# Patient Record
Sex: MALE | Race: WHITE | NOT HISPANIC OR LATINO | Employment: OTHER | URBAN - METROPOLITAN AREA
[De-identification: names, ages, dates, MRNs, and addresses within clinical notes are randomized per-mention and may not be internally consistent; named-entity substitution may affect disease eponyms.]

---

## 2017-08-31 ENCOUNTER — APPOINTMENT (EMERGENCY)
Dept: RADIOLOGY | Facility: HOSPITAL | Age: 72
End: 2017-08-31
Payer: MEDICARE

## 2017-08-31 ENCOUNTER — HOSPITAL ENCOUNTER (EMERGENCY)
Facility: HOSPITAL | Age: 72
Discharge: HOME | End: 2017-08-31
Attending: EMERGENCY MEDICINE | Admitting: EMERGENCY MEDICINE
Payer: MEDICARE

## 2017-08-31 VITALS
SYSTOLIC BLOOD PRESSURE: 171 MMHG | OXYGEN SATURATION: 95 % | RESPIRATION RATE: 20 BRPM | TEMPERATURE: 97.6 F | HEART RATE: 57 BPM | DIASTOLIC BLOOD PRESSURE: 94 MMHG | WEIGHT: 216 LBS

## 2017-08-31 DIAGNOSIS — R07.9 CHEST PAIN: Primary | ICD-10-CM

## 2017-08-31 DIAGNOSIS — T14.8XXA MUSCLE STRAIN: ICD-10-CM

## 2017-08-31 DIAGNOSIS — J18.9 PNEUMONIA: ICD-10-CM

## 2017-08-31 LAB
ALBUMIN SERPL BCP-MCNC: 3.7 G/DL (ref 3.5–5)
ALP SERPL-CCNC: 75 U/L (ref 46–116)
ALT SERPL W P-5'-P-CCNC: 26 U/L (ref 12–78)
ANION GAP SERPL CALCULATED.3IONS-SCNC: 7 MMOL/L (ref 4–13)
AST SERPL W P-5'-P-CCNC: 18 U/L (ref 5–45)
BASOPHILS # BLD AUTO: 0 THOUSANDS/ΜL (ref 0–0.1)
BASOPHILS NFR BLD AUTO: 0 % (ref 0–1)
BILIRUB SERPL-MCNC: 0.5 MG/DL (ref 0.2–1)
BUN SERPL-MCNC: 26 MG/DL (ref 5–25)
CALCIUM SERPL-MCNC: 9.3 MG/DL (ref 8.3–10.1)
CHLORIDE SERPL-SCNC: 100 MMOL/L (ref 100–108)
CO2 SERPL-SCNC: 31 MMOL/L (ref 21–32)
CREAT SERPL-MCNC: 1.03 MG/DL (ref 0.6–1.3)
DEPRECATED D DIMER PPP: 730 NG/ML (FEU) (ref 190–520)
EOSINOPHIL # BLD AUTO: 0.2 THOUSAND/ΜL (ref 0–0.61)
EOSINOPHIL NFR BLD AUTO: 2 % (ref 0–6)
ERYTHROCYTE [DISTWIDTH] IN BLOOD BY AUTOMATED COUNT: 16.3 % (ref 11.6–15.1)
GFR SERPL CREATININE-BSD FRML MDRD: 72 ML/MIN/1.73SQ M
GLUCOSE SERPL-MCNC: 113 MG/DL (ref 65–140)
HCT VFR BLD AUTO: 38.9 % (ref 42–52)
HGB BLD-MCNC: 12.9 G/DL (ref 14–18)
LIPASE SERPL-CCNC: 120 U/L (ref 73–393)
LYMPHOCYTES # BLD AUTO: 1.3 THOUSANDS/ΜL (ref 0.6–4.47)
LYMPHOCYTES NFR BLD AUTO: 13 % (ref 14–44)
MAGNESIUM SERPL-MCNC: 1.8 MG/DL (ref 1.6–2.6)
MCH RBC QN AUTO: 30.3 PG (ref 27–31)
MCHC RBC AUTO-ENTMCNC: 33.3 G/DL (ref 31.4–37.4)
MCV RBC AUTO: 91 FL (ref 82–98)
MONOCYTES # BLD AUTO: 0.8 THOUSAND/ΜL (ref 0.17–1.22)
MONOCYTES NFR BLD AUTO: 8 % (ref 4–12)
NEUTROPHILS # BLD AUTO: 7.8 THOUSANDS/ΜL (ref 1.85–7.62)
NEUTS SEG NFR BLD AUTO: 77 % (ref 43–75)
NRBC BLD AUTO-RTO: 0 /100 WBCS
PLATELET # BLD AUTO: 178 THOUSANDS/UL (ref 130–400)
PMV BLD AUTO: 7.1 FL (ref 8.9–12.7)
POTASSIUM SERPL-SCNC: 4.6 MMOL/L (ref 3.5–5.3)
PROT SERPL-MCNC: 7.3 G/DL (ref 6.4–8.2)
RBC # BLD AUTO: 4.27 MILLION/UL (ref 4.7–6.1)
SODIUM SERPL-SCNC: 138 MMOL/L (ref 136–145)
TROPONIN I SERPL-MCNC: <0.02 NG/ML
TROPONIN I SERPL-MCNC: <0.02 NG/ML
WBC # BLD AUTO: 10.1 THOUSAND/UL (ref 4.8–10.8)

## 2017-08-31 PROCEDURE — 36415 COLL VENOUS BLD VENIPUNCTURE: CPT | Performed by: EMERGENCY MEDICINE

## 2017-08-31 PROCEDURE — 71275 CT ANGIOGRAPHY CHEST: CPT

## 2017-08-31 PROCEDURE — 80053 COMPREHEN METABOLIC PANEL: CPT | Performed by: EMERGENCY MEDICINE

## 2017-08-31 PROCEDURE — 83690 ASSAY OF LIPASE: CPT | Performed by: EMERGENCY MEDICINE

## 2017-08-31 PROCEDURE — 85379 FIBRIN DEGRADATION QUANT: CPT | Performed by: EMERGENCY MEDICINE

## 2017-08-31 PROCEDURE — 96374 THER/PROPH/DIAG INJ IV PUSH: CPT

## 2017-08-31 PROCEDURE — 74175 CTA ABDOMEN W/CONTRAST: CPT

## 2017-08-31 PROCEDURE — 83735 ASSAY OF MAGNESIUM: CPT | Performed by: EMERGENCY MEDICINE

## 2017-08-31 PROCEDURE — 71010 HB CHEST X-RAY 1 VIEW FRONTAL (PORTABLE): CPT

## 2017-08-31 PROCEDURE — 99285 EMERGENCY DEPT VISIT HI MDM: CPT

## 2017-08-31 PROCEDURE — 85025 COMPLETE CBC W/AUTO DIFF WBC: CPT | Performed by: EMERGENCY MEDICINE

## 2017-08-31 PROCEDURE — 84484 ASSAY OF TROPONIN QUANT: CPT | Performed by: EMERGENCY MEDICINE

## 2017-08-31 PROCEDURE — 96375 TX/PRO/DX INJ NEW DRUG ADDON: CPT

## 2017-08-31 PROCEDURE — 94640 AIRWAY INHALATION TREATMENT: CPT

## 2017-08-31 PROCEDURE — 93005 ELECTROCARDIOGRAM TRACING: CPT | Performed by: EMERGENCY MEDICINE

## 2017-08-31 RX ORDER — NITROGLYCERIN 0.4 MG/1
0.4 TABLET SUBLINGUAL ONCE
Status: DISCONTINUED | OUTPATIENT
Start: 2017-08-31 | End: 2017-08-31

## 2017-08-31 RX ORDER — ALBUTEROL SULFATE 90 UG/1
2 AEROSOL, METERED RESPIRATORY (INHALATION) EVERY 4 HOURS PRN
Qty: 1 INHALER | Refills: 0 | Status: SHIPPED | OUTPATIENT
Start: 2017-08-31

## 2017-08-31 RX ORDER — CELECOXIB 200 MG/1
200 CAPSULE ORAL 2 TIMES DAILY
COMMUNITY
End: 2021-04-16 | Stop reason: HOSPADM

## 2017-08-31 RX ORDER — MORPHINE SULFATE 2 MG/ML
2 INJECTION, SOLUTION INTRAMUSCULAR; INTRAVENOUS ONCE
Status: DISCONTINUED | OUTPATIENT
Start: 2017-08-31 | End: 2017-08-31

## 2017-08-31 RX ORDER — CYCLOBENZAPRINE HCL 10 MG
5 TABLET ORAL 2 TIMES DAILY PRN
Qty: 6 TABLET | Refills: 0 | Status: SHIPPED | OUTPATIENT
Start: 2017-08-31 | End: 2021-05-02 | Stop reason: HOSPADM

## 2017-08-31 RX ORDER — ATORVASTATIN CALCIUM 20 MG/1
20 TABLET, FILM COATED ORAL
COMMUNITY

## 2017-08-31 RX ORDER — FOLIC ACID 1 MG/1
1 TABLET ORAL DAILY
COMMUNITY
End: 2021-06-28

## 2017-08-31 RX ORDER — NAPROXEN 500 MG/1
500 TABLET ORAL 2 TIMES DAILY WITH MEALS
Qty: 10 TABLET | Refills: 0 | Status: SHIPPED | OUTPATIENT
Start: 2017-08-31 | End: 2021-04-27 | Stop reason: HOSPADM

## 2017-08-31 RX ORDER — IPRATROPIUM BROMIDE AND ALBUTEROL SULFATE 2.5; .5 MG/3ML; MG/3ML
3 SOLUTION RESPIRATORY (INHALATION) ONCE
Status: COMPLETED | OUTPATIENT
Start: 2017-08-31 | End: 2017-08-31

## 2017-08-31 RX ORDER — MORPHINE SULFATE 4 MG/ML
4 INJECTION, SOLUTION INTRAMUSCULAR; INTRAVENOUS ONCE
Status: COMPLETED | OUTPATIENT
Start: 2017-08-31 | End: 2017-08-31

## 2017-08-31 RX ORDER — METHYLPREDNISOLONE SODIUM SUCCINATE 125 MG/2ML
125 INJECTION, POWDER, LYOPHILIZED, FOR SOLUTION INTRAMUSCULAR; INTRAVENOUS DAILY
Status: DISCONTINUED | OUTPATIENT
Start: 2017-08-31 | End: 2017-08-31 | Stop reason: HOSPADM

## 2017-08-31 RX ORDER — AZITHROMYCIN 250 MG/1
250 TABLET, FILM COATED ORAL DAILY
Qty: 6 TABLET | Refills: 0 | Status: SHIPPED | OUTPATIENT
Start: 2017-08-31 | End: 2017-09-06

## 2017-08-31 RX ADMIN — METHYLPREDNISOLONE SODIUM SUCCINATE 125 MG: 125 INJECTION, POWDER, FOR SOLUTION INTRAMUSCULAR; INTRAVENOUS at 11:07

## 2017-08-31 RX ADMIN — MORPHINE SULFATE 4 MG: 4 INJECTION, SOLUTION INTRAMUSCULAR; INTRAVENOUS at 11:07

## 2017-08-31 RX ADMIN — IPRATROPIUM BROMIDE AND ALBUTEROL SULFATE 3 ML: .5; 3 SOLUTION RESPIRATORY (INHALATION) at 13:32

## 2017-08-31 RX ADMIN — IOHEXOL 100 ML: 350 INJECTION, SOLUTION INTRAVENOUS at 12:01

## 2017-09-03 LAB
ATRIAL RATE: 61 BPM
P AXIS: 53 DEGREES
PR INTERVAL: 140 MS
QRS AXIS: 41 DEGREES
QRSD INTERVAL: 100 MS
QT INTERVAL: 402 MS
QTC INTERVAL: 404 MS
T WAVE AXIS: 48 DEGREES
VENTRICULAR RATE: 61 BPM

## 2017-09-07 ENCOUNTER — TRANSCRIBE ORDERS (OUTPATIENT)
Dept: ADMINISTRATIVE | Facility: HOSPITAL | Age: 72
End: 2017-09-07

## 2017-09-07 DIAGNOSIS — R07.9 CHEST PAIN, UNSPECIFIED TYPE: ICD-10-CM

## 2017-09-07 DIAGNOSIS — I10 ESSENTIAL HYPERTENSION, MALIGNANT: ICD-10-CM

## 2017-09-07 DIAGNOSIS — J44.9 OBSTRUCTIVE CHRONIC BRONCHITIS WITHOUT EXACERBATION (HCC): Primary | ICD-10-CM

## 2017-09-13 ENCOUNTER — TRANSCRIBE ORDERS (OUTPATIENT)
Dept: ADMINISTRATIVE | Facility: HOSPITAL | Age: 72
End: 2017-09-13

## 2017-09-13 ENCOUNTER — HOSPITAL ENCOUNTER (OUTPATIENT)
Dept: RADIOLOGY | Facility: HOSPITAL | Age: 72
Discharge: HOME | End: 2017-09-13
Attending: INTERNAL MEDICINE
Payer: MEDICARE

## 2017-09-13 DIAGNOSIS — R07.89 OTHER CHEST PAIN: ICD-10-CM

## 2017-09-13 DIAGNOSIS — R07.89 OTHER CHEST PAIN: Primary | ICD-10-CM

## 2017-09-13 DIAGNOSIS — I10 ESSENTIAL HYPERTENSION, MALIGNANT: ICD-10-CM

## 2017-09-13 PROCEDURE — 71020 HB CHEST X-RAY 2VW FRONTAL&LATL: CPT

## 2017-09-21 ENCOUNTER — HOSPITAL ENCOUNTER (OUTPATIENT)
Dept: RADIOLOGY | Facility: HOSPITAL | Age: 72
Discharge: HOME | End: 2017-09-21
Attending: INTERNAL MEDICINE
Payer: MEDICARE

## 2017-09-21 ENCOUNTER — HOSPITAL ENCOUNTER (OUTPATIENT)
Dept: NON INVASIVE DIAGNOSTICS | Facility: HOSPITAL | Age: 72
Discharge: HOME | End: 2017-09-21
Attending: INTERNAL MEDICINE
Payer: MEDICARE

## 2017-09-21 ENCOUNTER — HOSPITAL ENCOUNTER (OUTPATIENT)
Dept: PULMONOLOGY | Facility: HOSPITAL | Age: 72
Discharge: HOME | End: 2017-09-21
Attending: INTERNAL MEDICINE
Payer: MEDICARE

## 2017-09-21 ENCOUNTER — GENERIC CONVERSION - ENCOUNTER (OUTPATIENT)
Dept: OTHER | Facility: OTHER | Age: 72
End: 2017-09-21

## 2017-09-21 DIAGNOSIS — I10 ESSENTIAL HYPERTENSION, MALIGNANT: ICD-10-CM

## 2017-09-21 DIAGNOSIS — R07.9 CHEST PAIN, UNSPECIFIED TYPE: ICD-10-CM

## 2017-09-21 DIAGNOSIS — J44.9 OBSTRUCTIVE CHRONIC BRONCHITIS WITHOUT EXACERBATION (HCC): ICD-10-CM

## 2017-09-21 PROCEDURE — 94726 PLETHYSMOGRAPHY LUNG VOLUMES: CPT

## 2017-09-21 PROCEDURE — 93017 CV STRESS TEST TRACING ONLY: CPT

## 2017-09-21 PROCEDURE — 94060 EVALUATION OF WHEEZING: CPT

## 2017-09-21 PROCEDURE — 94729 DIFFUSING CAPACITY: CPT

## 2017-09-21 PROCEDURE — 94760 N-INVAS EAR/PLS OXIMETRY 1: CPT

## 2017-09-21 PROCEDURE — A9502 TC99M TETROFOSMIN: HCPCS

## 2017-09-21 PROCEDURE — 78452 HT MUSCLE IMAGE SPECT MULT: CPT

## 2017-09-21 RX ORDER — ALBUTEROL SULFATE 2.5 MG/3ML
2.5 SOLUTION RESPIRATORY (INHALATION) ONCE
Status: DISCONTINUED | OUTPATIENT
Start: 2017-09-21 | End: 2017-09-25 | Stop reason: HOSPADM

## 2017-09-21 RX ADMIN — REGADENOSON 0.4 MG: 0.08 INJECTION, SOLUTION INTRAVENOUS at 09:27

## 2017-09-22 LAB
MAX DIASTOLIC BP: 80 MMHG
MAX HEART RATE: 92 BPM
MAX PREDICTED HEART RATE: 147 BPM
MAX. SYSTOLIC BP: 167 MMHG
PROTOCOL NAME: NORMAL
TARGET HR FORMULA: NORMAL
TEST INDICATION: NORMAL
TIME IN EXERCISE PHASE: 191 S

## 2018-03-25 ENCOUNTER — APPOINTMENT (EMERGENCY)
Dept: RADIOLOGY | Facility: HOSPITAL | Age: 73
End: 2018-03-25
Payer: MEDICARE

## 2018-03-25 ENCOUNTER — HOSPITAL ENCOUNTER (EMERGENCY)
Facility: HOSPITAL | Age: 73
Discharge: HOME | End: 2018-03-25
Attending: EMERGENCY MEDICINE | Admitting: EMERGENCY MEDICINE
Payer: MEDICARE

## 2018-03-25 VITALS
HEART RATE: 80 BPM | RESPIRATION RATE: 20 BRPM | OXYGEN SATURATION: 98 % | WEIGHT: 224 LBS | DIASTOLIC BLOOD PRESSURE: 94 MMHG | SYSTOLIC BLOOD PRESSURE: 177 MMHG | TEMPERATURE: 97.4 F

## 2018-03-25 DIAGNOSIS — R31.9 HEMATURIA: Primary | ICD-10-CM

## 2018-03-25 LAB
ALBUMIN SERPL BCP-MCNC: 3.6 G/DL (ref 3.5–5)
ALP SERPL-CCNC: 85 U/L (ref 46–116)
ALT SERPL W P-5'-P-CCNC: 26 U/L (ref 12–78)
ANION GAP SERPL CALCULATED.3IONS-SCNC: 9 MMOL/L (ref 4–13)
APTT PPP: 26 SECONDS (ref 24–33)
AST SERPL W P-5'-P-CCNC: 21 U/L (ref 5–45)
BACTERIA UR QL AUTO: ABNORMAL /HPF
BASOPHILS # BLD AUTO: 0 THOUSANDS/ΜL (ref 0–0.1)
BASOPHILS NFR BLD AUTO: 0 % (ref 0–1)
BILIRUB SERPL-MCNC: 0.3 MG/DL (ref 0.2–1)
BILIRUB UR QL STRIP: NEGATIVE
BUN SERPL-MCNC: 28 MG/DL (ref 5–25)
CALCIUM SERPL-MCNC: 9.3 MG/DL (ref 8.3–10.1)
CHLORIDE SERPL-SCNC: 102 MMOL/L (ref 100–108)
CLARITY UR: ABNORMAL
CO2 SERPL-SCNC: 26 MMOL/L (ref 21–32)
COLOR UR: ABNORMAL
CREAT SERPL-MCNC: 1.22 MG/DL (ref 0.6–1.3)
EOSINOPHIL # BLD AUTO: 0.1 THOUSAND/ΜL (ref 0–0.61)
EOSINOPHIL NFR BLD AUTO: 2 % (ref 0–6)
ERYTHROCYTE [DISTWIDTH] IN BLOOD BY AUTOMATED COUNT: 17.5 % (ref 11.6–15.1)
GFR SERPL CREATININE-BSD FRML MDRD: 58 ML/MIN/1.73SQ M
GLUCOSE SERPL-MCNC: 116 MG/DL (ref 65–140)
GLUCOSE UR STRIP-MCNC: NEGATIVE MG/DL
HCT VFR BLD AUTO: 39.2 % (ref 42–52)
HGB BLD-MCNC: 12.8 G/DL (ref 14–18)
HGB UR QL STRIP.AUTO: ABNORMAL
INR PPP: 1.04 (ref 0.86–1.16)
KETONES UR STRIP-MCNC: NEGATIVE MG/DL
LEUKOCYTE ESTERASE UR QL STRIP: NEGATIVE
LYMPHOCYTES # BLD AUTO: 1.4 THOUSANDS/ΜL (ref 0.6–4.47)
LYMPHOCYTES NFR BLD AUTO: 17 % (ref 14–44)
MCH RBC QN AUTO: 29.7 PG (ref 27–31)
MCHC RBC AUTO-ENTMCNC: 32.6 G/DL (ref 31.4–37.4)
MCV RBC AUTO: 91 FL (ref 82–98)
MONOCYTES # BLD AUTO: 0.7 THOUSAND/ΜL (ref 0.17–1.22)
MONOCYTES NFR BLD AUTO: 8 % (ref 4–12)
NEUTROPHILS # BLD AUTO: 6.1 THOUSANDS/ΜL (ref 1.85–7.62)
NEUTS SEG NFR BLD AUTO: 73 % (ref 43–75)
NITRITE UR QL STRIP: NEGATIVE
NON-SQ EPI CELLS URNS QL MICRO: ABNORMAL /HPF
NRBC BLD AUTO-RTO: 0 /100 WBCS
PH UR STRIP.AUTO: 5.5 [PH] (ref 5–9)
PLATELET # BLD AUTO: 213 THOUSANDS/UL (ref 130–400)
PMV BLD AUTO: 7.1 FL (ref 8.9–12.7)
POTASSIUM SERPL-SCNC: 4.5 MMOL/L (ref 3.5–5.3)
PROT SERPL-MCNC: 7.6 G/DL (ref 6.4–8.2)
PROT UR STRIP-MCNC: ABNORMAL MG/DL
PROTHROMBIN TIME: 10.9 SECONDS (ref 9.4–11.7)
RBC # BLD AUTO: 4.31 MILLION/UL (ref 4.7–6.1)
RBC #/AREA URNS AUTO: ABNORMAL /HPF
SODIUM SERPL-SCNC: 137 MMOL/L (ref 136–145)
SP GR UR STRIP.AUTO: 1.02 (ref 1–1.03)
UROBILINOGEN UR QL STRIP.AUTO: 0.2 E.U./DL
WBC # BLD AUTO: 8.3 THOUSAND/UL (ref 4.8–10.8)
WBC #/AREA URNS AUTO: ABNORMAL /HPF

## 2018-03-25 PROCEDURE — 85610 PROTHROMBIN TIME: CPT | Performed by: EMERGENCY MEDICINE

## 2018-03-25 PROCEDURE — 80053 COMPREHEN METABOLIC PANEL: CPT | Performed by: EMERGENCY MEDICINE

## 2018-03-25 PROCEDURE — 96360 HYDRATION IV INFUSION INIT: CPT

## 2018-03-25 PROCEDURE — 96361 HYDRATE IV INFUSION ADD-ON: CPT

## 2018-03-25 PROCEDURE — 85730 THROMBOPLASTIN TIME PARTIAL: CPT | Performed by: EMERGENCY MEDICINE

## 2018-03-25 PROCEDURE — 99284 EMERGENCY DEPT VISIT MOD MDM: CPT

## 2018-03-25 PROCEDURE — 74176 CT ABD & PELVIS W/O CONTRAST: CPT

## 2018-03-25 PROCEDURE — 81001 URINALYSIS AUTO W/SCOPE: CPT | Performed by: EMERGENCY MEDICINE

## 2018-03-25 PROCEDURE — 85025 COMPLETE CBC W/AUTO DIFF WBC: CPT | Performed by: EMERGENCY MEDICINE

## 2018-03-25 PROCEDURE — 36415 COLL VENOUS BLD VENIPUNCTURE: CPT | Performed by: EMERGENCY MEDICINE

## 2018-03-25 PROCEDURE — 87086 URINE CULTURE/COLONY COUNT: CPT | Performed by: EMERGENCY MEDICINE

## 2018-03-25 RX ADMIN — SODIUM CHLORIDE 1000 ML: 0.9 INJECTION, SOLUTION INTRAVENOUS at 15:54

## 2018-03-25 NOTE — ED NOTES
Patient ct scan back  Aware that Dr Taryn Stringer is in with another patient and will be in as soon as he can          Vlad Willams RN  03/25/18 7980

## 2018-03-25 NOTE — DISCHARGE INSTRUCTIONS
Acute Hematuria   WHAT YOU SHOULD KNOW:   Hematuria is blood in your urine from an injury or medical condition  Acute means the problem starts suddenly, worsens quickly, and lasts a short time  Your urine may be bright red to dark brown  Some common causes of hematuria are bladder infection, kidney stone, trauma to the kidneys or bladder, and some medications  Sometimes blood clots can block the urethra so that you cannot empty your bladder  AFTER YOU LEAVE:   Medicines:  Ask about these or other medicines you may need to treat the cause of your acute hematuria:  · Antibiotics: This medicine is given to fight or prevent an infection caused by bacteria  Always take your antibiotics exactly as ordered by your healthcare provider  Do not stop taking your medicine unless directed by your healthcare provider  Never save antibiotics or take leftover antibiotics that were given to you for another illness  · Take your medicine as directed  Call your healthcare provider if you think your medicine is not helping or if you have side effects  Tell him if you are allergic to any medicine  Keep a list of the medicines, vitamins, and herbs you take  Include the amounts, and when and why you take them  Bring the list or the pill bottles to follow-up visits  Carry your medicine list with you in case of an emergency  Increase the amount of fluid you drink:  Drink clear fluids to help flush the blood from your urinary tract  Follow instructions about how much fluid to drink  Follow up with your healthcare provider as directed: Your healthcare provider will tell you how often to come in for follow-up visits  He may refer you to a specialist, such as a urologist or nephrologist  The specialists may do tests or procedures to find more serious problems with your urinary system  Write down your questions so you remember to ask them during your visits     Contact your healthcare provider if:   · You have a fever that gets worse or does not go away with treatment  · You cannot keep liquids or medicines down  · Your urine gets darker, even after you drink extra liquids  · You have questions or concerns about your condition, treatment, or care  Seek care immediately or call 911 if:   · You have blood in your urine after a new injury, such as a fall  · You are urinating very small amounts or not at all  · You feel like you cannot empty your bladder  · You have severe back or side pain that does not go away with treatment  © 2014 [employer] is for End User's use only and may not be sold, redistributed or otherwise used for commercial purposes  All illustrations and images included in CareNotes® are the copyrighted property of [employer]  or Giorgi Wang  The above information is an  only  It is not intended as medical advice for individual conditions or treatments  Talk to your doctor, nurse or pharmacist before following any medical regimen to see if it is safe and effective for you

## 2018-03-26 ENCOUNTER — TRANSCRIBE ORDERS (OUTPATIENT)
Dept: ADMINISTRATIVE | Facility: HOSPITAL | Age: 73
End: 2018-03-26

## 2018-03-26 DIAGNOSIS — R31.0 GROSS HEMATURIA: Primary | ICD-10-CM

## 2018-03-26 LAB — BACTERIA UR CULT: NORMAL

## 2018-03-29 ENCOUNTER — HOSPITAL ENCOUNTER (OUTPATIENT)
Dept: RADIOLOGY | Facility: HOSPITAL | Age: 73
Discharge: HOME | End: 2018-03-29
Attending: SPECIALIST
Payer: MEDICARE

## 2018-03-29 DIAGNOSIS — R31.0 GROSS HEMATURIA: ICD-10-CM

## 2018-03-29 PROCEDURE — 74178 CT ABD&PLV WO CNTR FLWD CNTR: CPT

## 2018-03-29 RX ADMIN — IOHEXOL 100 ML: 350 INJECTION, SOLUTION INTRAVENOUS at 09:35

## 2018-05-02 NOTE — ED PROVIDER NOTES
History  Chief Complaint   Patient presents with    Blood in Urine     Pt reports blood in urine since this morning  Pt also currently has shingles x 3 weeks  Patient states he had blood he noticed in the urine this morning, without dysuria  Patient states he has had recent pain in the flank secondary to zoster infection  There was no change or no new flank pain  No nausea or vomiting today  Patient is not on any blood thinners  Patient is a current everyday smoker            Prior to Admission Medications   Prescriptions Last Dose Informant Patient Reported? Taking? Calcium Carb-Cholecalciferol (CALTRATE 600+D3 SOFT PO)   Yes No   Sig: Take by mouth   albuterol (PROVENTIL HFA,VENTOLIN HFA) 90 mcg/act inhaler   No No   Sig: Inhale 2 puffs every 4 (four) hours as needed for wheezing   atorvastatin (LIPITOR) 20 mg tablet   Yes No   Sig: Take 20 mg by mouth daily at bedtime   celecoxib (CeleBREX) 200 mg capsule   Yes No   Sig: Take 200 mg by mouth 2 (two) times a day   cyclobenzaprine (FLEXERIL) 10 mg tablet   No No   Sig: Take 0 5 tablets by mouth 2 (two) times a day as needed for muscle spasms for up to 3 days   folic acid (FOLVITE) 1 mg tablet   Yes No   Sig: Take 1 mg by mouth daily   methotrexate 2 5 mg tablet   Yes No   Sig: Take 15 5 mg by mouth once a week   metoprolol tartrate (LOPRESSOR) 25 mg tablet   Yes No   Sig: Take 25 mg by mouth every 12 (twelve) hours   naproxen (NAPROSYN) 500 mg tablet   No No   Sig: Take 1 tablet by mouth 2 (two) times a day with meals for 5 days      Facility-Administered Medications: None       Past Medical History:   Diagnosis Date    Cancer [facility])     prostate       Past Surgical History:   Procedure Laterality Date    APPENDECTOMY      HERNIA REPAIR      PROSTATECTOMY      ROTATOR CUFF REPAIR      right shoulder       History reviewed  No pertinent family history  I have reviewed and agree with the history as documented      Social History   Substance Use Topics    Smoking status: Current Every Day Smoker     Packs/day: 0 50     Years: 50 00     Last attempt to quit: 8/29/2017    Smokeless tobacco: Never Used    Alcohol use No        Review of Systems   Constitutional: Negative for fever  Respiratory: Negative for shortness of breath  Gastrointestinal: Negative for abdominal pain and vomiting  Genitourinary: Positive for flank pain and hematuria  Negative for decreased urine volume, dysuria, penile pain and testicular pain  Musculoskeletal: Negative for back pain and myalgias  Skin: Positive for rash  Neurological: Negative for weakness  Hematological: Does not bruise/bleed easily  All other systems reviewed and are negative  Physical Exam  ED Triage Vitals [03/25/18 1445]   Temperature Pulse Respirations Blood Pressure SpO2   (!) 97 4 °F (36 3 °C) 93 19 132/90 96 %      Temp Source Heart Rate Source Patient Position - Orthostatic VS BP Location FiO2 (%)   Tympanic Monitor Sitting Right arm --      Pain Score       6           Orthostatic Vital Signs  Vitals:    03/25/18 1445 03/25/18 1748   BP: 132/90 (!) 177/94   Pulse: 93 80   Patient Position - Orthostatic VS: Sitting        Physical Exam   Constitutional: He is oriented to person, place, and time  He appears well-developed and well-nourished  HENT:   Head: Normocephalic and atraumatic  Eyes: Conjunctivae are normal    Neck: Normal range of motion  Neck supple  Cardiovascular: Normal rate, regular rhythm and normal heart sounds  Pulmonary/Chest: Effort normal and breath sounds normal    Abdominal: Soft  Bowel sounds are normal  There is no tenderness  Musculoskeletal: Normal range of motion  He exhibits no tenderness  Neurological: He is alert and oriented to person, place, and time  Skin: Skin is warm and dry  Psychiatric: He has a normal mood and affect  Nursing note and vitals reviewed        ED Medications  Medications   sodium chloride 0 9 % bolus 1,000 mL (0 mL Intravenous Stopped 3/25/18 1737)       Diagnostic Studies  Results Reviewed     Procedure Component Value Units Date/Time    Urine culture [[id number]] Collected:  03/25/18 1551    Lab Status:  Final result Specimen:  Urine from Urine, Clean Catch Updated:  03/26/18 1809     Urine Culture No Growth <1000 cfu/mL    Urine Microscopic [[id number]]  (Abnormal) Collected:  03/25/18 1551    Lab Status:  Final result Specimen:  Urine from Urine, Clean Catch Updated:  03/25/18 1632     RBC, UA Innumerable (A) /hpf      WBC, UA 0-1 (A) /hpf      Epithelial Cells None Seen /hpf      Bacteria, UA Moderate (A) /hpf     UA w Reflex to Microscopic [[id number]]  (Abnormal) Collected:  03/25/18 1551    Lab Status:  Final result Specimen:  Urine from Urine, Clean Catch Updated:  03/25/18 1630     Color, UA Red     Clarity, UA Turbid     Specific [place], UA 1 020     pH, UA 5 5     Leukocytes, UA Negative     Nitrite, UA Negative     Protein,  (2+) (A) mg/dl      Glucose, UA Negative mg/dl      Ketones, UA Negative mg/dl      Urobilinogen, UA 0 2 E U /dl      Bilirubin, UA Negative     Blood, UA Large (A)    Protime-INR [[id number]]  (Normal) Collected:  03/25/18 1551    Lab Status:  Final result Specimen:  Blood from Arm, Left Updated:  03/25/18 1625     Protime 10 9 seconds      INR 1 04    APTT [[id number]]  (Normal) Collected:  03/25/18 1551    Lab Status:  Final result Specimen:  Blood from Arm, Left Updated:  03/25/18 1625     PTT 26 seconds     Narrative:          Therapeutic Heparin Range = 60-90 seconds    Comprehensive metabolic panel [[id number]]  (Abnormal) Collected:  03/25/18 1551    Lab Status:  Final result Specimen:  Blood from Arm, Left Updated:  03/25/18 1619     Sodium 137 mmol/L      Potassium 4 5 mmol/L      Chloride 102 mmol/L      CO2 26 mmol/L      Anion Gap 9 mmol/L      BUN 28 (H) mg/dL      Creatinine 1 22 mg/dL      Glucose 116 mg/dL      Calcium 9 3 mg/dL      AST 21 U/L      ALT 26 U/L      Alkaline Phosphatase 85 U/L      Total Protein 7 6 g/dL      Albumin 3 6 g/dL      Total Bilirubin 0 30 mg/dL      eGFR 58 ml/min/1 73sq m     Narrative:         National Kidney Disease Education Program recommendations are as follows:  GFR calculation is accurate only with a steady state creatinine  Chronic Kidney disease less than 60 ml/min/1 73 sq  meters  Kidney failure less than 15 ml/min/1 73 sq  meters  CBC and differential [[id number]]  (Abnormal) Collected:  03/25/18 1551    Lab Status:  Final result Specimen:  Blood from Arm, Left Updated:  03/25/18 1602     WBC 8 30 Thousand/uL      RBC 4 31 (L) Million/uL      Hemoglobin 12 8 (L) g/dL      Hematocrit 39 2 (L) %      MCV 91 fL      MCH 29 7 pg      MCHC 32 6 g/dL      RDW 17 5 (H) %      MPV 7 1 (L) fL      Platelets 561 Thousands/uL      nRBC 0 /100 WBCs      Neutrophils Relative 73 %      Lymphocytes Relative 17 %      Monocytes Relative 8 %      Eosinophils Relative 2 %      Basophils Relative 0 %      Neutrophils Absolute 6 10 Thousands/µL      Lymphocytes Absolute 1 40 Thousands/µL      Monocytes Absolute 0 70 Thousand/µL      Eosinophils Absolute 0 10 Thousand/µL      Basophils Absolute 0 00 Thousands/µL                  CT renal stone study abdomen pelvis without contrast   Final Result by Namita Martin MD (03/25 1647)      No acute inflammatory stranding   No renal or ureteric calculi seen             Workstation performed: [id number]                    Procedures  Procedures       Phone Contacts  ED Phone Contact    ED Course                               MDM  Number of Diagnoses or Management Options  Hematuria:   Diagnosis management comments: Patient has recent zoster infection may be complicating the presentation of hematuria, which I believe is actually painless hematuria 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  Must consider possible renal stone, or UTI    CT scan abdomen pelvis, urology follow-up    CritCare Time    Disposition  Final diagnoses:   Hematuria     Time reflects when diagnosis was documented in both MDM as applicable and the Disposition within this note     Time User Action Codes Description Comment    3/25/2018  5:18 PM Con Sis Add [R31 9] Hematuria       ED Disposition     ED Disposition Condition Comment    Discharge  Ivette Wyman discharge to home/self care  Condition at discharge: Stable        Follow-up Information     Follow up With Specialties Details Why [address], MD Urology Go in 1 day  [address]  [phone]          Discharge Medication List as of 3/25/2018  5:20 PM      CONTINUE these medications which have NOT CHANGED    Details   albuterol (PROVENTIL HFA,VENTOLIN HFA) 90 mcg/act inhaler Inhale 2 puffs every 4 (four) hours as needed for wheezing, Starting u 8/31/2017, Print      atorvastatin (LIPITOR) 20 mg tablet Take 20 mg by mouth daily at bedtime, Historical Med      Calcium Carb-Cholecalciferol (CALTRATE 600+D3 SOFT PO) Take by mouth, Historical Med      celecoxib (CeleBREX) 200 mg capsule Take 200 mg by mouth 2 (two) times a day, Historical Med      cyclobenzaprine (FLEXERIL) 10 mg tablet Take 0 5 tablets by mouth 2 (two) times a day as needed for muscle spasms for up to 3 days, Starting Thu 8/31/2017, Until Sun 6/2/1152, Print      folic acid (FOLVITE) 1 mg tablet Take 1 mg by mouth daily, Historical Med      methotrexate 2 5 mg tablet Take 15 5 mg by mouth once a week, Historical Med      metoprolol tartrate (LOPRESSOR) 25 mg tablet Take 25 mg by mouth every 12 (twelve) hours, Historical Med      naproxen (NAPROSYN) 500 mg tablet Take 1 tablet by mouth 2 (two) times a day with meals for 5 days, Starting Thu 8/31/2017, Until Tue 9/5/2017, Print           No discharge procedures on file      ED Provider  Electronically Signed by           Carrie Chong MD  05/02/18 2946

## 2020-06-17 ENCOUNTER — TRANSCRIBE ORDERS (OUTPATIENT)
Dept: ADMINISTRATIVE | Facility: HOSPITAL | Age: 75
End: 2020-06-17

## 2020-06-17 DIAGNOSIS — Z00.00 HEALTH MAINTENANCE EXAMINATION: Primary | ICD-10-CM

## 2020-06-17 DIAGNOSIS — I10 BENIGN HYPERTENSION: ICD-10-CM

## 2020-06-18 ENCOUNTER — OFFICE VISIT (OUTPATIENT)
Dept: LAB | Facility: HOSPITAL | Age: 75
End: 2020-06-18
Attending: INTERNAL MEDICINE
Payer: MEDICARE

## 2020-06-18 ENCOUNTER — TRANSCRIBE ORDERS (OUTPATIENT)
Dept: ADMINISTRATIVE | Facility: HOSPITAL | Age: 75
End: 2020-06-18

## 2020-06-18 ENCOUNTER — HOSPITAL ENCOUNTER (OUTPATIENT)
Dept: RADIOLOGY | Facility: HOSPITAL | Age: 75
Discharge: HOME | End: 2020-06-18
Attending: INTERNAL MEDICINE

## 2020-06-18 DIAGNOSIS — Z00.00 ROUTINE GENERAL MEDICAL EXAMINATION AT A HEALTH CARE FACILITY: Primary | ICD-10-CM

## 2020-06-18 DIAGNOSIS — Z00.00 ROUTINE GENERAL MEDICAL EXAMINATION AT A HEALTH CARE FACILITY: ICD-10-CM

## 2020-06-18 DIAGNOSIS — I10 ESSENTIAL HYPERTENSION, MALIGNANT: ICD-10-CM

## 2020-06-18 LAB
ATRIAL RATE: 70 BPM
P AXIS: 74 DEGREES
PR INTERVAL: 142 MS
QRS AXIS: 29 DEGREES
QRSD INTERVAL: 92 MS
QT INTERVAL: 382 MS
QTC INTERVAL: 412 MS
T WAVE AXIS: 60 DEGREES
VENTRICULAR RATE: 70 BPM

## 2020-06-18 PROCEDURE — 93010 ELECTROCARDIOGRAM REPORT: CPT | Performed by: INTERNAL MEDICINE

## 2020-06-18 PROCEDURE — 93005 ELECTROCARDIOGRAM TRACING: CPT

## 2020-06-18 PROCEDURE — 71046 X-RAY EXAM CHEST 2 VIEWS: CPT

## 2020-06-23 ENCOUNTER — TRANSCRIBE ORDERS (OUTPATIENT)
Dept: ADMINISTRATIVE | Facility: HOSPITAL | Age: 75
End: 2020-06-23

## 2020-06-23 DIAGNOSIS — R79.89 POSITIVE D-DIMER: Primary | ICD-10-CM

## 2020-06-26 ENCOUNTER — HOSPITAL ENCOUNTER (OUTPATIENT)
Dept: NON INVASIVE DIAGNOSTICS | Facility: HOSPITAL | Age: 75
Discharge: HOME | End: 2020-06-26
Attending: INTERNAL MEDICINE
Payer: MEDICARE

## 2020-06-26 DIAGNOSIS — Z00.00 HEALTH MAINTENANCE EXAMINATION: ICD-10-CM

## 2020-06-26 DIAGNOSIS — I10 BENIGN HYPERTENSION: ICD-10-CM

## 2020-06-26 PROCEDURE — 93306 TTE W/DOPPLER COMPLETE: CPT

## 2020-06-27 PROCEDURE — 93306 TTE W/DOPPLER COMPLETE: CPT | Performed by: INTERNAL MEDICINE

## 2020-06-29 ENCOUNTER — HOSPITAL ENCOUNTER (OUTPATIENT)
Dept: RADIOLOGY | Facility: HOSPITAL | Age: 75
Discharge: HOME | End: 2020-06-29
Attending: INTERNAL MEDICINE
Payer: MEDICARE

## 2020-06-29 DIAGNOSIS — R60.9 EDEMA: ICD-10-CM

## 2020-06-29 DIAGNOSIS — R79.89 POSITIVE D-DIMER: ICD-10-CM

## 2020-06-29 PROCEDURE — 93970 EXTREMITY STUDY: CPT

## 2020-06-29 PROCEDURE — 93970 EXTREMITY STUDY: CPT | Performed by: SURGERY

## 2021-02-09 DIAGNOSIS — Z23 ENCOUNTER FOR IMMUNIZATION: ICD-10-CM

## 2021-02-10 ENCOUNTER — IMMUNIZATIONS (OUTPATIENT)
Dept: FAMILY MEDICINE CLINIC | Facility: HOSPITAL | Age: 76
End: 2021-02-10

## 2021-02-10 DIAGNOSIS — Z23 ENCOUNTER FOR IMMUNIZATION: Primary | ICD-10-CM

## 2021-02-10 PROCEDURE — 0011A SARS-COV-2 / COVID-19 MRNA VACCINE (MODERNA) 100 MCG: CPT

## 2021-02-10 PROCEDURE — 91301 SARS-COV-2 / COVID-19 MRNA VACCINE (MODERNA) 100 MCG: CPT

## 2021-03-10 ENCOUNTER — IMMUNIZATIONS (OUTPATIENT)
Dept: FAMILY MEDICINE CLINIC | Facility: HOSPITAL | Age: 76
End: 2021-03-10

## 2021-03-10 DIAGNOSIS — Z23 ENCOUNTER FOR IMMUNIZATION: Primary | ICD-10-CM

## 2021-03-10 PROCEDURE — 91301 SARS-COV-2 / COVID-19 MRNA VACCINE (MODERNA) 100 MCG: CPT

## 2021-03-10 PROCEDURE — 0012A SARS-COV-2 / COVID-19 MRNA VACCINE (MODERNA) 100 MCG: CPT

## 2021-04-12 ENCOUNTER — HOSPITAL ENCOUNTER (INPATIENT)
Facility: HOSPITAL | Age: 76
LOS: 4 days | Discharge: HOME | DRG: 670 | End: 2021-04-16
Attending: EMERGENCY MEDICINE | Admitting: FAMILY MEDICINE
Payer: MEDICARE

## 2021-04-12 DIAGNOSIS — N30.91 HEMORRHAGIC CYSTITIS: Primary | ICD-10-CM

## 2021-04-12 DIAGNOSIS — N39.0 UTI (URINARY TRACT INFECTION): ICD-10-CM

## 2021-04-12 DIAGNOSIS — N30.40 RADIATION CYSTITIS: ICD-10-CM

## 2021-04-12 DIAGNOSIS — R31.0 GROSS HEMATURIA: ICD-10-CM

## 2021-04-12 PROBLEM — E78.5 DYSLIPIDEMIA: Status: ACTIVE | Noted: 2021-04-12

## 2021-04-12 PROBLEM — F17.200 SMOKER: Status: ACTIVE | Noted: 2021-04-12

## 2021-04-12 PROBLEM — N30.90 CYSTITIS: Status: ACTIVE | Noted: 2021-04-12

## 2021-04-12 PROBLEM — I10 ESSENTIAL HYPERTENSION: Status: ACTIVE | Noted: 2021-04-12

## 2021-04-12 LAB
ALBUMIN SERPL BCP-MCNC: 3.6 G/DL (ref 3.5–5)
ALP SERPL-CCNC: 80 U/L (ref 46–116)
ALT SERPL W P-5'-P-CCNC: 40 U/L (ref 12–78)
ANION GAP SERPL CALCULATED.3IONS-SCNC: 11 MMOL/L (ref 4–13)
APTT PPP: 27 SECONDS (ref 23–37)
AST SERPL W P-5'-P-CCNC: 26 U/L (ref 5–45)
BACTERIA UR QL AUTO: ABNORMAL /HPF
BASOPHILS # BLD AUTO: 0.04 THOUSANDS/ΜL (ref 0–0.1)
BASOPHILS NFR BLD AUTO: 0 % (ref 0–1)
BILIRUB SERPL-MCNC: 0.7 MG/DL (ref 0.2–1)
BILIRUB UR QL STRIP: NEGATIVE
BUN SERPL-MCNC: 31 MG/DL (ref 5–25)
CALCIUM SERPL-MCNC: 9.1 MG/DL (ref 8.3–10.1)
CHLORIDE SERPL-SCNC: 102 MMOL/L (ref 100–108)
CLARITY UR: ABNORMAL
CO2 SERPL-SCNC: 26 MMOL/L (ref 21–32)
COLOR UR: ABNORMAL
CREAT SERPL-MCNC: 1.37 MG/DL (ref 0.6–1.3)
EOSINOPHIL # BLD AUTO: 0.15 THOUSAND/ΜL (ref 0–0.61)
EOSINOPHIL NFR BLD AUTO: 1 % (ref 0–6)
ERYTHROCYTE [DISTWIDTH] IN BLOOD BY AUTOMATED COUNT: 15.1 % (ref 11.6–15.1)
GFR SERPL CREATININE-BSD FRML MDRD: 49 ML/MIN/1.73SQ M
GLUCOSE SERPL-MCNC: 109 MG/DL (ref 65–140)
GLUCOSE UR STRIP-MCNC: ABNORMAL MG/DL
HCT VFR BLD AUTO: 33.5 % (ref 36.5–49.3)
HCT VFR BLD AUTO: 38.5 % (ref 36.5–49.3)
HGB BLD-MCNC: 10.8 G/DL (ref 12–17)
HGB BLD-MCNC: 12.3 G/DL (ref 12–17)
HGB UR QL STRIP.AUTO: ABNORMAL
IMM GRANULOCYTES # BLD AUTO: 0.07 THOUSAND/UL (ref 0–0.2)
IMM GRANULOCYTES NFR BLD AUTO: 1 % (ref 0–2)
INR PPP: 1.14 (ref 0.84–1.19)
KETONES UR STRIP-MCNC: ABNORMAL MG/DL
LEUKOCYTE ESTERASE UR QL STRIP: ABNORMAL
LYMPHOCYTES # BLD AUTO: 1.52 THOUSANDS/ΜL (ref 0.6–4.47)
LYMPHOCYTES NFR BLD AUTO: 13 % (ref 14–44)
MCH RBC QN AUTO: 31 PG (ref 26.8–34.3)
MCHC RBC AUTO-ENTMCNC: 31.9 G/DL (ref 31.4–37.4)
MCV RBC AUTO: 97 FL (ref 82–98)
MONOCYTES # BLD AUTO: 0.81 THOUSAND/ΜL (ref 0.17–1.22)
MONOCYTES NFR BLD AUTO: 7 % (ref 4–12)
NEUTROPHILS # BLD AUTO: 9.18 THOUSANDS/ΜL (ref 1.85–7.62)
NEUTS SEG NFR BLD AUTO: 78 % (ref 43–75)
NITRITE UR QL STRIP: POSITIVE
NON-SQ EPI CELLS URNS QL MICRO: ABNORMAL /HPF
NRBC BLD AUTO-RTO: 0 /100 WBCS
PH UR STRIP.AUTO: 6.5 [PH]
PLATELET # BLD AUTO: 184 THOUSANDS/UL (ref 149–390)
PMV BLD AUTO: 9.9 FL (ref 8.9–12.7)
POTASSIUM SERPL-SCNC: 4.2 MMOL/L (ref 3.5–5.3)
PROT SERPL-MCNC: 7 G/DL (ref 6.4–8.2)
PROT UR STRIP-MCNC: >=300 MG/DL
PROTHROMBIN TIME: 14.5 SECONDS (ref 11.6–14.5)
RBC # BLD AUTO: 3.97 MILLION/UL (ref 3.88–5.62)
RBC #/AREA URNS AUTO: ABNORMAL /HPF
SODIUM SERPL-SCNC: 139 MMOL/L (ref 136–145)
SP GR UR STRIP.AUTO: 1.02 (ref 1–1.03)
UROBILINOGEN UR QL STRIP.AUTO: 2 E.U./DL
WBC # BLD AUTO: 11.77 THOUSAND/UL (ref 4.31–10.16)
WBC #/AREA URNS AUTO: ABNORMAL /HPF

## 2021-04-12 PROCEDURE — 85025 COMPLETE CBC W/AUTO DIFF WBC: CPT | Performed by: EMERGENCY MEDICINE

## 2021-04-12 PROCEDURE — 87186 SC STD MICRODIL/AGAR DIL: CPT | Performed by: EMERGENCY MEDICINE

## 2021-04-12 PROCEDURE — 80053 COMPREHEN METABOLIC PANEL: CPT | Performed by: EMERGENCY MEDICINE

## 2021-04-12 PROCEDURE — 81001 URINALYSIS AUTO W/SCOPE: CPT | Performed by: EMERGENCY MEDICINE

## 2021-04-12 PROCEDURE — 87077 CULTURE AEROBIC IDENTIFY: CPT | Performed by: EMERGENCY MEDICINE

## 2021-04-12 PROCEDURE — 99284 EMERGENCY DEPT VISIT MOD MDM: CPT

## 2021-04-12 PROCEDURE — 85610 PROTHROMBIN TIME: CPT | Performed by: EMERGENCY MEDICINE

## 2021-04-12 PROCEDURE — 85730 THROMBOPLASTIN TIME PARTIAL: CPT | Performed by: EMERGENCY MEDICINE

## 2021-04-12 PROCEDURE — 99284 EMERGENCY DEPT VISIT MOD MDM: CPT | Performed by: EMERGENCY MEDICINE

## 2021-04-12 PROCEDURE — 85014 HEMATOCRIT: CPT | Performed by: FAMILY MEDICINE

## 2021-04-12 PROCEDURE — 99223 1ST HOSP IP/OBS HIGH 75: CPT | Performed by: FAMILY MEDICINE

## 2021-04-12 PROCEDURE — 87086 URINE CULTURE/COLONY COUNT: CPT | Performed by: EMERGENCY MEDICINE

## 2021-04-12 PROCEDURE — 36415 COLL VENOUS BLD VENIPUNCTURE: CPT | Performed by: EMERGENCY MEDICINE

## 2021-04-12 PROCEDURE — 85018 HEMOGLOBIN: CPT | Performed by: FAMILY MEDICINE

## 2021-04-12 RX ORDER — CEFTRIAXONE 1 G/50ML
1000 INJECTION, SOLUTION INTRAVENOUS ONCE
Status: COMPLETED | OUTPATIENT
Start: 2021-04-12 | End: 2021-04-12

## 2021-04-12 RX ORDER — FOLIC ACID 1 MG/1
1 TABLET ORAL DAILY
Status: DISCONTINUED | OUTPATIENT
Start: 2021-04-13 | End: 2021-04-16 | Stop reason: HOSPADM

## 2021-04-12 RX ORDER — CEFTRIAXONE 1 G/50ML
1000 INJECTION, SOLUTION INTRAVENOUS EVERY 24 HOURS
Status: DISCONTINUED | OUTPATIENT
Start: 2021-04-13 | End: 2021-04-15

## 2021-04-12 RX ORDER — NICOTINE 21 MG/24HR
1 PATCH, TRANSDERMAL 24 HOURS TRANSDERMAL DAILY
Status: DISCONTINUED | OUTPATIENT
Start: 2021-04-13 | End: 2021-04-16 | Stop reason: HOSPADM

## 2021-04-12 RX ORDER — ATROPA BELLADONNA AND OPIUM 16.2; 3 MG/1; MG/1
30 SUPPOSITORY RECTAL EVERY 8 HOURS PRN
Status: DISCONTINUED | OUTPATIENT
Start: 2021-04-12 | End: 2021-04-16 | Stop reason: HOSPADM

## 2021-04-12 RX ORDER — ALBUTEROL SULFATE 90 UG/1
2 AEROSOL, METERED RESPIRATORY (INHALATION) EVERY 4 HOURS PRN
Status: DISCONTINUED | OUTPATIENT
Start: 2021-04-12 | End: 2021-04-16 | Stop reason: HOSPADM

## 2021-04-12 RX ORDER — ATORVASTATIN CALCIUM 20 MG/1
20 TABLET, FILM COATED ORAL
Status: DISCONTINUED | OUTPATIENT
Start: 2021-04-12 | End: 2021-04-16 | Stop reason: HOSPADM

## 2021-04-12 RX ADMIN — ATORVASTATIN CALCIUM 20 MG: 20 TABLET, FILM COATED ORAL at 23:00

## 2021-04-12 RX ADMIN — CEFTRIAXONE 1000 MG: 1 INJECTION, SOLUTION INTRAVENOUS at 14:30

## 2021-04-12 RX ADMIN — ATROPA BELLADONNA AND OPIUM 1 SUPPOSITORY: 16.2; 3 SUPPOSITORY RECTAL at 19:57

## 2021-04-13 ENCOUNTER — ANESTHESIA EVENT (INPATIENT)
Dept: PERIOP | Facility: HOSPITAL | Age: 76
DRG: 670 | End: 2021-04-13
Payer: MEDICARE

## 2021-04-13 PROBLEM — E78.5 HYPERLIPIDEMIA: Status: ACTIVE | Noted: 2021-04-13

## 2021-04-13 LAB
ANION GAP SERPL CALCULATED.3IONS-SCNC: 8 MMOL/L (ref 4–13)
BUN SERPL-MCNC: 28 MG/DL (ref 5–25)
CALCIUM SERPL-MCNC: 8.6 MG/DL (ref 8.3–10.1)
CHLORIDE SERPL-SCNC: 105 MMOL/L (ref 100–108)
CO2 SERPL-SCNC: 26 MMOL/L (ref 21–32)
CREAT SERPL-MCNC: 1.21 MG/DL (ref 0.6–1.3)
ERYTHROCYTE [DISTWIDTH] IN BLOOD BY AUTOMATED COUNT: 15.2 % (ref 11.6–15.1)
GFR SERPL CREATININE-BSD FRML MDRD: 57 ML/MIN/1.73SQ M
GLUCOSE SERPL-MCNC: 99 MG/DL (ref 65–140)
HCT VFR BLD AUTO: 34.1 % (ref 36.5–49.3)
HGB BLD-MCNC: 11 G/DL (ref 12–17)
MAGNESIUM SERPL-MCNC: 2 MG/DL (ref 1.6–2.6)
MCH RBC QN AUTO: 30.8 PG (ref 26.8–34.3)
MCHC RBC AUTO-ENTMCNC: 32.3 G/DL (ref 31.4–37.4)
MCV RBC AUTO: 96 FL (ref 82–98)
PLATELET # BLD AUTO: 166 THOUSANDS/UL (ref 149–390)
PMV BLD AUTO: 10.2 FL (ref 8.9–12.7)
POTASSIUM SERPL-SCNC: 4.1 MMOL/L (ref 3.5–5.3)
RBC # BLD AUTO: 3.57 MILLION/UL (ref 3.88–5.62)
SARS-COV-2 RNA RESP QL NAA+PROBE: NEGATIVE
SODIUM SERPL-SCNC: 139 MMOL/L (ref 136–145)
WBC # BLD AUTO: 8.63 THOUSAND/UL (ref 4.31–10.16)

## 2021-04-13 PROCEDURE — 87635 SARS-COV-2 COVID-19 AMP PRB: CPT | Performed by: SPECIALIST

## 2021-04-13 PROCEDURE — 99232 SBSQ HOSP IP/OBS MODERATE 35: CPT | Performed by: FAMILY MEDICINE

## 2021-04-13 PROCEDURE — 85027 COMPLETE CBC AUTOMATED: CPT | Performed by: FAMILY MEDICINE

## 2021-04-13 PROCEDURE — 83735 ASSAY OF MAGNESIUM: CPT | Performed by: FAMILY MEDICINE

## 2021-04-13 PROCEDURE — 80048 BASIC METABOLIC PNL TOTAL CA: CPT | Performed by: FAMILY MEDICINE

## 2021-04-13 RX ADMIN — ATORVASTATIN CALCIUM 20 MG: 20 TABLET, FILM COATED ORAL at 22:10

## 2021-04-13 RX ADMIN — FOLIC ACID 1 MG: 1 TABLET ORAL at 09:44

## 2021-04-13 RX ADMIN — METOPROLOL TARTRATE 25 MG: 25 TABLET, FILM COATED ORAL at 09:44

## 2021-04-13 RX ADMIN — CEFTRIAXONE 1000 MG: 1 INJECTION, SOLUTION INTRAVENOUS at 18:40

## 2021-04-13 NOTE — ANESTHESIA PREPROCEDURE EVALUATION
Procedure:  CYSTOSCOPY EVACUATION OF CLOTS bilateral retrograde pyelograms possible TURBT bladder biopsy (Bilateral Bladder)    Relevant Problems   CARDIO   (+) Essential hypertension   (+) Hyperlipidemia      HEMATOLOGY   (+) Anemia      PULMONARY   (+) Chronic obstructive pulmonary disease (HCC)   (+) Smoker (Resolved)      Other   (+) Gross hematuria        Physical Exam    Airway    Mallampati score: II  TM Distance: >3 FB  Neck ROM: full     Dental       Cardiovascular  Rhythm: regular, Rate: normal,     Pulmonary  Breath sounds clear to auscultation,     Other Findings        Anesthesia Plan  ASA Score- 2     Anesthesia Type- general with ASA Monitors  Additional Monitors:   Airway Plan: LMA  Plan Factors-    Chart reviewed  Patient is a current smoker  Induction- intravenous  Postoperative Plan- Plan for postoperative opioid use  Informed Consent- Anesthetic plan and risks discussed with patient  I personally reviewed this patient with the CRNA  Discussed and agreed on the Anesthesia Plan with the CRNA  Antoinette Medina

## 2021-04-14 ENCOUNTER — APPOINTMENT (INPATIENT)
Dept: RADIOLOGY | Facility: HOSPITAL | Age: 76
DRG: 670 | End: 2021-04-14
Payer: MEDICARE

## 2021-04-14 ENCOUNTER — ANESTHESIA (INPATIENT)
Dept: PERIOP | Facility: HOSPITAL | Age: 76
DRG: 670 | End: 2021-04-14
Payer: MEDICARE

## 2021-04-14 PROBLEM — D64.9 ANEMIA: Status: ACTIVE | Noted: 2021-04-14

## 2021-04-14 PROBLEM — J44.9 CHRONIC OBSTRUCTIVE PULMONARY DISEASE (HCC): Status: ACTIVE | Noted: 2021-04-14

## 2021-04-14 PROBLEM — F17.200 SMOKER: Status: RESOLVED | Noted: 2021-04-12 | Resolved: 2021-04-14

## 2021-04-14 LAB
ANION GAP SERPL CALCULATED.3IONS-SCNC: 8 MMOL/L (ref 4–13)
BUN SERPL-MCNC: 28 MG/DL (ref 5–25)
CALCIUM SERPL-MCNC: 8.7 MG/DL (ref 8.3–10.1)
CHLORIDE SERPL-SCNC: 104 MMOL/L (ref 100–108)
CO2 SERPL-SCNC: 27 MMOL/L (ref 21–32)
CREAT SERPL-MCNC: 1.28 MG/DL (ref 0.6–1.3)
GFR SERPL CREATININE-BSD FRML MDRD: 54 ML/MIN/1.73SQ M
GLUCOSE SERPL-MCNC: 97 MG/DL (ref 65–140)
HCT VFR BLD AUTO: 34.2 % (ref 36.5–49.3)
HGB BLD-MCNC: 11 G/DL (ref 12–17)
POTASSIUM SERPL-SCNC: 4 MMOL/L (ref 3.5–5.3)
SODIUM SERPL-SCNC: 139 MMOL/L (ref 136–145)

## 2021-04-14 PROCEDURE — 88304 TISSUE EXAM BY PATHOLOGIST: CPT | Performed by: PATHOLOGY

## 2021-04-14 PROCEDURE — 99232 SBSQ HOSP IP/OBS MODERATE 35: CPT | Performed by: FAMILY MEDICINE

## 2021-04-14 PROCEDURE — 88305 TISSUE EXAM BY PATHOLOGIST: CPT | Performed by: PATHOLOGY

## 2021-04-14 PROCEDURE — 74420 UROGRAPHY RTRGR +-KUB: CPT

## 2021-04-14 PROCEDURE — 85014 HEMATOCRIT: CPT | Performed by: FAMILY MEDICINE

## 2021-04-14 PROCEDURE — 88342 IMHCHEM/IMCYTCHM 1ST ANTB: CPT | Performed by: PATHOLOGY

## 2021-04-14 PROCEDURE — 85018 HEMOGLOBIN: CPT | Performed by: FAMILY MEDICINE

## 2021-04-14 PROCEDURE — 0T5B7ZZ DESTRUCTION OF BLADDER, VIA NATURAL OR ARTIFICIAL OPENING: ICD-10-PCS | Performed by: SPECIALIST

## 2021-04-14 PROCEDURE — 80048 BASIC METABOLIC PNL TOTAL CA: CPT | Performed by: FAMILY MEDICINE

## 2021-04-14 RX ORDER — EPHEDRINE SULFATE 50 MG/ML
INJECTION INTRAVENOUS AS NEEDED
Status: DISCONTINUED | OUTPATIENT
Start: 2021-04-14 | End: 2021-04-14

## 2021-04-14 RX ORDER — FENTANYL CITRATE 50 UG/ML
INJECTION, SOLUTION INTRAMUSCULAR; INTRAVENOUS AS NEEDED
Status: DISCONTINUED | OUTPATIENT
Start: 2021-04-14 | End: 2021-04-14

## 2021-04-14 RX ORDER — LEVOFLOXACIN 5 MG/ML
500 INJECTION, SOLUTION INTRAVENOUS EVERY 24 HOURS
Status: COMPLETED | OUTPATIENT
Start: 2021-04-15 | End: 2021-04-15

## 2021-04-14 RX ORDER — SODIUM CHLORIDE, SODIUM LACTATE, POTASSIUM CHLORIDE, CALCIUM CHLORIDE 600; 310; 30; 20 MG/100ML; MG/100ML; MG/100ML; MG/100ML
20 INJECTION, SOLUTION INTRAVENOUS CONTINUOUS
Status: DISCONTINUED | OUTPATIENT
Start: 2021-04-14 | End: 2021-04-15

## 2021-04-14 RX ORDER — FENTANYL CITRATE/PF 50 MCG/ML
50 SYRINGE (ML) INJECTION
Status: DISCONTINUED | OUTPATIENT
Start: 2021-04-14 | End: 2021-04-14 | Stop reason: HOSPADM

## 2021-04-14 RX ORDER — GLYCINE 1.5 G/100ML
SOLUTION IRRIGATION AS NEEDED
Status: DISCONTINUED | OUTPATIENT
Start: 2021-04-14 | End: 2021-04-14 | Stop reason: HOSPADM

## 2021-04-14 RX ORDER — METOCLOPRAMIDE HYDROCHLORIDE 5 MG/ML
5 INJECTION INTRAMUSCULAR; INTRAVENOUS ONCE AS NEEDED
Status: DISCONTINUED | OUTPATIENT
Start: 2021-04-14 | End: 2021-04-14 | Stop reason: HOSPADM

## 2021-04-14 RX ORDER — SODIUM CHLORIDE, SODIUM LACTATE, POTASSIUM CHLORIDE, CALCIUM CHLORIDE 600; 310; 30; 20 MG/100ML; MG/100ML; MG/100ML; MG/100ML
75 INJECTION, SOLUTION INTRAVENOUS CONTINUOUS
Status: DISCONTINUED | OUTPATIENT
Start: 2021-04-14 | End: 2021-04-14

## 2021-04-14 RX ORDER — ONDANSETRON 2 MG/ML
4 INJECTION INTRAMUSCULAR; INTRAVENOUS ONCE AS NEEDED
Status: DISCONTINUED | OUTPATIENT
Start: 2021-04-14 | End: 2021-04-14 | Stop reason: HOSPADM

## 2021-04-14 RX ORDER — PROPOFOL 10 MG/ML
INJECTION, EMULSION INTRAVENOUS AS NEEDED
Status: DISCONTINUED | OUTPATIENT
Start: 2021-04-14 | End: 2021-04-14

## 2021-04-14 RX ORDER — FENTANYL CITRATE/PF 50 MCG/ML
25 SYRINGE (ML) INJECTION
Status: DISCONTINUED | OUTPATIENT
Start: 2021-04-14 | End: 2021-04-14 | Stop reason: HOSPADM

## 2021-04-14 RX ORDER — LEVOFLOXACIN 5 MG/ML
500 INJECTION, SOLUTION INTRAVENOUS ONCE
Status: DISCONTINUED | OUTPATIENT
Start: 2021-04-14 | End: 2021-04-14 | Stop reason: HOSPADM

## 2021-04-14 RX ORDER — ONDANSETRON 2 MG/ML
4 INJECTION INTRAMUSCULAR; INTRAVENOUS EVERY 4 HOURS PRN
Status: DISCONTINUED | OUTPATIENT
Start: 2021-04-14 | End: 2021-04-16 | Stop reason: HOSPADM

## 2021-04-14 RX ORDER — LIDOCAINE HYDROCHLORIDE 10 MG/ML
INJECTION, SOLUTION EPIDURAL; INFILTRATION; INTRACAUDAL; PERINEURAL AS NEEDED
Status: DISCONTINUED | OUTPATIENT
Start: 2021-04-14 | End: 2021-04-14

## 2021-04-14 RX ADMIN — LIDOCAINE HYDROCHLORIDE 50 MG: 10 INJECTION, SOLUTION EPIDURAL; INFILTRATION; INTRACAUDAL; PERINEURAL at 11:17

## 2021-04-14 RX ADMIN — FENTANYL CITRATE 100 MCG: 50 INJECTION, SOLUTION INTRAMUSCULAR; INTRAVENOUS at 12:19

## 2021-04-14 RX ADMIN — EPHEDRINE SULFATE 10 MG: 50 INJECTION, SOLUTION INTRAVENOUS at 11:25

## 2021-04-14 RX ADMIN — FENTANYL CITRATE 50 MCG: 50 INJECTION, SOLUTION INTRAMUSCULAR; INTRAVENOUS at 11:33

## 2021-04-14 RX ADMIN — ATORVASTATIN CALCIUM 20 MG: 20 TABLET, FILM COATED ORAL at 21:13

## 2021-04-14 RX ADMIN — SODIUM CHLORIDE, SODIUM LACTATE, POTASSIUM CHLORIDE, AND CALCIUM CHLORIDE 20 ML/HR: .6; .31; .03; .02 INJECTION, SOLUTION INTRAVENOUS at 13:42

## 2021-04-14 RX ADMIN — CEFTRIAXONE 1000 MG: 1 INJECTION, SOLUTION INTRAVENOUS at 15:36

## 2021-04-14 RX ADMIN — PROPOFOL 150 MG: 10 INJECTION, EMULSION INTRAVENOUS at 11:17

## 2021-04-14 RX ADMIN — METOPROLOL TARTRATE 25 MG: 25 TABLET, FILM COATED ORAL at 20:58

## 2021-04-14 RX ADMIN — ONDANSETRON 4 MG: 2 INJECTION INTRAMUSCULAR; INTRAVENOUS at 21:44

## 2021-04-14 RX ADMIN — LEVOFLOXACIN 500 MG: 5 INJECTION, SOLUTION INTRAVENOUS at 11:20

## 2021-04-14 RX ADMIN — MORPHINE SULFATE 2 MG: 2 INJECTION, SOLUTION INTRAMUSCULAR; INTRAVENOUS at 21:43

## 2021-04-14 RX ADMIN — FENTANYL CITRATE 50 MCG: 50 INJECTION, SOLUTION INTRAMUSCULAR; INTRAVENOUS at 13:05

## 2021-04-14 RX ADMIN — ATROPA BELLADONNA AND OPIUM 1 SUPPOSITORY: 16.2; 3 SUPPOSITORY RECTAL at 20:58

## 2021-04-14 RX ADMIN — FENTANYL CITRATE 50 MCG: 50 INJECTION, SOLUTION INTRAMUSCULAR; INTRAVENOUS at 11:22

## 2021-04-14 RX ADMIN — SODIUM CHLORIDE, SODIUM LACTATE, POTASSIUM CHLORIDE, AND CALCIUM CHLORIDE: .6; .31; .03; .02 INJECTION, SOLUTION INTRAVENOUS at 11:05

## 2021-04-14 RX ADMIN — EPHEDRINE SULFATE 5 MG: 50 INJECTION, SOLUTION INTRAVENOUS at 11:39

## 2021-04-14 RX ADMIN — FENTANYL CITRATE 25 MCG: 50 INJECTION, SOLUTION INTRAMUSCULAR; INTRAVENOUS at 12:27

## 2021-04-14 RX ADMIN — FOLIC ACID 1 MG: 1 TABLET ORAL at 09:06

## 2021-04-14 RX ADMIN — FENTANYL CITRATE 25 MCG: 50 INJECTION, SOLUTION INTRAMUSCULAR; INTRAVENOUS at 12:43

## 2021-04-14 RX ADMIN — FENTANYL CITRATE 25 MCG: 50 INJECTION, SOLUTION INTRAMUSCULAR; INTRAVENOUS at 12:36

## 2021-04-14 RX ADMIN — MORPHINE SULFATE 2 MG: 2 INJECTION, SOLUTION INTRAMUSCULAR; INTRAVENOUS at 15:30

## 2021-04-14 NOTE — ANESTHESIA POSTPROCEDURE EVALUATION
Post-Op Assessment Note    CV Status:  Stable  Pain Score: 0    Pain management: adequate     Mental Status:  Alert and awake   Hydration Status:  Euvolemic   PONV Controlled:  Controlled   Airway Patency:  Patent      Post Op Vitals Reviewed: Yes      Staff: CRNA         No complications documented      BP   130/78   Temp   97 9   Pulse  84   Resp   18   SpO2   98

## 2021-04-15 LAB
BACTERIA UR CULT: ABNORMAL
BACTERIA UR CULT: ABNORMAL
HCT VFR BLD AUTO: 33.5 % (ref 36.5–49.3)
HGB BLD-MCNC: 10.9 G/DL (ref 12–17)

## 2021-04-15 PROCEDURE — 99232 SBSQ HOSP IP/OBS MODERATE 35: CPT | Performed by: INTERNAL MEDICINE

## 2021-04-15 PROCEDURE — 85018 HEMOGLOBIN: CPT | Performed by: SPECIALIST

## 2021-04-15 PROCEDURE — 85014 HEMATOCRIT: CPT | Performed by: SPECIALIST

## 2021-04-15 RX ORDER — VANCOMYCIN HYDROCHLORIDE 1 G/200ML
10 INJECTION, SOLUTION INTRAVENOUS EVERY 12 HOURS
Status: DISCONTINUED | OUTPATIENT
Start: 2021-04-15 | End: 2021-04-16 | Stop reason: HOSPADM

## 2021-04-15 RX ADMIN — ATORVASTATIN CALCIUM 20 MG: 20 TABLET, FILM COATED ORAL at 21:34

## 2021-04-15 RX ADMIN — FOLIC ACID 1 MG: 1 TABLET ORAL at 10:18

## 2021-04-15 RX ADMIN — VANCOMYCIN HYDROCHLORIDE 1000 MG: 1 INJECTION, SOLUTION INTRAVENOUS at 21:36

## 2021-04-15 RX ADMIN — VANCOMYCIN HYDROCHLORIDE 1000 MG: 1 INJECTION, SOLUTION INTRAVENOUS at 10:19

## 2021-04-15 RX ADMIN — LEVOFLOXACIN 500 MG: 5 INJECTION, SOLUTION INTRAVENOUS at 12:38

## 2021-04-15 RX ADMIN — ATROPA BELLADONNA AND OPIUM 1 SUPPOSITORY: 16.2; 3 SUPPOSITORY RECTAL at 22:57

## 2021-04-16 VITALS
RESPIRATION RATE: 16 BRPM | WEIGHT: 212.3 LBS | TEMPERATURE: 98.7 F | OXYGEN SATURATION: 90 % | SYSTOLIC BLOOD PRESSURE: 107 MMHG | HEIGHT: 71 IN | DIASTOLIC BLOOD PRESSURE: 58 MMHG | HEART RATE: 67 BPM | BODY MASS INDEX: 29.72 KG/M2

## 2021-04-16 LAB
ANION GAP SERPL CALCULATED.3IONS-SCNC: 7 MMOL/L (ref 4–13)
BASOPHILS # BLD AUTO: 0.02 THOUSANDS/ΜL (ref 0–0.1)
BASOPHILS NFR BLD AUTO: 0 % (ref 0–1)
BUN SERPL-MCNC: 29 MG/DL (ref 5–25)
CALCIUM SERPL-MCNC: 8.7 MG/DL (ref 8.3–10.1)
CHLORIDE SERPL-SCNC: 103 MMOL/L (ref 100–108)
CO2 SERPL-SCNC: 27 MMOL/L (ref 21–32)
CREAT SERPL-MCNC: 1.44 MG/DL (ref 0.6–1.3)
EOSINOPHIL # BLD AUTO: 0.16 THOUSAND/ΜL (ref 0–0.61)
EOSINOPHIL NFR BLD AUTO: 2 % (ref 0–6)
ERYTHROCYTE [DISTWIDTH] IN BLOOD BY AUTOMATED COUNT: 15.2 % (ref 11.6–15.1)
GFR SERPL CREATININE-BSD FRML MDRD: 47 ML/MIN/1.73SQ M
GLUCOSE SERPL-MCNC: 102 MG/DL (ref 65–140)
HCT VFR BLD AUTO: 32.8 % (ref 36.5–49.3)
HGB BLD-MCNC: 10.4 G/DL (ref 12–17)
IMM GRANULOCYTES # BLD AUTO: 0.05 THOUSAND/UL (ref 0–0.2)
IMM GRANULOCYTES NFR BLD AUTO: 1 % (ref 0–2)
LYMPHOCYTES # BLD AUTO: 1.75 THOUSANDS/ΜL (ref 0.6–4.47)
LYMPHOCYTES NFR BLD AUTO: 18 % (ref 14–44)
MCH RBC QN AUTO: 30.9 PG (ref 26.8–34.3)
MCHC RBC AUTO-ENTMCNC: 31.7 G/DL (ref 31.4–37.4)
MCV RBC AUTO: 97 FL (ref 82–98)
MONOCYTES # BLD AUTO: 0.87 THOUSAND/ΜL (ref 0.17–1.22)
MONOCYTES NFR BLD AUTO: 9 % (ref 4–12)
NEUTROPHILS # BLD AUTO: 7.09 THOUSANDS/ΜL (ref 1.85–7.62)
NEUTS SEG NFR BLD AUTO: 70 % (ref 43–75)
NRBC BLD AUTO-RTO: 0 /100 WBCS
PLATELET # BLD AUTO: 159 THOUSANDS/UL (ref 149–390)
PMV BLD AUTO: 10 FL (ref 8.9–12.7)
POTASSIUM SERPL-SCNC: 4.1 MMOL/L (ref 3.5–5.3)
RBC # BLD AUTO: 3.37 MILLION/UL (ref 3.88–5.62)
SODIUM SERPL-SCNC: 137 MMOL/L (ref 136–145)
WBC # BLD AUTO: 9.94 THOUSAND/UL (ref 4.31–10.16)

## 2021-04-16 PROCEDURE — 99239 HOSP IP/OBS DSCHRG MGMT >30: CPT | Performed by: PHYSICIAN ASSISTANT

## 2021-04-16 PROCEDURE — 97161 PT EVAL LOW COMPLEX 20 MIN: CPT

## 2021-04-16 PROCEDURE — 85025 COMPLETE CBC W/AUTO DIFF WBC: CPT | Performed by: INTERNAL MEDICINE

## 2021-04-16 PROCEDURE — 80048 BASIC METABOLIC PNL TOTAL CA: CPT | Performed by: INTERNAL MEDICINE

## 2021-04-16 RX ORDER — AMOXICILLIN 250 MG/5ML
500 POWDER, FOR SUSPENSION ORAL 2 TIMES DAILY
Qty: 80 ML | Refills: 0 | Status: SHIPPED | OUTPATIENT
Start: 2021-04-16 | End: 2021-04-20

## 2021-04-16 RX ADMIN — FOLIC ACID 1 MG: 1 TABLET ORAL at 10:42

## 2021-04-16 RX ADMIN — VANCOMYCIN HYDROCHLORIDE 1000 MG: 1 INJECTION, SOLUTION INTRAVENOUS at 10:42

## 2021-04-23 ENCOUNTER — HOSPITAL ENCOUNTER (EMERGENCY)
Facility: HOSPITAL | Age: 76
Discharge: HOME | DRG: 663 | End: 2021-04-23
Attending: EMERGENCY MEDICINE | Admitting: EMERGENCY MEDICINE
Payer: MEDICARE

## 2021-04-23 VITALS
DIASTOLIC BLOOD PRESSURE: 64 MMHG | OXYGEN SATURATION: 94 % | SYSTOLIC BLOOD PRESSURE: 137 MMHG | HEART RATE: 67 BPM | TEMPERATURE: 97.2 F | RESPIRATION RATE: 20 BRPM

## 2021-04-23 DIAGNOSIS — R33.9 URINARY RETENTION: Primary | ICD-10-CM

## 2021-04-23 DIAGNOSIS — R31.9 HEMATURIA: ICD-10-CM

## 2021-04-23 PROCEDURE — 99283 EMERGENCY DEPT VISIT LOW MDM: CPT

## 2021-04-23 PROCEDURE — 99284 EMERGENCY DEPT VISIT MOD MDM: CPT | Performed by: EMERGENCY MEDICINE

## 2021-04-23 RX ORDER — OXYCODONE HYDROCHLORIDE AND ACETAMINOPHEN 5; 325 MG/1; MG/1
1 TABLET ORAL ONCE
Status: COMPLETED | OUTPATIENT
Start: 2021-04-23 | End: 2021-04-23

## 2021-04-23 RX ADMIN — OXYCODONE HYDROCHLORIDE AND ACETAMINOPHEN 1 TABLET: 5; 325 TABLET ORAL at 22:30

## 2021-04-24 ENCOUNTER — APPOINTMENT (INPATIENT)
Dept: RADIOLOGY | Facility: HOSPITAL | Age: 76
DRG: 663 | End: 2021-04-24
Payer: MEDICARE

## 2021-04-24 ENCOUNTER — ANESTHESIA EVENT (INPATIENT)
Dept: PERIOP | Facility: HOSPITAL | Age: 76
DRG: 663 | End: 2021-04-24
Payer: MEDICARE

## 2021-04-24 ENCOUNTER — HOSPITAL ENCOUNTER (INPATIENT)
Facility: HOSPITAL | Age: 76
LOS: 3 days | Discharge: HOME | DRG: 663 | End: 2021-04-27
Attending: EMERGENCY MEDICINE | Admitting: INTERNAL MEDICINE
Payer: MEDICARE

## 2021-04-24 ENCOUNTER — ANESTHESIA (INPATIENT)
Dept: PERIOP | Facility: HOSPITAL | Age: 76
DRG: 663 | End: 2021-04-24
Payer: MEDICARE

## 2021-04-24 DIAGNOSIS — R31.0 GROSS HEMATURIA: ICD-10-CM

## 2021-04-24 DIAGNOSIS — N30.01 ACUTE CYSTITIS WITH HEMATURIA: ICD-10-CM

## 2021-04-24 DIAGNOSIS — N30.91 HEMATURIA DUE TO CYSTITIS: Primary | ICD-10-CM

## 2021-04-24 PROBLEM — N39.0 UTI (URINARY TRACT INFECTION): Status: ACTIVE | Noted: 2021-04-24

## 2021-04-24 PROBLEM — D72.829 LEUKOCYTOSIS: Status: ACTIVE | Noted: 2021-04-24

## 2021-04-24 PROBLEM — N17.9 AKI (ACUTE KIDNEY INJURY) (HCC): Status: ACTIVE | Noted: 2021-04-24

## 2021-04-24 LAB
ALBUMIN SERPL BCP-MCNC: 3.5 G/DL (ref 3.5–5)
ALP SERPL-CCNC: 86 U/L (ref 46–116)
ALT SERPL W P-5'-P-CCNC: 43 U/L (ref 12–78)
ANION GAP SERPL CALCULATED.3IONS-SCNC: 11 MMOL/L (ref 4–13)
ANION GAP SERPL CALCULATED.3IONS-SCNC: 9 MMOL/L (ref 4–13)
APTT PPP: 26 SECONDS (ref 23–37)
AST SERPL W P-5'-P-CCNC: 30 U/L (ref 5–45)
BACTERIA UR QL AUTO: ABNORMAL /HPF
BASOPHILS # BLD AUTO: 0.01 THOUSANDS/ΜL (ref 0–0.1)
BASOPHILS # BLD AUTO: 0.03 THOUSANDS/ΜL (ref 0–0.1)
BASOPHILS NFR BLD AUTO: 0 % (ref 0–1)
BASOPHILS NFR BLD AUTO: 0 % (ref 0–1)
BILIRUB SERPL-MCNC: 0.39 MG/DL (ref 0.2–1)
BILIRUB UR QL STRIP: NEGATIVE
BUN SERPL-MCNC: 32 MG/DL (ref 5–25)
BUN SERPL-MCNC: 33 MG/DL (ref 5–25)
CALCIUM SERPL-MCNC: 8.5 MG/DL (ref 8.3–10.1)
CALCIUM SERPL-MCNC: 8.9 MG/DL (ref 8.3–10.1)
CHLORIDE SERPL-SCNC: 103 MMOL/L (ref 100–108)
CHLORIDE SERPL-SCNC: 106 MMOL/L (ref 100–108)
CLARITY UR: ABNORMAL
CO2 SERPL-SCNC: 27 MMOL/L (ref 21–32)
CO2 SERPL-SCNC: 27 MMOL/L (ref 21–32)
COLOR UR: ABNORMAL
CREAT SERPL-MCNC: 1.43 MG/DL (ref 0.6–1.3)
CREAT SERPL-MCNC: 1.59 MG/DL (ref 0.6–1.3)
EOSINOPHIL # BLD AUTO: 0.03 THOUSAND/ΜL (ref 0–0.61)
EOSINOPHIL # BLD AUTO: 0.1 THOUSAND/ΜL (ref 0–0.61)
EOSINOPHIL NFR BLD AUTO: 0 % (ref 0–6)
EOSINOPHIL NFR BLD AUTO: 1 % (ref 0–6)
ERYTHROCYTE [DISTWIDTH] IN BLOOD BY AUTOMATED COUNT: 14.3 % (ref 11.6–15.1)
ERYTHROCYTE [DISTWIDTH] IN BLOOD BY AUTOMATED COUNT: 14.4 % (ref 11.6–15.1)
GFR SERPL CREATININE-BSD FRML MDRD: 41 ML/MIN/1.73SQ M
GFR SERPL CREATININE-BSD FRML MDRD: 47 ML/MIN/1.73SQ M
GLUCOSE P FAST SERPL-MCNC: 150 MG/DL (ref 65–99)
GLUCOSE SERPL-MCNC: 132 MG/DL (ref 65–140)
GLUCOSE SERPL-MCNC: 150 MG/DL (ref 65–140)
GLUCOSE UR STRIP-MCNC: NEGATIVE MG/DL
HCT VFR BLD AUTO: 31.5 % (ref 36.5–49.3)
HCT VFR BLD AUTO: 34.5 % (ref 36.5–49.3)
HGB BLD-MCNC: 11.2 G/DL (ref 12–17)
HGB BLD-MCNC: 9.8 G/DL (ref 12–17)
HGB UR QL STRIP.AUTO: ABNORMAL
IMM GRANULOCYTES # BLD AUTO: 0.06 THOUSAND/UL (ref 0–0.2)
IMM GRANULOCYTES # BLD AUTO: 0.08 THOUSAND/UL (ref 0–0.2)
IMM GRANULOCYTES NFR BLD AUTO: 1 % (ref 0–2)
IMM GRANULOCYTES NFR BLD AUTO: 1 % (ref 0–2)
INR PPP: 1.1 (ref 0.84–1.19)
KETONES UR STRIP-MCNC: ABNORMAL MG/DL
LEUKOCYTE ESTERASE UR QL STRIP: ABNORMAL
LYMPHOCYTES # BLD AUTO: 0.93 THOUSANDS/ΜL (ref 0.6–4.47)
LYMPHOCYTES # BLD AUTO: 1.31 THOUSANDS/ΜL (ref 0.6–4.47)
LYMPHOCYTES NFR BLD AUTO: 8 % (ref 14–44)
LYMPHOCYTES NFR BLD AUTO: 9 % (ref 14–44)
MCH RBC QN AUTO: 30.1 PG (ref 26.8–34.3)
MCH RBC QN AUTO: 30.8 PG (ref 26.8–34.3)
MCHC RBC AUTO-ENTMCNC: 31.1 G/DL (ref 31.4–37.4)
MCHC RBC AUTO-ENTMCNC: 32.5 G/DL (ref 31.4–37.4)
MCV RBC AUTO: 95 FL (ref 82–98)
MCV RBC AUTO: 97 FL (ref 82–98)
MONOCYTES # BLD AUTO: 0.63 THOUSAND/ΜL (ref 0.17–1.22)
MONOCYTES # BLD AUTO: 0.68 THOUSAND/ΜL (ref 0.17–1.22)
MONOCYTES NFR BLD AUTO: 5 % (ref 4–12)
MONOCYTES NFR BLD AUTO: 6 % (ref 4–12)
NEUTROPHILS # BLD AUTO: 12.57 THOUSANDS/ΜL (ref 1.85–7.62)
NEUTROPHILS # BLD AUTO: 9.72 THOUSANDS/ΜL (ref 1.85–7.62)
NEUTS SEG NFR BLD AUTO: 84 % (ref 43–75)
NEUTS SEG NFR BLD AUTO: 85 % (ref 43–75)
NITRITE UR QL STRIP: POSITIVE
NON-SQ EPI CELLS URNS QL MICRO: ABNORMAL /HPF
NRBC BLD AUTO-RTO: 0 /100 WBCS
NRBC BLD AUTO-RTO: 0 /100 WBCS
NT-PROBNP SERPL-MCNC: 157 PG/ML
PH UR STRIP.AUTO: 7 [PH]
PLATELET # BLD AUTO: 180 THOUSANDS/UL (ref 149–390)
PLATELET # BLD AUTO: 207 THOUSANDS/UL (ref 149–390)
PMV BLD AUTO: 10 FL (ref 8.9–12.7)
PMV BLD AUTO: 10 FL (ref 8.9–12.7)
POTASSIUM SERPL-SCNC: 4.3 MMOL/L (ref 3.5–5.3)
POTASSIUM SERPL-SCNC: 4.6 MMOL/L (ref 3.5–5.3)
PROT SERPL-MCNC: 7 G/DL (ref 6.4–8.2)
PROT UR STRIP-MCNC: >=300 MG/DL
PROTHROMBIN TIME: 14.1 SECONDS (ref 11.6–14.5)
RBC # BLD AUTO: 3.26 MILLION/UL (ref 3.88–5.62)
RBC # BLD AUTO: 3.64 MILLION/UL (ref 3.88–5.62)
RBC #/AREA URNS AUTO: ABNORMAL /HPF
SARS-COV-2 RNA RESP QL NAA+PROBE: NEGATIVE
SODIUM SERPL-SCNC: 141 MMOL/L (ref 136–145)
SODIUM SERPL-SCNC: 142 MMOL/L (ref 136–145)
SP GR UR STRIP.AUTO: 1.02 (ref 1–1.03)
TROPONIN I SERPL-MCNC: <0.02 NG/ML
UROBILINOGEN UR QL STRIP.AUTO: 2 E.U./DL
WBC # BLD AUTO: 11.38 THOUSAND/UL (ref 4.31–10.16)
WBC # BLD AUTO: 14.77 THOUSAND/UL (ref 4.31–10.16)
WBC #/AREA URNS AUTO: ABNORMAL /HPF

## 2021-04-24 PROCEDURE — 71045 X-RAY EXAM CHEST 1 VIEW: CPT

## 2021-04-24 PROCEDURE — 81001 URINALYSIS AUTO W/SCOPE: CPT | Performed by: EMERGENCY MEDICINE

## 2021-04-24 PROCEDURE — 99223 1ST HOSP IP/OBS HIGH 75: CPT | Performed by: INTERNAL MEDICINE

## 2021-04-24 PROCEDURE — 0TCB8ZZ EXTIRPATION OF MATTER FROM BLADDER, VIA NATURAL OR ARTIFICIAL OPENING ENDOSCOPIC: ICD-10-PCS | Performed by: SPECIALIST

## 2021-04-24 PROCEDURE — U0003 INFECTIOUS AGENT DETECTION BY NUCLEIC ACID (DNA OR RNA); SEVERE ACUTE RESPIRATORY SYNDROME CORONAVIRUS 2 (SARS-COV-2) (CORONAVIRUS DISEASE [COVID-19]), AMPLIFIED PROBE TECHNIQUE, MAKING USE OF HIGH THROUGHPUT TECHNOLOGIES AS DESCRIBED BY CMS-2020-01-R: HCPCS | Performed by: SPECIALIST

## 2021-04-24 PROCEDURE — 87086 URINE CULTURE/COLONY COUNT: CPT | Performed by: EMERGENCY MEDICINE

## 2021-04-24 PROCEDURE — 93005 ELECTROCARDIOGRAM TRACING: CPT

## 2021-04-24 PROCEDURE — 94640 AIRWAY INHALATION TREATMENT: CPT

## 2021-04-24 PROCEDURE — 3E1K78Z IRRIGATION OF GENITOURINARY TRACT USING IRRIGATING SUBSTANCE, VIA NATURAL OR ARTIFICIAL OPENING: ICD-10-PCS | Performed by: INTERNAL MEDICINE

## 2021-04-24 PROCEDURE — 84484 ASSAY OF TROPONIN QUANT: CPT | Performed by: INTERNAL MEDICINE

## 2021-04-24 PROCEDURE — 1124F ACP DISCUSS-NO DSCNMKR DOCD: CPT | Performed by: EMERGENCY MEDICINE

## 2021-04-24 PROCEDURE — 85610 PROTHROMBIN TIME: CPT | Performed by: EMERGENCY MEDICINE

## 2021-04-24 PROCEDURE — 85025 COMPLETE CBC W/AUTO DIFF WBC: CPT | Performed by: EMERGENCY MEDICINE

## 2021-04-24 PROCEDURE — 80053 COMPREHEN METABOLIC PANEL: CPT | Performed by: EMERGENCY MEDICINE

## 2021-04-24 PROCEDURE — 74176 CT ABD & PELVIS W/O CONTRAST: CPT

## 2021-04-24 PROCEDURE — 94760 N-INVAS EAR/PLS OXIMETRY 1: CPT

## 2021-04-24 PROCEDURE — 36415 COLL VENOUS BLD VENIPUNCTURE: CPT | Performed by: EMERGENCY MEDICINE

## 2021-04-24 PROCEDURE — 99285 EMERGENCY DEPT VISIT HI MDM: CPT

## 2021-04-24 PROCEDURE — 99284 EMERGENCY DEPT VISIT MOD MDM: CPT | Performed by: EMERGENCY MEDICINE

## 2021-04-24 PROCEDURE — 87077 CULTURE AEROBIC IDENTIFY: CPT | Performed by: EMERGENCY MEDICINE

## 2021-04-24 PROCEDURE — 85025 COMPLETE CBC W/AUTO DIFF WBC: CPT | Performed by: PHYSICIAN ASSISTANT

## 2021-04-24 PROCEDURE — 85730 THROMBOPLASTIN TIME PARTIAL: CPT | Performed by: EMERGENCY MEDICINE

## 2021-04-24 PROCEDURE — U0005 INFEC AGEN DETEC AMPLI PROBE: HCPCS | Performed by: SPECIALIST

## 2021-04-24 PROCEDURE — 80048 BASIC METABOLIC PNL TOTAL CA: CPT | Performed by: PHYSICIAN ASSISTANT

## 2021-04-24 PROCEDURE — 0W3R8ZZ CONTROL BLEEDING IN GENITOURINARY TRACT, VIA NATURAL OR ARTIFICIAL OPENING ENDOSCOPIC: ICD-10-PCS | Performed by: SPECIALIST

## 2021-04-24 PROCEDURE — 83880 ASSAY OF NATRIURETIC PEPTIDE: CPT | Performed by: INTERNAL MEDICINE

## 2021-04-24 RX ORDER — GLYCINE 1.5 G/100ML
SOLUTION IRRIGATION AS NEEDED
Status: DISCONTINUED | OUTPATIENT
Start: 2021-04-24 | End: 2021-04-24 | Stop reason: HOSPADM

## 2021-04-24 RX ORDER — PROPOFOL 10 MG/ML
INJECTION, EMULSION INTRAVENOUS AS NEEDED
Status: DISCONTINUED | OUTPATIENT
Start: 2021-04-24 | End: 2021-04-24

## 2021-04-24 RX ORDER — FENTANYL CITRATE 50 UG/ML
INJECTION, SOLUTION INTRAMUSCULAR; INTRAVENOUS AS NEEDED
Status: DISCONTINUED | OUTPATIENT
Start: 2021-04-24 | End: 2021-04-24

## 2021-04-24 RX ORDER — FENTANYL CITRATE/PF 50 MCG/ML
50 SYRINGE (ML) INJECTION
Status: DISCONTINUED | OUTPATIENT
Start: 2021-04-24 | End: 2021-04-24 | Stop reason: HOSPADM

## 2021-04-24 RX ORDER — POLYETHYLENE GLYCOL 3350 17 G/17G
17 POWDER, FOR SOLUTION ORAL DAILY PRN
Status: DISCONTINUED | OUTPATIENT
Start: 2021-04-24 | End: 2021-04-27 | Stop reason: HOSPADM

## 2021-04-24 RX ORDER — LEVOFLOXACIN 5 MG/ML
500 INJECTION, SOLUTION INTRAVENOUS ONCE
Status: DISCONTINUED | OUTPATIENT
Start: 2021-04-24 | End: 2021-04-24 | Stop reason: HOSPADM

## 2021-04-24 RX ORDER — MAGNESIUM HYDROXIDE/ALUMINUM HYDROXICE/SIMETHICONE 120; 1200; 1200 MG/30ML; MG/30ML; MG/30ML
30 SUSPENSION ORAL EVERY 6 HOURS PRN
Status: DISCONTINUED | OUTPATIENT
Start: 2021-04-24 | End: 2021-04-27 | Stop reason: HOSPADM

## 2021-04-24 RX ORDER — EPHEDRINE SULFATE 50 MG/ML
INJECTION INTRAVENOUS AS NEEDED
Status: DISCONTINUED | OUTPATIENT
Start: 2021-04-24 | End: 2021-04-24

## 2021-04-24 RX ORDER — PROMETHAZINE HYDROCHLORIDE 25 MG/ML
12.5 INJECTION, SOLUTION INTRAMUSCULAR; INTRAVENOUS ONCE AS NEEDED
Status: DISCONTINUED | OUTPATIENT
Start: 2021-04-24 | End: 2021-04-24 | Stop reason: HOSPADM

## 2021-04-24 RX ORDER — AMOXICILLIN 250 MG/1
500 CAPSULE ORAL EVERY 8 HOURS SCHEDULED
Status: DISCONTINUED | OUTPATIENT
Start: 2021-04-24 | End: 2021-04-27 | Stop reason: HOSPADM

## 2021-04-24 RX ORDER — ONDANSETRON 2 MG/ML
4 INJECTION INTRAMUSCULAR; INTRAVENOUS ONCE AS NEEDED
Status: DISCONTINUED | OUTPATIENT
Start: 2021-04-24 | End: 2021-04-24 | Stop reason: HOSPADM

## 2021-04-24 RX ORDER — FOLIC ACID 1 MG/1
1 TABLET ORAL DAILY
Status: DISCONTINUED | OUTPATIENT
Start: 2021-04-24 | End: 2021-04-27 | Stop reason: HOSPADM

## 2021-04-24 RX ORDER — DEXAMETHASONE SODIUM PHOSPHATE 10 MG/ML
INJECTION, SOLUTION INTRAMUSCULAR; INTRAVENOUS AS NEEDED
Status: DISCONTINUED | OUTPATIENT
Start: 2021-04-24 | End: 2021-04-24

## 2021-04-24 RX ORDER — MEPERIDINE HYDROCHLORIDE 25 MG/ML
12.5 INJECTION INTRAMUSCULAR; INTRAVENOUS; SUBCUTANEOUS
Status: DISCONTINUED | OUTPATIENT
Start: 2021-04-24 | End: 2021-04-24 | Stop reason: HOSPADM

## 2021-04-24 RX ORDER — ONDANSETRON 2 MG/ML
4 INJECTION INTRAMUSCULAR; INTRAVENOUS EVERY 6 HOURS PRN
Status: DISCONTINUED | OUTPATIENT
Start: 2021-04-24 | End: 2021-04-27 | Stop reason: HOSPADM

## 2021-04-24 RX ORDER — LIDOCAINE HYDROCHLORIDE 10 MG/ML
INJECTION, SOLUTION EPIDURAL; INFILTRATION; INTRACAUDAL; PERINEURAL AS NEEDED
Status: DISCONTINUED | OUTPATIENT
Start: 2021-04-24 | End: 2021-04-24

## 2021-04-24 RX ORDER — CALCIUM CARBONATE 200(500)MG
1000 TABLET,CHEWABLE ORAL DAILY PRN
Status: DISCONTINUED | OUTPATIENT
Start: 2021-04-24 | End: 2021-04-27 | Stop reason: HOSPADM

## 2021-04-24 RX ORDER — ATROPA BELLADONNA AND OPIUM 16.2; 3 MG/1; MG/1
30 SUPPOSITORY RECTAL EVERY 8 HOURS PRN
Status: DISCONTINUED | OUTPATIENT
Start: 2021-04-24 | End: 2021-04-27 | Stop reason: HOSPADM

## 2021-04-24 RX ORDER — ACETAMINOPHEN 325 MG/1
650 TABLET ORAL EVERY 6 HOURS PRN
Status: DISCONTINUED | OUTPATIENT
Start: 2021-04-24 | End: 2021-04-27 | Stop reason: HOSPADM

## 2021-04-24 RX ORDER — IPRATROPIUM BROMIDE AND ALBUTEROL SULFATE 2.5; .5 MG/3ML; MG/3ML
3 SOLUTION RESPIRATORY (INHALATION) 2 TIMES DAILY PRN
Status: DISCONTINUED | OUTPATIENT
Start: 2021-04-24 | End: 2021-04-27 | Stop reason: HOSPADM

## 2021-04-24 RX ORDER — LEVOFLOXACIN 5 MG/ML
500 INJECTION, SOLUTION INTRAVENOUS EVERY 24 HOURS
Status: COMPLETED | OUTPATIENT
Start: 2021-04-24 | End: 2021-04-24

## 2021-04-24 RX ORDER — METHYLPREDNISOLONE SODIUM SUCCINATE 125 MG/2ML
60 INJECTION, POWDER, LYOPHILIZED, FOR SOLUTION INTRAMUSCULAR; INTRAVENOUS ONCE
Status: COMPLETED | OUTPATIENT
Start: 2021-04-24 | End: 2021-04-24

## 2021-04-24 RX ORDER — ATORVASTATIN CALCIUM 20 MG/1
20 TABLET, FILM COATED ORAL
Status: DISCONTINUED | OUTPATIENT
Start: 2021-04-24 | End: 2021-04-27 | Stop reason: HOSPADM

## 2021-04-24 RX ORDER — IPRATROPIUM BROMIDE AND ALBUTEROL SULFATE 2.5; .5 MG/3ML; MG/3ML
SOLUTION RESPIRATORY (INHALATION)
Status: COMPLETED
Start: 2021-04-24 | End: 2021-04-24

## 2021-04-24 RX ORDER — SODIUM CHLORIDE 9 MG/ML
75 INJECTION, SOLUTION INTRAVENOUS CONTINUOUS
Status: DISCONTINUED | OUTPATIENT
Start: 2021-04-24 | End: 2021-04-24

## 2021-04-24 RX ORDER — ONDANSETRON 2 MG/ML
INJECTION INTRAMUSCULAR; INTRAVENOUS AS NEEDED
Status: DISCONTINUED | OUTPATIENT
Start: 2021-04-24 | End: 2021-04-24

## 2021-04-24 RX ADMIN — METOPROLOL TARTRATE 25 MG: 25 TABLET, FILM COATED ORAL at 21:27

## 2021-04-24 RX ADMIN — FENTANYL CITRATE 50 MCG: 50 INJECTION, SOLUTION INTRAMUSCULAR; INTRAVENOUS at 15:24

## 2021-04-24 RX ADMIN — PROPOFOL 20 MG: 10 INJECTION, EMULSION INTRAVENOUS at 15:45

## 2021-04-24 RX ADMIN — METOPROLOL TARTRATE 25 MG: 25 TABLET, FILM COATED ORAL at 08:49

## 2021-04-24 RX ADMIN — ATORVASTATIN CALCIUM 20 MG: 20 TABLET, FILM COATED ORAL at 21:27

## 2021-04-24 RX ADMIN — PROPOFOL 30 MG: 10 INJECTION, EMULSION INTRAVENOUS at 15:43

## 2021-04-24 RX ADMIN — LEVOFLOXACIN 500 MG: 5 INJECTION, SOLUTION INTRAVENOUS at 17:25

## 2021-04-24 RX ADMIN — AMOXICILLIN 500 MG: 250 CAPSULE ORAL at 21:27

## 2021-04-24 RX ADMIN — LIDOCAINE HYDROCHLORIDE 50 MG: 10 INJECTION, SOLUTION EPIDURAL; INFILTRATION; INTRACAUDAL; PERINEURAL at 15:24

## 2021-04-24 RX ADMIN — FENTANYL CITRATE 25 MCG: 50 INJECTION, SOLUTION INTRAMUSCULAR; INTRAVENOUS at 15:40

## 2021-04-24 RX ADMIN — EPHEDRINE SULFATE 10 MG: 50 INJECTION, SOLUTION INTRAVENOUS at 15:29

## 2021-04-24 RX ADMIN — SODIUM CHLORIDE 75 ML/HR: 0.9 INJECTION, SOLUTION INTRAVENOUS at 02:53

## 2021-04-24 RX ADMIN — VANCOMYCIN HYDROCHLORIDE 2000 MG: 1 INJECTION, POWDER, LYOPHILIZED, FOR SOLUTION INTRAVENOUS at 05:01

## 2021-04-24 RX ADMIN — ONDANSETRON 4 MG: 2 INJECTION INTRAMUSCULAR; INTRAVENOUS at 15:28

## 2021-04-24 RX ADMIN — IPRATROPIUM BROMIDE AND ALBUTEROL SULFATE 3 ML: 2.5; .5 SOLUTION RESPIRATORY (INHALATION) at 18:32

## 2021-04-24 RX ADMIN — EPHEDRINE SULFATE 10 MG: 50 INJECTION, SOLUTION INTRAVENOUS at 15:50

## 2021-04-24 RX ADMIN — DEXAMETHASONE SODIUM PHOSPHATE 4 MG: 10 INJECTION, SOLUTION INTRAMUSCULAR; INTRAVENOUS at 15:28

## 2021-04-24 RX ADMIN — METHYLPREDNISOLONE SODIUM SUCCINATE 60 MG: 125 INJECTION, POWDER, FOR SOLUTION INTRAMUSCULAR; INTRAVENOUS at 18:48

## 2021-04-24 RX ADMIN — PROPOFOL 150 MG: 10 INJECTION, EMULSION INTRAVENOUS at 15:24

## 2021-04-24 RX ADMIN — FOLIC ACID 1 MG: 1 TABLET ORAL at 08:49

## 2021-04-24 RX ADMIN — ATROPA BELLADONNA AND OPIUM 1 SUPPOSITORY: 16.2; 3 SUPPOSITORY RECTAL at 04:57

## 2021-04-24 RX ADMIN — FENTANYL CITRATE 25 MCG: 50 INJECTION, SOLUTION INTRAMUSCULAR; INTRAVENOUS at 15:43

## 2021-04-24 NOTE — ED PROVIDER NOTES
History  Chief Complaint   Patient presents with    Urinary Retention     states a couple hours ago passed a couple of large clots  last regular urination about 4 pm uncomfortable     Pt in the ER with c/o acute urinary retention tonight  He has a hx of prostate CA and was recently diagnosed with hemorrhagic cystitis  He voided 2 large clots, after which he was unable to urinate  Bladder scan showed that pt was retaining 200ml of urine  Pt requested a alanis catheter  History provided by:  Patient  History limited by:  Acuity of condition   used: No    Difficulty Urinating  Presenting symptoms: dysuria    Relieved by:  None tried  Worsened by:  Nothing  Ineffective treatments:  None tried  Associated symptoms: abdominal pain, hematuria and urinary retention    Associated symptoms: no diarrhea, no fever, no flank pain, no nausea and no vomiting    Abdominal pain:     Location:  Suprapubic    Quality: aching and pressure      Severity:  Moderate    Onset quality:  Sudden    Timing:  Constant    Progression:  Worsening    Chronicity:  Recurrent      Prior to Admission Medications   Prescriptions Last Dose Informant Patient Reported? Taking?    Calcium Carb-Cholecalciferol (CALTRATE 600+D3 SOFT PO)   Yes No   Sig: Take by mouth   albuterol (PROVENTIL HFA,VENTOLIN HFA) 90 mcg/act inhaler   No No   Sig: Inhale 2 puffs every 4 (four) hours as needed for wheezing   atorvastatin (LIPITOR) 20 mg tablet   Yes No   Sig: Take 20 mg by mouth daily at bedtime   cyclobenzaprine (FLEXERIL) 10 mg tablet   No No   Sig: Take 0 5 tablets by mouth 2 (two) times a day as needed for muscle spasms for up to 3 days   folic acid (FOLVITE) 1 mg tablet   Yes No   Sig: Take 1 mg by mouth daily   methotrexate 2 5 mg tablet   Yes No   Sig: Take 15 5 mg by mouth once a week   metoprolol tartrate (LOPRESSOR) 25 mg tablet   Yes No   Sig: Take 25 mg by mouth every 12 (twelve) hours   naproxen (NAPROSYN) 500 mg tablet   No No Sig: Take 1 tablet by mouth 2 (two) times a day with meals for 5 days      Facility-Administered Medications: None       Past Medical History:   Diagnosis Date    Cancer [facility])     prostate       Past Surgical History:   Procedure Laterality Date    APPENDECTOMY      [place] RETROGRADE PYELOGRAM  4/14/2021    HERNIA REPAIR      [place] CYSTOURETHROSCOPY W/IRRIG & EVAC CLOTS Bilateral 4/14/2021    Procedure: CYSTOSCOPY EVACUATION OF CLOTS bilateral retrograde pyelograms possible TURBT bladder biopsy;  Surgeon: Sean Sunshine MD;  Location: [address] [place] [address];  Service: Urology    PROSTATECTOMY      [address]      right shoulder       History reviewed  No pertinent family history  I have reviewed and agree with the history as documented  E-Cigarette/Vaping    E-Cigarette Use Former User      E-Cigarette/Vaping Substances     Social History     Tobacco Use    Smoking status: Former Smoker     Packs/day: 0 50     Years: 50 00     Pack years: 25 00     Quit date: 8/29/2017     Years since quitting: 3 6    Smokeless tobacco: Never Used    Tobacco comment: quit one month ago   Substance Use Topics    Alcohol use: Never     Frequency: Never    Drug use: No       Review of Systems   Constitutional: Negative for chills and fever  Respiratory: Negative for cough, shortness of breath and wheezing  Cardiovascular: Negative for chest pain and palpitations  Gastrointestinal: Positive for abdominal pain  Negative for constipation, diarrhea, nausea and vomiting  Genitourinary: Positive for dysuria, hematuria and urgency  Negative for flank pain  Musculoskeletal: Negative for back pain  Skin: Negative for color change and rash  All other systems reviewed and are negative  Physical Exam  Physical Exam  Vitals signs and nursing note reviewed  Constitutional:       Appearance: He is well-developed  HENT:      Head: Normocephalic and atraumatic     Eyes:      Pupils: Pupils are equal, round, and reactive to light  Cardiovascular:      Rate and Rhythm: Normal rate and regular rhythm  Heart sounds: Normal heart sounds  Pulmonary:      Effort: Pulmonary effort is normal       Breath sounds: Normal breath sounds  Abdominal:      General: Bowel sounds are normal  There is no distension  Palpations: Abdomen is soft  There is no mass  Tenderness: There is no abdominal tenderness  There is no guarding or rebound  Skin:     General: Skin is warm and dry  Capillary Refill: Capillary refill takes less than 2 seconds  Neurological:      General: No focal deficit present  Mental Status: He is alert and oriented to person, place, and time  Psychiatric:         Behavior: Behavior normal          Thought Content: Thought content normal          Judgment: Judgment normal          Vital Signs  ED Triage Vitals   Temperature Pulse Respirations Blood Pressure SpO2   04/23/21 2038 04/23/21 2038 04/23/21 2038 04/23/21 2038 04/23/21 2038   98 3 °F (36 8 °C) 84 18 (!) 170/107 95 %      Temp Source Heart Rate Source Patient Position - Orthostatic VS BP Location FiO2 (%)   04/23/21 2038 04/23/21 2038 04/23/21 2038 04/23/21 2038 --   Tympanic Monitor Sitting Right arm       Pain Score       04/23/21 2230       Worst Possible Pain           Vitals:    04/23/21 2038 04/23/21 2244   BP: (!) 170/107 137/64   Pulse: 84 67   Patient Position - Orthostatic VS: Sitting Sitting         Visual Acuity      ED Medications  Medications   oxyCODONE-acetaminophen (PERCOCET) 5-325 mg per tablet 1 tablet (1 tablet Oral Given 4/23/21 2230)       Diagnostic Studies  Results Reviewed     None                 No orders to display              Procedures  Procedures         ED Course                                           MDM  Number of Diagnoses or Management Options  Hematuria: new and requires workup  Urinary retention: new and requires workup  Diagnosis management comments: Pt in ER with c/o hematuria   Initial alanis catheter obstructed with blood clots, changed to 3way alanis catheter, which was hand irrigated, with good urine output  Will d/c pt to home with 3way catheter  I reviewed pt with Dr Darcy Dorman, and he agrees with plan  Risk of Complications, Morbidity, and/or Mortality  Presenting problems: high  Diagnostic procedures: high  Management options: high    Patient Progress  Patient progress: improved      Disposition  Final diagnoses:   Urinary retention   Hematuria     Time reflects when diagnosis was documented in both MDM as applicable and the Disposition within this note     Time User Action Codes Description Comment    4/23/2021  9:51 PM Fredda Jeronimo Add [R33 9] Urinary retention     4/23/2021  9:51 PM Fredda Jeronimo Add [R31 9] Hematuria       ED Disposition     ED Disposition Condition Date/Time Comment    Discharge Stable Fri Apr 23, 2021  9:51 PM Jase Hankins discharge to home/self care  Follow-up Information     Follow up With Specialties Details Why Contact Moon Menezes MD Internal Medicine Schedule an appointment as soon as possible for a visit in 2 days for follow up [address]    [facility] [id number] [phone]      Justino Garcia MD Urology Schedule an appointment as soon as possible for a visit in 3 days for follow up [address] [facility]  [phone]            Discharge Medication List as of 4/23/2021 10:28 PM      CONTINUE these medications which have NOT CHANGED    Details   albuterol (PROVENTIL HFA,VENTOLIN HFA) 90 mcg/act inhaler Inhale 2 puffs every 4 (four) hours as needed for wheezing, Starting u 8/31/2017, Print      atorvastatin (LIPITOR) 20 mg tablet Take 20 mg by mouth daily at bedtime, Historical Med      Calcium Carb-Cholecalciferol (CALTRATE 600+D3 SOFT PO) Take by mouth, Historical Med      cyclobenzaprine (FLEXERIL) 10 mg tablet Take 0 5 tablets by mouth 2 (two) times a day as needed for muscle spasms for up to 3 days, Starting Thu 8/31/2017, Until Sun 4/6/5089, Print      folic acid (FOLVITE) 1 mg tablet Take 1 mg by mouth daily, Historical Med      methotrexate 2 5 mg tablet Take 15 5 mg by mouth once a week, Historical Med      metoprolol tartrate (LOPRESSOR) 25 mg tablet Take 25 mg by mouth every 12 (twelve) hours, Historical Med      naproxen (NAPROSYN) 500 mg tablet Take 1 tablet by mouth 2 (two) times a day with meals for 5 days, Starting Thu 8/31/2017, Until Tue 9/5/2017, Print           No discharge procedures on file      PDMP Review     None          ED Provider  Electronically Signed by           Franc Ochoa DO  04/24/21 0204

## 2021-04-24 NOTE — PHYSICAL THERAPY NOTE
Pt declined PT evaluation today  Pt states he is independently ambulating  Will follow    Simona Irby PT  [id number]     04/24/21 1131   Note Type   Note type Evaluation   Cancel Reasons Other  (pt refused)

## 2021-04-24 NOTE — ED NOTES
Pt initially has 18F alanis placed in ED by Trang Boothe  This RN hand irrigated it with some clots but pt tolerates it poorly  Requested to have the alanis removed as he states that it happened before and he had clots it the cath that won't move  MD made aware  Dr Duncan Going at bedside, order to remove 18F alanis and inserted 22F alanis 3 ways inserted, urine returned noted  Hand irrigated performed, pt tolerates well, some clots noted  Leg bag applied          Shane Clayton RN  04/23/21 0772

## 2021-04-24 NOTE — ED NOTES
PATIENT ARRIVED WITH GONSALVES CATH IN PLACE  RESPIRATIONS EVEN AND UNLABORED  PATIENT REPORTS PAIN IN PENIS 10/10  PATIENT APPEARS UNCOMFORTABLE, SPOUSE AT BEDSIDE       Ramón Almonte RN  04/24/21 1749

## 2021-04-24 NOTE — DISCHARGE INSTRUCTIONS
Keep alanis catheter in place until you follow up with Dr Sonali Motnes  Return to the ER for further concerns or worsening symptoms  Follow up with your primary care physician in 1-2 days

## 2021-04-24 NOTE — ANESTHESIA PREPROCEDURE EVALUATION
Procedure:  CYSTOSCOPY EVACUATION OF CLOTS fulguration (N/A Bladder)    Relevant Problems   CARDIO   (+) Essential hypertension   (+) Hyperlipidemia      /RENAL   (+) SHANTI (acute kidney injury) ([address] )      HEMATOLOGY   (+) Anemia      PULMONARY   (+) Chronic obstructive pulmonary disease (HCC)      Other   (+) Cystitis        Physical Exam    Airway    Mallampati score: II  TM Distance: >3 FB  Neck ROM: full     Dental       Cardiovascular  Rhythm: regular, Rate: normal,     Pulmonary  Breath sounds clear to auscultation,     Other Findings        Anesthesia Plan  ASA Score- 2 Emergent    Anesthesia Type- general with ASA Monitors  Additional Monitors:   Airway Plan: LMA  Plan Factors-    Chart reviewed  Patient is not a current smoker  Induction- intravenous  Postoperative Plan- Plan for postoperative opioid use  Informed Consent- Anesthetic plan and risks discussed with patient

## 2021-04-24 NOTE — ASSESSMENT & PLAN NOTE
Noted exacerbation post cystoscopy  Chest x-ray without any acute abnormality  EKG within normal limit  Troponin, proBNP negative    Improved with albuterol and IV Solu-Medrol x1  · Continue albuterol as needed

## 2021-04-24 NOTE — H&P
Arm[facility] 43  H&P- Marin Shea 1945, 66 y o  male MRN: [id number]  Unit/Bed#: ED 07 Encounter: [id number]  Primary Care Provider: Rupal Boyer MD   Date and time admitted to hospital: 4/24/2021 12:29 AM    * Gross hematuria  Assessment & Plan  Patient recently underwent cystoscopy on 4/14 with evacuation of clots, bladder biopsy and extensive fulguration of radiation cysitis and was maintained on CBI initially during hospitalization and was discharged without Sumner, sent with Abx for UTI  He arrived in ED earlier tonight with complaint of hematuria and urinary retention and was sent home with a Sumner catheter  He complained of significant pain and minimal urine output from the Sumner and felt dizzy and lightheaded from the pain  - UA pending  - CBI started in ED   - Monitor H+H, stable at 11 2  - Urology consult appreciated    UTI (urinary tract infection)  Assessment & Plan  WBC 14 on admission  UA with moderate leukocytes, nitrites  Previous culture grew E faecalis, susceptible to vanco  Treat with vanco pending urine cultures     SHANTI (acute kidney injury) ([facility] [address] )  Assessment & Plan  Baseline Cr between 1 2-1 3  Cr 1 59 on admission  IVF  Repeat BMP in AM  Avoid nephrotoxins     Chronic obstructive pulmonary disease ([facility] [address] )  Assessment & Plan  No acute exacerbation at this time   PRN albuterol    Dyslipidemia  Assessment & Plan  Continue statin therapy    Essential hypertension  Assessment & Plan  Continue Lopressor 25 mg Q12     VTE Prophylaxis: Pharmacologic VTE Prophylaxis contraindicated due to gross hematuria  / sequential compression device   Code Status: level 3  POLST: POLST is not applicable to this patient  Discussion with family: Pt's wife present in ED     Anticipated Length of Stay:  Patient will be admitted on an Observation basis with an anticipated length of stay of  < 2 midnights     Justification for Hospital Stay: gross hematuria    Total Time for Visit, including Counseling / Coordination of Care: 30 minutes  Greater than 50% of this total time spent on direct patient counseling and coordination of care  Chief Complaint:   Hematuria, dysuria    History of Present Illness:    Altagracia Johnson is a 66 y o  male who presents with PMH of HTN, HLD, COPD, prostate cancer s/p radiation/chemo, prostatectomy  He initially presented to the ED with complaint of urinary retention and hematuria  He was passing clots at home and then felt as though he was unable to urinate and presented to the ED  He had a Sumner cathter placed in the ED and was discharged home  He returned a few hours later complaining of severe pain related to the Sumner  He states he felt lightheaded, dizzy, diaphoretic from the pain itself  He had the Sumner replaced in the ED with CBI with significant relief  He denies any fevers, chills, abdominal pain, nausea, vomiting  Review of Systems:    Review of Systems   Constitutional: Negative for chills, fatigue and fever  HENT: Negative for congestion, rhinorrhea and sore throat  Eyes: Negative for visual disturbance  Respiratory: Negative for cough, shortness of breath and wheezing  Cardiovascular: Negative for chest pain, palpitations and leg swelling  Gastrointestinal: Negative for abdominal distention, abdominal pain, diarrhea, nausea and vomiting  Genitourinary: Positive for dysuria, hematuria, penile pain and urgency  Negative for frequency  Musculoskeletal: Negative for gait problem and myalgias  Neurological: Negative for dizziness, weakness, light-headedness, numbness and headaches         Past Medical and Surgical History:     Past Medical History:   Diagnosis Date    Cancer [facility])     prostate       Past Surgical History:   Procedure Laterality Date    APPENDECTOMY      FL RETROGRADE PYELOGRAM  4/14/2021    HERNIA REPAIR      GA CYSTOURETHROSCOPY W/IRRIG & EVAC CLOTS Bilateral 4/14/2021    Procedure: CYSTOSCOPY EVACUATION OF CLOTS bilateral retrograde pyelograms possible TURBT bladder biopsy;  Surgeon: Raoul Arredondo MD;  Location: [address] [place] [address];  Service: Urology    PROSTATECTOMY      ROTATOR CUFF REPAIR      right shoulder       Meds/Allergies:    Prior to Admission medications    Medication Sig Start Date End Date Taking? Authorizing Provider   albuterol (PROVENTIL HFA,VENTOLIN HFA) 90 mcg/act inhaler Inhale 2 puffs every 4 (four) hours as needed for wheezing 8/31/17   Kelly Rivera DO   atorvastatin (LIPITOR) 20 mg tablet Take 20 mg by mouth daily at bedtime    Historical Provider, MD   Calcium Carb-Cholecalciferol (CALTRATE 600+D3 SOFT PO) Take by mouth    Historical Provider, MD   cyclobenzaprine (FLEXERIL) 10 mg tablet Take 0 5 tablets by mouth 2 (two) times a day as needed for muscle spasms for up to 3 days 8/31/17 9/3/17  Kelly Rivera DO   folic acid (FOLVITE) 1 mg tablet Take 1 mg by mouth daily    Historical Provider, MD   methotrexate 2 5 mg tablet Take 15 5 mg by mouth once a week    Historical Provider, MD   metoprolol tartrate (LOPRESSOR) 25 mg tablet Take 25 mg by mouth every 12 (twelve) hours    Historical Provider, MD   naproxen (NAPROSYN) 500 mg tablet Take 1 tablet by mouth 2 (two) times a day with meals for 5 days 8/31/17 9/5/17  Kelly Rivera DO       Allergies: No Known Allergies    Social History:    Social History     Substance and Sexual Activity   Alcohol Use Never    Frequency: Never     Social History     Tobacco Use   Smoking Status Former Smoker    Packs/day: 0 50    Years: 50 00    Pack years: 25 00    Quit date: 8/29/2017    Years since quitting: 3 6   Smokeless Tobacco Never Used   Tobacco Comment    quit one month ago     Social History     Substance and Sexual Activity   Drug Use No       Family History:    History reviewed  No pertinent family history      Physical Exam:     Vitals:   Blood Pressure: 98/55 (04/24/21 0145)  Pulse: 66 (04/24/21 0145)  Respirations: 20 (04/24/21 0037)  SpO2: 91 %(placed on 2L O2) (04/24/21 0145)    Physical Exam  Vitals signs reviewed  Constitutional:       General: He is not in acute distress  Appearance: He is well-developed  He is not ill-appearing  HENT:      Head: Normocephalic and atraumatic  Cardiovascular:      Rate and Rhythm: Normal rate and regular rhythm  Heart sounds: Normal heart sounds  No murmur  No gallop  Pulmonary:      Effort: Pulmonary effort is normal  No respiratory distress  Breath sounds: Normal breath sounds  No wheezing, rhonchi or rales  Abdominal:      General: Bowel sounds are normal  There is no distension  Palpations: Abdomen is soft  Tenderness: There is no abdominal tenderness  There is no guarding  Musculoskeletal:         General: No tenderness  Right lower leg: No edema  Left lower leg: No edema  Skin:     General: Skin is warm and dry  Findings: No erythema or rash  Neurological:      Mental Status: He is alert and oriented to person, place, and time  Psychiatric:         Mood and Affect: Mood normal          Behavior: Behavior normal            Additional Data:     Lab Results: I have personally reviewed pertinent reports  Additional labs pending at time of admission  Results from last 7 days   Lab Units 04/24/21  0132   WBC Thousand/uL 14 77*   HEMOGLOBIN g/dL 11 2*   HEMATOCRIT % 34 5*   PLATELETS Thousands/uL 207   NEUTROS PCT % 84*   LYMPHS PCT % 9*   MONOS PCT % 5   EOS PCT % 1     Results from last 7 days   Lab Units 04/24/21  0132   SODIUM mmol/L 141   POTASSIUM mmol/L 4 3   CHLORIDE mmol/L 103   CO2 mmol/L 27   BUN mg/dL 33*   CREATININE mg/dL 1 59*   ANION GAP mmol/L 11   CALCIUM mg/dL 8 9   ALBUMIN g/dL 3 5   TOTAL BILIRUBIN mg/dL 0 39   ALK PHOS U/L 86   ALT U/L 43   AST U/L 30   GLUCOSE RANDOM mg/dL 150*     Results from last 7 days   Lab Units 04/24/21  0131   INR  1 10                   Imaging: I have personally reviewed pertinent reports        No orders to display       EKG, Pathology, and Other Studies Reviewed on Admission:   · EKG: none on admission    Allscripts / Epic Records Reviewed: Yes     ** Please Note: This note has been constructed using a voice recognition system   **

## 2021-04-24 NOTE — ASSESSMENT & PLAN NOTE
Suspected, WBC 14 on admission    Patient denies any fever chills  UA with moderate leukocytes, nitrites  Previous culture grew E faecalis, susceptible to vanco and ampicillin  Remains afebrile, white count is trending down  · Continue amoxicillin   · Current urine cultures growing Candida Candida

## 2021-04-24 NOTE — ASSESSMENT & PLAN NOTE
Patient recently underwent cystoscopy on 4/14 with evacuation of clots, bladder biopsy and extensive fulguration of radiation cysitis and was maintained on CBI initially during hospitalization and was discharged without Sumner, sent with Abx for UTI  He arrived in ED earlier tonight with complaint of hematuria and urinary retention and was sent home with a Sumner catheter  He complained of significant pain and minimal urine output from the Sumner and felt dizzy and lightheaded from the pain     - UA pending  - CBI started in ED   - Monitor H+H, stable at 11 2  - Urology consult appreciated

## 2021-04-24 NOTE — PLAN OF CARE
Problem: Potential for Falls  Goal: Patient will remain free of falls  Description: INTERVENTIONS:  - Assess patient frequently for physical needs  -  Identify cognitive and physical deficits and behaviors that affect risk of falls    -  [place] fall precautions as indicated by assessment   - Educate patient/family on patient safety including physical limitations  - Instruct patient to call for assistance with activity based on assessment  - Modify environment to reduce risk of injury  - Consider OT/PT consult to assist with strengthening/mobility  Outcome: Progressing     Problem: PAIN - ADULT  Goal: Verbalizes/displays adequate comfort level or baseline comfort level  Description: Interventions:  - Encourage patient to monitor pain and request assistance  - Assess pain using appropriate pain scale  - Administer analgesics based on type and severity of pain and evaluate response  - Implement non-pharmacological measures as appropriate and evaluate response  - Consider cultural and social influences on pain and pain management  - Notify physician/advanced practitioner if interventions unsuccessful or patient reports new pain  Outcome: Progressing     Problem: INFECTION - ADULT  Goal: Absence or prevention of progression during hospitalization  Description: INTERVENTIONS:  - Assess and monitor for signs and symptoms of infection  - Monitor lab/diagnostic results  - Monitor all insertion sites, i e  indwelling lines, tubes, and drains  - Monitor endotracheal if appropriate and nasal secretions for changes in amount and color  - [place] appropriate cooling/warming therapies per order  - Administer medications as ordered  - Instruct and encourage patient and family to use good hand hygiene technique  - Identify and instruct in appropriate isolation precautions for identified infection/condition  Outcome: Progressing  Goal: Absence of fever/infection during neutropenic period  Description: INTERVENTIONS:  - Monitor WBC    Outcome: Completed     Problem: SAFETY ADULT  Goal: Patient will remain free of falls  Description: INTERVENTIONS:  - Assess patient frequently for physical needs  -  Identify cognitive and physical deficits and behaviors that affect risk of falls    -  [place] fall precautions as indicated by assessment   - Educate patient/family on patient safety including physical limitations  - Instruct patient to call for assistance with activity based on assessment  - Modify environment to reduce risk of injury  - Consider OT/PT consult to assist with strengthening/mobility  Outcome: Progressing  Goal: Maintain or return to baseline ADL function  Description: INTERVENTIONS:  -  Assess patient's ability to carry out ADLs; assess patient's baseline for ADL function and identify physical deficits which impact ability to perform ADLs (bathing, care of mouth/teeth, toileting, grooming, dressing, etc )  - Assess/evaluate cause of self-care deficits   - Assess range of motion  - Assess patient's mobility; develop plan if impaired  - Assess patient's need for assistive devices and provide as appropriate  - Encourage maximum independence but intervene and supervise when necessary  - Involve family in performance of ADLs  - Assess for home care needs following discharge   - Consider OT consult to assist with ADL evaluation and planning for discharge  - Provide patient education as appropriate  Outcome: Progressing  Goal: Maintain or return mobility status to optimal level  Description: INTERVENTIONS:  - Assess patient's baseline mobility status (ambulation, transfers, stairs, etc )    - Identify cognitive and physical deficits and behaviors that affect mobility  - Identify mobility aids required to assist with transfers and/or ambulation (gait belt, sit-to-stand, lift, walker, cane, etc )  - [place] fall precautions as indicated by assessment  - Record patient progress and toleration of activity level on Mobility SBAR; progress patient to next Phase/Stage  - Instruct patient to call for assistance with activity based on assessment  - Consider rehabilitation consult to assist with strengthening/weightbearing, etc   Outcome: Progressing     Problem: DISCHARGE PLANNING  Goal: Discharge to home or other facility with appropriate resources  Description: INTERVENTIONS:  - Identify barriers to discharge w/patient and caregiver  - Arrange for needed discharge resources and transportation as appropriate  - Identify discharge learning needs (meds, wound care, etc )  - Arrange for interpretive services to assist at discharge as needed  - Refer to Case Management Department for coordinating discharge planning if the patient needs post-hospital services based on physician/advanced practitioner order or complex needs related to functional status, cognitive ability, or social support system  Outcome: Progressing     Problem: Knowledge Deficit  Goal: Patient/family/caregiver demonstrates understanding of disease process, treatment plan, medications, and discharge instructions  Description: Complete learning assessment and assess knowledge base  Interventions:  - Provide teaching at level of understanding  - Provide teaching via preferred learning methods  Outcome: Progressing     Problem: Nutrition/Hydration-ADULT  Goal: Nutrient/Hydration intake appropriate for improving, restoring or maintaining nutritional needs  Description: Monitor and assess patient's nutrition/hydration status for malnutrition  Collaborate with interdisciplinary team and initiate plan and interventions as ordered  Monitor patient's weight and dietary intake as ordered or per policy  Utilize nutrition screening tool and intervene as necessary  Determine patient's food preferences and provide high-protein, high-caloric foods as appropriate       INTERVENTIONS:  - Monitor oral intake, urinary output, labs, and treatment plans  - Assess nutrition and hydration status and recommend course of action  - Evaluate amount of meals eaten  - Assist patient with eating if necessary   - Allow adequate time for meals  - Recommend/ encourage appropriate diets, oral nutritional supplements, and vitamin/mineral supplements  - Order, calculate, and assess calorie counts as needed  - Recommend, monitor, and adjust tube feedings and TPN/PPN based on assessed needs  - Assess need for intravenous fluids  - Provide specific nutrition/hydration education as appropriate  - Include patient/family/caregiver in decisions related to nutrition  Outcome: Progressing

## 2021-04-24 NOTE — OP NOTE
OPERATIVE REPORT  PATIENT NAME: Yennifer Smith    :  1945  MRN: [id number]  Pt Location: [facility] OR ROOM 04    SURGERY DATE: 2021    Surgeon(s) and Role:     * Ning Catherine MD - Primary    Preop Diagnosis:  Gross hematuria [R31 0]  Radiation cystitis    Post-Op Diagnosis Codes:     * Gross hematuria [R31 0]  Radiation cystitis    Procedure(s) (LRB):  CYSTOSCOPY EVACUATION OF CLOTS fulguration (N/A)    Specimen(s):  * No specimens in log *    Estimated Blood Loss:   Minimal    Drains:  Urethral Catheter Three way 22 Fr  (Active)   Number of days: 0       Continuous Bladder Irrigation Three-way (Active)   Number of days: 0       Anesthesia Type:   General/LMA    Operative Indications:  Gross hematuria [R320]  49-year-old male well known to me recently discharged from [address] [place] [address] after undergoing workup for gross hematuria 10 days ago found on cystoscopy to have radiation cystitis path confirmed inflammatory changes no carcinoma 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 status post radiation for prostate cancer recurrence status post radical prostatectomy 11 years ago patient seen in the office yesterday for postoperative visit doing well but refusing to undergo hyperbolioxygen therapy since he is claustrophobic but will go for the consultation next week  only to develop gross hematuria last night at 6:00 p m   Developing into clot retention presenting to [address] [place] [address] ER having Sumner catheter inserted and hand irrigated sent home only to return in clot retention started on continuous bladder irrigation seen at the bedside this morning noting severe clots and suprapubic discomfort now at this time to go to the OR for emergency cysto evacuation of clots fulguration    Operative Findings:  500 cc of clot irrigated from the bladder  Bleeding from the bladder floor posterior wall significant radiation cystitis posterior lateral walls   extensive fulguration no bleeding at this time  CBI clear    Complications:   None    Procedure and Technique:  After patient identified the holding area consent signed he was placed in the op suite  After anesthesia was induced he was placed in lithotomy seated position draped prep standard fashion  Time-out performed  A 22 [place]ian cystoscope passed through through the bladder  Anterior showed no abnormalities  Posterior urethra confirmed inflamed bleeding bladder neck with organized clot throughout the bladder  The scope was removed and the 32 Estonian resectoscope with 30 lens was then placed with, 2 L of water used to remove clots on view of the bladder there was extensive radiation cystitis changes with oozing throughout the entire bladder floor and lateral walls a fulgurated once again as was done last week    At the end of the procedure there was no obvious bleeding the scope was removed 22 Estonian 3 Sumner catheter inserted with 20 cc into the balloon and continuous bladder irrigation 0 9 normal saline time of dictation CBI is clear postop procedure was awakened taken to recovery room stable condition patient once again strongly suggested hyper bulk oxygen therapy which he states he does not want to get since he is claustrophobic but does have a consult next week   I was present for the entire procedure    Patient Disposition:  PACU     SIGNATURE: Parth Manzo MD  DATE: April 24, 2021  TIME: 4:18 PM

## 2021-04-24 NOTE — CONSULTS
H&P Exam - Urology       Patient: Jorge Atkinson   : 1945 Sex: male   MRN: [id number]     CSN: [id number]      History of Present Illness   HPI:  Jorge Atkinson is a 66 y o  male well known to me recent history of diagnosed radiation cystitis  and admission to the hospital seen in the office yesterday afternoon doing well status post cysto fulguration biopsy confirming inflammatory changes presented to [address] [place] [address] ER last night with gross painless hematuria  It patient had Sumner catheter inserted hand irrigated the discharged with a Sumner only to return in clot retention admitted with continuous bladder irrigation  Patient now has undergone multiple hand irrigations by the staff and myself stone noting aggressive clots and at this point after discussion with patient and wife in room take to OR for cysto evacuation of clots fulguration of bleeding vessels        Review of Systems:   Constitutional:  Negative for activity change, fever, chills and diaphoresis  HENT: Negative for hearing loss and trouble swallowing  Eyes: Negative for itching and visual disturbance  Respiratory: Negative for chest tightness and shortness of breath  Cardiovascular: Negative for chest pain, edema  Gastrointestinal: Negative for abdominal distention, na abdominal pain, constipation, diarrhea, Nausea and vomiting  Genitourinary: Negative for decreased urine volume, difficulty urinating, dysuria, enuresis, frequency, hematuria and urgency  Musculoskeletal: Negative for gait problem and myalgias  Neurological: Negative for dizziness and headaches  Hematological: Does not bruise/bleed easily         Historical Information   Past Medical History:   Diagnosis Date    Cancer [facility])     prostate     Past Surgical History:   Procedure Laterality Date    APPENDECTOMY      FL RETROGRADE PYELOGRAM  2021    HERNIA REPAIR      CT CYSTOURETHROSCOPY W/IRRIG & EVAC CLOTS Bilateral 2021    Procedure: CYSTOSCOPY EVACUATION OF CLOTS bilateral retrograde pyelograms possible TURBT bladder biopsy;  Surgeon: Sean Sunshine MD;  Location: [facility];  Service: Urology    PROSTATECTOMY      [address]      right shoulder     Social History   Social History     Substance and Sexual Activity   Alcohol Use Never    Frequency: Never     Social History     Substance and Sexual Activity   Drug Use No     Social History     Tobacco Use   Smoking Status Former Smoker    Packs/day: 0 50    Years: 50 00    Pack years: 25 00    Quit date: 8/29/2017    Years since quitting: 3 6   Smokeless Tobacco Never Used   Tobacco Comment    quit one month ago     Family History: History reviewed  No pertinent family history  Meds/Allergies   Medications Prior to Admission   Medication    atorvastatin (LIPITOR) 20 mg tablet    methotrexate 2 5 mg tablet    metoprolol tartrate (LOPRESSOR) 25 mg tablet    albuterol (PROVENTIL HFA,VENTOLIN HFA) 90 mcg/act inhaler    Calcium Carb-Cholecalciferol (CALTRATE 600+D3 SOFT PO)    cyclobenzaprine (FLEXERIL) 10 mg tablet    folic acid (FOLVITE) 1 mg tablet    naproxen (NAPROSYN) 500 mg tablet     No Known Allergies    Objective   Vitals: /68 (BP Location: Left arm)   Pulse 73   Temp 97 5 °F (36 4 °C) (Oral)   Resp 18   Ht 5' 11" (1 803 m)   Wt 97 5 kg (215 lb)   SpO2 96%   BMI 29 99 kg/m²     Physical Exam:  General Alert awake   Normocephalic atraumatic PERRLA  Lungs clear bilaterally  Cardiac normal S1 normal S2  Abdomen soft, flank pain  Bladder distended  CBI pink  Hand irrigated multiple clots removed with significant pain noted  Extremities no edema    I/O last 24 hours:   In: 80 [I V :310; IV Piggyback:500]  Out: 1000 [Urine:1000]    Invasive Devices     Peripheral Intravenous Line            Peripheral IV 04/24/21 Right Antecubital less than 1 day          Drain            Urethral Catheter Three way -- days    Continuous Bladder Irrigation Three-way less than 1 day                    Lab Results: CBC:   Lab Results   Component Value Date    WBC 11 38 (H) 04/24/2021    HGB 9 8 (L) 04/24/2021    HCT 31 5 (L) 04/24/2021    MCV 97 04/24/2021     04/24/2021    MCH 30 1 04/24/2021    MCHC 31 1 (L) 04/24/2021    RDW 14 4 04/24/2021    MPV 10 0 04/24/2021    NRBC 0 04/24/2021     CMP:   Lab Results   Component Value Date     04/24/2021    CO2 27 04/24/2021    BUN 32 (H) 04/24/2021    CREATININE 1 43 (H) 04/24/2021    CALCIUM 8 5 04/24/2021    AST 30 04/24/2021    ALT 43 04/24/2021    ALKPHOS 86 04/24/2021    EGFR 47 04/24/2021     Urinalysis:   Lab Results   Component Value Date    COLORU Red 04/24/2021    CLARITYU Cloudy 04/24/2021    SPECGRAV 1 020 04/24/2021    PHUR 7 0 04/24/2021    PHUR 5 5 03/25/2018    LEUKOCYTESUR Moderate (A) 04/24/2021    NITRITE Positive (A) 04/24/2021    GLUCOSEU Negative 04/24/2021    KETONESU 15 (1+) (A) 04/24/2021    BILIRUBINUR Negative 04/24/2021    BLOODU Large (A) 04/24/2021     Urine Culture:   Lab Results   Component Value Date    URINECX 80,000-89,000 cfu/ml Enterococcus faecalis (A) 04/12/2021    URINECX 60,000-69,000 cfu/ml  04/12/2021     PSA: No results found for: PSA        Assessment/ Plan:  Gross hematuria/UTI  Clot retention  History of radiation cystitis  NPO  To OR cysto evacuation of clots fulguration    Awaiting CT scan abdomen pelvis from previous [facility] admission Will schedule postop CT scan for re-evaluation without IV contrast in light of chronic renal failure      Vineet Barrientos MD

## 2021-04-24 NOTE — PLAN OF CARE
Problem: Potential for Falls  Goal: Patient will remain free of falls  Description: INTERVENTIONS:  - Assess patient frequently for physical needs  -  Identify cognitive and physical deficits and behaviors that affect risk of falls    -  Wever fall precautions as indicated by assessment   - Educate patient/family on patient safety including physical limitations  - Instruct patient to call for assistance with activity based on assessment  - Modify environment to reduce risk of injury  - Consider OT/PT consult to assist with strengthening/mobility  Outcome: Progressing     Problem: PAIN - ADULT  Goal: Verbalizes/displays adequate comfort level or baseline comfort level  Description: Interventions:  - Encourage patient to monitor pain and request assistance  - Assess pain using appropriate pain scale  - Administer analgesics based on type and severity of pain and evaluate response  - Implement non-pharmacological measures as appropriate and evaluate response  - Consider cultural and social influences on pain and pain management  - Notify physician/advanced practitioner if interventions unsuccessful or patient reports new pain  Outcome: Progressing     Problem: INFECTION - ADULT  Goal: Absence or prevention of progression during hospitalization  Description: INTERVENTIONS:  - Assess and monitor for signs and symptoms of infection  - Monitor lab/diagnostic results  - Monitor all insertion sites, i e  indwelling lines, tubes, and drains  - Monitor endotracheal if appropriate and nasal secretions for changes in amount and color  - Wever appropriate cooling/warming therapies per order  - Administer medications as ordered  - Instruct and encourage patient and family to use good hand hygiene technique  - Identify and instruct in appropriate isolation precautions for identified infection/condition  Outcome: Progressing  Goal: Absence of fever/infection during neutropenic period  Description: INTERVENTIONS:  - Monitor WBC    Outcome: Progressing     Problem: SAFETY ADULT  Goal: Patient will remain free of falls  Description: INTERVENTIONS:  - Assess patient frequently for physical needs  -  Identify cognitive and physical deficits and behaviors that affect risk of falls    -  Alpha fall precautions as indicated by assessment   - Educate patient/family on patient safety including physical limitations  - Instruct patient to call for assistance with activity based on assessment  - Modify environment to reduce risk of injury  - Consider OT/PT consult to assist with strengthening/mobility  Outcome: Progressing  Goal: Maintain or return to baseline ADL function  Description: INTERVENTIONS:  -  Assess patient's ability to carry out ADLs; assess patient's baseline for ADL function and identify physical deficits which impact ability to perform ADLs (bathing, care of mouth/teeth, toileting, grooming, dressing, etc )  - Assess/evaluate cause of self-care deficits   - Assess range of motion  - Assess patient's mobility; develop plan if impaired  - Assess patient's need for assistive devices and provide as appropriate  - Encourage maximum independence but intervene and supervise when necessary  - Involve family in performance of ADLs  - Assess for home care needs following discharge   - Consider OT consult to assist with ADL evaluation and planning for discharge  - Provide patient education as appropriate  Outcome: Progressing  Goal: Maintain or return mobility status to optimal level  Description: INTERVENTIONS:  - Assess patient's baseline mobility status (ambulation, transfers, stairs, etc )    - Identify cognitive and physical deficits and behaviors that affect mobility  - Identify mobility aids required to assist with transfers and/or ambulation (gait belt, sit-to-stand, lift, walker, cane, etc )  - Alpha fall precautions as indicated by assessment  - Record patient progress and toleration of activity level on Mobility SBAR; progress patient to next Phase/Stage  - Instruct patient to call for assistance with activity based on assessment  - Consider rehabilitation consult to assist with strengthening/weightbearing, etc   Outcome: Progressing     Problem: DISCHARGE PLANNING  Goal: Discharge to home or other facility with appropriate resources  Description: INTERVENTIONS:  - Identify barriers to discharge w/patient and caregiver  - Arrange for needed discharge resources and transportation as appropriate  - Identify discharge learning needs (meds, wound care, etc )  - Arrange for interpretive services to assist at discharge as needed  - Refer to Case Management Department for coordinating discharge planning if the patient needs post-hospital services based on physician/advanced practitioner order or complex needs related to functional status, cognitive ability, or social support system  Outcome: Progressing     Problem: Knowledge Deficit  Goal: Patient/family/caregiver demonstrates understanding of disease process, treatment plan, medications, and discharge instructions  Description: Complete learning assessment and assess knowledge base  Interventions:  - Provide teaching at level of understanding  - Provide teaching via preferred learning methods  Outcome: Progressing     Problem: Nutrition/Hydration-ADULT  Goal: Nutrient/Hydration intake appropriate for improving, restoring or maintaining nutritional needs  Description: Monitor and assess patient's nutrition/hydration status for malnutrition  Collaborate with interdisciplinary team and initiate plan and interventions as ordered  Monitor patient's weight and dietary intake as ordered or per policy  Utilize nutrition screening tool and intervene as necessary  Determine patient's food preferences and provide high-protein, high-caloric foods as appropriate       INTERVENTIONS:  - Monitor oral intake, urinary output, labs, and treatment plans  - Assess nutrition and hydration status and recommend course of action  - Evaluate amount of meals eaten  - Assist patient with eating if necessary   - Allow adequate time for meals  - Recommend/ encourage appropriate diets, oral nutritional supplements, and vitamin/mineral supplements  - Order, calculate, and assess calorie counts as needed  - Recommend, monitor, and adjust tube feedings and TPN/PPN based on assessed needs  - Assess need for intravenous fluids  - Provide specific nutrition/hydration education as appropriate  - Include patient/family/caregiver in decisions related to nutrition  Outcome: Progressing

## 2021-04-24 NOTE — ASSESSMENT & PLAN NOTE
Patient recently underwent cystoscopy on 4/14 with evacuation of clots, bladder biopsy and extensive fulguration of radiation cysitis and was maintained on CBI initially during hospitalization and was discharged without Sumner, sent with Abx for UTI  He arrived in ED earlier tonight with complaint of hematuria and urinary retention and was sent home with a Sumner catheter  He complained of significant pain and minimal urine output from the Sumner and felt dizzy and lightheaded from the pain  Remained with hematuria passing clots with mild did drop in hemoglobin  Underwent urgent cystoscopy 4/24, noted to have 500 cc of clot in the bladder which was evacuated  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  Maintained on CBI postoperatively  Hematuria resolved  Urology follow-up appreciated  · Sumner has been removed yesterday and patient has been urinating well  · Creatinine continues to improve    · Patient to be discharged on amoxicillin for 5 days

## 2021-04-24 NOTE — ASSESSMENT & PLAN NOTE
WBC 14 on admission  UA with moderate leukocytes, nitrites  Previous culture grew E faecalis, susceptible to vanco  Treat with vanco pending urine cultures

## 2021-04-24 NOTE — ASSESSMENT & PLAN NOTE
Baseline Cr between 1 2-1 3  Cr 1 59 on admission  Likely secondary to urinary retention  Subsequently improved but again higher at 1 41 today despite urinary catheter  · Patient received IV fluids  · Sumner has been removed yesterday and patient has been urinating well  · Creatinine has improved and is close to baseline

## 2021-04-24 NOTE — ED PROVIDER NOTES
History  Chief Complaint   Patient presents with    Weakness - Generalized     "I GOT ALL DIZZY AND LIGHTHEADED AND SWEATY ALONG WITH THIS PAIN FROM THE CATHETHER"     Pt returns to the ER with c/o penile/scrotal discomfort, from alanis catheter inserted earlier this evening to treat hematuria  Pt also states that he has had minimal urine output since he was discharged earlier  Pt with a hx of prostate CA, recently with hemorrhagic cystitis      History provided by:  Patient   used: No        Prior to Admission Medications   Prescriptions Last Dose Informant Patient Reported? Taking?    Calcium Carb-Cholecalciferol (CALTRATE 600+D3 SOFT PO)   Yes No   Sig: Take by mouth   albuterol (PROVENTIL HFA,VENTOLIN HFA) 90 mcg/act inhaler   No No   Sig: Inhale 2 puffs every 4 (four) hours as needed for wheezing   atorvastatin (LIPITOR) 20 mg tablet   Yes No   Sig: Take 20 mg by mouth daily at bedtime   cyclobenzaprine (FLEXERIL) 10 mg tablet   No No   Sig: Take 0 5 tablets by mouth 2 (two) times a day as needed for muscle spasms for up to 3 days   folic acid (FOLVITE) 1 mg tablet   Yes No   Sig: Take 1 mg by mouth daily   methotrexate 2 5 mg tablet   Yes No   Sig: Take 15 5 mg by mouth once a week   metoprolol tartrate (LOPRESSOR) 25 mg tablet   Yes No   Sig: Take 25 mg by mouth every 12 (twelve) hours   naproxen (NAPROSYN) 500 mg tablet   No No   Sig: Take 1 tablet by mouth 2 (two) times a day with meals for 5 days      Facility-Administered Medications: None       Past Medical History:   Diagnosis Date    Cancer [facility])     prostate       Past Surgical History:   Procedure Laterality Date    APPENDECTOMY      [place] RETROGRADE PYELOGRAM  4/14/2021    HERNIA REPAIR      [place] CYSTOURETHROSCOPY W/IRRIG & EVAC CLOTS Bilateral 4/14/2021    Procedure: CYSTOSCOPY EVACUATION OF CLOTS bilateral retrograde pyelograms possible TURBT bladder biopsy;  Surgeon: Julia Lynn MD;  Location: [address] [place] [address];  Service: Urology  PROSTATECTOMY      ROTATOR CUFF REPAIR      right shoulder       History reviewed  No pertinent family history  I have reviewed and agree with the history as documented  E-Cigarette/Vaping    E-Cigarette Use Former User      E-Cigarette/Vaping Substances     Social History     Tobacco Use    Smoking status: Former Smoker     Packs/day: 0 50     Years: 50 00     Pack years: 25 00     Quit date: 8/29/2017     Years since quitting: 3 6    Smokeless tobacco: Never Used    Tobacco comment: quit one month ago   Substance Use Topics    Alcohol use: Never     Frequency: Never    Drug use: No       Review of Systems   Constitutional: Negative for chills and fever  Respiratory: Negative for cough, shortness of breath and wheezing  Cardiovascular: Negative for chest pain and palpitations  Gastrointestinal: Positive for abdominal pain  Negative for constipation, diarrhea, nausea and vomiting  Genitourinary: Positive for difficulty urinating, dysuria, hematuria and penile pain  Negative for flank pain and urgency  Musculoskeletal: Negative for back pain  Skin: Negative for color change and rash  All other systems reviewed and are negative  Physical Exam  Physical Exam  Vitals signs and nursing note reviewed  Constitutional:       Appearance: He is well-developed  HENT:      Head: Normocephalic and atraumatic  Eyes:      Pupils: Pupils are equal, round, and reactive to light  Cardiovascular:      Rate and Rhythm: Normal rate and regular rhythm  Heart sounds: Normal heart sounds  Pulmonary:      Effort: Pulmonary effort is normal       Breath sounds: Normal breath sounds  Abdominal:      General: Bowel sounds are normal  There is no distension  Palpations: Abdomen is soft  There is no mass  Tenderness: There is no abdominal tenderness  There is no guarding or rebound  Genitourinary:     Penis: Normal        Scrotum/Testes:         Right: Tenderness present  Left: Tenderness present  Skin:     General: Skin is warm and dry  Capillary Refill: Capillary refill takes less than 2 seconds  Neurological:      General: No focal deficit present  Mental Status: He is alert and oriented to person, place, and time  Psychiatric:         Mood and Affect: Mood is anxious  Behavior: Behavior normal          Thought Content:  Thought content normal          Judgment: Judgment normal          Vital Signs  ED Triage Vitals [04/24/21 0037]   Temp Pulse Respirations Blood Pressure SpO2   -- 74 20 154/96 95 %      Temp src Heart Rate Source Patient Position - Orthostatic VS BP Location FiO2 (%)   -- Monitor Lying Left arm --      Pain Score       Worst Possible Pain           Vitals:    04/24/21 0037 04/24/21 0145   BP: 154/96 98/55   Pulse: 74 66   Patient Position - Orthostatic VS: Lying          Visual Acuity      ED Medications  Medications - No data to display    Diagnostic Studies  Results Reviewed     Procedure Component Value Units Date/Time    Comprehensive metabolic panel [[id number]]  (Abnormal) Collected: 04/24/21 0132    Lab Status: Final result Specimen: Blood from Arm, Right Updated: 04/24/21 0154     Sodium 141 mmol/L      Potassium 4 3 mmol/L      Chloride 103 mmol/L      CO2 27 mmol/L      ANION GAP 11 mmol/L      BUN 33 mg/dL      Creatinine 1 59 mg/dL      Glucose 150 mg/dL      Glucose, Fasting 150 mg/dL      Calcium 8 9 mg/dL      AST 30 U/L      ALT 43 U/L      Alkaline Phosphatase 86 U/L      Total Protein 7 0 g/dL      Albumin 3 5 g/dL      Total Bilirubin 0 39 mg/dL      eGFR 41 ml/min/1 73sq m     Narrative:      Tonya guidelines for Chronic Kidney Disease (CKD):     Stage 1 with normal or high GFR (GFR > 90 mL/min/1 73 square meters)    Stage 2 Mild CKD (GFR = 60-89 mL/min/1 73 square meters)    Stage 3A Moderate CKD (GFR = 45-59 mL/min/1 73 square meters)    Stage 3B Moderate CKD (GFR = 30-44 mL/min/1 73 square meters)    Stage 4 Severe CKD (GFR = 15-29 mL/min/1 73 square meters)    Stage 5 End Stage CKD (GFR <15 mL/min/1 73 square meters)  Note: GFR calculation is accurate only with a steady state creatinine    Urine Microscopic [[id number]]  (Abnormal) Collected: 04/24/21 0134    Lab Status: Final result Specimen: Urine, Indwelling Sumner Catheter Updated: 04/24/21 0152     RBC, UA Innumerable /hpf      WBC, UA       Field obscured, unable to enumerate     /hpf     Epithelial Cells None Seen /hpf      Bacteria, UA       Field obscured, unable to enumerate     /hpf    Urine culture [[id number]] Collected: 04/24/21 0134    Lab Status:  In process Specimen: Urine, Indwelling Sumner Catheter Updated: 04/24/21 0152    Protime-INR [[id number]]  (Normal) Collected: 04/24/21 0131    Lab Status: Final result Specimen: Blood from Arm, Right Updated: 04/24/21 0149     Protime 14 1 seconds      INR 1 10    APTT [[id number]]  (Normal) Collected: 04/24/21 0131    Lab Status: Final result Specimen: Blood from Arm, Right Updated: 04/24/21 0149     PTT 26 seconds     UA w Reflex to Microscopic w Reflex to Culture [[id number]]  (Abnormal) Collected: 04/24/21 0134    Lab Status: Final result Specimen: Urine, Indwelling Sumner Catheter Updated: 04/24/21 0145     Color, UA Red     Clarity, UA Cloudy     Specific [place], UA 1 020     pH, UA 7 0     Leukocytes, UA Moderate     Nitrite, UA Positive     Protein, UA >=300 mg/dl      Glucose, UA Negative mg/dl      Ketones, UA 15 (1+) mg/dl      Urobilinogen, UA 2 0 E U /dl      Bilirubin, UA Negative     Blood, UA Large    CBC and differential [[id number]]  (Abnormal) Collected: 04/24/21 0132    Lab Status: Final result Specimen: Blood from Arm, Right Updated: 04/24/21 0137     WBC 14 77 Thousand/uL      RBC 3 64 Million/uL      Hemoglobin 11 2 g/dL      Hematocrit 34 5 %      MCV 95 fL      MCH 30 8 pg      MCHC 32 5 g/dL      RDW 14 3 %      MPV 10 0 fL      Platelets 890 Thousands/uL      nRBC 0 /100 WBCs      Neutrophils Relative 84 %      Immat GRANS % 1 %      Lymphocytes Relative 9 %      Monocytes Relative 5 %      Eosinophils Relative 1 %      Basophils Relative 0 %      Neutrophils Absolute 12 57 Thousands/µL      Immature Grans Absolute 0 08 Thousand/uL      Lymphocytes Absolute 1 31 Thousands/µL      Monocytes Absolute 0 68 Thousand/µL      Eosinophils Absolute 0 10 Thousand/µL      Basophils Absolute 0 03 Thousands/µL                  No orders to display              Procedures  Procedures         ED Course                             SBIRT 22yo+      Most Recent Value   SBIRT (22 yo +)   In order to provide better care to our patients, we are screening all of our patients for alcohol and drug use  Would it be okay to ask you these screening questions? Yes Filed at: 04/24/2021 3110   Initial Alcohol Screen: US AUDIT-C    1  How often do you have a drink containing alcohol?  0 Filed at: 04/24/2021 0039   2  How many drinks containing alcohol do you have on a typical day you are drinking? 0 Filed at: 04/24/2021 0039   3a  Male UNDER 65: How often do you have five or more drinks on one occasion? 0 Filed at: 04/24/2021 0039   3b  FEMALE Any Age, or MALE 65+: How often do you have 4 or more drinks on one occassion? 0 Filed at: 04/24/2021 0039   Audit-C Score  0 Filed at: 04/24/2021 9708   NATALI: How many times in the past year have you    Used an illegal drug or used a prescription medication for non-medical reasons? Never Filed at: 04/24/2021 6705                    MDM  Number of Diagnoses or Management Options  Hematuria due to cystitis: new and requires workup  Diagnosis management comments: Pt in the ER with worsening hematuria and penile discomfort  Previous alanis catheter obstructed with blood clots  New 3way alanis catheter placed  CBI initiated  Will admit pt to [facility]  Dr Lisa Screws informed          Amount and/or Complexity of Data Reviewed  Clinical lab tests: ordered and reviewed  Tests in the radiology section of CPT®: ordered and reviewed    Risk of Complications, Morbidity, and/or Mortality  Presenting problems: high  Diagnostic procedures: high  Management options: high    Patient Progress  Patient progress: stable      Disposition  Final diagnoses:   Hematuria due to cystitis     Time reflects when diagnosis was documented in both MDM as applicable and the Disposition within this note     Time User Action Codes Description Comment    4/24/2021  1:05 AM Brian Friday O Add [N30 91] Hematuria due to cystitis     4/24/2021  1:20 AM Haley Resides Add [R31 0] Gross hematuria       ED Disposition     ED Disposition Condition Date/Time Comment    Admit Stable Sat Apr 24, 2021  1:05 AM Case was discussed with Dr Kareen Mccray and Virgil Gitelman, PA and the patient's admission status was agreed to be Admission Status: observation status to the service of Dr Kerwin Baer   Follow-up Information    None         Patient's Medications   Discharge Prescriptions    No medications on file     No discharge procedures on file      PDMP Review     None          ED Provider  Electronically Signed by           Suhas Hudson DO  04/24/21 0209

## 2021-04-24 NOTE — PROGRESS NOTES
Vancomycin Assessment    Altagracia Johnson is a 66 y o  male who is currently receiving vancomycin 2000mg (20mg/kg) Q 24hrs for urinary tract infection     Relevant clinical data and objective history reviewed:  Creatinine   Date Value Ref Range Status   04/24/2021 1 59 (H) 0 60 - 1 30 mg/dL Final     Comment:     Standardized to IDMS reference method   04/16/2021 1 44 (H) 0 60 - 1 30 mg/dL Final     Comment:     Standardized to IDMS reference method   04/14/2021 1 28 0 60 - 1 30 mg/dL Final     Comment:     Standardized to IDMS reference method     /70   Pulse 66   Temp 97 5 °F (36 4 °C)   Resp 18   Ht 5' 11" (1 803 m)   Wt 97 5 kg (215 lb)   SpO2 96%   BMI 29 99 kg/m²   No intake/output data recorded  Lab Results   Component Value Date/Time    BUN 33 (H) 04/24/2021 01:32 AM    WBC 14 77 (H) 04/24/2021 01:32 AM    HGB 11 2 (L) 04/24/2021 01:32 AM    HCT 34 5 (L) 04/24/2021 01:32 AM    MCV 95 04/24/2021 01:32 AM     04/24/2021 01:32 AM     Temp Readings from Last 3 Encounters:   04/24/21 97 5 °F (36 4 °C)   04/23/21 (!) 97 2 °F (36 2 °C) (Tympanic)   04/16/21 98 7 °F (37 1 °C) (Oral)     Vancomycin Days of Therapy: 1    Assessment/Plan  The patient is currently on vancomycin utilizing scheduled dosing based on actual body weight  Baseline risks associated with therapy include: pre-existing renal impairment and advanced age  The patient is currently receiving 2000mg (20mg/kg) Q 24hrs and is clinically appropriate and dose will be continued  Pharmacy will also follow closely for s/sx of nephrotoxicity, infusion reactions, and appropriateness of therapy  BMP and CBC will be ordered per protocol  Plan for trough as patient approaches steady state, prior to the 4th  dose at approximately 0430 on 4/27/21  Due to infection severity, will target a trough of 15-20 (appropriate for most indications)     Pharmacy will continue to follow the patients culture results and clinical progress daily     Stephan Tillman, Pharmacist

## 2021-04-24 NOTE — ANESTHESIA POSTPROCEDURE EVALUATION
Post-Op Assessment Note    CV Status:  Stable  Pain Score: 0    Pain management: adequate     Mental Status:  Awake   Hydration Status:  Stable   PONV Controlled:  None   Airway Patency:  Patent      Post Op Vitals Reviewed: Yes      Staff: Anesthesiologist         No complications documented      BP      Temp      Pulse     Resp      SpO2

## 2021-04-24 NOTE — PROGRESS NOTES
[facility] Progress Note  Patient: Edd Molina 66 y o  male   MRN: [id number]  PCP: Dulce Vasquez MD  Unit/Bed#: [address] [place] [address] Encounter: [id number]  Date Of Visit: 04/24/21    Problem List:    Principal Problem:    Gross hematuria  Active Problems:    Essential hypertension    SHANTI (acute kidney injury) ([facility] )    UTI (urinary tract infection)    Chronic obstructive pulmonary disease ([place] [address] )    Dyslipidemia      Assessment & Plan:    * Gross hematuria  Assessment & Plan  Patient recently underwent cystoscopy on 4/14 with evacuation of clots, bladder biopsy and extensive fulguration of radiation cysitis and was maintained on CBI initially during hospitalization and was discharged without Sumner, sent with Abx for UTI  He arrived in ED earlier tonight with complaint of hematuria and urinary retention and was sent home with a Sumner catheter  He complained of significant pain and minimal urine output from the Sumner and felt dizzy and lightheaded from the pain  Remains with hematuria passing clots intermittently requiring irrigation  Hemoglobin slightly lower with component of dilution  · Continue to monitor H&H  · - CBI started in ED   · Follow-up urology recommendation    UTI (urinary tract infection)  Assessment & Plan  Suspected, WBC 14 on admission    Patient denies any fever chills  UA with moderate leukocytes, nitrites  Previous culture grew E faecalis, susceptible to vanco and ampicillin  Remains afebrile, white count is trending down  · Will transition to ampicillin  · Follow-up urine culture    SHANTI (acute kidney injury) ([facility] )  Assessment & Plan  Baseline Cr between 1 2-1 3  Cr 1 59 on admission  Likely secondary to urinary retention  · Improving IV fluids  · Monitor intake output  · Follow-up urine culture  · Avoid nephrotoxins     Essential hypertension  Assessment & Plan  Continue Lopressor 25 mg Q12   Check orthostasis    Chronic obstructive pulmonary disease (HCC)  Assessment & Plan  No acute exacerbation at this time   PRN albuterol    Dyslipidemia  Assessment & Plan  Continue statin therapy    Discussed with Urology, recommended to continue CBI  CT of the abdomen without contrast due to elevated creatinine  Continue to monitor H&H may require cystoscopy if no improvement    Addendum 6:30 p m  [place] Patient reported having shortness of breath  Noted to be wheezing saturating 88% on on 2 L of oxygen  Patient was noted to be wheezing bilaterally  Reported shortness of breath denied chest pain  Patient underwent emergent cystoscopy earlier today due to hematuria  Procedure was done under LMA  Patient denies any shortness of breath in the past but reported history of extensive smoking quit about 1 month ago  Patient was afebrile heart rate 95 blood pressure 160/84  On exam, patient is comfortable  Does not appear to be in distress  Answering appropriately without any conversational dyspnea  Lungs exam diffuse bilateral wheezing  No Stridor noted  Cardiac S1-S2 regular  Abdomen soft nontender bowel sounds present CBI running  Extremity no edema   · Will get chest x-ray, EKG  · DuoNebs, IV steroid x1  · Check troponin, proBNP  · Continue supplemental oxygen    VTE Pharmacologic Prophylaxis:   Pharmacologic: Pharmacologic VTE Prophylaxis contraindicated due to Hematuria  Mechanical VTE Prophylaxis in Place: Yes    Patient Centered Rounds: I have performed bedside rounds with nursing staff today  Discussions with Specialists or Other Care Team Provider:  Yes    Education and Discussions with Family / Patient:  Discussed with patient    Time Spent for Care: 45 minutes  More than 50% of total time spent on counseling and coordination of care as described above      Current Length of Stay: 0 day(s)    Current Patient Status: Observation   Certification Statement: The patient will continue to require additional inpatient hospital stay due to Hematuria requiring CBI    Discharge Plan:  Home went hematuria improves and cleared by Urology    Code Status: Level 3 - DNAR and DNI      Subjective:   Presented with urinary retention and hematuria, seen in ED earlier yesterday Sumner was placed but subsequently had significant pain, and minimal urine output with Sumner  Reported feeling dizzy and lightheaded due to pain  Recently hospitalized for radiation cystitis, patient reported that he was doing well until a day prior  Denied any fever chills dysuria or flank pain prior to occurrence of hematuria    Remains on CBI, requiring irrigation due to clots  Denies chest pain shortness of breath dizziness palpitation  Denies any flank pain, fever or chills          Objective:     Vitals:   Temp (24hrs), Av 7 °F (36 5 °C), Min:97 5 °F (36 4 °C), Max:98 1 °F (36 7 °C)    Temp:  [97 5 °F (36 4 °C)-98 1 °F (36 7 °C)] 97 5 °F (36 4 °C)  HR:  [66-74] 73  Resp:  [16-20] 18  BP: ()/(55-96) 122/68  SpO2:  [91 %-98 %] 96 % on room air  Body mass index is 29 99 kg/m²  Input and Output Summary (last 24 hours): Intake/Output Summary (Last 24 hours) at 2021 0955  Last data filed at 2021 0701  Gross per 24 hour   Intake 810 ml   Output 1000 ml   Net -190 ml       Physical Exam:     Physical Exam  Constitutional:       General: He is not in acute distress  Appearance: He is obese  HENT:      Head: Normocephalic and atraumatic  Neck:      Musculoskeletal: Neck supple  Cardiovascular:      Rate and Rhythm: Normal rate  Pulmonary:      Effort: Pulmonary effort is normal  No respiratory distress  Breath sounds: No wheezing, rhonchi or rales  Chest:      Chest wall: No tenderness  Abdominal:      General: Bowel sounds are normal  There is no distension  Palpations: Abdomen is soft  Tenderness: There is no abdominal tenderness  There is no guarding or rebound     Genitourinary:     Comments: CBI with pink-tinged urine  Musculoskeletal:      Right lower leg: No edema  Left lower leg: No edema  Skin:     General: Skin is warm and dry  Findings: No rash  Neurological:      General: No focal deficit present  Mental Status: He is alert and oriented to person, place, and time  Mental status is at baseline  Cranial Nerves: No cranial nerve deficit  Additional Data:     Labs:    Results from last 7 days   Lab Units 04/24/21  0537   WBC Thousand/uL 11 38*   HEMOGLOBIN g/dL 9 8*   HEMATOCRIT % 31 5*   PLATELETS Thousands/uL 180   NEUTROS PCT % 85*   LYMPHS PCT % 8*   MONOS PCT % 6   EOS PCT % 0     Results from last 7 days   Lab Units 04/24/21  0537 04/24/21  0132   POTASSIUM mmol/L 4 6 4 3   CHLORIDE mmol/L 106 103   CO2 mmol/L 27 27   BUN mg/dL 32* 33*   CREATININE mg/dL 1 43* 1 59*   CALCIUM mg/dL 8 5 8 9   ALK PHOS U/L  --  86   ALT U/L  --  43   AST U/L  --  30     Results from last 7 days   Lab Units 04/24/21  0131   INR  1 10       * I Have Reviewed All Lab Data Listed Above  * Additional Pertinent Lab Tests Reviewed:  All Labs Within Last 24 Hours Reviewed      Imaging:  Imaging Reports Reviewed Today Include:  None  Recent Cultures (last 7 days):           Last 24 Hours Medication List:   Current Facility-Administered Medications   Medication Dose Route Frequency Provider Last Rate    acetaminophen  650 mg Oral Q6H PRN Mikle Meline, PA-C      atorvastatin  20 mg Oral HS Mikle Meline, PA-C      belladonna-opium  30 mg Rectal Q8H PRN Mikle Meline, PA-C      calcium carbonate  1,000 mg Oral Daily PRN Mikle Meline, PA-C      folic acid  1 mg Oral Daily Mikle Meline, PA-C      metoprolol tartrate  25 mg Oral Q12H [facility] Mikle Meline, PA-C      ondansetron  4 mg Intravenous Q6H PRN Mikle Meline, PA-C      polyethylene glycol  17 g Oral Daily PRN Mikle Meline, PA-C      sodium chloride  75 mL/hr Intravenous Continuous Mikle Meline, PA-C 75 mL/hr (04/24/21 0253)    vancomycin  20 mg/kg Intravenous Q24H Mikle Meline, PA-C 2,000 mg (04/24/21 0824)          Today, Patient Was Seen By: Rupa Echeverria MD    ** Please Note: "This note has been constructed using a voice recognition system  Therefore there may be syntax, spelling, and/or grammatical errors   Please call if you have any questions  "**

## 2021-04-25 LAB
ANION GAP SERPL CALCULATED.3IONS-SCNC: 6 MMOL/L (ref 4–13)
ATRIAL RATE: 83 BPM
BUN SERPL-MCNC: 30 MG/DL (ref 5–25)
CALCIUM SERPL-MCNC: 8.4 MG/DL (ref 8.3–10.1)
CHLORIDE SERPL-SCNC: 104 MMOL/L (ref 100–108)
CO2 SERPL-SCNC: 27 MMOL/L (ref 21–32)
CREAT SERPL-MCNC: 1.24 MG/DL (ref 0.6–1.3)
ERYTHROCYTE [DISTWIDTH] IN BLOOD BY AUTOMATED COUNT: 14.6 % (ref 11.6–15.1)
GFR SERPL CREATININE-BSD FRML MDRD: 56 ML/MIN/1.73SQ M
GLUCOSE SERPL-MCNC: 161 MG/DL (ref 65–140)
HCT VFR BLD AUTO: 30.6 % (ref 36.5–49.3)
HGB BLD-MCNC: 9.7 G/DL (ref 12–17)
MCH RBC QN AUTO: 30.4 PG (ref 26.8–34.3)
MCHC RBC AUTO-ENTMCNC: 31.7 G/DL (ref 31.4–37.4)
MCV RBC AUTO: 96 FL (ref 82–98)
P AXIS: 65 DEGREES
PLATELET # BLD AUTO: 172 THOUSANDS/UL (ref 149–390)
PMV BLD AUTO: 10 FL (ref 8.9–12.7)
POTASSIUM SERPL-SCNC: 4.7 MMOL/L (ref 3.5–5.3)
PR INTERVAL: 140 MS
QRS AXIS: 51 DEGREES
QRSD INTERVAL: 88 MS
QT INTERVAL: 364 MS
QTC INTERVAL: 427 MS
RBC # BLD AUTO: 3.19 MILLION/UL (ref 3.88–5.62)
SODIUM SERPL-SCNC: 137 MMOL/L (ref 136–145)
T WAVE AXIS: 53 DEGREES
VENTRICULAR RATE: 83 BPM
WBC # BLD AUTO: 10.64 THOUSAND/UL (ref 4.31–10.16)

## 2021-04-25 PROCEDURE — 93010 ELECTROCARDIOGRAM REPORT: CPT | Performed by: INTERNAL MEDICINE

## 2021-04-25 PROCEDURE — 80048 BASIC METABOLIC PNL TOTAL CA: CPT | Performed by: SPECIALIST

## 2021-04-25 PROCEDURE — 94760 N-INVAS EAR/PLS OXIMETRY 1: CPT

## 2021-04-25 PROCEDURE — 85027 COMPLETE CBC AUTOMATED: CPT | Performed by: SPECIALIST

## 2021-04-25 PROCEDURE — 99232 SBSQ HOSP IP/OBS MODERATE 35: CPT | Performed by: INTERNAL MEDICINE

## 2021-04-25 PROCEDURE — 94640 AIRWAY INHALATION TREATMENT: CPT

## 2021-04-25 RX ADMIN — ATORVASTATIN CALCIUM 20 MG: 20 TABLET, FILM COATED ORAL at 21:39

## 2021-04-25 RX ADMIN — AMOXICILLIN 500 MG: 250 CAPSULE ORAL at 06:24

## 2021-04-25 RX ADMIN — AMOXICILLIN 500 MG: 250 CAPSULE ORAL at 14:10

## 2021-04-25 RX ADMIN — IPRATROPIUM BROMIDE AND ALBUTEROL SULFATE 3 ML: 2.5; .5 SOLUTION RESPIRATORY (INHALATION) at 20:11

## 2021-04-25 RX ADMIN — AMOXICILLIN 500 MG: 250 CAPSULE ORAL at 21:38

## 2021-04-25 RX ADMIN — IPRATROPIUM BROMIDE AND ALBUTEROL SULFATE 3 ML: 2.5; .5 SOLUTION RESPIRATORY (INHALATION) at 07:38

## 2021-04-25 RX ADMIN — FOLIC ACID 1 MG: 1 TABLET ORAL at 09:27

## 2021-04-25 NOTE — PROGRESS NOTES
Progress Note - Urology      Patient: Chrissie Rai   : 1945 Sex: male   MRN: [id number]     CSN: [id number]  Unit/Bed#: [address] [place] [address]     SUBJECTIVE:   Sitting in bed  CBI crystal clear  Status post cysto evacuation of clots extensive fulguration recurrent radiation cystitis      Objective   Vitals: /59   Pulse 56   Temp 97 6 °F (36 4 °C)   Resp 18   Ht 5' 11" (1 803 m)   Wt 97 5 kg (215 lb)   SpO2 98%   BMI 29 99 kg/m²     I/O last 24 hours: In: 1410 8 [P O :250; I V :1060 8;  IV Piggyback:100]  Out: 65606 [Urine:66314]      Physical Exam:   General Alert awake   Normocephalic atraumatic PERRLA  Lungs clear bilaterally  Cardiac normal S1 normal S2  Abdomen soft, flank pain  Extremities no edema      Lab Results: CBC:   Lab Results   Component Value Date    WBC 10 64 (H) 2021    HGB 9 7 (L) 2021    HCT 30 6 (L) 2021    MCV 96 2021     2021    MCH 30 4 2021    MCHC 31 7 2021    RDW 14 6 2021    MPV 10 0 2021    NRBC 0 2021     CMP:   Lab Results   Component Value Date     2021    CO2 27 2021    BUN 30 (H) 2021    CREATININE 1 24 2021    CALCIUM 8 4 2021    AST 30 2021    ALT 43 2021    ALKPHOS 86 2021    EGFR 56 2021     Urinalysis:   Lab Results   Component Value Date    COLORU Red 2021    CLARITYU Cloudy 2021    SPECGRAV 1 020 2021    PHUR 7 0 2021    PHUR 5 5 2018    LEUKOCYTESUR Moderate (A) 2021    NITRITE Positive (A) 2021    GLUCOSEU Negative 2021    KETONESU 15 (1+) (A) 2021    BILIRUBINUR Negative 2021    BLOODU Large (A) 2021     Urine Culture:   Lab Results   Component Value Date    URINECX Culture too young- will reincubate 2021     PSA: No results found for: PSA  CT scan shows no upper tract abnormalities contrast not given in light of renal failure  Assessment/ Plan:  Doing well  Status post cysto evacuation of clots extensive fulguration day 1    CBI clear  Will DC CBI  DC Sumner catheter tomorrow morning if content urine continues to stay clear             Bijan Neal MD

## 2021-04-25 NOTE — PLAN OF CARE
Problem: Potential for Falls  Goal: Patient will remain free of falls  Description: INTERVENTIONS:  - Assess patient frequently for physical needs  -  Identify cognitive and physical deficits and behaviors that affect risk of falls    -  [place] fall precautions as indicated by assessment   - Educate patient/family on patient safety including physical limitations  - Instruct patient to call for assistance with activity based on assessment  - Modify environment to reduce risk of injury  - Consider OT/PT consult to assist with strengthening/mobility  Outcome: Progressing     Problem: PAIN - ADULT  Goal: Verbalizes/displays adequate comfort level or baseline comfort level  Description: Interventions:  - Encourage patient to monitor pain and request assistance  - Assess pain using appropriate pain scale  - Administer analgesics based on type and severity of pain and evaluate response  - Implement non-pharmacological measures as appropriate and evaluate response  - Consider cultural and social influences on pain and pain management  - Notify physician/advanced practitioner if interventions unsuccessful or patient reports new pain  Outcome: Progressing     Problem: INFECTION - ADULT  Goal: Absence or prevention of progression during hospitalization  Description: INTERVENTIONS:  - Assess and monitor for signs and symptoms of infection  - Monitor lab/diagnostic results  - Monitor all insertion sites, i e  indwelling lines, tubes, and drains  - Monitor endotracheal if appropriate and nasal secretions for changes in amount and color  - [place] appropriate cooling/warming therapies per order  - Administer medications as ordered  - Instruct and encourage patient and family to use good hand hygiene technique  - Identify and instruct in appropriate isolation precautions for identified infection/condition  Outcome: Progressing     Problem: SAFETY ADULT  Goal: Patient will remain free of falls  Description: INTERVENTIONS:  - Assess patient frequently for physical needs  -  Identify cognitive and physical deficits and behaviors that affect risk of falls    -  [place] fall precautions as indicated by assessment   - Educate patient/family on patient safety including physical limitations  - Instruct patient to call for assistance with activity based on assessment  - Modify environment to reduce risk of injury  - Consider OT/PT consult to assist with strengthening/mobility  Outcome: Progressing  Goal: Maintain or return to baseline ADL function  Description: INTERVENTIONS:  -  Assess patient's ability to carry out ADLs; assess patient's baseline for ADL function and identify physical deficits which impact ability to perform ADLs (bathing, care of mouth/teeth, toileting, grooming, dressing, etc )  - Assess/evaluate cause of self-care deficits   - Assess range of motion  - Assess patient's mobility; develop plan if impaired  - Assess patient's need for assistive devices and provide as appropriate  - Encourage maximum independence but intervene and supervise when necessary  - Involve family in performance of ADLs  - Assess for home care needs following discharge   - Consider OT consult to assist with ADL evaluation and planning for discharge  - Provide patient education as appropriate  Outcome: Progressing  Goal: Maintain or return mobility status to optimal level  Description: INTERVENTIONS:  - Assess patient's baseline mobility status (ambulation, transfers, stairs, etc )    - Identify cognitive and physical deficits and behaviors that affect mobility  - Identify mobility aids required to assist with transfers and/or ambulation (gait belt, sit-to-stand, lift, walker, cane, etc )  - [place] fall precautions as indicated by assessment  - Record patient progress and toleration of activity level on Mobility SBAR; progress patient to next Phase/Stage  - Instruct patient to call for assistance with activity based on assessment  - Consider rehabilitation consult to assist with strengthening/weightbearing, etc   Outcome: Progressing     Problem: DISCHARGE PLANNING  Goal: Discharge to home or other facility with appropriate resources  Description: INTERVENTIONS:  - Identify barriers to discharge w/patient and caregiver  - Arrange for needed discharge resources and transportation as appropriate  - Identify discharge learning needs (meds, wound care, etc )  - Arrange for interpretive services to assist at discharge as needed  - Refer to Case Management Department for coordinating discharge planning if the patient needs post-hospital services based on physician/advanced practitioner order or complex needs related to functional status, cognitive ability, or social support system  Outcome: Progressing     Problem: Knowledge Deficit  Goal: Patient/family/caregiver demonstrates understanding of disease process, treatment plan, medications, and discharge instructions  Description: Complete learning assessment and assess knowledge base  Interventions:  - Provide teaching at level of understanding  - Provide teaching via preferred learning methods  Outcome: Progressing     Problem: Nutrition/Hydration-ADULT  Goal: Nutrient/Hydration intake appropriate for improving, restoring or maintaining nutritional needs  Description: Monitor and assess patient's nutrition/hydration status for malnutrition  Collaborate with interdisciplinary team and initiate plan and interventions as ordered  Monitor patient's weight and dietary intake as ordered or per policy  Utilize nutrition screening tool and intervene as necessary  Determine patient's food preferences and provide high-protein, high-caloric foods as appropriate       INTERVENTIONS:  - Monitor oral intake, urinary output, labs, and treatment plans  - Assess nutrition and hydration status and recommend course of action  - Evaluate amount of meals eaten  - Assist patient with eating if necessary   - Allow adequate time for meals  - Recommend/ encourage appropriate diets, oral nutritional supplements, and vitamin/mineral supplements  - Order, calculate, and assess calorie counts as needed  - Recommend, monitor, and adjust tube feedings and TPN/PPN based on assessed needs  - Assess need for intravenous fluids  - Provide specific nutrition/hydration education as appropriate  - Include patient/family/caregiver in decisions related to nutrition  Outcome: Progressing     Problem: RESPIRATORY - ADULT  Goal: Achieves optimal ventilation and oxygenation  Description: INTERVENTIONS:  - Assess for changes in respiratory status  - Assess for changes in mentation and behavior  - Position to facilitate oxygenation and minimize respiratory effort  - Oxygen administered by appropriate delivery if ordered  - Initiate smoking cessation education as indicated  - Encourage broncho-pulmonary hygiene including cough, deep breathe, Incentive Spirometry  - Assess the need for suctioning and aspirate as needed  - Assess and instruct to report SOB or any respiratory difficulty  - Respiratory Therapy support as indicated  Outcome: Progressing

## 2021-04-26 PROBLEM — D62 ACUTE BLOOD LOSS ANEMIA (ABLA): Status: ACTIVE | Noted: 2021-04-26

## 2021-04-26 LAB
ANION GAP SERPL CALCULATED.3IONS-SCNC: 6 MMOL/L (ref 4–13)
ANION GAP SERPL CALCULATED.3IONS-SCNC: 7 MMOL/L (ref 4–13)
BACTERIA UR CULT: ABNORMAL
BUN SERPL-MCNC: 32 MG/DL (ref 5–25)
BUN SERPL-MCNC: 35 MG/DL (ref 5–25)
CALCIUM SERPL-MCNC: 8 MG/DL (ref 8.3–10.1)
CALCIUM SERPL-MCNC: 8.2 MG/DL (ref 8.3–10.1)
CHLORIDE SERPL-SCNC: 102 MMOL/L (ref 100–108)
CHLORIDE SERPL-SCNC: 105 MMOL/L (ref 100–108)
CO2 SERPL-SCNC: 28 MMOL/L (ref 21–32)
CO2 SERPL-SCNC: 28 MMOL/L (ref 21–32)
CREAT SERPL-MCNC: 1.41 MG/DL (ref 0.6–1.3)
CREAT SERPL-MCNC: 1.42 MG/DL (ref 0.6–1.3)
ERYTHROCYTE [DISTWIDTH] IN BLOOD BY AUTOMATED COUNT: 14.8 % (ref 11.6–15.1)
GFR SERPL CREATININE-BSD FRML MDRD: 47 ML/MIN/1.73SQ M
GFR SERPL CREATININE-BSD FRML MDRD: 48 ML/MIN/1.73SQ M
GLUCOSE SERPL-MCNC: 119 MG/DL (ref 65–140)
GLUCOSE SERPL-MCNC: 119 MG/DL (ref 65–140)
HCT VFR BLD AUTO: 27.9 % (ref 36.5–49.3)
HGB BLD-MCNC: 8.9 G/DL (ref 12–17)
MCH RBC QN AUTO: 30.6 PG (ref 26.8–34.3)
MCHC RBC AUTO-ENTMCNC: 31.9 G/DL (ref 31.4–37.4)
MCV RBC AUTO: 96 FL (ref 82–98)
PLATELET # BLD AUTO: 187 THOUSANDS/UL (ref 149–390)
PMV BLD AUTO: 10.3 FL (ref 8.9–12.7)
POTASSIUM SERPL-SCNC: 4.1 MMOL/L (ref 3.5–5.3)
POTASSIUM SERPL-SCNC: 4.5 MMOL/L (ref 3.5–5.3)
RBC # BLD AUTO: 2.91 MILLION/UL (ref 3.88–5.62)
SODIUM SERPL-SCNC: 136 MMOL/L (ref 136–145)
SODIUM SERPL-SCNC: 140 MMOL/L (ref 136–145)
WBC # BLD AUTO: 11.43 THOUSAND/UL (ref 4.31–10.16)

## 2021-04-26 PROCEDURE — 85027 COMPLETE CBC AUTOMATED: CPT | Performed by: INTERNAL MEDICINE

## 2021-04-26 PROCEDURE — 80048 BASIC METABOLIC PNL TOTAL CA: CPT | Performed by: INTERNAL MEDICINE

## 2021-04-26 PROCEDURE — 99232 SBSQ HOSP IP/OBS MODERATE 35: CPT | Performed by: INTERNAL MEDICINE

## 2021-04-26 PROCEDURE — 97162 PT EVAL MOD COMPLEX 30 MIN: CPT

## 2021-04-26 PROCEDURE — 97116 GAIT TRAINING THERAPY: CPT

## 2021-04-26 PROCEDURE — 94760 N-INVAS EAR/PLS OXIMETRY 1: CPT

## 2021-04-26 RX ORDER — SODIUM CHLORIDE 9 MG/ML
75 INJECTION, SOLUTION INTRAVENOUS CONTINUOUS
Status: DISCONTINUED | OUTPATIENT
Start: 2021-04-26 | End: 2021-04-27 | Stop reason: HOSPADM

## 2021-04-26 RX ADMIN — METOPROLOL TARTRATE 25 MG: 25 TABLET, FILM COATED ORAL at 21:43

## 2021-04-26 RX ADMIN — FOLIC ACID 1 MG: 1 TABLET ORAL at 08:26

## 2021-04-26 RX ADMIN — AMOXICILLIN 500 MG: 250 CAPSULE ORAL at 06:13

## 2021-04-26 RX ADMIN — ATORVASTATIN CALCIUM 20 MG: 20 TABLET, FILM COATED ORAL at 21:43

## 2021-04-26 RX ADMIN — SODIUM CHLORIDE 75 ML/HR: 0.9 INJECTION, SOLUTION INTRAVENOUS at 21:44

## 2021-04-26 RX ADMIN — METOPROLOL TARTRATE 25 MG: 25 TABLET, FILM COATED ORAL at 08:26

## 2021-04-26 RX ADMIN — SODIUM CHLORIDE 75 ML/HR: 0.9 INJECTION, SOLUTION INTRAVENOUS at 11:02

## 2021-04-26 RX ADMIN — AMOXICILLIN 500 MG: 250 CAPSULE ORAL at 14:55

## 2021-04-26 RX ADMIN — AMOXICILLIN 500 MG: 250 CAPSULE ORAL at 21:43

## 2021-04-26 NOTE — PLAN OF CARE
Problem: Potential for Falls  Goal: Patient will remain free of falls  Description: INTERVENTIONS:  - Assess patient frequently for physical needs  -  Identify cognitive and physical deficits and behaviors that affect risk of falls    -  [place] fall precautions as indicated by assessment   - Educate patient/family on patient safety including physical limitations  - Instruct patient to call for assistance with activity based on assessment  - Modify environment to reduce risk of injury  - Consider OT/PT consult to assist with strengthening/mobility  Outcome: Progressing     Problem: PAIN - ADULT  Goal: Verbalizes/displays adequate comfort level or baseline comfort level  Description: Interventions:  - Encourage patient to monitor pain and request assistance  - Assess pain using appropriate pain scale  - Administer analgesics based on type and severity of pain and evaluate response  - Implement non-pharmacological measures as appropriate and evaluate response  - Consider cultural and social influences on pain and pain management  - Notify physician/advanced practitioner if interventions unsuccessful or patient reports new pain  Outcome: Progressing     Problem: INFECTION - ADULT  Goal: Absence or prevention of progression during hospitalization  Description: INTERVENTIONS:  - Assess and monitor for signs and symptoms of infection  - Monitor lab/diagnostic results  - Monitor all insertion sites, i e  indwelling lines, tubes, and drains  - Monitor endotracheal if appropriate and nasal secretions for changes in amount and color  - [place] appropriate cooling/warming therapies per order  - Administer medications as ordered  - Instruct and encourage patient and family to use good hand hygiene technique  - Identify and instruct in appropriate isolation precautions for identified infection/condition  Outcome: Progressing     Problem: SAFETY ADULT  Goal: Patient will remain free of falls  Description: INTERVENTIONS:  - Assess patient frequently for physical needs  -  Identify cognitive and physical deficits and behaviors that affect risk of falls    -  [place] fall precautions as indicated by assessment   - Educate patient/family on patient safety including physical limitations  - Instruct patient to call for assistance with activity based on assessment  - Modify environment to reduce risk of injury  - Consider OT/PT consult to assist with strengthening/mobility  Outcome: Progressing  Goal: Maintain or return to baseline ADL function  Description: INTERVENTIONS:  -  Assess patient's ability to carry out ADLs; assess patient's baseline for ADL function and identify physical deficits which impact ability to perform ADLs (bathing, care of mouth/teeth, toileting, grooming, dressing, etc )  - Assess/evaluate cause of self-care deficits   - Assess range of motion  - Assess patient's mobility; develop plan if impaired  - Assess patient's need for assistive devices and provide as appropriate  - Encourage maximum independence but intervene and supervise when necessary  - Involve family in performance of ADLs  - Assess for home care needs following discharge   - Consider OT consult to assist with ADL evaluation and planning for discharge  - Provide patient education as appropriate  Outcome: Progressing  Goal: Maintain or return mobility status to optimal level  Description: INTERVENTIONS:  - Assess patient's baseline mobility status (ambulation, transfers, stairs, etc )    - Identify cognitive and physical deficits and behaviors that affect mobility  - Identify mobility aids required to assist with transfers and/or ambulation (gait belt, sit-to-stand, lift, walker, cane, etc )  - [place] fall precautions as indicated by assessment  - Record patient progress and toleration of activity level on Mobility SBAR; progress patient to next Phase/Stage  - Instruct patient to call for assistance with activity based on assessment  - Consider rehabilitation consult to assist with strengthening/weightbearing, etc   Outcome: Progressing     Problem: DISCHARGE PLANNING  Goal: Discharge to home or other facility with appropriate resources  Description: INTERVENTIONS:  - Identify barriers to discharge w/patient and caregiver  - Arrange for needed discharge resources and transportation as appropriate  - Identify discharge learning needs (meds, wound care, etc )  - Arrange for interpretive services to assist at discharge as needed  - Refer to Case Management Department for coordinating discharge planning if the patient needs post-hospital services based on physician/advanced practitioner order or complex needs related to functional status, cognitive ability, or social support system  Outcome: Progressing     Problem: Knowledge Deficit  Goal: Patient/family/caregiver demonstrates understanding of disease process, treatment plan, medications, and discharge instructions  Description: Complete learning assessment and assess knowledge base  Interventions:  - Provide teaching at level of understanding  - Provide teaching via preferred learning methods  Outcome: Progressing     Problem: Nutrition/Hydration-ADULT  Goal: Nutrient/Hydration intake appropriate for improving, restoring or maintaining nutritional needs  Description: Monitor and assess patient's nutrition/hydration status for malnutrition  Collaborate with interdisciplinary team and initiate plan and interventions as ordered  Monitor patient's weight and dietary intake as ordered or per policy  Utilize nutrition screening tool and intervene as necessary  Determine patient's food preferences and provide high-protein, high-caloric foods as appropriate       INTERVENTIONS:  - Monitor oral intake, urinary output, labs, and treatment plans  - Assess nutrition and hydration status and recommend course of action  - Evaluate amount of meals eaten  - Assist patient with eating if necessary   - Allow adequate time for meals  - Recommend/ encourage appropriate diets, oral nutritional supplements, and vitamin/mineral supplements  - Order, calculate, and assess calorie counts as needed  - Recommend, monitor, and adjust tube feedings and TPN/PPN based on assessed needs  - Assess need for intravenous fluids  - Provide specific nutrition/hydration education as appropriate  - Include patient/family/caregiver in decisions related to nutrition  Outcome: Progressing     Problem: RESPIRATORY - ADULT  Goal: Achieves optimal ventilation and oxygenation  Description: INTERVENTIONS:  - Assess for changes in respiratory status  - Assess for changes in mentation and behavior  - Position to facilitate oxygenation and minimize respiratory effort  - Oxygen administered by appropriate delivery if ordered  - Initiate smoking cessation education as indicated  - Encourage broncho-pulmonary hygiene including cough, deep breathe, Incentive Spirometry  - Assess the need for suctioning and aspirate as needed  - Assess and instruct to report SOB or any respiratory difficulty  - Respiratory Therapy support as indicated  Outcome: Progressing

## 2021-04-26 NOTE — ASSESSMENT & PLAN NOTE
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

## 2021-04-26 NOTE — PROGRESS NOTES
Progress Note - Urology      Patient: Durga Marx   : 1945 Sex: male   MRN: [id number]     CSN: [id number]  Unit/Bed#: [address] [place] [address]     SUBJECTIVE:   Feels fine  Urine was clear this morning  Nurses order to remove Sumner at 7:00 a m  Which was done before creatinine returned      Objective   Vitals: /60   Pulse 75   Temp 98 1 °F (36 7 °C)   Resp 18   Ht 5' 11" (1 803 m)   Wt 97 5 kg (215 lb)   SpO2 93%   BMI 29 99 kg/m²     I/O last 24 hours: In: -   Out: 3267 [Urine:1470]      Physical Exam:   General Alert awake   Normocephalic atraumatic PERRLA  Lungs clear bilaterally  Cardiac normal S1 normal S2  Abdomen soft, flank pain  Extremities no edema      Lab Results: CBC:   Lab Results   Component Value Date    WBC 11 43 (H) 2021    HGB 8 9 (L) 2021    HCT 27 9 (L) 2021    MCV 96 2021     2021    MCH 30 6 2021    MCHC 31 9 2021    RDW 14 8 2021    MPV 10 3 2021    NRBC 0 2021     CMP:   Lab Results   Component Value Date     2021    CO2 28 2021    BUN 35 (H) 2021    CREATININE 1 41 (H) 2021    CALCIUM 8 2 (L) 2021    AST 30 2021    ALT 43 2021    ALKPHOS 86 2021    EGFR 48 2021     Urinalysis:   Lab Results   Component Value Date    COLORU Red 2021    CLARITYU Cloudy 2021    SPECGRAV 1 020 2021    PHUR 7 0 2021    PHUR 5 5 2018    LEUKOCYTESUR Moderate (A) 2021    NITRITE Positive (A) 2021    GLUCOSEU Negative 2021    KETONESU 15 (1+) (A) 2021    BILIRUBINUR Negative 2021    BLOODU Large (A) 2021     Urine Culture:   Lab Results   Component Value Date    URINECX Culture results to follow  2021     PSA: No results found for: PSA      Assessment/ Plan:  Radiation cystitis  Status post cysto evacuation of clots fulguration bladder floor day 2    [place]ner out earlier  Voiding clear urine  Worsening creatinine renal failure  Possibly prerenal or postrenal if orifices were fulgurated  Increase p o   Intake  Voiding trial watch today check postvoid residual  If creatinine increases tomorrow renal ultrasound           Vipul Pereyra MD

## 2021-04-26 NOTE — PHYSICAL THERAPY NOTE
PT EVALUATION       04/26/21 1047   PT Last Visit   PT Visit Date 04/26/21   Note Type   Note type Evaluation   Pain Assessment   Pain Assessment Tool Pain Assessment not indicated - pt denies pain   Home Living   Type of Home Apartment  (6 ALLIE with rails)   Home Layout One level   Home Equipment   (No DME per patient)   Prior Function   Level of [place] Independent with ADLs and functional mobility   Lives With Spouse   Receives Help From Family   ADL Assistance Independent   IADLs Independent   Comments Patient ambulates without an assistive device prior to admission   Restrictions/Precautions   Other Precautions Fall Risk   General   Additional Pertinent History Patient admitted with urinary retention, hematuria, Sumner placed with resultant pain and exchange of Sumner  On 04/24/2021 patient underwent a cystoscopy  Family/Caregiver Present No   Cognition   Overall Cognitive Status WFL   Arousal/Participation Cooperative   Orientation Level Oriented X4   Following Commands Follows all commands and directions without difficulty   RLE Assessment   RLE Assessment WFL  (4-/5)   LLE Assessment   LLE Assessment WFL  (4-/5)   Bed Mobility   Supine to Sit 7  Independent   Transfers   Sit to Stand 5  Supervision   Additional items Verbal cues   Stand to Sit 5  Supervision   Additional items Verbal cues   Ambulation/Elevation   Gait pattern   (Mild, occasional unsteadiness)   Gait Assistance   (Min assist/S)   Additional items Assist x 1;Verbal cues; Tactile cues   Assistive Device None;Rolling walker;SPC   Distance Patient ambulated 25 ft without an assistive device; 25 ft with a RW; 100 ft with a cane; all with change in direction   Balance   Static Sitting Good   Static Standing Fair +   Dynamic Standing Fair   Ambulatory   (F-/F)   Activity Tolerance   Activity Tolerance Patient limited by fatigue  (Mild, occasional unsteadiness)   Assessment   Problem List Decreased strength;Decreased range of motion;Decreased endurance; Impaired balance;Decreased mobility; Decreased coordination;Decreased safety awareness   Assessment Patient seen for Physical Therapy evaluation  Patient admitted with Gross hematuria  Comorbidities affecting patient's physical performance include:  HTN, COPD, SHANTI, UTI, hematuria due to cystitis, anemia, HLD  Personal factors affecting patient at time of initial evaluation include: lives in one story house, stairs to enter home, inability to navigate community distances, inability to navigate level surfaces without external assistance and inability to perform dynamic tasks in community  Prior to admission, patient was independent with functional mobility without assistive device, independent with ADLS, independent with IADLS, living with spouse in a one level home with 6 steps to enter, ambulating household distance and ambulating community distances  Please find objective findings from Physical Therapy assessment regarding body systems outlined above with impairments and limitations including weakness, decreased ROM, impaired balance, decreased endurance, impaired coordination, gait deviations, decreased activity tolerance, decreased functional mobility tolerance, decreased safety awareness and fall risk  The Barthel Index was used as a functional outcome tool presenting with a score of 70 today indicating moderate limitations of functional mobility and ADLS  Patient's clinical presentation is currently evolving as seen in patient's presentation of vital sign response, changing level of pain, increased fall risk, new onset of impairment of functional mobility, decreased endurance and new onset of weakness  Pt would benefit from continued Physical Therapy treatment to address deficits as defined above and maximize level of functional mobility  As demonstrated by objective findings, the assigned level of complexity for this evaluation is moderate      The patient's AM-PAC Basic Mobility Inpatient Short Form Raw Score is 20, Standardized Score is 43 99  A standardized score greater than 42 9 suggests the patient may benefit from discharge to home  Please also refer to the recommendation of the Physical Therapist for safe discharge planning  Goals   Patient Goals go home   STG Expiration Date 05/03/21   Short Term Goal #1 Transfers-I   Short Term Goal #2 Patient will ambulate with/without cane functional household distances-S/I; up/down 6 steps with bilateral rails so patient can enter/exit his home-S   LTG Expiration Date 05/10/21   Long Term Goal #1 Patient will return to independent functional mobility, home and community, with/without cane, levels and stairs   Long Term Goal #2 Balance with/without assistive device-G for safe gait and mobility   Plan   Treatment/Interventions ADL retraining;Functional transfer training;LE strengthening/ROM; Elevations; Therapeutic exercise; Endurance training;Patient/family training;Equipment eval/education; Bed mobility;Gait training; Compensatory technique education   PT Frequency 5x/wk   Recommendation   PT Discharge Recommendation No rehabilitation needs   Equipment Recommended   (cane for dc)   AM-PAC Basic Mobility Inpatient   Turning in Bed Without Bedrails 4   Lying on Back to Sitting on Edge of Flat Bed 4   Moving Bed to Chair 3   Standing Up From Chair 3   Walk in Room 3   Climb 3-5 Stairs 3   Basic Mobility Inpatient Raw Score 20   Basic Mobility Standardized Score 43 99   Barthel Index   Feeding 10   Bathing 0   Grooming Score 5   Dressing Score 5   Bladder Score 10   Bowels Score 10   Toilet Use Score 5   Transfers (Bed/Chair) Score 10   Mobility (Level Surface) Score 10   Stairs Score 5   Barthel Index Score 79   Licensure   [place] License Number  Hira Rothman PT [id number]     Time [place]:1448  Time [place]:1360  Total Time: 10 mins      S:  I have not walked in 3 days  O:  Patient transfers sit to stand-S/I and ambulates with a cane x 75 ft and without the cane x 75 ft both with change in direction with S/I  Patient transfer stand to sit-S/I  A:  Slight improvement in balance noted with the cane versus no assistive device  Patient will benefit from continued skilled physical therapy services to return him to his prior level of function and to improve his balance  P:  Continue per PT POC  DCP-home with wife  Continued gait with cane with progression to no assistive devices previous      [place], [place]   [id number]

## 2021-04-26 NOTE — PROGRESS NOTES
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  No further bleeding  Continue to monitor for now    * Gross hematuria  Assessment & Plan  Patient recently underwent cystoscopy on 4/14 with evacuation of clots, bladder biopsy and extensive fulguration of radiation cysitis and was maintained on CBI initially during hospitalization and was discharged without Sumner, sent with Abx for UTI  He arrived in ED earlier tonight with complaint of hematuria and urinary retention and was sent home with a Sumner catheter  He complained of significant pain and minimal urine output from the Sumner and felt dizzy and lightheaded from the pain  Remained with hematuria passing clots mid declining hemoglobin  Underwent urgent cystoscopy 4/24, noted to have 500 cc of clot in the bladder which was evacuated  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  Maintained on CBI postoperatively  Hematuria resolved  Urology follow-up appreciated  · Continue to monitor  · Catheter was removed today, voiding trial  · Monitor bladder scan  · Follow-up BMP  · Follow-up with urology    UTI (urinary tract infection)  Assessment & Plan  Suspected, WBC 14 on admission    Patient denies any fever chills  UA with moderate leukocytes, nitrites  Previous culture grew E faecalis, susceptible to vanco and ampicillin  Remains afebrile, white count is trending down  · Continue amoxicillin   · Follow-up urine culture    SHANTI (acute kidney injury) ([facility] )  Assessment & Plan  Baseline Cr between 1 2-1 3  Cr 1 59 on admission  Likely secondary to urinary retention  Subsequently improved but again higher at 1 41 today despite urinary catheter  · Start IV fluid  · Monitor intake output  · Monitor bladder scan as catheter has been removed  Repeat BMP later today  · Avoid nephrotoxins     Chronic obstructive pulmonary disease (HCC)  Assessment & Plan  Noted exacerbation post cystoscopy  Chest x-ray without any acute abnormality  EKG within normal limit  Troponin, proBNP negative  Improved with albuterol and IV Solu-Medrol x1  · Continue albuterol as needed    Essential hypertension  Assessment & Plan  Continue Lopressor 25 mg Q12     Dyslipidemia  Assessment & Plan  Continue statin therapy        VTE Pharmacologic Prophylaxis:   Pharmacologic: Pharmacologic VTE Prophylaxis contraindicated due to Hematuria  Mechanical VTE Prophylaxis in Place: Yes    Patient Centered Rounds: I have performed bedside rounds with nursing staff today  Discussions with Specialists or Other Care Team Provider:  Yes, Dr Yesi Dupont and Discussions with Family / Patient:  Discussed with patient, offered to call family but patient declined    Time Spent for Care: 45 minutes  More than 50% of total time spent on counseling and coordination of care as described above  Current Length of Stay: 2 day(s)    Current Patient Status: Inpatient   Certification Statement: The patient will continue to require additional inpatient hospital stay due to Hematuria requiring CBI    Discharge Plan:  Home likely in a m      Code Status: Level 3 - DNAR and DNI      Subjective:   Feels well  Sumner catheter was removed today  Denies any voiding difficulties no further bleeding or dysuria    Denies chest pain shortness of breath or dizziness    Objective:     Vitals:   Temp (24hrs), Av 1 °F (36 7 °C), Min:97 9 °F (36 6 °C), Max:98 3 °F (36 8 °C)    Temp:  [97 9 °F (36 6 °C)-98 3 °F (36 8 °C)] 98 3 °F (36 8 °C)  HR:  [60-75] 60  Resp:  [16-18] 18  BP: (116-134)/(60-73) 134/73  SpO2:  [90 %-94 %] 94 % on room air  Body mass index is 29 99 kg/m²  Input and Output Summary (last 24 hours): Intake/Output Summary (Last 24 hours) at 4/26/2021 1733  Last data filed at 4/26/2021 0701  Gross per 24 hour   Intake --   Output 1470 ml   Net -1470 ml       Physical Exam:     Physical Exam  Constitutional:       General: He is not in acute distress  Appearance: He is obese  HENT:      Head: Normocephalic and atraumatic  Neck:      Musculoskeletal: Neck supple  Cardiovascular:      Rate and Rhythm: Normal rate  Pulmonary:      Effort: Pulmonary effort is normal  No respiratory distress  Breath sounds: No wheezing, rhonchi or rales  Chest:      Chest wall: No tenderness  Abdominal:      General: Bowel sounds are normal  There is no distension  Palpations: Abdomen is soft  Tenderness: There is no abdominal tenderness  There is no guarding or rebound  Skin:     General: Skin is warm and dry  Findings: No rash  Neurological:      Mental Status: He is alert and oriented to person, place, and time  Mental status is at baseline  Cranial Nerves: No cranial nerve deficit  Additional Data:     Labs:    Results from last 7 days   Lab Units 04/26/21  0447  04/24/21  0537   WBC Thousand/uL 11 43*   < > 11 38*   HEMOGLOBIN g/dL 8 9*   < > 9 8*   HEMATOCRIT % 27 9*   < > 31 5*   PLATELETS Thousands/uL 187   < > 180   NEUTROS PCT %  --   --  85*   LYMPHS PCT %  --   --  8*   MONOS PCT %  --   --  6   EOS PCT %  --   --  0    < > = values in this interval not displayed       Results from last 7 days   Lab Units 04/26/21  1653  04/24/21  0132   POTASSIUM mmol/L 4 5   < > 4 3   CHLORIDE mmol/L 102   < > 103   CO2 mmol/L 28   < > 27   BUN mg/dL 32*   < > 33*   CREATININE mg/dL 1 42*   < > 1 59*   CALCIUM mg/dL 8 0*   < > 8 9   ALK PHOS U/L  --   --  86   ALT U/L  --   --  43   AST U/L  --   --  30    < > = values in this interval not displayed  Results from last 7 days   Lab Units 04/24/21  0131   INR  1 10       * I Have Reviewed All Lab Data Listed Above  * Additional Pertinent Lab Tests Reviewed: All Labs Within Last 24 Hours Reviewed      Imaging:  Imaging Reports Reviewed Today Include:  None  Recent Cultures (last 7 days):     Results from last 7 days   Lab Units 04/24/21  0134   URINE CULTURE  10,000-19,000 cfu/ml Candida albicans*       Last 24 Hours Medication List:   Current Facility-Administered Medications   Medication Dose Route Frequency Provider Last Rate    acetaminophen  650 mg Oral Q6H PRN Meaghan Chávez, MD      aluminum-magnesium hydroxide-simethicone  30 mL Oral Q6H PRN Meaghan Chávez, MD      amoxicillin  500 mg Oral [facility] Meaghan Chávez MD      atorvastatin  20 mg Oral HS Meaghan Chávez, MD      belladonna-opium  30 mg Rectal Q8H PRN Meaghan Chávez MD      calcium carbonate  1,000 mg Oral Daily PRN Meaghan Chávez MD      folic acid  1 mg Oral Daily Meaghan Chávez MD      ipratropium-albuterol  3 mL Nebulization BID PRN Claudio Davey MD      metoprolol tartrate  25 mg Oral Q12H [address] Meaghan Chávez MD      ondansetron  4 mg Intravenous Q6H PRN Meaghan Chávez MD      polyethylene glycol  17 g Oral Daily PRN Meaghan Chávez MD      sodium chloride  75 mL/hr Intravenous Continuous Claudio Davey MD 75 mL/hr (04/26/21 1102)          Today, Patient Was Seen By: Claudio Davey MD    ** Please Note: "This note has been constructed using a voice recognition system  Therefore there may be syntax, spelling, and/or grammatical errors   Please call if you have any questions  "**

## 2021-04-26 NOTE — PROGRESS NOTES
[facility] Progress Note  Patient: Truman Patricia 66 y o  male   MRN: [id number]  PCP: Mila Jacobs MD  Unit/Bed#: [address] [place] [address] Encounter: [id number]  Date Of Visit: 04/25/21    Problem List:    Principal Problem:    Gross hematuria  Active Problems:    SHANTI (acute kidney injury) ([facility] )    UTI (urinary tract infection)    Essential hypertension    Chronic obstructive pulmonary disease ([place] [address] )    Dyslipidemia    Hematuria due to cystitis      Assessment & Plan:    * Gross hematuria  Assessment & Plan  Patient recently underwent cystoscopy on 4/14 with evacuation of clots, bladder biopsy and extensive fulguration of radiation cysitis and was maintained on CBI initially during hospitalization and was discharged without Sumner, sent with Abx for UTI  He arrived in ED earlier tonight with complaint of hematuria and urinary retention and was sent home with a Sumner catheter  He complained of significant pain and minimal urine output from the Sumner and felt dizzy and lightheaded from the pain  Remained with hematuria passing clots mid declining hemoglobin  Underwent urgent cystoscopy 4/24, noted to have 500 cc of clot in the bladder which was evacuated  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  Maintained on CBI overnight, now clear  Urology follow-up appreciated  · Continue to monitor  · Voiding trial in a m  If urine remains clear  · Follow-up with urology    UTI (urinary tract infection)  Assessment & Plan  Suspected, WBC 14 on admission    Patient denies any fever chills  UA with moderate leukocytes, nitrites  Previous culture grew E faecalis, susceptible to vanco and ampicillin  Remains afebrile, white count is trending down  · Continue amoxicillin   · Follow-up urine culture    SHANTI (acute kidney injury) ([facility] )  Assessment & Plan  Baseline Cr between 1 2-1 3  Cr 1 59 on admission  Likely secondary to urinary retention  Now improved  · Monitor intake output  · Follow-up urine culture  · Avoid nephrotoxins     Chronic obstructive pulmonary disease ([facility] )  Assessment & Plan  Noted to exacerbation post cystoscopy  Chest x-ray without any acute abnormality  EKG within normal limit  Troponin, proBNP negative  Improved with albuterol and IV Solu-Medrol x1  · Continue albuterol as needed    Essential hypertension  Assessment & Plan  Continue Lopressor 25 mg Q12     Dyslipidemia  Assessment & Plan  Continue statin therapy        VTE Pharmacologic Prophylaxis:   Pharmacologic: Pharmacologic VTE Prophylaxis contraindicated due to Hematuria  Mechanical VTE Prophylaxis in Place: Yes    Patient Centered Rounds: I have performed bedside rounds with nursing staff today  Discussions with Specialists or Other Care Team Provider:  Yes, Dr Kory Munoz    Education and Discussions with Family / Patient:  Discussed with patient    Time Spent for Care: 45 minutes  More than 50% of total time spent on counseling and coordination of care as described above  Current Length of Stay: 1 day(s)    Current Patient Status: Inpatient   Certification Statement: The patient will continue to require additional inpatient hospital stay due to Hematuria requiring CBI    Discharge Plan:  Home went hematuria improves and cleared by Urology    Code Status: Level 3 - DNAR and DNI      Subjective:   Underwent urgent cystoscopy yesterday due to significant gross hematuria and passing clots  Noted to have 500 cc out which was irrigated from the catheter  Bleeding was noted from bladder floor posterior wall secondary to radiation cystitis, patient underwent extensive fulguration    Reports feeling much better today  Denies any abdominal discomfort , CBI was discontinues as urine cleared  Denies any further shortness of breath or wheezing    Reports that he has history of COPD takes albuterol inhaler as needed  Previously was on maintenance inhaler also  Quit smoking few months ago      Objective: Vitals:   Temp (24hrs), Av 9 °F (36 6 °C), Min:97 6 °F (36 4 °C), Max:98 2 °F (36 8 °C)    Temp:  [97 6 °F (36 4 °C)-98 2 °F (36 8 °C)] 97 9 °F (36 6 °C)  HR:  [51-71] 62  Resp:  [16-20] 16  BP: ()/(55-65) 118/63  SpO2:  [91 %-98 %] 94 % on room air  Body mass index is 29 99 kg/m²  Input and Output Summary (last 24 hours): Intake/Output Summary (Last 24 hours) at 2021 2140  Last data filed at 2021 1901  Gross per 24 hour   Intake --   Output -2850 ml   Net 2850 ml       Physical Exam:     Physical Exam  Constitutional:       General: He is not in acute distress  Appearance: He is obese  HENT:      Head: Normocephalic and atraumatic  Neck:      Musculoskeletal: Neck supple  Cardiovascular:      Rate and Rhythm: Normal rate  Pulmonary:      Effort: Pulmonary effort is normal  No respiratory distress  Breath sounds: No wheezing, rhonchi or rales  Chest:      Chest wall: No tenderness  Abdominal:      General: Bowel sounds are normal  There is no distension  Palpations: Abdomen is soft  Tenderness: There is no abdominal tenderness  There is no guarding or rebound  Genitourinary:     Comments: CBI with pink-tinged urine  Musculoskeletal:      Right lower leg: No edema  Left lower leg: No edema  Skin:     General: Skin is warm and dry  Findings: No rash  Neurological:      General: No focal deficit present  Mental Status: He is alert and oriented to person, place, and time  Mental status is at baseline  Cranial Nerves: No cranial nerve deficit           Additional Data:     Labs:    Results from last 7 days   Lab Units 21  0447 21  0537   WBC Thousand/uL 10 64* 11 38*   HEMOGLOBIN g/dL 9 7* 9 8*   HEMATOCRIT % 30 6* 31 5*   PLATELETS Thousands/uL 172 180   NEUTROS PCT %  --  85*   LYMPHS PCT %  --  8*   MONOS PCT %  --  6   EOS PCT %  --  0     Results from last 7 days   Lab Units 217  21  0132 POTASSIUM mmol/L 4 7   < > 4 3   CHLORIDE mmol/L 104   < > 103   CO2 mmol/L 27   < > 27   BUN mg/dL 30*   < > 33*   CREATININE mg/dL 1 24   < > 1 59*   CALCIUM mg/dL 8 4   < > 8 9   ALK PHOS U/L  --   --  86   ALT U/L  --   --  43   AST U/L  --   --  30    < > = values in this interval not displayed  Results from last 7 days   Lab Units 04/24/21  0131   INR  1 10       * I Have Reviewed All Lab Data Listed Above  * Additional Pertinent Lab Tests Reviewed: All Labs Within Last 24 Hours Reviewed      Imaging:  Imaging Reports Reviewed Today Include:  None  Recent Cultures (last 7 days):     Results from last 7 days   Lab Units 04/24/21  0134   URINE CULTURE  Culture too young- will reincubate       Last 24 Hours Medication List:   Current Facility-Administered Medications   Medication Dose Route Frequency Provider Last Rate    acetaminophen  650 mg Oral Q6H PRN Yissel Olmstead MD      aluminum-magnesium hydroxide-simethicone  30 mL Oral Q6H PRN Yissel Olmstead MD      amoxicillin  500 mg Oral [facility] Yissel Olmstead MD      atorvastatin  20 mg Oral HS Yissel Olmstead MD      belladonna-opium  30 mg Rectal Q8H PRN Yissel Olmstead MD      calcium carbonate  1,000 mg Oral Daily PRN Yissel Olmstead MD      folic acid  1 mg Oral Daily Yissel Olmstead MD      ipratropium-albuterol  3 mL Nebulization BID PRN Selwyn Baker MD      metoprolol tartrate  25 mg Oral Q12H [address] MD      ondansetron  4 mg Intravenous Q6H PRN Yissel Olmstead MD      polyethylene glycol  17 g Oral Daily PRN Yissel Olmstead MD            Today, Patient Was Seen By: Selwyn Baker MD    ** Please Note: "This note has been constructed using a voice recognition system  Therefore there may be syntax, spelling, and/or grammatical errors   Please call if you have any questions  "**

## 2021-04-26 NOTE — CASE MANAGEMENT
LOS - 2 days    Readmission  CPR2    SW met with pt to assess needs and discuss plans  Discussed goals of making sure pt's needs are met upon discharge and that Freedom of Choice is offered  Pt is a 30 day readmission  Admissions are related  Explored with pt factors that may have led to readmission  Pt could not pinpoint reason for readmission  He had followed up with urologist as directed early that day but then started to have issues later in the evening  Pt lives with his wife in an apartment  Pt is independent with ADLs and mobility, driving  No has no DME at home  No HHC/STR history  No MH or D&A issues  Pt's PCP is Dr Ivon Hester MD   Per pt he has prescription coverage and has no difficulty getting his medication as prescribed  Preferred pharmacy is [facility]  Pt does not have POA/advanced directives  Offered information on and assistance with completing advanced directive  Pt declined at this time  Discussed discharge plans and needs with pt  Pt's plan is to return home when discharged  Offered home care services  Pt declined at this time  Pt's wife to transport home when ready  No other discharge needs expressed or anticipated by pt at this time  Offered support in future if needed

## 2021-04-27 VITALS
RESPIRATION RATE: 18 BRPM | WEIGHT: 215 LBS | SYSTOLIC BLOOD PRESSURE: 123 MMHG | HEART RATE: 64 BPM | HEIGHT: 71 IN | DIASTOLIC BLOOD PRESSURE: 59 MMHG | OXYGEN SATURATION: 92 % | BODY MASS INDEX: 30.1 KG/M2 | TEMPERATURE: 97.7 F

## 2021-04-27 LAB
ANION GAP SERPL CALCULATED.3IONS-SCNC: 6 MMOL/L (ref 4–13)
BUN SERPL-MCNC: 28 MG/DL (ref 5–25)
CALCIUM SERPL-MCNC: 8.3 MG/DL (ref 8.3–10.1)
CHLORIDE SERPL-SCNC: 105 MMOL/L (ref 100–108)
CO2 SERPL-SCNC: 28 MMOL/L (ref 21–32)
CREAT SERPL-MCNC: 1.29 MG/DL (ref 0.6–1.3)
ERYTHROCYTE [DISTWIDTH] IN BLOOD BY AUTOMATED COUNT: 15 % (ref 11.6–15.1)
GFR SERPL CREATININE-BSD FRML MDRD: 53 ML/MIN/1.73SQ M
GLUCOSE SERPL-MCNC: 96 MG/DL (ref 65–140)
HCT VFR BLD AUTO: 30.4 % (ref 36.5–49.3)
HGB BLD-MCNC: 9.5 G/DL (ref 12–17)
MCH RBC QN AUTO: 30.4 PG (ref 26.8–34.3)
MCHC RBC AUTO-ENTMCNC: 31.3 G/DL (ref 31.4–37.4)
MCV RBC AUTO: 97 FL (ref 82–98)
PLATELET # BLD AUTO: 203 THOUSANDS/UL (ref 149–390)
PMV BLD AUTO: 10 FL (ref 8.9–12.7)
POTASSIUM SERPL-SCNC: 4.2 MMOL/L (ref 3.5–5.3)
RBC # BLD AUTO: 3.13 MILLION/UL (ref 3.88–5.62)
SODIUM SERPL-SCNC: 139 MMOL/L (ref 136–145)
WBC # BLD AUTO: 8.35 THOUSAND/UL (ref 4.31–10.16)

## 2021-04-27 PROCEDURE — 85027 COMPLETE CBC AUTOMATED: CPT | Performed by: INTERNAL MEDICINE

## 2021-04-27 PROCEDURE — 99239 HOSP IP/OBS DSCHRG MGMT >30: CPT | Performed by: INTERNAL MEDICINE

## 2021-04-27 PROCEDURE — 80048 BASIC METABOLIC PNL TOTAL CA: CPT | Performed by: INTERNAL MEDICINE

## 2021-04-27 RX ORDER — AMOXICILLIN 200 MG/5ML
500 POWDER, FOR SUSPENSION ORAL 2 TIMES DAILY
Qty: 125 ML | Refills: 0 | Status: SHIPPED | OUTPATIENT
Start: 2021-04-27 | End: 2021-05-02 | Stop reason: HOSPADM

## 2021-04-27 RX ADMIN — AMOXICILLIN 500 MG: 250 CAPSULE ORAL at 05:50

## 2021-04-27 RX ADMIN — METOPROLOL TARTRATE 25 MG: 25 TABLET, FILM COATED ORAL at 09:12

## 2021-04-27 RX ADMIN — FOLIC ACID 1 MG: 1 TABLET ORAL at 09:12

## 2021-04-27 NOTE — PROGRESS NOTES
Progress Note - Urology      Patient: Marin Shea   : 1945 Sex: male   MRN: [id number]     CSN: [id number]  Unit/Bed#: [address] [place] [address]     SUBJECTIVE:   Feels fine  Being discharged today creatinine trending down      Objective   Vitals: /59   Pulse 64   Temp 97 7 °F (36 5 °C)   Resp 18   Ht 5' 11" (1 803 m)   Wt 97 5 kg (215 lb)   SpO2 92%   BMI 29 99 kg/m²     I/O last 24 hours: In: 1761 3 [P O :240;  I V :1521 3]  Out: 1420 [Urine:1420]      Physical Exam:   General Alert awake   Normocephalic atraumatic PERRLA  Lungs clear bilaterally  Cardiac normal S1 normal S2  Abdomen soft, flank pain  Extremities no edema      Lab Results: CBC:   Lab Results   Component Value Date    WBC 8 35 2021    HGB 9 5 (L) 2021    HCT 30 4 (L) 2021    MCV 97 2021     2021    MCH 30 4 2021    MCHC 31 3 (L) 2021    RDW 15 0 2021    MPV 10 0 2021    NRBC 0 2021     CMP:   Lab Results   Component Value Date     2021    CO2 28 2021    BUN 28 (H) 2021    CREATININE 1 29 2021    CALCIUM 8 3 2021    AST 30 2021    ALT 43 2021    ALKPHOS 86 2021    EGFR 53 2021     Urinalysis:   Lab Results   Component Value Date    COLORU Red 2021    CLARITYU Cloudy 2021    SPECGRAV 1 020 2021    PHUR 7 0 2021    PHUR 5 5 2018    LEUKOCYTESUR Moderate (A) 2021    NITRITE Positive (A) 2021    GLUCOSEU Negative 2021    KETONESU 15 (1+) (A) 2021    BILIRUBINUR Negative 2021    BLOODU Large (A) 2021     Urine Culture:   Lab Results   Component Value Date    URINECX 10,000-19,000 cfu/ml Candida albicans (A) 2021     PSA: No results found for: PSA      Assessment/ Plan:  Recurrent hemorrhagic cystitis  Sumner out voiding well  Creatinine trending down with hydration  DC home will see the office Friday has consult with Wound Care hyper bulk oxygen therapy this Thursday  No straining heavy lifting aspirin products        Batsheva Ridley MD

## 2021-04-27 NOTE — CASE MANAGEMENT
Patient has been medically cleared for discharge home today  IMM reviewed with patient  patient agree with discharge determination  Copy provided to patient  Original placed in chart for scanning  Patient requires a follow up appointment with their primary care physician post discharge  CM called patients primary care office to request a follow up appointment  See appointment details below      PCP Name: Dr Mehran Tipton at [facility] Appointment date and time: 5/4/2021 10:45AM

## 2021-04-27 NOTE — PLAN OF CARE
Problem: Potential for Falls  Goal: Patient will remain free of falls  Description: INTERVENTIONS:  - Assess patient frequently for physical needs  -  Identify cognitive and physical deficits and behaviors that affect risk of falls    -  [place] fall precautions as indicated by assessment   - Educate patient/family on patient safety including physical limitations  - Instruct patient to call for assistance with activity based on assessment  - Modify environment to reduce risk of injury  - Consider OT/PT consult to assist with strengthening/mobility  Outcome: Progressing     Problem: PAIN - ADULT  Goal: Verbalizes/displays adequate comfort level or baseline comfort level  Description: Interventions:  - Encourage patient to monitor pain and request assistance  - Assess pain using appropriate pain scale  - Administer analgesics based on type and severity of pain and evaluate response  - Implement non-pharmacological measures as appropriate and evaluate response  - Consider cultural and social influences on pain and pain management  - Notify physician/advanced practitioner if interventions unsuccessful or patient reports new pain  Outcome: Progressing     Problem: INFECTION - ADULT  Goal: Absence or prevention of progression during hospitalization  Description: INTERVENTIONS:  - Assess and monitor for signs and symptoms of infection  - Monitor lab/diagnostic results  - Monitor all insertion sites, i e  indwelling lines, tubes, and drains  - Monitor endotracheal if appropriate and nasal secretions for changes in amount and color  - [place] appropriate cooling/warming therapies per order  - Administer medications as ordered  - Instruct and encourage patient and family to use good hand hygiene technique  - Identify and instruct in appropriate isolation precautions for identified infection/condition  Outcome: Progressing     Problem: SAFETY ADULT  Goal: Patient will remain free of falls  Description: INTERVENTIONS:  - Assess patient frequently for physical needs  -  Identify cognitive and physical deficits and behaviors that affect risk of falls    -  [place] fall precautions as indicated by assessment   - Educate patient/family on patient safety including physical limitations  - Instruct patient to call for assistance with activity based on assessment  - Modify environment to reduce risk of injury  - Consider OT/PT consult to assist with strengthening/mobility  Outcome: Progressing  Goal: Maintain or return to baseline ADL function  Description: INTERVENTIONS:  -  Assess patient's ability to carry out ADLs; assess patient's baseline for ADL function and identify physical deficits which impact ability to perform ADLs (bathing, care of mouth/teeth, toileting, grooming, dressing, etc )  - Assess/evaluate cause of self-care deficits   - Assess range of motion  - Assess patient's mobility; develop plan if impaired  - Assess patient's need for assistive devices and provide as appropriate  - Encourage maximum independence but intervene and supervise when necessary  - Involve family in performance of ADLs  - Assess for home care needs following discharge   - Consider OT consult to assist with ADL evaluation and planning for discharge  - Provide patient education as appropriate  Outcome: Progressing  Goal: Maintain or return mobility status to optimal level  Description: INTERVENTIONS:  - Assess patient's baseline mobility status (ambulation, transfers, stairs, etc )    - Identify cognitive and physical deficits and behaviors that affect mobility  - Identify mobility aids required to assist with transfers and/or ambulation (gait belt, sit-to-stand, lift, walker, cane, etc )  - [place] fall precautions as indicated by assessment  - Record patient progress and toleration of activity level on Mobility SBAR; progress patient to next Phase/Stage  - Instruct patient to call for assistance with activity based on assessment  - Consider rehabilitation consult to assist with strengthening/weightbearing, etc   Outcome: Progressing     Problem: DISCHARGE PLANNING  Goal: Discharge to home or other facility with appropriate resources  Description: INTERVENTIONS:  - Identify barriers to discharge w/patient and caregiver  - Arrange for needed discharge resources and transportation as appropriate  - Identify discharge learning needs (meds, wound care, etc )  - Arrange for interpretive services to assist at discharge as needed  - Refer to Case Management Department for coordinating discharge planning if the patient needs post-hospital services based on physician/advanced practitioner order or complex needs related to functional status, cognitive ability, or social support system  Outcome: Progressing     Problem: Knowledge Deficit  Goal: Patient/family/caregiver demonstrates understanding of disease process, treatment plan, medications, and discharge instructions  Description: Complete learning assessment and assess knowledge base  Interventions:  - Provide teaching at level of understanding  - Provide teaching via preferred learning methods  Outcome: Progressing     Problem: Nutrition/Hydration-ADULT  Goal: Nutrient/Hydration intake appropriate for improving, restoring or maintaining nutritional needs  Description: Monitor and assess patient's nutrition/hydration status for malnutrition  Collaborate with interdisciplinary team and initiate plan and interventions as ordered  Monitor patient's weight and dietary intake as ordered or per policy  Utilize nutrition screening tool and intervene as necessary  Determine patient's food preferences and provide high-protein, high-caloric foods as appropriate       INTERVENTIONS:  - Monitor oral intake, urinary output, labs, and treatment plans  - Assess nutrition and hydration status and recommend course of action  - Evaluate amount of meals eaten  - Assist patient with eating if necessary   - Allow adequate time for meals  - Recommend/ encourage appropriate diets, oral nutritional supplements, and vitamin/mineral supplements  - Order, calculate, and assess calorie counts as needed  - Recommend, monitor, and adjust tube feedings and TPN/PPN based on assessed needs  - Assess need for intravenous fluids  - Provide specific nutrition/hydration education as appropriate  - Include patient/family/caregiver in decisions related to nutrition  Outcome: Progressing     Problem: RESPIRATORY - ADULT  Goal: Achieves optimal ventilation and oxygenation  Description: INTERVENTIONS:  - Assess for changes in respiratory status  - Assess for changes in mentation and behavior  - Position to facilitate oxygenation and minimize respiratory effort  - Oxygen administered by appropriate delivery if ordered  - Initiate smoking cessation education as indicated  - Encourage broncho-pulmonary hygiene including cough, deep breathe, Incentive Spirometry  - Assess the need for suctioning and aspirate as needed  - Assess and instruct to report SOB or any respiratory difficulty  - Respiratory Therapy support as indicated  Outcome: Progressing

## 2021-04-27 NOTE — NURSING NOTE
Discharge instructions given to patient and wife  No questions at this time  Patient wheeled down to lobby for discharge with PCA  All belongings at side, IV was removed

## 2021-04-27 NOTE — DISCHARGE SUMMARY
Armando 43  Discharge- Whitley Links 1945, 66 y o  male MRN: [id number]  Unit/Bed#: [address] [place] [address] Encounter: [id number]  Primary Care Provider: Saima Nation MD   Date and time admitted to hospital: 4/24/2021 12:29 AM    * Gross hematuria  Assessment & Plan  Patient recently underwent cystoscopy on 4/14 with evacuation of clots, bladder biopsy and extensive fulguration of radiation cysitis and was maintained on CBI initially during hospitalization and was discharged without Sumner, sent with Abx for UTI  He arrived in ED earlier tonight with complaint of hematuria and urinary retention and was sent home with a Sumner catheter  He complained of significant pain and minimal urine output from the Sumner and felt dizzy and lightheaded from the pain  Remained with hematuria passing clots with mild did drop in hemoglobin  Underwent urgent cystoscopy 4/24, noted to have 500 cc of clot in the bladder which was evacuated  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  Maintained on CBI postoperatively  Hematuria resolved  Urology follow-up appreciated  · Sumner has been removed yesterday and patient has been urinating well  · Creatinine continues to improve  · Patient to be discharged on amoxicillin for 5 days    SHANTI (acute kidney injury) ([facility] [address] )  Assessment & Plan  Baseline Cr between 1 2-1 3  Cr 1 59 on admission  Likely secondary to urinary retention  Subsequently improved but again higher at 1 41 today despite urinary catheter  · Patient received IV fluids  · Sumner has been removed yesterday and patient has been urinating well  · Creatinine has improved and is close to baseline  UTI (urinary tract infection)  Assessment & Plan  Suspected, WBC 14 on admission    Patient denies any fever chills  UA with moderate leukocytes, nitrites  Previous culture grew E faecalis, susceptible to vanco and ampicillin  Remains afebrile, white count is trending down  · Continue amoxicillin   · Current urine cultures growing Candida Candida    Chronic obstructive pulmonary disease (HCC)  Assessment & Plan  Noted exacerbation post cystoscopy  Chest x-ray without any acute abnormality  EKG within normal limit  Troponin, proBNP negative  Improved with albuterol and IV Solu-Medrol x1  · Continue albuterol as needed    Acute blood loss anemia (ABLA)  Assessment & Plan  Secondary to gross hematuria due to radiation cystitis  No further bleeding  Hemoglobin continues to remain stable    Dyslipidemia  Assessment & Plan  Continue statin therapy    Essential hypertension  Assessment & Plan  Continue Lopressor 25 mg Q12   Blood pressure has been stable      Discharging Physician / Practitioner: Wes Haynes MD  PCP: Kellen Pena MD  Admission Date:   Admission Orders (From admission, onward)     Ordered        04/24/21 1219  Inpatient Admission  Once         04/24/21 0105  Place in Observation  Once                   Discharge Date: 04/27/21    Resolved Problems  Date Reviewed: 4/27/2021    None          Consultations During Hospital Stay:  · Urology    Procedures Performed:   · Cystoscopy with evacuation of clots fulguration     Outpatient Tests Requested:  · Follow-up with Urology    Complications:  None    Reason for Admission:  Blood in the urine with burning urination    Hospital Course:     Gabby Wang is a 66 y o  male patient with past medical history of hypertension, hyperlipidemia, COPD, prostate cancer status post radiation/chemotherapy and prostatectomy who originally presented to the hospital on 4/24/2021 due to no retention and hematuria  Patient was passing lot of clots and had difficulty with urination  Patient had Sumner catheter placed in the ED and was discharged home and returned few hours later complaining of severe pain related to the Sumner with dizziness and diaphoresis    Patient was seen by Urology underwent cystoscopy with evacuation of clots and fulguration  Patient was maintained CBI postprocedure and eventually was changed to Sumner catheter  Later Sumner catheter was discontinued and patient was urinating well with improving creatinine  Patient to be discharged to home with outpatient follow-up with Urology        Please see above list of diagnoses and related plan for additional information  Condition at Discharge: stable     Discharge Day Visit / Exam:     Subjective:  Patient is feeling well  Has been urinating well  Denies any blood in the urine  Denies any abdominal pain pain with urination  Patient had a bowel movement today  Vitals: Blood Pressure: 123/59 (04/27/21 0823)  Pulse: 64 (04/27/21 0823)  Temperature: 97 7 °F (36 5 °C) (04/27/21 0823)  Temp Source: Oral (04/26/21 2145)  Respirations: 18 (04/26/21 2145)  Height: 5' 11" (180 3 cm) (04/24/21 0331)  Weight - Scale: 97 5 kg (215 lb) (04/24/21 0331)  SpO2: 92 % (04/27/21 0823)  Exam:   Physical Exam  Constitutional:       Appearance: Normal appearance  HENT:      Head: Normocephalic and atraumatic  Eyes:      Extraocular Movements: Extraocular movements intact  Pupils: Pupils are equal, round, and reactive to light  Neck:      Musculoskeletal: Normal range of motion and neck supple  Cardiovascular:      Rate and Rhythm: Normal rate and regular rhythm  Heart sounds: No murmur  No gallop  Pulmonary:      Effort: Pulmonary effort is normal       Breath sounds: Normal breath sounds  Abdominal:      General: Bowel sounds are normal       Palpations: Abdomen is soft  Tenderness: There is no abdominal tenderness  Musculoskeletal: Normal range of motion  General: No swelling or deformity  Skin:     General: Skin is warm and dry  Neurological:      General: No focal deficit present  Mental Status: He is alert             Discharge instructions/Information to patient and family:   See after visit summary for information provided to patient and family  Provisions for Follow-Up Care:  See after visit summary for information related to follow-up care and any pertinent home health orders  Disposition:     Home      Planned Readmission: No     Discharge Statement:  I spent 35 minutes discharging the patient  This time was spent on the day of discharge  I had direct contact with the patient on the day of discharge  Greater than 50% of the total time was spent examining patient, answering all patient questions, arranging and discussing plan of care with patient as well as directly providing post-discharge instructions  Additional time then spent on discharge activities  Discharge Medications:  See after visit summary for reconciled discharge medications provided to patient and family        ** Please Note: This note has been constructed using a voice recognition system **

## 2021-04-27 NOTE — DISCHARGE INSTRUCTIONS
Acute Hematuria   WHAT YOU SHOULD KNOW:   Hematuria is blood in your urine from an injury or medical condition  Acute means the problem starts suddenly, worsens quickly, and lasts a short time  Your urine may be bright red to dark brown  Some common causes of hematuria are bladder infection, kidney stone, trauma to the kidneys or bladder, and some medications  Sometimes blood clots can block the urethra so that you cannot empty your bladder  AFTER YOU LEAVE:   Medicines:  Ask about these or other medicines you may need to treat the cause of your acute hematuria:  · Antibiotics: This medicine is given to fight or prevent an infection caused by bacteria  Always take your antibiotics exactly as ordered by your healthcare provider  Do not stop taking your medicine unless directed by your healthcare provider  Never save antibiotics or take leftover antibiotics that were given to you for another illness  · Take your medicine as directed  Call your healthcare provider if you think your medicine is not helping or if you have side effects  Tell him if you are allergic to any medicine  Keep a list of the medicines, vitamins, and herbs you take  Include the amounts, and when and why you take them  Bring the list or the pill bottles to follow-up visits  Carry your medicine list with you in case of an emergency  Increase the amount of fluid you drink:  Drink clear fluids to help flush the blood from your urinary tract  Follow instructions about how much fluid to drink  Follow up with your healthcare provider as directed: Your healthcare provider will tell you how often to come in for follow-up visits  He may refer you to a specialist, such as a urologist or nephrologist  The specialists may do tests or procedures to find more serious problems with your urinary system  Write down your questions so you remember to ask them during your visits     Contact your healthcare provider if:   · You have a fever that gets worse or does not go away with treatment  · You cannot keep liquids or medicines down  · Your urine gets darker, even after you drink extra liquids  · You have questions or concerns about your condition, treatment, or care  Seek care immediately or call 911 if:   · You have blood in your urine after a new injury, such as a fall  · You are urinating very small amounts or not at all  · You feel like you cannot empty your bladder  · You have severe back or side pain that does not go away with treatment  © 2014 [employer] is for End User's use only and may not be sold, redistributed or otherwise used for commercial purposes  All illustrations and images included in CareNotes® are the copyrighted property of [employer]  or Giorgi Wang  The above information is an  only  It is not intended as medical advice for individual conditions or treatments  Talk to your doctor, nurse or pharmacist before following any medical regimen to see if it is safe and effective for you

## 2021-04-29 ENCOUNTER — HOSPITAL ENCOUNTER (INPATIENT)
Facility: HOSPITAL | Age: 76
LOS: 2 days | Discharge: HOME | DRG: 700 | End: 2021-05-02
Attending: EMERGENCY MEDICINE | Admitting: INTERNAL MEDICINE
Payer: MEDICARE

## 2021-04-29 DIAGNOSIS — R31.0 GROSS HEMATURIA: ICD-10-CM

## 2021-04-29 DIAGNOSIS — R31.9 HEMATURIA: Primary | ICD-10-CM

## 2021-04-29 LAB
ABO GROUP BLD: NORMAL
ALBUMIN SERPL BCP-MCNC: 2.9 G/DL (ref 3.5–5)
ALP SERPL-CCNC: 89 U/L (ref 46–116)
ALT SERPL W P-5'-P-CCNC: 31 U/L (ref 12–78)
ANION GAP SERPL CALCULATED.3IONS-SCNC: 8 MMOL/L (ref 4–13)
APTT PPP: 28 SECONDS (ref 23–37)
AST SERPL W P-5'-P-CCNC: 19 U/L (ref 5–45)
BACTERIA UR QL AUTO: ABNORMAL /HPF
BASOPHILS # BLD AUTO: 0.02 THOUSANDS/ΜL (ref 0–0.1)
BASOPHILS NFR BLD AUTO: 0 % (ref 0–1)
BILIRUB SERPL-MCNC: 0.55 MG/DL (ref 0.2–1)
BILIRUB UR QL STRIP: NEGATIVE
BLD GP AB SCN SERPL QL: NEGATIVE
BUN SERPL-MCNC: 28 MG/DL (ref 5–25)
CALCIUM ALBUM COR SERPL-MCNC: 9.4 MG/DL (ref 8.3–10.1)
CALCIUM SERPL-MCNC: 8.5 MG/DL (ref 8.3–10.1)
CHLORIDE SERPL-SCNC: 106 MMOL/L (ref 100–108)
CLARITY UR: ABNORMAL
CO2 SERPL-SCNC: 27 MMOL/L (ref 21–32)
COLOR UR: ABNORMAL
CREAT SERPL-MCNC: 1.24 MG/DL (ref 0.6–1.3)
EOSINOPHIL # BLD AUTO: 0.43 THOUSAND/ΜL (ref 0–0.61)
EOSINOPHIL NFR BLD AUTO: 5 % (ref 0–6)
ERYTHROCYTE [DISTWIDTH] IN BLOOD BY AUTOMATED COUNT: 14.5 % (ref 11.6–15.1)
GFR SERPL CREATININE-BSD FRML MDRD: 56 ML/MIN/1.73SQ M
GLUCOSE SERPL-MCNC: 124 MG/DL (ref 65–140)
GLUCOSE UR STRIP-MCNC: NEGATIVE MG/DL
HCT VFR BLD AUTO: 32 % (ref 36.5–49.3)
HGB BLD-MCNC: 10.3 G/DL (ref 12–17)
HGB UR QL STRIP.AUTO: ABNORMAL
IMM GRANULOCYTES # BLD AUTO: 0.05 THOUSAND/UL (ref 0–0.2)
IMM GRANULOCYTES NFR BLD AUTO: 1 % (ref 0–2)
INR PPP: 1.16 (ref 0.84–1.19)
KETONES UR STRIP-MCNC: NEGATIVE MG/DL
LEUKOCYTE ESTERASE UR QL STRIP: NEGATIVE
LYMPHOCYTES # BLD AUTO: 1.64 THOUSANDS/ΜL (ref 0.6–4.47)
LYMPHOCYTES NFR BLD AUTO: 17 % (ref 14–44)
MCH RBC QN AUTO: 30.5 PG (ref 26.8–34.3)
MCHC RBC AUTO-ENTMCNC: 32.2 G/DL (ref 31.4–37.4)
MCV RBC AUTO: 95 FL (ref 82–98)
MONOCYTES # BLD AUTO: 0.71 THOUSAND/ΜL (ref 0.17–1.22)
MONOCYTES NFR BLD AUTO: 8 % (ref 4–12)
NEUTROPHILS # BLD AUTO: 6.59 THOUSANDS/ΜL (ref 1.85–7.62)
NEUTS SEG NFR BLD AUTO: 69 % (ref 43–75)
NITRITE UR QL STRIP: NEGATIVE
NON-SQ EPI CELLS URNS QL MICRO: ABNORMAL /HPF
NRBC BLD AUTO-RTO: 0 /100 WBCS
PH UR STRIP.AUTO: 6 [PH]
PLATELET # BLD AUTO: 211 THOUSANDS/UL (ref 149–390)
PMV BLD AUTO: 9.3 FL (ref 8.9–12.7)
POTASSIUM SERPL-SCNC: 4.1 MMOL/L (ref 3.5–5.3)
PROT SERPL-MCNC: 6.3 G/DL (ref 6.4–8.2)
PROT UR STRIP-MCNC: ABNORMAL MG/DL
PROTHROMBIN TIME: 14.7 SECONDS (ref 11.6–14.5)
RBC # BLD AUTO: 3.38 MILLION/UL (ref 3.88–5.62)
RBC #/AREA URNS AUTO: ABNORMAL /HPF
RH BLD: POSITIVE
SODIUM SERPL-SCNC: 141 MMOL/L (ref 136–145)
SP GR UR STRIP.AUTO: 1.02 (ref 1–1.03)
SPECIMEN EXPIRATION DATE: NORMAL
UROBILINOGEN UR QL STRIP.AUTO: 0.2 E.U./DL
WBC # BLD AUTO: 9.44 THOUSAND/UL (ref 4.31–10.16)
WBC #/AREA URNS AUTO: ABNORMAL /HPF

## 2021-04-29 PROCEDURE — 85730 THROMBOPLASTIN TIME PARTIAL: CPT | Performed by: EMERGENCY MEDICINE

## 2021-04-29 PROCEDURE — 99284 EMERGENCY DEPT VISIT MOD MDM: CPT | Performed by: EMERGENCY MEDICINE

## 2021-04-29 PROCEDURE — 85610 PROTHROMBIN TIME: CPT | Performed by: EMERGENCY MEDICINE

## 2021-04-29 PROCEDURE — 99220 PR INITIAL OBSERVATION CARE/DAY 70 MINUTES: CPT | Performed by: INTERNAL MEDICINE

## 2021-04-29 PROCEDURE — 36415 COLL VENOUS BLD VENIPUNCTURE: CPT | Performed by: EMERGENCY MEDICINE

## 2021-04-29 PROCEDURE — 87086 URINE CULTURE/COLONY COUNT: CPT | Performed by: EMERGENCY MEDICINE

## 2021-04-29 PROCEDURE — 86900 BLOOD TYPING SEROLOGIC ABO: CPT | Performed by: EMERGENCY MEDICINE

## 2021-04-29 PROCEDURE — 99284 EMERGENCY DEPT VISIT MOD MDM: CPT

## 2021-04-29 PROCEDURE — 81001 URINALYSIS AUTO W/SCOPE: CPT | Performed by: EMERGENCY MEDICINE

## 2021-04-29 PROCEDURE — 86850 RBC ANTIBODY SCREEN: CPT | Performed by: EMERGENCY MEDICINE

## 2021-04-29 PROCEDURE — 86901 BLOOD TYPING SEROLOGIC RH(D): CPT | Performed by: EMERGENCY MEDICINE

## 2021-04-29 PROCEDURE — 3E1H78Z IRRIGATION OF LOWER GI USING IRRIGATING SUBSTANCE, VIA NATURAL OR ARTIFICIAL OPENING: ICD-10-PCS | Performed by: SPECIALIST

## 2021-04-29 PROCEDURE — 85025 COMPLETE CBC W/AUTO DIFF WBC: CPT | Performed by: EMERGENCY MEDICINE

## 2021-04-29 PROCEDURE — 80053 COMPREHEN METABOLIC PANEL: CPT | Performed by: EMERGENCY MEDICINE

## 2021-04-29 PROCEDURE — 0TCB7ZZ EXTIRPATION OF MATTER FROM BLADDER, VIA NATURAL OR ARTIFICIAL OPENING: ICD-10-PCS | Performed by: SPECIALIST

## 2021-04-29 RX ORDER — AMOXICILLIN 250 MG/5ML
500 POWDER, FOR SUSPENSION ORAL EVERY 12 HOURS SCHEDULED
Status: DISCONTINUED | OUTPATIENT
Start: 2021-04-29 | End: 2021-05-02 | Stop reason: HOSPADM

## 2021-04-29 RX ORDER — ONDANSETRON 2 MG/ML
4 INJECTION INTRAMUSCULAR; INTRAVENOUS EVERY 6 HOURS PRN
Status: DISCONTINUED | OUTPATIENT
Start: 2021-04-29 | End: 2021-05-02 | Stop reason: HOSPADM

## 2021-04-29 RX ORDER — SODIUM CHLORIDE 9 MG/ML
125 INJECTION, SOLUTION INTRAVENOUS CONTINUOUS
Status: DISCONTINUED | OUTPATIENT
Start: 2021-04-29 | End: 2021-04-30

## 2021-04-29 RX ORDER — ATORVASTATIN CALCIUM 20 MG/1
20 TABLET, FILM COATED ORAL
Status: DISCONTINUED | OUTPATIENT
Start: 2021-04-29 | End: 2021-05-02 | Stop reason: HOSPADM

## 2021-04-29 RX ORDER — FOLIC ACID 1 MG/1
1 TABLET ORAL DAILY
Status: DISCONTINUED | OUTPATIENT
Start: 2021-04-29 | End: 2021-05-02 | Stop reason: HOSPADM

## 2021-04-29 RX ORDER — ALBUTEROL SULFATE 90 UG/1
2 AEROSOL, METERED RESPIRATORY (INHALATION) EVERY 4 HOURS PRN
Status: DISCONTINUED | OUTPATIENT
Start: 2021-04-29 | End: 2021-05-02 | Stop reason: HOSPADM

## 2021-04-29 RX ORDER — B-COMPLEX WITH VITAMIN C
1 TABLET ORAL
Status: DISCONTINUED | OUTPATIENT
Start: 2021-04-29 | End: 2021-05-02 | Stop reason: HOSPADM

## 2021-04-29 RX ADMIN — METOPROLOL TARTRATE 25 MG: 25 TABLET, FILM COATED ORAL at 10:24

## 2021-04-29 RX ADMIN — SODIUM CHLORIDE 125 ML/HR: 0.9 INJECTION, SOLUTION INTRAVENOUS at 18:15

## 2021-04-29 RX ADMIN — ATORVASTATIN CALCIUM 20 MG: 20 TABLET, FILM COATED ORAL at 21:47

## 2021-04-29 RX ADMIN — FOLIC ACID 1 MG: 1 TABLET ORAL at 10:24

## 2021-04-29 RX ADMIN — SODIUM CHLORIDE 125 ML/HR: 0.9 INJECTION, SOLUTION INTRAVENOUS at 10:24

## 2021-04-29 RX ADMIN — AMOXICILLIN 500 MG: 250 POWDER, FOR SUSPENSION ORAL at 22:49

## 2021-04-29 RX ADMIN — AMOXICILLIN 500 MG: 250 POWDER, FOR SUSPENSION ORAL at 10:37

## 2021-04-29 RX ADMIN — METOPROLOL TARTRATE 25 MG: 25 TABLET, FILM COATED ORAL at 21:47

## 2021-04-29 NOTE — ASSESSMENT & PLAN NOTE
Patient presented with 1 episode of leobardo bleeding this morning at 3:30 a m    Patient had a recent hospitalization for similar complaints and underwent cystoscopy on 04/24 with clot evacuation and extensive fulguration  Patient was started on CBI as per Urology recommendations  Hemoglobin appears to be stable  CBI with pinkish colored urine without any clots  Monitor hemoglobin  Urology evaluation  No evidence of UTI on urinalysis  Continue amoxicillin for now

## 2021-04-29 NOTE — QUICK NOTE
Patient hand irrigated with 1 5 L of fluid with multiple clots removed now minimal hematuria    At this point and elected to consider cysto fulguration yet again will attempt CBI for time being would send home with indwelling Sumner to allow bladder to heal for 1-2 weeks in light of recent episode of recurrence just discharged 3 days ago

## 2021-04-29 NOTE — CONSULTS
H&P Exam - Urology       Patient: Phillip Cartwright   : 1945 Sex: male   MRN: [id number]     CSN: [id number]      History of Present Illness   HPI:  Phillip Cartwright is a 66 y o  male well-known to [facility] with 2 recent admissions for gross hematuria secondary to radiation cystitis undergoing cysto for evacuation of clots repeat fulguration last weekend discharged home on Monday night without catheter passing clear urine developing gross painless hematuria early this morning presenting to the ER now on continuous bladder irrigation seen at the bedside  Review of Systems:   Constitutional:  Negative for activity change, fever, chills and diaphoresis  HENT: Negative for hearing loss and trouble swallowing  Eyes: Negative for itching and visual disturbance  Respiratory: Negative for chest tightness and shortness of breath  Cardiovascular: Negative for chest pain, edema  Gastrointestinal: Negative for abdominal distention, na abdominal pain, constipation, diarrhea, Nausea and vomiting  Genitourinary: Negative for decreased urine volume, difficulty urinating, dysuria, enuresis, frequency, hematuria and urgency  Musculoskeletal: Negative for gait problem and myalgias  Neurological: Negative for dizziness and headaches  Hematological: Does not bruise/bleed easily         Historical Information   Past Medical History:   Diagnosis Date    Cancer [facility])     prostate     Past Surgical History:   Procedure Laterality Date    APPENDECTOMY      [place] RETROGRADE PYELOGRAM  2021    HERNIA REPAIR      NH CYSTOURETHROSCOPY W/IRRIG & EVAC CLOTS Bilateral 2021    Procedure: CYSTOSCOPY EVACUATION OF CLOTS bilateral retrograde pyelograms possible TURBT bladder biopsy;  Surgeon: Khushboo Alex MD;  [facility];  Service: Urology    NH CYSTOURETHROSCOPY W/IRRIG & EVAC CLOTS N/A 2021    Procedure: CYSTOSCOPY EVACUATION OF CLOTS fulguration;  Surgeon: Khushboo Alex MD; Location: [facility];  Service: Urology    PROSTATECTOMY      ROTATOR CUFF REPAIR      right shoulder     Social History   Social History     Substance and Sexual Activity   Alcohol Use Never    Frequency: Never     Social History     Substance and Sexual Activity   Drug Use No     Social History     Tobacco Use   Smoking Status Former Smoker    Packs/day: 0 50    Years: 50 00    Pack years: 25 00    Quit date: 8/29/2017    Years since quitting: 3 6   Smokeless Tobacco Never Used   Tobacco Comment    quit one month ago     Family History: No family history on file  Meds/Allergies   Medications Prior to Admission   Medication    albuterol (PROVENTIL HFA,VENTOLIN HFA) 90 mcg/act inhaler    amoxicillin (AMOXIL) 200 MG/5ML suspension    atorvastatin (LIPITOR) 20 mg tablet    Calcium Carb-Cholecalciferol (CALTRATE 923+K8 SOFT PO)    folic acid (FOLVITE) 1 mg tablet    methotrexate 2 5 mg tablet    metoprolol tartrate (LOPRESSOR) 25 mg tablet    cyclobenzaprine (FLEXERIL) 10 mg tablet     No Known Allergies    Objective   Vitals: /80   Pulse 60   Temp 97 5 °F (36 4 °C)   Resp 18   SpO2 94%     Physical Exam:  General Alert awake   Normocephalic atraumatic PERRLA  Lungs clear bilaterally  Cardiac normal S1 normal S2  Abdomen soft, flank osorio  CBI clear  Extremities no edema    I/O last 24 hours:   In: -   Out: 1800 [Urine:1800]    Invasive Devices     Peripheral Intravenous Line            Peripheral IV 04/29/21 Left Wrist less than 1 day          Drain            Urethral Catheter Three way 22 Fr  less than 1 day                    Lab Results: CBC:   Lab Results   Component Value Date    WBC 9 44 04/29/2021    HGB 10 3 (L) 04/29/2021    HCT 32 0 (L) 04/29/2021    MCV 95 04/29/2021     04/29/2021    MCH 30 5 04/29/2021    MCHC 32 2 04/29/2021    RDW 14 5 04/29/2021    MPV 9 3 04/29/2021    NRBC 0 04/29/2021     CMP:   Lab Results   Component Value Date     04/29/2021    CO2 27 04/29/2021    BUN 28 (H) 04/29/2021    CREATININE 1 24 04/29/2021    CALCIUM 8 5 04/29/2021    AST 19 04/29/2021    ALT 31 04/29/2021    ALKPHOS 89 04/29/2021    EGFR 56 04/29/2021     Urinalysis:   Lab Results   Component Value Date    COLORU Red 04/29/2021    CLARITYU Cloudy 04/29/2021    SPECGRAV 1 025 04/29/2021    PHUR 6 0 04/29/2021    PHUR 5 5 03/25/2018    LEUKOCYTESUR Negative 04/29/2021    NITRITE Negative 04/29/2021    GLUCOSEU Negative 04/29/2021    KETONESU Negative 04/29/2021    BILIRUBINUR Negative 04/29/2021    BLOODU Large (A) 04/29/2021     Urine Culture:   Lab Results   Component Value Date    URINECX 10,000-19,000 cfu/ml Candida albicans (A) 04/24/2021     PSA: No results found for: PSA        Assessment/ Plan:  Gross hematuria  Radiation cystitis status post  Radical prostatectomy for localized prostate cancer with recurrence undergoing radiation therapy to the prostatic bed  History of bladder papillomas  Recent new onset history radiation cystitis  Continue CBI today  Will DC CBI in the morning if clear patient to go home with Sumner for 1 week to allow adequate healing      Long Hernandez MD

## 2021-04-29 NOTE — Clinical Note
Case was discussed with Ms Kaushik Cobos and the patient's admission status was agreed to be Med to the service of Dr Robert Garcia

## 2021-04-29 NOTE — ED PROVIDER NOTES
History  No chief complaint on file  66-year-old male, former smoker, past medical history for prostate CA years ago treated with surgery and radiation,  urinary retention, gross hematuria,presenting with the recurrent gross hematuria noticed it prior to arrival   Patient states his urine has been clear up until this morning around 3:30 a m  Antoinette Medina Patient has had multiple recent ER visits and recent admission on 04/24 for the same  He is a patient of Dr Last Paris with a recent cystoscopy, bladder irrigation and large clot removal from bladder  He denies any use of aspirin or anticoagulation  There is no bleeding from elsewhere  Patient states he is voiding freely  Denies any back or flank pain  No fever or chills  He is currently taking amoxicillin which he was discharged on  There are no other acute issues at this time  History provided by:  Patient   used: No        Prior to Admission Medications   Prescriptions Last Dose Informant Patient Reported? Taking?    Calcium Carb-Cholecalciferol (CALTRATE 600+D3 SOFT PO)   Yes No   Sig: Take by mouth   albuterol (PROVENTIL HFA,VENTOLIN HFA) 90 mcg/act inhaler   No No   Sig: Inhale 2 puffs every 4 (four) hours as needed for wheezing   amoxicillin (AMOXIL) 200 MG/5ML suspension   No No   Sig: Take 12 5 mL (500 mg total) by mouth 2 (two) times a day for 5 days   atorvastatin (LIPITOR) 20 mg tablet   Yes No   Sig: Take 20 mg by mouth daily at bedtime   cyclobenzaprine (FLEXERIL) 10 mg tablet   No No   Sig: Take 0 5 tablets by mouth 2 (two) times a day as needed for muscle spasms for up to 3 days   folic acid (FOLVITE) 1 mg tablet   Yes No   Sig: Take 1 mg by mouth daily   methotrexate 2 5 mg tablet   Yes No   Sig: Take 15 5 mg by mouth once a week   metoprolol tartrate (LOPRESSOR) 25 mg tablet   Yes No   Sig: Take 25 mg by mouth every 12 (twelve) hours      Facility-Administered Medications: None       Past Medical History:   Diagnosis Date    Cancer [facility])     prostate       Past Surgical History:   Procedure Laterality Date    APPENDECTOMY      FL RETROGRADE PYELOGRAM  4/14/2021    HERNIA REPAIR      [place] CYSTOURETHROSCOPY W/IRRIG & EVAC CLOTS Bilateral 4/14/2021    Procedure: CYSTOSCOPY EVACUATION OF CLOTS bilateral retrograde pyelograms possible TURBT bladder biopsy;  Surgeon: Patrick Ragsdale MD;  Location: [facility];  Service: Urology    [place] CYSTOURETHROSCOPY W/IRRIG & EVAC CLOTS N/A 4/24/2021    Procedure: CYSTOSCOPY EVACUATION OF CLOTS fulguration;  Surgeon: Patrick Ragsdale MD;  Location: [address] [place] [address];  Service: Urology    PROSTATECTOMY      [address]      right shoulder       No family history on file  I have reviewed and agree with the history as documented  E-Cigarette/Vaping    E-Cigarette Use Former User      E-Cigarette/Vaping Substances     Social History     Tobacco Use    Smoking status: Former Smoker     Packs/day: 0 50     Years: 50 00     Pack years: 25 00     Quit date: 8/29/2017     Years since quitting: 3 6    Smokeless tobacco: Never Used    Tobacco comment: quit one month ago   Substance Use Topics    Alcohol use: Never     Frequency: Never    Drug use: No       Review of Systems   Constitutional: Negative for chills, diaphoresis, fatigue and fever  HENT: Negative for nosebleeds  Eyes: Negative for visual disturbance  Respiratory: Negative for cough, chest tightness and shortness of breath  Cardiovascular: Negative for chest pain, palpitations and leg swelling  Gastrointestinal: Negative for abdominal pain, diarrhea, nausea and vomiting  Genitourinary: Positive for frequency, hematuria and penile pain  Negative for difficulty urinating, discharge, dysuria, flank pain, penile swelling, scrotal swelling, testicular pain and urgency  Burning at the penis  Nocturia   Musculoskeletal: Negative for back pain  Skin: Negative for pallor, rash and wound     Allergic/Immunologic: Negative for immunocompromised state  Neurological: Negative for dizziness, syncope, light-headedness and headaches  Psychiatric/Behavioral: The patient is nervous/anxious  All other systems reviewed and are negative  Physical Exam  Physical Exam  Vitals signs and nursing note reviewed  Constitutional:       Appearance: Normal appearance  He is well-developed  HENT:      Head: Normocephalic and atraumatic  Nose: Nose normal       Mouth/Throat:      Mouth: Mucous membranes are moist    Eyes:      Extraocular Movements: Extraocular movements intact  Conjunctiva/sclera: Conjunctivae normal    Neck:      Musculoskeletal: Normal range of motion and neck supple  Cardiovascular:      Rate and Rhythm: Normal rate and regular rhythm  Heart sounds: No murmur  Pulmonary:      Effort: Pulmonary effort is normal  No respiratory distress  Breath sounds: Normal breath sounds  Abdominal:      General: There is no distension  Palpations: Abdomen is soft  Tenderness: There is no abdominal tenderness  Genitourinary:     Penis: Normal        Comments: Mild light blood at tip of penis and depends  No active bleeding, no clots    Musculoskeletal: Normal range of motion  General: No tenderness  Right lower leg: No edema  Left lower leg: No edema  Skin:     General: Skin is warm and dry  Capillary Refill: Capillary refill takes less than 2 seconds  Neurological:      General: No focal deficit present  Mental Status: He is alert and oriented to person, place, and time  Psychiatric:      Comments: Mod anxious         Vital Signs  ED Triage Vitals   Temp Pulse Resp BP SpO2   -- -- -- -- --      Temp src Heart Rate Source Patient Position - Orthostatic VS BP Location FiO2 (%)   -- -- -- -- --      Pain Score       --           There were no vitals filed for this visit        Visual Acuity      ED Medications  Medications - No data to display    Diagnostic Studies  Results Reviewed     Procedure Component Value Units Date/Time    UA w Reflex to Microscopic w Reflex to Culture [[id number]]     Lab Status: No result Specimen: Urine     Protime-INR [[id number]]     Lab Status: No result Specimen: Blood     APTT [[id number]]     Lab Status: No result Specimen: Blood     Comprehensive metabolic panel [[id number]]     Lab Status: No result Specimen: Blood     CBC and differential [[id number]]     Lab Status: No result Specimen: Blood                  No orders to display              Procedures  Procedures         ED Course  ED Course as of Apr 29 0629   Thu Apr 29, 2021   0517 Bladder scan 120 cc on arrival      0540 Pt given void trial in ED, but "not able to go" 3- way indwelling catheter place, appx 300 cc bloody urine outpt, clear, no clots; tolerated well       0614 Case discussed with urologist, Dr Roberts Me, findings reviewed - recommending CBI and admission      0617 Patient endorsed to hospitalist, Ms Montano [facility]  Number of Diagnoses or Management Options  Hematuria: established and worsening  Diagnosis management comments: CBI per urologist  Admit       Amount and/or Complexity of Data Reviewed  Clinical lab tests: ordered and reviewed  Tests in the medicine section of CPT®: reviewed and ordered  Decide to obtain previous medical records or to obtain history from someone other than the patient: yes  Review and summarize past medical records: yes  Independent visualization of images, tracings, or specimens: yes    Risk of Complications, Morbidity, and/or Mortality  Presenting problems: low  Diagnostic procedures: minimal  Management options: low    Patient Progress  Patient progress: stable      Disposition  Final diagnoses:   None     ED Disposition     None      Follow-up Information    None         Patient's Medications   Discharge Prescriptions    No medications on file     No discharge procedures on file      PDMP Review None          ED Provider  Electronically Signed by           Rasheed Flores MD  04/29/21 5990

## 2021-04-29 NOTE — ED NOTES
PATIENT RANG CALL BELL AND STATED "IM HAVING A LOT OF PAIN IN MY PENIS AND BLADDER, I CAN'T PEE"   DR SHAISTA العراقي [address] [place] [address], RN  21 Brenda Baltazar, RN  [phone]

## 2021-04-29 NOTE — ED TRIAGE NOTES
PATIENT AOX4, PLEASANT AND COOPERATIVE, PATIENT STATES "I WENT TO THE BATHROOM AT 0100 AND MY URINE WAS CLEAR, I WENT TO THE BATHROOM AGAIN AT 0300 AND IT WAS BLOODY, DARK RED, NO BLOOD CLOTS"  PATIENT DENIES PAIN OR DISCOMFORT AT THIS TIME  RESPIRATIONS EVEN AND UNLABORED    WHEN DR NOONAN ENTERED ROOM PATIENT COMPLAINED OF "SOME BURNING AT THE TIP OF MY PENIS"

## 2021-04-29 NOTE — H&P
Armando 43  H&P- Renan Miller 1945, 66 y o  male MRN: [id number]  Unit/Bed#: [address] [place] [address] Encounter: [id number]  Primary Care Provider: Arash Ho MD   Date and time admitted to hospital: 4/29/2021  4:45 AM    * Gross hematuria  Assessment & Plan  Patient presented with 1 episode of leobardo bleeding this morning at 3:30 a m  Patient had a recent hospitalization for similar complaints and underwent cystoscopy on 04/24 with clot evacuation and extensive fulguration  Patient was started on CBI as per Urology recommendations  Hemoglobin appears to be stable  CBI with pinkish colored urine without any clots  Monitor hemoglobin  Urology evaluation  No evidence of UTI on urinalysis  Continue amoxicillin for now      Chronic obstructive pulmonary disease ([address] )  Assessment & Plan  No evidence of exacerbation  Continue albuterol p r n  Dyslipidemia  Assessment & Plan  Continue statin    Essential hypertension  Assessment & Plan  Continue metoprolol 25 milligram p o  B i d       VTE Prophylaxis: Pharmacologic VTE Prophylaxis contraindicated due to Hematuria  / sequential compression device   Code Status:  Full code    Anticipated Length of Stay:  Patient will be admitted on an Observation basis with an anticipated length of stay of  less than 2 midnights  Justification for Hospital Stay:  Gross hematuria    Total Time for Visit, including Counseling / Coordination of Care: 60 minutes  Greater than 50% of this total time spent on direct patient counseling and coordination of care  Chief Complaint:   Blood in the urine    History of Present Illness:    Renan Miller is a 66 y o  male with past medical history hypertension, hyperlipidemia, COPD, prostate cancer status post radiation/chemotherapy with prostatectomy who presented with 1 episode of leobardo blood in the urine    Patient had a recent hospitalization with similar complaints of bleeding with clots and underwent cystoscopy with clot evacuation and fulguration  Patient was on CBI postprocedure and hematuria resolved and patient was discharged home  Patient has stable and was urinating clear urine  Patient reported at 3:30 a m  He woke up and saw leobardo blood in the urine  Denies any clots  Been and denies any burning urination or pain with urination  Denies any fever, chills, abdominal pain, nausea or vomiting  In the ED patient's vital signs were stable other than slightly elevated blood pressure  Labs showed normal creatinine with hemoglobin of 10 3  Review of Systems:    Review of Systems   Constitutional: Negative for chills, diaphoresis, fatigue and unexpected weight change  HENT: Negative for congestion, ear discharge, ear pain, facial swelling, hearing loss, mouth sores, nosebleeds, postnasal drip, rhinorrhea, sinus pressure, sneezing, sore throat, tinnitus, trouble swallowing and voice change  Eyes: Negative for photophobia, discharge, redness and visual disturbance  Respiratory: Negative for cough, chest tightness, shortness of breath, wheezing and stridor  Cardiovascular: Negative for chest pain, palpitations and leg swelling  Gastrointestinal: Negative for abdominal distention, abdominal pain, anal bleeding, blood in stool, constipation, diarrhea, nausea and vomiting  Endocrine: Negative for polydipsia, polyphagia and polyuria  Genitourinary: Positive for hematuria  Negative for decreased urine volume, difficulty urinating, dysuria, flank pain, frequency and urgency  Musculoskeletal: Negative for arthralgias, back pain and neck stiffness  Skin: Negative for pallor and rash  Neurological: Negative for dizziness, seizures, facial asymmetry, speech difficulty, light-headedness, numbness and headaches  Hematological: Negative for adenopathy  Does not bruise/bleed easily  Psychiatric/Behavioral: Negative for agitation and confusion         Past Medical and Surgical History:     Past Medical History:   Diagnosis Date    Cancer [facility])     prostate       Past Surgical History:   Procedure Laterality Date    APPENDECTOMY      FL RETROGRADE PYELOGRAM  4/14/2021    HERNIA REPAIR      [place] CYSTOURETHROSCOPY W/IRRIG & EVAC CLOTS Bilateral 4/14/2021    Procedure: CYSTOSCOPY EVACUATION OF CLOTS bilateral retrograde pyelograms possible TURBT bladder biopsy;  Surgeon: Vicki Curtis MD;  Location: [facility];  Service: Urology    MI CYSTOURETHROSCOPY W/IRRIG & EVAC CLOTS N/A 4/24/2021    Procedure: CYSTOSCOPY EVACUATION OF CLOTS fulguration;  Surgeon: Vicki Curtis MD;  Location: [address] [place] [address];  Service: Urology    [address]      right shoulder       Meds/Allergies:    Prior to Admission medications    Medication Sig Start Date End Date Taking? Authorizing Provider   amoxicillin (AMOXIL) 200 MG/5ML suspension Take 12 5 mL (500 mg total) by mouth 2 (two) times a day for 5 days 4/27/21 5/2/21 Yes Alondra Lopez MD   atorvastatin (LIPITOR) 20 mg tablet Take 20 mg by mouth daily at bedtime   Yes Historical Provider, MD   Calcium Carb-Cholecalciferol (CALTRATE 600+D3 SOFT PO) Take by mouth   Yes Historical Provider, MD   folic acid (FOLVITE) 1 mg tablet Take 1 mg by mouth daily   Yes Historical Provider, MD   methotrexate 2 5 mg tablet Take 15 5 mg by mouth once a week   Yes Historical Provider, MD   metoprolol tartrate (LOPRESSOR) 25 mg tablet Take 25 mg by mouth every 12 (twelve) hours   Yes Historical Provider, MD   albuterol (PROVENTIL HFA,VENTOLIN HFA) 90 mcg/act inhaler Inhale 2 puffs every 4 (four) hours as needed for wheezing 8/31/17   Kelly Macu, DO   cyclobenzaprine (FLEXERIL) 10 mg tablet Take 0 5 tablets by mouth 2 (two) times a day as needed for muscle spasms for up to 3 days 8/31/17 9/3/17  Kelly Macu, DO     I have reviewed home medications using allscripts      Allergies: No Known Allergies    Social History:     Marital Status: /Civil Union Substance Use History:   Social History     Substance and Sexual Activity   Alcohol Use Never    Frequency: Never     Social History     Tobacco Use   Smoking Status Former Smoker    Packs/day: 0 50    Years: 50 00    Pack years: 25 00    Quit date: 8/29/2017    Years since quitting: 3 6   Smokeless Tobacco Never Used   Tobacco Comment    quit one month ago     Social History     Substance and Sexual Activity   Drug Use No       Family History:    No family history on file  Physical Exam:     Vitals:   Blood Pressure: 151/80 (04/29/21 0754)  Pulse: 60 (04/29/21 0754)  Temperature: 97 5 °F (36 4 °C) (04/29/21 0754)  Temp Source: Tympanic (04/29/21 0449)  Respirations: 18 (04/29/21 0754)  SpO2: 94 % (04/29/21 0754)    Physical Exam  Constitutional:       Appearance: Normal appearance  HENT:      Head: Normocephalic and atraumatic  Eyes:      Extraocular Movements: Extraocular movements intact  Pupils: Pupils are equal, round, and reactive to light  Neck:      Musculoskeletal: Normal range of motion and neck supple  Cardiovascular:      Rate and Rhythm: Normal rate and regular rhythm  Heart sounds: No murmur  No gallop  Pulmonary:      Effort: Pulmonary effort is normal       Breath sounds: Normal breath sounds  Abdominal:      General: Bowel sounds are normal       Palpations: Abdomen is soft  Tenderness: There is no abdominal tenderness  Genitourinary:     Comments: CBI with pinkish colored urine without any clots  Musculoskeletal: Normal range of motion  General: No swelling or deformity  Skin:     General: Skin is warm and dry  Neurological:      General: No focal deficit present  Mental Status: He is alert  Additional Data:     Lab Results: I have personally reviewed pertinent reports        Results from last 7 days   Lab Units 04/29/21  0456   WBC Thousand/uL 9 44   HEMOGLOBIN g/dL 10 3*   HEMATOCRIT % 32 0*   PLATELETS Thousands/uL 211 NEUTROS PCT % 69   LYMPHS PCT % 17   MONOS PCT % 8   EOS PCT % 5     Results from last 7 days   Lab Units 04/29/21  0456   SODIUM mmol/L 141   POTASSIUM mmol/L 4 1   CHLORIDE mmol/L 106   CO2 mmol/L 27   BUN mg/dL 28*   CREATININE mg/dL 1 24   ANION GAP mmol/L 8   CALCIUM mg/dL 8 5   ALBUMIN g/dL 2 9*   TOTAL BILIRUBIN mg/dL 0 55   ALK PHOS U/L 89   ALT U/L 31   AST U/L 19   GLUCOSE RANDOM mg/dL 124     Results from last 7 days   Lab Units 04/29/21  0456   INR  1 16                   Imaging: I have personally reviewed pertinent reports  No orders to display       EKG, Pathology, and Other Studies Reviewed on Admission:   · EKG:     Allscripts / Epic Records Reviewed: Yes     ** Please Note: This note has been constructed using a voice recognition system   **

## 2021-04-30 LAB
ANION GAP SERPL CALCULATED.3IONS-SCNC: 7 MMOL/L (ref 4–13)
BACTERIA UR CULT: ABNORMAL
BUN SERPL-MCNC: 21 MG/DL (ref 5–25)
CALCIUM SERPL-MCNC: 8.1 MG/DL (ref 8.3–10.1)
CHLORIDE SERPL-SCNC: 106 MMOL/L (ref 100–108)
CO2 SERPL-SCNC: 27 MMOL/L (ref 21–32)
CREAT SERPL-MCNC: 1.1 MG/DL (ref 0.6–1.3)
ERYTHROCYTE [DISTWIDTH] IN BLOOD BY AUTOMATED COUNT: 14.6 % (ref 11.6–15.1)
GFR SERPL CREATININE-BSD FRML MDRD: 64 ML/MIN/1.73SQ M
GLUCOSE P FAST SERPL-MCNC: 99 MG/DL (ref 65–99)
GLUCOSE SERPL-MCNC: 99 MG/DL (ref 65–140)
HCT VFR BLD AUTO: 29.1 % (ref 36.5–49.3)
HGB BLD-MCNC: 9.2 G/DL (ref 12–17)
MCH RBC QN AUTO: 30.7 PG (ref 26.8–34.3)
MCHC RBC AUTO-ENTMCNC: 31.6 G/DL (ref 31.4–37.4)
MCV RBC AUTO: 97 FL (ref 82–98)
PLATELET # BLD AUTO: 174 THOUSANDS/UL (ref 149–390)
PMV BLD AUTO: 9.8 FL (ref 8.9–12.7)
POTASSIUM SERPL-SCNC: 4 MMOL/L (ref 3.5–5.3)
RBC # BLD AUTO: 3 MILLION/UL (ref 3.88–5.62)
SODIUM SERPL-SCNC: 140 MMOL/L (ref 136–145)
WBC # BLD AUTO: 8.57 THOUSAND/UL (ref 4.31–10.16)

## 2021-04-30 PROCEDURE — 85027 COMPLETE CBC AUTOMATED: CPT | Performed by: INTERNAL MEDICINE

## 2021-04-30 PROCEDURE — 80048 BASIC METABOLIC PNL TOTAL CA: CPT | Performed by: INTERNAL MEDICINE

## 2021-04-30 PROCEDURE — 99232 SBSQ HOSP IP/OBS MODERATE 35: CPT | Performed by: INTERNAL MEDICINE

## 2021-04-30 RX ADMIN — SODIUM CHLORIDE 125 ML/HR: 0.9 INJECTION, SOLUTION INTRAVENOUS at 02:19

## 2021-04-30 RX ADMIN — METOPROLOL TARTRATE 25 MG: 25 TABLET, FILM COATED ORAL at 22:00

## 2021-04-30 RX ADMIN — ATORVASTATIN CALCIUM 20 MG: 20 TABLET, FILM COATED ORAL at 22:00

## 2021-04-30 RX ADMIN — AMOXICILLIN 500 MG: 250 POWDER, FOR SUSPENSION ORAL at 22:00

## 2021-04-30 RX ADMIN — AMOXICILLIN 500 MG: 250 POWDER, FOR SUSPENSION ORAL at 09:53

## 2021-04-30 RX ADMIN — FOLIC ACID 1 MG: 1 TABLET ORAL at 09:52

## 2021-04-30 NOTE — ASSESSMENT & PLAN NOTE
Patient presented with 1 episode of leobardo bleeding this morning at 3:30 a m  Patient had a recent hospitalization for similar complaints and underwent cystoscopy on 04/24 with clot evacuation and extensive fulguration  Patient was started on CBI as per Urology recommendations in the ED  Hemoglobin appears to be stable  CBI is clear currently with clots  Hemoglobin appears stable  Urology evaluation appreciated  No evidence of UTI on urinalysis  Continue empiric amoxicillin through tomorrow  To discontinue CBI  Continue [place] for drainage  If no further bleeding with stable hemoglobin for discharge in a [facility]

## 2021-04-30 NOTE — PROGRESS NOTES
Progress Note - Urology      Patient: Marcia Christopher   : 1945 Sex: male   MRN: [id number]     CSN: [id number]  Unit/Bed#: [address] [place] [address]     SUBJECTIVE:   No problems  cbi clear      Objective   Vitals: /66   Pulse 58   Temp 98 1 °F (36 7 °C)   Resp 17   SpO2 94%     I/O last 24 hours:   In: 210 [P O :210]  Out: 1050 [Urine:1050]      Physical Exam:   General Alert awake   Normocephalic atraumatic PERRLA  Lungs clear bilaterally  Cardiac normal S1 normal S2  Abdomen soft, flank pain  Extremities no edema      Lab Results: CBC:   Lab Results   Component Value Date    WBC 8 57 2021    HGB 9 2 (L) 2021    HCT 29 1 (L) 2021    MCV 97 2021     2021    MCH 30 7 2021    MCHC 31 6 2021    RDW 14 6 2021    MPV 9 8 2021    NRBC 0 2021     CMP:   Lab Results   Component Value Date     2021    CO2 27 2021    BUN 21 2021    CREATININE 1 10 2021    CALCIUM 8 1 (L) 2021    AST 19 2021    ALT 31 2021    ALKPHOS 89 2021    EGFR 64 2021     Urinalysis:   Lab Results   Component Value Date    COLORU Red 2021    CLARITYU Cloudy 2021    SPECGRAV 1 025 2021    PHUR 6 0 2021    PHUR 5 5 2018    LEUKOCYTESUR Negative 2021    NITRITE Negative 2021    GLUCOSEU Negative 2021    KETONESU Negative 2021    BILIRUBINUR Negative 2021    BLOODU Large (A) 2021     Urine Culture:   Lab Results   Component Value Date    URINECX 10,000-19,000 cfu/ml Candida albicans (A) 2021     PSA: No results found for: PSA      Assessment/ Plan:  Gross hematuria  Radiation cystitis  cbi  Clear  Will d/c cbi  Alanis drainage  Home tomorrow with alanis if clear          Guinda MD Cat

## 2021-04-30 NOTE — PROGRESS NOTES
[address]  Progress Note Kennedy Rowe 1945, 66 y o  male MRN: [id number]  Unit/Bed#: [address] [place] [address] Encounter: [id number]  Primary Care Provider: Eliazar Reaves MD   Date and time admitted to hospital: 4/29/2021  4:45 AM    * Gross hematuria  Assessment & Plan  Patient presented with 1 episode of leobardo bleeding this morning at 3:30 a m  Patient had a recent hospitalization for similar complaints and underwent cystoscopy on 04/24 with clot evacuation and extensive fulguration  Patient was started on CBI as per Urology recommendations in the ED  Hemoglobin appears to be stable  CBI is clear currently with clots  Hemoglobin appears stable  Urology evaluation appreciated  No evidence of UTI on urinalysis  Continue empiric amoxicillin through tomorrow  To discontinue CBI  Continue Sumner for drainage  If no further bleeding with stable hemoglobin for discharge in a m  Jennifer Cassette Chronic obstructive pulmonary disease (HCC)  Assessment & Plan  No evidence of exacerbation  Continue albuterol p r n  Dyslipidemia  Assessment & Plan  Continue statin    Essential hypertension  Assessment & Plan  Continue metoprolol 25 milligram p o  B i d         VTE Pharmacologic Prophylaxis:   Pharmacologic: Pharmacologic VTE Prophylaxis contraindicated due to Gross hematuria  Mechanical VTE Prophylaxis in Place: Yes    Patient Centered Rounds: I have performed bedside rounds with nursing staff today  Discussions with Specialists or Other Care Team Provider: Dr Olga Chua and Discussions with Family / Patient: yes    Time Spent for Care: 45 minutes  More than 50% of total time spent on counseling and coordination of care as described above      Current Length of Stay: 0 day(s)    Current Patient Status: Inpatient   Certification Statement: The patient will continue to require additional inpatient hospital stay due to Gross hematuria    Discharge Plan:  Home    Code Status: Level 1 - Full Code      Subjective:   Patient denies any abdominal pain, nausea or vomiting  CBI with clear urine    Objective:     Vitals:   Temp (24hrs), Av 4 °F (36 9 °C), Min:98 1 °F (36 7 °C), Max:98 5 °F (36 9 °C)    Temp:  [98 1 °F (36 7 °C)-98 5 °F (36 9 °C)] 98 5 °F (36 9 °C)  HR:  [57-66] 66  Resp:  [16-20] 16  BP: ()/(56-80) 98/56  SpO2:  [93 %-96 %] 93 %  There is no height or weight on file to calculate BMI  Input and Output Summary (last 24 hours):     No intake or output data in the 24 hours ending 21 1349    Physical Exam:     Physical Exam  Constitutional:       General: He is not in acute distress  HENT:      Head: Normocephalic and atraumatic  Eyes:      Conjunctiva/sclera: Conjunctivae normal       Pupils: Pupils are equal, round, and reactive to light  Neck:      Musculoskeletal: Normal range of motion and neck supple  Cardiovascular:      Rate and Rhythm: Normal rate and regular rhythm  Heart sounds: Normal heart sounds  Pulmonary:      Effort: Pulmonary effort is normal  No respiratory distress  Breath sounds: No wheezing, rhonchi or rales  Chest:      Chest wall: No tenderness  Abdominal:      General: Bowel sounds are normal  There is no distension  Palpations: Abdomen is soft  Tenderness: There is no abdominal tenderness  There is no guarding or rebound  Genitourinary:     Comments: CBI with clear urine  Skin:     General: Skin is warm and dry  Findings: No rash  Neurological:      Mental Status: He is alert  Cranial Nerves: No cranial nerve deficit           Additional Data:     Labs:    Results from last 7 days   Lab Units 21  0632 21  0456   WBC Thousand/uL 8 57 9 44   HEMOGLOBIN g/dL 9 2* 10 3*   HEMATOCRIT % 29 1* 32 0*   PLATELETS Thousands/uL 174 211   NEUTROS PCT %  --  69   LYMPHS PCT %  --  17   MONOS PCT %  --  8   EOS PCT %  --  5     Results from last 7 days   Lab Units 21  0632 21  0456   POTASSIUM mmol/L 4 0 4 1   CHLORIDE mmol/L 106 106   CO2 mmol/L 27 27   BUN mg/dL 21 28*   CREATININE mg/dL 1 10 1 24   CALCIUM mg/dL 8 1* 8 5   ALK PHOS U/L  --  89   ALT U/L  --  31   AST U/L  --  19     Results from last 7 days   Lab Units 04/29/21  0456   INR  1 16       * I Have Reviewed All Lab Data Listed Above  * Additional Pertinent Lab Tests Reviewed: [facility] Admission Reviewed      Recent Cultures (last 7 days):     Results from last 7 days   Lab Units 04/29/21  0545 04/24/21  0134   URINE CULTURE  <10,000 cfu/ml Yeast* 10,000-19,000 cfu/ml Candida albicans*       Last 24 Hours Medication List:   Current Facility-Administered Medications   Medication Dose Route Frequency Provider Last Rate    albuterol  2 puff Inhalation Q4H PRN Marilynn Simon MD      amoxicillin  500 mg Oral Q12H Liza Lambert MD      atorvastatin  20 mg Oral HS Marilynn Simon MD      calcium carbonate-vitamin D  1 tablet Oral Daily With Breakfast Marilynn Simon MD      folic acid  1 mg Oral Daily Marilynn Simon MD      [START ON 5/5/2021] methotrexate  15 mg Oral Once per day on Yaona Bansal MD      metoprolol tartrate  25 mg Oral Q12H [address] Marilynn Simon MD      ondansetron  4 mg Intravenous Q6H PRN Marilynn Simon MD          Today, Patient Was Seen By: Marilynn Simon MD    ** Please Note: Dictation voice to text software may have been used in the creation of this document   **

## 2021-05-01 LAB
ANION GAP SERPL CALCULATED.3IONS-SCNC: 4 MMOL/L (ref 4–13)
BUN SERPL-MCNC: 20 MG/DL (ref 5–25)
CALCIUM SERPL-MCNC: 8.4 MG/DL (ref 8.3–10.1)
CHLORIDE SERPL-SCNC: 106 MMOL/L (ref 100–108)
CO2 SERPL-SCNC: 29 MMOL/L (ref 21–32)
CREAT SERPL-MCNC: 1.26 MG/DL (ref 0.6–1.3)
ERYTHROCYTE [DISTWIDTH] IN BLOOD BY AUTOMATED COUNT: 14.4 % (ref 11.6–15.1)
GFR SERPL CREATININE-BSD FRML MDRD: 55 ML/MIN/1.73SQ M
GLUCOSE SERPL-MCNC: 98 MG/DL (ref 65–140)
HCT VFR BLD AUTO: 29.5 % (ref 36.5–49.3)
HGB BLD-MCNC: 9.3 G/DL (ref 12–17)
MCH RBC QN AUTO: 30.2 PG (ref 26.8–34.3)
MCHC RBC AUTO-ENTMCNC: 31.5 G/DL (ref 31.4–37.4)
MCV RBC AUTO: 96 FL (ref 82–98)
PLATELET # BLD AUTO: 177 THOUSANDS/UL (ref 149–390)
PMV BLD AUTO: 9.5 FL (ref 8.9–12.7)
POTASSIUM SERPL-SCNC: 4.2 MMOL/L (ref 3.5–5.3)
RBC # BLD AUTO: 3.08 MILLION/UL (ref 3.88–5.62)
SODIUM SERPL-SCNC: 139 MMOL/L (ref 136–145)
WBC # BLD AUTO: 9.41 THOUSAND/UL (ref 4.31–10.16)

## 2021-05-01 PROCEDURE — 85027 COMPLETE CBC AUTOMATED: CPT | Performed by: INTERNAL MEDICINE

## 2021-05-01 PROCEDURE — 99232 SBSQ HOSP IP/OBS MODERATE 35: CPT | Performed by: INTERNAL MEDICINE

## 2021-05-01 PROCEDURE — 80048 BASIC METABOLIC PNL TOTAL CA: CPT | Performed by: INTERNAL MEDICINE

## 2021-05-01 RX ADMIN — FOLIC ACID 1 MG: 1 TABLET ORAL at 10:01

## 2021-05-01 RX ADMIN — METOPROLOL TARTRATE 25 MG: 25 TABLET, FILM COATED ORAL at 21:23

## 2021-05-01 RX ADMIN — AMOXICILLIN 500 MG: 250 POWDER, FOR SUSPENSION ORAL at 21:23

## 2021-05-01 RX ADMIN — ATORVASTATIN CALCIUM 20 MG: 20 TABLET, FILM COATED ORAL at 21:23

## 2021-05-01 RX ADMIN — METOPROLOL TARTRATE 25 MG: 25 TABLET, FILM COATED ORAL at 10:01

## 2021-05-01 RX ADMIN — AMOXICILLIN 500 MG: 250 POWDER, FOR SUSPENSION ORAL at 09:59

## 2021-05-01 NOTE — PROGRESS NOTES
Progress Note - Urology      Patient: Cheyenne Rutledge   : 1945 Sex: male   MRN: [id number]     CSN: [id number]  Unit/Bed#: [address] [address]     SUBJECTIVE:   Feels fine  Sumner not causing any trouble  Urine cleared till BM 3:00 a m  This morning  Minimal hematuria no clots      Objective   Vitals: /71 (BP Location: Right arm)   Pulse 64   Temp 97 6 °F (36 4 °C) (Oral)   Resp 19   Ht 5' 11" (1 803 m)   Wt 95 3 kg (210 lb)   SpO2 (!) 89%   BMI 29 29 kg/m²     I/O last 24 hours:   In: -   Out: 1150 [Urine:1150]      Physical Exam:   General Alert awake   Normocephalic atraumatic PERRLA  Lungs clear bilaterally  Cardiac normal S1 normal S2  Abdomen soft, flank pain  Extremities no edema      Lab Results: CBC:   Lab Results   Component Value Date    WBC 9 41 2021    HGB 9 3 (L) 2021    HCT 29 5 (L) 2021    MCV 96 2021     2021    MCH 30 2 2021    MCHC 31 5 2021    RDW 14 4 2021    MPV 9 5 2021    NRBC 0 2021     CMP:   Lab Results   Component Value Date     2021    CO2 29 2021    BUN 20 2021    CREATININE 1 26 2021    CALCIUM 8 4 2021    AST 19 2021    ALT 31 2021    ALKPHOS 89 2021    EGFR 55 2021     Urinalysis:   Lab Results   Component Value Date    COLORU Red 2021    CLARITYU Cloudy 2021    SPECGRAV 1 025 2021    PHUR 6 0 2021    PHUR 5 5 2018    LEUKOCYTESUR Negative 2021    NITRITE Negative 2021    GLUCOSEU Negative 2021    KETONESU Negative 2021    BILIRUBINUR Negative 2021    BLOODU Large (A) 2021     Urine Culture:   Lab Results   Component Value Date    URINECX <10,000 cfu/ml Yeast (A) 2021     PSA: No results found for: PSA      Assessment/ Plan:  Radiation cystitis  Gross hematuria  Irrigated no clots  Patient to be watched today   No straining lifting  Patient advised to stay home will see in the office May 10th remove Sumner  Can be discharged tomorrow morning with Taisha Hernandez MD

## 2021-05-01 NOTE — PROGRESS NOTES
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  Patient had a recent hospitalization for similar complaints and underwent cystoscopy on 04/24 with clot evacuation and extensive fulguration  Patient was started on CBI as per Urology recommendations in the ED  Hemoglobin appears to be stable  CBI is clear currently with clots  Hemoglobin appears stable  Urology evaluation appreciated  No evidence of UTI on urinalysis  Continue empiric amoxicillin through tomorrow  CBI was discontinued yesterday  Patient noted to have small amount of hematuria again through Sumner today  Discussed with Urology-no further intervention at the current time      Chronic obstructive pulmonary disease ([facility] [address] )  Assessment & Plan  No evidence of exacerbation  Continue albuterol p r n  Dyslipidemia  Assessment & Plan  Continue statin    Essential hypertension  Assessment & Plan  Continue metoprolol 25 milligram p o  B i d         VTE Pharmacologic Prophylaxis:   Pharmacologic: Pharmacologic VTE Prophylaxis contraindicated due to Gross hematuria  Mechanical VTE Prophylaxis in Place: Yes    Patient Centered Rounds: I have performed bedside rounds with nursing staff today  Discussions with Specialists or Other Care Team Provider: Dr Elis Dejesus and Discussions with Family / Patient: yes    Time Spent for Care: 45 minutes  More than 50% of total time spent on counseling and coordination of care as described above      Current Length of Stay: 1 day(s)    Current Patient Status: Inpatient   Certification Statement: The patient will continue to require additional inpatient hospital stay due to Persistent hematuria    Discharge Plan:  Home    Code Status: Level 1 - Full Code      Subjective:   Patient denies any abdominal pain  Patient had clear urine till early this morning when urine started to turn reddish again  Objective:     Vitals:   Temp (24hrs), Av °F (36 7 °C), Min:97 6 °F (36 4 °C), Max:98 3 °F (36 8 °C)    Temp:  [97 6 °F (36 4 °C)-98 3 °F (36 8 °C)] 97 6 °F (36 4 °C)  HR:  [61-64] 64  Resp:  [16-19] 19  BP: (116-130)/(65-71) 123/71  SpO2:  [89 %-94 %] 89 %  Body mass index is 29 29 kg/m²  Input and Output Summary (last 24 hours): Intake/Output Summary (Last 24 hours) at 2021 1309  Last data filed at 2021 0555  Gross per 24 hour   Intake --   Output 1150 ml   Net -1150 ml       Physical Exam:     Physical Exam  Constitutional:       General: He is not in acute distress  HENT:      Head: Normocephalic and atraumatic  Eyes:      Conjunctiva/sclera: Conjunctivae normal       Pupils: Pupils are equal, round, and reactive to light  Neck:      Musculoskeletal: Normal range of motion and neck supple  Cardiovascular:      Rate and Rhythm: Normal rate and regular rhythm  Heart sounds: Normal heart sounds  Pulmonary:      Effort: Pulmonary effort is normal  No respiratory distress  Breath sounds: No wheezing, rhonchi or rales  Chest:      Chest wall: No tenderness  Abdominal:      General: Bowel sounds are normal  There is no distension  Palpations: Abdomen is soft  Tenderness: There is no abdominal tenderness  There is no guarding or rebound  Genitourinary:     Comments: Sumner with reddish urine without clots  Skin:     General: Skin is warm and dry  Findings: No rash  Neurological:      Mental Status: He is alert  Cranial Nerves: No cranial nerve deficit           Additional Data:     Labs:    Results from last 7 days   Lab Units 21  0609  21  0456   WBC Thousand/uL 9 41   < > 9 44   HEMOGLOBIN g/dL 9 3*   < > 10 3*   HEMATOCRIT % 29 5*   < > 32 0*   PLATELETS Thousands/uL 177   < > 211   NEUTROS PCT %  --   --  69   LYMPHS PCT %  --   --  17   MONOS PCT %  --   --  8   EOS PCT %  --   --  5    < > = values in this interval not displayed  Results from last 7 days   Lab Units 05/01/21  0609  04/29/21  0456   POTASSIUM mmol/L 4 2   < > 4 1   CHLORIDE mmol/L 106   < > 106   CO2 mmol/L 29   < > 27   BUN mg/dL 20   < > 28*   CREATININE mg/dL 1 26   < > 1 24   CALCIUM mg/dL 8 4   < > 8 5   ALK PHOS U/L  --   --  89   ALT U/L  --   --  31   AST U/L  --   --  19    < > = values in this interval not displayed  Results from last 7 days   Lab Units 04/29/21  0456   INR  1 16       * I Have Reviewed All Lab Data Listed Above  * Additional Pertinent Lab Tests Reviewed: [facility] Admission Reviewed      Recent Cultures (last 7 days):     Results from last 7 days   Lab Units 04/29/21  0545   URINE CULTURE  <10,000 cfu/ml Yeast*       Last 24 Hours Medication List:   Current Facility-Administered Medications   Medication Dose Route Frequency Provider Last Rate    albuterol  2 puff Inhalation Q4H PRN Wes Haynes MD      amoxicillin  500 mg Oral Q12H Mj Wise MD      atorvastatin  20 mg Oral HS Wes Haynes MD      calcium carbonate-vitamin D  1 tablet Oral Daily With Breakfast Wes Haynes MD      folic acid  1 mg Oral Daily Wes Haynes MD      [START ON 5/5/2021] methotrexate  15 mg Oral Once per day on Aidee Trejo MD      metoprolol tartrate  25 mg Oral Q12H [facility] Wes Haynes MD      ondansetron  4 mg Intravenous Q6H PRN Wes Haynes MD          Today, Patient Was Seen By: Wes Haynes MD    ** Please Note: Dictation voice to text software may have been used in the creation of this document   **

## 2021-05-01 NOTE — ASSESSMENT & PLAN NOTE
Patient present with 1 episode of leobardo bleeding on the morning of the day of admission  Recurrent gross hematuria secondary to radiation cystitis  Patient had a recent hospitalization for similar complaints and underwent cystoscopy on 04/24 with clot evacuation and extensive fulguration  Patient was started on CBI as per Urology recommendations in the ED  Hemoglobin appears to be stable  CBI is clear currently with clots  Hemoglobin appears stable  Urology evaluation appreciated  No evidence of UTI on urinalysis  Continue empiric amoxicillin through tomorrow  CBI was discontinued yesterday    Patient noted to have small amount of hematuria again through Sumner today  Discussed with Urology-no further intervention at the current time

## 2021-05-02 VITALS
HEIGHT: 71 IN | SYSTOLIC BLOOD PRESSURE: 101 MMHG | HEART RATE: 59 BPM | BODY MASS INDEX: 29.4 KG/M2 | RESPIRATION RATE: 18 BRPM | DIASTOLIC BLOOD PRESSURE: 57 MMHG | OXYGEN SATURATION: 92 % | TEMPERATURE: 98.1 F | WEIGHT: 210 LBS

## 2021-05-02 LAB
ERYTHROCYTE [DISTWIDTH] IN BLOOD BY AUTOMATED COUNT: 14.4 % (ref 11.6–15.1)
HCT VFR BLD AUTO: 29.7 % (ref 36.5–49.3)
HGB BLD-MCNC: 9.3 G/DL (ref 12–17)
MCH RBC QN AUTO: 29.7 PG (ref 26.8–34.3)
MCHC RBC AUTO-ENTMCNC: 31.3 G/DL (ref 31.4–37.4)
MCV RBC AUTO: 95 FL (ref 82–98)
PLATELET # BLD AUTO: 190 THOUSANDS/UL (ref 149–390)
PMV BLD AUTO: 9.5 FL (ref 8.9–12.7)
RBC # BLD AUTO: 3.13 MILLION/UL (ref 3.88–5.62)
WBC # BLD AUTO: 8.42 THOUSAND/UL (ref 4.31–10.16)

## 2021-05-02 PROCEDURE — 85027 COMPLETE CBC AUTOMATED: CPT | Performed by: INTERNAL MEDICINE

## 2021-05-02 PROCEDURE — 99239 HOSP IP/OBS DSCHRG MGMT >30: CPT | Performed by: INTERNAL MEDICINE

## 2021-05-02 RX ADMIN — AMOXICILLIN 500 MG: 250 POWDER, FOR SUSPENSION ORAL at 09:38

## 2021-05-02 RX ADMIN — FOLIC ACID 1 MG: 1 TABLET ORAL at 09:38

## 2021-05-02 NOTE — DISCHARGE SUMMARY
[address]  Discharge- Parvin Ho 1945, 66 y o  male MRN: [id number]  Unit/Bed#: [address] [place] [address] Encounter: [id number]  Primary Care Provider: Frederick Rea MD   Date and time admitted to hospital: 4/29/2021  4:45 AM    * Gross hematuria  Assessment & Plan  Patient present with 1 episode of leobardo bleeding on the morning of the day of admission  Recurrent gross hematuria secondary to radiation cystitis  Patient had a recent hospitalization for similar complaints and underwent cystoscopy on 04/24 with clot evacuation and extensive fulguration  Patient was started on CBI as per Urology recommendations in the ED  Hemoglobin appears to be stable  Patient was initially on CBI which was clear and again had small amount hematuria after CBI was discontinued which has since resolved  Hemoglobin appears stable  Urology evaluation appreciated  No evidence of UTI on urinalysis  Patient finished empiric amoxicillin today  Patient to be discharged home with outpatient follow-up with Urology  Chronic obstructive pulmonary disease (HCC)  Assessment & Plan  No evidence of exacerbation  Continue albuterol p r n  Dyslipidemia  Assessment & Plan  Continue statin    Essential hypertension  Assessment & Plan  Continue metoprolol 25 milligram p o  B i d        Discharging Physician / Practitioner: Micky Laboy MD  PCP: Frederick Rea MD  Admission Date:   Admission Orders (From admission, onward)     Ordered        04/30/21 1348  Inpatient Admission  Once         04/29/21 0617  Place in Observation  Once                   Discharge Date: 05/02/21    Resolved Problems  Date Reviewed: 5/2/2021    None          Consultations During Hospital Stay:  · Urology    Procedures Performed:   · CBI       Outpatient Tests Requested:  · Follow-up with Urology    Complications:  None    Reason for Admission:  Blood in the urine    Hospital Course:     Parvin Ho is a 66 y o  male patient with past medical history hypertension, hyperlipidemia, prostate cancer status post radiation/chemotherapy, radiation cystitis, COPD who originally presented to the hospital on 4/29/2021 due to blood in the urine  Patient had recent hospitalization for similar complaints and her cystoscopy with fulguration  Patient's hemoglobin continues to remain stable  Patient was seen by Urology  The patient was on CBI initially  Later CBI was discontinued  Patient's Sumner catheter was continued and patient had mild amount of bleeding which later resolved  Patient be discharged home with outpatient follow-up with Urology        Please see above list of diagnoses and related plan for additional information  Condition at Discharge: stable     Discharge Day Visit / Exam:     Subjective:  Patient has no further bleeding  Patient did have a bowel movement today  Patient denies any abdominal pain  Vitals: Blood Pressure: 101/57 (05/02/21 0937)  Pulse: 59 (05/02/21 0937)  Temperature: 98 1 °F (36 7 °C) (05/02/21 0937)  Temp Source: Oral (05/02/21 9425)  Respirations: 18 (05/02/21 0937)  Height: 5' 11" (180 3 cm) (04/30/21 1623)  Weight - Scale: 95 3 kg (210 lb) (04/30/21 1623)  SpO2: 92 % (05/02/21 0937)  Exam:   Physical Exam  Constitutional:       Appearance: Normal appearance  HENT:      Head: Normocephalic and atraumatic  Eyes:      Extraocular Movements: Extraocular movements intact  Pupils: Pupils are equal, round, and reactive to light  Neck:      Musculoskeletal: Normal range of motion and neck supple  Cardiovascular:      Rate and Rhythm: Normal rate and regular rhythm  Heart sounds: No murmur  No gallop  Pulmonary:      Effort: Pulmonary effort is normal       Breath sounds: Normal breath sounds  Abdominal:      General: Bowel sounds are normal       Palpations: Abdomen is soft  Tenderness: There is no abdominal tenderness     Genitourinary:     Comments: Very light pinkish urine  Musculoskeletal: Normal range of motion  General: No swelling or deformity  Skin:     General: Skin is warm and dry  Neurological:      General: No focal deficit present  Mental Status: He is alert  Discharge instructions/Information to patient and family:   See after visit summary for information provided to patient and family  Provisions for Follow-Up Care:  See after visit summary for information related to follow-up care and any pertinent home health orders  Disposition:     Home      Planned Readmission: No     Discharge Statement:  I spent 35 minutes discharging the patient  This time was spent on the day of discharge  I had direct contact with the patient on the day of discharge  Greater than 50% of the total time was spent examining patient, answering all patient questions, arranging and discussing plan of care with patient as well as directly providing post-discharge instructions  Additional time then spent on discharge activities  Discharge Medications:  See after visit summary for reconciled discharge medications provided to patient and family        ** Please Note: This note has been constructed using a voice recognition system **

## 2021-05-02 NOTE — PROGRESS NOTES
Progress Note - Urology      Patient: Edd Molina   : 1945 Sex: male   MRN: [id number]     CSN: [id number]  Unit/Bed#: [address] [address]     SUBJECTIVE:   Feeling good going home  Sumner urine clear  Patient instructed to do no straining lifting excessive bowel movements to allow adequate healing of the inflamed tissue      Objective   Vitals: /57   Pulse 59   Temp 98 1 °F (36 7 °C) (Oral)   Resp 18   Ht 5' 11" (1 803 m)   Wt 95 3 kg (210 lb)   SpO2 92%   BMI 29 29 kg/m²     I/O last 24 hours:   In: -   Out:  [Urine:]      Physical Exam:   General Alert awake   Normocephalic atraumatic PERRLA  Lungs clear bilaterally  Cardiac normal S1 normal S2  Abdomen soft, flank pain  Extremities no edema      Lab Results: CBC:   Lab Results   Component Value Date    WBC 8 42 2021    HGB 9 3 (L) 2021    HCT 29 7 (L) 2021    MCV 95 2021     2021    MCH 29 7 2021    MCHC 31 3 (L) 2021    RDW 14 4 2021    MPV 9 5 2021    NRBC 0 2021     CMP:   Lab Results   Component Value Date     2021    CO2 29 2021    BUN 20 2021    CREATININE 1 26 2021    CALCIUM 8 4 2021    AST 19 2021    ALT 31 2021    ALKPHOS 89 2021    EGFR 55 2021     Urinalysis:   Lab Results   Component Value Date    COLORU Red 2021    CLARITYU Cloudy 2021    SPECGRAV 1 025 2021    PHUR 6 0 2021    PHUR 5 5 2018    LEUKOCYTESUR Negative 2021    NITRITE Negative 2021    GLUCOSEU Negative 2021    KETONESU Negative 2021    BILIRUBINUR Negative 2021    BLOODU Large (A) 2021     Urine Culture:   Lab Results   Component Value Date    URINECX <10,000 cfu/ml Yeast (A) 2021     PSA: No results found for: PSA      Assessment/ Plan:  Radiation cystitis  Urine clear  DC home with Sumner  Will see in the office next Monday to remove Sumner  No straining no car adeel Philippe MD

## 2021-05-02 NOTE — ASSESSMENT & PLAN NOTE
Patient present with 1 episode of leobardo bleeding on the morning of the day of admission  Recurrent gross hematuria secondary to radiation cystitis  Patient had a recent hospitalization for similar complaints and underwent cystoscopy on 04/24 with clot evacuation and extensive fulguration  Patient was started on CBI as per Urology recommendations in the ED  Hemoglobin appears to be stable  Patient was initially on CBI which was clear and again had small amount hematuria after CBI was discontinued which has since resolved  Hemoglobin appears stable  Urology evaluation appreciated  No evidence of UTI on urinalysis  Patient finished empiric amoxicillin today  Patient to be discharged home with outpatient follow-up with Urology

## 2021-05-04 ENCOUNTER — HOSPITAL ENCOUNTER (INPATIENT)
Facility: HOSPITAL | Age: 76
LOS: 6 days | Discharge: HOME HEALTH | DRG: 699 | End: 2021-05-12
Attending: GENERAL PRACTICE | Admitting: INTERNAL MEDICINE
Payer: MEDICARE

## 2021-05-04 DIAGNOSIS — R31.0 GROSS HEMATURIA: ICD-10-CM

## 2021-05-04 DIAGNOSIS — N30.91 HEMATURIA DUE TO CYSTITIS: ICD-10-CM

## 2021-05-04 DIAGNOSIS — R31.9 HEMATURIA, UNSPECIFIED TYPE: Primary | ICD-10-CM

## 2021-05-04 DIAGNOSIS — D64.9 ANEMIA, UNSPECIFIED TYPE: ICD-10-CM

## 2021-05-04 PROBLEM — D72.829 LEUKOCYTOSIS: Status: ACTIVE | Noted: 2021-05-04

## 2021-05-04 PROBLEM — M06.9 RA (RHEUMATOID ARTHRITIS) (HCC): Status: ACTIVE | Noted: 2021-05-04

## 2021-05-04 LAB
ANION GAP SERPL CALCULATED.3IONS-SCNC: 11 MMOL/L (ref 4–13)
APTT PPP: 29 SECONDS (ref 23–37)
BACTERIA UR QL AUTO: ABNORMAL /HPF
BASOPHILS # BLD AUTO: 0.04 THOUSANDS/ΜL (ref 0–0.1)
BASOPHILS NFR BLD AUTO: 0 % (ref 0–1)
BILIRUB UR QL STRIP: NEGATIVE
BUN SERPL-MCNC: 23 MG/DL (ref 5–25)
CALCIUM SERPL-MCNC: 9.2 MG/DL (ref 8.3–10.1)
CHLORIDE SERPL-SCNC: 102 MMOL/L (ref 100–108)
CLARITY UR: ABNORMAL
CO2 SERPL-SCNC: 25 MMOL/L (ref 21–32)
COLOR UR: ABNORMAL
CREAT SERPL-MCNC: 1.51 MG/DL (ref 0.6–1.3)
EOSINOPHIL # BLD AUTO: 0.33 THOUSAND/ΜL (ref 0–0.61)
EOSINOPHIL NFR BLD AUTO: 3 % (ref 0–6)
ERYTHROCYTE [DISTWIDTH] IN BLOOD BY AUTOMATED COUNT: 14.6 % (ref 11.6–15.1)
GFR SERPL CREATININE-BSD FRML MDRD: 44 ML/MIN/1.73SQ M
GLUCOSE SERPL-MCNC: 150 MG/DL (ref 65–140)
GLUCOSE UR STRIP-MCNC: NEGATIVE MG/DL
HCT VFR BLD AUTO: 33.1 % (ref 36.5–49.3)
HGB BLD-MCNC: 10.5 G/DL (ref 12–17)
HGB UR QL STRIP.AUTO: ABNORMAL
IMM GRANULOCYTES # BLD AUTO: 0.09 THOUSAND/UL (ref 0–0.2)
IMM GRANULOCYTES NFR BLD AUTO: 1 % (ref 0–2)
INR PPP: 1.12 (ref 0.84–1.19)
KETONES UR STRIP-MCNC: NEGATIVE MG/DL
LEUKOCYTE ESTERASE UR QL STRIP: NEGATIVE
LYMPHOCYTES # BLD AUTO: 1.82 THOUSANDS/ΜL (ref 0.6–4.47)
LYMPHOCYTES NFR BLD AUTO: 16 % (ref 14–44)
MCH RBC QN AUTO: 30.1 PG (ref 26.8–34.3)
MCHC RBC AUTO-ENTMCNC: 31.7 G/DL (ref 31.4–37.4)
MCV RBC AUTO: 95 FL (ref 82–98)
MONOCYTES # BLD AUTO: 0.93 THOUSAND/ΜL (ref 0.17–1.22)
MONOCYTES NFR BLD AUTO: 8 % (ref 4–12)
NEUTROPHILS # BLD AUTO: 7.98 THOUSANDS/ΜL (ref 1.85–7.62)
NEUTS SEG NFR BLD AUTO: 72 % (ref 43–75)
NITRITE UR QL STRIP: NEGATIVE
NON-SQ EPI CELLS URNS QL MICRO: ABNORMAL /HPF
NRBC BLD AUTO-RTO: 0 /100 WBCS
PH UR STRIP.AUTO: 7.5 [PH]
PLATELET # BLD AUTO: 258 THOUSANDS/UL (ref 149–390)
PMV BLD AUTO: 9.7 FL (ref 8.9–12.7)
POTASSIUM SERPL-SCNC: 4 MMOL/L (ref 3.5–5.3)
PROT UR STRIP-MCNC: >=300 MG/DL
PROTHROMBIN TIME: 14.3 SECONDS (ref 11.6–14.5)
RBC # BLD AUTO: 3.49 MILLION/UL (ref 3.88–5.62)
RBC #/AREA URNS AUTO: ABNORMAL /HPF
SARS-COV-2 RNA RESP QL NAA+PROBE: NEGATIVE
SODIUM SERPL-SCNC: 138 MMOL/L (ref 136–145)
SP GR UR STRIP.AUTO: 1.02 (ref 1–1.03)
UROBILINOGEN UR QL STRIP.AUTO: 0.2 E.U./DL
WBC # BLD AUTO: 11.19 THOUSAND/UL (ref 4.31–10.16)
WBC #/AREA URNS AUTO: ABNORMAL /HPF

## 2021-05-04 PROCEDURE — 80048 BASIC METABOLIC PNL TOTAL CA: CPT | Performed by: GENERAL PRACTICE

## 2021-05-04 PROCEDURE — 87086 URINE CULTURE/COLONY COUNT: CPT | Performed by: GENERAL PRACTICE

## 2021-05-04 PROCEDURE — 85610 PROTHROMBIN TIME: CPT | Performed by: GENERAL PRACTICE

## 2021-05-04 PROCEDURE — 81001 URINALYSIS AUTO W/SCOPE: CPT | Performed by: GENERAL PRACTICE

## 2021-05-04 PROCEDURE — 85730 THROMBOPLASTIN TIME PARTIAL: CPT | Performed by: GENERAL PRACTICE

## 2021-05-04 PROCEDURE — 85025 COMPLETE CBC W/AUTO DIFF WBC: CPT | Performed by: GENERAL PRACTICE

## 2021-05-04 PROCEDURE — 99284 EMERGENCY DEPT VISIT MOD MDM: CPT

## 2021-05-04 PROCEDURE — 96365 THER/PROPH/DIAG IV INF INIT: CPT

## 2021-05-04 PROCEDURE — 99220 PR INITIAL OBSERVATION CARE/DAY 70 MINUTES: CPT | Performed by: INTERNAL MEDICINE

## 2021-05-04 PROCEDURE — U0005 INFEC AGEN DETEC AMPLI PROBE: HCPCS | Performed by: SPECIALIST

## 2021-05-04 PROCEDURE — 36415 COLL VENOUS BLD VENIPUNCTURE: CPT | Performed by: GENERAL PRACTICE

## 2021-05-04 PROCEDURE — 96375 TX/PRO/DX INJ NEW DRUG ADDON: CPT

## 2021-05-04 PROCEDURE — 99285 EMERGENCY DEPT VISIT HI MDM: CPT | Performed by: GENERAL PRACTICE

## 2021-05-04 PROCEDURE — U0003 INFECTIOUS AGENT DETECTION BY NUCLEIC ACID (DNA OR RNA); SEVERE ACUTE RESPIRATORY SYNDROME CORONAVIRUS 2 (SARS-COV-2) (CORONAVIRUS DISEASE [COVID-19]), AMPLIFIED PROBE TECHNIQUE, MAKING USE OF HIGH THROUGHPUT TECHNOLOGIES AS DESCRIBED BY CMS-2020-01-R: HCPCS | Performed by: SPECIALIST

## 2021-05-04 RX ORDER — LEVOFLOXACIN 5 MG/ML
500 INJECTION, SOLUTION INTRAVENOUS ONCE
Status: CANCELLED | OUTPATIENT
Start: 2021-05-04 | End: 2021-05-04

## 2021-05-04 RX ORDER — ATROPA BELLADONNA AND OPIUM 16.2; 3 MG/1; MG/1
15 SUPPOSITORY RECTAL EVERY 6 HOURS PRN
Status: DISCONTINUED | OUTPATIENT
Start: 2021-05-04 | End: 2021-05-12 | Stop reason: HOSPADM

## 2021-05-04 RX ORDER — SODIUM CHLORIDE 9 MG/ML
75 INJECTION, SOLUTION INTRAVENOUS CONTINUOUS
Status: DISCONTINUED | OUTPATIENT
Start: 2021-05-04 | End: 2021-05-04

## 2021-05-04 RX ORDER — ATORVASTATIN CALCIUM 20 MG/1
20 TABLET, FILM COATED ORAL
Status: DISCONTINUED | OUTPATIENT
Start: 2021-05-04 | End: 2021-05-12 | Stop reason: HOSPADM

## 2021-05-04 RX ORDER — FENTANYL CITRATE 50 UG/ML
25 INJECTION, SOLUTION INTRAMUSCULAR; INTRAVENOUS ONCE
Status: COMPLETED | OUTPATIENT
Start: 2021-05-04 | End: 2021-05-04

## 2021-05-04 RX ORDER — ACETAMINOPHEN 325 MG/1
650 TABLET ORAL EVERY 6 HOURS PRN
Status: DISCONTINUED | OUTPATIENT
Start: 2021-05-04 | End: 2021-05-12 | Stop reason: HOSPADM

## 2021-05-04 RX ORDER — IPRATROPIUM BROMIDE AND ALBUTEROL SULFATE 2.5; .5 MG/3ML; MG/3ML
3 SOLUTION RESPIRATORY (INHALATION) 4 TIMES DAILY PRN
Status: DISCONTINUED | OUTPATIENT
Start: 2021-05-04 | End: 2021-05-12 | Stop reason: HOSPADM

## 2021-05-04 RX ORDER — SODIUM CHLORIDE 9 MG/ML
75 INJECTION, SOLUTION INTRAVENOUS CONTINUOUS
Status: DISCONTINUED | OUTPATIENT
Start: 2021-05-04 | End: 2021-05-05

## 2021-05-04 RX ORDER — MORPHINE SULFATE 4 MG/ML
4 INJECTION, SOLUTION INTRAMUSCULAR; INTRAVENOUS ONCE
Status: COMPLETED | OUTPATIENT
Start: 2021-05-04 | End: 2021-05-04

## 2021-05-04 RX ORDER — ATROPA BELLADONNA AND OPIUM 16.2; 3 MG/1; MG/1
30 SUPPOSITORY RECTAL ONCE
Status: COMPLETED | OUTPATIENT
Start: 2021-05-04 | End: 2021-05-04

## 2021-05-04 RX ORDER — FLUTICASONE FUROATE AND VILANTEROL 100; 25 UG/1; UG/1
1 POWDER RESPIRATORY (INHALATION) DAILY
Status: DISCONTINUED | OUTPATIENT
Start: 2021-05-04 | End: 2021-05-12 | Stop reason: HOSPADM

## 2021-05-04 RX ORDER — FOLIC ACID 1 MG/1
1 TABLET ORAL DAILY
Status: DISCONTINUED | OUTPATIENT
Start: 2021-05-04 | End: 2021-05-12 | Stop reason: HOSPADM

## 2021-05-04 RX ORDER — FLUCONAZOLE 100 MG/1
200 TABLET ORAL DAILY
Status: DISCONTINUED | OUTPATIENT
Start: 2021-05-05 | End: 2021-05-08

## 2021-05-04 RX ADMIN — SODIUM CHLORIDE 75 ML/HR: 0.9 INJECTION, SOLUTION INTRAVENOUS at 18:26

## 2021-05-04 RX ADMIN — LIDOCAINE HYDROCHLORIDE 140 MG: 10 INJECTION, SOLUTION EPIDURAL; INFILTRATION; INTRACAUDAL; PERINEURAL at 11:02

## 2021-05-04 RX ADMIN — SODIUM CHLORIDE 75 ML/HR: 0.9 INJECTION, SOLUTION INTRAVENOUS at 16:13

## 2021-05-04 RX ADMIN — METOPROLOL TARTRATE 25 MG: 25 TABLET, FILM COATED ORAL at 15:23

## 2021-05-04 RX ADMIN — ATORVASTATIN CALCIUM 20 MG: 20 TABLET, FILM COATED ORAL at 21:30

## 2021-05-04 RX ADMIN — ATROPA BELLADONNA AND OPIUM 1 SUPPOSITORY: 16.2; 3 SUPPOSITORY RECTAL at 11:51

## 2021-05-04 RX ADMIN — MORPHINE SULFATE 4 MG: 4 INJECTION INTRAVENOUS at 10:49

## 2021-05-04 RX ADMIN — FENTANYL CITRATE 25 MCG: 50 INJECTION, SOLUTION INTRAMUSCULAR; INTRAVENOUS at 09:32

## 2021-05-04 RX ADMIN — FOLIC ACID 1 MG: 1 TABLET ORAL at 15:23

## 2021-05-04 RX ADMIN — FLUTICASONE FUROATE AND VILANTEROL TRIFENATATE 1 PUFF: 100; 25 POWDER RESPIRATORY (INHALATION) at 18:25

## 2021-05-04 RX ADMIN — FENTANYL CITRATE 25 MCG: 50 INJECTION, SOLUTION INTRAMUSCULAR; INTRAVENOUS at 09:57

## 2021-05-04 NOTE — CONSULTS
H&P Exam - Urology       Patient: Roro Saha   : 1945 Sex: male   MRN: [id number]     CSN: [id number]      History of Present Illness   HPI:  Roro Saha is a 66 y o  male who was just discharged from [address] [place] [address] on [place] May 2nd with admission for gross hematuria clot retention requiring CBI with history of radiation cystitis this is the 3rd admission since returning home from [facility] admitted for 2 weeks for gross hematuria patient has now undergone cystoscopy fulguration x2 in the last 3 weeks seen in the ER for clot retention severe pain undergoing hand irrigation and now with Sumner catheter converted to a [address] 3 way on continuous bladder irrigation hand irrigated by the nursing staff now CBI crystal clear        Review of Systems:   Constitutional:  Negative for activity change, fever, chills and diaphoresis  HENT: Negative for hearing loss and trouble swallowing  Eyes: Negative for itching and visual disturbance  Respiratory: Negative for chest tightness and shortness of breath  Cardiovascular: Negative for chest pain, edema  Gastrointestinal: Negative for abdominal distention, na abdominal pain, constipation, diarrhea, Nausea and vomiting  Genitourinary: Negative for decreased urine volume, difficulty urinating, dysuria, enuresis, frequency, hematuria and urgency  Musculoskeletal: Negative for gait problem and myalgias  Neurological: Negative for dizziness and headaches  Hematological: Does not bruise/bleed easily         Historical Information   Past Medical History:   Diagnosis Date    Cancer [facility])     prostate     Past Surgical History:   Procedure Laterality Date    APPENDECTOMY      FL RETROGRADE PYELOGRAM  2021    HERNIA REPAIR      [place] CYSTOURETHROSCOPY W/IRRIG & EVAC CLOTS Bilateral 2021    Procedure: CYSTOSCOPY EVACUATION OF CLOTS bilateral retrograde pyelograms possible TURBT bladder biopsy;  Surgeon: Vineet Barrientos MD;  Location: [facility];  Service: Urology    [place] CYSTOURETHROSCOPY W/IRRIG & EVAC CLOTS N/A 4/24/2021    Procedure: CYSTOSCOPY EVACUATION OF CLOTS fulguration;  Surgeon: Justino Garcia MD;  Location: [facility];  Service: Urology    PROSTATECTOMY      [address]      right shoulder     Social History   Social History     Substance and Sexual Activity   Alcohol Use Never    Frequency: Never     Social History     Substance and Sexual Activity   Drug Use No     Social History     Tobacco Use   Smoking Status Former Smoker    Packs/day: 0 50    Years: 50 00    Pack years: 25 00    Quit date: 8/29/2017    Years since quitting: 3 6   Smokeless Tobacco Never Used   Tobacco Comment    quit one month ago     Family History: History reviewed  No pertinent family history  Meds/Allergies   Medications Prior to Admission   Medication    atorvastatin (LIPITOR) 20 mg tablet    folic acid (FOLVITE) 1 mg tablet    metoprolol tartrate (LOPRESSOR) 25 mg tablet    albuterol (PROVENTIL HFA,VENTOLIN HFA) 90 mcg/act inhaler    Calcium Carb-Cholecalciferol (CALTRATE 600+D3 SOFT PO)    methotrexate 2 5 mg tablet     No Known Allergies    Objective   Vitals: /88   Pulse 88   Temp 97 8 °F (36 6 °C) (Axillary)   Resp 18   Wt 93 4 kg (206 lb)   SpO2 93%   BMI 28 73 kg/m²     Physical Exam:  General Alert awake   Normocephalic atraumatic PERRLA  Lungs clear bilaterally  Cardiac normal S1 normal S2  Abdomen soft, flank pain  Extremities no edema    I/O last 24 hours:   In: -   Out: 125 [Urine:125]    Invasive Devices     Peripheral Intravenous Line            Peripheral IV 05/04/21 Right Antecubital less than 1 day          Drain            Continuous Bladder Irrigation Three-way less than 1 day    Urethral Catheter Three way 22 Fr  less than 1 day                    Lab Results: CBC:   Lab Results   Component Value Date    WBC 11 19 (H) 05/04/2021    HGB 10 5 (L) 05/04/2021    HCT 33 1 (L) 05/04/2021    MCV 95 05/04/2021     05/04/2021    MCH 30 1 05/04/2021    MCHC 31 7 05/04/2021    RDW 14 6 05/04/2021    MPV 9 7 05/04/2021    NRBC 0 05/04/2021     CMP:   Lab Results   Component Value Date     05/04/2021    CO2 25 05/04/2021    BUN 23 05/04/2021    CREATININE 1 51 (H) 05/04/2021    CALCIUM 9 2 05/04/2021    AST 19 04/29/2021    ALT 31 04/29/2021    ALKPHOS 89 04/29/2021    EGFR 44 05/04/2021     Urinalysis:   Lab Results   Component Value Date    COLORU Red 05/04/2021    CLARITYU Cloudy 05/04/2021    SPECGRAV 1 020 05/04/2021    PHUR 7 5 05/04/2021    PHUR 5 5 03/25/2018    LEUKOCYTESUR Negative 05/04/2021    NITRITE Negative 05/04/2021    GLUCOSEU Negative 05/04/2021    KETONESU Negative 05/04/2021    BILIRUBINUR Negative 05/04/2021    BLOODU Large (A) 05/04/2021     Urine Culture:   Lab Results   Component Value Date    URINECX <10,000 cfu/ml Yeast (A) 04/29/2021     PSA: No results found for: PSA        Assessment/ Plan:  Recurrent gross hematuria  Secondary to radiation cystitis  Sent home with indwelling Sumner on Sunday  Hand irrigated CBI clear  Will watch today  DC CBI in the morning  If bleeding tomorrow will put on OR schedule for Thursday cysto fulguration  Discussed with patient may need bilateral nephrostomy tubes if bleeding continues and could not start hyperbolic oxygen therapy which he now states he will do      Mike Rosenberg MD

## 2021-05-04 NOTE — ASSESSMENT & PLAN NOTE
Ex-smoker, quit 2 months ago  On Advair and albuterol as needed at home  Substitute with [facility]  Continue albuterol as needed

## 2021-05-04 NOTE — ED NOTES
Bladder scan = 100cc  Sumner irrigated with saline, gross bloody urine return with clots, Sumner replaced with 22F 3 way without diff  Gross bloody urine with clots returned, continuous irrigation started       De Young LAURA Cervantes  05/04/21 9399

## 2021-05-04 NOTE — ED NOTES
Received patient from [facility] c/o of severe spasm,  Started the lidocaine drip as ordered  Dr Gillian Henry ordered to PRN  Irrigate the 3 ways alanis with CBI running  Irrigated twice and massive clots was pulled  Patient states a small amount of relief and will irrigate as needed       Marco Youssef RN  05/04/21 0502

## 2021-05-04 NOTE — LETTER
To: Megan Mckeon  From:  Gino Neal, [place] [phone]    AVS/F2F for Kiley Quach    Discharging home today

## 2021-05-04 NOTE — ASSESSMENT & PLAN NOTE
Mild in setting of recurrent hematuria  Somewhat lower than baseline but stable, likely combination of hematuria and dilutional component  Continue to monitor

## 2021-05-04 NOTE — ED NOTES
Patient continues to have bladder spasms, continuous irrigation does not run when patient is having spasms  Dr Denis Favre aware       Jose Cooper, RN  05/04/21 1000

## 2021-05-04 NOTE — ED PROVIDER NOTES
History  Chief Complaint   Patient presents with    Bladder Spasms     patient with a alanis, discharged Sunday  States he has been having pressure and spasms all night  Drained back this morning and has had little to no drainage since  Patient is a 68-year-old male with a past medical history of prostate cancer status post resection, with recurrence status post radiation, and recent hematuria status post CBI and cauterization by Urology, who was discharged from the hospital 2 days ago, who presents to the emergency room with severe lower abdominal spasms and hematuria  There has been decreased drainage from his Alanis this morning, which is now draining bright red blood  He has had no flank pain or fevers  There has been no trauma  Prior to Admission Medications   Prescriptions Last Dose Informant Patient Reported? Taking?    Calcium Carb-Cholecalciferol (CALTRATE 600+D3 SOFT PO)   Yes No   Sig: Take by mouth   albuterol (PROVENTIL HFA,VENTOLIN HFA) 90 mcg/act inhaler   No No   Sig: Inhale 2 puffs every 4 (four) hours as needed for wheezing   atorvastatin (LIPITOR) 20 mg tablet   Yes No   Sig: Take 20 mg by mouth daily at bedtime   folic acid (FOLVITE) 1 mg tablet   Yes No   Sig: Take 1 mg by mouth daily   methotrexate 2 5 mg tablet   Yes No   Sig: Take 15 5 mg by mouth once a week   metoprolol tartrate (LOPRESSOR) 25 mg tablet   Yes No   Sig: Take 25 mg by mouth every 12 (twelve) hours      Facility-Administered Medications: None       Past Medical History:   Diagnosis Date    Cancer [facility])     prostate       Past Surgical History:   Procedure Laterality Date    APPENDECTOMY      FL RETROGRADE PYELOGRAM  4/14/2021    HERNIA REPAIR      MD CYSTOURETHROSCOPY W/IRRIG & EVAC CLOTS Bilateral 4/14/2021    Procedure: CYSTOSCOPY EVACUATION OF CLOTS bilateral retrograde pyelograms possible TURBT bladder biopsy;  Surgeon: Suman Schwartz MD;  Location: [facility];  Service: Urology    MD CYSTOURETHROSCOPY W/IRRIG & EVAC CLOTS N/A 4/24/2021    Procedure: CYSTOSCOPY EVACUATION OF CLOTS fulguration;  Surgeon: Kathy Fowler MD;  Location: [address] [place] [address];  Service: Urology    PROSTATECTOMY      [address]      right shoulder       History reviewed  No pertinent family history  I have reviewed and agree with the history as documented  E-Cigarette/Vaping    E-Cigarette Use Former User      E-Cigarette/Vaping Substances    Nicotine No     THC No     CBD No     Flavoring No     Other No     Unknown No      Social History     Tobacco Use    Smoking status: Former Smoker     Packs/day: 0 50     Years: 50 00     Pack years: 25 00     Quit date: 8/29/2017     Years since quitting: 3 6    Smokeless tobacco: Never Used    Tobacco comment: quit one month ago   Substance Use Topics    Alcohol use: Never     Frequency: Never    Drug use: No       Review of Systems   Constitutional: Negative for chills and fatigue  HENT: Negative for congestion and rhinorrhea  Eyes: Negative for redness and visual disturbance  Respiratory: Negative for cough and wheezing  Cardiovascular: Negative for chest pain and palpitations  Gastrointestinal: Negative for abdominal pain, constipation, diarrhea, nausea and vomiting  Endocrine: Negative for polydipsia and polyuria  Genitourinary: Positive for hematuria and urgency  Negative for dysuria  Musculoskeletal: Negative for arthralgias and myalgias  Neurological: Negative for light-headedness and headaches  Hematological: Negative for adenopathy  Does not bruise/bleed easily  Psychiatric/Behavioral: Negative for dysphoric mood  The patient is not nervous/anxious  All other systems reviewed and are negative  Physical Exam  Physical Exam  Constitutional:       General: He is not in acute distress  Appearance: Normal appearance  He is not ill-appearing  Comments: Moderate painful distress   HENT:      Head: Normocephalic and atraumatic  Mouth/Throat:      Mouth: Mucous membranes are moist       Pharynx: Oropharynx is clear  Eyes:      General: No scleral icterus  Conjunctiva/sclera: Conjunctivae normal    Neck:      Musculoskeletal: Normal range of motion and neck supple  Cardiovascular:      Rate and Rhythm: Normal rate and regular rhythm  Pulses: Normal pulses  Heart sounds: No murmur  No friction rub  No gallop  Pulmonary:      Effort: Pulmonary effort is normal  No respiratory distress  Breath sounds: Normal breath sounds  No wheezing, rhonchi or rales  Abdominal:      Palpations: Abdomen is soft  Tenderness: There is abdominal tenderness (Suprapubic)  There is guarding (Voluntary)  There is no right CVA tenderness or left CVA tenderness  Genitourinary:     Penis: Normal        Scrotum/Testes: Normal       Comments: Bright red (Gatorade) urine  Musculoskeletal: Normal range of motion  General: No swelling or tenderness  Skin:     General: Skin is warm and dry  Capillary Refill: Capillary refill takes less than 2 seconds  Neurological:      General: No focal deficit present  Mental Status: He is alert and oriented to person, place, and time  Mental status is at baseline     Psychiatric:         Mood and Affect: Mood normal          Behavior: Behavior normal          Vital Signs  ED Triage Vitals   Temperature Pulse Respirations Blood Pressure SpO2   05/04/21 0804 05/04/21 0804 05/04/21 0804 05/04/21 1002 05/04/21 0804   98 °F (36 7 °C) (!) 114 20 (!) 177/73 97 %      Temp Source Heart Rate Source Patient Position - Orthostatic VS BP Location FiO2 (%)   05/04/21 0804 05/04/21 0804 05/04/21 1002 05/04/21 1002 --   Tympanic Monitor Lying Right arm       Pain Score       05/04/21 0932       Worst Possible Pain           Vitals:    05/04/21 0804 05/04/21 1002 05/04/21 1100   BP:  (!) 177/73 148/77   Pulse: (!) 114 (!) 107 98   Patient Position - Orthostatic VS:  Lying Lying         Visual Acuity      ED Medications  Medications   belladonna-opium (B&O SUPPOSITORY) 16 2-30 mg suppository 1 suppository (has no administration in time range)   fentanyl citrate (PF) 100 MCG/2ML 25 mcg (25 mcg Intravenous Given 5/4/21 0932)   fentanyl citrate (PF) 100 MCG/2ML 25 mcg (25 mcg Intravenous Given 5/4/21 0957)   lidocaine (XYLOCAINE) 1 % 140 mg in dextrose 5 % 50 mL IVPB (140 mg Intravenous New Bag 5/4/21 1102)   morphine (PF) 4 mg/mL injection 4 mg (4 mg Intravenous Given 5/4/21 1049)       Diagnostic Studies  Results Reviewed     Procedure Component Value Units Date/Time    APTT [[id number]]  (Normal) Collected: 05/04/21 0916    Lab Status: Final result Specimen: Blood from Arm, Right Updated: 05/04/21 0937     PTT 29 seconds     Protime-INR [[id number]]  (Normal) Collected: 05/04/21 0916    Lab Status: Final result Specimen: Blood from Arm, Right Updated: 05/04/21 0937     Protime 14 3 seconds      INR 1 05    Basic metabolic panel [[id number]]  (Abnormal) Collected: 05/04/21 0916    Lab Status: Final result Specimen: Blood from Arm, Right Updated: 05/04/21 0936     Sodium 138 mmol/L      Potassium 4 0 mmol/L      Chloride 102 mmol/L      CO2 25 mmol/L      ANION GAP 11 mmol/L      BUN 23 mg/dL      Creatinine 1 51 mg/dL      Glucose 150 mg/dL      Calcium 9 2 mg/dL      eGFR 44 ml/min/1 73sq m     Narrative:      Tonya guidelines for Chronic Kidney Disease (CKD):     Stage 1 with normal or high GFR (GFR > 90 mL/min/1 73 square meters)    Stage 2 Mild CKD (GFR = 60-89 mL/min/1 73 square meters)    Stage 3A Moderate CKD (GFR = 45-59 mL/min/1 73 square meters)    Stage 3B Moderate CKD (GFR = 30-44 mL/min/1 73 square meters)    Stage 4 Severe CKD (GFR = 15-29 mL/min/1 73 square meters)    Stage 5 End Stage CKD (GFR <15 mL/min/1 73 square meters)  Note: GFR calculation is accurate only with a steady state creatinine    Urine Microscopic [[id number]]  (Abnormal) Collected: 05/04/21 5075    Lab Status: Final result Specimen: Urine, Indwelling Sumner Catheter Updated: 05/04/21 0931     RBC, UA Innumerable /hpf      WBC, UA       Field obscured, unable to enumerate     /hpf     Epithelial Cells       Field obscured, unable to enumerate     /hpf     Bacteria, UA       Field obscured, unable to enumerate     /hpf    Urine culture [[id number]] Collected: 05/04/21 0916    Lab Status:  In process Specimen: Urine, Indwelling Sumner Catheter Updated: 05/04/21 0931    UA w Reflex to Microscopic w Reflex to Culture [[id number]]  (Abnormal) Collected: 05/04/21 0916    Lab Status: Final result Specimen: Urine, Indwelling Sumner Catheter Updated: 05/04/21 0928     Color, UA Red     Clarity, UA Cloudy     Specific [place], UA 1 020     pH, UA 7 5     Leukocytes, UA Negative     Nitrite, UA Negative     Protein, UA >=300 mg/dl      Glucose, UA Negative mg/dl      Ketones, UA Negative mg/dl      Urobilinogen, UA 0 2 E U /dl      Bilirubin, UA Negative     Blood, UA Large    CBC and differential [[id number]]  (Abnormal) Collected: 05/04/21 0916    Lab Status: Final result Specimen: Blood from Arm, Right Updated: 05/04/21 0924     WBC 11 19 Thousand/uL      RBC 3 49 Million/uL      Hemoglobin 10 5 g/dL      Hematocrit 33 1 %      MCV 95 fL      MCH 30 1 pg      MCHC 31 7 g/dL      RDW 14 6 %      MPV 9 7 fL      Platelets 091 Thousands/uL      nRBC 0 /100 WBCs      Neutrophils Relative 72 %      Immat GRANS % 1 %      Lymphocytes Relative 16 %      Monocytes Relative 8 %      Eosinophils Relative 3 %      Basophils Relative 0 %      Neutrophils Absolute 7 98 Thousands/µL      Immature Grans Absolute 0 09 Thousand/uL      Lymphocytes Absolute 1 82 Thousands/µL      Monocytes Absolute 0 93 Thousand/µL      Eosinophils Absolute 0 33 Thousand/µL      Basophils Absolute 0 04 Thousands/µL                  No orders to display              Procedures  Procedures         ED Course  ED Course as of May 04 1136   Tue May 04, 2021 Kay 12 text sent to Dr Trav Frankel  9862 Call placed to Dr Trav Frankel  No answer  Left message  4693 Patient still with persistent pain  Additional fentanyl 25mcg and lidocaine 1 5mg/kg IVPB ordered  2507 [facility] to Dr Trav Frankel  He states the spasms are likely because his bladder is over-distended  Recommend hand irrigation with 10cc at a time until clot burden is relieved, then he may tolerate CBI  Admit to medicine and he will put the patient on the schedule for cystoscopy  1123 Belladonna Opium suppository ordered for spasm  Nurse irrigating with small volume, getting significant clot burden out  She will continue as patient tolerates  MDM    Disposition  Final diagnoses:   Hematuria, unspecified type     Time reflects when diagnosis was documented in both MDM as applicable and the Disposition within this note     Time User Action Codes Description Comment    5/4/2021 11:35 AM Sonam Lopez Add [R31 9] Hematuria, unspecified type       ED Disposition     ED Disposition Condition Date/Time Comment    Admit Stable Tue May 4, 2021 11:35 AM Case was discussed with Shelli and the patient's admission status was agreed to be Admission Status: observation status to the service of Dr Kranthi Riggins   Follow-up Information    None         Patient's Medications   Discharge Prescriptions    No medications on file     No discharge procedures on file      PDMP Review     None          ED Provider  Electronically Signed by           Sue Biggs MD  05/04/21 5145

## 2021-05-04 NOTE — ASSESSMENT & PLAN NOTE
Baseline creatinine appears to be 1 2-1 3  Creatinine 1 5 on presentation  Likely secondary to urinary retention  Improved initially, creatinine down trended 1 45  · Monitor intake output  · Avoid nephrotoxin  · Follow-up BMP

## 2021-05-04 NOTE — H&P
History and Physical - [facility]    Patient Information: Whitley Hernandez 66 y o  male MRN: [id number]  Unit/Bed#: [address] [place] [address] Encounter: [id number]  Admitting Physician: Ceasar Rubinstein, MD  PCP: Saima Nation MD  Date of Admission:  05/04/21        Hospital Problem List:     Principal Problem:    Gross hematuria  Active Problems:    Anemia    SHANTI (acute kidney injury) ([facility] )    Leukocytosis    Essential hypertension    Chronic obstructive pulmonary disease (HCC)    RA (rheumatoid arthritis) ([facility] )    Dyslipidemia      Assessment/Plan:    * Gross hematuria  Assessment & Plan  Presented with recurrence of gross hematuria associated with lower abdominal discomfort, bladder spasm and decreased output from the catheter  Recent multiple hospitalization requiring cystoscopy clot evacuation extensive fulguration for radiation cystitis  Denies fever chills or flank pain  Hemoglobin stable  Case was discussed with Urology in ED, recommended to continue CBI  · Continue CBI  · Head irrigation as needed  · Monitor labs  · NPO  · IV fluid  · Urology follow-up, likely will require cystoscopy    Leukocytosis  Assessment & Plan  Reactive  Monitor    SHANTI (acute kidney injury) ([facility] )  Assessment & Plan  Baseline creatinine appears to be 1 2-1 3  Creatinine 1 5 on presentation  Likely secondary to urinary retention  · Continue CBI  · Monitor intake output  · Avoid nephrotoxin  · IV fluids    Anemia  Assessment & Plan  Mild in setting of recurrent hematuria  Currently stable  Continue to monitor    RA (rheumatoid arthritis) (HCC)  Assessment & Plan  On methotrexate and folic acid    Chronic obstructive pulmonary disease (HCC)  Assessment & Plan  Ex-smoker, quit 2 months ago  On Advair and albuterol as needed at home  Substitute with [facility]  Continue albuterol as needed    Essential hypertension  Assessment & Plan  Uncontrolled on presentation due to pain  Now improved  Continue metoprolol with holding parameters    Dyslipidemia  Assessment & Plan  On statin          VTE Prophylaxis: Pharmacologic VTE Prophylaxis contraindicated due to Due to hematuria  / sequential compression device   Code Status: Level 1 - Full Code    Anticipated Length of Stay:  Patient will be admitted on an Observation basis with an anticipated length of stay of  < 2 midnights  Justification for Hospital Stay:  Recurrent gross hematuria    Total Time for Visit, including Counseling / Coordination of Care: 45 minutes  Greater than 50% of this total time spent on direct patient counseling and coordination of care  Chief Complaint:     Bladder Spasms (patient with a alanis, discharged Sunday  States he has been having pressure and spasms all night  Drained back this morning and has had little to no drainage since )    History of Present Illness:    García Dick is a 66 y o  male past medical history of COPD, hypertension, dyslipidemia, prostate cancer status post radiation and chemotherapy, recent recurrent hematuria secondary to radiation cystitis who presents with bladder spasm and pain  Patient had recent multiple hospitalization for gross hematuria requiring cystoscopy fulguration and evacuation  Most recently patient was discharged 2 days ago  Patient reported that at the time of discharge he was discharged with Alanis catheter   At that time urine was pinkish  Over last 2 days he noticed that urine was more blood-tinged  Today morning, he started having   severe lower abdominal pain, bladder spasm and decrease drainage from his Alanis catheter  Patient denied any fever, chills or flank pain  Patient was evaluated in ED, afebrile  Was hypertensive and tachycardic in setting of pain  Bright red blood was noted through the Alanis catheter  And irrigation was done with clot removal   Case was discussed with the Neurology, Dr Hoang Youngblood and Patient was subsequently admitted      Review of Systems:    Review of Systems Constitutional: Negative for activity change, appetite change, chills and fever  HENT: Negative for hearing loss and sore throat  Eyes: Negative for visual disturbance  Respiratory: Negative for cough, chest tightness and shortness of breath  Cardiovascular: Negative for chest pain, palpitations and leg swelling  Gastrointestinal: Positive for abdominal pain  Negative for constipation, diarrhea, nausea and vomiting  Genitourinary: Positive for decreased urine volume, difficulty urinating and hematuria  Negative for flank pain  Neurological: Negative for dizziness, syncope and light-headedness  Hematological: Does not bruise/bleed easily  Psychiatric/Behavioral: Negative for behavioral problems  Past Medical and Surgical History:     Past Medical History:   Diagnosis Date    Cancer [facility])     prostate       Past Surgical History:   Procedure Laterality Date    APPENDECTOMY      FL RETROGRADE PYELOGRAM  4/14/2021    HERNIA REPAIR      MA CYSTOURETHROSCOPY W/IRRIG & EVAC CLOTS Bilateral 4/14/2021    Procedure: CYSTOSCOPY EVACUATION OF CLOTS bilateral retrograde pyelograms possible TURBT bladder biopsy;  Surgeon: Sukhwinder De Jesus MD;  Location: [address] [place] [address];  Service: Urology    MA CYSTOURETHROSCOPY W/IRRIG & EVAC CLOTS N/A 4/24/2021    Procedure: CYSTOSCOPY EVACUATION OF CLOTS fulguration;  Surgeon: Sukhwinder De Jesus MD;  Location: [address] [place] [address];  Service: Urology    [id number] [facility]      right shoulder       Meds/Allergies:    PTA meds:   Prior to Admission Medications   Prescriptions Last Dose Informant Patient Reported? Taking?    Calcium Carb-Cholecalciferol (CALTRATE 600+D3 SOFT PO) Not Taking at Unknown time  Yes No   Sig: Take by mouth   albuterol (PROVENTIL HFA,VENTOLIN HFA) 90 mcg/act inhaler   No No   Sig: Inhale 2 puffs every 4 (four) hours as needed for wheezing   atorvastatin (LIPITOR) 20 mg tablet 5/3/2021 at Unknown time  Yes Yes   Sig: Take 20 mg by mouth daily at bedtime   folic acid (FOLVITE) 1 mg tablet 5/3/2021 at Unknown time  Yes Yes   Sig: Take 1 mg by mouth daily   methotrexate 2 5 mg tablet   Yes No   Sig: Take 15 5 mg by mouth once a week   metoprolol tartrate (LOPRESSOR) 25 mg tablet 5/3/2021 at Unknown time  Yes Yes   Sig: Take 25 mg by mouth every 12 (twelve) hours      Facility-Administered Medications: None       Allergies: No Known Allergies  History:     Marital Status: /Civil Union     Substance Use History:   Social History     Substance and Sexual Activity   Alcohol Use Never    Frequency: Never     Social History     Tobacco Use   Smoking Status Former Smoker    Packs/day: 0 50    Years: 50 00    Pack years: 25 00    Quit date: 8/29/2017    Years since quitting: 3 6   Smokeless Tobacco Never Used   Tobacco Comment    quit one month ago     Social History     Substance and Sexual Activity   Drug Use No       Family History:    History reviewed  No pertinent family history  Physical Exam:     Vitals:   Blood Pressure: 156/91 (05/04/21 1339)  Pulse: 100 (05/04/21 1339)  Temperature: 97 8 °F (36 6 °C) (05/04/21 1339)  Temp Source: Axillary (05/04/21 1339)  Respirations: 18 (05/04/21 1339)  Weight - Scale: 93 4 kg (206 lb) (05/04/21 1022)  SpO2: 94 % (05/04/21 1339)    Physical Exam  Constitutional:       General: He is not in acute distress  HENT:      Head: Normocephalic and atraumatic  Neck:      Musculoskeletal: Neck supple  Cardiovascular:      Rate and Rhythm: Normal rate  Pulmonary:      Effort: Pulmonary effort is normal  No respiratory distress  Breath sounds: No wheezing, rhonchi or rales  Chest:      Chest wall: No tenderness  Abdominal:      General: Bowel sounds are normal  There is no distension  Palpations: Abdomen is soft  Tenderness: There is no abdominal tenderness  There is no guarding or rebound     Genitourinary:     Comments: CBI, requiring intermittent hand irrigation with passage of clots  Musculoskeletal:      Right lower leg: No edema  Left lower leg: No edema  Skin:     General: Skin is warm and dry  Findings: No rash  Neurological:      Mental Status: He is alert  Mental status is at baseline  Cranial Nerves: No cranial nerve deficit  Lab Results: I have personally reviewed pertinent reports  Results from last 7 days   Lab Units 05/04/21  0916   WBC Thousand/uL 11 19*   HEMOGLOBIN g/dL 10 5*   HEMATOCRIT % 33 1*   PLATELETS Thousands/uL 258   NEUTROS PCT % 72   LYMPHS PCT % 16   MONOS PCT % 8   EOS PCT % 3     Results from last 7 days   Lab Units 05/04/21  0916  04/29/21  0456   POTASSIUM mmol/L 4 0   < > 4 1   CHLORIDE mmol/L 102   < > 106   CO2 mmol/L 25   < > 27   BUN mg/dL 23   < > 28*   CREATININE mg/dL 1 51*   < > 1 24   CALCIUM mg/dL 9 2   < > 8 5   ALK PHOS U/L  --   --  89   ALT U/L  --   --  31   AST U/L  --   --  19    < > = values in this interval not displayed  Results from last 7 days   Lab Units 05/04/21  0916   INR  1 12       Imaging: I have personally reviewed pertinent reports  Ct Abdomen Pelvis Wo Contrast    Result Date: 4/24/2021  Narrative: CT ABDOMEN AND PELVIS WITHOUT IV CONTRAST INDICATION:   Recurrent hematuria  COMPARISON:  Compared with 3/29/2018 TECHNIQUE:  CT examination of the abdomen and pelvis was performed without intravenous contrast   Axial, sagittal, and coronal 2D reformatted images were created from the source data and submitted for interpretation  Radiation dose length product (DLP) for this visit:  730 43 mGy-cm   This examination, like all CT scans performed in the [facility], was performed utilizing techniques to minimize radiation dose exposure, including the use of iterative  reconstruction and automated exposure control  Enteric contrast was administered   FINDINGS: ABDOMEN LOWER CHEST:  No clinically significant abnormality identified in the visualized lower chest  LIVER/BILIARY TREE:  Unremarkable  GALLBLADDER:  No calcified gallstones  No pericholecystic inflammatory change  SPLEEN:  Unremarkable  PANCREAS:  Unremarkable  ADRENAL GLANDS:  Unremarkable  KIDNEYS/URETERS:  Unremarkable  No hydronephrosis  STOMACH AND BOWEL:  Unremarkable  APPENDIX:  There are expected postoperative changes of appendectomy  ABDOMINOPELVIC CAVITY:  No ascites  No pneumoperitoneum  No lymphadenopathy  VESSELS:  Unremarkable for patient's age  PELVIS REPRODUCTIVE ORGANS:  Status post prostatectomy  URINARY BLADDER:  Bladder demonstrates hyperdense fluid with Sumner balloon and is poorly distended  Small amount of air seen  ABDOMINAL WALL/INGUINAL REGIONS:  Small right inguinal hernia  OSSEOUS STRUCTURES:  No acute fracture or destructive osseous lesion  Impression: Poorly distended bladder demonstrate a Sumner balloon and hyperdense fluid which may correlate with history of hematuria    Otherwise unremarkable study  Workstation performed: [id number]     Xr Chest Portable    Result Date: 4/25/2021  Narrative: CHEST INDICATION:   Shortness of breath  COMPARISON:  Compared with 6/18/2020 EXAM PERFORMED/VIEWS:  XR CHEST PORTABLE FINDINGS: Cardiomediastinal silhouette appears unremarkable  Bibasilar linear densities of atelectasis are new  The lungs are clear  No pneumothorax or pleural effusion  Osseous structures appear within normal limits for patient age  Impression: New bibasilar linear atelectasis  No acute cardiopulmonary disease  Workstation performed: [id number]     Fl Retrograde Pyelogram    Result Date: 4/14/2021  Narrative: C-ARM - retrograde pyelogram INDICATION: R31 0: Gross hematuria  Procedure guidance  COMPARISON:  None TECHNIQUE: FLUOROSCOPY TIME:   17 7 SECONDS 5 FLUOROSCOPIC IMAGES FINDINGS: Fluoroscopic guidance provided for procedure guidance  Osseous and soft tissue detail limited by technique  Impression: Fluoroscopic guidance provided for procedure guidance    Please refer to the separate procedure notes for additional details  Workstation performed: [id number]       No orders to display       EKG, Pathology, and Other Studies Reviewed on Admission:   · Prior EKG noted from 04/24    \[facility]\""[facility] Reviewed: Yes     ** Please Note: "This note has been constructed using a voice recognition system  Therefore there may be syntax, spelling, and/or grammatical errors   Please call if you have any questions  "**

## 2021-05-04 NOTE — ASSESSMENT & PLAN NOTE
Presented with recurrence of gross hematuria associated with lower abdominal discomfort, bladder spasm and decreased output from the catheter  Recent multiple hospitalization requiring cystoscopy clot evacuation extensive fulguration for radiation cystitis  Improved initially on CBI but hematuria and clot recurred after stopping CBI, CBI was resumed  Hemoglobin remains stable  Patient denies any fever chills or flank pain  Urology following, input appreciated  Recommended bilateral nephrostomy due to recurrent hematuria  Status post IR guided bilateral nephrostomy placement on 05/07  Hematuria appears to have improved  · Urology follow-up noted, recommended bilateral nephrograms  · Status post IR evaluation for left nephrostomy, functioning appropriately  · Monitor for hematuria  · May require cystoscopy if hematuria persistent/recurs after nephrostomy  · Patient is considering hyperbaric oxygen  · Patient has bilateral nephrostomy tube which are stable, however did have some hematuria  Sumner catheter has been discontinued today  · Follow up Urology recommendations  Plan to watch the patient today and continue to monitor  If stable tomorrow can be discharged

## 2021-05-04 NOTE — PLAN OF CARE
Problem: Potential for Falls  Goal: Patient will remain free of falls  Description: INTERVENTIONS:  - Assess patient frequently for physical needs  -  Identify cognitive and physical deficits and behaviors that affect risk of falls    -  [place] fall precautions as indicated by assessment   - Educate patient/family on patient safety including physical limitations  - Instruct patient to call for assistance with activity based on assessment  - Modify environment to reduce risk of injury  - Consider OT/PT consult to assist with strengthening/mobility  5/4/2021 1847 by Kimberley Kennedy RN  Outcome: Progressing  5/4/2021 1846 by Kimberley Kennedy RN  Outcome: Progressing     Problem: RESPIRATORY - ADULT  Goal: Achieves optimal ventilation and oxygenation  Description: INTERVENTIONS:  - Assess for changes in respiratory status  - Assess for changes in mentation and behavior  - Position to facilitate oxygenation and minimize respiratory effort  - Oxygen administered by appropriate delivery if ordered  - Initiate smoking cessation education as indicated  - Encourage broncho-pulmonary hygiene including cough, deep breathe, Incentive Spirometry  - Assess the need for suctioning and aspirate as needed  - Assess and instruct to report SOB or any respiratory difficulty  - Respiratory Therapy support as indicated  5/4/2021 1847 by Kimberley Kennedy RN  Outcome: Progressing  5/4/2021 1846 by Kimberley Kennedy RN  Outcome: Progressing     Problem: GENITOURINARY - ADULT  Goal: Absence of urinary retention  Description: INTERVENTIONS:  - Assess patients ability to void and empty bladder  - Monitor I/O  - Bladder scan as needed  - Discuss with physician/AP medications to alleviate retention as needed  - Discuss catheterization for long term situations as appropriate  Outcome: Progressing  Goal: Urinary catheter remains patent  Description: INTERVENTIONS:  - Assess patency of urinary catheter  - If patient has a chronic alanis, consider changing catheter if non-functioning  - Follow guidelines for intermittent irrigation of non-functioning urinary catheter  Outcome: Progressing

## 2021-05-04 NOTE — ED NOTES
Lidociane drip stopped  and patient O2 maintained above 90%  Nissa Kurtz texted Dr Roberts Me that patient's spasm is still ongoing          Abraham Perea, RN  05/04/21 0874

## 2021-05-05 LAB
ANION GAP SERPL CALCULATED.3IONS-SCNC: 7 MMOL/L (ref 4–13)
BACTERIA UR CULT: NORMAL
BUN SERPL-MCNC: 23 MG/DL (ref 5–25)
CALCIUM SERPL-MCNC: 8.4 MG/DL (ref 8.3–10.1)
CHLORIDE SERPL-SCNC: 105 MMOL/L (ref 100–108)
CO2 SERPL-SCNC: 29 MMOL/L (ref 21–32)
CREAT SERPL-MCNC: 1.32 MG/DL (ref 0.6–1.3)
ERYTHROCYTE [DISTWIDTH] IN BLOOD BY AUTOMATED COUNT: 14.6 % (ref 11.6–15.1)
GFR SERPL CREATININE-BSD FRML MDRD: 52 ML/MIN/1.73SQ M
GLUCOSE P FAST SERPL-MCNC: 103 MG/DL (ref 65–99)
GLUCOSE SERPL-MCNC: 103 MG/DL (ref 65–140)
HCT VFR BLD AUTO: 27.6 % (ref 36.5–49.3)
HGB BLD-MCNC: 8.6 G/DL (ref 12–17)
MCH RBC QN AUTO: 30 PG (ref 26.8–34.3)
MCHC RBC AUTO-ENTMCNC: 31.2 G/DL (ref 31.4–37.4)
MCV RBC AUTO: 96 FL (ref 82–98)
PLATELET # BLD AUTO: 207 THOUSANDS/UL (ref 149–390)
PMV BLD AUTO: 9.6 FL (ref 8.9–12.7)
POTASSIUM SERPL-SCNC: 4.2 MMOL/L (ref 3.5–5.3)
RBC # BLD AUTO: 2.87 MILLION/UL (ref 3.88–5.62)
SODIUM SERPL-SCNC: 141 MMOL/L (ref 136–145)
WBC # BLD AUTO: 10.17 THOUSAND/UL (ref 4.31–10.16)

## 2021-05-05 PROCEDURE — 99225 PR SBSQ OBSERVATION CARE/DAY 25 MINUTES: CPT | Performed by: INTERNAL MEDICINE

## 2021-05-05 PROCEDURE — 94760 N-INVAS EAR/PLS OXIMETRY 1: CPT

## 2021-05-05 PROCEDURE — 80048 BASIC METABOLIC PNL TOTAL CA: CPT | Performed by: INTERNAL MEDICINE

## 2021-05-05 PROCEDURE — 85027 COMPLETE CBC AUTOMATED: CPT | Performed by: INTERNAL MEDICINE

## 2021-05-05 RX ADMIN — FOLIC ACID 1 MG: 1 TABLET ORAL at 08:41

## 2021-05-05 RX ADMIN — ATORVASTATIN CALCIUM 20 MG: 20 TABLET, FILM COATED ORAL at 21:49

## 2021-05-05 RX ADMIN — FLUCONAZOLE 200 MG: 100 TABLET ORAL at 08:41

## 2021-05-05 RX ADMIN — METHOTREXATE SODIUM 2.5 MG: 2.5 TABLET ORAL at 08:41

## 2021-05-05 RX ADMIN — FLUTICASONE FUROATE AND VILANTEROL TRIFENATATE 1 PUFF: 100; 25 POWDER RESPIRATORY (INHALATION) at 21:49

## 2021-05-05 NOTE — PLAN OF CARE
Problem: Potential for Falls  Goal: Patient will remain free of falls  Description: INTERVENTIONS:  - Assess patient frequently for physical needs  -  Identify cognitive and physical deficits and behaviors that affect risk of falls    -  [place] fall precautions as indicated by assessment   - Educate patient/family on patient safety including physical limitations  - Instruct patient to call for assistance with activity based on assessment  - Modify environment to reduce risk of injury  - Consider OT/PT consult to assist with strengthening/mobility  Outcome: Progressing     Problem: RESPIRATORY - ADULT  Goal: Achieves optimal ventilation and oxygenation  Description: INTERVENTIONS:  - Assess for changes in respiratory status  - Assess for changes in mentation and behavior  - Position to facilitate oxygenation and minimize respiratory effort  - Oxygen administered by appropriate delivery if ordered  - Initiate smoking cessation education as indicated  - Encourage broncho-pulmonary hygiene including cough, deep breathe, Incentive Spirometry  - Assess the need for suctioning and aspirate as needed  - Assess and instruct to report SOB or any respiratory difficulty  - Respiratory Therapy support as indicated  Outcome: Progressing     Problem: GENITOURINARY - ADULT  Goal: Absence of urinary retention  Description: INTERVENTIONS:  - Assess patients ability to void and empty bladder  - Monitor I/O  - Bladder scan as needed  - Discuss with physician/AP medications to alleviate retention as needed  - Discuss catheterization for long term situations as appropriate  Outcome: Progressing  Goal: Urinary catheter remains patent  Description: INTERVENTIONS:  - Assess patency of urinary catheter  - If patient has a chronic alanis, consider changing catheter if non-functioning  - Follow guidelines for intermittent irrigation of non-functioning urinary catheter  Outcome: Progressing

## 2021-05-05 NOTE — PLAN OF CARE
Problem: Potential for Falls  Goal: Patient will remain free of falls  Description: INTERVENTIONS:  - Assess patient frequently for physical needs  -  Identify cognitive and physical deficits and behaviors that affect risk of falls    -  [place] fall precautions as indicated by assessment   - Educate patient/family on patient safety including physical limitations  - Instruct patient to call for assistance with activity based on assessment  - Modify environment to reduce risk of injury  - Consider OT/PT consult to assist with strengthening/mobility  Outcome: Progressing     Problem: RESPIRATORY - ADULT  Goal: Achieves optimal ventilation and oxygenation  Description: INTERVENTIONS:  - Assess for changes in respiratory status  - Assess for changes in mentation and behavior  - Position to facilitate oxygenation and minimize respiratory effort  - Oxygen administered by appropriate delivery if ordered  - Initiate smoking cessation education as indicated  - Encourage broncho-pulmonary hygiene including cough, deep breathe, Incentive Spirometry  - Assess the need for suctioning and aspirate as needed  - Assess and instruct to report SOB or any respiratory difficulty  - Respiratory Therapy support as indicated  Outcome: Progressing     Problem: GENITOURINARY - ADULT  Goal: Absence of urinary retention  Description: INTERVENTIONS:  - Assess patients ability to void and empty bladder  - Monitor I/O  - Bladder scan as needed  - Discuss with physician/AP medications to alleviate retention as needed  - Discuss catheterization for long term situations as appropriate  Outcome: Progressing  Goal: Urinary catheter remains patent  Description: INTERVENTIONS:  - Assess patency of urinary catheter  - If patient has a chronic alanis, consider changing catheter if non-functioning  - Follow guidelines for intermittent irrigation of non-functioning urinary catheter  Outcome: Progressing

## 2021-05-05 NOTE — PROGRESS NOTES
Progress Note - Urology      Patient: Randolph Moran   : 1945 Sex: male   MRN: [id number]     CSN: [id number]  Unit/Bed#: [address] [place] [address]     SUBJECTIVE:   Hand irrigated last night  No remaining clots  CBI completely clear  Only had hematuria for 1 day  Appears that radiation cystitis/inflamed tissues are healing        Objective   Vitals: /57   Pulse 73   Temp 99 6 °F (37 6 °C)   Resp 16   Wt 93 4 kg (206 lb)   SpO2 90%   BMI 28 73 kg/m²     I/O last 24 hours:   In: 850 [I V :850]  Out:  [Urine:]      Physical Exam:   General Alert awake   Normocephalic atraumatic PERRLA  Lungs clear bilaterally  Cardiac normal S1 normal S2  Abdomen soft, flank pain  Extremities no edema      Lab Results: CBC:   Lab Results   Component Value Date    WBC 10 17 (H) 2021    HGB 8 6 (L) 2021    HCT 27 6 (L) 2021    MCV 96 2021     2021    MCH 30 0 2021    MCHC 31 2 (L) 2021    RDW 14 6 2021    MPV 9 6 2021    NRBC 0 2021     CMP:   Lab Results   Component Value Date     2021    CO2 29 2021    BUN 23 2021    CREATININE 1 32 (H) 2021    CALCIUM 8 4 2021    AST 19 2021    ALT 31 2021    ALKPHOS 89 2021    EGFR 52 2021     Urinalysis:   Lab Results   Component Value Date    COLORU Red 2021    CLARITYU Cloudy 2021    SPECGRAV 1 020 2021    PHUR 7 5 2021    PHUR 5 5 2018    LEUKOCYTESUR Negative 2021    NITRITE Negative 2021    GLUCOSEU Negative 2021    KETONESU Negative 2021    BILIRUBINUR Negative 2021    BLOODU Large (A) 2021     Urine Culture:   Lab Results   Component Value Date    URINECX No Growth <1000 cfu/mL 2021     PSA: No results found for: PSA      Assessment/ Plan:  Gross hematuria  Radiation cystitis  DC CBI  Continue Sumner  Will make NPO after midnight  If hematuria occurs overnight will go back to OR for cysto Gamal fulguration which after discussion with patient trying to avoid  Will undergo hyper bulk oxygen therapy once out of hospital and set up with daily visits          Justino Garcia MD

## 2021-05-05 NOTE — PROGRESS NOTES
[facility] Internal Medicine Progress Note  Patient: Adelaide Bowling 66 y o  male   MRN: [id number]  PCP: Mark Morales MD  Unit/Bed#: [address] [place] [address] Encounter: [id number]  Date Of Visit: 05/05/21    Problem List:    Principal Problem:    Gross hematuria  Active Problems:    Anemia    SHANTI (acute kidney injury) ([facility] )    Essential hypertension    Chronic obstructive pulmonary disease (HCC)    RA (rheumatoid arthritis) ([place] [address] )    Dyslipidemia      Assessment & Plan:    * Gross hematuria  Assessment & Plan  Presented with recurrence of gross hematuria associated with lower abdominal discomfort, bladder spasm and decreased output from the catheter  Recent multiple hospitalization requiring cystoscopy clot evacuation extensive fulguration for radiation cystitis  Improved on CBI   Hemoglobin is lower but stable  Patient denies any fever chills or flank pain  Urology following, input appreciated  · Continue CBI, plan to discontinue CBI and monitor today  · Monitor labs  · Monitor for hematuria  · May require cystoscopy for recurrence  · Urology input noted   Eventual consideration for hyperbaric treatment versus bilateral nephrostomy    SHANTI (acute kidney injury) ([place] [address] )  Assessment & Plan  Baseline creatinine appears to be 1 2-1 3  Creatinine 1 5 on presentation  Likely secondary to urinary retention  Improved  · Continue CBI  · Monitor intake output  · Avoid nephrotoxin  · Will monitor off IV fluid    Anemia  Assessment & Plan  Mild in setting of recurrent hematuria  Somewhat lower than baseline, likely combination of hematuria and dilutional component  Continue to monitor    Leukocytosis  Assessment & Plan  Reactive, improving spontaneously  Monitor    RA (rheumatoid arthritis) (HCC)  Assessment & Plan  On methotrexate and folic acid    Chronic obstructive pulmonary disease (HCC)  Assessment & Plan  Ex-smoker, quit 2 months ago  On Advair and albuterol as needed at home  Substitute with [facility]  Continue albuterol as needed    Essential hypertension  Assessment & Plan  Uncontrolled on presentation due to pain  Now improved  Continue metoprolol with holding parameters    Dyslipidemia  Assessment & Plan  On statin        VTE Pharmacologic Prophylaxis:   Pharmacologic: Pharmacologic VTE Prophylaxis contraindicated due to Hematuria  Mechanical VTE Prophylaxis in Place: Yes    Patient Centered Rounds: I have performed bedside rounds with nursing staff today  Discussions with Specialists or Other Care Team Provider:  Urology    Education and Discussions with Family / Patient:  Discussed with patient    Time Spent for Care: 45 minutes  More than 50% of total time spent on counseling and coordination of care as described above  Current Length of Stay: 0 day(s)    Current Patient Status:  Observation  Certification Statement: The patient will continue to require additional inpatient hospital stay due to Monitoring of hematuria    Discharge Plan:  Home when cleared by Urology    Code Status: Level 1 - Full Code      Subjective:   Hematuria improved with CBI  Denies any pain    Objective:     Vitals:   Temp (24hrs), Av 6 °F (37 °C), Min:97 8 °F (36 6 °C), Max:99 6 °F (37 6 °C)    Temp:  [97 8 °F (36 6 °C)-99 6 °F (37 6 °C)] 99 6 °F (37 6 °C)  HR:  [] 73  Resp:  [16-20] 16  BP: (100-177)/(55-91) 111/57  SpO2:  [90 %-94 %] 90 %  Body mass index is 28 73 kg/m²  Input and Output Summary (last 24 hours): Intake/Output Summary (Last 24 hours) at 2021 6995  Last data filed at 2021 0801  Gross per 24 hour   Intake 850 ml   Output 2000 ml   Net -1150 ml       Physical Exam:     Physical Exam  Constitutional:       General: He is not in acute distress  HENT:      Head: Normocephalic and atraumatic  Neck:      Musculoskeletal: Normal range of motion and neck supple  Cardiovascular:      Rate and Rhythm: Normal rate  Pulmonary:      Effort: Pulmonary effort is normal  No respiratory distress        Breath sounds: No wheezing, rhonchi or rales  Chest:      Chest wall: No tenderness  Abdominal:      General: Bowel sounds are normal  There is no distension  Palpations: Abdomen is soft  Tenderness: There is no abdominal tenderness  There is no guarding or rebound  Genitourinary:     Comments: CBI, clear at present  Musculoskeletal:      Right lower leg: No edema  Left lower leg: No edema  Skin:     General: Skin is warm and dry  Findings: No rash  Neurological:      Mental Status: He is alert  Mental status is at baseline  Cranial Nerves: No cranial nerve deficit  Additional Data:     Labs:    Results from last 7 days   Lab Units 05/05/21  0547 05/04/21  0916   WBC Thousand/uL 10 17* 11 19*   HEMOGLOBIN g/dL 8 6* 10 5*   HEMATOCRIT % 27 6* 33 1*   PLATELETS Thousands/uL 207 258   NEUTROS PCT %  --  72   LYMPHS PCT %  --  16   MONOS PCT %  --  8   EOS PCT %  --  3     Results from last 7 days   Lab Units 05/05/21  0547  04/29/21  0456   POTASSIUM mmol/L 4 2   < > 4 1   CHLORIDE mmol/L 105   < > 106   CO2 mmol/L 29   < > 27   BUN mg/dL 23   < > 28*   CREATININE mg/dL 1 32*   < > 1 24   CALCIUM mg/dL 8 4   < > 8 5   ALK PHOS U/L  --   --  89   ALT U/L  --   --  31   AST U/L  --   --  19    < > = values in this interval not displayed  Results from last 7 days   Lab Units 05/04/21  0916   INR  1 12       * I Have Reviewed All Lab Data Listed Above  * Additional Pertinent Lab Tests Reviewed:  All Labs Within Last 24 Hours Reviewed      Imaging:  Imaging Reports Reviewed Today Include:  none    Recent Cultures (last 7 days):     Results from last 7 days   Lab Units 05/04/21  0916 04/29/21  0545   URINE CULTURE  No Growth <1000 cfu/mL <10,000 cfu/ml Yeast*       Last 24 Hours Medication List:   Current Facility-Administered Medications   Medication Dose Route Frequency Provider Last Rate    acetaminophen  650 mg Oral Q6H PRN Sonia Montalvo MD      atorvastatin  20 mg Oral HS Ez Metzger MD      belladonna-opium  15 mg Rectal Q6H PRN Ez Metzger MD      fluconazole  200 mg Oral Daily María Sexton MD      fluticasone-vilanterol  1 puff Inhalation Daily Ez Metzger MD      folic acid  1 mg Oral Daily Ez Metzger MD      ipratropium-albuterol  3 mL Nebulization 4x Daily PRN Ez Metzger MD      methotrexate  2 5 mg Oral Weekly Ez Metzger MD      metoprolol tartrate  25 mg Oral Q12H Vy Wilcox MD            Today, Patient Was Seen By: Ez Metzger MD    ** Please Note: "This note has been constructed using a voice recognition system  Therefore there may be syntax, spelling, and/or grammatical errors   Please call if you have any questions  "**

## 2021-05-06 PROBLEM — D72.829 LEUKOCYTOSIS: Status: RESOLVED | Noted: 2021-05-04 | Resolved: 2021-05-06

## 2021-05-06 LAB
ANION GAP SERPL CALCULATED.3IONS-SCNC: 8 MMOL/L (ref 4–13)
BUN SERPL-MCNC: 27 MG/DL (ref 5–25)
CALCIUM SERPL-MCNC: 8.3 MG/DL (ref 8.3–10.1)
CHLORIDE SERPL-SCNC: 104 MMOL/L (ref 100–108)
CO2 SERPL-SCNC: 29 MMOL/L (ref 21–32)
CREAT SERPL-MCNC: 1.38 MG/DL (ref 0.6–1.3)
ERYTHROCYTE [DISTWIDTH] IN BLOOD BY AUTOMATED COUNT: 14.6 % (ref 11.6–15.1)
GFR SERPL CREATININE-BSD FRML MDRD: 49 ML/MIN/1.73SQ M
GLUCOSE P FAST SERPL-MCNC: 110 MG/DL (ref 65–99)
GLUCOSE SERPL-MCNC: 110 MG/DL (ref 65–140)
HCT VFR BLD AUTO: 27 % (ref 36.5–49.3)
HGB BLD-MCNC: 8.5 G/DL (ref 12–17)
MCH RBC QN AUTO: 30.4 PG (ref 26.8–34.3)
MCHC RBC AUTO-ENTMCNC: 31.5 G/DL (ref 31.4–37.4)
MCV RBC AUTO: 96 FL (ref 82–98)
PLATELET # BLD AUTO: 206 THOUSANDS/UL (ref 149–390)
PMV BLD AUTO: 9.5 FL (ref 8.9–12.7)
POTASSIUM SERPL-SCNC: 4 MMOL/L (ref 3.5–5.3)
RBC # BLD AUTO: 2.8 MILLION/UL (ref 3.88–5.62)
SODIUM SERPL-SCNC: 141 MMOL/L (ref 136–145)
WBC # BLD AUTO: 9.27 THOUSAND/UL (ref 4.31–10.16)

## 2021-05-06 PROCEDURE — 85027 COMPLETE CBC AUTOMATED: CPT | Performed by: INTERNAL MEDICINE

## 2021-05-06 PROCEDURE — NC001 PR NO CHARGE: Performed by: STUDENT IN AN ORGANIZED HEALTH CARE EDUCATION/TRAINING PROGRAM

## 2021-05-06 PROCEDURE — 94760 N-INVAS EAR/PLS OXIMETRY 1: CPT

## 2021-05-06 PROCEDURE — 80048 BASIC METABOLIC PNL TOTAL CA: CPT | Performed by: INTERNAL MEDICINE

## 2021-05-06 PROCEDURE — 99232 SBSQ HOSP IP/OBS MODERATE 35: CPT | Performed by: INTERNAL MEDICINE

## 2021-05-06 PROCEDURE — 94640 AIRWAY INHALATION TREATMENT: CPT

## 2021-05-06 RX ORDER — CEFAZOLIN SODIUM 2 G/50ML
2000 SOLUTION INTRAVENOUS ONCE
Status: CANCELLED | OUTPATIENT
Start: 2021-05-06 | End: 2021-05-06

## 2021-05-06 RX ADMIN — FOLIC ACID 1 MG: 1 TABLET ORAL at 10:54

## 2021-05-06 RX ADMIN — FLUTICASONE FUROATE AND VILANTEROL TRIFENATATE 1 PUFF: 100; 25 POWDER RESPIRATORY (INHALATION) at 21:16

## 2021-05-06 RX ADMIN — FLUCONAZOLE 200 MG: 100 TABLET ORAL at 10:54

## 2021-05-06 RX ADMIN — ATORVASTATIN CALCIUM 20 MG: 20 TABLET, FILM COATED ORAL at 21:16

## 2021-05-06 RX ADMIN — IPRATROPIUM BROMIDE AND ALBUTEROL SULFATE 3 ML: 2.5; .5 SOLUTION RESPIRATORY (INHALATION) at 11:42

## 2021-05-06 RX ADMIN — IPRATROPIUM BROMIDE AND ALBUTEROL SULFATE 3 ML: 2.5; .5 SOLUTION RESPIRATORY (INHALATION) at 06:18

## 2021-05-06 NOTE — CONSULTS
INTERPROFESSIONAL (PHONE) CONSULTATION - Interventional Radiology  Marin Shea 66 y o  male MRN: [id number]  Unit/Bed#: [address] [place] [address] Encounter: [id number]    IR has been consulted to evaluate the patient, determine the appropriate procedure, and whether or not a procedure can and should be performed regarding the care of Marin Shea  We were consulted by urology concerning this patient, and to possibly perform b/l PCNs if medically appropriate for the patient  IP Consult to IR  Consult performed by: Dany Weeks MD  Consult ordered by: Demetrice Villarreal MD        05/06/21      Assessment/Recommendation:   66 yoM PMH prostate ca s/p radical prostatectomy with tumor recurrence, s/p external beam radiation  He presents with refractory hematuria due to extensive urinary bladder radiation cystitis  He has failed multiple bladder fulgurations and CBI  IR consulted for b/l PCN placement for urinary diversion  We'll plan on b/l PCN placement tomorrow pending anesthesia availability  Please make NPO at midnight  D/w Dr Rox Esquivel  Total time spent in review of data, discussion with requesting provider and rendering advice was 15 minutes  Patient or appropriate family member was verbally informed by urology of this consultative service on their behalf to provide more timely access to specialty care in lieu of an in person consultation  Verbal consent was obtained  Thank you for allowing Interventional Radiology to participate in the care of Marin Shea  Please don't hesitate to call or TigerText us with any questions       Dany Weeks MD

## 2021-05-06 NOTE — PROGRESS NOTES
[facility] Medicine Progress Note  Patient: Jef Cleveland 66 y o  male   MRN: [id number]  PCP: Renu Sheriff MD  Unit/Bed#: [address] [place] [address] Encounter: [id number]  Date Of Visit: 05/06/21    Problem List:    Principal Problem:    Gross hematuria  Active Problems:    Anemia    SHANTI (acute kidney injury) ([facility] )    Essential hypertension    Chronic obstructive pulmonary disease (HCC)    RA (rheumatoid arthritis) ([place] [address] )    Dyslipidemia      Assessment & Plan:    * Gross hematuria  Assessment & Plan  Presented with recurrence of gross hematuria associated with lower abdominal discomfort, bladder spasm and decreased output from the catheter  Recent multiple hospitalization requiring cystoscopy clot evacuation extensive fulguration for radiation cystitis  Improved on CBI but hematuria and clot recurred after stopping  Hemoglobin is lower but stable  Patient denies any fever chills or flank pain  Urology following, input appreciated  · Continue CBI  · Monitor labs  · Monitor for hematuria  · May require cystoscopy for recurrence  · Urology input noted   Eventual consideration for hyperbaric treatment versus bilateral nephrostomy    SHANTI (acute kidney injury) ([place] [address] )  Assessment & Plan  Baseline creatinine appears to be 1 2-1 3  Creatinine 1 5 on presentation  Likely secondary to urinary retention  Improved  · Continue CBI  · Monitor intake output  · Avoid nephrotoxin  · Will monitor off IV fluid    Anemia  Assessment & Plan  Mild in setting of recurrent hematuria  Somewhat lower than baseline but stable, likely combination of hematuria and dilutional component  Continue to monitor    Leukocytosis-resolved as of 5/6/2021  Assessment & Plan  Reactive  Monitor    RA (rheumatoid arthritis) (HCC)  Assessment & Plan  On methotrexate and folic acid    Chronic obstructive pulmonary disease (HCC)  Assessment & Plan  Ex-smoker, quit 2 months ago  On Advair and albuterol as needed at home  Substitute with [facility]  Continue albuterol as needed    Essential hypertension  Assessment & Plan  Uncontrolled on presentation due to pain  Now improved  Continue metoprolol with holding parameters    Dyslipidemia  Assessment & Plan  On statin          VTE Pharmacologic Prophylaxis:   Pharmacologic: Pharmacologic VTE Prophylaxis contraindicated due to Hematuria  Mechanical VTE Prophylaxis in Place: Yes    Patient Centered Rounds: I have performed bedside rounds with nursing staff today  Discussions with Specialists or Other Care Team Provider:  Urology    Education and Discussions with Family / Patient:  Discussed with patient    Time Spent for Care: 45 minutes  More than 50% of total time spent on counseling and coordination of care as described above  Current Length of Stay: 0 day(s)    Current Patient Status:  Observation  Certification Statement: The patient will continue to require additional inpatient hospital stay due to Monitoring of hematuria    Discharge Plan:  Home when cleared by Urology    Code Status: Level 1 - Full Code      Subjective:   Hematuria and clot recurred overnight  Now back on CBI  Denies any pain    Denies chest pain shortness of breath dizziness or palpitation  Denies any other problems  Reported that he discussed with urologist earlier today and considering bilateral nephrostomy and cystoscopy    Objective:     Vitals:   Temp (24hrs), Av 7 °F (37 1 °C), Min:98 3 °F (36 8 °C), Max:98 9 °F (37 2 °C)    Temp:  [98 3 °F (36 8 °C)-98 9 °F (37 2 °C)] 98 3 °F (36 8 °C)  HR:  [68-81] 81  Resp:  [16-18] 16  BP: (106-126)/(58-77) 106/60  SpO2:  [91 %-95 %] 91 %  Body mass index is 28 75 kg/m²  Input and Output Summary (last 24 hours): Intake/Output Summary (Last 24 hours) at 2021 1101  Last data filed at 2021 0601  Gross per 24 hour   Intake --   Output 1680 ml   Net -1680 ml       Physical Exam:     Physical Exam  Constitutional:       General: He is not in acute distress    HENT:      Head: Normocephalic and atraumatic  Neck:      Musculoskeletal: Neck supple  Cardiovascular:      Rate and Rhythm: Normal rate  Pulmonary:      Effort: Pulmonary effort is normal  No respiratory distress  Breath sounds: No wheezing, rhonchi or rales  Chest:      Chest wall: No tenderness  Abdominal:      General: Bowel sounds are normal  There is no distension  Palpations: Abdomen is soft  Tenderness: There is no abdominal tenderness  There is no guarding or rebound  Genitourinary:     Comments: CBI, clear at present  Skin:     General: Skin is warm and dry  Findings: No rash  Neurological:      Mental Status: He is alert  Mental status is at baseline  Cranial Nerves: No cranial nerve deficit  Additional Data:     Labs:    Results from last 7 days   Lab Units 05/06/21  0556  05/04/21  0916   WBC Thousand/uL 9 27   < > 11 19*   HEMOGLOBIN g/dL 8 5*   < > 10 5*   HEMATOCRIT % 27 0*   < > 33 1*   PLATELETS Thousands/uL 206   < > 258   NEUTROS PCT %  --   --  72   LYMPHS PCT %  --   --  16   MONOS PCT %  --   --  8   EOS PCT %  --   --  3    < > = values in this interval not displayed  Results from last 7 days   Lab Units 05/06/21  0556   POTASSIUM mmol/L 4 0   CHLORIDE mmol/L 104   CO2 mmol/L 29   BUN mg/dL 27*   CREATININE mg/dL 1 38*   CALCIUM mg/dL 8 3     Results from last 7 days   Lab Units 05/04/21  0916   INR  1 12       * I Have Reviewed All Lab Data Listed Above  * Additional Pertinent Lab Tests Reviewed:  All Labs Within Last 24 Hours Reviewed      Imaging:  Imaging Reports Reviewed Today Include:  none    Recent Cultures (last 7 days):     Results from last 7 days   Lab Units 05/04/21  0916   URINE CULTURE  No Growth <1000 cfu/mL       Last 24 Hours Medication List:   Current Facility-Administered Medications   Medication Dose Route Frequency Provider Last Rate    acetaminophen  650 mg Oral Q6H PRN Jeanette De La Torre MD      atorvastatin  20 mg Oral HS [facility] Artem Araujo MD      belladonna-opium  15 mg Rectal Q6H PRN Andrea Davis MD      fluconazole  200 mg Oral Daily Ning Catherine MD      fluticasone-vilanterol  1 puff Inhalation Daily Andrea Davis MD      folic acid  1 mg Oral Daily Andrea Davis MD      ipratropium-albuterol  3 mL Nebulization 4x Daily PRN Andrea Davis MD      methotrexate  2 5 mg Oral Weekly Andrea Davis MD      metoprolol tartrate  25 mg Oral Q12H Surekha Anderson MD            Today, Patient Was Seen By: Andrea Davis MD    ** Please Note: "This note has been constructed using a voice recognition system  Therefore there may be syntax, spelling, and/or grammatical errors   Please call if you have any questions  "**

## 2021-05-06 NOTE — PLAN OF CARE
Problem: Potential for Falls  Goal: Patient will remain free of falls  Description: INTERVENTIONS:  - Assess patient frequently for physical needs  -  Identify cognitive and physical deficits and behaviors that affect risk of falls    -  [place] fall precautions as indicated by assessment   - Educate patient/family on patient safety including physical limitations  - Instruct patient to call for assistance with activity based on assessment  - Modify environment to reduce risk of injury  - Consider OT/PT consult to assist with strengthening/mobility  Outcome: Progressing     Problem: RESPIRATORY - ADULT  Goal: Achieves optimal ventilation and oxygenation  Description: INTERVENTIONS:  - Assess for changes in respiratory status  - Assess for changes in mentation and behavior  - Position to facilitate oxygenation and minimize respiratory effort  - Oxygen administered by appropriate delivery if ordered  - Initiate smoking cessation education as indicated  - Encourage broncho-pulmonary hygiene including cough, deep breathe, Incentive Spirometry  - Assess the need for suctioning and aspirate as needed  - Assess and instruct to report SOB or any respiratory difficulty  - Respiratory Therapy support as indicated  Outcome: Progressing     Problem: GENITOURINARY - ADULT  Goal: Absence of urinary retention  Description: INTERVENTIONS:  - Assess patients ability to void and empty bladder  - Monitor I/O  - Bladder scan as needed  - Discuss with physician/AP medications to alleviate retention as needed  - Discuss catheterization for long term situations as appropriate  Outcome: Progressing  Goal: Urinary catheter remains patent  Description: INTERVENTIONS:  - Assess patency of urinary catheter  - If patient has a chronic alanis, consider changing catheter if non-functioning  - Follow guidelines for intermittent irrigation of non-functioning urinary catheter  Outcome: Progressing

## 2021-05-06 NOTE — QUICK NOTE
Discussion with patient and wife at bedside just now in light of continued gross hematuria from unresolved radiation cystitis willing to undergo bilateral nephrostomy tube drainage at this time will reach out to IR consult to see a can be done today since patient is NPO otherwise hopefully tomorrow patient will continue on bladder irrigation at this time after is not clear by Saturday return to the OR for cysto repeat fulguration  Patient states now that he will undergo hiyperbolic oxygen despite being claustrophobic

## 2021-05-06 NOTE — PROGRESS NOTES
Progress Note - Urology      Patient: Maryjane Marmolejo   : 1945 Sex: male   MRN: [id number]     CSN: [id number]  Unit/Bed#: [address] [place] [address]     SUBJECTIVE:   Hand irrigated last night  Multiple clots  Started back on CBI by myself now clear      Objective   Vitals: /60   Pulse 81   Temp 98 3 °F (36 8 °C)   Resp 16   Ht 5' 11" (1 803 m)   Wt 93 5 kg (206 lb 2 1 oz)   SpO2 93%   BMI 28 75 kg/m²     I/O last 24 hours:   In: -   Out:  [Urine:]      Physical Exam:   General Alert awake   Normocephalic atraumatic PERRLA  Lungs clear bilaterally  Cardiac normal S1 normal S2  Abdomen soft, flank pain  Extremities no edema      Lab Results: CBC:   Lab Results   Component Value Date    WBC 9 27 2021    HGB 8 5 (L) 2021    HCT 27 0 (L) 2021    MCV 96 2021     2021    MCH 30 4 2021    MCHC 31 5 2021    RDW 14 6 2021    MPV 9 5 2021    NRBC 0 2021     CMP:   Lab Results   Component Value Date     2021    CO2 29 2021    BUN 27 (H) 2021    CREATININE 1 38 (H) 2021    CALCIUM 8 3 2021    AST 19 2021    ALT 31 2021    ALKPHOS 89 2021    EGFR 49 2021     Urinalysis:   Lab Results   Component Value Date    COLORU Red 2021    CLARITYU Cloudy 2021    SPECGRAV 1 020 2021    PHUR 7 5 2021    PHUR 5 5 2018    LEUKOCYTESUR Negative 2021    NITRITE Negative 2021    GLUCOSEU Negative 2021    KETONESU Negative 2021    BILIRUBINUR Negative 2021    BLOODU Large (A) 2021     Urine Culture:   Lab Results   Component Value Date    URINECX No Growth <1000 cfu/mL 2021     PSA: No results found for: PSA      Assessment/ Plan:  Gross hematuria  Radiation cystitis  Hand irrigated a few clots but back on CBI  Discussed with patient feel IR consult warranted for possible bilateral nephrostomy tube placement  Will have discussion with wife this afternoon about nephrostomy tubes          Tania Rosales MD

## 2021-05-07 ENCOUNTER — ANESTHESIA (INPATIENT)
Dept: NON INVASIVE DIAGNOSTICS | Facility: HOSPITAL | Age: 76
DRG: 699 | End: 2021-05-07
Payer: MEDICARE

## 2021-05-07 LAB
ANION GAP SERPL CALCULATED.3IONS-SCNC: 8 MMOL/L (ref 4–13)
BUN SERPL-MCNC: 23 MG/DL (ref 5–25)
CALCIUM SERPL-MCNC: 8.7 MG/DL (ref 8.3–10.1)
CHLORIDE SERPL-SCNC: 103 MMOL/L (ref 100–108)
CO2 SERPL-SCNC: 29 MMOL/L (ref 21–32)
CREAT SERPL-MCNC: 1.3 MG/DL (ref 0.6–1.3)
ERYTHROCYTE [DISTWIDTH] IN BLOOD BY AUTOMATED COUNT: 14.5 % (ref 11.6–15.1)
GFR SERPL CREATININE-BSD FRML MDRD: 53 ML/MIN/1.73SQ M
GLUCOSE SERPL-MCNC: 101 MG/DL (ref 65–140)
HCT VFR BLD AUTO: 27.5 % (ref 36.5–49.3)
HGB BLD-MCNC: 8.6 G/DL (ref 12–17)
MCH RBC QN AUTO: 29.6 PG (ref 26.8–34.3)
MCHC RBC AUTO-ENTMCNC: 31.3 G/DL (ref 31.4–37.4)
MCV RBC AUTO: 95 FL (ref 82–98)
PLATELET # BLD AUTO: 230 THOUSANDS/UL (ref 149–390)
PMV BLD AUTO: 9.9 FL (ref 8.9–12.7)
POTASSIUM SERPL-SCNC: 4.1 MMOL/L (ref 3.5–5.3)
RBC # BLD AUTO: 2.91 MILLION/UL (ref 3.88–5.62)
SODIUM SERPL-SCNC: 140 MMOL/L (ref 136–145)
WBC # BLD AUTO: 8.98 THOUSAND/UL (ref 4.31–10.16)

## 2021-05-07 PROCEDURE — 94760 N-INVAS EAR/PLS OXIMETRY 1: CPT

## 2021-05-07 PROCEDURE — 80048 BASIC METABOLIC PNL TOTAL CA: CPT | Performed by: INTERNAL MEDICINE

## 2021-05-07 PROCEDURE — 99232 SBSQ HOSP IP/OBS MODERATE 35: CPT | Performed by: INTERNAL MEDICINE

## 2021-05-07 PROCEDURE — C1894 INTRO/SHEATH, NON-LASER: HCPCS

## 2021-05-07 PROCEDURE — 97162 PT EVAL MOD COMPLEX 30 MIN: CPT

## 2021-05-07 PROCEDURE — 97530 THERAPEUTIC ACTIVITIES: CPT

## 2021-05-07 PROCEDURE — 50432 PLMT NEPHROSTOMY CATHETER: CPT | Performed by: RADIOLOGY

## 2021-05-07 PROCEDURE — 0T9130Z DRAINAGE OF LEFT KIDNEY WITH DRAINAGE DEVICE, PERCUTANEOUS APPROACH: ICD-10-PCS | Performed by: RADIOLOGY

## 2021-05-07 PROCEDURE — 85027 COMPLETE CBC AUTOMATED: CPT | Performed by: INTERNAL MEDICINE

## 2021-05-07 PROCEDURE — 50432 PLMT NEPHROSTOMY CATHETER: CPT

## 2021-05-07 PROCEDURE — 94640 AIRWAY INHALATION TREATMENT: CPT

## 2021-05-07 PROCEDURE — 0T9030Z DRAINAGE OF RIGHT KIDNEY WITH DRAINAGE DEVICE, PERCUTANEOUS APPROACH: ICD-10-PCS | Performed by: RADIOLOGY

## 2021-05-07 PROCEDURE — C1729 CATH, DRAINAGE: HCPCS

## 2021-05-07 RX ORDER — NEOSTIGMINE METHYLSULFATE 1 MG/ML
INJECTION INTRAVENOUS AS NEEDED
Status: DISCONTINUED | OUTPATIENT
Start: 2021-05-07 | End: 2021-05-07

## 2021-05-07 RX ORDER — GLYCOPYRROLATE 0.2 MG/ML
INJECTION INTRAMUSCULAR; INTRAVENOUS AS NEEDED
Status: DISCONTINUED | OUTPATIENT
Start: 2021-05-07 | End: 2021-05-07

## 2021-05-07 RX ORDER — SODIUM CHLORIDE 9 MG/ML
INJECTION, SOLUTION INTRAVENOUS CONTINUOUS PRN
Status: DISCONTINUED | OUTPATIENT
Start: 2021-05-07 | End: 2021-05-07

## 2021-05-07 RX ORDER — DEXAMETHASONE SODIUM PHOSPHATE 4 MG/ML
INJECTION, SOLUTION INTRA-ARTICULAR; INTRALESIONAL; INTRAMUSCULAR; INTRAVENOUS; SOFT TISSUE AS NEEDED
Status: DISCONTINUED | OUTPATIENT
Start: 2021-05-07 | End: 2021-05-07

## 2021-05-07 RX ORDER — PROPOFOL 10 MG/ML
INJECTION, EMULSION INTRAVENOUS AS NEEDED
Status: DISCONTINUED | OUTPATIENT
Start: 2021-05-07 | End: 2021-05-07

## 2021-05-07 RX ORDER — SUCCINYLCHOLINE/SOD CL,ISO/PF 100 MG/5ML
SYRINGE (ML) INTRAVENOUS AS NEEDED
Status: DISCONTINUED | OUTPATIENT
Start: 2021-05-07 | End: 2021-05-07

## 2021-05-07 RX ORDER — CEFAZOLIN SODIUM 2 G/50ML
SOLUTION INTRAVENOUS AS NEEDED
Status: DISCONTINUED | OUTPATIENT
Start: 2021-05-07 | End: 2021-05-07

## 2021-05-07 RX ORDER — ONDANSETRON 2 MG/ML
INJECTION INTRAMUSCULAR; INTRAVENOUS AS NEEDED
Status: DISCONTINUED | OUTPATIENT
Start: 2021-05-07 | End: 2021-05-07

## 2021-05-07 RX ORDER — ROCURONIUM BROMIDE 10 MG/ML
INJECTION, SOLUTION INTRAVENOUS AS NEEDED
Status: DISCONTINUED | OUTPATIENT
Start: 2021-05-07 | End: 2021-05-07

## 2021-05-07 RX ORDER — FENTANYL CITRATE 50 UG/ML
INJECTION, SOLUTION INTRAMUSCULAR; INTRAVENOUS AS NEEDED
Status: DISCONTINUED | OUTPATIENT
Start: 2021-05-07 | End: 2021-05-07

## 2021-05-07 RX ADMIN — CEFAZOLIN SODIUM 2000 MG: 2 SOLUTION INTRAVENOUS at 11:15

## 2021-05-07 RX ADMIN — FENTANYL CITRATE 50 MCG: 50 INJECTION, SOLUTION INTRAMUSCULAR; INTRAVENOUS at 12:12

## 2021-05-07 RX ADMIN — PHENYLEPHRINE HYDROCHLORIDE 50 MCG/MIN: 10 INJECTION INTRAVENOUS at 11:43

## 2021-05-07 RX ADMIN — ATORVASTATIN CALCIUM 20 MG: 20 TABLET, FILM COATED ORAL at 21:31

## 2021-05-07 RX ADMIN — IOHEXOL 22 ML: 350 INJECTION, SOLUTION INTRAVENOUS at 12:14

## 2021-05-07 RX ADMIN — ROCURONIUM BROMIDE 5 MG: 10 INJECTION, SOLUTION INTRAVENOUS at 11:12

## 2021-05-07 RX ADMIN — PHENYLEPHRINE HYDROCHLORIDE 100 MCG: 10 INJECTION INTRAVENOUS at 11:27

## 2021-05-07 RX ADMIN — IPRATROPIUM BROMIDE AND ALBUTEROL SULFATE 3 ML: 2.5; .5 SOLUTION RESPIRATORY (INHALATION) at 08:03

## 2021-05-07 RX ADMIN — PROPOFOL 200 MG: 10 INJECTION, EMULSION INTRAVENOUS at 11:12

## 2021-05-07 RX ADMIN — GLYCOPYRROLATE 0.4 MG: 0.2 INJECTION, SOLUTION INTRAMUSCULAR; INTRAVENOUS at 12:16

## 2021-05-07 RX ADMIN — NEOSTIGMINE METHYLSULFATE 3 MG: 1 INJECTION INTRAVENOUS at 12:16

## 2021-05-07 RX ADMIN — FLUCONAZOLE 200 MG: 100 TABLET ORAL at 08:44

## 2021-05-07 RX ADMIN — FENTANYL CITRATE 50 MCG: 50 INJECTION, SOLUTION INTRAMUSCULAR; INTRAVENOUS at 11:12

## 2021-05-07 RX ADMIN — ROCURONIUM BROMIDE 45 MG: 10 INJECTION, SOLUTION INTRAVENOUS at 11:25

## 2021-05-07 RX ADMIN — DEXAMETHASONE SODIUM PHOSPHATE 4 MG: 4 INJECTION, SOLUTION INTRA-ARTICULAR; INTRALESIONAL; INTRAMUSCULAR; INTRAVENOUS; SOFT TISSUE at 11:38

## 2021-05-07 RX ADMIN — SODIUM CHLORIDE: 0.9 INJECTION, SOLUTION INTRAVENOUS at 11:05

## 2021-05-07 RX ADMIN — ONDANSETRON 4 MG: 2 INJECTION INTRAMUSCULAR; INTRAVENOUS at 11:38

## 2021-05-07 RX ADMIN — Medication 100 MG: at 11:12

## 2021-05-07 RX ADMIN — PHENYLEPHRINE HYDROCHLORIDE 100 MCG: 10 INJECTION INTRAVENOUS at 11:37

## 2021-05-07 RX ADMIN — FOLIC ACID 1 MG: 1 TABLET ORAL at 08:44

## 2021-05-07 RX ADMIN — FLUTICASONE FUROATE AND VILANTEROL TRIFENATATE 1 PUFF: 100; 25 POWDER RESPIRATORY (INHALATION) at 21:30

## 2021-05-07 NOTE — BRIEF OP NOTE (RAD/CATH)
INTERVENTIONAL RADIOLOGY PROCEDURE NOTE    Date: 5/7/2021    Procedure: IR NEPHROSTOMY TUBE PLACEMENT    Preoperative diagnosis:   1  Hematuria, unspecified type    2  Gross hematuria         Postoperative diagnosis: Same  Surgeon: Freya Hendrickson MD     Assistant: None  No qualified resident was available  Blood loss: 1 mL    Specimens: None     Findings: Successful bilateral PCN placement    Complications: None immediate      Anesthesia: local and general

## 2021-05-07 NOTE — PHYSICAL THERAPY NOTE
PT EVALUATION       05/07/21 0910   Note Type   Note type Evaluation   Pain Assessment   Pain Assessment Tool Pain Assessment not indicated - pt denies pain   Home Living   Type of [address] One level  (6 ALLIE)   Home Equipment   (none)   Prior Function   Level of [place] Independent with ADLs and functional mobility   Lives With Spouse   ADL Assistance Independent   Restrictions/Precautions   Other Precautions   (CBI)   General   Additional Pertinent History Pt has been admitted 3 x over the past few weeks for gross hematuria  Pt now on CBI and feeling a little weak from being in bed for a few days  Cognition   Overall Cognitive Status WFL   RUE Assessment   RUE Assessment WFL   LUE Assessment   LUE Assessment WFL   RLE Assessment   RLE Assessment WFL   LLE Assessment   LLE Assessment WFL   Bed Mobility   Supine to Sit 5  Supervision   Sit to Supine 5  Supervision   Transfers   Sit to Stand 5  Supervision   Stand to Sit 5  Supervision   Stand pivot 5  Supervision   Ambulation/Elevation   Gait Assistance 5  Supervision   Assistive Device Rolling walker   Distance 50 feet   Balance   Static Sitting Good   Dynamic Sitting Good   Static Standing Good   Dynamic Standing Fair +   Ambulatory Fair +   Activity Tolerance   Activity Tolerance Patient tolerated treatment well   Assessment   Prognosis Good   Problem List Decreased mobility   Assessment Patient seen for Physical Therapy evaluation  Patient admitted with Gross hematuria  Comorbidities affecting patient's physical performance include: anemia, htn, RA  Personal factors affecting patient at time of initial evaluation include: stairs to enter home  Prior to admission, patient was independent with functional mobility without assistive device and independent with ADLS    Please find objective findings from Physical Therapy assessment regarding body systems outlined above with impairments and limitations including decreased activity tolerance and decreased functional mobility tolerance  The Barthel Index was used as a functional outcome tool presenting with a score of 80 today indicating moderate limitations of functional mobility and ADLS  Patient's clinical presentation is currently evolving as seen in patient's presentation of new onset of impairment of functional mobility  Pt would benefit from continued Physical Therapy treatment to address deficits as defined above and maximize level of functional mobility  As demonstrated by objective findings, the assigned level of complexity for this evaluation is moderate  The patient's AM-PAC Basic Mobility Inpatient Short Form Raw Score is 23, Standardized Score is 50  88  A standardized score greater than 42 9 suggests the patient may benefit from discharge to home  Goals   Patient Goals "no more bleeding"   STG Expiration Date 05/14/21   Short Term Goal #1 Independent ambulation with least restrictive device indoor level surfaces 200 ft, independent up and down 6 steps with a railing so patient can enter his home   LTG Expiration Date 05/21/21   Long Term Goal #1 Independent ambulation outdoor surfaces without an assistive device 300 ft with steady gait   Plan   Treatment/Interventions ADL retraining;Functional transfer training;LE strengthening/ROM; Therapeutic exercise; Endurance training;Gait training;Bed mobility; Equipment eval/education;Patient/family training;Elevations   PT Frequency 5x/wk   Recommendation   PT Discharge Recommendation No rehabilitation needs   Equipment Recommended Other (Comment)  (pt requesting a cane)   AM-PAC Basic Mobility Inpatient   Turning in Bed Without Bedrails 4   Lying on Back to Sitting on Edge of Flat Bed 4   Moving Bed to Chair 4   Standing Up From Chair 4   Walk in Room 4   Climb 3-5 Stairs 3   Basic Mobility Inpatient Raw Score 23   Basic Mobility Standardized Score 50 88   Barthel Index   Feeding 10   Bathing 0   Grooming Score 5   Dressing Score 10 Bladder Score 0   Bowels Score 10   Toilet Use Score 10   Transfers (Bed/Chair) Score 15   Mobility (Level Surface) Score 10   Stairs Score 5   Barthel Index Score 76   Licensure   [place] License Number  Neeta Antonio PT  [id number]       Time In:0900  Time Out:0910  Total Time:10       S:  "It feels good to move around"   O:  Supervision ambulation with rolling walker 200 feet  A:  Pt tolerated activity well  Gait is steady with a walker  Plan to try with a cane or no device next session as pt is normally independent without a device    P:  Continue PT    Larisa Santos PT

## 2021-05-07 NOTE — CASE MANAGEMENT
LOS - 1 day    Readmission  CPR2     SW met with pt to assess needs and discuss plans  Discussed goals of making sure pt's needs are met upon discharge and that Freedom of Choice is offered  Pt is a 30 day readmission  Admissions are related  Explored with pt factors that may have led to readmission  Pt continues with ongoing bladder issues  Pt lives with his wife in an apartment  Pt is independent with ADLs and mobility, driving  No has no DME at home  No HHC/STR history  No MH or D&A issues  Pt's PCP is Dr Roque Walton MD   Per pt he has prescription coverage and has no difficulty getting his medication as prescribed  Preferred pharmacy is [facility]  Pt does not have POA/advanced directives  Offered information on and assistance with completing advanced directive  Pt declined at this time  Discussed discharge plans and needs with pt  Pt's plan is to return home when discharged  Offered home care services  Pt is open to services now that he has the bilateral nephrostomy tubes  Discussed agency options with pt  Pt expressed no specific preference  Referral will be made to [facility]  Pt's wife to transport home when ready  SW will continue to follow to monitor progress and assist with transition home when ready

## 2021-05-07 NOTE — PROGRESS NOTES
[facility] Progress Note  Patient: Vanesa Ahmadi 66 y o  male   MRN: [id number]  PCP: Piotr Nur MD  Unit/Bed#: [address] [place] [address] Encounter: [id number]  Date Of Visit: 05/07/21    Problem List:    Principal Problem:    Gross hematuria  Active Problems:    Anemia    SHANTI (acute kidney injury) ([facility] )    Essential hypertension    Chronic obstructive pulmonary disease (HCC)    RA (rheumatoid arthritis) ([facility] )    Dyslipidemia      Assessment & Plan:    * Gross hematuria  Assessment & Plan  Presented with recurrence of gross hematuria associated with lower abdominal discomfort, bladder spasm and decreased output from the catheter  Recent multiple hospitalization requiring cystoscopy clot evacuation extensive fulguration for radiation cystitis  Improved initially on CBI but hematuria and clot recurred after stopping CBI, CBI resume  Hemoglobin is lower but stable  Patient denies any fever chills or flank pain  Urology following, input appreciated  · Continue CBI  · Monitor labs  · Monitor for hematuria  · Urology input noted    Plan for bilateral nephrostomy today due to persistent/recurrent hematuria  · May require cystoscopy if hematuria persist of CBI after nephrostomy  · Patient is considering hyperbaric oxygen    SHANTI (acute kidney injury) ([facility] )  Assessment & Plan  Baseline creatinine appears to be 1 2-1 3  Creatinine 1 5 on presentation  Likely secondary to urinary retention  Improved  · Continue CBI  · Monitor intake output  · Avoid nephrotoxin  · Will monitor off IV fluid    Anemia  Assessment & Plan  Mild in setting of recurrent hematuria  Somewhat lower than baseline but stable, likely combination of hematuria and dilutional component  Continue to monitor    Leukocytosis-resolved as of 5/6/2021  Assessment & Plan  Reactive  Monitor    RA (rheumatoid arthritis) ([facility])  Assessment & Plan  On methotrexate and folic acid    Chronic obstructive pulmonary disease ([facility])  Assessment & Plan  Ex-smoker, quit 2 months ago  On Advair and albuterol as needed at home  Substitute with [facility]  Continue albuterol as needed    Essential hypertension  Assessment & Plan  Uncontrolled on presentation due to pain  Now improved  Continue metoprolol with holding parameters    Dyslipidemia  Assessment & Plan  On statin          VTE Pharmacologic Prophylaxis:   Pharmacologic: Pharmacologic VTE Prophylaxis contraindicated due to Hematuria  Mechanical VTE Prophylaxis in Place: Yes    Patient Centered Rounds: I have performed bedside rounds with nursing staff today  Discussions with Specialists or Other Care Team Provider:  Urology    Education and Discussions with Family / Patient:  Discussed with patient    Time Spent for Care: 45 minutes  More than 50% of total time spent on counseling and coordination of care as described above  Current Length of Stay: 1 day(s)    Current Patient Status:  Observation  Certification Statement: The patient will continue to require additional inpatient hospital stay due to Monitoring of hematuria    Discharge Plan:  Home when cleared by Urology    Code Status: Level 1 - Full Code      Subjective:   Scheduled for bilateral nephrostomy today  Denies any abdominal pain or bladder spasm  Urine remains clear son CBI  Denies any chest pain shortness of breath or abdominal pain  Denies any fever chills or flank pain    Objective:     Vitals:   Temp (24hrs), Av 4 °F (36 9 °C), Min:98 3 °F (36 8 °C), Max:98 6 °F (37 °C)    Temp:  [98 3 °F (36 8 °C)-98 6 °F (37 °C)] 98 4 °F (36 9 °C)  HR:  [68-81] 71  Resp:  [16-20] 20  BP: (105-110)/(57-62) 105/57  SpO2:  [91 %-95 %] 93 %  Body mass index is 28 75 kg/m²  Input and Output Summary (last 24 hours):        Intake/Output Summary (Last 24 hours) at 2021 0023  Last data filed at 2021 2352  Gross per 24 hour   Intake --   Output 73296 ml   Net -02856 ml       Physical Exam:     Physical Exam  Constitutional:       General: He is not in acute distress  HENT:      Head: Normocephalic and atraumatic  Neck:      Musculoskeletal: Neck supple  Cardiovascular:      Rate and Rhythm: Normal rate  Pulmonary:      Effort: Pulmonary effort is normal  No respiratory distress  Breath sounds: No wheezing, rhonchi or rales  Chest:      Chest wall: No tenderness  Abdominal:      General: Bowel sounds are normal  There is no distension  Palpations: Abdomen is soft  Tenderness: There is no abdominal tenderness  There is no guarding or rebound  Genitourinary:     Comments: CBI, clear  Skin:     General: Skin is warm and dry  Findings: No rash  Neurological:      Mental Status: He is alert  Mental status is at baseline  Cranial Nerves: No cranial nerve deficit  Additional Data:     Labs:    Results from last 7 days   Lab Units 05/06/21  0556  05/04/21  0916   WBC Thousand/uL 9 27   < > 11 19*   HEMOGLOBIN g/dL 8 5*   < > 10 5*   HEMATOCRIT % 27 0*   < > 33 1*   PLATELETS Thousands/uL 206   < > 258   NEUTROS PCT %  --   --  72   LYMPHS PCT %  --   --  16   MONOS PCT %  --   --  8   EOS PCT %  --   --  3    < > = values in this interval not displayed  Results from last 7 days   Lab Units 05/06/21  0556   POTASSIUM mmol/L 4 0   CHLORIDE mmol/L 104   CO2 mmol/L 29   BUN mg/dL 27*   CREATININE mg/dL 1 38*   CALCIUM mg/dL 8 3     Results from last 7 days   Lab Units 05/04/21  0916   INR  1 12       * I Have Reviewed All Lab Data Listed Above  * Additional Pertinent Lab Tests Reviewed:  All Labs Within Last 24 Hours Reviewed      Imaging:  Imaging Reports Reviewed Today Include:  none    Recent Cultures (last 7 days):     Results from last 7 days   Lab Units 05/04/21  0916   URINE CULTURE  No Growth <1000 cfu/mL       Last 24 Hours Medication List:   Current Facility-Administered Medications   Medication Dose Route Frequency Provider Last Rate    acetaminophen  650 mg Oral Q6H PRN Maxine Gracia MD      atorvastatin  20 mg Oral HS Erik Renee MD      belladonna-opium  15 mg Rectal Q6H PRN Erik Renee MD      fluconazole  200 mg Oral Daily Sean Sunshine MD      fluticasone-vilanterol  1 puff Inhalation Daily Erik Renee MD      folic acid  1 mg Oral Daily Erik Renee MD      ipratropium-albuterol  3 mL Nebulization 4x Daily PRN Erik Renee MD      methotrexate  2 5 mg Oral Weekly Erik Renee MD      metoprolol tartrate  25 mg Oral Q12H Edgar Simon MD            Today, Patient Was Seen By: Erik Renee MD    ** Please Note: "This note has been constructed using a voice recognition system  Therefore there may be syntax, spelling, and/or grammatical errors   Please call if you have any questions  "**

## 2021-05-07 NOTE — PLAN OF CARE
Problem: Potential for Falls  Goal: Patient will remain free of falls  Description: INTERVENTIONS:  - Assess patient frequently for physical needs  -  Identify cognitive and physical deficits and behaviors that affect risk of falls    -  [place] fall precautions as indicated by assessment   - Educate patient/family on patient safety including physical limitations  - Instruct patient to call for assistance with activity based on assessment  - Modify environment to reduce risk of injury  - Consider OT/PT consult to assist with strengthening/mobility  Outcome: Progressing     Problem: RESPIRATORY - ADULT  Goal: Achieves optimal ventilation and oxygenation  Description: INTERVENTIONS:  - Assess for changes in respiratory status  - Assess for changes in mentation and behavior  - Position to facilitate oxygenation and minimize respiratory effort  - Oxygen administered by appropriate delivery if ordered  - Initiate smoking cessation education as indicated  - Encourage broncho-pulmonary hygiene including cough, deep breathe, Incentive Spirometry  - Assess the need for suctioning and aspirate as needed  - Assess and instruct to report SOB or any respiratory difficulty  - Respiratory Therapy support as indicated  Outcome: Progressing     Problem: GENITOURINARY - ADULT  Goal: Absence of urinary retention  Description: INTERVENTIONS:  - Assess patients ability to void and empty bladder  - Monitor I/O  - Bladder scan as needed  - Discuss with physician/AP medications to alleviate retention as needed  - Discuss catheterization for long term situations as appropriate  Outcome: Progressing  Goal: Urinary catheter remains patent  Description: INTERVENTIONS:  - Assess patency of urinary catheter  - If patient has a chronic alanis, consider changing catheter if non-functioning  - Follow guidelines for intermittent irrigation of non-functioning urinary catheter  Outcome: Progressing

## 2021-05-07 NOTE — CASE MANAGEMENT
SW met with pt and dtr to issue IMM#2  Pt gave verbal understanding, signed, and declined a copy  Original placed in bin for scanning

## 2021-05-07 NOTE — PLAN OF CARE
Problem: Potential for Falls  Goal: Patient will remain free of falls  Description: INTERVENTIONS:  - Assess patient frequently for physical needs  -  Identify cognitive and physical deficits and behaviors that affect risk of falls    -  [place] fall precautions as indicated by assessment   - Educate patient/family on patient safety including physical limitations  - Instruct patient to call for assistance with activity based on assessment  - Modify environment to reduce risk of injury  - Consider OT/PT consult to assist with strengthening/mobility  Outcome: Progressing     Problem: RESPIRATORY - ADULT  Goal: Achieves optimal ventilation and oxygenation  Description: INTERVENTIONS:  - Assess for changes in respiratory status  - Assess for changes in mentation and behavior  - Position to facilitate oxygenation and minimize respiratory effort  - Oxygen administered by appropriate delivery if ordered  - Initiate smoking cessation education as indicated  - Encourage broncho-pulmonary hygiene including cough, deep breathe, Incentive Spirometry  - Assess the need for suctioning and aspirate as needed  - Assess and instruct to report SOB or any respiratory difficulty  - Respiratory Therapy support as indicated  Outcome: Progressing     Problem: GENITOURINARY - ADULT  Goal: Absence of urinary retention  Description: INTERVENTIONS:  - Assess patients ability to void and empty bladder  - Monitor I/O  - Bladder scan as needed  - Discuss with physician/AP medications to alleviate retention as needed  - Discuss catheterization for long term situations as appropriate  Outcome: Progressing  Goal: Urinary catheter remains patent  Description: INTERVENTIONS:  - Assess patency of urinary catheter  - If patient has a chronic alanis, consider changing catheter if non-functioning  - Follow guidelines for intermittent irrigation of non-functioning urinary catheter  Outcome: Progressing

## 2021-05-07 NOTE — PROGRESS NOTES
Progress Note - Urology      Patient: Adelaide Bowling   : 1945 Sex: male   MRN: [id number]     CSN: [id number]  Unit/Bed#: [address] [place] [address]     SUBJECTIVE:   NPO  For cysto bilateral nephrostomy today      Objective   Vitals: /73   Pulse 77   Temp 98 4 °F (36 9 °C)   Resp 18   Ht 5' 11" (1 803 m)   Wt 93 5 kg (206 lb 2 1 oz)   SpO2 93%   BMI 28 75 kg/m²     I/O last 24 hours:   In: -   Out: 49656 [Urine:38482]      Physical Exam:   General Alert awake   Normocephalic atraumatic PERRLA  Lungs clear bilaterally  Cardiac normal S1 normal S2  Abdomen soft, flank pain  Extremities no edema      Lab Results: CBC:   Lab Results   Component Value Date    WBC 8 98 2021    HGB 8 6 (L) 2021    HCT 27 5 (L) 2021    MCV 95 2021     2021    MCH 29 6 2021    MCHC 31 3 (L) 2021    RDW 14 5 2021    MPV 9 9 2021    NRBC 0 2021     CMP:   Lab Results   Component Value Date     2021    CO2 29 2021    BUN 23 2021    CREATININE 1 30 2021    CALCIUM 8 7 2021    AST 19 2021    ALT 31 2021    ALKPHOS 89 2021    EGFR 53 2021     Urinalysis:   Lab Results   Component Value Date    COLORU Red 2021    CLARITYU Cloudy 2021    SPECGRAV 1 020 2021    PHUR 7 5 2021    PHUR 5 5 2018    LEUKOCYTESUR Negative 2021    NITRITE Negative 2021    GLUCOSEU Negative 2021    KETONESU Negative 2021    BILIRUBINUR Negative 2021    BLOODU Large (A) 2021     Urine Culture:   Lab Results   Component Value Date    URINECX No Growth <1000 cfu/mL 2021     PSA: No results found for: PSA      Assessment/ Plan:  Radiation cystitis  Gross hematuria  Patient NPO for cysto bilateral nephrostomies today          Kathy Fowler MD

## 2021-05-07 NOTE — ANESTHESIA PREPROCEDURE EVALUATION
Procedure:  IR NEPHROSTOMY TUBE PLACEMENT    Relevant Problems   No relevant active problems        Physical Exam    Airway    Mallampati score: II  TM Distance: >3 FB  Neck ROM: full     Dental   No notable dental hx     Cardiovascular  Cardiovascular exam normal    Pulmonary  Pulmonary exam normal     Other Findings        Anesthesia Plan  ASA Score- 2     Anesthesia Type- general with ASA Monitors  Additional Monitors:   Airway Plan: ETT  Plan Factors-Exercise tolerance (METS): >4 METS  Chart reviewed  EKG reviewed  Imaging results reviewed  Existing labs reviewed  Patient summary reviewed  Patient is not a current smoker  Induction- intravenous  Postoperative Plan- Plan for postoperative opioid use  Informed Consent- Anesthetic plan and risks discussed with patient  I personally reviewed this patient with the CRNA  Discussed and agreed on the Anesthesia Plan with the CRNA  Helene Lara

## 2021-05-07 NOTE — ANESTHESIA POSTPROCEDURE EVALUATION
Post-Op Assessment Note    CV Status:  Stable  Pain Score: 0    Pain management: adequate     Mental Status:  Alert   Hydration Status:  Stable   PONV Controlled:  None   Airway Patency:  Patent   Two or more mitigation strategies used for obstructive sleep apnea   Post Op Vitals Reviewed: Yes      Staff: CRNA         No complications documented      /60 (05/07/21 1310)    Temp 97 7 °F (36 5 °C) (05/07/21 1310)    Pulse 84 (05/07/21 1310)   Resp 16 (05/07/21 1310)    SpO2 95 % (05/07/21 1310)

## 2021-05-08 LAB
ANION GAP SERPL CALCULATED.3IONS-SCNC: 6 MMOL/L (ref 4–13)
BUN SERPL-MCNC: 27 MG/DL (ref 5–25)
CALCIUM SERPL-MCNC: 8.9 MG/DL (ref 8.3–10.1)
CHLORIDE SERPL-SCNC: 103 MMOL/L (ref 100–108)
CO2 SERPL-SCNC: 30 MMOL/L (ref 21–32)
CREAT SERPL-MCNC: 1.57 MG/DL (ref 0.6–1.3)
ERYTHROCYTE [DISTWIDTH] IN BLOOD BY AUTOMATED COUNT: 14.3 % (ref 11.6–15.1)
GFR SERPL CREATININE-BSD FRML MDRD: 42 ML/MIN/1.73SQ M
GLUCOSE SERPL-MCNC: 178 MG/DL (ref 65–140)
HCT VFR BLD AUTO: 29.2 % (ref 36.5–49.3)
HGB BLD-MCNC: 9.4 G/DL (ref 12–17)
MCH RBC QN AUTO: 30.2 PG (ref 26.8–34.3)
MCHC RBC AUTO-ENTMCNC: 32.2 G/DL (ref 31.4–37.4)
MCV RBC AUTO: 94 FL (ref 82–98)
PLATELET # BLD AUTO: 262 THOUSANDS/UL (ref 149–390)
PMV BLD AUTO: 9.5 FL (ref 8.9–12.7)
POTASSIUM SERPL-SCNC: 4.6 MMOL/L (ref 3.5–5.3)
RBC # BLD AUTO: 3.11 MILLION/UL (ref 3.88–5.62)
SODIUM SERPL-SCNC: 139 MMOL/L (ref 136–145)
WBC # BLD AUTO: 13.96 THOUSAND/UL (ref 4.31–10.16)

## 2021-05-08 PROCEDURE — 94760 N-INVAS EAR/PLS OXIMETRY 1: CPT

## 2021-05-08 PROCEDURE — 85027 COMPLETE CBC AUTOMATED: CPT | Performed by: INTERNAL MEDICINE

## 2021-05-08 PROCEDURE — 99232 SBSQ HOSP IP/OBS MODERATE 35: CPT | Performed by: INTERNAL MEDICINE

## 2021-05-08 PROCEDURE — 80048 BASIC METABOLIC PNL TOTAL CA: CPT | Performed by: INTERNAL MEDICINE

## 2021-05-08 RX ORDER — ONDANSETRON 2 MG/ML
4 INJECTION INTRAMUSCULAR; INTRAVENOUS EVERY 8 HOURS PRN
Status: DISCONTINUED | OUTPATIENT
Start: 2021-05-08 | End: 2021-05-12 | Stop reason: HOSPADM

## 2021-05-08 RX ORDER — SODIUM CHLORIDE 9 MG/ML
75 INJECTION, SOLUTION INTRAVENOUS CONTINUOUS
Status: DISPENSED | OUTPATIENT
Start: 2021-05-08 | End: 2021-05-09

## 2021-05-08 RX ORDER — CEFAZOLIN SODIUM 2 G/50ML
2000 SOLUTION INTRAVENOUS EVERY 8 HOURS
Status: DISCONTINUED | OUTPATIENT
Start: 2021-05-08 | End: 2021-05-12 | Stop reason: HOSPADM

## 2021-05-08 RX ADMIN — SODIUM CHLORIDE 75 ML/HR: 0.9 INJECTION, SOLUTION INTRAVENOUS at 12:01

## 2021-05-08 RX ADMIN — CEFAZOLIN SODIUM 2000 MG: 2 SOLUTION INTRAVENOUS at 21:41

## 2021-05-08 RX ADMIN — METOPROLOL TARTRATE 25 MG: 25 TABLET, FILM COATED ORAL at 09:41

## 2021-05-08 RX ADMIN — ONDANSETRON 4 MG: 2 INJECTION INTRAMUSCULAR; INTRAVENOUS at 02:07

## 2021-05-08 RX ADMIN — ATORVASTATIN CALCIUM 20 MG: 20 TABLET, FILM COATED ORAL at 21:42

## 2021-05-08 RX ADMIN — METOPROLOL TARTRATE 25 MG: 25 TABLET, FILM COATED ORAL at 21:41

## 2021-05-08 RX ADMIN — ATROPA BELLADONNA AND OPIUM 0.5 SUPPOSITORY: 16.2; 3 SUPPOSITORY RECTAL at 04:11

## 2021-05-08 RX ADMIN — FOLIC ACID 1 MG: 1 TABLET ORAL at 09:41

## 2021-05-08 RX ADMIN — CEFAZOLIN SODIUM 2000 MG: 2 SOLUTION INTRAVENOUS at 15:34

## 2021-05-08 NOTE — PROGRESS NOTES
[facility] Progress Note  Patient: Chrissie Rai 66 y o  male   MRN: [id number]  PCP: Rosa Weinberg MD  Unit/Bed#: [address] [place] [address] Encounter: [id number]  Date Of Visit: 05/08/21    Problem List:    Principal Problem:    Gross hematuria  Active Problems:    Anemia    SHANTI (acute kidney injury) ([facility] )    Leukocytosis    Essential hypertension    Chronic obstructive pulmonary disease (HCC)    RA (rheumatoid arthritis) ([facility] )    Dyslipidemia      Assessment & Plan:    * Gross hematuria  Assessment & Plan  Presented with recurrence of gross hematuria associated with lower abdominal discomfort, bladder spasm and decreased output from the catheter  Recent multiple hospitalization requiring cystoscopy clot evacuation extensive fulguration for radiation cystitis  Improved initially on CBI but hematuria and clot recurred after stopping CBI, CBI was resumed  Hemoglobin remains stable  Patient denies any fever chills or flank pain  Urology following, input appreciated    Recommended bilateral nephrostomy due to recurrent hematuria  Status post IR guided bilateral nephrostomy placement on 05/07  · Continue CBI for now  · Follow-up for Urology for further input  · Monitor for hematuria  · May require cystoscopy if hematuria persistent/recurs after nephrostomy  · Patient is considering hyperbaric oxygen    Leukocytosis  Assessment & Plan  Reactive, improving  Monitor    SHANTI (acute kidney injury) ([facility] )  Assessment & Plan  Baseline creatinine appears to be 1 2-1 3  Creatinine 1 5 on presentation  Likely secondary to urinary retention  Improved initially, creatinine slightly higher at 1 57 today   · IV hydration  · Monitor intake output  · Avoid nephrotoxin  · Follow-up BMP    Anemia  Assessment & Plan  Mild in setting of recurrent hematuria  Somewhat lower than baseline but stable, likely combination of hematuria and dilutional component  Continue to monitor    RA (rheumatoid arthritis) ([facility] )  Assessment & Plan  On methotrexate and folic acid    Chronic obstructive pulmonary disease (HCC)  Assessment & Plan  Ex-smoker, quit 2 months ago  On Advair and albuterol as needed at home  Substitute with Breo  Continue albuterol as needed    Essential hypertension  Assessment & Plan  Uncontrolled on presentation due to pain  Now improved  Continue metoprolol with holding parameters    Dyslipidemia  Assessment & Plan  On statin          VTE Pharmacologic Prophylaxis:   Pharmacologic: Pharmacologic VTE Prophylaxis contraindicated due to Hematuria  Mechanical VTE Prophylaxis in Place: Yes    Patient Centered Rounds: I have performed bedside rounds with nursing staff today  Discussions with Specialists or Other Care Team Provider:  Urology    Education and Discussions with Family / Patient:  Discussed with patient    Time Spent for Care: 45 minutes  More than 50% of total time spent on counseling and coordination of care as described above  Current Length of Stay: 2 day(s)    Current Patient Status:  Inpatient   Certification Statement: The patient will continue to require additional inpatient hospital stay due to Monitoring of hematuria    Discharge Plan:  Home when cleared by Urology    Code Status: Level 1 - Full Code      Subjective:   Underwent bilateral nephrostomy placement yesterday  Denies any fever chills or back pain  Reports discomfort associated with Sumner catheter  On CBI with tea-colored urine  Denies chest pain shortness of breath or GI symptoms      Objective:     Vitals:   Temp (24hrs), Av 9 °F (36 6 °C), Min:97 6 °F (36 4 °C), Max:98 3 °F (36 8 °C)    Temp:  [97 6 °F (36 4 °C)-98 3 °F (36 8 °C)] 97 8 °F (36 6 °C)  HR:  [66-86] 86  Resp:  [16-17] 16  BP: (121-145)/(60-75) 132/70  SpO2:  [88 %-100 %] 95 %  Body mass index is 28 75 kg/m²  Input and Output Summary (last 24 hours):        Intake/Output Summary (Last 24 hours) at 2021 1137  Last data filed at 2021 0546  Gross per 24 hour   Intake 700 ml   Output 3540 ml   Net -2840 ml       Physical Exam:     Physical Exam  Constitutional:       General: He is not in acute distress  HENT:      Head: Normocephalic and atraumatic  Neck:      Musculoskeletal: Neck supple  Cardiovascular:      Rate and Rhythm: Normal rate  Pulmonary:      Effort: Pulmonary effort is normal  No respiratory distress  Breath sounds: No wheezing, rhonchi or rales  Chest:      Chest wall: No tenderness  Abdominal:      General: Bowel sounds are normal  There is no distension  Palpations: Abdomen is soft  Tenderness: There is no abdominal tenderness  There is no guarding or rebound  Genitourinary:     Comments: Bilateral nephrostomy with clear yellow urine  Sumner catheter with tea-colored urine on CBI    Skin:     General: Skin is warm and dry  Findings: No rash  Neurological:      Mental Status: He is alert  Mental status is at baseline  Cranial Nerves: No cranial nerve deficit  Additional Data:     Labs:        * I Have Reviewed All Lab Data Listed Above  * Additional Pertinent Lab Tests Reviewed:  All Labs Within Last 24 Hours Reviewed      Imaging:  Imaging Reports Reviewed Today Include:  none    Recent Cultures (last 7 days):     Results from last 7 days   Lab Units 05/04/21  0916   URINE CULTURE  No Growth <1000 cfu/mL       Last 24 Hours Medication List:   Current Facility-Administered Medications   Medication Dose Route Frequency Provider Last Rate    acetaminophen  650 mg Oral Q6H PRN Demetrice Villarreal MD      atorvastatin  20 mg Oral HS Demetrice Villarreal MD      belladonna-opium  15 mg Rectal Q6H PRN Demetrice Villarreal MD      fluticasone-vilanterol  1 puff Inhalation Daily Demetrice Villarreal MD      folic acid  1 mg Oral Daily Demetrice Villarreal MD      ipratropium-albuterol  3 mL Nebulization 4x Daily PRN Demetrice Villarreal MD      methotrexate  2 5 mg Oral Weekly Demetrice Villarreal MD      metoprolol tartrate  25 mg Oral Q12H [facility] Laura Franco MD      ondansetron  4 mg Intravenous Q8H PRN Vani Silva PA-C            Today, Patient Was Seen By: Laura Franco MD    ** Please Note: "This note has been constructed using a voice recognition system  Therefore there may be syntax, spelling, and/or grammatical errors   Please call if you have any questions  "**

## 2021-05-08 NOTE — PROGRESS NOTES
Progress Note - Urology      Patient: García Dick   : 1945 Sex: male   MRN: [id number]     CSN: [id number]  Unit/Bed#: [address] [place] [address]     SUBJECTIVE:   No complaints  CBI stopped 3 hours ago  Mild hematuria no suprapubic discomfort  Minimal urine output  Bilateral nephrostomy tubes draining clear urine      Objective   Vitals: /70   Pulse 86   Temp 97 8 °F (36 6 °C)   Resp 16   Ht 5' 11" (1 803 m)   Wt 93 5 kg (206 lb 2 1 oz)   SpO2 95%   BMI 28 75 kg/m²     I/O last 24 hours:   In: 1700 [I V :1700]  Out: 7340 [Urine:7335; Blood:5]      Physical Exam:   General Alert awake   Normocephalic atraumatic PERRLA  Lungs clear bilaterally  Cardiac normal S1 normal S2  Abdomen soft, flank pain  Extremities no edema      Lab Results: CBC:   Lab Results   Component Value Date    WBC 13 96 (H) 2021    HGB 9 4 (L) 2021    HCT 29 2 (L) 2021    MCV 94 2021     2021    MCH 30 2 2021    MCHC 32 2 2021    RDW 14 3 2021    MPV 9 5 2021    NRBC 0 2021     CMP:   Lab Results   Component Value Date     2021    CO2 30 2021    BUN 27 (H) 2021    CREATININE 1 57 (H) 2021    CALCIUM 8 9 2021    AST 19 2021    ALT 31 2021    ALKPHOS 89 2021    EGFR 42 2021     Urinalysis:   Lab Results   Component Value Date    COLORU Red 2021    CLARITYU Cloudy 2021    SPECGRAV 1 020 2021    PHUR 7 5 2021    PHUR 5 5 2018    LEUKOCYTESUR Negative 2021    NITRITE Negative 2021    GLUCOSEU Negative 2021    KETONESU Negative 2021    BILIRUBINUR Negative 2021    BLOODU Large (A) 2021     Urine Culture:   Lab Results   Component Value Date    URINECX No Growth <1000 cfu/mL 2021     PSA: No results found for: PSA      Assessment/ Plan:  Hemorrhagic cystitis  Bilateral nephrostomy tubes draining clear urine  Hand irrigated multiple small clots removed  Now irrigation clear  Sumner to bag drainage  Do not feel cysto fulguration warranted at this time  Patient states will definitely undergo hyper bulk oxygen therapy  White count elevating will start Ancef in light of recent bilateral nephrostomy tubes  If urine clears by tomorrow will DC Sumner can be discharged home          Justino Garcia MD

## 2021-05-09 LAB
ANION GAP SERPL CALCULATED.3IONS-SCNC: 6 MMOL/L (ref 4–13)
BASOPHILS # BLD AUTO: 0.03 THOUSANDS/ΜL (ref 0–0.1)
BASOPHILS NFR BLD AUTO: 0 % (ref 0–1)
BUN SERPL-MCNC: 30 MG/DL (ref 5–25)
CALCIUM SERPL-MCNC: 8.2 MG/DL (ref 8.3–10.1)
CHLORIDE SERPL-SCNC: 106 MMOL/L (ref 100–108)
CO2 SERPL-SCNC: 30 MMOL/L (ref 21–32)
CREAT SERPL-MCNC: 1.55 MG/DL (ref 0.6–1.3)
EOSINOPHIL # BLD AUTO: 0.32 THOUSAND/ΜL (ref 0–0.61)
EOSINOPHIL NFR BLD AUTO: 3 % (ref 0–6)
ERYTHROCYTE [DISTWIDTH] IN BLOOD BY AUTOMATED COUNT: 14.6 % (ref 11.6–15.1)
GFR SERPL CREATININE-BSD FRML MDRD: 43 ML/MIN/1.73SQ M
GLUCOSE SERPL-MCNC: 101 MG/DL (ref 65–140)
HCT VFR BLD AUTO: 28.6 % (ref 36.5–49.3)
HGB BLD-MCNC: 8.9 G/DL (ref 12–17)
IMM GRANULOCYTES # BLD AUTO: 0.1 THOUSAND/UL (ref 0–0.2)
IMM GRANULOCYTES NFR BLD AUTO: 1 % (ref 0–2)
LYMPHOCYTES # BLD AUTO: 1.89 THOUSANDS/ΜL (ref 0.6–4.47)
LYMPHOCYTES NFR BLD AUTO: 16 % (ref 14–44)
MCH RBC QN AUTO: 29.7 PG (ref 26.8–34.3)
MCHC RBC AUTO-ENTMCNC: 31.1 G/DL (ref 31.4–37.4)
MCV RBC AUTO: 95 FL (ref 82–98)
MONOCYTES # BLD AUTO: 0.87 THOUSAND/ΜL (ref 0.17–1.22)
MONOCYTES NFR BLD AUTO: 7 % (ref 4–12)
NEUTROPHILS # BLD AUTO: 8.55 THOUSANDS/ΜL (ref 1.85–7.62)
NEUTS SEG NFR BLD AUTO: 73 % (ref 43–75)
NRBC BLD AUTO-RTO: 0 /100 WBCS
PLATELET # BLD AUTO: 259 THOUSANDS/UL (ref 149–390)
PMV BLD AUTO: 9.7 FL (ref 8.9–12.7)
POTASSIUM SERPL-SCNC: 4.6 MMOL/L (ref 3.5–5.3)
RBC # BLD AUTO: 3 MILLION/UL (ref 3.88–5.62)
SODIUM SERPL-SCNC: 142 MMOL/L (ref 136–145)
WBC # BLD AUTO: 11.76 THOUSAND/UL (ref 4.31–10.16)

## 2021-05-09 PROCEDURE — 99232 SBSQ HOSP IP/OBS MODERATE 35: CPT | Performed by: INTERNAL MEDICINE

## 2021-05-09 PROCEDURE — 94760 N-INVAS EAR/PLS OXIMETRY 1: CPT

## 2021-05-09 PROCEDURE — NC001 PR NO CHARGE: Performed by: STUDENT IN AN ORGANIZED HEALTH CARE EDUCATION/TRAINING PROGRAM

## 2021-05-09 PROCEDURE — 85025 COMPLETE CBC W/AUTO DIFF WBC: CPT | Performed by: INTERNAL MEDICINE

## 2021-05-09 PROCEDURE — 80048 BASIC METABOLIC PNL TOTAL CA: CPT | Performed by: INTERNAL MEDICINE

## 2021-05-09 RX ADMIN — FOLIC ACID 1 MG: 1 TABLET ORAL at 09:06

## 2021-05-09 RX ADMIN — ATORVASTATIN CALCIUM 20 MG: 20 TABLET, FILM COATED ORAL at 21:14

## 2021-05-09 RX ADMIN — CEFAZOLIN SODIUM 2000 MG: 2 SOLUTION INTRAVENOUS at 14:29

## 2021-05-09 RX ADMIN — FLUTICASONE FUROATE AND VILANTEROL TRIFENATATE 1 PUFF: 100; 25 POWDER RESPIRATORY (INHALATION) at 21:13

## 2021-05-09 RX ADMIN — CEFAZOLIN SODIUM 2000 MG: 2 SOLUTION INTRAVENOUS at 23:04

## 2021-05-09 RX ADMIN — CEFAZOLIN SODIUM 2000 MG: 2 SOLUTION INTRAVENOUS at 05:33

## 2021-05-09 NOTE — PROGRESS NOTES
Progress Note - Urology      Patient: Edd Molina   : 1945 Sex: male   MRN: [id number]     CSN: [id number]  Unit/Bed#: [address] [address]     SUBJECTIVE:   Doing better  Right nephrostomy 300 cc left nephrostomy 150 cc  Left nephrostomy appears to be obstructed  Penile Sumner urine clear      Objective   Vitals: /66 (BP Location: Left arm)   Pulse 55   Temp 97 8 °F (36 6 °C) (Oral)   Resp 16   Ht 5' 11" (1 803 m)   Wt 93 5 kg (206 lb 2 1 oz)   SpO2 94%   BMI 28 75 kg/m²     I/O last 24 hours: In: 1800 [P O :150;  I V :1600; IV Piggyback:50]  Out: 5650 [Urine:5650]      Physical Exam:   General Alert awake   Normocephalic atraumatic PERRLA  Lungs clear bilaterally  Cardiac normal S1 normal S2  Abdomen soft, flank pain  Extremities no edema      Lab Results: CBC:   Lab Results   Component Value Date    WBC 11 76 (H) 2021    HGB 8 9 (L) 2021    HCT 28 6 (L) 2021    MCV 95 2021     2021    MCH 29 7 2021    MCHC 31 1 (L) 2021    RDW 14 6 2021    MPV 9 7 2021    NRBC 0 2021     CMP:   Lab Results   Component Value Date     2021    CO2 30 2021    BUN 30 (H) 2021    CREATININE 1 55 (H) 2021    CALCIUM 8 2 (L) 2021    AST 19 2021    ALT 31 2021    ALKPHOS 89 2021    EGFR 43 2021     Urinalysis:   Lab Results   Component Value Date    COLORU Red 2021    CLARITYU Cloudy 2021    SPECGRAV 1 020 2021    PHUR 7 5 2021    PHUR 5 5 2018    LEUKOCYTESUR Negative 2021    NITRITE Negative 2021    GLUCOSEU Negative 2021    KETONESU Negative 2021    BILIRUBINUR Negative 2021    BLOODU Large (A) 2021     Urine Culture:   Lab Results   Component Value Date    URINECX No Growth <1000 cfu/mL 2021     PSA: No results found for: PSA      Assessment/ Plan:  Radiation cystitisstatus post radiation Prostatic tissue for recurrent prostate cancer status post radical prostatectomy years ago  Gross hematuria  History of bladder papillomas  Bilateral nephrostomy tubes appears left tube may be blocked  Renal failure  CT scan abdomen pelvis without IV contrast confirm no obvious upper tract abnormalities  Plan  Will place consult with IR to do bilateral nephrograms for tomorrow to check upper tracts in light of history of bladder papillomas  Will check left nephrostomy tube in light of drainage difference for partial obstruction  Will DC Sumner catheter tomorrow or Tuesday pending results of IR evaluation  Patient to schedule hyperbolic oxygen therapy consult this week          Batsheva Ridley MD

## 2021-05-09 NOTE — PLAN OF CARE
Problem: Potential for Falls  Goal: Patient will remain free of falls  Description: INTERVENTIONS:  - Assess patient frequently for physical needs  -  Identify cognitive and physical deficits and behaviors that affect risk of falls    -  [place] fall precautions as indicated by assessment   - Educate patient/family on patient safety including physical limitations  - Instruct patient to call for assistance with activity based on assessment  - Modify environment to reduce risk of injury  - Consider OT/PT consult to assist with strengthening/mobility  Outcome: Progressing     Problem: RESPIRATORY - ADULT  Goal: Achieves optimal ventilation and oxygenation  Description: INTERVENTIONS:  - Assess for changes in respiratory status  - Assess for changes in mentation and behavior  - Position to facilitate oxygenation and minimize respiratory effort  - Oxygen administered by appropriate delivery if ordered  - Initiate smoking cessation education as indicated  - Encourage broncho-pulmonary hygiene including cough, deep breathe, Incentive Spirometry  - Assess the need for suctioning and aspirate as needed  - Assess and instruct to report SOB or any respiratory difficulty  - Respiratory Therapy support as indicated  Outcome: Progressing     Problem: GENITOURINARY - ADULT  Goal: Absence of urinary retention  Description: INTERVENTIONS:  - Assess patients ability to void and empty bladder  - Monitor I/O  - Bladder scan as needed  - Discuss with physician/AP medications to alleviate retention as needed  - Discuss catheterization for long term situations as appropriate  Outcome: Progressing  Goal: Urinary catheter remains patent  Description: INTERVENTIONS:  - Assess patency of urinary catheter  - If patient has a chronic alanis, consider changing catheter if non-functioning  - Follow guidelines for intermittent irrigation of non-functioning urinary catheter  Outcome: Progressing

## 2021-05-09 NOTE — PLAN OF CARE
Problem: Potential for Falls  Goal: Patient will remain free of falls  Description: INTERVENTIONS:  - Assess patient frequently for physical needs  -  Identify cognitive and physical deficits and behaviors that affect risk of falls    -  [place] fall precautions as indicated by assessment   - Educate patient/family on patient safety including physical limitations  - Instruct patient to call for assistance with activity based on assessment  - Modify environment to reduce risk of injury  - Consider OT/PT consult to assist with strengthening/mobility  Outcome: Progressing     Problem: RESPIRATORY - ADULT  Goal: Achieves optimal ventilation and oxygenation  Description: INTERVENTIONS:  - Assess for changes in respiratory status  - Assess for changes in mentation and behavior  - Position to facilitate oxygenation and minimize respiratory effort  - Oxygen administered by appropriate delivery if ordered  - Initiate smoking cessation education as indicated  - Encourage broncho-pulmonary hygiene including cough, deep breathe, Incentive Spirometry  - Assess the need for suctioning and aspirate as needed  - Assess and instruct to report SOB or any respiratory difficulty  - Respiratory Therapy support as indicated  Outcome: Progressing     Problem: GENITOURINARY - ADULT  Goal: Absence of urinary retention  Description: INTERVENTIONS:  - Assess patients ability to void and empty bladder  - Monitor I/O  - Bladder scan as needed  - Discuss with physician/AP medications to alleviate retention as needed  - Discuss catheterization for long term situations as appropriate  Outcome: Progressing  Goal: Urinary catheter remains patent  Description: INTERVENTIONS:  - Assess patency of urinary catheter  - If patient has a chronic alanis, consider changing catheter if non-functioning  - Follow guidelines for intermittent irrigation of non-functioning urinary catheter  Outcome: Progressing

## 2021-05-09 NOTE — CONSULTS
INTERPROFESSIONAL (PHONE) CONSULTATION - Interventional Radiology  Constantine Jenkins 66 y o  male MRN: [id number]  Unit/Bed#: [address] [address] Encounter: [id number]    IR has been consulted to evaluate the patient, determine the appropriate procedure, and whether or not a procedure can and should be performed regarding the care of Constantine Jenkins  We were consulted by urolology concerning this patient, and to possibly perform a L PCN check if medically appropriate for the patient  Consults  05/09/21      Assessment/Recommendation:     66 yoM PMH prostate ca s/p radical prostatectomy c/b recurrent hematuria, now s/p b/l PCN insertion on Friday for urinary diversion  The L PCN is not draining well  IR consulted for L PCN evaluation  We'll plan on L PCN evaluation tomorrow pending schedule availability  Total time spent in review of data, discussion with requesting provider and rendering advice was 15 minutes  Patient or appropriate family member was verbally informed by urology of this consultative service on their behalf to provide more timely access to specialty care in lieu of an in person consultation  Verbal consent was obtained  Thank you for allowing Interventional Radiology to participate in the care of Constantine Jenkins  Please don't hesitate to call or TigerText us with any questions       Su Councilman, MD

## 2021-05-09 NOTE — PROGRESS NOTES
[facility] Progress Note  Patient: Parvin Ho 66 y o  male   MRN: [id number]  PCP: Frederick Rea MD  Unit/Bed#: [address] [place] [address] Encounter: [id number]  Date Of Visit: 05/09/21    Problem List:    Principal Problem:    Gross hematuria  Active Problems:    Anemia    SHANTI (acute kidney injury) ([facility] )    Leukocytosis    Essential hypertension    Chronic obstructive pulmonary disease (HCC)    RA (rheumatoid arthritis) ([facility] )    Dyslipidemia      Assessment & Plan:    * Gross hematuria  Assessment & Plan  Presented with recurrence of gross hematuria associated with lower abdominal discomfort, bladder spasm and decreased output from the catheter  Recent multiple hospitalization requiring cystoscopy clot evacuation extensive fulguration for radiation cystitis  Improved initially on CBI but hematuria and clot recurred after stopping CBI, CBI was resumed  Hemoglobin remains stable  Patient denies any fever chills or flank pain  Urology following, input appreciated    Recommended bilateral nephrostomy due to recurrent hematuria  Status post IR guided bilateral nephrostomy placement on 05/07  Hematuria appears to have improved  · Follow-up with urology  · IR evaluation for left nephrostomy  · Monitor for hematuria  · May require cystoscopy if hematuria persistent/recurs after nephrostomy  · Patient is considering hyperbaric oxygen    Leukocytosis  Assessment & Plan  Reactive, improving  Monitor    SHANTI (acute kidney injury) ([facility] )  Assessment & Plan  Baseline creatinine appears to be 1 2-1 3  Creatinine 1 5 on presentation  Likely secondary to urinary retention  Improved initially, creatinine slightly higher at 1 55   · IR for left nephrostomy evaluation  · Monitor intake output  · Avoid nephrotoxin  · Follow-up BMP    Anemia  Assessment & Plan  Mild in setting of recurrent hematuria  Somewhat lower than baseline but stable, likely combination of hematuria and dilutional component  Continue to monitor    RA (rheumatoid arthritis) (HCC)  Assessment & Plan  On methotrexate and folic acid    Chronic obstructive pulmonary disease (HCC)  Assessment & Plan  Ex-smoker, quit 2 months ago  On Advair and albuterol as needed at home  Substitute with Breo  Continue albuterol as needed    Essential hypertension  Assessment & Plan  Uncontrolled on presentation due to pain  Now improved  Continue metoprolol with holding parameters    Dyslipidemia  Assessment & Plan  On statin          VTE Pharmacologic Prophylaxis:   Pharmacologic: Pharmacologic VTE Prophylaxis contraindicated due to Hematuria  Mechanical VTE Prophylaxis in Place: Yes    Patient Centered Rounds: I have performed bedside rounds with nursing staff today  Discussions with Specialists or Other Care Team Provider:  Urology    Education and Discussions with Family / Patient:  Discussed with patient    Time Spent for Care: 45 minutes  More than 50% of total time spent on counseling and coordination of care as described above  Current Length of Stay: 3 day(s)    Current Patient Status:  Inpatient   Certification Statement: The patient will continue to require additional inpatient hospital stay due to Monitoring of hematuria    Discharge Plan:  Home when cleared by Urology    Code Status: Level 1 - Full Code      Subjective:     Reports feeling better  Sumner catheter without hematuria  Denies any chest pain, shortness of breath, abdominal pain or flank pain  Urology input appreciated, concerned about decrease left nephrostomy output recommended IR evaluation   Patient denies any fever or chills      Objective:     Vitals:   Temp (24hrs), Av °F (36 7 °C), Min:97 5 °F (36 4 °C), Max:98 4 °F (36 9 °C)    Temp:  [97 5 °F (36 4 °C)-98 4 °F (36 9 °C)] 97 8 °F (36 6 °C)  HR:  [51-73] 55  Resp:  [12-20] 16  BP: (121-133)/(66-71) 121/66  SpO2:  [89 %-96 %] 94 %  Body mass index is 28 75 kg/m²  Input and Output Summary (last 24 hours):        Intake/Output Summary (Last 24 hours) at 5/9/2021 1322  Last data filed at 5/9/2021 1202  Gross per 24 hour   Intake 800 ml   Output 1950 ml   Net -1150 ml       Physical Exam:     Physical Exam  Constitutional:       General: He is not in acute distress  HENT:      Head: Normocephalic and atraumatic  Neck:      Musculoskeletal: Neck supple  Cardiovascular:      Rate and Rhythm: Normal rate  Pulmonary:      Effort: Pulmonary effort is normal  No respiratory distress  Breath sounds: No wheezing, rhonchi or rales  Chest:      Chest wall: No tenderness  Abdominal:      General: Bowel sounds are normal  There is no distension  Palpations: Abdomen is soft  Tenderness: There is no abdominal tenderness  There is no guarding or rebound  Musculoskeletal:      Comments: Bilateral nephrostomy in place, recently emptied  Dressing C/D/I  Sumner catheter with clear urine     Skin:     General: Skin is warm and dry  Findings: No rash  Neurological:      Mental Status: He is alert  Mental status is at baseline  Cranial Nerves: No cranial nerve deficit  Additional Data:     Labs:    Results from last 7 days   Lab Units 05/09/21  0524   WBC Thousand/uL 11 76*   HEMOGLOBIN g/dL 8 9*   HEMATOCRIT % 28 6*   PLATELETS Thousands/uL 259   NEUTROS PCT % 73   LYMPHS PCT % 16   MONOS PCT % 7   EOS PCT % 3     Results from last 7 days   Lab Units 05/09/21  0524   POTASSIUM mmol/L 4 6   CHLORIDE mmol/L 106   CO2 mmol/L 30   BUN mg/dL 30*   CREATININE mg/dL 1 55*   CALCIUM mg/dL 8 2*     Results from last 7 days   Lab Units 05/04/21  0916   INR  1 12       * I Have Reviewed All Lab Data Listed Above  * Additional Pertinent Lab Tests Reviewed:  All Labs Within Last 24 Hours Reviewed      Imaging:  Imaging Reports Reviewed Today Include:  none    Recent Cultures (last 7 days):     Results from last 7 days   Lab Units 05/04/21  0916   URINE CULTURE  No Growth <1000 cfu/mL       Last 24 Hours Medication List:   Current Facility-Administered Medications   Medication Dose Route Frequency Provider Last Rate    acetaminophen  650 mg Oral Q6H PRN Sonia Montalvo MD      atorvastatin  20 mg Oral HS Sonia Montalvo MD      belladonna-opium  15 mg Rectal Q6H PRN Sonia Montalvo MD      cefazolin  2,000 mg Intravenous Q8H Khushboo Alex MD 2,000 mg (05/09/21 0533)    fluticasone-vilanterol  1 puff Inhalation Daily Sonia Montalvo MD      folic acid  1 mg Oral Daily Sonia Montalvo MD      ipratropium-albuterol  3 mL Nebulization 4x Daily PRN Sonia Montalvo MD      methotrexate  2 5 mg Oral Weekly Sonia Montalvo MD      metoprolol tartrate  25 mg Oral Q12H Vickey Duckworth MD      ondansetron  4 mg Intravenous Q8H PRN Taylor Baugh PA-C            Today, Patient Was Seen By: Sonia Montalvo MD    ** Please Note: "This note has been constructed using a voice recognition system  Therefore there may be syntax, spelling, and/or grammatical errors   Please call if you have any questions  "**

## 2021-05-10 ENCOUNTER — APPOINTMENT (INPATIENT)
Dept: NON INVASIVE DIAGNOSTICS | Facility: HOSPITAL | Age: 76
DRG: 699 | End: 2021-05-10
Payer: MEDICARE

## 2021-05-10 LAB
ANION GAP SERPL CALCULATED.3IONS-SCNC: 7 MMOL/L (ref 4–13)
BASOPHILS # BLD AUTO: 0.04 THOUSANDS/ΜL (ref 0–0.1)
BASOPHILS NFR BLD AUTO: 0 % (ref 0–1)
BUN SERPL-MCNC: 30 MG/DL (ref 5–25)
CALCIUM SERPL-MCNC: 8.2 MG/DL (ref 8.3–10.1)
CHLORIDE SERPL-SCNC: 105 MMOL/L (ref 100–108)
CO2 SERPL-SCNC: 29 MMOL/L (ref 21–32)
CREAT SERPL-MCNC: 1.45 MG/DL (ref 0.6–1.3)
EOSINOPHIL # BLD AUTO: 0.55 THOUSAND/ΜL (ref 0–0.61)
EOSINOPHIL NFR BLD AUTO: 6 % (ref 0–6)
ERYTHROCYTE [DISTWIDTH] IN BLOOD BY AUTOMATED COUNT: 14.6 % (ref 11.6–15.1)
GFR SERPL CREATININE-BSD FRML MDRD: 46 ML/MIN/1.73SQ M
GLUCOSE SERPL-MCNC: 98 MG/DL (ref 65–140)
HCT VFR BLD AUTO: 28.6 % (ref 36.5–49.3)
HGB BLD-MCNC: 9 G/DL (ref 12–17)
IMM GRANULOCYTES # BLD AUTO: 0.09 THOUSAND/UL (ref 0–0.2)
IMM GRANULOCYTES NFR BLD AUTO: 1 % (ref 0–2)
LYMPHOCYTES # BLD AUTO: 1.69 THOUSANDS/ΜL (ref 0.6–4.47)
LYMPHOCYTES NFR BLD AUTO: 17 % (ref 14–44)
MCH RBC QN AUTO: 29.8 PG (ref 26.8–34.3)
MCHC RBC AUTO-ENTMCNC: 31.5 G/DL (ref 31.4–37.4)
MCV RBC AUTO: 95 FL (ref 82–98)
MONOCYTES # BLD AUTO: 0.83 THOUSAND/ΜL (ref 0.17–1.22)
MONOCYTES NFR BLD AUTO: 8 % (ref 4–12)
NEUTROPHILS # BLD AUTO: 6.79 THOUSANDS/ΜL (ref 1.85–7.62)
NEUTS SEG NFR BLD AUTO: 68 % (ref 43–75)
NRBC BLD AUTO-RTO: 0 /100 WBCS
PLATELET # BLD AUTO: 261 THOUSANDS/UL (ref 149–390)
PMV BLD AUTO: 9.9 FL (ref 8.9–12.7)
POTASSIUM SERPL-SCNC: 4.4 MMOL/L (ref 3.5–5.3)
RBC # BLD AUTO: 3.02 MILLION/UL (ref 3.88–5.62)
SODIUM SERPL-SCNC: 141 MMOL/L (ref 136–145)
WBC # BLD AUTO: 9.99 THOUSAND/UL (ref 4.31–10.16)

## 2021-05-10 PROCEDURE — 94760 N-INVAS EAR/PLS OXIMETRY 1: CPT

## 2021-05-10 PROCEDURE — 88112 CYTOPATH CELL ENHANCE TECH: CPT | Performed by: PATHOLOGY

## 2021-05-10 PROCEDURE — 99232 SBSQ HOSP IP/OBS MODERATE 35: CPT | Performed by: INTERNAL MEDICINE

## 2021-05-10 PROCEDURE — 76080 X-RAY EXAM OF FISTULA: CPT

## 2021-05-10 PROCEDURE — BT141ZZ FLUOROSCOPY OF KIDNEYS, URETERS AND BLADDER USING LOW OSMOLAR CONTRAST: ICD-10-PCS | Performed by: RADIOLOGY

## 2021-05-10 PROCEDURE — 50431 NJX PX NFROSGRM &/URTRGRM: CPT | Performed by: RADIOLOGY

## 2021-05-10 PROCEDURE — 85025 COMPLETE CBC W/AUTO DIFF WBC: CPT | Performed by: INTERNAL MEDICINE

## 2021-05-10 PROCEDURE — 49424 ASSESS CYST CONTRAST INJECT: CPT

## 2021-05-10 PROCEDURE — 80048 BASIC METABOLIC PNL TOTAL CA: CPT | Performed by: INTERNAL MEDICINE

## 2021-05-10 RX ADMIN — ATORVASTATIN CALCIUM 20 MG: 20 TABLET, FILM COATED ORAL at 22:10

## 2021-05-10 RX ADMIN — CEFAZOLIN SODIUM 2000 MG: 2 SOLUTION INTRAVENOUS at 05:48

## 2021-05-10 RX ADMIN — CEFAZOLIN SODIUM 2000 MG: 2 SOLUTION INTRAVENOUS at 13:39

## 2021-05-10 RX ADMIN — IOHEXOL 15 ML: 350 INJECTION, SOLUTION INTRAVENOUS at 15:35

## 2021-05-10 RX ADMIN — FOLIC ACID 1 MG: 1 TABLET ORAL at 08:50

## 2021-05-10 RX ADMIN — CEFAZOLIN SODIUM 2000 MG: 2 SOLUTION INTRAVENOUS at 22:10

## 2021-05-10 RX ADMIN — FLUTICASONE FUROATE AND VILANTEROL TRIFENATATE 1 PUFF: 100; 25 POWDER RESPIRATORY (INHALATION) at 21:56

## 2021-05-10 NOTE — PROGRESS NOTES
[facility] Progress Note  Patient: Marin Shea 66 y o  male   MRN: [id number]  PCP: Rupal Boyer MD  Unit/Bed#: [address] [place] [address] Encounter: [id number]  Date Of Visit: 05/10/21    Problem List:    Principal Problem:    Gross hematuria  Active Problems:    Anemia    SHANTI (acute kidney injury) ([facility] )    Leukocytosis    Essential hypertension    Chronic obstructive pulmonary disease (HCC)    RA (rheumatoid arthritis) ([facility] )    Dyslipidemia      Assessment & Plan:    * Gross hematuria  Assessment & Plan  Presented with recurrence of gross hematuria associated with lower abdominal discomfort, bladder spasm and decreased output from the catheter  Recent multiple hospitalization requiring cystoscopy clot evacuation extensive fulguration for radiation cystitis  Improved initially on CBI but hematuria and clot recurred after stopping CBI, CBI was resumed  Hemoglobin remains stable  Patient denies any fever chills or flank pain  Urology following, input appreciated  Recommended bilateral nephrostomy due to recurrent hematuria  Status post IR guided bilateral nephrostomy placement on 05/07  Hematuria appears to have improved  · Urology follow-up noted, recommended bilateral nephrograms  · Status post IR evaluation for left nephrostomy, functioning appropriately    · Monitor for hematuria  · May require cystoscopy if hematuria persistent/recurs after nephrostomy  · Patient is considering hyperbaric oxygen  · Will discharge once cleared by Urology    Leukocytosis  Assessment & Plan  Reactive, improving  Monitor    SHANTI (acute kidney injury) ([facility] )  Assessment & Plan  Baseline creatinine appears to be 1 2-1 3  Creatinine 1 5 on presentation  Likely secondary to urinary retention  Improved initially, creatinine down trended 1 45  · Monitor intake output  · Avoid nephrotoxin  · Follow-up BMP    Anemia  Assessment & Plan  Mild in setting of recurrent hematuria  Somewhat lower than baseline but stable, likely combination of hematuria and dilutional component  Continue to monitor    RA (rheumatoid arthritis) (HCC)  Assessment & Plan  On methotrexate and folic acid    Chronic obstructive pulmonary disease (HCC)  Assessment & Plan  Ex-smoker, quit 2 months ago  On Advair and albuterol as needed at home  Substitute with Breo  Continue albuterol as needed    Essential hypertension  Assessment & Plan  Uncontrolled on presentation due to pain  Now improved  Continue metoprolol with holding parameters    Dyslipidemia  Assessment & Plan  On statin          VTE Pharmacologic Prophylaxis:   Pharmacologic: Pharmacologic VTE Prophylaxis contraindicated due to Hematuria  Mechanical VTE Prophylaxis in Place: Yes    Patient Centered Rounds: I have performed bedside rounds with nursing staff today  Discussions with Specialists or Other Care Team Provider:  Urology    Education and Discussions with Family / Patient:  Discussed with patient and family at bedside    Time Spent for Care: 45 minutes  More than 50% of total time spent on counseling and coordination of care as described above      Current Length of Stay: 4 day(s)    Current Patient Status:  Inpatient   Certification Statement: The patient will continue to require additional inpatient hospital stay due to Monitoring of hematuria    Discharge Plan:  Home when cleared by Urology    Code Status: Level 1 - Full Code      Subjective:     Patient reported doing well  He reports that he was seen by IR today and nephrostomy tube were assessed and functioning appropriately  Denies any flank pain or abdominal pain  [place] catheter with small amount of blood  Denies any chest pain shortness of breath or dizziness      Objective:     Vitals:   Temp (24hrs), Av 3 °F (36 8 °C), Min:98 1 °F (36 7 °C), Max:98 6 °F (37 °C)    Temp:  [98 1 °F (36 7 °C)-98 6 °F (37 °C)] 98 6 °F (37 °C)  HR:  [56-74] 74  Resp:  [16-18] 18  BP: (109-126)/(59-79) 111/59  SpO2:  [91 %-93 %] 91 %  Body mass index is 28 75 kg/m²  Input and Output Summary (last 24 hours): Intake/Output Summary (Last 24 hours) at 5/10/2021 1314  Last data filed at 5/10/2021 1218  Gross per 24 hour   Intake 155 ml   Output 2300 ml   Net -2145 ml       Physical Exam:     Physical Exam  Constitutional:       General: He is not in acute distress  HENT:      Head: Normocephalic and atraumatic  Neck:      Musculoskeletal: Neck supple  Cardiovascular:      Rate and Rhythm: Normal rate  Pulmonary:      Effort: Pulmonary effort is normal  No respiratory distress  Breath sounds: No wheezing, rhonchi or rales  Chest:      Chest wall: No tenderness  Abdominal:      General: Bowel sounds are normal  There is no distension  Palpations: Abdomen is soft  Tenderness: There is no abdominal tenderness  There is no guarding or rebound  Genitourinary:     Comments: Bilateral nephrostomy in place, recently emptied  Dressing C/D/I  [place]ner catheter with small amount of blood  Skin:     General: Skin is warm and dry  Findings: No rash  Neurological:      Mental Status: He is alert  Mental status is at baseline  Cranial Nerves: No cranial nerve deficit  Additional Data:     Labs:    Results from last 7 days   Lab Units 05/10/21  0539   WBC Thousand/uL 9 99   HEMOGLOBIN g/dL 9 0*   HEMATOCRIT % 28 6*   PLATELETS Thousands/uL 261   NEUTROS PCT % 68   LYMPHS PCT % 17   MONOS PCT % 8   EOS PCT % 6     Results from last 7 days   Lab Units 05/10/21  0539   POTASSIUM mmol/L 4 4   CHLORIDE mmol/L 105   CO2 mmol/L 29   BUN mg/dL 30*   CREATININE mg/dL 1 45*   CALCIUM mg/dL 8 2*     Results from last 7 days   Lab Units 05/04/21  0916   INR  1 12       * I Have Reviewed All Lab Data Listed Above  * Additional Pertinent Lab Tests Reviewed:  All Labs Within Last 24 Hours Reviewed      Imaging:  Imaging Reports Reviewed Today Include:  none    Recent Cultures (last 7 days):     Results from last 7 days   Lab Units 05/04/21  0916   URINE CULTURE  No Growth <1000 cfu/mL       Last 24 Hours Medication List:   Current Facility-Administered Medications   Medication Dose Route Frequency Provider Last Rate    acetaminophen  650 mg Oral Q6H PRN Claudio Davey MD      atorvastatin  20 mg Oral HS Claudio Davey MD      belladonna-opium  15 mg Rectal Q6H PRN Claudio Davey MD      cefazolin  2,000 mg Intravenous Q8H Meaghan Chávez MD 2,000 mg (05/10/21 0548)    fluticasone-vilanterol  1 puff Inhalation Daily Claudio Davey MD      folic acid  1 mg Oral Daily Claudio Davey MD      ipratropium-albuterol  3 mL Nebulization 4x Daily PRN Claudio Davey MD      methotrexate  2 5 mg Oral Weekly Claudio Davey MD      metoprolol tartrate  25 mg Oral Q12H Shae Mcgill MD      ondansetron  4 mg Intravenous Q8H PRN Andrés Mccormick PA-C            Today, Patient Was Seen By: Claudio Davey MD    ** Please Note: "This note has been constructed using a voice recognition system  Therefore there may be syntax, spelling, and/or grammatical errors   Please call if you have any questions  "**

## 2021-05-10 NOTE — PLAN OF CARE
Problem: Potential for Falls  Goal: Patient will remain free of falls  Description: INTERVENTIONS:  - Assess patient frequently for physical needs  -  Identify cognitive and physical deficits and behaviors that affect risk of falls    -  [place] fall precautions as indicated by assessment   - Educate patient/family on patient safety including physical limitations  - Instruct patient to call for assistance with activity based on assessment  - Modify environment to reduce risk of injury  - Consider OT/PT consult to assist with strengthening/mobility  Outcome: Progressing     Problem: GENITOURINARY - ADULT  Goal: Absence of urinary retention  Description: INTERVENTIONS:  - Assess patients ability to void and empty bladder  - Monitor I/O  - Bladder scan as needed  - Discuss with physician/AP medications to alleviate retention as needed  - Discuss catheterization for long term situations as appropriate  Outcome: Progressing     Problem: GENITOURINARY - ADULT  Goal: Urinary catheter remains patent  Description: INTERVENTIONS:  - Assess patency of urinary catheter  - If patient has a chronic alanis, consider changing catheter if non-functioning  - Follow guidelines for intermittent irrigation of non-functioning urinary catheter  Outcome: Progressing

## 2021-05-10 NOTE — CASE MANAGEMENT
Per response in [place], [facility] is unable to accept  CM placed additional home healthcare referrals  Awaiting responses on acceptance

## 2021-05-10 NOTE — PLAN OF CARE
Problem: Potential for Falls  Goal: Patient will remain free of falls  Description: INTERVENTIONS:  - Assess patient frequently for physical needs  -  Identify cognitive and physical deficits and behaviors that affect risk of falls    -  [place] fall precautions as indicated by assessment   - Educate patient/family on patient safety including physical limitations  - Instruct patient to call for assistance with activity based on assessment  - Modify environment to reduce risk of injury  - Consider OT/PT consult to assist with strengthening/mobility  Outcome: Progressing     Problem: RESPIRATORY - ADULT  Goal: Achieves optimal ventilation and oxygenation  Description: INTERVENTIONS:  - Assess for changes in respiratory status  - Assess for changes in mentation and behavior  - Position to facilitate oxygenation and minimize respiratory effort  - Oxygen administered by appropriate delivery if ordered  - Initiate smoking cessation education as indicated  - Encourage broncho-pulmonary hygiene including cough, deep breathe, Incentive Spirometry  - Assess the need for suctioning and aspirate as needed  - Assess and instruct to report SOB or any respiratory difficulty  - Respiratory Therapy support as indicated  Outcome: Progressing     Problem: GENITOURINARY - ADULT  Goal: Absence of urinary retention  Description: INTERVENTIONS:  - Assess patients ability to void and empty bladder  - Monitor I/O  - Bladder scan as needed  - Discuss with physician/AP medications to alleviate retention as needed  - Discuss catheterization for long term situations as appropriate  Outcome: Progressing  Goal: Urinary catheter remains patent  Description: INTERVENTIONS:  - Assess patency of urinary catheter  - If patient has a chronic alanis, consider changing catheter if non-functioning  - Follow guidelines for intermittent irrigation of non-functioning urinary catheter  Outcome: Progressing

## 2021-05-10 NOTE — CASE MANAGEMENT
CM spoke to spouse Carmen Solis regarding home healthcare  Spouse does not have a preference on VNA agencies  Patient is accepted with [facility] per response in [employer]  Contact information for [facility] added to patient's after visit summary  Spouse to transport home  CM met with patient at bedside to communicate the above  IMM reviewed with patient and a copy was provided

## 2021-05-10 NOTE — SEDATION DOCUMENTATION
B/L nephrostogram completed  No intervention needed at this time  Patient transferred back to room in stable condition

## 2021-05-10 NOTE — PHYSICAL THERAPY NOTE
Pt declined PT today  Pt reports he was just up in the bathroom washing and that he is going for a test soon  Encouraged pt to ambulate in the [place]ott with the staff later today  Will follow    Delbra Eisenmenger PT  [phone]     05/10/21 1331   Note Type   Note Type Treatment   Cancel Reasons Other  (refusal)

## 2021-05-10 NOTE — PROGRESS NOTES
Progress Note - Urology      Patient: Cheyenne Rutledge   : 1945 Sex: male   MRN: [id number]     CSN: [id number]  Unit/Bed#: [address] [place] [address]     SUBJECTIVE:   No complaints  Gross hematuria now noted in [place]  On for IR at bilateral nephrograms today      Objective   Vitals: /59 (BP Location: Right arm)   Pulse 74   Temp 98 6 °F (37 °C) (Oral)   Resp 18   Ht 5' 11" (1 803 m)   Wt 93 5 kg (206 lb 2 1 oz)   SpO2 91%   BMI 28 75 kg/m²     I/O last 24 hours: In: 305 [P O :300;  I V :5]  Out: 2400 [Urine:2400]      Physical Exam:   General Alert awake   Normocephalic atraumatic PERRLA  Lungs clear bilaterally  Cardiac normal S1 normal S2  Abdomen soft, flank pain  Extremities no edema      Lab Results: CBC:   Lab Results   Component Value Date    WBC 9 99 05/10/2021    HGB 9 0 (L) 05/10/2021    HCT 28 6 (L) 05/10/2021    MCV 95 05/10/2021     05/10/2021    MCH 29 8 05/10/2021    MCHC 31 5 05/10/2021    RDW 14 6 05/10/2021    MPV 9 9 05/10/2021    NRBC 0 05/10/2021     CMP:   Lab Results   Component Value Date     05/10/2021    CO2 29 05/10/2021    BUN 30 (H) 05/10/2021    CREATININE 1 45 (H) 05/10/2021    CALCIUM 8 2 (L) 05/10/2021    AST 19 2021    ALT 31 2021    ALKPHOS 89 2021    EGFR 46 05/10/2021     Urinalysis:   Lab Results   Component Value Date    COLORU Red 2021    CLARITYU Cloudy 2021    SPECGRAV 1 020 2021    PHUR 7 5 2021    PHUR 5 5 2018    LEUKOCYTESUR Negative 2021    NITRITE Negative 2021    GLUCOSEU Negative 2021    KETONESU Negative 2021    BILIRUBINUR Negative 2021    BLOODU Large (A) 2021     Urine Culture:   Lab Results   Component Value Date    URINECX No Growth <1000 cfu/mL 2021     PSA: No results found for: PSA      Assessment/ Plan:  Radiation cystitis  Gross hematuria  Bilateral nephrostomy tubes  Hand irrigated minimal amount of scant clots  IR consult check left tube for partial obstruction  Bilateral nephrograms check upper tracts in light of history of bladder papillomas status post CT scan abdomen pelvis without IV contrast in light of renal failure  DC [place] tomorrow  Hyperbolic  oxygen outpatient          Bubba Wang MD

## 2021-05-10 NOTE — BRIEF OP NOTE (RAD/CATH)
INTERVENTIONAL RADIOLOGY PROCEDURE NOTE    Date: 5/10/2021    Procedure:  BILATERAL NEPHROSTOGRAM    Preoperative diagnosis:   1  Hematuria, unspecified type    2  Gross hematuria    3  Hematuria due to cystitis         Postoperative diagnosis: Same  Surgeon: Mark Zambrano MD     Assistant: None  No qualified resident was available  Blood loss:  None    Specimens:  None    Findings:  Bilateral nephrostogram is a were performed  Both nephrostomy tubes are in good position  Both ureters drain into the bladder  There is no hydronephrosis  On the left, there are multiple fixed filling defects demonstrated in the distal ureter, which appear to be mucosal or submucosal in location  These abnormalities are nonobstructive  Differential possibilities include a very focal area of ureteritis cystica which should be considered if the patient has a history of chronic urinary tract infection  Another consideration is urothelial cancer affecting the distal left ureter  Complications: None immediate      Anesthesia: none

## 2021-05-10 NOTE — QUICK NOTE
Patient seen at the bedside going into clot retention after being in the prone position for bilateral nephrograms tiger text from Dr Ciro Yates  patient has filling defect left distal ureter questionable ureteral carcinoma or ureteritis cystica will definitely need ureteroscopy for definitive diagnosis hand irrigated with multiple small clots removed now draining  Will see in the morning  Urine clear will DC [place] tried to get patient home will need hyper bulk oxygen will also need more than likely conversion of left nephrostomy tube to left nephroureteral stent in order to gain access into the left ureter since cannot be found due to severe bladder inflammatory changes from bleeding and radiation cystitis patient aware

## 2021-05-11 LAB
ANION GAP SERPL CALCULATED.3IONS-SCNC: 8 MMOL/L (ref 4–13)
BUN SERPL-MCNC: 28 MG/DL (ref 5–25)
CALCIUM SERPL-MCNC: 8.3 MG/DL (ref 8.3–10.1)
CHLORIDE SERPL-SCNC: 104 MMOL/L (ref 100–108)
CO2 SERPL-SCNC: 28 MMOL/L (ref 21–32)
CREAT SERPL-MCNC: 1.4 MG/DL (ref 0.6–1.3)
ERYTHROCYTE [DISTWIDTH] IN BLOOD BY AUTOMATED COUNT: 14.6 % (ref 11.6–15.1)
GFR SERPL CREATININE-BSD FRML MDRD: 48 ML/MIN/1.73SQ M
GLUCOSE SERPL-MCNC: 109 MG/DL (ref 65–140)
HCT VFR BLD AUTO: 28.3 % (ref 36.5–49.3)
HGB BLD-MCNC: 8.9 G/DL (ref 12–17)
MCH RBC QN AUTO: 29.6 PG (ref 26.8–34.3)
MCHC RBC AUTO-ENTMCNC: 31.4 G/DL (ref 31.4–37.4)
MCV RBC AUTO: 94 FL (ref 82–98)
PLATELET # BLD AUTO: 242 THOUSANDS/UL (ref 149–390)
PMV BLD AUTO: 9.7 FL (ref 8.9–12.7)
POTASSIUM SERPL-SCNC: 4.2 MMOL/L (ref 3.5–5.3)
RBC # BLD AUTO: 3.01 MILLION/UL (ref 3.88–5.62)
SODIUM SERPL-SCNC: 140 MMOL/L (ref 136–145)
WBC # BLD AUTO: 9.38 THOUSAND/UL (ref 4.31–10.16)

## 2021-05-11 PROCEDURE — 80048 BASIC METABOLIC PNL TOTAL CA: CPT | Performed by: INTERNAL MEDICINE

## 2021-05-11 PROCEDURE — 99232 SBSQ HOSP IP/OBS MODERATE 35: CPT | Performed by: INTERNAL MEDICINE

## 2021-05-11 PROCEDURE — 85027 COMPLETE CBC AUTOMATED: CPT | Performed by: INTERNAL MEDICINE

## 2021-05-11 PROCEDURE — 97530 THERAPEUTIC ACTIVITIES: CPT

## 2021-05-11 RX ADMIN — CEFAZOLIN SODIUM 2000 MG: 2 SOLUTION INTRAVENOUS at 05:47

## 2021-05-11 RX ADMIN — FOLIC ACID 1 MG: 1 TABLET ORAL at 08:23

## 2021-05-11 RX ADMIN — ATORVASTATIN CALCIUM 20 MG: 20 TABLET, FILM COATED ORAL at 21:53

## 2021-05-11 RX ADMIN — FLUTICASONE FUROATE AND VILANTEROL TRIFENATATE 1 PUFF: 100; 25 POWDER RESPIRATORY (INHALATION) at 21:53

## 2021-05-11 RX ADMIN — CEFAZOLIN SODIUM 2000 MG: 2 SOLUTION INTRAVENOUS at 13:50

## 2021-05-11 RX ADMIN — CEFAZOLIN SODIUM 2000 MG: 2 SOLUTION INTRAVENOUS at 21:53

## 2021-05-11 NOTE — PROGRESS NOTES
Progress Note - Urology      Patient: Claus Mehta   : 1945 Sex: male   MRN: [id number]     CSN: [id number]  Unit/Bed#: [address] [place] [address]     SUBJECTIVE:   No complaints  Bilateral nephrostomy tubes draining clear urine  Urine cytologies pending right and left tube  Discussed with patient questionable lesion left distal ureter possible inflammatory versus left ureteral tumor  Unable to find left orifice at this time in light of severe radiation cystitis inflammatory changes to the bladder floor  Would need conversion of left nephrostomy to left nephroureteral stent in fear of bleeding if done at this time      Objective   Vitals: /68 (BP Location: Right arm)   Pulse 74   Temp 98 4 °F (36 9 °C) (Oral)   Resp 16   Ht 5' 11" (1 803 m)   Wt 93 5 kg (206 lb 2 1 oz)   SpO2 94%   BMI 28 75 kg/m²     I/O last 24 hours: In: 934 [P O :625;  I V :10]  Out: 1800 [Urine:1800]      Physical Exam:   General Alert awake   Normocephalic atraumatic PERRLA  Lungs clear bilaterally  Cardiac normal S1 normal S2  Abdomen soft, flank pain  Extremities no edema      Lab Results: CBC:   Lab Results   Component Value Date    WBC 9 38 2021    HGB 8 9 (L) 2021    HCT 28 3 (L) 2021    MCV 94 2021     2021    MCH 29 6 2021    MCHC 31 4 2021    RDW 14 6 2021    MPV 9 7 2021    NRBC 0 05/10/2021     CMP:   Lab Results   Component Value Date     2021    CO2 28 2021    BUN 28 (H) 2021    CREATININE 1 40 (H) 2021    CALCIUM 8 3 2021    AST 19 2021    ALT 31 2021    ALKPHOS 89 2021    EGFR 48 2021     Urinalysis:   Lab Results   Component Value Date    COLORU Red 2021    CLARITYU Cloudy 2021    SPECGRAV 1 020 2021    PHUR 7 5 2021    PHUR 5 5 2018    LEUKOCYTESUR Negative 2021    NITRITE Negative 2021    GLUCOSEU Negative 2021    KETONESU Negative 2021 BILIRUBINUR Negative 05/04/2021    BLOODU Large (A) 05/04/2021     Urine Culture:   Lab Results   Component Value Date    URINECX No Growth <1000 cfu/mL 05/04/2021     PSA: No results found for: PSA      Assessment/ Plan:  Radiation cystitis/renal failure  Left nephrogram confirmed  Left distal ureteral lesion inflammatory versus tumor  Hand irrigated bedside though mildly hematuria no clots general bladder bleeding consistent with radiation cystitis  Urine cytology is pending right/ left nephrostomy  Plan  DC [place]  Voiding trial  Will watch today  If urine cytologies left ureter negative for carcinoma would  schedule hyperbolic  oxygen therapy  Repeat left nephrogram in 6 weeks  Once bleeding subsides if defect cleared more than likely inflammatory  If defect still present will need left nephroureteral stent insertion and elective left ureteroscopy discussed with patient  DC home tomorrow if voiding well minimal hematuria        Justino Garcia MD

## 2021-05-11 NOTE — PROGRESS NOTES
[address] 43  Progress Note Nithin Pisano 1945, 66 y o  male MRN: [id number]  Unit/Bed#: [address] [place] [address] Encounter: [id number]  Primary Care Provider: Piotr Nur MD   Date and time admitted to hospital: 5/4/2021  7:57 AM    * Gross hematuria  Assessment & Plan  Presented with recurrence of gross hematuria associated with lower abdominal discomfort, bladder spasm and decreased output from the catheter  Recent multiple hospitalization requiring cystoscopy clot evacuation extensive fulguration for radiation cystitis  Improved initially on CBI but hematuria and clot recurred after stopping CBI, CBI was resumed  Hemoglobin remains stable  Patient denies any fever chills or flank pain  Urology following, input appreciated  Recommended bilateral nephrostomy due to recurrent hematuria  Status post IR guided bilateral nephrostomy placement on 05/07  Hematuria appears to have improved  · Urology follow-up noted, recommended bilateral nephrograms  · Status post IR evaluation for left nephrostomy, functioning appropriately  · Monitor for hematuria  · May require cystoscopy if hematuria persistent/recurs after nephrostomy  · Patient is considering hyperbaric oxygen  · Patient has bilateral nephrostomy tube which are stable, however did have some hematuria  [place] catheter has been discontinued today  · Follow up Urology recommendations  Plan to watch the patient today and continue to monitor  If stable tomorrow can be discharged      SHANTI (acute kidney injury) ([facility] [address] )  Assessment & Plan  Baseline creatinine appears to be 1 2-1 3  Creatinine 1 5 on presentation  Likely secondary to urinary retention  Improved initially, creatinine down trended 1 45  · Monitor intake output  · Avoid nephrotoxin  · Follow-up BMP    Anemia  Assessment & Plan  Mild in setting of recurrent hematuria  Somewhat lower than baseline but stable, likely combination of hematuria and dilutional component  Continue to monitor    Essential hypertension  Assessment & Plan  Uncontrolled on presentation due to pain  Now improved  Continue metoprolol with holding parameters    Leukocytosis  Assessment & Plan  Reactive, improving  Monitor    RA (rheumatoid arthritis) (HCC)  Assessment & Plan  On methotrexate and folic acid    Chronic obstructive pulmonary disease (HCC)  Assessment & Plan  Ex-smoker, quit 2 months ago  On Advair and albuterol as needed at home  Substitute with Breo  Continue albuterol as needed    Dyslipidemia  Assessment & Plan  On statin        Subjective:   I have seen and Examined the patient at the bedside  No CP or Sob  No fevers or chills, No nausea or vomiting  Overnight events reviewed with the RN  No Other complains  Patient denies any abdominal pain or back pain  Does complain of some mild hematuria however the Sumner catheter has been removed today  And patient is voiding little bit  Appetite is okay  No fevers or chills  Review of System:   Denies any CP or SOB  Denies any Cough or Cold  Denies any Fevers or chills  Denies any focal tingling numbness or weakness in any extremities  Denies any abdominal pain, Nausea or vomiting  Objective:   Temp (24hrs), Av 3 °F (36 8 °C), Min:97 9 °F (36 6 °C), Max:98 5 °F (36 9 °C)    Temp:  [97 9 °F (36 6 °C)-98 5 °F (36 9 °C)] 98 4 °F (36 9 °C)  HR:  [60-75] 74  Resp:  [16-18] 16  BP: (111-116)/(58-68) 116/68  SpO2:  [90 %-95 %] 94 %  Body mass index is 28 75 kg/m²  Input and Output Summary (last 24 hours): Intake/Output Summary (Last 24 hours) at 2021 1354  Last data filed at 2021 0823  Gross per 24 hour   Intake 480 ml   Output 1450 ml   Net -970 ml     I/O        07 - 05/10 0700 05/10 0701 -  0700  07 -  0700    P  O  150 150 475    I V  (mL/kg)  5 (0 1) 5 (0 1)    IV Piggyback       Total Intake(mL/kg) 150 (1 6) 155 (1 7) 480 (5 1)    Urine (mL/kg/hr) 2400 (1 1) 1200 (0 5) 600 (0 9)    Total Output 2400 [address] [place] [address]                 Physical Exam:   General : Alert, Awake and oriented x 2-3, NAD  Neck : Supple  Eyes:  TR, EOMI  CVS : S1, S2, RRR    R/S : Clear to auscultate anteriorly  Abd: Soft, NT, ND  Bs+ve,  bilateral nephrostomy tube noted draining clear urine  Extremity: Trace pedal edema noted  Skin: No acute Rash noted  CNS: No acute FND  Additional Data:     Labs & Imaging Data Reviewed :    Results from last 7 days   Lab Units 05/11/21  0538 05/10/21  0539   WBC Thousand/uL 9 38 9 99   HEMOGLOBIN g/dL 8 9* 9 0*   HEMATOCRIT % 28 3* 28 6*   PLATELETS Thousands/uL 242 261   NEUTROS PCT %  --  68   LYMPHS PCT %  --  17   MONOS PCT %  --  8   EOS PCT %  --  6     Results from last 7 days   Lab Units 05/11/21  0538   POTASSIUM mmol/L 4 2   CHLORIDE mmol/L 104   CO2 mmol/L 28   BUN mg/dL 28*   CREATININE mg/dL 1 40*   CALCIUM mg/dL 8 3         No results found for: HGBA1C   IR nephrostomy tube check/change/reposition/reinsertion/upsize   Final Result by Jose Tee MD (05/10 8423)   Impression:   1  Nephrostogram demonstrates both nephrostomy catheters in good position with no obstruction  2  The distal left ureter is affected by multiple mucosal or submucosal filling defects which may represent either ureteral tumor implants or focal ureter right is cystic  Clinical correlation is needed  Workstation performed: [id number]         IR nephrostomy tube placement   Final Result by Freya Hendrickson MD (05/07 5113)      Bilateral percutaneous nephrostomy tube placement using 8 5 [place] catheters        Plan:       Return in 3 months for routine PCN exchange    ______________________________________________________________________      PROCEDURE SUMMARY:   - Bilateral percutaneous nephrostomy catheter placement under fluoroscopic and ultrasound guidance      PROCEDURE DETAILS:      Pre-procedure   Consent: Informed consent for the procedure including risks, benefits and alternatives was obtained and time-out was performed prior to the procedure  Preparation: The site was prepared and draped using maximal sterile barrier technique including cutaneous antisepsis  Anesthesia/sedation   Level of anesthesia/sedation: General anesthesia   Anesthesia/sedation administered by: Anesthesiology   Total intra-service sedation time (minutes): 60      Left percutaneous nephrostomy catheter placement   The patient was positioned prone  Under ultrasound guidance, a 21-gauge AccuStick needle was used to gain access into a superior pole calyx of the left kidney  System was converted to an AccuStick catheter over a 0 018 inch wire  8 5 Gabonese catheter was    advanced over a 0 035 inch wire with pigtail loop forming in the left renal collecting system  Contrast was injected under fluoroscopy to confirm proper positioning  Catheter was secured to skin using 2-0 Prolene suture and connected to bag gravity    drainage  Right percutaneous nephrostomy catheter placement   The patient was positioned prone  Under ultrasound guidance, a 21-gauge AccuStick needle was used to gain access into a superior pole calyx of the right kidney  System was converted to an AccuStick catheter over a 0 018 inch wire  8 5 [place]onese catheter was    advanced over a 0 035 inch wire with pigtail loop forming in the right renal collecting system  Contrast was injected under fluoroscopy to confirm proper positioning  Catheter was secured to skin using 2-0 Prolene suture and connected to bag gravity    drainage  Contrast   Contrast agent: Omnipaque 300   Contrast volume (mL): 22      Radiation Dose   Fluoroscopy time (minutes): 7 8     Reference air kerma (mGy): 79       Additional Details   Estimated blood loss (mL): 1   Standardized report: [email or web address]      Attestation   Signer name: [facility]   I attest that I was present for the entire procedure   I reviewed the stored images and agree with the report as written  Workstation performed: [id number]         IR nephrostomy tube check/change/reposition/reinsertion/upsize    (Results Pending)       Cultures:   Blood Culture: No results found for: BLOODCX  Urine Culture:   Lab Results   Component Value Date    URINECX No Growth <1000 cfu/mL 05/04/2021    URINECX <10,000 cfu/ml Yeast (A) 04/29/2021    URINECX 10,000-19,000 cfu/ml Candida albicans (A) 04/24/2021    URINECX 80,000-89,000 cfu/ml Enterococcus faecalis (A) 04/12/2021    URINECX 60,000-69,000 cfu/ml  04/12/2021    URINECX No Growth <1000 cfu/mL 03/25/2018     Sputum Culture: No components found for: SPUTUMCX  Wound Culture: No results found for: WOUNDCULT    Last 24 Hours Medication List:   Current Facility-Administered Medications   Medication Dose Route Frequency Provider Last Rate    acetaminophen  650 mg Oral Q6H PRN Jordi Arita MD      atorvastatin  20 mg Oral HS Jordi Arita MD      belladonna-opium  15 mg Rectal Q6H PRN Jordi Arita MD      cefazolin  2,000 mg Intravenous Q8H Oanh Morrell MD 2,000 mg (05/11/21 1350)    fluticasone-vilanterol  1 puff Inhalation Daily Jordi Arita MD      folic acid  1 mg Oral Daily Jordi Arita MD      ipratropium-albuterol  3 mL Nebulization 4x Daily PRN Jordi Arita MD      methotrexate  2 5 mg Oral Weekly Jordi Arita MD      metoprolol tartrate  25 mg Oral Q12H Ana Laura Figueroa MD      ondansetron  4 mg Intravenous Q8H PRN Yas Varela PA-C         Patient is at moderate risk for morbidity and mortality due to above mentioned illness and comorbidities

## 2021-05-11 NOTE — PLAN OF CARE
Problem: Potential for Falls  Goal: Patient will remain free of falls  Description: INTERVENTIONS:  - Assess patient frequently for physical needs  -  Identify cognitive and physical deficits and behaviors that affect risk of falls    -  [place] fall precautions as indicated by assessment   - Educate patient/family on patient safety including physical limitations  - Instruct patient to call for assistance with activity based on assessment  - Modify environment to reduce risk of injury  - Consider OT/PT consult to assist with strengthening/mobility  Outcome: Progressing     Problem: RESPIRATORY - ADULT  Goal: Achieves optimal ventilation and oxygenation  Description: INTERVENTIONS:  - Assess for changes in respiratory status  - Assess for changes in mentation and behavior  - Position to facilitate oxygenation and minimize respiratory effort  - Oxygen administered by appropriate delivery if ordered  - Initiate smoking cessation education as indicated  - Encourage broncho-pulmonary hygiene including cough, deep breathe, Incentive Spirometry  - Assess the need for suctioning and aspirate as needed  - Assess and instruct to report SOB or any respiratory difficulty  - Respiratory Therapy support as indicated  Outcome: Progressing     Problem: GENITOURINARY - ADULT  Goal: Absence of urinary retention  Description: INTERVENTIONS:  - Assess patients ability to void and empty bladder  - Monitor I/O  - Bladder scan as needed  - Discuss with physician/AP medications to alleviate retention as needed  - Discuss catheterization for long term situations as appropriate  Outcome: Progressing  Goal: Urinary catheter remains patent  Description: INTERVENTIONS:  - Assess patency of urinary catheter  - If patient has a chronic alanis, consider changing catheter if non-functioning  - Follow guidelines for intermittent irrigation of non-functioning urinary catheter  Outcome: Progressing

## 2021-05-11 NOTE — PLAN OF CARE
Problem: Potential for Falls  Goal: Patient will remain free of falls  Description: INTERVENTIONS:  - Assess patient frequently for physical needs  -  Identify cognitive and physical deficits and behaviors that affect risk of falls    -  [place] fall precautions as indicated by assessment   - Educate patient/family on patient safety including physical limitations  - Instruct patient to call for assistance with activity based on assessment  - Modify environment to reduce risk of injury  - Consider OT/PT consult to assist with strengthening/mobility  Outcome: Progressing     Problem: RESPIRATORY - ADULT  Goal: Achieves optimal ventilation and oxygenation  Description: INTERVENTIONS:  - Assess for changes in respiratory status  - Assess for changes in mentation and behavior  - Position to facilitate oxygenation and minimize respiratory effort  - Oxygen administered by appropriate delivery if ordered  - Initiate smoking cessation education as indicated  - Encourage broncho-pulmonary hygiene including cough, deep breathe, Incentive Spirometry  - Assess the need for suctioning and aspirate as needed  - Assess and instruct to report SOB or any respiratory difficulty  - Respiratory Therapy support as indicated  Outcome: Progressing     Problem: GENITOURINARY - ADULT  Goal: Absence of urinary retention  Description: INTERVENTIONS:  - Assess patients ability to void and empty bladder  - Monitor I/O  - Bladder scan as needed  - Discuss with physician/AP medications to alleviate retention as needed  - Discuss catheterization for long term situations as appropriate  Outcome: Progressing  Goal: Urinary catheter remains patent  Description: INTERVENTIONS:  - Assess patency of urinary catheter  - If patient has a chronic alanis, consider changing catheter if non-functioning  - Follow guidelines for intermittent irrigation of non-functioning urinary catheter  Outcome: Progressing

## 2021-05-12 VITALS
HEART RATE: 70 BPM | HEIGHT: 71 IN | OXYGEN SATURATION: 91 % | TEMPERATURE: 98.6 F | BODY MASS INDEX: 28.86 KG/M2 | DIASTOLIC BLOOD PRESSURE: 69 MMHG | SYSTOLIC BLOOD PRESSURE: 123 MMHG | WEIGHT: 206.13 LBS | RESPIRATION RATE: 16 BRPM

## 2021-05-12 LAB
ANION GAP SERPL CALCULATED.3IONS-SCNC: 5 MMOL/L (ref 4–13)
BASOPHILS # BLD AUTO: 0.03 THOUSANDS/ΜL (ref 0–0.1)
BASOPHILS NFR BLD AUTO: 0 % (ref 0–1)
BUN SERPL-MCNC: 28 MG/DL (ref 5–25)
CALCIUM SERPL-MCNC: 8.7 MG/DL (ref 8.3–10.1)
CHLORIDE SERPL-SCNC: 104 MMOL/L (ref 100–108)
CO2 SERPL-SCNC: 31 MMOL/L (ref 21–32)
CREAT SERPL-MCNC: 1.45 MG/DL (ref 0.6–1.3)
EOSINOPHIL # BLD AUTO: 0.64 THOUSAND/ΜL (ref 0–0.61)
EOSINOPHIL NFR BLD AUTO: 7 % (ref 0–6)
ERYTHROCYTE [DISTWIDTH] IN BLOOD BY AUTOMATED COUNT: 14.6 % (ref 11.6–15.1)
GFR SERPL CREATININE-BSD FRML MDRD: 46 ML/MIN/1.73SQ M
GLUCOSE SERPL-MCNC: 114 MG/DL (ref 65–140)
HCT VFR BLD AUTO: 28.9 % (ref 36.5–49.3)
HGB BLD-MCNC: 8.8 G/DL (ref 12–17)
IMM GRANULOCYTES # BLD AUTO: 0.09 THOUSAND/UL (ref 0–0.2)
IMM GRANULOCYTES NFR BLD AUTO: 1 % (ref 0–2)
LYMPHOCYTES # BLD AUTO: 1.37 THOUSANDS/ΜL (ref 0.6–4.47)
LYMPHOCYTES NFR BLD AUTO: 16 % (ref 14–44)
MCH RBC QN AUTO: 28.9 PG (ref 26.8–34.3)
MCHC RBC AUTO-ENTMCNC: 30.4 G/DL (ref 31.4–37.4)
MCV RBC AUTO: 95 FL (ref 82–98)
MONOCYTES # BLD AUTO: 0.76 THOUSAND/ΜL (ref 0.17–1.22)
MONOCYTES NFR BLD AUTO: 9 % (ref 4–12)
NEUTROPHILS # BLD AUTO: 5.75 THOUSANDS/ΜL (ref 1.85–7.62)
NEUTS SEG NFR BLD AUTO: 67 % (ref 43–75)
NRBC BLD AUTO-RTO: 0 /100 WBCS
PLATELET # BLD AUTO: 244 THOUSANDS/UL (ref 149–390)
PMV BLD AUTO: 9.7 FL (ref 8.9–12.7)
POTASSIUM SERPL-SCNC: 4.4 MMOL/L (ref 3.5–5.3)
RBC # BLD AUTO: 3.05 MILLION/UL (ref 3.88–5.62)
SODIUM SERPL-SCNC: 140 MMOL/L (ref 136–145)
WBC # BLD AUTO: 8.64 THOUSAND/UL (ref 4.31–10.16)

## 2021-05-12 PROCEDURE — 85025 COMPLETE CBC W/AUTO DIFF WBC: CPT | Performed by: INTERNAL MEDICINE

## 2021-05-12 PROCEDURE — 80048 BASIC METABOLIC PNL TOTAL CA: CPT | Performed by: INTERNAL MEDICINE

## 2021-05-12 PROCEDURE — 99239 HOSP IP/OBS DSCHRG MGMT >30: CPT | Performed by: INTERNAL MEDICINE

## 2021-05-12 RX ORDER — ATROPA BELLADONNA AND OPIUM 16.2; 3 MG/1; MG/1
15 SUPPOSITORY RECTAL EVERY 6 HOURS PRN
Qty: 10 SUPPOSITORY | Refills: 0 | Status: SHIPPED | OUTPATIENT
Start: 2021-05-12 | End: 2021-06-30 | Stop reason: HOSPADM

## 2021-05-12 RX ADMIN — FOLIC ACID 1 MG: 1 TABLET ORAL at 08:44

## 2021-05-12 RX ADMIN — CEFAZOLIN SODIUM 2000 MG: 2 SOLUTION INTRAVENOUS at 06:19

## 2021-05-12 NOTE — PLAN OF CARE
Problem: Potential for Falls  Goal: Patient will remain free of falls  Description: INTERVENTIONS:  - Assess patient frequently for physical needs  -  Identify cognitive and physical deficits and behaviors that affect risk of falls    -  [place] fall precautions as indicated by assessment   - Educate patient/family on patient safety including physical limitations  - Instruct patient to call for assistance with activity based on assessment  - Modify environment to reduce risk of injury  - Consider OT/PT consult to assist with strengthening/mobility  Outcome: Progressing     Problem: RESPIRATORY - ADULT  Goal: Achieves optimal ventilation and oxygenation  Description: INTERVENTIONS:  - Assess for changes in respiratory status  - Assess for changes in mentation and behavior  - Position to facilitate oxygenation and minimize respiratory effort  - Oxygen administered by appropriate delivery if ordered  - Initiate smoking cessation education as indicated  - Encourage broncho-pulmonary hygiene including cough, deep breathe, Incentive Spirometry  - Assess the need for suctioning and aspirate as needed  - Assess and instruct to report SOB or any respiratory difficulty  - Respiratory Therapy support as indicated  Outcome: Progressing     Problem: GENITOURINARY - ADULT  Goal: Absence of urinary retention  Description: INTERVENTIONS:  - Assess patients ability to void and empty bladder  - Monitor I/O  - Bladder scan as needed  - Discuss with physician/AP medications to alleviate retention as needed  - Discuss catheterization for long term situations as appropriate  Outcome: Progressing

## 2021-05-12 NOTE — DISCHARGE INSTRUCTIONS
Nephrostomy Tube Insertion   WHAT YOU NEED TO KNOW:   A nephrostomy tube is a catheter (thin plastic tube) that is inserted through your skin and into your kidney  The nephrostomy tube is placed to drain urine from your kidney into a collecting bag outside your body  You may need one tube for each kidney  DISCHARGE INSTRUCTIONS:   Medicines:   · Prescription pain medicine  may be given to decrease pain  Do not wait until the pain is severe before you take this medicine  · Take your medicine as directed  Contact your healthcare provider if you think your medicine is not helping or if you have side effects  Tell him or her if you are allergic to any medicine  Keep a list of the medicines, vitamins, and herbs you take  Include the amounts, and when and why you take them  Bring the list or the pill bottles to follow-up visits  Carry your medicine list with you in case of an emergency  Follow up with your healthcare provider as directed: You will need to return for more tests  Write down your questions so you remember to ask them during your visits  Nephrostomy tube care:  Since the nephrostomy tube comes out of your back, you will not be able to care for it by yourself  Ask for help caring for your nephrostomy tube  Ask your healthcare provider how to clean your skin, and what skin barriers and attachment devices to use  Ask what type of urinary drainage bag to use  If the bag is not single-use, ask when and how to clean it  Prevent nephrostomy tube problems:   · Keep the tube taped to your skin and connected to a drainage bag placed below the level of your kidneys  This helps prevent urine from backing up into your kidneys  You may wear a small drainage bag strapped to your leg to let you move around more easily  · Check the catheter to be sure it is in place after you change your clothes or do other activities  Do not wear tight clothing over the tube   Place the tubing over your thigh rather than under it when you are sitting down  Be sure that nothing is pulling on the nephrostomy tube when you move around  · Change positions if you see little or no urine in your drainage bag  Check to see if the urine tube is twisted or bent  Be sure that you are not sitting or lying on the tube  · Flush out the tube as directed  Do this if you think the tube is blocked  Contact your healthcare provider if:   · The skin around the nephrostomy tube is red, swollen, itches, or has a rash  · You have a fever  · You have low back or hip pain  · There are changes in how your urine looks or smells  · A large amount of urine drains into the drainage bag over a short period of time  · You have little or no urine draining from the nephrostomy tube  · You have nausea or are vomiting  · The black xavier on your tube (if there is a xavier) has moved, or you see that your tube is longer than it was when it was put in      · You have questions or concerns about your condition or care  Seek care immediately or call 911 if:   · The nephrostomy tube comes out completely  · There is blood, pus, or a bad smell coming from the place where the tube enters your skin  · Urine is leaking around the tube 10 days after the tube was placed  · Blood soaks through your bandage  © Copyright [employer] 2018 Information is for End User's use only and may not be sold, redistributed or otherwise used for commercial purposes  All illustrations and images included in CareNotes® are the copyrighted property of A D A M , Inc  or [address] [place], [place] [address]tr[address]   The above information is an  only  It is not intended as medical advice for individual conditions or treatments  Talk to your doctor, nurse or pharmacist before following any medical regimen to see if it is safe and effective for you        Nephrostomy Tube Care   WHAT YOU NEED TO KNOW:   A nephrostomy tube is a catheter (thin plastic tube) that is inserted through your skin and into your kidney  The nephrostomy tube drains urine from your kidney into a collecting bag outside your body  You may need a nephrostomy tube when something is blocking the normal flow of urine  A nephrostomy tube may be used for a short or a long period of time  The nephrostomy tube comes out of your back, so you will need someone to help care for your nephrostomy tube  DISCHARGE INSTRUCTIONS:   Medicines:   · Antibiotics  may be given to prevent or treat an infection caused by bacteria  You may need to take antibiotics for 5 to 10 days after the tube is placed  · Take your medicine as directed  Contact your healthcare provider if you think your medicine is not helping or you have side effects  Tell him if you are allergic to any medicine  Keep a list of the medicines, vitamins, and herbs you take  Include the amounts, and when and why you take them  Bring the list or the pill bottles to follow-up visits  Carry your medicine list with you in case of an emergency  Follow up with your healthcare provider as directed: You may need a test called a nephrostogram to make sure your nephrostomy tube is in the correct place  Write down your questions so you remember to ask them during your visits  How to clean the skin around the nephrostomy tube and change the bandage:  Since the nephrostomy tube comes out of your back, you will not be able to care for it by yourself  Ask someone to follow the general directions below to check and care for your nephrostomy tube  Ask your healthcare provider how your skin should be cleaned and what skin barriers and attachment devices to use  · Gather the items you will need  ? Disposable (single use) under-pad, and a clean washcloth    ? Plain soap, warm water, and new medical gloves    ? Sterile gauze bandages    ? Clear adhesive dressing or medical tape    ? Skin barrier    ? Tube attachment device     ?  Protective skin film    ? Hydrogen peroxide and saline solution (if ordered by a healthcare provider)    ? Medicine for your skin (if ordered by a healthcare provider)     ? Trash bag    · Remove the old bandage, and check the tube entry site  ? Have the patient lie on his side with the nephrostomy tube entry site facing up  Place the under-pad where it will catch drainage as you are working with the nephrostomy tube  ? Wash your hands with soap and water  Put on new medical gloves  ? Gently remove the old bandage and skin barrier  Do this by holding the skin beside the tube with one hand  With the other hand, gently remove sticky tape and the skin barrier by pulling in the same direction as hair growth  Do not touch the side of the bandage that is placed over or around the tube  Throw the bandage and skin barrier away in a trash bag     ? Look for signs of infection, such as skin redness and swelling  Report any skin changes to healthcare providers  ? There may be a black xavier on the tube to xavier the place where the tube enters the skin  Check to see that the black xavier is next to the skin  If it is further down the tube, the tube has moved  A healthcare provider needs to put it back in  · Clean the tube entry site  ? Hold the tube in place to keep it from being pulled out while you are cleaning around it  ? You will need to clean the area twice  For the first cleaning, wet a new gauze bandage with soap and water  You may be directed to use hydrogen peroxide instead  Begin at the entry site of the tube  Wipe the skin in circles, moving away from the entry site  Remove blood and any other material with the gauze  Do this as often as needed  Use a new gauze bandage each time you clean the area, moving away from the entry site  ? For the second cleaning, wet a new gauze bandage with water  You may be directed to use saline solution instead  Begin at the entry site of the tube   Wipe the skin in circles, moving away from the entry site  Use a new gauze bandage each time you clean the area, moving away from the entry site  ? Gently pat the skin with a clean washcloth to dry it  Put medicine on the skin if directed by a healthcare provider  · Apply the skin barrier and bandages  ? Cut an opening in the center of the skin barrier large enough to fit around the tube  Cut a slit from the outside edge of the skin barrier to the center opening so that you can fit it around the nephrostomy tube  Place the skin barrier around the nephrostomy tube  Make sure the skin barrier is not touching stitches that may be holding the tube in place  Put a protective skin film over the skin barrier if directed by a healthcare provider  ? Roll up a bandage to make it thick, and wrap it around the place where the tube enters the skin  Place it to support the tube, and stop it from kinking or bending  Tape the bandage in place, and apply more bandages if directed by a healthcare provider  ? An attachment device may be placed over the bandages to help keep the nephrostomy tube in place  ? Bring the tubing forward to the front and tape it to the skin  Do not stretch the tube tight, because this may pull the nephrostomy tube out  How often to change the bandage, skin barrier, and tube attachment device:  Change the bandage around the tube, the bolsters, skin barriers, and tube attachment devices at least every 7 days  If your bandages, barriers, or devices get dirty or wet, change them right away, and as often as needed  If your nephrostomy tube is to be used for a long period of time, the tube needs to be changed every 2 to 3 months  Healthcare providers will tell you when you need to make an appointment to have your tube changed  How to care for the urine drainage bag: You may use a reusable or a single-use (disposable) urine bag  If you are using a disposable urine bag, use it only once, and then throw it away  If you have a reusable urine drainage bag, ask when and how to clean it  The following are general directions for cleaning a reusable urine drainage bag:  · Ask if you need to measure and write down how much urine is in the bag before you empty it  Drain urine out of the drainage bag when it is ½ to ? full  Open the spout at the bottom of the bag to empty the urine into the toilet  · You may need to detach the drainage bag from the nephrostomy tube to clean it  If so, attach a new drainage bag tightly to the nephrostomy tube  · You may need to use a solution such as phosphoric acid to clean the bag  Ask what kind of cleaning solution to use  Use a plastic syringe (without a needle) to gently force the cleaning solution into the drainage bag as you clean it  · Ask if you should leave the cleaning solution in the bag for a time before you drain it out  When it is time to drain the bag, drain the cleaning solution out through the spout at the bottom  · Ask what to use to rinse the urine drainage bag  After you rinse the bag, empty it and hang it up to air dry before you use it again  Throw reusable bags away after you use them for 1 week  How to prevent problems with your nephrostomy tube:   · Change bandages, skin barriers, and attachment devices as directed  This helps to prevent infection  Throw away or clean your drainage bag as directed by your healthcare provider  · Wipe the connecting ends of the drainage bag with alcohol or iodine before you reconnect the bag to the tube  This helps prevent infection  · Keep the tube taped to your skin and connected to a drainage bag placed below the level of your kidneys  This helps prevent urine from backing up into your kidneys  You may wear a small drainage bag strapped to your leg to let you move around more easily  · Use a larger drainage bag at night and when you take naps during the day    This will help prevent urine from leaking out from the place where the tube enters your skin  · Check the catheter to be sure it is in place after you change your clothes or do other activities  Do not wear tight clothing over the tube  Place the tubing over your thigh rather than under it when you are sitting down  Be sure that nothing is pulling on the nephrostomy tube when you move around  · Change positions if you see little or no urine in your drainage bag  Check to see if the urine tube is twisted or bent  Be sure that you are not sitting or lying on the tube  If there are no kinks and there is little or no urine in the drainage bag, tell your healthcare provider  · Flush out the tube as directed  Do this if you think the tube is blocked  Other things to know:   · Drink 2 to 3 liters of liquid each day  unless you were told to limit liquids because of another condition  This amount is equal to about 8 to 12 (eight-ounce) cups of liquid  There should be 30 to 60 milliliters of urine draining into the bag each hour  A large amount of urine that drains over a shorter period of time should be reported  For example, 2,000 milliliters (2 liters) of urine draining out over 8 hours could be a sign of problems  · Keep the site covered while you shower  Tape a piece of clear adhesive plastic over the dressing to keep it dry while you shower  Do not take tub baths  Contact your healthcare provider if:   · The skin around the nephrostomy tube is red, swollen, itches, or has a rash  · You have a fever  · You have lower back or hip pain  · There are changes in how your urine looks or smells  · You have large amounts of urine draining into the drainage bag over a short period of time  · You have little or no urine draining from the nephrostomy tube  · You have nausea and are vomiting      · The black xavier on your tube has moved, or the tube is longer than when it was put in      · You have questions or concerns about your condition or care  Seek care immediately or call 911 if:   · The nephrostomy tube comes out completely  · There is blood, pus, or a bad smell coming from the place where the tube enters your skin  · Urine is leaking around the tube 10 days after the tube was placed  © Copyright [address] Information is for End User's use only and may not be sold, redistributed or otherwise used for commercial purposes  All illustrations and images included in CareNotes® are the copyrighted property of A D A M , Inc  or [facility]   The above information is an  only  It is not intended as medical advice for individual conditions or treatments  Talk to your doctor, nurse or pharmacist before following any medical regimen to see if it is safe and effective for you

## 2021-05-12 NOTE — PLAN OF CARE
Problem: Potential for Falls  Goal: Patient will remain free of falls  Description: INTERVENTIONS:  - Assess patient frequently for physical needs  -  Identify cognitive and physical deficits and behaviors that affect risk of falls    -  [place] fall precautions as indicated by assessment   - Educate patient/family on patient safety including physical limitations  - Instruct patient to call for assistance with activity based on assessment  - Modify environment to reduce risk of injury  - Consider OT/PT consult to assist with strengthening/mobility  Outcome: Progressing     Problem: RESPIRATORY - ADULT  Goal: Achieves optimal ventilation and oxygenation  Description: INTERVENTIONS:  - Assess for changes in respiratory status  - Assess for changes in mentation and behavior  - Position to facilitate oxygenation and minimize respiratory effort  - Oxygen administered by appropriate delivery if ordered  - Initiate smoking cessation education as indicated  - Encourage broncho-pulmonary hygiene including cough, deep breathe, Incentive Spirometry  - Assess the need for suctioning and aspirate as needed  - Assess and instruct to report SOB or any respiratory difficulty  - Respiratory Therapy support as indicated  Outcome: Progressing     Problem: GENITOURINARY - ADULT  Goal: Absence of urinary retention  Description: INTERVENTIONS:  - Assess patients ability to void and empty bladder  - Monitor I/O  - Bladder scan as needed  - Discuss with physician/AP medications to alleviate retention as needed  - Discuss catheterization for long term situations as appropriate  Outcome: Progressing  Goal: Urinary catheter remains patent  Description: INTERVENTIONS:  - Assess patency of urinary catheter  - If patient has a chronic alanis, consider changing catheter if non-functioning  - Follow guidelines for intermittent irrigation of non-functioning urinary catheter  Outcome: Progressing

## 2021-05-12 NOTE — CASE MANAGEMENT
Discharge ordered  Pt will be returning home with wife and home care services through [facility]  SW met with wife to review plans  SW spoke with Brad Camara at [facility] to confirm acceptance of case and inform her of discharge today  Brad Camara said agency will be calling pt and wife to schedule SOC  AVS will be faxed to agency through [address] [place] [address] Rd  Wife will be transporting pt home  No other needs expressed by wife

## 2021-05-12 NOTE — DISCHARGE SUMMARY
Discharge Summary - [facility] Internal Medicine    Patient Information: Phillip Cartwright 66 y o  male MRN: [id number]  Unit/Bed#: [address] [place] [address] Encounter: [id number]    Discharging Physician / Practitioner: Laura Rodriguez MD  PCP: Nic Richard MD  Admission Date: 5/4/2021  Discharge Date: 05/12/21    Reason for Admission: Bladder Spasms (patient with a alanis, discharged Sunday  States he has been having pressure and spasms all night  Drained back this morning and has had little to no drainage since )      Discharge Diagnoses:     Primary Discharge Diagnosis:     Principal Problem:    Gross hematuria  Active Problems:    SHANTI (acute kidney injury) ([facility] [address] )    Essential hypertension    Anemia    Leukocytosis    RA (rheumatoid arthritis) ([facility])    Dyslipidemia    [facility]  Resolved Problems:    * No resolved hospital problems  *    Consultations During Hospital Stay:  [address] TO IR  INPATIENT CONSULT TO IR    Procedures Performed:   IR guided bilateral nephrostomy tube placement on 05/10/2021  Significant Findings:   · Refer to hospital course and above listed diagnosis related plan for details    Imaging while in hospital:    Incidental Findings:   ·     Test Results Pending at Discharge (will require follow up):   · As per After Visit Summary     Outpatient Tests Requested:  CBC, BMP in 1 week  Complications:  Refer to hospital course and above listed diagnosis related plan, if any    Hospital Course:     Phillip Cartwright is a 66 y o  male patient with PMHx of COPD, hypertension, dyslipidemia, prostate cancer status post radiation and chemotherapy and history of 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 as well as bladder spasms and pain who originally presented to the hospital on 5/4/2021 due to chief complaints of gross hematuria and bladder spasms    Patient has had this problem for a while and was discharged on [place] catheter and again started having gross hematuria  Patient was then considered by Urology to get bilateral nephrostomy tube placement which he got on 05/10/2021 by Interventional Radiology  Status post dose procedure patient was much better  Bilateral nephrostomy tubes were draining clear urine however patient had mild hematuria for which he was then monitored for another day and eventually on 05/12/2021 patient was cleared by Urology to follow up with Urology as an outpatient and patient will be in the plan to get hyperbaric oxygen treatment for this radiation cystitis  Patient takes methotrexate at home which [facility] inpatient pharmacy contacted patient's rheumatology and they recommended that they were planning to stop the patient's methotrexate  Currently the recommended to continue the methotrexate on discharge but eventually would need to be stopped  Communicated this with the patient's family-patient's wife  Patient will be given a prescription for belladonna suppository for bladder spasms as needed  Patient will follow-up with urology as an outpatient  Please see above list of diagnoses and related plan for additional information  Condition at Discharge: fair     Discharge Day Visit / Exam:     Subjective:  I have seen and examined the patient at bedside  Overnight events reviewed with the RN  Vitals: Blood Pressure: 123/69 (05/12/21 0803)  Pulse: 70 (05/12/21 0803)  Temperature: 98 6 °F (37 °C) (05/12/21 0803)  Temp Source: Oral (05/11/21 0539)  Respirations: 16 (05/11/21 2238)  Height: 5' 11" (180 3 cm) (05/05/21 1539)  Weight - Scale: 93 5 kg (206 lb 2 1 oz) (05/05/21 1539)  SpO2: 91 % (05/12/21 0803)  Exam:   Physical Exam  General Exam: Alert and Oriented x 3, NAD  Eyes: TR  Neck: Supple  CVS: S1, S2 Price, RRR    R/S: Clear to auscultate anteriorly  Abd: Soft, NT, ND, BS+ve, bilateral nephrostomy tube draining clear urine  Extremities: No edema noted  Skin: No acute Rash noted  CNS: No acute FND  Moves all 4 extremities  Psych: Co-operative, Not agitated  Discharge instructions/Information to patient and family:(Discharge Medications and Follow up):   See after visit summary for information provided to patient and family  Provisions for Follow-Up Care:  See after visit summary for information related to follow-up care and any pertinent home health orders  Disposition: Home with Home Health  Planned Readmission:  No     Discharge Statement:  I spent 35 minutes discharging the patient  This time was spent on the day of discharge  I had direct contact with the patient on the day of discharge  Greater than 50% of the total time was spent examining patient, answering all patient questions, arranging and discussing plan of care with patient as well as directly providing post-discharge instructions  Additional time then spent on discharge activities  Discharge Medications:  See after visit summary for reconciled discharge medications provided to patient and family  ** Please Note: "This note has been constructed using a voice recognition system  Therefore there may be syntax, spelling, and/or grammatical errors   Please call if you have any questions  "**

## 2021-05-12 NOTE — PLAN OF CARE
Problem: Potential for Falls  Goal: Patient will remain free of falls  Description: INTERVENTIONS:  - Assess patient frequently for physical needs  -  Identify cognitive and physical deficits and behaviors that affect risk of falls  -  [place] fall precautions as indicated by assessment   - Educate patient/family on patient safety including physical limitations  - Instruct patient to call for assistance with activity based on assessment  - Modify environment to reduce risk of injury  - Consider OT/PT consult to assist with strengthening/mobility  5/12/2021 1215 by Wilbert Carcamo RN  Outcome: Adequate for Discharge  5/12/2021 1215 by Wilbert Carcamo RN  Outcome: Adequate for Discharge  5/12/2021 [id number] by Wilbert Carcamo RN  Outcome: Progressing     Problem: RESPIRATORY - ADULT  Goal: Achieves optimal ventilation and oxygenation  Description: INTERVENTIONS:  - Assess for changes in respiratory status  - Assess for changes in mentation and behavior  - Position to facilitate oxygenation and minimize respiratory effort  - Oxygen administered by appropriate delivery if ordered  - Initiate smoking cessation education as indicated  - Encourage broncho-pulmonary hygiene including cough, deep breathe, Incentive Spirometry  - Assess the need for suctioning and aspirate as needed  - Assess and instruct to report SOB or any respiratory difficulty  - Respiratory Therapy support as indicated  5/12/2021 1215 by Wilbert Carcamo RN  Outcome: Adequate for Discharge  5/12/2021 1215 by Wilbert Carcamo RN  Outcome: Adequate for Discharge  5/12/2021 5325 by Wilbert Carcamo RN  Outcome: Progressing     Problem: GENITOURINARY - ADULT  Goal: Absence of urinary retention  Description: INTERVENTIONS:  - Assess patients ability to void and empty bladder  - Monitor I/O  - Bladder scan as needed  - Discuss with physician/AP medications to alleviate retention as needed  - Discuss catheterization for long term situations as appropriate  5/12/2021 1215 by Abi Carcamo RN  Outcome: Adequate for Discharge  5/12/2021 1215 by Abi Carcamo RN  Outcome: Adequate for Discharge  5/12/2021 [id number] by Abi Carcamo RN  Outcome: Progressing  Goal: Urinary catheter remains patent  Description: INTERVENTIONS:  - Assess patency of urinary catheter  - If patient has a chronic alanis, consider changing catheter if non-functioning  - Follow guidelines for intermittent irrigation of non-functioning urinary catheter  5/12/2021 1215 by Abi Carcamo RN  Outcome: Adequate for Discharge  5/12/2021 1215 by Abi Carcamo RN  Outcome: Adequate for Discharge  5/12/2021 5412 by Abi Carcamo RN  Outcome: Progressing

## 2021-05-12 NOTE — PROGRESS NOTES
Progress Note - Urology      Patient: Yennifer Smith   : 1945 Sex: male   MRN: [id number]     CSN: [id number]  Unit/Bed#: [address] [address]     SUBJECTIVE:   [place] out 24 hour  Urine clear  Bilateral nephrostomy tubes draining clear urine  Patient ready for discharge  Aware left nephrogram confirmed questionable inflammatory or malignancy  Will undergo repeat left nephrogram after O2 therapy  Urine cytology is pending    Objective   Vitals: /69   Pulse 70   Temp 98 6 °F (37 °C)   Resp 16   Ht 5' 11" (1 803 m)   Wt 93 5 kg (206 lb 2 1 oz)   SpO2 91%   BMI 28 75 kg/m²     I/O last 24 hours:   In: 480 [P O :475; I V :5]  Out: 1500 [Urine:1500]      Physical Exam:   General Alert awake   Normocephalic atraumatic PERRLA  Lungs clear bilaterally  Cardiac normal S1 normal S2  Abdomen soft, flank pain  Extremities no edema      Lab Results: CBC:   Lab Results   Component Value Date    WBC 8 64 2021    HGB 8 8 (L) 2021    HCT 28 9 (L) 2021    MCV 95 2021     2021    MCH 28 9 2021    MCHC 30 4 (L) 2021    RDW 14 6 2021    MPV 9 7 2021    NRBC 0 2021     CMP:   Lab Results   Component Value Date     2021    CO2 31 2021    BUN 28 (H) 2021    CREATININE 1 45 (H) 2021    CALCIUM 8 7 2021    AST 19 2021    ALT 31 2021    ALKPHOS 89 2021    EGFR 46 2021     Urinalysis:   Lab Results   Component Value Date    COLORU Red 2021    CLARITYU Cloudy 2021    SPECGRAV 1 020 2021    PHUR 7 5 2021    PHUR 5 5 2018    LEUKOCYTESUR Negative 2021    NITRITE Negative 2021    GLUCOSEU Negative 2021    KETONESU Negative 2021    BILIRUBINUR Negative 2021    BLOODU Large (A) 2021     Urine Culture:   Lab Results   Component Value Date    URINECX No Growth <1000 cfu/mL 2021     PSA: No results found for: PSA      Assessment/ Plan:  Radiation cystitis  Bilateral nephrostomy tubes  Voiding clear urine  DC home  Patient to schedule hyperbolic O2 therapy ASAP  Will see in the office in 1 week check nephrostomy tubes        Mina Issa MD

## 2021-05-12 NOTE — NURSING NOTE
Patient discharged home with home health care services  Patient sent with referral for blood work as well as a prescription to the pharmacy for a suppository  Patient's IV taken out and all belongings sent with  Education given to patient about nephrostomy tubes and how to care for them  Patient left with bilateral nephrostomy tubes as well  Patient understood measures of how to empty tubes and when to seek medical attention again for possible complications  All questions and concerns answered  Patient left floor via wheelchair accompanied by PCA

## 2021-05-14 ENCOUNTER — OFFICE VISIT (OUTPATIENT)
Dept: WOUND CARE | Facility: HOSPITAL | Age: 76
End: 2021-05-14
Payer: MEDICARE

## 2021-05-14 VITALS
TEMPERATURE: 97.5 F | RESPIRATION RATE: 16 BRPM | DIASTOLIC BLOOD PRESSURE: 82 MMHG | SYSTOLIC BLOOD PRESSURE: 150 MMHG | HEART RATE: 96 BPM

## 2021-05-14 DIAGNOSIS — F41.8 ANXIETY ASSOCIATED WITH HYPERBARIC OXYGEN THERAPY: ICD-10-CM

## 2021-05-14 DIAGNOSIS — N30.41 IRRADIATION CYSTITIS WITH HEMATURIA: Primary | ICD-10-CM

## 2021-05-14 PROCEDURE — 99204 OFFICE O/P NEW MOD 45 MIN: CPT | Performed by: NURSE PRACTITIONER

## 2021-05-14 PROCEDURE — 99213 OFFICE O/P EST LOW 20 MIN: CPT | Performed by: NURSE PRACTITIONER

## 2021-05-14 RX ORDER — LORAZEPAM 0.5 MG/1
0.5 TABLET ORAL EVERY 8 HOURS PRN
Qty: 30 TABLET | Refills: 0 | Status: SHIPPED | OUTPATIENT
Start: 2021-05-14 | End: 2021-06-11 | Stop reason: SDUPTHER

## 2021-05-14 RX ORDER — NALOXONE HYDROCHLORIDE 4 MG/.1ML
SPRAY NASAL
Qty: 1 EACH | Refills: 1 | Status: SHIPPED | OUTPATIENT
Start: 2021-05-14 | End: 2021-08-02 | Stop reason: HOSPADM

## 2021-05-14 NOTE — PROGRESS NOTES
Patient ID: Robbie Marcus is a 66 y o  male Date of Birth 1945     Chief Complaint  Chief Complaint   Patient presents with    HBO Consult       Allergies  Patient has no known allergies  Assessment:     Diagnoses and all orders for this visit:    Irradiation cystitis with hematuria  -     Hyperbaric oxygen thearpy; Standing    Anxiety associated with hyperbaric oxygen therapy  -     LORazepam (ATIVAN) 0 5 mg tablet; Take 1 tablet (0 5 mg total) by mouth every 8 (eight) hours as needed for anxiety (1-2 tablets prior to hyperbaric oxygen therapy)  -     naloxone (NARCAN) 4 mg/0 1 mL nasal spray; Administer 1 spray into a nostril  If no response after 2-3 minutes, give another dose in the other nostril using a new spray  Plan:  1  Patient with a history of prostate cancer s/p prostatectomy s/p radiation 8-10 years ago with recurring hospitalizations of radiation cystitis with hematuria  Awaiting for radiation records from [facility]  Patient is a candidate for HBOT and would benefit from treatment with the goal of angiogenesis  2  Addendum: Received radiation records from [facility]  Per patient's records patient received a total dose of 6840cGy which was completed on 7/8/2015  3  Patient has no contraindications  Patient had a CXR on 4/24/21 which showed no acute cardiopulmonary disease  4  He also had an EKG on 4/24 which showed NSR  5  Will prescribe 0 5mg lorazepam to help with anxiety d/t HBOT  5  Patient is scheduled to start on Tuesday at 8:15               Subjective:     Patient is a 66year old male who was seen today for an HBO consult  Patient was referred by his urologist Dr Kaitlyn Perera  Patient was diagnosed with prostate cancer  He underwent a prostatectomy with radiation at [facility]  He received a total dose of 6840cGy which was completed on 7/8/2015  He reports he had 35 radiation treatments  He has had numerous hospitalizations for hematuria   Most recently, he underwent a cystoscopy with evacuation of organized clot, bladder biopsy and extensive fulguration of radiation cystitis on 4/14/21  The following portions of the patient's history were reviewed and updated as appropriate:   He  has a past medical history of Cancer ([place] [address] ), COPD (chronic obstructive pulmonary disease) ([place] [address] ), Hyperlipidemia, and Hypertension  He   Patient Active Problem List    Diagnosis Date Noted    Leukocytosis 05/04/2021    RA (rheumatoid arthritis) ([place] [address] ) 05/04/2021    Acute blood loss anemia (ABLA) 04/26/2021    SHANTI (acute kidney injury) ([place] [address] ) 04/24/2021    UTI (urinary tract infection) 04/24/2021    Hematuria due to cystitis     Anemia 04/14/2021    Chronic obstructive pulmonary disease ([place] [address] ) 04/14/2021    Hyperlipidemia 04/13/2021    Gross hematuria 04/12/2021    Cystitis 04/12/2021    Essential hypertension 04/12/2021    Dyslipidemia 04/12/2021     He  has a past surgical history that includes Appendectomy; Hernia repair; Rotator cuff repair; Prostatectomy; [place] retrograde pyelogram (4/14/2021); pr cystourethroscopy w/irrig & evac clots (Bilateral, 4/14/2021); pr cystourethroscopy w/irrig & evac clots (N/A, 4/24/2021); IR nephrostomy tube placement (5/7/2021); and IR nephrostomy tube check/change/reposition/reinsertion/upsize (5/10/2021)  His family history includes Diabetes in his brother and mother; Stroke in his brother and mother  He  reports that he quit smoking about 3 years ago  He has a 25 00 pack-year smoking history  He has never used smokeless tobacco  He reports that he does not drink alcohol or use drugs    Current Outpatient Medications   Medication Sig Dispense Refill    albuterol (PROVENTIL HFA,VENTOLIN HFA) 90 mcg/act inhaler Inhale 2 puffs every 4 (four) hours as needed for wheezing 1 Inhaler 0    atorvastatin (LIPITOR) 20 mg tablet Take 20 mg by mouth daily at bedtime      folic acid (FOLVITE) 1 mg tablet Take 1 mg by mouth daily      methotrexate 2 5 mg tablet Take 15 5 mg by mouth once a week      belladonna-opium (B&O SUPPOSITORY) 16 2-30 mg suppository Insert 0 5 suppositories into the rectum every 6 (six) hours as needed for cramping or bladder spasms for up to 10 daysMax Daily Amount: 2 suppositories (Patient not taking: Reported on 5/14/2021) 10 suppository 0    Calcium Carb-Cholecalciferol (CALTRATE 600+D3 SOFT PO) Take by mouth      LORazepam (ATIVAN) 0 5 mg tablet Take 1 tablet (0 5 mg total) by mouth every 8 (eight) hours as needed for anxiety (1-2 tablets prior to hyperbaric oxygen therapy) 30 tablet 0    metoprolol tartrate (LOPRESSOR) 25 mg tablet Take 25 mg by mouth every 12 (twelve) hours      naloxone (NARCAN) 4 mg/0 1 mL nasal spray Administer 1 spray into a nostril  If no response after 2-3 minutes, give another dose in the other nostril using a new spray  1 each 1     No current facility-administered medications for this visit  He has No Known Allergies       Review of Systems   Constitutional: Negative  HENT: Negative for ear pain and hearing loss  Eyes: Negative for pain  Respiratory: Negative for chest tightness and shortness of breath  Cardiovascular: Negative for chest pain, palpitations and leg swelling  Gastrointestinal: Negative for diarrhea, nausea and vomiting  Genitourinary: Positive for dysuria and hematuria  Musculoskeletal: Negative for gait problem  Skin: Positive for wound  Neurological: Negative for tremors and weakness  Psychiatric/Behavioral: Negative for behavioral problems, confusion and suicidal ideas  HBO Qualification & Assessment     Yennifer Smith presents today for a consultation for HBO treatment      HBO Indication    Soft Tissue Radionecrosis    Soft Tissue Radionecrosis diagnosis code  N30 41 Irradiation cystitis with hematuria    Anatomical site Other    Date radiation treatments started Unknown    Date radiation treatments completed 7/8/2015    Radiation therapy treatments ended at least 6 months previous to consideration of HBO  Yes    Symptoms of STRN the patient is experiencing Bleeding    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 as evidenced by above documentation HBO is medically necessary    Review of the [facility], dada patient had cancer and received radiation  HBO is being used as an adjunct to conventional Rx  Based on the information included herein, it is my determination that the patient has a medical necessity for HBOT and meets the conditions for the adjunctive treatment to a comprehensive plan    Yes    Brief history of co-morbidities that may affect HBO treatment    Previously treated with No chemotherapy or medications which are contraindications to HBO    Patient reports s/s of No acute infections    Eyes    Patient has  No history of Myopia, Cataracts or Optic Nerve    Ears    Patient has a history of No ear abnormalities or conditions    Ears assessed for tympanic membrane or middle are abnormalities  Ears clear bilaterally    Patient instructed on how to clear ears and demonstrate proper technique Yes    Right ear baseline TEEDS Grade 0    Left ear baseline TEEDS Grade 0    ENT consult for Myringotomy tube insertion due to No consult is needed    Cerumen removal performed N/A  ears clear of cerumen    Neurological    Patient lindsey a history of seizures No    Cardiovascular    Patient has a history of No cardiac history requiring work-up prior to hyperbaric therapy    EKG ordered No    Echocardiogram ordered No    Ejection fraction Greater than 50%    Cardiovascular consult ordered No    Smoking  Social History     Tobacco Use   Smoking Status Former Smoker    Packs/day: 0 50    Years: 50 00    Pack years: 25 00    Quit date: 8/29/2017    Years since quitting: 3 7   Smokeless Tobacco Never Used   Tobacco Comment    quit one month ago       Respiratory    Patient has a history of pneumothorax No    Patient has a history of COPD     Lungs clear bilaterally Yes    Chest x-ray ordered No    Psychiatric    Patient has confinement anxiety Yes    Patient education and consent    I discussed with and educated the patient on the risks and benefits of HBO, different treatment options, potential adverse side effects and side effects, HBO procedure, smoking/drug/alcohol policy, and itesm not allowed in the hyperbaric chamber  Yes    Written consent for HBO obtained Yes    A trained emergency response team and ICU is available in this facility to assist with complications if required  Yes    HBO treatment goal    Angiogenesis    Objective:            /82   Pulse 96   Temp 97 5 °F (36 4 °C)   Resp 16     Physical Exam  Vitals signs and nursing note reviewed  Constitutional:       General: He is not in acute distress  Appearance: Normal appearance  He is normal weight  HENT:      Head: Normocephalic and atraumatic  Right Ear: Tympanic membrane, ear canal and external ear normal  There is no impacted cerumen  Left Ear: Tympanic membrane, ear canal and external ear normal  There is no impacted cerumen  Eyes:      General:         Right eye: No discharge  Left eye: No discharge  Neck:      Musculoskeletal: Normal range of motion and neck supple  No neck rigidity  Cardiovascular:      Rate and Rhythm: Normal rate and regular rhythm  Heart sounds: Normal heart sounds  No murmur  No friction rub  No gallop  Pulmonary:      Effort: Pulmonary effort is normal  No respiratory distress  Breath sounds: Normal breath sounds  Musculoskeletal: Normal range of motion  Right lower leg: No edema  Left lower leg: No edema  Skin:     General: Skin is warm and dry  Findings: No erythema or wound  Neurological:      General: No focal deficit present  Mental Status: He is alert and oriented to person, place, and time  Mental status is at baseline     Psychiatric:         Mood and Affect: Mood normal  Behavior: Behavior normal          Thought Content: Thought content normal          Judgment: Judgment normal            Wound Instructions:  Orders Placed This Encounter   Procedures    Hyperbaric oxygen thearpy     Standing Status:   Standing     Number of Occurrences:   40     Standing Expiration Date:   5/14/2022     Order Specific Question:   FE Rate     Answer:   2 5 FE for 90 Minutes with 2 five minute air breaks; 100% oxygen     Order Specific Question:   Descent and ascent rate     Answer:   Descent and ascent rate of up to 2 psi/min depending upon the patient's ability to clear ears and comfort     Order Specific Question:   Frequency     Answer:   5 x week     Order Specific Question:   Total number of treatments     Answer:   40        Diagnosis ICD-10-CM Associated Orders   1  Irradiation cystitis with hematuria  N30 41 Hyperbaric oxygen thearpy   2   Anxiety associated with hyperbaric oxygen therapy  F41 9 LORazepam (ATIVAN) 0 5 mg tablet     naloxone (NARCAN) 4 mg/0 1 mL nasal spray

## 2021-05-17 ENCOUNTER — HOSPITAL ENCOUNTER (EMERGENCY)
Facility: HOSPITAL | Age: 76
Discharge: HOME | End: 2021-05-17
Attending: EMERGENCY MEDICINE | Admitting: EMERGENCY MEDICINE
Payer: MEDICARE

## 2021-05-17 VITALS
TEMPERATURE: 96.9 F | HEART RATE: 101 BPM | OXYGEN SATURATION: 98 % | RESPIRATION RATE: 17 BRPM | DIASTOLIC BLOOD PRESSURE: 73 MMHG | SYSTOLIC BLOOD PRESSURE: 145 MMHG

## 2021-05-17 DIAGNOSIS — N30.40 RADIATION CYSTITIS: ICD-10-CM

## 2021-05-17 DIAGNOSIS — R31.9 HEMATURIA: Primary | ICD-10-CM

## 2021-05-17 PROCEDURE — 99283 EMERGENCY DEPT VISIT LOW MDM: CPT

## 2021-05-17 PROCEDURE — 99284 EMERGENCY DEPT VISIT MOD MDM: CPT | Performed by: EMERGENCY MEDICINE

## 2021-05-17 NOTE — ED PROVIDER NOTES
History  Chief Complaint   Patient presents with    Blood in Urine     pt reports has had bloody urine/occasional clots from penis since saturday- reports his nephrostomy bag urine is normal for him     Patient has a history of prostate cancer, status post external beam radiation  Patient recently developed problems with recurrent gross hematuria  He is scheduled to start hyperbaric oxygen treatments tomorrow  Patient states left the hospital last Wednesday and was well with bilateral nephrostomy tubes  States he still able the pass some urine via the penile urethra, which was doing normally until early Sunday morning  Patient states he once again experienced gross hematuria while urinating with some mild burning in the penis  He has no abdominal pain  He has no fever  Bilateral nephrostomy tube connected to leg bag have been draining clear clyde urine  Patient states he was directed here for evaluation by the bariatric treatment center  Prior to Admission Medications   Prescriptions Last Dose Informant Patient Reported? Taking?    Calcium Carb-Cholecalciferol (CALTRATE 600+D3 SOFT PO)   Yes Yes   Sig: Take by mouth   LORazepam (ATIVAN) 0 5 mg tablet   No Yes   Sig: Take 1 tablet (0 5 mg total) by mouth every 8 (eight) hours as needed for anxiety (1-2 tablets prior to hyperbaric oxygen therapy)   albuterol (PROVENTIL HFA,VENTOLIN HFA) 90 mcg/act inhaler   No Yes   Sig: Inhale 2 puffs every 4 (four) hours as needed for wheezing   atorvastatin (LIPITOR) 20 mg tablet   Yes Yes   Sig: Take 20 mg by mouth daily at bedtime   belladonna-opium (B&O SUPPOSITORY) 16 2-30 mg suppository Not Taking at Unknown time  No No   Sig: Insert 0 5 suppositories into the rectum every 6 (six) hours as needed for cramping or bladder spasms for up to 10 daysMax Daily Amount: 2 suppositories   Patient not taking: Reported on 3/95/5034   folic acid (FOLVITE) 1 mg tablet   Yes Yes   Sig: Take 1 mg by mouth daily methotrexate 2 5 mg tablet   Yes Yes   Sig: Take 15 5 mg by mouth once a week   metoprolol tartrate (LOPRESSOR) 25 mg tablet   Yes Yes   Sig: Take 25 mg by mouth every 12 (twelve) hours   naloxone (NARCAN) 4 mg/0 1 mL nasal spray   No No   Sig: Administer 1 spray into a nostril  If no response after 2-3 minutes, give another dose in the other nostril using a new spray  Facility-Administered Medications: None       Past Medical History:   Diagnosis Date    Cancer [facility])     prostate    COPD [facility]chronic obstructive pulmonary disease) ([facility] [address] )     Hyperlipidemia     Hypertension        Past Surgical History:   Procedure Laterality Date    APPENDECTOMY      FL RETROGRADE PYELOGRAM  4/14/2021    HERNIA REPAIR      IR NEPHROSTOMY TUBE CHECK/CHANGE/REPOSITION/REINSERTION/UPSIZE  5/10/2021    IR NEPHROSTOMY TUBE PLACEMENT  5/7/2021    MO CYSTOURETHROSCOPY W/IRRIG & EVAC CLOTS Bilateral 4/14/2021    Procedure: CYSTOSCOPY EVACUATION OF CLOTS bilateral retrograde pyelograms possible TURBT bladder biopsy;  Surgeon: Ning Catherine MD;  Location: [address] [place] [address];  Service: Urology    MO CYSTOURETHROSCOPY W/IRRIG & EVAC CLOTS N/A 4/24/2021    Procedure: CYSTOSCOPY EVACUATION OF CLOTS fulguration;  Surgeon: Ning Catherine MD;  Location: [facility];  Service: Urology    PROSTATECTOMY      ROTATOR CUFF REPAIR      right shoulder       Family History   Problem Relation Age of Onset    Diabetes Mother     Stroke Mother     Diabetes Brother     Stroke Brother      I have reviewed and agree with the history as documented      E-Cigarette/Vaping    E-Cigarette Use Former User      E-Cigarette/Vaping Substances    Nicotine No     THC No     CBD No     Flavoring No     Other No     Unknown No      Social History     Tobacco Use    Smoking status: Former Smoker     Packs/day: 0 50     Years: 50 00     Pack years: 25 00     Quit date: 8/29/2017     Years since quitting: 3 7    Smokeless tobacco: Never Used    Tobacco comment: quit one month ago   Substance Use Topics    Alcohol use: Never     Frequency: Never    Drug use: No       Review of Systems   Constitutional: Negative for chills and fever  HENT: Negative for congestion and sore throat  Eyes: Negative for visual disturbance  Respiratory: Negative for cough and shortness of breath  Cardiovascular: Negative for chest pain  Gastrointestinal: Negative for abdominal pain and vomiting  Genitourinary: Positive for dysuria, hematuria and penile pain  Negative for testicular pain  Musculoskeletal: Negative for back pain  Skin: Negative for rash  Neurological: Negative for weakness  Hematological: Does not bruise/bleed easily  Psychiatric/Behavioral: Negative for confusion  All other systems reviewed and are negative  Physical Exam  Physical Exam  Vitals signs and nursing note reviewed  Constitutional:       Appearance: Normal appearance  HENT:      Head: Normocephalic  Right Ear: External ear normal       Left Ear: External ear normal       Nose: Nose normal       Mouth/Throat:      Mouth: Mucous membranes are moist    Eyes:      Conjunctiva/sclera: Conjunctivae normal    Neck:      Musculoskeletal: Normal range of motion  Cardiovascular:      Rate and Rhythm: Normal rate  Pulses: Normal pulses  Pulmonary:      Effort: Pulmonary effort is normal    Abdominal:      Palpations: Abdomen is soft  Tenderness: There is no abdominal tenderness  Genitourinary:     Penis: Normal        Comments: Patient has fresh blood in his adult diaper  Musculoskeletal: Normal range of motion  Skin:     General: Skin is warm and dry  Capillary Refill: Capillary refill takes less than 2 seconds  Neurological:      General: No focal deficit present  Mental Status: He is alert     Psychiatric:         Mood and Affect: Mood normal          Behavior: Behavior normal          Vital Signs  ED Triage Vitals [05/17/21 1117]   Temperature Pulse Respirations Blood Pressure SpO2   (!) 96 9 °F (36 1 °C) 101 17 145/73 98 %      Temp Source Heart Rate Source Patient Position - Orthostatic VS BP Location FiO2 (%)   Tympanic -- Sitting Left arm --      Pain Score       --           Vitals:    05/17/21 1117   BP: 145/73   Pulse: 101   Patient Position - Orthostatic VS: Sitting         Visual Acuity      ED Medications  Medications - No data to display    Diagnostic Studies  Results Reviewed     Procedure Component Value Units Date/Time    UA (URINE) with reflex to Scope [[id number]]     Lab Status: No result Specimen: Urine                  No orders to display              Procedures  Procedures         ED Course                                           MDM  Number of Diagnoses or Management Options  Diagnosis management comments: Both nephrostomies are draining normally  Bedside bladder scan was performed that shows only a small amount of fluid contained in the bladder  Will check a penile urinalysis for possible infection  Patient is well-known to his urologist who had discussed the case with this morning  States he will see him as he office      Disposition  Final diagnoses:   Hematuria   Radiation cystitis     Time reflects when diagnosis was documented in both MDM as applicable and the Disposition within this note     Time User Action Codes Description Comment    5/17/2021 11:52 AM Gal ALFORD Add [R31 9] Hematuria     5/17/2021 11:52 AM Reese Santos Add [N30 40] Radiation cystitis       ED Disposition     ED Disposition Condition Date/Time Comment    Discharge Stable Mon May 17, 2021 11:52 AM Nancie Escobedo discharge to home/self care              Follow-up Information     Follow up With Specialties Details Why Contact Info    Kerwin Rivera MD Urology Schedule an appointment as soon as possible for a visit in 1 day  [address]  [phone]            Patient's Medications   Discharge Prescriptions No medications on file     No discharge procedures on file      PDMP Review       Value Time User    PDMP Reviewed  Yes 5/14/2021 10:47 AM CORWIN Elliott          ED Provider  Electronically Signed by           Ольга Michaels MD  05/17/21 4097

## 2021-05-18 ENCOUNTER — OFFICE VISIT (OUTPATIENT)
Dept: WOUND CARE | Facility: HOSPITAL | Age: 76
End: 2021-05-18
Payer: MEDICARE

## 2021-05-18 DIAGNOSIS — N30.41 IRRADIATION CYSTITIS WITH HEMATURIA: Primary | ICD-10-CM

## 2021-05-18 PROCEDURE — G0277 HBOT, FULL BODY CHAMBER, 30M: HCPCS | Performed by: NURSE PRACTITIONER

## 2021-05-18 PROCEDURE — 99183 HYPERBARIC OXYGEN THERAPY: CPT | Performed by: NURSE PRACTITIONER

## 2021-05-18 NOTE — PROGRESS NOTES
HBO Treatment Course Details       Treatment Notes: Patient tolerated treatment well  Patient reports "popping" in his ears during treatment  Patient was found to have a TEED 1 in his left ear post HBOT  Counseled patient to purchase Flonase nasal spray OTC and use tonight and tomorrow before his next HBOT  Patient verbalized agreement and understanding  Treatment Course Number: 1  Total Treatments Ordered:  40     Diagnosis:   1  Irradiation cystitis with hematuria  [facility]       HBO Treatment Details:  In-Patient Visit: No  Treatment Length:90 Minutes(Minutes)  Chamber #: Hard sided Monoplace Chamber    Pre-Treatment details:  Pre-treatment protocol Treatment Protocol: 2 5 FE X 90 minutes w/ 100% oxygen, two 5 minute air breaks  Left ear clear?: yes  Right ear clear?: yes  Left ear intact?: yes  Right ear intact?: yes                    Left ear TEED scale: Grade 0  Right ear TEED scale: Grade 0   Pretreatment heart and lung assessment: Pretreatment heart and lung auscltation unremarkable  Patient cleared for HBOT     Treatment details:  FE Rate: 2 5  Started Compression: 0839  Reached Compression: 0904  Total Compression Time: 25 (Minutes)  Total Holding Time: 100 (Minutes)  Started Decompression: 1044  Reached Surface: 7593  Total Decompression Time: 21 (Minutes)  Total Airbreaks: 10 (Minutes)  Total Time of Treatment: 136 (Minutes)  Symptoms Noted During Treatment: Restlessness and irritability(in last minute patient started to knock on window asking how much longer ) (Minutes)    Post treatment details:  Left ear clear?: yes  Right ear clear?: yes  Left ears intact?: yes  Right ears intact?: yes                    Left ear TEED scale: Grade I  Right ear TEED scale: Grade 0  Post treatment heart and lung assessment: Post treatment heart and lung auscltation unremarkable  Patient cleared for discharge  Tolerated treatment well         Vital Signs:  HBO Glucose Reference Range: 100-350 mg/dl   Pre-Treatment Post-Treatment   Time vitals are taken: 0820 Time vitals are taken: 1110   Blood Pressure: 116/75 Blood Pressure: 147/85   Pulse: 93 Pulse: 74   Resp: 18 Resp: 18   Temp: 97 9 °F (36 6 °C) Temp: 97 4 °F (36 3 °C)             No Known Allergies  Patient Active Problem List    Diagnosis Date Noted    Leukocytosis 05/04/2021    RA (rheumatoid arthritis) ([place] [address] ) 05/04/2021    Acute blood loss anemia (ABLA) 04/26/2021    SHANTI (acute kidney injury) ([place] [address] ) 04/24/2021    UTI (urinary tract infection) 04/24/2021    Hematuria due to cystitis     Anemia 04/14/2021    Chronic obstructive pulmonary disease ([place] [address] ) 04/14/2021    Hyperlipidemia 04/13/2021    Gross hematuria 04/12/2021    Cystitis 04/12/2021    Essential hypertension 04/12/2021    Dyslipidemia 04/12/2021     No orders of the defined types were placed in this encounter

## 2021-05-18 NOTE — PROGRESS NOTES
HBO Treatment Course Details       Treatment Notes: Patient's first dive  Some anxiety for which he took Xanax before coming to site  He had nephrosotmy tube insertion sites covered with ABD pad and paper tape  Each tube vented and put into plastic bag with guaze to soak up any drainage and urinal provided for any drainage from penis  Patient had some restlessness during last minute  Was able to talk to him and make it until he was at sea level  He states he had "popping in ears" but denies any pain in ears bilaterally  He was instructed to use Flonase tonight and prior to coming in morning by CORWNI which was written on paper for him  Overall he successfully completed his first dive  Treatment Course Number: 1  Total Treatments Ordered:  40     Diagnosis:   1  Irradiation cystitis with hematuria  Hyperbaric oxygen [facility]       HBO Treatment Details:  In-Patient Visit: no  Treatment Length:90 Minutes(Minutes)  Chamber #: Hard sided Monoplace Chamber    Pre-Treatment details:  Pre-treatment protocol Treatment Protocol: 2 5 FE X 90 minutes w/ 100% oxygen, two 5 minute air breaks  Left ear clear?: yes  Right ear clear?: yes  Left ear intact?: yes  Right ear intact?: yes                    Left ear TEED scale: Grade 0  Right ear TEED scale: Grade 0   Pretreatment heart and lung assessment: Pretreatment heart and lung auscltation unremarkable   Patient cleared for HBOT     Treatment details:  FE Rate: 2 5  Started Compression: 0839  Reached Compression: 0904  Total Compression Time: 25 (Minutes)  Total Holding Time: 100 (Minutes)  Started Decompression: 1044  Reached Surface: 1105  Total Decompression Time: 21 (Minutes)  Total Airbreaks: 10 (Minutes)  Total Time of Treatment: 136 (Minutes)  Symptoms Noted During Treatment: Restlessness and irritability(in last minute patient started to knock on window asking how much longer ) (Minutes)    Post treatment details:  Left ear clear?: yes  Right ear clear?: yes  Left ears intact?: yes  Right ears intact?: yes                    Left ear TEED scale: Grade I  Right ear TEED scale: Grade 0  Post treatment heart and lung assessment: Post treatment heart and lung auscltation unremarkable  Patient cleared for discharge  Tolerated treatment well  Vital Signs:  [facility] Glucose Reference Range: 100-350 mg/dl   Pre-Treatment Post-Treatment   Time vitals are taken: 0820 Time vitals are taken: 1110   Blood Pressure: 116/75 Blood Pressure: 147/85   Pulse: 93 Pulse: 74   Resp: 18 Resp: 18   Temp: 97 9 °F (36 6 °C) Temp: 97 4 °F (36 3 °C)             No Known Allergies  Patient Active Problem List    Diagnosis Date Noted    Leukocytosis 05/04/2021    RA (rheumatoid arthritis) ([place] [id number] ) 05/04/2021    Acute blood loss anemia (ABLA) 04/26/2021    SHANTI (acute kidney injury) ([place] [id number] ) 04/24/2021    UTI (urinary tract infection) 04/24/2021    Hematuria due to cystitis     Anemia 04/14/2021    Chronic obstructive pulmonary disease ([place] [id number] ) 04/14/2021    Hyperlipidemia 04/13/2021    Gross hematuria 04/12/2021    Cystitis 04/12/2021    Essential hypertension 04/12/2021    Dyslipidemia 04/12/2021     No orders of the defined types were placed in this encounter

## 2021-05-19 ENCOUNTER — OFFICE VISIT (OUTPATIENT)
Dept: WOUND CARE | Facility: HOSPITAL | Age: 76
End: 2021-05-19
Payer: MEDICARE

## 2021-05-19 DIAGNOSIS — N30.41 IRRADIATION CYSTITIS WITH HEMATURIA: ICD-10-CM

## 2021-05-19 PROCEDURE — G0277 HBOT, FULL BODY CHAMBER, 30M: HCPCS | Performed by: FAMILY MEDICINE

## 2021-05-19 PROCEDURE — 99183 HYPERBARIC OXYGEN THERAPY: CPT | Performed by: FAMILY MEDICINE

## 2021-05-19 NOTE — PROGRESS NOTES
HBO Treatment Course Details       Treatment Notes:      Treatment Course Number: 2  Total Treatments Ordered:  40     Diagnosis:   1  Irradiation cystitis with hematuria  Hyperbaric oxygen [facility]       HBO Treatment Details:  In-Patient Visit: no  Treatment Length:90 Minutes(Minutes)  Chamber #: Hard sided Monoplace Chamber    Pre-Treatment details:  Pre-treatment protocol Treatment Protocol: 2 5 FE X 90 minutes w/ 100% oxygen, two 5 minute air breaks  Left ear clear?: yes  Right ear clear?: yes  Left ear intact?: yes  Right ear intact?: yes  Left ear color: translucent  Right ear color: translucent  PE Tubes present, Left ear?: no  PE Tubes present, Right ear?: no  Left ear irrigated?: no  Right ear irrigated?: no  Left ear TEED scale: Grade 0  Right ear TEED scale: Grade 0   Pretreatment heart and lung assessment: Pretreatment heart and lung auscltation unremarkable  Patient cleared for HBOT     Treatment details:  FE Rate: 2 5  Started Compression: 0837  Reached Compression: 0902  Total Compression Time: 25 (Minutes)  Total Holding Time: 100 (Minutes)  Started Decompression: 1042  Reached Surface: 1102  Total Decompression Time: 20 (Minutes)  Total Airbreaks: 10 (Minutes)  Total Time of Treatment: 135 (Minutes)  Symptoms Noted During Treatment: Restlessness and irritability(he starts to get restless and anxious while ascending ) (Minutes)    Post treatment details:  Left ear clear?: yes  Right ear clear?: yes  Left ears intact?: yes  Right ears intact?: yes  Left ear color: translucent  Right ear color: pink  PE Tubes present, Left ear?: no  PE Tubes present, Right ear?: no        Left ear TEED scale: Grade 0  Right ear TEED scale: Grade 0  Post treatment heart and lung assessment: Post treatment heart and lung auscltation unremarkable  Patient cleared for discharge  Tolerated treatment well         Vital Signs:  HBO Glucose Reference Range: 100-350 mg/dl   Pre-Treatment Post-Treatment   Time vitals are taken: 0815 Time vitals are taken: 1110   Blood Pressure: 132/85 Blood Pressure: (!) 153/93   Pulse: 90 Pulse: 66   Resp: 18 Resp: 18   Temp: 97 6 °F (36 4 °C) Temp: 97 5 °F (36 4 °C)             No Known Allergies  Patient Active Problem List    Diagnosis Date Noted    Leukocytosis 05/04/2021    RA (rheumatoid arthritis) ([place] [address] ) 05/04/2021    Acute blood loss anemia (ABLA) 04/26/2021    SHANTI (acute kidney injury) ([place] [address] ) 04/24/2021    UTI (urinary tract infection) 04/24/2021    Hematuria due to cystitis     Anemia 04/14/2021    Chronic obstructive pulmonary disease ([place] [address] ) 04/14/2021    Hyperlipidemia 04/13/2021    Gross hematuria 04/12/2021    Cystitis 04/12/2021    Essential hypertension 04/12/2021    Dyslipidemia 04/12/2021     No orders of the defined types were placed in this encounter

## 2021-05-19 NOTE — PROGRESS NOTES
HBO Treatment Course Details       Treatment Notes: Patient had some "popping" in ears but denies pain  Continued to instruct him on how to clear ears  He does have anxiety near the end of the dive  Some relaxation techniques instruction provided and he asked if he could take 2 Ativan instead of one  Per prescription he can take 2  He verbalizes understanding  Treatment Course Number: 2  Total Treatments Ordered:  40     Diagnosis:   1  Irradiation cystitis with hematuria  [facility]       H[facility] Treatment Details:  In-Patient Visit: no  Treatment Length:90 Minutes(Minutes)  Chamber #: Hard sided Monoplace Chamber    Pre-Treatment details:  Pre-treatment protocol Treatment Protocol: 2 5 FE X 90 minutes w/ 100% oxygen, two 5 minute air breaks  Left ear clear?: yes  Right ear clear?: yes  Left ear intact?: yes  Right ear intact?: yes  Left ear color: translucent  Right ear color: translucent  PE Tubes present, Left ear?: no  PE Tubes present, Right ear?: no  Left ear irrigated?: no  Right ear irrigated?: no  Left ear TEED scale: Grade 0  Right ear TEED scale: Grade 0   Pretreatment heart and lung assessment: Pretreatment heart and lung auscltation unremarkable   Patient cleared for HBOT     Treatment details:  FE Rate: 2 5  Started Compression: 0837  Reached Compression: 0902  Total Compression Time: 25 (Minutes)  Total Holding Time: 100 (Minutes)  Started Decompression: 1042  Reached Surface: 1102  Total Decompression Time: 20 (Minutes)  Total Airbreaks: 10 (Minutes)  Total Time of Treatment: 135 (Minutes)  Symptoms Noted During Treatment: Restlessness and irritability(he starts to get restless and anxious while ascending ) (Minutes)    Post treatment details:  Left ear clear?: yes  Right ear clear?: yes  Left ears intact?: yes  Right ears intact?: yes  Left ear color: translucent  Right ear color: pink  PE Tubes present, Left ear?: no  PE Tubes present, Right ear?: no        Left ear TEED scale: Grade 0  Right ear TEED scale: Grade 0  Post treatment heart and lung assessment: Post treatment heart and lung auscltation unremarkable  Patient cleared for discharge  Tolerated treatment well  Vital Signs:  [facility] Glucose Reference Range: 100-350 mg/dl   Pre-Treatment Post-Treatment   Time vitals are taken: 0815 Time vitals are taken: 1110   Blood Pressure: 132/85 Blood Pressure: (!) 153/93   Pulse: 90 Pulse: 66   Resp: 18 Resp: 18   Temp: 97 6 °F (36 4 °C) Temp: 97 5 °F (36 4 °C)             No Known Allergies  Patient Active Problem List    Diagnosis Date Noted    Leukocytosis 05/04/2021    RA (rheumatoid arthritis) ([place] [address] ) 05/04/2021    Acute blood loss anemia (ABLA) 04/26/2021    SHANTI (acute kidney injury) ([place] [address] ) 04/24/2021    UTI (urinary tract infection) 04/24/2021    Hematuria due to cystitis     Anemia 04/14/2021    Chronic obstructive pulmonary disease ([place] [address] ) 04/14/2021    Hyperlipidemia 04/13/2021    Gross hematuria 04/12/2021    Cystitis 04/12/2021    Essential hypertension 04/12/2021    Dyslipidemia 04/12/2021     No orders of the defined types were placed in this encounter

## 2021-05-20 ENCOUNTER — OFFICE VISIT (OUTPATIENT)
Dept: WOUND CARE | Facility: HOSPITAL | Age: 76
End: 2021-05-20
Payer: MEDICARE

## 2021-05-20 DIAGNOSIS — N30.41 IRRADIATION CYSTITIS WITH HEMATURIA: ICD-10-CM

## 2021-05-20 PROCEDURE — G0277 HBOT, FULL BODY CHAMBER, 30M: HCPCS | Performed by: PREVENTIVE MEDICINE

## 2021-05-20 PROCEDURE — 99183 HYPERBARIC OXYGEN THERAPY: CPT | Performed by: PREVENTIVE MEDICINE

## 2021-05-20 NOTE — PROGRESS NOTES
HBO Treatment Course Details       Treatment Notes: Pt c/o left ear pain 3/10 pre-dive & congestion in both ears  He took nasal spray this a m  & denied pain during hbo treatment  Pain in left ear subsided before going into chamber  Pt tolerated treatment well  Treatment Course Number: 3  Total Treatments Ordered:  40     Diagnosis:   1  Irradiation cystitis with hematuria  [facility]       [facility] Treatment Details:  In-Patient Visit: no  Treatment Length:90 Minutes(Minutes)  Chamber #: Hard sided Monoplace Chamber    Pre-Treatment details:  Pre-treatment protocol Treatment Protocol: 2 5 FE X 90 minutes w/ 100% oxygen, two 5 minute air breaks  Left ear clear?: yes  Right ear clear?: yes                          Left ear TEED scale: Grade 0  Right ear TEED scale: Grade 0   Pretreatment heart and lung assessment: Pretreatment heart and lung auscltation unremarkable  Patient cleared for HBOT     Treatment details:  FE Rate: 2 5  Started Compression: 0835  Reached Compression: 0855  Total Compression Time: 20 (Minutes)  Total Holding Time: 100 (Minutes)  Started Decompression: 1035  Reached Surface: 1050  Total Decompression Time: 15 (Minutes)  Total Airbreaks: 10 (Minutes)  Total Time of Treatment: 125 (Minutes)  Symptoms Noted During Treatment: None (Minutes)    Post treatment details:  Left ear clear?: yes  Right ear clear?: yes                          Left ear TEED scale: Grade 0  Right ear TEED scale: Grade 0  Post treatment heart and lung assessment: Post treatment heart and lung auscltation unremarkable  Patient cleared for discharge  Tolerated treatment well         Vital Signs:  HBO Glucose Reference Range: 100-350 mg/dl   Pre-Treatment Post-Treatment   Time vitals are taken: 0815 Time vitals are taken: 1055   Blood Pressure: 132/76 Blood Pressure: 140/81   Pulse: 82 Pulse: 65   Resp: 16 Resp: 16   Temp: 98 °F (36 7 °C) Temp: 97 3 °F (36 3 °C)     Post-Inspection Glucose reading: (n/a) No Known Allergies  Patient Active Problem List    Diagnosis Date Noted    Leukocytosis 05/04/2021    RA (rheumatoid arthritis) ([place] [address] ) 05/04/2021    Acute blood loss anemia (ABLA) 04/26/2021    SHANTI (acute kidney injury) ([place] [address] ) 04/24/2021    UTI (urinary tract infection) 04/24/2021    Hematuria due to cystitis     Anemia 04/14/2021    Chronic obstructive pulmonary disease ([place] [address] ) 04/14/2021    Hyperlipidemia 04/13/2021    Gross hematuria 04/12/2021    Cystitis 04/12/2021    Essential hypertension 04/12/2021    Dyslipidemia 04/12/2021     No orders of the defined types were placed in this encounter

## 2021-05-24 ENCOUNTER — TELEPHONE (OUTPATIENT)
Dept: WOUND CARE | Facility: HOSPITAL | Age: 76
End: 2021-05-24

## 2021-05-24 PROCEDURE — NC001 PR NO CHARGE: Performed by: NURSE PRACTITIONER

## 2021-05-24 NOTE — TELEPHONE ENCOUNTER
Patient requested call back in regards to HBO treatment  Patient reports he is having severe anxiety r/t his HBOT  Patient asked if treatments could be limited to 30-60 minutes, counseled patient that he needs to be in the chamber for the full length in order to receive the full benefits of HBOT  Patient also reports that taking 1 tablet of lorazepam has not helped his anxiety much and he is afraid to take 2 and fall asleep in the chamber and miss his air breaks  Counseled patient to take 2 tablets and we will try diving him tomorrow at 2 0 FE with no air breaks  Patient verbalized agreement and understanding  Patient will report tomorrow at 8am for his HBOT

## 2021-05-25 ENCOUNTER — OFFICE VISIT (OUTPATIENT)
Dept: WOUND CARE | Facility: HOSPITAL | Age: 76
End: 2021-05-25
Payer: MEDICARE

## 2021-05-25 ENCOUNTER — TRANSCRIBE ORDERS (OUTPATIENT)
Dept: ADMINISTRATIVE | Facility: HOSPITAL | Age: 76
End: 2021-05-25

## 2021-05-25 ENCOUNTER — APPOINTMENT (OUTPATIENT)
Dept: LAB | Facility: HOSPITAL | Age: 76
End: 2021-05-25
Attending: INTERNAL MEDICINE
Payer: MEDICARE

## 2021-05-25 DIAGNOSIS — N30.41 IRRADIATION CYSTITIS WITH HEMATURIA: Primary | ICD-10-CM

## 2021-05-25 DIAGNOSIS — R31.9 HEMATURIA, UNSPECIFIED TYPE: ICD-10-CM

## 2021-05-25 DIAGNOSIS — D64.9 ANEMIA, UNSPECIFIED TYPE: ICD-10-CM

## 2021-05-25 DIAGNOSIS — F41.8 ANXIETY ASSOCIATED WITH HYPERBARIC OXYGEN THERAPY: ICD-10-CM

## 2021-05-25 LAB
BASOPHILS # BLD AUTO: 0.02 THOUSANDS/ΜL (ref 0–0.1)
BASOPHILS NFR BLD AUTO: 0 % (ref 0–1)
EOSINOPHIL # BLD AUTO: 0.22 THOUSAND/ΜL (ref 0–0.61)
EOSINOPHIL NFR BLD AUTO: 2 % (ref 0–6)
ERYTHROCYTE [DISTWIDTH] IN BLOOD BY AUTOMATED COUNT: 14.6 % (ref 11.6–15.1)
HCT VFR BLD AUTO: 29.2 % (ref 36.5–49.3)
HGB BLD-MCNC: 9 G/DL (ref 12–17)
IMM GRANULOCYTES # BLD AUTO: 0.05 THOUSAND/UL (ref 0–0.2)
IMM GRANULOCYTES NFR BLD AUTO: 1 % (ref 0–2)
LYMPHOCYTES # BLD AUTO: 1.68 THOUSANDS/ΜL (ref 0.6–4.47)
LYMPHOCYTES NFR BLD AUTO: 17 % (ref 14–44)
MCH RBC QN AUTO: 28 PG (ref 26.8–34.3)
MCHC RBC AUTO-ENTMCNC: 30.8 G/DL (ref 31.4–37.4)
MCV RBC AUTO: 91 FL (ref 82–98)
MONOCYTES # BLD AUTO: 0.84 THOUSAND/ΜL (ref 0.17–1.22)
MONOCYTES NFR BLD AUTO: 9 % (ref 4–12)
NEUTROPHILS # BLD AUTO: 6.96 THOUSANDS/ΜL (ref 1.85–7.62)
NEUTS SEG NFR BLD AUTO: 71 % (ref 43–75)
NRBC BLD AUTO-RTO: 0 /100 WBCS
PLATELET # BLD AUTO: 251 THOUSANDS/UL (ref 149–390)
PMV BLD AUTO: 10 FL (ref 8.9–12.7)
RBC # BLD AUTO: 3.21 MILLION/UL (ref 3.88–5.62)
WBC # BLD AUTO: 9.77 THOUSAND/UL (ref 4.31–10.16)

## 2021-05-25 PROCEDURE — G0277 HBOT, FULL BODY CHAMBER, 30M: HCPCS | Performed by: NURSE PRACTITIONER

## 2021-05-25 PROCEDURE — 36415 COLL VENOUS BLD VENIPUNCTURE: CPT

## 2021-05-25 PROCEDURE — 85025 COMPLETE CBC W/AUTO DIFF WBC: CPT

## 2021-05-25 PROCEDURE — 99183 HYPERBARIC OXYGEN THERAPY: CPT | Performed by: NURSE PRACTITIONER

## 2021-05-25 NOTE — PROGRESS NOTES
HBO Treatment Course Details       Treatment Notes: Patient tolerated treatment well  Patient reports the 2 tablets of lorazepam and not having air breaks helped with his anxiety     Treatment Course Number: 4  Total Treatments Ordered:  40     Diagnosis:   1  Irradiation cystitis with hematuria  Hyperbaric oxygen thearpy   2  Anxiety associated with hyperbaric oxygen therapy         HBO Treatment Details:  In-Patient Visit: No  Treatment Length:90 Minutes(Minutes)  Chamber #: Hard sided Monoplace Chamber    Pre-Treatment details:  Pre-treatment protocol Treatment Protocol: 2 0 FE X 90 minutes w/ 100% oxygen, no air break  Left ear clear?: yes  Right ear clear?: yes  Left ear intact?: yes  Right ear intact?: yes                    Left ear TEED scale: Grade 0  Right ear TEED scale: Grade 0   Pretreatment heart and lung assessment: Pretreatment heart and lung auscltation unremarkable  Patient cleared for HBOT     Treatment details:  FE Rate: 2  Started Compression: 2044  Reached Compression: 0838  Total Compression Time: 14 (Minutes)  Total Holding Time: 90 (Minutes)  Started Decompression: 1008  Reached Surface: 1023  Total Decompression Time: 15 (Minutes)  Total Airbreaks: 0 (Minutes)  Total Time of Treatment: 119 (Minutes)  Symptoms Noted During Treatment: None (Minutes)    Post treatment details:  Left ear clear?: yes  Right ear clear?: yes  Left ears intact?: yes  Right ears intact?: yes                    Left ear TEED scale: Grade 0  Right ear TEED scale: Grade 0  Post treatment heart and lung assessment: Post treatment heart and lung auscltation unremarkable  Patient cleared for discharge  Tolerated treatment well         Vital Signs:  [facility] Glucose Reference Range: 100-350 mg/dl   Pre-Treatment Post-Treatment   Time vitals are taken: 0810 Time vitals are taken: 1025   Blood Pressure: 138/70 Blood Pressure: 142/90   Pulse: 68 Pulse: 54   Resp: 18 Resp: 18   Temp: 98 4 °F (36 9 °C) Temp: 98 2 °F (36 8 °C) No Known Allergies  Patient Active Problem List    Diagnosis Date Noted    Leukocytosis 05/04/2021    RA (rheumatoid arthritis) ([place] [address] ) 05/04/2021    Acute blood loss anemia (ABLA) 04/26/2021    SHANTI (acute kidney injury) ([place] [address] ) 04/24/2021    UTI (urinary tract infection) 04/24/2021    Hematuria due to cystitis     Anemia 04/14/2021    Chronic obstructive pulmonary disease ([place] [address] ) 04/14/2021    Hyperlipidemia 04/13/2021    Gross hematuria 04/12/2021    Cystitis 04/12/2021    Essential hypertension 04/12/2021    Dyslipidemia 04/12/2021     No orders of the defined types were placed in this encounter

## 2021-05-25 NOTE — PROGRESS NOTES
HBO Treatment Course Details       Treatment Notes: patient tolerated HBOT without difficulty today  He states he prefers to dive at 2 0 with no air breaks as it makes him less anxious  He also took two Ativan pills prior to his treatment today  Treatment Course Number: 4  Total Treatments Ordered:  40     Diagnosis:   1  Irradiation cystitis with hematuria  Hyperbaric oxygen thearpy   2  Anxiety associated with hyperbaric oxygen therapy         HBO Treatment Details:  In-Patient Visit: no  Treatment Length:90 Minutes(Minutes)  Chamber #: Hard sided Monoplace Chamber    Pre-Treatment details:  Pre-treatment protocol Treatment Protocol: 2 0 FE X 90 minutes w/ 100% oxygen, no air break  Left ear clear?: yes  Right ear clear?: yes  Left ear intact?: yes  Right ear intact?: yes                    Left ear TEED scale: Grade 0  Right ear TEED scale: Grade 0   Pretreatment heart and lung assessment: Pretreatment heart and lung auscltation unremarkable  Patient cleared for HBOT     Treatment details:  FE Rate: 2  Started Compression: 0165  Reached Compression: 0838  Total Compression Time: 14 (Minutes)  Total Holding Time: 90 (Minutes)  Started Decompression: 1008  Reached Surface: 1023  Total Decompression Time: 15 (Minutes)  Total Airbreaks: 0 (Minutes)  Total Time of Treatment: 119 (Minutes)  Symptoms Noted During Treatment: None (Minutes)    Post treatment details:  Left ear clear?: yes  Right ear clear?: yes  Left ears intact?: yes  Right ears intact?: yes                    Left ear TEED scale: Grade 0  Right ear TEED scale: Grade 0  Post treatment heart and lung assessment: Post treatment heart and lung auscltation unremarkable  Patient cleared for discharge  Tolerated treatment well         Vital Signs:  [facility] Glucose Reference Range: 100-350 mg/dl   Pre-Treatment Post-Treatment   Time vitals are taken: 0810 Time vitals are taken: 1025   Blood Pressure: 138/70 Blood Pressure: 142/90   Pulse: 68 Pulse: 54 Resp: 18 Resp: 18   Temp: 98 4 °F (36 9 °C) Temp: 98 2 °F (36 8 °C)             No Known Allergies  Patient Active Problem List    Diagnosis Date Noted    Leukocytosis 05/04/2021    RA (rheumatoid arthritis) ([place] [address] ) 05/04/2021    Acute blood loss anemia (ABLA) 04/26/2021    SHANTI (acute kidney injury) ([place] [address] ) 04/24/2021    UTI (urinary tract infection) 04/24/2021    Hematuria due to cystitis     Anemia 04/14/2021    Chronic obstructive pulmonary disease ([place] [address] ) 04/14/2021    Hyperlipidemia 04/13/2021    Gross hematuria 04/12/2021    Cystitis 04/12/2021    Essential hypertension 04/12/2021    Dyslipidemia 04/12/2021     No orders of the defined types were placed in this encounter

## 2021-05-26 ENCOUNTER — OFFICE VISIT (OUTPATIENT)
Dept: WOUND CARE | Facility: HOSPITAL | Age: 76
End: 2021-05-26
Payer: MEDICARE

## 2021-05-26 DIAGNOSIS — R09.81 CHRONIC NASAL CONGESTION: ICD-10-CM

## 2021-05-26 DIAGNOSIS — N30.41 IRRADIATION CYSTITIS WITH HEMATURIA: Primary | ICD-10-CM

## 2021-05-26 PROCEDURE — 99183 HYPERBARIC OXYGEN THERAPY: CPT | Performed by: FAMILY MEDICINE

## 2021-05-26 PROCEDURE — 99213 OFFICE O/P EST LOW 20 MIN: CPT | Performed by: FAMILY MEDICINE

## 2021-05-26 PROCEDURE — G0277 HBOT, FULL BODY CHAMBER, 30M: HCPCS | Performed by: FAMILY MEDICINE

## 2021-05-26 RX ORDER — MONTELUKAST SODIUM 10 MG/1
10 TABLET ORAL
Qty: 30 TABLET | Refills: 1 | Status: SHIPPED | OUTPATIENT
Start: 2021-05-26 | End: 2021-06-30 | Stop reason: HOSPADM

## 2021-05-26 NOTE — PROGRESS NOTES
HBO Treatment Course Details       Treatment Notes:      Treatment Course Number: 5  Total Treatments Ordered:  40     Diagnosis:   1  Irradiation cystitis with hematuria  Hyperbaric oxygen thearpy   2  Chronic nasal congestion  montelukast (SINGULAIR) 10 mg tablet       HBO Treatment Details:  In-Patient Visit: no  Treatment Length:90 Minutes(Minutes)  Chamber #: Hard sided Monoplace Chamber    Pre-Treatment details:  Pre-treatment protocol Treatment Protocol: 2 0 FE X 90 minutes w/ 100% oxygen, no air break  Left ear clear?: yes  Right ear clear?: yes  Left ear intact?: yes  Right ear intact?: yes  Left ear color: translucent  Right ear color: translucent  PE Tubes present, Left ear?: no  PE Tubes present, Right ear?: no  Left ear irrigated?: no  Right ear irrigated?: no  Left ear TEED scale: Grade 0  Right ear TEED scale: Grade 0   Pretreatment heart and lung assessment: Pretreatment heart and lung auscltation unremarkable  Patient cleared for HBOT     Treatment details:  FE Rate: 2  Started Compression: 0827  Reached Compression: 0842  Total Compression Time: 15 (Minutes)  Total Holding Time: 90 (Minutes)  Started Decompression: 1012  Reached Surface: 1019  Total Decompression Time: 7 (Minutes)  Total Airbreaks: 0 (Minutes)  Total Time of Treatment: 112 (Minutes)  Symptoms Noted During Treatment: None (Minutes)    Post treatment details:  Left ear clear?: yes  Right ear clear?: yes  Left ears intact?: yes  Right ears intact?: yes  Left ear color: translucent  Right ear color: translucent  PE Tubes present, Left ear?: no  PE Tubes present, Right ear?: no        Left ear TEED scale: Grade 0  Right ear TEED scale: Grade 0  Post treatment heart and lung assessment: Post treatment heart and lung auscltation unremarkable  Patient cleared for discharge  Tolerated treatment well         Vital Signs:  HBO Glucose Reference Range: 100-350 mg/dl   Pre-Treatment Post-Treatment   Time vitals are taken: 0815 Time vitals are taken: 1020   Blood Pressure: 168/79 Blood Pressure: 129/84   Pulse: 97 Pulse: 61   Resp: 20 Resp: 16   Temp: 97 7 °F (36 5 °C) Temp: 98 1 °F (36 7 °C)     Post-Inspection Glucose reading: (n/a)       No Known Allergies  Patient Active Problem List    Diagnosis Date Noted    Leukocytosis 05/04/2021    RA (rheumatoid arthritis) ([place] [address] ) 05/04/2021    Acute blood loss anemia (ABLA) 04/26/2021    SHANTI (acute kidney injury) ([place] [address] ) 04/24/2021    UTI (urinary tract infection) 04/24/2021    Hematuria due to cystitis     Anemia 04/14/2021    Chronic obstructive pulmonary disease ([place] [address] ) 04/14/2021    Hyperlipidemia 04/13/2021    Gross hematuria 04/12/2021    Cystitis 04/12/2021    Essential hypertension 04/12/2021    Dyslipidemia 04/12/2021     No orders of the defined types were placed in this encounter

## 2021-05-26 NOTE — PROGRESS NOTES
HBO Treatment Course Details       Treatment Notes: Pt states that he is still having a feeling of fullness and congestion in b/l ears but denies any complaints of pain  Dr Lm Murray to prescribe allergy medication to be taken at [place] [address]  Pt tolerated treatment well with not complaints  Treatment Course Number: 5  Total Treatments Ordered:  40     Diagnosis:   1  Irradiation cystitis with hematuria  Hyperbaric oxygen thearpy   2  Chronic nasal congestion  montelukast (SINGULAIR) 10 mg tablet       HBO Treatment Details:  In-Patient Visit: no  Treatment Length:90 Minutes(Minutes)  Chamber #: Hard sided Monoplace Chamber    Pre-Treatment details:  Pre-treatment protocol Treatment Protocol: 2 0 FE X 90 minutes w/ 100% oxygen, no air break  Left ear clear?: yes  Right ear clear?: yes  Left ear intact?: yes  Right ear intact?: yes  Left ear color: translucent  Right ear color: translucent  PE Tubes present, Left ear?: no  PE Tubes present, Right ear?: no  Left ear irrigated?: no  Right ear irrigated?: no  Left ear TEED scale: Grade 0  Right ear TEED scale: Grade 0   Pretreatment heart and lung assessment: Pretreatment heart and lung auscltation unremarkable   Patient cleared for HBOT     Treatment details:  FE Rate: 2  Started Compression: 0827  Reached Compression: 0842  Total Compression Time: 15 (Minutes)  Total Holding Time: 90 (Minutes)  Started Decompression: 1012  Reached Surface: 1019  Total Decompression Time: 7 (Minutes)  Total Airbreaks: 0 (Minutes)  Total Time of Treatment: 112 (Minutes)  Symptoms Noted During Treatment: None (Minutes)    Post treatment details:                                              Vital Signs:  HBO Glucose Reference Range: 100-350 mg/dl   Pre-Treatment Post-Treatment   Time vitals are taken: 0815 Time vitals are taken: 1020   Blood Pressure: 168/79 Blood Pressure: 129/84   Pulse: 97 Pulse: 61   Resp: 20 Resp: 16   Temp: 97 7 °F (36 5 °C) Temp: 98 1 °F (36 7 °C)     Post-Inspection Glucose reading: (n/a)       No Known Allergies  Patient Active Problem List    Diagnosis Date Noted    Leukocytosis 05/04/2021    RA (rheumatoid arthritis) ([place] [address] ) 05/04/2021    Acute blood loss anemia (ABLA) 04/26/2021    SHANTI (acute kidney injury) ([place] [address] ) 04/24/2021    UTI (urinary tract infection) 04/24/2021    Hematuria due to cystitis     Anemia 04/14/2021    Chronic obstructive pulmonary disease ([place] [address] ) 04/14/2021    Hyperlipidemia 04/13/2021    Gross hematuria 04/12/2021    Cystitis 04/12/2021    Essential hypertension 04/12/2021    Dyslipidemia 04/12/2021     No orders of the defined types were placed in this encounter

## 2021-05-27 ENCOUNTER — OFFICE VISIT (OUTPATIENT)
Dept: WOUND CARE | Facility: HOSPITAL | Age: 76
End: 2021-05-27
Payer: MEDICARE

## 2021-05-27 DIAGNOSIS — N30.41 IRRADIATION CYSTITIS WITH HEMATURIA: ICD-10-CM

## 2021-05-27 PROCEDURE — G0277 HBOT, FULL BODY CHAMBER, 30M: HCPCS | Performed by: PREVENTIVE MEDICINE

## 2021-05-27 PROCEDURE — 99183 HYPERBARIC OXYGEN THERAPY: CPT | Performed by: PREVENTIVE MEDICINE

## 2021-05-27 NOTE — PROGRESS NOTES
HBO Treatment Course Details       Treatment Notes: The pt continues to complain of congestion & inability to clear his ears but denies pain pre & post treatment & is not as anxious today as he normally is  He was prescribed Singulair yesterday by Dr Venus Garrison but patient has not filled it yet due to concerns regarding possible side effects  Pt was encouraged to discuss this with his primary care physician  Treatment Course Number: 6  Total Treatments Ordered:  40     Diagnosis:   1  Irradiation cystitis with hematuria  [facility]       H[facility] Treatment Details:  In-Patient Visit: no  Treatment Length:90 Minutes(Minutes)  Chamber #: Hard sided Monoplace Chamber    Pre-Treatment details:  Pre-treatment protocol Treatment Protocol: 2 0 FE X 90 minutes w/ 100% oxygen, no air break  Left ear clear?: yes  Right ear clear?: yes                          Left ear TEED scale: Grade 0  Right ear TEED scale: Grade 0   Pretreatment heart and lung assessment: Pretreatment heart and lung auscltation unremarkable  Patient cleared for HBOT     Treatment details:  FE Rate: 2  Started Compression: 0819  Reached Compression: 0832  Total Compression Time: 13 (Minutes)  Total Holding Time: 90 (Minutes)  Started Decompression: 1002  Reached Surface: 1012  Total Decompression Time: 10 (Minutes)  Total Airbreaks: 0 (Minutes)  Total Time of Treatment: 113 (Minutes)  Symptoms Noted During Treatment: None (Minutes)    Post treatment details:  Left ear clear?: yes  Right ear clear?: yes                          Left ear TEED scale: Grade 0  Right ear TEED scale: Grade 0  Post treatment heart and lung assessment: Post treatment heart and lung auscltation unremarkable  Patient cleared for discharge  Tolerated treatment well         Vital Signs:  HBO Glucose Reference Range: 100-350 mg/dl   Pre-Treatment Post-Treatment   Time vitals are taken: 0815 Time vitals are taken: 1015   Blood Pressure: 129/73 Blood Pressure: 146/73 Pulse: 97 Pulse: 83   Resp: 16 Resp: 18   Temp: 97 7 °F (36 5 °C) Temp: 97 4 °F (36 3 °C)   Pre-Inspection Glucose reading: (n/a) Post-Inspection Glucose reading: (n/a)       No Known Allergies  Patient Active Problem List    Diagnosis Date Noted    Leukocytosis 05/04/2021    RA (rheumatoid arthritis) ([place] [address] ) 05/04/2021    Acute blood loss anemia (ABLA) 04/26/2021    SHANTI (acute kidney injury) ([place] [address] ) 04/24/2021    UTI (urinary tract infection) 04/24/2021    Hematuria due to cystitis     Anemia 04/14/2021    Chronic obstructive pulmonary disease ([place] [address] ) 04/14/2021    Hyperlipidemia 04/13/2021    Gross hematuria 04/12/2021    Cystitis 04/12/2021    Essential hypertension 04/12/2021    Dyslipidemia 04/12/2021     No orders of the defined types were placed in this encounter

## 2021-06-01 ENCOUNTER — OFFICE VISIT (OUTPATIENT)
Dept: WOUND CARE | Facility: HOSPITAL | Age: 76
End: 2021-06-01
Payer: MEDICARE

## 2021-06-01 DIAGNOSIS — N30.41 IRRADIATION CYSTITIS WITH HEMATURIA: Primary | ICD-10-CM

## 2021-06-01 PROCEDURE — 99183 HYPERBARIC OXYGEN THERAPY: CPT | Performed by: NURSE PRACTITIONER

## 2021-06-01 PROCEDURE — G0277 HBOT, FULL BODY CHAMBER, 30M: HCPCS | Performed by: NURSE PRACTITIONER

## 2021-06-01 NOTE — PROGRESS NOTES
HBO Treatment Course Details       Treatment Notes: patient tolerated HBOT well  A bit of anxiety near end of treatment but he was able to finish treatment with encouragement     Treatment Course Number: 7  Total Treatments Ordered:  40     Diagnosis:   1  Irradiation cystitis with hematuria  [facility]       H[facility] Treatment Details:  In-Patient Visit: no  Treatment Length:90 Minutes(Minutes)  Chamber #: Hard sided Monoplace Chamber    Pre-Treatment details:  Pre-treatment protocol Treatment Protocol: 2 0 FE X 90 minutes w/ 100% oxygen, no air break  Left ear clear?: yes  Right ear clear?: yes  Left ear intact?: yes  Right ear intact?: yes                    Left ear TEED scale: Grade 0  Right ear TEED scale: Grade 0   Pretreatment heart and lung assessment: Pretreatment heart and lung auscltation unremarkable   Patient cleared for HBOT     Treatment details:  FE Rate: 2  Started Compression: 0820  Reached Compression: 0833  Total Compression Time: 13 (Minutes)  Total Holding Time: 90 (Minutes)  Started Decompression: 1003  Reached Surface: 1013  Total Decompression Time: 10 (Minutes)  Total Airbreaks: 0 (Minutes)  Total Time of Treatment: 113 (Minutes)  Symptoms Noted During Treatment: None (Minutes)    Post treatment details:                                              Vital Signs:  HBO Glucose Reference Range: 100-350 mg/dl   Pre-Treatment Post-Treatment   Time vitals are taken: 0815 Time vitals are taken: 1015   Blood Pressure: 149/64 Blood Pressure: 166/79   Pulse: 79 Pulse: 57   Resp: 18 Resp: 16   Temp: 97 8 °F (36 6 °C) Temp: 97 8 °F (36 6 °C)             No Known Allergies  Patient Active Problem List    Diagnosis Date Noted    Leukocytosis 05/04/2021    [facility] 05/04/2021    Acute blood loss anemia (ABLA) 04/26/2021    SHANTI (acute kidney injury) ([facility] ) 04/24/2021    UTI (urinary tract infection) 04/24/2021    Hematuria due to cystitis     Anemia 04/14/2021    Chronic [facility] disease ([facility] ) 04/14/2021    Hyperlipidemia 04/13/2021    Gross hematuria 04/12/2021    Cystitis 04/12/2021    Essential hypertension 04/12/2021    Dyslipidemia 04/12/2021     No orders of the defined types were placed in this encounter

## 2021-06-01 NOTE — PROGRESS NOTES
HBO Treatment Course Details       Treatment Notes:  Patient tolerated treatment well  Treatment Course Number: 7  Total Treatments Ordered:  40     Diagnosis:   1  Irradiation cystitis with hematuria  [facility]       HBO Treatment Details:  In-Patient Visit:  No  Treatment Length:90 Minutes(Minutes)  Chamber #: Hard sided Monoplace Chamber    Pre-Treatment details:  Pre-treatment protocol Treatment Protocol: 2 0 FE X 90 minutes w/ 100% oxygen, no air break  Left ear clear?: yes  Right ear clear?: yes  Left ear intact?: yes  Right ear intact?: yes                    Left ear TEED scale: Grade 0  Right ear TEED scale: Grade 0   Pretreatment heart and lung assessment: Pretreatment heart and lung auscltation unremarkable  Patient cleared for HBOT     Treatment details:  FE Rate: 2  Started Compression: 0820  Reached Compression: 0833  Total Compression Time: 13 (Minutes)  Total Holding Time: 90 (Minutes)  Started Decompression: 1003  Reached Surface: 1013  Total Decompression Time: 10 (Minutes)  Total Airbreaks: 0 (Minutes)  Total Time of Treatment: 113 (Minutes)  Symptoms Noted During Treatment: None (Minutes)    Post treatment details:  Left ear clear?: yes  Right ear clear?: yes  Left ears intact?: yes  Right ears intact?: yes                    Left ear TEED scale: Grade 0  Right ear TEED scale: Grade 0  Post treatment heart and lung assessment: Post treatment heart and lung auscltation unremarkable  Patient cleared for discharge  Tolerated treatment well         Vital Signs:  HBO Glucose Reference Range: 100-350 mg/dl   Pre-Treatment Post-Treatment   Time vitals are taken: 0815 Time vitals are taken: 1015   Blood Pressure: 149/64 Blood Pressure: 166/79   Pulse: 79 Pulse: 57   Resp: 18 Resp: 16   Temp: 97 8 °F (36 6 °C) Temp: 97 8 °F (36 6 °C)             No Known Allergies  Patient Active Problem List    Diagnosis Date Noted    Leukocytosis 05/04/2021    RA (rheumatoid [facility]) ([facility]) 05/04/2021    Acute blood loss anemia (ABLA) 04/26/2021    SHANTI (acute kidney injury) ([facility] ) 04/24/2021    UTI (urinary tract infection) 04/24/2021    Hematuria due to cystitis     Anemia 04/14/2021    Chronic obstructive pulmonary disease ([facility] ) 04/14/2021    Hyperlipidemia 04/13/2021    Gross hematuria 04/12/2021    Cystitis 04/12/2021    Essential hypertension 04/12/2021    Dyslipidemia 04/12/2021     No orders of the defined types were placed in this encounter

## 2021-06-02 ENCOUNTER — OFFICE VISIT (OUTPATIENT)
Dept: WOUND CARE | Facility: HOSPITAL | Age: 76
End: 2021-06-02
Payer: MEDICARE

## 2021-06-02 DIAGNOSIS — N30.41 IRRADIATION CYSTITIS WITH HEMATURIA: ICD-10-CM

## 2021-06-02 PROCEDURE — 99183 HYPERBARIC OXYGEN THERAPY: CPT | Performed by: FAMILY MEDICINE

## 2021-06-02 PROCEDURE — G0277 HBOT, FULL BODY CHAMBER, 30M: HCPCS | Performed by: FAMILY MEDICINE

## 2021-06-02 NOTE — PROGRESS NOTES
HBO Treatment Course Details     HBO Treatment Course Details       Treatment Notes:      Treatment Course Number: 8  Total Treatments Ordered:  40     Diagnosis:   1  Irradiation cystitis with hematuria  Hyperbaric oxygen [facility]       HBO Treatment Details:  In-Patient Visit: no  Treatment Length:90 Minutes(Minutes)  Chamber #: Hard sided Monoplace Chamber    Pre-Treatment details:  Pre-treatment protocol Treatment Protocol: 2 0 FE X 90 minutes w/ 100% oxygen, no air break  Left ear clear?: yes  Right ear clear?: yes  Left ear intact?: yes  Right ear intact?: yes  Left ear color: translucent  Right ear color: translucent  PE Tubes present, Left ear?: no  PE Tubes present, Right ear?: no  Left ear irrigated?: no  Right ear irrigated?: no  Left ear TEED scale: Grade 0  Right ear TEED scale: Grade 0   Pretreatment heart and lung assessment: Pretreatment heart and lung auscltation unremarkable  Patient cleared for HBOT     Treatment details:  FE Rate: 2  Started Compression: 0822  Reached Compression: 0834  Total Compression Time: 12 (Minutes)  Total Holding Time: 90 (Minutes)  Started Decompression: 1004  Reached Surface: 9241  Total Decompression Time: 10 (Minutes)  Total Airbreaks: 0 (Minutes)  Total Time of Treatment: 112 (Minutes)  Symptoms Noted During Treatment: Restlessness and irritability (Minutes)    Post treatment details:  Left ear clear?: yes  Right ear clear?: yes  Left ears intact?: yes  Right ears intact?: yes  Left ear color: translucent  Right ear color: translucent  PE Tubes present, Left ear?: no  PE Tubes present, Right ear?: no        Left ear TEED scale: Grade 0  Right ear TEED scale: Grade 0  Post treatment heart and lung assessment: Post treatment heart and lung auscltation unremarkable  Patient cleared for discharge  Tolerated treatment well         Vital Signs:  HBO Glucose Reference Range: 100-350 mg/dl   Pre-Treatment Post-Treatment   Time vitals are taken: 0810 Time vitals are taken: 1015   Blood Pressure: 135/77 Blood Pressure: 132/76   Pulse: 92 Pulse: 69   Resp: 16 Resp: 18   Temp: 97 9 °F (36 6 °C) Temp: 97 6 °F (36 4 °C)   Pre-Inspection Glucose reading: (n/a) Post-Inspection Glucose reading: (n/a)       No Known Allergies  Patient Active Problem List    Diagnosis Date Noted    Leukocytosis 05/04/2021    RA (rheumatoid arthritis) ([facility] ) 05/04/2021    Acute blood loss anemia (ABLA) 04/26/2021    SHANTI (acute kidney injury) ([facility] ) 04/24/2021    UTI (urinary tract infection) 04/24/2021    Hematuria due to cystitis     Anemia 04/14/2021    Chronic obstructive pulmonary disease ([place] [address] ) 04/14/2021    Hyperlipidemia 04/13/2021    Gross hematuria 04/12/2021    Cystitis 04/12/2021    Essential hypertension 04/12/2021    Dyslipidemia 04/12/2021     No orders of the defined types were placed in this encounter  HBO Treatment Course Details       Treatment Notes:      Treatment Course Number: 8  Total Treatments Ordered:  40     Diagnosis:   1  Irradiation cystitis with hematuria  Hyperbaric oxygen [facility]       HBO Treatment Details:  In-Patient Visit: no  Treatment Length:90 Minutes(Minutes)  Chamber #: Hard sided Monoplace Chamber    Pre-Treatment details:  Pre-treatment protocol Treatment Protocol: 2 0 FE X 90 minutes w/ 100% oxygen, no air break  Left ear clear?: yes  Right ear clear?: yes  Left ear intact?: yes  Right ear intact?: yes  Left ear color: translucent  Right ear color: translucent  PE Tubes present, Left ear?: no  PE Tubes present, Right ear?: no  Left ear irrigated?: no  Right ear irrigated?: no  Left ear TEED scale: Grade 0  Right ear TEED scale: Grade 0   Pretreatment heart and lung assessment: Pretreatment heart and lung auscltation unremarkable   Patient cleared for HBOT     Treatment details:  FE Rate: 2  Started Compression: 5903  Reached Compression: 4516  Total Compression Time: 12 (Minutes)  Total Holding Time: 90 (Minutes)  Started Decompression: 1004  Reached Surface: 1014  Total Decompression Time: 10 (Minutes)  Total Airbreaks: 0 (Minutes)  Total Time of Treatment: 112 (Minutes)  Symptoms Noted During Treatment: Restlessness and irritability (Minutes)    Post treatment details:  Left ear clear?: yes  Right ear clear?: yes  Left ears intact?: yes  Right ears intact?: yes  Left ear color: translucent  Right ear color: translucent  PE Tubes present, Left ear?: no  PE Tubes present, Right ear?: no        Left ear TEED scale: Grade 0  Right ear TEED scale: Grade 0  Post treatment heart and lung assessment: Post treatment heart and lung auscltation unremarkable  Patient cleared for discharge  Tolerated treatment well  Vital Signs:  [facility] Glucose Reference Range: 100-350 mg/dl   Pre-Treatment Post-Treatment   Time vitals are taken: 0810 Time vitals are taken: 1015   Blood Pressure: 135/77 Blood Pressure: 132/76   Pulse: 92 Pulse: 69   Resp: 16 Resp: 18   Temp: 97 9 °F (36 6 °C) Temp: 97 6 °F (36 4 °C)   Pre-Inspection Glucose reading: (n/a) Post-Inspection Glucose reading: (n/a)       No Known Allergies  Patient Active Problem List    Diagnosis Date Noted    Leukocytosis 05/04/2021    RA (rheumatoid arthritis) ([facility] ) 05/04/2021    Acute blood loss anemia (ABLA) 04/26/2021    SHANTI (acute kidney injury) ([facility] ) 04/24/2021    UTI (urinary tract infection) 04/24/2021    Hematuria due to cystitis     Anemia 04/14/2021    Chronic obstructive pulmonary disease ([facility] ) 04/14/2021    Hyperlipidemia 04/13/2021    Gross hematuria 04/12/2021    Cystitis 04/12/2021    Essential hypertension 04/12/2021    Dyslipidemia 04/12/2021     No orders of the defined types were placed in this encounter  Treatment Notes: Pt states that he is still having problems with anxiety & continues to take Ativan x 2  Pt still having problems clearing ears but denies pain  He is refusing to take Singulair as prescribed by Dr Scott Harman due to fear of reaction with his other meds   Pt encouraged to call pharmacist or to speak with Dr Maria Luisa Bonner with any questions or concerns  Pt denied pain pre & post tx and became anxious at 9:56 am  He was put on medical air until 10:04 then was decompressed at that time  Pt was without complaints post tx  Treatment Course Number: 8  Total Treatments Ordered:  40     Diagnosis:   1  Irradiation cystitis with hematuria  [facility]       H[facility] Treatment Details:  In-Patient Visit: no  Treatment Length:90 Minutes(Minutes)  Chamber #: Hard sided Monoplace Chamber    Pre-Treatment details:  Pre-treatment protocol Treatment Protocol: 2 0 FE X 90 minutes w/ 100% oxygen, no air break  Left ear clear?: yes  Right ear clear?: yes  Left ear intact?: yes  Right ear intact?: yes  Left ear color: translucent  Right ear color: translucent  PE Tubes present, Left ear?: no  PE Tubes present, Right ear?: no  Left ear irrigated?: no  Right ear irrigated?: no  Left ear TEED scale: Grade 0  Right ear TEED scale: Grade 0   Pretreatment heart and lung assessment: Pretreatment heart and lung auscltation unremarkable  Patient cleared for HBOT     Treatment details:  FE Rate: 2  Started Compression: 0822  Reached Compression: 0834  Total Compression Time: 12 (Minutes)  Total Holding Time: 90 (Minutes)  Started Decompression: 1004  Reached Surface: 7209  Total Decompression Time: 10 (Minutes)  Total Airbreaks: 0 (Minutes)  Total Time of Treatment: 112 (Minutes)  Symptoms Noted During Treatment: Restlessness and irritability (Minutes)    Post treatment details:  Left ear clear?: yes  Right ear clear?: yes  Left ears intact?: yes  Right ears intact?: yes  Left ear color: translucent  Right ear color: translucent  PE Tubes present, Left ear?: no  PE Tubes present, Right ear?: no        Left ear TEED scale: Grade 0  Right ear TEED scale: Grade 0  Post treatment heart and lung assessment: Post treatment heart and lung auscltation unremarkable  Patient cleared for discharge   Tolerated treatment well  Vital Signs:  HBO Glucose Reference Range: 100-350 mg/dl   Pre-Treatment Post-Treatment   Time vitals are taken: 0810 Time vitals are taken: 1015   Blood Pressure: 135/77 Blood Pressure: 132/76   Pulse: 92 Pulse: 69   Resp: 16 Resp: 18   Temp: 97 9 °F (36 6 °C) Temp: 97 6 °F (36 4 °C)   Pre-Inspection Glucose reading: (n/a) Post-Inspection Glucose reading: (n/a)       No Known Allergies  Patient Active Problem List    Diagnosis Date Noted    Leukocytosis 05/04/2021    RA (rheumatoid arthritis) ([place] [address] ) 05/04/2021    Acute blood loss anemia (ABLA) 04/26/2021    SHANTI (acute kidney injury) ([place] [address] ) 04/24/2021    UTI (urinary tract infection) 04/24/2021    Hematuria due to cystitis     Anemia 04/14/2021    Chronic obstructive pulmonary disease ([place] [address] ) 04/14/2021    Hyperlipidemia 04/13/2021    Gross hematuria 04/12/2021    Cystitis 04/12/2021    Essential hypertension 04/12/2021    Dyslipidemia 04/12/2021     No orders of the defined types were placed in this encounter

## 2021-06-03 ENCOUNTER — OFFICE VISIT (OUTPATIENT)
Dept: WOUND CARE | Facility: HOSPITAL | Age: 76
End: 2021-06-03
Payer: MEDICARE

## 2021-06-03 DIAGNOSIS — N30.41 IRRADIATION CYSTITIS WITH HEMATURIA: ICD-10-CM

## 2021-06-03 PROCEDURE — G0277 HBOT, FULL BODY CHAMBER, 30M: HCPCS | Performed by: PREVENTIVE MEDICINE

## 2021-06-03 PROCEDURE — 99183 HYPERBARIC OXYGEN THERAPY: CPT | Performed by: PREVENTIVE MEDICINE

## 2021-06-03 NOTE — PROGRESS NOTES
HBO Treatment Course Details       Treatment Notes:tolerated well  Patient requested air breaks which were provided, 2 x 5 minute air breaks  Has some anxiety while in the chamber and feels they help  Treatment Course Number: 9  Total Treatments Ordered:  40     Diagnosis:   1  Irradiation cystitis with hematuria  Hyperbaric oxygen [facility]       HBO Treatment Details:    Treatment Length:90 Minutes(Minutes)  Chamber #: Hard sided Monoplace Chamber    Pre-Treatment details:  Pre-treatment protocol Treatment Protocol: 2 0 FE X 90 minutes w/ 100% oxygen, no air break(Dr Devaughn Pastor made aware that pt requesting to have 1 air break  Pt later stated that he will only request it if he becomes anxious towards end of treatment )  Left ear clear?: yes  Right ear clear?: yes                          Left ear TEED scale: Grade 0  Right ear TEED scale: Grade 0   Pretreatment heart and lung assessment: Pretreatment heart and lung auscltation unremarkable  Patient cleared for HBOT     Treatment details:  FE Rate:    Started Compression: 0821  Reached Compression: 0831  Total Compression Time: 10 (Minutes)  Total Holding Time: 90 (Minutes)  Started Decompression: 1001  Reached Surface: 1015  Total Decompression Time: 14 (Minutes)  Total Airbreaks:   (Minutes)  Total Time of Treatment:   (Minutes)  Symptoms Noted During Treatment: Restlessness and irritability (Minutes)    Post treatment details:  Left ear clear?: yes  Right ear clear?: yes                          Left ear TEED scale: Grade 0  Right ear TEED scale: Grade 0  Post treatment heart and lung assessment: Post treatment heart and lung auscltation unremarkable  Patient cleared for discharge  Tolerated treatment well         Vital Signs:  HBO Glucose Reference Range: 100-350 mg/dl   Pre-Treatment Post-Treatment   Time vitals are taken: 0805 Time vitals are taken: 1015   Blood Pressure: 145/80 Blood Pressure: 154/85   Pulse: 91 Pulse: 66   Resp: 20 Resp: 16   Temp: 97 8 °F (36 6 °C) Temp: 97 8 °F (36 6 °C)   Pre-Inspection Glucose reading: (n/a) Post-Inspection Glucose reading: (n/a)       No Known Allergies  Patient Active Problem List    Diagnosis Date Noted    Leukocytosis 05/04/2021    RA (rheumatoid arthritis) ([facility] ) 05/04/2021    Acute blood loss anemia (ABLA) 04/26/2021    SHANTI (acute kidney injury) ([place] [id number] ) 04/24/2021    UTI (urinary tract infection) 04/24/2021    Hematuria due to cystitis     Anemia 04/14/2021    Chronic obstructive pulmonary disease ([place] [address] ) 04/14/2021    Hyperlipidemia 04/13/2021    Gross hematuria 04/12/2021    Cystitis 04/12/2021    Essential hypertension 04/12/2021    Dyslipidemia 04/12/2021     No orders of the defined types were placed in this encounter  HBO Treatment Course Details       Treatment Notes: Pt denies any problems or complaints of pain & states that he took first dose of Singulair last night  He also states that the medical air break that he took yesterday helped him to feel less anxious & requested to have one again today  Dr Slade Vivas notified of this & was asked for air break order  He stated that he would include in his documentation  During treatment pt asked for 2nd air break toward end of treatment due to anxiety & Dr Slade Vivas notified  Pt denies any complaints of pain & was discharged in stable condition  Treatment Course Number: 9  Total Treatments Ordered:  40     Diagnosis:   1  Irradiation cystitis with hematuria  [facility]       [facility] Treatment Details:  In-Patient Visit: no  Treatment Length:90 Minutes(Minutes)  Chamber #: Hard sided Monoplace Chamber    Pre-Treatment details:  Pre-treatment protocol Treatment Protocol: 2 0 FE X 90 minutes w/ 100% oxygen, no air break(Dr Slade Vivas made aware that pt requesting to have 1 air break   Pt later stated that he will only request it if he becomes anxious towards end of treatment )  Left ear clear?: yes  Right ear clear?: yes                          Left ear TEED scale: Grade 0  Right ear TEED scale: Grade 0   Pretreatment heart and lung assessment: Pretreatment heart and lung auscltation unremarkable  Patient cleared for HBOT     Treatment details:  FE Rate:    Started Compression: 0821  Reached Compression: 0831  Total Compression Time: 10 (Minutes)  Total Holding Time: 90 (Minutes)  Started Decompression: 1001  Reached Surface: 1015  Total Decompression Time: 14 (Minutes)  Total Airbreaks:   (Minutes)  Total Time of Treatment:   (Minutes)  Symptoms Noted During Treatment: Restlessness and irritability (Minutes)    Post treatment details:  Left ear clear?: yes  Right ear clear?: yes                          Left ear TEED scale: Grade 0  Right ear TEED scale: Grade 0  Post treatment heart and lung assessment: Post treatment heart and lung auscltation unremarkable  Patient cleared for discharge  Tolerated treatment well  Vital Signs:  [facility] Glucose Reference Range: 100-350 mg/dl   Pre-Treatment Post-Treatment   Time vitals are taken: 0805 Time vitals are taken: 1015   Blood Pressure: 145/80 Blood Pressure: 154/43613/85   Pulse: 91 Pulse: 6666   Resp: 20 Resp: 1616   Temp: 97 8 °F (36 6 °C) Temp: 97 8 °F (36 6 °C)97 8   Pre-Inspection Glucose reading: (n/a) Post-Inspection Glucose reading: (n/a)       No Known Allergies  Patient Active Problem List    Diagnosis Date Noted    Leukocytosis 05/04/2021    RA (rheumatoid arthritis) ([facility] ) 05/04/2021    Acute blood loss anemia (ABLA) 04/26/2021    SHANTI (acute kidney injury) ([place] [id number] ) 04/24/2021    UTI (urinary tract infection) 04/24/2021    Hematuria due to cystitis     Anemia 04/14/2021    Chronic obstructive pulmonary disease ([facility] ) 04/14/2021    Hyperlipidemia 04/13/2021    Gross hematuria 04/12/2021    Cystitis 04/12/2021    Essential hypertension 04/12/2021    Dyslipidemia 04/12/2021     No orders of the defined types were placed in this encounter

## 2021-06-04 ENCOUNTER — OFFICE VISIT (OUTPATIENT)
Dept: WOUND CARE | Facility: HOSPITAL | Age: 76
End: 2021-06-04
Payer: MEDICARE

## 2021-06-04 DIAGNOSIS — N30.41 IRRADIATION CYSTITIS WITH HEMATURIA: Primary | ICD-10-CM

## 2021-06-04 PROCEDURE — 99183 HYPERBARIC OXYGEN THERAPY: CPT | Performed by: NURSE PRACTITIONER

## 2021-06-04 PROCEDURE — G0277 HBOT, FULL BODY CHAMBER, 30M: HCPCS | Performed by: NURSE PRACTITIONER

## 2021-06-04 NOTE — PROGRESS NOTES
HBO Treatment Course Details       Treatment Notes: Patient tolerated treatment  Patient requested to end treatment 10 minutes early today d/t anxiety  Treatment Course Number: 10  Total Treatments Ordered:  40     Diagnosis:   1  Irradiation cystitis with hematuria  [facility]baric oxygen [facility]    [facility]       [facility] Treatment Details:  In-Patient Visit: No  Treatment Length:90 Minutes(Minutes)  Chamber #: Hard sided Monoplace Chamber    Pre-Treatment details:  Pre-treatment protocol Treatment Protocol: 2 0 FE X 90 minutes w/ 100% oxygen, two 5 minute air breaks  Left ear clear?: yes  Right ear clear?: yes  Left ear intact?: yes  Right ear intact?: yes                    Left ear TEED scale: Grade 0  Right ear TEED scale: Grade 0   Pretreatment heart and lung assessment: Pretreatment heart and lung auscltation unremarkable  Patient cleared for HBOT     Treatment details:  FE Rate: 2  Started Compression: 0820  Reached Compression: 0831  Total Compression Time: 11 (Minutes)  Total Holding Time: 90 (Minutes)  Started Decompression: 1001  Reached Surface: 5375  Total Decompression Time: 13 (Minutes)  Total Airbreaks: 10 (Minutes)  Total Time of Treatment: 104 (Minutes)  Symptoms Noted During Treatment: Restlessness and irritability (Minutes)    Post treatment details:  Left ear clear?: yes  Right ear clear?: yes  Left ears intact?: yes  Right ears intact?: yes                    Left ear TEED scale: Grade 0  Right ear TEED scale: Grade 0  Post treatment heart and lung assessment: Post treatment heart and lung auscltation unremarkable  Patient cleared for discharge  Tolerated treatment well         Vital Signs:  HBO Glucose Reference Range: 100-350 mg/dl   Pre-Treatment Post-Treatment   Time vitals are taken: 0805 Time vitals are taken: 1015   Blood Pressure: 122/67 Blood Pressure: 154/79   Pulse: 69 Pulse: 57   Resp: 16 Resp: 20   Temp: 97 9 °F (36 6 °C) Temp: 97 6 °F (36 4 °C)   Pre-Inspection Glucose reading: (n/a) Post-Inspection Glucose reading: (n/a)       No Known Allergies  Patient Active Problem List    Diagnosis Date Noted    Leukocytosis 05/04/2021    RA (rheumatoid arthritis) ([place] [id number] ) 05/04/2021    Acute blood loss anemia (ABLA) 04/26/2021    SHANTI (acute kidney injury) ([place] [address] ) 04/24/2021    UTI (urinary tract infection) 04/24/2021    Hematuria due to cystitis     Anemia 04/14/2021    Chronic obstructive pulmonary disease ([place] [address] ) 04/14/2021    Hyperlipidemia 04/13/2021    Gross hematuria 04/12/2021    Cystitis 04/12/2021    Essential hypertension 04/12/2021    Dyslipidemia 04/12/2021     Orders Placed This Encounter   Procedures    Hyperbaric oxygen [facility]     Standing Status:   Future     Standing Expiration Date:   6/4/2022     Order Specific Question:   FE Rate     Answer:   2 0 FE for 90 Minutes without Air Breaks; 100% oxygen     Comments:   with 2 five minute air breaks     Order Specific Question:   Descent and ascent rate     Answer:   Descent and ascent rate of up to 2 psi/min depending upon the patient's ability to clear ears and comfort     Order Specific Question:   Frequency     Answer:   5 x week

## 2021-06-04 NOTE — PROGRESS NOTES
HBO Treatment Course Details       Treatment Notes: Pt denied pain pre or post dive  Order obtained for air breaks as pt was requesting them daily  Today pt asked to discontinue treatment 10 minutes early due to anxiety  Vital signs stable post treatment  Treatment Course Number: 10  Total Treatments Ordered:  40     Diagnosis:   1  Irradiation cystitis with hematuria  Hyperbaric oxygen [facility]    Hyperbaric oxygen [facility]       HBO Treatment Details:  In-Patient Visit: no  Treatment Length:90 Minutes(Minutes)  Chamber #: Hard sided Monoplace Chamber    Pre-Treatment details:  Pre-treatment protocol Treatment Protocol: 2 0 FE X 90 minutes w/ 100% oxygen, two 5 minute air breaks  Left ear clear?: yes  Right ear clear?: yes  Left ear intact?: yes  Right ear intact?: yes                    Left ear TEED scale: Grade 0  Right ear TEED scale: Grade 0   Pretreatment heart and lung assessment: Pretreatment heart and lung auscltation unremarkable   Patient cleared for HBOT     Treatment details:  FE Rate: 2  Started Compression: 0820  Reached Compression: 0831  Total Compression Time: 11 (Minutes)  Total Holding Time: 90 (Minutes)  Started Decompression: 1001  Reached Surface: 1014  Total Decompression Time: 13 (Minutes)  Total Airbreaks: 10 (Minutes)  Total Time of Treatment: 104 (Minutes)  Symptoms Noted During Treatment: Restlessness and irritability (Minutes)    Post treatment details:                                              Vital Signs:  HBO Glucose Reference Range: 100-350 mg/dl   Pre-Treatment Post-Treatment   Time vitals are taken: 0805 Time vitals are taken: 1015   Blood Pressure: 122/67 Blood Pressure: 154/79   Pulse: 69 Pulse: 57   Resp: 16 Resp: 20   Temp: 97 9 °F (36 6 °C) Temp: 97 6 °F (36 4 °C)   Pre-Inspection Glucose reading: (n/a) Post-Inspection Glucose reading: (n/a)       No Known Allergies  Patient Active Problem List    Diagnosis Date Noted    Leukocytosis 05/04/2021    RA (rheumatoid arthritis) ([place] [address] ) 05/04/2021    Acute blood loss anemia (ABLA) 04/26/2021    SHANTI (acute kidney injury) ([place] [address] ) 04/24/2021    UTI (urinary tract infection) 04/24/2021    Hematuria due to cystitis     Anemia 04/14/2021    Chronic obstructive pulmonary disease ([place] [address] ) 04/14/2021    Hyperlipidemia 04/13/2021    Gross hematuria 04/12/2021    Cystitis 04/12/2021    Essential hypertension 04/12/2021    Dyslipidemia 04/12/2021     No orders of the defined types were placed in this encounter

## 2021-06-07 ENCOUNTER — OFFICE VISIT (OUTPATIENT)
Dept: WOUND CARE | Facility: HOSPITAL | Age: 76
End: 2021-06-07
Payer: MEDICARE

## 2021-06-07 DIAGNOSIS — N30.41 IRRADIATION CYSTITIS WITH HEMATURIA: Primary | ICD-10-CM

## 2021-06-07 PROCEDURE — G0277 HBOT, FULL BODY CHAMBER, 30M: HCPCS | Performed by: NURSE PRACTITIONER

## 2021-06-07 PROCEDURE — 99183 HYPERBARIC OXYGEN THERAPY: CPT | Performed by: NURSE PRACTITIONER

## 2021-06-07 NOTE — PROGRESS NOTES
HBO Treatment Course Details       Treatment Notes: Patient tolerated treatment well     Treatment Course Number: 11  Total Treatments Ordered:  40     Diagnosis:   1  Irradiation cystitis with hematuria  [facility]       [facility] Treatment Details:  In-Patient Visit: No  Treatment Length:90 Minutes(Minutes)  Chamber #: Hard sided Monoplace Chamber    Pre-Treatment details:  Pre-treatment protocol Treatment Protocol: 2 0 FE X 90 minutes w/ 100% oxygen, two 5 minute air breaks  Left ear clear?: yes  Right ear clear?: yes  Left ear intact?: yes  Right ear intact?: yes                    Left ear TEED scale: Grade 0  Right ear TEED scale: Grade 0   Pretreatment heart and lung assessment: Pretreatment heart and lung auscltation unremarkable  Patient cleared for HBOT     Treatment details:  FE Rate: 2  Started Compression: 0818  Reached Compression: 0830  Total Compression Time: 12 (Minutes)  Total Holding Time: 100 (Minutes)  Started Decompression: 1010  Reached Surface: 1020  Total Decompression Time: 10 (Minutes)  Total Airbreaks: 10 (Minutes)  Total Time of Treatment: 112 (Minutes)  Symptoms Noted During Treatment: None (Minutes)    Post treatment details:  Left ear clear?: yes  Right ear clear?: yes  Left ears intact?: yes  Right ears intact?: yes                    Left ear TEED scale: Grade 0  Right ear TEED scale: Grade 0  Post treatment heart and lung assessment: Post treatment heart and lung auscltation unremarkable  Patient cleared for discharge  Tolerated treatment well         Vital Signs:  HBO Glucose Reference Range: 100-350 mg/dl   Pre-Treatment Post-Treatment   Time vitals are taken: 0805 Time vitals are taken: 1025   Blood Pressure: 133/67 Blood Pressure: 144/76   Pulse: 65 Pulse: 59   Resp: 20     Temp: 97 6 °F (36 4 °C) Temp: 97 7 °F (36 5 °C)   Pre-Inspection Glucose reading: (n/a) Post-Inspection Glucose reading: (n/a)       No Known Allergies  Patient Active Problem List    Diagnosis Date Noted    Leukocytosis 05/04/2021    RA (rheumatoid arthritis) ([facility] ) 05/04/2021    Acute blood loss anemia (ABLA) 04/26/2021    SHANTI (acute kidney injury) ([place] [id number] ) 04/24/2021    UTI (urinary tract infection) 04/24/2021    Hematuria due to cystitis     Anemia 04/14/2021    Chronic obstructive pulmonary disease ([place] [address] ) 04/14/2021    Hyperlipidemia 04/13/2021    Gross hematuria 04/12/2021    Cystitis 04/12/2021    Essential hypertension 04/12/2021    Dyslipidemia 04/12/2021     No orders of the defined types were placed in this encounter

## 2021-06-07 NOTE — PROGRESS NOTES
HBO Treatment Course Details       Treatment Notes: Pt c/o pain 1/10 from right nephrostomy tube pre-treatment then denied pain post treatment  Pt started to become anxious toward end of treatment but completed full prescribed time  Post vital signs stable  Treatment Course Number:    Total Treatments Ordered:  40     Diagnosis:   1  Irradiation cystitis with hematuria  [facility]       H[facility] Treatment Details:  In-Patient Visit: no  Treatment Length:90 Minutes(Minutes)  Chamber #: Hard sided Monoplace Chamber    Pre-Treatment details:  Pre-treatment protocol Treatment Protocol: 2 0 FE X 90 minutes w/ 100% oxygen, two 5 minute air breaks  Left ear clear?: yes  Right ear clear?: yes  Left ear intact?: yes  Right ear intact?: yes                    Left ear TEED scale: Grade 0  Right ear TEED scale: Grade 0   Pretreatment heart and lung assessment: Pretreatment heart and lung auscltation unremarkable   Patient cleared for HBOT     Treatment details:  FE Rate: 2  Started Compression: 0818  Reached Compression: 0830  Total Compression Time: 12 (Minutes)  Total Holding Time: 100 (Minutes)  Started Decompression: 1010  Reached Surface: 1020  Total Decompression Time: 10 (Minutes)  Total Airbreaks: 10 (Minutes)  Total Time of Treatment: 112 (Minutes)  Symptoms Noted During Treatment: None (Minutes)    Post treatment details:                                              Vital Signs:  HBO Glucose Reference Range: 100-350 mg/dl   Pre-Treatment Post-Treatment   Time vitals are taken: 0805 Time vitals are taken: 1025   Blood Pressure: 133/67 Blood Pressure: 144/76   Pulse: 65 Pulse: 59   Resp: 20     Temp: 97 6 °F (36 4 °C) Temp: 97 7 °F (36 5 °C)   Pre-Inspection Glucose reading: (n/a) Post-Inspection Glucose reading: (n/a)       No Known Allergies  Patient Active Problem List    Diagnosis Date Noted    Leukocytosis 05/04/2021    RA (rheumatoid arthritis) ([address] ) 05/04/2021    Acute blood loss anemia (ABLA) 04/26/2021    SHANTI (acute kidney injury) ([facility] ) 04/24/2021    UTI (urinary tract infection) 04/24/2021    Hematuria due to cystitis     Anemia 04/14/2021    Chronic obstructive pulmonary disease ([facility] ) 04/14/2021    Hyperlipidemia 04/13/2021    Gross hematuria 04/12/2021    Cystitis 04/12/2021    Essential hypertension 04/12/2021    Dyslipidemia 04/12/2021     No orders of the defined types were placed in this encounter

## 2021-06-08 ENCOUNTER — OFFICE VISIT (OUTPATIENT)
Dept: WOUND CARE | Facility: HOSPITAL | Age: 76
End: 2021-06-08
Payer: MEDICARE

## 2021-06-08 VITALS
HEART RATE: 74 BPM | TEMPERATURE: 97.5 F | DIASTOLIC BLOOD PRESSURE: 79 MMHG | SYSTOLIC BLOOD PRESSURE: 151 MMHG | RESPIRATION RATE: 20 BRPM

## 2021-06-08 DIAGNOSIS — N30.41 IRRADIATION CYSTITIS WITH HEMATURIA: Primary | ICD-10-CM

## 2021-06-08 PROCEDURE — G0277 HBOT, FULL BODY CHAMBER, 30M: HCPCS | Performed by: NURSE PRACTITIONER

## 2021-06-08 PROCEDURE — 99183 HYPERBARIC OXYGEN THERAPY: CPT | Performed by: NURSE PRACTITIONER

## 2021-06-08 NOTE — PROGRESS NOTES
HBO Treatment Course Details       Treatment Notes:  Patient tolerated treatment well  HBO tech reports that patient took two 5 minutes air breaks during his treatment which he also tolerated well  Treatment Course Number: 12  Total Treatments Ordered:  40     Diagnosis:   1  Irradiation cystitis with hematuria  [facility]baric oxygen [facility]    [facility]       [facility] Treatment Details:  In-Patient Visit:  No  Treatment Length:90 Minutes(Minutes)  Chamber #: Hard sided Monoplace Chamber    Pre-Treatment details:  Pre-treatment protocol Treatment Protocol: 2 0 FE X 90 minutes w/ 100% oxygen, two 5 minute air breaks  Left ear clear?: yes  Right ear clear?: yes  Left ear intact?: yes  Right ear intact?: yes                    Left ear TEED scale: Grade 0  Right ear TEED scale: Grade 0   Pretreatment heart and lung assessment: Pretreatment heart and lung auscltation unremarkable  Patient cleared for HBOT     Treatment details:  FE Rate: 2  Started Compression: 0840  Reached Compression: 0850  Total Compression Time: 10 (Minutes)  Total Holding Time: 100 (Minutes)  Started Decompression: 1030  Reached Surface: 1040  Total Decompression Time: 10 (Minutes)  Total Airbreaks: 10 (Minutes)  Total Time of Treatment: 110 (Minutes)  Symptoms Noted During Treatment: Other (Comment)(mild anxiety last 10 minutes of treatment) (Minutes)    Post treatment details:  Left ear clear?: yes  Right ear clear?: yes  Left ears intact?: yes  Right ears intact?: yes                    Left ear TEED scale: Grade 0  Right ear TEED scale: Grade 0  Post treatment heart and lung assessment: Post treatment heart and lung auscltation unremarkable  Patient cleared for discharge  Tolerated treatment well         Vital Signs:  HBO Glucose Reference Range: 100-350 mg/dl   Pre-Treatment Post-Treatment   Time vitals are taken: 0825 Time vitals are taken: 1050   Blood Pressure: 151/79 Blood Pressure: 158/77   Pulse: 74 Pulse: 64   Resp: 20 Resp: 18   Temp: 97 5 °F (36 4 °C) Temp: 97 6 °F (36 4 °C)   Pre-Inspection Glucose reading: (n/a) Post-Inspection Glucose reading: (n/a)       No Known Allergies  Patient Active Problem List    Diagnosis Date Noted    Leukocytosis 05/04/2021    RA (rheumatoid arthritis) ([place] [id number] ) 05/04/2021    Acute blood loss anemia (ABLA) 04/26/2021    SHANTI (acute kidney injury) ([place] [address] ) 04/24/2021    UTI (urinary tract infection) 04/24/2021    Hematuria due to cystitis     Anemia 04/14/2021    Chronic obstructive pulmonary disease ([place] [address] ) 04/14/2021    Hyperlipidemia 04/13/2021    Gross hematuria 04/12/2021    Cystitis 04/12/2021    Essential hypertension 04/12/2021    Dyslipidemia 04/12/2021     No orders of the defined types were placed in this encounter

## 2021-06-10 ENCOUNTER — OFFICE VISIT (OUTPATIENT)
Dept: WOUND CARE | Facility: HOSPITAL | Age: 76
End: 2021-06-10
Payer: MEDICARE

## 2021-06-10 DIAGNOSIS — N30.41 IRRADIATION CYSTITIS WITH HEMATURIA: ICD-10-CM

## 2021-06-10 PROCEDURE — G0277 HBOT, FULL BODY CHAMBER, 30M: HCPCS | Performed by: PREVENTIVE MEDICINE

## 2021-06-10 PROCEDURE — 99183 HYPERBARIC OXYGEN THERAPY: CPT | Performed by: PREVENTIVE MEDICINE

## 2021-06-10 RX ORDER — CIPROFLOXACIN 500 MG/1
500 TABLET, FILM COATED ORAL EVERY 12 HOURS SCHEDULED
COMMUNITY
Start: 2021-06-09 | End: 2021-06-16

## 2021-06-10 NOTE — PROGRESS NOTES
HBO Treatment Course Details       Treatment Notes: Pt denied any problems or complaints pre of post treatment  Had usual episode of anxiety toward last few minutes of tx but completed dive without complaints  He is being treated for urinary tract infection by primary care physician (Dr Ирина Huerta made aware)  Treatment Course Number: 14  Total Treatments Ordered:  40     Diagnosis:   1  Irradiation cystitis with hematuria  [facility] [place]       [facility] Treatment Details:    Treatment Length:90 Minutes(Minutes)  Chamber #: Hard sided Monoplace Chamber    Pre-Treatment details:  Pre-treatment protocol Treatment Protocol: 2 0 FE X 90 minutes w/ 100% oxygen, two 5 minute air breaks  Left ear clear?: yes  Right ear clear?: yes                          Left ear TEED scale: Grade 0  Right ear TEED scale: Grade 0   Pretreatment heart and lung assessment: Pretreatment heart and lung auscltation unremarkable  Patient cleared for HBOT     Treatment details:  FE Rate: 2  Started Compression: 0822  Reached Compression: 0832  Total Compression Time: 10 (Minutes)  Total Holding Time:   (Minutes)  Started Decompression:    Reached Surface: Total Decompression Time:   (Minutes)  Total Airbreaks: 10 (Minutes)  Total Time of Treatment:   (Minutes)  Symptoms Noted During Treatment:   (Minutes)    Post treatment details:  Left ear clear?: yes  Right ear clear?: yes                          Left ear TEED scale: Grade 0  Right ear TEED scale: Grade 0  Post treatment heart and lung assessment: Post treatment heart and lung auscltation unremarkable  Patient cleared for discharge  Tolerated treatment well         Vital Signs:  HBO Glucose Reference Range: 100-350 mg/dl   Pre-Treatment Post-Treatment   Time vitals are taken: 0815  1225   Blood Pressure: 138/74  157/80   Pulse: 99  90   Resp: 16     Temp: 97 3 °F (36 3 °C)  97 4   Pre-Inspection Glucose reading: (n/a)  n/a       No Known Allergies  Patient Active Problem List Diagnosis Date Noted    Leukocytosis 05/04/2021    RA (rheumatoid arthritis) ([facility] ) 05/04/2021    Acute blood loss anemia (ABLA) 04/26/2021    SHANTI (acute kidney injury) ([place] [id number] ) 04/24/2021    UTI (urinary tract infection) 04/24/2021    Hematuria due to cystitis     Anemia 04/14/2021    Chronic obstructive pulmonary disease ([place] [address] ) 04/14/2021    Hyperlipidemia 04/13/2021    Gross hematuria 04/12/2021    Cystitis 04/12/2021    Essential hypertension 04/12/2021    Dyslipidemia 04/12/2021     No orders of the defined types were placed in this encounter

## 2021-06-11 ENCOUNTER — OFFICE VISIT (OUTPATIENT)
Dept: WOUND CARE | Facility: HOSPITAL | Age: 76
End: 2021-06-11
Payer: MEDICARE

## 2021-06-11 DIAGNOSIS — F41.8 ANXIETY ASSOCIATED WITH HYPERBARIC OXYGEN THERAPY: ICD-10-CM

## 2021-06-11 DIAGNOSIS — N30.41 IRRADIATION CYSTITIS WITH HEMATURIA: Primary | ICD-10-CM

## 2021-06-11 PROCEDURE — 99213 OFFICE O/P EST LOW 20 MIN: CPT | Performed by: NURSE PRACTITIONER

## 2021-06-11 PROCEDURE — G0277 HBOT, FULL BODY CHAMBER, 30M: HCPCS | Performed by: NURSE PRACTITIONER

## 2021-06-11 PROCEDURE — 99183 HYPERBARIC OXYGEN THERAPY: CPT | Performed by: NURSE PRACTITIONER

## 2021-06-11 RX ORDER — LORAZEPAM 0.5 MG/1
0.5 TABLET ORAL EVERY 8 HOURS PRN
Qty: 30 TABLET | Refills: 0 | Status: SHIPPED | OUTPATIENT
Start: 2021-06-11 | End: 2021-07-13 | Stop reason: SDUPTHER

## 2021-06-11 NOTE — PROGRESS NOTES
HBO Treatment Course Details       Treatment Notes: Pt c/o pain 1/10 from left nephrostomy tube pre dive  He requested to abort treatment 10 minutes early due to mid-back pain 4/10  He states he will take Tylenol at home  Treatment Course Number: 14  Total Treatments Ordered:  40     Diagnosis:   1  Irradiation cystitis with hematuria  Hyperbaric oxygen the[facility]py   2  Anxiety associated with hyperbaric oxygen therapy  LORazepam (ATIVAN) 0 5 mg tablet       HBO Treatment Details:  In-Patient Visit: no  Treatment Length:90 Minutes(Minutes)  Chamber #: Hard sided Monoplace Chamber    Pre-Treatment details:  Pre-treatment protocol Treatment Protocol: 2 0 FE X 90 minutes w/ 100% oxygen, two 5 minute air breaks  Left ear clear?: yes  Right ear clear?: yes  Left ear intact?: yes  Right ear intact?: yes                    Left ear TEED scale: Grade 0  Right ear TEED scale: Grade 0   Pretreatment heart and lung assessment: Pretreatment heart and lung auscltation unremarkable  Patient cleared for HBOT     Treatment details:  FE Rate: 2  Started Compression: 0815  Reached Compression: 0825  Total Compression Time: 10 (Minutes)  Total Holding Time: 90 (Minutes)  Started Decompression: 0955  Reached Surface: 1005  Total Decompression Time: 10 (Minutes)  Total Airbreaks: 10 (Minutes)  Total Time of Treatment: 100 (Minutes)  Symptoms Noted During Treatment: Other (Comment)(Pt c/o mid-back pain 4/10 & asked to abort tx 10 min early) (Minutes)    Post treatment details:  Left ear clear?: yes  Right ear clear?: yes  Left ears intact?: yes  Right ears intact?: yes                    Left ear TEED scale: Grade 0  Right ear TEED scale: Grade 0  Post treatment heart and lung assessment: Post treatment heart and lung auscltation unremarkable  Patient cleared for discharge  Tolerated treatment well         Vital Signs:  HBO Glucose Reference Range: 100-350 mg/dl   Pre-Treatment Post-Treatment   Time vitals are taken: 0810 Time vitals are taken: 1010   Blood Pressure: 154/78 Blood Pressure: 139/81   Pulse: 102 Pulse: 64     Resp: 16   Temp: 97 8 °F (36 6 °C) Temp: 97 4 °F (36 3 °C)   Pre-Inspection Glucose reading: (n/a) Post-Inspection Glucose reading: (n/a)       No Known Allergies  Patient Active Problem List    Diagnosis Date Noted    Leukocytosis 05/04/2021    RA (rheumatoid arthritis) ([place] [address] ) 05/04/2021    Acute blood loss anemia (ABLA) 04/26/2021    SHANTI (acute kidney injury) ([place] [address] ) 04/24/2021    UTI (urinary tract infection) 04/24/2021    Hematuria due to cystitis     Anemia 04/14/2021    Chronic obstructive pulmonary disease ([place] [address] ) 04/14/2021    Hyperlipidemia 04/13/2021    Gross hematuria 04/12/2021    Cystitis 04/12/2021    Essential hypertension 04/12/2021    Dyslipidemia 04/12/2021     No orders of the defined types were placed in this encounter

## 2021-06-11 NOTE — PROGRESS NOTES
HBO Treatment Course Details       Treatment Notes:  Patient requested to and HBO today 10 minutes early due to back pain  Otherwise he tolerated treatment well  Script for lorazepam was renewed today  Treatment Course Number: 14  Total Treatments Ordered:  40     Diagnosis:   1  Irradiation cystitis with hematuria  Hyperbaric oxygen thearpy   2  Anxiety associated with hyperbaric oxygen therapy  LORazepam (ATIVAN) 0 5 mg tablet       HBO Treatment Details:  In-Patient Visit: No  Treatment Length:90 Minutes(Minutes)  Chamber #: Hard sided Monoplace Chamber    Pre-Treatment details:  Pre-treatment protocol Treatment Protocol: 2 0 FE X 90 minutes w/ 100% oxygen, two 5 minute air breaks  Left ear clear?: yes  Right ear clear?: yes  Left ear intact?: yes  Right ear intact?: yes                    Left ear TEED scale: Grade 0  Right ear TEED scale: Grade 0   Pretreatment heart and lung assessment: Pretreatment heart and lung auscltation unremarkable  Patient cleared for HBOT     Treatment details:  FE Rate: 2  Started Compression: 0815  Reached Compression: 0825  Total Compression Time: 10 (Minutes)  Total Holding Time: 90 (Minutes)  Started Decompression: 0955  Reached Surface: 1005  Total Decompression Time: 10 (Minutes)  Total Airbreaks: 10 (Minutes)  Total Time of Treatment: 100 (Minutes)  Symptoms Noted During Treatment: Other (Comment)(Pt c/o mid-back pain 4/10 & asked to abort tx 10 min early) (Minutes)    Post treatment details:  Left ear clear?: yes  Right ear clear?: yes  Left ears intact?: yes  Right ears intact?: yes                    Left ear TEED scale: Grade 0  Right ear TEED scale: Grade 0  Post treatment heart and lung assessment: Post treatment heart and lung auscltation unremarkable  Patient cleared for discharge  Tolerated treatment well         Vital Signs:  HBO Glucose Reference Range: 100-350 mg/dl   Pre-Treatment Post-Treatment   Time vitals are taken: 0810 Time vitals are taken: 1010 Blood Pressure: 154/78 Blood Pressure: 139/81   Pulse: 102 Pulse: 64     Resp: 16   Temp: 97 8 °F (36 6 °C) Temp: 97 4 °F (36 3 °C)   Pre-Inspection Glucose reading: (n/a) Post-Inspection Glucose reading: (n/a)       No Known Allergies  Patient Active Problem List    Diagnosis Date Noted    Leukocytosis 05/04/2021    RA (rheumatoid arthritis) ([place] [id number] ) 05/04/2021    Acute blood loss anemia (ABLA) 04/26/2021    SHANTI (acute kidney injury) ([place] [address] ) 04/24/2021    UTI (urinary tract infection) 04/24/2021    Hematuria due to cystitis     Anemia 04/14/2021    Chronic obstructive pulmonary disease ([place] [address] ) 04/14/2021    Hyperlipidemia 04/13/2021    Gross hematuria 04/12/2021    Cystitis 04/12/2021    Essential hypertension 04/12/2021    Dyslipidemia 04/12/2021     No orders of the defined types were placed in this encounter

## 2021-06-14 ENCOUNTER — OFFICE VISIT (OUTPATIENT)
Dept: WOUND CARE | Facility: HOSPITAL | Age: 76
End: 2021-06-14
Payer: MEDICARE

## 2021-06-14 DIAGNOSIS — N30.41 IRRADIATION CYSTITIS WITH HEMATURIA: Primary | ICD-10-CM

## 2021-06-14 PROCEDURE — 99183 HYPERBARIC OXYGEN THERAPY: CPT | Performed by: NURSE PRACTITIONER

## 2021-06-14 NOTE — PROGRESS NOTES
HBO Treatment Course Details       Treatment Notes: Patient refusing to complete HBOT today  Patient reports he passed a clot this morning and he is having bleeding  Patient is concerned about making a mess in the HBO chamber and becoming embarrassed  Patient stated he doesn't feel comfortable doing HBO today  Patient states he will try again tomorrow     Treatment Course Number:    Total Treatments Ordered:        Diagnosis:   1  Irradiation cystitis with hematuria         HBO Treatment Details:  In-Patient Visit: No  Treatment Length: (Minutes)  Chamber #:      Pre-Treatment details:  Pre-treatment protocol                                               Treatment details:  FE Rate:    Started Compression:    Reached Compression:    Total Compression Time:   (Minutes)  Total Holding Time:   (Minutes)  Started Decompression:    Reached Surface: Total Decompression Time:   (Minutes)  Total Airbreaks:   (Minutes)  Total Time of Treatment:   (Minutes)  Symptoms Noted During Treatment:   (Minutes)    Post treatment details:                                              Vital Signs:  HBO Glucose Reference Range: 100-350 mg/dl   Pre-Treatment Post-Treatment                                           No Known Allergies  Patient Active Problem List    Diagnosis Date Noted    Leukocytosis 05/04/2021    RA (rheumatoid arthritis) ([facility] ) 05/04/2021    Acute blood loss anemia (ABLA) 04/26/2021    SHANTI (acute kidney injury) ([facility] ) 04/24/2021    UTI (urinary tract infection) 04/24/2021    Hematuria due to cystitis     Anemia 04/14/2021    Chronic obstructive pulmonary disease ([facility] ) 04/14/2021    Hyperlipidemia 04/13/2021    Gross hematuria 04/12/2021    Cystitis 04/12/2021    Essential hypertension 04/12/2021    Dyslipidemia 04/12/2021     No orders of the defined types were placed in this encounter

## 2021-06-14 NOTE — PROGRESS NOTES
The patient stated that he he passed a clot this morning while urinating  He stated that he had chills yesterday but did not check his temperature or go to ER  Temperature was 97 4 here, blood pressure 151/72, pulse 73 & respirations 20  He denies pain but states that he has burning during urination  He is still taking Cipro 500 mg twice a day for urinary tract infection  He is refusing his treatment today but stated that he will try again tomorrow

## 2021-06-15 ENCOUNTER — OFFICE VISIT (OUTPATIENT)
Dept: WOUND CARE | Facility: HOSPITAL | Age: 76
End: 2021-06-15
Payer: MEDICARE

## 2021-06-15 DIAGNOSIS — N30.41 IRRADIATION CYSTITIS WITH HEMATURIA: Primary | ICD-10-CM

## 2021-06-15 PROCEDURE — G0277 HBOT, FULL BODY CHAMBER, 30M: HCPCS | Performed by: NURSE PRACTITIONER

## 2021-06-15 PROCEDURE — 99183 HYPERBARIC OXYGEN THERAPY: CPT | Performed by: NURSE PRACTITIONER

## 2021-06-15 NOTE — PROGRESS NOTES
HBO Treatment Course Details       Treatment Notes: Patient treated in a hard sided monoplace chamber /  He tolerated HBOT well  Treatment Course Number: 15  Total Treatments Ordered:  40     Diagnosis:   1  Irradiation cystitis with hematuria  [facility]       HBO Treatment Details:  In-Patient Visit: no  Treatment Length:90 Minutes(Minutes)  Chamber #: Hard sided Monoplace Chamber    Pre-Treatment details:  Pre-treatment protocol Treatment Protocol: 2 0 FE X 90 minutes w/ 100% oxygen, two 5 minute air breaks  Left ear clear?: yes  Right ear clear?: yes  Left ear intact?: yes  Right ear intact?: yes                    Left ear TEED scale: Grade 0  Right ear TEED scale: Grade 0   Pretreatment heart and lung assessment: Pretreatment heart and lung auscltation unremarkable  Patient cleared for HBOT     Treatment details:  FE Rate: 2  Started Compression: 6180  Reached Compression: 0824  Total Compression Time: 10 (Minutes)  Total Holding Time: 100 (Minutes)  Started Decompression: 1004  Reached Surface: 0055  Total Decompression Time: 10 (Minutes)  Total Airbreaks: 10 (Minutes)  Total Time of Treatment: 110 (Minutes)  Symptoms Noted During Treatment: None (Minutes)    Post treatment details:  Left ear clear?: yes  Right ear clear?: yes  Left ears intact?: yes  Right ears intact?: yes                    Left ear TEED scale: Grade 0  Right ear TEED scale: Grade 0  Post treatment heart and lung assessment: Post treatment heart and lung auscltation unremarkable  Patient cleared for discharge  Tolerated treatment well         Vital Signs:  HBO Glucose Reference Range: 100-350 mg/dl   Pre-Treatment Post-Treatment   Time vitals are taken: 0810 Time vitals are taken: 1020   Blood Pressure: 160/77 Blood Pressure: 158/87   Pulse: 79 Pulse: 52   Resp: 18 Resp: 16   Temp: 97 7 °F (36 5 °C) Temp: 98 3 °F (36 8 °C)             No Known Allergies  Patient Active Problem List    Diagnosis Date Noted    Leukocytosis 05/04/2021    RA (rheumatoid arthritis) ([facility] ) 05/04/2021    Acute blood loss anemia (ABLA) 04/26/2021    SHANTI (acute kidney injury) ([place] [id number] ) 04/24/2021    UTI (urinary tract infection) 04/24/2021    Hematuria due to cystitis     Anemia 04/14/2021    Chronic obstructive pulmonary disease ([place] [address] ) 04/14/2021    Hyperlipidemia 04/13/2021    Gross hematuria 04/12/2021    Cystitis 04/12/2021    Essential hypertension 04/12/2021    Dyslipidemia 04/12/2021     No orders of the defined types were placed in this encounter

## 2021-06-15 NOTE — PROGRESS NOTES
HBO Treatment Course Details       Treatment Notes: Patient tolerated treatment well     Treatment Course Number: 15  Total Treatments Ordered:  40     Diagnosis:   1  Irradiation cystitis with hematuria  Hyperbaric oxygen [facility]       HBO Treatment Details:  In-Patient Visit: No  Treatment Length:90 Minutes(Minutes)  Chamber #: Hard sided Monoplace Chamber    Pre-Treatment details:  Pre-treatment protocol Treatment Protocol: 2 0 FE X 90 minutes w/ 100% oxygen, two 5 minute air breaks  Left ear clear?: yes  Right ear clear?: yes  Left ear intact?: yes  Right ear intact?: yes                    Left ear TEED scale: Grade 0  Right ear TEED scale: Grade 0   Pretreatment heart and lung assessment: Pretreatment heart and lung auscltation unremarkable  Patient cleared for HBOT     Treatment details:  FE Rate: 2  Started Compression: 0135  Reached Compression: 0824  Total Compression Time: 10 (Minutes)  Total Holding Time: 100 (Minutes)  Started Decompression: 1004  Reached Surface: 9512  Total Decompression Time: 10 (Minutes)  Total Airbreaks: 10 (Minutes)  Total Time of Treatment: 110 (Minutes)  Symptoms Noted During Treatment: None (Minutes)    Post treatment details:  Left ear clear?: yes  Right ear clear?: yes  Left ears intact?: yes  Right ears intact?: yes                    Left ear TEED scale: Grade 0  Right ear TEED scale: Grade 0  Post treatment heart and lung assessment: Post treatment heart and lung auscltation unremarkable  Patient cleared for discharge  Tolerated treatment well         Vital Signs:  HBO Glucose Reference Range: 100-350 mg/dl   Pre-Treatment Post-Treatment   Time vitals are taken: 0810 Time vitals are taken: 1020   Blood Pressure: 160/77 Blood Pressure: 158/87   Pulse: 79 Pulse: 52   Resp: 18 Resp: 16   Temp: 97 7 °F (36 5 °C) Temp: 98 3 °F (36 8 °C)             No Known Allergies  Patient Active Problem List    Diagnosis Date Noted    Leukocytosis 05/04/2021    RA (rheumatoid arthritis) ([place] [address] ) 05/04/2021    Acute blood loss anemia (ABLA) 04/26/2021    SHANTI (acute kidney injury) ([place] [address] ) 04/24/2021    UTI (urinary tract infection) 04/24/2021    Hematuria due to cystitis     Anemia 04/14/2021    Chronic obstructive pulmonary disease ([place] [address] ) 04/14/2021    Hyperlipidemia 04/13/2021    Gross hematuria 04/12/2021    Cystitis 04/12/2021    Essential hypertension 04/12/2021    Dyslipidemia 04/12/2021     No orders of the defined types were placed in this encounter

## 2021-06-16 ENCOUNTER — OFFICE VISIT (OUTPATIENT)
Dept: WOUND CARE | Facility: HOSPITAL | Age: 76
End: 2021-06-16
Payer: MEDICARE

## 2021-06-16 DIAGNOSIS — N30.41 IRRADIATION CYSTITIS WITH HEMATURIA: ICD-10-CM

## 2021-06-16 PROCEDURE — G0277 HBOT, FULL BODY CHAMBER, 30M: HCPCS | Performed by: FAMILY MEDICINE

## 2021-06-16 PROCEDURE — 99183 HYPERBARIC OXYGEN THERAPY: CPT | Performed by: FAMILY MEDICINE

## 2021-06-16 NOTE — PROGRESS NOTES
HBO Treatment Course Details       Treatment Notes: Patient treated in a hard sided monoplace chamber  He tolerated HBOT well  Treatment Course Number: 16  Total Treatments Ordered:  40     Diagnosis:   1  Irradiation cystitis with hematuria  [facility]       [facility] Treatment Details:  In-Patient Visit: no  Treatment Length:90 Minutes(Minutes)  Chamber #: Hard sided Monoplace Chamber    Pre-Treatment details:  Pre-treatment protocol Treatment Protocol: 2 0 FE X 90 minutes w/ 100% oxygen, two 5 minute air breaks                                             Treatment details:  FE Rate: 2  Started Compression: 0816  Reached Compression: 0829  Total Compression Time: 13 (Minutes)  Total Holding Time: 100 (Minutes)  Started Decompression: 1009  Reached Surface: 1019  Total Decompression Time: 10 (Minutes)  Total Airbreaks: 10 (Minutes)  Total Time of Treatment: 113 (Minutes)  Symptoms Noted During Treatment: None (Minutes)    Post treatment details:                                              Vital Signs:  HBO Glucose Reference Range: 100-350 mg/dl   Pre-Treatment Post-Treatment   Time vitals are taken: 0810 Time vitals are taken: 1023   Blood Pressure: 168/59 Blood Pressure: 149/85   Pulse: 80 Pulse: 58   Resp: 16 Resp: 16   Temp: 97 7 °F (36 5 °C) Temp: 98 °F (36 7 °C)             No Known Allergies  Patient Active Problem List    Diagnosis Date Noted    Leukocytosis 05/04/2021    RA (rheumatoid arthritis) ([facility] ) 05/04/2021    Acute blood loss anemia (ABLA) 04/26/2021    SHANTI (acute kidney injury) ([place] [id number] ) 04/24/2021    UTI (urinary tract infection) 04/24/2021    Hematuria due to cystitis     Anemia 04/14/2021    Chronic obstructive pulmonary disease ([facility] ) 04/14/2021    Hyperlipidemia 04/13/2021    Gross hematuria 04/12/2021    Cystitis 04/12/2021    Essential hypertension 04/12/2021    Dyslipidemia 04/12/2021     No orders of the defined types were placed in this encounter

## 2021-06-16 NOTE — PROGRESS NOTES
HBO Treatment Course Details       Treatment Notes:      Treatment Course Number: 16  Total Treatments Ordered:  40     Diagnosis:   1  Irradiation cystitis with hematuria  [facility]       HBO Treatment Details:  In-Patient Visit: no  Treatment Length:90 Minutes(Minutes)  Chamber #: Hard sided Monoplace Chamber    Pre-Treatment details:  Pre-treatment protocol Treatment Protocol: 2 0 FE X 90 minutes w/ 100% oxygen, two 5 minute air breaks  Left ear clear?: yes  Right ear clear?: yes  Left ear intact?: yes  Right ear intact?: yes  Left ear color: translucent  Right ear color: translucent  PE Tubes present, Left ear?: no  PE Tubes present, Right ear?: no  Left ear irrigated?: no  Right ear irrigated?: no  Left ear TEED scale: Grade 0  Right ear TEED scale: Grade 0   Pretreatment heart and lung assessment: Pretreatment heart and lung auscltation unremarkable  Patient cleared for HBOT     Treatment details:  FE Rate: 2  Started Compression: 0816  Reached Compression: 0829  Total Compression Time: 13 (Minutes)  Total Holding Time: 100 (Minutes)  Started Decompression: 1009  Reached Surface: 1019  Total Decompression Time: 10 (Minutes)  Total Airbreaks: 10 (Minutes)  Total Time of Treatment: 113 (Minutes)  Symptoms Noted During Treatment: None (Minutes)    Post treatment details:  Left ear clear?: yes  Right ear clear?: yes  Left ears intact?: yes  Right ears intact?: yes  Left ear color: translucent  Right ear color: translucent  PE Tubes present, Left ear?: no  PE Tubes present, Right ear?: no        Left ear TEED scale: Grade 0  Right ear TEED scale: Grade 0  Post treatment heart and lung assessment: Post treatment heart and lung auscltation unremarkable  Patient cleared for discharge  Tolerated treatment well         Vital Signs:  HBO Glucose Reference Range: 100-350 mg/dl   Pre-Treatment Post-Treatment   Time vitals are taken: 0810 Time vitals are taken: 1023   Blood Pressure: 168/59 Blood Pressure: 149/85   Pulse: 80 Pulse: 58   Resp: 16 Resp: 16   Temp: 97 7 °F (36 5 °C) Temp: 98 °F (36 7 °C)             No Known Allergies  Patient Active Problem List    Diagnosis Date Noted    Leukocytosis 05/04/2021    RA (rheumatoid arthritis) ([facility] ) 05/04/2021    Acute blood loss anemia (ABLA) 04/26/2021    SHANTI (acute kidney injury) ([facility] ) 04/24/2021    UTI (urinary tract infection) 04/24/2021    Hematuria due to cystitis     Anemia 04/14/2021    Chronic obstructive pulmonary disease ([facility] ) 04/14/2021    Hyperlipidemia 04/13/2021    Gross hematuria 04/12/2021    Cystitis 04/12/2021    Essential hypertension 04/12/2021    Dyslipidemia 04/12/2021     No orders of the defined types were placed in this encounter  HBO Treatment Course Details       Treatment Notes:      Treatment Course Number: 16  Total Treatments Ordered:  40     Diagnosis:   1  Irradiation cystitis with hematuria  Hyperbaric oxygen [facility]       HBO Treatment Details:  In-Patient Visit: no  Treatment Length:90 Minutes(Minutes)  Chamber #: Hard sided Monoplace Chamber    Pre-Treatment details:  Pre-treatment protocol Treatment Protocol: 2 0 FE X 90 minutes w/ 100% oxygen, two 5 minute air breaks  Left ear clear?: yes  Right ear clear?: yes  Left ear intact?: yes  Right ear intact?: yes  Left ear color: translucent  Right ear color: translucent  PE Tubes present, Left ear?: no  PE Tubes present, Right ear?: no  Left ear irrigated?: no  Right ear irrigated?: no  Left ear TEED scale: Grade 0  Right ear TEED scale: Grade 0   Pretreatment heart and lung assessment: Pretreatment heart and lung auscltation unremarkable   Patient cleared for HBOT     Treatment details:  FE Rate: 2  Started Compression: 0816  Reached Compression: 0829  Total Compression Time: 13 (Minutes)  Total Holding Time: 100 (Minutes)  Started Decompression: 1009  Reached Surface: 1019  Total Decompression Time: 10 (Minutes)  Total Airbreaks: 10 (Minutes)  Total Time of Treatment: 113 (Minutes)  Symptoms Noted During Treatment: None (Minutes)    Post treatment details:  Left ear clear?: yes  Right ear clear?: yes  Left ears intact?: yes  Right ears intact?: yes  Left ear color: translucent  Right ear color: translucent  PE Tubes present, Left ear?: no  PE Tubes present, Right ear?: no        Left ear TEED scale: Grade 0  Right ear TEED scale: Grade 0  Post treatment heart and lung assessment: Post treatment heart and lung auscltation unremarkable  Patient cleared for discharge  Tolerated treatment well  Vital Signs:  [facility] Glucose Reference Range: 100-350 mg/dl   Pre-Treatment Post-Treatment   Time vitals are taken: 0810 Time vitals are taken: 1023   Blood Pressure: 168/59 Blood Pressure: 149/85   Pulse: 80 Pulse: 58   Resp: 16 Resp: 16   Temp: 97 7 °F (36 5 °C) Temp: 98 °F (36 7 °C)             No Known Allergies  Patient Active Problem List    Diagnosis Date Noted    Leukocytosis 05/04/2021    RA (rheumatoid arthritis) ([facility] ) 05/04/2021    Acute blood loss anemia (ABLA) 04/26/2021    [facility]I [facility] ([facility] ) 04/24/2021    UTI (urinary tract infection) 04/24/2021    Hematuria due to cystitis     Anemia 04/14/2021    Chronic obstructive pulmonary disease ([facility] ) 04/14/2021    Hyperlipidemia 04/13/2021    Gross hematuria 04/12/2021    Cystitis 04/12/2021    Essential hypertension 04/12/2021    Dyslipidemia 04/12/2021     No orders of the defined types were placed in this encounter

## 2021-06-17 ENCOUNTER — OFFICE VISIT (OUTPATIENT)
Dept: WOUND CARE | Facility: HOSPITAL | Age: 76
End: 2021-06-17
Payer: MEDICARE

## 2021-06-17 DIAGNOSIS — N30.41 IRRADIATION CYSTITIS WITH HEMATURIA: ICD-10-CM

## 2021-06-17 PROCEDURE — 99183 HYPERBARIC OXYGEN THERAPY: CPT | Performed by: PREVENTIVE MEDICINE

## 2021-06-17 PROCEDURE — G0277 HBOT, FULL BODY CHAMBER, 30M: HCPCS | Performed by: PREVENTIVE MEDICINE

## 2021-06-17 NOTE — PROGRESS NOTES
HBO Treatment Course Details       Treatment Notes: Pt denies any problems or complaints of pain pre or post hbo dive  He tolerated treatment well  Treatment Course Number: 17  Total Treatments Ordered:  40     Diagnosis:   1  Irradiation cystitis with hematuria  [facility]       [facility] Treatment Details:  In-Patient Visit: no  Treatment Length:90 Minutes(Minutes)  Chamber #: Hard sided Monoplace Chamber    Pre-Treatment details:  Pre-treatment protocol Treatment Protocol: 2 0 FE X 90 minutes w/ 100% oxygen, two 5 minute air breaks  Left ear clear?: yes  Right ear clear?: yes                          Left ear TEED scale: Grade 0  Right ear TEED scale: Grade 0   Pretreatment heart and lung assessment: Pretreatment heart and lung auscltation unremarkable  Patient cleared for HBOT     Treatment details:  FE Rate: 2  Started Compression: 0813  Reached Compression: 0824  Total Compression Time: 11 (Minutes)  Total Holding Time: 100 (Minutes)  Started Decompression: 1004  Reached Surface: 1011  Total Decompression Time: 7 (Minutes)  Total Airbreaks: 10 (Minutes)  Total Time of Treatment: 108 (Minutes)  Symptoms Noted During Treatment: None (Minutes)    Post treatment details:  Left ear clear?: yes  Right ear clear?: yes                          Left ear TEED scale: Grade 0  Right ear TEED scale: Grade 0  Post treatment heart and lung assessment: Post treatment heart and lung auscltation unremarkable  Patient cleared for discharge  Tolerated treatment well         Vital Signs:  HBO Glucose Reference Range: 100-350 mg/dl   Pre-Treatment Post-Treatment   Time vitals are taken: 0813 Time vitals are taken: 1015   Blood Pressure: 157/75 Blood Pressure: 170/84   Pulse: 93 Pulse: 57   Resp: 18 Resp: 18   Temp: 97 9 °F (36 6 °C) Temp: 97 6 °F (36 4 °C)   Pre-Inspection Glucose reading:  (n/a) Post-Inspection Glucose reading:  (n/a)       No Known Allergies  Patient Active Problem List    Diagnosis Date Noted    Leukocytosis 05/04/2021    [facility]) ([facility] ) 05/04/2021    Acute blood loss anemia (ABLA) 04/26/2021    SHANTI (acute kidney injury) ([place] [id number] ) 04/24/2021    UTI (urinary tract infection) 04/24/2021    Hematuria due to cystitis     Anemia 04/14/2021    Chronic obstructive pulmonary disease ([place] [address] ) 04/14/2021    Hyperlipidemia 04/13/2021    Gross hematuria 04/12/2021    Cystitis 04/12/2021    Essential hypertension 04/12/2021    Dyslipidemia 04/12/2021     No orders of the defined types were placed in this encounter

## 2021-06-18 ENCOUNTER — OFFICE VISIT (OUTPATIENT)
Dept: WOUND CARE | Facility: HOSPITAL | Age: 76
End: 2021-06-18
Payer: MEDICARE

## 2021-06-18 DIAGNOSIS — N30.41 IRRADIATION CYSTITIS WITH HEMATURIA: Primary | ICD-10-CM

## 2021-06-18 LAB — GLUCOSE SERPL-MCNC: 261 MG/DL (ref 65–140)

## 2021-06-18 PROCEDURE — G0277 HBOT, FULL BODY CHAMBER, 30M: HCPCS | Performed by: NURSE PRACTITIONER

## 2021-06-18 PROCEDURE — 82948 REAGENT STRIP/BLOOD GLUCOSE: CPT | Performed by: NURSE PRACTITIONER

## 2021-06-18 PROCEDURE — 99183 HYPERBARIC OXYGEN THERAPY: CPT | Performed by: NURSE PRACTITIONER

## 2021-06-18 NOTE — PROGRESS NOTES
HBO Treatment Course Details       Treatment Notes: Pt tolerated treatment well and denied pain pre & post dive  Treatment Course Number: 18  Total Treatments Ordered:  40     Diagnosis:   1  Irradiation cystitis with hematuria  [facility]       [facility] Treatment Details:    Treatment Length:90 Minutes(Minutes)  Chamber #: Hard sided Monoplace Chamber    Pre-Treatment details:  Pre-treatment protocol Treatment Protocol: 2 0 FE X 90 minutes w/ 100% oxygen, two 5 minute air breaks  Left ear clear?: yes  Right ear clear?: yes  Left ear intact?: yes  Right ear intact?: yes                    Left ear TEED scale: Grade 0  Right ear TEED scale: Grade 0   Pretreatment heart and lung assessment: Pretreatment heart and lung auscltation unremarkable   Patient cleared for HBOT     Treatment details:  FE Rate: 2  Started Compression: 0820  Reached Compression: 0833  Total Compression Time: 13 (Minutes)  Total Holding Time: 100 (Minutes)  Started Decompression: 1013  Reached Surface: 1023  Total Decompression Time: 10 (Minutes)  Total Airbreaks: 10 (Minutes)  Total Time of Treatment: 113 (Minutes)  Symptoms Noted During Treatment: Other (Comment) (Pt became restless during last 1/2 hr of hbo treatment ) (Minutes)    Post treatment details:                                              Vital Signs:  HBO Glucose Reference Range: 100-350 mg/dl   Pre-Treatment Post-Treatment   Time vitals are taken: 0815 Time vitals are taken: 1025   Blood Pressure: 160/73 Blood Pressure: 159/90   Pulse: 76 Pulse: 59   Resp: 20 Resp: 16   Temp: 97 9 °F (36 6 °C) Temp: 97 4 °F (36 3 °C)   Pre-Inspection Glucose reading:  (n/a) Post-Inspection Glucose reading:  (n/a)       No Known Allergies  Patient Active Problem List    Diagnosis Date Noted    Leukocytosis 05/04/2021    RA (rheumatoid arthritis) ([facility] ) 05/04/2021    Acute blood loss anemia (ABLA) 04/26/2021    SHANTI (acute kidney injury) ([facility] ) 04/24/2021    UTI (urinary tract infection) 04/24/2021    Hematuria due to cystitis     Anemia 04/14/2021    [facility] ([facility] ) 04/14/2021    Hyperlipidemia 04/13/2021    Gross hematuria 04/12/2021    Cystitis 04/12/2021    Essential hypertension 04/12/2021    Dyslipidemia 04/12/2021     No orders of the defined types were placed in this encounter  HBO Treatment Course Details       Treatment Notes: Pt denied any problems or complaints of pain pre or post dive  He tolerated treatment well with usual restlessness toward end of treatment  Treatment Course Number: 18  Total Treatments Ordered:  40     Diagnosis:   1  Irradiation cystitis with hematuria  Hyperbaric oxygen [facility]       HBO Treatment Details:  In-Patient Visit: no  Treatment Length:90 Minutes(Minutes)  Chamber #: Hard sided Monoplace Chamber    Pre-Treatment details:  Pre-treatment protocol Treatment Protocol: 2 0 FE X 90 minutes w/ 100% oxygen, two 5 minute air breaks  Left ear clear?: yes  Right ear clear?: yes  Left ear intact?: yes  Right ear intact?: yes                    Left ear TEED scale: Grade 0  Right ear TEED scale: Grade 0   Pretreatment heart and lung assessment: Pretreatment heart and lung auscltation unremarkable   Patient cleared for HBOT     Treatment details:  FE Rate: 2  Started Compression: 0820  Reached Compression: 0833  Total Compression Time: 13 (Minutes)  Total Holding Time: 100 (Minutes)  Started Decompression: 1013  Reached Surface: 1023  Total Decompression Time: 10 (Minutes)  Total Airbreaks: 10 (Minutes)  Total Time of Treatment: 113 (Minutes)  Symptoms Noted During Treatment: Other (Comment) (Pt became restless during last 1/2 hr of hbo treatment ) (Minutes)    Post treatment details:                                              Vital Signs:  HBO Glucose Reference Range: 100-350 mg/dl   Pre-Treatment Post-Treatment   Time vitals are taken: 0815 Time vitals are taken: 1025   Blood Pressure: 160/73 Blood Pressure: 159/90   Pulse: 76 Pulse: 59   Resp: 20 Resp: 16   Temp: 97 9 °F (36 6 °C) Temp: 97 4 °F (36 3 °C)   Pre-Inspection Glucose reading:  (n/a) Post-Inspection Glucose reading:  (n/a)       No Known Allergies  Patient Active Problem List    Diagnosis Date Noted    Leukocytosis 05/04/2021    RA (rheumatoid arthritis) ([place] [address] ) 05/04/2021    Acute blood loss anemia (ABLA) 04/26/2021    SHANTI (acute kidney injury) ([place] [address] ) 04/24/2021    UTI (urinary tract infection) 04/24/2021    Hematuria due to cystitis     Anemia 04/14/2021    Chronic obstructive pulmonary disease ([place] [address] ) 04/14/2021    Hyperlipidemia 04/13/2021    Gross hematuria 04/12/2021    Cystitis 04/12/2021    Essential hypertension 04/12/2021    Dyslipidemia 04/12/2021     No orders of the defined types were placed in this encounter

## 2021-06-18 NOTE — PROGRESS NOTES
HBO Treatment Course Details       Treatment Notes:  Patient tolerated treatment well     Treatment Course Number: 18  Total Treatments Ordered:  40     Diagnosis:   1  Irradiation cystitis with hematuria  [facility]       [facility] Treatment Details:  In-Patient Visit:  No  Treatment Length:90 Minutes(Minutes)  Chamber #: Hard sided Monoplace Chamber    Pre-Treatment details:  Pre-treatment protocol Treatment Protocol: 2 0 FE X 90 minutes w/ 100% oxygen, two 5 minute air breaks  Left ear clear?: yes  Right ear clear?: yes  Left ear intact?: yes  Right ear intact?: yes                    Left ear TEED scale: Grade 0  Right ear TEED scale: Grade 0   Pretreatment heart and lung assessment: Pretreatment heart and lung auscltation unremarkable  Patient cleared for HBOT     Treatment details:  FE Rate: 2  Started Compression: 0820  Reached Compression: 0833  Total Compression Time: 13 (Minutes)  Total Holding Time: 100 (Minutes)  Started Decompression: 1013  Reached Surface: 6233  Total Decompression Time: 10 (Minutes)  Total Airbreaks: 10 (Minutes)  Total Time of Treatment: 113 (Minutes)  Symptoms Noted During Treatment: Other (Comment) (Pt became restless during last 1/2 hr of hbo treatment ) (Minutes)    Post treatment details:  Left ear clear?: yes  Right ear clear?: yes  Left ears intact?: yes  Right ears intact?: yes                    Left ear TEED scale: Grade 0  Right ear TEED scale: Grade 0  Post treatment heart and lung assessment: Post treatment heart and lung auscltation unremarkable  Patient cleared for discharge  Tolerated treatment well         Vital Signs:  HBO Glucose Reference Range: 100-350 mg/dl   Pre-Treatment Post-Treatment   Time vitals are taken: 0815 Time vitals are taken: 1025   Blood Pressure: 160/73 Blood Pressure: 159/90   Pulse: 76 Pulse: 59   Resp: 20 Resp: 16   Temp: 97 9 °F (36 6 °C) Temp: 97 4 °F (36 3 °C)   Pre-Inspection Glucose reading:  (n/a) Post-Inspection Glucose reading:  (n/a)       No Known Allergies  Patient Active Problem List    Diagnosis Date Noted    Leukocytosis 05/04/2021    RA (rheumatoid arthritis) ([place] [address] ) 05/04/2021    Acute blood loss anemia (ABLA) 04/26/2021    SHANTI (acute kidney injury) ([place] [address] ) 04/24/2021    UTI (urinary tract infection) 04/24/2021    Hematuria due to cystitis     Anemia 04/14/2021    Chronic obstructive pulmonary disease ([place] [address] ) 04/14/2021    Hyperlipidemia 04/13/2021    Gross hematuria 04/12/2021    Cystitis 04/12/2021    Essential hypertension 04/12/2021    Dyslipidemia 04/12/2021     No orders of the defined types were placed in this encounter

## 2021-06-21 ENCOUNTER — OFFICE VISIT (OUTPATIENT)
Dept: WOUND CARE | Facility: HOSPITAL | Age: 76
End: 2021-06-21
Payer: MEDICARE

## 2021-06-21 DIAGNOSIS — N30.41 IRRADIATION CYSTITIS WITH HEMATURIA: Primary | ICD-10-CM

## 2021-06-21 PROCEDURE — 99183 HYPERBARIC OXYGEN THERAPY: CPT | Performed by: NURSE PRACTITIONER

## 2021-06-21 PROCEDURE — G0277 HBOT, FULL BODY CHAMBER, 30M: HCPCS | Performed by: NURSE PRACTITIONER

## 2021-06-21 NOTE — PROGRESS NOTES
HBO Treatment Course Details       Treatment Notes:  Patient tolerated treatment well     Treatment Course Number: 19  Total Treatments Ordered:  40     Diagnosis:   1  Irradiation cystitis with hematuria  [facility]       [facility] Treatment Details:  In-Patient Visit:  No  Treatment Length:90 Minutes(Minutes)  Chamber #: Hard sided Monoplace Chamber    Pre-Treatment details:  Pre-treatment protocol Treatment Protocol: 2 0 FE X 90 minutes w/ 100% oxygen, two 5 minute air breaks  Left ear clear?: yes  Right ear clear?: yes  Left ear intact?: yes  Right ear intact?: yes                    Left ear TEED scale: Grade 0  Right ear TEED scale: Grade 0   Pretreatment heart and lung assessment: Pretreatment heart and lung auscltation unremarkable  Patient cleared for HBOT     Treatment details:  FE Rate: 2  Started Compression: 0821  Reached Compression: 0831  Total Compression Time: 10 (Minutes)  Total Holding Time: 100 (Minutes)  Started Decompression: 1011  Reached Surface: 1019  Total Decompression Time: 8 (Minutes)  Total Airbreaks: 10 (Minutes)  Total Time of Treatment: 108 (Minutes)  Symptoms Noted During Treatment: Other (Comment) (Pt restless especially during last 1/2 hour per usual ) (Minutes)    Post treatment details:  Left ear clear?: yes  Right ear clear?: yes  Left ears intact?: yes  Right ears intact?: yes                    Left ear TEED scale: Grade 0  Right ear TEED scale: Grade 0  Post treatment heart and lung assessment: Post treatment heart and lung auscltation unremarkable  Patient cleared for discharge  Tolerated treatment well         Vital Signs:  HBO Glucose Reference Range: 100-350 mg/dl   Pre-Treatment Post-Treatment   Time vitals are taken: 0815 Time vitals are taken: 1020   Blood Pressure: 157/76 Blood Pressure: 160/78   Pulse: 106 Pulse: 80   Resp: 18 Resp: 18   Temp: 97 3 °F (36 3 °C) Temp: 97 3 °F (36 3 °C)   Pre-Inspection Glucose reading:  (n/a) Post-Inspection Glucose reading:  (n/a)       No Known Allergies  Patient Active Problem List    Diagnosis Date Noted    Leukocytosis 05/04/2021    RA (rheumatoid arthritis) ([place] [address] ) 05/04/2021    Acute blood loss anemia (ABLA) 04/26/2021    SHANTI (acute kidney injury) ([place] [address] ) 04/24/2021    UTI (urinary tract infection) 04/24/2021    Hematuria due to cystitis     Anemia 04/14/2021    Chronic obstructive pulmonary disease ([place] [address] ) 04/14/2021    Hyperlipidemia 04/13/2021    Gross hematuria 04/12/2021    Cystitis 04/12/2021    Essential hypertension 04/12/2021    Dyslipidemia 04/12/2021     No orders of the defined types were placed in this encounter

## 2021-06-21 NOTE — PROGRESS NOTES
HBO Treatment Course Details       Treatment Notes: Pt denied any problems or complaints of pain pre or post dive  He tolerated treatment well & had usual episode of mild anxiety during last half hour  Treatment Course Number: 19  Total Treatments Ordered:  40     Diagnosis:   1  Irradiation cystitis with hematuria  [facility]       HBO Treatment Details:  In-Patient Visit: no  Treatment Length:90 Minutes(Minutes)  Chamber #: Hard sided Monoplace Chamber    Pre-Treatment details:  Pre-treatment protocol Treatment Protocol: 2 0 FE X 90 minutes w/ 100% oxygen, two 5 minute air breaks  Left ear clear?: yes  Right ear clear?: yes  Left ear intact?: yes  Right ear intact?: yes                    Left ear TEED scale: Grade 0  Right ear TEED scale: Grade 0   Pretreatment heart and lung assessment: Pretreatment heart and lung auscltation unremarkable   Patient cleared for HBOT     Treatment details:  FE Rate: 2  Started Compression: 0821  Reached Compression: 0831  Total Compression Time: 10 (Minutes)  Total Holding Time: 100 (Minutes)  Started Decompression: 1011  Reached Surface: 1019  Total Decompression Time: 8 (Minutes)  Total Airbreaks: 10 (Minutes)  Total Time of Treatment: 108 (Minutes)  Symptoms Noted During Treatment: Other (Comment) (Pt restless especially during last 1/2 hour per usual ) (Minutes)    Post treatment details:                                              Vital Signs:  HBO Glucose Reference Range: 100-350 mg/dl   Pre-Treatment Post-Treatment   Time vitals are taken: 0815 Time vitals are taken: 1020   Blood Pressure: 157/76 Blood Pressure: 160/78   Pulse: 106 Pulse: 80   Resp: 18 Resp: 18   Temp: 97 3 °F (36 3 °C) Temp: 97 3 °F (36 3 °C)   Pre-Inspection Glucose reading:  (n/a) Post-Inspection Glucose reading:  (n/a)       No Known Allergies  Patient Active Problem List    Diagnosis Date Noted    Leukocytosis 05/04/2021    RA (rheumatoid arthritis) ([facility] ) 05/04/2021    Acute blood loss anemia (ABLA) 04/26/2021    SHANTI (acute kidney injury) ([facility] ) 04/24/2021    UTI (urinary tract infection) 04/24/2021    Hematuria due to cystitis     Anemia 04/14/2021    Chronic obstructive pulmonary disease ([facility] ) 04/14/2021    Hyperlipidemia 04/13/2021    Gross hematuria 04/12/2021    Cystitis 04/12/2021    Essential hypertension 04/12/2021    Dyslipidemia 04/12/2021     No orders of the defined types were placed in this encounter

## 2021-06-22 ENCOUNTER — OFFICE VISIT (OUTPATIENT)
Dept: WOUND CARE | Facility: HOSPITAL | Age: 76
End: 2021-06-22
Payer: MEDICARE

## 2021-06-22 DIAGNOSIS — N30.41 IRRADIATION CYSTITIS WITH HEMATURIA: Primary | ICD-10-CM

## 2021-06-22 PROCEDURE — 99183 HYPERBARIC OXYGEN THERAPY: CPT | Performed by: NURSE PRACTITIONER

## 2021-06-22 PROCEDURE — G0277 HBOT, FULL BODY CHAMBER, 30M: HCPCS | Performed by: NURSE PRACTITIONER

## 2021-06-22 NOTE — PROGRESS NOTES
HBO Treatment Course Details       Treatment Notes: patient tolerated HBOT well  Treatment Course Number: 20  Total Treatments Ordered:  40     Diagnosis:   1  Irradiation cystitis with hematuria  [facility]       [facility] Details:  In-Patient Visit: no  Treatment Length:90 Minutes(Minutes)  Chamber #: Hard sided Monoplace Chamber    Pre-Treatment details:  Pre-treatment protocol Treatment Protocol: 2 0 FE X 90 minutes w/ 100% oxygen, two 5 minute air breaks  Left ear clear?: yes  Right ear clear?: yes  Left ear intact?: yes  Right ear intact?: yes                    Left ear TEED scale: Grade 0  Right ear TEED scale: Grade 0   Pretreatment heart and lung assessment: Pretreatment heart and lung auscltation unremarkable   Patient cleared for HBOT     Treatment details:  FE Rate: 2  Started Compression: 0813  Reached Compression: 0823  Total Compression Time: 10 (Minutes)  Total Holding Time: 100 (Minutes)  Started Decompression: 1003  Reached Surface: 1013  Total Decompression Time: 10 (Minutes)  Total Airbreaks: 10 (Minutes)  Total Time of Treatment: 110 (Minutes)  Symptoms Noted During Treatment: None (Minutes)    Post treatment details:                                              Vital Signs:  HBO Glucose Reference Range: 100-350 mg/dl   Pre-Treatment Post-Treatment   Time vitals are taken: 0810 Time vitals are taken: 1015   Blood Pressure: 165/80 Blood Pressure: 163/89   Pulse: 98 Pulse: 84   Resp: 18 Resp: 16   Temp: 98 °F (36 7 °C) Temp: 97 7 °F (36 5 °C)             No Known Allergies  Patient Active Problem List    Diagnosis Date Noted    Leukocytosis 05/04/2021    RA (rheumatoid arthritis) ([place] [id number] ) 05/04/2021    Acute blood loss anemia (ABLA) 04/26/2021    SHANTI (acute kidney injury) ([place] [address] ) 04/24/2021    UTI (urinary tract infection) 04/24/2021    Hematuria due to cystitis     Anemia 04/14/2021    Chronic obstructive pulmonary disease ([place] [address] ) 04/14/2021    Hyperlipidemia 04/13/2021  Gross hematuria 04/12/2021    Cystitis 04/12/2021    Essential hypertension 04/12/2021    Dyslipidemia 04/12/2021     No orders of the defined types were placed in this encounter

## 2021-06-22 NOTE — PROGRESS NOTES
HBO Treatment Course Details       Treatment Notes:  Patient tolerated treatment well  Patient has a diagnosis of radiation cystitis with hematuria for which hyperbaric oxygen therapy has been ordered  At the present time the patient has received 20 treatments  At the outset of hyperbaric therapy, the treatment goal was to resolve radiation symptoms  In reviewing the patient's medical record, there were other treatments tried which included cystoscopy with clot evacuation and extensive fulguration, bilateral nephrostomy tube placement, and continuous bladder irrigation  To date the patient has some resolution of his hematuria but not total resolution yet  Therefore it is my recommendation that an additional 20 treatments be given  The alternate goal of HBO therapy will be to resolve his hematuria  Treatment Course Number: 20  Total Treatments Ordered:  40     Diagnosis:   1  Irradiation cystitis with hematuria  Hyperbaric oxygen [facility]       HBO Treatment Details:  In-Patient Visit:  No  Treatment Length:90 Minutes(Minutes)  Chamber #: Hard sided Monoplace Chamber    Pre-Treatment details:  Pre-treatment protocol Treatment Protocol: 2 0 FE X 90 minutes w/ 100% oxygen, two 5 minute air breaks  Left ear clear?: yes  Right ear clear?: yes  Left ear intact?: yes  Right ear intact?: yes                    Left ear TEED scale: Grade 0  Right ear TEED scale: Grade 0   Pretreatment heart and lung assessment: Pretreatment heart and lung auscltation unremarkable   Patient cleared for HBOT     Treatment details:  FE Rate: 2  Started Compression: 0813  Reached Compression: 0823  Total Compression Time: 10 (Minutes)  Total Holding Time: 100 (Minutes)  Started Decompression: 1003  Reached Surface: 1013  Total Decompression Time: 10 (Minutes)  Total Airbreaks: 10 (Minutes)  Total Time of Treatment: 110 (Minutes)  Symptoms Noted During Treatment: None (Minutes)    Post treatment details:  Left ear clear?: yes  Right ear clear?: yes  Left ears intact?: yes  Right ears intact?: yes                    Left ear TEED scale: Grade 0  Right ear TEED scale: Grade 0  Post treatment heart and lung assessment: Post treatment heart and lung auscltation unremarkable  Patient cleared for discharge  Tolerated treatment well  Vital Signs:  [facility] Glucose Reference Range: 100-350 mg/dl   Pre-Treatment Post-Treatment   Time vitals are taken: 0810 Time vitals are taken: 1015   Blood Pressure: 165/80 Blood Pressure: 163/89   Pulse: 98 Pulse: 84   Resp: 18 Resp: 16   Temp: 98 °F (36 7 °C) Temp: 97 7 °F (36 5 °C)             No Known Allergies  Patient Active Problem List    Diagnosis Date Noted    Leukocytosis 05/04/2021    RA (rheumatoid arthritis) ([place] [id number] ) 05/04/2021    Acute blood loss anemia (ABLA) 04/26/2021    SHANTI (acute kidney injury) ([place] [id number] ) 04/24/2021    UTI (urinary tract infection) 04/24/2021    Hematuria due to cystitis     Anemia 04/14/2021    Chronic obstructive pulmonary disease ([place] [id number] ) 04/14/2021    Hyperlipidemia 04/13/2021    Gross hematuria 04/12/2021    Cystitis 04/12/2021    Essential hypertension 04/12/2021    Dyslipidemia 04/12/2021     No orders of the defined types were placed in this encounter

## 2021-06-23 ENCOUNTER — OFFICE VISIT (OUTPATIENT)
Dept: WOUND CARE | Facility: HOSPITAL | Age: 76
End: 2021-06-23
Payer: MEDICARE

## 2021-06-23 DIAGNOSIS — N30.41 IRRADIATION CYSTITIS WITH HEMATURIA: ICD-10-CM

## 2021-06-23 PROCEDURE — G0277 HBOT, FULL BODY CHAMBER, 30M: HCPCS | Performed by: FAMILY MEDICINE

## 2021-06-23 PROCEDURE — 99183 HYPERBARIC OXYGEN THERAPY: CPT | Performed by: FAMILY MEDICINE

## 2021-06-23 NOTE — PROGRESS NOTES
HBO Treatment Course Details       Treatment Notes:      Treatment Course Number: 21  Total Treatments Ordered:  40     Diagnosis:   1  Irradiation cystitis with hematuria  Hyperbaric oxygen [facility]       HBO Treatment Details:  In-Patient Visit: no  Treatment Length:90 Minutes(Minutes)  Chamber #: Hard sided Monoplace Chamber    Pre-Treatment details:  Pre-treatment protocol Treatment Protocol: 2 0 FE X 90 minutes w/ 100% oxygen, two 5 minute air breaks  Left ear clear?: yes  Right ear clear?: yes  Left ear intact?: yes  Right ear intact?: yes  Left ear color: translucent  Right ear color: translucent  PE Tubes present, Left ear?: no  PE Tubes present, Right ear?: no  Left ear irrigated?: no  Right ear irrigated?: no  Left ear TEED scale: Grade 0  Right ear TEED scale: Grade 0   Pretreatment heart and lung assessment: Pretreatment heart and lung auscltation unremarkable   Patient cleared for HBOT     Treatment details:  FE Rate: 2  Started Compression: 0810  Reached Compression: 0825  Total Compression Time: 15 (Minutes)  Total Holding Time: 100 (Minutes)  Started Decompression: 1005  Reached Surface: 1015  Total Decompression Time: 10 (Minutes)  Total Airbreaks: 10 (Minutes)  Total Time of Treatment: 115 (Minutes)  Symptoms Noted During Treatment:   (Minutes)    Post treatment details:                                              Vital Signs:  HBO Glucose Reference Range: 100-350 mg/dl   Pre-Treatment Post-Treatment   Time vitals are taken: 0800     Blood Pressure: 158/77     Pulse: 96     Resp: 16     Temp: 98 2 °F (36 8 °C)               No Known Allergies  Patient Active Problem List    Diagnosis Date Noted    Leukocytosis 05/04/2021    RA (rheumatoid arthritis) ([facility] ) 05/04/2021    Acute blood loss anemia (ABLA) 04/26/2021    SHANTI (acute kidney injury) ([facility] ) 04/24/2021    UTI (urinary tract infection) 04/24/2021    Hematuria due to cystitis     Anemia 04/14/2021    Chronic obstructive pulmonary disease ([facility] ) 04/14/2021    Hyperlipidemia 04/13/2021    Gross hematuria 04/12/2021    Cystitis 04/12/2021    Essential hypertension 04/12/2021    Dyslipidemia 04/12/2021     No orders of the defined types were placed in this encounter

## 2021-06-23 NOTE — PROGRESS NOTES
HBO Treatment Course Details       Treatment Notes:Patient tolerated the HBOT well  Treatment Course Number: 21  Total Treatments Ordered:  40     Diagnosis:   1  Irradiation cystitis with hematuria  [facility]       [facility] Treatment Details:  In-Patient Visit: no  Treatment Length:90 Minutes(Minutes)  Chamber #: Hard sided Monoplace Chamber    Pre-Treatment details:  Pre-treatment protocol Treatment Protocol: 2 0 FE X 90 minutes w/ 100% oxygen, two 5 minute air breaks  Left ear clear?: yes  Right ear clear?: yes  Left ear intact?: yes  Right ear intact?: yes  Left ear color: translucent  Right ear color: translucent  PE Tubes present, Left ear?: no  PE Tubes present, Right ear?: no  Left ear irrigated?: no  Right ear irrigated?: no  Left ear TEED scale: Grade 0  Right ear TEED scale: Grade 0   Pretreatment heart and lung assessment: Pretreatment heart and lung auscltation unremarkable  Patient cleared for HBOT     Treatment details:  FE Rate: 2  Started Compression: 0810  Reached Compression: 0825  Total Compression Time: 15 (Minutes)  Total Holding Time: 100 (Minutes)  Started Decompression: 1005  Reached Surface: 1015  Total Decompression Time: 10 (Minutes)  Total Airbreaks: 10 (Minutes)  Total Time of Treatment: 115 (Minutes)  Symptoms Noted During Treatment: None (Minutes)    Post treatment details:  Left ear clear?: yes  Right ear clear?: yes  Left ears intact?: yes  Right ears intact?: yes  Left ear color: translucent  Right ear color: translucent  PE Tubes present, Left ear?: no  PE Tubes present, Right ear?: no        Left ear TEED scale: Grade 0  Right ear TEED scale: Grade 0  Post treatment heart and lung assessment: Post treatment heart and lung auscltation unremarkable  Patient cleared for discharge  Tolerated treatment well         Vital Signs:  HBO Glucose Reference Range: 100-350 mg/dl   Pre-Treatment Post-Treatment   Time vitals are taken: 0800 Time vitals are taken: 1018   Blood Pressure: 158/77 Blood Pressure: 160/80   Pulse: 96 Pulse: 66   Resp: 16 Resp: 16   Temp: 98 2 °F (36 8 °C) Temp: 97 7 °F (36 5 °C)             No Known Allergies  Patient Active Problem List    Diagnosis Date Noted    Leukocytosis 05/04/2021    RA (rheumatoid arthritis) ([place] [id number] ) 05/04/2021    Acute blood loss anemia (ABLA) 04/26/2021    SHANTI (acute kidney injury) ([place] [address] ) 04/24/2021    UTI (urinary tract infection) 04/24/2021    Hematuria due to cystitis     Anemia 04/14/2021    Chronic obstructive pulmonary disease ([place] [address] ) 04/14/2021    Hyperlipidemia 04/13/2021    Gross hematuria 04/12/2021    Cystitis 04/12/2021    Essential hypertension 04/12/2021    Dyslipidemia 04/12/2021     No orders of the defined types were placed in this encounter

## 2021-06-24 ENCOUNTER — OFFICE VISIT (OUTPATIENT)
Dept: WOUND CARE | Facility: HOSPITAL | Age: 76
End: 2021-06-24
Payer: MEDICARE

## 2021-06-24 DIAGNOSIS — N30.41 IRRADIATION CYSTITIS WITH HEMATURIA: ICD-10-CM

## 2021-06-24 PROCEDURE — 99183 HYPERBARIC OXYGEN THERAPY: CPT | Performed by: PREVENTIVE MEDICINE

## 2021-06-24 PROCEDURE — G0277 HBOT, FULL BODY CHAMBER, 30M: HCPCS | Performed by: PREVENTIVE MEDICINE

## 2021-06-24 NOTE — PROGRESS NOTES
HBO Treatment Course Details       Treatment Notes: Pt denied any problems or complaints of pain pre-tx  He stated that he had some congestion during the treatment but denies any complaints pain  Recommended adding nasal saline prior to flonase     Treatment Course Number: 22  Total Treatments Ordered:        Diagnosis:   1  Irradiation cystitis with hematuria  [facility]       [facility] Treatment Details:  In-Patient Visit: no  Treatment Length:90 Minutes(Minutes)  Chamber #: Hard sided Monoplace Chamber    Pre-Treatment details:  Pre-treatment protocol Treatment Protocol: 2 0 FE X 90 minutes w/ 100% oxygen, two 5 minute air breaks  Left ear clear?: yes  Right ear clear?: yes                          Left ear TEED scale: Grade 0  Right ear TEED scale: Grade 0   Pretreatment heart and lung assessment: Pretreatment heart and lung auscltation unremarkable  Patient cleared for HBOT     Treatment details:  FE Rate: 2  Started Compression: 0811  Reached Compression: 0822  Total Compression Time: 11 (Minutes)  Total Holding Time: 100 (Minutes)  Started Decompression: 1002  Reached Surface: 1010  Total Decompression Time: 8 (Minutes)  Total Airbreaks: 10 (Minutes)  Total Time of Treatment: 109 (Minutes)  Symptoms Noted During Treatment: None (Pt stated after decompression that he had congestion in his throat  He was advised by Dr Kajal Marx to take Ventolin inhaler in a m  with Flonase ) (Minutes)    Post treatment details:  Left ear clear?: yes  Right ear clear?: yes                          Left ear TEED scale: Grade 0  Right ear TEED scale: Grade 0  Post treatment heart and lung assessment: Post treatment heart and lung auscltation unremarkable  Patient cleared for discharge  Tolerated treatment well         Vital Signs:  HBO Glucose Reference Range: 100-350 mg/dl   Pre-Treatment Post-Treatment   Time vitals are taken: 0805 Time vitals are taken: 1010   Blood Pressure: 152/71 Blood Pressure: 155/84   Pulse: 98 Pulse: 72   Resp: 18 Resp: 18   Temp: 97 7 °F (36 5 °C) Temp: 97 6 °F (36 4 °C)   Pre-Inspection Glucose reading:  (n/a)         No Known Allergies  Patient Active Problem List    Diagnosis Date Noted    Leukocytosis 05/04/2021    RA [place]rheumatoid arthritis) ([place] [id number] ) 05/04/2021    Acute blood loss anemia (ABLA) 04/26/2021    SHANTI (acute kidney injury) ([place] [id number] ) 04/24/2021    UTI (urinary tract infection) 04/24/2021    Hematuria due to cystitis     Anemia 04/14/2021    Chronic obstructive pulmonary disease ([place] [address] ) 04/14/2021    Hyperlipidemia 04/13/2021    Gross hematuria 04/12/2021    Cystitis 04/12/2021    Essential hypertension 04/12/2021    Dyslipidemia 04/12/2021     No orders of the defined types were placed in this encounter

## 2021-06-28 ENCOUNTER — OFFICE VISIT (OUTPATIENT)
Dept: WOUND CARE | Facility: HOSPITAL | Age: 76
DRG: 700 | End: 2021-06-28
Payer: MEDICARE

## 2021-06-28 ENCOUNTER — HOSPITAL ENCOUNTER (INPATIENT)
Facility: HOSPITAL | Age: 76
LOS: 1 days | Discharge: HOME | DRG: 700 | End: 2021-06-30
Attending: EMERGENCY MEDICINE | Admitting: INTERNAL MEDICINE
Payer: MEDICARE

## 2021-06-28 DIAGNOSIS — N30.41 IRRADIATION CYSTITIS WITH HEMATURIA: Primary | ICD-10-CM

## 2021-06-28 DIAGNOSIS — D64.9 ANEMIA, UNSPECIFIED TYPE: ICD-10-CM

## 2021-06-28 DIAGNOSIS — R31.9 HEMATURIA: Primary | ICD-10-CM

## 2021-06-28 DIAGNOSIS — K59.00 CONSTIPATION: ICD-10-CM

## 2021-06-28 DIAGNOSIS — R35.0 URINARY FREQUENCY: ICD-10-CM

## 2021-06-28 DIAGNOSIS — N30.01 ACUTE CYSTITIS WITH HEMATURIA: ICD-10-CM

## 2021-06-28 LAB
ABO GROUP BLD: NORMAL
ALBUMIN SERPL BCP-MCNC: 3.5 G/DL (ref 3.5–5)
ALP SERPL-CCNC: 90 U/L (ref 46–116)
ALT SERPL W P-5'-P-CCNC: 27 U/L (ref 12–78)
ANION GAP SERPL CALCULATED.3IONS-SCNC: 7 MMOL/L (ref 4–13)
APTT PPP: 29 SECONDS (ref 23–37)
AST SERPL W P-5'-P-CCNC: 19 U/L (ref 5–45)
BACTERIA UR QL AUTO: ABNORMAL /HPF
BASOPHILS # BLD AUTO: 0.03 THOUSANDS/ΜL (ref 0–0.1)
BASOPHILS NFR BLD AUTO: 0 % (ref 0–1)
BILIRUB SERPL-MCNC: 0.3 MG/DL (ref 0.2–1)
BILIRUB UR QL STRIP: NEGATIVE
BLD GP AB SCN SERPL QL: NEGATIVE
BUN SERPL-MCNC: 36 MG/DL (ref 5–25)
CALCIUM SERPL-MCNC: 9 MG/DL (ref 8.3–10.1)
CHLORIDE SERPL-SCNC: 105 MMOL/L (ref 100–108)
CLARITY UR: ABNORMAL
CO2 SERPL-SCNC: 28 MMOL/L (ref 21–32)
COLOR UR: ABNORMAL
CREAT SERPL-MCNC: 1.36 MG/DL (ref 0.6–1.3)
EOSINOPHIL # BLD AUTO: 0.34 THOUSAND/ΜL (ref 0–0.61)
EOSINOPHIL NFR BLD AUTO: 3 % (ref 0–6)
ERYTHROCYTE [DISTWIDTH] IN BLOOD BY AUTOMATED COUNT: 16 % (ref 11.6–15.1)
GFR SERPL CREATININE-BSD FRML MDRD: 50 ML/MIN/1.73SQ M
GLUCOSE SERPL-MCNC: 101 MG/DL (ref 65–140)
GLUCOSE UR STRIP-MCNC: NEGATIVE MG/DL
HCT VFR BLD AUTO: 29.1 % (ref 36.5–49.3)
HCT VFR BLD AUTO: 31.6 % (ref 36.5–49.3)
HGB BLD-MCNC: 8.7 G/DL (ref 12–17)
HGB BLD-MCNC: 9.5 G/DL (ref 12–17)
HGB UR QL STRIP.AUTO: ABNORMAL
IMM GRANULOCYTES # BLD AUTO: 0.03 THOUSAND/UL (ref 0–0.2)
IMM GRANULOCYTES NFR BLD AUTO: 0 % (ref 0–2)
INR PPP: 1.06 (ref 0.84–1.19)
KETONES UR STRIP-MCNC: NEGATIVE MG/DL
LEUKOCYTE ESTERASE UR QL STRIP: ABNORMAL
LYMPHOCYTES # BLD AUTO: 1.62 THOUSANDS/ΜL (ref 0.6–4.47)
LYMPHOCYTES NFR BLD AUTO: 16 % (ref 14–44)
MCH RBC QN AUTO: 24.9 PG (ref 26.8–34.3)
MCHC RBC AUTO-ENTMCNC: 30.1 G/DL (ref 31.4–37.4)
MCV RBC AUTO: 83 FL (ref 82–98)
MONOCYTES # BLD AUTO: 0.92 THOUSAND/ΜL (ref 0.17–1.22)
MONOCYTES NFR BLD AUTO: 9 % (ref 4–12)
NEUTROPHILS # BLD AUTO: 7.19 THOUSANDS/ΜL (ref 1.85–7.62)
NEUTS SEG NFR BLD AUTO: 72 % (ref 43–75)
NITRITE UR QL STRIP: POSITIVE
NON-SQ EPI CELLS URNS QL MICRO: ABNORMAL /HPF
NRBC BLD AUTO-RTO: 0 /100 WBCS
OTHER STN SPEC: ABNORMAL
PH UR STRIP.AUTO: 6 [PH]
PLATELET # BLD AUTO: 241 THOUSANDS/UL (ref 149–390)
PMV BLD AUTO: 9.5 FL (ref 8.9–12.7)
POTASSIUM SERPL-SCNC: 5.1 MMOL/L (ref 3.5–5.3)
PROT SERPL-MCNC: 7.7 G/DL (ref 6.4–8.2)
PROT UR STRIP-MCNC: ABNORMAL MG/DL
PROTHROMBIN TIME: 13.7 SECONDS (ref 11.6–14.5)
RBC # BLD AUTO: 3.81 MILLION/UL (ref 3.88–5.62)
RBC #/AREA URNS AUTO: ABNORMAL /HPF
RH BLD: POSITIVE
SODIUM SERPL-SCNC: 140 MMOL/L (ref 136–145)
SP GR UR STRIP.AUTO: >=1.03 (ref 1–1.03)
SPECIMEN EXPIRATION DATE: NORMAL
UROBILINOGEN UR QL STRIP.AUTO: 0.2 E.U./DL
WBC # BLD AUTO: 10.13 THOUSAND/UL (ref 4.31–10.16)
WBC #/AREA URNS AUTO: ABNORMAL /HPF

## 2021-06-28 PROCEDURE — 85025 COMPLETE CBC W/AUTO DIFF WBC: CPT | Performed by: EMERGENCY MEDICINE

## 2021-06-28 PROCEDURE — 99183 HYPERBARIC OXYGEN THERAPY: CPT | Performed by: NURSE PRACTITIONER

## 2021-06-28 PROCEDURE — 85014 HEMATOCRIT: CPT | Performed by: PHYSICIAN ASSISTANT

## 2021-06-28 PROCEDURE — 81001 URINALYSIS AUTO W/SCOPE: CPT | Performed by: EMERGENCY MEDICINE

## 2021-06-28 PROCEDURE — 85018 HEMOGLOBIN: CPT | Performed by: PHYSICIAN ASSISTANT

## 2021-06-28 PROCEDURE — 86900 BLOOD TYPING SEROLOGIC ABO: CPT | Performed by: EMERGENCY MEDICINE

## 2021-06-28 PROCEDURE — 99219 PR INITIAL OBSERVATION CARE/DAY 50 MINUTES: CPT | Performed by: PHYSICIAN ASSISTANT

## 2021-06-28 PROCEDURE — 86850 RBC ANTIBODY SCREEN: CPT | Performed by: EMERGENCY MEDICINE

## 2021-06-28 PROCEDURE — 85730 THROMBOPLASTIN TIME PARTIAL: CPT | Performed by: EMERGENCY MEDICINE

## 2021-06-28 PROCEDURE — 80053 COMPREHEN METABOLIC PANEL: CPT | Performed by: EMERGENCY MEDICINE

## 2021-06-28 PROCEDURE — 87086 URINE CULTURE/COLONY COUNT: CPT | Performed by: EMERGENCY MEDICINE

## 2021-06-28 PROCEDURE — 36415 COLL VENOUS BLD VENIPUNCTURE: CPT | Performed by: EMERGENCY MEDICINE

## 2021-06-28 PROCEDURE — 99285 EMERGENCY DEPT VISIT HI MDM: CPT

## 2021-06-28 PROCEDURE — 99285 EMERGENCY DEPT VISIT HI MDM: CPT | Performed by: EMERGENCY MEDICINE

## 2021-06-28 PROCEDURE — 87040 BLOOD CULTURE FOR BACTERIA: CPT | Performed by: EMERGENCY MEDICINE

## 2021-06-28 PROCEDURE — G0277 HBOT, FULL BODY CHAMBER, 30M: HCPCS | Performed by: NURSE PRACTITIONER

## 2021-06-28 PROCEDURE — 86901 BLOOD TYPING SEROLOGIC RH(D): CPT | Performed by: EMERGENCY MEDICINE

## 2021-06-28 PROCEDURE — 85610 PROTHROMBIN TIME: CPT | Performed by: EMERGENCY MEDICINE

## 2021-06-28 RX ORDER — ACETAMINOPHEN 325 MG/1
650 TABLET ORAL EVERY 6 HOURS PRN
Status: DISCONTINUED | OUTPATIENT
Start: 2021-06-28 | End: 2021-06-30 | Stop reason: HOSPADM

## 2021-06-28 RX ORDER — ATROPA BELLADONNA AND OPIUM 16.2; 3 MG/1; MG/1
15 SUPPOSITORY RECTAL EVERY 6 HOURS PRN
Status: DISCONTINUED | OUTPATIENT
Start: 2021-06-28 | End: 2021-06-30 | Stop reason: HOSPADM

## 2021-06-28 RX ORDER — MORPHINE SULFATE 4 MG/ML
4 INJECTION, SOLUTION INTRAMUSCULAR; INTRAVENOUS ONCE
Status: COMPLETED | OUTPATIENT
Start: 2021-06-28 | End: 2021-06-28

## 2021-06-28 RX ORDER — CEPHALEXIN 500 MG/1
500 CAPSULE ORAL 2 TIMES DAILY
Status: ON HOLD | COMMUNITY
Start: 2021-06-25 | End: 2021-06-30 | Stop reason: SDUPTHER

## 2021-06-28 RX ORDER — ATORVASTATIN CALCIUM 20 MG/1
20 TABLET, FILM COATED ORAL
Status: DISCONTINUED | OUTPATIENT
Start: 2021-06-28 | End: 2021-06-30 | Stop reason: HOSPADM

## 2021-06-28 RX ORDER — SODIUM CHLORIDE 9 MG/ML
75 INJECTION, SOLUTION INTRAVENOUS CONTINUOUS
Status: DISCONTINUED | OUTPATIENT
Start: 2021-06-28 | End: 2021-06-30

## 2021-06-28 RX ORDER — CEFAZOLIN SODIUM 2 G/50ML
2000 SOLUTION INTRAVENOUS ONCE
Status: COMPLETED | OUTPATIENT
Start: 2021-06-28 | End: 2021-06-28

## 2021-06-28 RX ORDER — ALBUTEROL SULFATE 90 UG/1
2 AEROSOL, METERED RESPIRATORY (INHALATION) EVERY 4 HOURS PRN
Status: DISCONTINUED | OUTPATIENT
Start: 2021-06-28 | End: 2021-06-30 | Stop reason: HOSPADM

## 2021-06-28 RX ORDER — CALCIUM CARBONATE 200(500)MG
1000 TABLET,CHEWABLE ORAL DAILY PRN
Status: DISCONTINUED | OUTPATIENT
Start: 2021-06-28 | End: 2021-06-30 | Stop reason: HOSPADM

## 2021-06-28 RX ORDER — MONTELUKAST SODIUM 10 MG/1
10 TABLET ORAL
Status: DISCONTINUED | OUTPATIENT
Start: 2021-06-28 | End: 2021-06-30 | Stop reason: HOSPADM

## 2021-06-28 RX ORDER — CEFAZOLIN SODIUM 2 G/50ML
2000 SOLUTION INTRAVENOUS EVERY 12 HOURS
Status: DISCONTINUED | OUTPATIENT
Start: 2021-06-29 | End: 2021-06-30 | Stop reason: HOSPADM

## 2021-06-28 RX ORDER — ONDANSETRON 2 MG/ML
4 INJECTION INTRAMUSCULAR; INTRAVENOUS EVERY 6 HOURS PRN
Status: DISCONTINUED | OUTPATIENT
Start: 2021-06-28 | End: 2021-06-30 | Stop reason: HOSPADM

## 2021-06-28 RX ORDER — LORAZEPAM 0.5 MG/1
0.5 TABLET ORAL EVERY 8 HOURS PRN
Status: DISCONTINUED | OUTPATIENT
Start: 2021-06-28 | End: 2021-06-30 | Stop reason: HOSPADM

## 2021-06-28 RX ORDER — POLYETHYLENE GLYCOL 3350 17 G/17G
17 POWDER, FOR SOLUTION ORAL DAILY PRN
Status: DISCONTINUED | OUTPATIENT
Start: 2021-06-28 | End: 2021-06-30 | Stop reason: HOSPADM

## 2021-06-28 RX ADMIN — CEFAZOLIN SODIUM 2000 MG: 2 SOLUTION INTRAVENOUS at 19:02

## 2021-06-28 RX ADMIN — METOPROLOL TARTRATE 25 MG: 25 TABLET, FILM COATED ORAL at 20:18

## 2021-06-28 RX ADMIN — ATORVASTATIN CALCIUM 20 MG: 20 TABLET, FILM COATED ORAL at 21:37

## 2021-06-28 RX ADMIN — SODIUM CHLORIDE 1000 ML: 0.9 INJECTION, SOLUTION INTRAVENOUS at 18:52

## 2021-06-28 RX ADMIN — SODIUM CHLORIDE 75 ML/HR: 0.9 INJECTION, SOLUTION INTRAVENOUS at 20:17

## 2021-06-28 RX ADMIN — ATROPA BELLADONNA AND OPIUM 0.5 SUPPOSITORY: 16.2; 3 SUPPOSITORY RECTAL at 21:32

## 2021-06-28 RX ADMIN — MORPHINE SULFATE 4 MG: 4 INJECTION INTRAVENOUS at 18:58

## 2021-06-28 RX ADMIN — MONTELUKAST SODIUM 10 MG: 10 TABLET, FILM COATED ORAL at 21:37

## 2021-06-28 NOTE — PROGRESS NOTES
HBO Treatment Course Details       Treatment Notes:  Patient tolerated treatment well     Treatment Course Number: 23  Total Treatments Ordered:  40     Diagnosis:   1  Irradiation cystitis with hematuria  [facility]       HBO Treatment Details:  In-Patient Visit:  No  Treatment Length:90 Minutes(Minutes)  Chamber #: Hard sided Monoplace Chamber    Pre-Treatment details:  Pre-treatment protocol Treatment Protocol: 2 0 FE X 90 minutes w/ 100% oxygen, two 5 minute air breaks  Left ear clear?: yes  Right ear clear?: yes  Left ear intact?: yes  Right ear intact?: yes                    Left ear TEED scale: Grade 0  Right ear TEED scale: Grade 0   Pretreatment heart and lung assessment: Pretreatment heart and lung auscltation unremarkable  Patient cleared for HBOT     Treatment details:  FE Rate: 2  Started Compression: 0816  Reached Compression: 0827  Total Compression Time: 11 (Minutes)  Total Holding Time: 100 (Minutes)  Started Decompression: 1007  Reached Surface: 1017  Total Decompression Time: 10 (Minutes)  Total Airbreaks: 10 (Minutes)  Total Time of Treatment: 111 (Minutes)  Symptoms Noted During Treatment: Other (Comment) (Pt continues to have upper respiratory congestion  Is taking all meds as directed ) (Minutes)    Post treatment details:  Left ear clear?: yes  Right ear clear?: yes  Left ears intact?: yes  Right ears intact?: yes                    Left ear TEED scale: Grade 0  Right ear TEED scale: Grade 0  Post treatment heart and lung assessment: Post treatment heart and lung auscltation unremarkable  Patient cleared for discharge  Tolerated treatment well         Vital Signs:  HBO Glucose Reference Range: 100-350 mg/dl   Pre-Treatment Post-Treatment   Time vitals are taken: 0810 Time vitals are taken: 1020   Blood Pressure: 136/73 Blood Pressure: (!) 127/94   Pulse: 80 Pulse: 74   Resp: 16 Resp: 16   Temp: 96 9 °F (36 1 °C) Temp: 97 3 °F (36 3 °C)     Post-Inspection Glucose reading: (n/a)       No Known Allergies  Patient Active Problem List    Diagnosis Date Noted    Leukocytosis 05/04/2021    RA (rheumatoid arthritis) ([place] [id number] ) 05/04/2021    Acute blood loss anemia (ABLA) 04/26/2021    SHANTI (acute kidney injury) ([place] [address] ) 04/24/2021    UTI (urinary tract infection) 04/24/2021    Hematuria due to cystitis     Anemia 04/14/2021    Chronic obstructive pulmonary disease ([place] [address] ) 04/14/2021    Hyperlipidemia 04/13/2021    Gross hematuria 04/12/2021    Cystitis 04/12/2021    Essential hypertension 04/12/2021    Dyslipidemia 04/12/2021     No orders of the defined types were placed in this encounter

## 2021-06-28 NOTE — PROGRESS NOTES
HBO Treatment Course Details       Treatment Notes: Pt states that he has been passing clots from bladder since Friday  He was seen by Dr Shaquille Jacques on Friday & started on Keflex twice a day x 15 days  He denied pain pre & post dive but had significant leobardo red drainage & clots passed from bladder  He was encouraged by this nurse to call Dr Shqauille Jacques as soon as possible because the bloody drainage has increased from Friday  Pt  is asymptomatic, states he feels fine & that he will call Dr Shaquille Jacques today  Treatment Course Number: 23  Total Treatments Ordered:  40     Diagnosis:   1  Irradiation cystitis with hematuria  Hyperbaric oxygen [facility]       HBO Treatment Details:  In-Patient Visit: no  Treatment Length:90 Minutes(Minutes)  Chamber #: Hard sided Monoplace Chamber    Pre-Treatment details:  Pre-treatment protocol Treatment Protocol: 2 0 FE X 90 minutes w/ 100% oxygen, two 5 minute air breaks  Left ear clear?: yes  Right ear clear?: yes  Left ear intact?: yes  Right ear intact?: yes                    Left ear TEED scale: Grade 0  Right ear TEED scale: Grade 0   Pretreatment heart and lung assessment: Pretreatment heart and lung auscltation unremarkable  Patient cleared for HBOT     Treatment details:  FE Rate: 2  Started Compression: 0816  Reached Compression: 0827  Total Compression Time: 11 (Minutes)  Total Holding Time: 100 (Minutes)  Started Decompression: 1007  Reached Surface: 1017  Total Decompression Time: 10 (Minutes)  Total Airbreaks: 10 (Minutes)  Total Time of Treatment: 111 (Minutes)  Symptoms Noted During Treatment: Other (Comment) (Pt continues to have upper respiratory congestion   Is taking all meds as directed ) (Minutes)    Post treatment details:  Left ear clear?: yes  Right ear clear?: yes  Left ears intact?: yes  Right ears intact?: yes                    Left ear TEED scale: Grade 0  Right ear TEED scale: Grade 0  Post treatment heart and lung assessment: Post treatment heart and lung auscltation unremarkable  Patient cleared for discharge  Tolerated treatment well  Vital Signs:  HBO Glucose Reference Range: 100-350 mg/dl   Pre-Treatment Post-Treatment   Time vitals are taken: 0810 Time vitals are taken: 1020   Blood Pressure: 136/73 Blood Pressure: (!) 127/94   Pulse: 80 Pulse: 74   Resp: 16 Resp: 16   Temp: 96 9 °F (36 1 °C) Temp: 97 3 °F (36 3 °C)     Post-Inspection Glucose reading:  (n/a)       No Known Allergies  Patient Active Problem List    Diagnosis Date Noted    Leukocytosis 05/04/2021    RA (rheumatoid arthritis) ([facility] ) 05/04/2021    Acute blood loss anemia (ABLA) 04/26/2021    SHANTI (acute kidney injury) ([place] [id number] ) 04/24/2021    UTI (urinary tract infection) 04/24/2021    Hematuria due to cystitis     Anemia 04/14/2021    Chronic obstructive pulmonary disease ([place] [address] ) 04/14/2021    Hyperlipidemia 04/13/2021    Gross hematuria 04/12/2021    Cystitis 04/12/2021    Essential hypertension 04/12/2021    Dyslipidemia 04/12/2021     No orders of the defined types were placed in this encounter  HBO Treatment Course Details       Treatment Notes:      Treatment Course Number: 23  Total Treatments Ordered:  40     Diagnosis:   1  Irradiation cystitis with hematuria  Hyperbaric oxygen [facility]       HBO Treatment Details:    Treatment Length:90 Minutes(Minutes)  Chamber #: Hard sided Monoplace Chamber    Pre-Treatment details:  Pre-treatment protocol Treatment Protocol: 2 0 FE X 90 minutes w/ 100% oxygen, two 5 minute air breaks  Left ear clear?: yes  Right ear clear?: yes  Left ear intact?: yes  Right ear intact?: yes                    Left ear TEED scale: Grade 0  Right ear TEED scale: Grade 0   Pretreatment heart and lung assessment: Pretreatment heart and lung auscltation unremarkable   Patient cleared for HBOT     Treatment details:  FE Rate: 2  Started Compression: 0816  Reached Compression: 0827  Total Compression Time: 11 (Minutes)  Total Holding Time: 100 (Minutes)  Started Decompression: 1007  Reached Surface: 1017  Total Decompression Time: 10 (Minutes)  Total Airbreaks: 10 (Minutes)  Total Time of Treatment: 111 (Minutes)  Symptoms Noted During Treatment: Other (Comment) (Pt continues to have upper respiratory congestion  Is taking all meds as directed ) (Minutes)    Post treatment details:  Left ear clear?: yes  Right ear clear?: yes  Left ears intact?: yes  Right ears intact?: yes                    Left ear TEED scale: Grade 0  Right ear TEED scale: Grade 0  Post treatment heart and lung assessment: Post treatment heart and lung auscltation unremarkable  Patient cleared for discharge  Tolerated treatment well  Vital Signs:  [facility] Glucose Reference Range: 100-350 mg/dl   Pre-Treatment Post-Treatment   Time vitals are taken: 0810 Time vitals are taken: 1020   Blood Pressure: 136/73 Blood Pressure: (!) 127/94   Pulse: 80 Pulse: 74   Resp: 16 Resp: 16   Temp: 96 9 °F (36 1 °C) Temp: 97 3 °F (36 3 °C)     Post-Inspection Glucose reading:  (n/a)       No Known Allergies  Patient Active Problem List    Diagnosis Date Noted    Leukocytosis 05/04/2021    RA (rheumatoid arthritis) ([facility] ) 05/04/2021    Acute blood loss anemia (ABLA) 04/26/2021    SHANTI (acute kidney injury) ([facility] ) 04/24/2021    UTI (urinary tract infection) 04/24/2021    Hematuria due to cystitis     Anemia 04/14/2021    Chronic obstructive pulmonary disease ([facility] ) 04/14/2021    Hyperlipidemia 04/13/2021    Gross hematuria 04/12/2021    Cystitis 04/12/2021    Essential hypertension 04/12/2021    Dyslipidemia 04/12/2021     No orders of the defined types were placed in this encounter

## 2021-06-28 NOTE — ASSESSMENT & PLAN NOTE
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

## 2021-06-28 NOTE — ASSESSMENT & PLAN NOTE
Chronic, at baseline  Continue to monitor in the setting of acute hematuria, repeat H+H tonight  Transfuse as needed for Hg < 8

## 2021-06-28 NOTE — ED PROVIDER NOTES
History  Chief Complaint   Patient presents with    Penis Pain     Patient has bilateral nephrostomy tubes   States he started with clots coming out of penis this morning  Went for his hyperbaric treatments for his radiation cystitis this morning  States bleeding from penis continues with severe pain      HPI    67 yo M hx of copd htn hld prostate cancer s/p external beam radiation, hx of hematuria ongoing since 54/14/21 w/ bilateral nephrostomy tubes placed 5/10/21 by IR and urology cystoscopy 4/24/21 by Cindy Langston presents to ed for eval of clots coming from his urethra today  Ongoing since this am  Patient states he is passing clots  Now having difficulty urinating  No n/v  No diarrhea  No other complaints no ros  No thinners  On chronic keflex  Prior to Admission Medications   Prescriptions Last Dose Informant Patient Reported? Taking?    LORazepam (ATIVAN) 0 5 mg tablet 6/28/2021 at 0745  No Yes   Sig: Take 1 tablet (0 5 mg total) by mouth every 8 (eight) hours as needed for anxiety (2 tablets prior to hyperbaric oxygen therapy)   albuterol (PROVENTIL HFA,VENTOLIN HFA) 90 mcg/act inhaler Unknown at Unknown time  No No   Sig: Inhale 2 puffs every 4 (four) hours as needed for wheezing   atorvastatin (LIPITOR) 20 mg tablet Past Month at Unknown time  Yes Yes   Sig: Take 20 mg by mouth daily at bedtime   belladonna-opium (B&O SUPPOSITORY) 16 2-30 mg suppository Unknown at Unknown time  No No   Sig: Insert 0 5 suppositories into the rectum every 6 (six) hours as needed for cramping or bladder spasms for up to 10 daysMax Daily Amount: 2 suppositories   Patient not taking: Reported on 5/14/2021   cephalexin (Keflex) 500 mg capsule 6/28/2021 at 1100 Self Yes Yes   Sig: Take 500 mg by mouth 2 (two) times a day   metoprolol tartrate (LOPRESSOR) 25 mg tablet 6/28/2021 at 0730  Yes Yes   Sig: Take 25 mg by mouth every 12 (twelve) hours    montelukast (SINGULAIR) 10 mg tablet Unknown at Unknown time  No No   Sig: Take 1 tablet (10 mg total) by mouth daily at bedtime   Patient not taking: Reported on 6/2/2021   naloxone (NARCAN) 4 mg/0 1 mL nasal spray Unknown at Unknown time  No No   Sig: Administer 1 spray into a nostril  If no response after 2-3 minutes, give another dose in the other nostril using a new spray  Facility-Administered Medications: None       Past Medical History:   Diagnosis Date    Cancer [facility])     prostate    COPD (chronic obstructive pulmonary disease) ([facility] [address] )     Hyperlipidemia     Hypertension        Past Surgical History:   Procedure Laterality Date    APPENDECTOMY      FL RETROGRADE PYELOGRAM  4/14/2021    HERNIA REPAIR      IR NEPHROSTOMY TUBE CHECK/CHANGE/REPOSITION/REINSERTION/UPSIZE  5/10/2021    IR NEPHROSTOMY TUBE PLACEMENT  5/7/2021    NC CYSTOURETHROSCOPY W/IRRIG & EVAC CLOTS Bilateral 4/14/2021    Procedure: CYSTOSCOPY EVACUATION OF CLOTS bilateral retrograde pyelograms possible TURBT bladder biopsy;  Surgeon: Radha Spring MD;  Location: [address] [place] [address];  Service: Urology    NC CYSTOURETHROSCOPY W/IRRIG & EVAC CLOTS N/A 4/24/2021    Procedure: CYSTOSCOPY EVACUATION OF CLOTS fulguration;  Surgeon: Radha Spring MD;  Location: [facility];  Service: Urology    PROSTATECTOMY      ROTATOR CUFF REPAIR      right shoulder       Family History   Problem Relation Age of Onset    Diabetes Mother     Stroke Mother     Diabetes Brother     Stroke Brother      I have reviewed and agree with the history as documented      E-Cigarette/Vaping    E-Cigarette Use Former User      E-Cigarette/Vaping Substances    Nicotine No     THC No     CBD No     Flavoring No     Other No     Unknown No      Social History     Tobacco Use    Smoking status: Former Smoker     Packs/day: 0 50     Years: 50 00     Pack years: 25 00     Quit date: 8/29/2017     Years since quitting: 3 8    Smokeless tobacco: Never Used    Tobacco comment: quit one month ago   Vaping Use    Vaping Use: Former   Substance Use Topics    Alcohol use: Never    Drug use: No       Review of Systems   Constitutional: Negative for chills, fatigue and fever  HENT: Negative for nosebleeds and sore throat  Eyes: Negative for redness and visual disturbance  Respiratory: Negative for shortness of breath and wheezing  Cardiovascular: Negative for chest pain and palpitations  Gastrointestinal: Negative for abdominal pain and diarrhea  Endocrine: Negative for polyuria  Genitourinary: Positive for difficulty urinating, hematuria and penile pain  Negative for testicular pain  Musculoskeletal: Negative for back pain and neck stiffness  Skin: Negative for rash and wound  Neurological: Negative for seizures, speech difficulty and headaches  Psychiatric/Behavioral: Negative for dysphoric mood and hallucinations  All other systems reviewed and are negative  Physical Exam  Physical Exam  Vitals and nursing note reviewed  Constitutional:       Appearance: He is well-developed  HENT:      Head: Normocephalic and atraumatic  Right Ear: External ear normal       Left Ear: External ear normal    Eyes:      Conjunctiva/sclera: Conjunctivae normal    Cardiovascular:      Rate and Rhythm: Normal rate and regular rhythm  Heart sounds: Normal heart sounds  Pulmonary:      Effort: Pulmonary effort is normal       Breath sounds: Normal breath sounds  No wheezing  Chest:      Chest wall: No tenderness  Abdominal:      General: Bowel sounds are normal       Palpations: Abdomen is soft  Tenderness: There is no abdominal tenderness  There is no guarding  Musculoskeletal:         General: Normal range of motion  Cervical back: Normal range of motion  Comments: On examination of patient's back, has b/l nephrostomy tubes in place, sites c/d/i  Draining clyde colored urine   Skin:     General: Skin is warm and dry  Findings: No rash     Neurological:      Mental Status: He is alert and oriented to person, place, and time  Cranial Nerves: No cranial nerve deficit  Sensory: No sensory deficit  Motor: No abnormal muscle tone  Coordination: Coordination normal          Vital Signs  ED Triage Vitals   Temperature Pulse Respirations Blood Pressure SpO2   06/28/21 1658 06/28/21 1658 06/28/21 1658 06/28/21 1658 06/28/21 1658   98 1 °F (36 7 °C) 76 18 (!) 218/92 94 %      Temp Source Heart Rate Source Patient Position - Orthostatic VS BP Location FiO2 (%)   06/28/21 1658 06/28/21 1658 06/28/21 1658 06/28/21 1658 --   Tympanic Monitor Sitting Left arm       Pain Score       06/28/21 1858       Worst Possible Pain           Vitals:    06/28/21 1658 06/28/21 1906   BP: (!) 218/92 (!) 174/94   Pulse: 76 88   Patient Position - Orthostatic VS: Sitting Sitting         Visual Acuity      ED Medications  Medications   sodium chloride 0 9 % bolus 1,000 mL (1,000 mL Intravenous New Bag 6/28/21 1852)   ceFAZolin (ANCEF) IVPB (premix in dextrose) 2,000 mg 50 mL (2,000 mg Intravenous New Bag 6/28/21 1902)   morphine (PF) 4 mg/mL injection 4 mg (4 mg Intravenous Given 6/28/21 1858)       Diagnostic Studies  Results Reviewed     Procedure Component Value Units Date/Time    Urine culture [[id number]] Collected: 06/28/21 1905    Lab Status:  In process Specimen: Urine Updated: 06/28/21 1933    Urine Microscopic [[id number]]  (Abnormal) Collected: 06/28/21 1905    Lab Status: Final result Specimen: Urine, Other Updated: 06/28/21 1929     RBC, UA       Field obscured, unable to enumerate     /hpf     WBC, UA Innumerable /hpf      Epithelial Cells Occasional /hpf      Bacteria, UA Moderate /hpf      OTHER OBSERVATIONS Yeast Cells Present    UA (URINE) with reflex to Scope [[id number]]  (Abnormal) Collected: 06/28/21 1905    Lab Status: Final result Specimen: Urine, Other Updated: 06/28/21 1913     Color, UA Light Yellow     Clarity, UA Slightly Cloudy     Specific Gravity, UA >=1 030     pH, UA 6 0     Leukocytes, UA Moderate     Nitrite, UA Positive     Protein,  (2+) mg/dl      Glucose, UA Negative mg/dl      Ketones, UA Negative mg/dl      Urobilinogen, UA 0 2 E U /dl      Bilirubin, UA Negative     Blood, UA Large    Blood culture #1 [[id number]] Collected: 06/28/21 1851    Lab Status: In process Specimen: Blood from Hand, Right Updated: 06/28/21 1857    Blood culture #2 [[id number]] Collected: 06/28/21 1851    Lab Status:  In process Specimen: Blood from Arm, Left Updated: 06/28/21 1857    Comprehensive metabolic panel [[id number]]  (Abnormal) Collected: 06/28/21 1755    Lab Status: Final result Specimen: Blood from Arm, Right Updated: 06/28/21 1817     Sodium 140 mmol/L      Potassium 5 1 mmol/L      Chloride 105 mmol/L      CO2 28 mmol/L      ANION GAP 7 mmol/L      BUN 36 mg/dL      Creatinine 1 36 mg/dL      Glucose 101 mg/dL      Calcium 9 0 mg/dL      AST 19 U/L      ALT 27 U/L      Alkaline Phosphatase 90 U/L      Total Protein 7 7 g/dL      Albumin 3 5 g/dL      Total Bilirubin 0 30 mg/dL      eGFR 50 ml/min/1 73sq m     Narrative:      [facility] guidelines for Chronic Kidney Disease (CKD):     Stage 1 with normal or high GFR (GFR > 90 mL/min/1 73 square meters)    Stage 2 Mild CKD (GFR = 60-89 mL/min/1 73 square meters)    Stage 3A Moderate CKD (GFR = 45-59 mL/min/1 73 square meters)    Stage 3B Moderate CKD (GFR = 30-44 mL/min/1 73 square meters)    Stage 4 Severe CKD (GFR = 15-29 mL/min/1 73 square meters)    Stage 5 End Stage CKD (GFR <15 mL/min/1 73 square meters)  Note: GFR calculation is accurate only with a steady state creatinine    Protime-INR [[id number]]  (Normal) Collected: 06/28/21 1755    Lab Status: Final result Specimen: Blood from Arm, Right Updated: 06/28/21 1813     Protime 13 7 seconds      INR 1 06    APTT [[id number]]  (Normal) Collected: 06/28/21 1755    Lab Status: Final result Specimen: Blood from Arm, Right Updated: 06/28/21 1813     PTT 29 seconds     CBC and differential [[id number]]  (Abnormal) Collected: 06/28/21 1755    Lab Status: Final result Specimen: Blood from Arm, Right Updated: 06/28/21 1801     WBC 10 13 Thousand/uL      RBC 3 81 Million/uL      Hemoglobin 9 5 g/dL      Hematocrit 31 6 %      MCV 83 fL      MCH 24 9 pg      MCHC 30 1 g/dL      RDW 16 0 %      MPV 9 5 fL      Platelets 820 Thousands/uL      nRBC 0 /100 WBCs      Neutrophils Relative 72 %      Immat GRANS % 0 %      Lymphocytes Relative 16 %      Monocytes Relative 9 %      Eosinophils Relative 3 %      Basophils Relative 0 %      Neutrophils Absolute 7 19 Thousands/µL      Immature Grans Absolute 0 03 Thousand/uL      Lymphocytes Absolute 1 62 Thousands/µL      Monocytes Absolute 0 92 Thousand/µL      Eosinophils Absolute 0 34 Thousand/µL      Basophils Absolute 0 03 Thousands/µL                  No orders to display              Procedures  Procedures         ED Course  ED Course as of Jun 28 1946 Mon Jun 28, 2021 1810 baseline   Hemoglobin(!): 9 5   1831 Baseline 1 45   Creatinine(!): 1 36   1831 Spoke to Daya Kumar, patient should be admitted for pain control  Hold on alanis for now, re evaluation in am, start on ancef  MDM    65 yo M w/ difficulty urinating  Patient passing clots  Has 0 on bladder scan  Spoke to Daya Kumar  No alanis at this time, can make bleeding worse from radiation cystitis  Already draining urine in nephrostomy tubes  Start on ancef  Pain control  If pain worse in am, can evaluate for alanis placement  Admitted to medicine for further management      Disposition  Final diagnoses:   Hematuria   Urinary frequency     Time reflects when diagnosis was documented in both MDM as applicable and the Disposition within this note     Time User Action Codes Description Comment    6/28/2021  6:43 PM Baldo Ramirez Add [R31 9] Hematuria     6/28/2021  6:43 PM Baldo Ramirez Add [R35 0] Urinary frequency       ED Disposition     ED Disposition Condition Date/Time Comment Admit Stable Mon Jun 28, 2021  6:43 PM Case was discussed with MAI and the patient's admission status was agreed to be Admission Status: observation status to the service of Dr Rowan Bran  Follow-up Information    None         Patient's Medications   Discharge Prescriptions    No medications on file     No discharge procedures on file      PDMP Review       Value Time User    PDMP Reviewed  Yes 6/11/2021  8:36 AM Mignon Bueno, [facility]          ED Provider  Electronically Signed by           Hilary Tsang MD  06/28/21 1946

## 2021-06-28 NOTE — CONSULTS
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 receiving O2 therapy at this time  Patient be seen in the office last week doing well without any further episodes of gross hematuria and has been admitted multiple times in light of gross hematuria all starting over the [facility] vacation some 2 in after months ago status post radical prostatectomy by myself with local recurrence to the serge prostatic bed undergoing radiation therapy and history of bladder papillomas with no recurrence in the last few months        Review of Systems:   Constitutional:  Negative for activity change, fever, chills and diaphoresis  HENT: Negative for hearing loss and trouble swallowing  Eyes: Negative for itching and visual disturbance  Respiratory: Negative for chest tightness and shortness of breath  Cardiovascular: Negative for chest pain, edema  Gastrointestinal: Negative for abdominal distention, na abdominal pain, constipation, diarrhea, Nausea and vomiting  Genitourinary: Negative for decreased urine volume, difficulty urinating, dysuria, enuresis, frequency, hematuria and urgency  Musculoskeletal: Negative for gait problem and myalgias  Neurological: Negative for dizziness and headaches  Hematological: Does not bruise/bleed easily    Patient with bilateral nephrostomy tubes to reduce urine output and episodes of gross hematuria which have worked to some extent      Historical Information   Past Medical History:   Diagnosis Date    Cancer [facility])     prostate    COPD [facility]ch[facility] obstructive [facility] disease) ([facility] [address]     Hyperlipidemia     Hypertension      Past Surgical History:   Procedure Laterality Date    APPENDECTOMY      FL RETROGRADE PYELOGRAM  4/14/2021    HERNIA REPAIR      IR NEPHROSTOMY TUBE CHECK/CHANGE/REPOSITION/REINSERTION/UPSIZE  5/10/2021    IR NEPHROSTOMY TUBE PLACEMENT  5/7/2021    OR CYSTOURETHROSCOPY W/IRRIG & EVAC CLOTS Bilateral 4/14/2021    Procedure: CYSTOSCOPY EVACUATION OF CLOTS bilateral retrograde pyelograms possible TURBT bladder biopsy;  Surgeon: Danielle Garcia MD;  Location: [address] [place] [address];  Service: Urology    OR CYSTOURETHROSCOPY W/IRRIG & EVAC CLOTS N/A 4/24/2021    Procedure: CYSTOSCOPY EVACUATION OF CLOTS fulguration;  Surgeon: Danielle Garcia MD;  Location: [address] [place] [address];  Service: Urology    [address]      right shoulder     Social History   Social History     Substance and Sexual Activity   Alcohol Use Never     Social History     Substance and Sexual Activity   Drug Use No     Social History     Tobacco Use   Smoking Status Former Smoker    Packs/day: 0 50    Years: 50 00    Pack years: 25 00    Quit date: 8/29/2017    Years since quitting: 3 8   Smokeless Tobacco Never Used   Tobacco Comment    quit one month ago     Family History:   Family History   Problem Relation Age of Onset    Diabetes Mother     Stroke Mother     Diabetes Brother     Stroke Brother        Meds/Allergies   (Not in a hospital admission)    No Known Allergies    Objective   Vitals: BP (!) 218/92 (BP Location: Left arm)   Pulse 76   Temp 98 1 °F (36 7 °C) (Tympanic)   Resp 18   Wt 98 4 kg (217 lb)   SpO2 94%   BMI 30 27 kg/m²     Physical Exam:  General Alert awake   Normocephalic atraumatic PERRLA  Lungs clear bilaterally  Cardiac normal S1 normal S2  Abdomen soft, flank pain  Bladder not distended   rectal exam not done  Extremities no edema    No intake/output data recorded      Invasive Devices     Peripheral Intravenous Line            Peripheral IV 05/09/21 Distal;Left;Ventral (anterior) Forearm 50 days    Peripheral IV 06/28/21 Right Wrist <1 day          Drain Nephrostomy Left 1 8 Fr  52 days    Nephrostomy Right 2 8 Fr  52 days                    Lab Results: CBC:   Lab Results   Component Value Date    WBC 10 13 06/28/2021    HGB 9 5 (L) 06/28/2021    HCT 31 6 (L) 06/28/2021    MCV 83 06/28/2021     06/28/2021    MCH 24 9 (L) 06/28/2021    MCHC 30 1 (L) 06/28/2021    RDW 16 0 (H) 06/28/2021    MPV 9 5 06/28/2021    NRBC 0 06/28/2021     CMP:   Lab Results   Component Value Date     05/12/2021    CO2 31 05/12/2021    BUN 28 (H) 05/12/2021    CREATININE 1 45 (H) 05/12/2021    CALCIUM 8 7 05/12/2021    AST 19 04/29/2021    ALT 31 04/29/2021    ALKPHOS 89 04/29/2021    EGFR 46 05/12/2021     Urinalysis:   Lab Results   Component Value Date    COLORU Red 05/04/2021    CLARITYU Cloudy 05/04/2021    SPECGRAV 1 020 05/04/2021    PHUR 7 5 05/04/2021    PHUR 5 5 03/25/2018    LEUKOCYTESUR Negative 05/04/2021    NITRITE Negative 05/04/2021    GLUCOSEU Negative 05/04/2021    KETONESU Negative 05/04/2021    BILIRUBINUR Negative 05/04/2021    BLOODU Large (A) 05/04/2021     Urine Culture:   Lab Results   Component Value Date    URINECX No Growth <1000 cfu/mL 05/04/2021     PSA: No results found for: PSA        Assessment/ Plan:  Gross hematuria  Radiation cystitis on hyper bulk oxygen therapy  Postvoid residual bladder empty  Plan  Would admit to hospitalist service IV hydration start Ancef urine cultures  Hopefully with hydration hematuria will clear  Avoid Sumner catheter if possible in light of radiation cystitis inflamed bladder  Pain meds as needed      Tala Hernandez MD

## 2021-06-28 NOTE — H&P
[address] 43  H&P- Fela Neves 1945, 66 y o  male MRN: [id number]  Unit/Bed#: ED 01 Encounter: [id number]  Primary Care Provider: Valente Arora MD   Date and time admitted to hospital: 6/28/2021  5:04 PM    * Gross hematuria  Assessment & Plan  Presented to the ED with gross hematuria, pt has bilateral nephrostomy tubes placed and is undergoing hyperbaric O2 therapy for radiation cystitis and recurrent hematuria  Pt has had multiple hospitalizations requiring cystoscopy and clot evacuation for the same   - Seen by urology, recommending Keflex for UTI and IVF   - Urine culture pending  - Continue IVF hydration  - PRN belladonna and pain control for cramping/pain   - Monitor urine output, measure PVR  - Urology recommendations appreciated     Anemia  Assessment & Plan  Chronic, at baseline  Continue to monitor in the setting of acute hematuria, repeat H+H tonight  Transfuse as needed for Hg < 8    Chronic obstructive pulmonary disease (HCC)  Assessment & Plan  Continue albuterol as needed  No acute exacerbation on admission    Dyslipidemia  Assessment & Plan  Continue atorvastatin    Essential hypertension  Assessment & Plan  Continue Lopressor 25 mg Q12      VTE Prophylaxis: Pharmacologic VTE Prophylaxis contraindicated due to gross hematuria  / sequential compression device   Code Status: Level 1  POLST: POLST is not applicable to this patient  Discussion with family: No    Anticipated Length of Stay:  Patient will be admitted on an Observation basis with an anticipated length of stay of  < 2 midnights  Justification for Hospital Stay: gross hematuria, UTI    Total Time for Visit, including Counseling / Coordination of Care: 30 minutes  Greater than 50% of this total time spent on direct patient counseling and coordination of care      Chief Complaint:   Gross hematuria    History of Present Illness:    Fela Neves is a 66 y o  male who presents with PMH of HTN, HLD, COPD, prostate cancer s/p radiation and chemotherapy with multiple admissions for gross hematuria due to radiation cystitis with bladder spasms with recent bilateral nephrostomy tube placement  He presented to the ED today after completing hyperbaric oxygen therapy for the same  He states he woke up this morning and started with lower abdominal pain and cramping and penile pain  He states this is typically where the pain occurs when he's passed blood clots in the past  He denies any fevers, chills, abdominal pain currently  He denies any bloody drainage from the nephrostomy tubes  He has had normal BMs, non-bloody  He denies any lightheadedness, dizziness  Dr Aura Gomez recommending IVF and ABx, and will follow for re-evaluation  Patient is admitted for further management of gross hematuria  Review of Systems:    Review of Systems   Constitutional: Negative for chills, fatigue and fever  HENT: Negative for congestion, rhinorrhea and sore throat  Eyes: Negative for visual disturbance  Respiratory: Negative for cough, shortness of breath and wheezing  Cardiovascular: Negative for chest pain, palpitations and leg swelling  Gastrointestinal: Negative for abdominal distention, abdominal pain, diarrhea, nausea and vomiting  Genitourinary: Positive for penile pain  Negative for decreased urine volume, dysuria, flank pain, frequency, hematuria, penile swelling and urgency  Musculoskeletal: Negative for gait problem and myalgias  Skin: Negative for rash and wound  Neurological: Negative for dizziness, weakness, light-headedness, numbness and headaches         Past Medical and Surgical History:     Past Medical History:   Diagnosis Date    Cancer [facility])     prostate    COPD (chronic obstructive pulmonary disease) ([facility] [address] [facility] )     Hyperlipidemia     Hypertension        Past Surgical History:   Procedure Laterality Date    APPENDECTOMY      FL RETROGRADE PYELOGRAM  4/14/2021    HERNIA REPAIR      IR NEPHROSTOMY TUBE CHECK/CHANGE/REPOSITION/REINSERTION/UPSIZE  5/10/2021    IR NEPHROSTOMY TUBE PLACEMENT  5/7/2021    VA CYSTOURETHROSCOPY W/IRRIG & EVAC CLOTS Bilateral 4/14/2021    Procedure: CYSTOSCOPY EVACUATION OF CLOTS bilateral retrograde pyelograms possible TURBT bladder biopsy;  Surgeon: Rachel Patel MD;  Location: [address] [place] [address];  Service: Urology    VA CYSTOURETHROSCOPY W/IRRIG & EVAC CLOTS N/A 4/24/2021    Procedure: CYSTOSCOPY EVACUATION OF CLOTS fulguration;  Surgeon: Rachel Patel MD;  Location: [address] [place] [address];  Service: Urology    [address]      right shoulder       Meds/Allergies:    Prior to Admission medications    Medication Sig Start Date End Date Taking?  Authorizing Provider   atorvastatin (LIPITOR) 20 mg tablet Take 20 mg by mouth daily at bedtime   Yes Historical Provider, MD   cephalexin (Keflex) 500 mg capsule Take 500 mg by mouth 2 (two) times a day 6/25/21 7/9/21 Yes Rachel Patel MD   LORazepam (ATIVAN) 0 5 mg tablet Take 1 tablet (0 5 mg total) by mouth every 8 (eight) hours as needed for anxiety (2 tablets prior to hyperbaric oxygen therapy) 6/11/21  Yes CORWIN Felton   metoprolol tartrate (LOPRESSOR) 25 mg tablet Take 25 mg by mouth every 12 (twelve) hours    Yes Historical Provider, MD   albuterol (PROVENTIL HFA,VENTOLIN HFA) 90 mcg/act inhaler Inhale 2 puffs every 4 (four) hours as needed for wheezing 8/31/17   DO praveen Palaciosa-opium (B&O SUPPOSITORY) 16 2-30 mg suppository Insert 0 5 suppositories into the rectum every 6 (six) hours as needed for cramping or bladder spasms for up to 10 daysMax Daily Amount: 2 suppositories  Patient not taking: Reported on 5/14/2021 5/12/21 5/22/21  Yousif Ritchie MD   montelukast (SINGULAIR) 10 mg tablet Take 1 tablet (10 mg total) by mouth daily at bedtime  Patient not taking: Reported on 6/2/2021 5/26/21   DO clement [facility]) 4 mg/0 1 mL nasal spray Administer 1 spray into a nostril  If no response after 2-3 minutes, give another dose in the other nostril using a new spray  5/14/21   CORWIN Malloy   Calcium Carb-Cholecalciferol (CALTRATE 600+D3 SOFT PO) Take by mouth  6/28/21  Historical Provider, MD   folic acid (FOLVITE) 1 mg tablet Take 1 mg by mouth daily  Patient not taking: Reported on 6/28/2021 6/28/21  Historical Provider, MD   methotrexate 2 5 mg tablet Take 15 5 mg by mouth once a week  6/28/21  Historical Provider, MD       Allergies: No Known Allergies    Social History:    Social History     Substance and Sexual Activity   Alcohol Use Never     Social History     Tobacco Use   Smoking Status Former Smoker    Packs/day: 0 50    Years: 50 00    Pack years: 25 00    Quit date: 8/29/2017    Years since quitting: 3 8   Smokeless Tobacco Never Used   Tobacco Comment    quit one month ago     Social History     Substance and Sexual Activity   Drug Use No       Family History:    Family History   Problem Relation Age of Onset    Diabetes Mother     Stroke Mother     Diabetes Brother     Stroke Brother        Physical Exam:     Vitals:   Blood Pressure: (!) 174/94 (06/28/21 1906)  Pulse: 88 (06/28/21 1906)  Temperature: 98 1 °F (36 7 °C) (06/28/21 1658)  Temp Source: Tympanic (06/28/21 1658)  Respirations: 18 (06/28/21 1906)  Weight - Scale: 98 4 kg (217 lb) (06/28/21 1658)  SpO2: 95 % (06/28/21 1906)    Physical Exam  Vitals reviewed  Constitutional:       General: He is not in acute distress  Appearance: He is well-developed  He is not ill-appearing  HENT:      Head: Normocephalic and atraumatic  Cardiovascular:      Rate and Rhythm: Normal rate and regular rhythm  Heart sounds: Normal heart sounds  No murmur heard  No gallop  Pulmonary:      Effort: Pulmonary effort is normal  No respiratory distress  Breath sounds: Normal breath sounds  No wheezing, rhonchi or rales     Abdominal:      General: Bowel sounds are normal  There is no distension  Palpations: Abdomen is soft  Tenderness: There is no abdominal tenderness  There is no guarding  Comments: B/L nephrostomy tubes in place, draining clear urine   Musculoskeletal:         General: No tenderness  Skin:     General: Skin is warm and dry  Findings: No erythema or rash  Neurological:      Mental Status: He is alert and oriented to person, place, and time  Psychiatric:         Mood and Affect: Mood normal          Behavior: Behavior normal          Additional Data:     Lab Results: I have personally reviewed pertinent reports  Results from last 7 days   Lab Units 06/28/21  1755   WBC Thousand/uL 10 13   HEMOGLOBIN g/dL 9 5*   HEMATOCRIT % 31 6*   PLATELETS Thousands/uL 241   NEUTROS PCT % 72   LYMPHS PCT % 16   MONOS PCT % 9   EOS PCT % 3     Results from last 7 days   Lab Units 06/28/21  1755   SODIUM mmol/L 140   POTASSIUM mmol/L 5 1   CHLORIDE mmol/L 105   CO2 mmol/L 28   BUN mg/dL 36*   CREATININE mg/dL 1 36*   ANION GAP mmol/L 7   CALCIUM mg/dL 9 0   ALBUMIN g/dL 3 5   TOTAL BILIRUBIN mg/dL 0 30   ALK PHOS U/L 90   ALT U/L 27   AST U/L 19   GLUCOSE RANDOM mg/dL 101     Results from last 7 days   Lab Units 06/28/21  1755   INR  1 06                   Imaging: I have personally reviewed pertinent reports  No orders to display       EKG, Pathology, and Other Studies Reviewed on Admission:   · EKG: none on admission, previous from 4/24 reviewed    [facility]     ** Please Note: This note has been constructed using a voice recognition system   **

## 2021-06-29 LAB
ANION GAP SERPL CALCULATED.3IONS-SCNC: 8 MMOL/L (ref 4–13)
BASOPHILS # BLD AUTO: 0.03 THOUSANDS/ΜL (ref 0–0.1)
BASOPHILS NFR BLD AUTO: 0 % (ref 0–1)
BUN SERPL-MCNC: 38 MG/DL (ref 5–25)
CALCIUM SERPL-MCNC: 8.4 MG/DL (ref 8.3–10.1)
CHLORIDE SERPL-SCNC: 107 MMOL/L (ref 100–108)
CO2 SERPL-SCNC: 26 MMOL/L (ref 21–32)
CREAT SERPL-MCNC: 1.43 MG/DL (ref 0.6–1.3)
EOSINOPHIL # BLD AUTO: 0.39 THOUSAND/ΜL (ref 0–0.61)
EOSINOPHIL NFR BLD AUTO: 4 % (ref 0–6)
ERYTHROCYTE [DISTWIDTH] IN BLOOD BY AUTOMATED COUNT: 16.2 % (ref 11.6–15.1)
GFR SERPL CREATININE-BSD FRML MDRD: 47 ML/MIN/1.73SQ M
GLUCOSE P FAST SERPL-MCNC: 80 MG/DL (ref 65–99)
GLUCOSE SERPL-MCNC: 80 MG/DL (ref 65–140)
HCT VFR BLD AUTO: 28 % (ref 36.5–49.3)
HGB BLD-MCNC: 8.2 G/DL (ref 12–17)
IMM GRANULOCYTES # BLD AUTO: 0.05 THOUSAND/UL (ref 0–0.2)
IMM GRANULOCYTES NFR BLD AUTO: 1 % (ref 0–2)
LYMPHOCYTES # BLD AUTO: 1.72 THOUSANDS/ΜL (ref 0.6–4.47)
LYMPHOCYTES NFR BLD AUTO: 19 % (ref 14–44)
MCH RBC QN AUTO: 24.7 PG (ref 26.8–34.3)
MCHC RBC AUTO-ENTMCNC: 29.3 G/DL (ref 31.4–37.4)
MCV RBC AUTO: 84 FL (ref 82–98)
MONOCYTES # BLD AUTO: 1.05 THOUSAND/ΜL (ref 0.17–1.22)
MONOCYTES NFR BLD AUTO: 12 % (ref 4–12)
NEUTROPHILS # BLD AUTO: 5.86 THOUSANDS/ΜL (ref 1.85–7.62)
NEUTS SEG NFR BLD AUTO: 64 % (ref 43–75)
NRBC BLD AUTO-RTO: 0 /100 WBCS
PLATELET # BLD AUTO: 232 THOUSANDS/UL (ref 149–390)
PMV BLD AUTO: 10.2 FL (ref 8.9–12.7)
POTASSIUM SERPL-SCNC: 4.4 MMOL/L (ref 3.5–5.3)
RBC # BLD AUTO: 3.32 MILLION/UL (ref 3.88–5.62)
SODIUM SERPL-SCNC: 141 MMOL/L (ref 136–145)
WBC # BLD AUTO: 9.1 THOUSAND/UL (ref 4.31–10.16)

## 2021-06-29 PROCEDURE — 85025 COMPLETE CBC W/AUTO DIFF WBC: CPT | Performed by: PHYSICIAN ASSISTANT

## 2021-06-29 PROCEDURE — 99232 SBSQ HOSP IP/OBS MODERATE 35: CPT | Performed by: INTERNAL MEDICINE

## 2021-06-29 PROCEDURE — 80048 BASIC METABOLIC PNL TOTAL CA: CPT | Performed by: PHYSICIAN ASSISTANT

## 2021-06-29 PROCEDURE — 97161 PT EVAL LOW COMPLEX 20 MIN: CPT

## 2021-06-29 RX ORDER — FLUTICASONE FUROATE AND VILANTEROL 100; 25 UG/1; UG/1
1 POWDER RESPIRATORY (INHALATION) DAILY
Status: DISCONTINUED | OUTPATIENT
Start: 2021-06-29 | End: 2021-06-30 | Stop reason: HOSPADM

## 2021-06-29 RX ADMIN — ALBUTEROL SULFATE 2 PUFF: 90 AEROSOL, METERED RESPIRATORY (INHALATION) at 12:23

## 2021-06-29 RX ADMIN — METOPROLOL TARTRATE 25 MG: 25 TABLET, FILM COATED ORAL at 12:21

## 2021-06-29 RX ADMIN — ALBUTEROL SULFATE 2 PUFF: 90 AEROSOL, METERED RESPIRATORY (INHALATION) at 20:37

## 2021-06-29 RX ADMIN — CEFAZOLIN SODIUM 2000 MG: 2 SOLUTION INTRAVENOUS at 20:35

## 2021-06-29 RX ADMIN — CEFAZOLIN SODIUM 2000 MG: 2 SOLUTION INTRAVENOUS at 12:21

## 2021-06-29 RX ADMIN — MONTELUKAST SODIUM 10 MG: 10 TABLET, FILM COATED ORAL at 21:36

## 2021-06-29 RX ADMIN — ATORVASTATIN CALCIUM 20 MG: 20 TABLET, FILM COATED ORAL at 21:36

## 2021-06-29 RX ADMIN — FLUTICASONE FUROATE AND VILANTEROL TRIFENATATE 1 PUFF: 100; 25 POWDER RESPIRATORY (INHALATION) at 15:40

## 2021-06-29 NOTE — PLAN OF CARE
Problem: PAIN - ADULT  Goal: Verbalizes/displays adequate comfort level or baseline comfort level  Description: Interventions:  - Encourage patient to monitor pain and request assistance  - Assess pain using appropriate pain scale  - Administer analgesics based on type and severity of pain and evaluate response  - Implement non-pharmacological measures as appropriate and evaluate response  - Consider cultural and social influences on pain and pain management  - Notify physician/advanced practitioner if interventions unsuccessful or patient reports new pain  Outcome: Progressing     Problem: INFECTION - ADULT  Goal: Absence or prevention of progression during hospitalization  Description: INTERVENTIONS:  - Assess and monitor for signs and symptoms of infection  - Monitor lab/diagnostic results  - Monitor all insertion sites, i e  indwelling lines, tubes, and drains  - Monitor endotracheal if appropriate and nasal secretions for changes in amount and color  - Perry appropriate cooling/warming therapies per order  - Administer medications as ordered  - Instruct and encourage patient and family to use good hand hygiene technique  - Identify and instruct in appropriate isolation precautions for identified infection/condition  Outcome: Progressing     Problem: SAFETY ADULT  Goal: Patient will remain free of falls  Description: INTERVENTIONS:  - Educate patient/family on patient safety including physical limitations  - Instruct patient to call for assistance with activity   - Consult OT/PT to assist with strengthening/mobility   - Keep Call bell within reach  - Keep bed low and locked with side rails adjusted as appropriate  - Keep care items and personal belongings within reach  - Initiate and maintain comfort rounds  - Make Fall Risk Sign visible to staff  - Offer Toileting every 2 Hours, in advance of need  - Initiate/Maintain bed alarm  - Obtain necessary fall risk management equipment  - Apply yellow socks and bracelet for high fall risk patients  - Consider moving patient to room near nurses station  Outcome: Progressing  Goal: Maintain or return to baseline ADL function  Description: INTERVENTIONS:  -  Assess patient's ability to carry out ADLs; assess patient's baseline for ADL function and identify physical deficits which impact ability to perform ADLs (bathing, care of mouth/teeth, toileting, grooming, dressing, etc )  - Assess/evaluate cause of self-care deficits   - Assess range of motion  - Assess patient's mobility; develop plan if impaired  - Assess patient's need for assistive devices and provide as appropriate  - Encourage maximum independence but intervene and supervise when necessary  - Involve family in performance of ADLs  - Assess for home care needs following discharge   - Consider OT consult to assist with ADL evaluation and planning for discharge  - Provide patient education as appropriate  Outcome: Progressing  Goal: Maintains/Returns to pre admission functional level  Description: INTERVENTIONS:  - Perform BMAT or MOVE assessment daily    - Set and communicate daily mobility goal to care team and patient/family/caregiver     - Collaborate with rehabilitation services on mobility goals if consulted  - Out of bed to chair 3 times a day   - Out of bed for meals 3 times a day  - Out of bed for toileting  - Record patient progress and toleration of activity level   Outcome: Progressing     Problem: DISCHARGE PLANNING  Goal: Discharge to home or other facility with appropriate resources  Description: INTERVENTIONS:  - Identify barriers to discharge w/patient and caregiver  - Arrange for needed discharge resources and transportation as appropriate  - Identify discharge learning needs (meds, wound care, etc )  - Arrange for interpretive services to assist at discharge as needed  - Refer to Case Management Department for coordinating discharge planning if the patient needs post-hospital services based on physician/advanced practitioner order or complex needs related to functional status, cognitive ability, or social support system  Outcome: Progressing     Problem: Knowledge Deficit  Goal: Patient/family/caregiver demonstrates understanding of disease process, treatment plan, medications, and discharge instructions  Description: Complete learning assessment and assess knowledge base    Interventions:  - Provide teaching at level of understanding  - Provide teaching via preferred learning methods  Outcome: Progressing

## 2021-06-29 NOTE — UTILIZATION REVIEW
Initial Clinical Review    Admission: Date/Time/Statement:   Admission Orders (From admission, onward)     Ordered        06/28/21 1844  Place in Observation  Once                   Orders Placed This Encounter   Procedures    Place in Observation     Standing Status:   Standing     Number of Occurrences:   1     Order Specific Question:   Level of Care     Answer:   Med Surg [16]     ED Arrival Information     Expected Arrival Acuity    - 6/28/2021 16:44 Emergent         Means of arrival Escorted by Service Admission type    [facility]         Arrival complaint    bladder spasms        Chief Complaint   Patient presents with    Penis Pain     Patient has bilateral nephrostomy tubes   States he started with clots coming out of penis this morning  Went for his hyperbaric treatments for his radiation cystitis this morning  States bleeding from penis continues with severe pain        Initial Presentation:   66 y o  male who presents with PMH of HTN, HLD, COPD, prostate cancer s/p radiation and chemotherapy with multiple admissions for gross hematuria due to radiation cystitis with bladder spasms with recent bilateral nephrostomy tube placement  He presented to the ED today after completing hyperbaric oxygen therapy for the same  He states he woke up this morning and started with lower abdominal pain and cramping and penile pain  He states this is typically where the pain occurs when he's passed blood clots in the past  He denies any fevers, chills, abdominal pain currently  He denies any bloody drainage from the nephrostomy tubes  He has had normal BMs, non-bloody  He denies any lightheadedness, dizziness  Dr Gelacio Chau recommending IVF and ABx, and will follow for re-evaluation  Patient is admitted for further management of gross hematuria    Gross hematuria  Assessment & Plan  Presented to the ED with gross hematuria, pt has bilateral nephrostomy tubes placed and is undergoing hyperbaric O2 therapy for radiation cystitis and recurrent hematuria  Pt has had multiple hospitalizations requiring cystoscopy and clot evacuation for the same   - Seen by urology, recommending Keflex for UTI and IVF   - Urine culture pending  - Continue IVF hydration  - PRN belladonna and pain control for cramping/pain   - Monitor urine output, measure PVR  - Urology recommendations appreciated   Anemia  Assessment & Plan  Chronic, at baseline  Continue to monitor in the setting of acute hematuria, repeat H+H tonight  Transfuse as needed for Hg < 8  Chronic obstructive pulmonary disease (HCC)  Assessment & Plan  Continue albuterol as needed  No acute exacerbation on admission  Dyslipidemia  Assessment & Plan  Continue atorvastatin   Essential hypertension  Assessment & Plan  Continue Lopressor 25 mg Q12  VTE Prophylaxis: Pharmacologic VTE Prophylaxis contraindicated due to gross hematuria  / sequential compression device   Code Status: Level 1  POLST: POLST is not applicable to this patient  Discussion with family: No  Anticipated Length of Stay:  Patient will be admitted on an Observation basis with an anticipated length of stay of  < 2 midnights     Justification for Hospital Stay: gross hematuria, UTI  UROLOGY CONSULT  Assessment/ Plan:  Gross hematuria  Radiation cystitis on hyper bulk oxygen therapy  Postvoid residual bladder empty  Plan  Would admit to hospitalist service IV hydration start Ancef urine cultures  Hopefully with hydration hematuria will clear  Avoid Sumner catheter if possible in light of radiation cystitis inflamed bladder  Pain meds as needed     Date:    Day 2:     ED Triage Vitals   Temperature Pulse Respirations Blood Pressure SpO2   06/28/21 1658 06/28/21 1658 06/28/21 1658 06/28/21 1658 06/28/21 1658   98 1 °F (36 7 °C) 76 18 (!) 218/92 94 %      Temp Source Heart Rate Source Patient Position - Orthostatic VS BP Location FiO2 (%)   06/28/21 1658 06/28/21 1658 06/28/21 1658 06/28/21 1658 -- Tympanic Monitor Sitting Left arm       Pain Score       06/28/21 1858       Worst Possible Pain          Wt Readings from Last 1 Encounters:   06/28/21 98 4 kg (217 lb)     Additional Vital Signs:   Pertinent Labs/Diagnostic Test Results:       Results from last 7 days   Lab Units 06/29/21  0531 06/28/21  2259 06/28/21  1755   WBC Thousand/uL 9 10  --  10 13   HEMOGLOBIN g/dL 8 2* 8 7* 9 5*   HEMATOCRIT % 28 0* 29 1* 31 6*   PLATELETS Thousands/uL 232  --  241   NEUTROS ABS Thousands/µL 5 86  --  7 19         Results from last 7 days   Lab Units 06/28/21  1755   SODIUM mmol/L 140   POTASSIUM mmol/L 5 1   CHLORIDE mmol/L 105   CO2 mmol/L 28   ANION GAP mmol/L 7   BUN mg/dL 36*   CREATININE mg/dL 1 36*   EGFR ml/min/1 73sq m 50   CALCIUM mg/dL 9 0     Results from last 7 days   Lab Units 06/28/21  1755   AST U/L 19   ALT U/L 27   ALK PHOS U/L 90   TOTAL PROTEIN g/dL 7 7   ALBUMIN g/dL 3 5   TOTAL BILIRUBIN mg/dL 0 30         Results from last 7 days   Lab Units 06/28/21  1755   GLUCOSE RANDOM mg/dL 101             No results found for: BETA-HYDROXYBUTYRATE                           Results from last 7 days   Lab Units 06/28/21  1755   PROTIME seconds 13 7   INR  1 06   PTT seconds 29                                                 Results from last 7 days   Lab Units 06/28/21  1905   CLARITY UA  Slightly Cloudy   COLOR UA  Light Yellow   SPEC GRAV UA  >=1 030   PH UA  6 0   GLUCOSE UA mg/dl Negative   KETONES UA mg/dl Negative   BLOOD UA  Large*   PROTEIN UA mg/dl 100 (2+)*   NITRITE UA  Positive*   BILIRUBIN UA  Negative   UROBILINOGEN UA E U /dl 0 2   LEUKOCYTES UA  Moderate*   WBC UA /hpf Innumerable*   RBC UA /hpf Field obscured, unable to enumerate*   BACTERIA UA /hpf Moderate*   EPITHELIAL CELLS WET PREP /hpf Occasional                                 Results from last 7 days   Lab Units 06/28/21  1851   BLOOD CULTURE  Received in Microbiology Lab  Culture in Progress  Received in Microbiology Lab   Culture in Progress  ED Treatment:   Medication Administration from 06/28/2021 1644 to 06/28/2021 1958       Date/Time Order Dose Route Action Action by Comments     06/28/2021 1932 ceFAZolin (ANCEF) IVPB (premix in dextrose) 2,000 mg 50 mL 0 mg [facility]venous [facility]      06/28/2021 1902 ceFAZolin (ANCEF) IVPB (premix in dextrose) 2,000 mg 50 mL 2,000 mg Intravenous [facility]      06/28/2021 1858 morphine (PF) 4 mg/mL injection 4 mg 4 mg Intravenous Given Ruddy Guiles, [place]      06/28/2021 1852 sodium chloride 0 9 % bolus 1,000 mL 1,000 mL Intravenous New Bag Ruddy Ross RN         Past Medical History:   Diagnosis Date    Cancer [facility])     prostate    [facility]     Hyperlipidemia     Hypertension      Present on Admission:   Anemia   Chronic obstructive pulmonary disease ([facility] )   Dyslipidemia   Essential hypertension   Gross hematuria      Admitting Diagnosis: Urinary frequency [R35 0]  Penis pain [N48 89]  Hematuria [R31 9]  Age/Sex: 66 y o  male  Admission Orders:  Scheduled Medications:  atorvastatin, 20 mg, Oral, HS  cefazolin, 2,000 mg, Intravenous, Q12H  metoprolol tartrate, 25 mg, Oral, Q12H DHAVAL  montelukast, 10 mg, Oral, HS      Continuous IV Infusions:  sodium chloride, 75 mL/hr, Intravenous, Continuous      PRN Meds:  acetaminophen, 650 mg, Oral, Q6H PRN  albuterol, 2 puff, Inhalation, Q4H PRN  belladonna-opium, 15 mg, Rectal, Q6H PRN  calcium carbonate, 1,000 mg, Oral, Daily PRN  LORazepam, 0 5 mg, Oral, Q8H PRN  ondansetron, 4 mg, Intravenous, Q6H PRN  polyethylene glycol, 17 g, Oral, Daily PRN        IP CONSULT TO CASE MANAGEMENT    Network Utilization Review Department  ATTENTION: Please call with any questions or concerns to [phone] and carefully listen to the prompts so that you are directed to the right person   All voicemails are confidential   Nereida Montanez all requests for admission clinical reviews, approved or denied determinations and any other requests to dedicated fax number below belonging to the campus where the patient is receiving treatment   List of dedicated fax numbers for the Facilities:  [address] [place] [address] DENIALS (Administrative/Medical Necessity) [phone]   [address] (Maternity/NICU/Pediatrics) [phone] [address] [place] [address] [address] [place] [phone] [id number] [place] [address] Marisabel Palacio [address] [facility] [place] [address] [phone]

## 2021-06-29 NOTE — PROGRESS NOTES
Progress Note - Urology      Patient: Latesha Cortes   : 1945 Sex: male   MRN: [id number]     CSN: [id number]  Unit/Bed#: [address] [place] [address]     SUBJECTIVE:   Having penile pain  Cultures pending      Objective   Vitals: /56   Pulse 69   Temp 97 8 °F (36 6 °C)   Resp 18   Ht 5' 11" (1 803 m)   Wt 98 4 kg (217 lb)   SpO2 94%   BMI 30 27 kg/m²     I/O last 24 hours:   In: 48 [IV Piggyback:50]  Out: -       Physical Exam:   General Alert awake   Normocephalic atraumatic PERRLA  Lungs clear bilaterally  Cardiac normal S1 normal S2  Abdomen soft, flank pain  Extremities no edema      Lab Results: CBC:   Lab Results   Component Value Date    WBC 9 10 2021    HGB 8 2 (L) 2021    HCT 28 0 (L) 2021    MCV 84 2021     2021    MCH 24 7 (L) 2021    MCHC 29 3 (L) 2021    RDW 16 2 (H) 2021    MPV 10 2 2021    NRBC 0 2021     CMP:   Lab Results   Component Value Date     2021    CO2 26 2021    BUN 38 (H) 2021    CREATININE 1 43 (H) 2021    CALCIUM 8 4 2021    AST 19 2021    ALT 27 2021    ALKPHOS 90 2021    EGFR 47 2021     Urinalysis:   Lab Results   Component Value Date    COLORU Light Yellow 2021    CLARITYU Slightly Cloudy 2021    SPECGRAV >=1 030 2021    PHUR 6 0 2021    PHUR 5 5 2018    LEUKOCYTESUR Moderate (A) 2021    NITRITE Positive (A) 2021    GLUCOSEU Negative 2021    KETONESU Negative 2021    BILIRUBINUR Negative 2021    BLOODU Large (A) 2021     Urine Culture:   Lab Results   Component Value Date    URINECX No Growth <1000 cfu/mL 2021     PSA: No results found for: PSA      Assessment/ Plan:  Gross hematuria  Radiation cystitis  Check pvr  Cancel o2 therapy today            Tiarra Dunham MD

## 2021-06-29 NOTE — PLAN OF CARE
Problem: PAIN - ADULT  Goal: Verbalizes/displays adequate comfort level or baseline comfort level  Description: Interventions:  - Encourage patient to monitor pain and request assistance  - Assess pain using appropriate pain scale  - Administer analgesics based on type and severity of pain and evaluate response  - Implement non-pharmacological measures as appropriate and evaluate response  - Consider cultural and social influences on pain and pain management  - Notify physician/advanced practitioner if interventions unsuccessful or patient reports new pain  Outcome: Progressing     Problem: INFECTION - ADULT  Goal: Absence or prevention of progression during hospitalization  Description: INTERVENTIONS:  - Assess and monitor for signs and symptoms of infection  - Monitor lab/diagnostic results  - Monitor all insertion sites, i e  indwelling lines, tubes, and drains  - Monitor endotracheal if appropriate and nasal secretions for changes in amount and color  - [place] appropriate cooling/warming therapies per order  - Administer medications as ordered  - Instruct and encourage patient and family to use good hand hygiene technique  - Identify and instruct in appropriate isolation precautions for identified infection/condition  Outcome: Progressing     Problem: SAFETY ADULT  Goal: Patient will remain free of falls  Description: INTERVENTIONS:  - Educate patient/family on patient safety including physical limitations  - Instruct patient to call for assistance with activity   - Consult OT/PT to assist with strengthening/mobility   - Keep Call bell within reach  - Keep bed low and locked with side rails adjusted as appropriate  - Keep care items and personal belongings within reach  - Initiate and maintain comfort rounds  - Make Fall Risk Sign visible to staff    - Apply yellow socks and bracelet for high fall risk patients  - Consider moving patient to room near nurses station  Outcome: Progressing  Goal: Maintain or return to baseline ADL function  Description: INTERVENTIONS:  -  Assess patient's ability to carry out ADLs; assess patient's baseline for ADL function and identify physical deficits which impact ability to perform ADLs (bathing, care of mouth/teeth, toileting, grooming, dressing, etc )  - Assess/evaluate cause of self-care deficits   - Assess range of motion  - Assess patient's mobility; develop plan if impaired  - Assess patient's need for assistive devices and provide as appropriate  - Encourage maximum independence but intervene and supervise when necessary  - Involve family in performance of ADLs  - Assess for home care needs following discharge   - Consider OT consult to assist with ADL evaluation and planning for discharge  - Provide patient education as appropriate  Outcome: Progressing  Goal: Maintains/Returns to pre admission functional level  Description: INTERVENTIONS:  - Perform BMAT or MOVE assessment daily    - Set and communicate daily mobility goal to care team and patient/family/caregiver     - Collaborate with rehabilitation services on mobility goals if consulted  - Perform Range of Motion   - Out of bed for toileting  - Record patient progress and toleration of activity level   Outcome: Progressing     Problem: DISCHARGE PLANNING  Goal: Discharge to home or other facility with appropriate resources  Description: INTERVENTIONS:  - Identify barriers to discharge w/patient and caregiver  - Arrange for needed discharge resources and transportation as appropriate  - Identify discharge learning needs (meds, wound care, etc )  - Arrange for interpretive services to assist at discharge as needed  - Refer to Case Management Department for coordinating discharge planning if the patient needs post-hospital services based on physician/advanced practitioner order or complex needs related to functional status, cognitive ability, or social support system  Outcome: Progressing     Problem: Knowledge Deficit  Goal: Patient/family/caregiver demonstrates understanding of disease process, treatment plan, medications, and discharge instructions  Description: Complete learning assessment and assess knowledge base    Interventions:  - Provide teaching at level of understanding  - Provide teaching via preferred learning methods  Outcome: Progressing

## 2021-06-29 NOTE — PROGRESS NOTES
Progress Note - Urology      Patient: Martin Das   : 1945 Sex: male   MRN: [id number]     CSN: [id number]  Unit/Bed#: [address] [place] [address]     SUBJECTIVE:   Feeling a lot better  Less penile pain  Cough temporally last night and passed multiple clots urine now clear on voided specimen  Urine cultures pending  Bilateral nephrostomy tubes draining clear urine      Objective   Vitals: /60 (BP Location: Left arm)   Pulse 63   Temp 97 8 °F (36 6 °C) (Oral)   Resp 20   Ht 5' 11" (1 803 m)   Wt 98 4 kg (217 lb)   SpO2 93%   BMI 30 27 kg/m²     I/O last 24 hours:   In: 48 [IV Piggyback:50]  Out: 899 [Urine:775]      Physical Exam:   General Alert awake   Normocephalic atraumatic PERRLA  Lungs clear bilaterally  Cardiac normal S1 normal S2  Abdomen soft, flank pain  Extremities no edema      Lab Results: CBC:   Lab Results   Component Value Date    WBC 9 10 2021    HGB 8 2 (L) 2021    HCT 28 0 (L) 2021    MCV 84 2021     2021    MCH 24 7 (L) 2021    MCHC 29 3 (L) 2021    RDW 16 2 (H) 2021    MPV 10 2 2021    NRBC 0 2021     CMP:   Lab Results   Component Value Date     2021    CO2 26 2021    BUN 38 (H) 2021    CREATININE 1 43 (H) 2021    CALCIUM 8 4 2021    AST 19 2021    ALT 27 2021    ALKPHOS 90 2021    EGFR 47 2021     Urinalysis:   Lab Results   Component Value Date    COLORU Light Yellow 2021    CLARITYU Slightly Cloudy 2021    SPECGRAV >=1 030 2021    PHUR 6 0 2021    PHUR 5 5 2018    LEUKOCYTESUR Moderate (A) 2021    NITRITE Positive (A) 2021    GLUCOSEU Negative 2021    KETONESU Negative 2021    BILIRUBINUR Negative 2021    BLOODU Large (A) 2021     Urine Culture:   Lab Results   Component Value Date    URINECX No Growth <1000 cfu/mL 2021     PSA: No results found for: PSA      Assessment/ Plan:  Nhi Moore hematuria  Radiation cystitis status post RT therapy for prostate cancer to the prostatic bed status post radical prostatectomy  Urine now clear  Awaiting urine culture  Can be discharged home tomorrow continue hyperbolic  oxygen therapy        Yen Cao MD

## 2021-06-29 NOTE — OCCUPATIONAL THERAPY NOTE
Occupational Therapy Screen       06/29/21 1529   Note Type   Note type Screen   Cancel Reasons   (Patient independent ADLs/mobility, NO OT needs)   Licensure   [place] License Number  Ciro Lorenzana, OTR/L [id number]

## 2021-06-29 NOTE — PROGRESS NOTES
[facility] Progress Note  Patient: Humble Sanchez 66 y o  male   MRN: [id number]  PCP: Shaila Harding MD  Unit/Bed#: [address] [place] [address] Encounter: [id number]  Date Of Visit: 06/29/21    Problem List:    Principal Problem:    Gross hematuria  Active Problems:    Anemia    Chronic obstructive pulmonary disease ([address] )    Essential hypertension    Dyslipidemia      Assessment & Plan:    * Gross hematuria  Assessment & Plan  Presented to the ED with gross hematuria, pt has bilateral nephrostomy tubes placed and is undergoing hyperbaric O2 therapy for radiation cystitis and recurrent hematuria  Pt has had multiple hospitalizations requiring cystoscopy and clot evacuation for the same  Reports no further recurrence of hematuria after starting hyperbaric oxygen therapy until day of admission  Denies any fever chills  Reported passing clots overnight per urethra but no further bleeding since then  Seen by Urology at the time of admission, input appreciated  Recommended close observation, treatment for possible UTI and IV fluids  - continue to monitor closely  - continue IV cefazolin  - follow-up pending urine culture  - Continue IVF hydration  - PRN belladonna and pain control for cramping/pain   - Urology follow-up    Chronic obstructive pulmonary disease (HCC)  Assessment & Plan  Continue albuterol as needed and maintenance inhaler  No acute exacerbation on admission    Anemia  Assessment & Plan  Chronic, at baseline  Continue to monitor in the setting of acute hematuria  Monitor H&H    Essential hypertension  Assessment & Plan  Continue Lopressor 25 mg Q12    Dyslipidemia  Assessment & Plan  Continue atorvastatin        VTE Pharmacologic Prophylaxis:   Pharmacologic: Pharmacologic VTE Prophylaxis contraindicated due to Hematuria  Mechanical VTE Prophylaxis in Place: Yes    Patient Centered Rounds: I have performed bedside rounds with nursing staff today      Discussions with Specialists or Other Care Team Provider:  Dr Spencer Mancuso    Education and Discussions with Family / Patient:  Discussed with patient    Time Spent for Care: 30 minutes  More than 50% of total time spent on counseling and coordination of care as described above  Current Length of Stay: 0 day(s)    Current Patient Status: Inpatient   Certification Statement: The patient, admitted on an observation basis, will now require > 2 midnight hospital stay due to Monitoring for recurrent hematuria    Discharge Plan:  Home in next 24 hours if continues to improve    Code Status: Level 1 - Full Code      Subjective:   Reported episodes of passing clots overnight with coughing episode  Denies any further hematuria, pelvic pain  No further penile pain  Denies any fever chills or flank pain  Bilateral nephrostomy with clear yellow urine  Reports intermittent cough at baseline uses inhalers at home but has not been getting here so far    Denies chest pain or shortness of breath      Objective:     Vitals:   Temp (24hrs), Av 9 °F (36 6 °C), Min:97 8 °F (36 6 °C), Max:98 2 °F (36 8 °C)    Temp:  [97 8 °F (36 6 °C)-98 2 °F (36 8 °C)] 98 2 °F (36 8 °C)  HR:  [63-89] 69  Resp:  [18-20] 19  BP: (104-142)/(56-76) 128/63  SpO2:  [93 %-95 %] 93 %  Body mass index is 30 27 kg/m²  Input and Output Summary (last 24 hours): Intake/Output Summary (Last 24 hours) at 2021  Last data filed at 2021  Gross per 24 hour   Intake --   Output 1225 ml   Net -1225 ml       Physical Exam:     Physical Exam  Constitutional:       General: He is not in acute distress  HENT:      Head: Normocephalic and atraumatic  Cardiovascular:      Rate and Rhythm: Normal rate  Pulmonary:      Effort: Pulmonary effort is normal  No respiratory distress  Breath sounds: No wheezing, rhonchi or rales  Chest:      Chest wall: No tenderness  Abdominal:      General: Bowel sounds are normal  There is no distension  Palpations: Abdomen is soft  Tenderness: There is no abdominal tenderness  There is no guarding or rebound  Genitourinary:     Comments: Bilateral nephrostomy with clear yellow urine    Musculoskeletal:      Cervical back: Neck supple  Right lower leg: No edema  Left lower leg: No edema  Skin:     General: Skin is warm and dry  Findings: No rash  Neurological:      General: No focal deficit present  Mental Status: He is alert and oriented to person, place, and time  Mental status is at baseline  Cranial Nerves: No cranial nerve deficit  Additional Data:     Labs:    Results from last 7 days   Lab Units 06/29/21  0531   WBC Thousand/uL 9 10   HEMOGLOBIN g/dL 8 2*   HEMATOCRIT % 28 0*   PLATELETS Thousands/uL 232   NEUTROS PCT % 64   LYMPHS PCT % 19   MONOS PCT % 12   EOS PCT % 4     Results from last 7 days   Lab Units 06/29/21  0531 06/28/21  1755   POTASSIUM mmol/L 4 4 5 1   CHLORIDE mmol/L 107 105   CO2 mmol/L 26 28   BUN mg/dL 38* 36*   CREATININE mg/dL 1 43* 1 36*   CALCIUM mg/dL 8 4 9 0   ALK PHOS U/L  --  90   ALT U/L  --  27   AST U/L  --  19     Results from last 7 days   Lab Units 06/28/21  1755   INR  1 06       * I Have Reviewed All Lab Data Listed Above  * Additional Pertinent Lab Tests Reviewed: All Labs Within Last 24 Hours Reviewed      Imaging:  Imaging Reports Reviewed Today Include:  None  Recent Cultures (last 7 days):     Results from last 7 days   Lab Units 06/28/21  1851   BLOOD CULTURE  Received in Microbiology Lab  Culture in Progress  Received in Microbiology Lab  Culture in Progress         Last 24 Hours Medication List:   Current Facility-Administered Medications   Medication Dose Route Frequency Provider Last Rate    acetaminophen  650 mg Oral Q6H PRN Kelsie Rodriguez PA-C      albuterol  2 puff Inhalation Q4H PRN Kelsie Rodriguez PA-C      atorvastatin  20 mg Oral HS Kelsie Rodriguez PA-C      belladonna-opium  15 mg Rectal Q6H PRN Kelsie Rodriguez PA-C      calcium carbonate 1,000 mg Oral Daily PRN Kevin Payton, PA-C      cefazolin  2,000 mg Intravenous Q12H Kevin Payton, PA-C 2,000 mg (06/29/21 1221)    fluticasone-vilanterol  1 puff Inhalation Daily Becky Conroy MD      LORazepam  0 5 mg Oral Q8H PRN Kevin Payton, PA-C      metoprolol tartrate  25 mg Oral Q12H [address] Kevin Payton, PA-C      montelukast  10 mg Oral HS Kevin Payton, PA-C      ondansetron  4 mg Intravenous Q6H PRN Kevin Payton, PA-C      polyethylene glycol  17 g Oral Daily PRN Kevin Payton, PA-C      sodium chloride  75 mL/hr Intravenous Continuous Kevin Payton, PA-C 75 mL/hr (06/28/21 2017)          Today, Patient Was Seen By: Becky Conroy MD    ** Please Note: "This note has been constructed using a voice recognition system  Therefore there may be syntax, spelling, and/or grammatical errors   Please call if you have any questions  "**

## 2021-06-30 VITALS
TEMPERATURE: 98.9 F | HEART RATE: 64 BPM | HEIGHT: 71 IN | RESPIRATION RATE: 16 BRPM | DIASTOLIC BLOOD PRESSURE: 60 MMHG | BODY MASS INDEX: 30.38 KG/M2 | WEIGHT: 217 LBS | SYSTOLIC BLOOD PRESSURE: 108 MMHG | OXYGEN SATURATION: 93 %

## 2021-06-30 PROBLEM — R31.0 GROSS HEMATURIA: Status: RESOLVED | Noted: 2021-04-12 | Resolved: 2021-06-30

## 2021-06-30 LAB
ANION GAP SERPL CALCULATED.3IONS-SCNC: 9 MMOL/L (ref 4–13)
BACTERIA UR CULT: NORMAL
BUN SERPL-MCNC: 29 MG/DL (ref 5–25)
CALCIUM SERPL-MCNC: 8 MG/DL (ref 8.3–10.1)
CHLORIDE SERPL-SCNC: 106 MMOL/L (ref 100–108)
CO2 SERPL-SCNC: 24 MMOL/L (ref 21–32)
CREAT SERPL-MCNC: 1.22 MG/DL (ref 0.6–1.3)
ERYTHROCYTE [DISTWIDTH] IN BLOOD BY AUTOMATED COUNT: 16.2 % (ref 11.6–15.1)
FERRITIN SERPL-MCNC: 6 NG/ML (ref 8–388)
GFR SERPL CREATININE-BSD FRML MDRD: 57 ML/MIN/1.73SQ M
GLUCOSE SERPL-MCNC: 101 MG/DL (ref 65–140)
HCT VFR BLD AUTO: 26 % (ref 36.5–49.3)
HGB BLD-MCNC: 7.8 G/DL (ref 12–17)
IRON SATN MFR SERPL: 5 %
IRON SERPL-MCNC: 17 UG/DL (ref 65–175)
MCH RBC QN AUTO: 24.7 PG (ref 26.8–34.3)
MCHC RBC AUTO-ENTMCNC: 30 G/DL (ref 31.4–37.4)
MCV RBC AUTO: 82 FL (ref 82–98)
PLATELET # BLD AUTO: 209 THOUSANDS/UL (ref 149–390)
PMV BLD AUTO: 10.1 FL (ref 8.9–12.7)
POTASSIUM SERPL-SCNC: 4.2 MMOL/L (ref 3.5–5.3)
RBC # BLD AUTO: 3.16 MILLION/UL (ref 3.88–5.62)
SODIUM SERPL-SCNC: 139 MMOL/L (ref 136–145)
TIBC SERPL-MCNC: 329 UG/DL (ref 250–450)
WBC # BLD AUTO: 8.17 THOUSAND/UL (ref 4.31–10.16)

## 2021-06-30 PROCEDURE — 80048 BASIC METABOLIC PNL TOTAL CA: CPT | Performed by: INTERNAL MEDICINE

## 2021-06-30 PROCEDURE — 83550 IRON BINDING TEST: CPT | Performed by: INTERNAL MEDICINE

## 2021-06-30 PROCEDURE — 85027 COMPLETE CBC AUTOMATED: CPT | Performed by: INTERNAL MEDICINE

## 2021-06-30 PROCEDURE — 82728 ASSAY OF FERRITIN: CPT | Performed by: INTERNAL MEDICINE

## 2021-06-30 PROCEDURE — 99239 HOSP IP/OBS DSCHRG MGMT >30: CPT | Performed by: INTERNAL MEDICINE

## 2021-06-30 PROCEDURE — 83540 ASSAY OF IRON: CPT | Performed by: INTERNAL MEDICINE

## 2021-06-30 RX ORDER — POLYETHYLENE GLYCOL 3350 17 G/17G
17 POWDER, FOR SOLUTION ORAL DAILY PRN
Refills: 0
Start: 2021-06-30 | End: 2021-07-12 | Stop reason: HOSPADM

## 2021-06-30 RX ORDER — CEPHALEXIN 500 MG/1
500 CAPSULE ORAL 2 TIMES DAILY
Start: 2021-06-30 | End: 2021-07-02

## 2021-06-30 RX ORDER — FERROUS SULFATE TAB EC 324 MG (65 MG FE EQUIVALENT) 324 (65 FE) MG
324 TABLET DELAYED RESPONSE ORAL
Qty: 30 TABLET | Refills: 0 | Status: SHIPPED | OUTPATIENT
Start: 2021-06-30

## 2021-06-30 RX ORDER — SODIUM CHLORIDE 9 MG/ML
10 INJECTION INTRAVENOUS DAILY
Qty: 900 ML | Refills: 0 | Status: SHIPPED | OUTPATIENT
Start: 2021-06-30 | End: 2021-08-02 | Stop reason: HOSPADM

## 2021-06-30 RX ORDER — DOCUSATE SODIUM 100 MG/1
100 CAPSULE, LIQUID FILLED ORAL 2 TIMES DAILY
Qty: 10 CAPSULE | Refills: 0
Start: 2021-06-30 | End: 2021-07-12 | Stop reason: HOSPADM

## 2021-06-30 RX ADMIN — CEFAZOLIN SODIUM 2000 MG: 2 SOLUTION INTRAVENOUS at 08:14

## 2021-06-30 RX ADMIN — SODIUM CHLORIDE 75 ML/HR: 0.9 INJECTION, SOLUTION INTRAVENOUS at 01:29

## 2021-06-30 RX ADMIN — FLUTICASONE FUROATE AND VILANTEROL TRIFENATATE 1 PUFF: 100; 25 POWDER RESPIRATORY (INHALATION) at 08:14

## 2021-06-30 NOTE — ASSESSMENT & PLAN NOTE
No acute exacerbation on admission  Reports that he has been taking albuterol inhaler as needed and another maintenance inhaler  Recommended to continue home medications

## 2021-06-30 NOTE — PROGRESS NOTES
Progress Note - Urology      Patient: Glo Abbott   : 1945 Sex: male   MRN: [id number]     CSN: [id number]  Unit/Bed#: [address] [place] [address]     SUBJECTIVE:   No complaints  Hematuria cleared  With sent home on p o  Antibiotics pending sensitivity if culture positive      Objective   Vitals: /60 (BP Location: Left arm)   Pulse 64   Temp 98 9 °F (37 2 °C) (Oral)   Resp 16   Ht 5' 11" (1 803 m)   Wt 98 4 kg (217 lb)   SpO2 93%   BMI 30 27 kg/m²     I/O last 24 hours:   In: 1490 [P O :480; I V :1010]  Out: 1725 [Urine:1725]      Physical Exam:   General Alert awake   Normocephalic atraumatic PERRLA  Lungs clear bilaterally  Cardiac normal S1 normal S2  Abdomen soft, flank pain  Extremities no edema      Lab Results: CBC:   Lab Results   Component Value Date    WBC 8 17 2021    HGB 7 8 (L) 2021    HCT 26 0 (L) 2021    MCV 82 2021     2021    MCH 24 7 (L) 2021    MCHC 30 0 (L) 2021    RDW 16 2 (H) 2021    MPV 10 1 2021    NRBC 0 2021     CMP:   Lab Results   Component Value Date     2021    CO2 24 2021    BUN 29 (H) 2021    CREATININE 1 22 2021    CALCIUM 8 0 (L) 2021    AST 19 2021    ALT 27 2021    ALKPHOS 90 2021    EGFR 57 2021     Urinalysis:   Lab Results   Component Value Date    COLORU Light Yellow 2021    CLARITYU Slightly Cloudy 2021    SPECGRAV >=1 030 2021    PHUR 6 0 2021    PHUR 5 5 2018    LEUKOCYTESUR Moderate (A) 2021    NITRITE Positive (A) 2021    GLUCOSEU Negative 2021    KETONESU Negative 2021    BILIRUBINUR Negative 2021    BLOODU Large (A) 2021     Urine Culture:   Lab Results   Component Value Date    URINECX Culture too young- will reincubate 2021     PSA: No results found for: PSA      Assessment/ Plan:  Gross hematuria  Radiation cystitis  Questionable UTI penile pain  Bilateral nephrostomies  Plan patient is feeling better can be discharged home  Will start O2 therapy tomorrow            Gwendolyn Spear MD

## 2021-06-30 NOTE — ASSESSMENT & PLAN NOTE
Presented to the ED with gross hematuria, pt has bilateral nephrostomy tubes placed and is undergoing hyperbaric O2 therapy for radiation cystitis and recurrent hematuria  Pt has had multiple hospitalizations requiring cystoscopy and clot evacuation for the same  Reports no further recurrence of hematuria after starting hyperbaric oxygen therapy until day of admission  Denies any fever chills  no further bleeding since 6/29 early morning  Seen by Urology at the time of admission, input appreciated  Recommended close observation, treatment for possible UTI and IV fluids  No further bleeding  Hemoglobin slightly lower likely dilutional   Cleared by Urology for outpatient follow-up recommended to continue Keflex  - will transition to Keflex for 2 more days to complete 7 days    Patient has medications at home from urologist  - follow-up pending urine culture with urologist   Discussed with Dr Dennis Roldan

## 2021-06-30 NOTE — ASSESSMENT & PLAN NOTE
Possible UTI likely cystitis    No evidence of upper urinary tract infection or infection associated with nephrostomy tube  Currently on 1st generation cephalosporin as per urology  Follow-up final culture  Continue nephrostomy care as per Urology and IR

## 2021-06-30 NOTE — DISCHARGE SUMMARY
Discharge Summary - [facility] Internal Medicine    Patient Information: Radha Prince 66 y o  male MRN: [id number]  Unit/Bed#: [address] [place] [address] Encounter: [id number]    Discharging Physician / Practitioner: Alma Black MD  PCP: Inga Zhao MD  Admission Date: 6/28/2021  Discharge Date: 06/30/21    Reason for Admission: Penis Pain (Patient has bilateral nephrostomy tubes   States he started with clots coming out of penis this morning  Went for his hyperbaric treatments for his radiation cystitis this morning  States bleeding from penis continues with severe pain )      Discharge Diagnoses:     Principal Problem (Resolved):    Gross hematuria  Active Problems:    Anemia    Chronic obstructive pulmonary disease (HCC)    UTI (urinary tract infection)    Essential hypertension    Dyslipidemia        * Gross hematuria-resolved as of 6/30/2021  Assessment & Plan  Presented to the ED with gross hematuria, pt has bilateral nephrostomy tubes placed and is undergoing hyperbaric O2 therapy for radiation cystitis and recurrent hematuria  Pt has had multiple hospitalizations requiring cystoscopy and clot evacuation for the same  Reports no further recurrence of hematuria after starting hyperbaric oxygen therapy until day of admission  Denies any fever chills  no further bleeding since 6/29 early morning  Seen by Urology at the time of admission, input appreciated  Recommended close observation, treatment for possible UTI and IV fluids  No further bleeding  Hemoglobin slightly lower likely dilutional   Cleared by Urology for outpatient follow-up recommended to continue Keflex  - will transition to Keflex for 2 more days to complete 7 days  Patient has medications at home from urologist  - follow-up pending urine culture with urologist   Discussed with Dr Gavino Laboy    UTI (urinary tract infection)  Assessment & Plan  Possible UTI likely cystitis    No evidence of upper urinary tract infection or infection associated with nephrostomy tube  Currently on 1st generation cephalosporin as per urology  Follow-up final culture  Continue nephrostomy care as per Urology and IR    Chronic obstructive pulmonary disease ([facility] [address] )  Assessment & Plan  No acute exacerbation on admission  Reports that he has been taking albuterol inhaler as needed and another maintenance inhaler  Recommended to continue home medications    Anemia  Assessment & Plan  Chronic, slightly lower in setting of hematuria and dilutional   Asymptomatic  Get iron studies  Iron supplement on discharge  Discuss regarding side effects and precautions  Follow-up advised to follow-up with PCP    Essential hypertension  Assessment & Plan  Continue Lopressor 25 mg Q12    Dyslipidemia  Assessment & Plan  Continue atorvastatin      Consultations During Hospital Stay:  IP CONSULT TO CASE MANAGEMENT    Procedures Performed:     · None    Significant Findings:     · Refer to hospital course and above listed diagnosis related plan for details    Imaging while in hospital:    No results found  Incidental Findings:   · None    Test Results Pending at Discharge (will require follow up):   · As per After Visit Summary, follow-up pending urine culture and iron studies     Outpatient Tests Requested:  · Follow-up with PCP for monitoring of hemoglobin    Complications:  Refer to hospital course and above listed diagnosis related plan, if any    Hospital Course: As per HPI  James Araujo is a 66 y o  male patient with history of hypertension, dyslipidemia COPD, prostate cancer status post radiation and chemotherapy with history of recurrent hematuria due to radiation cystitis status post bilateral nephrostomy placement who originally presented to the hospital on 6/28/2021 due to hematuria  He presented to the ED  after completing hyperbaric oxygen therapy for the same  He stated he woke up this morning and started with lower abdominal pain and cramping and penile pain   He stated this is typically where the pain occurs when he's passed blood clots in the past  He denied any fevers, chills, abdominal pain  He denied any bloody drainage from the nephrostomy tubes  He has had normal BMs, non-bloody  He denied any lightheadedness, dizziness  Dr Gelacio Chau recommended IVF and ABx  Patient is admitted for further management of gross hematuria    Please see above list of diagnoses and related plan for additional information  Condition at Discharge: stable     Discharge Day Visit / Exam:     Subjective:  Feels well  No bleeding/hematuria since yesterday early morning  Denies any pain  Denies any fever chills or flank pain  Nephrostomy tube with clear yellow urine  Denies any chest pain shortness of breath, mild intermittent cough    Vitals: Blood Pressure: 108/60 (06/30/21 0700)  Pulse: 64 (06/30/21 0700)  Temperature: 98 9 °F (37 2 °C) (06/30/21 0700)  Temp Source: Oral (06/30/21 0700)  Respirations: 16 (06/30/21 0700)  Height: 5' 11" (180 3 cm) (06/28/21 2025)  Weight - Scale: 98 4 kg (217 lb) (06/28/21 1658)  SpO2: 93 % (06/30/21 0700)  Exam:   Physical Exam  Constitutional:       General: He is not in acute distress  HENT:      Head: Normocephalic and atraumatic  Cardiovascular:      Rate and Rhythm: Normal rate  Pulmonary:      Effort: Pulmonary effort is normal  No respiratory distress  Breath sounds: No wheezing, rhonchi or rales  Chest:      Chest wall: No tenderness  Abdominal:      General: Bowel sounds are normal  There is no distension  Palpations: Abdomen is soft  Tenderness: There is no abdominal tenderness  There is no guarding or rebound  Genitourinary:     Comments: Bilaterally nephrostomy with clear yellow urine  Musculoskeletal:         General: Normal range of motion  Right lower leg: No edema  Left lower leg: No edema  Skin:     General: Skin is warm and dry  Findings: No rash     Neurological:      General: No focal deficit present  Mental Status: He is alert and oriented to person, place, and time  Mental status is at baseline  Cranial Nerves: No cranial nerve deficit  Discharge instructions/Information to patient and family:(Discharge Medications and Follow up):   See after visit summary for information provided to patient and family  Provisions for Follow-Up Care:  See after visit summary for information related to follow-up care and any pertinent home health orders  Disposition: Home    Planned Readmission:  No     Discharge Statement:  I spent 45 minutes discharging the patient  This time was spent on the day of discharge  I had direct contact with the patient on the day of discharge  Greater than 50% of the total time was spent examining patient, answering all patient questions, arranging and discussing plan of care with patient as well as directly providing post-discharge instructions  Additional time then spent on discharge activities  Coordinated with urologist at the time of discharge, patient was cleared for discharge with outpatient follow-up  Discharge Medications:  See after visit summary for reconciled discharge medications provided to patient and family  ** Please Note: "This note has been constructed using a voice recognition system  Therefore there may be syntax, spelling, and/or grammatical errors   Please call if you have any questions  "**

## 2021-06-30 NOTE — DISCHARGE INSTRUCTIONS
Follow-up with Dr Daya Kumar regarding final urine culture result  Follow-up with Dr Rocco Davison after hospitalization and monitoring of blood count  Continue nephrostomy care as per intervention Radiology

## 2021-06-30 NOTE — ASSESSMENT & PLAN NOTE
Chronic, slightly lower in setting of hematuria and dilutional   Asymptomatic  Get iron studies  Iron supplement on discharge    Discuss regarding side effects and precautions  Follow-up advised to follow-up with PCP

## 2021-06-30 NOTE — PROGRESS NOTES
[facility]   Patient Information   Mental Status Alert   Primary Caregiver Self   Accompanied by/Relationship SCREENED; D/C Today; DX:Gross hematuria; No rehab and/or CM needs identified at this time; wife will transport home upon D/C     Support System Immediate family   Activities of Daily Living Prior to Admission   Functional Status Independent   Living Arrangement House;Lives with someone   Ambulation Independent   Income Information   Income Source SSI/SSD   Means of Transportation   Means of Transport to Appts: Family

## 2021-06-30 NOTE — PLAN OF CARE
Problem: PAIN - ADULT  Goal: Verbalizes/displays adequate comfort level or baseline comfort level  Description: Interventions:  - Encourage patient to monitor pain and request assistance  - Assess pain using appropriate pain scale  - Administer analgesics based on type and severity of pain and evaluate response  - Implement non-pharmacological measures as appropriate and evaluate response  - Consider cultural and social influences on pain and pain management  - Notify physician/advanced practitioner if interventions unsuccessful or patient reports new pain  Outcome: Completed     Problem: INFECTION - ADULT  Goal: Absence or prevention of progression during hospitalization  Description: INTERVENTIONS:  - Assess and monitor for signs and symptoms of infection  - Monitor lab/diagnostic results  - Monitor all insertion sites, i e  indwelling lines, tubes, and drains  - Monitor endotracheal if appropriate and nasal secretions for changes in amount and color  - [place] appropriate cooling/warming therapies per order  - Administer medications as ordered  - Instruct and encourage patient and family to use good hand hygiene technique  - Identify and instruct in appropriate isolation precautions for identified infection/condition  Outcome: Completed     Problem: SAFETY ADULT  Goal: Patient will remain free of falls  Description: INTERVENTIONS:  - Educate patient/family on patient safety including physical limitations  - Instruct patient to call for assistance with activity   - Consult OT/PT to assist with strengthening/mobility   - Keep Call bell within reach  - Keep bed low and locked with side rails adjusted as appropriate  - Keep care items and personal belongings within reach  - Initiate and maintain comfort rounds  - Make Fall Risk Sign visible to staff  - Apply yellow socks and bracelet for high fall risk patients  - Consider moving patient to room near nurses station  Outcome: Completed  Goal: Maintain or return to baseline ADL function  Description: INTERVENTIONS:  -  Assess patient's ability to carry out ADLs; assess patient's baseline for ADL function and identify physical deficits which impact ability to perform ADLs (bathing, care of mouth/teeth, toileting, grooming, dressing, etc )  - Assess/evaluate cause of self-care deficits   - Assess range of motion  - Assess patient's mobility; develop plan if impaired  - Assess patient's need for assistive devices and provide as appropriate  - Encourage maximum independence but intervene and supervise when necessary  - Involve family in performance of ADLs  - Assess for home care needs following discharge   - Consider OT consult to assist with ADL evaluation and planning for discharge  - Provide patient education as appropriate  Outcome: Completed  Goal: Maintains/Returns to pre admission functional level  Description: INTERVENTIONS:  - Perform BMAT or MOVE assessment daily    - Set and communicate daily mobility goal to care team and patient/family/caregiver     - Collaborate with rehabilitation services on mobility goals if consulted  -- Ambulate patient 3 times a day  - Out of bed to chair 3 times a day   - Out of bed for meals 3 times a day  - Out of bed for toileting  - Record patient progress and toleration of activity level   Outcome: Completed     Problem: DISCHARGE PLANNING  Goal: Discharge to home or other facility with appropriate resources  Description: INTERVENTIONS:  - Identify barriers to discharge w/patient and caregiver  - Arrange for needed discharge resources and transportation as appropriate  - Identify discharge learning needs (meds, wound care, etc )  - Arrange for interpretive services to assist at discharge as needed  - Refer to Case Management Department for coordinating discharge planning if the patient needs post-hospital services based on physician/advanced practitioner order or complex needs related to functional status, cognitive ability, or social support system  Outcome: Completed     Problem: Knowledge Deficit  Goal: Patient/family/caregiver demonstrates understanding of disease process, treatment plan, medications, and discharge instructions  Description: Complete learning assessment and assess knowledge base    Interventions:  - Provide teaching at level of understanding  - Provide teaching via preferred learning methods  Outcome: Completed     Problem: Potential for Falls  Goal: Patient will remain free of falls  Description: INTERVENTIONS:  - Educate patient/family on patient safety including physical limitations  - Instruct patient to call for assistance with activity   - Consult OT/PT to assist with strengthening/mobility   - Keep Call bell within reach  - Keep bed low and locked with side rails adjusted as appropriate  - Keep care items and personal belongings within reach  - Initiate and maintain comfort rounds  - Make Fall Risk Sign visible to staff  -- Apply yellow socks and bracelet for high fall risk patients  - Consider moving patient to room near nurses station  Outcome: Completed

## 2021-07-01 ENCOUNTER — OFFICE VISIT (OUTPATIENT)
Dept: WOUND CARE | Facility: HOSPITAL | Age: 76
End: 2021-07-01
Payer: MEDICARE

## 2021-07-01 DIAGNOSIS — N30.41 IRRADIATION CYSTITIS WITH HEMATURIA: ICD-10-CM

## 2021-07-01 PROCEDURE — 99183 HYPERBARIC OXYGEN THERAPY: CPT | Performed by: PREVENTIVE MEDICINE

## 2021-07-01 PROCEDURE — G0277 HBOT, FULL BODY CHAMBER, 30M: HCPCS | Performed by: PREVENTIVE MEDICINE

## 2021-07-01 NOTE — PROGRESS NOTES
HBO Treatment Course Details       Treatment Notes: Pt was discharged from [facility] due to admission on 6/28 for gross hematuria & possible UTI  He denied any problems or complaints of pain  He will follow up with PMD today who may prescribe p o  Iron  Treatment Course Number: 24  Total Treatments Ordered:  40     Diagnosis:   1  Irradiation cystitis with hematuria  [facility]       [facility] Treatment Details:  In-Patient Visit: no  Treatment Length:90 Minutes(Minutes)  Chamber #: Hard sided Monoplace Chamber    Pre-Treatment details:  Pre-treatment protocol Treatment Protocol: 2 0 FE X 90 minutes w/ 100% oxygen, two 5 minute air breaks  Left ear clear?: yes  Right ear clear?: yes                          Left ear TEED scale: Grade 0  Right ear TEED scale: Grade 0   Pretreatment heart and lung assessment: Pretreatment heart and lung auscltation unremarkable  Patient cleared for HBOT     Treatment details:  FE Rate: 2  Started Compression: 0816  Reached Compression: 0826  Total Compression Time: 10 (Minutes)  Total Holding Time: 100 (Minutes)  Started Decompression: 1006  Reached Surface: 1016  Total Decompression Time: 10 (Minutes)  Total Airbreaks: 10 (Minutes)  Total Time of Treatment: 110 (Minutes)  Symptoms Noted During Treatment: None (Minutes)    Post treatment details:  Left ear clear?: yes  Right ear clear?: yes                          Left ear TEED scale: Grade 0  Right ear TEED scale: Grade 0  Post treatment heart and lung assessment: Post treatment heart and lung auscltation unremarkable  Patient cleared for discharge  Tolerated treatment well         Vital Signs:  HBO Glucose Reference Range: 100-350 mg/dl   Pre-Treatment Post-Treatment   Time vitals are taken: 0816 Time vitals are taken: 1020   Blood Pressure: 128/69 Blood Pressure: 159/89   Pulse: 94 Pulse: 66   Resp: 18 Resp: 18   Temp: 97 5 °F (36 4 °C) Temp: 97 7 °F (36 5 °C)   Pre-Inspection Glucose reading:  (n/a) Post-Inspection Glucose reading:  (n/a)       No Known Allergies  Patient Active Problem List    Diagnosis Date Noted    Leukocytosis 05/04/2021    RA (rheumatoid arthritis) ([place] [address] ) 05/04/2021    Acute blood loss anemia (ABLA) 04/26/2021    SHANTI (acute kidney injury) ([place] [address] ) 04/24/2021    UTI (urinary tract infection) 04/24/2021    Hematuria due to cystitis     Anemia 04/14/2021    Chronic obstructive pulmonary disease ([place] [address] ) 04/14/2021    Hyperlipidemia 04/13/2021    Cystitis 04/12/2021    Essential hypertension 04/12/2021    Dyslipidemia 04/12/2021     No orders of the defined types were placed in this encounter

## 2021-07-02 ENCOUNTER — OFFICE VISIT (OUTPATIENT)
Dept: WOUND CARE | Facility: HOSPITAL | Age: 76
End: 2021-07-02
Payer: MEDICARE

## 2021-07-02 DIAGNOSIS — N30.41 IRRADIATION CYSTITIS WITH HEMATURIA: ICD-10-CM

## 2021-07-02 PROCEDURE — G0277 HBOT, FULL BODY CHAMBER, 30M: HCPCS | Performed by: PREVENTIVE MEDICINE

## 2021-07-02 PROCEDURE — 99183 HYPERBARIC OXYGEN THERAPY: CPT | Performed by: PHYSICIAN ASSISTANT

## 2021-07-02 NOTE — PROGRESS NOTES
HBO Treatment Course Details       Treatment Notes: Pt denied any problems or c/o pain; no hematuria noted  Pt had usual episode of anxiety & restlessness during last 1/2 hour; otherwise tolerated treatment well  Treatment Course Number: 25  Total Treatments Ordered:  40     Diagnosis:   1  Irradiation cystitis with hematuria  [facility]       [facility] Treatment Details:  In-Patient Visit: no  Treatment Length:90 Minutes(Minutes)  Chamber #: Hard sided Monoplace Chamber    Pre-Treatment details:  Pre-treatment protocol Treatment Protocol: 2 0 FE X 90 minutes w/ 100% oxygen, two 5 minute air breaks  Left ear clear?: yes  Right ear clear?: yes  Left ear intact?: yes  Right ear intact?: yes  Left ear color: slightly red and inner ear fluid present, patient may have allergic rhinitis component     PE Tubes present, Left ear?: no  PE Tubes present, Right ear?: no  Left ear irrigated?: no  Right ear irrigated?: no  Left ear TEED scale: Grade 0  Right ear TEED scale: Grade 0   Pretreatment heart and lung assessment: Pretreatment heart and lung auscltation unremarkable  Patient cleared for HBOT     Treatment details:  FE Rate: 2  Started Compression: 0820  Reached Compression: 0830  Total Compression Time: 10 (Minutes)  Total Holding Time: 100 (Minutes)  Started Decompression: 1010  Reached Surface: 1020  Total Decompression Time: 10 (Minutes)  Total Airbreaks: 10 (Minutes)  Total Time of Treatment: 110 (Minutes)  Symptoms Noted During Treatment: Other (Comment) (Usual episode of anxiety & restlessness during last half hour ) (Minutes)    Post treatment details:  Left ear clear?: yes  Right ear clear?: yes  Left ears intact?: yes  Right ears intact?: yes        PE Tubes present, Left ear?: no  PE Tubes present, Right ear?: no        Left ear TEED scale: Grade 0  Right ear TEED scale: Grade 0  Post treatment heart and lung assessment: Post treatment heart and lung auscltation unremarkable   Patient cleared for discharge  Tolerated treatment well  Vital Signs:  [facility] Glucose Reference Range: 100-350 mg/dl   Pre-Treatment Post-Treatment   Time vitals are taken: 0815 Time vitals are taken: 1020   Blood Pressure: 137/80 Blood Pressure: (!) 138/99   Pulse: 72 Pulse: 58   Resp: 16 Resp: 16   Temp: 98 1 °F (36 7 °C) Temp: 97 6 °F (36 4 °C)   Pre-Inspection Glucose reading:  (n/a) Post-Inspection Glucose reading:  (n/a)       No Known Allergies  Patient Active Problem List    Diagnosis Date Noted    Leukocytosis 05/04/2021    RA (rheumatoid arthritis) ([place] [address] ) 05/04/2021    Acute blood loss anemia (ABLA) 04/26/2021    SHANTI (acute kidney injury) ([place] [address] ) 04/24/2021    UTI (urinary tract infection) 04/24/2021    Hematuria due to cystitis     Anemia 04/14/2021    Chronic obstructive pulmonary disease ([place] [address] ) 04/14/2021    Hyperlipidemia 04/13/2021    Cystitis 04/12/2021    Essential hypertension 04/12/2021    Dyslipidemia 04/12/2021     No orders of the defined types were placed in this encounter

## 2021-07-03 LAB
BACTERIA BLD CULT: NORMAL
BACTERIA BLD CULT: NORMAL

## 2021-07-06 ENCOUNTER — OFFICE VISIT (OUTPATIENT)
Dept: WOUND CARE | Facility: HOSPITAL | Age: 76
DRG: 699 | End: 2021-07-06
Payer: MEDICARE

## 2021-07-06 DIAGNOSIS — N30.41 IRRADIATION CYSTITIS WITH HEMATURIA: Primary | ICD-10-CM

## 2021-07-06 PROCEDURE — 99183 HYPERBARIC OXYGEN THERAPY: CPT | Performed by: NURSE PRACTITIONER

## 2021-07-06 PROCEDURE — G0277 HBOT, FULL BODY CHAMBER, 30M: HCPCS | Performed by: NURSE PRACTITIONER

## 2021-07-06 RX ORDER — FOLIC ACID/MULTIVIT,IRON,MINER .4-18-35
1 TABLET,CHEWABLE ORAL DAILY
COMMUNITY
Start: 2021-07-06 | End: 2021-09-06

## 2021-07-06 NOTE — PROGRESS NOTES
HBO Treatment Course Details       Treatment Notes: Patient tolerated treatment well     Treatment Course Number: 26  Total Treatments Ordered:  40     Diagnosis:   1  Irradiation cystitis with hematuria         HBO Treatment Details:  In-Patient Visit: No  Treatment Length:90 Minutes(Minutes)  Chamber #: Hard sided Monoplace Chamber    Pre-Treatment details:  Pre-treatment protocol Treatment Protocol: 2 0 FE X 90 minutes w/ 100% oxygen, no air break  Left ear clear?: yes  Right ear clear?: yes  Left ear intact?: yes  Right ear intact?: yes                    Left ear TEED scale: Grade 0  Right ear TEED scale: Grade 0   Pretreatment heart and lung assessment: Pretreatment heart and lung auscltation unremarkable  Patient cleared for HBOT     Treatment details:  FE Rate: 2  Started Compression: 0816  Reached Compression: 0826  Total Compression Time: 10 (Minutes)  Total Holding Time: 100 (Minutes)  Started Decompression: 1006  Reached Surface: 1016  Total Decompression Time: 10 (Minutes)  Total Airbreaks: 10 (Minutes)  Total Time of Treatment: 110 (Minutes)  Symptoms Noted During Treatment: None (Minutes)    Post treatment details:  Left ear clear?: yes  Right ear clear?: yes  Left ears intact?: yes  Right ears intact?: yes                    Left ear TEED scale: Grade 0  Right ear TEED scale: Grade 0  Post treatment heart and lung assessment: Post treatment heart and lung auscltation unremarkable  Patient cleared for discharge  Tolerated treatment well         Vital Signs:  HBO Glucose Reference Range: 100-350 mg/dl   Pre-Treatment Post-Treatment   Time vitals are taken: 0805 Time vitals are taken: 1020   Blood Pressure: 159/76 Blood Pressure: 133/73   Pulse: 76 Pulse: 59   Resp: 16 Resp: 16   Temp: 97 5 °F (36 4 °C) Temp: 97 2 °F (36 2 °C)             No Known Allergies  Patient Active Problem List    Diagnosis Date Noted    Leukocytosis 05/04/2021    RA (rheumatoid arthritis) ([facility] [address] ) 05/04/2021    Acute blood loss anemia (ABLA) 04/26/2021    SHANTI (acute [facility] injury) ([facility] ) 04/24/2021    UTI (urinary tract infection) 04/24/2021    Hematuria due to cystitis     Anemia 04/14/2021    Chronic obstructive pulmonary disease ([facility] ) 04/14/2021    Hyperlipidemia 04/13/2021    Cystitis 04/12/2021    Essential hypertension 04/12/2021    Dyslipidemia 04/12/2021     No orders of the defined types were placed in this encounter

## 2021-07-06 NOTE — PHYSICIAN ADVISOR
Current patient class: Inpatient  The patient is currently on Hospital Day: 3 at [address] [place] [address]      The patient was admitted to the hospital at  7:35 PM on 6/29/21 for the following diagnosis:  Urinary frequency [R35 0]  Penis pain [N48 89]  Hematuria [R31 9]       There was documentation in the medical record of an expected length of stay of at least 2 midnights  The patient was therefore expected to satisfy the 2 midnight benchmark and given the 2 midnight presumption was appropriate for INPATIENT ADMISSION  Given this expectation of a satisfying stay, CMS instructs us that the patient is most often appropriate for inpatient admission under part A provided medical necessity is documented in the chart  After review of the relevant documentation, labs, vital signs and test results, the patient is appropriate for INPATIENT ADMISSION  Admission to the hospital as an inpatient is a complex decision making process which requires the practitioner to consider the patients presenting complaint, history and physical examination and all relevant testing  With this in mind, in this case, the patient was deemed appropriate for INPATIENT ADMISSION  After review of the documentation and testing available at the time of the admission, I concur with this clinical determination of medical necessity  For the reason noted, the patient was discharged before reaching 2 midnights as an inpatient  Rationale is as follows: The patient is a 66 yrs old Male who presented to the ED at 6/28/2021  5:04 PM with a chief complaint of Penis Pain (Patient has bilateral nephrostomy tubes   States he started with clots coming out of penis this morning  Went for his hyperbaric treatments for his radiation cystitis this morning  States bleeding from penis continues with severe pain )     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 for which the patient has required cystoscopy and clot evacuations  Patient was seen by Urology who recommended antibiotics and IV fluids  On day 2  Of admission the patient was still noted to have some penile pain and also cultures were pending  Given the need for monitoring of the hematuria as well as antibiotics and IV fluids the patient did cross the 2 midnight benchmark as set by Medicare is inpatient status appropriate      The patients vitals on arrival were   ED Triage Vitals   Temperature Pulse Respirations Blood Pressure SpO2   06/28/21 1658 06/28/21 1658 06/28/21 1658 06/28/21 1658 06/28/21 1658   98 1 °F (36 7 °C) 76 18 (!) 218/92 94 %      Temp Source Heart Rate Source Patient Position - Orthostatic VS BP Location FiO2 (%)   06/28/21 1658 06/28/21 1658 06/28/21 1658 06/28/21 1658 --   Tympanic Monitor Sitting Left arm       Pain Score       06/28/21 1858       Worst Possible Pain           Past Medical History:   Diagnosis Date    Cancer [facility])     prostate    COPD [facility]ch[facility] disease) [facility] [facility]     Hyperlipidemia     Hypertension      Past Surgical History:   Procedure Laterality Date    APPENDECTOMY      FL RETROGRADE PYELOGRAM  4/14/2021    HERNIA REPAIR      IR NEPHROSTOMY TUBE CHECK/CHANGE/REPOSITION/REINSERTION/UPSIZE  5/10/2021    IR NEPHROSTOMY TUBE PLACEMENT  5/7/2021    [place] CYSTOURETHROSCOPY W/IRRIG & EVAC CLOTS Bilateral 4/14/2021    Procedure: CYSTOSCOPY EVACUATION OF CLOTS bilateral retrograde pyelograms possible TURBT bladder biopsy;  Surgeon: Kentrell Abreu MD;  Location: [address] [place] [address];  Service: Urology    [place] CYSTOURETHROSCOPY W/IRRIG & EVAC CLOTS N/A 4/24/2021    Procedure: CYSTOSCOPY EVACUATION OF CLOTS fulguration;  Surgeon: Kentrell Abreu MD;  Location: [address] [place] [address];  Service: Urology    PROSTATECTOMY      [address]      right shoulder           Consults have been placed to:   IP CONSULT TO CASE MANAGEMENT    Vitals:    06/29/21 1900 06/29/21 2035 06/29/21 2300 06/30/21 0700   BP: 128/63 106/56 117/55 108/60   BP Location: Left arm  Left arm Left arm   Pulse: 69 71 66 64   Resp: 19  20 16   Temp: 98 2 °F (36 8 °C)  97 9 °F (36 6 °C) 98 9 °F (37 2 °C)   TempSrc: Oral  Oral Oral   SpO2: 93%  94% 93%   Weight:       Height:           Most recent labs:    No results for input(s): WBC, HGB, HCT, PLT, K, NA, CALCIUM, BUN, CREATININE, LIPASE, AMYLASE, INR, TROPONINI, CKTOTAL, AST, ALT, ALKPHOS, BILITOT in the last 72 hours  Scheduled Meds:  Continuous Infusions:No current facility-administered medications for this encounter  PRN Meds:      Surgical procedures (if appropriate):

## 2021-07-06 NOTE — PROGRESS NOTES
HBO Treatment Course Details       Treatment Notes: Patient treated in a hard sided mono place chamber  He tolerated HBOT well  Treatment Course Number: 26  Total Treatments Ordered:  40     Diagnosis:   1  Irradiation cystitis with hematuria         HBO Treatment Details:  In-Patient Visit: no  Treatment Length:90 Minutes(Minutes)  Chamber #: Hard sided Monoplace Chamber    Pre-Treatment details:  Pre-treatment protocol Treatment Protocol: 2 0 FE X 90 minutes w/ 100% oxygen, no air break  Left ear clear?: yes  Right ear clear?: yes  Left ear intact?: yes  Right ear intact?: yes                    Left ear TEED scale: Grade 0  Right ear TEED scale: Grade 0   Pretreatment heart and lung assessment: Pretreatment heart and lung auscltation unremarkable  Patient cleared for HBOT     Treatment details:  FE Rate: 2  Started Compression: 0816  Reached Compression: 0826  Total Compression Time: 10 (Minutes)  Total Holding Time: 100 (Minutes)  Started Decompression: 1006  Reached Surface: 1016  Total Decompression Time: 10 (Minutes)  Total Airbreaks: 10 (Minutes)  Total Time of Treatment: 110 (Minutes)  Symptoms Noted During Treatment: None (Minutes)    Post treatment details:  Left ear clear?: yes  Right ear clear?: yes  Left ears intact?: yes  Right ears intact?: yes                    Left ear TEED scale: Grade 0  Right ear TEED scale: Grade 0  Post treatment heart and lung assessment: Post treatment heart and lung auscltation unremarkable  Patient cleared for discharge  Tolerated treatment well         Vital Signs:  HBO Glucose Reference Range: 100-350 mg/dl   Pre-Treatment Post-Treatment   Time vitals are taken: 0805 Time vitals are taken: 1020   Blood Pressure: 159/76 Blood Pressure: 133/73   Pulse: 76 Pulse: 59   Resp: 16 Resp: 16   Temp: 97 5 °F (36 4 °C) Temp: 97 2 °F (36 2 °C)             No Known Allergies  Patient Active Problem List    Diagnosis Date Noted    Leukocytosis 05/04/2021    RA (rheumatoid arthritis) ([place] [address] ) 05/04/2021    Acute blood loss anemia (ABLA) 04/26/2021    SHANTI (acute kidney injury) ([place] [address] ) 04/24/2021    UTI (urinary tract infection) 04/24/2021    Hematuria due to cystitis     Anemia 04/14/2021    Chronic obstructive pulmonary disease ([place] [address] ) 04/14/2021    Hyperlipidemia 04/13/2021    Cystitis 04/12/2021    Essential hypertension 04/12/2021    Dyslipidemia 04/12/2021     No orders of the defined types were placed in this encounter

## 2021-07-07 ENCOUNTER — OFFICE VISIT (OUTPATIENT)
Dept: WOUND CARE | Facility: HOSPITAL | Age: 76
DRG: 699 | End: 2021-07-07
Payer: MEDICARE

## 2021-07-07 DIAGNOSIS — N30.41 IRRADIATION CYSTITIS WITH HEMATURIA: ICD-10-CM

## 2021-07-07 PROCEDURE — G0277 HBOT, FULL BODY CHAMBER, 30M: HCPCS | Performed by: FAMILY MEDICINE

## 2021-07-07 PROCEDURE — 99183 HYPERBARIC OXYGEN THERAPY: CPT | Performed by: FAMILY MEDICINE

## 2021-07-07 NOTE — PROGRESS NOTES
HBO Treatment Course Details       Treatment Notes: Pt c/o testicular pain 3/10; he denies hematuria  He states that he has completed his antibiotic  He was advised to call Urologist office if this persists  Post dive, he denied any c/o pain & tolerated treatment well  Treatment Course Number: 27  Total Treatments Ordered:  40     Diagnosis:   1   Irradiation cystitis with hematuria  [facility]       HBO Treatment Details:  In-Patient Visit: no  Treatment Length:90 Minutes(Minutes)  Chamber #: Hard sided Monoplace Chamber    Pre-Treatment details:  Pre-treatment protocol Treatment Protocol: 2 5 FE X 90 minutes w/ 100% oxygen, two 5 minute air breaks                                             Treatment details:  FE Rate: 2  Started Compression: 0818  Reached Compression: 0830  Total Compression Time: 12 (Minutes)  Total Holding Time: 100 (Minutes)  Started Decompression: 1010  Reached Surface: 1018  Total Decompression Time: 8 (Minutes)  Total Airbreaks: 10 (Minutes)  Total Time of Treatment: 110 (Minutes)  Symptoms Noted During Treatment: Other (Comment) (Pt became anxious & restless during last 15 minutes of treatment as per usual ) (Minutes)    Post treatment details:                                              Vital Signs:  HBO Glucose Reference Range: 100-350 mg/dl   Pre-Treatment Post-Treatment   Time vitals are taken: 0810 Time vitals are taken: 1020   Blood Pressure: 144/72 Blood Pressure: 135/77   Pulse: 85 Pulse: 72   Resp: 16 Resp: 18   Temp: 98 4 °F (36 9 °C) Temp: 97 4 °F (36 3 °C)   Pre-Inspection Glucose reading:  (n/a) Post-Inspection Glucose reading:  (n/a)       No Known Allergies  Patient Active Problem List    Diagnosis Date Noted    Leukocytosis 05/04/2021    [facility] 05/04/2021    Acute blood loss anemia (ABLA) 04/26/2021    SHANTI [facility]acute kidney injury) ([facility] ) 04/24/2021    UTI (urinary tract infection) 04/24/2021    Hematuria due to cystitis     Anemia 04/14/2021    Chronic obstructive pulmonary disease ([facility] [address] ) 04/14/2021    Hyperlipidemia 04/13/2021    Cystitis 04/12/2021    Essential hypertension 04/12/2021    Dyslipidemia 04/12/2021     No orders of the defined types were placed in this encounter

## 2021-07-07 NOTE — PROGRESS NOTES
HBO Treatment Course Details       Treatment Notes:      Treatment Course Number: 27  Total Treatments Ordered:  40     Diagnosis:   1  Irradiation cystitis with hematuria  Hyperbaric oxygen [facility]       HBO Treatment Details:  In-Patient Visit: no  Treatment Length:90 Minutes(Minutes)  Chamber #: Hard sided Monoplace Chamber    Pre-Treatment details:  Pre-treatment protocol Treatment Protocol: 2 5 FE X 90 minutes w/ 100% oxygen, two 5 minute air breaks  Left ear clear?: yes  Right ear clear?: yes  Left ear intact?: yes  Right ear intact?: yes  Left ear color: translucent  Right ear color: translucent  PE Tubes present, Left ear?: no  PE Tubes present, Right ear?: no  Left ear irrigated?: no  Right ear irrigated?: no  Left ear TEED scale: Grade 0  Right ear TEED scale: Grade 0   Pretreatment heart and lung assessment: Pretreatment heart and lung auscltation unremarkable  Patient cleared for HBOT     Treatment details:  FE Rate: 2  Started Compression: 0818  Reached Compression: 0830  Total Compression Time: 12 (Minutes)  Total Holding Time: 100 (Minutes)  Started Decompression: 1010  Reached Surface: 1018  Total Decompression Time: 8 (Minutes)  Total Airbreaks: 10 (Minutes)  Total Time of Treatment: 110 (Minutes)  Symptoms Noted During Treatment: Other (Comment) (Pt became anxious & restless during last 15 minutes of treatment as per usual ) (Minutes)    Post treatment details:  Left ear clear?: yes  Right ear clear?: yes  Left ears intact?: yes  Right ears intact?: yes  Left ear color: translucent  Right ear color: translucent  PE Tubes present, Left ear?: no  PE Tubes present, Right ear?: no        Left ear TEED scale: Grade 0  Right ear TEED scale: Grade 0  Post treatment heart and lung assessment: Post treatment heart and lung auscltation unremarkable  Patient cleared for discharge  Tolerated treatment well         Vital Signs:  HBO Glucose Reference Range: 100-350 mg/dl   Pre-Treatment Post-Treatment   Time vitals are taken: 0810 Time vitals are taken: 1020   Blood Pressure: 144/72 Blood Pressure: 135/77   Pulse: 85 Pulse: 72   Resp: 16 Resp: 18   Temp: 98 4 °F (36 9 °C) Temp: 97 4 °F (36 3 °C)   Pre-Inspection Glucose reading:  (n/a) Post-Inspection Glucose reading:  (n/a)       No Known Allergies  Patient Active Problem List    Diagnosis Date Noted    Leukocytosis 05/04/2021    RA (rheumatoid arthritis) ([place] [address] ) 05/04/2021    Acute blood loss anemia (ABLA) 04/26/2021    SHANTI (acute kidney injury) ([place] [address] ) 04/24/2021    UTI (urinary tract infection) 04/24/2021    Hematuria due to cystitis     Anemia 04/14/2021    Chronic obstructive pulmonary disease ([place] [address] ) 04/14/2021    Hyperlipidemia 04/13/2021    Cystitis 04/12/2021    Essential hypertension 04/12/2021    Dyslipidemia 04/12/2021     No orders of the defined types were placed in this encounter

## 2021-07-08 ENCOUNTER — ANESTHESIA EVENT (OUTPATIENT)
Dept: PERIOP | Facility: HOSPITAL | Age: 76
DRG: 699 | End: 2021-07-08
Payer: MEDICARE

## 2021-07-08 ENCOUNTER — HOSPITAL ENCOUNTER (INPATIENT)
Facility: HOSPITAL | Age: 76
LOS: 3 days | Discharge: HOME | DRG: 699 | End: 2021-07-12
Attending: EMERGENCY MEDICINE | Admitting: INTERNAL MEDICINE
Payer: MEDICARE

## 2021-07-08 ENCOUNTER — ANESTHESIA (OUTPATIENT)
Dept: PERIOP | Facility: HOSPITAL | Age: 76
DRG: 699 | End: 2021-07-08
Payer: MEDICARE

## 2021-07-08 ENCOUNTER — APPOINTMENT (EMERGENCY)
Dept: RADIOLOGY | Facility: HOSPITAL | Age: 76
DRG: 699 | End: 2021-07-08
Payer: MEDICARE

## 2021-07-08 ENCOUNTER — APPOINTMENT (OUTPATIENT)
Dept: RADIOLOGY | Facility: HOSPITAL | Age: 76
DRG: 699 | End: 2021-07-08
Payer: MEDICARE

## 2021-07-08 DIAGNOSIS — N30.91 HEMATURIA DUE TO CYSTITIS: ICD-10-CM

## 2021-07-08 DIAGNOSIS — R31.0 GROSS HEMATURIA: ICD-10-CM

## 2021-07-08 DIAGNOSIS — Z71.89 COMPLEX CARE COORDINATION: Primary | ICD-10-CM

## 2021-07-08 DIAGNOSIS — N39.0 UTI (URINARY TRACT INFECTION): Primary | ICD-10-CM

## 2021-07-08 DIAGNOSIS — N30.90 CYSTITIS: ICD-10-CM

## 2021-07-08 PROBLEM — C61 PROSTATE CANCER (HCC): Status: ACTIVE | Noted: 2021-07-08

## 2021-07-08 LAB
ALBUMIN SERPL BCP-MCNC: 3.5 G/DL (ref 3.5–5)
ALP SERPL-CCNC: 89 U/L (ref 46–116)
ALT SERPL W P-5'-P-CCNC: 24 U/L (ref 12–78)
AMORPH URATE CRY URNS QL MICRO: ABNORMAL /HPF
ANION GAP SERPL CALCULATED.3IONS-SCNC: 10 MMOL/L (ref 4–13)
ANION GAP SERPL CALCULATED.3IONS-SCNC: 11 MMOL/L (ref 4–13)
APTT PPP: 31 SECONDS (ref 23–37)
AST SERPL W P-5'-P-CCNC: 17 U/L (ref 5–45)
ATRIAL RATE: 88 BPM
BACTERIA UR QL AUTO: ABNORMAL /HPF
BACTERIA UR QL AUTO: ABNORMAL /HPF
BASOPHILS # BLD AUTO: 0.04 THOUSANDS/ΜL (ref 0–0.1)
BASOPHILS # BLD AUTO: 0.04 THOUSANDS/ΜL (ref 0–0.1)
BASOPHILS NFR BLD AUTO: 0 % (ref 0–1)
BASOPHILS NFR BLD AUTO: 0 % (ref 0–1)
BILIRUB SERPL-MCNC: 0.33 MG/DL (ref 0.2–1)
BILIRUB UR QL STRIP: NEGATIVE
BILIRUB UR QL STRIP: NEGATIVE
BUN SERPL-MCNC: 29 MG/DL (ref 5–25)
BUN SERPL-MCNC: 29 MG/DL (ref 5–25)
CALCIUM SERPL-MCNC: 8.8 MG/DL (ref 8.3–10.1)
CALCIUM SERPL-MCNC: 8.9 MG/DL (ref 8.3–10.1)
CHLORIDE SERPL-SCNC: 103 MMOL/L (ref 100–108)
CHLORIDE SERPL-SCNC: 105 MMOL/L (ref 100–108)
CLARITY UR: ABNORMAL
CLARITY UR: ABNORMAL
CO2 SERPL-SCNC: 25 MMOL/L (ref 21–32)
CO2 SERPL-SCNC: 26 MMOL/L (ref 21–32)
COLOR UR: ABNORMAL
COLOR UR: YELLOW
CREAT SERPL-MCNC: 1.4 MG/DL (ref 0.6–1.3)
CREAT SERPL-MCNC: 1.48 MG/DL (ref 0.6–1.3)
EOSINOPHIL # BLD AUTO: 0.95 THOUSAND/ΜL (ref 0–0.61)
EOSINOPHIL # BLD AUTO: 1.04 THOUSAND/ΜL (ref 0–0.61)
EOSINOPHIL NFR BLD AUTO: 7 % (ref 0–6)
EOSINOPHIL NFR BLD AUTO: 8 % (ref 0–6)
ERYTHROCYTE [DISTWIDTH] IN BLOOD BY AUTOMATED COUNT: 16.7 % (ref 11.6–15.1)
ERYTHROCYTE [DISTWIDTH] IN BLOOD BY AUTOMATED COUNT: 16.9 % (ref 11.6–15.1)
GFR SERPL CREATININE-BSD FRML MDRD: 45 ML/MIN/1.73SQ M
GFR SERPL CREATININE-BSD FRML MDRD: 48 ML/MIN/1.73SQ M
GLUCOSE SERPL-MCNC: 120 MG/DL (ref 65–140)
GLUCOSE SERPL-MCNC: 139 MG/DL (ref 65–140)
GLUCOSE UR STRIP-MCNC: NEGATIVE MG/DL
GLUCOSE UR STRIP-MCNC: NEGATIVE MG/DL
HCT VFR BLD AUTO: 29.3 % (ref 36.5–49.3)
HCT VFR BLD AUTO: 30.3 % (ref 36.5–49.3)
HGB BLD-MCNC: 8.9 G/DL (ref 12–17)
HGB BLD-MCNC: 9 G/DL (ref 12–17)
HGB UR QL STRIP.AUTO: ABNORMAL
HGB UR QL STRIP.AUTO: ABNORMAL
IMM GRANULOCYTES # BLD AUTO: 0.06 THOUSAND/UL (ref 0–0.2)
IMM GRANULOCYTES # BLD AUTO: 0.07 THOUSAND/UL (ref 0–0.2)
IMM GRANULOCYTES NFR BLD AUTO: 1 % (ref 0–2)
IMM GRANULOCYTES NFR BLD AUTO: 1 % (ref 0–2)
INR PPP: 1.09 (ref 0.84–1.19)
KETONES UR STRIP-MCNC: NEGATIVE MG/DL
KETONES UR STRIP-MCNC: NEGATIVE MG/DL
LEUKOCYTE ESTERASE UR QL STRIP: ABNORMAL
LEUKOCYTE ESTERASE UR QL STRIP: ABNORMAL
LYMPHOCYTES # BLD AUTO: 1.71 THOUSANDS/ΜL (ref 0.6–4.47)
LYMPHOCYTES # BLD AUTO: 1.77 THOUSANDS/ΜL (ref 0.6–4.47)
LYMPHOCYTES NFR BLD AUTO: 14 % (ref 14–44)
LYMPHOCYTES NFR BLD AUTO: 14 % (ref 14–44)
MCH RBC QN AUTO: 24.6 PG (ref 26.8–34.3)
MCH RBC QN AUTO: 24.9 PG (ref 26.8–34.3)
MCHC RBC AUTO-ENTMCNC: 29.7 G/DL (ref 31.4–37.4)
MCHC RBC AUTO-ENTMCNC: 30.4 G/DL (ref 31.4–37.4)
MCV RBC AUTO: 82 FL (ref 82–98)
MCV RBC AUTO: 83 FL (ref 82–98)
MONOCYTES # BLD AUTO: 1.02 THOUSAND/ΜL (ref 0.17–1.22)
MONOCYTES # BLD AUTO: 1.02 THOUSAND/ΜL (ref 0.17–1.22)
MONOCYTES NFR BLD AUTO: 8 % (ref 4–12)
MONOCYTES NFR BLD AUTO: 8 % (ref 4–12)
NEUTROPHILS # BLD AUTO: 8.7 THOUSANDS/ΜL (ref 1.85–7.62)
NEUTROPHILS # BLD AUTO: 9.13 THOUSANDS/ΜL (ref 1.85–7.62)
NEUTS SEG NFR BLD AUTO: 69 % (ref 43–75)
NEUTS SEG NFR BLD AUTO: 70 % (ref 43–75)
NITRITE UR QL STRIP: NEGATIVE
NITRITE UR QL STRIP: NEGATIVE
NON-SQ EPI CELLS URNS QL MICRO: ABNORMAL /HPF
NON-SQ EPI CELLS URNS QL MICRO: ABNORMAL /HPF
NRBC BLD AUTO-RTO: 0 /100 WBCS
NRBC BLD AUTO-RTO: 0 /100 WBCS
OTHER STN SPEC: ABNORMAL
P AXIS: 58 DEGREES
PH UR STRIP.AUTO: 5.5 [PH]
PH UR STRIP.AUTO: 7 [PH]
PLATELET # BLD AUTO: 266 THOUSANDS/UL (ref 149–390)
PLATELET # BLD AUTO: 277 THOUSANDS/UL (ref 149–390)
PMV BLD AUTO: 10.3 FL (ref 8.9–12.7)
PMV BLD AUTO: 9.7 FL (ref 8.9–12.7)
POTASSIUM SERPL-SCNC: 4.2 MMOL/L (ref 3.5–5.3)
POTASSIUM SERPL-SCNC: 4.3 MMOL/L (ref 3.5–5.3)
PR INTERVAL: 132 MS
PROT SERPL-MCNC: 7.6 G/DL (ref 6.4–8.2)
PROT UR STRIP-MCNC: >=300 MG/DL
PROT UR STRIP-MCNC: ABNORMAL MG/DL
PROTHROMBIN TIME: 14 SECONDS (ref 11.6–14.5)
QRS AXIS: 28 DEGREES
QRSD INTERVAL: 92 MS
QT INTERVAL: 342 MS
QTC INTERVAL: 413 MS
RBC # BLD AUTO: 3.57 MILLION/UL (ref 3.88–5.62)
RBC # BLD AUTO: 3.66 MILLION/UL (ref 3.88–5.62)
RBC #/AREA URNS AUTO: ABNORMAL /HPF
RBC #/AREA URNS AUTO: ABNORMAL /HPF
SARS-COV-2 RNA RESP QL NAA+PROBE: NEGATIVE
SODIUM SERPL-SCNC: 140 MMOL/L (ref 136–145)
SODIUM SERPL-SCNC: 140 MMOL/L (ref 136–145)
SP GR UR STRIP.AUTO: 1.02 (ref 1–1.03)
SP GR UR STRIP.AUTO: 1.02 (ref 1–1.03)
T WAVE AXIS: 54 DEGREES
UROBILINOGEN UR QL STRIP.AUTO: 0.2 E.U./DL
UROBILINOGEN UR QL STRIP.AUTO: 0.2 E.U./DL
VENTRICULAR RATE: 88 BPM
WBC # BLD AUTO: 12.57 THOUSAND/UL (ref 4.31–10.16)
WBC # BLD AUTO: 12.98 THOUSAND/UL (ref 4.31–10.16)
WBC #/AREA URNS AUTO: ABNORMAL /HPF
WBC #/AREA URNS AUTO: ABNORMAL /HPF

## 2021-07-08 PROCEDURE — BT13ZZZ FLUOROSCOPY OF BILATERAL KIDNEYS: ICD-10-PCS | Performed by: SPECIALIST

## 2021-07-08 PROCEDURE — 88307 TISSUE EXAM BY PATHOLOGIST: CPT | Performed by: PATHOLOGY

## 2021-07-08 PROCEDURE — 81001 URINALYSIS AUTO W/SCOPE: CPT | Performed by: EMERGENCY MEDICINE

## 2021-07-08 PROCEDURE — 87186 SC STD MICRODIL/AGAR DIL: CPT | Performed by: EMERGENCY MEDICINE

## 2021-07-08 PROCEDURE — 85025 COMPLETE CBC W/AUTO DIFF WBC: CPT | Performed by: EMERGENCY MEDICINE

## 2021-07-08 PROCEDURE — 85730 THROMBOPLASTIN TIME PARTIAL: CPT | Performed by: EMERGENCY MEDICINE

## 2021-07-08 PROCEDURE — 87186 SC STD MICRODIL/AGAR DIL: CPT | Performed by: SPECIALIST

## 2021-07-08 PROCEDURE — 0TCB8ZZ EXTIRPATION OF MATTER FROM BLADDER, VIA NATURAL OR ARTIFICIAL OPENING ENDOSCOPIC: ICD-10-PCS | Performed by: SPECIALIST

## 2021-07-08 PROCEDURE — 87086 URINE CULTURE/COLONY COUNT: CPT | Performed by: EMERGENCY MEDICINE

## 2021-07-08 PROCEDURE — 36415 COLL VENOUS BLD VENIPUNCTURE: CPT | Performed by: EMERGENCY MEDICINE

## 2021-07-08 PROCEDURE — 80048 BASIC METABOLIC PNL TOTAL CA: CPT | Performed by: PHYSICIAN ASSISTANT

## 2021-07-08 PROCEDURE — 81001 URINALYSIS AUTO W/SCOPE: CPT | Performed by: SPECIALIST

## 2021-07-08 PROCEDURE — 85610 PROTHROMBIN TIME: CPT | Performed by: EMERGENCY MEDICINE

## 2021-07-08 PROCEDURE — 87077 CULTURE AEROBIC IDENTIFY: CPT | Performed by: SPECIALIST

## 2021-07-08 PROCEDURE — 96374 THER/PROPH/DIAG INJ IV PUSH: CPT

## 2021-07-08 PROCEDURE — 99219 PR INITIAL OBSERVATION CARE/DAY 50 MINUTES: CPT | Performed by: PHYSICIAN ASSISTANT

## 2021-07-08 PROCEDURE — 87086 URINE CULTURE/COLONY COUNT: CPT | Performed by: SPECIALIST

## 2021-07-08 PROCEDURE — 99284 EMERGENCY DEPT VISIT MOD MDM: CPT | Performed by: EMERGENCY MEDICINE

## 2021-07-08 PROCEDURE — 99285 EMERGENCY DEPT VISIT HI MDM: CPT

## 2021-07-08 PROCEDURE — 0T9B70Z DRAINAGE OF BLADDER WITH DRAINAGE DEVICE, VIA NATURAL OR ARTIFICIAL OPENING: ICD-10-PCS | Performed by: SPECIALIST

## 2021-07-08 PROCEDURE — 93010 ELECTROCARDIOGRAM REPORT: CPT | Performed by: INTERNAL MEDICINE

## 2021-07-08 PROCEDURE — 71045 X-RAY EXAM CHEST 1 VIEW: CPT

## 2021-07-08 PROCEDURE — 87077 CULTURE AEROBIC IDENTIFY: CPT | Performed by: EMERGENCY MEDICINE

## 2021-07-08 PROCEDURE — 93005 ELECTROCARDIOGRAM TRACING: CPT

## 2021-07-08 PROCEDURE — 85025 COMPLETE CBC W/AUTO DIFF WBC: CPT | Performed by: PHYSICIAN ASSISTANT

## 2021-07-08 PROCEDURE — U0005 INFEC AGEN DETEC AMPLI PROBE: HCPCS | Performed by: SPECIALIST

## 2021-07-08 PROCEDURE — U0003 INFECTIOUS AGENT DETECTION BY NUCLEIC ACID (DNA OR RNA); SEVERE ACUTE RESPIRATORY SYNDROME CORONAVIRUS 2 (SARS-COV-2) (CORONAVIRUS DISEASE [COVID-19]), AMPLIFIED PROBE TECHNIQUE, MAKING USE OF HIGH THROUGHPUT TECHNOLOGIES AS DESCRIBED BY CMS-2020-01-R: HCPCS | Performed by: SPECIALIST

## 2021-07-08 PROCEDURE — 80053 COMPREHEN METABOLIC PANEL: CPT | Performed by: EMERGENCY MEDICINE

## 2021-07-08 RX ORDER — SODIUM CHLORIDE, SODIUM LACTATE, POTASSIUM CHLORIDE, CALCIUM CHLORIDE 600; 310; 30; 20 MG/100ML; MG/100ML; MG/100ML; MG/100ML
100 INJECTION, SOLUTION INTRAVENOUS CONTINUOUS
Status: DISCONTINUED | OUTPATIENT
Start: 2021-07-08 | End: 2021-07-09

## 2021-07-08 RX ORDER — ONDANSETRON 2 MG/ML
4 INJECTION INTRAMUSCULAR; INTRAVENOUS EVERY 6 HOURS PRN
Status: DISCONTINUED | OUTPATIENT
Start: 2021-07-08 | End: 2021-07-12 | Stop reason: HOSPADM

## 2021-07-08 RX ORDER — LEVOFLOXACIN 5 MG/ML
500 INJECTION, SOLUTION INTRAVENOUS ONCE
Status: CANCELLED | OUTPATIENT
Start: 2021-07-08 | End: 2021-07-08

## 2021-07-08 RX ORDER — FENTANYL CITRATE 50 UG/ML
INJECTION, SOLUTION INTRAMUSCULAR; INTRAVENOUS AS NEEDED
Status: DISCONTINUED | OUTPATIENT
Start: 2021-07-08 | End: 2021-07-08

## 2021-07-08 RX ORDER — FENTANYL CITRATE/PF 50 MCG/ML
50 SYRINGE (ML) INJECTION
Status: DISCONTINUED | OUTPATIENT
Start: 2021-07-08 | End: 2021-07-08 | Stop reason: HOSPADM

## 2021-07-08 RX ORDER — SODIUM CHLORIDE 9 MG/ML
10 INJECTION INTRAVENOUS DAILY
Status: DISCONTINUED | OUTPATIENT
Start: 2021-07-08 | End: 2021-07-08

## 2021-07-08 RX ORDER — ATORVASTATIN CALCIUM 20 MG/1
20 TABLET, FILM COATED ORAL
Status: DISCONTINUED | OUTPATIENT
Start: 2021-07-08 | End: 2021-07-12 | Stop reason: HOSPADM

## 2021-07-08 RX ORDER — PROPOFOL 10 MG/ML
INJECTION, EMULSION INTRAVENOUS AS NEEDED
Status: DISCONTINUED | OUTPATIENT
Start: 2021-07-08 | End: 2021-07-08

## 2021-07-08 RX ORDER — DOCUSATE SODIUM 100 MG/1
100 CAPSULE, LIQUID FILLED ORAL 2 TIMES DAILY
Status: DISCONTINUED | OUTPATIENT
Start: 2021-07-08 | End: 2021-07-12 | Stop reason: HOSPADM

## 2021-07-08 RX ORDER — ACETAMINOPHEN 325 MG/1
650 TABLET ORAL EVERY 6 HOURS PRN
Status: DISCONTINUED | OUTPATIENT
Start: 2021-07-08 | End: 2021-07-12 | Stop reason: HOSPADM

## 2021-07-08 RX ORDER — ALBUTEROL SULFATE 90 UG/1
2 AEROSOL, METERED RESPIRATORY (INHALATION) EVERY 4 HOURS PRN
Status: DISCONTINUED | OUTPATIENT
Start: 2021-07-08 | End: 2021-07-12 | Stop reason: HOSPADM

## 2021-07-08 RX ORDER — LIDOCAINE HYDROCHLORIDE 10 MG/ML
INJECTION, SOLUTION EPIDURAL; INFILTRATION; INTRACAUDAL; PERINEURAL AS NEEDED
Status: DISCONTINUED | OUTPATIENT
Start: 2021-07-08 | End: 2021-07-08

## 2021-07-08 RX ORDER — SODIUM CHLORIDE 9 MG/ML
100 INJECTION, SOLUTION INTRAVENOUS CONTINUOUS
Status: DISCONTINUED | OUTPATIENT
Start: 2021-07-08 | End: 2021-07-08

## 2021-07-08 RX ORDER — FERROUS SULFATE 325(65) MG
325 TABLET ORAL
Status: DISCONTINUED | OUTPATIENT
Start: 2021-07-08 | End: 2021-07-09

## 2021-07-08 RX ORDER — SODIUM CHLORIDE, SODIUM LACTATE, POTASSIUM CHLORIDE, CALCIUM CHLORIDE 600; 310; 30; 20 MG/100ML; MG/100ML; MG/100ML; MG/100ML
INJECTION, SOLUTION INTRAVENOUS CONTINUOUS PRN
Status: DISCONTINUED | OUTPATIENT
Start: 2021-07-08 | End: 2021-07-08

## 2021-07-08 RX ORDER — MORPHINE SULFATE 4 MG/ML
4 INJECTION, SOLUTION INTRAMUSCULAR; INTRAVENOUS ONCE
Status: COMPLETED | OUTPATIENT
Start: 2021-07-08 | End: 2021-07-08

## 2021-07-08 RX ORDER — DEXAMETHASONE SODIUM PHOSPHATE 10 MG/ML
INJECTION, SOLUTION INTRAMUSCULAR; INTRAVENOUS AS NEEDED
Status: DISCONTINUED | OUTPATIENT
Start: 2021-07-08 | End: 2021-07-08

## 2021-07-08 RX ORDER — SODIUM CHLORIDE 9 MG/ML
10 INJECTION INTRAVENOUS DAILY
Status: DISCONTINUED | OUTPATIENT
Start: 2021-07-08 | End: 2021-07-12 | Stop reason: HOSPADM

## 2021-07-08 RX ORDER — ATROPA BELLADONNA AND OPIUM 16.2; 3 MG/1; MG/1
30 SUPPOSITORY RECTAL EVERY 8 HOURS PRN
Status: DISCONTINUED | OUTPATIENT
Start: 2021-07-08 | End: 2021-07-12 | Stop reason: HOSPADM

## 2021-07-08 RX ORDER — ONDANSETRON 2 MG/ML
INJECTION INTRAMUSCULAR; INTRAVENOUS AS NEEDED
Status: DISCONTINUED | OUTPATIENT
Start: 2021-07-08 | End: 2021-07-08

## 2021-07-08 RX ORDER — CALCIUM CARBONATE 200(500)MG
1000 TABLET,CHEWABLE ORAL DAILY PRN
Status: DISCONTINUED | OUTPATIENT
Start: 2021-07-08 | End: 2021-07-12 | Stop reason: HOSPADM

## 2021-07-08 RX ORDER — ONDANSETRON 2 MG/ML
4 INJECTION INTRAMUSCULAR; INTRAVENOUS ONCE AS NEEDED
Status: DISCONTINUED | OUTPATIENT
Start: 2021-07-08 | End: 2021-07-08 | Stop reason: HOSPADM

## 2021-07-08 RX ORDER — SODIUM CHLORIDE 9 MG/ML
75 INJECTION, SOLUTION INTRAVENOUS CONTINUOUS
Status: DISCONTINUED | OUTPATIENT
Start: 2021-07-08 | End: 2021-07-09

## 2021-07-08 RX ORDER — GLYCINE 1.5 G/100ML
SOLUTION IRRIGATION AS NEEDED
Status: DISCONTINUED | OUTPATIENT
Start: 2021-07-08 | End: 2021-07-08 | Stop reason: HOSPADM

## 2021-07-08 RX ORDER — POLYETHYLENE GLYCOL 3350 17 G/17G
17 POWDER, FOR SOLUTION ORAL DAILY PRN
Status: DISCONTINUED | OUTPATIENT
Start: 2021-07-08 | End: 2021-07-12 | Stop reason: HOSPADM

## 2021-07-08 RX ADMIN — DOCUSATE SODIUM 100 MG: 100 CAPSULE, LIQUID FILLED ORAL at 09:07

## 2021-07-08 RX ADMIN — MORPHINE SULFATE 4 MG: 4 INJECTION INTRAVENOUS at 04:24

## 2021-07-08 RX ADMIN — FENTANYL CITRATE 50 MCG: 50 INJECTION, SOLUTION INTRAMUSCULAR; INTRAVENOUS at 17:25

## 2021-07-08 RX ADMIN — SODIUM CHLORIDE 75 ML/HR: 0.9 INJECTION, SOLUTION INTRAVENOUS at 05:44

## 2021-07-08 RX ADMIN — PIPERACILLIN SODIUM AND TAZOBACTAM SODIUM 3.38 G: 36; 4.5 INJECTION, POWDER, FOR SOLUTION INTRAVENOUS at 11:14

## 2021-07-08 RX ADMIN — LIDOCAINE HYDROCHLORIDE 50 MG: 10 INJECTION, SOLUTION EPIDURAL; INFILTRATION; INTRACAUDAL; PERINEURAL at 17:12

## 2021-07-08 RX ADMIN — ATROPA BELLADONNA AND OPIUM 1 SUPPOSITORY: 16.2; 3 SUPPOSITORY RECTAL at 22:27

## 2021-07-08 RX ADMIN — FENTANYL CITRATE 50 MCG: 50 INJECTION, SOLUTION INTRAMUSCULAR; INTRAVENOUS at 17:16

## 2021-07-08 RX ADMIN — ATORVASTATIN CALCIUM 20 MG: 20 TABLET, FILM COATED ORAL at 21:00

## 2021-07-08 RX ADMIN — PIPERACILLIN AND TAZOBACTAM 3.38 G: 3; .375 INJECTION, POWDER, LYOPHILIZED, FOR SOLUTION INTRAVENOUS at 04:41

## 2021-07-08 RX ADMIN — FENTANYL CITRATE 50 MCG: 50 INJECTION, SOLUTION INTRAMUSCULAR; INTRAVENOUS at 18:11

## 2021-07-08 RX ADMIN — ONDANSETRON 4 MG: 2 INJECTION INTRAMUSCULAR; INTRAVENOUS at 17:21

## 2021-07-08 RX ADMIN — PROPOFOL 160 MG: 10 INJECTION, EMULSION INTRAVENOUS at 17:12

## 2021-07-08 RX ADMIN — METOPROLOL TARTRATE 25 MG: 25 TABLET, FILM COATED ORAL at 09:07

## 2021-07-08 RX ADMIN — SODIUM CHLORIDE 10 ML: 9 INJECTION, SOLUTION INTRAMUSCULAR; INTRAVENOUS; SUBCUTANEOUS at 11:14

## 2021-07-08 RX ADMIN — METOPROLOL TARTRATE 25 MG: 25 TABLET, FILM COATED ORAL at 21:00

## 2021-07-08 RX ADMIN — ATROPA BELLADONNA AND OPIUM 1 SUPPOSITORY: 16.2; 3 SUPPOSITORY RECTAL at 05:45

## 2021-07-08 RX ADMIN — SODIUM CHLORIDE, SODIUM LACTATE, POTASSIUM CHLORIDE, AND CALCIUM CHLORIDE 100 ML/HR: .6; .31; .03; .02 INJECTION, SOLUTION INTRAVENOUS at 19:06

## 2021-07-08 RX ADMIN — SODIUM CHLORIDE, SODIUM LACTATE, POTASSIUM CHLORIDE, AND CALCIUM CHLORIDE: .6; .31; .03; .02 INJECTION, SOLUTION INTRAVENOUS at 17:08

## 2021-07-08 RX ADMIN — DEXAMETHASONE SODIUM PHOSPHATE 4 MG: 10 INJECTION, SOLUTION INTRAMUSCULAR; INTRAVENOUS at 17:21

## 2021-07-08 RX ADMIN — SODIUM CHLORIDE 100 ML/HR: 0.9 INJECTION, SOLUTION INTRAVENOUS at 04:23

## 2021-07-08 NOTE — H&P
Armando 43  H&P- Bev Marcus 1945, 66 y o  male MRN: [id number]  Unit/Bed#: SHERRY Encounter: [id number]  Primary Care Provider: Joey Hernandes MD   Date and time admitted to hospital: 7/8/2021  3:26 AM    * UTI (urinary tract infection)  Assessment & Plan  Possible UTI, cystitis - nephrostomy tubes clear, no evidence of infection involving nephrostomy tubes  - Just completed a course of Keflex on Friday  - Urine culture pending  - Continue broad Abx with Zosyn Q6, started in ED  - Previous cultures grew mixed contaminants, once with E faecalis susceptible to vanco and ampicillin one with yeast cells present  - Continue to monitor output, nephrostomy tube care  - Urology consult    Hematuria due to cystitis  Assessment & Plan  Radiation cystitis, 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  No bloody output from bilateral nephrostomy tubes  Also with recurrent UTIs, just recently finished a course of Kelfex on Friday  - UA abnormal, started on Zosyn  - F/u urine culture and tailor ABx per results   - Monitor I/Os  - B+O suppository PRN for pain  - Urology consult appreciated    Anemia  Assessment & Plan  Hg 9 0  Chronic, on iron supplements    Dyslipidemia  Assessment & Plan  Continue statin therapy    Essential hypertension  Assessment & Plan  Continue Lopressor 25 mg BID     VTE Prophylaxis: Pharmacologic VTE Prophylaxis contraindicated due to gross hematuria  / sequential compression device   Code Status: level 1  POLST: POLST is not applicable to this patient  Discussion with family: Patient's wife present in ED     Anticipated Length of Stay:  Patient will be admitted on an Observation basis with an anticipated length of stay of  < 2 midnights  Justification for Hospital Stay: hematuria, UTI    Total Time for Visit, including Counseling / Coordination of Care: 30 minutes    Greater than 50% of this total time spent on direct patient counseling and coordination of care  Chief Complaint:   hematuria    History of Present Illness:    Mariano Holloway is a 66 y o  male who presents with PMH of HTN, HLD, COPD, prostate cancer s/p radiation and chemotherapy with recurrent UTIs and gross hematuria, bilateral nephrostomy tubes  He presented to the ED today due to gross hematuria that started again tonight  He reports similar symptoms with his hematuria in the past with penile pain and "cramping" type pain  He denies any fevers, chills, abdominal pain  He has had clear, non-bloody output from his nephrostomy tubes  He has been passing grossly bloody urine and clots tonight  BMs have been regular and non-bloody  He was recently admitted for the same and improved after Abx and fluids  He was discharged home on Keflex which he just finished taking on Friday  Today's labs show abnormal  UA with gross blood and leukocytosis on CBC  He was started on Zosyn in the ED and will be admitted for further management due to recurrent UTI with gross hematuria  Review of Systems:    Review of Systems   Constitutional: Negative for chills, fatigue and fever  HENT: Negative for congestion, rhinorrhea and sore throat  Eyes: Negative for visual disturbance  Respiratory: Negative for cough, shortness of breath and wheezing  Cardiovascular: Negative for chest pain, palpitations and leg swelling  Gastrointestinal: Negative for abdominal distention, abdominal pain, blood in stool, constipation, diarrhea, nausea and vomiting  Genitourinary: Positive for frequency, hematuria and penile pain  Negative for dysuria and urgency  Musculoskeletal: Negative for back pain, gait problem and myalgias  Skin: Negative for pallor, rash and wound  Neurological: Negative for dizziness, weakness, light-headedness, numbness and headaches          Past Medical and Surgical History:     Past Medical History:   Diagnosis Date    Cancer [facility])     prostate    COPD [facility]ch[facility] disease) ([facility] [address] )     Hyperlipidemia     Hypertension        Past Surgical History:   Procedure Laterality Date    APPENDECTOMY      FL RETROGRADE PYELOGRAM  4/14/2021    HERNIA REPAIR      IR NEPHROSTOMY TUBE CHECK/CHANGE/REPOSITION/REINSERTION/UPSIZE  5/10/2021    IR NEPHROSTOMY TUBE PLACEMENT  5/7/2021    [place] CYSTOURETHROSCOPY W/IRRIG & EVAC CLOTS Bilateral 4/14/2021    Procedure: CYSTOSCOPY EVACUATION OF CLOTS bilateral retrograde pyelograms possible TURBT bladder biopsy;  Surgeon: Chaz Hopkins MD;  Location: [facility];  Service: Urology    ID CYSTOURETHROSCOPY W/IRRIG & EVAC CLOTS N/A 4/24/2021    Procedure: CYSTOSCOPY EVACUATION OF CLOTS fulguration;  Surgeon: Chaz Hopkins MD;  Location: [address] [place] [address];  Service: Urology    PROSTATECTOMY      [address]      right shoulder       Meds/Allergies:    Prior to Admission medications    Medication Sig Start Date End Date Taking?  Authorizing Provider   albuterol (PROVENTIL HFA,VENTOLIN HFA) 90 mcg/act inhaler Inhale 2 puffs every 4 (four) hours as needed for wheezing 8/31/17  Yes Kelly Rivera DO   atorvastatin (LIPITOR) 20 mg tablet Take 20 mg by mouth daily at bedtime   Yes Historical Provider, MD   ferrous sulfate 324 (65 Fe) mg Take 1 tablet (324 mg total) by mouth daily before breakfast 6/30/21  Yes Maeve Barcenas MD   LORazepam (ATIVAN) 0 5 mg tablet Take 1 tablet (0 5 mg total) by mouth every 8 (eight) hours as needed for anxiety (2 tablets prior to hyperbaric oxygen therapy) 6/11/21  Yes CORWIN Chacon   metoprolol tartrate (LOPRESSOR) 25 mg tablet Take 25 mg by mouth every 12 (twelve) hours    Yes Historical Provider, MD   multivitamin-iron-minerals-folic acid (CENTRUM) chewable tablet Chew 1 tablet daily 7/6/21  Yes Historical Provider, MD   docusate sodium (COLACE) 100 mg capsule Take 1 capsule (100 mg total) by mouth 2 (two) times a day 6/30/21   Maeve Barcenas MD   naloxone (NARCAN) 4 mg/0 1 mL nasal spray Administer 1 spray into a nostril  If no response after 2-3 minutes, give another dose in the other nostril using a new spray  5/14/21   CORWIN Aguilar   polyethylene glycol (MIRALAX) 17 g packet Take 17 g by mouth daily as needed (Constipation) 6/30/21   Lauren Hernandez MD   sodium chloride, PF, 0 9 % 10 mL by Intracatheter route daily Left Intracatheter flushing daily 6/30/21 9/28/21  Lilliam Munoz MD   sodium chloride, PF, 0 9 % 10 mL by Intracatheter route daily Right Intracatheter flushing daily 6/30/21 9/28/21  Lilliam Munoz MD       Allergies: No Known Allergies    Social History:    Social History     Substance and Sexual Activity   Alcohol Use Never     Social History     Tobacco Use   Smoking Status Former Smoker    Packs/day: 0 50    Years: 50 00    Pack years: 25 00    Quit date: 8/29/2017    Years since quitting: 3 8   Smokeless Tobacco Never Used   Tobacco Comment    quit one month ago     Social History     Substance and Sexual Activity   Drug Use No       Family History:    Family History   Problem Relation Age of Onset    Diabetes Mother     Stroke Mother     Diabetes Brother     Stroke Brother        Physical Exam:     Vitals:   Blood Pressure: (!) 179/83 (07/08/21 0327)  Pulse: 95 (07/08/21 0327)  Temperature: (!) 96 4 °F (35 8 °C) (07/08/21 0327)  Temp Source: Tympanic (07/08/21 0327)  Respirations: 18 (07/08/21 0327)  SpO2: 93 % (07/08/21 0327)    Physical Exam  Vitals reviewed  Constitutional:       General: He is not in acute distress  Appearance: He is well-developed  He is not ill-appearing  HENT:      Head: Normocephalic and atraumatic  Cardiovascular:      Rate and Rhythm: Normal rate and regular rhythm  Heart sounds: Normal heart sounds  No murmur heard  No gallop  Pulmonary:      Effort: Pulmonary effort is normal  No respiratory distress  Breath sounds: Normal breath sounds  No wheezing, rhonchi or rales     Abdominal:      General: Bowel sounds are normal  There is no distension  Palpations: Abdomen is soft  Tenderness: There is no abdominal tenderness  There is no guarding  Genitourinary:     Comments: B/l nephrostomy tubes draining clear, pale yellow urine  Musculoskeletal:         General: No tenderness  Right lower leg: No edema  Left lower leg: No edema  Skin:     General: Skin is warm and dry  Findings: No erythema or rash  Neurological:      Mental Status: He is alert and oriented to person, place, and time  Psychiatric:         Mood and Affect: Mood normal          Behavior: Behavior normal          Additional Data:     Lab Results: I have personally reviewed pertinent reports  Results from last 7 days   Lab Units 07/08/21  0416   WBC Thousand/uL 12 57*   HEMOGLOBIN g/dL 9 0*   HEMATOCRIT % 30 3*   PLATELETS Thousands/uL 277   NEUTROS PCT % 69   LYMPHS PCT % 14   MONOS PCT % 8   EOS PCT % 8*     Results from last 7 days   Lab Units 07/08/21  0416   SODIUM mmol/L 140   POTASSIUM mmol/L 4 2   CHLORIDE mmol/L 103   CO2 mmol/L 26   BUN mg/dL 29*   CREATININE mg/dL 1 40*   ANION GAP mmol/L 11   CALCIUM mg/dL 8 9   ALBUMIN g/dL 3 5   TOTAL BILIRUBIN mg/dL 0 33   ALK PHOS U/L 89   ALT U/L 24   AST U/L 17   GLUCOSE RANDOM mg/dL 120     Results from last 7 days   Lab Units 07/08/21  0416   INR  1 09                   Imaging: I have personally reviewed pertinent reports  XR chest 1 view portable    (Results Pending)       EKG, Pathology, and Other Studies Reviewed on Admission:   · EKG: NSR, 88bpm    Allscripts / Epic Records Reviewed: Yes     ** Please Note: This note has been constructed using a voice recognition system   **

## 2021-07-08 NOTE — ASSESSMENT & PLAN NOTE
Possible UTI, cystitis - nephrostomy tubes clear, no evidence of infection involving nephrostomy tubes  - Just completed a course of Keflex on Friday  - Urine culture pending  - Continue broad Abx with Zosyn Q6, started in ED  - Previous cultures grew mixed contaminants, once with E faecalis susceptible to vanco and ampicillin one with yeast cells present  - Continue to monitor output, nephrostomy tube care  - Urology consult

## 2021-07-08 NOTE — PLAN OF CARE
Problem: Potential for Falls  Goal: Patient will remain free of falls  Description: INTERVENTIONS:  - Educate patient/family on patient safety including physical limitations  - Instruct patient to call for assistance with activity   - Consult OT/PT to assist with strengthening/mobility   - Keep Call bell within reach  - Keep bed low and locked with side rails adjusted as appropriate  - Keep care items and personal belongings within reach  - Initiate and maintain comfort rounds  - Make Fall Risk Sign visible to staff  - Apply yellow socks and bracelet for high fall risk patients  - Consider moving patient to room near nurses station  7/8/2021 1016 by Dominic Cervantes RN  Outcome: Progressing     Problem: PAIN - ADULT  Goal: Verbalizes/displays adequate comfort level or baseline comfort level  Description: Interventions:  - Encourage patient to monitor pain and request assistance  - Assess pain using appropriate pain scale  - Administer analgesics based on type and severity of pain and evaluate response  - Implement non-pharmacological measures as appropriate and evaluate response  - Consider cultural and social influences on pain and pain management  - Notify physician/advanced practitioner if interventions unsuccessful or patient reports new pain  Outcome: Progressing     Problem: INFECTION - ADULT  Goal: Absence or prevention of progression during hospitalization  Description: INTERVENTIONS:  - Assess and monitor for signs and symptoms of infection  - Monitor lab/diagnostic results  - Monitor all insertion sites, i e  indwelling lines, tubes, and drains  - Monitor endotracheal if appropriate and nasal secretions for changes in amount and color  - Lanse appropriate cooling/warming therapies per order  - Administer medications as ordered  - Instruct and encourage patient and family to use good hand hygiene technique  - Identify and instruct in appropriate isolation precautions for identified infection/condition  Outcome: Progressing     Problem: INFECTION - ADULT  Goal: Absence of fever/infection during neutropenic period  Description: INTERVENTIONS:  - Monitor WBC    Outcome: Progressing

## 2021-07-08 NOTE — ED PROVIDER NOTES
History  Chief Complaint   Patient presents with    Blood in Urine     Pt presents with blood in urine, has bilateral nephorostomy tubes, pt reports finding blood coming from tip of penis as well as clots  66 y o  M PMH hypertension, dyslipidemia COPD, prostate cancer status post radiation and chemotherapy with history of recurrent gross hematuria s/t radiation cystitis s/p bilateral nephrostomy placementafter and completed hyperbaric oxygen therapy for the same, with recent adm to hospital for same from 6/28-6/30, d/c'd home on abx which he completed on on 7/2  He presents to the ED today with c/o recurrent gross hematuria with clots via penis and dysuria  No blood per nephrostomy tubes  He denied any fevers, chills, abdominal pain  He has had normal BMs, non-bloody  He denied any symptoms at this time  He denies bleeding from elsewhere  No anticoagulation use at this time  He is requesting morphine or a rectal suppository pain med he received previously for pain management  Dr Eugene Anne was consulted and is recommended readmission for IVF and broad spectrum abx, suggests Zosyn, for further management of gross hematuria and possible UTI vs s/e radiation cystitis  History provided by:  Parent and spouse   used: No        Prior to Admission Medications   Prescriptions Last Dose Informant Patient Reported? Taking?    LORazepam (ATIVAN) 0 5 mg tablet 7/7/2021 at Unknown time  No Yes   Sig: Take 1 tablet (0 5 mg total) by mouth every 8 (eight) hours as needed for anxiety (2 tablets prior to hyperbaric oxygen therapy)   albuterol (PROVENTIL HFA,VENTOLIN HFA) 90 mcg/act inhaler 7/7/2021 at Unknown time  No Yes   Sig: Inhale 2 puffs every 4 (four) hours as needed for wheezing   atorvastatin (LIPITOR) 20 mg tablet 7/7/2021 at Unknown time  Yes Yes   Sig: Take 20 mg by mouth daily at bedtime   docusate sodium (COLACE) 100 mg capsule   No No   Sig: Take 1 capsule (100 mg total) by mouth 2 (two) times a day   ferrous sulfate 324 (65 Fe) mg 7/7/2021 at Unknown time  No Yes   Sig: Take 1 tablet (324 mg total) by mouth daily before breakfast   metoprolol tartrate (LOPRESSOR) 25 mg tablet 7/7/2021 at Unknown time  Yes Yes   Sig: Take 25 mg by mouth every 12 (twelve) hours    multivitamin-iron-minerals-folic acid (CENTRUM) chewable tablet 7/7/2021 at Unknown time  Yes Yes   Sig: Chew 1 tablet daily   naloxone (NARCAN) 4 mg/0 1 mL nasal spray   No No   Sig: Administer 1 spray into a nostril  If no response after 2-3 minutes, give another dose in the other nostril using a new spray     polyethylene glycol (MIRALAX) 17 g packet   No No   Sig: Take 17 g by mouth daily as needed (Constipation)   sodium chloride, PF, 0 9 %   No No   Sig: 10 mL by Intracatheter route daily Left Intracatheter flushing daily   sodium chloride, PF, 0 9 %   No No   Sig: 10 mL by Intracatheter route daily Right Intracatheter flushing daily      Facility-Administered Medications: None       Past Medical History:   Diagnosis Date    Cancer [facility])     prostate    COPD [facility]ch[facility] obstructive [facility] disease) ([facility] [address] [facility]     Hyperlipidemia     Hypertension        Past Surgical History:   Procedure Laterality Date    APPENDECTOMY      FL RETROGRADE PYELOGRAM  4/14/2021    HERNIA REPAIR      IR NEPHROSTOMY TUBE CHECK/CHANGE/REPOSITION/REINSERTION/UPSIZE  5/10/2021    IR NEPHROSTOMY TUBE PLACEMENT  5/7/2021    [place] CYSTOURETHROSCOPY W/IRRIG & EVAC CLOTS Bilateral 4/14/2021    Procedure: CYSTOSCOPY EVACUATION OF CLOTS bilateral retrograde pyelograms possible TURBT bladder biopsy;  Surgeon: Lattie Hatchet, MD;  Location: [address] [place] [address];  Service: Urology    [place] CYSTOURETHROSCOPY W/IRRIG & EVAC CLOTS N/A 4/24/2021    Procedure: CYSTOSCOPY EVACUATION OF CLOTS fulguration;  Surgeon: Lattie Hatchet, MD;  Location: [facility];  Service: Urology    PROSTATECTOMY      [address]      right shoulder       Family History   Problem Relation Age of Onset    Diabetes Mother     Stroke Mother     Diabetes Brother     Stroke Brother      I have reviewed and agree with the history as documented  E-Cigarette/Vaping    E-Cigarette Use Former User      E-Cigarette/Vaping Substances    Nicotine No     THC No     CBD No     Flavoring No     Other No     Unknown No      Social History     Tobacco Use    Smoking status: Former Smoker     Packs/day: 0 50     Years: 50 00     Pack years: 25 00     Quit date: 8/29/2017     Years since quitting: 3 8    Smokeless tobacco: Never Used    Tobacco comment: quit one month ago   Vaping Use    Vaping Use: Former   Substance Use Topics    Alcohol use: Never    Drug use: No       Review of Systems   Constitutional: Negative for chills and fever  HENT: Negative for nosebleeds  Eyes: Negative for pain and visual disturbance  Respiratory: Negative for cough, chest tightness and shortness of breath  Cardiovascular: Negative for chest pain and palpitations  Gastrointestinal: Negative for abdominal pain, anal bleeding, blood in stool, nausea and vomiting  Genitourinary: Positive for decreased urine volume, difficulty urinating, dysuria, frequency, hematuria, penile pain and urgency  Negative for discharge, flank pain, penile swelling, scrotal swelling and testicular pain  B/l nephrostomy tube, good urine output, yellow urine  Gross penile hematuria   Musculoskeletal: Negative for back pain  Skin: Negative for color change, rash and wound  Neurological: Negative for dizziness, syncope and light-headedness  Hematological: Does not bruise/bleed easily  Psychiatric/Behavioral: The patient is nervous/anxious  All other systems reviewed and are negative  Physical Exam  Physical Exam  Vitals and nursing note reviewed  Constitutional:       Appearance: Normal appearance  He is well-developed  HENT:      Head: Normocephalic and atraumatic        Nose: Nose normal       Mouth/Throat: Mouth: Mucous membranes are moist    Eyes:      Extraocular Movements: Extraocular movements intact  Conjunctiva/sclera: Conjunctivae normal    Cardiovascular:      Rate and Rhythm: Normal rate and regular rhythm  Comments: hypertensive  Pulmonary:      Effort: Pulmonary effort is normal  No respiratory distress  Breath sounds: Normal breath sounds  Abdominal:      General: There is no distension  Palpations: Abdomen is soft  Tenderness: There is abdominal tenderness  Comments: Mild suprapubic ttp  Bladder scan appx 30 cc  B/l nephrostomy tubes, draining yellow urine   Genitourinary:     Comments: Gross hematuria noted on depends, no clots  Musculoskeletal:         General: Normal range of motion  Cervical back: Normal range of motion and neck supple  Skin:     General: Skin is warm and dry  Capillary Refill: Capillary refill takes less than 2 seconds  Coloration: Skin is not pale  Findings: No bruising  Neurological:      General: No focal deficit present  Mental Status: He is alert     Psychiatric:      Comments: Moderately anxious         Vital Signs  ED Triage Vitals [07/08/21 0327]   Temperature Pulse Respirations Blood Pressure SpO2   (!) 96 4 °F (35 8 °C) 95 18 (!) 179/83 93 %      Temp Source Heart Rate Source Patient Position - Orthostatic VS BP Location FiO2 (%)   Tympanic Monitor Sitting Right arm --      Pain Score       Worst Possible Pain           Vitals:    07/08/21 0327   BP: (!) 179/83   Pulse: 95   Patient Position - Orthostatic VS: Sitting         Visual Acuity      ED Medications  Medications   morphine (PF) 4 mg/mL injection 4 mg (has no administration in time range)       Diagnostic Studies  Results Reviewed     Procedure Component Value Units Date/Time    CBC and differential [[id number]] Collected: 07/08/21 0416    Lab Status: No result Specimen: Blood from Arm, Left     Protime-INR [[id number]] Collected: 07/08/21 0416    Lab Status: No result Specimen: Blood from Arm, Left     APTT [[id number]] Collected: 07/08/21 0416    Lab Status: No result Specimen: Blood from Arm, Left     Comprehensive metabolic panel [[id number]] Collected: 07/08/21 0416    Lab Status: No result Specimen: Blood from Arm, Left     Urine Microscopic [[id number]]  (Abnormal) Collected: 07/08/21 0338    Lab Status: Final result Specimen: Urine, Other Updated: 07/08/21 0406     RBC, UA Innumerable /hpf      WBC, UA Innumerable /hpf      Epithelial Cells None Seen /hpf      Bacteria, UA Innumerable /hpf      AMORPH URATES Occasional /hpf      OTHER OBSERVATIONS Yeast Cells Present    Urine culture [[id number]] Collected: 07/08/21 0338    Lab Status:  In process Specimen: Urine, Other Updated: 07/08/21 0406    UA w Reflex to Microscopic w Reflex to Culture [[id number]]  (Abnormal) Collected: 07/08/21 0338    Lab Status: Final result Specimen: Urine, Other Updated: 07/08/21 0357     Color, UA Yellow     Clarity, UA Cloudy     Specific Gravity, UA 1 025     pH, UA 5 5     Leukocytes, UA Moderate     Nitrite, UA Negative     Protein,  (2+) mg/dl      Glucose, UA Negative mg/dl      Ketones, UA Negative mg/dl      Urobilinogen, UA 0 2 E U /dl      Bilirubin, UA Negative     Blood, UA Large                 No orders to display              Procedures  Procedures         ED Course                                           MDM  Number of Diagnoses or Management Options  Gross hematuria: established and worsening  UTI (urinary tract infection): established and worsening  Diagnosis management comments: Vs Radiation Cystitis Effects  PPx Abx  IVF  Symptomatic management  Urology consult, consider cystoscopy if benefits outweighs risks  ID consult as needed         Amount and/or Complexity of Data Reviewed  Clinical lab tests: ordered and reviewed  Tests in the radiology section of CPT®: ordered and reviewed  Tests in the medicine section of CPT®: reviewed and ordered  Decide to obtain previous medical records or to obtain history from someone other than the patient: yes  Obtain history from someone other than the patient: yes (spouse)  Review and summarize past medical records: yes  Discuss the patient with other providers: yes (Dr Shirlee Gitelman - hospitalist)  Independent visualization of images, tracings, or specimens: yes    Risk of Complications, Morbidity, and/or Mortality  Presenting problems: moderate  Diagnostic procedures: moderate  Management options: moderate    Patient Progress  Patient progress: stable      Disposition  Final diagnoses:   None     ED Disposition     None      Follow-up Information    None         Patient's Medications   Discharge Prescriptions    No medications on file     No discharge procedures on file      PDMP Review       Value Time User    PDMP Reviewed  Yes 6/11/2021  8:36 AM CORWIN Rosales          ED Provider  Electronically Signed by           Maria Luisa Fermin MD  07/08/21 5639

## 2021-07-08 NOTE — OP NOTE
OPERATIVE REPORT  PATIENT NAME: Michael Arteaga    :  1945  MRN: [id number]  Pt Location: [facility] OR ROOM 04    SURGERY DATE: 2021    Surgeon(s) and Role:     * Alan Cervantes MD - Primary    Preop Diagnosis:  Gross hematuria [R31 0]  Radiation cystitis  Clot retention    Post-Op Diagnosis Codes:     * Gross hematuria [R31 0]  Radiation cystitis  Clot retention    Procedure  CYSTOSCOPY EVACUATION OF CLOTS FULGURATION BLADDER ANTEGRADE NEPHROGRAMS    Specimen(s):  ID Type Source Tests Collected by Time Destination   1 : bladder tissue and clot Tissue Urinary Bladder TISSUE EXAM Alan Cervantes MD 2021 1735        Estimated Blood Loss:   Minimal    Drains:  Nephrostomy Left 1 8 Fr  (Active)   Number of days: 62       Nephrostomy Right 2 8 Fr  (Active)   Number of days: 62       Urethral Catheter Three way 22 Fr  (Active)   Number of days: 0       Anesthesia Type:   General/LMA    Operative Indications:  Gross hematuria [R31 0]  This 73-year-old male well-known to [place] [address] staff presented late last night with severe suprapubic pain patient has recent diagnosed history of radiation cystitis gross hematuria status post radiation therapy to the prostatic bed for recurrent prostate cancer status post radical prostatectomy by myself decades ago patient has been admitted 3 times in the last 3 months for debilitating gross hematuria now on O2 therapy receiving 32 courses    Patient also has a 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 patient now to undergo re-evaluation    Operative Findings:  Cystoscopy confirms   severely inflamed bladder consistent with radiation cystitis no bladder tumors noted    organized clots undergoing aggressive irrigation of clots an fulguration to the floor  Ureteral orifices could not be found   antegrade nephrostogram is a confirm normal filling of the pelvis and ureters   draining into the bladder with no intraluminal defects checking the upper tracts   in light of history of bladder papillomas      Complications:   None    Procedure and Technique:  After patient identified in the holding area consent signed he was placed op suite  After anesthesia induced he was placed in lithotomy position draped prep standard fashion  Time-out performed  A 20 Hungarian cystoscope with 30 lens was passed through through the bladder  Anterior showed no abnormalities  Posterior the confirmed inflamed ragged bladder neck with severely inflamed bladder floor and organized clot  Hand irrigation of these clots removed with friable tissue noted in the [facility] and sent for permanent identification  On view of the remaining bladder there was no obvious bladder tumors noted  The ureteral orifices could not be found  The 25 Hungarian resectoscope was placed and aggressive fulguration of the entire bladder for was performed  Once the bleeding had been controlled in all clots removed [address] [place] catheter inserted with 10 cc in the balloon left to continuous bladder irrigation 0 9 normal saline CBI is clear  The right nephrostomy tub was then injected with 10 cc of normal saline filling the renal pelvis proximal mid distal ureter no intraluminal defects noted this was done on the left side no intraluminal defects were noted nephrostomy tubes we reconnected to bag drainage  Patient was awakened taken to recovery room stable condition    Will continue with his hyperbolic  therapy after discharge     I was present for the entire procedure  Patient Disposition:  PACU     SIGNATURE: Lattie Hatchet, MD  DATE: July 8, 2021  TIME: 5:57 PM

## 2021-07-08 NOTE — ANESTHESIA POSTPROCEDURE EVALUATION
Post-Op Assessment Note    CV Status:  Stable    Pain management: adequate     Mental Status:  Alert and awake   Hydration Status:  Euvolemic   PONV Controlled:  Controlled   Airway Patency:  Patent      Post Op Vitals Reviewed: Yes      Staff: CRNA         No complications documented      BP   140/70   Temp   97 8   Pulse  72   Resp   20   SpO2   99

## 2021-07-08 NOTE — CONSULTS
H&P Exam - Urology       Patient: Cynthia Arias   : 1945 Sex: male   MRN: [id number]     CSN: [id number]      History of Present Illness   HPI:  Cynthia Arias is a 66 y o  male well known to me with history of radiation cystitis status post external radiation to the prostatic bed for recurrent prostate cancer with history of bladder papillomas now on hyperbolic  oxygen therapy developed severe suprapubic pain late last night and penile discomfort presenting to [address] [place] [address] ER early this morning seen at the bedside noted by the nurses to have a postvoid residual of less than 30 cc patient seen in the room early this morning unable to sit still stating he has severe penile pain placed in the supine position [address] [place] 3 way [place] catheter inserted with 100 cc of pyuria urine removed hand irrigated with multiple clots removed denying fevers chills      Review of Systems:   Constitutional:  Negative for activity change, fever, chills and diaphoresis  HENT: Negative for hearing loss and trouble swallowing  Eyes: Negative for itching and visual disturbance  Respiratory: Negative for chest tightness and shortness of breath  Cardiovascular: Negative for chest pain, edema  Gastrointestinal: Negative for abdominal distention, na abdominal pain, constipation, diarrhea, Nausea and vomiting  Genitourinary: Negative for decreased urine volume, difficulty urinating, dysuria, enuresis, frequency, hematuria and urgency  Musculoskeletal: Negative for gait problem and myalgias  Neurological: Negative for dizziness and headaches  Hematological: Does not bruise/bleed easily         Historical Information   Past Medical History:   Diagnosis Date    Cancer [facility])     prostate    COPD [facility]chronic obstructive pulmonary disease) ([facility] [address] )     Hyperlipidemia     Hypertension      Past Surgical History:   Procedure Laterality Date    APPENDECTOMY      FL RETROGRADE PYELOGRAM  2021    HERNIA REPAIR      IR NEPHROSTOMY TUBE CHECK/CHANGE/REPOSITION/REINSERTION/UPSIZE  5/10/2021    IR NEPHROSTOMY TUBE PLACEMENT  5/7/2021    [place] CYSTOURETHROSCOPY W/IRRIG & EVAC CLOTS Bilateral 4/14/2021    Procedure: CYSTOSCOPY EVACUATION OF CLOTS bilateral retrograde pyelograms possible TURBT bladder biopsy;  Surgeon: Ramírez Rivera MD;  Location: [address] [place] [address];  Service: Urology    [place] CYSTOURETHROSCOPY W/IRRIG & EVAC CLOTS N/A 4/24/2021    Procedure: CYSTOSCOPY EVACUATION OF CLOTS fulguration;  Surgeon: Ramírez Rivera MD;  Location: [facility];  Service: Urology    PROSTATECTOMY      [address]      right shoulder     Social History   Social History     Substance and Sexual Activity   Alcohol Use Never     Social History     Substance and Sexual Activity   Drug Use No     Social History     Tobacco Use   Smoking Status Former Smoker    Packs/day: 0 50    Years: 50 00    Pack years: 25 00    Quit date: 8/29/2017    Years since quitting: 3 8   Smokeless Tobacco Never Used   Tobacco Comment    quit one month ago     Family History:   Family History   Problem Relation Age of Onset    Diabetes Mother     Stroke Mother     Diabetes Brother     Stroke Brother        Meds/Allergies   Medications Prior to Admission   Medication    albuterol (PROVENTIL HFA,VENTOLIN HFA) 90 mcg/act inhaler    atorvastatin (LIPITOR) 20 mg tablet    ferrous sulfate 324 (65 Fe) mg    LORazepam (ATIVAN) 0 5 mg tablet    metoprolol tartrate (LOPRESSOR) 25 mg tablet    multivitamin-iron-minerals-folic acid (CENTRUM) chewable tablet    docusate sodium (COLACE) 100 mg capsule    naloxone (NARCAN) 4 mg/0 1 mL nasal spray    polyethylene glycol (MIRALAX) 17 g packet    sodium chloride, PF, 0 9 %    sodium chloride, PF, 0 9 %     No Known Allergies    Objective   Vitals: /68 (BP Location: Right arm)   Pulse 94   Temp 97 8 °F (36 6 °C) (Oral)   Resp 18   Ht 5' 11" (1 803 m)   Wt 97 6 kg (215 lb 2 7 oz)   SpO2 97%   BMI 30 01 kg/m² Physical Exam:  General Alert awake   Normocephalic atraumatic PERRLA  Lungs clear bilaterally  Cardiac normal S1 normal S2  Abdomen soft, flank pain  Extremities no edema    I/O last 24 hours:   In: -   Out: 50 [Urine:50]    Invasive Devices     Peripheral Intravenous Line            Peripheral IV 07/08/21 Left Antecubital <1 day          Drain            Nephrostomy Left 1 8 Fr  62 days    Nephrostomy Right 2 8 Fr  62 days                    Lab Results: CBC:   Lab Results   Component Value Date    WBC 12 98 (H) 07/08/2021    HGB 8 9 (L) 07/08/2021    HCT 29 3 (L) 07/08/2021    MCV 82 07/08/2021     07/08/2021    MCH 24 9 (L) 07/08/2021    MCHC 30 4 (L) 07/08/2021    RDW 16 7 (H) 07/08/2021    MPV 9 7 07/08/2021    NRBC 0 07/08/2021     CMP:   Lab Results   Component Value Date     07/08/2021    CO2 25 07/08/2021    BUN 29 (H) 07/08/2021    CREATININE 1 48 (H) 07/08/2021    CALCIUM 8 8 07/08/2021    AST 17 07/08/2021    ALT 24 07/08/2021    ALKPHOS 89 07/08/2021    EGFR 45 07/08/2021     Urinalysis:   Lab Results   Component Value Date    COLORU Yellow 07/08/2021    CLARITYU Cloudy 07/08/2021    SPECGRAV 1 025 07/08/2021    PHUR 5 5 07/08/2021    PHUR 5 5 03/25/2018    LEUKOCYTESUR Moderate (A) 07/08/2021    NITRITE Negative 07/08/2021    GLUCOSEU Negative 07/08/2021    KETONESU Negative 07/08/2021    BILIRUBINUR Negative 07/08/2021    BLOODU Large (A) 07/08/2021     Urine Culture:   Lab Results   Component Value Date    URINECX >100,000 cfu/ml  06/28/2021     PSA: No results found for: PSA    Twenty-four [address] 3 way [place] catheter inserted with some difficulty 100 cc of pyuria urine and clots hand irrigated with clots removed now draining hematuria urine  CBI to start    Assessment/ Plan:  Gross hematuria  Small capacity bladder  Radiation cystitis/history of bladder papillomas  History of prostate cancer status post radical prostatectomy status post external radiation to the prostatic bed in light of recurrence  Plan   NPO   Awaiting  Urinalysis from a voided specimen  Right nephrostomy urine checked this morning for infection  If no obvious bladder infection  Will take to OR today for cysto evacuation of clots fulguration retrograde pyelograms      Gume Molina MD

## 2021-07-08 NOTE — ASSESSMENT & PLAN NOTE
Radiation cystitis, 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  No bloody output from bilateral nephrostomy tubes    Also with recurrent UTIs, just recently finished a course of Kelfex on Friday  - UA abnormal, started on Zosyn  - F/u urine culture and tailor ABx per results   - Monitor I/Os  - B+O suppository PRN for pain  - Urology consult appreciated

## 2021-07-08 NOTE — ANESTHESIA PREPROCEDURE EVALUATION
Procedure:  CYSTOSCOPY EVACUATION OF CLOTS BILATERAL RETROGRADES POSSIBLE TURBT (N/A Bladder)    Relevant Problems   ANESTHESIA (within normal limits)      CARDIO   (+) Essential hypertension   (+) Hyperlipidemia      /RENAL   (+) SHANTI (acute kidney injury) (HCC)   (+) Prostate cancer (HCC)      HEMATOLOGY   (+) Acute blood loss anemia (ABLA)   (+) Anemia      MUSCULOSKELETAL   (+) RA (rheumatoid arthritis) (HCC)      PULMONARY   (+) Chronic obstructive pulmonary disease (HCC)        Physical Exam    Airway    Mallampati score: II  TM Distance: >3 FB  Neck ROM: full     Dental   No notable dental hx     Cardiovascular  Rhythm: regular, Rate: normal,     Pulmonary  Breath sounds clear to auscultation,     Other Findings        Anesthesia Plan  ASA Score- 3 Emergent    Anesthesia Type- general with ASA Monitors  Additional Monitors:   Airway Plan: LMA  Plan Factors-Exercise tolerance (METS): >4 METS  Chart reviewed  EKG reviewed  Existing labs reviewed  Patient summary reviewed  Patient is not a current smoker  Induction- intravenous  Postoperative Plan- Plan for postoperative opioid use  Planned trial extubation    Informed Consent- Anesthetic plan and risks discussed with patient  I personally reviewed this patient with the CRNA  Discussed and agreed on the Anesthesia Plan with the CRNA  Mario Cervantes

## 2021-07-09 PROBLEM — N39.0 UTI (URINARY TRACT INFECTION): Status: RESOLVED | Noted: 2021-04-24 | Resolved: 2021-07-09

## 2021-07-09 PROCEDURE — 99232 SBSQ HOSP IP/OBS MODERATE 35: CPT | Performed by: INTERNAL MEDICINE

## 2021-07-09 PROCEDURE — 94664 DEMO&/EVAL PT USE INHALER: CPT

## 2021-07-09 RX ORDER — FERROUS SULFATE 325(65) MG
325 TABLET ORAL EVERY 24 HOURS
Status: DISCONTINUED | OUTPATIENT
Start: 2021-07-09 | End: 2021-07-12 | Stop reason: HOSPADM

## 2021-07-09 RX ORDER — FERROUS SULFATE 325(65) MG
325 TABLET ORAL EVERY 24 HOURS
Status: DISCONTINUED | OUTPATIENT
Start: 2021-07-10 | End: 2021-07-09

## 2021-07-09 RX ORDER — ATROPA BELLADONNA AND OPIUM 16.2; 3 MG/1; MG/1
30 SUPPOSITORY RECTAL EVERY 8 HOURS PRN
Qty: 10 SUPPOSITORY | Refills: 0 | Status: SHIPPED | OUTPATIENT
Start: 2021-07-09 | End: 2021-08-02 | Stop reason: HOSPADM

## 2021-07-09 RX ADMIN — ATORVASTATIN CALCIUM 20 MG: 20 TABLET, FILM COATED ORAL at 21:16

## 2021-07-09 RX ADMIN — SODIUM CHLORIDE, SODIUM LACTATE, POTASSIUM CHLORIDE, AND CALCIUM CHLORIDE 100 ML/HR: .6; .31; .03; .02 INJECTION, SOLUTION INTRAVENOUS at 05:08

## 2021-07-09 RX ADMIN — METOPROLOL TARTRATE 25 MG: 25 TABLET, FILM COATED ORAL at 08:40

## 2021-07-09 RX ADMIN — METOPROLOL TARTRATE 25 MG: 25 TABLET, FILM COATED ORAL at 21:16

## 2021-07-09 RX ADMIN — FERROUS SULFATE TAB 325 MG (65 MG ELEMENTAL FE) 325 MG: 325 (65 FE) TAB at 14:01

## 2021-07-09 NOTE — PHYSICIAN ADVISOR
Current patient class: Outpatient Surgery  The patient is currently on Hospital Day: 2 at [address]        The patient was admitted to the hospital  on N/A at N/A for the following diagnosis:  Blood in urine [R31 9]  UTI (urinary tract infection) [N39 0]  Gross hematuria [R31 0]  Hematuria due to cystitis [N30 91]     After review of the relevant documentation, labs, vital signs and test results, the patient is most appropriate to remain OUTPATIENT CLASS today  Rationale is as follows: The patient is a 66 yrs   Male who presented to the ED at 7/8/2021  3:26 AM with a chief complaint of Blood in Urine (Pt presents with blood in urine, has bilateral nephorostomy tubes, pt reports finding blood coming from tip of penis as well as clots )   Patient came in for cystoscopy evacuation of clots/fulguration  Patient has a discharge order written for today  Given that the patient is anticipated to be discharged today signifies medical stability and therefore I recommend keeping the patient outpatient no charge bed      The patients vitals on arrival were   ED Triage Vitals [07/08/21 0327]   Temperature Pulse Respirations Blood Pressure SpO2   (!) 96 4 °F (35 8 °C) 95 18 (!) 179/83 93 %      Temp Source Heart Rate Source Patient Position - Orthostatic VS BP Location FiO2 (%)   Tympanic Monitor Sitting Right arm --      Pain Score       Worst Possible Pain           Past Medical History:   Diagnosis Date    Cancer [facility])     prostate    COPD (chronic obstructive pulmonary disease) ([facility] [address] )     Hyperlipidemia     Hypertension      Past Surgical History:   Procedure Laterality Date    APPENDECTOMY      [place] RETROGRADE PYELOGRAM  4/14/2021    HERNIA REPAIR      IR NEPHROSTOMY TUBE CHECK/CHANGE/REPOSITION/REINSERTION/UPSIZE  5/10/2021    IR NEPHROSTOMY TUBE PLACEMENT  5/7/2021    [place] CYSTOURETHROSCOPY W/IRRIG & EVAC CLOTS Bilateral 4/14/2021    Procedure: CYSTOSCOPY EVACUATION OF CLOTS bilateral retrograde pyelograms possible TURBT bladder biopsy;  Surgeon: Kellee Corbin MD;  Location: [address] [place] [address];  Service: Urology    [place] CYSTOURETHROSCOPY W/IRRIG & EVAC CLOTS N/A 4/24/2021    Procedure: CYSTOSCOPY EVACUATION OF CLOTS fulguration;  Surgeon: Kellee Corbin MD;  Location: [address] [place] [address];  Service: Urology    PROSTATECTOMY      [address]      right shoulder           Consults have been placed to:   IP CONSULT TO UROLOGY    Vitals:    07/08/21 1904 07/08/21 2035 07/08/21 2248 07/09/21 0733   BP: (!) 179/81 130/62 112/69 119/92   BP Location: Right arm Right arm Left arm Left arm   Pulse: 76 72 76 60   Resp: 18 18 18 17   Temp: 98 1 °F (36 7 °C) 98 5 °F (36 9 °C) 98 4 °F (36 9 °C) 98 4 °F (36 9 °C)   TempSrc: Oral Oral Oral Oral   SpO2: 97% 92% 94% 91%   Weight:       Height:           Most recent labs:    Recent Labs     07/08/21  0416 07/08/21  0556   WBC 12 57* 12 98*   HGB 9 0* 8 9*   HCT 30 3* 29 3*    266   K 4 2 4 3   CALCIUM 8 9 8 8   BUN 29* 29*   CREATININE 1 40* 1 48*   INR 1 09  --    AST 17  --    ALT 24  --    ALKPHOS 89  --        Scheduled Meds:  Current Facility-Administered Medications   Medication Dose Route Frequency Provider Last Rate    acetaminophen  650 mg Oral Q6H PRN Kellee Corbin MD      albuterol  2 puff Inhalation Q4H PRN Kellee Corbin MD      atorvastatin  20 mg Oral HS Kellee Corbin MD      belladonna-opium  30 mg Rectal Q8H PRN Kellee Corbin MD      calcium carbonate  1,000 mg Oral Daily PRN Kellee Corbin MD      docusate sodium  100 mg Oral BID Kellee Corbin MD      ferrous sulfate  325 mg Oral Daily With Breakfast Kellee Corbin MD      lactated ringers  1,000 mL Intravenous Once PRN Kellee Corbin MD      And    lactated ringers  1,000 mL Intravenous Once PRN Kellee Corbin MD      lactated ringers  100 mL/hr Intravenous Continuous Jh MD Nroman 100 mL/hr (07/09/21 0435)    metoprolol tartrate  25 mg Oral Q12H [address] Vaibhav Mohr MD      ondansetron  4 mg Intravenous Q6H PRN Yen Cao MD      polyethylene glycol  17 g Oral Daily PRN Yen Cao MD      sodium chloride (PF)  10 mL Intracatheter Daily Yen Cao MD      sodium chloride  1,000 mL Intravenous Once PRN Yen Cao MD      And    sodium chloride  1,000 mL Intravenous Once PRN Yen Cao MD      sodium chloride  75 mL/hr Intravenous Continuous Yen Cao MD 75 mL/hr (07/08/21 0544)     Continuous Infusions:lactated ringers, 100 mL/hr, Last Rate: 100 mL/hr (07/09/21 0508)  sodium chloride, 75 mL/hr, Last Rate: 75 mL/hr (07/08/21 0544)      PRN Meds:   acetaminophen    albuterol    belladonna-opium    calcium carbonate    lactated ringers **AND** lactated ringers    ondansetron    polyethylene glycol    sodium chloride **AND** sodium chloride

## 2021-07-09 NOTE — PLAN OF CARE
Problem: Potential for Falls  Goal: Patient will remain free of falls  Description: INTERVENTIONS:  - Educate patient/family on patient safety including physical limitations  - Instruct patient to call for assistance with activity   - Consult OT/PT to assist with strengthening/mobility   - Keep Call bell within reach  - Keep bed low and locked with side rails adjusted as appropriate  - Keep care items and personal belongings within reach  - Initiate and maintain comfort rounds  - Make Fall Risk Sign visible to staff  - Apply yellow socks and bracelet for high fall risk patients  - Consider moving patient to room near nurses station  Outcome: Progressing      Problem: PAIN - ADULT  Goal: Verbalizes/displays adequate comfort level or baseline comfort level  Description: Interventions:  - Encourage patient to monitor pain and request assistance  - Assess pain using appropriate pain scale  - Administer analgesics based on type and severity of pain and evaluate response  - Implement non-pharmacological measures as appropriate and evaluate response  - Consider cultural and social influences on pain and pain management  - Notify physician/advanced practitioner if interventions unsuccessful or patient reports new pain  Outcome: Progressing     Problem: INFECTION - ADULT  Goal: Absence or prevention of progression during hospitalization  Description: INTERVENTIONS:  - Assess and monitor for signs and symptoms of infection  - Monitor lab/diagnostic results  - Monitor all insertion sites, i e  indwelling lines, tubes, and drains  - Monitor endotracheal if appropriate and nasal secretions for changes in amount and color  - [place] appropriate cooling/warming therapies per order  - Administer medications as ordered  - Instruct and encourage patient and family to use good hand hygiene technique  - Identify and instruct in appropriate isolation precautions for identified infection/condition  Outcome: Progressing  Goal: Absence of fever/infection during neutropenic period  Description: INTERVENTIONS:  - Monitor WBC    Outcome: Progressing     Problem: GENITOURINARY - ADULT  Goal: Maintains or returns to baseline urinary function  Description: INTERVENTIONS:  - Assess urinary function  - Encourage oral fluids to ensure adequate hydration if ordered  - Administer IV fluids as ordered to ensure adequate hydration  - Administer ordered medications as needed  - Offer frequent toileting  - Follow urinary retention protocol if ordered  Outcome: Progressing  Goal: Absence of urinary retention  Description: INTERVENTIONS:  - Assess patients ability to void and empty bladder  - Monitor I/O  - Bladder scan as needed  - Discuss with physician/AP medications to alleviate retention as needed  - Discuss catheterization for long term situations as appropriate  Outcome: Progressing  Goal: Urinary catheter remains patent  Description: INTERVENTIONS:  - Assess patency of urinary catheter  - If patient has a chronic alanis, consider changing catheter if non-functioning  - Follow guidelines for intermittent irrigation of non-functioning urinary catheter  Outcome: Progressing

## 2021-07-09 NOTE — PLAN OF CARE
Problem: Potential for Falls  Goal: Patient will remain free of falls  Description: INTERVENTIONS:  - Educate patient/family on patient safety including physical limitations  - Instruct patient to call for assistance with activity   - Consult OT/PT to assist with strengthening/mobility   - Keep Call bell within reach  - Keep bed low and locked with side rails adjusted as appropriate  - Keep care items and personal belongings within reach  - Initiate and maintain comfort rounds  - Make Fall Risk Sign visible to staff  - Apply yellow socks and bracelet for high fall risk patients  - Consider moving patient to room near nurses station  Outcome: Progressing     Problem: PAIN - ADULT  Goal: Verbalizes/displays adequate comfort level or baseline comfort level  Description: Interventions:  - Encourage patient to monitor pain and request assistance  - Assess pain using appropriate pain scale  - Administer analgesics based on type and severity of pain and evaluate response  - Implement non-pharmacological measures as appropriate and evaluate response  - Consider cultural and social influences on pain and pain management  - Notify physician/advanced practitioner if interventions unsuccessful or patient reports new pain  Outcome: Progressing     Problem: INFECTION - ADULT  Goal: Absence or prevention of progression during hospitalization  Description: INTERVENTIONS:  - Assess and monitor for signs and symptoms of infection  - Monitor lab/diagnostic results  - Monitor all insertion sites, i e  indwelling lines, tubes, and drains  - Monitor endotracheal if appropriate and nasal secretions for changes in amount and color  - [place] appropriate cooling/warming therapies per order  - Administer medications as ordered  - Instruct and encourage patient and family to use good hand hygiene technique  - Identify and instruct in appropriate isolation precautions for identified infection/condition  Outcome: Progressing  Goal: Absence of fever/infection during neutropenic period  Description: INTERVENTIONS:  - Monitor WBC    Outcome: Progressing     Problem: GENITOURINARY - ADULT  Goal: Maintains or returns to baseline urinary function  Description: INTERVENTIONS:  - Assess urinary function  - Encourage oral fluids to ensure adequate hydration if ordered  - Administer IV fluids as ordered to ensure adequate hydration  - Administer ordered medications as needed  - Offer frequent toileting  - Follow urinary retention protocol if ordered  Outcome: Progressing  Goal: Absence of urinary retention  Description: INTERVENTIONS:  - Assess patients ability to void and empty bladder  - Monitor I/O  - Bladder scan as needed  - Discuss with physician/AP medications to alleviate retention as needed  - Discuss catheterization for long term situations as appropriate  Outcome: Progressing  Goal: Urinary catheter remains patent  Description: INTERVENTIONS:  - Assess patency of urinary catheter  - If patient has a chronic alanis, consider changing catheter if non-functioning  - Follow guidelines for intermittent irrigation of non-functioning urinary catheter  Outcome: Progressing

## 2021-07-09 NOTE — PROGRESS NOTES
Progress Note - Urology      Patient: Michael Arteaga   : 1945 Sex: male   MRN: [id number]     CSN: [id number]  Unit/Bed#: [address] [place] [address]     SUBJECTIVE:   No complaints  Sumner just removed  Clear urine  Voiding trial      Objective   Vitals: /92 (BP Location: Left arm)   Pulse 60   Temp 98 4 °F (36 9 °C) (Oral)   Resp 17   Ht 5' 11" (1 803 m)   Wt 97 6 kg (215 lb 2 7 oz)   SpO2 91%   BMI 30 01 kg/m²     I/O last 24 hours: In: 505 [I V :505]  Out: 2825 [Urine:2825]      Physical Exam:   General Alert awake   Normocephalic atraumatic PERRLA  Lungs clear bilaterally  Cardiac normal S1 normal S2  Abdomen soft, flank pain  Extremities no edema      Lab Results: CBC:   Lab Results   Component Value Date    WBC 12 98 (H) 2021    HGB 8 9 (L) 2021    HCT 29 3 (L) 2021    MCV 82 2021     2021    MCH 24 9 (L) 2021    MCHC 30 4 (L) 2021    RDW 16 7 (H) 2021    MPV 9 7 2021    NRBC 0 2021     CMP:   Lab Results   Component Value Date     2021    CO2 25 2021    BUN 29 (H) 2021    CREATININE 1 48 (H) 2021    CALCIUM 8 8 2021    AST 17 2021    ALT 24 2021    ALKPHOS 89 2021    EGFR 45 2021     Urinalysis:   Lab Results   Component Value Date    COLORU Red 2021    CLARITYU Turbid 2021    SPECGRAV 1 020 2021    PHUR 7 0 2021    PHUR 5 5 2018    LEUKOCYTESUR Moderate (A) 2021    NITRITE Negative 2021    GLUCOSEU Negative 2021    KETONESU Negative 2021    BILIRUBINUR Negative 2021    BLOODU Large (A) 2021     Urine Culture:   Lab Results   Component Value Date    URINECX >100,000 cfu/ml  2021     PSA: No results found for: PSA      Assessment/ Plan:  Status post cysto evacuation of clots fulguration antegrade nephrostogram CIS  Radiation cystitis  CBI D seed 7:00 a m    Sumner clear urine  DC Sumner voiding trial  Continue O2 therapy  Will see in the office in a few weeks          Lattie Hatchet, MD

## 2021-07-09 NOTE — ASSESSMENT & PLAN NOTE
Secondary to hemorrhagic cystitis  Patient has been receiving hyperbaric oxygen treatment, however still continues to have gross hematuria on and off  Follow up Urology recommendations  Patient is status post OR and cystoscopy and clot evacuation  Currently no more bleeding  However status post removal of the Sumner catheter patient had some blood clots passing and bladder spasms  Hence patient will need to be made a full inpatient admission     Check CBC and BMP in the morning

## 2021-07-09 NOTE — ASSESSMENT & PLAN NOTE
Management as per Urology, patient is status post bilateral nephrostomy tubes  Flush catheter daily  7

## 2021-07-09 NOTE — ASSESSMENT & PLAN NOTE
Radiation cystitis, 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  No bloody output from bilateral nephrostomy tubes  Patient does not seem to have any UTI  Discontinue antibiotic  Follow up Urology recommendations regarding management of hemorrhagic cystitis  Patient has had clear urine this morning and hence Sumner catheter has been discontinued  But patient had passed some blood clots and hence will need to be monitored overnight    Hence patient will need to be made a full inpatient admission P

## 2021-07-09 NOTE — PHYSICIAN ADVISOR
I did review the case earlier today and at that point the patient did appear to be medically stable however the patient after removal of the Sumner catheter had some blood clots and bladder spasms therefore requiring further hospitalization and monitoring  Given the above the patient is going to cross the 2 midnight benchmark as set by Medicare and is inpatient appropriate  Patient is receiving ongoing acute medical care, management and monitoring

## 2021-07-09 NOTE — DISCHARGE SUMMARY
Discharge Summary - [facility]    Patient Information: Lo Shah 66 y o  male MRN: [id number]  Unit/Bed#: [address] [place] [address] Encounter: [id number]    Discharging Physician / Practitioner: Sonia Albarado MD  PCP: Mohit Falcon MD  Admission Date: 7/8/2021  Discharge Date: 7/12/21    Reason for Admission: Blood in Urine (Pt presents with blood in urine, has bilateral nephorostomy tubes, pt reports finding blood coming from tip of penis as well as clots )      Discharge Diagnoses:     Primary Discharge Diagnosis:       Principal Problem:    UTI (urinary tract infection)  Active Problems:    Essential hypertension    Anemia    Dyslipidemia    Hematuria due to cystitis    Prostate cancer ([facility] [address] )  Resolved Problems:    * No resolved hospital problems  *    Consultations During Hospital Stay:  IP CONSULT TO UROLOGY    Procedures Performed:     · Cystoscopy with evacuation of blood clots  Significant Findings:   · Refer to hospital course and above listed diagnosis related plan for details    Imaging while in hospital:  IR guided nephrostomy checked  CXR  Incidental Findings:   Test Results Pending at Discharge (will require follow up):   · As per After Visit Summary     Outpatient Tests Requested:    Complications:  Refer to hospital course and above listed diagnosis related plan, if any    Hospital Course:     Lo Shah is a 66 y o  male patient with PMHx of hypertension, hyperlipidemia, COPD, prostate cancer, status post radiation chemotherapy, history of 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 and multiple admissions for the same, bilateral nephrostomy tubes who originally presented to the hospital on 7/8/2021 due to chief complaints of gross hematuria again  Patient had significant amount of gross bloody urine  He had clots coming out as well  Patient was then taken to the OR for cystoscopy and blood clot evacuation    Status post procedure patient was doing normal and Sumner catheter was removed, however later on he again had blood clots and hematuria recurred hence Sumner catheter was reinserted and there was minimal amount of gross hematuria and Sumner catheter was discontinued  Patient's nephrostomy tube was clamped and patient was allowed to pass urine and clear office clots eventually patient was feeling better and nephrostomy tubes were again of kept open  Patient had minimal bladder spasms then on and patient was recommended to continue getting his hyperbaric oxygen therapy for his radiation induced cystitis and follow-up with urology as an outpatient  Patient's hemoglobin remained stable and did not require any blood transfusion  Patient was seen by Urologist Dr Kristen Carrasquillo during the hospitalization  Please see above list of diagnoses and related plan for additional information  Condition at Discharge: fair     Discharge Day Visit / Exam:     Subjective:  I have seen and examined the patient at bedside  Overnight events reviewed with the RN  Vitals: Blood Pressure: 119/92 (07/09/21 0733)  Pulse: 60 (07/09/21 0733)  Temperature: 98 4 °F (36 9 °C) (07/09/21 0733)  Temp Source: Oral (07/09/21 0733)  Respirations: 17 (07/09/21 0733)  Height: 5' 11" (180 3 cm) (07/08/21 0516)  Weight - Scale: 97 6 kg (215 lb 2 7 oz) (07/08/21 0516)  SpO2: 91 % (07/09/21 0733)  Exam:   Physical Exam  General Exam: Alert and Oriented x 3, NAD  Eyes: TR  Neck: Supple  CVS: S1, S2 [place], RRR    R/S: Clear to auscultate anteriorly  Abd: Soft, NT, ND, BS+ve, bilateral nephrostomy tube draining clear urine  Extremities: No edema noted  Skin: No acute Rash noted  CNS: No acute FND  Moves all 4 extremities  Psych: Co-operative, Not agitated  Discharge instructions/Information to patient and family:(Discharge Medications and Follow up):   See after visit summary for information provided to patient and family        Provisions for Follow-Up Care:  See after visit summary for information related to follow-up care and any pertinent home health orders  Disposition: Home    Planned Readmission:  No     Discharge Statement:  I spent 35 minutes discharging the patient  This time was spent on the day of discharge  I had direct contact with the patient on the day of discharge  Greater than 50% of the total time was spent examining patient, answering all patient questions, arranging and discussing plan of care with patient as well as directly providing post-discharge instructions  Additional time then spent on discharge activities  Discharge Medications:  See after visit summary for reconciled discharge medications provided to patient and family  ** Please Note: "This note has been constructed using a voice recognition system  Therefore there may be syntax, spelling, and/or grammatical errors   Please call if you have any questions  "**

## 2021-07-09 NOTE — PROGRESS NOTES
Armando 43  Progress Note Bird Valentine 1945, 66 y o  male MRN: [id number]  Unit/Bed#: [facility] Encounter: [id number]  Primary Care Provider: Arya Montoya MD   Date and time admitted to hospital: 7/8/2021  3:26 AM    * Gross hematuria  Assessment & Plan  Secondary to hemorrhagic cystitis  Patient has been receiving hyperbaric oxygen treatment, however still continues to have gross hematuria on and off  Follow up Urology recommendations  Patient is status post OR and cystoscopy and clot evacuation  Currently no more bleeding  However status post removal of the Sumner catheter patient had some blood clots passing and bladder spasms  Hence patient will need to be made a full inpatient admission  Check CBC and BMP in the morning    Anemia  Assessment & Plan  Hg 8 9  Chronic, on iron supplements  Continue supportive care    Essential hypertension  Assessment & Plan  Continue Lopressor 25 mg BID     Prostate cancer [facility])  Assessment & Plan  Management as per Urology, patient is status post bilateral nephrostomy tubes  Flush catheter daily  Hematuria due to cystitis  Assessment & Plan  Radiation cystitis, 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  No bloody output from bilateral nephrostomy tubes  Patient does not seem to have any UTI  Discontinue antibiotic  Follow up Urology recommendations regarding management of hemorrhagic cystitis  Patient has had clear urine this morning and hence Sumner catheter has been discontinued  But patient had passed some blood clots and hence will need to be monitored overnight  Hence patient will need to be made a full inpatient admission P    Dyslipidemia  Assessment & Plan  Continue statin therapy        Subjective:   I have seen and Examined the patient at the bedside  No CP or Sob  No fevers or chills, No nausea or vomiting  Overnight events reviewed with the RN  No Other complains    Patient underwent cystoscopy yesterday and clot evacuation  He did have friable bladder mucosa and significant inflammation noted  Most likely secondary to hemorrhagic cystitis from radiation induced injury  Patient status post procedure continued to have CBI but urine became more clear  This morning patient had clear urine and hence Sumner catheter was removed  Status post Sumner catheter removal patient had some blood clots passing, and had bladder spasms due to the same  But no abdominal pain otherwise  No CVA tenderness  No fevers or chills  Review of System:   Denies any CP or SOB  Denies any Cough or Cold  Denies any Fevers or chills  Denies any focal tingling numbness or weakness in any extremities  Denies any abdominal pain, Nausea or vomiting  Objective:   Temp (24hrs), Av 5 °F (36 9 °C), Min:98 1 °F (36 7 °C), Max:99 °F (37 2 °C)    Temp:  [98 1 °F (36 7 °C)-99 °F (37 2 °C)] 98 4 °F (36 9 °C)  HR:  [60-76] 60  Resp:  [17-20] 17  BP: (112-179)/(62-92) 119/92  SpO2:  [91 %-97 %] 91 %  Body mass index is 30 01 kg/m²  Input and Output Summary (last 24 hours): Intake/Output Summary (Last 24 hours) at 2021 1342  Last data filed at 2021 1001  Gross per 24 hour   Intake 745 ml   Output 2775 ml   Net -2030 ml     I/O        07 -  0700  07 -  0700  07 - 07/10 0700    P  O    240    I V  (mL/kg)  500 (5 1) 5 (0 1)    Total Intake(mL/kg)  500 (5 1) 245 (2 5)    Urine (mL/kg/hr)  2475 (1 1) 350 (0 5)    Total Output  2475 350    Net  - -           Unmeasured Urine Occurrence  150 x           Physical Exam:   General : Alert, Awake and oriented x 2-3, NAD  Neck : Supple  Eyes:  TR, EOMI  CVS : S1, S2, RRR    R/S : Clear to auscultate anteriorly  Abd: Soft, mild tenderness in the suprapubic region, ND  Bs+ve, bilateral nephrostomy to draining clear urine  Extremity: No pedal edema noted  Skin: No acute Rash noted  CNS: No acute FND      exam:  Sumner catheter draining clear urine/saline  Additional Data:     Labs & Imaging Data Reviewed :    Results from last 7 days   Lab Units 07/08/21  0556   WBC Thousand/uL 12 98*   HEMOGLOBIN g/dL 8 9*   HEMATOCRIT % 29 3*   PLATELETS Thousands/uL 266   NEUTROS PCT % 70   LYMPHS PCT % 14   MONOS PCT % 8   EOS PCT % 7*     Results from last 7 days   Lab Units 07/08/21  0556 07/08/21  0416   POTASSIUM mmol/L 4 3 4 2   CHLORIDE mmol/L 105 103   CO2 mmol/L 25 26   BUN mg/dL 29* 29*   CREATININE mg/dL 1 48* 1 40*   CALCIUM mg/dL 8 8 8 9   ALK PHOS U/L  --  89   ALT U/L  --  24   AST U/L  --  17     Results from last 7 days   Lab Units 07/08/21  0416   INR  1 09     No results found for: HGBA1C   XR chest 1 view portable   Final Result by Tangela Nickerson MD (07/08 4668)      No acute cardiopulmonary disease  Workstation performed: [id number]         IR nephrostomy tube check and/or removal    (Results Pending)       Cultures:   Blood Culture:   Lab Results   Component Value Date    BLOODCX No Growth After 5 Days  06/28/2021    BLOODCX No Growth After 5 Days   06/28/2021     Urine Culture:   Lab Results   Component Value Date    URINECX >100,000 cfu/ml  06/28/2021    URINECX No Growth <1000 cfu/mL 05/04/2021    URINECX <10,000 cfu/ml Yeast (A) 04/29/2021    URINECX 10,000-19,000 cfu/ml Candida albicans (A) 04/24/2021    URINECX 80,000-89,000 cfu/ml Enterococcus faecalis (A) 04/12/2021    URINECX 60,000-69,000 cfu/ml  04/12/2021    URINECX No Growth <1000 cfu/mL 03/25/2018     Sputum Culture: No components found for: SPUTUMCX  Wound Culture: No results found for: WOUNDCULT    Last 24 Hours Medication List:   Current Facility-Administered Medications   Medication Dose Route Frequency Provider Last Rate    acetaminophen  650 mg Oral Q6H PRN Davido Ayush, MD      albuterol  2 puff Inhalation Q4H PRN Milo Oak[place], MD      atorvastatin  20 mg Oral HS Milo [place], MD      belladonna-opium  30 mg Rectal Q8H PRN Miguel Ángel Michael MD      calcium carbonate  1,000 mg Oral Daily PRN Miguel Ángel Michael MD      docusate sodium  100 mg Oral BID Miguel Ángel Michael MD      Suzan Cuff ON 7/10/2021] ferrous sulfate  325 mg Oral Q24H Tamar Conteh MD      lactated ringers  1,000 mL Intravenous Once PRN Miguel Ángel Michael MD      And    lactated ringers  1,000 mL Intravenous Once PRN Miguel Ángel Michael MD      metoprolol tartrate  25 mg Oral Q12H [address] Miguel Ángel Michael MD      ondansetron  4 mg Intravenous Q6H PRN Miguel Ángel Michael MD      polyethylene glycol  17 g Oral Daily PRN Miguel Ángel Michael MD      sodium chloride (PF)  10 mL Intracatheter Daily Miguel Ángel Michael MD      sodium chloride  1,000 mL Intravenous Once PRN Miguel Ángel Michael MD      And    sodium chloride  1,000 mL Intravenous Once PRN Miguel Ángel Michael MD         Patient is at moderate risk for morbidity and mortality due to above mentioned illness and comorbidities

## 2021-07-10 LAB
ANION GAP SERPL CALCULATED.3IONS-SCNC: 8 MMOL/L (ref 4–13)
BACTERIA UR CULT: ABNORMAL
BASOPHILS # BLD AUTO: 0.05 THOUSANDS/ΜL (ref 0–0.1)
BASOPHILS NFR BLD AUTO: 0 % (ref 0–1)
BUN SERPL-MCNC: 34 MG/DL (ref 5–25)
CALCIUM SERPL-MCNC: 8.1 MG/DL (ref 8.3–10.1)
CHLORIDE SERPL-SCNC: 105 MMOL/L (ref 100–108)
CO2 SERPL-SCNC: 27 MMOL/L (ref 21–32)
CREAT SERPL-MCNC: 1.45 MG/DL (ref 0.6–1.3)
EOSINOPHIL # BLD AUTO: 1.09 THOUSAND/ΜL (ref 0–0.61)
EOSINOPHIL NFR BLD AUTO: 9 % (ref 0–6)
ERYTHROCYTE [DISTWIDTH] IN BLOOD BY AUTOMATED COUNT: 17.3 % (ref 11.6–15.1)
GFR SERPL CREATININE-BSD FRML MDRD: 46 ML/MIN/1.73SQ M
GLUCOSE SERPL-MCNC: 94 MG/DL (ref 65–140)
HCT VFR BLD AUTO: 24.3 % (ref 36.5–49.3)
HGB BLD-MCNC: 7.2 G/DL (ref 12–17)
IMM GRANULOCYTES # BLD AUTO: 0.07 THOUSAND/UL (ref 0–0.2)
IMM GRANULOCYTES NFR BLD AUTO: 1 % (ref 0–2)
LYMPHOCYTES # BLD AUTO: 1.59 THOUSANDS/ΜL (ref 0.6–4.47)
LYMPHOCYTES NFR BLD AUTO: 13 % (ref 14–44)
MCH RBC QN AUTO: 24.5 PG (ref 26.8–34.3)
MCHC RBC AUTO-ENTMCNC: 29.6 G/DL (ref 31.4–37.4)
MCV RBC AUTO: 83 FL (ref 82–98)
MONOCYTES # BLD AUTO: 1.13 THOUSAND/ΜL (ref 0.17–1.22)
MONOCYTES NFR BLD AUTO: 10 % (ref 4–12)
NEUTROPHILS # BLD AUTO: 8 THOUSANDS/ΜL (ref 1.85–7.62)
NEUTS SEG NFR BLD AUTO: 67 % (ref 43–75)
NRBC BLD AUTO-RTO: 0 /100 WBCS
PLATELET # BLD AUTO: 213 THOUSANDS/UL (ref 149–390)
PMV BLD AUTO: 9.8 FL (ref 8.9–12.7)
POTASSIUM SERPL-SCNC: 4.3 MMOL/L (ref 3.5–5.3)
RBC # BLD AUTO: 2.94 MILLION/UL (ref 3.88–5.62)
SODIUM SERPL-SCNC: 140 MMOL/L (ref 136–145)
WBC # BLD AUTO: 11.93 THOUSAND/UL (ref 4.31–10.16)

## 2021-07-10 PROCEDURE — 99232 SBSQ HOSP IP/OBS MODERATE 35: CPT | Performed by: INTERNAL MEDICINE

## 2021-07-10 PROCEDURE — 80048 BASIC METABOLIC PNL TOTAL CA: CPT | Performed by: INTERNAL MEDICINE

## 2021-07-10 PROCEDURE — 85025 COMPLETE CBC W/AUTO DIFF WBC: CPT | Performed by: INTERNAL MEDICINE

## 2021-07-10 RX ADMIN — FERROUS SULFATE TAB 325 MG (65 MG ELEMENTAL FE) 325 MG: 325 (65 FE) TAB at 14:00

## 2021-07-10 RX ADMIN — SODIUM CHLORIDE 10 ML: 9 INJECTION, SOLUTION INTRAMUSCULAR; INTRAVENOUS; SUBCUTANEOUS at 11:57

## 2021-07-10 RX ADMIN — METOPROLOL TARTRATE 25 MG: 25 TABLET, FILM COATED ORAL at 08:47

## 2021-07-10 RX ADMIN — ALBUTEROL SULFATE 2 PUFF: 90 AEROSOL, METERED RESPIRATORY (INHALATION) at 08:58

## 2021-07-10 RX ADMIN — ATORVASTATIN CALCIUM 20 MG: 20 TABLET, FILM COATED ORAL at 21:12

## 2021-07-10 RX ADMIN — METOPROLOL TARTRATE 25 MG: 25 TABLET, FILM COATED ORAL at 21:12

## 2021-07-10 NOTE — PROGRESS NOTES
Armando 43  Progress Note Emerald Wheeler 1945, 66 y o  male MRN: [id number]  Unit/Bed#: [address] Encounter: [id number]  Primary Care Provider: Marisabel Menezes MD   Date and time admitted to hospital: 2021  3:26 AM    * Gross hematuria  Assessment & Plan  Secondary to hemorrhagic cystitis  Patient has been receiving hyperbaric oxygen treatment, however still continues to have gross hematuria on and off  Follow up Urology recommendations  Patient is status post OR and cystoscopy and clot evacuation  Yesterday late evening onwards patient started to pass clots again  He also had intermittent bladder spasms with the same  Patient denies any dizziness or lightheadedness  Check CBC and BMP in the morning    Anemia  Assessment & Plan  Hg 8 9 - trickle down to 7 2 today  Secondary to blood loss anemia  Check hemoglobin tomorrow, type and screen tomorrow  Chronic, on iron supplements  Continue supportive care    Essential hypertension  Assessment & Plan  Continue Lopressor 25 mg BID     Prostate cancer [facility])  Assessment & Plan  Management as per Urology, patient is status post bilateral nephrostomy tubes  Flush catheter daily  Dyslipidemia  Assessment & Plan  Continue statin therapy        Subjective:   I have seen and Examined the patient at the bedside  No CP or Sob  No fevers or chills, No nausea or vomiting  Overnight events reviewed with the RN  No Other complains  Yesterday evening patient started to have passing blood clots and mild hematuria, but had bladder spasms multiple times while placing blood clots  Review of System:   Denies any CP or SOB  Denies any Cough or Cold  Denies any Fevers or chills  Denies any focal tingling numbness or weakness in any extremities  Denies any Nausea or vomiting       Objective:   Temp (24hrs), Av 2 °F (36 8 °C), Min:97 9 °F (36 6 °C), Max:98 7 °F (37 1 °C)    Temp:  [97 9 °F (36 6 °C)-98 7 °F (37 1 °C)] 98 °F (36 7 °C)  HR:  [59-72] 62  Resp:  [18-20] 19  BP: (116-132)/(57-70) 118/57  SpO2:  [92 %-93 %] 93 %  Body mass index is 30 01 kg/m²  Input and Output Summary (last 24 hours): Intake/Output Summary (Last 24 hours) at 7/10/2021 1101  Last data filed at 7/9/2021 2242  Gross per 24 hour   Intake 670 ml   Output 300 ml   Net 370 ml     I/O       07/08 0701 - 07/09 0700 07/09 0701 - 07/10 0700 07/10 0701 - 07/11 0700    P  O   900     I V  (mL/kg) 500 (5 1) 15 (0 2)     Total Intake(mL/kg) 500 (5 1) 915 (9 4)     Urine (mL/kg/hr) 2475 (1 1) 650 (0 3)     Total Output 2475 650     Net -1975 +265            Unmeasured Urine Occurrence 150 x 3 x           Physical Exam:   General : Alert, Awake and oriented x 2-3, NAD  Neck : Supple  Eyes:  TR, EOMI  CVS : S1, S2, RRR    R/S : Clear to auscultate anteriorly  Abd: Soft, ND  Bs+ve, bilateral nephrostomy tube draining clear urine  Mild suprapubic tenderness  Extremity: No pedal edema noted  Skin: No acute Rash noted  CNS: No acute FND  Additional Data:     Labs & Imaging Data Reviewed :    Results from last 7 days   Lab Units 07/10/21  0555   WBC Thousand/uL 11 93*   HEMOGLOBIN g/dL 7 2*   HEMATOCRIT % 24 3*   PLATELETS Thousands/uL 213   NEUTROS PCT % 67   LYMPHS PCT % 13*   MONOS PCT % 10   EOS PCT % 9*     Results from last 7 days   Lab Units 07/10/21  0555 07/08/21  0416   POTASSIUM mmol/L 4 3 4 2   CHLORIDE mmol/L 105 103   CO2 mmol/L 27 26   BUN mg/dL 34* 29*   CREATININE mg/dL 1 45* 1 40*   CALCIUM mg/dL 8 1* 8 9   ALK PHOS U/L  --  89   ALT U/L  --  24   AST U/L  --  17     Results from last 7 days   Lab Units 07/08/21  0416   INR  1 09     No results found for: HGBA1C   XR chest 1 view portable   Final Result by Destini Leo MD (07/08 0472)      No acute cardiopulmonary disease                 Workstation performed: [id number]         IR nephrostomy tube check and/or removal    (Results Pending)       Cultures:   Blood Culture:   Lab Results Component Value Date    BLOODCX No Growth After 5 Days  06/28/2021    BLOODCX No Growth After 5 Days  06/28/2021     Urine Culture:   Lab Results   Component Value Date    URINECX 10,000-19,000 cfu/ml Enterococcus faecalis (A) 07/08/2021    URINECX >100,000 cfu/ml Stenotrophomonas maltophilia (A) 07/08/2021    URINECX >100,000 cfu/ml  06/28/2021    URINECX No Growth <1000 cfu/mL 05/04/2021    URINECX <10,000 cfu/ml Yeast (A) 04/29/2021    URINECX 10,000-19,000 cfu/ml Candida albicans (A) 04/24/2021    URINECX 80,000-89,000 cfu/ml Enterococcus faecalis (A) 04/12/2021    URINECX 60,000-69,000 cfu/ml  04/12/2021     Sputum Culture: No components found for: SPUTUMCX  Wound Culture: No results found for: WOUNDCULT    Last 24 Hours Medication List:   Current Facility-Administered Medications   Medication Dose Route Frequency Provider Last Rate    acetaminophen  650 mg Oral Q6H PRN Swapna Cruz MD      albuterol  2 puff Inhalation Q4H PRN Swapna Cruz MD      atorvastatin  20 mg Oral HS Swapna Cruz MD      belladonna-opium  30 mg Rectal Q8H PRN Swapna Curz MD      calcium carbonate  1,000 mg Oral Daily PRN Swapna Cruz MD      docusate sodium  100 mg Oral BID Swapna Cruz MD      ferrous sulfate  325 mg Oral Q24H Dar Meza MD      metoprolol tartrate  25 mg Oral Q12H [address] Swapna Cruz MD      ondansetron  4 mg Intravenous Q6H PRN Swapna Cruz MD      polyethylene glycol  17 g Oral Daily PRN Swapna Cruz MD      sodium chloride (PF)  10 mL Intracatheter Daily Swapna Cruz MD         Patient is at moderate risk for morbidity and mortality due to above mentioned illness and comorbidities

## 2021-07-10 NOTE — NURSING NOTE
Aliyah of patient room is not working so its off, charge nurse on duty is informed  Checking the vitals on portable vital machine

## 2021-07-10 NOTE — ASSESSMENT & PLAN NOTE
Secondary to hemorrhagic cystitis  Patient has been receiving hyperbaric oxygen treatment, however still continues to have gross hematuria on and off  Follow up Urology recommendations  Patient is status post OR and cystoscopy and clot evacuation  Yesterday late evening onwards patient started to pass clots again  He also had intermittent bladder spasms with the same  Patient denies any dizziness or lightheadedness     Check CBC and BMP in the morning

## 2021-07-10 NOTE — ASSESSMENT & PLAN NOTE
Hg 8 9 - trickle down to 7 2 today  Secondary to blood loss anemia  Check hemoglobin tomorrow, type and screen tomorrow    Chronic, on iron supplements  Continue supportive care

## 2021-07-10 NOTE — PROGRESS NOTES
Progress Note - Urology      Patient: Oscar Noyola   : 1945 Sex: male   MRN: [id number]     CSN: [id number]  Unit/Bed#: [address] [place] [address]     SUBJECTIVE:   No pain  Drinking fluids  Voiding 100 cc with some clots  90% of urine coming out nephrostomy tubes      Objective   Vitals: /57 (BP Location: Left arm)   Pulse 62   Temp 98 °F (36 7 °C) (Oral)   Resp 19   Ht 5' 11" (1 803 m)   Wt 97 6 kg (215 lb 2 7 oz)   SpO2 93%   BMI 30 01 kg/m²     I/O last 24 hours: In: 5230 [P O :1470;  I V :35]  Out: 750 [Urine:750]      Physical Exam:   General Alert awake   Normocephalic atraumatic PERRLA  Lungs clear bilaterally  Cardiac normal S1 normal S2  Abdomen soft, flank pain  Bilateral nephrostomy tubes clear urine  Extremities no edema      Lab Results: CBC:   Lab Results   Component Value Date    WBC 11 93 (H) 07/10/2021    HGB 7 2 (L) 07/10/2021    HCT 24 3 (L) 07/10/2021    MCV 83 07/10/2021     07/10/2021    MCH 24 5 (L) 07/10/2021    MCHC 29 6 (L) 07/10/2021    RDW 17 3 (H) 07/10/2021    MPV 9 8 07/10/2021    NRBC 0 07/10/2021     CMP:   Lab Results   Component Value Date     07/10/2021    CO2 27 07/10/2021    BUN 34 (H) 07/10/2021    CREATININE 1 45 (H) 07/10/2021    CALCIUM 8 1 (L) 07/10/2021    AST 17 2021    ALT 24 2021    ALKPHOS 89 2021    EGFR 46 07/10/2021     Urinalysis:   Lab Results   Component Value Date    COLORU Red 2021    CLARITYU Turbid 2021    SPECGRAV 1 020 2021    PHUR 7 0 2021    PHUR 5 5 2018    LEUKOCYTESUR Moderate (A) 2021    NITRITE Negative 2021    GLUCOSEU Negative 2021    KETONESU Negative 2021    BILIRUBINUR Negative 2021    BLOODU Large (A) 2021     Urine Culture:   Lab Results   Component Value Date    URINECX 10,000-19,000 cfu/ml Enterococcus faecalis (A) 2021     PSA: No results found for: PSA      Assessment/ Plan:  Hemorrhagic cystitis  Will clamp off nephrostomy tubes to perfuse bladder removed remaining clots and clear hematuria  Unclamped tubes before discharge tomorrow          Rachel Patel MD

## 2021-07-10 NOTE — ASSESSMENT & PLAN NOTE
Secondary to hemorrhagic cystitis  Patient has been receiving hyperbaric oxygen treatment, however still continues to have gross hematuria on and off  Follow up Urology recommendations  Patient is status post OR and cystoscopy and clot evacuation on 7/8/21  On 7/9/21 evening onwards patient started passing blood clots  7/10/21 He had his both nephrostomy tubes clamped by Urology and tried to let him have urine from the bladder and see if the blood clots continue  Patient still continued to to have blood clots on 07/11/2021 and hence plan to do CBI  Hence Sumner catheter will be reinserted by Urology and CBI will be started  s to have blood clots and hence today on 07/11/2021 patient on Yesterday late evening onwards patient started to pass clots Patient denies any dizziness or lightheadedness     Check CBC and BMP in the morning

## 2021-07-10 NOTE — ASSESSMENT & PLAN NOTE
Management as per Urology, patient is status post bilateral nephrostomy tubes  Flush catheter daily

## 2021-07-10 NOTE — NURSING NOTE
Patient is passing clots with each urination and stated no pain, with patient permission a picture took of clots and sent to dr Farzana Barriga, dr called me back and said it is okk, but if he continue passing clots till tomorrow will consider a alanis's catheterization, call bell within reach and will continue to monitor

## 2021-07-10 NOTE — PLAN OF CARE
Problem: Potential for Falls  Goal: Patient will remain free of falls  Description: INTERVENTIONS:  - Educate patient/family on patient safety including physical limitations  - Instruct patient to call for assistance with activity   - Consult OT/PT to assist with strengthening/mobility   - Keep Call bell within reach  - Keep bed low and locked with side rails adjusted as appropriate  - Keep care items and personal belongings within reach  - Initiate and maintain comfort rounds  - Make Fall Risk Sign visible to staff  - Offer Toileting every 2 Hours, in advance of need  -  - Apply yellow socks and bracelet for high fall risk patients  - Consider moving patient to room near nurses station  Outcome: Progressing     Problem: PAIN - ADULT  Goal: Verbalizes/displays adequate comfort level or baseline comfort level  Description: Interventions:  - Encourage patient to monitor pain and request assistance  - Assess pain using appropriate pain scale  - Administer analgesics based on type and severity of pain and evaluate response  - Implement non-pharmacological measures as appropriate and evaluate response  - Consider cultural and social influences on pain and pain management  - Notify physician/advanced practitioner if interventions unsuccessful or patient reports new pain  Outcome: Progressing     Problem: INFECTION - ADULT  Goal: Absence or prevention of progression during hospitalization  Description: INTERVENTIONS:  - Assess and monitor for signs and symptoms of infection  - Monitor lab/diagnostic results  - Monitor all insertion sites, i e  indwelling lines, tubes, and drains  - Monitor endotracheal if appropriate and nasal secretions for changes in amount and color  - [place] appropriate cooling/warming therapies per order  - Administer medications as ordered  - Instruct and encourage patient and family to use good hand hygiene technique  - Identify and instruct in appropriate isolation precautions for identified infection/condition  Outcome: Progressing  Goal: Absence of fever/infection during neutropenic period  Description: INTERVENTIONS:  - Monitor WBC    Outcome: Completed     Problem: GENITOURINARY - ADULT  Goal: Maintains or returns to baseline urinary function  Description: INTERVENTIONS:  - Assess urinary function  - Encourage oral fluids to ensure adequate hydration if ordered  - Administer IV fluids as ordered to ensure adequate hydration  - Administer ordered medications as needed  - Offer frequent toileting  - Follow urinary retention protocol if ordered  Outcome: Progressing  Goal: Absence of urinary retention  Description: INTERVENTIONS:  - Assess patients ability to void and empty bladder  - Monitor I/O  - Bladder scan as needed  - Discuss with physician/AP medications to alleviate retention as needed  - Discuss catheterization for long term situations as appropriate  Outcome: Progressing  Goal: Urinary catheter remains patent  Description: INTERVENTIONS:  - Assess patency of urinary catheter  - If patient has a chronic alanis, consider changing catheter if non-functioning  - Follow guidelines for intermittent irrigation of non-functioning urinary catheter  Outcome: Completed

## 2021-07-11 LAB
ABO GROUP BLD: NORMAL
ANION GAP SERPL CALCULATED.3IONS-SCNC: 9 MMOL/L (ref 4–13)
BACTERIA UR CULT: ABNORMAL
BASOPHILS # BLD AUTO: 0.06 THOUSANDS/ΜL (ref 0–0.1)
BASOPHILS NFR BLD AUTO: 1 % (ref 0–1)
BLD GP AB SCN SERPL QL: NEGATIVE
BUN SERPL-MCNC: 29 MG/DL (ref 5–25)
CALCIUM SERPL-MCNC: 8.2 MG/DL (ref 8.3–10.1)
CHLORIDE SERPL-SCNC: 104 MMOL/L (ref 100–108)
CO2 SERPL-SCNC: 25 MMOL/L (ref 21–32)
CREAT SERPL-MCNC: 1.4 MG/DL (ref 0.6–1.3)
EOSINOPHIL # BLD AUTO: 1.22 THOUSAND/ΜL (ref 0–0.61)
EOSINOPHIL NFR BLD AUTO: 11 % (ref 0–6)
ERYTHROCYTE [DISTWIDTH] IN BLOOD BY AUTOMATED COUNT: 17.4 % (ref 11.6–15.1)
GFR SERPL CREATININE-BSD FRML MDRD: 48 ML/MIN/1.73SQ M
GLUCOSE SERPL-MCNC: 100 MG/DL (ref 65–140)
HCT VFR BLD AUTO: 26.4 % (ref 36.5–49.3)
HGB BLD-MCNC: 7.8 G/DL (ref 12–17)
IMM GRANULOCYTES # BLD AUTO: 0.07 THOUSAND/UL (ref 0–0.2)
IMM GRANULOCYTES NFR BLD AUTO: 1 % (ref 0–2)
LYMPHOCYTES # BLD AUTO: 1.61 THOUSANDS/ΜL (ref 0.6–4.47)
LYMPHOCYTES NFR BLD AUTO: 14 % (ref 14–44)
MCH RBC QN AUTO: 24.5 PG (ref 26.8–34.3)
MCHC RBC AUTO-ENTMCNC: 29.5 G/DL (ref 31.4–37.4)
MCV RBC AUTO: 83 FL (ref 82–98)
MONOCYTES # BLD AUTO: 1.23 THOUSAND/ΜL (ref 0.17–1.22)
MONOCYTES NFR BLD AUTO: 11 % (ref 4–12)
NEUTROPHILS # BLD AUTO: 7.38 THOUSANDS/ΜL (ref 1.85–7.62)
NEUTS SEG NFR BLD AUTO: 62 % (ref 43–75)
NRBC BLD AUTO-RTO: 0 /100 WBCS
PLATELET # BLD AUTO: 242 THOUSANDS/UL (ref 149–390)
PMV BLD AUTO: 9.9 FL (ref 8.9–12.7)
POTASSIUM SERPL-SCNC: 4.2 MMOL/L (ref 3.5–5.3)
RBC # BLD AUTO: 3.19 MILLION/UL (ref 3.88–5.62)
RH BLD: POSITIVE
SODIUM SERPL-SCNC: 138 MMOL/L (ref 136–145)
SPECIMEN EXPIRATION DATE: NORMAL
WBC # BLD AUTO: 11.57 THOUSAND/UL (ref 4.31–10.16)

## 2021-07-11 PROCEDURE — 86900 BLOOD TYPING SEROLOGIC ABO: CPT | Performed by: INTERNAL MEDICINE

## 2021-07-11 PROCEDURE — 85025 COMPLETE CBC W/AUTO DIFF WBC: CPT | Performed by: INTERNAL MEDICINE

## 2021-07-11 PROCEDURE — 99232 SBSQ HOSP IP/OBS MODERATE 35: CPT | Performed by: INTERNAL MEDICINE

## 2021-07-11 PROCEDURE — 80048 BASIC METABOLIC PNL TOTAL CA: CPT | Performed by: INTERNAL MEDICINE

## 2021-07-11 PROCEDURE — 86850 RBC ANTIBODY SCREEN: CPT | Performed by: INTERNAL MEDICINE

## 2021-07-11 PROCEDURE — 86901 BLOOD TYPING SEROLOGIC RH(D): CPT | Performed by: INTERNAL MEDICINE

## 2021-07-11 RX ADMIN — ATORVASTATIN CALCIUM 20 MG: 20 TABLET, FILM COATED ORAL at 21:01

## 2021-07-11 RX ADMIN — FERROUS SULFATE TAB 325 MG (65 MG ELEMENTAL FE) 325 MG: 325 (65 FE) TAB at 14:04

## 2021-07-11 RX ADMIN — METOPROLOL TARTRATE 25 MG: 25 TABLET, FILM COATED ORAL at 09:20

## 2021-07-11 RX ADMIN — METOPROLOL TARTRATE 25 MG: 25 TABLET, FILM COATED ORAL at 21:01

## 2021-07-11 NOTE — ASSESSMENT & PLAN NOTE
Hg 8 9 - trickle down to 7 8 today  Secondary to blood loss anemia  On iron supplements  Continue supportive care  Monitor Hb

## 2021-07-11 NOTE — PROGRESS NOTES
Armando 43  Progress Note Charlene Farmer 1945, 66 y o  male MRN: [id number]  Unit/Bed#: [facility] Encounter: [id number]  Primary Care Provider: Rowdy aWy MD   Date and time admitted to hospital: 7/8/2021  3:26 AM    * Gross hematuria  Assessment & Plan  Secondary to hemorrhagic cystitis  Patient has been receiving hyperbaric oxygen treatment, however still continues to have gross hematuria on and off  Follow up Urology recommendations  Patient is status post OR and cystoscopy and clot evacuation on 7/8/21  On 7/9/21 evening onwards patient started passing blood clots  7/10/21 He had his both nephrostomy tubes clamped by Urology and tried to let him have urine from the bladder and see if the blood clots continue  Patient still continued to to have blood clots on 07/11/2021 and hence plan to do CBI  Hence Sumner catheter will be reinserted by Urology and CBI will be started  s to have blood clots and hence today on 07/11/2021 patient on Yesterday late evening onwards patient started to pass clots Patient denies any dizziness or lightheadedness  Check CBC and BMP in the morning    Anemia  Assessment & Plan  Hg 8 9 - trickle down to 7 8 today  Secondary to blood loss anemia  On iron supplements  Continue supportive care  Monitor Hb  Essential hypertension  Assessment & Plan  Continue Lopressor 25 mg BID     Prostate cancer [facility])  Assessment & Plan  Management as per Urology, patient is status post bilateral nephrostomy tubes  Flush catheter daily  Hematuria due to cystitis  Assessment & Plan  Radiation cystitis, 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  No bloody output from bilateral nephrostomy tubes  Patient does not seem to have any UTI  Discontinue antibiotic  Follow up Urology recommendations regarding management of hemorrhagic cystitis    Patient has had clear urine this morning and hence Sumner catheter has been discontinued  But patient had passed some blood clots and hence will need to be monitored overnight  Hence patient will need to be made a full inpatient admission P    Dyslipidemia  Assessment & Plan  Continue statin therapy        Subjective:   I have seen and Examined the patient at the bedside  No CP or Sob  No fevers or chills, No nausea or vomiting  Overnight events reviewed with the RN  No Other complains  Patient still continues to have blood clots and hematuria, plan to put a 3 way Sumner and CBI will be started by Urology  No pelvic or bladder spasms overnight    Review of System:   Denies any CP or SOB  Denies any Cough or Cold  Denies any Fevers or chills  Denies any focal tingling numbness or weakness in any extremities  Denies any abdominal pain, Nausea or vomiting  Objective:   Temp (24hrs), Av °F (36 7 °C), Min:97 9 °F (36 6 °C), Max:98 1 °F (36 7 °C)    Temp:  [97 9 °F (36 6 °C)-98 1 °F (36 7 °C)] 98 1 °F (36 7 °C)  HR:  [57-83] 83  Resp:  [17-18] 17  BP: (116-144)/(58-67) 124/59  SpO2:  [93 %-94 %] 93 %  Body mass index is 30 01 kg/m²  Input and Output Summary (last 24 hours): Intake/Output Summary (Last 24 hours) at 2021 1447  Last data filed at 2021 0737  Gross per 24 hour   Intake --   Output 750 ml   Net -750 ml     I/O       701 - 07/10 0700 07/10 0701 -  07 07 -  07    P  O  900 570     I V  (mL/kg) 15 (0 2) 20 (0 2)     Total Intake(mL/kg) 915 (9 4) 590 (6)     Urine (mL/kg/hr) 650 (0 3) 650 (0 3) 200 (0 3)    Total Output 650 650 200    Net +265 -60 -200           Unmeasured Urine Occurrence 3 x            Physical Exam:   General : Alert, Awake and oriented x 2-3, NAD  Neck : Supple  Eyes:  TR, EOMI  CVS : S1, S2, RRR    R/S : Clear to auscultate anteriorly  Abd: Soft, NT, ND  Bs+ve  Extremity: No pedal edema noted  Skin: No acute Rash noted  CNS: No acute FND       Additional Data:     Labs & Imaging Data Reviewed :    Results from last 7 days   Lab Units 07/11/21  0537   WBC Thousand/uL 11 57*   HEMOGLOBIN g/dL 7 8*   HEMATOCRIT % 26 4*   PLATELETS Thousands/uL 242   NEUTROS PCT % 62   LYMPHS PCT % 14   MONOS PCT % 11   EOS PCT % 11*     Results from last 7 days   Lab Units 07/11/21  0537 07/08/21  0416   POTASSIUM mmol/L 4 2 4 2   CHLORIDE mmol/L 104 103   CO2 mmol/L 25 26   BUN mg/dL 29* 29*   CREATININE mg/dL 1 40* 1 40*   CALCIUM mg/dL 8 2* 8 9   ALK PHOS U/L  --  89   ALT U/L  --  24   AST U/L  --  17     Results from last 7 days   Lab Units 07/08/21  0416   INR  1 09     No results found for: HGBA1C   XR chest 1 view portable   Final Result by Dima Castro MD (07/08 7807)      No acute cardiopulmonary disease  Workstation performed: [id number]         IR nephrostomy tube check and/or removal    (Results Pending)       Cultures:   Blood Culture:   Lab Results   Component Value Date    BLOODCX No Growth After 5 Days  06/28/2021    BLOODCX No Growth After 5 Days   06/28/2021     Urine Culture:   Lab Results   Component Value Date    URINECX 10,000-19,000 cfu/ml Enterococcus faecalis (A) 07/08/2021    URINECX >100,000 cfu/ml Stenotrophomonas maltophilia (A) 07/08/2021    URINECX >100,000 cfu/ml  06/28/2021    URINECX No Growth <1000 cfu/mL 05/04/2021    URINECX <10,000 cfu/ml Yeast (A) 04/29/2021    URINECX 10,000-19,000 cfu/ml Candida albicans (A) 04/24/2021    URINECX 80,000-89,000 cfu/ml Enterococcus faecalis (A) 04/12/2021    URINECX 60,000-69,000 cfu/ml  04/12/2021     Sputum Culture: No components found for: SPUTUMCX  Wound Culture: No results found for: WOUNDCULT    Last 24 Hours Medication List:   Current Facility-Administered Medications   Medication Dose Route Frequency Provider Last Rate    acetaminophen  650 mg Oral Q6H PRN Yanelis Craft MD      albuterol  2 puff Inhalation Q4H PRN Yanelis Craft MD      atorvastatin  20 mg Oral HS Yanelis Craft MD      belladonna-opium  30 mg Rectal Q8H PRN Rory Wellington MD      calcium carbonate  1,000 mg Oral Daily PRN Rory Wellington MD      docusate sodium  100 mg Oral BID Rory Wellington MD      ferrous sulfate  325 mg Oral Q24H Ricky Bailey MD      metoprolol tartrate  25 mg Oral Q12H [address] Rory Wellington MD      ondansetron  4 mg Intravenous Q6H PRN Rory Wellington MD      polyethylene glycol  17 g Oral Daily PRN Rory Wellington MD      sodium chloride (PF)  10 mL Intracatheter Daily Rory Wellington MD         Patient is at moderate risk for morbidity and mortality due to above mentioned illness and comorbidities

## 2021-07-11 NOTE — PLAN OF CARE
Problem: Potential for Falls  Goal: Patient will remain free of falls  Description: INTERVENTIONS:  - Educate patient/family on patient safety including physical limitations  - Instruct patient to call for assistance with activity   - Consult OT/PT to assist with strengthening/mobility   - Keep Call bell within reach  - Keep bed low and locked with side rails adjusted as appropriate  - Keep care items and personal belongings within reach  - Initiate and maintain comfort rounds  - Make Fall Risk Sign visible to staff  - Apply yellow socks and bracelet for high fall risk patients  - Consider moving patient to room near nurses station  Outcome: Progressing     Problem: PAIN - ADULT  Goal: Verbalizes/displays adequate comfort level or baseline comfort level  Description: Interventions:  - Encourage patient to monitor pain and request assistance  - Assess pain using appropriate pain scale  - Administer analgesics based on type and severity of pain and evaluate response  - Implement non-pharmacological measures as appropriate and evaluate response  - Consider cultural and social influences on pain and pain management  - Notify physician/advanced practitioner if interventions unsuccessful or patient reports new pain  Outcome: Progressing     Problem: INFECTION - ADULT  Goal: Absence or prevention of progression during hospitalization  Description: INTERVENTIONS:  - Assess and monitor for signs and symptoms of infection  - Monitor lab/diagnostic results  - Monitor all insertion sites, i e  indwelling lines, tubes, and drains  - Monitor endotracheal if appropriate and nasal secretions for changes in amount and color  - [place] appropriate cooling/warming therapies per order  - Administer medications as ordered  - Instruct and encourage patient and family to use good hand hygiene technique  - Identify and instruct in appropriate isolation precautions for identified infection/condition  Outcome: Progressing     Problem: GENITOURINARY - ADULT  Goal: Maintains or returns to baseline urinary function  Description: INTERVENTIONS:  - Assess urinary function  - Encourage oral fluids to ensure adequate hydration if ordered  - Administer IV fluids as ordered to ensure adequate hydration  - Administer ordered medications as needed  - Offer frequent toileting  - Follow urinary retention protocol if ordered  Outcome: Progressing  Goal: Absence of urinary retention  Description: INTERVENTIONS:  - Assess patients ability to void and empty bladder  - Monitor I/O  - Bladder scan as needed  - Discuss with physician/AP medications to alleviate retention as needed  - Discuss catheterization for long term situations as appropriate  Outcome: Progressing

## 2021-07-11 NOTE — PROGRESS NOTES
Progress Note - Urology      Patient: Dee Dee Cohen   : 1945 Sex: male   MRN: [id number]     CSN: [id number]  Unit/Bed#: [address] [place] [address]     SUBJECTIVE:   Patient feels fine  Has been drinking water at least 2 goal to 3 glasses  Mild to moderate hematuria noted no obvious clots      Objective   Vitals: /59 (BP Location: Right arm)   Pulse 83   Temp 98 1 °F (36 7 °C) (Axillary)   Resp 17   Ht 5' 11" (1 803 m)   Wt 97 6 kg (215 lb 2 7 oz)   SpO2 93%   BMI 30 01 kg/m²     I/O last 24 hours: In: 0 [P O :570;  I V :20]  Out: 850 [Urine:850]      Physical Exam:   General Alert awake   Normocephalic atraumatic PERRLA  Lungs clear bilaterally  Cardiac normal S1 normal S2  Abdomen soft, flank pain  Nephrostomy tubes clamped no urine output  Extremities no edema      Lab Results: CBC:   Lab Results   Component Value Date    WBC 11 57 (H) 2021    HGB 7 8 (L) 2021    HCT 26 4 (L) 2021    MCV 83 2021     2021    MCH 24 5 (L) 2021    MCHC 29 5 (L) 2021    RDW 17 4 (H) 2021    MPV 9 9 2021    NRBC 0 2021     CMP:   Lab Results   Component Value Date     2021    CO2 25 2021    BUN 29 (H) 2021    CREATININE 1 40 (H) 2021    CALCIUM 8 2 (L) 2021    AST 17 2021    ALT 24 2021    ALKPHOS 89 2021    EGFR 48 2021     Urinalysis:   Lab Results   Component Value Date    COLORU Red 2021    CLARITYU Turbid 2021    SPECGRAV 1 020 2021    PHUR 7 0 2021    PHUR 5 5 2018    LEUKOCYTESUR Moderate (A) 2021    NITRITE Negative 2021    GLUCOSEU Negative 2021    KETONESU Negative 2021    BILIRUBINUR Negative 2021    BLOODU Large (A) 2021     Urine Culture:   Lab Results   Component Value Date    URINECX 10,000-19,000 cfu/ml Enterococcus faecalis (A) 2021     PSA: No results found for: PSA      Assessment/ Plan:  Radiation cystitis  Status post cysto evacuation of clots aggressive fulguration on 7/8  Nephrostomy tubes clamped  States he is drinking adequate fluid  Moderate hematuria noted  Twenty-two [place] Sumner placed hand irrigated no clots  Increase p o   Intake  Open nephrostomy tubes  Patient wishes to be discharged in light of O2 therapy tomorrow  Will see in the office next week          Rene Stanley MD

## 2021-07-11 NOTE — PLAN OF CARE
Problem: Potential for Falls  Goal: Patient will remain free of falls  Description: INTERVENTIONS:  - Educate patient/family on patient safety including physical limitations  - Instruct patient to call for assistance with activity   - Consult OT/PT to assist with strengthening/mobility   - Keep Call bell within reach  - Keep bed low and locked with side rails adjusted as appropriate  - Keep care items and personal belongings within reach  - Initiate and maintain comfort rounds  - Make Fall Risk Sign visible to staff  - Offer Toileting every 2 Hours, in advance of need    - Apply yellow socks and bracelet for high fall risk patients  - Consider moving patient to room near nurses station  Outcome: Progressing     Problem: PAIN - ADULT  Goal: Verbalizes/displays adequate comfort level or baseline comfort level  Description: Interventions:  - Encourage patient to monitor pain and request assistance  - Assess pain using appropriate pain scale  - Administer analgesics based on type and severity of pain and evaluate response  - Implement non-pharmacological measures as appropriate and evaluate response  - Consider cultural and social influences on pain and pain management  - Notify physician/advanced practitioner if interventions unsuccessful or patient reports new pain  Outcome: Progressing     Problem: PAIN - ADULT  Goal: Verbalizes/displays adequate comfort level or baseline comfort level  Description: Interventions:  - Encourage patient to monitor pain and request assistance  - Assess pain using appropriate pain scale  - Administer analgesics based on type and severity of pain and evaluate response  - Implement non-pharmacological measures as appropriate and evaluate response  - Consider cultural and social influences on pain and pain management  - Notify physician/advanced practitioner if interventions unsuccessful or patient reports new pain  Outcome: Progressing     Problem: INFECTION - ADULT  Goal: Absence or prevention of progression during hospitalization  Description: INTERVENTIONS:  - Assess and monitor for signs and symptoms of infection  - Monitor lab/diagnostic results  - Monitor all insertion sites, i e  indwelling lines, tubes, and drains  - Monitor endotracheal if appropriate and nasal secretions for changes in amount and color  - [place] appropriate cooling/warming therapies per order  - Administer medications as ordered  - Instruct and encourage patient and family to use good hand hygiene technique  - Identify and instruct in appropriate isolation precautions for identified infection/condition  Outcome: Progressing     Problem: GENITOURINARY - ADULT  Goal: Maintains or returns to baseline urinary function  Description: INTERVENTIONS:  - Assess urinary function  - Encourage oral fluids to ensure adequate hydration if ordered  - Administer IV fluids as ordered to ensure adequate hydration  - Administer ordered medications as needed  - Offer frequent toileting  - Follow urinary retention protocol if ordered  Outcome: Progressing  Goal: Absence of urinary retention  Description: INTERVENTIONS:  - Assess patients ability to void and empty bladder  - Monitor I/O  - Bladder scan as needed  - Discuss with physician/AP medications to alleviate retention as needed  - Discuss catheterization for long term situations as appropriate  Outcome: Progressing

## 2021-07-12 VITALS
BODY MASS INDEX: 30.12 KG/M2 | RESPIRATION RATE: 16 BRPM | OXYGEN SATURATION: 95 % | DIASTOLIC BLOOD PRESSURE: 73 MMHG | WEIGHT: 215.17 LBS | HEART RATE: 70 BPM | SYSTOLIC BLOOD PRESSURE: 128 MMHG | HEIGHT: 71 IN | TEMPERATURE: 98.2 F

## 2021-07-12 LAB
BASOPHILS # BLD AUTO: 0.04 THOUSANDS/ΜL (ref 0–0.1)
BASOPHILS NFR BLD AUTO: 0 % (ref 0–1)
EOSINOPHIL # BLD AUTO: 1.17 THOUSAND/ΜL (ref 0–0.61)
EOSINOPHIL NFR BLD AUTO: 11 % (ref 0–6)
ERYTHROCYTE [DISTWIDTH] IN BLOOD BY AUTOMATED COUNT: 17.8 % (ref 11.6–15.1)
HCT VFR BLD AUTO: 27 % (ref 36.5–49.3)
HGB BLD-MCNC: 8.1 G/DL (ref 12–17)
IMM GRANULOCYTES # BLD AUTO: 0.08 THOUSAND/UL (ref 0–0.2)
IMM GRANULOCYTES NFR BLD AUTO: 1 % (ref 0–2)
LYMPHOCYTES # BLD AUTO: 1.86 THOUSANDS/ΜL (ref 0.6–4.47)
LYMPHOCYTES NFR BLD AUTO: 17 % (ref 14–44)
MCH RBC QN AUTO: 24.8 PG (ref 26.8–34.3)
MCHC RBC AUTO-ENTMCNC: 30 G/DL (ref 31.4–37.4)
MCV RBC AUTO: 83 FL (ref 82–98)
MONOCYTES # BLD AUTO: 1.11 THOUSAND/ΜL (ref 0.17–1.22)
MONOCYTES NFR BLD AUTO: 10 % (ref 4–12)
NEUTROPHILS # BLD AUTO: 6.58 THOUSANDS/ΜL (ref 1.85–7.62)
NEUTS SEG NFR BLD AUTO: 61 % (ref 43–75)
NRBC BLD AUTO-RTO: 0 /100 WBCS
PLATELET # BLD AUTO: 247 THOUSANDS/UL (ref 149–390)
PMV BLD AUTO: 9.7 FL (ref 8.9–12.7)
RBC # BLD AUTO: 3.27 MILLION/UL (ref 3.88–5.62)
WBC # BLD AUTO: 10.84 THOUSAND/UL (ref 4.31–10.16)

## 2021-07-12 PROCEDURE — 85025 COMPLETE CBC W/AUTO DIFF WBC: CPT | Performed by: INTERNAL MEDICINE

## 2021-07-12 PROCEDURE — 99239 HOSP IP/OBS DSCHRG MGMT >30: CPT | Performed by: INTERNAL MEDICINE

## 2021-07-12 RX ADMIN — SODIUM CHLORIDE 10 ML: 9 INJECTION, SOLUTION INTRAMUSCULAR; INTRAVENOUS; SUBCUTANEOUS at 09:51

## 2021-07-12 RX ADMIN — METOPROLOL TARTRATE 25 MG: 25 TABLET, FILM COATED ORAL at 09:50

## 2021-07-12 NOTE — PLAN OF CARE
Problem: Potential for Falls  Goal: Patient will remain free of falls  Description: INTERVENTIONS:  - Educate patient/family on patient safety including physical limitations  - Instruct patient to call for assistance with activity   - Consult OT/PT to assist with strengthening/mobility   - Keep Call bell within reach  - Keep bed low and locked with side rails adjusted as appropriate  - Keep care items and personal belongings within reach  - Initiate and maintain comfort rounds  - Make Fall Risk Sign visible to staff  - Offer Toileting every  Hours, in advance of need  - Initiate/Maintain alarm  - Obtain necessary fall risk management equipment:   - Apply yellow socks and bracelet for high fall risk patients  - Consider moving patient to room near nurses station  7/12/2021 1031 by Wilman Faust RN  Outcome: Adequate for Discharge  7/12/2021 1019 by Wilman Faust RN  Outcome: Progressing     Problem: PAIN - ADULT  Goal: Verbalizes/displays adequate comfort level or baseline comfort level  Description: Interventions:  - Encourage patient to monitor pain and request assistance  - Assess pain using appropriate pain scale  - Administer analgesics based on type and severity of pain and evaluate response  - Implement non-pharmacological measures as appropriate and evaluate response  - Consider cultural and social influences on pain and pain management  - Notify physician/advanced practitioner if interventions unsuccessful or patient reports new pain  7/12/2021 1031 by Wilman Faust RN  Outcome: Adequate for Discharge  7/12/2021 1019 by Wilman Faust RN  Outcome: Progressing     Problem: INFECTION - ADULT  Goal: Absence or prevention of progression during hospitalization  Description: INTERVENTIONS:  - Assess and monitor for signs and symptoms of infection  - Monitor lab/diagnostic results  - Monitor all insertion sites, i e  indwelling lines, tubes, and drains  - Monitor endotracheal if appropriate and nasal secretions for changes in amount and color  - [place] appropriate cooling/warming therapies per order  - Administer medications as ordered  - Instruct and encourage patient and family to use good hand hygiene technique  - Identify and instruct in appropriate isolation precautions for identified infection/condition  7/12/2021 1031 by Kiya Walker RN  Outcome: Adequate for Discharge  7/12/2021 1019 by Kiya Walker RN  Outcome: Progressing     Problem: GENITOURINARY - ADULT  Goal: Maintains or returns to baseline urinary function  Description: INTERVENTIONS:  - Assess urinary function  - Encourage oral fluids to ensure adequate hydration if ordered  - Administer IV fluids as ordered to ensure adequate hydration  - Administer ordered medications as needed  - Offer frequent toileting  - Follow urinary retention protocol if ordered  7/12/2021 1031 by Kiya Walker RN  Outcome: Adequate for Discharge  7/12/2021 1019 by Kiya Walker RN  Outcome: Progressing  Goal: Absence of urinary retention  Description: INTERVENTIONS:  - Assess patients ability to void and empty bladder  - Monitor I/O  - Bladder scan as needed  - Discuss with physician/AP medications to alleviate retention as needed  - Discuss catheterization for long term situations as appropriate  7/12/2021 1031 by Kiya Walker RN  Outcome: Adequate for Discharge  7/12/2021 1019 by Kiya Walker RN  Outcome: Progressing

## 2021-07-12 NOTE — PLAN OF CARE
Problem: Potential for Falls  Goal: Patient will remain free of falls  Description: INTERVENTIONS:  - Educate patient/family on patient safety including physical limitations  - Instruct patient to call for assistance with activity   - Consult OT/PT to assist with strengthening/mobility   - Keep Call bell within reach  - Keep bed low and locked with side rails adjusted as appropriate  - Keep care items and personal belongings within reach  - Initiate and maintain comfort rounds  - Make Fall Risk Sign visible to staff  - Offer Toileting every  Hours, in advance of need  - Initiate/Maintain alarm  - Obtain necessary fall risk management equipment:   - Apply yellow socks and bracelet for high fall risk patients  - Consider moving patient to room near nurses station  Outcome: Progressing     Problem: PAIN - ADULT  Goal: Verbalizes/displays adequate comfort level or baseline comfort level  Description: Interventions:  - Encourage patient to monitor pain and request assistance  - Assess pain using appropriate pain scale  - Administer analgesics based on type and severity of pain and evaluate response  - Implement non-pharmacological measures as appropriate and evaluate response  - Consider cultural and social influences on pain and pain management  - Notify physician/advanced practitioner if interventions unsuccessful or patient reports new pain  Outcome: Progressing     Problem: INFECTION - ADULT  Goal: Absence or prevention of progression during hospitalization  Description: INTERVENTIONS:  - Assess and monitor for signs and symptoms of infection  - Monitor lab/diagnostic results  - Monitor all insertion sites, i e  indwelling lines, tubes, and drains  - Monitor endotracheal if appropriate and nasal secretions for changes in amount and color  - [place] appropriate cooling/warming therapies per order  - Administer medications as ordered  - Instruct and encourage patient and family to use good hand hygiene technique  - Identify and instruct in appropriate isolation precautions for identified infection/condition  Outcome: Progressing     Problem: GENITOURINARY - ADULT  Goal: Maintains or returns to baseline urinary function  Description: INTERVENTIONS:  - Assess urinary function  - Encourage oral fluids to ensure adequate hydration if ordered  - Administer IV fluids as ordered to ensure adequate hydration  - Administer ordered medications as needed  - Offer frequent toileting  - Follow urinary retention protocol if ordered  Outcome: Progressing  Goal: Absence of urinary retention  Description: INTERVENTIONS:  - Assess patients ability to void and empty bladder  - Monitor I/O  - Bladder scan as needed  - Discuss with physician/AP medications to alleviate retention as needed  - Discuss catheterization for long term situations as appropriate  Outcome: Progressing

## 2021-07-12 NOTE — NURSING NOTE
IV removed prior to discharge  AVS printed, given and explained with all questions answered  Pt escorted to main lobby by PCA via wheel chair with all belongings  No prescriptions given

## 2021-07-13 ENCOUNTER — OFFICE VISIT (OUTPATIENT)
Dept: WOUND CARE | Facility: HOSPITAL | Age: 76
End: 2021-07-13
Payer: MEDICARE

## 2021-07-13 DIAGNOSIS — F41.8 ANXIETY ASSOCIATED WITH HYPERBARIC OXYGEN THERAPY: ICD-10-CM

## 2021-07-13 DIAGNOSIS — N30.41 IRRADIATION CYSTITIS WITH HEMATURIA: Primary | ICD-10-CM

## 2021-07-13 PROCEDURE — 99213 OFFICE O/P EST LOW 20 MIN: CPT | Performed by: NURSE PRACTITIONER

## 2021-07-13 PROCEDURE — G0277 HBOT, FULL BODY CHAMBER, 30M: HCPCS | Performed by: NURSE PRACTITIONER

## 2021-07-13 PROCEDURE — 99183 HYPERBARIC OXYGEN THERAPY: CPT | Performed by: NURSE PRACTITIONER

## 2021-07-13 RX ORDER — LORAZEPAM 0.5 MG/1
0.5 TABLET ORAL EVERY 8 HOURS PRN
Qty: 22 TABLET | Refills: 0 | Status: SHIPPED | OUTPATIENT
Start: 2021-07-13 | End: 2021-08-02 | Stop reason: HOSPADM

## 2021-07-13 NOTE — PROGRESS NOTES
HBO Treatment Course Details       Treatment Notes: Patient tolerated treatment well  Lorazepam was renewed today for remainder of patient's HBOT's  Treatment Course Number: 28  Total Treatments Ordered:  40     Diagnosis:   1  Irradiation cystitis with hematuria  Hyperbaric oxygen thearpy   2  Anxiety associated with hyperbaric oxygen therapy  LORazepam (ATIVAN) 0 5 mg tablet       HBO Treatment Details:  In-Patient Visit: No  Treatment Length:90 Minutes(Minutes)  Chamber #: Hard sided Monoplace Chamber    Pre-Treatment details:  Pre-treatment protocol Treatment Protocol: 2 0 FE X 90 minutes w/ 100% oxygen, two 5 minute air breaks  Left ear clear?: yes  Right ear clear?: yes  Left ear intact?: yes  Right ear intact?: yes                    Left ear TEED scale: Grade 0  Right ear TEED scale: Grade 0   Pretreatment heart and lung assessment: Pretreatment heart and lung auscltation unremarkable  Patient cleared for HBOT     Treatment details:  FE Rate: 2  Started Compression: 0823  Reached Compression: 0833  Total Compression Time: 10 (Minutes)  Total Holding Time: 100 (Minutes)  Started Decompression: 1013  Reached Surface: 8219  Total Decompression Time: 10 (Minutes)  Total Airbreaks: 10 (Minutes)  Total Time of Treatment: 110 (Minutes)  Symptoms Noted During Treatment: None (Minutes)    Post treatment details:  Left ear clear?: yes  Right ear clear?: yes  Left ears intact?: yes  Right ears intact?: yes                    Left ear TEED scale: Grade 0  Right ear TEED scale: Grade 0  Post treatment heart and lung assessment: Post treatment heart and lung auscltation unremarkable  Patient cleared for discharge  Tolerated treatment well         Vital Signs:  [facility] Glucose Reference Range: 100-350 mg/dl   Pre-Treatment Post-Treatment   Time vitals are taken: 0815 Time vitals are taken: 1025   Blood Pressure: 114/71 Blood Pressure: 157/75   Pulse: (!) 48 Pulse: 77   Resp: 16 Resp: 16   Temp: 98 3 °F (36 8 °C) Temp: 97 7 °F (36 5 °C)             No Known Allergies  Patient Active Problem List    Diagnosis Date Noted    Prostate cancer ([place] [id number] ) 07/08/2021    Leukocytosis 05/04/2021    RA (rheumatoid arthritis) ([place] [id number] ) 05/04/2021    Acute blood loss anemia ([facility]) 04/26/2021    SHANTI (acute kidney injury) ([place] [id number] ) 04/24/2021    Hematuria due to cystitis     Anemia 04/14/2021    Chronic obstructive pulmonary disease ([place] [address] ) 04/14/2021    Hyperlipidemia 04/13/2021    Gross hematuria 04/12/2021    Cystitis 04/12/2021    Essential hypertension 04/12/2021    Dyslipidemia 04/12/2021     No orders of the defined types were placed in this encounter

## 2021-07-14 ENCOUNTER — OFFICE VISIT (OUTPATIENT)
Dept: WOUND CARE | Facility: HOSPITAL | Age: 76
End: 2021-07-14
Payer: MEDICARE

## 2021-07-14 DIAGNOSIS — N30.41 IRRADIATION CYSTITIS WITH HEMATURIA: ICD-10-CM

## 2021-07-14 PROCEDURE — 99183 HYPERBARIC OXYGEN THERAPY: CPT | Performed by: FAMILY MEDICINE

## 2021-07-14 PROCEDURE — G0277 HBOT, FULL BODY CHAMBER, 30M: HCPCS | Performed by: FAMILY MEDICINE

## 2021-07-14 NOTE — PROGRESS NOTES
HBO Treatment Course Details       Treatment Notes: Patient treated terence hard sided mono-place chamber  He tolerated HBOT well  Treatment Course Number: 29  Total Treatments Ordered:  40     Diagnosis:   1  Irradiation cystitis with hematuria  [facility]       H[facility] Treatment Details:  In-Patient Visit: no  Treatment Length:90 Minutes(Minutes)  Chamber #: Hard sided Monoplace Chamber    Pre-Treatment details:  Pre-treatment protocol Treatment Protocol: 2 0 FE X 90 minutes w/ 100% oxygen, two 5 minute air breaks                                             Treatment details:  FE Rate: 2  Started Compression: 0818  Reached Compression: 0828  Total Compression Time: 10 (Minutes)  Total Holding Time: 100 (Minutes)  Started Decompression: 1008  Reached Surface: 1018  Total Decompression Time: 10 (Minutes)  Total Airbreaks: 10 (Minutes)  Total Time of Treatment: 110 (Minutes)  Symptoms Noted During Treatment: None (Minutes)    Post treatment details:                                              Vital Signs:  HBO Glucose Reference Range: 100-350 mg/dl   Pre-Treatment Post-Treatment   Time vitals are taken: 0815 Time vitals are taken: 1020   Blood Pressure: 125/69 Blood Pressure: (!) 152/94   Pulse: 91 Pulse: 87   Resp: 16     Temp: 97 6 °F (36 4 °C) Temp: 97 8 °F (36 6 °C)             No Known Allergies  Patient Active Problem List    Diagnosis Date Noted    Prostate cancer ([place] [id number] ) 07/08/2021    Leukocytosis 05/04/2021    RA (rheumatoid arthritis) ([place] [id number] ) 05/04/2021    Acute blood loss anemia (ABLA) 04/26/2021    SHANTI (acute kidney injury) ([place] [id number] ) 04/24/2021    Hematuria due to cystitis     Anemia 04/14/2021    Chronic obstructive pulmonary disease ([place] [id number] ) 04/14/2021    Hyperlipidemia 04/13/2021    Gross hematuria 04/12/2021    Cystitis 04/12/2021    Essential hypertension 04/12/2021    Dyslipidemia 04/12/2021     No orders of the defined types were placed in this encounter

## 2021-07-14 NOTE — PROGRESS NOTES
HBO Treatment Course Details       Treatment Notes:      Treatment Course Number: 29  Total Treatments Ordered:  40     Diagnosis:   1  Irradiation cystitis with hematuria  Hyperbaric oxygen [facility]       HBO Treatment Details:  In-Patient Visit: no  Treatment Length:90 Minutes(Minutes)  Chamber #: Hard sided Monoplace Chamber    Pre-Treatment details:  Pre-treatment protocol Treatment Protocol: 2 0 FE X 90 minutes w/ 100% oxygen, two 5 minute air breaks  Left ear clear?: yes  Right ear clear?: yes  Left ear intact?: yes  Right ear intact?: yes  Left ear color: translucent  Right ear color: translucent  PE Tubes present, Left ear?: no  PE Tubes present, Right ear?: no  Left ear irrigated?: no  Right ear irrigated?: no  Left ear TEED scale: Grade 0  Right ear TEED scale: Grade 0   Pretreatment heart and lung assessment: Pretreatment heart and lung auscltation unremarkable  Patient cleared for HBOT     Treatment details:  FE Rate: 2  Started Compression: 0818  Reached Compression: 0828  Total Compression Time: 10 (Minutes)  Total Holding Time: 100 (Minutes)  Started Decompression: 1008  Reached Surface: 1018  Total Decompression Time: 10 (Minutes)  Total Airbreaks: 10 (Minutes)  Total Time of Treatment: 110 (Minutes)  Symptoms Noted During Treatment: None (Minutes)    Post treatment details:  Left ear clear?: yes  Right ear clear?: yes  Left ears intact?: yes  Right ears intact?: yes  Left ear color: translucent  Right ear color: translucent  PE Tubes present, Left ear?: no  PE Tubes present, Right ear?: no        Left ear TEED scale: Grade 0  Right ear TEED scale: Grade 0  Post treatment heart and lung assessment: Post treatment heart and lung auscltation unremarkable  Patient cleared for discharge  Tolerated treatment well         Vital Signs:  HBO Glucose Reference Range: 100-350 mg/dl   Pre-Treatment Post-Treatment   Time vitals are taken: 0815 Time vitals are taken: 1020   Blood Pressure: 125/69 Blood Pressure: (!) 152/94   Pulse: 91 Pulse: 87   Resp: 16     Temp: 97 6 °F (36 4 °C) Temp: 97 8 °F (36 6 °C)             No Known Allergies  Patient Active Problem List    Diagnosis Date Noted    Prostate cancer ([place] [id number] ) 07/08/2021    Leukocytosis 05/04/2021    RA (rheumatoid arthritis) ([place] [id number] ) 05/04/2021    Acute blood loss anemia (ABLA) 04/26/2021    SHANTI (acute kidney injury) ([place] [id number] ) 04/24/2021    Hematuria due to cystitis     Anemia 04/14/2021    Chronic obstructive pulmonary disease ([place] [id number] ) 04/14/2021    Hyperlipidemia 04/13/2021    Gross hematuria 04/12/2021    Cystitis 04/12/2021    Essential hypertension 04/12/2021    Dyslipidemia 04/12/2021     No orders of the defined types were placed in this encounter

## 2021-07-15 ENCOUNTER — OFFICE VISIT (OUTPATIENT)
Dept: WOUND CARE | Facility: HOSPITAL | Age: 76
End: 2021-07-15
Payer: MEDICARE

## 2021-07-15 DIAGNOSIS — N30.41 IRRADIATION CYSTITIS WITH HEMATURIA: ICD-10-CM

## 2021-07-15 PROCEDURE — G0277 HBOT, FULL BODY CHAMBER, 30M: HCPCS | Performed by: PREVENTIVE MEDICINE

## 2021-07-15 PROCEDURE — 99183 HYPERBARIC OXYGEN THERAPY: CPT | Performed by: PREVENTIVE MEDICINE

## 2021-07-15 NOTE — PROGRESS NOTES
HBO Treatment Course Details       Treatment Notes: Pt c/o slight hematuria this morning upon arrival for hbo treatment  He also states that his hgb is about 7 and he feels tired most of the time  He tolerated treatment well & denied pain post treatment  He had some congestion which cleared when he sat up  Treatment Course Number: 30  Total Treatments Ordered:  40     Diagnosis:   1  Irradiation cystitis with hematuria  [facility]       [facility] Treatment Details:  In-Patient Visit: no  Treatment Length:90 Minutes(Minutes)  Chamber #: Hard sided Monoplace Chamber    Pre-Treatment details:  Pre-treatment protocol Treatment Protocol: 2 0 FE X 90 minutes w/ 100% oxygen, two 5 minute air breaks  Left ear clear?: yes  Right ear clear?: yes                          Left ear TEED scale: Grade 0  Right ear TEED scale: Grade 0   Pretreatment heart and lung assessment: Pretreatment heart and lung auscltation unremarkable  Patient cleared for HBOT     Treatment details:  FE Rate: 2  Started Compression: 0817  Reached Compression: 0827  Total Compression Time: 10 (Minutes)  Total Holding Time: 100 (Minutes)  Started Decompression: 1007  Reached Surface: 1015  Total Decompression Time: 8 (Minutes)  Total Airbreaks: 10 (Minutes)  Total Time of Treatment: 108 (Minutes)  Symptoms Noted During Treatment: Other (Comment) (Pt c/o upper respiratory congestion during last 15 minutes  Felt better once sitting up ) (Minutes)    Post treatment details:  Left ear clear?: yes  Right ear clear?: yes                          Left ear TEED scale: Grade 0  Right ear TEED scale: Grade 0  Post treatment heart and lung assessment: Post treatment heart and lung auscltation unremarkable  Patient cleared for discharge  Tolerated treatment well         Vital Signs:  HBO Glucose Reference Range: 100-350 mg/dl   Pre-Treatment Post-Treatment   Time vitals are taken: 0815 Time vitals are taken: 1015   Blood Pressure: 165/67 Blood Pressure: 142/87   Pulse: 98 Pulse: 75   Resp: 18 Resp: 18   Temp: 97 3 °F (36 3 °C) Temp: 97 5 °F (36 4 °C)   Pre-Inspection Glucose reading:  (n/a) Post-Inspection Glucose reading:  (n/a)       No Known Allergies  Patient Active Problem List    Diagnosis Date Noted    Prostate cancer ([place] [id number] ) 07/08/2021    Leukocytosis 05/04/2021    RA (rheumatoid arthritis) ([place] [address] ) 05/04/2021    Acute blood loss anemia (ABLA) 04/26/2021    SHANTI (acute kidney injury) ([place] [id number] ) 04/24/2021    Hematuria due to cystitis     Anemia 04/14/2021    Chronic obstructive pulmonary disease ([place] [address] ) 04/14/2021    Hyperlipidemia 04/13/2021    Gross hematuria 04/12/2021    Cystitis 04/12/2021    Essential hypertension 04/12/2021    Dyslipidemia 04/12/2021     No orders of the defined types were placed in this encounter

## 2021-07-16 ENCOUNTER — OFFICE VISIT (OUTPATIENT)
Dept: WOUND CARE | Facility: HOSPITAL | Age: 76
End: 2021-07-16
Payer: MEDICARE

## 2021-07-16 DIAGNOSIS — N30.41 IRRADIATION CYSTITIS WITH HEMATURIA: Primary | ICD-10-CM

## 2021-07-16 PROCEDURE — G0277 HBOT, FULL BODY CHAMBER, 30M: HCPCS | Performed by: NURSE PRACTITIONER

## 2021-07-16 PROCEDURE — 99183 HYPERBARIC OXYGEN THERAPY: CPT | Performed by: NURSE PRACTITIONER

## 2021-07-16 NOTE — PROGRESS NOTES
HBO Treatment Course Details       Treatment Notes: Pt c/o pain 4-5/10 to right nephrostomy tube insertion site  He states that the site is "infected" but no purulent draining note, site is pink & he has been afebrile  Wife to arrange for isha't with surgeon  Pt tolerated treatment well & denies pain post treatment  Treatment Course Number: 31  Total Treatments Ordered:  40     Diagnosis:   1  Irradiation cystitis with hematuria  [facility]       [facility] Treatment Details:  In-Patient Visit: no  Treatment Length:90 Minutes(Minutes)  Chamber #: Hard sided Monoplace Chamber    Pre-Treatment details:  Pre-treatment protocol Treatment Protocol: 2 0 FE X 90 minutes w/ 100% oxygen, no air break  Left ear clear?: yes  Right ear clear?: yes  Left ear intact?: yes  Right ear intact?: yes                    Left ear TEED scale: Grade 0  Right ear TEED scale: Grade 0   Pretreatment heart and lung assessment: Pretreatment heart and lung auscltation unremarkable  Patient cleared for HBOT     Treatment details:  FE Rate: 2  Started Compression: 0818  Reached Compression: 0829  Total Compression Time: 11 (Minutes)  Total Holding Time: 100 (Minutes)  Started Decompression: 1009  Reached Surface: 1017  Total Decompression Time: 8 (Minutes)  Total Airbreaks: 10 (Minutes)  Total Time of Treatment: 109 (Minutes)  Symptoms Noted During Treatment: None (Minutes)    Post treatment details:  Left ear clear?: yes  Right ear clear?: yes  Left ears intact?: yes  Right ears intact?: yes                    Left ear TEED scale: Grade 0  Right ear TEED scale: Grade 0  Post treatment heart and lung assessment: Post treatment heart and lung auscltation unremarkable  Patient cleared for discharge  Tolerated treatment well         Vital Signs:  HBO Glucose Reference Range: 100-350 mg/dl   Pre-Treatment Post-Treatment   Time vitals are taken: 0815 Time vitals are taken: 1020   Blood Pressure: 116/59 Blood Pressure: 132/62   Pulse: 71 Pulse: 80   Resp: 20 Resp: 16   Temp: 97 7 °F (36 5 °C) Temp: 97 4 °F (36 3 °C)   Pre-Inspection Glucose reading:  (n/a) Post-Inspection Glucose reading:  (n/a)       No Known Allergies  Patient Active Problem List    Diagnosis Date Noted    Prostate cancer ([place] [address] ) 07/08/2021    Leukocytosis 05/04/2021    RA (rheumatoid arthritis) ([place] [address] ) 05/04/2021    Acute blood loss anemia (ABLA) 04/26/2021    SHANTI (acute kidney injury) ([place] [address] ) 04/24/2021    Hematuria due to cystitis     Anemia 04/14/2021    Chronic obstructive pulmonary disease ([place] [address] ) 04/14/2021    Hyperlipidemia 04/13/2021    Gross hematuria 04/12/2021    Cystitis 04/12/2021    Essential hypertension 04/12/2021    Dyslipidemia 04/12/2021     No orders of the defined types were placed in this encounter

## 2021-07-19 ENCOUNTER — HOSPITAL ENCOUNTER (OUTPATIENT)
Dept: NON INVASIVE DIAGNOSTICS | Facility: HOSPITAL | Age: 76
Discharge: HOME | End: 2021-07-19
Attending: RADIOLOGY

## 2021-07-19 ENCOUNTER — OFFICE VISIT (OUTPATIENT)
Dept: WOUND CARE | Facility: HOSPITAL | Age: 76
End: 2021-07-19
Payer: MEDICARE

## 2021-07-19 DIAGNOSIS — N30.41 IRRADIATION CYSTITIS WITH HEMATURIA: Primary | ICD-10-CM

## 2021-07-19 DIAGNOSIS — N30.91 HEMATURIA DUE TO CYSTITIS: ICD-10-CM

## 2021-07-19 PROCEDURE — G0277 HBOT, FULL BODY CHAMBER, 30M: HCPCS | Performed by: NURSE PRACTITIONER

## 2021-07-19 PROCEDURE — 99183 HYPERBARIC OXYGEN THERAPY: CPT | Performed by: NURSE PRACTITIONER

## 2021-07-19 NOTE — PROGRESS NOTES
HBO Treatment Course Details       Treatment Notes: Patient tolerated treatment well     Treatment Course Number: 32  Total Treatments Ordered:  40     Diagnosis:   1  Irradiation cystitis with hematuria  [facility]       H[facility] Treatment Details:  In-Patient Visit: No  Treatment Length:90 Minutes(Minutes)  Chamber #: Hard sided Monoplace Chamber    Pre-Treatment details:  Pre-treatment protocol Treatment Protocol: 2 0 FE X 90 minutes w/ 100% oxygen, no air break  Left ear clear?: yes  Right ear clear?: yes  Left ear intact?: yes  Right ear intact?: yes                    Left ear TEED scale: Grade 0  Right ear TEED scale: Grade 0   Pretreatment heart and lung assessment: Pretreatment heart and lung auscltation unremarkable  Patient cleared for HBOT     Treatment details:  FE Rate: 2  Started Compression: 0820  Reached Compression: 0832  Total Compression Time: 12 (Minutes)  Total Holding Time: 100 (Minutes)  Started Decompression: 1012  Reached Surface: 1020  Total Decompression Time: 8 (Minutes)  Total Airbreaks: 10 (Minutes)  Total Time of Treatment: 110 (Minutes)  Symptoms Noted During Treatment: None (Minutes)    Post treatment details:  Left ear clear?: yes  Right ear clear?: yes  Left ears intact?: yes  Right ears intact?: yes                    Left ear TEED scale: Grade 0  Right ear TEED scale: Grade 0  Post treatment heart and lung assessment: Post treatment heart and lung auscltation unremarkable  Patient cleared for discharge  Tolerated treatment well         Vital Signs:  HBO Glucose Reference Range: 100-350 mg/dl   Pre-Treatment Post-Treatment   Time vitals are taken: 0815 Time vitals are taken: 1020   Blood Pressure: 137/68 Blood Pressure: 144/55   Pulse: 76 Pulse: 59   Resp: 18 Resp: 16   Temp: 97 7 °F (36 5 °C) Temp: 97 3 °F (36 3 °C)   Pre-Inspection Glucose reading:  (n/a) Post-Inspection Glucose reading:  (n/a)       No Known Allergies  Patient Active Problem List    Diagnosis Date Noted    Prostate cancer ([place] [address] ) 07/08/2021    Leukocytosis 05/04/2021    RA (rheumatoid arthritis) ([place] [address] ) 05/04/2021    Acute blood loss anemia (ABLA) 04/26/2021    SHANTI (acute kidney injury) ([place] [address] ) 04/24/2021    Hematuria due to cystitis     Anemia 04/14/2021    Chronic obstructive pulmonary disease ([place] [address] ) 04/14/2021    Hyperlipidemia 04/13/2021    Gross hematuria 04/12/2021    Cystitis 04/12/2021    Essential hypertension 04/12/2021    Dyslipidemia 04/12/2021     No orders of the defined types were placed in this encounter

## 2021-07-19 NOTE — PROGRESS NOTES
HBO Treatment Course Details       Treatment Notes: The patient denied any c/o pain pre or post dive  He stated that the left nephrostomy tube is putting out less & that he is urinating more  Also, he stated that his lower extremities were slightly swollen & numb over the weekend  He will see interventional radiologist today to check nephrostomy tubes & will schedule isha't with primary MD for swelling/numbness  He tolerated the treatment well today  Treatment Course Number: 32  Total Treatments Ordered:  40     Diagnosis:   1  Irradiation cystitis with hematuria  Hyperbaric oxygen [facility]       HBO Treatment Details:  In-Patient Visit: no  Treatment Length:90 Minutes(Minutes)  Chamber #: Hard sided Monoplace Chamber    Pre-Treatment details:  Pre-treatment protocol Treatment Protocol: 2 0 FE X 90 minutes w/ 100% oxygen, no air break  Left ear clear?: yes  Right ear clear?: yes  Left ear intact?: yes  Right ear intact?: yes                    Left ear TEED scale: Grade 0  Right ear TEED scale: Grade 0   Pretreatment heart and lung assessment: Pretreatment heart and lung auscltation unremarkable  Patient cleared for HBOT     Treatment details:  FE Rate: 2  Started Compression: 0820  Reached Compression: 0832  Total Compression Time: 12 (Minutes)  Total Holding Time: 100 (Minutes)  Started Decompression: 1012  Reached Surface: 1020  Total Decompression Time: 8 (Minutes)  Total Airbreaks: 10 (Minutes)  Total Time of Treatment: 110 (Minutes)  Symptoms Noted During Treatment: None (Minutes)    Post treatment details:  Left ear clear?: yes  Right ear clear?: yes  Left ears intact?: yes  Right ears intact?: yes                    Left ear TEED scale: Grade 0  Right ear TEED scale: Grade 0  Post treatment heart and lung assessment: Post treatment heart and lung auscltation unremarkable  Patient cleared for discharge  Tolerated treatment well         Vital Signs:  HBO Glucose Reference Range: 100-350 mg/dl Pre-Treatment Post-Treatment   Time vitals are taken: 0815 Time vitals are taken: 1020   Blood Pressure: 137/68 Blood Pressure: 144/55   Pulse: 76 Pulse: 59   Resp: 18 Resp: 16   Temp: 97 7 °F (36 5 °C) Temp: 97 3 °F (36 3 °C)   Pre-Inspection Glucose reading:  (n/a) Post-Inspection Glucose reading:  (n/a)       No Known Allergies  Patient Active Problem List    Diagnosis Date Noted    Prostate cancer ([place] [id number] ) 07/08/2021    Leukocytosis 05/04/2021    RA (rheumatoid arthritis) ([place] [address] ) 05/04/2021    Acute blood loss anemia (ABLA) 04/26/2021    SHANTI (acute kidney injury) ([place] [id number] ) 04/24/2021    Hematuria due to cystitis     Anemia 04/14/2021    Chronic obstructive pulmonary disease ([place] [address] ) 04/14/2021    Hyperlipidemia 04/13/2021    Gross hematuria 04/12/2021    Cystitis 04/12/2021    Essential hypertension 04/12/2021    Dyslipidemia 04/12/2021     No orders of the defined types were placed in this encounter

## 2021-07-19 NOTE — NURSING NOTE
Pt seen in IR today with c/o of right PCN site soreness  B/l pcn to bags with good output  , both tubes flush without difficulty, sites intact, no redness or drainage    Pt had stat locks on both tubes, stat locks removed , sutures intact  Pt scheduled for routine change August 4 but may have removed before then  Pt will call IR to let us know plan after his hyperbaric therapy finishes  B/L dressings changed today

## 2021-07-20 ENCOUNTER — OFFICE VISIT (OUTPATIENT)
Dept: WOUND CARE | Facility: HOSPITAL | Age: 76
End: 2021-07-20
Payer: MEDICARE

## 2021-07-20 DIAGNOSIS — N30.41 IRRADIATION CYSTITIS WITH HEMATURIA: Primary | ICD-10-CM

## 2021-07-20 PROCEDURE — G0277 HBOT, FULL BODY CHAMBER, 30M: HCPCS | Performed by: NURSE PRACTITIONER

## 2021-07-20 PROCEDURE — 99183 HYPERBARIC OXYGEN THERAPY: CPT | Performed by: NURSE PRACTITIONER

## 2021-07-20 NOTE — PROGRESS NOTES
HBO Treatment Course Details       Treatment Notes: Patient treated in a hard sided monochamber  He tolerated HBOT well     Treatment Course Number: 33  Total Treatments Ordered:  40     Diagnosis:   1  Irradiation cystitis with hematuria  [facility]       [facility] Treatment Details:  In-Patient Visit: no  Treatment Length:90 Minutes(Minutes)  Chamber #: Hard sided Monoplace Chamber    Pre-Treatment details:  Pre-treatment protocol Treatment Protocol: 2 0 FE X 90 minutes w/ 100% oxygen, two 5 minute air breaks  Left ear clear?: yes  Right ear clear?: yes  Left ear intact?: yes  Right ear intact?: yes                    Left ear TEED scale: Grade 0  Right ear TEED scale: Grade 0   Pretreatment heart and lung assessment: Pretreatment heart and lung auscltation unremarkable   Patient cleared for HBOT     Treatment details:  FE Rate: 2  Started Compression: 0814  Reached Compression: 0824  Total Compression Time: 10 (Minutes)  Total Holding Time: 100 (Minutes)  Started Decompression: 1004  Reached Surface: 1014  Total Decompression Time: 10 (Minutes)  Total Airbreaks: 10 (Minutes)  Total Time of Treatment: 110 (Minutes)  Symptoms Noted During Treatment: None (Minutes)    Post treatment details:                                              Vital Signs:  HBO Glucose Reference Range: 100-350 mg/dl   Pre-Treatment Post-Treatment   Time vitals are taken: 0810 Time vitals are taken: 1016   Blood Pressure: 131/76 Blood Pressure: 158/88   Pulse: 75 Pulse: 60   Resp: 16 Resp: 16   Temp: 97 4 °F (36 3 °C) Temp: 97 8 °F (36 6 °C)             No Known Allergies  Patient Active Problem List    Diagnosis Date Noted    Prostate cancer ([place] [id number] ) 07/08/2021    Leukocytosis 05/04/2021    RA (rheumatoid arthritis) ([place] [address] ) 05/04/2021    Acute blood loss anemia (ABLA) 04/26/2021    SHANTI (acute kidney injury) ([place] [address] ) 04/24/2021    Hematuria due to cystitis     Anemia 04/14/2021    Chronic obstructive pulmonary disease ([facility]) 04/14/2021    Hyperlipidemia 04/13/2021    Gross hematuria 04/12/2021    Cystitis 04/12/2021    Essential hypertension 04/12/2021    Dyslipidemia 04/12/2021     No orders of the defined types were placed in this encounter

## 2021-07-20 NOTE — PROGRESS NOTES
HBO Treatment Course Details       Treatment Notes:  Patient tolerated treatment well     Treatment Course Number: 33  Total Treatments Ordered:  40     Diagnosis:   1  Irradiation cystitis with hematuria  [facility]       [facility] Treatment Details:  In-Patient Visit:  No  Treatment Length:90 Minutes(Minutes)  Chamber #: Hard sided Monoplace Chamber    Pre-Treatment details:  Pre-treatment protocol Treatment Protocol: 2 0 FE X 90 minutes w/ 100% oxygen, two 5 minute air breaks  Left ear clear?: yes  Right ear clear?: yes  Left ear intact?: yes  Right ear intact?: yes                    Left ear TEED scale: Grade 0  Right ear TEED scale: Grade 0   Pretreatment heart and lung assessment: Pretreatment heart and lung auscltation unremarkable  Patient cleared for HBOT     Treatment details:  FE Rate: 2  Started Compression: 1870  Reached Compression: 0824  Total Compression Time: 10 (Minutes)  Total Holding Time: 100 (Minutes)  Started Decompression: 1004  Reached Surface: 6182  Total Decompression Time: 10 (Minutes)  Total Airbreaks: 10 (Minutes)  Total Time of Treatment: 110 (Minutes)  Symptoms Noted During Treatment: None (Minutes)    Post treatment details:  Left ear clear?: yes  Right ear clear?: yes  Left ears intact?: yes  Right ears intact?: yes                    Left ear TEED scale: Grade 0  Right ear TEED scale: Grade 0  Post treatment heart and lung assessment: Post treatment heart and lung auscltation unremarkable  Patient cleared for discharge  Tolerated treatment well         Vital Signs:  HBO Glucose Reference Range: 100-350 mg/dl   Pre-Treatment Post-Treatment   Time vitals are taken: 0810 Time vitals are taken: 1016   Blood Pressure: 131/76 Blood Pressure: 158/88   Pulse: 75 Pulse: 60   Resp: 16 Resp: 16   Temp: 97 4 °F (36 3 °C) Temp: 97 8 °F (36 6 °C)             No Known Allergies  Patient Active Problem List    Diagnosis Date Noted    Prostate cancer ([facility] [address] ) 07/08/2021    Leukocytosis 05/04/2021    RA [facility]rheumatoid arthritis) ([facility] ) 05/04/2021    Acute blood loss anemia (ABLA) 04/26/2021    SHANTI (acute kidney injury) ([place] [id number] ) 04/24/2021    Hematuria due to cystitis     Anemia 04/14/2021    Chronic obstructive pulmonary disease ([place] [id number] ) 04/14/2021    Hyperlipidemia 04/13/2021    Gross hematuria 04/12/2021    Cystitis 04/12/2021    Essential hypertension 04/12/2021    Dyslipidemia 04/12/2021     No orders of the defined types were placed in this encounter

## 2021-07-21 ENCOUNTER — OFFICE VISIT (OUTPATIENT)
Dept: WOUND CARE | Facility: HOSPITAL | Age: 76
End: 2021-07-21
Payer: MEDICARE

## 2021-07-21 DIAGNOSIS — N30.41 IRRADIATION CYSTITIS WITH HEMATURIA: ICD-10-CM

## 2021-07-21 PROCEDURE — G0277 HBOT, FULL BODY CHAMBER, 30M: HCPCS | Performed by: FAMILY MEDICINE

## 2021-07-21 PROCEDURE — 99183 HYPERBARIC OXYGEN THERAPY: CPT | Performed by: FAMILY MEDICINE

## 2021-07-21 NOTE — PROGRESS NOTES
HBO Treatment Course Details       Treatment Notes:      Treatment Course Number: 34  Total Treatments Ordered:  40     Diagnosis:   1  Irradiation cystitis with hematuria  Hyperbaric oxygen [facility]       HBO Treatment Details:  In-Patient Visit: no  Treatment Length:90 Minutes(Minutes)  Chamber #: Hard sided Monoplace Chamber    Pre-Treatment details:  Pre-treatment protocol Treatment Protocol: 2 0 FE X 90 minutes w/ 100% oxygen, two 5 minute air breaks  Left ear clear?: yes  Right ear clear?: yes  Left ear intact?: yes  Right ear intact?: yes  Left ear color: translucent  Right ear color: translucent  PE Tubes present, Left ear?: no  PE Tubes present, Right ear?: no  Left ear irrigated?: no  Right ear irrigated?: no  Left ear TEED scale: Grade 0  Right ear TEED scale: Grade 0   Pretreatment heart and lung assessment: Pretreatment heart and lung auscltation unremarkable  Patient cleared for HBOT     Treatment details:  FE Rate: 2  Started Compression: 0816  Reached Compression: 0828  Total Compression Time: 12 (Minutes)  Total Holding Time: 100 (Minutes)  Started Decompression: 1008  Reached Surface: 1018  Total Decompression Time: 10 (Minutes)  Total Airbreaks: 10 (Minutes)  Total Time of Treatment: 112 (Minutes)  Symptoms Noted During Treatment: None (Minutes)    Post treatment details:  Left ear clear?: yes  Right ear clear?: yes  Left ears intact?: yes  Right ears intact?: yes  Left ear color: translucent  Right ear color: translucent  PE Tubes present, Left ear?: no  PE Tubes present, Right ear?: no        Left ear TEED scale: Grade 0  Right ear TEED scale: Grade 0  Post treatment heart and lung assessment: Post treatment heart and lung auscltation unremarkable  Patient cleared for discharge  Tolerated treatment well         Vital Signs:  HBO Glucose Reference Range: 100-350 mg/dl   Pre-Treatment Post-Treatment   Time vitals are taken: 0805 Time vitals are taken: 1020   Blood Pressure: 147/68 Blood Pressure: 132/80   Pulse: 76 Pulse: 60   Resp: 16 Resp: 16   Temp: 97 6 °F (36 4 °C) Temp: 97 8 °F (36 6 °C)             No Known Allergies  Patient Active Problem List    Diagnosis Date Noted    Prostate cancer ([place] [id number] ) 07/08/2021    Leukocytosis 05/04/2021    RA (rheumatoid arthritis) ([place] [id number] ) 05/04/2021    Acute blood loss anemia (ABLA) 04/26/2021    SHANTI (acute kidney injury) ([place] [id number] ) 04/24/2021    Hematuria due to cystitis     Anemia 04/14/2021    Chronic obstructive pulmonary disease ([place] [address] ) 04/14/2021    Hyperlipidemia 04/13/2021    Gross hematuria 04/12/2021    Cystitis 04/12/2021    Essential hypertension 04/12/2021    Dyslipidemia 04/12/2021     No orders of the defined types were placed in this encounter

## 2021-07-21 NOTE — PROGRESS NOTES
HBO Treatment Course Details       Treatment Notes: Patient tolerated HBOT well  Some anxiety in last 5 minutes but was able to complete dive     Treatment Course Number: 34  Total Treatments Ordered:  40     Diagnosis:   1  Irradiation cystitis with hematuria  Hyperbaric oxygen [facility]       HBO Treatment Details:  In-Patient Visit: no  Treatment Length:90 Minutes(Minutes)  Chamber #: Hard sided Monoplace Chamber    Pre-Treatment details:  Pre-treatment protocol Treatment Protocol: 2 0 FE X 90 minutes w/ 100% oxygen, two 5 minute air breaks                                             Treatment details:  FE Rate: 2  Started Compression: 0816  Reached Compression: 0828  Total Compression Time: 12 (Minutes)  Total Holding Time: 100 (Minutes)  Started Decompression: 1008  Reached Surface: 1018  Total Decompression Time: 10 (Minutes)  Total Airbreaks: 10 (Minutes)  Total Time of Treatment: 112 (Minutes)  Symptoms Noted During Treatment: None (Minutes)    Post treatment details:                                              Vital Signs:  HBO Glucose Reference Range: 100-350 mg/dl   Pre-Treatment Post-Treatment   Time vitals are taken: 0805 Time vitals are taken: 1020   Blood Pressure: 147/68 Blood Pressure: 132/80   Pulse: 76 Pulse: 60   Resp: 16 Resp: 16   Temp: 97 6 °F (36 4 °C) Temp: 97 8 °F (36 6 °C)             No Known Allergies  Patient Active Problem List    Diagnosis Date Noted    Prostate cancer ([place] [address] ) 07/08/2021    Leukocytosis 05/04/2021    RA (rheumatoid arthritis) ([place] [address] ) 05/04/2021    Acute blood loss anemia (ABLA) 04/26/2021    SHANTI (acute kidney injury) ([place] [address] ) 04/24/2021    Hematuria due to cystitis     Anemia 04/14/2021    Chronic obstructive pulmonary disease ([place] [address] ) 04/14/2021    Hyperlipidemia 04/13/2021    Gross hematuria 04/12/2021    Cystitis 04/12/2021    Essential hypertension 04/12/2021    Dyslipidemia 04/12/2021     No orders of the defined types were placed in this encounter

## 2021-07-22 ENCOUNTER — OFFICE VISIT (OUTPATIENT)
Dept: WOUND CARE | Facility: HOSPITAL | Age: 76
End: 2021-07-22
Payer: MEDICARE

## 2021-07-22 DIAGNOSIS — N30.41 IRRADIATION CYSTITIS WITH HEMATURIA: ICD-10-CM

## 2021-07-22 PROCEDURE — 99183 HYPERBARIC OXYGEN THERAPY: CPT | Performed by: PREVENTIVE MEDICINE

## 2021-07-22 NOTE — PROGRESS NOTES
HBO Treatment Course Details       Treatment Notes: Pt denied any problems or complaints of pain pre or post dive  He stated that his nephrostomy tubes are functioning well as per interventional radiologist  He is awaiting bloodwork results from primary care physician from [facility] visit  Treatment Course Number: 35  Total Treatments Ordered:  40     Diagnosis:   1  Irradiation cystitis with hematuria  [facility]       [facility] Treatment Details:  In-Patient Visit: no  Treatment Length:90 Minutes(Minutes)  Chamber #: Hard sided Monoplace Chamber    Pre-Treatment details:  Pre-treatment protocol Treatment Protocol: 2 0 FE X 90 minutes w/ 100% oxygen, two 5 minute air breaks  Left ear clear?: yes  Right ear clear?: yes                          Left ear TEED scale: Grade 0  Right ear TEED scale: Grade 0   Pretreatment heart and lung assessment: Pretreatment heart and lung auscltation unremarkable  Patient cleared for HBOT     Treatment details:  FE Rate: 2  Started Compression: 0818  Reached Compression: 0828  Total Compression Time: 10 (Minutes)  Total Holding Time: 100 (Minutes)  Started Decompression: 1008  Reached Surface: 1017  Total Decompression Time: 9 (Minutes)  Total Airbreaks: 10 (Minutes)  Total Time of Treatment: 109 (Minutes)  Symptoms Noted During Treatment: None (Minutes)    Post treatment details:  Left ear clear?: yes  Right ear clear?: yes                          Left ear TEED scale: Grade 0  Right ear TEED scale: Grade 0  Post treatment heart and lung assessment: Post treatment heart and lung auscltation unremarkable  Patient cleared for discharge  Tolerated treatment well         Vital Signs:  HBO Glucose Reference Range: 100-350 mg/dl   Pre-Treatment Post-Treatment   Time vitals are taken: 0805 Time vitals are taken: 1020   Blood Pressure: 126/70 Blood Pressure: 135/68   Pulse: 85 Pulse: 73   Resp: 18 Resp: 18   Temp: 97 9 °F (36 6 °C) Temp: 96 9 °F (36 1 °C)   Pre-Inspection Glucose reading:  (n/a) Post-Inspection Glucose reading:  (n/a)       No Known Allergies  Patient Active Problem List    Diagnosis Date Noted    Prostate cancer ([place] [id number] ) 07/08/2021    Leukocytosis 05/04/2021    RA (rheumatoid arthritis) ([place] [address] ) 05/04/2021    Acute blood loss anemia (ABLA) 04/26/2021    SHANTI (acute kidney injury) ([place] [address] ) 04/24/2021    Hematuria due to cystitis     Anemia 04/14/2021    Chronic obstructive pulmonary disease ([place] [address] ) 04/14/2021    Hyperlipidemia 04/13/2021    Gross hematuria 04/12/2021    Cystitis 04/12/2021    Essential hypertension 04/12/2021    Dyslipidemia 04/12/2021     No orders of the defined types were placed in this encounter

## 2021-07-23 ENCOUNTER — OFFICE VISIT (OUTPATIENT)
Dept: WOUND CARE | Facility: HOSPITAL | Age: 76
End: 2021-07-23
Payer: MEDICARE

## 2021-07-23 DIAGNOSIS — N30.41 IRRADIATION CYSTITIS WITH HEMATURIA: Primary | ICD-10-CM

## 2021-07-23 DIAGNOSIS — Z71.89 COMPLEX CARE COORDINATION: ICD-10-CM

## 2021-07-23 PROCEDURE — G0277 HBOT, FULL BODY CHAMBER, 30M: HCPCS | Performed by: NURSE PRACTITIONER

## 2021-07-23 PROCEDURE — 99183 HYPERBARIC OXYGEN THERAPY: CPT | Performed by: NURSE PRACTITIONER

## 2021-07-23 NOTE — PROGRESS NOTES
HBO Treatment Course Details       Treatment Notes: Patient tolerated treatment well     Treatment Course Number: 36  Total Treatments Ordered:  40     Diagnosis:   1  Irradiation cystitis with hematuria     2  Complex care coordination  Ambulatory referral to complex care management program       HBO Treatment Details:  In-Patient Visit: No  Treatment Length:90 Minutes(Minutes)  Chamber #: Hard sided Monoplace Chamber    Pre-Treatment details:  Pre-treatment protocol Treatment Protocol: 2 0 FE X 90 minutes w/ 100% oxygen, two 5 minute air breaks  Left ear clear?: yes  Right ear clear?: yes  Left ear intact?: yes  Right ear intact?: yes                    Left ear TEED scale: Grade 0  Right ear TEED scale: Grade 0   Pretreatment heart and lung assessment: Pretreatment heart and lung auscltation unremarkable  Patient cleared for HBOT     Treatment details:  FE Rate: 2  Started Compression: 0818  Reached Compression: 0828  Total Compression Time: 10 (Minutes)  Total Holding Time: 100 (Minutes)  Started Decompression: 1008  Reached Surface: 1018  Total Decompression Time: 10 (Minutes)  Total Airbreaks: 10 (Minutes)  Total Time of Treatment: 110 (Minutes)  Symptoms Noted During Treatment: Restlessness and irritability (Minutes)    Post treatment details:  Left ear clear?: yes  Right ear clear?: yes  Left ears intact?: yes  Right ears intact?: yes                    Left ear TEED scale: Grade 0  Right ear TEED scale: Grade 0  Post treatment heart and lung assessment: Post treatment heart and lung auscltation unremarkable  Patient cleared for discharge  Tolerated treatment well         Vital Signs:  HBO Glucose Reference Range: 100-350 mg/dl   Pre-Treatment Post-Treatment   Time vitals are taken: 0815 Time vitals are taken: 1020   Blood Pressure: 131/67 Blood Pressure: 150/76   Pulse: 68 Pulse: 52   Resp: 16 Resp: 16   Temp: 98 °F (36 7 °C) Temp: 97 5 °F (36 4 °C)   Pre-Inspection Glucose reading:  (n/a) Post-Inspection Glucose reading:  (n/a)       No Known Allergies  Patient Active Problem List    Diagnosis Date Noted    Prostate cancer ([place] [address] ) 07/08/2021    Leukocytosis 05/04/2021    RA (rheumatoid arthritis) ([place] [address] ) 05/04/2021    Acute blood loss anemia (ABLA) 04/26/2021    SHANTI (acute kidney injury) ([place] [address] ) 04/24/2021    Hematuria due to cystitis     Anemia 04/14/2021    Chronic obstructive pulmonary disease ([place] [address] ) 04/14/2021    Hyperlipidemia 04/13/2021    Gross hematuria 04/12/2021    Cystitis 04/12/2021    Essential hypertension 04/12/2021    Dyslipidemia 04/12/2021     No orders of the defined types were placed in this encounter

## 2021-07-23 NOTE — PROGRESS NOTES
HBO Treatment Course Details       Treatment Notes: Pt denied any problems or complaints of pain pre or post dive  He tolerated treatment well  Treatment Course Number: 36  Total Treatments Ordered:  40     Diagnosis:   1  Irradiation cystitis with hematuria     2  Complex care coordination  Ambulatory referral to complex care management program       HBO Treatment Details:  In-Patient Visit: no  Treatment Length:90 Minutes(Minutes)  Chamber #: Hard sided Monoplace Chamber    Pre-Treatment details:  Pre-treatment protocol Treatment Protocol: 2 0 FE X 90 minutes w/ 100% oxygen, two 5 minute air breaks  Left ear clear?: yes  Right ear clear?: yes  Left ear intact?: yes  Right ear intact?: yes                    Left ear TEED scale: Grade 0  Right ear TEED scale: Grade 0   Pretreatment heart and lung assessment: Pretreatment heart and lung auscltation unremarkable   Patient cleared for HBOT     Treatment details:  FE Rate: 2  Started Compression: 0818  Reached Compression: 0828  Total Compression Time: 10 (Minutes)  Total Holding Time: 100 (Minutes)  Started Decompression: 1008  Reached Surface: 1018  Total Decompression Time: 10 (Minutes)  Total Airbreaks: 10 (Minutes)  Total Time of Treatment: 110 (Minutes)  Symptoms Noted During Treatment: Restlessness and irritability (Minutes)    Post treatment details:                                              Vital Signs:  HBO Glucose Reference Range: 100-350 mg/dl   Pre-Treatment Post-Treatment   Time vitals are taken: 0815 Time vitals are taken: 1020   Blood Pressure: 131/67 Blood Pressure: 150/76   Pulse: 68 Pulse: 52   Resp: 16 Resp: 16   Temp: 98 °F (36 7 °C) Temp: 97 5 °F (36 4 °C)   Pre-Inspection Glucose reading:  (n/a) Post-Inspection Glucose reading:  (n/a)       No Known Allergies  Patient Active Problem List    Diagnosis Date Noted    Prostate cancer ([facility] ) 07/08/2021    Leukocytosis 05/04/2021    RA (rheumatoid arthritis) ([facility] ) 05/04/2021    Acute blood loss anemia (ABLA) 04/26/2021    SAHNTI [facility] ) 04/24/2021    Hematuria due to cystitis     Anemia 04/14/2021    [facility] ) 04/14/2021    Hyperlipidemia 04/13/2021    Gross hematuria 04/12/2021    Cystitis 04/12/2021    Essential hypertension 04/12/2021    Dyslipidemia 04/12/2021     No orders of the defined types were placed in this encounter

## 2021-07-26 ENCOUNTER — OFFICE VISIT (OUTPATIENT)
Dept: WOUND CARE | Facility: HOSPITAL | Age: 76
End: 2021-07-26
Payer: MEDICARE

## 2021-07-26 DIAGNOSIS — N30.41 IRRADIATION CYSTITIS WITH HEMATURIA: Primary | ICD-10-CM

## 2021-07-26 PROCEDURE — G0277 HBOT, FULL BODY CHAMBER, 30M: HCPCS | Performed by: NURSE PRACTITIONER

## 2021-07-26 PROCEDURE — 99183 HYPERBARIC OXYGEN THERAPY: CPT | Performed by: NURSE PRACTITIONER

## 2021-07-26 NOTE — PROGRESS NOTES
HBO Treatment Course Details       Treatment Notes: Patient tolerated treatment well     Treatment Course Number: 37  Total Treatments Ordered:  40     Diagnosis:   1  Irradiation cystitis with hematuria  Hyperbaric oxygen [facility]    Hyperbaric oxygen [facility]       HBO Treatment Details:  In-Patient Visit: No  Treatment Length:90 Minutes(Minutes)  Chamber #: Hard sided Monoplace Chamber    Pre-Treatment details:  Pre-treatment protocol Treatment Protocol: 2 0 FE X 90 minutes w/ 100% oxygen, two 5 minute air breaks  Left ear clear?: yes  Right ear clear?: yes  Left ear intact?: yes  Right ear intact?: yes                    Left ear TEED scale: Grade 0  Right ear TEED scale: Grade 0   Pretreatment heart and lung assessment: Pretreatment heart and lung auscltation unremarkable  Patient cleared for HBOT     Treatment details:  FE Rate: 2  Started Compression: 0819  Reached Compression: 0830  Total Compression Time: 11 (Minutes)  Total Holding Time: 100 (Minutes)  Started Decompression: 1010  Reached Surface: 1018  Total Decompression Time: 8 (Minutes)  Total Airbreaks: 10 (Minutes)  Total Time of Treatment: 109 (Minutes)  Symptoms Noted During Treatment: None (Minutes)    Post treatment details:  Left ear clear?: yes  Right ear clear?: yes  Left ears intact?: yes  Right ears intact?: yes                    Left ear TEED scale: Grade 0  Right ear TEED scale: Grade 0  Post treatment heart and lung assessment: Post treatment heart and lung auscltation unremarkable  Patient cleared for discharge  Tolerated treatment well         Vital Signs:  HBO Glucose Reference Range: 100-350 mg/dl   Pre-Treatment Post-Treatment   Time vitals are taken: 0815 Time vitals are taken: 1020   Blood Pressure: 130/78 Blood Pressure: (!) 153/104   Pulse: 85 Pulse: 56   Resp: 20 Resp: 18   Temp: 97 8 °F (36 6 °C) Temp: 97 7 °F (36 5 °C)   Pre-Inspection Glucose reading:  (n/a) Post-Inspection Glucose reading:  (n/a)       No Known Allergies  Patient Active Problem List    Diagnosis Date Noted    Prostate cancer ([place] [address] ) 07/08/2021    Leukocytosis 05/04/2021    RA (rheumatoid arthritis) ([place] [address] ) 05/04/2021    Acute blood loss anemia (ABLA) 04/26/2021    SHANTI (acute kidney injury) ([place] [address] ) 04/24/2021    Hematuria due to cystitis     Anemia 04/14/2021    Chronic obstructive pulmonary disease ([place] [address] ) 04/14/2021    Hyperlipidemia 04/13/2021    Gross hematuria 04/12/2021    Cystitis 04/12/2021    Essential hypertension 04/12/2021    Dyslipidemia 04/12/2021     No orders of the defined types were placed in this encounter

## 2021-07-26 NOTE — PROGRESS NOTES
HBO Treatment Course Details       Treatment Notes: Pt denied any problems or c/o pain pre or post dive  He tolerated treatment well  Treatment Course Number: 37  Total Treatments Ordered:  40     Diagnosis:   1  Irradiation cystitis with hematuria  Hyperbaric oxygen [facility]    Hyperbaric oxygen [facility]       HBO Treatment Details:  In-Patient Visit: no  Treatment Length:90 Minutes(Minutes)  Chamber #: Hard sided Monoplace Chamber    Pre-Treatment details:  Pre-treatment protocol Treatment Protocol: 2 0 FE X 90 minutes w/ 100% oxygen, two 5 minute air breaks  Left ear clear?: yes  Right ear clear?: yes  Left ear intact?: yes  Right ear intact?: yes                    Left ear TEED scale: Grade 0  Right ear TEED scale: Grade 0   Pretreatment heart and lung assessment: Pretreatment heart and lung auscltation unremarkable   Patient cleared for HBOT     Treatment details:  FE Rate: 2  Started Compression: 0819  Reached Compression: 0830  Total Compression Time: 11 (Minutes)  Total Holding Time: 100 (Minutes)  Started Decompression: 1010  Reached Surface: 1018  Total Decompression Time: 8 (Minutes)  Total Airbreaks: 10 (Minutes)  Total Time of Treatment: 109 (Minutes)  Symptoms Noted During Treatment: None (Minutes)    Post treatment details:                                              Vital Signs:  HBO Glucose Reference Range: 100-350 mg/dl   Pre-Treatment Post-Treatment   Time vitals are taken: 0815 Time vitals are taken: 1020   Blood Pressure: 130/78 Blood Pressure: (!) 153/104   Pulse: 85 Pulse: 56   Resp: 20 Resp: 18   Temp: 97 8 °F (36 6 °C) Temp: 97 7 °F (36 5 °C)   Pre-Inspection Glucose reading:  (n/a) Post-Inspection Glucose reading:  (n/a)       No Known Allergies  Patient Active Problem List    Diagnosis Date Noted    Prostate cancer ([facility] ) 07/08/2021    Leukocytosis 05/04/2021    RA (rheumatoid arthritis) ([facility] ) 05/04/2021    Acute blood loss anemia (ABLA) 04/26/2021    SHANTI [facility]acute [facility] injury) ([facility] ) 04/24/2021    Hematuria due to cystitis     Anemia 04/14/2021    Chronic obstructive pulmonary disease ([facility] ) 04/14/2021    Hyperlipidemia 04/13/2021    Gross hematuria 04/12/2021    Cystitis 04/12/2021    Essential hypertension 04/12/2021    Dyslipidemia 04/12/2021     No orders of the defined types were placed in this encounter

## 2021-07-27 ENCOUNTER — OFFICE VISIT (OUTPATIENT)
Dept: WOUND CARE | Facility: HOSPITAL | Age: 76
End: 2021-07-27
Payer: MEDICARE

## 2021-07-27 DIAGNOSIS — N30.41 IRRADIATION CYSTITIS WITH HEMATURIA: ICD-10-CM

## 2021-07-27 PROCEDURE — G0277 HBOT, FULL BODY CHAMBER, 30M: HCPCS

## 2021-07-27 PROCEDURE — 99183 HYPERBARIC OXYGEN THERAPY: CPT | Performed by: PHYSICIAN ASSISTANT

## 2021-07-27 NOTE — PROGRESS NOTES
HBO Treatment Course Details       Treatment Notes: Patient tolerated HBOT well  A bit of anxiety near end but ultimately her was able to complete dive without difficulty    Tolerated HBO dive well  No issues post dive  Has slight expiratory wheeze  Treatment Course Number: 38  Total Treatments Ordered:  40     Diagnosis:   1  Irradiation cystitis with hematuria  [facility]       H[facility] Treatment Details:  In-Patient Visit: no  Treatment Length:90 Minutes(Minutes)  Chamber #: Hard sided Monoplace Chamber    Pre-Treatment details:  Pre-treatment protocol Treatment Protocol: 2 0 FE X 90 minutes w/ 100% oxygen, two 5 minute air breaks  Left ear clear?: yes  Right ear clear?: yes  Left ear intact?: yes  Right ear intact?: yes  Left ear color: pink/semitransparent  Right ear color: pink/semitransparent  PE Tubes present, Left ear?: no  PE Tubes present, Right ear?: no  Left ear irrigated?: no  Right ear irrigated?: no  Left ear TEED scale: Grade 0  Right ear TEED scale: Grade 0   Pretreatment heart and lung assessment: Pretreatment heart and lung auscltation unremarkable  Patient cleared for HBOT (expiratory wheeze prior to HBO)     Treatment details:  FE Rate: 2  Started Compression: 9959  Reached Compression: 0831  Total Compression Time: 10 (Minutes)  Total Holding Time: 100 (Minutes)  Started Decompression: 1011  Reached Surface: 1021  Total Decompression Time: 10 (Minutes)  Total Airbreaks: 10 (Minutes)  Total Time of Treatment: 110 (Minutes)  Symptoms Noted During Treatment: None (Minutes)    Post treatment details:  Left ear clear?: no  Right ear clear?: no  Left ears intact?: no  Right ears intact?: no  Left ear color: pink  Right ear color: pink  PE Tubes present, Left ear?: no  PE Tubes present, Right ear?: no        Left ear TEED scale: Grade 0  Right ear TEED scale: Grade 0  Post treatment heart and lung assessment: Post treatment heart and lung auscltation unremarkable   Patient cleared for discharge  Tolerated treatment well  Vital Signs:  [facility] Glucose Reference Range: 100-350 mg/dl   Pre-Treatment Post-Treatment   Time vitals are taken: 0810 Time vitals are taken: 1025   Blood Pressure: 146/72 Blood Pressure: 152/81   Pulse: 98 Pulse: 67   Resp: 18 Resp: 18   Temp: 97 9 °F (36 6 °C) Temp: 97 3 °F (36 3 °C)             No Known Allergies  Patient Active Problem List    Diagnosis Date Noted    Prostate cancer ([place] [address] ) 07/08/2021    Leukocytosis 05/04/2021    RA (rheumatoid arthritis) ([place] [address] ) 05/04/2021    Acute blood loss anemia (ABLA) 04/26/2021    SHANTI (acute kidney injury) ([place] [address] ) 04/24/2021    Hematuria due to cystitis     Anemia 04/14/2021    Chronic obstructive pulmonary disease ([place] [address] ) 04/14/2021    Hyperlipidemia 04/13/2021    Gross hematuria 04/12/2021    Cystitis 04/12/2021    Essential hypertension 04/12/2021    Dyslipidemia 04/12/2021     No orders of the defined types were placed in this encounter

## 2021-07-29 ENCOUNTER — HOSPITAL ENCOUNTER (INPATIENT)
Facility: HOSPITAL | Age: 76
LOS: 2 days | Discharge: HOME | DRG: 690 | End: 2021-08-02
Attending: EMERGENCY MEDICINE | Admitting: INTERNAL MEDICINE
Payer: MEDICARE

## 2021-07-29 ENCOUNTER — OFFICE VISIT (OUTPATIENT)
Dept: WOUND CARE | Facility: HOSPITAL | Age: 76
DRG: 690 | End: 2021-07-29
Payer: MEDICARE

## 2021-07-29 DIAGNOSIS — N30.41 IRRADIATION CYSTITIS WITH HEMATURIA: ICD-10-CM

## 2021-07-29 DIAGNOSIS — R31.9 HEMATURIA: Primary | ICD-10-CM

## 2021-07-29 DIAGNOSIS — N30.01 ACUTE CYSTITIS WITH HEMATURIA: ICD-10-CM

## 2021-07-29 DIAGNOSIS — D64.9 ANEMIA, UNSPECIFIED TYPE: ICD-10-CM

## 2021-07-29 PROCEDURE — G0277 HBOT, FULL BODY CHAMBER, 30M: HCPCS | Performed by: PREVENTIVE MEDICINE

## 2021-07-29 PROCEDURE — 99284 EMERGENCY DEPT VISIT MOD MDM: CPT

## 2021-07-29 PROCEDURE — 99183 HYPERBARIC OXYGEN THERAPY: CPT | Performed by: PREVENTIVE MEDICINE

## 2021-07-29 PROCEDURE — 99284 EMERGENCY DEPT VISIT MOD MDM: CPT | Performed by: EMERGENCY MEDICINE

## 2021-07-29 RX ORDER — CEFAZOLIN SODIUM 2 G/50ML
2000 SOLUTION INTRAVENOUS ONCE
Status: COMPLETED | OUTPATIENT
Start: 2021-07-30 | End: 2021-07-30

## 2021-07-29 NOTE — PROGRESS NOTES
HBO Treatment Course Details       Treatment Notes: Pt denies any c/o pain pre or post dive  He states that he was passing large clots in his urine yesterday a m  & missed his hbo tx  Pt tolerated treatment today without difficulty  Treatment Course Number: 39  Total Treatments Ordered:  40     Diagnosis:   1  Irradiation cystitis with hematuria  [facility]       H[facility] Treatment Details:  In-Patient Visit: no  Treatment Length:90 Minutes(Minutes)  Chamber #: Hard sided Monoplace Chamber    Pre-Treatment details:  Pre-treatment protocol Treatment Protocol: 2 0 FE X 90 minutes w/ 100% oxygen, two 5 minute air breaks  Left ear clear?: yes  Right ear clear?: yes                          Left ear TEED scale: Grade 0  Right ear TEED scale: Grade 0   Pretreatment heart and lung assessment: Pretreatment heart and lung auscltation unremarkable  Patient cleared for HBOT     Treatment details:  FE Rate: 2  Started Compression: 0817  Reached Compression: 0828  Total Compression Time: 11 (Minutes)  Total Holding Time: 100 (Minutes)  Started Decompression: 1008  Reached Surface: 1018  Total Decompression Time: 10 (Minutes)  Total Airbreaks: 10 (Minutes)  Total Time of Treatment: 111 (Minutes)  Symptoms Noted During Treatment: None (Minutes)    Post treatment details:  Left ear clear?: yes  Right ear clear?: yes                          Left ear TEED scale: Grade 0  Right ear TEED scale: Grade 0  Post treatment heart and lung assessment: Post treatment heart and lung auscltation unremarkable  Patient cleared for discharge  Tolerated treatment well         Vital Signs:  HBO Glucose Reference Range: 100-350 mg/dl   Pre-Treatment Post-Treatment   Time vitals are taken: 0805 Time vitals are taken: 1020   Blood Pressure: 146/75 Blood Pressure: 127/69   Pulse: 88 Pulse: 60   Resp: 16 Resp: 16   Temp: 98 3 °F (36 8 °C) Temp: 98 1 °F (36 7 °C)   Pre-Inspection Glucose reading:  (n/a) Post-Inspection Glucose reading: (N/A)       No Known Allergies  Patient Active Problem List    Diagnosis Date Noted    Prostate cancer ([place] [address] ) 07/08/2021    Leukocytosis 05/04/2021    RA (rheumatoid arthritis) ([place] [address] ) 05/04/2021    Acute blood loss anemia (ABLA) 04/26/2021    SHANTI (acute kidney injury) ([place] [address] ) 04/24/2021    Hematuria due to cystitis     Anemia 04/14/2021    Chronic obstructive pulmonary disease ([place] [address] ) 04/14/2021    Hyperlipidemia 04/13/2021    Gross hematuria 04/12/2021    Cystitis 04/12/2021    Essential hypertension 04/12/2021    Dyslipidemia 04/12/2021     No orders of the defined types were placed in this encounter

## 2021-07-30 PROBLEM — N18.9 CKD (CHRONIC KIDNEY DISEASE): Status: ACTIVE | Noted: 2021-07-30

## 2021-07-30 LAB
ALBUMIN SERPL BCP-MCNC: 3 G/DL (ref 3.5–5)
ALBUMIN SERPL BCP-MCNC: 3.1 G/DL (ref 3.5–5)
ALP SERPL-CCNC: 72 U/L (ref 46–116)
ALP SERPL-CCNC: 77 U/L (ref 46–116)
ALT SERPL W P-5'-P-CCNC: 20 U/L (ref 12–78)
ALT SERPL W P-5'-P-CCNC: 27 U/L (ref 12–78)
ANION GAP SERPL CALCULATED.3IONS-SCNC: 1 MMOL/L (ref 4–13)
ANION GAP SERPL CALCULATED.3IONS-SCNC: 6 MMOL/L (ref 4–13)
AST SERPL W P-5'-P-CCNC: 14 U/L (ref 5–45)
AST SERPL W P-5'-P-CCNC: 16 U/L (ref 5–45)
BACTERIA UR QL AUTO: ABNORMAL /HPF
BASOPHILS # BLD AUTO: 0.02 THOUSANDS/ΜL (ref 0–0.1)
BASOPHILS NFR BLD AUTO: 0 % (ref 0–1)
BILIRUB SERPL-MCNC: 0.31 MG/DL (ref 0.2–1)
BILIRUB SERPL-MCNC: 0.33 MG/DL (ref 0.2–1)
BILIRUB UR QL STRIP: NEGATIVE
BUN SERPL-MCNC: 28 MG/DL (ref 5–25)
BUN SERPL-MCNC: 33 MG/DL (ref 5–25)
CALCIUM ALBUM COR SERPL-MCNC: 9.2 MG/DL (ref 8.3–10.1)
CALCIUM ALBUM COR SERPL-MCNC: 9.2 MG/DL (ref 8.3–10.1)
CALCIUM SERPL-MCNC: 8.4 MG/DL (ref 8.3–10.1)
CALCIUM SERPL-MCNC: 8.5 MG/DL (ref 8.3–10.1)
CHLORIDE SERPL-SCNC: 103 MMOL/L (ref 100–108)
CHLORIDE SERPL-SCNC: 105 MMOL/L (ref 100–108)
CLARITY UR: ABNORMAL
CO2 SERPL-SCNC: 27 MMOL/L (ref 21–32)
CO2 SERPL-SCNC: 29 MMOL/L (ref 21–32)
COLOR UR: ABNORMAL
CREAT SERPL-MCNC: 1.53 MG/DL (ref 0.6–1.3)
CREAT SERPL-MCNC: 1.57 MG/DL (ref 0.6–1.3)
EOSINOPHIL # BLD AUTO: 0.51 THOUSAND/ΜL (ref 0–0.61)
EOSINOPHIL NFR BLD AUTO: 6 % (ref 0–6)
ERYTHROCYTE [DISTWIDTH] IN BLOOD BY AUTOMATED COUNT: 17.9 % (ref 11.6–15.1)
FERRITIN SERPL-MCNC: 11 NG/ML (ref 8–388)
GFR SERPL CREATININE-BSD FRML MDRD: 42 ML/MIN/1.73SQ M
GFR SERPL CREATININE-BSD FRML MDRD: 43 ML/MIN/1.73SQ M
GLUCOSE SERPL-MCNC: 114 MG/DL (ref 65–140)
GLUCOSE SERPL-MCNC: 121 MG/DL (ref 65–140)
GLUCOSE UR STRIP-MCNC: NEGATIVE MG/DL
HCT VFR BLD AUTO: 26.2 % (ref 36.5–49.3)
HCT VFR BLD AUTO: 26.8 % (ref 36.5–49.3)
HCT VFR BLD AUTO: 28.4 % (ref 36.5–49.3)
HCT VFR BLD AUTO: 29 % (ref 36.5–49.3)
HGB BLD-MCNC: 7.9 G/DL (ref 12–17)
HGB BLD-MCNC: 8 G/DL (ref 12–17)
HGB BLD-MCNC: 8.2 G/DL (ref 12–17)
HGB BLD-MCNC: 8.6 G/DL (ref 12–17)
HGB UR QL STRIP.AUTO: ABNORMAL
IRON SATN MFR SERPL: 6 %
IRON SERPL-MCNC: 26 UG/DL (ref 65–175)
KETONES UR STRIP-MCNC: ABNORMAL MG/DL
LEUKOCYTE ESTERASE UR QL STRIP: ABNORMAL
LYMPHOCYTES # BLD AUTO: 1.45 THOUSANDS/ΜL (ref 0.6–4.47)
LYMPHOCYTES NFR BLD AUTO: 16 % (ref 14–44)
MAGNESIUM SERPL-MCNC: 1.8 MG/DL (ref 1.6–2.6)
MCH RBC QN AUTO: 24.2 PG (ref 26.8–34.3)
MCHC RBC AUTO-ENTMCNC: 30.5 G/DL (ref 31.4–37.4)
MCV RBC AUTO: 79 FL (ref 82–98)
MONOCYTES # BLD AUTO: 1.11 THOUSAND/ΜL (ref 0.17–1.22)
MONOCYTES NFR BLD AUTO: 13 % (ref 4–12)
NEUTROPHILS # BLD AUTO: 5.76 THOUSANDS/ΜL (ref 1.85–7.62)
NEUTS SEG NFR BLD AUTO: 65 % (ref 43–75)
NITRITE UR QL STRIP: POSITIVE
NON-SQ EPI CELLS URNS QL MICRO: ABNORMAL /HPF
PH UR STRIP.AUTO: 6.5 [PH]
PLATELET # BLD AUTO: 237 THOUSANDS/UL (ref 149–390)
PMV BLD AUTO: 9.3 FL (ref 8.9–12.7)
POTASSIUM SERPL-SCNC: 4.1 MMOL/L (ref 3.5–5.3)
POTASSIUM SERPL-SCNC: 4.4 MMOL/L (ref 3.5–5.3)
PROT SERPL-MCNC: 6.8 G/DL (ref 6.4–8.2)
PROT SERPL-MCNC: 6.9 G/DL (ref 6.4–8.2)
PROT UR STRIP-MCNC: >=300 MG/DL
RBC # BLD AUTO: 3.31 MILLION/UL (ref 3.88–5.62)
RBC #/AREA URNS AUTO: ABNORMAL /HPF
SODIUM SERPL-SCNC: 131 MMOL/L (ref 136–145)
SODIUM SERPL-SCNC: 140 MMOL/L (ref 136–145)
SP GR UR STRIP.AUTO: 1.02 (ref 1–1.03)
TIBC SERPL-MCNC: 410 UG/DL (ref 250–450)
UROBILINOGEN UR QL STRIP.AUTO: 1 E.U./DL
WBC # BLD AUTO: 8.85 THOUSAND/UL (ref 4.31–10.16)
WBC #/AREA URNS AUTO: ABNORMAL /HPF

## 2021-07-30 PROCEDURE — 85018 HEMOGLOBIN: CPT | Performed by: INTERNAL MEDICINE

## 2021-07-30 PROCEDURE — 85014 HEMATOCRIT: CPT | Performed by: NURSE PRACTITIONER

## 2021-07-30 PROCEDURE — 81001 URINALYSIS AUTO W/SCOPE: CPT | Performed by: EMERGENCY MEDICINE

## 2021-07-30 PROCEDURE — 83540 ASSAY OF IRON: CPT | Performed by: INTERNAL MEDICINE

## 2021-07-30 PROCEDURE — 82728 ASSAY OF FERRITIN: CPT | Performed by: INTERNAL MEDICINE

## 2021-07-30 PROCEDURE — 99220 PR INITIAL OBSERVATION CARE/DAY 70 MINUTES: CPT | Performed by: INTERNAL MEDICINE

## 2021-07-30 PROCEDURE — 36415 COLL VENOUS BLD VENIPUNCTURE: CPT | Performed by: EMERGENCY MEDICINE

## 2021-07-30 PROCEDURE — 83550 IRON BINDING TEST: CPT | Performed by: INTERNAL MEDICINE

## 2021-07-30 PROCEDURE — 85018 HEMOGLOBIN: CPT | Performed by: NURSE PRACTITIONER

## 2021-07-30 PROCEDURE — 85014 HEMATOCRIT: CPT | Performed by: INTERNAL MEDICINE

## 2021-07-30 PROCEDURE — 80053 COMPREHEN METABOLIC PANEL: CPT | Performed by: NURSE PRACTITIONER

## 2021-07-30 PROCEDURE — 87077 CULTURE AEROBIC IDENTIFY: CPT | Performed by: EMERGENCY MEDICINE

## 2021-07-30 PROCEDURE — 87086 URINE CULTURE/COLONY COUNT: CPT | Performed by: EMERGENCY MEDICINE

## 2021-07-30 PROCEDURE — 80053 COMPREHEN METABOLIC PANEL: CPT | Performed by: EMERGENCY MEDICINE

## 2021-07-30 PROCEDURE — 87186 SC STD MICRODIL/AGAR DIL: CPT | Performed by: EMERGENCY MEDICINE

## 2021-07-30 PROCEDURE — 83735 ASSAY OF MAGNESIUM: CPT | Performed by: NURSE PRACTITIONER

## 2021-07-30 PROCEDURE — 85025 COMPLETE CBC W/AUTO DIFF WBC: CPT | Performed by: EMERGENCY MEDICINE

## 2021-07-30 PROCEDURE — 87040 BLOOD CULTURE FOR BACTERIA: CPT | Performed by: INTERNAL MEDICINE

## 2021-07-30 RX ORDER — ATORVASTATIN CALCIUM 20 MG/1
20 TABLET, FILM COATED ORAL
Status: DISCONTINUED | OUTPATIENT
Start: 2021-07-30 | End: 2021-08-02 | Stop reason: HOSPADM

## 2021-07-30 RX ORDER — ALBUTEROL SULFATE 90 UG/1
2 AEROSOL, METERED RESPIRATORY (INHALATION) EVERY 4 HOURS PRN
Status: DISCONTINUED | OUTPATIENT
Start: 2021-07-30 | End: 2021-08-02 | Stop reason: HOSPADM

## 2021-07-30 RX ORDER — LEVOCETIRIZINE DIHYDROCHLORIDE 5 MG/1
5 TABLET, FILM COATED ORAL EVERY EVENING
COMMUNITY
End: 2021-09-06

## 2021-07-30 RX ORDER — FLUTICASONE PROPIONATE 50 MCG
1 SPRAY, SUSPENSION (ML) NASAL DAILY
COMMUNITY
End: 2021-09-06

## 2021-07-30 RX ORDER — FLUTICASONE FUROATE AND VILANTEROL 200; 25 UG/1; UG/1
1 POWDER RESPIRATORY (INHALATION)
Status: DISCONTINUED | OUTPATIENT
Start: 2021-07-30 | End: 2021-08-02 | Stop reason: HOSPADM

## 2021-07-30 RX ORDER — FERROUS SULFATE 325(65) MG
325 TABLET ORAL
Status: DISCONTINUED | OUTPATIENT
Start: 2021-07-30 | End: 2021-08-02

## 2021-07-30 RX ORDER — CEFAZOLIN SODIUM 2 G/50ML
2000 SOLUTION INTRAVENOUS EVERY 8 HOURS
Status: DISCONTINUED | OUTPATIENT
Start: 2021-07-30 | End: 2021-07-31

## 2021-07-30 RX ORDER — ACETAMINOPHEN 325 MG/1
650 TABLET ORAL EVERY 6 HOURS PRN
Status: DISCONTINUED | OUTPATIENT
Start: 2021-07-30 | End: 2021-08-02 | Stop reason: HOSPADM

## 2021-07-30 RX ORDER — ATROPA BELLADONNA AND OPIUM 16.2; 3 MG/1; MG/1
30 SUPPOSITORY RECTAL EVERY 8 HOURS PRN
Status: DISCONTINUED | OUTPATIENT
Start: 2021-07-30 | End: 2021-08-02 | Stop reason: HOSPADM

## 2021-07-30 RX ADMIN — CEFAZOLIN SODIUM 2000 MG: 2 SOLUTION INTRAVENOUS at 00:20

## 2021-07-30 RX ADMIN — SODIUM CHLORIDE 1000 ML: 0.9 INJECTION, SOLUTION INTRAVENOUS at 00:16

## 2021-07-30 RX ADMIN — ATORVASTATIN CALCIUM 20 MG: 20 TABLET, FILM COATED ORAL at 22:24

## 2021-07-30 RX ADMIN — ATROPA BELLADONNA AND OPIUM 1 SUPPOSITORY: 16.2; 3 SUPPOSITORY RECTAL at 04:18

## 2021-07-30 RX ADMIN — CEFAZOLIN SODIUM 2000 MG: 2 SOLUTION INTRAVENOUS at 10:02

## 2021-07-30 RX ADMIN — ALBUTEROL SULFATE 2 PUFF: 90 AEROSOL, METERED RESPIRATORY (INHALATION) at 15:05

## 2021-07-30 RX ADMIN — ALBUTEROL SULFATE 2 PUFF: 90 AEROSOL, METERED RESPIRATORY (INHALATION) at 19:25

## 2021-07-30 RX ADMIN — ACETAMINOPHEN 650 MG: 325 TABLET, FILM COATED ORAL at 15:12

## 2021-07-30 RX ADMIN — CEFAZOLIN SODIUM 2000 MG: 2 SOLUTION INTRAVENOUS at 18:07

## 2021-07-30 RX ADMIN — FLUTICASONE FUROATE AND VILANTEROL TRIFENATATE 1 PUFF: 200; 25 POWDER RESPIRATORY (INHALATION) at 22:24

## 2021-07-30 RX ADMIN — METOPROLOL TARTRATE 25 MG: 25 TABLET, FILM COATED ORAL at 22:24

## 2021-07-30 RX ADMIN — METOPROLOL TARTRATE 25 MG: 25 TABLET, FILM COATED ORAL at 10:03

## 2021-07-30 NOTE — CONSULTS
H&P Exam - Urology       Patient: Linda Christensen   : 1945 Sex: male   MRN: [id number]     CSN: [id number]      History of Present Illness   HPI:  Linda Christensen is a 66 y o  male who presents with gross hematuria/radiation cystitis finishing O2 therapy starting last nite        Review of Systems:   Constitutional:  Negative for activity change, fever, chills and diaphoresis  HENT: Negative for hearing loss and trouble swallowing  Eyes: Negative for itching and visual disturbance  Respiratory: Negative for chest tightness and shortness of breath  Cardiovascular: Negative for chest pain, edema  Gastrointestinal: Negative for abdominal distention, na abdominal pain, constipation, diarrhea, Nausea and vomiting  Genitourinary: Negative for decreased urine volume, difficulty urinating, dysuria, enuresis, frequency, hematuria and urgency  Musculoskeletal: Negative for gait problem and myalgias  Neurological: Negative for dizziness and headaches  Hematological: Does not bruise/bleed easily         Historical Information   Past Medical History:   Diagnosis Date    Cancer [facility])     prostate    [facility]     Hyperlipidemia     Hypertension      Past Surgical History:   Procedure Laterality Date    APPENDECTOMY      [place] RETROGRADE PYELOGRAM  2021    HERNIA REPAIR      IR NEPHROSTOMY TUBE CHECK/CHANGE/REPOSITION/REINSERTION/UPSIZE  5/10/2021    IR NEPHROSTOMY TUBE PLACEMENT  2021    [place] CYSTOURETHROSCOPY W/IRRIG & EVAC CLOTS Bilateral 2021    Procedure: CYSTOSCOPY EVACUATION OF CLOTS bilateral retrograde pyelograms possible TURBT bladder biopsy;  Surgeon: Shayy Neil MD;  Location: [address] [place] [address];  Service: Urology    [place] CYSTOURETHROSCOPY W/IRRIG & EVAC CLOTS N/A 2021    Procedure: CYSTOSCOPY EVACUATION OF CLOTS fulguration;  Surgeon: Shayy Neil MD;  [facility];  Service: Urology    [place] CYSTOURETHROSCOPY W/IRRIG & EVAC CLOTS N/A 7/8/2021    Procedure: CYSTOSCOPY EVACUATION OF CLOTS BILATERAL ANTIROGRADES NEPHROSTOMY [facility] ;  Surgeon: Lala Avalos MD;  Location: [address] [place] [address];  Service: Urology    PROSTATECTOMY      [address]      right shoulder     Social History   Social History     Substance and Sexual Activity   Alcohol Use Never     Social History     Substance and Sexual Activity   Drug Use No     Social History     Tobacco Use   Smoking Status Former Smoker    Packs/day: 0 50    Years: 50 00    Pack years: 25 00    Quit date: 8/29/2017    Years since quitting: 3 9   Smokeless Tobacco Never Used   Tobacco Comment    quit one month ago     Family History:   Family History   Problem Relation Age of Onset    Diabetes Mother     Stroke Mother     Diabetes Brother     Stroke Brother        Meds/Allergies   Medications Prior to Admission   Medication    atorvastatin (LIPITOR) 20 mg tablet    ferrous sulfate 324 (65 Fe) mg    fluticasone (FLONASE) 50 mcg/act nasal spray    fluticasone-vilanterol (Breo Ellipta) 200-25 MCG/INH inhaler    levocetirizine (XYZAL) 5 MG tablet    metoprolol tartrate (LOPRESSOR) 25 mg tablet    multivitamin-iron-minerals-folic acid (CENTRUM) chewable tablet    albuterol (PROVENTIL HFA,VENTOLIN HFA) 90 mcg/act inhaler    belladonna-opium (B&O SUPPOSITORY) 16 2-30 mg suppository    LORazepam (ATIVAN) 0 5 mg tablet    naloxone (NARCAN) 4 mg/0 1 mL nasal spray    sodium chloride, PF, 0 9 %    sodium chloride, PF, 0 9 %     No Known Allergies    Objective   Vitals: /68   Pulse 84   Temp 98 5 °F (36 9 °C)   Resp 18   Ht 5' 11" (1 803 m)   Wt 97 4 kg (214 lb 11 7 oz)   SpO2 94%   BMI 29 95 kg/m²     Physical Exam:  General Alert awake   Normocephalic atraumatic PERRLA  Lungs clear bilaterally  Cardiac normal S1 normal S2  Abdomen soft, flank pain  Extremities no edema    I/O last 24 hours:   In: -   Out: 1000 [Urine:1000]    Invasive Devices     Peripheral Intravenous Line            Peripheral IV 07/30/21 Left Antecubital <1 day          Drain            Nephrostomy Left 1 8 Fr  83 days    Nephrostomy Right 2 8 Fr  83 days    Urethral Catheter Three way 22 Fr  <1 day                    Lab Results: CBC:   Lab Results   Component Value Date    WBC 8 85 07/30/2021    HGB 7 9 (L) 07/30/2021    HCT 26 8 (L) 07/30/2021    MCV 79 (L) 07/30/2021     07/30/2021    MCH 24 2 (L) 07/30/2021    MCHC 30 5 (L) 07/30/2021    RDW 17 9 (H) 07/30/2021    MPV 9 3 07/30/2021    NRBC 0 07/12/2021     CMP:   Lab Results   Component Value Date     07/30/2021    CO2 27 07/30/2021    BUN 28 (H) 07/30/2021    CREATININE 1 53 (H) 07/30/2021    CALCIUM 8 4 07/30/2021    AST 16 07/30/2021    ALT 20 07/30/2021    ALKPHOS 72 07/30/2021    EGFR 43 07/30/2021     Urinalysis:   Lab Results   Component Value Date    COLORU Red 07/30/2021    CLARITYU Cloudy 07/30/2021    SPECGRAV 1 020 07/30/2021    PHUR 6 5 07/30/2021    PHUR 5 5 03/25/2018    LEUKOCYTESUR Moderate (A) 07/30/2021    NITRITE Positive (A) 07/30/2021    GLUCOSEU Negative 07/30/2021    KETONESU Trace (A) 07/30/2021    BILIRUBINUR Negative 07/30/2021    BLOODU Large (A) 07/30/2021     Urine Culture:   Lab Results   Component Value Date    URINECX 10,000-19,000 cfu/ml Enterococcus faecalis (A) 07/08/2021     PSA: No results found for: PSA        Assessment/ Plan:  Radiation cystitis  Continue cbi   Regular diet      Gume Molina MD

## 2021-07-30 NOTE — QUICK NOTE
In CBI crystal clear at this time  Needs to undergo 1 more treatment of O2 therapy to complete a full course  Long discussed with patient this is the 1st hematuria episode in over 4 weeks I believe these doing well will DC CBI at 7:00 a m   If urine is clear DC Sumner Sunday morning voiding trial can go home and finish off last treatment  If patient continues to have gross hematuria after O2 therapy may need to consider surgical intervention

## 2021-07-30 NOTE — ASSESSMENT & PLAN NOTE
Acute blood loss on chronic blood loss anemia  Iron profile is improving, B12 low normal   Folate normal  Hemoglobin is lower stabilized around 7 5 grams/deciliter today  Patient is asymptomatic, no evidence of symptomatic anemia  Discussed regarding transfusion versus iron supplementation, patient prefers iron supplementation  Will give IV Venofer x1  Continue iron and B12 supplementation  Follow-up CBC as outpatient

## 2021-07-30 NOTE — ASSESSMENT & PLAN NOTE
Secondary to acute infection infectious cystitis with UTI in setting of radiation cystitis  Recurrent despite diverting bilateral nephrostomy and hyperbaric oxygen treatment, received 39/40  Seen by Urology, input appreciated  Status post CBI with improvement in hematuria, subsequently placed on Sumner catheter which was removed  Patient is voiding without difficulties after removal of Sumner    Nephrostomy were clamped temporarily  Hematuria improved  · Follow-up nephrostomy recommendation as per Urology and IR  · Continue antibiotics as below  · Complete hyperbaric treatment after discharge  · Urology follow-up

## 2021-07-30 NOTE — CASE MANAGEMENT
LOS - 1 day (obs)    SW met with pt to assess needs and discuss plans  Discussed goals of making sure pt's needs are met upon discharge and that Freedom of Choice is offered  Also explained Observation Status notice  Pt signed and copy given  Copy also placed in scan bin for chart  Pt lives with his wife  Independent with ADLs and mobility, driving  No DME at home  No HHC/STR history  No MH or D&A issues  Pt's PCP is Dr Kaylee Garcia MD   Per pt he has prescription coverage and has no difficulty getting his medication as prescribed  Pt does not have POA/advanced directives  Offered information on and assistance with completing advanced directive  Pt declined at this time  Per pt his wife is his healthcare representative  Discussed discharge plans and needs  Pt's plan is to return home  No discharge needs expressed or anticipated by pt at this time  Offered support in future if needed  Wife to transport home  Will be available if plans change/needs arise

## 2021-07-30 NOTE — PROGRESS NOTES
[facility] Internal Medicine Progress Note  Patient: Amber Badillo 66 y o  male   MRN: [id number]  PCP: Diana Sandoval MD  Unit/Bed#: [address] [place] [address] Encounter: [id number]  Date Of Visit: 07/30/21    Problem List:    Principal Problem:    Gross hematuria  Active Problems:    Anemia    Chronic obstructive pulmonary disease ([facility] )    Essential hypertension    Hyperlipidemia    [facility]    CKD (chronic kidney disease)      Assessment & Plan:    * Gross hematuria  Assessment & Plan  Secondary to hemorrhagic cystitis  Recurrent despite diverting bilateral nephrostomy and hyperbaric oxygen treatment, received 39/40  Seen by Urology, input appreciated  · Continue CBI  · Monitor clearance of hematuria  · Follow-up urine culture    Chronic obstructive pulmonary disease (HCC)  Assessment & Plan  Stable  Continue Breo and albuterol as needed    Anemia  Assessment & Plan  Secondary to blood loss  Continue iron supplements  Monitor and transfuse as medically indicated     Essential hypertension  Assessment & Plan  Continue lopressor 25 BID    CKD (chronic kidney disease)  Assessment & Plan  Lab Results   Component Value Date    EGFR 43 07/30/2021    EGFR 42 07/30/2021    EGFR 48 07/11/2021    CREATININE 1 53 (H) 07/30/2021    CREATININE 1 57 (H) 07/30/2021    CREATININE 1 40 (H) 07/11/2021   Baseline Cr 1 4,   Close to baseline  Monitor    Prostate cancer [facility])  Assessment & Plan  With bilateral nephrostomy tubes in place     Hyperlipidemia  Assessment & Plan  Continue statin     Addendum patient was noted to have episode of fever without any apparent symptoms  Will get blood culture x2  Continue to monitor    VTE Pharmacologic Prophylaxis:   Pharmacologic: Pharmacologic VTE Prophylaxis contraindicated due to Hematuria  Mechanical VTE Prophylaxis in Place: Yes    Patient Centered Rounds: I have performed bedside rounds with nursing staff today      Discussions with Specialists or Other Care Team Provider:    Bill  Education and Discussions with Family / Patient:  Patient declined offered to call family    Time Spent for Care: 45 minutes  More than 50% of total time spent on counseling and coordination of care as described above  Current Length of Stay: 0 day(s)    Current Patient Status: Observation   Certification Statement: The patient will continue to require additional inpatient hospital stay due to Recurrent hematuria    Discharge Plan:  Home when clinically improved    Code Status: Level 1 - Full Code      Subjective:   HPI reviewed, discussed with patient presented with recurrence of gross hematuria over last 2 days  Denies any fever chills but reported having mild dysuria  Reported urgency and discomfort associated with passing blood blood clots    Denies chest pain shortness of breath or abdominal pain    Undergoing CBI, reports mild discomfort with catheter  Currently CBI is running clear but passing clot with mobility as per nursing staff    Objective:     Vitals:   Temp (24hrs), Av 5 °F (36 9 °C), Min:98 1 °F (36 7 °C), Max:99 3 °F (37 4 °C)    Temp:  [98 1 °F (36 7 °C)-99 3 °F (37 4 °C)] 98 5 °F (36 9 °C)  HR:  [78-84] 84  Resp:  [16-20] 18  BP: (147-153)/(68-80) 147/68  SpO2:  [94 %-97 %] 94 %  Body mass index is 29 95 kg/m²  Input and Output Summary (last 24 hours): Intake/Output Summary (Last 24 hours) at 2021 1131  Last data filed at 2021 0801  Gross per 24 hour   Intake --   Output 2100 ml   Net -2100 ml       Physical Exam:     Physical Exam  Constitutional:       General: He is not in acute distress  HENT:      Head: Normocephalic and atraumatic  Cardiovascular:      Rate and Rhythm: Normal rate  Pulmonary:      Effort: Pulmonary effort is normal  No respiratory distress  Breath sounds: No wheezing, rhonchi or rales  Chest:      Chest wall: No tenderness  Abdominal:      General: Bowel sounds are normal  There is no distension        Palpations: Abdomen is soft       Tenderness: There is no abdominal tenderness  There is no guarding or rebound  Genitourinary:     Comments: CBI  Musculoskeletal:      Right lower leg: No edema  Left lower leg: No edema  Skin:     General: Skin is warm and dry  Findings: No rash  Neurological:      General: No focal deficit present  Mental Status: He is alert and oriented to person, place, and time  Mental status is at baseline  Cranial Nerves: No cranial nerve deficit  Additional Data:     Labs:    Results from last 7 days   Lab Units 07/30/21  0504 07/30/21  0013   WBC Thousand/uL  --  8 85   HEMOGLOBIN g/dL 7 9* 8 0*   HEMATOCRIT % 26 8* 26 2*   PLATELETS Thousands/uL  --  237   NEUTROS PCT %  --  65   LYMPHS PCT %  --  16   MONOS PCT %  --  13*   EOS PCT %  --  6     Results from last 7 days   Lab Units 07/30/21  0501   POTASSIUM mmol/L 4 1   CHLORIDE mmol/L 103   CO2 mmol/L 27   BUN mg/dL 28*   CREATININE mg/dL 1 53*   CALCIUM mg/dL 8 4   ALK PHOS U/L 72   ALT U/L 20   AST U/L 16           * I Have Reviewed All Lab Data Listed Above  * Additional Pertinent Lab Tests Reviewed:  All Labs Within Last 24 Hours Reviewed      Imaging:  Imaging Reports Reviewed Today Include:  None    Recent Cultures (last 7 days):           Last 24 Hours Medication List:   Current Facility-Administered Medications   Medication Dose Route Frequency Provider Last Rate    albuterol  2 puff Inhalation Q4H PRN Erasmo Pina MD      atorvastatin  20 mg Oral HS SharleneCORWIN Taylor      belladonna-opium  30 mg Rectal Q8H PRN CORWIN Richey      cefazolin  2,000 mg Intravenous Q8H CORWIN Richey 2,000 mg (07/30/21 1002)    ferrous sulfate  325 mg Oral Daily With Breakfast CORWIN Richey      fluticasone-vilanterol  1 puff Inhalation HS CORWIN Richey      metoprolol tartrate  25 mg Oral Q12H [address] SharleneCORWIN Taylor      multivitamin-minerals  1 tablet Oral Daily Sharlene Church, CORWIN            Today, Patient Was Seen By: Lindsay Lugo MD    ** Please Note: "This note has been constructed using a voice recognition system  Therefore there may be syntax, spelling, and/or grammatical errors   Please call if you have any questions  "**

## 2021-07-30 NOTE — ASSESSMENT & PLAN NOTE
Secondary to hemorrhagic cystitis  Receiving hyperbaric oxygen treatments  Case discussed with urology who recommended admission  Sumner inserted and CBI running  Urology consult in AM  NPO pending consult

## 2021-07-30 NOTE — ASSESSMENT & PLAN NOTE
Lab Results   Component Value Date    EGFR 49 08/02/2021    EGFR 44 08/01/2021    EGFR 35 07/31/2021    CREATININE 1 39 (H) 08/02/2021    CREATININE 1 51 (H) 08/01/2021    CREATININE 1 84 (H) 07/31/2021   Baseline Cr 1 3-1 5  Remains at baseline  Treatment of UTI as above  Avoid hypo tension nephrotoxins  Monitor

## 2021-07-30 NOTE — PLAN OF CARE
Problem: MOBILITY - ADULT  Goal: Maintain or return to baseline ADL function  Description: INTERVENTIONS:  -  Assess patient's ability to carry out ADLs; assess patient's baseline for ADL function and identify physical deficits which impact ability to perform ADLs (bathing, care of mouth/teeth, toileting, grooming, dressing, etc )  - Assess/evaluate cause of self-care deficits   - Assess range of motion  - Assess patient's mobility; develop plan if impaired  - Assess patient's need for assistive devices and provide as appropriate  - Encourage maximum independence but intervene and supervise when necessary  - Involve family in performance of ADLs  - Assess for home care needs following discharge   - Consider OT consult to assist with ADL evaluation and planning for discharge  - Provide patient education as appropriate  Outcome: Progressing  Goal: Maintains/Returns to pre admission functional level  Description: INTERVENTIONS:  - Perform BMAT or MOVE assessment daily    - Set and communicate daily mobility goal to care team and patient/family/caregiver  - Collaborate with rehabilitation services on mobility goals if consulted  - Perform Range of Motion 3 times a day  - Reposition patient every 3 hours    - Dangle patient 3times a day  - Stand patient 3 times a day  - Ambulate patient 3 times a day  - Out of bed to chair 3 times a day   - Out of bed for meals 3 times a day  - Out of bed for toileting  - Record patient progress and toleration of activity level   Outcome: Progressing     Problem: Potential for Falls  Goal: Patient will remain free of falls  Description: INTERVENTIONS:  - Educate patient/family on patient safety including physical limitations  - Instruct patient to call for assistance with activity   - Consult OT/PT to assist with strengthening/mobility   - Keep Call bell within reach  - Keep bed low and locked with side rails adjusted as appropriate  - Keep care items and personal belongings within reach  - Initiate and maintain comfort rounds  - Make Fall Risk Sign visible to staff  - Offer Toileting every 2 Hours, in advance of need  - Initiate/Maintain bed alarm  - Obtain necessary fall risk management equipment: call bell  - Apply yellow socks and bracelet for high fall risk patients  - Consider moving patient to room near nurses station  Outcome: Progressing     Problem: PAIN - ADULT  Goal: Verbalizes/displays adequate comfort level or baseline comfort level  Description: Interventions:  - Encourage patient to monitor pain and request assistance  - Assess pain using appropriate pain scale  - Administer analgesics based on type and severity of pain and evaluate response  - Implement non-pharmacological measures as appropriate and evaluate response  - Consider cultural and social influences on pain and pain management  - Notify physician/advanced practitioner if interventions unsuccessful or patient reports new pain  Outcome: Progressing     Problem: INFECTION - ADULT  Goal: Absence or prevention of progression during hospitalization  Description: INTERVENTIONS:  - Assess and monitor for signs and symptoms of infection  - Monitor lab/diagnostic results  - Monitor all insertion sites, i e  indwelling lines, tubes, and drains  - Monitor endotracheal if appropriate and nasal secretions for changes in amount and color  - [place] appropriate cooling/warming therapies per order  - Administer medications as ordered  - Instruct and encourage patient and family to use good hand hygiene technique  - Identify and instruct in appropriate isolation precautions for identified infection/condition  Outcome: Progressing     Problem: GENITOURINARY - ADULT  Goal: Maintains or returns to baseline urinary function  Description: INTERVENTIONS:  - Assess urinary function  - Encourage oral fluids to ensure adequate hydration if ordered  - Administer IV fluids as ordered to ensure adequate hydration  - Administer ordered medications as needed  - Offer frequent toileting  - Follow urinary retention protocol if ordered  Outcome: Progressing  Goal: Absence of urinary retention  Description: INTERVENTIONS:  - Assess patients ability to void and empty bladder  - Monitor I/O  - Bladder scan as needed  - Discuss with physician/AP medications to alleviate retention as needed  - Discuss catheterization for long term situations as appropriate  Outcome: Progressing  Goal: Urinary catheter remains patent  Description: INTERVENTIONS:  - Assess patency of urinary catheter  - If patient has a chronic alanis, consider changing catheter if non-functioning  - Follow guidelines for intermittent irrigation of non-functioning urinary catheter  Outcome: Progressing     Problem: HEMATOLOGIC - ADULT  Goal: Maintains hematologic stability  Description: INTERVENTIONS  - Assess for signs and symptoms of bleeding or hemorrhage  - Monitor labs  - Administer supportive blood products/factors as ordered and appropriate  Outcome: Progressing

## 2021-07-30 NOTE — ASSESSMENT & PLAN NOTE
Lab Results   Component Value Date    EGFR 42 07/30/2021    EGFR 48 07/11/2021    EGFR 46 07/10/2021    CREATININE 1 57 (H) 07/30/2021    CREATININE 1 40 (H) 07/11/2021    CREATININE 1 45 (H) 07/10/2021   Baseline Cr 1 4, Cr 1 57 today  Gentle IV hydration  Repeat in AM

## 2021-07-30 NOTE — H&P
[facility] 43  H&P- Janette Ridley 1945, 66 y o  male MRN: [id number]  Unit/Bed#: ED 01 Encounter: [id number]  Primary Care Provider: Guillermo Azar MD   Date and time admitted to hospital: 7/29/2021 11:16 PM    * Gross hematuria  Assessment & Plan  Secondary to hemorrhagic cystitis  Receiving hyperbaric oxygen treatments  Case discussed with urology who recommended admission  Sumner inserted and CBI running  Urology consult in AM  NPO pending consult     Prostate cancer [facility])  Assessment & Plan  With bilateral nephrostomy tubes in place     Anemia  Assessment & Plan  Secondary to blood loss  Continue iron supplements  Monitor and transfuse as medically indicated     Hyperlipidemia  Assessment & Plan  Continue statin     Essential hypertension  Assessment & Plan  Continue lopressor 25 BID      VTE Prophylaxis: Pharmacologic VTE Prophylaxis contraindicated due to bleed  / sequential compression device   Code Status: Prior    Anticipated Length of Stay:  Patient will be admitted on an Observation basis with an anticipated length of stay of less than 2 midnights  Justification for Hospital Stay: gross hematuria     Total Time for Visit, including Counseling / Coordination of Care: 15 minutes  Greater than 50% of this total time spent on direct patient counseling and coordination of care  Chief Complaint:   Urinary Retention (Patient states he has the urge to void but cannot and feels burning when he tries, patient states he was able to void a small amount of clotted blood  Patient has b/l lateral nephrostomy tubes, both actively draining without complication )      History of Present Illness:    Janette Ridley is a 66 y o  male with a PMH of hypertension, hyperlipidemia, COPD, prostate cancer post radiation chemotherapy who presents with complaints of gross hematuria  Patient was hospitalized earlier this month for the same    Essentially, patient has radiation cystitis for which he is receiving hyperbaric treatment  This evening, patient developed the urge to urinate but only passed small amounts of blood/clots  Patient has bilateral nephrostomy tubes   hgb remained stable at 8  Urinalysis revealed blood, nitrites, leukocytes  Patient is admitted at the recommendation of urology for CBI  Review of Systems:    Review of Systems   Constitutional: Negative  HENT: Negative  Eyes: Negative  Respiratory: Negative  Cardiovascular: Negative  Gastrointestinal: Negative  Endocrine: Negative  Genitourinary: Positive for difficulty urinating, dysuria and hematuria  Musculoskeletal: Negative  Skin: Negative  Allergic/Immunologic: Negative  Neurological: Negative  Hematological: Negative  Psychiatric/Behavioral: Negative          Past Medical and Surgical History:     Past Medical History:   Diagnosis Date    Cancer [facility])     prostate    COPD (chronic obstructive pulmonary disease) ([facility] [address] )     Hyperlipidemia     Hypertension        Past Surgical History:   Procedure Laterality Date    APPENDECTOMY      FL RETROGRADE PYELOGRAM  4/14/2021    HERNIA REPAIR      IR NEPHROSTOMY TUBE CHECK/CHANGE/REPOSITION/REINSERTION/UPSIZE  5/10/2021    IR NEPHROSTOMY TUBE PLACEMENT  5/7/2021    AL CYSTOURETHROSCOPY W/IRRIG & EVAC CLOTS Bilateral 4/14/2021    Procedure: CYSTOSCOPY EVACUATION OF CLOTS bilateral retrograde pyelograms possible TURBT bladder biopsy;  Surgeon: Rachel Patel MD;  Location: [address] [place] [address];  Service: Urology    AL CYSTOURETHROSCOPY W/IRRIG & EVAC CLOTS N/A 4/24/2021    Procedure: CYSTOSCOPY EVACUATION OF CLOTS fulguration;  Surgeon: Rachel Patel MD;  Location: [address] [place] [address];  Service: Urology    AL CYSTOURETHROSCOPY W/IRRIG & EVAC CLOTS N/A 7/8/2021    Procedure: CYSTOSCOPY EVACUATION OF CLOTS BILATERAL ANTIROGRADES NEPHROSTOMY Yovani Grow ;  Surgeon: Rachel Patel MD;  Location: [address] [place] [address];  Service: Urology    PROSTATECTOMY  ROTATOR CUFF REPAIR      right shoulder       Meds/Allergies:    Prior to Admission medications    Medication Sig Start Date End Date Taking? Authorizing Provider   albuterol (PROVENTIL HFA,VENTOLIN HFA) 90 mcg/act inhaler Inhale 2 puffs every 4 (four) hours as needed for wheezing 8/31/17   Kelly Rivera DO   atorvastatin (LIPITOR) 20 mg tablet Take 20 mg by mouth daily at bedtime    Historical Provider, MD   belladonna-opium (B&O SUPPOSITORY) 16 2-30 mg suppository Insert 1 suppository into the rectum every 8 (eight) hours as needed for cramping or bladder spasms for up to 10 daysMax Daily Amount: 3 suppositories 7/9/21 7/19/21  Dar Meza MD   ferrous sulfate 324 (65 Fe) mg Take 1 tablet (324 mg total) by mouth daily before breakfast 6/30/21   Ruy Whittington MD   LORazepam (ATIVAN) 0 5 mg tablet Take 1 tablet (0 5 mg total) by mouth every 8 (eight) hours as needed for anxiety (2 tablets prior to hyperbaric oxygen therapy) 7/13/21   CORWIN Jordan   metoprolol tartrate (LOPRESSOR) 25 mg tablet Take 25 mg by mouth every 12 (twelve) hours     Historical Provider, MD   multivitamin-iron-minerals-folic acid (CENTRUM) chewable tablet Chew 1 tablet daily 7/6/21   Historical Provider, MD   naloxone (NARCAN) 4 mg/0 1 mL nasal spray Administer 1 spray into a nostril  If no response after 2-3 minutes, give another dose in the other nostril using a new spray   5/14/21   CORWIN Jordan   sodium chloride, PF, 0 9 % 10 mL by Intracatheter route daily Left Intracatheter flushing daily 6/30/21 9/28/21  Iris Dorsey MD   sodium chloride, PF, 0 9 % 10 mL by Intracatheter route daily Right Intracatheter flushing daily 6/30/21 9/28/21  Iris Dorsey MD       Allergies: No Known Allergies    Social History:     Marital Status: /Civil Union   Substance Use History:   Social History     Substance and Sexual Activity   Alcohol Use Never     Social History     Tobacco Use   Smoking Status Former Smoker    Packs/day: 0 50    Years: 50 00    Pack years: 25 00    Quit date: 8/29/2017    Years since quitting: 3 9   Smokeless Tobacco Never Used   Tobacco Comment    quit one month ago     Social History     Substance and Sexual Activity   Drug Use No       Family History:    Family History   Problem Relation Age of Onset    Diabetes Mother     Stroke Mother     Diabetes Brother     Stroke Brother        Physical Exam:     Vitals:   Blood Pressure: 150/71 (07/29/21 2320)  Pulse: 84 (07/29/21 2318)  Temperature: 99 3 °F (37 4 °C) (07/29/21 2318)  Temp Source: Tympanic (07/29/21 2318)  Respirations: 18 (07/29/21 2318)  Weight - Scale: 100 kg (220 lb 7 4 oz) (07/29/21 2318)  SpO2: 95 % (07/29/21 2320)    Physical Exam  Vitals and nursing note reviewed  Constitutional:       Appearance: Normal appearance  HENT:      Head: Normocephalic  Nose: Nose normal       Mouth/Throat:      Mouth: Mucous membranes are moist    Eyes:      Extraocular Movements: Extraocular movements intact  Cardiovascular:      Rate and Rhythm: Normal rate and regular rhythm  Heart sounds: No murmur heard  Pulmonary:      Effort: Pulmonary effort is normal  No respiratory distress  Breath sounds: Normal breath sounds  No wheezing  Abdominal:      General: Abdomen is flat  Bowel sounds are normal  There is no distension  Palpations: Abdomen is soft  Comments: CBI running, gross hematuria, no clots noted    Musculoskeletal:         General: Normal range of motion  Skin:     General: Skin is warm and dry  Neurological:      General: No focal deficit present  Mental Status: He is alert and oriented to person, place, and time  Psychiatric:         Mood and Affect: Mood normal          Behavior: Behavior normal            Additional Data:     Lab Results: I have personally reviewed pertinent reports                  Invalid input(s): LABALBU                    Imaging: I have personally reviewed pertinent reports  No orders to display       No orders to display       EKG, Pathology, and Other Studies Reviewed on Admission:   · EKG: not applicable     Allscripts / Epic Records Reviewed: Yes     ** Please Note: This note has been constructed using a voice recognition system   **

## 2021-07-30 NOTE — PLAN OF CARE
Problem: MOBILITY - ADULT  Goal: Maintain or return to baseline ADL function  Description: INTERVENTIONS:  -  Assess patient's ability to carry out ADLs; assess patient's baseline for ADL function and identify physical deficits which impact ability to perform ADLs (bathing, care of mouth/teeth, toileting, grooming, dressing, etc )  - Assess/evaluate cause of self-care deficits   - Assess range of motion  - Assess patient's mobility; develop plan if impaired  - Assess patient's need for assistive devices and provide as appropriate  - Encourage maximum independence but intervene and supervise when necessary  - Involve family in performance of ADLs  - Assess for home care needs following discharge   - Consider OT consult to assist with ADL evaluation and planning for discharge  - Provide patient education as appropriate  Outcome: Progressing  Goal: Maintains/Returns to pre admission functional level  Description: INTERVENTIONS:  - Perform BMAT or MOVE assessment daily    - Set and communicate daily mobility goal to care team and patient/family/caregiver  - Collaborate with rehabilitation services on mobility goals if consulted  - Perform Range of Motion 2 times a day  - Reposition patient every 2 hours    - Dangle patient 2 times a day  - Stand patient 2 times a day  - Ambulate patient 2 times a day  - Out of bed to chair 3 times a day   - Out of bed for meals 3 times a day  - Out of bed for toileting  - Record patient progress and toleration of activity level   Outcome: Progressing     Problem: PAIN - ADULT  Goal: Verbalizes/displays adequate comfort level or baseline comfort level  Description: Interventions:  - Encourage patient to monitor pain and request assistance  - Assess pain using appropriate pain scale  - Administer analgesics based on type and severity of pain and evaluate response  - Implement non-pharmacological measures as appropriate and evaluate response  - Consider cultural and social influences on pain and pain management  - Notify physician/advanced practitioner if interventions unsuccessful or patient reports new pain  Outcome: Progressing     Problem: INFECTION - ADULT  Goal: Absence or prevention of progression during hospitalization  Description: INTERVENTIONS:  - Assess and monitor for signs and symptoms of infection  - Monitor lab/diagnostic results  - Monitor all insertion sites, i e  indwelling lines, tubes, and drains  - Monitor endotracheal if appropriate and nasal secretions for changes in amount and color  - [place] appropriate cooling/warming therapies per order  - Administer medications as ordered  - Instruct and encourage patient and family to use good hand hygiene technique  - Identify and instruct in appropriate isolation precautions for identified infection/condition  Outcome: Progressing     Problem: GENITOURINARY - ADULT  Goal: Maintains or returns to baseline urinary function  Description: INTERVENTIONS:  - Assess urinary function  - Encourage oral fluids to ensure adequate hydration if ordered  - Administer IV fluids as ordered to ensure adequate hydration  - Administer ordered medications as needed  - Offer frequent toileting  - Follow urinary retention protocol if ordered  Outcome: Progressing  Goal: Absence of urinary retention  Description: INTERVENTIONS:  - Assess patients ability to void and empty bladder  - Monitor I/O  - Bladder scan as needed  - Discuss with physician/AP medications to alleviate retention as needed  - Discuss catheterization for long term situations as appropriate  Outcome: Progressing  Goal: Urinary catheter remains patent  Description: INTERVENTIONS:  - Assess patency of urinary catheter  - If patient has a chronic alanis, consider changing catheter if non-functioning  - Follow guidelines for intermittent irrigation of non-functioning urinary catheter  Outcome: Progressing     Problem: HEMATOLOGIC - ADULT  Goal: Maintains hematologic stability  Description: INTERVENTIONS  - Assess for signs and symptoms of bleeding or hemorrhage  - Monitor labs  - Administer supportive blood products/factors as ordered and appropriate  Outcome: Progressing

## 2021-07-30 NOTE — ASSESSMENT & PLAN NOTE
Status post radiation with radiation cystitis  Status post bilateral nephrostomy tubes due to recurrent hematuria, nephrostomy tubes were clamped during hospitalization as per urology recommendation  Follow-up with Urology and IR regarding nephrostomy management

## 2021-07-31 PROBLEM — N30.01 ACUTE CYSTITIS WITH HEMATURIA: Status: ACTIVE | Noted: 2021-04-12

## 2021-07-31 LAB
ANION GAP SERPL CALCULATED.3IONS-SCNC: 12 MMOL/L (ref 4–13)
BACTERIA UR QL AUTO: ABNORMAL /HPF
BILIRUB UR QL STRIP: NEGATIVE
BUN SERPL-MCNC: 35 MG/DL (ref 5–25)
CALCIUM SERPL-MCNC: 8.5 MG/DL (ref 8.3–10.1)
CHLORIDE SERPL-SCNC: 101 MMOL/L (ref 100–108)
CLARITY UR: ABNORMAL
CO2 SERPL-SCNC: 25 MMOL/L (ref 21–32)
COLOR UR: YELLOW
CREAT SERPL-MCNC: 1.84 MG/DL (ref 0.6–1.3)
ERYTHROCYTE [DISTWIDTH] IN BLOOD BY AUTOMATED COUNT: 17.4 % (ref 11.6–15.1)
FOLATE SERPL-MCNC: 19 NG/ML (ref 3.1–17.5)
GFR SERPL CREATININE-BSD FRML MDRD: 35 ML/MIN/1.73SQ M
GLUCOSE SERPL-MCNC: 112 MG/DL (ref 65–140)
GLUCOSE UR STRIP-MCNC: NEGATIVE MG/DL
HCT VFR BLD AUTO: 26.1 % (ref 36.5–49.3)
HCT VFR BLD AUTO: 26.3 % (ref 36.5–49.3)
HGB BLD-MCNC: 7.7 G/DL (ref 12–17)
HGB BLD-MCNC: 7.8 G/DL (ref 12–17)
HGB UR QL STRIP.AUTO: ABNORMAL
KETONES UR STRIP-MCNC: NEGATIVE MG/DL
LEUKOCYTE ESTERASE UR QL STRIP: ABNORMAL
MCH RBC QN AUTO: 23.4 PG (ref 26.8–34.3)
MCHC RBC AUTO-ENTMCNC: 29.5 G/DL (ref 31.4–37.4)
MCV RBC AUTO: 79 FL (ref 82–98)
NITRITE UR QL STRIP: NEGATIVE
NON-SQ EPI CELLS URNS QL MICRO: ABNORMAL /HPF
PH UR STRIP.AUTO: 5.5 [PH]
PLATELET # BLD AUTO: 184 THOUSANDS/UL (ref 149–390)
PMV BLD AUTO: 9.2 FL (ref 8.9–12.7)
POTASSIUM SERPL-SCNC: 4.3 MMOL/L (ref 3.5–5.3)
PROCALCITONIN SERPL-MCNC: 0.09 NG/ML
PROT UR STRIP-MCNC: >=300 MG/DL
RBC # BLD AUTO: 3.29 MILLION/UL (ref 3.88–5.62)
RBC #/AREA URNS AUTO: ABNORMAL /HPF
SODIUM SERPL-SCNC: 138 MMOL/L (ref 136–145)
SP GR UR STRIP.AUTO: >=1.03 (ref 1–1.03)
UROBILINOGEN UR QL STRIP.AUTO: 0.2 E.U./DL
VIT B12 SERPL-MCNC: 346 PG/ML (ref 100–900)
WBC # BLD AUTO: 10.62 THOUSAND/UL (ref 4.31–10.16)
WBC #/AREA URNS AUTO: ABNORMAL /HPF

## 2021-07-31 PROCEDURE — 85014 HEMATOCRIT: CPT | Performed by: INTERNAL MEDICINE

## 2021-07-31 PROCEDURE — 84145 PROCALCITONIN (PCT): CPT | Performed by: INTERNAL MEDICINE

## 2021-07-31 PROCEDURE — 85018 HEMOGLOBIN: CPT | Performed by: INTERNAL MEDICINE

## 2021-07-31 PROCEDURE — 80048 BASIC METABOLIC PNL TOTAL CA: CPT | Performed by: INTERNAL MEDICINE

## 2021-07-31 PROCEDURE — 82746 ASSAY OF FOLIC ACID SERUM: CPT | Performed by: INTERNAL MEDICINE

## 2021-07-31 PROCEDURE — 85027 COMPLETE CBC AUTOMATED: CPT | Performed by: INTERNAL MEDICINE

## 2021-07-31 PROCEDURE — 99232 SBSQ HOSP IP/OBS MODERATE 35: CPT | Performed by: INTERNAL MEDICINE

## 2021-07-31 PROCEDURE — 81001 URINALYSIS AUTO W/SCOPE: CPT | Performed by: INTERNAL MEDICINE

## 2021-07-31 PROCEDURE — 82607 VITAMIN B-12: CPT | Performed by: INTERNAL MEDICINE

## 2021-07-31 RX ORDER — CEFTRIAXONE 1 G/50ML
1000 INJECTION, SOLUTION INTRAVENOUS EVERY 24 HOURS
Status: DISCONTINUED | OUTPATIENT
Start: 2021-07-31 | End: 2021-08-01

## 2021-07-31 RX ADMIN — ALBUTEROL SULFATE 2 PUFF: 90 AEROSOL, METERED RESPIRATORY (INHALATION) at 20:21

## 2021-07-31 RX ADMIN — FERROUS SULFATE TAB 325 MG (65 MG ELEMENTAL FE) 325 MG: 325 (65 FE) TAB at 07:26

## 2021-07-31 RX ADMIN — CEFAZOLIN SODIUM 2000 MG: 2 SOLUTION INTRAVENOUS at 01:29

## 2021-07-31 RX ADMIN — CEFTRIAXONE 1000 MG: 1 INJECTION, SOLUTION INTRAVENOUS at 17:24

## 2021-07-31 RX ADMIN — VANCOMYCIN HYDROCHLORIDE 2000 MG: 10 INJECTION, POWDER, LYOPHILIZED, FOR SOLUTION INTRAVENOUS at 18:31

## 2021-07-31 RX ADMIN — FLUTICASONE FUROATE AND VILANTEROL TRIFENATATE 1 PUFF: 200; 25 POWDER RESPIRATORY (INHALATION) at 21:53

## 2021-07-31 RX ADMIN — CEFAZOLIN SODIUM 2000 MG: 2 SOLUTION INTRAVENOUS at 09:50

## 2021-07-31 RX ADMIN — ALBUTEROL SULFATE 2 PUFF: 90 AEROSOL, METERED RESPIRATORY (INHALATION) at 09:50

## 2021-07-31 RX ADMIN — ATORVASTATIN CALCIUM 20 MG: 20 TABLET, FILM COATED ORAL at 21:51

## 2021-07-31 RX ADMIN — METOPROLOL TARTRATE 25 MG: 25 TABLET, FILM COATED ORAL at 20:21

## 2021-07-31 NOTE — PROGRESS NOTES
[facility] Internal Medicine Progress Note  Patient: Demetrio Mccloud 66 y o  male   MRN: [id number]  PCP: Megan Gao MD  Unit/Bed#: [address] [place] [address] Encounter: [id number]  Date Of Visit: 07/31/21    Problem List:    Principal Problem:    Gross hematuria  Active Problems:    Acute cystitis with hematuria    Anemia    Chronic obstructive pulmonary disease ([facility] )    Essential hypertension    Hyperlipidemia    Prostate cancer ([place] [id number] )    CKD (chronic kidney disease)      Assessment & Plan:    Acute cystitis with hematuria  Assessment & Plan  Noted to be febrile with T-max of 101° F on 07/30  Low-grade fever overnight  Urine culture with growth of Enterococcus and Klebsiella pneumonia  · Will change antibiotics to vancomycin and ceftriaxone  · Follow-up blood culture  · Follow-up urine culture sensitivity    * Gross hematuria  Assessment & Plan  Secondary to acute infection infectious cystitis with UTI in setting of radiation cystitis  Recurrent despite diverting bilateral nephrostomy and hyperbaric oxygen treatment, received 39/40  Seen by Urology, input appreciated  Status post CBI with improvement in hematuria  · Currently on Sumner catheter  · Continue antibiotics as below  · Urology follow-up    Chronic obstructive pulmonary disease ([facility] )  Assessment & Plan  Stable  Continue Breo and albuterol as needed    Anemia  Assessment & Plan  Secondary to blood loss related to above  Continue iron supplements  Iron profile is improving, B12 low normal   Folate normal  Monitor and transfuse as medically indicated     Essential hypertension  Assessment & Plan  Continue lopressor 25 BID    CKD (chronic kidney disease)  Assessment & Plan  Lab Results   Component Value Date    EGFR 35 07/31/2021    EGFR 43 07/30/2021    EGFR 42 07/30/2021    CREATININE 1 84 (H) 07/31/2021    CREATININE 1 53 (H) 07/30/2021    CREATININE 1 57 (H) 07/30/2021   Baseline Cr 1 3-1 5  Noted to be uptrending  Check UA  Treatment of UTI as above  Avoid hypo tension nephrotoxins  Monitor    Prostate cancer [facility])  Assessment & Plan  With bilateral nephrostomy tubes in place     Hyperlipidemia  Assessment & Plan  Continue statin       VTE Pharmacologic Prophylaxis:   Pharmacologic: Pharmacologic VTE Prophylaxis contraindicated due to Hematuria  Mechanical VTE Prophylaxis in Place: Yes    Patient Centered Rounds: I have performed bedside rounds with nursing staff today  Discussions with Specialists or Other Care Team Provider:  Dr Cindy Langston  Education and Discussions with Family / Patient:  Patient declined offered to call family    Time Spent for Care: 45 minutes  More than 50% of total time spent on counseling and coordination of care as described above  Current Length of Stay: 0 day(s)    Current Patient Status: Observation   Certification Statement: The patient will continue to require additional inpatient hospital stay due to Recurrent hematuria    Discharge Plan:  Home when clinically improved    Code Status: Level 1 - Full Code      Subjective:     Low-grade fever overnight after T-max of 101° last afternoon  CBI was discontinued due to resolution of hematuria currently with Sumner catheter with cloudy urine  Denies any further fever, chills, pelvic or flank pain    Urine culture with growth of Enterococcus faecalis and Klebsiella pneumoniae      Objective:     Vitals:   Temp (24hrs), Av 7 °F (37 6 °C), Min:98 °F (36 7 °C), Max:101 °F (38 3 °C)    Temp:  [98 °F (36 7 °C)-101 °F (38 3 °C)] 98 °F (36 7 °C)  HR:  [66-78] 75  Resp:  [17-18] 18  BP: (102-142)/(57-76) 102/57  SpO2:  [94 %-98 %] 95 % on room air  Body mass index is 29 95 kg/m²  Input and Output Summary (last 24 hours): Intake/Output Summary (Last 24 hours) at 2021 0951  Last data filed at 2021 0300  Gross per 24 hour   Intake 50 ml   Output 3450 ml   Net -3400 ml       Physical Exam:     Physical Exam  Constitutional:       General: He is not in acute distress    HENT: Head: Normocephalic and atraumatic  Cardiovascular:      Rate and Rhythm: Normal rate  Comments: Diminished, clear  Pulmonary:      Effort: Pulmonary effort is normal  No respiratory distress  Breath sounds: No wheezing, rhonchi or rales  Chest:      Chest wall: No tenderness  Abdominal:      General: Bowel sounds are normal  There is no distension  Palpations: Abdomen is soft  Tenderness: There is no abdominal tenderness  There is no guarding or rebound  Comments: Nephrostomy with clear yellow urine   Genitourinary:     Comments: Sumner catheter with cloudy yellow urine  Skin:     General: Skin is warm and dry  Coloration: Skin is pale  Findings: No rash  Neurological:      Mental Status: He is alert  Mental status is at baseline  Cranial Nerves: No cranial nerve deficit  Additional Data:     Labs:    Results from last 7 days   Lab Units 07/31/21  0543 07/30/21  0013   WBC Thousand/uL 10 62* 8 85   HEMOGLOBIN g/dL 7 7* 8 0*   HEMATOCRIT % 26 1* 26 2*   PLATELETS Thousands/uL 184 237   NEUTROS PCT %  --  65   LYMPHS PCT %  --  16   MONOS PCT %  --  13*   EOS PCT %  --  6     Results from last 7 days   Lab Units 07/31/21  0543 07/30/21  0501   POTASSIUM mmol/L 4 3 4 1   CHLORIDE mmol/L 101 103   CO2 mmol/L 25 27   BUN mg/dL 35* 28*   CREATININE mg/dL 1 84* 1 53*   CALCIUM mg/dL 8 5 8 4   ALK PHOS U/L  --  72   ALT U/L  --  20   AST U/L  --  16           * I Have Reviewed All Lab Data Listed Above  * Additional Pertinent Lab Tests Reviewed: All Labs Within Last 24 Hours Reviewed      Imaging:  Imaging Reports Reviewed Today Include:  None    Recent Cultures (last 7 days):     Results from last 7 days   Lab Units 07/30/21  1629 07/30/21  1621 07/30/21  0013   BLOOD CULTURE  Received in Microbiology Lab  Culture in Progress  Received in Microbiology Lab  Culture in Progress    --    URINE CULTURE   --   --  >100,000 cfu/ml Klebsiella-Enterobacter  group*  >100,000 cfu/ml Streptococcus species*       Last 24 Hours Medication List:   Current Facility-Administered Medications   Medication Dose Route Frequency Provider Last Rate    acetaminophen  650 mg Oral Q6H PRN Manju Kelsey MD      albuterol  2 puff Inhalation Q4H PRN Manju Kelsey MD      atorvastatin  20 mg Oral HS CORWIN Laird      belladonna-opium  30 mg Rectal Q8H PRN CORWIN Laird      cefazolin  2,000 mg Intravenous Q8H CORWIN Laird 2,000 mg (07/31/21 0950)    ferrous sulfate  325 mg Oral Daily With Breakfast CORWIN Laird      fluticasone-vilanterol  1 puff Inhalation HS CORWIN Laird      metoprolol tartrate  25 mg Oral Q12H [address] CORWIN Laird      multivitamin-minerals  1 tablet Oral Daily CORWIN Laird            Today, Patient Was Seen By: Manju Kelsey MD    ** Please Note: "This note has been constructed using a voice recognition system  Therefore there may be syntax, spelling, and/or grammatical errors   Please call if you have any questions  "**

## 2021-07-31 NOTE — ASSESSMENT & PLAN NOTE
Noted to be febrile with T-max of 101° F on 07/30  Low-grade fever overnight  Urine culture with growth of Enterococcus and Klebsiella pneumonia  · Initially started on vancomycin and ceftriaxone, sensitivities noted    Subsequently transition to Unasyn  · Follow-up blood culture-negative  · Will change to Augmentin to complete 7 days

## 2021-07-31 NOTE — PROGRESS NOTES
Progress Note - Urology      Patient: Sandra Broderick   : 1945 Sex: male   MRN: [id number]     CSN: [id number]  Unit/Bed#: [address] [place] [address]     SUBJECTIVE:   No complaints  Wife present long conversation about additional therapy if hematuria continues after finishing last treatment of O2 therapy this week  Patient may have gross hematuria from hemorrhagic cystitis not ongoing radiation cystitis in light of positive culture      Objective   Vitals: /57   Pulse 75   Temp 98 °F (36 7 °C) (Oral)   Resp 18   Ht 5' 11" (1 803 m)   Wt 97 4 kg (214 lb 11 7 oz)   SpO2 95%   BMI 29 95 kg/m²     I/O last 24 hours:   In: 48 [IV Piggyback:50]  Out: 5300 [Urine:5300]      Physical Exam:   General Alert awake   Normocephalic atraumatic PERRLA  Lungs clear bilaterally  Cardiac normal S1 normal S2  Abdomen soft, flank pain  Extremities no edema      Lab Results: CBC:   Lab Results   Component Value Date    WBC 10 62 (H) 2021    HGB 7 7 (L) 2021    HCT 26 1 (L) 2021    MCV 79 (L) 2021     2021    MCH 23 4 (L) 2021    MCHC 29 5 (L) 2021    RDW 17 4 (H) 2021    MPV 9 2 2021    NRBC 0 2021     CMP:   Lab Results   Component Value Date     2021    CO2 25 2021    BUN 35 (H) 2021    CREATININE 1 84 (H) 2021    CALCIUM 8 5 2021    AST 16 2021    ALT 20 2021    ALKPHOS 72 2021    EGFR 35 2021     Urinalysis:   Lab Results   Component Value Date    COLORU Red 2021    CLARITYU Cloudy 2021    SPECGRAV 1 020 2021    PHUR 6 5 2021    PHUR 5 5 2018    LEUKOCYTESUR Moderate (A) 2021    NITRITE Positive (A) 2021    GLUCOSEU Negative 2021    KETONESU Trace (A) 2021    BILIRUBINUR Negative 2021    BLOODU Large (A) 2021     Urine Culture:   Lab Results   Component Value Date    URINECX >100,000 cfu/ml Klebsiella pneumoniae (A) 2021    Nany Links >100,000 cfu/ml Enterococcus faecalis (A) 07/30/2021     PSA: No results found for: PSA      Assessment/ Plan:  UTI  Possible hemorrhagic cystitis  Radiation cystitis  On oxygen therapy  Sumner draining clear urine  Will DC Sumner in the morning voiding trial can be discharged home on appropriate antibiotics if cultures return          Swapna Cruz MD

## 2021-07-31 NOTE — PROGRESS NOTES
Vancomycin Assessment    Shannan Conway is a 66 y o  male who is currently receiving vancomycin 2000 mg IV q24h for urinary tract infection     Relevant clinical data and objective history reviewed:  Creatinine   Date Value Ref Range Status   07/31/2021 1 84 (H) 0 60 - 1 30 mg/dL Final     Comment:     Standardized to IDMS reference method   07/30/2021 1 53 (H) 0 60 - 1 30 mg/dL Final     Comment:     Standardized to IDMS reference method   07/30/2021 1 57 (H) 0 60 - 1 30 mg/dL Final     Comment:     Standardized to IDMS reference method     /60   Pulse 67   Temp 98 3 °F (36 8 °C)   Resp 20   Ht 5' 11" (1 803 m)   Wt 97 4 kg (214 lb 11 7 oz)   SpO2 94%   BMI 29 95 kg/m²   I/O last 3 completed shifts: In: 48 [IV Piggyback:50]  Out: 5550 [Urine:5550]  Lab Results   Component Value Date/Time    BUN 35 (H) 07/31/2021 05:43 AM    WBC 10 62 (H) 07/31/2021 05:43 AM    HGB 7 7 (L) 07/31/2021 05:43 AM    HCT 26 1 (L) 07/31/2021 05:43 AM    MCV 79 (L) 07/31/2021 05:43 AM     07/31/2021 05:43 AM     Temp Readings from Last 3 Encounters:   07/31/21 98 3 °F (36 8 °C)     Vancomycin Days of Therapy: 1    Assessment/Plan  The patient is currently on vancomycin utilizing scheduled dosing based on actual body weight  Baseline risks associated with therapy include: pre-existing renal impairment, concomitant nephrotoxic medications, advanced age, and dehydration  The patient is currently receiving 2000 mg IV q24h and is clinically appropriate and dose will be continued  Pharmacy will also follow closely for s/sx of nephrotoxicity, infusion reactions, and appropriateness of therapy  BMP and CBC will be ordered per protocol  Plan for trough as patient approaches steady state, prior to the 4th  dose at approximately 1530 on 8/3/21  Due to infection severity, will target a trough of 15-20 (appropriate for most indications)     Pharmacy will continue to follow the patients culture results and clinical progress daily      Arpita Palafox, Pharmacist

## 2021-07-31 NOTE — PLAN OF CARE
Problem: MOBILITY - ADULT  Goal: Maintain or return to baseline ADL function  Description: INTERVENTIONS:  -  Assess patient's ability to carry out ADLs; assess patient's baseline for ADL function and identify physical deficits which impact ability to perform ADLs (bathing, care of mouth/teeth, toileting, grooming, dressing, etc )  - Assess/evaluate cause of self-care deficits   - Assess range of motion  - Assess patient's mobility; develop plan if impaired  - Assess patient's need for assistive devices and provide as appropriate  - Encourage maximum independence but intervene and supervise when necessary  - Involve family in performance of ADLs  - Assess for home care needs following discharge   - Consider OT consult to assist with ADL evaluation and planning for discharge  - Provide patient education as appropriate  Outcome: Progressing  Goal: Maintains/Returns to pre admission functional level  Description: INTERVENTIONS:  - Perform BMAT or MOVE assessment daily    - Set and communicate daily mobility goal to care team and patient/family/caregiver  - Collaborate with rehabilitation services on mobility goals if consulted  - Perform Range of Motion 3 times a day  - Reposition patient every 2 hours    - Dangle patient 3 times a day  - Stand patient 3 times a day  - Ambulate patient 3 times a day  - Out of bed to chair 3 times a day   - Out of bed for meals 3 times a day  - Out of bed for toileting  - Record patient progress and toleration of activity level   Outcome: Progressing     Problem: Potential for Falls  Goal: Patient will remain free of falls  Description: INTERVENTIONS:  - Educate patient/family on patient safety including physical limitations  - Instruct patient to call for assistance with activity   - Consult OT/PT to assist with strengthening/mobility   - Keep Call bell within reach  - Keep bed low and locked with side rails adjusted as appropriate  - Keep care items and personal belongings within reach  - Initiate and maintain comfort rounds  - Make Fall Risk Sign visible to staff  - Offer Toileting every 2 Hours, in advance of need  - Initiate/Maintain bed alarm  - Obtain necessary fall risk management equipment: N/A  - Apply yellow socks and bracelet for high fall risk patients  - Consider moving patient to room near nurses station  Outcome: Progressing     Problem: PAIN - ADULT  Goal: Verbalizes/displays adequate comfort level or baseline comfort level  Description: Interventions:  - Encourage patient to monitor pain and request assistance  - Assess pain using appropriate pain scale  - Administer analgesics based on type and severity of pain and evaluate response  - Implement non-pharmacological measures as appropriate and evaluate response  - Consider cultural and social influences on pain and pain management  - Notify physician/advanced practitioner if interventions unsuccessful or patient reports new pain  Outcome: Progressing     Problem: INFECTION - ADULT  Goal: Absence or prevention of progression during hospitalization  Description: INTERVENTIONS:  - Assess and monitor for signs and symptoms of infection  - Monitor lab/diagnostic results  - Monitor all insertion sites, i e  indwelling lines, tubes, and drains  - Administer medications as ordered  - Instruct and encourage patient and family to use good hand hygiene technique  - Identify and instruct in appropriate isolation precautions for identified infection/condition  Outcome: Progressing     Problem: GENITOURINARY - ADULT  Goal: Maintains or returns to baseline urinary function  Description: INTERVENTIONS:  - Assess urinary function  - Encourage oral fluids to ensure adequate hydration if ordered  - Administer IV fluids as ordered to ensure adequate hydration  - Administer ordered medications as needed  - Offer frequent toileting  - Follow urinary retention protocol if ordered  Outcome: Progressing  Goal: Absence of urinary retention  Description: INTERVENTIONS:  - Assess patient's ability to void and empty bladder  - Monitor I/O  - Bladder scan as needed  - Discuss with physician/AP medications to alleviate retention as needed  - Discuss catheterization for long term situations as appropriate  Outcome: Progressing  Goal: Urinary catheter remains patent  Description: INTERVENTIONS:  - Assess patency of urinary catheter  - If patient has a chronic alanis, consider changing catheter if non-functioning  - Follow guidelines for intermittent irrigation of non-functioning urinary catheter  Outcome: Progressing     Problem: HEMATOLOGIC - ADULT  Goal: Maintains hematologic stability  Description: INTERVENTIONS  - Assess for signs and symptoms of bleeding or hemorrhage  - Monitor labs  - Administer supportive blood products/factors as ordered and appropriate  Outcome: Progressing

## 2021-07-31 NOTE — ED PROVIDER NOTES
History  Chief Complaint   Patient presents with    Urinary Retention     Patient states he has the urge to void but cannot and feels burning when he tries, patient states he was able to void a small amount of clotted blood  Patient has b/l lateral nephrostomy tubes, both actively draining without complication  Patient presents for evaluation of hematuria burning and difficulty urinating  Patient has a history of radiation induced hemorrhagic cystitis  He has bilateral nephrostomy tubes  He has been undergoing hyperbaric oxygen treatments as an outpatient with little improvement  States he has been passing clots earlier today but tonight was unable to pass any clots he has the urge to go and pain and burning  Nephrostomy tubes are still draining bilaterally nares no blood in the nephrostomy tubes  History provided by:  Patient   used: No        Prior to Admission Medications   Prescriptions Last Dose Informant Patient Reported? Taking?    LORazepam (ATIVAN) 0 5 mg tablet Not Taking at Unknown time  No No   Sig: Take 1 tablet (0 5 mg total) by mouth every 8 (eight) hours as needed for anxiety (2 tablets prior to hyperbaric oxygen therapy)   Patient not taking: Reported on 7/30/2021   albuterol (PROVENTIL HFA,VENTOLIN HFA) 90 mcg/act inhaler Not Taking at Unknown time  No No   Sig: Inhale 2 puffs every 4 (four) hours as needed for wheezing   Patient not taking: Reported on 7/30/2021   atorvastatin (LIPITOR) 20 mg tablet 7/29/2021 at Unknown time  Yes Yes   Sig: Take 20 mg by mouth daily at bedtime   belladonna-opium (B&O SUPPOSITORY) 16 2-30 mg suppository   No No   Sig: Insert 1 suppository into the rectum every 8 (eight) hours as needed for cramping or bladder spasms for up to 10 daysMax Daily Amount: 3 suppositories   ferrous sulfate 324 (65 Fe) mg 7/29/2021 at Unknown time  No Yes   Sig: Take 1 tablet (324 mg total) by mouth daily before breakfast   fluticasone (FLONASE) 50 mcg/act nasal spray 2021 at Unknown time  Yes Yes   Si spray into each nostril daily   fluticasone-vilanterol (Breo Ellipta) 200-25 MCG/INH inhaler  Self Yes Yes   Sig: Inhale 1 puff daily at bedtime Rinse mouth after use  levocetirizine (XYZAL) 5 MG tablet 2021 at Unknown time Self Yes Yes   Sig: Take 5 mg by mouth every evening   metoprolol tartrate (LOPRESSOR) 25 mg tablet 2021 at Unknown time  Yes Yes   Sig: Take 25 mg by mouth every 12 (twelve) hours    multivitamin-iron-minerals-folic acid (CENTRUM) chewable tablet 2021 at Unknown time  Yes Yes   Sig: Chew 1 tablet daily   naloxone (NARCAN) 4 mg/0 1 mL nasal spray Not Taking at Unknown time  No No   Sig: Administer 1 spray into a nostril  If no response after 2-3 minutes, give another dose in the other nostril using a new spray     Patient not taking: Reported on 2021   sodium chloride, PF, 0 9 % Not Taking at Unknown time  No No   Sig: 10 mL by Intracatheter route daily Left Intracatheter flushing daily   Patient not taking: Reported on 2021   sodium chloride, PF, 0 9 % Not Taking at Unknown time  No No   Sig: 10 mL by Intracatheter route daily Right Intracatheter flushing daily   Patient not taking: Reported on 2021      Facility-Administered Medications: None       Past Medical History:   Diagnosis Date    Cancer [facility])     prostate    COPD (chronic obstructive pulmonary disease) ([facility] [address] )     Hyperlipidemia     Hypertension        Past Surgical History:   Procedure Laterality Date    APPENDECTOMY      FL RETROGRADE PYELOGRAM  2021    HERNIA REPAIR      IR NEPHROSTOMY TUBE CHECK/CHANGE/REPOSITION/REINSERTION/UPSIZE  5/10/2021    IR NEPHROSTOMY TUBE PLACEMENT  2021    SC CYSTOURETHROSCOPY W/IRRIG & EVAC CLOTS Bilateral 2021    Procedure: CYSTOSCOPY EVACUATION OF CLOTS bilateral retrograde pyelograms possible TURBT bladder biopsy;  Surgeon: Leigh Garcia MD;  Location: [address] [place] [address];  Service: Urology  ID CYSTOURETHROSCOPY W/IRRIG & EVAC CLOTS N/A 4/24/2021    Procedure: CYSTOSCOPY EVACUATION OF CLOTS fulguration;  Surgeon: Darrel Song MD;  Location: [facility];  Service: Urology    ID CYSTOURETHROSCOPY W/IRRIG & EVAC CLOTS N/A 7/8/2021    Procedure: CYSTOSCOPY EVACUATION OF CLOTS BILATERAL ANTIROGRADES NEPHROSTOMY Greg Welch ;  Surgeon: Darrel Song MD;  Location: [facility];  Service: Urology    PROSTATECTOMY     [address]      right shoulder       Family History   Problem Relation Age of Onset    Diabetes Mother     Stroke Mother     Diabetes Brother     Stroke Brother      I have reviewed and agree with the history as documented  E-Cigarette/Vaping    E-Cigarette Use Former User      E-Cigarette/Vaping Substances    Nicotine No     THC No     CBD No     Flavoring No     Other No     Unknown No      Social History     Tobacco Use    Smoking status: Former Smoker     Packs/day: 0 50     Years: 50 00     Pack years: 25 00     Quit date: 8/29/2017     Years since quitting: 3 9    Smokeless tobacco: Never Used    Tobacco comment: quit one month ago   Vaping Use    Vaping Use: Former   Substance Use Topics    Alcohol use: Never    Drug use: No       Review of Systems   Constitutional: Negative for chills and fever  HENT: Negative for ear pain and sore throat  Eyes: Negative for pain and visual disturbance  Respiratory: Negative for cough and shortness of breath  Cardiovascular: Negative for chest pain and palpitations  Gastrointestinal: Negative for abdominal pain and vomiting  Genitourinary: Positive for difficulty urinating, dysuria, hematuria and urgency  Musculoskeletal: Negative for arthralgias and back pain  Skin: Negative for color change and rash  Neurological: Negative for seizures and syncope  All other systems reviewed and are negative  Physical Exam  Physical Exam  Vitals and nursing note reviewed     Constitutional: General: He is not in acute distress  Appearance: Normal appearance  HENT:      Head: Atraumatic  Right Ear: External ear normal       Left Ear: External ear normal       Nose: Nose normal       Mouth/Throat:      Mouth: Mucous membranes are moist       Pharynx: Oropharynx is clear  Eyes:      General: No scleral icterus  Conjunctiva/sclera: Conjunctivae normal    Cardiovascular:      Rate and Rhythm: Normal rate and regular rhythm  Pulses: Normal pulses  Pulmonary:      Effort: Pulmonary effort is normal  No respiratory distress  Breath sounds: Normal breath sounds  Abdominal:      General: Abdomen is flat  Bowel sounds are normal  There is no distension  Palpations: Abdomen is soft  Tenderness: There is no abdominal tenderness  There is no guarding or rebound  Musculoskeletal:         General: Normal range of motion  Skin:     Capillary Refill: Capillary refill takes less than 2 seconds  Findings: No rash  Neurological:      General: No focal deficit present  Mental Status: He is alert and oriented to person, place, and time           Vital Signs  ED Triage Vitals   Temperature Pulse Respirations Blood Pressure SpO2   07/29/21 2318 07/29/21 2318 07/29/21 2318 07/29/21 2320 07/29/21 2320   99 3 °F (37 4 °C) 84 18 150/71 95 %      Temp Source Heart Rate Source Patient Position - Orthostatic VS BP Location FiO2 (%)   07/29/21 2318 07/30/21 2035 07/30/21 2035 07/30/21 2035 --   Tympanic Monitor Lying Right arm       Pain Score       07/30/21 0058       6           Vitals:    07/30/21 1446 07/30/21 1817 07/30/21 2035 07/30/21 2201   BP: 142/74  139/76 119/68   Pulse: 74 66 78 70   Patient Position - Orthostatic VS:   Lying Lying         Visual Acuity      ED Medications  Medications   atorvastatin (LIPITOR) tablet 20 mg (20 mg Oral Given 7/30/21 2224)   ferrous sulfate tablet 325 mg (325 mg Oral Not Given 7/30/21 1003)   metoprolol tartrate (LOPRESSOR) tablet 25 mg (25 mg Oral Given 7/30/21 2224)   multivitamin-minerals (CENTRUM) tablet 1 tablet (1 tablet Oral Not Given 7/30/21 1003)   ceFAZolin (ANCEF) IVPB (premix in dextrose) 2,000 mg 50 mL (2,000 mg Intravenous New Bag 7/31/21 0129)   fluticasone-vilanterol (BREO ELLIPTA) 200-25 MCG/INH inhaler 1 puff (1 puff Inhalation Given 7/30/21 2224)   belladonna-opium (B&O SUPPOSITORY) 16 2-30 mg suppository 1 suppository (1 suppository Rectal Given 7/30/21 0418)   albuterol (PROVENTIL HFA,VENTOLIN HFA) inhaler 2 puff (2 puffs Inhalation Given 7/30/21 1925)   acetaminophen (TYLENOL) tablet 650 mg (650 mg Oral Given 7/30/21 1512)   ceFAZolin (ANCEF) IVPB (premix in dextrose) 2,000 mg 50 mL (2,000 mg Intravenous New Bag 7/30/21 0020)   sodium chloride 0 9 % bolus 1,000 mL (1,000 mL Intravenous New Bag 7/30/21 0016)       Diagnostic Studies  Results Reviewed     Procedure Component Value Units Date/Time    Urine Microscopic [[id number]]  (Abnormal) Collected: 07/30/21 0013    Lab Status: Final result Specimen: Urine, Indwelling Sumner Catheter Updated: 07/30/21 0038     RBC, UA Innumerable /hpf      WBC, UA       Field obscured, unable to enumerate     /hpf     Epithelial Cells None Seen /hpf      Bacteria, UA       Field obscured, unable to enumerate     /hpf    Urine culture [[id number]] Collected: 07/30/21 0013    Lab Status:  In process Specimen: Urine, Indwelling Sumner Catheter Updated: 07/30/21 0038    Comprehensive metabolic panel [[id number]]  (Abnormal) Collected: 07/30/21 0013    Lab Status: Final result Specimen: Blood from Arm, Left Updated: 07/30/21 0038     Sodium 140 mmol/L      Potassium 4 4 mmol/L      Chloride 105 mmol/L      CO2 29 mmol/L      ANION GAP 6 mmol/L      BUN 33 mg/dL      Creatinine 1 57 mg/dL      Glucose 121 mg/dL      Calcium 8 5 mg/dL      Corrected Calcium 9 2 mg/dL      AST 14 U/L      ALT 27 U/L      Alkaline Phosphatase 77 U/L      Total Protein 6 9 g/dL      Albumin 3 1 g/dL      Total Bilirubin 0 31 mg/dL      eGFR 42 ml/min/1 73sq m     Narrative:      National Kidney Disease Foundation guidelines for Chronic Kidney Disease (CKD):     Stage 1 with normal or high GFR (GFR > 90 mL/min/1 73 square meters)    Stage 2 Mild CKD (GFR = 60-89 mL/min/1 73 square meters)    Stage 3A Moderate CKD (GFR = 45-59 mL/min/1 73 square meters)    Stage 3B Moderate CKD (GFR = 30-44 mL/min/1 73 square meters)    Stage 4 Severe CKD (GFR = 15-29 mL/min/1 73 square meters)    Stage 5 End Stage CKD (GFR <15 mL/min/1 73 square meters)  Note: GFR calculation is accurate only with a steady state creatinine    UA w Reflex to Microscopic w Reflex to Culture [[id number]]  (Abnormal) Collected: 07/30/21 0013    Lab Status: Final result Specimen: Urine, Indwelling Sumner Catheter Updated: 07/30/21 0028     Color, UA Red     Clarity, UA Cloudy     Specific [place], UA 1 020     pH, UA 6 5     Leukocytes, UA Moderate     Nitrite, UA Positive     Protein, UA >=300 mg/dl      Glucose, UA Negative mg/dl      Ketones, UA Trace mg/dl      Urobilinogen, UA 1 0 E U /dl      Bilirubin, UA Negative     Blood, UA Large    CBC and differential [[id number]]  (Abnormal) Collected: 07/30/21 0013    Lab Status: Final result Specimen: Blood from Arm, Left Updated: 07/30/21 0025     WBC 8 85 Thousand/uL      RBC 3 31 Million/uL      Hemoglobin 8 0 g/dL      Hematocrit 26 2 %      MCV 79 fL      MCH 24 2 pg      MCHC 30 5 g/dL      RDW 17 9 %      MPV 9 3 fL      Platelets 694 Thousands/uL      Neutrophils Relative 65 %      Lymphocytes Relative 16 %      Monocytes Relative 13 %      Eosinophils Relative 6 %      Basophils Relative 0 %      Neutrophils Absolute 5 76 Thousands/µL      Lymphocytes Absolute 1 45 Thousands/µL      Monocytes Absolute 1 11 Thousand/µL      Eosinophils Absolute 0 51 Thousand/µL      Basophils Absolute 0 02 Thousands/µL                  No orders to display              Procedures  Procedures         ED Course MDM  Number of Diagnoses or Management Options  Hematuria  Diagnosis management comments: Pulse ox 96% room air indicating adequate oxygenation  Dr Karon Apgar consult in case discussed  Recommended placing Sumner catheter for irrigation of clots admission to the medical service and given a dose of Ancef  Also recommend IV hydration  Amount and/or Complexity of Data Reviewed  Clinical lab tests: ordered and reviewed  Decide to obtain previous medical records or to obtain history from someone other than the patient: yes  Review and summarize past medical records: yes  Discuss the patient with other providers: yes    Patient Progress  Patient progress: stable      Disposition  Final diagnoses:   Hematuria     Time reflects when diagnosis was documented in both MDM as applicable and the Disposition within this note     Time User Action Codes Description Comment    2021 11:55 PM Laura Senior Add [R31 9] Hematuria       ED Disposition     ED Disposition Condition Date/Time Comment    Admit Stable u 2021 11:56 PM Case was discussed with KIMBERLEY Larson and the patient's admission status was agreed to be Admission Status: observation status to the service of Dr Delta Arora  Follow-up Information    None         Current Discharge Medication List      CONTINUE these medications which have NOT CHANGED    Details   atorvastatin (LIPITOR) 20 mg tablet Take 20 mg by mouth daily at bedtime      ferrous sulfate 324 (65 Fe) mg Take 1 tablet (324 mg total) by mouth daily before breakfast  Qty: 30 tablet, Refills: 0    Associated Diagnoses: Anemia, unspecified type      fluticasone (FLONASE) 50 mcg/act nasal spray 1 spray into each nostril daily      fluticasone-vilanterol (Breo Ellipta) 200-25 MCG/INH inhaler Inhale 1 puff daily at bedtime Rinse mouth after use        levocetirizine (XYZAL) 5 MG tablet Take 5 mg by mouth every evening      metoprolol tartrate (LOPRESSOR) 25 mg tablet Take 25 mg by mouth every 12 (twelve) hours       multivitamin-iron-minerals-folic acid (CENTRUM) chewable tablet Chew 1 tablet daily      albuterol (PROVENTIL HFA,VENTOLIN HFA) 90 mcg/act inhaler Inhale 2 puffs every 4 (four) hours as needed for wheezing  Qty: 1 Inhaler, Refills: 0      belladonna-opium (B&O SUPPOSITORY) 16 2-30 mg suppository Insert 1 suppository into the rectum every 8 (eight) hours as needed for cramping or bladder spasms for up to 10 daysMax Daily Amount: 3 suppositories  Qty: 10 suppository, Refills: 0    Associated Diagnoses: Cystitis      LORazepam (ATIVAN) 0 5 mg tablet Take 1 tablet (0 5 mg total) by mouth every 8 (eight) hours as needed for anxiety (2 tablets prior to hyperbaric oxygen therapy)  Qty: 22 tablet, Refills: 0    Associated Diagnoses: Anxiety associated with hyperbaric oxygen therapy      naloxone (NARCAN) 4 mg/0 1 mL nasal spray Administer 1 spray into a nostril  If no response after 2-3 minutes, give another dose in the other nostril using a new spray  Qty: 1 each, Refills: 1    Associated Diagnoses: Anxiety associated with hyperbaric oxygen therapy      !! sodium chloride, PF, 0 9 % 10 mL by Intracatheter route daily Left Intracatheter flushing daily  Qty: 900 mL, Refills: 0    Associated Diagnoses: Acute cystitis with hematuria      !! sodium chloride, PF, 0 9 % 10 mL by Intracatheter route daily Right Intracatheter flushing daily  Qty: 900 mL, Refills: 0    Associated Diagnoses: Acute cystitis with hematuria       !! - Potential duplicate medications found  Please discuss with provider  No discharge procedures on file      PDMP Review       Value Time User    PDMP Reviewed  Yes 7/13/2021 10:25 AM Cande Ramirez [facility]          ED Provider  Electronically Signed by           Robyn Hays DO  07/31/21 [id number]

## 2021-08-01 LAB
ABO GROUP BLD: NORMAL
ANION GAP SERPL CALCULATED.3IONS-SCNC: 9 MMOL/L (ref 4–13)
BACTERIA UR CULT: ABNORMAL
BACTERIA UR CULT: ABNORMAL
BLD GP AB SCN SERPL QL: NEGATIVE
BUN SERPL-MCNC: 31 MG/DL (ref 5–25)
CALCIUM SERPL-MCNC: 8.4 MG/DL (ref 8.3–10.1)
CHLORIDE SERPL-SCNC: 105 MMOL/L (ref 100–108)
CO2 SERPL-SCNC: 27 MMOL/L (ref 21–32)
CREAT SERPL-MCNC: 1.51 MG/DL (ref 0.6–1.3)
ERYTHROCYTE [DISTWIDTH] IN BLOOD BY AUTOMATED COUNT: 17.3 % (ref 11.6–15.1)
GFR SERPL CREATININE-BSD FRML MDRD: 44 ML/MIN/1.73SQ M
GLUCOSE SERPL-MCNC: 105 MG/DL (ref 65–140)
HCT VFR BLD AUTO: 25.6 % (ref 36.5–49.3)
HCT VFR BLD AUTO: 25.8 % (ref 36.5–49.3)
HCT VFR BLD AUTO: 26.5 % (ref 36.5–49.3)
HGB BLD-MCNC: 7.4 G/DL (ref 12–17)
HGB BLD-MCNC: 7.5 G/DL (ref 12–17)
HGB BLD-MCNC: 7.9 G/DL (ref 12–17)
MCH RBC QN AUTO: 22.9 PG (ref 26.8–34.3)
MCHC RBC AUTO-ENTMCNC: 28.3 G/DL (ref 31.4–37.4)
MCV RBC AUTO: 81 FL (ref 82–98)
PLATELET # BLD AUTO: 188 THOUSANDS/UL (ref 149–390)
PMV BLD AUTO: 10.4 FL (ref 8.9–12.7)
POTASSIUM SERPL-SCNC: 4.5 MMOL/L (ref 3.5–5.3)
PROCALCITONIN SERPL-MCNC: <0.05 NG/ML
RBC # BLD AUTO: 3.27 MILLION/UL (ref 3.88–5.62)
RH BLD: POSITIVE
SODIUM SERPL-SCNC: 141 MMOL/L (ref 136–145)
SPECIMEN EXPIRATION DATE: NORMAL
WBC # BLD AUTO: 8.73 THOUSAND/UL (ref 4.31–10.16)

## 2021-08-01 PROCEDURE — 85014 HEMATOCRIT: CPT | Performed by: INTERNAL MEDICINE

## 2021-08-01 PROCEDURE — 85027 COMPLETE CBC AUTOMATED: CPT | Performed by: INTERNAL MEDICINE

## 2021-08-01 PROCEDURE — 80048 BASIC METABOLIC PNL TOTAL CA: CPT | Performed by: INTERNAL MEDICINE

## 2021-08-01 PROCEDURE — 86850 RBC ANTIBODY SCREEN: CPT | Performed by: INTERNAL MEDICINE

## 2021-08-01 PROCEDURE — 86900 BLOOD TYPING SEROLOGIC ABO: CPT | Performed by: INTERNAL MEDICINE

## 2021-08-01 PROCEDURE — 85018 HEMOGLOBIN: CPT | Performed by: INTERNAL MEDICINE

## 2021-08-01 PROCEDURE — 99232 SBSQ HOSP IP/OBS MODERATE 35: CPT | Performed by: INTERNAL MEDICINE

## 2021-08-01 PROCEDURE — 86901 BLOOD TYPING SEROLOGIC RH(D): CPT | Performed by: INTERNAL MEDICINE

## 2021-08-01 PROCEDURE — 84145 PROCALCITONIN (PCT): CPT | Performed by: INTERNAL MEDICINE

## 2021-08-01 RX ADMIN — SODIUM CHLORIDE 3 G: 9 INJECTION, SOLUTION INTRAVENOUS at 16:29

## 2021-08-01 RX ADMIN — METOPROLOL TARTRATE 25 MG: 25 TABLET, FILM COATED ORAL at 21:55

## 2021-08-01 RX ADMIN — ALBUTEROL SULFATE 2 PUFF: 90 AEROSOL, METERED RESPIRATORY (INHALATION) at 10:06

## 2021-08-01 RX ADMIN — SODIUM CHLORIDE 3 G: 9 INJECTION, SOLUTION INTRAVENOUS at 21:55

## 2021-08-01 RX ADMIN — FERROUS SULFATE TAB 325 MG (65 MG ELEMENTAL FE) 325 MG: 325 (65 FE) TAB at 10:05

## 2021-08-01 RX ADMIN — Medication 1 TABLET: at 10:04

## 2021-08-01 RX ADMIN — METOPROLOL TARTRATE 25 MG: 25 TABLET, FILM COATED ORAL at 10:05

## 2021-08-01 RX ADMIN — FLUTICASONE FUROATE AND VILANTEROL TRIFENATATE 1 PUFF: 200; 25 POWDER RESPIRATORY (INHALATION) at 21:55

## 2021-08-01 RX ADMIN — CYANOCOBALAMIN TAB 500 MCG 1000 MCG: 500 TAB at 10:10

## 2021-08-01 RX ADMIN — ATORVASTATIN CALCIUM 20 MG: 20 TABLET, FILM COATED ORAL at 21:55

## 2021-08-01 NOTE — PROGRESS NOTES
Progress Note - Urology      Patient: Juliana Alaniz   : 1945 Sex: male   MRN: [id number]     CSN: [id number]  Unit/Bed#: [address] [place] [address]     SUBJECTIVE:   No complaints  Sumner draining scant urine minimally hematuria  Bilateral nephrostomy tubes draining 95% of urine output clear  Complicated UTI      Objective   Vitals: /55 (BP Location: Left arm)   Pulse 67   Temp 97 9 °F (36 6 °C) (Oral)   Resp 18   Ht 5' 11" (1 803 m)   Wt 95 9 kg (211 lb 6 7 oz)   SpO2 95%   BMI 29 49 kg/m²     I/O last 24 hours:   In: 340 [P O :240; IV Piggyback:100]  Out: 600 [Urine:600]      Physical Exam:   General Alert awake   Normocephalic atraumatic PERRLA  Lungs clear bilaterally  Cardiac normal S1 normal S2  Abdomen soft, flank pain  Extremities no edema      Lab Results: CBC:   Lab Results   Component Value Date    WBC 8 73 2021    HGB 7 5 (L) 2021    HCT 26 5 (L) 2021    MCV 81 (L) 2021     2021    MCH 22 9 (L) 2021    MCHC 28 3 (L) 2021    RDW 17 3 (H) 2021    MPV 10 4 2021    NRBC 0 2021     CMP:   Lab Results   Component Value Date     2021    CO2 27 2021    BUN 31 (H) 2021    CREATININE 1 51 (H) 2021    CALCIUM 8 4 2021    AST 16 2021    ALT 20 2021    ALKPHOS 72 2021    EGFR 44 2021     Urinalysis:   Lab Results   Component Value Date    COLORU Yellow 2021    CLARITYU Turbid 2021    SPECGRAV >=1 030 2021    PHUR 5 5 2021    PHUR 5 5 2018    LEUKOCYTESUR Large (A) 2021    NITRITE Negative 2021    GLUCOSEU Negative 2021    KETONESU Negative 2021    BILIRUBINUR Negative 2021    BLOODU Large (A) 2021     Urine Culture:   Lab Results   Component Value Date    URINECX >100,000 cfu/ml Klebsiella pneumoniae (A) 2021    URINECX >100,000 cfu/ml Enterococcus faecalis (A) 2021     PSA: No results found for: PSA      Assessment/ Plan:  Irrigate Sumner catheter no clots  DC Sumner  Hemorrhagic cystitis possible flare of radiation cystitis/hematuria  I personally clamped nephrostomy tubes  Antegrade flow of urine for urine output and evaluation of hematuria  Continue antibiotics  Patient wishes to be discharged tomorrow  Will finish O2 therapy this week          Shayy Neil MD

## 2021-08-01 NOTE — PROGRESS NOTES
[facility] Progress Note  Patient: Selvin Pang 66 y o  male   MRN: [id number]  PCP: Omer Stuart MD  Unit/Bed#: [address] [place] [address] Encounter: [id number]  Date Of Visit: 08/01/21    Problem List:    Principal Problem:    Gross hematuria  Active Problems:    Acute cystitis with hematuria    Anemia    Chronic obstructive pulmonary disease ([facility] )    Essential hypertension    Hyperlipidemia    Prostate cancer ([facility] )    CKD (chronic kidney disease)      Assessment & Plan:    Acute cystitis with hematuria  Assessment & Plan  Noted to be febrile with T-max of 101° F on 07/30  Low-grade fever overnight  Urine culture with growth of Enterococcus and Klebsiella pneumonia  · Initially started on vancomycin and ceftriaxone, sensitivities noted  · Follow-up blood culture-negative  · Will change to Unasyn    * Gross hematuria  Assessment & Plan  Secondary to acute infection infectious cystitis with UTI in setting of radiation cystitis  Recurrent despite diverting bilateral nephrostomy and hyperbaric oxygen treatment, received 39/40  Seen by Urology, input appreciated  Status post CBI with improvement in hematuria, but recurrence overnight  · Currently on Sumner catheter, follow-up with urology for further management  · Continue antibiotics as below  · Urology follow-up    Chronic obstructive pulmonary disease ([facility] )  Assessment & Plan  Stable  Continue Breo and albuterol as needed    Anemia  Assessment & Plan  Acute blood loss on chronic blood loss anemia  Still with ongoing hematuria  Continue iron supplements  Iron profile is improving, B12 low normal   Folate normal  Recheck hemoglobin later today  Monitor and transfuse as medically indicated     Essential hypertension  Assessment & Plan  Continue lopressor 25 BID    CKD (chronic kidney disease)  Assessment & Plan  Lab Results   Component Value Date    EGFR 44 08/01/2021    EGFR 35 07/31/2021    EGFR 43 07/30/2021    CREATININE 1 51 (H) 08/01/2021 CREATININE 1 84 (H) 2021    CREATININE 1 53 (H) 2021   Baseline Cr 1 3-1 5  At baseline today  Treatment of UTI as above  Avoid hypo tension nephrotoxins  Monitor    Prostate cancer [facility])  Assessment & Plan  With bilateral nephrostomy tubes in place     Hyperlipidemia  Assessment & Plan  Continue statin       VTE Pharmacologic Prophylaxis:   Pharmacologic: Pharmacologic VTE Prophylaxis contraindicated due to Hematuria  Mechanical VTE Prophylaxis in Place: Yes    Patient Centered Rounds: I have performed bedside rounds with nursing staff today  Discussions with Specialists or Other Care Team Provider:  Dr Nan Nicholas  Education and Discussions with Family / Patient:  Patient declined offered to call family    Time Spent for Care: 45 minutes  More than 50% of total time spent on counseling and coordination of care as described above  Current Length of Stay: 1 day(s)    Current Patient Status: Inpatient   Certification Statement: The patient will continue to require additional inpatient hospital stay due to Recurrent hematuria    Discharge Plan:  Home when clinically improved    Code Status: Level 1 - Full Code      Subjective:     Noted to have recurrence of hematuria through the Sumner catheter overnight  Bilateral nephrostomy with good output of yellow urine  Denies any fever chills pelvic pain or catheter discomfort  Denies any chest pain shortness of breath      Objective:     Vitals:   Temp (24hrs), Av 2 °F (36 8 °C), Min:97 9 °F (36 6 °C), Max:98 6 °F (37 °C)    Temp:  [97 9 °F (36 6 °C)-98 6 °F (37 °C)] 97 9 °F (36 6 °C)  HR:  [61-67] 67  Resp:  [18-20] 18  BP: (114-124)/(55-62) 114/55  SpO2:  [94 %-95 %] 95 % on room air  Body mass index is 29 49 kg/m²  Input and Output Summary (last 24 hours):        Intake/Output Summary (Last 24 hours) at 2021 1108  Last data filed at 2021 0443  Gross per 24 hour   Intake 340 ml   Output 475 ml   Net -135 ml       Physical Exam:     Physical Exam  Constitutional:       General: He is not in acute distress  HENT:      Head: Normocephalic and atraumatic  Cardiovascular:      Rate and Rhythm: Normal rate  Pulmonary:      Effort: Pulmonary effort is normal  No respiratory distress  Breath sounds: No wheezing, rhonchi or rales  Chest:      Chest wall: No tenderness  Abdominal:      General: Bowel sounds are normal  There is no distension  Palpations: Abdomen is soft  Tenderness: There is no abdominal tenderness  There is no guarding or rebound  Comments: Bilateral nephrostomy with clear yellow urine   Genitourinary:     Comments: Sumner catheter with scant amount of blood tinge output  Musculoskeletal:      Cervical back: Normal range of motion and neck supple  Skin:     General: Skin is warm and dry  Coloration: Skin is pale  Findings: No rash  Neurological:      General: No focal deficit present  Mental Status: He is alert  Mental status is at baseline  Cranial Nerves: No cranial nerve deficit  Additional Data:     Labs:    Results from last 7 days   Lab Units 08/01/21  0430 07/30/21  0504 07/30/21  0013   WBC Thousand/uL 8 73   < > 8 85   HEMOGLOBIN g/dL 7 5*  --  8 0*   HEMATOCRIT % 26 5*  --  26 2*   PLATELETS Thousands/uL 188   < > 237   NEUTROS PCT %  --   --  65   LYMPHS PCT %  --   --  16   MONOS PCT %  --   --  13*   EOS PCT %  --   --  6    < > = values in this interval not displayed  Results from last 7 days   Lab Units 08/01/21  0430 07/30/21  0501   POTASSIUM mmol/L 4 5 4 1   CHLORIDE mmol/L 105 103   CO2 mmol/L 27 27   BUN mg/dL 31* 28*   CREATININE mg/dL 1 51* 1 53*   CALCIUM mg/dL 8 4 8 4   ALK PHOS U/L  --  72   ALT U/L  --  20   AST U/L  --  16           * I Have Reviewed All Lab Data Listed Above  * Additional Pertinent Lab Tests Reviewed:  All Labs Within Last 24 Hours Reviewed      Imaging:  Imaging Reports Reviewed Today Include:  None    Recent Cultures (last 7 days): Results from last 7 days   Lab Units 07/30/21  1629 07/30/21  1621 07/30/21  0013   BLOOD CULTURE  No Growth at 24 hrs  No Growth at 24 hrs   --    URINE CULTURE   --   --  >100,000 cfu/ml Klebsiella pneumoniae*  >100,000 cfu/ml Enterococcus faecalis*       Last 24 Hours Medication List:   Current Facility-Administered Medications   Medication Dose Route Frequency Provider Last Rate    acetaminophen  650 mg Oral Q6H PRN Elli Fierro MD      albuterol  2 puff Inhalation Q4H PRN Elli Fierro MD      atorvastatin  20 mg Oral HS CORWIN Greenfield      belladonna-opium  30 mg Rectal Q8H PRN CORWIN Greenfield      cefTRIAXone  1,000 mg Intravenous Q24H Elli Fierro MD 1,000 mg (07/31/21 1724)    cyanocobalamin  1,000 mcg Oral Daily Elli Fierro MD      ferrous sulfate  325 mg Oral Daily With Breakfast CORWIN Greenfield      fluticasone-vilanterol  1 puff Inhalation HS CORWIN Greenfield      metoprolol tartrate  25 mg Oral Q12H [facility] CORWIN Greenfield      multivitamin-minerals  1 tablet Oral Daily CORWIN Greenfield      vancomycin  20 mg/kg Intravenous Q24H Elli Fierro MD 2,000 mg (07/31/21 1831)          Today, Patient Was Seen By: Elli Fierro MD    ** Please Note: "This note has been constructed using a voice recognition system  Therefore there may be syntax, spelling, and/or grammatical errors   Please call if you have any questions  "**

## 2021-08-01 NOTE — PROGRESS NOTES
Vancomycin IV Pharmacy-to-Dose Consultation    Martin Das is a 66 y o  male who is currently receiving Vancomycin IV with management by the Pharmacy Consult service  Assessment/Plan:  The patient was reviewed  Renal function is stable and no signs or symptoms of nephrotoxicity and/or infusion reactions were documented in the chart  Based on todays assessment, continue current vancomycin (day # 2) dosing of 2000 mg IV q24h, with a plan for trough to be drawn at 1600 on 8/3/21  We will continue to follow the patients culture results and clinical progress daily      Ophelia Cr, Pharmacist

## 2021-08-02 VITALS
WEIGHT: 214.73 LBS | RESPIRATION RATE: 16 BRPM | OXYGEN SATURATION: 93 % | HEIGHT: 71 IN | SYSTOLIC BLOOD PRESSURE: 112 MMHG | BODY MASS INDEX: 30.06 KG/M2 | TEMPERATURE: 97.2 F | DIASTOLIC BLOOD PRESSURE: 73 MMHG | HEART RATE: 64 BPM

## 2021-08-02 PROBLEM — R31.0 GROSS HEMATURIA: Status: RESOLVED | Noted: 2021-04-12 | Resolved: 2021-08-02

## 2021-08-02 LAB
ANION GAP SERPL CALCULATED.3IONS-SCNC: 6 MMOL/L (ref 4–13)
BUN SERPL-MCNC: 27 MG/DL (ref 5–25)
CALCIUM SERPL-MCNC: 8.3 MG/DL (ref 8.3–10.1)
CHLORIDE SERPL-SCNC: 106 MMOL/L (ref 100–108)
CO2 SERPL-SCNC: 29 MMOL/L (ref 21–32)
CREAT SERPL-MCNC: 1.39 MG/DL (ref 0.6–1.3)
ERYTHROCYTE [DISTWIDTH] IN BLOOD BY AUTOMATED COUNT: 17.2 % (ref 11.6–15.1)
GFR SERPL CREATININE-BSD FRML MDRD: 49 ML/MIN/1.73SQ M
GLUCOSE SERPL-MCNC: 97 MG/DL (ref 65–140)
HCT VFR BLD AUTO: 25.2 % (ref 36.5–49.3)
HGB BLD-MCNC: 7.4 G/DL (ref 12–17)
MCH RBC QN AUTO: 23.5 PG (ref 26.8–34.3)
MCHC RBC AUTO-ENTMCNC: 29.4 G/DL (ref 31.4–37.4)
MCV RBC AUTO: 80 FL (ref 82–98)
PLATELET # BLD AUTO: 172 THOUSANDS/UL (ref 149–390)
PMV BLD AUTO: 10 FL (ref 8.9–12.7)
POTASSIUM SERPL-SCNC: 4.2 MMOL/L (ref 3.5–5.3)
RBC # BLD AUTO: 3.15 MILLION/UL (ref 3.88–5.62)
SODIUM SERPL-SCNC: 141 MMOL/L (ref 136–145)
WBC # BLD AUTO: 7.51 THOUSAND/UL (ref 4.31–10.16)

## 2021-08-02 PROCEDURE — 85027 COMPLETE CBC AUTOMATED: CPT | Performed by: INTERNAL MEDICINE

## 2021-08-02 PROCEDURE — 80048 BASIC METABOLIC PNL TOTAL CA: CPT | Performed by: INTERNAL MEDICINE

## 2021-08-02 PROCEDURE — 99239 HOSP IP/OBS DSCHRG MGMT >30: CPT | Performed by: INTERNAL MEDICINE

## 2021-08-02 RX ORDER — AMOXICILLIN AND CLAVULANATE POTASSIUM 875; 125 MG/1; MG/1
1 TABLET, FILM COATED ORAL EVERY 12 HOURS SCHEDULED
Qty: 10 TABLET | Refills: 0 | Status: SHIPPED | OUTPATIENT
Start: 2021-08-02 | End: 2021-08-07

## 2021-08-02 RX ADMIN — METOPROLOL TARTRATE 25 MG: 25 TABLET, FILM COATED ORAL at 09:40

## 2021-08-02 RX ADMIN — Medication 1 TABLET: at 09:39

## 2021-08-02 RX ADMIN — IRON SUCROSE 200 MG: 20 INJECTION, SOLUTION INTRAVENOUS at 11:20

## 2021-08-02 RX ADMIN — CYANOCOBALAMIN TAB 500 MCG 1000 MCG: 500 TAB at 09:39

## 2021-08-02 RX ADMIN — SODIUM CHLORIDE 3 G: 9 INJECTION, SOLUTION INTRAVENOUS at 09:45

## 2021-08-02 RX ADMIN — FERROUS SULFATE TAB 325 MG (65 MG ELEMENTAL FE) 325 MG: 325 (65 FE) TAB at 09:39

## 2021-08-02 RX ADMIN — SODIUM CHLORIDE 3 G: 9 INJECTION, SOLUTION INTRAVENOUS at 04:25

## 2021-08-02 NOTE — CONSULTS
Patient's vancomycin therapy has been discontinued   Thank you for this consult; pharmacy will sign off now

## 2021-08-02 NOTE — CASE MANAGEMENT
LOS - 2 days    SW following to assist with DCP  Discharge planned for today  SW met with pt prior to discharge to review plans  Pt's plan is to return home with wife  No discharge needs expressed by pt  Per pt he follows up in wound care center for HBO treatments  IMM reviewed with patient  Patient agrees with discharge determination  Patient signed IMM but declined copy  Copy placed in scan bin for chart

## 2021-08-02 NOTE — PLAN OF CARE
Problem: MOBILITY - ADULT  Goal: Maintain or return to baseline ADL function  Description: INTERVENTIONS:  -  Assess patient's ability to carry out ADLs; assess patient's baseline for ADL function and identify physical deficits which impact ability to perform ADLs (bathing, care of mouth/teeth, toileting, grooming, dressing, etc )  - Assess/evaluate cause of self-care deficits   - Assess range of motion  - Assess patient's mobility; develop plan if impaired  - Assess patient's need for assistive devices and provide as appropriate  - Encourage maximum independence but intervene and supervise when necessary  - Involve family in performance of ADLs  - Assess for home care needs following discharge   - Consider OT consult to assist with ADL evaluation and planning for discharge  - Provide patient education as appropriate  Outcome: Adequate for Discharge  Goal: Maintains/Returns to pre admission functional level  Description: INTERVENTIONS:  - Perform BMAT or MOVE assessment daily    - Set and communicate daily mobility goal to care team and patient/family/caregiver  - Collaborate with rehabilitation services on mobility goals if consulted  - Perform Range of Motion 3 times a day  - Reposition patient every 2 hours    - Dangle patient 3 times a day  - Stand patient 3 times a day  - Ambulate patient 3 times a day  - Out of bed to chair 3 times a day   - Out of bed for meals 3 times a day  - Out of bed for toileting  - Record patient progress and toleration of activity level   Outcome: Adequate for Discharge     Problem: Potential for Falls  Goal: Patient will remain free of falls  Description: INTERVENTIONS:  - Educate patient/family on patient safety including physical limitations  - Instruct patient to call for assistance with activity   - Consult OT/PT to assist with strengthening/mobility   - Keep Call bell within reach  - Keep bed low and locked with side rails adjusted as appropriate  - Keep care items and personal belongings within reach  - Initiate and maintain comfort rounds  - Make Fall Risk Sign visible to staff  - Offer Toileting every 2 Hours, in advance of need  - Initiate/Maintain bed alarm  - Obtain necessary fall risk management equipment: N/A  - Apply yellow socks and bracelet for high fall risk patients  - Consider moving patient to room near nurses station  Outcome: Adequate for Discharge     Problem: PAIN - ADULT  Goal: Verbalizes/displays adequate comfort level or baseline comfort level  Description: Interventions:  - Encourage patient to monitor pain and request assistance  - Assess pain using appropriate pain scale  - Administer analgesics based on type and severity of pain and evaluate response  - Implement non-pharmacological measures as appropriate and evaluate response  - Consider cultural and social influences on pain and pain management  - Notify physician/advanced practitioner if interventions unsuccessful or patient reports new pain  Outcome: Adequate for Discharge     Problem: INFECTION - ADULT  Goal: Absence or prevention of progression during hospitalization  Description: INTERVENTIONS:  - Assess and monitor for signs and symptoms of infection  - Monitor lab/diagnostic results  - Monitor all insertion sites, i e  indwelling lines, tubes, and drains  - Administer medications as ordered  - Instruct and encourage patient and family to use good hand hygiene technique  - Identify and instruct in appropriate isolation precautions for identified infection/condition  Outcome: Adequate for Discharge     Problem: GENITOURINARY - ADULT  Goal: Maintains or returns to baseline urinary function  Description: INTERVENTIONS:  - Assess urinary function  - Encourage oral fluids to ensure adequate hydration if ordered  - Administer IV fluids as ordered to ensure adequate hydration  - Administer ordered medications as needed  - Offer frequent toileting  - Follow urinary retention protocol if ordered  Outcome: Adequate for Discharge  Goal: Absence of urinary retention  Description: INTERVENTIONS:  - Assess patient's ability to void and empty bladder  - Monitor I/O  - Bladder scan as needed  - Discuss with physician/AP medications to alleviate retention as needed  - Discuss catheterization for long term situations as appropriate  Outcome: Adequate for Discharge  Goal: Urinary catheter remains patent  Description: INTERVENTIONS:  - Assess patency of urinary catheter  - If patient has a chronic alanis, consider changing catheter if non-functioning  - Follow guidelines for intermittent irrigation of non-functioning urinary catheter  Outcome: Adequate for Discharge     Problem: HEMATOLOGIC - ADULT  Goal: Maintains hematologic stability  Description: INTERVENTIONS  - Assess for signs and symptoms of bleeding or hemorrhage  - Monitor labs  - Administer supportive blood products/factors as ordered and appropriate  Outcome: Adequate for Discharge

## 2021-08-02 NOTE — DISCHARGE SUMMARY
Discharge Summary - [facility] Internal Medicine    Patient Information: Amber Swain 66 y o  male MRN: [id number]  Unit/Bed#: [address] [place] [address] Encounter: [id number]    Discharging Physician / Practitioner: Aide Andujar MD  PCP: Devon Robertson MD  Admission Date: 7/29/2021  Discharge Date: 08/02/21    Reason for Admission: Urinary Retention (Patient states he has the urge to void but cannot and feels burning when he tries, patient states he was able to void a small amount of clotted blood  Patient has b/l lateral nephrostomy tubes, both actively draining without complication )      Discharge Diagnoses:     Principal Problem (Resolved):    Gross hematuria  Active Problems:    Acute cystitis with hematuria    Anemia    Chronic obstructive pulmonary disease (HCC)    Essential hypertension    Hyperlipidemia    Prostate cancer (HCC)    CKD (chronic kidney disease)        Acute cystitis with hematuria  Assessment & Plan  Noted to be febrile with T-max of 101° F on 07/30  Low-grade fever overnight  Urine culture with growth of Enterococcus and Klebsiella pneumonia  · Initially started on vancomycin and ceftriaxone, sensitivities noted  Subsequently transition to Unasyn  · Follow-up blood culture-negative  · Will change to Augmentin to complete 7 days    * Gross hematuria-resolved as of 8/2/2021  Assessment & Plan  Secondary to acute infection infectious cystitis with UTI in setting of radiation cystitis  Recurrent despite diverting bilateral nephrostomy and hyperbaric oxygen treatment, received 39/40  Seen by Urology, input appreciated  Status post CBI with improvement in hematuria, subsequently placed on Sumner catheter which was removed  Patient is voiding without difficulties after removal of Sumner    Nephrostomy were clamped temporarily  Hematuria improved  · Follow-up nephrostomy recommendation as per Urology and IR  · Continue antibiotics as below  · Complete hyperbaric treatment after discharge  · Urology follow-up    Chronic obstructive pulmonary disease (HCC)  Assessment & Plan  Stable  Continue Breo and albuterol as needed    Anemia  Assessment & Plan  Acute blood loss on chronic blood loss anemia  Iron profile is improving, B12 low normal   Folate normal  Hemoglobin is lower stabilized around 7 5 grams/deciliter today  Patient is asymptomatic, no evidence of symptomatic anemia  Discussed regarding transfusion versus iron supplementation, patient prefers iron supplementation  Will give IV Venofer x1  Continue iron and B12 supplementation  Follow-up CBC as outpatient  Essential hypertension  Assessment & Plan  Continue lopressor 25 BID    CKD (chronic kidney disease)  Assessment & Plan  Lab Results   Component Value Date    EGFR 49 08/02/2021    EGFR 44 08/01/2021    EGFR 35 07/31/2021    CREATININE 1 39 (H) 08/02/2021    CREATININE 1 51 (H) 08/01/2021    CREATININE 1 84 (H) 07/31/2021   Baseline Cr 1 3-1 5  Remains at baseline  Treatment of UTI as above  Avoid hypo tension nephrotoxins  Monitor    Prostate cancer [facility])  Assessment & Plan  Status post radiation with radiation cystitis  Status post bilateral nephrostomy tubes due to recurrent hematuria, nephrostomy tubes were clamped during hospitalization as per urology recommendation  Follow-up with Urology and IR regarding nephrostomy management      Hyperlipidemia  Assessment & Plan  Continue statin       Consultations During Hospital Stay:  [address] [place] TO PHARMACY    Procedures Performed:     · CBI  · Clamping of nephrostomy tube    Significant Findings:     · Refer to hospital course and above listed diagnosis related plan for details    Imaging while in hospital:    XR chest 1 view portable    Result Date: 7/8/2021  Narrative: CHEST INDICATION:   adm  COMPARISON: Chest radiograph from 4/24/2021 and chest CT from 8/31/2017  EXAM PERFORMED/VIEWS:  XR CHEST PORTABLE  FINDINGS: Cardiomediastinal silhouette normal  No acute disease  Benign linear bibasilar atelectasis or scar  No effusion or pneumothorax  Osseous structures normal for age  Impression: No acute cardiopulmonary disease  Workstation performed: [id number]       Incidental Findings:   · None    Test Results Pending at Discharge (will require follow up):   · As per After Visit Summary     Outpatient Tests Requested:  · CBC    Complications:  Refer to hospital course and above listed diagnosis related plan, if any    Hospital Course: As per HPI  Rocco Pappas is a 66 y o  male patient with hypertension, dyslipidemia, COPD, prostate cancer status post radiation chemotherapy with subsequent radiation cystitis and recurrent hematuria  who originally presented to the hospital on 7/29/2021 due to recurrence of gross hematuria   Patient has radiation cystitis for which he is receiving hyperbaric treatment  On the evening of admission, patient developed the urge to urinate but only passed small amounts of blood/clots  Patient has bilateral nephrostomy tubes   hgb remained stable at 8  Urinalysis revealed blood, nitrites, leukocytes  Patient is admitted at the recommendation of urology for CBI  Please see above list of diagnoses and related plan for additional information         Condition at Discharge: stable     Discharge Day Visit / Exam:     Subjective:  Feels better  Hematuria is resolving  Denies any dysuria fever chills or flank pain    Denies chest pain shortness of breath  Ambulating in the room without any difficulties  Denies any dizziness or lightheadedness      Vitals: Blood Pressure: 112/73 (08/02/21 0940)  Pulse: 64 (08/02/21 0940)  Temperature: (!) 97 2 °F (36 2 °C) (08/02/21 0746)  Temp Source: Oral (08/02/21 0746)  Respirations: 16 (08/02/21 0746)  Height: 5' 11" (180 3 cm) (07/30/21 0057)  Weight - Scale: 97 4 kg (214 lb 11 7 oz) (08/02/21 0600)  SpO2: 93 % (08/02/21 0940)  Exam:   Physical Exam  Constitutional:       General: He is not in acute distress  HENT:      Head: Normocephalic and atraumatic  Cardiovascular:      Rate and Rhythm: Normal rate  Pulmonary:      Effort: Pulmonary effort is normal  No respiratory distress  Breath sounds: No wheezing, rhonchi or rales  Chest:      Chest wall: No tenderness  Abdominal:      General: Bowel sounds are normal  There is no distension  Palpations: Abdomen is soft  Tenderness: There is no abdominal tenderness  There is no guarding or rebound  Comments: Bilateral nephrostomy in place, clamped   Musculoskeletal:      Cervical back: Neck supple  Right lower leg: No edema  Left lower leg: No edema  Skin:     General: Skin is warm and dry  Findings: No rash  Neurological:      Mental Status: He is alert  Mental status is at baseline  Cranial Nerves: No cranial nerve deficit  Discharge instructions/Information to patient and family:(Discharge Medications and Follow up):   See after visit summary for information provided to patient and family  Provisions for Follow-Up Care:  See after visit summary for information related to follow-up care and any pertinent home health orders  Disposition: Home    Planned Readmission:  No     Discharge Statement:  I spent 45 minutes discharging the patient  This time was spent on the day of discharge  I had direct contact with the patient on the day of discharge  Greater than 50% of the total time was spent examining patient, answering all patient questions, arranging and discussing plan of care with patient as well as directly providing post-discharge instructions  Additional time then spent on discharge activities  Coordinated with Dr Leona Estevez at the time of discharge  Discharge Medications:  See after visit summary for reconciled discharge medications provided to patient and family  ** Please Note: "This note has been constructed using a voice recognition system  Therefore there may be syntax, spelling, and/or grammatical errors   Please call if you have any questions  "**

## 2021-08-02 NOTE — DISCHARGE INSTRUCTIONS
Amoxicillin/Clavulanate Potassium (Augmentin, Augmentin ES-600, Augmentin XR) - (By mouth)   Why this medicine is used:   Treats infections  This medicine is a penicillin antibiotic  Contact a nurse or doctor right away if you have:  · Blistering, peeling, red skin rash  · Dark urine or pale stools, nausea, vomiting, loss of appetite, stomach pain, yellow eyes or skin  · Severe diarrhea, especially if bloody or ongoing     Common side effects:  · Diarrhea, nausea, vomiting  · Diaper rash  · Tooth discoloration (in children)  © Copyright [employer] 2021 Information is for End User's use only and may not be sold, redistributed or otherwise used for commercial purposes

## 2021-08-02 NOTE — PROGRESS NOTES
Progress Note - Urology      Patient: Martin Das   : 1945 Sex: male   MRN: [id number]     CSN: [id number]  Unit/Bed#: [address] [place] [address]     SUBJECTIVE:   No complaints      Objective   Vitals: /58 (BP Location: Right arm)   Pulse (!) 49   Temp (!) 97 2 °F (36 2 °C) (Oral)   Resp 16   Ht 5' 11" (1 803 m)   Wt 97 4 kg (214 lb 11 7 oz)   SpO2 94%   BMI 29 95 kg/m²     I/O last 24 hours:   In: 1040 [P O :1040]  Out: 800 [Urine:800]      Physical Exam:   General Alert awake   Normocephalic atraumatic PERRLA  Lungs clear bilaterally  Cardiac normal S1 normal S2  Abdomen soft, flank pain  Extremities no edema      Lab Results: CBC:   Lab Results   Component Value Date    WBC 7 51 2021    HGB 7 4 (L) 2021    HCT 25 2 (L) 2021    MCV 80 (L) 2021     2021    MCH 23 5 (L) 2021    MCHC 29 4 (L) 2021    RDW 17 2 (H) 2021    MPV 10 0 2021    NRBC 0 2021     CMP:   Lab Results   Component Value Date     2021    CO2 29 2021    BUN 27 (H) 2021    CREATININE 1 39 (H) 2021    CALCIUM 8 3 2021    AST 16 2021    ALT 20 2021    ALKPHOS 72 2021    EGFR 49 2021     Urinalysis:   Lab Results   Component Value Date    COLORU Yellow 2021    CLARITYU Turbid 2021    SPECGRAV >=1 030 2021    PHUR 5 5 2021    PHUR 5 5 2018    LEUKOCYTESUR Large (A) 2021    NITRITE Negative 2021    GLUCOSEU Negative 2021    KETONESU Negative 2021    BILIRUBINUR Negative 2021    BLOODU Large (A) 2021     Urine Culture:   Lab Results   Component Value Date    URINECX >100,000 cfu/ml Klebsiella pneumoniae (A) 2021    URINECX >100,000 cfu/ml Enterococcus faecalis (A) 2021     PSA: No results found for: PSA      Assessment/ Plan:  Acute cystitis  Hematuria clearing  Voiding trial  D/c home          David Cassidy MD

## 2021-08-04 ENCOUNTER — HOSPITAL ENCOUNTER (OUTPATIENT)
Dept: NON INVASIVE DIAGNOSTICS | Facility: HOSPITAL | Age: 76
Discharge: HOME | End: 2021-08-04
Attending: RADIOLOGY | Admitting: RADIOLOGY
Payer: MEDICARE

## 2021-08-04 DIAGNOSIS — C61 PROSTATE CANCER (HCC): Primary | ICD-10-CM

## 2021-08-04 LAB
BACTERIA BLD CULT: NORMAL
BACTERIA BLD CULT: NORMAL

## 2021-08-04 PROCEDURE — C1729 CATH, DRAINAGE: HCPCS

## 2021-08-04 PROCEDURE — 50435 EXCHANGE NEPHROSTOMY CATH: CPT | Performed by: RADIOLOGY

## 2021-08-04 PROCEDURE — 50435 EXCHANGE NEPHROSTOMY CATH: CPT

## 2021-08-04 PROCEDURE — C1769 GUIDE WIRE: HCPCS

## 2021-08-04 RX ORDER — LIDOCAINE WITH 8.4% SOD BICARB 0.9%(10ML)
SYRINGE (ML) INJECTION CODE/TRAUMA/SEDATION MEDICATION
Status: COMPLETED | OUTPATIENT
Start: 2021-08-04 | End: 2021-08-04

## 2021-08-04 RX ADMIN — IOHEXOL 20 ML: 350 INJECTION, SOLUTION INTRAVENOUS at 09:19

## 2021-08-04 RX ADMIN — Medication 8 ML: at 08:53

## 2021-08-04 NOTE — BRIEF OP NOTE (RAD/CATH)
INTERVENTIONAL RADIOLOGY PROCEDURE NOTE    Date: 8/4/2021    Procedure: IR NEPHROSTOMY TUBE CHECK/CHANGE/REPOSITION/REINSERTION/UPSIZE    Preoperative diagnosis: No diagnosis found  Postoperative diagnosis: Same  Surgeon: Ravinder Yoon MD     Assistant: None  No qualified resident was available  Blood loss:  Minimal    Specimens:  None     Findings:  Bilateral nephrostomy tube exchange    Complications: None immediate      Anesthesia: local

## 2021-08-04 NOTE — DISCHARGE INSTRUCTIONS
TUBE CARE INSTRUCTIONS    Care after your procedure:    Resume your normal diet  Small sips of flat soda will help with nausea  1  The properly functioning catheter should be forward flushed once (1x) daily with 10ml of normal saline using clean technique  You will be given a prescription for flushes  To flush the tube, clean both connections with alcohol swab  Twist off the drainage bag/ bulb  tubing and twist the saline syringe into the drainage tube and flush  Remove the syringe and twist the drainage bag / bulb tubing tubing back on     2  The drainage bag/bulb may be emptied as necessary  Keep a record of the amount of fluid you drain from your tube  This should be done with clean technique as well  3  A fresh dressing should be applied daily over the tube insertion site  4  As the tube is secured to the skin with only a suture,try not to pull on your tube  Tub baths are not permitted  Showers are permitted if the patient's skin entry site is prevented from getting wet  Similarly, washcloth "baths" are acceptable  Contact Interventional Radiology at [phone] [place] PATIENTS: Contact Interventional Radiology at [phone]) Michelle Dinero PATIENTS: Contact Interventional Radiology at [phone]) if:    1  Leakage or large amounts of liquid around the catheter  2  Fever of 101 degrees lasting several hours without other obvious cause (such as sore throat, flu, etc)  3  Persistent nausea or vomiting  4  Diminished drainage, which may be associated with pressure or pain  Or when the     drainage from your tube is less than 10mls for 48 hours  5  Catheter pulled back or falls out  The following pharmacies carry the flush syringes         [facility]                     [facility]  [address]                         [address] [facility]  Phone [phone]            Phone [phone]   [place] [address]                                [phone]  [address] [facility]                      [address] [facility]  Phone [phone]            Phone [phone]                      Emeka Frankel                                                                                                          [phone]  [facility]  [facility] [address]    [address]  Phone [phone]

## 2021-08-17 ENCOUNTER — HOSPITAL ENCOUNTER (INPATIENT)
Facility: HOSPITAL | Age: 76
LOS: 9 days | Discharge: HOME | DRG: 659 | End: 2021-08-27
Attending: EMERGENCY MEDICINE | Admitting: INTERNAL MEDICINE
Payer: MEDICARE

## 2021-08-17 DIAGNOSIS — I10 ESSENTIAL HYPERTENSION: ICD-10-CM

## 2021-08-17 DIAGNOSIS — D72.829 LEUKOCYTOSIS, UNSPECIFIED TYPE: ICD-10-CM

## 2021-08-17 DIAGNOSIS — D64.9 ANEMIA, UNSPECIFIED TYPE: ICD-10-CM

## 2021-08-17 DIAGNOSIS — A41.9 SEPSIS (HCC): ICD-10-CM

## 2021-08-17 DIAGNOSIS — A41.9 SEVERE SEPSIS (HCC): ICD-10-CM

## 2021-08-17 DIAGNOSIS — R33.9 URINARY RETENTION: ICD-10-CM

## 2021-08-17 DIAGNOSIS — D62 ACUTE BLOOD LOSS ANEMIA (ABLA): ICD-10-CM

## 2021-08-17 DIAGNOSIS — C61 PROSTATE CANCER (HCC): ICD-10-CM

## 2021-08-17 DIAGNOSIS — N17.9 AKI (ACUTE KIDNEY INJURY) (HCC): ICD-10-CM

## 2021-08-17 DIAGNOSIS — R31.0 GROSS HEMATURIA: ICD-10-CM

## 2021-08-17 DIAGNOSIS — R31.9 HEMATURIA: ICD-10-CM

## 2021-08-17 DIAGNOSIS — N30.01 ACUTE CYSTITIS WITH HEMATURIA: Primary | ICD-10-CM

## 2021-08-17 DIAGNOSIS — R65.20 SEVERE SEPSIS (HCC): ICD-10-CM

## 2021-08-17 DIAGNOSIS — N17.9 ACUTE KIDNEY INJURY SUPERIMPOSED ON CKD (HCC): ICD-10-CM

## 2021-08-17 DIAGNOSIS — N18.9 ACUTE KIDNEY INJURY SUPERIMPOSED ON CKD (HCC): ICD-10-CM

## 2021-08-17 DIAGNOSIS — J44.1 CHRONIC OBSTRUCTIVE PULMONARY DISEASE WITH ACUTE EXACERBATION (HCC): ICD-10-CM

## 2021-08-17 DIAGNOSIS — E78.5 DYSLIPIDEMIA: ICD-10-CM

## 2021-08-17 DIAGNOSIS — N30.91 HEMATURIA DUE TO CYSTITIS: ICD-10-CM

## 2021-08-17 LAB
ALBUMIN SERPL BCP-MCNC: 3.5 G/DL (ref 3.5–5)
ALP SERPL-CCNC: 96 U/L (ref 46–116)
ALT SERPL W P-5'-P-CCNC: 31 U/L (ref 12–78)
ANION GAP SERPL CALCULATED.3IONS-SCNC: 9 MMOL/L (ref 4–13)
AST SERPL W P-5'-P-CCNC: 20 U/L (ref 5–45)
BASOPHILS # BLD AUTO: 0.04 THOUSANDS/ΜL (ref 0–0.1)
BASOPHILS NFR BLD AUTO: 0 % (ref 0–1)
BILIRUB SERPL-MCNC: 0.34 MG/DL (ref 0.2–1)
BUN SERPL-MCNC: 38 MG/DL (ref 5–25)
CALCIUM SERPL-MCNC: 9 MG/DL (ref 8.3–10.1)
CHLORIDE SERPL-SCNC: 104 MMOL/L (ref 100–108)
CO2 SERPL-SCNC: 28 MMOL/L (ref 21–32)
CREAT SERPL-MCNC: 1.57 MG/DL (ref 0.6–1.3)
EOSINOPHIL # BLD AUTO: 0.33 THOUSAND/ΜL (ref 0–0.61)
EOSINOPHIL NFR BLD AUTO: 3 % (ref 0–6)
ERYTHROCYTE [DISTWIDTH] IN BLOOD BY AUTOMATED COUNT: 19 % (ref 11.6–15.1)
GFR SERPL CREATININE-BSD FRML MDRD: 42 ML/MIN/1.73SQ M
GLUCOSE SERPL-MCNC: 137 MG/DL (ref 65–140)
HCT VFR BLD AUTO: 31.9 % (ref 36.5–49.3)
HGB BLD-MCNC: 9.2 G/DL (ref 12–17)
IMM GRANULOCYTES # BLD AUTO: 0.03 THOUSAND/UL (ref 0–0.2)
IMM GRANULOCYTES NFR BLD AUTO: 0 % (ref 0–2)
LYMPHOCYTES # BLD AUTO: 1 THOUSANDS/ΜL (ref 0.6–4.47)
LYMPHOCYTES NFR BLD AUTO: 10 % (ref 14–44)
MCH RBC QN AUTO: 23.3 PG (ref 26.8–34.3)
MCHC RBC AUTO-ENTMCNC: 28.8 G/DL (ref 31.4–37.4)
MCV RBC AUTO: 81 FL (ref 82–98)
MONOCYTES # BLD AUTO: 0.5 THOUSAND/ΜL (ref 0.17–1.22)
MONOCYTES NFR BLD AUTO: 5 % (ref 4–12)
NEUTROPHILS # BLD AUTO: 7.71 THOUSANDS/ΜL (ref 1.85–7.62)
NEUTS SEG NFR BLD AUTO: 82 % (ref 43–75)
NRBC BLD AUTO-RTO: 0 /100 WBCS
PLATELET # BLD AUTO: 311 THOUSANDS/UL (ref 149–390)
PMV BLD AUTO: 9.6 FL (ref 8.9–12.7)
POTASSIUM SERPL-SCNC: 4.6 MMOL/L (ref 3.5–5.3)
PROT SERPL-MCNC: 7.9 G/DL (ref 6.4–8.2)
RBC # BLD AUTO: 3.95 MILLION/UL (ref 3.88–5.62)
SODIUM SERPL-SCNC: 141 MMOL/L (ref 136–145)
WBC # BLD AUTO: 9.61 THOUSAND/UL (ref 4.31–10.16)

## 2021-08-17 PROCEDURE — 96365 THER/PROPH/DIAG IV INF INIT: CPT

## 2021-08-17 PROCEDURE — 99284 EMERGENCY DEPT VISIT MOD MDM: CPT

## 2021-08-17 PROCEDURE — 99284 EMERGENCY DEPT VISIT MOD MDM: CPT | Performed by: EMERGENCY MEDICINE

## 2021-08-17 PROCEDURE — 80053 COMPREHEN METABOLIC PANEL: CPT | Performed by: EMERGENCY MEDICINE

## 2021-08-17 PROCEDURE — 99220 PR INITIAL OBSERVATION CARE/DAY 70 MINUTES: CPT | Performed by: INTERNAL MEDICINE

## 2021-08-17 PROCEDURE — 36415 COLL VENOUS BLD VENIPUNCTURE: CPT | Performed by: EMERGENCY MEDICINE

## 2021-08-17 PROCEDURE — 85025 COMPLETE CBC W/AUTO DIFF WBC: CPT | Performed by: EMERGENCY MEDICINE

## 2021-08-17 RX ORDER — FLUTICASONE FUROATE AND VILANTEROL 200; 25 UG/1; UG/1
1 POWDER RESPIRATORY (INHALATION)
Status: DISCONTINUED | OUTPATIENT
Start: 2021-08-17 | End: 2021-08-27 | Stop reason: HOSPADM

## 2021-08-17 RX ORDER — FERROUS SULFATE 325(65) MG
325 TABLET ORAL
Status: DISCONTINUED | OUTPATIENT
Start: 2021-08-18 | End: 2021-08-27 | Stop reason: HOSPADM

## 2021-08-17 RX ORDER — ACETAMINOPHEN 325 MG/1
650 TABLET ORAL EVERY 4 HOURS PRN
Status: DISCONTINUED | OUTPATIENT
Start: 2021-08-17 | End: 2021-08-27 | Stop reason: HOSPADM

## 2021-08-17 RX ORDER — CEFTRIAXONE 1 G/50ML
1000 INJECTION, SOLUTION INTRAVENOUS ONCE
Status: COMPLETED | OUTPATIENT
Start: 2021-08-17 | End: 2021-08-17

## 2021-08-17 RX ORDER — FLUTICASONE PROPIONATE 50 MCG
1 SPRAY, SUSPENSION (ML) NASAL DAILY
Status: DISCONTINUED | OUTPATIENT
Start: 2021-08-18 | End: 2021-08-27 | Stop reason: HOSPADM

## 2021-08-17 RX ORDER — ATROPA BELLADONNA AND OPIUM 16.2; 3 MG/1; MG/1
30 SUPPOSITORY RECTAL EVERY 8 HOURS PRN
Status: DISCONTINUED | OUTPATIENT
Start: 2021-08-17 | End: 2021-08-27 | Stop reason: HOSPADM

## 2021-08-17 RX ORDER — LORATADINE 10 MG/1
10 TABLET ORAL DAILY
Status: DISCONTINUED | OUTPATIENT
Start: 2021-08-18 | End: 2021-08-27 | Stop reason: HOSPADM

## 2021-08-17 RX ORDER — ATORVASTATIN CALCIUM 20 MG/1
20 TABLET, FILM COATED ORAL
Status: DISCONTINUED | OUTPATIENT
Start: 2021-08-17 | End: 2021-08-27 | Stop reason: HOSPADM

## 2021-08-17 RX ADMIN — FLUTICASONE FUROATE AND VILANTEROL TRIFENATATE 1 PUFF: 200; 25 POWDER RESPIRATORY (INHALATION) at 21:39

## 2021-08-17 RX ADMIN — ATORVASTATIN CALCIUM 20 MG: 20 TABLET, FILM COATED ORAL at 21:39

## 2021-08-17 RX ADMIN — ACETAMINOPHEN 650 MG: 325 TABLET, FILM COATED ORAL at 21:39

## 2021-08-17 RX ADMIN — ATROPA BELLADONNA AND OPIUM 1 SUPPOSITORY: 16.2; 3 SUPPOSITORY RECTAL at 20:28

## 2021-08-17 RX ADMIN — METOPROLOL TARTRATE 25 MG: 25 TABLET, FILM COATED ORAL at 20:27

## 2021-08-17 RX ADMIN — CEFTRIAXONE 1000 MG: 1 INJECTION, SOLUTION INTRAVENOUS at 13:19

## 2021-08-17 RX ADMIN — SODIUM CHLORIDE 1000 ML: 0.9 INJECTION, SOLUTION INTRAVENOUS at 13:11

## 2021-08-17 NOTE — ASSESSMENT & PLAN NOTE
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

## 2021-08-17 NOTE — ED NOTES
2700 ml of bright red urine output from [facility], [facility]  08/17/21 1462

## 2021-08-17 NOTE — ASSESSMENT & PLAN NOTE
Secondary to possibly hematuria and mild blood clots  Patient is status post Sumner catheter and CBI  Monitor BMP  Continue CBI and follow up Urology recommendations once CBI clear can be consider giving a voiding trial once cleared by Urology

## 2021-08-17 NOTE — ED NOTES
New bag of normal saline irrigation solution hung at this time  Flowing well; will continue to monitor        Nakul Rojas [facility]  08/17/21 5168

## 2021-08-17 NOTE — ED NOTES
2225 ml of bright red urine/NS solution drained from collection bag     Porsha Weaver RN  08/17/21 4189

## 2021-08-17 NOTE — CONSULTS
H&P Exam - Urology       Patient: Dee Dee Cohen   : 1945 Sex: male   MRN: [id number]     CSN: [id number]      History of Present Illness   HPI:  Dee Dee Cohen is a 66 y o  male well known to me with history of radiation cystitis finishing 39/40 oxygen treatments discharged from [address] [place] [address] last week on antibiotics for appeared to be hemorrhagic cystitis doing well with bilateral nephrostomy tubes clamped and supposed to present to my office this morning strain to have a bowel movement this morning developing gross hematuria urinary tension presenting to [address] [place] [address] ER having 3 way Sumner catheter inserted hand irrigated with multiple clots urologic consult called for  When seen on the floor patient was on continuous bladder irrigation with gross hematuria suprapubic discomfort        Review of Systems:   Constitutional:  Negative for activity change, fever, chills and diaphoresis  HENT: Negative for hearing loss and trouble swallowing  Eyes: Negative for itching and visual disturbance  Respiratory: Negative for chest tightness and shortness of breath  Cardiovascular: Negative for chest pain, edema  Gastrointestinal: Negative for abdominal distention, na abdominal pain, constipation, diarrhea, Nausea and vomiting  Genitourinary: Negative for decreased urine volume, difficulty urinating, dysuria, enuresis, frequency, hematuria and urgency  Musculoskeletal: Negative for gait problem and myalgias  Neurological: Negative for dizziness and headaches  Hematological: Does not bruise/bleed easily         Historical Information   Past Medical History:   Diagnosis Date    Cancer [facility])     prostate    COPD (chronic obstructive pulmonary disease) ([facility] [address] )     Hyperlipidemia     Hypertension      Past Surgical History:   Procedure Laterality Date    APPENDECTOMY      FL RETROGRADE PYELOGRAM  2021    HERNIA REPAIR      IR NEPHROSTOMY TUBE CHECK AND/OR REMOVAL  2021    IR NEPHROSTOMY TUBE CHECK/CHANGE/REPOSITION/REINSERTION/UPSIZE  5/10/2021    IR NEPHROSTOMY TUBE CHECK/CHANGE/REPOSITION/REINSERTION/UPSIZE  8/4/2021    IR NEPHROSTOMY TUBE PLACEMENT  5/7/2021    FL CYSTOURETHROSCOPY W/IRRIG & EVAC CLOTS Bilateral 4/14/2021    Procedure: CYSTOSCOPY EVACUATION OF CLOTS bilateral retrograde pyelograms possible TURBT bladder biopsy;  Surgeon: Edyta Shelton MD;  Location: [address] [place] [address];  Service: Urology    FL CYSTOURETHROSCOPY W/IRRIG & EVAC CLOTS N/A 4/24/2021    Procedure: CYSTOSCOPY EVACUATION OF CLOTS fulguration;  Surgeon: Edyta Shelton MD;  Location: [address] [place] [address];  Service: Urology    FL CYSTOURETHROSCOPY W/IRRIG & EVAC CLOTS N/A 7/8/2021    Procedure: CYSTOSCOPY EVACUATION OF CLOTS BILATERAL ANTIROGRADES NEPHROSTOMY Jay Darter ;  Surgeon: Edyta Shelton MD;  Location: [address] [place] [address];  Service: Urology    [address]      right shoulder     Social History   Social History     Substance and Sexual Activity   Alcohol Use Never     Social History     Substance and Sexual Activity   Drug Use No     Social History     Tobacco Use   Smoking Status Former Smoker    Packs/day: 0 50    Years: 50 00    Pack years: 25 00    Quit date: 8/29/2017    Years since quitting: 3 9   Smokeless Tobacco Never Used   Tobacco Comment    quit one month ago     Family History:   Family History   Problem Relation Age of Onset    Diabetes Mother     Stroke Mother     Diabetes Brother     Stroke Brother        Meds/Allergies   Medications Prior to Admission   Medication    albuterol (PROVENTIL HFA,VENTOLIN HFA) 90 mcg/act inhaler    atorvastatin (LIPITOR) 20 mg tablet    cyanocobalamin (VITAMIN B-12) 1000 MCG tablet    ferrous sulfate 324 (65 Fe) mg    fluticasone (FLONASE) 50 mcg/act nasal spray    fluticasone-vilanterol (Breo Ellipta) 200-25 MCG/INH inhaler    levocetirizine (XYZAL) 5 MG tablet    metoprolol tartrate (LOPRESSOR) 25 mg tablet    multivitamin-iron-minerals-folic acid (CENTRUM) chewable tablet     No Known Allergies    Objective   Vitals: /72 (BP Location: Left arm)   Pulse 63   Temp 98 9 °F (37 2 °C) (Oral)   Resp 19   Wt 96 2 kg (212 lb)   SpO2 99%   BMI 29 57 kg/m²     Physical Exam:  General Alert awake   Normocephalic atraumatic PERRLA  Lungs clear bilaterally  Cardiac normal S1 normal S2  Abdomen soft, flank pain  Extremities no edema    I/O last 24 hours: In: 48 [IV Piggyback:50]  Out: 2400 [Urine:2400]    Invasive Devices     Peripheral Intravenous Line            Peripheral IV 08/17/21 Left Wrist <1 day          Drain            Nephrostomy Left 1 8 Fr  13 days    Nephrostomy Right 1 8 Fr   13 days    Continuous Bladder Irrigation Three-way <1 day                    Lab Results: CBC:   Lab Results   Component Value Date    WBC 9 61 08/17/2021    HGB 9 2 (L) 08/17/2021    HCT 31 9 (L) 08/17/2021    MCV 81 (L) 08/17/2021     08/17/2021    MCH 23 3 (L) 08/17/2021    MCHC 28 8 (L) 08/17/2021    RDW 19 0 (H) 08/17/2021    MPV 9 6 08/17/2021    NRBC 0 08/17/2021     CMP:   Lab Results   Component Value Date     08/17/2021    CO2 28 08/17/2021    BUN 38 (H) 08/17/2021    CREATININE 1 57 (H) 08/17/2021    CALCIUM 9 0 08/17/2021    AST 20 08/17/2021    ALT 31 08/17/2021    ALKPHOS 96 08/17/2021    EGFR 42 08/17/2021     Urinalysis:   Lab Results   Component Value Date    COLORU Yellow 07/31/2021    CLARITYU Turbid 07/31/2021    SPECGRAV >=1 030 07/31/2021    PHUR 5 5 07/31/2021    PHUR 5 5 03/25/2018    LEUKOCYTESUR Large (A) 07/31/2021    NITRITE Negative 07/31/2021    GLUCOSEU Negative 07/31/2021    KETONESU Negative 07/31/2021    BILIRUBINUR Negative 07/31/2021    BLOODU Large (A) 07/31/2021     Urine Culture:   Lab Results   Component Value Date    URINECX >100,000 cfu/ml Klebsiella pneumoniae (A) 07/30/2021    URINECX >100,000 cfu/ml Enterococcus faecalis (A) 07/30/2021     PSA: No results found for: PSA  GONSALVES CATHETER NOT DRAINING WELL HAND IRRIGATED WITH 2 L OF NORMAL SALINE REMOVING 10 OF 20 CC FROM GONSALVES BALLOON MULTIPLE CLOTS REMOVED CBI NOW DRAINING PINK TO CLEAR URINE      Assessment/ Plan:  URINARY RETENTION  Radiation cystitis  Gross hematuria  Continue CBI overnight  If urine clear tomorrow can DC CBI  Will put on OR schedule for Thursday August 18th for cysto fulguration if continues to bleed tomorrow      Yen Cao MD

## 2021-08-17 NOTE — H&P
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  And possible trigger for by an episode of coughing spell  Patient was recently treated for UTI  Less likely etiology seems to be UTI  Patient did not have any fevers, or chills, no leukocytosis  Continue patient on CBI, follow up Urology recommendations  Anemia  Assessment & Plan  Secondary to iron deficiency anemia  Patient on iron supplements  Hemoglobin is stable  CKD (chronic kidney disease)  Assessment & Plan  Lab Results   Component Value Date    EGFR 42 08/17/2021    EGFR 49 08/02/2021    EGFR 44 08/01/2021    CREATININE 1 57 (H) 08/17/2021    CREATININE 1 39 (H) 08/02/2021    CREATININE 1 51 (H) 08/01/2021     Patient is around his baseline creatinine of around 1 5  Prostate cancer [facility])  Assessment & Plan  Status post radiation, and stable  Follow-up with urology    RA (rheumatoid arthritis) [facility])  Assessment & Plan  Continue supportive care    Chronic obstructive pulmonary disease (HCC)  Assessment & Plan  Continue on Breo and fluticasone  Continue albuterol inhalers as needed        History and Physical - [facility] Internal Medicine    Patient Information: Charlene Alcazar 66 y o  male MRN: [id number]  Unit/Bed#: Nichole [facility]-02 Encounter: [id number]  Admitting Physician: Victor M Castro MD  PCP: Sharmila Mccauley MD  Date of Admission:  08/17/21        Date of Service: 08/17/21    Hospital Problem List:     Principal Problem:    Urinary retention  Active Problems:    Anemia    Hematuria    CKD (chronic kidney disease)    Prostate cancer (HCC)    RA (rheumatoid arthritis) (HCC)    Chronic obstructive pulmonary disease (HCC)      VTE Prophylaxis:  sequential compression device   Code Status: Prior    Anticipated Length of Stay:  Patient will be admitted on an Observation basis with an anticipated length of stay of  < 2 midnights  Justification for Hospital Stay: Hematuria, Urinary Obstruction    Total Time for Visit, including Counseling / Coordination of Care: 1 hour  Greater than 50% of this total time spent on direct patient counseling and coordination of care  Chief Complaint:     Urinary Retention (States severe pelvic pain from full bladder, has not voided for several hours  States having blood in urine  Hx of same  )    History of Present Illness:    Matthew Kebede is a 66 y o  male with PMHx significant for prostate cancer status post radiation and radiation cystitis with recurrent episodes of hematuria due to the same, iron deficiency anemia from blood loss, CKD stage 3, COPD, hypertension, hyperlipidemia who presents with chief complaints of an episode of hematuria and pelvic pain and urinary retention  Patient mentioned that he was recently discharged after having a similar episode of hematuria and pelvic pain followed by which he was doing well until yesterday evening when he went to urinate he had an episode of coughing spell followed by which he had some pain in his pelvis and urinary retention after that  Patient then came to the ED for further evaluation the next day with pelvic pain and urinary retention  Bilateral nephrostomy tubes have been clamped for the patient  When he was urinating well through his penis until this episode yesterday  Patient denies any fevers or chills, no back pain  No nausea or vomiting      Patient on arrival to the ED vitals were as follows:  Temperature 97 7°, pulse 70, respiratory 20, blood pressure 141/105, saturating 98% on room air  Laboratory data did not show any leukocytosis, creatinine was around his baseline of around 1 3-1 5  Hemoglobin is stable at 9 2  Patient then was placed on Sumner catheter and CBI eyes orders initiated  Patient is being admitted for hematuria  Review of Systems:    Review of Systems: A thorough 10 point review of System was done which was negative for other than that mentioned in HPI       Past Medical and Surgical History:     Past Medical History:   Diagnosis Date    Cancer [facility])     prostate    COPD (chronic obstructive pulmonary disease) ([facility] [address] )     Hyperlipidemia     Hypertension        Past Surgical History:   Procedure Laterality Date    APPENDECTOMY      FL RETROGRADE PYELOGRAM  4/14/2021    HERNIA REPAIR      IR NEPHROSTOMY TUBE CHECK AND/OR REMOVAL  7/8/2021    IR NEPHROSTOMY TUBE CHECK/CHANGE/REPOSITION/REINSERTION/UPSIZE  5/10/2021    IR NEPHROSTOMY TUBE CHECK/CHANGE/REPOSITION/REINSERTION/UPSIZE  8/4/2021    IR NEPHROSTOMY TUBE PLACEMENT  5/7/2021    [place] CYSTOURETHROSCOPY W/IRRIG & EVAC CLOTS Bilateral 4/14/2021    Procedure: CYSTOSCOPY EVACUATION OF CLOTS bilateral retrograde pyelograms possible TURBT bladder biopsy;  Surgeon: Ramírez Rivera MD;  Location: [address] [place] [address];  Service: Urology    MN CYSTOURETHROSCOPY W/IRRIG & EVAC CLOTS N/A 4/24/2021    Procedure: CYSTOSCOPY EVACUATION OF CLOTS fulguration;  Surgeon: Ramírez Rivera MD;  Location: [address] [place] [address];  Service: Urology    MN CYSTOURETHROSCOPY W/IRRIG & EVAC CLOTS N/A 7/8/2021    Procedure: CYSTOSCOPY EVACUATION OF CLOTS BILATERAL ANTIROGRADES NEPHROSTOMY Julaine Oiler ;  Surgeon: Ramírez Rivera MD;  Location: [address] [place] [address];  Service: Urology    PROSTATECTOMY      [address]      right shoulder       Meds/Allergies:    PTA meds:   Prior to Admission Medications   Prescriptions Last Dose Informant Patient Reported? Taking? albuterol (PROVENTIL HFA,VENTOLIN HFA) 90 mcg/act inhaler   No No   Sig: Inhale 2 puffs every 4 (four) hours as needed for wheezing   Patient not taking: Reported on 2021   atorvastatin (LIPITOR) 20 mg tablet   Yes No   Sig: Take 20 mg by mouth daily at bedtime   cyanocobalamin (VITAMIN B-12) 1000 MCG tablet   No No   Sig: Take 0 5 tablets (500 mcg total) by mouth daily   ferrous sulfate 324 (65 Fe) mg   No No   Sig: Take 1 tablet (324 mg total) by mouth daily before breakfast   fluticasone (FLONASE) 50 mcg/act nasal spray   Yes No   Si spray into each nostril daily   fluticasone-vilanterol (Breo Ellipta) 200-25 MCG/INH inhaler  Self Yes No   Sig: Inhale 1 puff daily at bedtime Rinse mouth after use     levocetirizine (XYZAL) 5 MG tablet  Self Yes No   Sig: Take 5 mg by mouth every evening   metoprolol tartrate (LOPRESSOR) 25 mg tablet   Yes No   Sig: Take 25 mg by mouth every 12 (twelve) hours    multivitamin-iron-minerals-folic acid (CENTRUM) chewable tablet   Yes No   Sig: Chew 1 tablet daily      Facility-Administered Medications: None       Allergies: No Known Allergies  History:     Marital Status: /Civil Union     Substance Use History:   Social History     Substance and Sexual Activity   Alcohol Use Never     Social History     Tobacco Use   Smoking Status Former Smoker    Packs/day: 0 50    Years: 50 00    Pack years: 25 00    Quit date: 2017    Years since quitting: 3 9   Smokeless Tobacco Never Used   Tobacco Comment    quit one month ago     Social History     Substance and Sexual Activity   Drug Use No       Family History:    Family History   Problem Relation Age of Onset    Diabetes Mother     Stroke Mother     Diabetes Brother     Stroke Brother        Physical Exam:     Vitals:   Blood Pressure: 123/72 (21 1500)  Pulse: 63 (21 1500)  Temperature: 98 9 °F (37 2 °C) (21 1500)  Temp Source: Oral (21 1500)  Respirations: 19 (08/17/21 1500)  Weight - Scale: 96 2 kg (212 lb) (08/17/21 1220)  SpO2: 99 % (08/17/21 1500)    Physical Exam  General Exam: Alert and Oriented x 3, NAD  Eyes: TR  Neck: Supple  CVS: S1, S2 [place], RRR    R/S: Clear to auscultate anteriorly  Abd: Soft, NT, ND, BS+ve  Extremities: No edema noted  Skin: No acute Rash noted  CNS: No acute FND  Moves all 4 extremities  Psych: Co-operative, Not agitated   exam:  Has a Sumner catheter with 3 way-and CBI  Urinary bag draining light red colored urine  Lab Results: I have personally reviewed pertinent reports  Results from last 7 days   Lab Units 08/17/21  1309   WBC Thousand/uL 9 61   HEMOGLOBIN g/dL 9 2*   HEMATOCRIT % 31 9*   PLATELETS Thousands/uL 311   NEUTROS PCT % 82*   LYMPHS PCT % 10*   MONOS PCT % 5   EOS PCT % 3     Results from last 7 days   Lab Units 08/17/21  1309   POTASSIUM mmol/L 4 6   CHLORIDE mmol/L 104   CO2 mmol/L 28   BUN mg/dL 38*   CREATININE mg/dL 1 57*   CALCIUM mg/dL 9 0   ALK PHOS U/L 96   ALT U/L 31   AST U/L 20         Imaging: I have personally reviewed pertinent reports  IR nephrostomy tube check/change/reposition/reinsertion/upsize    Result Date: 8/4/2021  Narrative: IR NEPHROSTOMY TUBE CHECK AND CHANGE Clinical History: N30 91: Cystitis, unspecified with hematuria Indwelling long-standing bilateral PCNs Contrast: 20 mL of iohexol (OMNIPAQUE) Sedation: None Images: Multiple Fluoroscopy time: 1 6 min Radiation dose: 19 mGy Technique: The patient was brought to the interventional radiology suite and placed prone on the table  The existing bilateral percutaneous nephrostomy tube was prepped and draped in the usual sterile fashion  All elements of maximal sterile barrier technique were followed (cap, mask, sterile gown, sterile gloves, large sterile sheet, hand hygiene, and 2% chlorhexidine for cutaneous antisepsis)     After a  view was obtained, contrast was injected into the existing catheter and multiple images of the urinary tract were obtained to the bladder  Based on the findings, the catheter was removed over a wire, and a new  8 Czech 25 cm multipurpose catheter was advanced, and the loop formed within the renal pelvis  The catheter was then injected, confirming satisfactory position  Findings: The pre-existing left percutaneous nephrostomy tube was in good position  After tube exchange, the new left percutaneous nephrostomy tube was in appropriate position with the pigtail in the renal pelvis  The pre-existing right percutaneous nephrostomy tube was retracted with the pigtail in a superior calyx  After tube exchange, the new right percutaneous nephrostomy tube was in appropriate position with the pigtail in the renal pelvis  Impression: Impression: Successful bilateral percutaneous nephrostomy tube exchange  Workstation performed: [id number]       No orders to display       EKG, Pathology, and Other Studies Reviewed on Admission:   · NSR    Allscripts/EPIC Records Reviewed: Yes     ** Please Note: "This note has been constructed using a voice recognition system  Therefore there may be syntax, spelling, and/or grammatical errors   Please call if you have any questions  "**

## 2021-08-17 NOTE — ASSESSMENT & PLAN NOTE
Lab Results   Component Value Date    EGFR 42 08/17/2021    EGFR 49 08/02/2021    EGFR 44 08/01/2021    CREATININE 1 57 (H) 08/17/2021    CREATININE 1 39 (H) 08/02/2021    CREATININE 1 51 (H) 08/01/2021     Patient is around his baseline creatinine of around 1 5

## 2021-08-17 NOTE — ED PROVIDER NOTES
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 Taking? albuterol (PROVENTIL HFA,VENTOLIN HFA) 90 mcg/act inhaler   No No   Sig: Inhale 2 puffs every 4 (four) hours as needed for wheezing   Patient not taking: Reported on 2021   atorvastatin (LIPITOR) 20 mg tablet   Yes No   Sig: Take 20 mg by mouth daily at bedtime   cyanocobalamin (VITAMIN B-12) 1000 MCG tablet   No No   Sig: Take 0 5 tablets (500 mcg total) by mouth daily   ferrous sulfate 324 (65 Fe) mg   No No   Sig: Take 1 tablet (324 mg total) by mouth daily before breakfast   fluticasone (FLONASE) 50 mcg/act nasal spray   Yes No   Si spray into each nostril daily   fluticasone-vilanterol (Breo Ellipta) 200-25 MCG/INH inhaler  Self Yes No   Sig: Inhale 1 puff daily at bedtime Rinse mouth after use     levocetirizine (XYZAL) 5 MG tablet  Self Yes No   Sig: Take 5 mg by mouth every evening   metoprolol tartrate (LOPRESSOR) 25 mg tablet   Yes No   Sig: Take 25 mg by mouth every 12 (twelve) hours    multivitamin-iron-minerals-folic acid (CENTRUM) chewable tablet   Yes No   Sig: Chew 1 tablet daily      Facility-Administered Medications: None       Past Medical History:   Diagnosis Date    Cancer [facility])     prostate    COPD (chronic obstructive pulmonary disease) ([facility] [address] )     Hyperlipidemia     Hypertension        Past Surgical History:   Procedure Laterality Date    APPENDECTOMY      FL RETROGRADE PYELOGRAM  4/14/2021    HERNIA REPAIR      IR NEPHROSTOMY TUBE CHECK AND/OR REMOVAL  7/8/2021    IR NEPHROSTOMY TUBE CHECK/CHANGE/REPOSITION/REINSERTION/UPSIZE  5/10/2021    IR NEPHROSTOMY TUBE CHECK/CHANGE/REPOSITION/REINSERTION/UPSIZE  8/4/2021    IR NEPHROSTOMY TUBE PLACEMENT  5/7/2021    PA CYSTOURETHROSCOPY W/IRRIG & EVAC CLOTS Bilateral 4/14/2021    Procedure: CYSTOSCOPY EVACUATION OF CLOTS bilateral retrograde pyelograms possible TURBT bladder biopsy;  Surgeon: Jean Loco MD;  Location: [address] [place] [address];  Service: Urology    PA CYSTOURETHROSCOPY W/IRRIG & EVAC CLOTS N/A 4/24/2021    Procedure: CYSTOSCOPY EVACUATION OF CLOTS fulguration;  Surgeon: Jean Loco MD;  Location: [address] [place] [address];  Service: Urology    PA CYSTOURETHROSCOPY W/IRRIG & EVAC CLOTS N/A 7/8/2021    Procedure: CYSTOSCOPY EVACUATION OF CLOTS BILATERAL ANTIROGRADES NEPHROSTOMY Jose Curl ;  Surgeon: Jean Loco MD;  Location: [facility];  Service: Urology    PROSTATECTOMY      ROTATOR CUFF REPAIR      right shoulder       Family History   Problem Relation Age of Onset    Diabetes Mother     Stroke Mother     Diabetes Brother     Stroke Brother      I have reviewed and agree with the history as documented      E-Cigarette/Vaping    E-Cigarette Use Former User      E-Cigarette/Vaping Substances    Nicotine No     THC No     CBD No     Flavoring No     Other No     Unknown No      Social History     Tobacco Use    Smoking status: Former Smoker     Packs/day: 0 50     Years: 50 00     Pack years: 25 00     Quit date: 8/29/2017     Years since quitting: 3 9    Smokeless tobacco: Never Used    Tobacco comment: quit one month ago   Vaping Use    Vaping Use: Former   Substance Use Topics    Alcohol use: Never    Drug use: No       Review of Systems   Constitutional: Negative for chills and fever  HENT: Negative for ear pain and sore throat  Eyes: Negative for pain and visual disturbance  Respiratory: Negative for cough and shortness of breath  Cardiovascular: Negative for chest pain and palpitations  Gastrointestinal: Negative for abdominal pain and vomiting  Genitourinary: Positive for difficulty urinating, dysuria and hematuria  Musculoskeletal: Negative for arthralgias and back pain  Skin: Negative for color change and rash  Neurological: Negative for seizures and syncope  All other systems reviewed and are negative  Physical Exam  Physical Exam  Vitals and nursing note reviewed  Constitutional:       General: He is not in acute distress  Appearance: Normal appearance  HENT:      Head: Atraumatic  Right Ear: External ear normal       Left Ear: External ear normal       Nose: Nose normal       Mouth/Throat:      Mouth: Mucous membranes are moist       Pharynx: Oropharynx is clear  Eyes:      General: No scleral icterus  Conjunctiva/sclera: Conjunctivae normal    Cardiovascular:      Rate and Rhythm: Normal rate and regular rhythm  Pulses: Normal pulses  Pulmonary:      Effort: Pulmonary effort is normal  No respiratory distress  Breath sounds: Normal breath sounds  Abdominal:      General: Abdomen is flat  Bowel sounds are normal  There is no distension  Palpations: Abdomen is soft  Tenderness: There is abdominal tenderness  There is no right CVA tenderness, left CVA tenderness, guarding or rebound  Comments: Suprapubic fullness and tenderness  Patient has bilateral nephrostomy tubes in place clamped not connected to any bag   Musculoskeletal:         General: No deformity  Normal range of motion  Skin:     Capillary Refill: Capillary refill takes less than 2 seconds  Findings: No rash  Neurological:      General: No focal deficit present        Mental Status: He is alert and oriented to person, place, and time           Vital Signs  ED Triage Vitals [08/17/21 1220]   Temperature Pulse Respirations Blood Pressure SpO2   97 7 °F (36 5 °C) 70 20 (!) 141/105 98 %      Temp Source Heart Rate Source Patient Position - Orthostatic VS BP Location FiO2 (%)   Tympanic Monitor Sitting Left arm --      Pain Score       Worst Possible Pain           Vitals:    08/17/21 1220   BP: (!) 141/105   Pulse: 70   Patient Position - Orthostatic VS: Sitting         Visual Acuity      ED Medications  Medications   sodium chloride 0 9 % bolus 1,000 mL (1,000 mL Intravenous New Bag 8/17/21 1311)   cefTRIAXone (ROCEPHIN) IVPB (premix in dextrose) 1,000 mg 50 mL (0 mg Intravenous Stopped 8/17/21 1350)       Diagnostic Studies  Results Reviewed     Procedure Component Value Units Date/Time    Comprehensive metabolic panel [[id number]]  (Abnormal) Collected: 08/17/21 1309    Lab Status: Final result Specimen: Blood from Arm, Left Updated: 08/17/21 1334     Sodium 141 mmol/L      Potassium 4 6 mmol/L      Chloride 104 mmol/L      CO2 28 mmol/L      ANION GAP 9 mmol/L      BUN 38 mg/dL      Creatinine 1 57 mg/dL      Glucose 137 mg/dL      Calcium 9 0 mg/dL      AST 20 U/L      ALT 31 U/L      Alkaline Phosphatase 96 U/L      Total Protein 7 9 g/dL      Albumin 3 5 g/dL      Total Bilirubin 0 34 mg/dL      eGFR 42 ml/min/1 73sq m     Narrative:      Tonya guidelines for Chronic Kidney Disease (CKD):     Stage 1 with normal or high GFR (GFR > 90 mL/min/1 73 square meters)    Stage 2 Mild CKD (GFR = 60-89 mL/min/1 73 square meters)    Stage 3A Moderate CKD (GFR = 45-59 mL/min/1 73 square meters)    Stage 3B Moderate CKD (GFR = 30-44 mL/min/1 73 square meters)    Stage 4 Severe CKD (GFR = 15-29 mL/min/1 73 square meters)    Stage 5 End Stage CKD (GFR <15 mL/min/1 73 square meters)  Note: GFR calculation is accurate only with a steady state creatinine    CBC and differential [[id number]]  (Abnormal) Collected: 08/17/21 1309    Lab Status: Final result Specimen: Blood from Arm, Left Updated: 08/17/21 1316     WBC 9 61 Thousand/uL      RBC 3 95 Million/uL      Hemoglobin 9 2 g/dL      Hematocrit 31 9 %      MCV 81 fL      MCH 23 3 pg      MCHC 28 8 g/dL      RDW 19 0 %      MPV 9 6 fL      Platelets 515 Thousands/uL      nRBC 0 /100 WBCs      Neutrophils Relative 82 %      Immat GRANS % 0 %      Lymphocytes Relative 10 %      Monocytes Relative 5 %      Eosinophils Relative 3 %      Basophils Relative 0 %      Neutrophils Absolute 7 71 Thousands/µL      Immature Grans Absolute 0 03 Thousand/uL      Lymphocytes Absolute 1 00 Thousands/µL      Monocytes Absolute 0 50 Thousand/µL      Eosinophils Absolute 0 33 Thousand/µL      Basophils Absolute 0 04 Thousands/µL                  No orders to display              Procedures  Procedures         ED Course                             SBIRT 20yo+      Most Recent Value   SBIRT (24 yo +)   In order to provide better care to our patients, we are screening all of our patients for alcohol and drug use  Would it be okay to ask you these screening questions? No Filed at: 08/17/2021 1420                    [facility]  Number of Diagnoses or Management Options  Acute cystitis with hematuria  Diagnosis management comments: Pulse ox 98% room air indicating adequate oxygenation  Three way Sumner inserted by nursing staff hand irrigating multiple clots and then continuous irrigation  Patient had relief of his pain  Repeat abdominal exam was nontender and soft  Dr Gelacio Chau consulted case discussed  Recommend admission to medicine         Amount and/or Complexity of Data Reviewed  Clinical lab tests: ordered and reviewed  Decide to obtain previous medical records or to obtain history from someone other than the patient: yes  Review and summarize past medical records: yes  Discuss the patient with other providers: yes    Patient Progress  Patient progress: stable      Disposition  Final diagnoses:   Acute cystitis with hematuria     Time reflects when diagnosis was documented in both MDM as applicable and the Disposition within this note     Time User Action Codes Description Comment    8/17/2021  1:50 PM Denae Sanchez [N30 [id number]] Acute cystitis with hematuria       ED Disposition     ED Disposition Condition Date/Time Comment    Admit Stable Tue Aug 17, 2021  1:52 PM Case was discussed with Dr Min López and the patient's admission status was agreed to be Admission Status: observation status to the service of Dr Min López   Follow-up Information    None         Patient's Medications   Discharge Prescriptions    No medications on file     No discharge procedures on file      PDMP Review       Value Time User    PDMP Reviewed  Yes 7/13/2021 10:25 AM Kendell Schlatter, CRNP          ED Provider  Electronically Signed by           Dajuan Newton DO  08/17/21 3656

## 2021-08-17 NOTE — ED NOTES
800 ml of bright red urine/NS solution discarded before transfer to floor  New bag of NS irrigation solution hung       Rickey Castellanos, [facility]  08/17/21 1039

## 2021-08-18 LAB
ANION GAP SERPL CALCULATED.3IONS-SCNC: 8 MMOL/L (ref 4–13)
BASOPHILS # BLD AUTO: 0.04 THOUSANDS/ΜL (ref 0–0.1)
BASOPHILS NFR BLD AUTO: 0 % (ref 0–1)
BUN SERPL-MCNC: 36 MG/DL (ref 5–25)
CALCIUM SERPL-MCNC: 8.6 MG/DL (ref 8.3–10.1)
CHLORIDE SERPL-SCNC: 106 MMOL/L (ref 100–108)
CO2 SERPL-SCNC: 27 MMOL/L (ref 21–32)
CREAT SERPL-MCNC: 1.46 MG/DL (ref 0.6–1.3)
EOSINOPHIL # BLD AUTO: 0.11 THOUSAND/ΜL (ref 0–0.61)
EOSINOPHIL NFR BLD AUTO: 1 % (ref 0–6)
ERYTHROCYTE [DISTWIDTH] IN BLOOD BY AUTOMATED COUNT: 19 % (ref 11.6–15.1)
GFR SERPL CREATININE-BSD FRML MDRD: 46 ML/MIN/1.73SQ M
GLUCOSE SERPL-MCNC: 109 MG/DL (ref 65–140)
HCT VFR BLD AUTO: 28.3 % (ref 36.5–49.3)
HGB BLD-MCNC: 8.1 G/DL (ref 12–17)
IMM GRANULOCYTES # BLD AUTO: 0.05 THOUSAND/UL (ref 0–0.2)
IMM GRANULOCYTES NFR BLD AUTO: 0 % (ref 0–2)
LYMPHOCYTES # BLD AUTO: 1.72 THOUSANDS/ΜL (ref 0.6–4.47)
LYMPHOCYTES NFR BLD AUTO: 15 % (ref 14–44)
MCH RBC QN AUTO: 23.3 PG (ref 26.8–34.3)
MCHC RBC AUTO-ENTMCNC: 28.6 G/DL (ref 31.4–37.4)
MCV RBC AUTO: 81 FL (ref 82–98)
MONOCYTES # BLD AUTO: 0.84 THOUSAND/ΜL (ref 0.17–1.22)
MONOCYTES NFR BLD AUTO: 7 % (ref 4–12)
NEUTROPHILS # BLD AUTO: 8.85 THOUSANDS/ΜL (ref 1.85–7.62)
NEUTS SEG NFR BLD AUTO: 77 % (ref 43–75)
NRBC BLD AUTO-RTO: 0 /100 WBCS
PLATELET # BLD AUTO: 269 THOUSANDS/UL (ref 149–390)
PMV BLD AUTO: 9.9 FL (ref 8.9–12.7)
POTASSIUM SERPL-SCNC: 4.4 MMOL/L (ref 3.5–5.3)
RBC # BLD AUTO: 3.48 MILLION/UL (ref 3.88–5.62)
SODIUM SERPL-SCNC: 141 MMOL/L (ref 136–145)
WBC # BLD AUTO: 11.61 THOUSAND/UL (ref 4.31–10.16)

## 2021-08-18 PROCEDURE — 99233 SBSQ HOSP IP/OBS HIGH 50: CPT | Performed by: INTERNAL MEDICINE

## 2021-08-18 PROCEDURE — 85025 COMPLETE CBC W/AUTO DIFF WBC: CPT | Performed by: INTERNAL MEDICINE

## 2021-08-18 PROCEDURE — 80048 BASIC METABOLIC PNL TOTAL CA: CPT | Performed by: INTERNAL MEDICINE

## 2021-08-18 RX ADMIN — CYANOCOBALAMIN TAB 500 MCG 500 MCG: 500 TAB at 09:00

## 2021-08-18 RX ADMIN — ATROPA BELLADONNA AND OPIUM 1 SUPPOSITORY: 16.2; 3 SUPPOSITORY RECTAL at 04:01

## 2021-08-18 RX ADMIN — METOPROLOL TARTRATE 25 MG: 25 TABLET, FILM COATED ORAL at 20:35

## 2021-08-18 RX ADMIN — METOPROLOL TARTRATE 25 MG: 25 TABLET, FILM COATED ORAL at 09:00

## 2021-08-18 RX ADMIN — FLUTICASONE FUROATE AND VILANTEROL TRIFENATATE 1 PUFF: 200; 25 POWDER RESPIRATORY (INHALATION) at 21:43

## 2021-08-18 RX ADMIN — Medication 1 TABLET: at 09:00

## 2021-08-18 RX ADMIN — ATROPA BELLADONNA AND OPIUM 1 SUPPOSITORY: 16.2; 3 SUPPOSITORY RECTAL at 20:40

## 2021-08-18 RX ADMIN — ATORVASTATIN CALCIUM 20 MG: 20 TABLET, FILM COATED ORAL at 21:43

## 2021-08-18 NOTE — PLAN OF CARE
Problem: MOBILITY - ADULT  Goal: Maintain or return to baseline ADL function  Description: INTERVENTIONS:  -  Assess patient's ability to carry out ADLs; assess patient's baseline for ADL function and identify physical deficits which impact ability to perform ADLs (bathing, care of mouth/teeth, toileting, grooming, dressing, etc )  - Assess/evaluate cause of self-care deficits   - Assess range of motion  - Assess patient's mobility; develop plan if impaired  - Assess patient's need for assistive devices and provide as appropriate  - Encourage maximum independence but intervene and supervise when necessary  - Involve family in performance of ADLs  - Assess for home care needs following discharge   - Consider OT consult to assist with ADL evaluation and planning for discharge  - Provide patient education as appropriate  Outcome: Progressing  Goal: Maintains/Returns to pre admission functional level  Description: INTERVENTIONS:  - Perform BMAT or MOVE assessment daily    - Set and communicate daily mobility goal to care team and patient/family/caregiver  - Collaborate with rehabilitation services on mobility goals if consulted  - Perform Range of Motion 3 times a day  - Reposition patient every 2 hours    - Dangle patient 3 times a day  - Stand patient 3 times a day  - Ambulate patient 3 times a day  - Out of bed to chair 3 times a day   - Out of bed for meals 3 times a day  - Out of bed for toileting  - Record patient progress and toleration of activity level   Outcome: Progressing     Problem: GENITOURINARY - ADULT  Goal: Maintains or returns to baseline urinary function  Description: INTERVENTIONS:  - Assess urinary function  - Encourage oral fluids to ensure adequate hydration if ordered  - Administer IV fluids as ordered to ensure adequate hydration  - Administer ordered medications as needed  - Offer frequent toileting  - Follow urinary retention protocol if ordered  Outcome: Progressing  Goal: Absence of urinary retention  Description: INTERVENTIONS:  - Assess patients ability to void and empty bladder  - Monitor I/O  - Bladder scan as needed  - Discuss with physician/AP medications to alleviate retention as needed  - Discuss catheterization for long term situations as appropriate  Outcome: Progressing  Goal: Urinary catheter remains patent  Description: INTERVENTIONS:  - Assess patency of urinary catheter  - If patient has a chronic alanis, consider changing catheter if non-functioning  - Follow guidelines for intermittent irrigation of non-functioning urinary catheter  Outcome: Progressing

## 2021-08-18 NOTE — PROGRESS NOTES
[address] 43  Progress Note Anderson Davis 1945, 66 y o  male MRN: [id number]  Unit/Bed#: [address] [place] [address] Encounter: [id number]  Primary Care Provider: Beatrice Anna MD   Date and time admitted to hospital: 8/17/2021 12:20 PM  Patient needs to be made full inpatient admission secondary to hematuria recurrence and plan for cystoscopy and possible fulguration on the morning tomorrow  Patient will be NPO after midnight  * Urinary retention  Assessment & Plan  Secondary to possibly hematuria and mild blood clots  Patient is status post Sumner catheter and CBI  Monitor BMP  Continue CBI and follow up Urology recommendations  Plan for cystoscopy tomorrow and Fulgration  D/w Dr Homa Alcaraz  Hematuria  Assessment & Plan  Hematuria secondary to radiation cystitis  And possible trigger for by an episode of coughing spell  Patient was recently treated for UTI  Less likely etiology seems to be UTI  Patient did not have any fevers, or chills, no leukocytosis  Continue patient on CBI, follow up Urology recommendations  Plan for cystoscopy tomorrow and Fulgration  D/w Dr Homa Alcaraz  Anemia  Assessment & Plan  Secondary to iron deficiency anemia  Patient on iron supplements  Hemoglobin is stable  CKD (chronic kidney disease)  Assessment & Plan  Lab Results   Component Value Date    EGFR 46 08/18/2021    EGFR 42 08/17/2021    EGFR 49 08/02/2021    CREATININE 1 46 (H) 08/18/2021    CREATININE 1 57 (H) 08/17/2021    CREATININE 1 39 (H) 08/02/2021     Patient is around his baseline creatinine of around 1 5  Prostate cancer [facility])  Assessment & Plan  Status post radiation, and stable  Follow-up with urology    RA (rheumatoid arthritis) [facility])  Assessment & Plan  Continue supportive care    Chronic obstructive pulmonary disease (HCC)  Assessment & Plan  Continue on Breo and fluticasone  Continue albuterol inhalers as needed          Subjective:   I have seen and Examined the patient at the bedside  No CP or Sob  No fevers or chills, No nausea or vomiting  Overnight events reviewed with the RN  No Other complains  Patient's hematuria is clearing up, CBI continuing and urine is more clear  Denies any pelvic spasms or bladder spasms  Review of System:   Denies any CP or SOB  Denies any Cough or Cold  Denies any Fevers or chills  Denies any focal tingling numbness or weakness in any extremities  Denies any abdominal pain, Nausea or vomiting  Objective:   Temp (24hrs), Av °F (36 7 °C), Min:96 7 °F (35 9 °C), Max:98 9 °F (37 2 °C)    Temp:  [96 7 °F (35 9 °C)-98 9 °F (37 2 °C)] 98 1 °F (36 7 °C)  HR:  [58-76] 63  Resp:  [18-20] 18  BP: (112-160)/() 114/59  SpO2:  [94 %-99 %] 95 %  Body mass index is 30 9 kg/m²  Input and Output Summary (last 24 hours): Intake/Output Summary (Last 24 hours) at 2021 0941  Last data filed at 2021 0749  Gross per 24 hour   Intake 50 ml   Output 700 ml   Net -650 ml     I/O        07 -  0700  07 -  0700  07 -  0700    IV Piggyback  50     Total Intake(mL/kg)  50 (0 5)     Urine (mL/kg/hr)  2200 -1500 (-5 9)    Total Output  2200 -1500    Net  -2150 +1500                 Physical Exam:   General : Alert, Awake and oriented x 2-3, NAD  Neck : Supple  Eyes:  TR, EOMI  CVS : S1, S2, RRR    R/S : Clear to auscultate anteriorly  Abd: Soft, NT, ND  Bs+ve  Extremity: Trace pedal edema noted  Skin: No acute Rash noted  CNS: No acute FND  Few exam:  CBI running and Sumner catheter draining clear urine      Additional Data:     Labs & Imaging Data Reviewed :    Results from last 7 days   Lab Units 21  0514   WBC Thousand/uL 11 61*   HEMOGLOBIN g/dL 8 1*   HEMATOCRIT % 28 3*   PLATELETS Thousands/uL 269   NEUTROS PCT % 77*   LYMPHS PCT % 15   MONOS PCT % 7   EOS PCT % 1     Results from last 7 days   Lab Units 21  0514 21  1309   POTASSIUM mmol/L 4 4 4  6   CHLORIDE mmol/L 106 104   CO2 mmol/L 27 28   BUN mg/dL 36* 38*   CREATININE mg/dL 1 46* 1 57*   CALCIUM mg/dL 8 6 9 0   ALK PHOS U/L  --  96   ALT U/L  --  31   AST U/L  --  20         No results found for: HGBA1C   No orders to display       Cultures:   Blood Culture:   Lab Results   Component Value Date    BLOODCX No Growth After 5 Days  07/30/2021    BLOODCX No Growth After 5 Days  07/30/2021    BLOODCX No Growth After 5 Days  06/28/2021    BLOODCX No Growth After 5 Days  06/28/2021     Urine Culture:   Lab Results   Component Value Date    URINECX >100,000 cfu/ml Klebsiella pneumoniae (A) 07/30/2021    URINECX >100,000 cfu/ml Enterococcus faecalis (A) 07/30/2021    URINECX 10,000-19,000 cfu/ml Enterococcus faecalis (A) 07/08/2021    URINECX >100,000 cfu/ml Stenotrophomonas maltophilia (A) 07/08/2021    URINECX >100,000 cfu/ml  06/28/2021    URINECX No Growth <1000 cfu/mL 05/04/2021    URINECX <10,000 cfu/ml Yeast (A) 04/29/2021     Sputum Culture: No components found for: SPUTUMCX  Wound Culture: No results found for: WOUNDCULT    Last 24 Hours Medication List:   Current Facility-Administered Medications   Medication Dose Route Frequency Provider Last Rate    acetaminophen  650 mg Oral Q4H PRN Eliecer Cameron MD      atorvastatin  20 mg Oral HS Eliecer Cameron MD      belladonna-opium  30 mg Rectal Q8H PRN CORWIN Sanches      cyanocobalamin  500 mcg Oral Daily Eliecer Cameron MD      ferrous sulfate  325 mg Oral Daily With Breakfast Eliecer Cameron MD      fluticasone  1 spray Nasal Daily Eliecer Cameron MD      fluticasone-vilanterol  1 puff Inhalation HS Eliecer Cameron MD      loratadine  10 mg Oral Daily Eliecer Cameron MD      metoprolol tartrate  25 mg Oral Q12H [address] Eliecer Cameron MD      multivitamin-minerals  1 tablet Oral Daily Gonzáles Bourgeois, MD         Patient is at moderate risk for morbidity and mortality due to above mentioned illness and comorbidities

## 2021-08-18 NOTE — ASSESSMENT & PLAN NOTE
Lab Results   Component Value Date    EGFR 46 08/18/2021    EGFR 42 08/17/2021    EGFR 49 08/02/2021    CREATININE 1 46 (H) 08/18/2021    CREATININE 1 57 (H) 08/17/2021    CREATININE 1 39 (H) 08/02/2021     Patient is around his baseline creatinine of around 1 5

## 2021-08-18 NOTE — ASSESSMENT & PLAN NOTE
Hematuria secondary to radiation cystitis  And possible trigger for by an episode of coughing spell  Patient was recently treated for UTI  Less likely etiology seems to be UTI  Patient did not have any fevers, or chills, no leukocytosis  Continue patient on CBI, follow up Urology recommendations  Plan for cystoscopy tomorrow and Fulgration  D/w Dr Mckinley Adam

## 2021-08-18 NOTE — PROGRESS NOTES
Progress Note - Urology      Patient: Charbel Dickinson   : 1945 Sex: male   MRN: [id number]     CSN: [id number]  Unit/Bed#: [address] [place] [address]     SUBJECTIVE:   Still passing clots  On CBI      Objective   Vitals: /59 (BP Location: Left arm)   Pulse 63   Temp 98 1 °F (36 7 °C) (Oral)   Resp 18   Ht 5' 9" (1 753 m)   Wt 94 9 kg (209 lb 3 5 oz)   SpO2 95%   BMI 30 90 kg/m²     I/O last 24 hours:   In: 48 [IV Piggyback:50]  Out: 700 [Urine:700]      Physical Exam:   General Alert awake   Normocephalic atraumatic PERRLA  Lungs clear bilaterally  Cardiac normal S1 normal S2  Abdomen soft, flank pain  Extremities no edema      Lab Results: CBC:   Lab Results   Component Value Date    WBC 11 61 (H) 2021    HGB 8 1 (L) 2021    HCT 28 3 (L) 2021    MCV 81 (L) 2021     2021    MCH 23 3 (L) 2021    MCHC 28 6 (L) 2021    RDW 19 0 (H) 2021    MPV 9 9 2021    NRBC 0 2021     CMP:   Lab Results   Component Value Date     2021    CO2 27 2021    BUN 36 (H) 2021    CREATININE 1 46 (H) 2021    CALCIUM 8 6 2021    AST 20 2021    ALT 31 2021    ALKPHOS 96 2021    EGFR 46 2021     Urinalysis:   Lab Results   Component Value Date    COLORU Yellow 2021    CLARITYU Turbid 2021    SPECGRAV >=1 030 2021    PHUR 5 5 2021    PHUR 5 5 2018    LEUKOCYTESUR Large (A) 2021    NITRITE Negative 2021    GLUCOSEU Negative 2021    KETONESU Negative 2021    BILIRUBINUR Negative 2021    BLOODU Large (A) 2021     Urine Culture:   Lab Results   Component Value Date    URINECX >100,000 cfu/ml Klebsiella pneumoniae (A) 2021    URINECX >100,000 cfu/ml Enterococcus faecalis (A) 2021     PSA: No results found for: PSA      Assessment/ Plan:  Hemorrhagic radiation cystitis after bowel movement  Hand irrigated yesterday  CBI clear  Discussed with patient 2nd admission within 1 week after the straining  Will make NPO after midnight  Cysto fulguration retrogrades tomorrow          Edyta Shelton MD

## 2021-08-18 NOTE — ASSESSMENT & PLAN NOTE
Secondary to possibly hematuria and mild blood clots  Patient is status post Sumner catheter and CBI  Monitor BMP  Continue CBI and follow up Urology recommendations  Plan for cystoscopy tomorrow and Fulgration  D/w Dr Luther Holding

## 2021-08-18 NOTE — ASSESSMENT & PLAN NOTE
Secondary to possibly hematuria and mild blood clots  Patient is status post Sumner catheter and CBI  Monitor BMP  Continue CBI and follow up Urology recommendations  Plan for cystoscopy tomorrow and Fulgration  D/w Dr Daya Kumar

## 2021-08-18 NOTE — PLAN OF CARE
Problem: MOBILITY - ADULT  Goal: Maintain or return to baseline ADL function  Description: INTERVENTIONS:  -  Assess patient's ability to carry out ADLs; assess patient's baseline for ADL function and identify physical deficits which impact ability to perform ADLs (bathing, care of mouth/teeth, toileting, grooming, dressing, etc )  - Assess/evaluate cause of self-care deficits   - Assess range of motion  - Assess patient's mobility; develop plan if impaired  - Assess patient's need for assistive devices and provide as appropriate  - Encourage maximum independence but intervene and supervise when necessary  - Involve family in performance of ADLs  - Assess for home care needs following discharge   - Consider OT consult to assist with ADL evaluation and planning for discharge  - Provide patient education as appropriate  Outcome: Progressing  Goal: Maintains/Returns to pre admission functional level  Description: INTERVENTIONS:  - Perform BMAT or MOVE assessment daily    - Set and communicate daily mobility goal to care team and patient/family/caregiver  - Collaborate with rehabilitation services on mobility goals if consulted  - Perform Range of Motion x times a day  - Reposition patient every x hours    - Dangle patient x times a day  - Stand patient x times a day  - Ambulate patient x times a day  - Out of bed to chair x times a day   - Out of bed for meals x times a day  - Out of bed for toileting  - Record patient progress and toleration of activity level   Outcome: Progressing     Problem: GENITOURINARY - ADULT  Goal: Maintains or returns to baseline urinary function  Description: INTERVENTIONS:  - Assess urinary function  - Encourage oral fluids to ensure adequate hydration if ordered  - Administer IV fluids as ordered to ensure adequate hydration  - Administer ordered medications as needed  - Offer frequent toileting  - Follow urinary retention protocol if ordered  Outcome: Progressing  Goal: Absence of urinary retention  Description: INTERVENTIONS:  - Assess patients ability to void and empty bladder  - Monitor I/O  - Bladder scan as needed  - Discuss with physician/AP medications to alleviate retention as needed  - Discuss catheterization for long term situations as appropriate  Outcome: Progressing  Goal: Urinary catheter remains patent  Description: INTERVENTIONS:  - Assess patency of urinary catheter  - If patient has a chronic alanis, consider changing catheter if non-functioning  - Follow guidelines for intermittent irrigation of non-functioning urinary catheter  Outcome: Progressing

## 2021-08-18 NOTE — PHYSICAL THERAPY NOTE
08/18/21 0955   Note Type   Note type Evaluation   Cancel Reasons Medical status  (pt states Dr ordered bedrest today, due to blood clotting with CBI running,will reattempt later)   Licensure   [place] License Number  Jesús Hernandez PT [id number]

## 2021-08-19 ENCOUNTER — APPOINTMENT (INPATIENT)
Dept: RADIOLOGY | Facility: HOSPITAL | Age: 76
DRG: 659 | End: 2021-08-19
Payer: MEDICARE

## 2021-08-19 PROBLEM — R50.9 FEVER: Status: ACTIVE | Noted: 2021-08-19

## 2021-08-19 LAB
BACTERIA UR QL AUTO: ABNORMAL /HPF
BASOPHILS # BLD AUTO: 0.05 THOUSANDS/ΜL (ref 0–0.1)
BASOPHILS NFR BLD AUTO: 0 % (ref 0–1)
BILIRUB UR QL STRIP: NEGATIVE
CLARITY UR: ABNORMAL
COLOR UR: ABNORMAL
EOSINOPHIL # BLD AUTO: 0.06 THOUSAND/ΜL (ref 0–0.61)
EOSINOPHIL NFR BLD AUTO: 0 % (ref 0–6)
ERYTHROCYTE [DISTWIDTH] IN BLOOD BY AUTOMATED COUNT: 19.7 % (ref 11.6–15.1)
GLUCOSE UR STRIP-MCNC: NEGATIVE MG/DL
HCT VFR BLD AUTO: 31.6 % (ref 36.5–49.3)
HGB BLD-MCNC: 8.9 G/DL (ref 12–17)
HGB UR QL STRIP.AUTO: ABNORMAL
IMM GRANULOCYTES # BLD AUTO: 0.08 THOUSAND/UL (ref 0–0.2)
IMM GRANULOCYTES NFR BLD AUTO: 1 % (ref 0–2)
KETONES UR STRIP-MCNC: NEGATIVE MG/DL
LACTATE SERPL-SCNC: 1 MMOL/L (ref 0.5–2)
LACTATE SERPL-SCNC: 2.5 MMOL/L (ref 0.5–2)
LEUKOCYTE ESTERASE UR QL STRIP: ABNORMAL
LYMPHOCYTES # BLD AUTO: 1.18 THOUSANDS/ΜL (ref 0.6–4.47)
LYMPHOCYTES NFR BLD AUTO: 8 % (ref 14–44)
MCH RBC QN AUTO: 23.2 PG (ref 26.8–34.3)
MCHC RBC AUTO-ENTMCNC: 28.2 G/DL (ref 31.4–37.4)
MCV RBC AUTO: 83 FL (ref 82–98)
MONOCYTES # BLD AUTO: 1.11 THOUSAND/ΜL (ref 0.17–1.22)
MONOCYTES NFR BLD AUTO: 8 % (ref 4–12)
NEUTROPHILS # BLD AUTO: 11.53 THOUSANDS/ΜL (ref 1.85–7.62)
NEUTS SEG NFR BLD AUTO: 83 % (ref 43–75)
NITRITE UR QL STRIP: NEGATIVE
NON-SQ EPI CELLS URNS QL MICRO: ABNORMAL /HPF
NRBC BLD AUTO-RTO: 0 /100 WBCS
PH UR STRIP.AUTO: 6 [PH]
PLATELET # BLD AUTO: 232 THOUSANDS/UL (ref 149–390)
PMV BLD AUTO: 9.6 FL (ref 8.9–12.7)
PROT UR STRIP-MCNC: NEGATIVE MG/DL
RBC # BLD AUTO: 3.83 MILLION/UL (ref 3.88–5.62)
RBC #/AREA URNS AUTO: ABNORMAL /HPF
SP GR UR STRIP.AUTO: 1.01 (ref 1–1.03)
UROBILINOGEN UR QL STRIP.AUTO: 0.2 E.U./DL
WBC # BLD AUTO: 14.01 THOUSAND/UL (ref 4.31–10.16)
WBC #/AREA URNS AUTO: ABNORMAL /HPF

## 2021-08-19 PROCEDURE — 87040 BLOOD CULTURE FOR BACTERIA: CPT | Performed by: INTERNAL MEDICINE

## 2021-08-19 PROCEDURE — 81001 URINALYSIS AUTO W/SCOPE: CPT | Performed by: INTERNAL MEDICINE

## 2021-08-19 PROCEDURE — 85025 COMPLETE CBC W/AUTO DIFF WBC: CPT | Performed by: INTERNAL MEDICINE

## 2021-08-19 PROCEDURE — 83605 ASSAY OF LACTIC ACID: CPT | Performed by: INTERNAL MEDICINE

## 2021-08-19 PROCEDURE — 99232 SBSQ HOSP IP/OBS MODERATE 35: CPT | Performed by: INTERNAL MEDICINE

## 2021-08-19 RX ORDER — SODIUM CHLORIDE 450 MG/100ML
50 INJECTION, SOLUTION INTRAVENOUS CONTINUOUS
Status: DISCONTINUED | OUTPATIENT
Start: 2021-08-19 | End: 2021-08-20

## 2021-08-19 RX ORDER — VANCOMYCIN HYDROCHLORIDE 1 G/200ML
1000 INJECTION, SOLUTION INTRAVENOUS EVERY 12 HOURS
Status: DISCONTINUED | OUTPATIENT
Start: 2021-08-19 | End: 2021-08-20

## 2021-08-19 RX ADMIN — CYANOCOBALAMIN TAB 500 MCG 500 MCG: 500 TAB at 08:02

## 2021-08-19 RX ADMIN — FLUTICASONE FUROATE AND VILANTEROL TRIFENATATE 1 PUFF: 200; 25 POWDER RESPIRATORY (INHALATION) at 22:42

## 2021-08-19 RX ADMIN — Medication 1 TABLET: at 08:02

## 2021-08-19 RX ADMIN — SODIUM CHLORIDE 1000 ML: 0.9 INJECTION, SOLUTION INTRAVENOUS at 18:20

## 2021-08-19 RX ADMIN — SODIUM CHLORIDE 100 ML/HR: 0.45 INJECTION, SOLUTION INTRAVENOUS at 21:23

## 2021-08-19 RX ADMIN — METOPROLOL TARTRATE 25 MG: 25 TABLET, FILM COATED ORAL at 21:34

## 2021-08-19 RX ADMIN — AMPICILLIN SODIUM 2000 MG: 2 INJECTION, POWDER, FOR SOLUTION INTRAMUSCULAR; INTRAVENOUS at 22:44

## 2021-08-19 RX ADMIN — ATORVASTATIN CALCIUM 20 MG: 20 TABLET, FILM COATED ORAL at 21:34

## 2021-08-19 RX ADMIN — METOPROLOL TARTRATE 25 MG: 25 TABLET, FILM COATED ORAL at 08:02

## 2021-08-19 RX ADMIN — VANCOMYCIN HYDROCHLORIDE 1000 MG: 1 INJECTION, SOLUTION INTRAVENOUS at 21:23

## 2021-08-19 RX ADMIN — ACETAMINOPHEN 650 MG: 325 TABLET, FILM COATED ORAL at 08:02

## 2021-08-19 NOTE — PROGRESS NOTES
Progress Note - Urology      Patient: Linda Christensen   : 1945 Sex: male   MRN: [id number]     CSN: [id number]  Unit/Bed#: [address] [place] [address]     SUBJECTIVE:   CBI clear  Second admission in 7 days for gross hematuria most likely related to unresolved radiation cystitis  Discussed with patient to go to OR today for cysto evaluation possible fulguration retrograde pyelograms  Can be discharged tomorrow if urine clear      Objective   Vitals: /59 (BP Location: Left arm)   Pulse 78   Temp 98 9 °F (37 2 °C) (Oral)   Resp 17   Ht 5' 9" (1 753 m)   Wt 94 8 kg (209 lb)   SpO2 90%   BMI 30 86 kg/m²     I/O last 24 hours:   In: 54 [P O :50; I V :5]  Out: -4650       Physical Exam:   General Alert awake   Normocephalic atraumatic PERRLA  Lungs clear bilaterally  Cardiac normal S1 normal S2  Abdomen soft, flank pain  Extremities no edema      Lab Results: CBC:   Lab Results   Component Value Date    WBC 11 61 (H) 2021    HGB 8 1 (L) 2021    HCT 28 3 (L) 2021    MCV 81 (L) 2021     2021    MCH 23 3 (L) 2021    MCHC 28 6 (L) 2021    RDW 19 0 (H) 2021    MPV 9 9 2021    NRBC 0 2021     CMP:   Lab Results   Component Value Date     2021    CO2 27 2021    BUN 36 (H) 2021    CREATININE 1 46 (H) 2021    CALCIUM 8 6 2021    AST 20 2021    ALT 31 2021    ALKPHOS 96 2021    EGFR 46 2021     Urinalysis:   Lab Results   Component Value Date    COLORU Yellow 2021    CLARITYU Turbid 2021    SPECGRAV >=1 030 2021    PHUR 5 5 2021    PHUR 5 5 2018    LEUKOCYTESUR Large (A) 2021    NITRITE Negative 2021    GLUCOSEU Negative 2021    KETONESU Negative 2021    BILIRUBINUR Negative 2021    BLOODU Large (A) 2021     Urine Culture:   Lab Results   Component Value Date    URINECX >100,000 cfu/ml Klebsiella pneumoniae (A) 2021    Efra Cardozo >100,000 cfu/ml Enterococcus faecalis (A) 07/30/2021     PSA: No results found for: PSA      Assessment/ Plan:  Cysto evacuation of clots bilateral retrograde pyelograms fulguration          Swapna Cruz MD

## 2021-08-19 NOTE — PHYSICAL THERAPY NOTE
08/19/21 1430   PT Last Visit   PT Visit Date 08/19/21   Note Type   Note type Evaluation   Cancel Reasons Patient off floor/test  (Cysto)   Licensure   [place] License Number  [address] [place] [address] [id number]

## 2021-08-19 NOTE — QUICK NOTE
Patient brought down to the holding area CBI clear to pink urine spiked temperature to 103 patient not on antibiotics hand irrigated with 1 L normal saline few clots removed do not feel cystoscopy irrigating clots fulguration warranted in light of high temperature and unknown source of infection procedure canceled discussed with Dr Arvind Jones blood Sonja Domonique cultures urine cultures CBC to be drawn would start Zosyn

## 2021-08-19 NOTE — ASSESSMENT & PLAN NOTE
Patient has fever possible secondary to hematuria, and Sumner catheter in place  However also had a mild elevation of WBC 11 6  Will check urine analysis and urine culture, blood culture  We will check a lactic acid now

## 2021-08-19 NOTE — ASSESSMENT & PLAN NOTE
Hematuria secondary to radiation cystitis  And possible trigger for by an episode of coughing spell  Patient was recently treated for UTI  Less likely etiology seems to be UTI  Patient did not have any fevers, or chills, no leukocytosis  Continue patient on CBI, follow up Urology recommendations  Plan for cystoscopy tomorrow and Fulgration  D/w Dr Eugene Anne

## 2021-08-19 NOTE — PROGRESS NOTES
[facility] 43  Progress Note Ishmael Ramos 1945, 66 y o  male MRN: [id number]  Unit/Bed#: OR POOL Encounter: [id number]  Primary Care Provider: Shira Apley, MD   Date and time admitted to hospital: 8/17/2021 12:20 PM    * Urinary retention  Assessment & Plan  Secondary to possibly hematuria and mild blood clots  Patient is status post Sumner catheter and CBI  Monitor BMP  Continue CBI and follow up Urology recommendations  Plan for cystoscopy tomorrow and Fulgration  D/w Dr Nereyda Hi  Hematuria  Assessment & Plan  Hematuria secondary to radiation cystitis  And possible trigger for by an episode of coughing spell  Patient was recently treated for UTI  Less likely etiology seems to be UTI  Patient did not have any fevers, or chills, no leukocytosis  Continue patient on CBI, follow up Urology recommendations  Plan for cystoscopy tomorrow and Fulgration  D/w Dr Nereyda Hi  Fever  Assessment & Plan  Patient has fever possible secondary to hematuria, and Sumner catheter in place  However also had a mild elevation of WBC 11 6  Will check urine analysis and urine culture, blood culture  We will check a lactic acid now  Chronic obstructive pulmonary disease (HCC)  Assessment & Plan  Continue on Breo and fluticasone  Continue albuterol inhalers as needed  Subjective:   I have seen and Examined the patient at the bedside  No CP or Sob  No nausea or vomiting  Overnight events reviewed with the RN  No Other complains  Patient was scheduled for cystoscopy today however had a spiked fever of 101-101  3 prior to the OR and hence the procedure was canceled  Patient did receive Tylenol for his low-grade temperature before that  Still continues to have some mild hematuria and is on CBI  Review of System:   Denies any CP or SOB  Denies any Cough or Cold  Denies any Fevers or chills  Denies any focal tingling numbness or weakness in any extremities  Denies any abdominal pain, Nausea or vomiting  Objective:   Temp (24hrs), Av °F (37 8 °C), Min:98 9 °F (37 2 °C), Max:101 8 °F (38 8 °C)    Temp:  [98 9 °F (37 2 °C)-101 8 °F (38 8 °C)] 98 9 °F (37 2 °C)  HR:  [73-78] 78  Resp:  [17] 17  BP: (121-136)/(59-62) 127/59  SpO2:  [90 %-93 %] 90 %  Body mass index is 30 86 kg/m²  Input and Output Summary (last 24 hours): Intake/Output Summary (Last 24 hours) at 2021 1600  Last data filed at 2021 1201  Gross per 24 hour   Intake 55 ml   Output -3450 ml   Net 3505 ml     I/O        07 -  0700  07 -  0700  07 -  0700    P  O    50    I V  (mL/kg)   5 (0 1)    IV Piggyback 50      Total Intake(mL/kg) 50 (0 5)  55 (0 6)    Urine (mL/kg/hr) 2200 -4650 (-2) -500 (-0 6)    Total Output 2200 -4650 -500    Net -2150 +4650 +555                 Physical Exam:   General : Alert, Awake and oriented x 2-3, NAD  Neck : Supple  Eyes:  TR, EOMI  Conjunctival pallor noted  CVS : S1, S2, RRR    R/S : Clear to auscultate anteriorly  Abd: Soft, NT, ND  Bs+ve  Extremity: Trace pedal edema noted  Skin: No acute Rash noted  CNS: No acute FND   exam:  Continues to have CBI running and mild hematuria noted      Additional Data:     Labs & Imaging Data Reviewed :    Results from last 7 days   Lab Units 21  0514   WBC Thousand/uL 11 61*   HEMOGLOBIN g/dL 8 1*   HEMATOCRIT % 28 3*   PLATELETS Thousands/uL 269   NEUTROS PCT % 77*   LYMPHS PCT % 15   MONOS PCT % 7   EOS PCT % 1     Results from last 7 days   Lab Units 21  0514 21  1309   POTASSIUM mmol/L 4 4 4 6   CHLORIDE mmol/L 106 104   CO2 mmol/L 27 28   BUN mg/dL 36* 38*   CREATININE mg/dL 1 46* 1 57*   CALCIUM mg/dL 8 6 9 0   ALK PHOS U/L  --  96   ALT U/L  --  31   AST U/L  --  20         No results found for: HGBA1C   FL retrograde pyelogram    (Results Pending)       Cultures:   Blood Culture:   Lab Results   Component Value Date    BLOODCX No Growth After 5 Days  07/30/2021    BLOODCX No Growth After 5 Days  07/30/2021    BLOODCX No Growth After 5 Days  06/28/2021    BLOODCX No Growth After 5 Days  06/28/2021     Urine Culture:   Lab Results   Component Value Date    URINECX >100,000 cfu/ml Klebsiella pneumoniae (A) 07/30/2021    URINECX >100,000 cfu/ml Enterococcus faecalis (A) 07/30/2021    URINECX 10,000-19,000 cfu/ml Enterococcus faecalis (A) 07/08/2021    URINECX >100,000 cfu/ml Stenotrophomonas maltophilia (A) 07/08/2021    URINECX >100,000 cfu/ml  06/28/2021    URINECX No Growth <1000 cfu/mL 05/04/2021    URINECX <10,000 cfu/ml Yeast (A) 04/29/2021     Sputum Culture: No components found for: SPUTUMCX  Wound Culture: No results found for: WOUNDCULT    Last 24 Hours Medication List:   Current Facility-Administered Medications   Medication Dose Route Frequency Provider Last Rate    acetaminophen  650 mg Oral Q4H PRN Russ Scanlon MD      atorvastatin  20 mg Oral HS Russ Scanlon MD      belladonna-opium  30 mg Rectal Q8H PRN Russ Scanlon MD      cyanocobalamin  500 mcg Oral Daily Russ Scanlon MD      ferrous sulfate  325 mg Oral Daily With Breakfast Russ Scanlon MD      fluticasone  1 spray Nasal Daily Russ Scanlon MD      fluticasone-vilanterol  1 puff Inhalation HS Russ Scanlon MD      loratadine  10 mg Oral Daily Russ Scanlon MD      metoprolol tartrate  25 mg Oral Q12H [address] Russ Scanlon MD      multivitamin-minerals  1 tablet Oral Daily Russ Scanlon MD         Patient is at moderate risk for morbidity and mortality due to above mentioned illness and comorbidities

## 2021-08-20 ENCOUNTER — APPOINTMENT (INPATIENT)
Dept: RADIOLOGY | Facility: HOSPITAL | Age: 76
DRG: 659 | End: 2021-08-20
Payer: MEDICARE

## 2021-08-20 PROBLEM — R65.20 SEVERE SEPSIS (HCC): Status: ACTIVE | Noted: 2021-08-19

## 2021-08-20 PROBLEM — A41.9 SEVERE SEPSIS (HCC): Status: ACTIVE | Noted: 2021-08-19

## 2021-08-20 LAB
ABO GROUP BLD: NORMAL
ANION GAP SERPL CALCULATED.3IONS-SCNC: 9 MMOL/L (ref 4–13)
BASOPHILS # BLD AUTO: 0.03 THOUSANDS/ΜL (ref 0–0.1)
BASOPHILS NFR BLD AUTO: 0 % (ref 0–1)
BLD GP AB SCN SERPL QL: NEGATIVE
BUN SERPL-MCNC: 43 MG/DL (ref 5–25)
CALCIUM SERPL-MCNC: 7.7 MG/DL (ref 8.3–10.1)
CHLORIDE SERPL-SCNC: 102 MMOL/L (ref 100–108)
CO2 SERPL-SCNC: 25 MMOL/L (ref 21–32)
CREAT SERPL-MCNC: 1.97 MG/DL (ref 0.6–1.3)
EOSINOPHIL # BLD AUTO: 0.08 THOUSAND/ΜL (ref 0–0.61)
EOSINOPHIL NFR BLD AUTO: 1 % (ref 0–6)
ERYTHROCYTE [DISTWIDTH] IN BLOOD BY AUTOMATED COUNT: 19.2 % (ref 11.6–15.1)
GFR SERPL CREATININE-BSD FRML MDRD: 32 ML/MIN/1.73SQ M
GLUCOSE SERPL-MCNC: 111 MG/DL (ref 65–140)
HCT VFR BLD AUTO: 22.2 % (ref 36.5–49.3)
HCT VFR BLD AUTO: 24.6 % (ref 36.5–49.3)
HCT VFR BLD AUTO: 26.9 % (ref 36.5–49.3)
HCT VFR BLD AUTO: 27.5 % (ref 36.5–49.3)
HGB BLD-MCNC: 6.8 G/DL (ref 12–17)
HGB BLD-MCNC: 7.2 G/DL (ref 12–17)
HGB BLD-MCNC: 8 G/DL (ref 12–17)
HGB BLD-MCNC: 8.3 G/DL (ref 12–17)
IMM GRANULOCYTES # BLD AUTO: 0.05 THOUSAND/UL (ref 0–0.2)
IMM GRANULOCYTES NFR BLD AUTO: 1 % (ref 0–2)
LYMPHOCYTES # BLD AUTO: 1.32 THOUSANDS/ΜL (ref 0.6–4.47)
LYMPHOCYTES NFR BLD AUTO: 12 % (ref 14–44)
MCH RBC QN AUTO: 22.9 PG (ref 26.8–34.3)
MCHC RBC AUTO-ENTMCNC: 29.3 G/DL (ref 31.4–37.4)
MCV RBC AUTO: 78 FL (ref 82–98)
MONOCYTES # BLD AUTO: 1.27 THOUSAND/ΜL (ref 0.17–1.22)
MONOCYTES NFR BLD AUTO: 12 % (ref 4–12)
NEUTROPHILS # BLD AUTO: 7.88 THOUSANDS/ΜL (ref 1.85–7.62)
NEUTS SEG NFR BLD AUTO: 74 % (ref 43–75)
NRBC BLD AUTO-RTO: 0 /100 WBCS
PLATELET # BLD AUTO: 175 THOUSANDS/UL (ref 149–390)
PMV BLD AUTO: 9.9 FL (ref 8.9–12.7)
POTASSIUM SERPL-SCNC: 3.8 MMOL/L (ref 3.5–5.3)
RBC # BLD AUTO: 3.14 MILLION/UL (ref 3.88–5.62)
RH BLD: POSITIVE
SODIUM SERPL-SCNC: 136 MMOL/L (ref 136–145)
SPECIMEN EXPIRATION DATE: NORMAL
WBC # BLD AUTO: 10.63 THOUSAND/UL (ref 4.31–10.16)

## 2021-08-20 PROCEDURE — 86901 BLOOD TYPING SEROLOGIC RH(D): CPT | Performed by: NURSE PRACTITIONER

## 2021-08-20 PROCEDURE — P9016 RBC LEUKOCYTES REDUCED: HCPCS

## 2021-08-20 PROCEDURE — 86900 BLOOD TYPING SEROLOGIC ABO: CPT | Performed by: NURSE PRACTITIONER

## 2021-08-20 PROCEDURE — 80048 BASIC METABOLIC PNL TOTAL CA: CPT | Performed by: INTERNAL MEDICINE

## 2021-08-20 PROCEDURE — 76770 US EXAM ABDO BACK WALL COMP: CPT

## 2021-08-20 PROCEDURE — 85014 HEMATOCRIT: CPT | Performed by: NURSE PRACTITIONER

## 2021-08-20 PROCEDURE — 99223 1ST HOSP IP/OBS HIGH 75: CPT | Performed by: INTERNAL MEDICINE

## 2021-08-20 PROCEDURE — 86923 COMPATIBILITY TEST ELECTRIC: CPT

## 2021-08-20 PROCEDURE — 99232 SBSQ HOSP IP/OBS MODERATE 35: CPT | Performed by: NURSE PRACTITIONER

## 2021-08-20 PROCEDURE — 85018 HEMOGLOBIN: CPT | Performed by: NURSE PRACTITIONER

## 2021-08-20 PROCEDURE — 30233N1 TRANSFUSION OF NONAUTOLOGOUS RED BLOOD CELLS INTO PERIPHERAL VEIN, PERCUTANEOUS APPROACH: ICD-10-PCS | Performed by: INTERNAL MEDICINE

## 2021-08-20 PROCEDURE — 85025 COMPLETE CBC W/AUTO DIFF WBC: CPT | Performed by: INTERNAL MEDICINE

## 2021-08-20 PROCEDURE — 86850 RBC ANTIBODY SCREEN: CPT | Performed by: NURSE PRACTITIONER

## 2021-08-20 PROCEDURE — 71045 X-RAY EXAM CHEST 1 VIEW: CPT

## 2021-08-20 RX ORDER — ACETAMINOPHEN 325 MG/1
650 TABLET ORAL ONCE
Status: COMPLETED | OUTPATIENT
Start: 2021-08-20 | End: 2021-08-20

## 2021-08-20 RX ORDER — SACCHAROMYCES BOULARDII 250 MG
250 CAPSULE ORAL 2 TIMES DAILY
Status: DISCONTINUED | OUTPATIENT
Start: 2021-08-20 | End: 2021-08-27 | Stop reason: HOSPADM

## 2021-08-20 RX ORDER — ALBUTEROL SULFATE 2.5 MG/3ML
2.5 SOLUTION RESPIRATORY (INHALATION) EVERY 4 HOURS PRN
Status: DISCONTINUED | OUTPATIENT
Start: 2021-08-20 | End: 2021-08-24

## 2021-08-20 RX ORDER — SODIUM CHLORIDE 9 MG/ML
100 INJECTION, SOLUTION INTRAVENOUS CONTINUOUS
Status: DISCONTINUED | OUTPATIENT
Start: 2021-08-20 | End: 2021-08-26

## 2021-08-20 RX ORDER — DIPHENHYDRAMINE HCL 25 MG
25 TABLET ORAL ONCE
Status: COMPLETED | OUTPATIENT
Start: 2021-08-20 | End: 2021-08-20

## 2021-08-20 RX ADMIN — SODIUM CHLORIDE 75 ML/HR: 0.9 INJECTION, SOLUTION INTRAVENOUS at 15:00

## 2021-08-20 RX ADMIN — SODIUM CHLORIDE 3 G: 9 INJECTION, SOLUTION INTRAVENOUS at 14:45

## 2021-08-20 RX ADMIN — SODIUM CHLORIDE 3 G: 9 INJECTION, SOLUTION INTRAVENOUS at 21:47

## 2021-08-20 RX ADMIN — VANCOMYCIN HYDROCHLORIDE 1000 MG: 1 INJECTION, SOLUTION INTRAVENOUS at 08:41

## 2021-08-20 RX ADMIN — AMPICILLIN SODIUM 2000 MG: 2 INJECTION, POWDER, FOR SOLUTION INTRAMUSCULAR; INTRAVENOUS at 05:55

## 2021-08-20 RX ADMIN — Medication 1 TABLET: at 08:03

## 2021-08-20 RX ADMIN — ACETAMINOPHEN 650 MG: 325 TABLET, FILM COATED ORAL at 11:48

## 2021-08-20 RX ADMIN — CYANOCOBALAMIN TAB 500 MCG 500 MCG: 500 TAB at 08:03

## 2021-08-20 RX ADMIN — AMPICILLIN SODIUM 2000 MG: 2 INJECTION, POWDER, FOR SOLUTION INTRAMUSCULAR; INTRAVENOUS at 03:27

## 2021-08-20 RX ADMIN — Medication 250 MG: at 18:52

## 2021-08-20 RX ADMIN — Medication 250 MG: at 10:20

## 2021-08-20 RX ADMIN — DIPHENHYDRAMINE HYDROCHLORIDE 25 MG: 25 TABLET ORAL at 11:47

## 2021-08-20 RX ADMIN — AMPICILLIN SODIUM 2000 MG: 2 INJECTION, POWDER, FOR SOLUTION INTRAMUSCULAR; INTRAVENOUS at 10:15

## 2021-08-20 RX ADMIN — FLUTICASONE FUROATE AND VILANTEROL TRIFENATATE 1 PUFF: 200; 25 POWDER RESPIRATORY (INHALATION) at 21:48

## 2021-08-20 RX ADMIN — LORATADINE 10 MG: 10 TABLET ORAL at 08:03

## 2021-08-20 RX ADMIN — METOPROLOL TARTRATE 25 MG: 25 TABLET, FILM COATED ORAL at 08:03

## 2021-08-20 RX ADMIN — ATORVASTATIN CALCIUM 20 MG: 20 TABLET, FILM COATED ORAL at 21:47

## 2021-08-20 NOTE — ASSESSMENT & PLAN NOTE
Hematuria secondary to radiation cystitis,+/- complicated UTI  And possible triggered by an episode of coughing spell  Patient was recently treated for UTI  Continue patient on CBI, follow up Urology recommendations    Appreciate urology input

## 2021-08-20 NOTE — CONSULTS
Consultation - Infectious Disease   Humble Sanchez 66 y o  male MRN: [id number]  Unit/Bed#: [address] [place] [address] Encounter: [id number]      IMPRESSION & RECOMMENDATIONS:   Impression/Recommendations: This is a 66 y o  male, with history of prostate cancer, status post XRT with XRT cystitis and recurrent hematuria with urinary retention and bilateral PCNs place, admitted on 08/17 with urinary retention secondary to hematuria  Patient subsequent developed sepsis secondary UTI  He is now on antibiotics  1  Sepsis, not present on admission but developed yesterday, with fever and leukocytosis  Source of sepsis is most likely urinary retention  Patient is clinically improved  Temperature is down  WBC decreasing again  He has remains systemically well, without toxicity and hemodynamically stable, without hypotension  Blood culture obtained yesterday have no growth thus far  Antibiotic plan as in below  Monitor temperature/WBC  Monitor hemodynamics  Follow-up on pending blood cultures  2  Cystitis, most likely secondary to urinary retention  Patient is clinically improved  Unfortunately, urine culture was not obtained yesterday prior to antibiotic  Patient had recent urine culture last month with growth of Klebsiella and ampicillin susceptible Enterococcus  Will modify antibiotic regimen accordingly  Change antibiotic to IV Unasyn  Monitor temperature/WBC  If patient continues to improve, likely transition to p o  Augmentin in the next 1-2 days, as long as blood cultures have no growth  3  Urinary retention, most likely secondary to hematuria and XRT cystitis  Cystoscopy was postponed yesterday due to acute fever  It is schedule for today  Of note, patient has bilateral PCN in place, which are capped  Sumner catheter drainage in place  CBI ongoing per Urology  Plan for cystoscopy later today noted  4  CKD  Creatinine baseline  Antibiotic dosages adjusted accordingly  Monitor creatinine      5  XRT cystitis  6  Prostate cancer, status post XRT  Urology follow-up  Previous and current hospitalization records reviewed in detail  Discussed with patient in detail regarding the above plan  Discussed with Dr Farzad Fermin from [place] service  Thank you for this consultation  We will follow along with you  HISTORY OF PRESENT ILLNESS:  Reason for Consult:  UTI    HPI: Charlene Alcazar is a 66 y o  male, with history of prostate cancer, status post XRT with resulting XRT cystitis and recurrent hematuria with urinary retention and bilateral PCN in place, came to the ER on 08/17 with suprapubic pain and inability to urinate  On presentation, he did not have fever or leukocytosis  Sumner catheter was placed for urinary retention with evidence of hematuria  CBI was started  Yesterday, patient was scheduled to undergo cystoscopy  However, he developed acute fever with chills  WBC also elevated  Surgery was postponed  Patient was started on vancomycin/ampicillin  We are asked to evaluate the patient  Overall, patient feels better  He has no further chills  Suprapubic pain has resolved since placement of Sumner catheter on admission  Patient denies any other focal symptoms  As stated above, patient has history of urine retention and recurrent hematuria due to XRT cystitis  He had bilateral PCN in place  However, these PCN tube was capped and patient was able to urinate normally through his urethra, until the day prior to admission  Per record review, patient has had multiple admissions for hematuria, the last admission in late July  During admission, he had urine culture growing Klebsiella and Enterococcus  REVIEW OF SYSTEMS:  A complete system-based review was done  Except for what is noted in HPI above, ROS of systems is otherwise negative      PAST MEDICAL HISTORY:  Past Medical History:   Diagnosis Date    Cancer [facility])     prostate    COPD (chronic obstructive pulmonary disease) ([facility] [address] )  Hyperlipidemia     Hypertension      Past Surgical History:   Procedure Laterality Date    APPENDECTOMY      FL RETROGRADE PYELOGRAM  4/14/2021    HERNIA REPAIR      IR NEPHROSTOMY TUBE CHECK AND/OR REMOVAL  7/8/2021    IR NEPHROSTOMY TUBE CHECK/CHANGE/REPOSITION/REINSERTION/UPSIZE  5/10/2021    IR NEPHROSTOMY TUBE CHECK/CHANGE/REPOSITION/REINSERTION/UPSIZE  8/4/2021    IR NEPHROSTOMY TUBE PLACEMENT  5/7/2021    CA CYSTOURETHROSCOPY W/IRRIG & EVAC CLOTS Bilateral 4/14/2021    Procedure: CYSTOSCOPY EVACUATION OF CLOTS bilateral retrograde pyelograms possible TURBT bladder biopsy;  Surgeon: Lattie Hatchet, MD;  Location: [address] [place] [address];  Service: Urology    CA CYSTOURETHROSCOPY W/IRRIG & EVAC CLOTS N/A 4/24/2021    Procedure: CYSTOSCOPY EVACUATION OF CLOTS fulguration;  Surgeon: Lattie Hatchet, MD;  Location: [address] [place] [address];  Service: Urology    CA CYSTOURETHROSCOPY W/IRRIG & EVAC CLOTS N/A 7/8/2021    Procedure: CYSTOSCOPY EVACUATION OF CLOTS BILATERAL ANTIROGRADES NEPHROSTOMY Wilian Ely ;  Surgeon: Lattie Hatchet, MD;  Location: [address] [place] [address];  Service: Urology    PROSTATECTOMY      ROTATOR CUFF REPAIR      right shoulder     Problem list reviewed  FAMILY HISTORY:  Non-contributory    SOCIAL HISTORY:  Social History     Substance and Sexual Activity   Alcohol Use Never     Social History     Substance and Sexual Activity   Drug Use No     Social History     Tobacco Use   Smoking Status Former Smoker    Packs/day: 0 50    Years: 50 00    Pack years: 25 00    Quit date: 8/29/2017    Years since quitting: 3 9   Smokeless Tobacco Never Used   Tobacco Comment    quit one month ago       ALLERGIES:  No Known Allergies    MEDICATIONS:  All current active medications have been reviewed  Patient is currently on vancomycin/ampicillin      PHYSICAL EXAM:  Vitals:  Temp:  [98 °F (36 7 °C)-99 °F (37 2 °C)] 99 °F (37 2 °C)  HR:  [73-81] 75  Resp:  [18-24] 18  BP: (123-142)/(54-69) 126/59  SpO2:  [88 %-91 %] 88 %  Temp (24hrs), Av 6 °F (37 °C), Min:98 °F (36 7 °C), Max:99 °F (37 2 °C)  Current: Temperature: 99 °F (37 2 °C)     Physical Exam:  General:  Well-nourished, well-developed, acutely ill and somewhat chronically ill-appearing, nontoxic, in no acute distress  Awake, alert and oriented x 3  Eyes:  Conjunctive clear with no hemorrhages or effusions  Oropharynx:  No ulcers, no lesions, pharynx benign, no tonsillitis  Neck:  Supple, no lymphadenopathy, no mass, nontender  Lungs:  Expansion symmetric, no rales, no wheezing, no accessory muscle use  Cardiac:  Regular rate and rhythm, normal S1, normal S2, no murmurs  Abdomen:  Soft, nondistended, mild suprapubic tenderness, no HSM  Extremities:  No edema, no erythema, nontender  No ulcers  Skin:  No rashes, no ulcers  Neurological:  Moves all four extremities spontaneously, sensation grossly intact    LABS, IMAGING, & OTHER STUDIES:  Lab Results:  I have personally reviewed pertinent labs  Results from last 7 days   Lab Units 21  0532 21  0514 21  1309   POTASSIUM mmol/L 3 8 4 4 4 6   CHLORIDE mmol/L 102 106 104   CO2 mmol/L 25 27 28   BUN mg/dL 43* 36* 38*   CREATININE mg/dL 1 97* 1 46* 1 57*   EGFR ml/min/1 73sq m 32 46 42   CALCIUM mg/dL 7 7* 8 6 9 0   AST U/L  --   --  20   ALT U/L  --   --  31   ALK PHOS U/L  --   --  96     Results from last 7 days   Lab Units 21  1028 21  0532 21  1732 21  0514   WBC Thousand/uL  --  10 63* 14 01* 11 61*   HEMOGLOBIN g/dL 6 8* 7 2* 8 9* 8 1*   PLATELETS Thousands/uL  --  175 232 269           Imaging Studies:   I have personally reviewed pertinent imaging study reports and images in PACS  CXR reviewed personally  No infiltrates or consolidations  EKG, Pathology, and Other Studies:   I have personally reviewed pertinent reports

## 2021-08-20 NOTE — ASSESSMENT & PLAN NOTE
Status post radiation, had radiation cystitis  Status post bilateral nephrostomy tubes due to recurrent hematuria, currently capped  Urology following, appreciate input

## 2021-08-20 NOTE — ASSESSMENT & PLAN NOTE
Baseline hemoglobin appears to be around 8-9's likely due to iron deficiency  Patient on iron supplements at home  Hemoglobin 9 2 on admission  Trending down due to hematuria  Hemoglobin trended down to 6 8 8/20, received 1 unit RBC  Hemoglobin up to low 8's now down to mid 7's today  Monitor H&H every 8 hours, transfuse for hemoglobin less than 7 or sooner if hemodynamically unstable    Repeat hemoglobin MN  Continue iron supplement, B12, MVI from home

## 2021-08-20 NOTE — CASE MANAGEMENT
LOS - 2 days    SW met with pt and wife to assess needs and discuss plans  Discussed goals of making sure pt's needs are met upon discharge and that Freedom of Choice is offered  Pt is a 30 day readmission  Admissions are related to ongoing bladder issues due to radiation cystitis  Pt lives with his wife  Independent with ADLs and mobility, driving  No DME at home  No HHC/STR history  No MH or D&A issues  Pt's PCP is Dr Felix Fermin MD   Per pt he has prescription coverage and has no difficulty getting his medication as prescribed  Pt does not have POA/advanced directives  Pt's wife is his healthcare representative  Discussed discharge plans and needs with pt and wife  Pt's plan is to return home  No discharge needs expressed or anticipated by pt at this time  Offered support in future if needed  Wife to transport home  IMM reviewed with patient and wife  Patient signed IMM and copy given  Copy also placed in scan bin for chart   Will be available if plans change/needs arise

## 2021-08-20 NOTE — PROGRESS NOTES
Armando 43  Progress Note Debi Portillo 1945, 66 y o  male MRN: [id number]  Unit/Bed#: [address] [address] Encounter: [id number]  Primary Care Provider: Adrian Aguilar MD   Date and time admitted to hospital: 8/17/2021 12:20 PM    * Urinary retention  Assessment & Plan  Secondary to hematuria with mild blood clots and radiation cystitis  Patient is status post Sumner catheter and started on CBI  Appreciate urology input  · Cystoscopy and Fulgration canceled yesterday due to fever  · Continue CBI  Monitor BMP  Cr worsening today  Management as below  Severe sepsis [facility])  Assessment & Plan  Evolving since admission, as evidenced by fever, leukocytosis, lactic acidosis, with source of infection likely cystitis, most likely secondary to urinary retention  Fever 103 6 yesterday evening, WBC 14, lactic acid 2 5  Lactic acid normalized with IV fluid boluses  UA 8/19 showed trace leukocytes, large blood  Patient did receive 1 dose of IV Rocephin on 8/17  Blood cultures pending  Check procalcitonin  Urine culture on 7/30/2021 showed Enterococcus and Klebsiella pneumoniae  Patient was started on IV vancomycin and IV ampicillin yesterday based on that  Check renal ultrasound  Appreciate ID input  · Changing antibiotic to IV Unasyn    Results from last 7 days   Lab Units 08/19/21  2030 08/19/21  1732   LACTIC ACID mmol/L 1 0 2 5*     Results from last 7 days   Lab Units 08/20/21  0532 08/19/21  1732 08/18/21  0514 08/17/21  1309   WBC Thousand/uL 10 63* 14 01* 11 61* 9 61              Hematuria  Assessment & Plan  Hematuria secondary to cystitis  And possible triggered by an episode of coughing spell  Patient was recently treated for UTI  Continue patient on CBI, follow up Urology recommendations  Appreciate urology input      Anemia  Assessment & Plan  Secondary to iron deficiency anemia    Baseline hemoglobin appears to be around 8-9's  Patient on iron supplements at home   Hemoglobin 9 2 on admission  Trending down due to hematuria  Hemoglobin trended down to 6 8 today   Will transfuse 1 unit RBC  Repeat H&H 2 hours post transfusion  Monitor H&H every 12 hours, transfuse for hemoglobin less than 7 or sooner if hemodynamically unstable  Continue iron supplement, B12, MVI from home    Acute kidney injury superimposed on CKD [facility])  Assessment & Plan  Lab Results   Component Value Date    EGFR 32 08/20/2021    EGFR 46 08/18/2021    EGFR 42 08/17/2021    CREATININE 1 97 (H) 08/20/2021    CREATININE 1 46 (H) 08/18/2021    CREATININE 1 57 (H) 08/17/2021   Baseline creatinine appears to be around 1 4-1 5's  Creatinine 1 57 on admission  Creatinine today 1 97  Check renal and bladder ultrasound  Avoid nephrotoxins and hypotension  Blood transfusion as needed to keep hemoglobin above 7  Appreciate nephrology input  · Suspect prerenal  · Continue IV fluids  · Repeat BMP in a m  CKD (chronic kidney disease)  Assessment & Plan  Lab Results   Component Value Date    EGFR 32 08/20/2021    EGFR 46 08/18/2021    EGFR 42 08/17/2021    CREATININE 1 97 (H) 08/20/2021    CREATININE 1 46 (H) 08/18/2021    CREATININE 1 57 (H) 08/17/2021     Patient is around his baseline creatinine of around 1 5  Monitor BMP    Prostate cancer [facility])  Assessment & Plan  Status post radiation, had radiation cystitis  Status post bilateral nephrostomy tubes due to recurrent hematuria, currently clamped  Urology following, appreciate input  RA (rheumatoid arthritis) ([facility])  Assessment & Plan  Continue supportive care    Chronic obstructive pulmonary disease (HCC)  Assessment & Plan  Continue on Breo and fluticasone  Continue albuterol inhalers as needed  No evidence of exacerbation  Chest x-ray NAD  Will order albuterol p r n          VTE Pharmacologic Prophylaxis:   Pharmacologic: Pharmacologic VTE Prophylaxis contraindicated due to Hematuria  Mechanical VTE Prophylaxis in Place: Yes    Patient Centered Rounds: I have performed bedside rounds with nursing staff today  Discussions with Specialists or Other Care Team Provider:  Yes    Education and Discussions with Family / Patient:  Yes    Time Spent for Care: 20 minutes  More than 50% of total time spent on counseling and coordination of care as described above  Current Length of Stay: 2 day(s)    Current Patient Status: Inpatient   Certification Statement: The patient will continue to require additional inpatient hospital stay due to Severe sepsis, urinary retention, complicated UTI acute kidney injury, anemia    Discharge Plan:  Pending progress    Code Status: Level 1 - Full Code      Subjective:   Patient denies lower abdominal pain  Denies chest pain, headache, dizziness, SOB, nausea, vomiting  , diarrhea, constipation  reports slight cough  Objective:     Vitals:   Temp (24hrs), Av 6 °F (37 °C), Min:98 °F (36 7 °C), Max:99 °F (37 2 °C)    Temp:  [98 °F (36 7 °C)-99 °F (37 2 °C)] 99 °F (37 2 °C)  HR:  [73-81] 75  Resp:  [18-24] 18  BP: (123-142)/(54-69) 126/59  SpO2:  [88 %-91 %] 88 %  Body mass index is 31 01 kg/m²  Input and Output Summary (last 24 hours): Intake/Output Summary (Last 24 hours) at 2021 1221  Last data filed at 2021 0920  Gross per 24 hour   Intake 1000 ml   Output 450 ml   Net 550 ml       Physical Exam:     Physical Exam  Vitals and nursing note reviewed  Constitutional:       Appearance: He is well-developed  HENT:      Head: Normocephalic and atraumatic  Neck:      Thyroid: No thyromegaly  Vascular: No JVD  Trachea: No tracheal deviation  Cardiovascular:      Rate and Rhythm: Normal rate and regular rhythm  Heart sounds: Normal heart sounds  Pulmonary:      Effort: Pulmonary effort is normal  No respiratory distress  Breath sounds: No wheezing or rales        Comments: Breath sounds diminished bilateral, on room air, respirations easy  Abdominal:      General: Bowel sounds are normal  There is no distension  Palpations: Abdomen is soft  Tenderness: There is no abdominal tenderness  There is no guarding  Genitourinary:     Comments: Three way Sumner in situ  CBI ongoing, urine pinkish  Bilateral nephrostomy tube clamped  Musculoskeletal:         General: Normal range of motion  Cervical back: Neck supple  Skin:     General: Skin is warm and dry  Neurological:      General: No focal deficit present  Mental Status: He is alert and oriented to person, place, and time  Psychiatric:         Mood and Affect: Mood normal          Judgment: Judgment normal          Additional Data:     Labs:    Results from last 7 days   Lab Units 08/20/21  1028 08/20/21  0532   WBC Thousand/uL  --  10 63*   HEMOGLOBIN g/dL 6 8* 7 2*   HEMATOCRIT % 22 2* 24 6*   PLATELETS Thousands/uL  --  175   NEUTROS PCT %  --  74   LYMPHS PCT %  --  12*   MONOS PCT %  --  12   EOS PCT %  --  1     Results from last 7 days   Lab Units 08/20/21  0532 08/17/21  1309   POTASSIUM mmol/L 3 8 4 6   CHLORIDE mmol/L 102 104   CO2 mmol/L 25 28   BUN mg/dL 43* 38*   CREATININE mg/dL 1 97* 1 57*   CALCIUM mg/dL 7 7* 9 0   ALK PHOS U/L  --  96   ALT U/L  --  31   AST U/L  --  20           * I Have Reviewed All Lab Data Listed Above  * Additional Pertinent Lab Tests Reviewed: Bony 64 Admission Reviewed    Imaging:    Imaging Reports Reviewed Today Include:  Chest x-ray  Imaging Personally Reviewed by Myself Includes:  None    Recent Cultures (last 7 days):     Results from last 7 days   Lab Units 08/19/21  1829 08/19/21  1731   BLOOD CULTURE  Received in Microbiology Lab  Culture in Progress  Received in Microbiology Lab  Culture in Progress         Last 24 Hours Medication List:   Current Facility-Administered Medications   Medication Dose Route Frequency Provider Last Rate    acetaminophen  650 mg Oral Q4H PRN Tabby Prince MD      ampicillin-sulbactam  3 g Intravenous Q8H Santos Bashir MD      atorvastatin  20 mg Oral HS Rafi Pino MD      belladonna-opium  30 mg Rectal Q8H PRN Rafi Pino MD      cyanocobalamin  500 mcg Oral Daily Rafi Pino MD      ferrous sulfate  325 mg Oral Daily With Breakfast Rafi Pino MD      fluticasone  1 spray Nasal Daily Rafi Pino MD      fluticasone-vilanterol  1 puff Inhalation HS Rafi Pino MD      loratadine  10 mg Oral Daily Rafi Pino MD      metoprolol tartrate  25 mg Oral Q12H [facility] Rafi Pino MD      multivitamin-minerals  1 tablet Oral Daily Rafi Pino MD      saccharomyces boulardii  250 mg Oral BID CORWIN Braden      sodium chloride  75 mL/hr Intravenous Continuous Mckinley Montoya MD          Today, Patient Was Seen By: CORWIN Wells    ** Please Note: Dragon 360 Dictation voice to text software may have been used in the creation of this document   **

## 2021-08-20 NOTE — ASSESSMENT & PLAN NOTE
Secondary to possibly hematuria and mild blood clots  Patient is status post Sumner catheter and started on CBI  Appreciate urology input  · Cystoscopy and Fulgration canceled yesterday due to fever  · Continue CBI  Monitor BMP  Cr worsening today  Management as below

## 2021-08-20 NOTE — ASSESSMENT & PLAN NOTE
Secondary to hematuria with mild blood clots and radiation cystitis  Patient is status post Sumner catheter and started on CBI  Appreciate urology input  · Cystoscopy and Fulgration canceled 8/19 due to fever  · Continue CBI  · Plan for OR tomorrow  Monitor BMP

## 2021-08-20 NOTE — PLAN OF CARE
Problem: MOBILITY - ADULT  Goal: Maintain or return to baseline ADL function  Description: INTERVENTIONS:  -  Assess patient's ability to carry out ADLs; assess patient's baseline for ADL function and identify physical deficits which impact ability to perform ADLs (bathing, care of mouth/teeth, toileting, grooming, dressing, etc )  - Assess/evaluate cause of self-care deficits   - Assess range of motion  - Assess patient's mobility; develop plan if impaired  - Assess patient's need for assistive devices and provide as appropriate  - Encourage maximum independence but intervene and supervise when necessary  - Involve family in performance of ADLs  - Assess for home care needs following discharge   - Consider OT consult to assist with ADL evaluation and planning for discharge  - Provide patient education as appropriate  Outcome: Progressing  Goal: Maintains/Returns to pre admission functional level  Description: INTERVENTIONS:  - Perform BMAT or MOVE assessment daily    - Set and communicate daily mobility goal to care team and patient/family/caregiver  - Collaborate with rehabilitation services on mobility goals if consulted  - Perform Range of Motion 3 times a day  - Reposition patient every 2 hours    - Dangle patient 3 times a day  - Stand patient 3 times a day  - Ambulate patient 3 times a day  - Out of bed to chair 3 times a day   - Out of bed for meals 3 times a day  - Out of bed for toileting  - Record patient progress and toleration of activity level   Outcome: Progressing     Problem: GENITOURINARY - ADULT  Goal: Maintains or returns to baseline urinary function  Description: INTERVENTIONS:  - Assess urinary function  - Encourage oral fluids to ensure adequate hydration if ordered  - Administer IV fluids as ordered to ensure adequate hydration  - Administer ordered medications as needed  - Offer frequent toileting  - Follow urinary retention protocol if ordered  Outcome: Progressing  Goal: Absence of urinary retention  Description: INTERVENTIONS:  - Assess patients ability to void and empty bladder  - Monitor I/O  - Bladder scan as needed  - Discuss with physician/AP medications to alleviate retention as needed  - Discuss catheterization for long term situations as appropriate  Outcome: Progressing  Goal: Urinary catheter remains patent  Description: INTERVENTIONS:  - Assess patency of urinary catheter  - If patient has a chronic alanis, consider changing catheter if non-functioning  - Follow guidelines for intermittent irrigation of non-functioning urinary catheter  Outcome: Progressing     Problem: Potential for Falls  Goal: Patient will remain free of falls  Description: INTERVENTIONS:  - Educate patient/family on patient safety including physical limitations  - Instruct patient to call for assistance with activity   - Consult OT/PT to assist with strengthening/mobility   - Keep Call bell within reach  - Keep bed low and locked with side rails adjusted as appropriate  - Keep care items and personal belongings within reach  - Initiate and maintain comfort rounds  - Make Fall Risk Sign visible to staff  - Offer Toileting every 2 Hours, in advance of need  - Initiate/Maintain bed alarm  - Obtain necessary fall risk management equipment:   - Apply yellow socks and bracelet for high fall risk patients  - Consider moving patient to room near nurses station  Outcome: Progressing

## 2021-08-20 NOTE — PHYSICAL THERAPY NOTE
08/20/21 1449   Note Type   Note type Evaluation   Cancel Reasons Other  (pt with CBI, patient deferred activity, will re-attempt at a later time as appropriate)   Licensure   [place] License Number  Angus Sauceda PT [id number]

## 2021-08-20 NOTE — ASSESSMENT & PLAN NOTE
Lab Results   Component Value Date    EGFR 32 08/20/2021    EGFR 46 08/18/2021    EGFR 42 08/17/2021    CREATININE 1 97 (H) 08/20/2021    CREATININE 1 46 (H) 08/18/2021    CREATININE 1 57 (H) 08/17/2021   Baseline creatinine appears to be around 1 4-1 5's  Creatinine 1 57 on admission  Creatinine today 1 97  Check renal and bladder ultrasound  Avoid nephrotoxins and hypotension  Blood transfusion as needed to keep hemoglobin above 7  Appreciate nephrology input  · Suspect prerenal  · Continue IV fluids  · Repeat BMP in a m    ·

## 2021-08-20 NOTE — ASSESSMENT & PLAN NOTE
Status post radiation, had radiation cystitis  Status post bilateral nephrostomy tubes due to recurrent hematuria, currently clamped  Urology following, appreciate input

## 2021-08-20 NOTE — ASSESSMENT & PLAN NOTE
Lab Results   Component Value Date    EGFR 38 08/21/2021    EGFR 32 08/20/2021    EGFR 46 08/18/2021    CREATININE 1 69 (H) 08/21/2021    CREATININE 1 97 (H) 08/20/2021    CREATININE 1 46 (H) 08/18/2021   Baseline creatinine appears to be around 1 4-1 5's  Creatinine 1 57 on admission  Creatinine peaked to 1 97, trending down today  Renal and bladder ultrasound unremarkable  Avoid nephrotoxins and hypotension  Blood transfusion as needed to keep hemoglobin above 7  Appreciate nephrology input  · Suspect prerenal  · Continue IV fluids  · Repeat BMP in a m

## 2021-08-20 NOTE — PROGRESS NOTES
Progress Note - Urology      Patient: Amber Swain   : 1945 Sex: male   MRN: [id number]     CSN: [id number]  Unit/Bed#: [address] [place] [address]     SUBJECTIVE:   Sleeping      Objective   Vitals: /59   Pulse 75   Temp 99 °F (37 2 °C)   Resp 18   Ht 5' 9" (1 753 m)   Wt 95 3 kg (210 lb)   SpO2 (!) 88%   BMI 31 01 kg/m²     I/O last 24 hours: In: 1055 [P O :150;  I V :405; IV Piggyback:500]  Out: -50       Physical Exam:   General Alert awake   Normocephalic atraumatic PERRLA  Lungs clear bilaterally  Cardiac normal S1 normal S2  Abdomen soft, flank pain  Extremities no edema      Lab Results: CBC:   Lab Results   Component Value Date    WBC 10 63 (H) 2021    HGB 7 2 (L) 2021    HCT 24 6 (L) 2021    MCV 78 (L) 2021     2021    MCH 22 9 (L) 2021    MCHC 29 3 (L) 2021    RDW 19 2 (H) 2021    MPV 9 9 2021    NRBC 0 2021     CMP:   Lab Results   Component Value Date     2021    CO2 25 2021    BUN 43 (H) 2021    CREATININE 1 97 (H) 2021    CALCIUM 7 7 (L) 2021    AST 20 2021    ALT 31 2021    ALKPHOS 96 2021    EGFR 32 2021     Urinalysis:   Lab Results   Component Value Date    COLORU Orange 2021    CLARITYU Slightly Cloudy 2021    SPECGRAV 1 010 2021    PHUR 6 0 2021    PHUR 5 5 2018    LEUKOCYTESUR Trace (A) 2021    NITRITE Negative 2021    GLUCOSEU Negative 2021    KETONESU Negative 2021    BILIRUBINUR Negative 2021    BLOODU Large (A) 2021     Urine Culture:   Lab Results   Component Value Date    URINECX >100,000 cfu/ml Klebsiella pneumoniae (A) 2021    URINECX >100,000 cfu/ml Enterococcus faecalis (A) 2021     PSA: No results found for: PSA      Assessment/ Plan:  Gross hematuria  Recent complicated UTI  Fever spike 0103  CBI clear at this time hand irrigated yesterday in the preop area for possible cysto fulguration canceled  Blood cultures urine cultures pending  Fevers trending down          Anai Jimenez MD

## 2021-08-20 NOTE — ASSESSMENT & PLAN NOTE
Lab Results   Component Value Date    EGFR 32 08/20/2021    EGFR 46 08/18/2021    EGFR 42 08/17/2021    CREATININE 1 97 (H) 08/20/2021    CREATININE 1 46 (H) 08/18/2021    CREATININE 1 57 (H) 08/17/2021     Patient is around his baseline creatinine of around 1 5    Monitor BMP

## 2021-08-20 NOTE — ASSESSMENT & PLAN NOTE
Hematuria secondary to cystitis  And possible triggered by an episode of coughing spell  Patient was recently treated for UTI  Continue patient on CBI, follow up Urology recommendations    For [address] [place] [address] urology input

## 2021-08-20 NOTE — CONSULTS
2           Consultation - Nephrology   Elida Black 66 y o  male MRN: [id number]  Unit/Bed#: [address] [place] [address] Encounter: [id number]      Assessment/Plan     Assessment / Plan:  1  Renal    Patient appears to have chronic renal insufficiency baseline creatinine is around 1 5  Patient was admitted with hematuria complicated UTI fever and creatinine is 1 9  Reviewing the orders he did require a bolus of IV fluids so I suspect he likely has some prerenal azotemia  He has Sumner catheter in with CBI going urology is following so that would inclined to rule out bladder outlet issues  Start normal saline 75 cc/hour  BMP a m     2  Urologic    The patient apparently had nephrostomy tubes in and these were clamped about 3 weeks ago  He then developed hematuria urinary tract infection was admitted to the hospital   Urology is following and managing  He has history of prostate cancer status post radiation and developed radiation cystitis  Patient is on broad-spectrum antibiotics for presumptive urinary tract infection  History of Present Illness   Physician Requesting Consult: Ricky Bailey MD  Reason for Consult / Principal Problem:  Acute on chronic kidney disease  Hx and PE limited by:   HPI: Elida Black is a 66y o  year old male who has history of mild renal insufficiency baseline creatinine appears to be 1 3-1 5  The patient has history of prostate cancer with radiation cystitis  He had nephrostomy tubes placed and these were apparently clamped 3 weeks ago  He told me it was to rest his bladder  He then was recently discharged and had a bout of gross hematuria and pelvic pain and developed fever to 103° F and presented was admitted to the hospital   His creatinine increased were asked to see him regarding this  History obtained from chart review and the patient  Consults    Review of Systems   Constitutional: Positive for fatigue and fever  Negative for chills and diaphoresis  HENT: Negative  Eyes: Negative  Respiratory: Negative  Negative for cough, chest tightness, shortness of breath and wheezing  Cardiovascular: Negative  Negative for chest pain, palpitations and leg swelling  Gastrointestinal: Negative  Negative for abdominal distention, abdominal pain, diarrhea, nausea and vomiting  Genitourinary:        Had gross hematuria it is now clearing after CBI   Neurological: Negative for dizziness, syncope, light-headedness and headaches  Psychiatric/Behavioral: Negative  Negative for agitation, behavioral problems, confusion and decreased concentration         Historical Information   Patient Active Problem List   Diagnosis    Acute cystitis with hematuria    Essential hypertension    Dyslipidemia    Hyperlipidemia    Anemia    Chronic obstructive pulmonary disease (HCC)    [facility]I [facility] [place] [facility] )    Hematuria due to cystitis    Acute blood loss anemia (ABLA)    Leukocytosis    RA (rheumatoid arthritis) ([place] [address] )    Prostate cancer (HCC)    CKD (chronic kidney disease)    Urinary retention    Hematuria    Fever     Past Medical History:   Diagnosis Date    Cancer [facility])     prostate    COPD (chronic obstructive pulmonary disease) ([place] [address] )     Hyperlipidemia     Hypertension      Past Surgical History:   Procedure Laterality Date    APPENDECTOMY      FL RETROGRADE PYELOGRAM  4/14/2021    HERNIA REPAIR      IR NEPHROSTOMY TUBE CHECK AND/OR REMOVAL  7/8/2021    IR NEPHROSTOMY TUBE CHECK/CHANGE/REPOSITION/REINSERTION/UPSIZE  5/10/2021    IR NEPHROSTOMY TUBE CHECK/CHANGE/REPOSITION/REINSERTION/UPSIZE  8/4/2021    IR NEPHROSTOMY TUBE PLACEMENT  5/7/2021    MD CYSTOURETHROSCOPY W/IRRIG & EVAC CLOTS Bilateral 4/14/2021    Procedure: CYSTOSCOPY EVACUATION OF CLOTS bilateral retrograde pyelograms possible TURBT bladder biopsy;  Surgeon: Hari Peacock MD;  Location: [address] [place] [address];  Service: Urology    MD CYSTOURETHROSCOPY W/IRRIG & EVAC CLOTS N/A 4/24/2021    Procedure: CYSTOSCOPY EVACUATION OF CLOTS fulguration;  Surgeon: Danielle Garcia MD;  Location: [facility];  Service: Urology    OR CYSTOURETHROSCOPY W/IRRIG & EVAC CLOTS N/A 7/8/2021    Procedure: CYSTOSCOPY EVACUATION OF CLOTS BILATERAL ANTIROGRADES NEPHROSTOMY Comfort Burnham ;  Surgeon: Danielle Garcia MD;  Location: [address] [place] [address];  Service: Urology    [address]      right shoulder     Social History   Social History     Substance and Sexual Activity   Alcohol Use Never     Social History     Substance and Sexual Activity   Drug Use No     Social History     Tobacco Use   Smoking Status Former Smoker    Packs/day: 0 50    Years: 50 00    Pack years: 25 00    Quit date: 8/29/2017    Years since quitting: 3 9   Smokeless Tobacco Never Used   Tobacco Comment    quit one month ago     Family History   Problem Relation Age of Onset    Diabetes Mother     Stroke Mother     Diabetes Brother     Stroke Brother        Meds/Allergies   current meds:   Current Facility-Administered Medications   Medication Dose Route Frequency    acetaminophen (TYLENOL) tablet 650 mg  650 mg Oral Q4H PRN    ampicillin (OMNIPEN) 2,000 mg in sodium chloride 0 9 % 100 mL IVPB  2,000 mg Intravenous Q4H    atorvastatin (LIPITOR) tablet 20 mg  20 mg Oral HS    belladonna-opium (B&O SUPPOSITORY) 16 2-30 mg suppository 1 suppository  30 mg Rectal Q8H PRN    cyanocobalamin (VITAMIN B-12) tablet 500 mcg  500 mcg Oral Daily    ferrous sulfate tablet 325 mg  325 mg Oral Daily With Breakfast    fluticasone (FLONASE) 50 mcg/act nasal spray 1 spray  1 spray Nasal Daily    fluticasone-vilanterol (BREO ELLIPTA) 200-25 MCG/INH inhaler 1 puff  1 puff Inhalation HS    loratadine (CLARITIN) tablet 10 mg  10 mg Oral Daily    metoprolol tartrate (LOPRESSOR) tablet 25 mg  25 mg Oral Q12H [address]    multivitamin-minerals (CENTRUM) tablet 1 tablet  1 tablet Oral Daily    saccharomyces boulardii (FLORASTOR) capsule 250 mg  250 mg Oral BID    sodium chloride infusion 0 45 %  50 mL/hr Intravenous Continuous    vancomycin (VANCOCIN) IVPB (premix in dextrose) 1,000 mg 200 mL  1,000 mg Intravenous Q12H     No Known Allergies    Objective     Intake/Output Summary (Last 24 hours) at 8/20/2021 1031  Last data filed at 8/20/2021 0920  Gross per 24 hour   Intake 1000 ml   Output -50 ml   Net 1050 ml     Body mass index is 31 01 kg/m²  Invasive Devices:   Continuous Bladder Irrigation Three-way (Active)   Site Assessment Clean;Skin intact 08/19/21 2244   Securement Method Securing device (Describe) 08/19/21 2244   Rate Moderate 08/19/21 2244   Irrigant Normal saline 08/19/21 2244   CBI Irrigation Intake (mL) 6000 mL 08/20/21 0555   CBI Sumner Output (mL) 5600 mL 08/20/21 0555   CBI Net Output (mL) -400 mL 08/20/21 0555   Sumner Output Appearance Clear;Blood clots 08/20/21 0555       PHYSICAL EXAM:  /59   Pulse 75   Temp 99 °F (37 2 °C)   Resp 18   Ht 5' 9" (1 753 m)   Wt 95 3 kg (210 lb)   SpO2 (!) 88%   BMI 31 01 kg/m²     Physical Exam  Constitutional:       General: He is not in acute distress  Appearance: He is not toxic-appearing or diaphoretic  HENT:      Head: Normocephalic and atraumatic  Mouth/Throat:      Mouth: Mucous membranes are dry  Eyes:      General: No scleral icterus  Extraocular Movements: Extraocular movements intact  Cardiovascular:      Rate and Rhythm: Normal rate and regular rhythm  Heart sounds: No friction rub  No gallop  Comments: No significant pretibial edema  Pulmonary:      Effort: Pulmonary effort is normal  No respiratory distress  Breath sounds: Normal breath sounds  No wheezing, rhonchi or rales  Abdominal:      General: Bowel sounds are normal  There is no distension  Palpations: Abdomen is soft  Tenderness: There is no abdominal tenderness  There is no rebound  Musculoskeletal:      Cervical back: Normal range of motion and neck supple  Neurological:      General: No focal deficit present  Mental Status: He is alert and oriented to person, place, and time  Psychiatric:         Mood and Affect: Mood normal          Behavior: Behavior normal          Thought Content:  Thought content normal          Judgment: Judgment normal            Current Weight: Weight - Scale: 95 3 kg (210 lb)  First Weight: Weight - Scale: 96 2 kg (212 lb)    Lab Results:    Results from last 7 days   Lab Units 08/20/21  0532   WBC Thousand/uL 10 63*   HEMOGLOBIN g/dL 7 2*   HEMATOCRIT % 24 6*   PLATELETS Thousands/uL 175     Results from last 7 days   Lab Units 08/20/21  0532   POTASSIUM mmol/L 3 8   CHLORIDE mmol/L 102   CO2 mmol/L 25   BUN mg/dL 43*   CREATININE mg/dL 1 97*   CALCIUM mg/dL 7 7*     Results from last 7 days   Lab Units 08/20/21  0532 08/17/21  1309   POTASSIUM mmol/L 3 8 4 6   CHLORIDE mmol/L 102 104   CO2 mmol/L 25 28   BUN mg/dL 43* 38*   CREATININE mg/dL 1 97* 1 57*   CALCIUM mg/dL 7 7* 9 0   ALK PHOS U/L  --  96   ALT U/L  --  31   AST U/L  --  20

## 2021-08-20 NOTE — ASSESSMENT & PLAN NOTE
Secondary to iron deficiency anemia  Baseline hemoglobin appears to be around 8-9's  Patient on iron supplements at home  Hemoglobin 9 2 on admission  Trending down due to hematuria  Hemoglobin trended down to 6 8 today   Will transfuse 1 unit RBC  Repeat H&H 2 hours post transfusion  Monitor H&H every 12 hours, transfuse for hemoglobin less than 7 or sooner if hemodynamically unstable    Continue iron supplement, B12, MVI from home

## 2021-08-20 NOTE — ASSESSMENT & PLAN NOTE
Continue on Breo and fluticasone  Continue albuterol inhalers as needed  No evidence of exacerbation  Chest x-ray NAD  Will order albuterol p r n

## 2021-08-20 NOTE — ASSESSMENT & PLAN NOTE
Evolving since admission, as evidenced by fever, leukocytosis, lactic acidosis, with source of infection likely complicated UTI, rule out pyelo  Fever 103 6 yesterday evening, WBC 14, lactic acid 2 5  Lactic acid normalized with IV fluid boluses  UA 8/19 showed trace leukocytes, large blood  Patient did receive 1 dose of IV Rocephin on 8/17  Blood cultures pending  Check procalcitonin  Urine culture on 7/30/2021 showed Enterococcus and Klebsiella pneumoniae  Patient was started on IV vancomycin and IV ampicillin yesterday based on that  Will continue  Check renal ultrasound  Will consult ID  Will defer further workup to id      Results from last 7 days   Lab Units 08/19/21 2030 08/19/21  1732   LACTIC ACID mmol/L 1 0 2 5*     Results from last 7 days   Lab Units 08/20/21  0532 08/19/21  1732 08/18/21  0514 08/17/21  1309   WBC Thousand/uL 10 63* 14 01* 11 61* 9 61

## 2021-08-20 NOTE — ASSESSMENT & PLAN NOTE
Continue on Breo and fluticasone  No evidence of exacerbation  Chest x-ray NAD  Will order albuterol p r n

## 2021-08-20 NOTE — ASSESSMENT & PLAN NOTE
Evolving since admission, as evidenced by fever, leukocytosis, lactic acidosis, with source of infection likely cystitis, most likely secondary to urinary retention  Fever 103 6 8/19, WBC 14, lactic acid 2 5  Lactic acid normalized with IV fluid boluses  UA 8/19 showed trace leukocytes, large blood  Patient did receive 1 dose of IV Rocephin on 8/17  Urine culture not sent  Blood cultures no growth to date  Procalcitonin negative x1  Urine culture on 7/30/2021 showed Enterococcus and Klebsiella pneumoniae  Renal ultrasound unremarkable  Appreciate ID input  · Changed antibiotic to IV Unasyn  · Likely transition to p o   Augmentin in the morning as long as blood cultures remain negative    Results from last 7 days   Lab Units 08/19/21  2030 08/19/21  1732   LACTIC ACID mmol/L 1 0 2 5*     Results from last 7 days   Lab Units 08/20/21  0532 08/19/21  1732 08/18/21  0514 08/17/21  1309   WBC Thousand/uL 10 63* 14 01* 11 61* 9 61

## 2021-08-21 LAB
ABO GROUP BLD BPU: NORMAL
ANION GAP SERPL CALCULATED.3IONS-SCNC: 8 MMOL/L (ref 4–13)
BASOPHILS # BLD AUTO: 0.03 THOUSANDS/ΜL (ref 0–0.1)
BASOPHILS NFR BLD AUTO: 0 % (ref 0–1)
BPU ID: NORMAL
BUN SERPL-MCNC: 38 MG/DL (ref 5–25)
CALCIUM SERPL-MCNC: 7.4 MG/DL (ref 8.3–10.1)
CHLORIDE SERPL-SCNC: 105 MMOL/L (ref 100–108)
CO2 SERPL-SCNC: 25 MMOL/L (ref 21–32)
CREAT SERPL-MCNC: 1.69 MG/DL (ref 0.6–1.3)
CROSSMATCH: NORMAL
EOSINOPHIL # BLD AUTO: 0.62 THOUSAND/ΜL (ref 0–0.61)
EOSINOPHIL NFR BLD AUTO: 8 % (ref 0–6)
ERYTHROCYTE [DISTWIDTH] IN BLOOD BY AUTOMATED COUNT: 19.1 % (ref 11.6–15.1)
GFR SERPL CREATININE-BSD FRML MDRD: 38 ML/MIN/1.73SQ M
GLUCOSE SERPL-MCNC: 92 MG/DL (ref 65–140)
HCT VFR BLD AUTO: 25.5 % (ref 36.5–49.3)
HCT VFR BLD AUTO: 25.7 % (ref 36.5–49.3)
HGB BLD-MCNC: 7.6 G/DL (ref 12–17)
HGB BLD-MCNC: 7.8 G/DL (ref 12–17)
IMM GRANULOCYTES # BLD AUTO: 0.02 THOUSAND/UL (ref 0–0.2)
IMM GRANULOCYTES NFR BLD AUTO: 0 % (ref 0–2)
LYMPHOCYTES # BLD AUTO: 1.04 THOUSANDS/ΜL (ref 0.6–4.47)
LYMPHOCYTES NFR BLD AUTO: 14 % (ref 14–44)
MAGNESIUM SERPL-MCNC: 1.9 MG/DL (ref 1.6–2.6)
MCH RBC QN AUTO: 24.3 PG (ref 26.8–34.3)
MCHC RBC AUTO-ENTMCNC: 30.4 G/DL (ref 31.4–37.4)
MCV RBC AUTO: 80 FL (ref 82–98)
MONOCYTES # BLD AUTO: 0.85 THOUSAND/ΜL (ref 0.17–1.22)
MONOCYTES NFR BLD AUTO: 12 % (ref 4–12)
NEUTROPHILS # BLD AUTO: 4.81 THOUSANDS/ΜL (ref 1.85–7.62)
NEUTS SEG NFR BLD AUTO: 66 % (ref 43–75)
NRBC BLD AUTO-RTO: 0 /100 WBCS
PLATELET # BLD AUTO: 151 THOUSANDS/UL (ref 149–390)
PMV BLD AUTO: 10.2 FL (ref 8.9–12.7)
POTASSIUM SERPL-SCNC: 3.8 MMOL/L (ref 3.5–5.3)
PROCALCITONIN SERPL-MCNC: 0.14 NG/ML
RBC # BLD AUTO: 3.21 MILLION/UL (ref 3.88–5.62)
SODIUM SERPL-SCNC: 138 MMOL/L (ref 136–145)
UNIT DISPENSE STATUS: NORMAL
UNIT PRODUCT CODE: NORMAL
UNIT PRODUCT VOLUME: 351 ML
UNIT RH: NORMAL
WBC # BLD AUTO: 7.37 THOUSAND/UL (ref 4.31–10.16)

## 2021-08-21 PROCEDURE — 83735 ASSAY OF MAGNESIUM: CPT | Performed by: NURSE PRACTITIONER

## 2021-08-21 PROCEDURE — 99232 SBSQ HOSP IP/OBS MODERATE 35: CPT | Performed by: INTERNAL MEDICINE

## 2021-08-21 PROCEDURE — 85018 HEMOGLOBIN: CPT | Performed by: NURSE PRACTITIONER

## 2021-08-21 PROCEDURE — 99232 SBSQ HOSP IP/OBS MODERATE 35: CPT | Performed by: NURSE PRACTITIONER

## 2021-08-21 PROCEDURE — 85025 COMPLETE CBC W/AUTO DIFF WBC: CPT | Performed by: NURSE PRACTITIONER

## 2021-08-21 PROCEDURE — 99233 SBSQ HOSP IP/OBS HIGH 50: CPT | Performed by: INTERNAL MEDICINE

## 2021-08-21 PROCEDURE — 84145 PROCALCITONIN (PCT): CPT | Performed by: NURSE PRACTITIONER

## 2021-08-21 PROCEDURE — 85014 HEMATOCRIT: CPT | Performed by: NURSE PRACTITIONER

## 2021-08-21 PROCEDURE — 80048 BASIC METABOLIC PNL TOTAL CA: CPT | Performed by: INTERNAL MEDICINE

## 2021-08-21 RX ORDER — POTASSIUM CHLORIDE 20 MEQ/1
40 TABLET, EXTENDED RELEASE ORAL ONCE
Status: COMPLETED | OUTPATIENT
Start: 2021-08-21 | End: 2021-08-21

## 2021-08-21 RX ORDER — LEVOFLOXACIN 5 MG/ML
500 INJECTION, SOLUTION INTRAVENOUS ONCE
Status: CANCELLED | OUTPATIENT
Start: 2021-08-21 | End: 2021-08-21

## 2021-08-21 RX ADMIN — Medication 12.5 MG: at 21:09

## 2021-08-21 RX ADMIN — SODIUM CHLORIDE 3 G: 9 INJECTION, SOLUTION INTRAVENOUS at 05:36

## 2021-08-21 RX ADMIN — SODIUM CHLORIDE 3 G: 9 INJECTION, SOLUTION INTRAVENOUS at 21:17

## 2021-08-21 RX ADMIN — Medication 1 TABLET: at 08:02

## 2021-08-21 RX ADMIN — Medication 250 MG: at 17:07

## 2021-08-21 RX ADMIN — FLUTICASONE FUROATE AND VILANTEROL TRIFENATATE 1 PUFF: 200; 25 POWDER RESPIRATORY (INHALATION) at 21:11

## 2021-08-21 RX ADMIN — SODIUM CHLORIDE 3 G: 9 INJECTION, SOLUTION INTRAVENOUS at 14:26

## 2021-08-21 RX ADMIN — CYANOCOBALAMIN TAB 500 MCG 500 MCG: 500 TAB at 08:02

## 2021-08-21 RX ADMIN — SODIUM CHLORIDE 75 ML/HR: 0.9 INJECTION, SOLUTION INTRAVENOUS at 17:07

## 2021-08-21 RX ADMIN — Medication 12.5 MG: at 08:01

## 2021-08-21 RX ADMIN — Medication 250 MG: at 08:02

## 2021-08-21 RX ADMIN — ATORVASTATIN CALCIUM 20 MG: 20 TABLET, FILM COATED ORAL at 21:09

## 2021-08-21 RX ADMIN — POTASSIUM CHLORIDE 40 MEQ: 1500 TABLET, EXTENDED RELEASE ORAL at 19:27

## 2021-08-21 RX ADMIN — LORATADINE 10 MG: 10 TABLET ORAL at 08:01

## 2021-08-21 NOTE — PROGRESS NOTES
[facility] 43  Progress Note Roque Burris 1945, 66 y o  male MRN: [id number]  Unit/Bed#: 2 [place] [address] Encounter: [id number]  Primary Care Provider: Devon Robertson MD   Date and time admitted to hospital: 8/17/2021 12:20 PM    * Urinary retention  Assessment & Plan  Secondary to hematuria with mild blood clots and radiation cystitis  Patient is status post Sumner catheter and started on CBI  Appreciate urology input  · Cystoscopy and Fulgration canceled 8/19 due to fever  · Continue CBI  · Plan for OR tomorrow  Monitor BMP  Severe sepsis [facility])  Assessment & Plan  Evolving since admission, as evidenced by fever, leukocytosis, lactic acidosis, with source of infection likely cystitis, most likely secondary to urinary retention  Fever 103 6 8/19, WBC 14, lactic acid 2 5  Lactic acid normalized with IV fluid boluses  UA 8/19 showed trace leukocytes, large blood  Patient did receive 1 dose of IV Rocephin on 8/17  Urine culture not sent  Blood cultures no growth to date  Procalcitonin negative x1  Urine culture on 7/30/2021 showed Enterococcus and Klebsiella pneumoniae  Renal ultrasound unremarkable  Appreciate ID input  · Changed antibiotic to IV Unasyn  · Likely transition to p o  Augmentin in the morning as long as blood cultures remain negative    Results from last 7 days   Lab Units 08/19/21  2030 08/19/21  1732   LACTIC ACID mmol/L 1 0 2 5*     Results from last 7 days   Lab Units 08/20/21  0532 08/19/21  1732 08/18/21  0514 08/17/21  1309   WBC Thousand/uL 10 63* 14 01* 11 61* 9 61              Hematuria  Assessment & Plan  Hematuria secondary to cystitis  And possible triggered by an episode of coughing spell  Patient was recently treated for UTI  Continue patient on CBI, follow up Urology recommendations    For [facility] urology input      Anemia  Assessment & Plan  Baseline hemoglobin appears to be around 8-9's likely due to iron deficiency  Patient on iron supplements at home  Hemoglobin 9 2 on admission  Trending down due to hematuria  Hemoglobin trended down to 6 8 8/20, received 1 unit RBC  Hemoglobin up to low 8's now down to mid 7's today  Monitor H&H every 8 hours, transfuse for hemoglobin less than 7 or sooner if hemodynamically unstable  Repeat hemoglobin MN  Continue iron supplement, B12, MVI from home    Acute kidney injury superimposed on CKD [facility])  Assessment & Plan  Lab Results   Component Value Date    EGFR 38 08/21/2021    EGFR 32 08/20/2021    EGFR 46 08/18/2021    CREATININE 1 69 (H) 08/21/2021    CREATININE 1 97 (H) 08/20/2021    CREATININE 1 46 (H) 08/18/2021   Baseline creatinine appears to be around 1 4-1 5's  Creatinine 1 57 on admission  Creatinine peaked to 1 97, trending down today  Renal and bladder ultrasound unremarkable  Avoid nephrotoxins and hypotension  Blood transfusion as needed to keep hemoglobin above 7  Appreciate nephrology input  · Suspect prerenal  · Continue IV fluids  · Repeat BMP in a m  CKD (chronic kidney disease)  Assessment & Plan  Lab Results   Component Value Date    EGFR 32 08/20/2021    EGFR 46 08/18/2021    EGFR 42 08/17/2021    CREATININE 1 97 (H) 08/20/2021    CREATININE 1 46 (H) 08/18/2021    CREATININE 1 57 (H) 08/17/2021     Patient is around his baseline creatinine of around 1 5  Monitor BMP    Prostate cancer [facility])  Assessment & Plan  Status post radiation, had radiation cystitis  Status post bilateral nephrostomy tubes due to recurrent hematuria, currently capped  Urology following, appreciate input  RA (rheumatoid arthritis) ([facility])  Assessment & Plan  Continue supportive care    Chronic obstructive pulmonary disease (HCC)  Assessment & Plan  Continue on Breo and fluticasone  No evidence of exacerbation  Chest x-ray NAD  Will order albuterol p r n            VTE Pharmacologic Prophylaxis:   Pharmacologic: Pharmacologic VTE Prophylaxis contraindicated due to Hematuria  Mechanical VTE Prophylaxis in Place: Yes    Patient Centered Rounds: I have performed bedside rounds with nursing staff today  Discussions with Specialists or Other Care Team Provider:  Urology    Education and Discussions with Family / Patient:  Yes    Time Spent for Care: 20 minutes  More than 50% of total time spent on counseling and coordination of care as described above  Current Length of Stay: 3 day(s)    Current Patient Status: Inpatient   Certification Statement: The patient will continue to require additional inpatient hospital stay due to Urine retention, hematuria, cystitis, acute kidney injury    Discharge Plan:  Pending progress    Code Status: Level 1 - Full Code      Subjective:   Patient denies pelvic pain, fever, chills  Denies chest pain, headache, dizziness, SOB, nausea vomiting  Reports frequent BM  Soft stool per nursing  Required hand irrigation due to occlusion this morning  Moderate clots returned from hand irrigation this morning  Objective:     Vitals:   Temp (24hrs), Av 4 °F (36 9 °C), Min:97 1 °F (36 2 °C), Max:99 9 °F (37 7 °C)    Temp:  [97 1 °F (36 2 °C)-99 9 °F (37 7 °C)] 98 2 °F (36 8 °C)  HR:  [] 61  Resp:  [18-20] 20  BP: (112-128)/(56-57) 128/57  SpO2:  [89 %-95 %] 95 %  Body mass index is 30 99 kg/m²  Input and Output Summary (last 24 hours): Intake/Output Summary (Last 24 hours) at 2021 1825  Last data filed at 2021 1801  Gross per 24 hour   Intake 2670 ml   Output -400 ml   Net 3070 ml       Physical Exam:     Physical Exam  Vitals and nursing note reviewed  Constitutional:       Appearance: He is well-developed  HENT:      Head: Normocephalic and atraumatic  Neck:      Thyroid: No thyromegaly  Vascular: No JVD  Trachea: No tracheal deviation  Cardiovascular:      Rate and Rhythm: Normal rate and regular rhythm  Heart sounds: Normal heart sounds     Pulmonary:      Effort: Pulmonary effort is normal  No respiratory distress  Breath sounds: No wheezing or rales  Comments: Diminished breath sounds bilateral, on room air, respirations easy  Abdominal:      General: Bowel sounds are normal  There is no distension  Palpations: Abdomen is soft  Tenderness: There is no abdominal tenderness  There is no guarding  Genitourinary:     Comments: CBI ongoing, draining clear pinkish urine  Musculoskeletal:         General: No swelling or deformity  Cervical back: Neck supple  Right lower leg: No edema  Left lower leg: No edema  Skin:     General: Skin is warm and dry  Neurological:      General: No focal deficit present  Mental Status: He is alert and oriented to person, place, and time  Psychiatric:         Mood and Affect: Mood normal          Judgment: Judgment normal          Additional Data:     Labs:    Results from last 7 days   Lab Units 08/21/21  1439 08/21/21  0521   WBC Thousand/uL  --  7 37   HEMOGLOBIN g/dL 7 6* 7 8*   HEMATOCRIT % 25 5* 25 7*   PLATELETS Thousands/uL  --  151   NEUTROS PCT %  --  66   LYMPHS PCT %  --  14   MONOS PCT %  --  12   EOS PCT %  --  8*     Results from last 7 days   Lab Units 08/21/21  0521 08/17/21  1309   POTASSIUM mmol/L 3 8 4 6   CHLORIDE mmol/L 105 104   CO2 mmol/L 25 28   BUN mg/dL 38* 38*   CREATININE mg/dL 1 69* 1 57*   CALCIUM mg/dL 7 4* 9 0   ALK PHOS U/L  --  96   ALT U/L  --  31   AST U/L  --  20           * I Have Reviewed All Lab Data Listed Above  * Additional Pertinent Lab Tests Reviewed: [facility] Admission Reviewed    Imaging:    Imaging Reports Reviewed Today Include:  Renal ultrasound  Imaging Personally Reviewed by Myself Includes:  None    Recent Cultures (last 7 days):     Results from last 7 days   Lab Units 08/19/21  1829 08/19/21  1731   BLOOD CULTURE  No Growth at 24 hrs  No Growth at 24 hrs         Last 24 Hours Medication List:   Current Facility-Administered Medications   Medication Dose Route Frequency Provider Last Rate    acetaminophen  650 mg Oral Q4H PRN Freida Flores MD      albuterol  2 5 mg Nebulization Q4H PRN CORWIN Braden      ampicillin-sulbactam  3 g Intravenous Q8H Hannah Gallardo MD Stopped (08/21/21 1500)    atorvastatin  20 mg Oral HS Freida Flores MD      belladonna-opium  30 mg Rectal Q8H PRN Freida Flores MD      cyanocobalamin  500 mcg Oral Daily Freida Flores MD      ferrous sulfate  325 mg Oral Daily With Breakfast Freida Flores MD      fluticasone  1 spray Nasal Daily Freida Flores MD      fluticasone-vilanterol  1 puff Inhalation HS Freida Flores MD      loratadine  10 mg Oral Daily Freida Flores MD      metoprolol tartrate  12 5 mg Oral Q12H [facility] CORWIN Rogesr      multivitamin-minerals  1 tablet Oral Daily Freida Flores MD      potassium chloride  40 mEq Oral Once CORWIN Braden      saccharomyces boulardii  250 mg Oral BID CORWIN Braden      sodium chloride  75 mL/hr Intravenous Continuous Jocelynn Mckeon MD 75 mL/hr (08/21/21 1707)        Today, Patient Was Seen By: CORWIN Santos    ** Please Note: Dragon 360 Dictation voice to text software may have been used in the creation of this document   **

## 2021-08-21 NOTE — PROGRESS NOTES
NEPHROLOGY PROGRESS NOTE    Kentrell Rodriguez 66 y o  male MRN: [id number]  Unit/Bed#: 2 [place] [address] Encounter: [id number]  Reason for Consult:  Acute on chronic kidney disease    Patient is awake and alert says he had a rather uneventful evening no fever or rigors  Still having gross hematuria but denied any cramping in his bladder  Other than that no changes he has been in bed as he was instructed and he is eating okay  ASSESSMENT/PLAN:  1  Renal    Patient acute renal failure multifactorial likely due to urine retention along with fever and urinary tract infection with gross hematuria  Patient's creatinine has declined to 1 6 so approaching baseline of 1 5 and improved since admission  He has ongoing gross hematuria urology is managing the CBI  Fever has dissipated  For now he is on slow IV fluids can continue 24 hours once creatinine to baseline will likely discontinue as long as eating well  No changes today  Renal function improving    2  Urologic    History of nephrostomy tubes in place these were clamped  He developed 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 and now has CBI going Urology managing and still has gross hematuria  3  Infectious Disease    Patient had fever on admission  Presumptively due to urinary source they have placed the patient on ampicillin sulbactam and will manage antibiotic course  SUBJECTIVE:  Review of Systems   Constitutional: Negative for chills, fever, malaise/fatigue and night sweats  HENT: Negative  Eyes: Negative  Cardiovascular: Negative for chest pain, dyspnea on exertion, leg swelling and orthopnea  Respiratory: Negative  Negative for cough, shortness of breath, sputum production and wheezing  Gastrointestinal: Negative for bloating, abdominal pain, diarrhea, nausea and vomiting  Genitourinary: Positive for hematuria  Catheter in with gross hematuria noted  Denied bladder spasms     Neurological: Negative for dizziness, focal weakness, headaches and weakness  Psychiatric/Behavioral: Negative for altered mental status, depression, hallucinations and hypervigilance  OBJECTIVE:  Current Weight: Weight - Scale: 95 2 kg (209 lb 14 1 oz)  Vitals:Temp (24hrs), Av 2 °F (36 8 °C), Min:97 1 °F (36 2 °C), Max:99 9 °F (37 7 °C)  Current: Temperature: (!) 97 1 °F (36 2 °C)   Blood pressure 112/57, pulse 81, temperature (!) 97 1 °F (36 2 °C), resp  rate 18, height 5' 9" (1 753 m), weight 95 2 kg (209 lb 14 1 oz), SpO2 (!) 89 %  , Body mass index is 30 99 kg/m²  Intake/Output Summary (Last 24 hours) at 2021 0846  Last data filed at 2021 7381  Gross per 24 hour   Intake 3170 ml   Output -1600 ml   Net 4770 ml       Physical Exam: /57   Pulse 81   Temp (!) 97 1 °F (36 2 °C)   Resp 18   Ht 5' 9" (1 753 m)   Wt 95 2 kg (209 lb 14 1 oz)   SpO2 (!) 89%   BMI 30 99 kg/m²   Physical Exam  Constitutional:       General: He is not in acute distress  Appearance: He is not toxic-appearing or diaphoretic  HENT:      Head: Normocephalic and atraumatic  Mouth/Throat:      Mouth: Mucous membranes are moist    Eyes:      General: No scleral icterus  Extraocular Movements: Extraocular movements intact  Cardiovascular:      Rate and Rhythm: Normal rate and regular rhythm  Heart sounds: No friction rub  No gallop  Comments: No significant edema  Pulmonary:      Effort: Pulmonary effort is normal  No respiratory distress  Breath sounds: Normal breath sounds  No wheezing, rhonchi or rales  Abdominal:      General: Bowel sounds are normal  There is no distension  Palpations: Abdomen is soft  Tenderness: There is no abdominal tenderness  There is no rebound  Musculoskeletal:      Cervical back: Normal range of motion and neck supple  Neurological:      General: No focal deficit present  Mental Status: He is alert and oriented to person, place, and time  Mental status is at baseline  Psychiatric:         Mood and Affect: Mood normal          Behavior: Behavior normal          Thought Content:  Thought content normal          Judgment: Judgment normal          Medications:    Current Facility-Administered Medications:     acetaminophen (TYLENOL) tablet 650 mg, 650 mg, Oral, Q4H PRN, Wyatt Fields MD, 650 mg at 08/19/21 0802    albuterol inhalation solution 2 5 mg, 2 5 mg, Nebulization, Q4H PRN, CORWIN Braden    ampicillin-sulbactam (UNASYN) 3 g in sodium chloride 0 9 % 100 mL IVPB, 3 g, Intravenous, Q8H, Yonny Villegas MD, Stopped at 08/21/21 0700    atorvastatin (LIPITOR) tablet 20 mg, 20 mg, Oral, HS, Wyatt Fields MD, 20 mg at 08/20/21 2147    belladonna-opium (B&O SUPPOSITORY) 16 2-30 mg suppository 1 suppository, 30 mg, Rectal, Q8H PRN, Wyatt Fields MD, 1 suppository at 08/18/21 2040    cyanocobalamin (VITAMIN B-12) tablet 500 mcg, 500 mcg, Oral, Daily, Wyatt Fields MD, 500 mcg at 08/21/21 0802    ferrous sulfate tablet 325 mg, 325 mg, Oral, Daily With Breakfast, Wyatt Fields MD    fluticasone (FLONASE) 50 mcg/act nasal spray 1 spray, 1 spray, Nasal, Daily, Wyatt Fields MD    fluticasone-vilanterol (BREO ELLIPTA) 200-25 MCG/INH inhaler 1 puff, 1 puff, Inhalation, HS, Wyatt Fields MD, 1 puff at 08/20/21 2148    loratadine (CLARITIN) tablet 10 mg, 10 mg, Oral, Daily, Wyatt Fields MD, 10 mg at 08/21/21 0801    metoprolol tartrate (LOPRESSOR) partial tablet 12 5 mg, 12 5 mg, Oral, Q12H DHAVAL, CORWIN Braden, 12 5 mg at 08/21/21 0801    multivitamin-minerals (CENTRUM) tablet 1 tablet, 1 tablet, Oral, Daily, Wyatt Fields MD, 1 tablet at 08/21/21 0802    saccharomyces boulardii (FLORASTOR) capsule 250 mg, 250 mg, Oral, BID, CORWIN Braden, 250 mg at 08/21/21 0802    sodium chloride 0 9 % infusion, 75 mL/hr, Intravenous, Continuous, Branden Blanco MD, Last Rate: 75 mL/hr at 08/20/21 1748, 75 mL/hr at 08/20/21 1748    Laboratory Results:  Lab Results   Component Value Date    WBC 7 37 08/21/2021    HGB 7 8 (L) 08/21/2021    HCT 25 7 (L) 08/21/2021    MCV 80 (L) 08/21/2021     08/21/2021     Lab Results   Component Value Date    SODIUM 138 08/21/2021    K 3 8 08/21/2021     08/21/2021    CO2 25 08/21/2021    BUN 38 (H) 08/21/2021    CREATININE 1 69 (H) 08/21/2021    GLUC 92 08/21/2021    CALCIUM 7 4 (L) 08/21/2021     Lab Results   Component Value Date    CALCIUM 7 4 (L) 08/21/2021     No results found for: LABPROT

## 2021-08-21 NOTE — PLAN OF CARE
Problem: GENITOURINARY - ADULT  Goal: Maintains or returns to baseline urinary function  Description: INTERVENTIONS:  - Assess urinary function  - Encourage oral fluids to ensure adequate hydration if ordered  - Administer IV fluids as ordered to ensure adequate hydration  - Administer ordered medications as needed  - Offer frequent toileting  - Follow urinary retention protocol if ordered  Outcome: Progressing  Goal: Absence of urinary retention  Description: INTERVENTIONS:  - Assess patients ability to void and empty bladder  - Monitor I/O  - Bladder scan as needed  - Discuss with physician/AP medications to alleviate retention as needed  - Discuss catheterization for long term situations as appropriate  Outcome: Progressing  Goal: Urinary catheter remains patent  Description: INTERVENTIONS:  - Assess patency of urinary catheter  - If patient has a chronic alanis, consider changing catheter if non-functioning  - Follow guidelines for intermittent irrigation of non-functioning urinary catheter  Outcome: Progressing     Problem: RESPIRATORY - ADULT  Goal: Achieves optimal ventilation and oxygenation  Description: INTERVENTIONS:  - Assess for changes in respiratory status  - Assess for changes in mentation and behavior  - Position to facilitate oxygenation and minimize respiratory effort  - Oxygen administered by appropriate delivery if ordered  - Initiate smoking cessation education as indicated  - Encourage broncho-pulmonary hygiene including cough, deep breathe, Incentive Spirometry  - Assess the need for suctioning and aspirate as needed  - Assess and instruct to report SOB or any respiratory difficulty  - Respiratory Therapy support as indicated  Outcome: Progressing     Problem: Potential for Falls  Goal: Patient will remain free of falls  Description: INTERVENTIONS:  - Educate patient/family on patient safety including physical limitations  - Instruct patient to call for assistance with activity   - Consult OT/PT to assist with strengthening/mobility   - Keep Call bell within reach  - Keep bed low and locked with side rails adjusted as appropriate  - Keep care items and personal belongings within reach  - Initiate and maintain comfort rounds  - Make Fall Risk Sign visible to staff  - Offer Toileting every 2 Hours, in advance of need  - Initiate/Maintain bed alarm    - Apply yellow socks and bracelet for high fall risk patients  - Consider moving patient to room near nurses station  Outcome: Progressing     Problem: MOBILITY - ADULT  Goal: Maintain or return to baseline ADL function  Description: INTERVENTIONS:  -  Assess patient's ability to carry out ADLs; assess patient's baseline for ADL function and identify physical deficits which impact ability to perform ADLs (bathing, care of mouth/teeth, toileting, grooming, dressing, etc )  - Assess/evaluate cause of self-care deficits   - Assess range of motion  - Assess patient's mobility; develop plan if impaired  - Assess patient's need for assistive devices and provide as appropriate  - Encourage maximum independence but intervene and supervise when necessary  - Involve family in performance of ADLs  - Assess for home care needs following discharge   - Consider OT consult to assist with ADL evaluation and planning for discharge  - Provide patient education as appropriate  Outcome: Progressing  Goal: Maintains/Returns to pre admission functional level  Description: INTERVENTIONS:  - Perform BMAT or MOVE assessment daily    - Set and communicate daily mobility goal to care team and patient/family/caregiver     - Collaborate with rehabilitation services on mobility goals if consulted    - Dangle patient 3 times a day  - Stand patient 3 times a day  - Ambulate patient 2 times a day  - Out of bed to chair 3 times a day   - Out of bed for meals 3 times a day  - Out of bed for toileting  - Record patient progress and toleration of activity level   Outcome: Progressing COUGH

## 2021-08-21 NOTE — PHYSICAL THERAPY NOTE
Physical Therapy Cancellation Note     08/21/21 1053   PT Last Visit   PT Visit Date 08/21/21   Note Type   Note type Evaluation   Cancel Reasons Other  (Pt deferred evaluation, with CBI   Re-attempt as able later  )     Michel Niño PT, DPT [id number]

## 2021-08-21 NOTE — PROGRESS NOTES
Progress Note - Infectious Disease   Radha Prince 66 y o  male MRN: [id number]  Unit/Bed#: 2 [place] [address] Encounter: [id number]      Impression/Recommendations:  1  Sepsis, not present on admission but developed yesterday, with fever and leukocytosis  Source of sepsis is most likely urinary retention  Patient is clinically improved  Temperature is down  WBC normalized  He has remains systemically well, without toxicity and hemodynamically stable, without hypotension  Blood cultures have no growth thus far  Antibiotic plan as in below  Monitor temperature/WBC  Monitor hemodynamics  Follow-up on pending blood cultures      2  Cystitis, most likely secondary to urinary retention  Patient is clinically improved  Unfortunately, urine culture was not obtained prior to antibiotic  Regardless , patient is clinically much better on antibiotic  Patient had recent urine culture last month with growth of Klebsiella and ampicillin susceptible Enterococcus  Continue IV Unasyn  Monitor temperature/WBC  If patient continues to improve, likely transition to p o  Augmentin in am, as long as blood cultures have no growth      3  Urinary retention, most likely secondary to hematuria and XRT cystitis  Cystoscopy was postponed due to acute fever  Hematuria improving  Of note, patient has bilateral PCN in place, which are capped  Sumner catheter drainage in place  CBI ongoing per Urology  Plan for cystoscopy per Urology      4  CKD  Creatinine baseline  Antibiotic dosages adjusted accordingly  Monitor creatinine      5  XRT cystitis      6  Prostate cancer, status post XRT  Urology follow-up      Discussed with patient in detail regarding the above plan  Discussed with Dr Luis Armando Jade from [facility] service  Antibiotics:  Unasyn # 2     Subjective:  Patient feels quite well  No further abdominal pain  Temperature is down  No further chills  He is tolerating antibiotic well    No nausea, vomiting or diarrhea  Objective:  Vitals:  Temp:  [97 1 °F (36 2 °C)-99 9 °F (37 7 °C)] 97 1 °F (36 2 °C)  HR:  [] 100  Resp:  [16-19] 18  BP: ()/(49-57) 112/57  SpO2:  [89 %-94 %] 92 %  Temp (24hrs), Av 2 °F (36 8 °C), Min:97 1 °F (36 2 °C), Max:99 9 °F (37 7 °C)  Current: Temperature: (!) 97 1 °F (36 2 °C)    Physical Exam:     General: Awake, alert, cooperative, no distress  Neck:  Supple  No mass  No lymphadenopathy  Lungs: Expansion symmetric, no rales, no wheezing, respirations unlabored  Heart:  Regular rate and rhythm, S1 and S2 normal, no murmur  Abdomen: Soft, nondistended, non-tender, bowel sounds active all four quadrants, no masses, no organomegaly  Extremities: No edema  No erythema/warmth  No ulcer  Nontender to palpation  Skin:  No rash  Neuro: Moves all extremities  Invasive Devices     Peripheral Intravenous Line            Peripheral IV 21 Right Forearm 1 day          Drain            Nephrostomy Left 1 8 Fr  17 days    Nephrostomy Right 1 8 Fr  17 days    Continuous Bladder Irrigation Three-way 4 days                Labs studies:   I have personally reviewed pertinent labs  Results from last 7 days   Lab Units 21  0521 21  0532 21  0514 21  1309   POTASSIUM mmol/L 3 8 3 8 4 4 4 6   CHLORIDE mmol/L 105 102 106 104   CO2 mmol/L 25 25 27 28   BUN mg/dL 38* 43* 36* 38*   CREATININE mg/dL 1 69* 1 97* 1 46* 1 57*   EGFR ml/min/1 73sq m 38 32 46 42   CALCIUM mg/dL 7 4* 7 7* 8 6 9 0   AST U/L  --   --   --  20   ALT U/L  --   --   --  31   ALK PHOS U/L  --   --   --  96     Results from last 7 days   Lab Units 21  0521 21  2309 21  1655 21  0532 21  1732   WBC Thousand/uL 7 37  --   --  10 63* 14 01*   HEMOGLOBIN g/dL 7 8* 8 3* 8 0* 7 2* 8 9*   PLATELETS Thousands/uL 151  --   --  175 232     Results from last 7 days   Lab Units 21  1829 21  1731   BLOOD CULTURE  No Growth at 24 hrs  No Growth at 24 hrs  Imaging Studies:   I have personally reviewed pertinent imaging study reports and images in PACS  EKG, Pathology, and Other Studies:   I have personally reviewed pertinent reports

## 2021-08-22 ENCOUNTER — ANESTHESIA EVENT (INPATIENT)
Dept: PERIOP | Facility: HOSPITAL | Age: 76
DRG: 659 | End: 2021-08-22
Payer: MEDICARE

## 2021-08-22 ENCOUNTER — APPOINTMENT (INPATIENT)
Dept: RADIOLOGY | Facility: HOSPITAL | Age: 76
DRG: 659 | End: 2021-08-22
Payer: MEDICARE

## 2021-08-22 ENCOUNTER — ANESTHESIA (INPATIENT)
Dept: PERIOP | Facility: HOSPITAL | Age: 76
DRG: 659 | End: 2021-08-22
Payer: MEDICARE

## 2021-08-22 LAB
ANION GAP SERPL CALCULATED.3IONS-SCNC: 9 MMOL/L (ref 4–13)
BUN SERPL-MCNC: 25 MG/DL (ref 5–25)
CALCIUM SERPL-MCNC: 8 MG/DL (ref 8.3–10.1)
CHLORIDE SERPL-SCNC: 109 MMOL/L (ref 100–108)
CO2 SERPL-SCNC: 24 MMOL/L (ref 21–32)
CREAT SERPL-MCNC: 1.43 MG/DL (ref 0.6–1.3)
ERYTHROCYTE [DISTWIDTH] IN BLOOD BY AUTOMATED COUNT: 19.4 % (ref 11.6–15.1)
GFR SERPL CREATININE-BSD FRML MDRD: 47 ML/MIN/1.73SQ M
GLUCOSE SERPL-MCNC: 103 MG/DL (ref 65–140)
HCT VFR BLD AUTO: 24.4 % (ref 36.5–49.3)
HCT VFR BLD AUTO: 24.8 % (ref 36.5–49.3)
HCT VFR BLD AUTO: 26.4 % (ref 36.5–49.3)
HCT VFR BLD AUTO: 29.9 % (ref 36.5–49.3)
HGB BLD-MCNC: 7.2 G/DL (ref 12–17)
HGB BLD-MCNC: 7.3 G/DL (ref 12–17)
HGB BLD-MCNC: 7.6 G/DL (ref 12–17)
HGB BLD-MCNC: 8.7 G/DL (ref 12–17)
MAGNESIUM SERPL-MCNC: 2 MG/DL (ref 1.6–2.6)
MCH RBC QN AUTO: 23.5 PG (ref 26.8–34.3)
MCHC RBC AUTO-ENTMCNC: 29.4 G/DL (ref 31.4–37.4)
MCV RBC AUTO: 80 FL (ref 82–98)
PLATELET # BLD AUTO: 152 THOUSANDS/UL (ref 149–390)
PMV BLD AUTO: 10.1 FL (ref 8.9–12.7)
POTASSIUM SERPL-SCNC: 4.3 MMOL/L (ref 3.5–5.3)
PROCALCITONIN SERPL-MCNC: 0.08 NG/ML
RBC # BLD AUTO: 3.11 MILLION/UL (ref 3.88–5.62)
SODIUM SERPL-SCNC: 142 MMOL/L (ref 136–145)
WBC # BLD AUTO: 6.5 THOUSAND/UL (ref 4.31–10.16)

## 2021-08-22 PROCEDURE — 80048 BASIC METABOLIC PNL TOTAL CA: CPT | Performed by: NURSE PRACTITIONER

## 2021-08-22 PROCEDURE — 0TBB8ZX EXCISION OF BLADDER, VIA NATURAL OR ARTIFICIAL OPENING ENDOSCOPIC, DIAGNOSTIC: ICD-10-PCS | Performed by: SPECIALIST

## 2021-08-22 PROCEDURE — C2617 STENT, NON-COR, TEM W/O DEL: HCPCS | Performed by: SPECIALIST

## 2021-08-22 PROCEDURE — 83735 ASSAY OF MAGNESIUM: CPT | Performed by: NURSE PRACTITIONER

## 2021-08-22 PROCEDURE — 88112 CYTOPATH CELL ENHANCE TECH: CPT | Performed by: PATHOLOGY

## 2021-08-22 PROCEDURE — 88305 TISSUE EXAM BY PATHOLOGIST: CPT | Performed by: PATHOLOGY

## 2021-08-22 PROCEDURE — 74420 UROGRAPHY RTRGR +-KUB: CPT

## 2021-08-22 PROCEDURE — 88341 IMHCHEM/IMCYTCHM EA ADD ANTB: CPT | Performed by: PATHOLOGY

## 2021-08-22 PROCEDURE — 85018 HEMOGLOBIN: CPT | Performed by: SPECIALIST

## 2021-08-22 PROCEDURE — 85014 HEMATOCRIT: CPT | Performed by: NURSE PRACTITIONER

## 2021-08-22 PROCEDURE — 85018 HEMOGLOBIN: CPT | Performed by: NURSE PRACTITIONER

## 2021-08-22 PROCEDURE — 99232 SBSQ HOSP IP/OBS MODERATE 35: CPT | Performed by: NURSE PRACTITIONER

## 2021-08-22 PROCEDURE — BT1DZZZ FLUOROSCOPY OF RIGHT KIDNEY, URETER AND BLADDER: ICD-10-PCS | Performed by: SPECIALIST

## 2021-08-22 PROCEDURE — 84145 PROCALCITONIN (PCT): CPT | Performed by: NURSE PRACTITIONER

## 2021-08-22 PROCEDURE — 85027 COMPLETE CBC AUTOMATED: CPT | Performed by: NURSE PRACTITIONER

## 2021-08-22 PROCEDURE — 88307 TISSUE EXAM BY PATHOLOGIST: CPT | Performed by: PATHOLOGY

## 2021-08-22 PROCEDURE — 99232 SBSQ HOSP IP/OBS MODERATE 35: CPT | Performed by: INTERNAL MEDICINE

## 2021-08-22 PROCEDURE — 0T568ZZ DESTRUCTION OF RIGHT URETER, VIA NATURAL OR ARTIFICIAL OPENING ENDOSCOPIC: ICD-10-PCS | Performed by: SPECIALIST

## 2021-08-22 PROCEDURE — 85014 HEMATOCRIT: CPT | Performed by: SPECIALIST

## 2021-08-22 PROCEDURE — C1769 GUIDE WIRE: HCPCS | Performed by: SPECIALIST

## 2021-08-22 PROCEDURE — 0T768DZ DILATION OF RIGHT URETER WITH INTRALUMINAL DEVICE, VIA NATURAL OR ARTIFICIAL OPENING ENDOSCOPIC: ICD-10-PCS | Performed by: SPECIALIST

## 2021-08-22 PROCEDURE — 99233 SBSQ HOSP IP/OBS HIGH 50: CPT | Performed by: INTERNAL MEDICINE

## 2021-08-22 PROCEDURE — 0TCB8ZZ EXTIRPATION OF MATTER FROM BLADDER, VIA NATURAL OR ARTIFICIAL OPENING ENDOSCOPIC: ICD-10-PCS | Performed by: SPECIALIST

## 2021-08-22 PROCEDURE — 88342 IMHCHEM/IMCYTCHM 1ST ANTB: CPT | Performed by: PATHOLOGY

## 2021-08-22 DEVICE — INLAY URETERAL STENT W/O GUIDEWIRE
Type: IMPLANTABLE DEVICE | Site: URETER | Status: FUNCTIONAL
Brand: BARD® INLAY® URETERAL STENT

## 2021-08-22 RX ORDER — PROPOFOL 10 MG/ML
INJECTION, EMULSION INTRAVENOUS AS NEEDED
Status: DISCONTINUED | OUTPATIENT
Start: 2021-08-22 | End: 2021-08-22

## 2021-08-22 RX ORDER — SODIUM CHLORIDE, SODIUM LACTATE, POTASSIUM CHLORIDE, CALCIUM CHLORIDE 600; 310; 30; 20 MG/100ML; MG/100ML; MG/100ML; MG/100ML
INJECTION, SOLUTION INTRAVENOUS CONTINUOUS PRN
Status: DISCONTINUED | OUTPATIENT
Start: 2021-08-22 | End: 2021-08-22

## 2021-08-22 RX ORDER — FENTANYL CITRATE 50 UG/ML
INJECTION, SOLUTION INTRAMUSCULAR; INTRAVENOUS AS NEEDED
Status: DISCONTINUED | OUTPATIENT
Start: 2021-08-22 | End: 2021-08-22

## 2021-08-22 RX ORDER — SODIUM CHLORIDE, SODIUM LACTATE, POTASSIUM CHLORIDE, CALCIUM CHLORIDE 600; 310; 30; 20 MG/100ML; MG/100ML; MG/100ML; MG/100ML
100 INJECTION, SOLUTION INTRAVENOUS CONTINUOUS
Status: DISCONTINUED | OUTPATIENT
Start: 2021-08-22 | End: 2021-08-22

## 2021-08-22 RX ORDER — ONDANSETRON 2 MG/ML
INJECTION INTRAMUSCULAR; INTRAVENOUS AS NEEDED
Status: DISCONTINUED | OUTPATIENT
Start: 2021-08-22 | End: 2021-08-22

## 2021-08-22 RX ORDER — ONDANSETRON 2 MG/ML
4 INJECTION INTRAMUSCULAR; INTRAVENOUS ONCE AS NEEDED
Status: DISCONTINUED | OUTPATIENT
Start: 2021-08-22 | End: 2021-08-22 | Stop reason: HOSPADM

## 2021-08-22 RX ORDER — GLYCINE 1.5 G/100ML
SOLUTION IRRIGATION AS NEEDED
Status: DISCONTINUED | OUTPATIENT
Start: 2021-08-22 | End: 2021-08-22 | Stop reason: HOSPADM

## 2021-08-22 RX ORDER — FENTANYL CITRATE/PF 50 MCG/ML
50 SYRINGE (ML) INJECTION
Status: DISCONTINUED | OUTPATIENT
Start: 2021-08-22 | End: 2021-08-22 | Stop reason: HOSPADM

## 2021-08-22 RX ORDER — LIDOCAINE HYDROCHLORIDE 10 MG/ML
INJECTION, SOLUTION EPIDURAL; INFILTRATION; INTRACAUDAL; PERINEURAL AS NEEDED
Status: DISCONTINUED | OUTPATIENT
Start: 2021-08-22 | End: 2021-08-22

## 2021-08-22 RX ORDER — MAGNESIUM HYDROXIDE 1200 MG/15ML
LIQUID ORAL AS NEEDED
Status: DISCONTINUED | OUTPATIENT
Start: 2021-08-22 | End: 2021-08-22 | Stop reason: HOSPADM

## 2021-08-22 RX ADMIN — PROPOFOL 120 MG: 10 INJECTION, EMULSION INTRAVENOUS at 10:58

## 2021-08-22 RX ADMIN — SODIUM CHLORIDE 3 G: 9 INJECTION, SOLUTION INTRAVENOUS at 05:15

## 2021-08-22 RX ADMIN — SODIUM CHLORIDE 75 ML/HR: 0.9 INJECTION, SOLUTION INTRAVENOUS at 05:14

## 2021-08-22 RX ADMIN — FENTANYL CITRATE 25 MCG: 50 INJECTION, SOLUTION INTRAMUSCULAR; INTRAVENOUS at 11:25

## 2021-08-22 RX ADMIN — Medication 250 MG: at 18:32

## 2021-08-22 RX ADMIN — LORATADINE 10 MG: 10 TABLET ORAL at 08:45

## 2021-08-22 RX ADMIN — FENTANYL CITRATE 25 MCG: 50 INJECTION, SOLUTION INTRAMUSCULAR; INTRAVENOUS at 11:15

## 2021-08-22 RX ADMIN — LIDOCAINE HYDROCHLORIDE 50 MG: 10 INJECTION, SOLUTION EPIDURAL; INFILTRATION; INTRACAUDAL; PERINEURAL at 10:58

## 2021-08-22 RX ADMIN — Medication 12.5 MG: at 20:40

## 2021-08-22 RX ADMIN — SODIUM CHLORIDE 3 G: 9 INJECTION, SOLUTION INTRAVENOUS at 14:14

## 2021-08-22 RX ADMIN — Medication 250 MG: at 08:48

## 2021-08-22 RX ADMIN — SODIUM CHLORIDE 3 G: 9 INJECTION, SOLUTION INTRAVENOUS at 21:15

## 2021-08-22 RX ADMIN — FLUTICASONE FUROATE AND VILANTEROL TRIFENATATE 1 PUFF: 200; 25 POWDER RESPIRATORY (INHALATION) at 21:13

## 2021-08-22 RX ADMIN — FERROUS SULFATE TAB 325 MG (65 MG ELEMENTAL FE) 325 MG: 325 (65 FE) TAB at 07:57

## 2021-08-22 RX ADMIN — ATORVASTATIN CALCIUM 20 MG: 20 TABLET, FILM COATED ORAL at 21:13

## 2021-08-22 RX ADMIN — ACETAMINOPHEN 650 MG: 325 TABLET, FILM COATED ORAL at 19:33

## 2021-08-22 RX ADMIN — FENTANYL CITRATE 25 MCG: 50 INJECTION, SOLUTION INTRAMUSCULAR; INTRAVENOUS at 11:02

## 2021-08-22 RX ADMIN — CYANOCOBALAMIN TAB 500 MCG 500 MCG: 500 TAB at 08:45

## 2021-08-22 RX ADMIN — SODIUM CHLORIDE, SODIUM LACTATE, POTASSIUM CHLORIDE, AND CALCIUM CHLORIDE: .6; .31; .03; .02 INJECTION, SOLUTION INTRAVENOUS at 10:44

## 2021-08-22 RX ADMIN — FENTANYL CITRATE 25 MCG: 50 INJECTION, SOLUTION INTRAMUSCULAR; INTRAVENOUS at 11:04

## 2021-08-22 RX ADMIN — Medication 12.5 MG: at 08:44

## 2021-08-22 RX ADMIN — ONDANSETRON 4 MG: 2 INJECTION INTRAMUSCULAR; INTRAVENOUS at 11:14

## 2021-08-22 RX ADMIN — Medication 1 TABLET: at 08:44

## 2021-08-22 RX ADMIN — SODIUM CHLORIDE 75 ML/HR: 0.9 INJECTION, SOLUTION INTRAVENOUS at 14:10

## 2021-08-22 NOTE — OP NOTE
OPERATIVE REPORT  PATIENT NAME: Garrison Macedo    :  1945  MRN: [id number]  Pt Location: [facility] OR ROOM 04    SURGERY DATE: 2021    Surgeon(s) and Role:     * Chaz Hopkins MD - Primary    Preop Diagnosis  Radiation cystitis  Gross hematuria clot retention  Renal failure    Post-Op Diagnosis Codes:  Radiation cystitis  Left trigone bladder floor lesion  Right radiation ureteritis  Right ureteral lesion  Right ureteral bleeding    PROCEDURE  Cystoscopy  Evacuation of clots  Left bladder floor biopsy  Extensive fulguration left bladder floor left lateral wall  Right retrograde pyelogram  Right ureteroscopy biopsy suspicious distal lesion laser fulguration bleeding distal ureter laser fulguration suspicious lesions  Right 24 cm 6 Thai stent    Specimen(s):  ID Type Source Tests Collected by Time Destination   1 : Left bladder wall lesion Tissue Urinary Bladder TISSUE EXAM Chaz Hopkins MD 2021 1135    2 : Urine for cytology right ureter Urine Urine, Cystoscopic CYTOLOGY, URINE Chaz Hopkins MD 2021 1149    3 : Right ureteral bladder lesion Tissue Ureter, Right TISSUE EXAM Chaz Hopkins MD 2021 1158        Estimated Blood Loss:   Minimal    Drains:  Nephrostomy Left 1 8 Fr  (Active)   Number of days: 18       Nephrostomy Right 1 8 Fr   (Active)   Number of days: 18       Anesthesia Type:   General/LMA    Operative Indications:  Hematuria due to cystitis [N30 91]  This 77-year-old male with known history of radiation cystitis status post radical prostatectomy status post radiation therapy to the prostatic bed for local recurrence initially developing superficial bladder papillomas followed in the office with no recurrence noted up until 2020 with radiation cystitis present  developing gross painless hematuria this past April undergoing workup confirming radiation cystitis admitted multiple times for bleeding gross hematuria finishing recently 39/40 oxygen treatments status post bilateral nephrostomy tube placement recently clamped in light of minimal hematuria admitted last week for hemorrhagic cystitis/complicated UTI finishing a course of antibiotics presenting to [facility] ER on Tuesday with clot retention started on CBI not clearing taken to OR on Thursday August 19th spiking a temperature to 103 with procedure canceled started on antibiotics white count stable blood cultures negative at this time patient still has episodic gross hematuria after bowel movements and in light of that to go to the OR now for re-evaluation    Operative Findings:  1  Organized clots in the bladder removed with 1 L normal saline  2  Severely inflamed left trigone   left orifice not found oozing  from the left floor lateral wall no obvious bleeding from the right lateral wall or dome biopsy taken from left trigone extensive fulguration left floor lateral wall  3  Efflux of hematuria urine noted right orifice retrograde confirming no obvious lesion ureteroscopy confirmed suspicious lesion possible ureteral tumor and/or inflammatory changes obvious radiation ureteritis noted biopsy right distal ureter   Right ureteral washings  Holmium laser fulguration of bleeding inflamed areas distal ureter and suspicious small lesions  Twenty 4 cm 6 Sami stent past  CBI at this time clear     Complications:   None    Procedure and Technique:  After patient identified in the holding area consent signed he was placed in the op suite  After anesthesia induced he was placed thigh position draped prep standard fashion  Time-out performed  Twenty-two Sami cystoscope passed through through the bladder  Anterior to the confirm no abnormalities  Posterior urethra confirmed open bladder neck with inflammatory changes around the neck scope inserted into the bladder with organized clot noted irrigated with 1 L of normal saline    On view of the bladder the radiation cystitis throughout the right wall posterior wall dome was minimal no bleeding actually look bladder after O2 therapy there was some mild oozing from the left trigone the left orifice could not be found the left lateral wall confirmed oozing area from radiation cystitis cold cup biopsy forceps were placed and a biopsy taken  On view of the right orifice there was immediate efflux of hematuria urine right retrograde pyelogram confirmed normal filling and drainage with hematuria still noted  The 25 [place]ian resectoscope was then placed and fulguration of the left lateral wall left for performed the cystoscope was then replaced and a 0 038 wire was passed up the right orifice into the right renal pelvis the wire left a safety the ureteral scope was then placed and immediate bleeding noted from the distal ureter with suspicious area possibly inflammatory possible ureterocele cystica or even tumor noted Parana forceps were placed through the scope and biopsy taken selective urine cytologies from the right side with taken with 10 cc of normal saline  Distal and mid ureteroscopy was then performed with significant areas of inflammation consistent with radiation ureteritis  Retrograde pyelogram confirmed no obvious defects in the proximal and renal pelvis    The scope was brought down to the losing distal ureter the holmium laser was placed and the bleeding was controlled with laser fulguration the small areas of inflammatory changes were also fulgurated scope removed cystoscope replaced 24 cm 6 [place] stent past wire removed scope removed 24 [place] [address] 3 way [place] catheter inserted left to continuous bladder irrigation 0 9 normal saline time of dictation CBI is clear postop procedure was awakened taken recovery stable condition path pending   I was present for the entire procedure    Patient Disposition:  PACU     SIGNATURE: Cathy Enrique MD  DATE: August 22, 2021  TIME: 12:38 PM

## 2021-08-22 NOTE — PROGRESS NOTES
Progress Note - Infectious Disease   Juliana Ro 66 y o  male MRN: [id number]  Unit/Bed#: OR POOL Encounter: [id number]      Impression/Recommendations:  1  Sepsis, not present on admission but developed yesterday, with fever and leukocytosis   Source of sepsis is most likely urinary retention  Zoe Ferns is clinically improved   Temperature is down   WBC normalized  Gumaro Campos has remains systemically well, without toxicity and hemodynamically stable, without hypotension   Blood cultures have no growth thus far  Antibiotic plan as in below  Monitor temperature/WBC  Monitor hemodynamics  Follow-up on pending blood cultures      2  Cystitis, most likely secondary to urinary retention  Zoe Ferns is clinically improved   Unfortunately, urine culture was not obtained prior to antibiotic  Regardless , patient is clinically much better on antibiotic  Patient had recent urine culture last month with growth of Klebsiella and ampicillin susceptible Enterococcus  Continue IV Unasyn  Monitor temperature/WBC  With patient having surgeon today, will keep him on IV Unasyn  Will transition to Augmentin in a m      3  Urinary retention, most likely secondary to hematuria and XRT cystitis   Cystoscopy was postponed due to acute fever  Hematuria improving but still persists  Of note, patient has bilateral PCN in place, which are capped  Plan for cystoscopy today noted  Sumner catheter drainage in place  CBI ongoing per Urology  Plan for cystoscopy  later today per Urology      4  CKD   Creatinine baseline  Antibiotic dosages adjusted accordingly  Monitor creatinine      5  XRT cystitis      6  Prostate cancer, status post XRT  Urology follow-up      Discussed with patient in detail regarding the above plan  Discussed with Dr Rudd from [facility] service earlier      Antibiotics:  Unasyn # 3      Subjective:  Patient was seen earlier today  He feels well  No further abdominal pain  Hematuria persists  Temperature is down  No further chills  He is tolerating antibiotic well  No nausea, vomiting or diarrhea      Objective:  Vitals:  Temp:  [97 1 °F (36 2 °C)-98 8 °F (37 1 °C)] 97 1 °F (36 2 °C)  HR:  [61-74] 74  Resp:  [16-20] 18  BP: (122-153)/(57-65) 153/65  SpO2:  [92 %-97 %] 96 %  Temp (24hrs), Av 9 °F (36 6 °C), Min:97 1 °F (36 2 °C), Max:98 8 °F (37 1 °C)  Current: Temperature: (!) 97 1 °F (36 2 °C)    Physical Exam:     General: Awake, alert, cooperative, no distress  Neck:  Supple  No mass  No lymphadenopathy  Lungs: Expansion symmetric, no rales, no wheezing, respirations unlabored  Heart:  Regular rate and rhythm, S1 and S2 normal, no murmur  Abdomen: Soft, nondistended, non-tender, bowel sounds active all four quadrants, no masses, no organomegaly  Extremities: Trace leg edema  No erythema/warmth  No ulcer  Nontender to palpation  Skin:  No rash  Neuro: Moves all extremities  Invasive Devices     Peripheral Intravenous Line            Peripheral IV 21 Right Wrist <1 day          Drain            Nephrostomy Left 1 8 Fr  18 days    Nephrostomy Right 1 8 Fr  18 days    Urethral Catheter Latex 24 Fr  <1 day                Labs studies:   I have personally reviewed pertinent labs    Results from last 7 days   Lab Units 21  0539 21  0521 21  0532 21  1309   POTASSIUM mmol/L 4 3 3 8 3 8 4 6   CHLORIDE mmol/L 109* 105 102 104   CO2 mmol/L 24 25 25 28   BUN mg/dL 25 38* 43* 38*   CREATININE mg/dL 1 43* 1 69* 1 97* 1 57*   EGFR ml/min/1 73sq m 47 38 32 42   CALCIUM mg/dL 8 0* 7 4* 7 7* 9 0   AST U/L  --   --   --  20   ALT U/L  --   --   --  31   ALK PHOS U/L  --   --   --  96     Results from last 7 days   Lab Units 21  1017 21  0539 21  0005 21  0521 21  0532   WBC Thousand/uL  --  6 50  --  7 37 10 63*   HEMOGLOBIN g/dL 7 6* 7 3* 7 2* 7 8* 7 2*   PLATELETS Thousands/uL  --  152  --  151 175     Results from last 7 days   Lab Units 21  1824 08/19/21  1731   BLOOD CULTURE  No Growth at 48 hrs  No Growth at 48 hrs  Imaging Studies:   I have personally reviewed pertinent imaging study reports and images in PACS  EKG, Pathology, and Other Studies:   I have personally reviewed pertinent reports

## 2021-08-22 NOTE — PROGRESS NOTES
NEPHROLOGY PROGRESS NOTE    Bev Marcus 66 y o  male MRN: [id number]  Unit/Bed#: [address] [place] [address] Encounter: [id number]  Reason for Consult:  Acute on chronic kidney disease    Patient is awake alert still has CBI with gross hematuria  He told me he is going to the operating room today with Urology to try and stop the bleeding  Otherwise he is in good spirits no complaints  ASSESSMENT/PLAN:  1  Renal    The patient had acute renal failure and now appears creatinine is come down to baseline as it is 1 4  Etiology was likely related to his sepsis, fever and gross hematuria resulting in bladder outlet issues  Will monitor with urologic procedure  2  Urologic    Patient had history of prostate cancer had nephrostomy tubes which were clamped  Eventually developed 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  Patient has CBI urology managing in to go to the operating room today  3  Infectious Disease    Patient now afebrile  Being treated for presumed urinary tract infection per Infectious Disease follow-up  SUBJECTIVE:  Review of Systems   Constitutional: Negative for chills, decreased appetite, fever and malaise/fatigue  HENT: Negative  Eyes: Negative  Cardiovascular: Negative  Negative for chest pain, dyspnea on exertion, leg swelling and orthopnea  Respiratory: Negative  Negative for cough, shortness of breath, sputum production and wheezing  Gastrointestinal: Negative  Negative for bloating, abdominal pain, diarrhea, nausea and vomiting  Genitourinary: Negative for flank pain  Sumner catheter with gross hematuria  No bladder spasms  Neurological: Negative for dizziness, focal weakness, headaches and weakness  Psychiatric/Behavioral: Negative for altered mental status, depression, hallucinations and hypervigilance         OBJECTIVE:  Current Weight: Weight - Scale: 95 2 kg (209 lb 14 1 oz)  Vitals:Temp (24hrs), Av 3 °F (36 8 °C), Min:97 9 °F (36 6 °C), Max:98 8 °F (37 1 °C)  Current: Temperature: 98 2 °F (36 8 °C)   Blood pressure 126/57, pulse 63, temperature 98 2 °F (36 8 °C), resp  rate 16, height 5' 9" (1 753 m), weight 95 2 kg (209 lb 14 1 oz), SpO2 94 %  , Body mass index is 30 99 kg/m²  Intake/Output Summary (Last 24 hours) at 8/22/2021 0816  Last data filed at 8/22/2021 0615  Gross per 24 hour   Intake 2955 ml   Output 4250 ml   Net -1295 ml       Physical Exam: /57   Pulse 63   Temp 98 2 °F (36 8 °C)   Resp 16   Ht 5' 9" (1 753 m)   Wt 95 2 kg (209 lb 14 1 oz)   SpO2 94%   BMI 30 99 kg/m²   Physical Exam  Constitutional:       General: He is not in acute distress  Appearance: He is not toxic-appearing or diaphoretic  HENT:      Head: Normocephalic and atraumatic  Mouth/Throat:      Mouth: Mucous membranes are dry  Eyes:      General: No scleral icterus  Extraocular Movements: Extraocular movements intact  Cardiovascular:      Rate and Rhythm: Normal rate and regular rhythm  Heart sounds: No friction rub  No gallop  Comments: No significant edema  Pulmonary:      Effort: Pulmonary effort is normal  No respiratory distress  Breath sounds: Normal breath sounds  No wheezing, rhonchi or rales  Abdominal:      General: Bowel sounds are normal  There is no distension  Palpations: Abdomen is soft  Tenderness: There is no abdominal tenderness  There is no rebound  Musculoskeletal:      Cervical back: Normal range of motion and neck supple  Neurological:      General: No focal deficit present  Mental Status: He is alert and oriented to person, place, and time  Mental status is at baseline  Psychiatric:         Mood and Affect: Mood normal          Behavior: Behavior normal          Thought Content:  Thought content normal          Judgment: Judgment normal          Medications:    Current Facility-Administered Medications:     acetaminophen (TYLENOL) tablet 650 mg, 650 mg, Oral, Q4H PRN, Vikin Rico Sequin, MD, 650 mg at 08/19/21 0802    albuterol inhalation solution 2 5 mg, 2 5 mg, Nebulization, Q4H PRN, CORWIN Braden    ampicillin-sulbactam (UNASYN) 3 g in sodium chloride 0 9 % 100 mL IVPB, 3 g, Intravenous, Q8H, Toney Barillas MD, Stopped at 08/22/21 0545    atorvastatin (LIPITOR) tablet 20 mg, 20 mg, Oral, HS, Yousif Ritchie MD, 20 mg at 08/21/21 2109    belladonna-opium (B&O SUPPOSITORY) 16 2-30 mg suppository 1 suppository, 30 mg, Rectal, Q8H PRN, Yousif Ritchie MD, 1 suppository at 08/18/21 2040    cyanocobalamin (VITAMIN B-12) tablet 500 mcg, 500 mcg, Oral, Daily, Yousif Ritchie MD, 500 mcg at 08/21/21 0802    ferrous sulfate tablet 325 mg, 325 mg, Oral, Daily With Breakfast, Yousif Ritchie MD, 325 mg at 08/22/21 0757    fluticasone (FLONASE) 50 mcg/act nasal spray 1 spray, 1 spray, Nasal, Daily, Yousif Ritchie MD    fluticasone-vilanterol (BREO ELLIPTA) 200-25 MCG/INH inhaler 1 puff, 1 puff, Inhalation, HS, Yousif Ritchie MD, 1 puff at 08/21/21 2111    loratadine (CLARITIN) tablet 10 mg, 10 mg, Oral, Daily, Yousif Ritchie MD, 10 mg at 08/21/21 0801    metoprolol tartrate (LOPRESSOR) partial tablet 12 5 mg, 12 5 mg, Oral, Q12H [address], CORWIN Braden, 12 5 mg at 08/21/21 2109    multivitamin-minerals (CENTRUM) tablet 1 tablet, 1 tablet, Oral, Daily, Yousif Ritchie MD, 1 tablet at 08/21/21 0802    saccharomyces boulardii (FLORASTOR) capsule 250 mg, 250 mg, Oral, BID, CORWIN Braden, 250 mg at 08/21/21 1707    sodium chloride 0 9 % infusion, 75 mL/hr, Intravenous, Continuous, Wilfred Flanagan MD, Last Rate: 75 mL/hr at 08/22/21 0514, 75 mL/hr at 08/22/21 0514    Laboratory Results:  Lab Results   Component Value Date    WBC 6 50 08/22/2021    HGB 7 3 (L) 08/22/2021    HCT 24 8 (L) 08/22/2021    MCV 80 (L) 08/22/2021     08/22/2021     Lab Results   Component Value Date    SODIUM 142 08/22/2021    K 4 3 08/22/2021     (H) 08/22/2021    CO2 24 08/22/2021    BUN 25 08/22/2021    CREATININE 1 43 (H) 08/22/2021    GLUC 103 08/22/2021    CALCIUM 8 0 (L) 08/22/2021     Lab Results   Component Value Date    CALCIUM 8 0 (L) 08/22/2021     No results found for: LABPROT

## 2021-08-22 NOTE — ASSESSMENT & PLAN NOTE
Evolving since admission, as evidenced by fever, leukocytosis, lactic acidosis, with source of infection likely cystitis, most likely secondary to urinary retention  Fever 103 6 8/19, WBC 14, lactic acid 2 5  Lactic acid normalized with IV fluid boluses  UA 8/19 showed trace leukocytes, large blood  Patient did receive 1 dose of IV Rocephin on 8/17  Urine culture not sent  Blood cultures no growth to date  Procalcitonin negative x1, repeat pending today  Urine culture on 7/30/2021 showed Enterococcus and Klebsiella pneumoniae  Renal ultrasound unremarkable  Appreciate ID input  · Changed antibiotic to IV Unasyn  · Likely transition to p o   Augmentin in the morning as long as blood cultures remain negative    Results from last 7 days   Lab Units 08/21/21  0521 08/19/21  2030 08/19/21  1732   LACTIC ACID mmol/L  --  1 0 2 5*   PROCALCITONIN ng/ml 0 14  --   --      Results from last 7 days   Lab Units 08/22/21  0539 08/21/21  0521 08/20/21  0532 08/19/21  1732 08/18/21  0514 08/17/21  1309   WBC Thousand/uL 6 50 7 37 10 63* 14 01* 11 61* 9 61

## 2021-08-22 NOTE — ASSESSMENT & PLAN NOTE
Likely due to cystitis, left trigone bladder floor lesion, right radiation ureteritis/right ureteral lesion/right ureteral bleeding per OR findings today  Patient was recently treated for UTI    Continue patient on CBI per Urology  Appreciate urology input

## 2021-08-22 NOTE — ASSESSMENT & PLAN NOTE
Lab Results   Component Value Date    EGFR 47 08/22/2021    EGFR 38 08/21/2021    EGFR 32 08/20/2021    CREATININE 1 43 (H) 08/22/2021    CREATININE 1 69 (H) 08/21/2021    CREATININE 1 97 (H) 08/20/2021   Baseline creatinine appears to be around 1 4-1 5's  Creatinine 1 57 on admission  Creatinine peaked to 1 97, trending down   Renal and bladder ultrasound no evidence of hydro  Avoid nephrotoxins and hypotension  Blood transfusion as needed to keep hemoglobin above 7  Appreciate nephrology input  · Suspect prerenal  · Continue IV fluids  · Repeat BMP in a m

## 2021-08-22 NOTE — PROGRESS NOTES
Progress Note - Urology      Patient: Fela Neves   : 1945 Sex: male   MRN: [id number]     CSN: [id number]  Unit/Bed#: OR POOL     SUBJECTIVE:   Patient seen in the preop OR area  Afebrile now on antibiotics last 2 days  Blood cultures negative  Afebrile  Patient continuing to have hematuria after ambulating bowel movements despite CBI in mild mobility  To OR for re-evaluation today      Objective   Vitals: /59   Pulse 61   Temp 98 2 °F (36 8 °C) (Oral)   Resp 16   Ht 5' 9" (1 753 m)   Wt 95 2 kg (209 lb 14 1 oz)   SpO2 92%   BMI 30 99 kg/m²     I/O last 24 hours: In: 8631 [P O :1230;  I V :1725; IV Piggyback:300]  Out: 4250 [Urine:4250]      Physical Exam:   General Alert awake   Normocephalic atraumatic PERRLA  Lungs clear bilaterally  Cardiac normal S1 normal S2  Abdomen soft, flank pain  CBI clear  Extremities no edema      Lab Results: CBC:   Lab Results   Component Value Date    WBC 6 50 2021    HGB 7 6 (L) 2021    HCT 26 4 (L) 2021    MCV 80 (L) 2021     2021    MCH 23 5 (L) 2021    MCHC 29 4 (L) 2021    RDW 19 4 (H) 2021    MPV 10 1 2021    NRBC 0 2021     CMP:   Lab Results   Component Value Date     (H) 2021    CO2 24 2021    BUN 25 2021    CREATININE 1 43 (H) 2021    CALCIUM 8 0 (L) 2021    AST 20 2021    ALT 31 2021    ALKPHOS 96 2021    EGFR 47 2021     Urinalysis:   Lab Results   Component Value Date    COLORU Orange 2021    CLARITYU Slightly Cloudy 2021    SPECGRAV 1 010 2021    PHUR 6 0 2021    PHUR 5 5 2018    LEUKOCYTESUR Trace (A) 2021    NITRITE Negative 2021    GLUCOSEU Negative 2021    KETONESU Negative 2021    BILIRUBINUR Negative 2021    BLOODU Large (A) 2021     Urine Culture:   Lab Results   Component Value Date    URINECX >100,000 cfu/ml Klebsiella pneumoniae (A) 2021 URINECX >100,000 cfu/ml Enterococcus faecalis (A) 07/30/2021     PSA: No results found for: PSA      Assessment/ Plan:  Persisted gross hematuria  History of radiation cystitis  History of prostate cancer status post radical prostatectomy radiation recurrent prostatic bed  Hemoglobin repeat 7 6  NPO cysto evacuation of clots retrogrades fulguration          Lattie Hatchet, MD

## 2021-08-22 NOTE — PLAN OF CARE
Problem: MOBILITY - ADULT  Goal: Maintain or return to baseline ADL function  Description: INTERVENTIONS:  -  Assess patient's ability to carry out ADLs; assess patient's baseline for ADL function and identify physical deficits which impact ability to perform ADLs (bathing, care of mouth/teeth, toileting, grooming, dressing, etc )  - Assess/evaluate cause of self-care deficits   - Assess range of motion  - Assess patient's mobility; develop plan if impaired  - Assess patient's need for assistive devices and provide as appropriate  - Encourage maximum independence but intervene and supervise when necessary  - Involve family in performance of ADLs  - Assess for home care needs following discharge   - Consider OT consult to assist with ADL evaluation and planning for discharge  - Provide patient education as appropriate  Outcome: Progressing  Goal: Maintains/Returns to pre admission functional level  Description: INTERVENTIONS:  - Perform BMAT or MOVE assessment daily    - Set and communicate daily mobility goal to care team and patient/family/caregiver  - Collaborate with rehabilitation services on mobility goals if consulted  - Perform Range of Motion 3 times a day  - Reposition patient every 2 hours    - Dangle patient 3 times a day  - Stand patient 3 times a day  - Ambulate patient 3 times a day  - Out of bed to chair 3 times a day   - Out of bed for meals 3 times a day  - Out of bed for toileting  - Record patient progress and toleration of activity level   Outcome: Progressing     Problem: GENITOURINARY - ADULT  Goal: Maintains or returns to baseline urinary function  Description: INTERVENTIONS:  - Assess urinary function  - Encourage oral fluids to ensure adequate hydration if ordered  - Administer IV fluids as ordered to ensure adequate hydration  - Administer ordered medications as needed  - Offer frequent toileting  - Follow urinary retention protocol if ordered  Outcome: Progressing  Goal: Absence of urinary retention  Description: INTERVENTIONS:  - Assess patients ability to void and empty bladder  - Monitor I/O  - Bladder scan as needed  - Discuss with physician/AP medications to alleviate retention as needed  - Discuss catheterization for long term situations as appropriate  Outcome: Progressing  Goal: Urinary catheter remains patent  Description: INTERVENTIONS:  - Assess patency of urinary catheter  - If patient has a chronic alanis, consider changing catheter if non-functioning  - Follow guidelines for intermittent irrigation of non-functioning urinary catheter  Outcome: Progressing     Problem: Potential for Falls  Goal: Patient will remain free of falls  Description: INTERVENTIONS:  - Educate patient/family on patient safety including physical limitations  - Instruct patient to call for assistance with activity   - Consult OT/PT to assist with strengthening/mobility   - Keep Call bell within reach  - Keep bed low and locked with side rails adjusted as appropriate  - Keep care items and personal belongings within reach  - Initiate and maintain comfort rounds  - Make Fall Risk Sign visible to staff  - Offer Toileting every 2 Hours, in advance of need  - Initiate/Maintain bed alarm  - Obtain necessary fall risk management equipment:   - Apply yellow socks and bracelet for high fall risk patients  - Consider moving patient to room near nurses station  Outcome: Progressing     Problem: RESPIRATORY - ADULT  Goal: Achieves optimal ventilation and oxygenation  Description: INTERVENTIONS:  - Assess for changes in respiratory status  - Assess for changes in mentation and behavior  - Position to facilitate oxygenation and minimize respiratory effort  - Oxygen administered by appropriate delivery if ordered  - Initiate smoking cessation education as indicated  - Encourage broncho-pulmonary hygiene including cough, deep breathe, Incentive Spirometry  - Assess the need for suctioning and aspirate as needed  - Assess and instruct to report SOB or any respiratory difficulty  - Respiratory Therapy support as indicated  Outcome: Progressing

## 2021-08-22 NOTE — ASSESSMENT & PLAN NOTE
Baseline hemoglobin appears to be around 8-9's likely due to iron deficiency  Patient on iron supplements at home  Hemoglobin 9 2 on admission  Trending down due to hematuria  Hemoglobin trended down to 6 8 8/20, received 1 unit RBC  Hemoglobin up to low 8's now down to 7s post transfusion  Monitor H&H every 8 hours, transfuse for hemoglobin less than 7 or sooner if hemodynamically unstable    Repeat H&H this evening  Continue iron supplement, B12, MVI from home

## 2021-08-22 NOTE — ASSESSMENT & PLAN NOTE
Secondary to hematuria with mild blood clots and radiation cystitis  Patient is status post Sumner catheter and started on CBI  Appreciate urology input  · Patient is status post cystoscopy, evacuation of clots, left bladder floor biopsy, extensive fulguration left bladder floor left lateral wall, right retrograde pyelogram, right ureteroscopy biopsy suspicious distal ureteral lesion laser fulguration bleeding distal ureteral, right ureteral stent placement on 8/22  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, left trigone bladder floor lesion, right radiation ureteritis, right ureteral lesion with bleeding    · Continue CBI

## 2021-08-22 NOTE — PROGRESS NOTES
Armando 43  Progress Note Anjelica Mcrae 1945, 66 y o  male MRN: [id number]  Unit/Bed#: [address] [place] [address] Encounter: [id number]  Primary Care Provider: Colleen Yin MD   Date and time admitted to hospital: 8/17/2021 12:20 PM    * Urinary retention  Assessment & Plan  Secondary to hematuria with mild blood clots and radiation cystitis  Patient is status post Sumner catheter and started on CBI  Appreciate urology input  · Patient is status post cystoscopy, evacuation of clots, left bladder floor biopsy, extensive fulguration left bladder floor left lateral wall, right retrograde pyelogram, right ureteroscopy biopsy suspicious distal ureteral lesion laser fulguration bleeding distal ureteral, right ureteral stent placement on 8/22  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, left trigone bladder floor lesion, right radiation ureteritis, right ureteral lesion with bleeding  · Continue CBI         Severe sepsis [facility])  Assessment & Plan  Evolving since admission, as evidenced by fever, leukocytosis, lactic acidosis, with source of infection likely cystitis, most likely secondary to urinary retention  Fever 103 6 8/19, WBC 14, lactic acid 2 5  Lactic acid normalized with IV fluid boluses  UA 8/19 showed trace leukocytes, large blood  Patient did receive 1 dose of IV Rocephin on 8/17  Urine culture not sent  Blood cultures no growth to date  Procalcitonin negative x1, repeat pending today  Urine culture on 7/30/2021 showed Enterococcus and Klebsiella pneumoniae  Renal ultrasound unremarkable  Appreciate ID input  · Changed antibiotic to IV Unasyn  · Likely transition to p o   Augmentin in the morning as long as blood cultures remain negative    Results from last 7 days   Lab Units 08/21/21  0521 08/19/21 2030 08/19/21  1732   LACTIC ACID mmol/L  --  1 0 2 5*   PROCALCITONIN ng/ml 0 14  --   --      Results from last 7 days   Lab Units 08/22/21  0539 08/21/21  0521 08/20/21  0532 08/19/21  1732 08/18/21  0514 08/17/21  1309   WBC Thousand/uL 6 50 7 37 10 63* 14 01* 11 61* 9 61              Hematuria  Assessment & Plan  Likely due to cystitis, left trigone bladder floor lesion, right radiation ureteritis/right ureteral lesion/right ureteral bleeding per OR findings today  Patient was recently treated for UTI  Continue patient on CBI per Urology  Appreciate urology input      Anemia  Assessment & Plan  Baseline hemoglobin appears to be around 8-9's likely due to iron deficiency  Patient on iron supplements at home  Hemoglobin 9 2 on admission  Trending down due to hematuria  Hemoglobin trended down to 6 8 8/20, received 1 unit RBC  Hemoglobin up to low 8's now down to 7s post transfusion  Monitor H&H every 8 hours, transfuse for hemoglobin less than 7 or sooner if hemodynamically unstable  Repeat H&H this evening  Continue iron supplement, B12, MVI from home    Acute kidney injury superimposed on CKD [facility])  Assessment & Plan  Lab Results   Component Value Date    EGFR 47 08/22/2021    EGFR 38 08/21/2021    EGFR 32 08/20/2021    CREATININE 1 43 (H) 08/22/2021    CREATININE 1 69 (H) 08/21/2021    CREATININE 1 97 (H) 08/20/2021   Baseline creatinine appears to be around 1 4-1 5's  Creatinine 1 57 on admission  Creatinine peaked to 1 97, trending down   Renal and bladder ultrasound no evidence of hydro  Avoid nephrotoxins and hypotension  Blood transfusion as needed to keep hemoglobin above 7  Appreciate nephrology input  · Suspect prerenal  · Continue IV fluids  · Repeat BMP in a m  CKD (chronic kidney disease)  Assessment & Plan  Lab Results   Component Value Date    EGFR 47 08/22/2021    EGFR 38 08/21/2021    EGFR 32 08/20/2021    CREATININE 1 43 (H) 08/22/2021    CREATININE 1 69 (H) 08/21/2021    CREATININE 1 97 (H) 08/20/2021     Patient is around his baseline creatinine of around 1 5    Monitor BMP    Prostate cancer [facility])  Assessment & Plan  Status post radiation, had radiation cystitis  Status post bilateral nephrostomy tubes due to recurrent hematuria, currently capped  Urology following, appreciate input  RA (rheumatoid arthritis) ([facility])  Assessment & Plan  Continue supportive care    Chronic obstructive pulmonary disease (HCC)  Assessment & Plan  Continue on Breo and fluticasone  No evidence of exacerbation  Chest x-ray NAD  Will order albuterol p r n  VTE Pharmacologic Prophylaxis:   Pharmacologic: Pharmacologic VTE Prophylaxis contraindicated due to Hematuria  Mechanical VTE Prophylaxis in Place: Yes    Patient Centered Rounds: I have performed bedside rounds with nursing staff today  Discussions with Specialists or Other Care Team Provider:  Anesthesiology    Education and Discussions with Family / Patient:  Yes    Time Spent for Care: 20 minutes  More than 50% of total time spent on counseling and coordination of care as described above  Current Length of Stay: 4 day(s)    Current Patient Status: Inpatient   Certification Statement: The patient will continue to require additional inpatient hospital stay due to Hematuria, sepsis    Discharge Plan:  Likely home once medically cleared    Code Status: Level 1 - Full Code      Subjective:   Patient denies abdominal pain, back pain, nausea vomiting diarrhea, constipation  Denies chest pain, headache, dizziness, SOB, fever, chills  Objective:     Vitals:   Temp (24hrs), Av 9 °F (36 6 °C), Min:97 1 °F (36 2 °C), Max:98 8 °F (37 1 °C)    Temp:  [97 1 °F (36 2 °C)-98 8 °F (37 1 °C)] 97 1 °F (36 2 °C)  HR:  [61-74] 68  Resp:  [16-20] 18  BP: (122-153)/(57-68) 146/68  SpO2:  [92 %-97 %] 94 %  Body mass index is 30 99 kg/m²  Input and Output Summary (last 24 hours): Intake/Output Summary (Last 24 hours) at 2021 1427  Last data filed at 2021 1410  Gross per 24 hour   Intake 4255 ml   Output 4300 ml   Net -45 ml       Physical Exam:     Physical Exam  Vitals and nursing note reviewed  Constitutional:       Appearance: He is well-developed  HENT:      Head: Normocephalic and atraumatic  Neck:      Thyroid: No thyromegaly  Vascular: No JVD  Trachea: No tracheal deviation  Cardiovascular:      Rate and Rhythm: Normal rate and regular rhythm  Heart sounds: Normal heart sounds  Pulmonary:      Effort: Pulmonary effort is normal  No respiratory distress  Breath sounds: No wheezing or rales  Comments: Breath sounds diminished bilateral, on room air, respirations easy  Abdominal:      General: Bowel sounds are normal  There is no distension  Palpations: Abdomen is soft  Tenderness: There is no abdominal tenderness  There is no guarding  Genitourinary:     Comments: CBI ongoing, draining pinkish urine  Musculoskeletal:         General: No swelling or deformity  Cervical back: Neck supple  Right lower leg: No edema  Left lower leg: No edema  Skin:     General: Skin is warm and dry  Neurological:      General: No focal deficit present  Mental Status: He is alert and oriented to person, place, and time  Psychiatric:         Mood and Affect: Mood normal          Judgment: Judgment normal          Additional Data:     Labs:    Results from last 7 days   Lab Units 08/22/21  1017 08/22/21  0539 08/21/21  0521   WBC Thousand/uL  --  6 50 7 37   HEMOGLOBIN g/dL 7 6* 7 3* 7 8*   HEMATOCRIT % 26 4* 24 8* 25 7*   PLATELETS Thousands/uL  --  152 151   NEUTROS PCT %  --   --  66   LYMPHS PCT %  --   --  14   MONOS PCT %  --   --  12   EOS PCT %  --   --  8*     Results from last 7 days   Lab Units 08/22/21  0539 08/17/21  1309   POTASSIUM mmol/L 4 3 4 6   CHLORIDE mmol/L 109* 104   CO2 mmol/L 24 28   BUN mg/dL 25 38*   CREATININE mg/dL 1 43* 1 57*   CALCIUM mg/dL 8 0* 9 0   ALK PHOS U/L  --  96   ALT U/L  --  31   AST U/L  --  20           * I Have Reviewed All Lab Data Listed Above  * Additional Pertinent Lab Tests Reviewed:  All Labs For Current Hospital Admission Reviewed    Imaging:    Imaging Reports Reviewed Today Include:  Renal ultrasound  Imaging Personally Reviewed by Myself Includes:  None    Recent Cultures (last 7 days):     Results from last 7 days   Lab Units 08/19/21  1829 08/19/21  1731   BLOOD CULTURE  No Growth at 48 hrs  No Growth at 48 hrs  Last 24 Hours Medication List:   Current Facility-Administered Medications   Medication Dose Route Frequency Provider Last Rate    acetaminophen  650 mg Oral Q4H PRN Danielle Garcia MD      albuterol  2 5 mg Nebulization Q4H PRN Danielle Garcia MD      ampicillin-sulbactam  3 g Intravenous Q8H Danielle Garcia MD 3 g (08/22/21 1414)    atorvastatin  20 mg Oral HS Danielle Garcia MD      belladonna-opium  30 mg Rectal Q8H PRN Danielle Garcia MD      cyanocobalamin  500 mcg Oral Daily Danielle Garcia MD      ferrous sulfate  325 mg Oral Daily With Breakfast Danielle Garcia MD      fluticasone  1 spray Nasal Daily Danielle Garcia MD      fluticasone-vilanterol  1 puff Inhalation HS Danielle Garcia MD      loratadine  10 mg Oral Daily Danielle Garcia MD      metoprolol tartrate  12 5 mg Oral Q12H [address] Reyes MD      multivitamin-minerals  1 tablet Oral Daily Danielle Garcia MD      saccharomyces boulardii  250 mg Oral BID Danielle Garcia MD      sodium chloride  75 mL/hr Intravenous Continuous Danielle Garcia MD 75 mL/hr (08/22/21 1410)        Today, Patient Was Seen By: CORWIN Centeno    ** Please Note: Dragon 360 Dictation voice to text software may have been used in the creation of this document   **

## 2021-08-22 NOTE — ANESTHESIA PREPROCEDURE EVALUATION
Procedure:  CYSTOSCOPY EVACUATION OF CLOTS fulguration retrograde pyelograms (Bilateral Bladder)    Relevant Problems   ANESTHESIA (within normal limits)      CARDIO   (+) Essential hypertension   (+) Hyperlipidemia      /RENAL   (+) SHANTI (acute kidney injury) (HCC)   (+) Acute kidney injury superimposed on CKD (HCC)   (+) CKD (chronic kidney disease)   (+) Prostate cancer (HCC)      HEMATOLOGY   (+) Acute blood loss anemia (ABLA)   (+) Anemia      MUSCULOSKELETAL   (+) RA (rheumatoid arthritis) (HCC)      PULMONARY   (+) Chronic obstructive pulmonary disease (HCC)        Physical Exam    Airway    Mallampati score: II  TM Distance: >3 FB  Neck ROM: full     Dental   No notable dental hx     Cardiovascular  Rhythm: regular, Rate: normal,     Pulmonary  Breath sounds clear to auscultation,     Other Findings        Anesthesia Plan  ASA Score- 4 Emergent    Anesthesia Type- general with ASA Monitors  Additional Monitors:   Airway Plan: LMA  Plan Factors-Exercise tolerance (METS): >4 METS  Chart reviewed  EKG reviewed  Existing labs reviewed  Patient summary reviewed  Patient is not a current smoker  Induction- intravenous  Postoperative Plan- Plan for postoperative opioid use  Planned trial extubation    Informed Consent- Anesthetic plan and risks discussed with patient  I personally reviewed this patient with the CRNA  Discussed and agreed on the Anesthesia Plan with the CRNA  Maggie Mario

## 2021-08-22 NOTE — ASSESSMENT & PLAN NOTE
Lab Results   Component Value Date    EGFR 47 08/22/2021    EGFR 38 08/21/2021    EGFR 32 08/20/2021    CREATININE 1 43 (H) 08/22/2021    CREATININE 1 69 (H) 08/21/2021    CREATININE 1 97 (H) 08/20/2021     Patient is around his baseline creatinine of around 1 5    Monitor BMP

## 2021-08-23 PROBLEM — N17.9 ACUTE KIDNEY INJURY SUPERIMPOSED ON CHRONIC KIDNEY DISEASE (HCC): Status: ACTIVE | Noted: 2021-07-30

## 2021-08-23 LAB
ANION GAP SERPL CALCULATED.3IONS-SCNC: 11 MMOL/L (ref 4–13)
BUN SERPL-MCNC: 24 MG/DL (ref 5–25)
CALCIUM SERPL-MCNC: 8.2 MG/DL (ref 8.3–10.1)
CHLORIDE SERPL-SCNC: 104 MMOL/L (ref 100–108)
CO2 SERPL-SCNC: 23 MMOL/L (ref 21–32)
CREAT SERPL-MCNC: 1.9 MG/DL (ref 0.6–1.3)
ERYTHROCYTE [DISTWIDTH] IN BLOOD BY AUTOMATED COUNT: 19.6 % (ref 11.6–15.1)
GFR SERPL CREATININE-BSD FRML MDRD: 33 ML/MIN/1.73SQ M
GLUCOSE SERPL-MCNC: 133 MG/DL (ref 65–140)
HCT VFR BLD AUTO: 27.9 % (ref 36.5–49.3)
HGB BLD-MCNC: 8.1 G/DL (ref 12–17)
MAGNESIUM SERPL-MCNC: 1.7 MG/DL (ref 1.6–2.6)
MCH RBC QN AUTO: 23.4 PG (ref 26.8–34.3)
MCHC RBC AUTO-ENTMCNC: 29 G/DL (ref 31.4–37.4)
MCV RBC AUTO: 81 FL (ref 82–98)
PLATELET # BLD AUTO: 164 THOUSANDS/UL (ref 149–390)
PMV BLD AUTO: 10.1 FL (ref 8.9–12.7)
POTASSIUM SERPL-SCNC: 4.2 MMOL/L (ref 3.5–5.3)
RBC # BLD AUTO: 3.46 MILLION/UL (ref 3.88–5.62)
SODIUM SERPL-SCNC: 138 MMOL/L (ref 136–145)
WBC # BLD AUTO: 9.67 THOUSAND/UL (ref 4.31–10.16)

## 2021-08-23 PROCEDURE — 80048 BASIC METABOLIC PNL TOTAL CA: CPT | Performed by: NURSE PRACTITIONER

## 2021-08-23 PROCEDURE — 85027 COMPLETE CBC AUTOMATED: CPT | Performed by: NURSE PRACTITIONER

## 2021-08-23 PROCEDURE — 99232 SBSQ HOSP IP/OBS MODERATE 35: CPT | Performed by: INTERNAL MEDICINE

## 2021-08-23 PROCEDURE — 83735 ASSAY OF MAGNESIUM: CPT | Performed by: NURSE PRACTITIONER

## 2021-08-23 PROCEDURE — 99233 SBSQ HOSP IP/OBS HIGH 50: CPT | Performed by: INTERNAL MEDICINE

## 2021-08-23 RX ORDER — AMOXICILLIN AND CLAVULANATE POTASSIUM 500; 125 MG/1; MG/1
1 TABLET, FILM COATED ORAL EVERY 12 HOURS SCHEDULED
Status: DISCONTINUED | OUTPATIENT
Start: 2021-08-23 | End: 2021-08-24

## 2021-08-23 RX ORDER — MAGNESIUM SULFATE HEPTAHYDRATE 40 MG/ML
2 INJECTION, SOLUTION INTRAVENOUS ONCE
Status: COMPLETED | OUTPATIENT
Start: 2021-08-23 | End: 2021-08-23

## 2021-08-23 RX ADMIN — CYANOCOBALAMIN TAB 500 MCG 500 MCG: 500 TAB at 08:20

## 2021-08-23 RX ADMIN — MAGNESIUM SULFATE HEPTAHYDRATE 2 G: 40 INJECTION, SOLUTION INTRAVENOUS at 10:14

## 2021-08-23 RX ADMIN — ACETAMINOPHEN 650 MG: 325 TABLET, FILM COATED ORAL at 04:47

## 2021-08-23 RX ADMIN — SODIUM CHLORIDE 3 G: 9 INJECTION, SOLUTION INTRAVENOUS at 15:16

## 2021-08-23 RX ADMIN — ACETAMINOPHEN 650 MG: 325 TABLET, FILM COATED ORAL at 19:12

## 2021-08-23 RX ADMIN — SODIUM CHLORIDE 125 ML/HR: 0.9 INJECTION, SOLUTION INTRAVENOUS at 19:10

## 2021-08-23 RX ADMIN — FLUTICASONE FUROATE AND VILANTEROL TRIFENATATE 1 PUFF: 200; 25 POWDER RESPIRATORY (INHALATION) at 21:26

## 2021-08-23 RX ADMIN — Medication 250 MG: at 08:20

## 2021-08-23 RX ADMIN — AMOXICILLIN AND CLAVULANATE POTASSIUM 1 TABLET: 500; 125 TABLET, FILM COATED ORAL at 21:26

## 2021-08-23 RX ADMIN — ATORVASTATIN CALCIUM 20 MG: 20 TABLET, FILM COATED ORAL at 21:26

## 2021-08-23 RX ADMIN — FERROUS SULFATE TAB 325 MG (65 MG ELEMENTAL FE) 325 MG: 325 (65 FE) TAB at 08:20

## 2021-08-23 RX ADMIN — SODIUM CHLORIDE 3 G: 9 INJECTION, SOLUTION INTRAVENOUS at 05:19

## 2021-08-23 RX ADMIN — Medication 12.5 MG: at 21:26

## 2021-08-23 NOTE — ASSESSMENT & PLAN NOTE
Lab Results   Component Value Date    EGFR 33 08/23/2021    EGFR 47 08/22/2021    EGFR 38 08/21/2021    CREATININE 1 90 (H) 08/23/2021    CREATININE 1 43 (H) 08/22/2021    CREATININE 1 69 (H) 08/21/2021   Most likely etiology secondary to postobstructive  Nephrology on board, currently on IV fluids  Patient is around his baseline creatinine of around 1 3-1 6,   Monitor BMP

## 2021-08-23 NOTE — ASSESSMENT & PLAN NOTE
Evolving since admission, as evidenced by fever, leukocytosis, lactic acidosis, with source of infection likely cystitis, most likely secondary to urinary retention  Fever 103 6 8/19, WBC 14, lactic acid 2 5  Lactic acid normalized with IV fluid boluses  UA 8/19 showed trace leukocytes, large blood  Patient did receive 1 dose of IV Rocephin on 8/17  Urine culture not sent  Blood cultures no growth to date  Procalcitonin negative x1, repeat pending today  Urine culture on 7/30/2021 showed Enterococcus and Klebsiella pneumoniae  Renal ultrasound unremarkable  Appreciate ID input  · Changed antibiotic to IV Unasyn  · Likely transition to p o   Augmentin in the morning as long as blood cultures remain negative    Results from last 7 days   Lab Units 08/22/21  0539 08/21/21  0521 08/19/21  2030 08/19/21  1732   LACTIC ACID mmol/L  --   --  1 0 2 5*   PROCALCITONIN ng/ml 0 08 0 14  --   --      Results from last 7 days   Lab Units 08/23/21  0456 08/22/21  0539 08/21/21  0521 08/20/21  0532 08/19/21  1732 08/18/21  0514 08/17/21  1309   WBC Thousand/uL 9 67 6 50 7 37 10 63* 14 01* 11 61* 9 61

## 2021-08-23 NOTE — ASSESSMENT & PLAN NOTE
Likely due to cystitis, left trigone bladder floor lesion, right radiation ureteritis/right ureteral lesion/right ureteral bleeding per OR findings today  Patient was recently treated for UTI  Continue patient on CBI per Urology, Urine Clearing     Appreciate urology input

## 2021-08-23 NOTE — PROGRESS NOTES
Progress Note - Infectious Disease   Janette Ridley 66 y o  male MRN: [id number]  Unit/Bed#: [address] [address] Encounter: [id number]      Impression/Recommendations:  1  Sepsis, not present on admission: but developed during this admission with fever and leukocytosis   Source of sepsis is most likely urinary retention  Mamta Lloyd is clinically improving   Temperatures are decreasing since 8/22, WBC normalized   He has remains systemically well, without toxicity and hemodynamically stable   Blood cultures have no growth thus far  Antibiotic plan as below  Monitor temperature curve, WBC, clinical response  Follow-up blood cultures: ngtd     2  Cystitis, most likely secondary to urinary retention  Mamta Lloyd is clinically improved   Unfortunately, urine culture was not obtained prior to antibiotic  Regardless , patient is clinically much better on current antibiotics   Patient had recent urine culture last month with growth of Klebsiella and ampicillin susceptible Enterococcus  8/22 s/p cystoscopy with clot evacuation and bladder floor biopsy  Discontinue IV Unasyn  Transition to Augmentin 500mg po q12 hours x 7 day course thru 8/26  Monitor temperature curve, WBC, clinical response      3  Urinary retention, most likely secondary to hematuria and XRT cystitis   Hematuria improving but still persists  Of note, patient has bilateral PCN in place, which are capped  S/p cystoscopy on 8/22  Sumner catheter drainage in place  Urology service is following      4  CKD   Creatinine is rising  Antibiotic dosages adjusted accordingly  Repeat creatinine in am   Avoid nephrotoxins      5  XRT cystitis      6  Prostate cancer, status post XRT  Urology follow-up      Discussed with patient in regarding the above plan  Discussed with Dr Rudd from [facility] service      Antibiotics:  Unasyn # 4      Subjective:  Pt feels well  He denies fever, chills  No further abdominal pain  His gross hematuria persists    He is tolerating antibiotic well  No nausea, vomiting or diarrhea      Objective:  Vitals:  Temp:  [98 °F (36 7 °C)-102 7 °F (39 3 °C)] 98 9 °F (37 2 °C)  HR:  [] 69  Resp:  [17-19] 18  BP: ()/(46-78) 123/59  SpO2:  [91 %-95 %] 92 %  Temp (24hrs), Av 2 °F (37 9 °C), Min:98 °F (36 7 °C), Max:102 7 °F (39 3 °C)  Current: Temperature: 98 9 °F (37 2 °C)    Physical Exam:     General: Awake, alert, cooperative, no distress  Neck:  Supple  No mass  Lungs: CTA b/l, no wheezing, respirations unlabored  Heart:  Regular rate and rhythm, S1 and S2 normal, no murmur  Abd/: Soft, nondistended, no masses  Sumner catheter intact with gross hematuria noted  Extremities: No distal leg edema b/l  Skin:  No rash   Neuro: AAO x 3, conversant, fluent speech, moves all extremities  Invasive Devices     Peripheral Intravenous Line            Peripheral IV 21 Left Forearm <1 day          Drain            Nephrostomy Left 1 8 Fr  19 days    Nephrostomy Right 1 8 Fr  19 days    Urethral Catheter Latex 24 Fr  1 day              Labs studies:   I have personally reviewed pertinent labs  Results from last 7 days   Lab Units 21  0456 21  0539 21  0521 21  1309   POTASSIUM mmol/L 4 2 4 3 3 8 4 6   CHLORIDE mmol/L 104 109* 105 104   CO2 mmol/L 23 24 25 28   BUN mg/dL 24 25 38* 38*   CREATININE mg/dL 1 90* 1 43* 1 69* 1 57*   EGFR ml/min/1 73sq m 33 47 38 42   CALCIUM mg/dL 8 2* 8 0* 7 4* 9 0   AST U/L  --   --   --  20   ALT U/L  --   --   --  31   ALK PHOS U/L  --   --   --  96     Results from last 7 days   Lab Units 21  0456 21  1830 21  1017 21  0539 21  0521   WBC Thousand/uL 9 67  --   --  6 50 7 37   HEMOGLOBIN g/dL 8 1* 8 7* 7 6* 7 3* 7 8*   PLATELETS Thousands/uL 164  --   --  152 151     Results from last 7 days   Lab Units 21  1829 21  1731   BLOOD CULTURE  No Growth at 72 hrs  No Growth at 72 hrs       Imaging Studies:   I have personally reviewed pertinent imaging study reports

## 2021-08-23 NOTE — PHYSICAL THERAPY NOTE
Pt declined PT today reporting he is on bedrest with CBI  Will follow    Naldo Franklin PT  [id number]

## 2021-08-23 NOTE — PROGRESS NOTES
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 Prerenal/possible sepsis   Current creatinine:  1 90 somewhat higher   Peak creatinine:  1 97 as of 08/20/2021   UA:  Large occult blood/no proteinuria/innumerable RBCs   Renal imaging :  Ultrasound 08/20/2021:  No evidence of hydronephrosis at that time   Status post cystoscopy on 08/22/2021:  Extensive fulguration left bladder floor left lateral wall right retrograde pyelogram right ureteroscopy biopsy for suspicious distal lesion in laser fulguration of bleeding distal ureter and suspicious lesions stent placed  Treatment:   IV fluids   Follow-up with Urology   Check labs in a m  To make sure creatinine is not worsening   Maintain MAP:  Over 65 mmHg if possible/avoid hypoperfusion:  Hold parameters on blood pressure medications   Avoid nephrotoxic agents such as NSAIDs, and IV contrast if possible    2  CKD:  3B   Baseline creatinine:  1 3-1 6   Etiology:  Chronic obstructive uropathy/hypertensive nephrosclerosis/arteriolar nephrosclerosis    3  Volume/hypertension:   Volume:  Euvolemic:  However I will increase the IV fluids given low blood pressure   Blood pressure: Acceptable in actually low today  Recommend:   Hold parameters for any blood pressure medications   If low blood pressure persists may need to add midodrine    4  Electrolytes:   All acceptable      5  MBD of CKD/AK I:   Magnesium 1 7:  Replete    6   Anemia:  Secondary to urinary bleeding and CKD/chronic disease   Current hemoglobin:  8 1   For completeness check multiple myeloma evaluation given CKD and anemia   Oral iron given infection  Treatment:   Transfuse for hemoglobin less than 7 0 per primary service      7  Major reason for hospitalization:  o Urinary retention with sepsis being treated by Infectious Disease with cystitis    8  Other problems:  o Prostate CA status post XRT  o Rheumatoid arthritis  o COPD          Subjective:   Patient overall feels quite well  No chest pain or shortness of breath  No nausea vomiting or diarrhea  He has a Sumner catheter in place  Objective:     Vitals: Blood pressure (!) 93/46, pulse 72, temperature 98 °F (36 7 °C), resp  rate 18, height 5' 9" (1 753 m), weight 94 8 kg (209 lb), SpO2 91 %  ,Body mass index is 30 86 kg/m²  Weight (last 2 days)     Date/Time   Weight    08/23/21 0549   94 8 (209)    08/21/21 0556   95 2 (209 88)                Intake/Output Summary (Last 24 hours) at 8/23/2021 0916  Last data filed at 8/23/2021 0801  Gross per 24 hour   Intake 1550 ml   Output 1650 ml   Net -100 ml       Urethral Catheter Latex 24 Fr   (Active)   Reasons to continue Urinary Catheter  Acute urinary retention/obstruction failing urinary retention protocol 08/22/21 2045   Goal for Removal Will consult urology 08/22/21 2045   Site Assessment Clean;Skin intact 08/22/21 2045   Sumner Care Done 08/23/21 0852   Collection Container Standard drainage bag 08/22/21 2045   Securement Method Securing device (Describe) 08/22/21 2045       Physical Exam: General:  No acute distress  Skin:  No acute rash  Eyes:  No scleral icterus and noninjected  ENT:  Moist mucous membranes  Neck:  Supple, no jugular venous distention, trachea midline, overall appearance is normal  Chest:  Clear to auscultation  CVS:  Regular rate and rhythm, without a rub or gallops  Abdomen:  Normal bowel sounds, soft and nontender and nondistended  Extremities:  No edema, and no cyanosis, no significant arthritic changes  Neuro:  No gross focality  Psych:  Alert and oriented and appropriate                Medications:    Scheduled Meds:  Current Facility-Administered Medications   Medication Dose Route Frequency Provider Last Rate    acetaminophen  650 mg Oral Q4H PRN Bella Guan MD      albuterol  2 5 mg Nebulization Q4H PRN Bella Guan MD      ampicillin-sulbactam  3 g Intravenous Q8H Bella Guan MD 3 g (08/23/21 0519)    atorvastatin  20 mg Oral HS Bella Guan MD      belladonna-opium  30 mg Rectal Q8H PRN Bella Guan MD      cyanocobalamin  500 mcg Oral Daily Bella Guan MD      ferrous sulfate  325 mg Oral Daily With Breakfast Bella Guan MD      fluticasone  1 spray Nasal Daily Bella Guan MD      fluticasone-vilanterol  1 puff Inhalation HS Bella Guan MD      loratadine  10 mg Oral Daily Bella Guan MD      metoprolol tartrate  12 5 mg Oral Q12H [address] Bella Guan MD      multivitamin-minerals  1 tablet Oral Daily Bella Guan MD      saccharomyces boulardii  250 mg Oral BID Bella Guan MD      sodium chloride  75 mL/hr Intravenous Continuous Bella Guan MD 75 mL/hr (08/22/21 1410)       PRN Meds:   acetaminophen    albuterol    belladonna-opium    Continuous Infusions:sodium chloride, 75 mL/hr, Last Rate: 75 mL/hr (08/22/21 1410)        Lab, Imaging and other studies: I have personally reviewed pertinent labs    Laboratory Results:  Results from last 7 days   Lab Units 08/23/21  0456 08/22/21  1830 08/22/21  1017 08/22/21  0539 08/22/21  0005 08/21/21  1439 08/21/21  0521 08/20/21  0532 08/19/21  1732 08/18/21  0514 08/17/21  1309   WBC Thousand/uL 9 67  --   --  6 50  --   --  7 37 10 63* 14 01* 11 61* 9 61   HEMOGLOBIN g/dL 8 1* 8 7* 7 6* 7 3* 7 2* 7 6* 7 8* 7 2* 8 9* 8 1* 9 2*   HEMATOCRIT % 27 9* 29 9* 26 4* 24 8* 24 4* 25 5* 25 7* 24 6* 31 6* 28 3* 31 9*   PLATELETS Thousands/uL 164  --   --  152  --   -- 151 175 232 269 311   POTASSIUM mmol/L 4 2  --   --  4 3  --   --  3 8 3 8  --  4 4 4 6   CHLORIDE mmol/L 104  --   --  109*  --   --  105 102  --  106 104   CO2 mmol/L 23  --   --  24  --   --  25 25  --  27 28   BUN mg/dL 24  --   --  25  --   --  38* 43*  --  36* 38*   CREATININE mg/dL 1 90*  --   --  1 43*  --   --  1 69* 1 97*  --  1 46* 1 57*   CALCIUM mg/dL 8 2*  --   --  8 0*  --   --  7 4* 7 7*  --  8 6 9 0   MAGNESIUM mg/dL 1 7  --   --  2 0  --   --  1 9  --   --   --   --      Urinalysis:   Lab Results   Component Value Date    COLORU Orange 08/19/2021    CLARITYU Slightly Cloudy 08/19/2021    SPECGRAV 1 010 08/19/2021    PHUR 6 0 08/19/2021    PHUR 5 5 03/25/2018    LEUKOCYTESUR Trace (A) 08/19/2021    NITRITE Negative 08/19/2021    GLUCOSEU Negative 08/19/2021    KETONESU Negative 08/19/2021    BILIRUBINUR Negative 08/19/2021    BLOODU Large (A) 08/19/2021     ABGs: No results found for: [facility]  Radiology review:     Portions of the record may have been created with voice recognition software  Occasional wrong word or "sound a like" substitutions may have occurred due to the inherent limitations of voice recognition software  Read the chart carefully and recognize, using context, where substitutions have occurred

## 2021-08-23 NOTE — ASSESSMENT & PLAN NOTE
Baseline hemoglobin appears to be around 8-9's likely due to iron deficiency  Patient on iron supplements at home  Hemoglobin 9 2 on admission  Trending down due to hematuria  Hemoglobin trended down to 6 8 8/20, received 1 unit RBC  Hemoglobin up to low 8's now down to 7s post transfusion  Monitor H&H every 8 hours, transfuse for hemoglobin less than 7 or sooner if hemodynamically unstable  Hb stable in low 8 range  But BP running low in 22H systolic hence we will Transfuse 1 unit PRBC today     Continue iron supplement, B12, MVI from home Lab Facility: [id number] Wound Care: Vaseline Electrodesiccation And Curettage Text: The wound bed was treated with electrodesiccation and curettage after the biopsy was performed. Type Of Destruction Used: Electrodesiccation Dressing: bandage Notification Instructions: Patient will be notified of biopsy results. However, patient instructed to call the office if not contacted within 2 weeks. Hemostasis: Electrocautery Consent: Written consent was obtained and risks were reviewed including but not limited to scarring, infection, bleeding, scabbing, incomplete removal, nerve damage and allergy to anesthesia. Anesthesia Volume In Cc: 0.5 Detail Level: Detailed Cryotherapy Text: The wound bed was treated with cryotherapy after the biopsy was performed. Bill 02006 For Specimen Handling/Conveyance To Laboratory?: no Depth Of Biopsy: dermis Size Of Lesion In Cm: 1 Post-Care Instructions: I reviewed with the patient in detail post-care instructions. Patient is to keep the biopsy site dry overnight, and then apply bacitracin twice daily until healed. Patient may apply hydrogen peroxide soaks to remove any crusting. Silver Nitrate Text: The wound bed was treated with silver nitrate after the biopsy was performed. Was A Bandage Applied: Yes Anesthesia Type: 1% lidocaine with epinephrine X Size Of Lesion In Cm: 0 Electrodesiccation Text: The wound bed was treated with electrodesiccation after the biopsy was performed. Biopsy Method: Personna blade Biopsy Type: H and E Billing Type: Third-Party Bill Lab: [id number] Curettage Text: The wound bed was treated with curettage after the biopsy was performed.

## 2021-08-23 NOTE — PROGRESS NOTES
Progress Note - Urology      Patient: Bobo Canales   : 1945 Sex: male   MRN: [id number]     CSN: [id number]  Unit/Bed#: [address] [place] [address]     SUBJECTIVE:   Status post cysto right ureteroscopy laser/biopsy stent fulguration bleeding bladder vessels  CBI stopped this morning  Minimally hematuria urine  Tiny clots noted      Objective   Vitals: /59   Pulse 69   Temp 98 9 °F (37 2 °C)   Resp 18   Ht 5' 9" (1 753 m)   Wt 94 8 kg (209 lb)   SpO2 92%   BMI 30 86 kg/m²     I/O last 24 hours:   In: -   Out:  [Urine:1650]      Physical Exam:   General Alert awake   Normocephalic atraumatic PERRLA  Lungs clear bilaterally  Cardiac normal S1 normal S2  Abdomen soft, flank pain  Extremities no edema      Lab Results: CBC:   Lab Results   Component Value Date    WBC 9 67 2021    HGB 8 1 (L) 2021    HCT 27 9 (L) 2021    MCV 81 (L) 2021     2021    MCH 23 4 (L) 2021    MCHC 29 0 (L) 2021    RDW 19 6 (H) 2021    MPV 10 1 2021    NRBC 0 2021     CMP:   Lab Results   Component Value Date     2021    CO2 23 2021    BUN 24 2021    CREATININE 1 90 (H) 2021    CALCIUM 8 2 (L) 2021    AST 20 2021    ALT 31 2021    ALKPHOS 96 2021    EGFR 33 2021     Urinalysis:   Lab Results   Component Value Date    COLORU Orange 2021    CLARITYU Slightly Cloudy 2021    SPECGRAV 1 010 2021    PHUR 6 0 2021    PHUR 5 5 2018    LEUKOCYTESUR Trace (A) 2021    NITRITE Negative 2021    GLUCOSEU Negative 2021    KETONESU Negative 2021    BILIRUBINUR Negative 2021    BLOODU Large (A) 2021     Urine Culture:   Lab Results   Component Value Date    URINECX >100,000 cfu/ml Klebsiella pneumoniae (A) 2021    URINECX >100,000 cfu/ml Enterococcus faecalis (A) 2021     PSA: No results found for: PSA      Assessment/ Plan:  Radiation cystitis  Radiation ureteritis  Right stent  Sumner draining minimally hematuria urine  Encourage p o   Intake  Will DC Sumner tomorrow if urine clears          Kellee Corbin MD

## 2021-08-23 NOTE — PROGRESS NOTES
Armando 43  Progress Note Era Hernandez 1945, 66 y o  male MRN: [id number]  Unit/Bed#: [address] [place] [address] Encounter: [id number]  Primary Care Provider: Diana Sandoval MD   Date and time admitted to hospital: 8/17/2021 12:20 PM    Hematuria  Assessment & Plan  Likely due to cystitis, left trigone bladder floor lesion, right radiation ureteritis/right ureteral lesion/right ureteral bleeding per OR findings today  Patient was recently treated for UTI  Continue patient on CBI per Urology, Urine Clearing  Appreciate urology input      * Urinary retention  Assessment & Plan  Secondary to hematuria with mild blood clots and radiation cystitis  Patient is status post Sumner catheter and started on CBI  Appreciate urology input  · Patient is status post cystoscopy, evacuation of clots, left bladder floor biopsy, extensive fulguration left bladder floor left lateral wall, right retrograde pyelogram, right ureteroscopy biopsy suspicious distal ureteral lesion laser fulguration bleeding distal ureteral, right ureteral stent placement on 8/22  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, left trigone bladder floor lesion, right radiation ureteritis, right ureteral lesion with bleeding  · Continue CBI         Severe sepsis [facility])  Assessment & Plan  Evolving since admission, as evidenced by fever, leukocytosis, lactic acidosis, with source of infection likely cystitis, most likely secondary to urinary retention  Fever 103 6 8/19, WBC 14, lactic acid 2 5  Lactic acid normalized with IV fluid boluses  UA 8/19 showed trace leukocytes, large blood  Patient did receive 1 dose of IV Rocephin on 8/17  Urine culture not sent  Blood cultures no growth to date  Procalcitonin negative x1, repeat pending today  Urine culture on 7/30/2021 showed Enterococcus and Klebsiella pneumoniae  Renal ultrasound unremarkable  Appreciate ID input  · Changed antibiotic to IV Unasyn  · Likely transition to p o  Augmentin in the morning as long as blood cultures remain negative    Results from last 7 days   Lab Units 08/22/21  0539 08/21/21  0521 08/19/21  2030 08/19/21  1732   LACTIC ACID mmol/L  --   --  1 0 2 5*   PROCALCITONIN ng/ml 0 08 0 14  --   --      Results from last 7 days   Lab Units 08/23/21  0456 08/22/21  0539 08/21/21  0521 08/20/21  0532 08/19/21  1732 08/18/21  0514 08/17/21  1309   WBC Thousand/uL 9 67 6 50 7 37 10 63* 14 01* 11 61* 9 61              Anemia  Assessment & Plan  Baseline hemoglobin appears to be around 8-9's likely due to iron deficiency  Patient on iron supplements at home  Hemoglobin 9 2 on admission  Trending down due to hematuria  Hemoglobin trended down to 6 8 8/20, received 1 unit RBC  Hemoglobin up to low 8's now down to 7s post transfusion  Monitor H&H every 8 hours, transfuse for hemoglobin less than 7 or sooner if hemodynamically unstable  Hb stable in low 8 range  But BP running low in 17P systolic hence we will Transfuse 1 unit PRBC today  Continue iron supplement, B12, MVI from home    Acute kidney injury superimposed on chronic kidney disease [facility])  Assessment & Plan  Lab Results   Component Value Date    EGFR 33 08/23/2021    EGFR 47 08/22/2021    EGFR 38 08/21/2021    CREATININE 1 90 (H) 08/23/2021    CREATININE 1 43 (H) 08/22/2021    CREATININE 1 69 (H) 08/21/2021   Most likely etiology secondary to postobstructive  Nephrology on board, currently on IV fluids  Patient is around his baseline creatinine of around 1 3-1 6,   Monitor BMP  Prostate cancer [facility])  Assessment & Plan  Status post radiation, had radiation cystitis  Status post bilateral nephrostomy tubes due to recurrent hematuria, currently capped  Urology following, appreciate input  RA (rheumatoid arthritis) ([facility] [address] )  Assessment & Plan  Continue supportive care        Subjective:   I have seen and Examined the patient at the bedside  No CP or Sob  No fevers or chills, No nausea or vomiting  Overnight events reviewed with the RN  No Other complains  Patient denies any dizziness or lightheadedness  No chest pain or shortness with no fevers or chills  However early morning patient had a low-grade temperature of 100 6°  Blood pressures been running slightly lower with systolic blood pressure in the 90s  Review of System:   Denies any CP or SOB  Denies any Cough or Cold  Denies any Fevers or chills  Denies any focal tingling numbness or weakness in any extremities  Denies any abdominal pain, Nausea or vomiting  Objective:   Temp (24hrs), Av 2 °F (37 3 °C), Min:97 1 °F (36 2 °C), Max:102 7 °F (39 3 °C)    Temp:  [97 1 °F (36 2 °C)-102 7 °F (39 3 °C)] 98 °F (36 7 °C)  HR:  [] 72  Resp:  [17-19] 18  BP: ()/(46-84) 98/52  SpO2:  [91 %-96 %] 91 %  Body mass index is 30 86 kg/m²  Input and Output Summary (last 24 hours): Intake/Output Summary (Last 24 hours) at 2021 1234  Last data filed at 2021 1014  Gross per 24 hour   Intake 1550 ml   Output 1750 ml   Net -200 ml     I/O        0701 -  0700  07 -  0700  0701 -  0700    P  O  1230 0     I V  (mL/kg) 1725 (18 1) 1550 (16 4)     Blood       IV Piggyback 300      Total Intake(mL/kg) 3255 (34 2) 1550 (16 4)     Urine (mL/kg/hr) 4250 (1 9) 1550 (0 7) 200 (0 4)    Emesis/NG output  0     Stool  0     Total Output 4250 1550 200    Net -995 0 -200           Unmeasured Stool Occurrence  1 x           Physical Exam:   General : Alert, Awake and oriented x 2-3, NAD  Neck : Supple  Eyes:  TR, EOMI  CVS : S1, S2, RRR    R/S : Clear to auscultate anteriorly  Abd: Soft, NT, ND  Bs+ve  Extremity: Trace pedal edema noted  Skin: No acute Rash noted  CNS: No acute FND   exam:  CBI Sumner catheter draining clear urine  No hematuria noted      Additional Data:     Labs & Imaging Data Reviewed :    Results from last 7 days   Lab Units 21  0456 21  1439 21  0521   WBC Thousand/uL 9 67   < > 7 37   HEMOGLOBIN g/dL 8 1*  --  7 8*   HEMATOCRIT % 27 9*  --  25 7*   PLATELETS Thousands/uL 164   < > 151   NEUTROS PCT %  --   --  66   LYMPHS PCT %  --   --  14   MONOS PCT %  --   --  12   EOS PCT %  --   --  8*    < > = values in this interval not displayed  Results from last 7 days   Lab Units 08/23/21  0456 08/17/21  1309   POTASSIUM mmol/L 4 2 4 6   CHLORIDE mmol/L 104 104   CO2 mmol/L 23 28   BUN mg/dL 24 38*   CREATININE mg/dL 1 90* 1 57*   CALCIUM mg/dL 8 2* 9 0   ALK PHOS U/L  --  96   ALT U/L  --  31   AST U/L  --  20         No results found for: HGBA1C   US kidney and bladder   Final Result by Sarah Acevedo MD (08/20 1255)      No evidence of hydronephrosis  Sumner catheter noted  Workstation performed: [id number]         XR chest portable   Final Result by Woo Garg MD (08/20 1105)   No acute cardiopulmonary disease  Findings are stable            Workstation performed: [id number]         XR retrograde pyelogram    (Results Pending)       Cultures:   Blood Culture:   Lab Results   Component Value Date    BLOODCX No Growth at 72 hrs  08/19/2021    BLOODCX No Growth at 72 hrs  08/19/2021    BLOODCX No Growth After 5 Days  07/30/2021    BLOODCX No Growth After 5 Days  07/30/2021    BLOODCX No Growth After 5 Days  06/28/2021    BLOODCX No Growth After 5 Days   06/28/2021     Urine Culture:   Lab Results   Component Value Date    URINECX >100,000 cfu/ml Klebsiella pneumoniae (A) 07/30/2021    URINECX >100,000 cfu/ml Enterococcus faecalis (A) 07/30/2021    URINECX 10,000-19,000 cfu/ml Enterococcus faecalis (A) 07/08/2021    URINECX >100,000 cfu/ml Stenotrophomonas maltophilia (A) 07/08/2021    URINECX >100,000 cfu/ml  06/28/2021    URINECX No Growth <1000 cfu/mL 05/04/2021    URINECX <10,000 cfu/ml Yeast (A) 04/29/2021     Sputum Culture: No components found for: SPUTUMCX  Wound Culture: No results found for: WOUNDCULT    Last 24 Hours Medication List: Current Facility-Administered Medications   Medication Dose Route Frequency Provider Last Rate    acetaminophen  650 mg Oral Q4H PRN Leigh Garcia MD      albuterol  2 5 mg Nebulization Q4H PRN Leigh Garcia MD      ampicillin-sulbactam  3 g Intravenous Q8H Leigh Garcia MD 3 g (08/23/21 0519)    atorvastatin  20 mg Oral HS Leigh Garcia MD      belladonna-opium  30 mg Rectal Q8H PRN Leigh Garcia MD      cyanocobalamin  500 mcg Oral Daily Leigh Garcia MD      ferrous sulfate  325 mg Oral Daily With Breakfast Leigh Garcia MD      fluticasone  1 spray Nasal Daily Leigh Garcia MD      fluticasone-vilanterol  1 puff Inhalation HS Leigh Garcia MD      loratadine  10 mg Oral Daily Leigh Garcia MD      metoprolol tartrate  12 5 mg Oral Q12H [address] Leigh Garcia MD      multivitamin-minerals  1 tablet Oral Daily Leigh Garcia MD      saccharomyces boulardii  250 mg Oral BID Leigh Garcia MD      sodium chloride  125 mL/hr Intravenous Continuous Ruth Ann Suh  mL/hr (08/23/21 1013)       Patient is at moderate risk for morbidity and mortality due to above mentioned illness and comorbidities

## 2021-08-24 LAB
ABO GROUP BLD: NORMAL
ANION GAP SERPL CALCULATED.3IONS-SCNC: 8 MMOL/L (ref 4–13)
BASOPHILS # BLD AUTO: 0.03 THOUSANDS/ΜL (ref 0–0.1)
BASOPHILS NFR BLD AUTO: 0 % (ref 0–1)
BLD GP AB SCN SERPL QL: NEGATIVE
BUN SERPL-MCNC: 22 MG/DL (ref 5–25)
CALCIUM SERPL-MCNC: 7.8 MG/DL (ref 8.3–10.1)
CHLORIDE SERPL-SCNC: 106 MMOL/L (ref 100–108)
CO2 SERPL-SCNC: 26 MMOL/L (ref 21–32)
CREAT SERPL-MCNC: 1.62 MG/DL (ref 0.6–1.3)
EOSINOPHIL # BLD AUTO: 0.81 THOUSAND/ΜL (ref 0–0.61)
EOSINOPHIL NFR BLD AUTO: 8 % (ref 0–6)
ERYTHROCYTE [DISTWIDTH] IN BLOOD BY AUTOMATED COUNT: 19.3 % (ref 11.6–15.1)
GFR SERPL CREATININE-BSD FRML MDRD: 40 ML/MIN/1.73SQ M
GLUCOSE SERPL-MCNC: 114 MG/DL (ref 65–140)
HCT VFR BLD AUTO: 26.5 % (ref 36.5–49.3)
HGB BLD-MCNC: 7 G/DL (ref 12–17)
HGB BLD-MCNC: 7.9 G/DL (ref 12–17)
IMM GRANULOCYTES # BLD AUTO: 0.1 THOUSAND/UL (ref 0–0.2)
IMM GRANULOCYTES NFR BLD AUTO: 1 % (ref 0–2)
LYMPHOCYTES # BLD AUTO: 1.03 THOUSANDS/ΜL (ref 0.6–4.47)
LYMPHOCYTES NFR BLD AUTO: 10 % (ref 14–44)
MAGNESIUM SERPL-MCNC: 2 MG/DL (ref 1.6–2.6)
MCH RBC QN AUTO: 24 PG (ref 26.8–34.3)
MCHC RBC AUTO-ENTMCNC: 29.8 G/DL (ref 31.4–37.4)
MCV RBC AUTO: 81 FL (ref 82–98)
MONOCYTES # BLD AUTO: 1.03 THOUSAND/ΜL (ref 0.17–1.22)
MONOCYTES NFR BLD AUTO: 10 % (ref 4–12)
NEUTROPHILS # BLD AUTO: 7.41 THOUSANDS/ΜL (ref 1.85–7.62)
NEUTS SEG NFR BLD AUTO: 71 % (ref 43–75)
NRBC BLD AUTO-RTO: 0 /100 WBCS
PLATELET # BLD AUTO: 143 THOUSANDS/UL (ref 149–390)
PMV BLD AUTO: 9.6 FL (ref 8.9–12.7)
POTASSIUM SERPL-SCNC: 4.4 MMOL/L (ref 3.5–5.3)
RBC # BLD AUTO: 3.29 MILLION/UL (ref 3.88–5.62)
RH BLD: POSITIVE
SODIUM SERPL-SCNC: 140 MMOL/L (ref 136–145)
SPECIMEN EXPIRATION DATE: NORMAL
WBC # BLD AUTO: 10.41 THOUSAND/UL (ref 4.31–10.16)

## 2021-08-24 PROCEDURE — 86900 BLOOD TYPING SEROLOGIC ABO: CPT | Performed by: INTERNAL MEDICINE

## 2021-08-24 PROCEDURE — 85018 HEMOGLOBIN: CPT | Performed by: NURSE PRACTITIONER

## 2021-08-24 PROCEDURE — 99232 SBSQ HOSP IP/OBS MODERATE 35: CPT | Performed by: INTERNAL MEDICINE

## 2021-08-24 PROCEDURE — 86334 IMMUNOFIX E-PHORESIS SERUM: CPT | Performed by: PATHOLOGY

## 2021-08-24 PROCEDURE — 80048 BASIC METABOLIC PNL TOTAL CA: CPT | Performed by: INTERNAL MEDICINE

## 2021-08-24 PROCEDURE — 97530 THERAPEUTIC ACTIVITIES: CPT

## 2021-08-24 PROCEDURE — 99232 SBSQ HOSP IP/OBS MODERATE 35: CPT | Performed by: NURSE PRACTITIONER

## 2021-08-24 PROCEDURE — 85025 COMPLETE CBC W/AUTO DIFF WBC: CPT | Performed by: INTERNAL MEDICINE

## 2021-08-24 PROCEDURE — 97163 PT EVAL HIGH COMPLEX 45 MIN: CPT

## 2021-08-24 PROCEDURE — 83735 ASSAY OF MAGNESIUM: CPT | Performed by: INTERNAL MEDICINE

## 2021-08-24 PROCEDURE — 86901 BLOOD TYPING SEROLOGIC RH(D): CPT | Performed by: INTERNAL MEDICINE

## 2021-08-24 PROCEDURE — 86923 COMPATIBILITY TEST ELECTRIC: CPT

## 2021-08-24 PROCEDURE — 86850 RBC ANTIBODY SCREEN: CPT | Performed by: INTERNAL MEDICINE

## 2021-08-24 PROCEDURE — 84165 PROTEIN E-PHORESIS SERUM: CPT | Performed by: INTERNAL MEDICINE

## 2021-08-24 PROCEDURE — 94760 N-INVAS EAR/PLS OXIMETRY 1: CPT

## 2021-08-24 PROCEDURE — 94640 AIRWAY INHALATION TREATMENT: CPT

## 2021-08-24 PROCEDURE — 83883 ASSAY NEPHELOMETRY NOT SPEC: CPT | Performed by: INTERNAL MEDICINE

## 2021-08-24 PROCEDURE — 84165 PROTEIN E-PHORESIS SERUM: CPT | Performed by: PATHOLOGY

## 2021-08-24 PROCEDURE — 86334 IMMUNOFIX E-PHORESIS SERUM: CPT | Performed by: INTERNAL MEDICINE

## 2021-08-24 RX ORDER — ACETAMINOPHEN 325 MG/1
325 TABLET ORAL ONCE
Status: COMPLETED | OUTPATIENT
Start: 2021-08-24 | End: 2021-08-24

## 2021-08-24 RX ORDER — ALBUTEROL SULFATE 90 UG/1
2 AEROSOL, METERED RESPIRATORY (INHALATION) EVERY 4 HOURS PRN
Status: DISCONTINUED | OUTPATIENT
Start: 2021-08-24 | End: 2021-08-27 | Stop reason: HOSPADM

## 2021-08-24 RX ORDER — ALBUTEROL SULFATE 2.5 MG/3ML
2.5 SOLUTION RESPIRATORY (INHALATION)
Status: DISCONTINUED | OUTPATIENT
Start: 2021-08-24 | End: 2021-08-26

## 2021-08-24 RX ORDER — AMOXICILLIN AND CLAVULANATE POTASSIUM 500; 125 MG/1; MG/1
1 TABLET, FILM COATED ORAL EVERY 8 HOURS SCHEDULED
Status: DISCONTINUED | OUTPATIENT
Start: 2021-08-24 | End: 2021-08-25

## 2021-08-24 RX ADMIN — SODIUM CHLORIDE 125 ML/HR: 0.9 INJECTION, SOLUTION INTRAVENOUS at 01:54

## 2021-08-24 RX ADMIN — ACETAMINOPHEN 325 MG: 325 TABLET, FILM COATED ORAL at 22:54

## 2021-08-24 RX ADMIN — FLUTICASONE FUROATE AND VILANTEROL TRIFENATATE 1 PUFF: 200; 25 POWDER RESPIRATORY (INHALATION) at 21:53

## 2021-08-24 RX ADMIN — ACETAMINOPHEN 650 MG: 325 TABLET, FILM COATED ORAL at 20:12

## 2021-08-24 RX ADMIN — AMOXICILLIN AND CLAVULANATE POTASSIUM 1 TABLET: 500; 125 TABLET, FILM COATED ORAL at 17:11

## 2021-08-24 RX ADMIN — Medication 250 MG: at 17:11

## 2021-08-24 RX ADMIN — Medication 12.5 MG: at 20:12

## 2021-08-24 RX ADMIN — AMOXICILLIN AND CLAVULANATE POTASSIUM 1 TABLET: 500; 125 TABLET, FILM COATED ORAL at 09:21

## 2021-08-24 RX ADMIN — SODIUM CHLORIDE 125 ML/HR: 0.9 INJECTION, SOLUTION INTRAVENOUS at 09:24

## 2021-08-24 RX ADMIN — ATORVASTATIN CALCIUM 20 MG: 20 TABLET, FILM COATED ORAL at 21:42

## 2021-08-24 RX ADMIN — ALBUTEROL SULFATE 2.5 MG: 2.5 SOLUTION RESPIRATORY (INHALATION) at 14:22

## 2021-08-24 RX ADMIN — AMOXICILLIN AND CLAVULANATE POTASSIUM 1 TABLET: 500; 125 TABLET, FILM COATED ORAL at 21:42

## 2021-08-24 RX ADMIN — Medication 12.5 MG: at 09:20

## 2021-08-24 RX ADMIN — FERROUS SULFATE TAB 325 MG (65 MG ELEMENTAL FE) 325 MG: 325 (65 FE) TAB at 09:22

## 2021-08-24 RX ADMIN — ALBUTEROL SULFATE 2.5 MG: 2.5 SOLUTION RESPIRATORY (INHALATION) at 20:32

## 2021-08-24 RX ADMIN — ACETAMINOPHEN 650 MG: 325 TABLET, FILM COATED ORAL at 14:06

## 2021-08-24 RX ADMIN — SODIUM CHLORIDE 125 ML/HR: 0.9 INJECTION, SOLUTION INTRAVENOUS at 17:11

## 2021-08-24 NOTE — ASSESSMENT & PLAN NOTE
Baseline hemoglobin appears to be around 8-9's likely due to iron deficiency  Patient on iron supplements at home  Hemoglobin 9 2 on admission  Trending down due to hematuria  Hemoglobin trended down to 6 8 8/20, received 1 unit RBC  Hemoglobin up to low 8's now down to 7s post transfusion  Monitor H&H every 8 hours, transfuse for hemoglobin less than 7 or sooner if hemodynamically unstable  Hb stable in low 8 range  But BP running low in 77Y systolic, patient received 1 unit PRBC on 08/23  Continue iron supplement, B12, MVI from home  Monitor

## 2021-08-24 NOTE — ASSESSMENT & PLAN NOTE
Likely due to cystitis, left trigone bladder floor lesion, right radiation ureteritis/right ureteral lesion/right ureteral bleeding per OR findings today  Patient was recently treated for UTI  As noted above CBI was discontinued on 08/23, does continue with some hematuria    Will continue with Sumner catheter today and attempt a voiding trial in a m , follow-up with urology recommendations  Appreciate urology input

## 2021-08-24 NOTE — PROGRESS NOTES
Patient was sleeping  Left patient a business card/note with my  contact information        08/24/21 1200   Clinical Encounter Type   Visited With Patient   Routine Visit Introduction   Referral To   (census/rounds)

## 2021-08-24 NOTE — PROGRESS NOTES
Progress Note - Nephrology   Cynthia Arias 66 y o  male MRN: [id number]  Unit/Bed#: [address] [place] [address] Encounter: [id number]    ASSESSMENT AND PLAN:  57-year-old male with history of prostate cancer status post radiation therapy/radiation cystitis of the recurrent episodes of hematuria and iron deficiency anemia from iron loss/COPD/hypertension/dyslipidemia/CKD stage 3 who was admitted with pelvic pain and urinary retention  Patient has bilateral nephrostomy tubes  We are seeing him for acute kidney injury on CKD stage 3:     1 AK I:  Etiology:  Obstructive uropathy? Prerenal/possible sepsis  · Current creatinine:  1 62 improved:  Approaching baseline please see below  · Peak creatinine:  1 97 as of 08/20/2021  · UA:  Large occult blood/no proteinuria/innumerable RBCs  · Renal imaging :  Ultrasound 08/20/2021:  No evidence of hydronephrosis at that time  · Status post cystoscopy on 08/22/2021:  Extensive fulguration left bladder floor left lateral wall right retrograde pyelogram right ureteroscopy biopsy for suspicious distal lesion in laser fulguration of bleeding distal ureter and suspicious lesions stent placed  Treatment:  · IV fluids for now especially with outstanding urine output to avoid postobstructive diuresis  · Follow-up with Urology  · Maintain MAP:  Over 65 mmHg if possible/avoid hypoperfusion:  Hold parameters on blood pressure medications  · Avoid nephrotoxic agents such as NSAIDs, and IV contrast if possible     2  CKD:  3B  · Baseline creatinine:  1 3-1 6  · Etiology:  Chronic obstructive uropathy/hypertensive nephrosclerosis/arteriolar nephrosclerosis     3 Volume/hypertension:  · Volume:  IV fluids  · Blood pressure:  significantly improved this morning  Recommend:  · Continue IV fluids and monitor blood pressure     4 Electrolytes:  · All acceptable        5  MBD of CKD/AK I:  · Magnesium now repleted 2 0     6   Anemia:  Secondary to urinary bleeding and CKD/chronic disease  · Current hemoglobin: 7 9  · For completeness check multiple myeloma evaluation given CKD and anemia:  PENDING  · Oral iron given infection  Treatment:  · Transfuse for hemoglobin less than 7 0 per primary service       7  Major reason for hospitalization:  ? Urinary retention with sepsis being treated by Infectious Disease with cystitis PATIENT HAS RADIATION CYSTITIS AND RADIATION URETERITIS AND RIGHT STENT PER UROLOGY     8  Other problems:  ? Prostate CA status post XRT  ? Rheumatoid arthritis  ? COPD              Subjective:   Patient is asymptomatic this morning  He still as is Sumner catheter place with gross hematuria  No chest pain or shortness of breath  No nausea vomiting or diarrhea  Objective:     Vitals: Blood pressure 156/70, pulse (!) 130, temperature 99 8 °F (37 7 °C), resp  rate 18, height 5' 9" (1 753 m), weight 97 5 kg (215 lb), SpO2 91 %  ,Body mass index is 31 75 kg/m²  Weight (last 2 days)     Date/Time   Weight    08/24/21 0600   97 5 (215)    08/23/21 0549   94 8 (209)                Intake/Output Summary (Last 24 hours) at 8/24/2021 0925  Last data filed at 8/24/2021 0601  Gross per 24 hour   Intake 1750 ml   Output 2300 ml   Net -550 ml       Urethral Catheter Latex 24 Fr   (Active)   Reasons to continue Urinary Catheter  Acute urinary retention/obstruction failing urinary retention protocol 08/23/21 2044   Goal for Removal Will consult urology 08/23/21 2044   Site Assessment Clean;Skin intact 08/23/21 2044   Sumner Care Done 08/24/21 0900   Collection Container Standard drainage bag 08/23/21 2044   Securement Method Securing device (Describe) 08/23/21 2044   Output (mL) 1800 mL 08/24/21 0601       Physical Exam: General:  No acute distress  Skin:  No acute rash  Eyes:  No scleral icterus and noninjected  ENT:  Moist mucous membranes  Neck:  Supple, no jugular venous distention, trachea midline, overall appearance is normal  Chest:  Clear to auscultation  CVS:  Irregular rhythm, without a rub or gallops  Abdomen:  Normal bowel sounds, soft and nontender and nondistended  Extremities:  No edema, and no cyanosis, no significant arthritic changes  Neuro:  No gross focality  Psych:  Alert and oriented and appropriate                Medications:    Scheduled Meds:  Current Facility-Administered Medications   Medication Dose Route Frequency Provider Last Rate    acetaminophen  650 mg Oral Q4H PRN Shannan Vidales MD      albuterol  2 puff Inhalation Q4H PRN CORWIN Reynolds      albuterol  2 5 mg Nebulization TID CORWIN Reynolds      amoxicillin-clavulanate  1 tablet Oral Q12H [facility] Panchito Hernandez DO      atorvastatin  20 mg Oral HS Shannan Vidales MD      belladonna-opium  30 mg Rectal Q8H PRN Shannan Vidales MD      cyanocobalamin  500 mcg Oral Daily Shannan Vidales MD      ferrous sulfate  325 mg Oral Daily With Breakfast Shannan Vidales MD      fluticasone  1 spray Nasal Daily Shannan Vidalse MD      fluticasone-vilanterol  1 puff Inhalation HS Shannan Vidales MD      loratadine  10 mg Oral Daily Shannan Vidales MD      metoprolol tartrate  12 5 mg Oral Q12H [facility] Shannan Vidales MD      multivitamin-minerals  1 tablet Oral Daily Shannan Vidales MD      saccharomyces boulardii  250 mg Oral BID Shannan Vidales MD      sodium chloride  125 mL/hr Intravenous Continuous Lotus Vogt  mL/hr (08/24/21 1444)       PRN Meds:   acetaminophen    albuterol    belladonna-opium    Continuous Infusions:sodium chloride, 125 mL/hr, Last Rate: 125 mL/hr (08/24/21 0924)        Lab, Imaging and other studies: I have personally reviewed pertinent labs    Laboratory Results:  Results from last 7 days   Lab Units 08/24/21  0609 08/23/21  0456 08/22/21  1830 08/22/21  1017 08/22/21  0539 08/22/21  0005 08/21/21  1439 08/21/21  0521 08/20/21  0532 08/19/21  1732 08/18/21  0514 08/17/21  1309   WBC Thousand/uL 10 41* 9 67  --   --  6 50  --   --  7 37 10 63* 14 01* 11 61* 9 61   HEMOGLOBIN g/dL 7  9* 8 1* 8 7* 7 6* 7 3* 7 2* 7 6* 7 8* 7 2* 8 9* 8 1* 9 2*   HEMATOCRIT % 26 5* 27 9* 29 9* 26 4* 24 8* 24 4* 25 5* 25 7* 24 6* 31 6* 28 3* 31 9*   PLATELETS Thousands/uL 143* 164  --   --  152  --   --  151 175 232 269 311   POTASSIUM mmol/L 4 4 4 2  --   --  4 3  --   --  3 8 3 8  --  4 4 4 6   CHLORIDE mmol/L 106 104  --   --  109*  --   --  105 102  --  106 104   CO2 mmol/L 26 23  --   --  24  --   --  25 25  --  27 28   BUN mg/dL 22 24  --   --  25  --   --  38* 43*  --  36* 38*   CREATININE mg/dL 1 62* 1 90*  --   --  1 43*  --   --  1 69* 1 97*  --  1 46* 1 57*   CALCIUM mg/dL 7 8* 8 2*  --   --  8 0*  --   --  7 4* 7 7*  --  8 6 9 0   MAGNESIUM mg/dL 2 0 1 7  --   --  2 0  --   --  1 9  --   --   --   --      Urinalysis:   Lab Results   Component Value Date    COLORU Orange 08/19/2021    CLARITYU Slightly Cloudy 08/19/2021    SPECGRAV 1 010 08/19/2021    PHUR 6 0 08/19/2021    PHUR 5 5 03/25/2018    LEUKOCYTESUR Trace (A) 08/19/2021    NITRITE Negative 08/19/2021    GLUCOSEU Negative 08/19/2021    KETONESU Negative 08/19/2021    BILIRUBINUR Negative 08/19/2021    BLOODU Large (A) 08/19/2021     ABGs: No results found for: [facility]  Radiology review:     Portions of the record may have been created with voice recognition software  Occasional wrong word or "sound a like" substitutions may have occurred due to the inherent limitations of voice recognition software  Read the chart carefully and recognize, using context, where substitutions have occurred

## 2021-08-24 NOTE — ASSESSMENT & PLAN NOTE
Secondary to hematuria with mild blood clots and radiation cystitis  Patient is status post Sumner catheter and was on CBI  Appreciate urology input  · Patient is status post cystoscopy, evacuation of clots, left bladder floor biopsy, extensive fulguration left bladder floor left lateral wall, right retrograde pyelogram, right ureteroscopy biopsy suspicious distal ureteral lesion laser fulguration bleeding distal ureteral, right ureteral stent placement on 8/22  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, left trigone bladder floor lesion, right radiation ureteritis, right ureteral lesion with bleeding  · Patient had been on CBI on 08/23, nursing reported at that time was clear  CBI was stopped and return of hematuria was noted, nursing staff indicated that the hand irrigated Sumner catheter and did obtain some clots  · Sumner catheter still maintain, currently hematuria noted  As per urology will continue with Sumner catheter for today and attempt removal in a m   With voiding trial   · Patient aware

## 2021-08-24 NOTE — ASSESSMENT & PLAN NOTE
Lab Results   Component Value Date    EGFR 33 08/23/2021    EGFR 47 08/22/2021    EGFR 38 08/21/2021    CREATININE 1 90 (H) 08/23/2021    CREATININE 1 43 (H) 08/22/2021    CREATININE 1 69 (H) 08/21/2021   Most likely etiology secondary to postobstructive  Nephrology on board, currently on IV fluids  Patient is around his baseline creatinine of around 1 3-1 6,   Monitor BMP in a m

## 2021-08-24 NOTE — ASSESSMENT & PLAN NOTE
Lab Results   Component Value Date    EGFR 33 08/23/2021    EGFR 47 08/22/2021    EGFR 38 08/21/2021    CREATININE 1 90 (H) 08/23/2021    CREATININE 1 43 (H) 08/22/2021    CREATININE 1 69 (H) 08/21/2021   Baseline creatinine appears to be around 1 4-1 5's  Creatinine 1 57 on admission  Creatinine peaked to 1 97, trending down   Renal and bladder ultrasound no evidence of hydro  Avoid nephrotoxins and hypotension  Blood transfusion as needed to keep hemoglobin above 7  Appreciate nephrology input  · Suspect prerenal  · Continue IV fluids as per Nephrology  · Repeat BMP in a m

## 2021-08-24 NOTE — PHYSICAL THERAPY NOTE
PT EVALUATION       08/24/21 1650   PT Last Visit   PT Visit Date 08/24/21   Note Type   Note type Evaluation   Pain Assessment   Pain Assessment Tool 0-10   Pain Score 5   Pain Location/Orientation Location: Back  (at nephrostomy tube)   Home Living   Type of [address] level;Stairs to enter with rails  (6 ALLIE)   Bathroom Shower/Tub Tub/shower unit   [employer] Grab bars in shower;Hand-held shower   [address]   Additional Comments has RW , but does not use   Prior Function   Level of [place] Independent with ADLs and functional mobility   Lives With [place] Help From Family   ADL Assistance Independent   IADLs Independent   Comments +    Restrictions/Precautions   Weight Bearing Precautions Per Order No   Other Precautions Chair Alarm; Bed Alarm;Pain  (nephrostomy tube)   General   Additional Pertinent History Pt admitted with acute cystitis with hematuria, urinary retention   Family/Caregiver Present No   Cognition   Overall Cognitive Status WFL   Arousal/Participation Cooperative   Orientation Level Oriented X4   Following Commands Follows multistep commands with increased time or repetition   RLE Assessment   RLE Assessment WFL  (strength at least 3+/5)   LLE Assessment   LLE Assessment WFL  (strength at least 3+/5)   Bed Mobility   Supine to Sit 4  Minimal assistance   Additional items Assist x 1;Verbal cues; Increased time required   Sit to Supine 4  Minimal assistance   Additional items Assist x 1;Verbal cues; Increased time required   Additional Comments pt moves very cautiously due to nephrostomy tube and catheter   Transfers   Sit to Stand 5  Supervision   Stand to Sit 5  Supervision   Stand pivot 5  Supervision   Toilet transfer 5  Supervision   Additional items Commode;Verbal cues   Ambulation/Elevation   Gait pattern Forward Flexion  (slow sotero, guarded)   Gait Assistance 4  Minimal assist   Assistive Device Rolling walker   Distance 3 feet in room x 2   Balance   Static Sitting Fair +   Dynamic Sitting Fair   Static Standing Fair   Dynamic Standing Fair  (with RW)   Ambulatory Fair -  (with RW)   Activity Tolerance   Activity Tolerance Patient tolerated treatment well;Patient limited by pain   Nurse Made Aware yes: Kaycee   Assessment   Prognosis Good   Problem List Decreased strength;Decreased endurance; Impaired balance;Decreased mobility; Decreased skin integrity;Pain   Assessment Patient seen for Physical Therapy evaluation  Patient admitted with Urinary retention  Comorbidities affecting patient's physical performance include:Prostate cancer, COPD, HTN,  Personal factors affecting patient at time of initial evaluation include: lives in single story house, stairs to enter home, inability to ambulate household distances, inability to navigate community distances, inability to navigate level surfaces without external assistance, inability to perform ADLS and inability to perform IADLS   Prior to admission, patient was independent with functional mobility without assistive device, independent with ADLS, independent with IADLS, living with wife in a single level home with 6 steps to enter, ambulating household distance and ambulating community distances  Please find objective findings from Physical Therapy assessment regarding body systems outlined above with impairments and limitations including weakness, impaired balance, decreased endurance, gait deviations, pain, decreased activity tolerance, decreased functional mobility tolerance, fall risk and decreased skin integrity  The Barthel Index was used as a functional outcome tool presenting with a score of 60 today indicating moderate limitations of functional mobility and ADLS    Patient's clinical presentation is currently unstable/unpredictable as seen in patient's presentation of changing level of pain, increased fall risk, new onset of impairment of functional mobility, decreased endurance and new onset of weakness  Pt would benefit from continued Physical Therapy treatment to address deficits as defined above and maximize level of functional mobility  As demonstrated by objective findings, the assigned level of complexity for this evaluation is high  The patient's [facility] Basic Mobility Inpatient Short Form Raw Score is 19, Standardized Score is 42 48  A standardized score less than 42 9 suggests the patient may benefit from discharge to post-acute rehabilitation services, however expect pt to progress well with physical therapy to allow pt to return home with home PT  Please also refer to the recommendation of the Physical Therapist for safe discharge planning  Goals   Patient Goals to walk again tomorrow   STG Expiration Date 08/31/21   Short Term Goal #1 Indep transfers supine <> short sit and sit <> stand with good balance   Short Term Goal #2 Indep amb  with RW for functional household distances with fair +  balance   LTG Expiration Date 09/07/21   Long Term Goal #1 Indep amb  with RW for community distances with good balance   Long Term Goal #2 Indep navigating 6 stairs to allow pt to enter/exit his home  Plan   Treatment/Interventions Functional transfer training;LE strengthening/ROM; Elevations; Therapeutic exercise; Endurance training;Patient/family training;Equipment eval/education; Bed mobility;Gait training;Spoke to nursing;Spoke to case management   PT Frequency 5x/wk   Recommendation   PT Discharge Recommendation Home with home health rehabilitation   Additional Comments Pt was on CBI until 8/23/21; now with alanis catheter  Still with nephrostomy tube     [facility] Basic Mobility Inpatient   Turning in Bed Without Bedrails 3   Lying on Back to Sitting on Edge of Flat Bed 4   Moving Bed to Chair 3   Standing Up From Chair 4   Walk in Room 3   Climb 3-5 Stairs 2   Basic Mobility Inpatient Raw Score 19   Basic Mobility Standardized Score 42 48   Barthel Index   Feeding 10   Bathing 0   Grooming Score 0   Dressing Score 5   Bladder Score 0   Bowels Score 10   Toilet Use Score 10   Transfers (Bed/Chair) Score 10   Mobility (Level Surface) Score 10   Stairs Score 5   Barthel Index Score 60   Licensure   [place] License Number  Emma Marr PT  [id number] [facility]   PT TREATMENT  16:35-16:50  15 min  S: Pt states that until today, the doctor did not want him to get up to walk  But he wants to walk today  O:Pt transfers supine <> short sit with cautious movements due to catheter   Pt sat briefly in short sit due to catheter, sit > stand to RW with Min A and verbal cues  Pt ambulated 200 feet with RW and Min A  Pt returned to room and needed to use the commode  Commode placed close to bed  Pt stand > sit with supervision  Supervision as pt was able to stand and take care of hygiene after BM  Few steps back to bed and sidestepping toward head of bed with supervision and Min A for sit > supine to manage LEs   All needs in reach and dinner tray in front of pt  A: Pt is doing well with his functional mobility and expect pt to progress well to allow him to return home with wife and possibly Home PT  May need to use his RW for awhile once home   (pt has RW)  P: cont  As per plan      CKF

## 2021-08-24 NOTE — PROGRESS NOTES
Progress Note - Urology      Patient: Bobo Canales   : 1945 Sex: male   MRN: [id number]     CSN: [id number]  Unit/Bed#: [address] [place] [address]     SUBJECTIVE:   Stable  No complaints  Minimal hematuria urine no clots  Patient fully aware has right-sided stent which can call so cause hematuria      Objective   Vitals: /70   Pulse (!) 130   Temp 99 8 °F (37 7 °C)   Resp 18   Ht 5' 9" (1 753 m)   Wt 97 5 kg (215 lb)   SpO2 91%   BMI 31 75 kg/m²     I/O last 24 hours: In: 6108 [P O :250;  I V :1500]  Out: 2400 [Urine:2400]      Physical Exam:   General Alert awake   Normocephalic atraumatic PERRLA  Lungs clear bilaterally  Cardiac normal S1 normal S2  Abdomen soft, flank pain  Extremities no edema      Lab Results: CBC:   Lab Results   Component Value Date    WBC 10 41 (H) 2021    HGB 7 9 (L) 2021    HCT 26 5 (L) 2021    MCV 81 (L) 2021     (L) 2021    MCH 24 0 (L) 2021    MCHC 29 8 (L) 2021    RDW 19 3 (H) 2021    MPV 9 6 2021    NRBC 0 2021     CMP:   Lab Results   Component Value Date     2021    CO2 26 2021    BUN 22 2021    CREATININE 1 62 (H) 2021    CALCIUM 7 8 (L) 2021    AST 20 2021    ALT 31 2021    ALKPHOS 96 2021    EGFR 40 2021     Urinalysis:   Lab Results   Component Value Date    COLORU Orange 2021    CLARITYU Slightly Cloudy 2021    SPECGRAV 1 010 2021    PHUR 6 0 2021    PHUR 5 5 2018    LEUKOCYTESUR Trace (A) 2021    NITRITE Negative 2021    GLUCOSEU Negative 2021    KETONESU Negative 2021    BILIRUBINUR Negative 2021    BLOODU Large (A) 2021     Urine Culture:   Lab Results   Component Value Date    URINECX >100,000 cfu/ml Klebsiella pneumoniae (A) 2021    URINECX >100,000 cfu/ml Enterococcus faecalis (A) 2021     PSA: No results found for: PSA      Assessment/ Plan:  Status post cystoscopy right ureteroscopy biopsy laser radiation ureteritis fulguration bladder with biopsy day 2    Sumner draining minimally hematuria urine  Could DC Sumner but patient in fear of bleeding in light of experience in the past  Continue Sumner DC in the morning awaiting biopsies  Continue antibiotics full 7 day course in light of previous fever spike           Rene Stanley MD

## 2021-08-24 NOTE — ASSESSMENT & PLAN NOTE
Continue on Breo and fluticasone  No evidence of exacerbation  Chest x-ray NAD  Continue albuterol p r n

## 2021-08-24 NOTE — PROGRESS NOTES
Progress Note - Infectious Disease   Radha Prince 66 y o  male MRN: [id number]  Unit/Bed#: [address] [place] [address] Encounter: [id number]      Impression/Recommendations:  1  Sepsis, not present on admission: but developed during this admission with fever and leukocytosis   Source of sepsis is most likely urinary retention  Titus Zacarias is clinically improving   Temperatures are decreasing since 8/22, WBC normalized   He has remains systemically well, without toxicity and hemodynamically stable   Blood cultures have no growth thus far  Antibiotic plan as below  Monitor temperature curve, WBC, clinical response     2  Cystitis, most likely secondary to urinary retention  Titus Zacarias is clinically improved   Unfortunately, urine culture was not obtained prior to antibiotic  Regardless , patient is clinically much better on current antibiotics   Patient had recent urine culture last month with growth of Klebsiella and ampicillin susceptible Enterococcus  8/22 s/p cystoscopy with clot evacuation and bladder floor biopsy  Continue Augmentin but increase frequency to 500mg po q8 hours x 7 day course thru 8/26  Monitor temperature curve, WBC, clinical response      3  Urinary retention, most likely secondary to hematuria and XRT cystitis   Hematuria improving but still persists  Of note, patient has bilateral PCN in place, which are capped  S/p cystoscopy on 8/22  Sumner catheter drainage in place  Urology service is following with consideration for Sumner removal tomorrow      4  CKD   Creatinine is improving  Antibiotic dosages adjusted accordingly  Repeat creatinine in am   Avoid nephrotoxins      5  XRT cystitis      6  Prostate cancer, status post XRT  Urology follow-up      Discussed with patient in regarding the above plan      Antibiotics:  Augmentin #2  ABx # 5     Subjective:  Pt feels well  He denies fever, chills  He reports mild soreness from his RT flank  No abdominal pain  His gross hematuria persists    He is tolerating antibiotic well  No nausea, vomiting or diarrhea      Objective:  Vitals:  Temp:  [97 7 °F (36 5 °C)-99 8 °F (37 7 °C)] 99 8 °F (37 7 °C)  HR:  [] 75  Resp:  [16-18] 18  BP: (114-156)/(58-70) 114/60  SpO2:  [91 %-96 %] 93 %  Temp (24hrs), Av 9 °F (37 2 °C), Min:97 7 °F (36 5 °C), Max:99 8 °F (37 7 °C)  Current: Temperature: 99 8 °F (37 7 °C)    Physical Exam:     General: Awake, alert, cooperative, no distress  Neck:  Supple  Lungs: CTA b/l, no wheezing, respirations unlabored  Heart:  Regular rate and rhythm, S1 and S2 normal, no murmur  Abd/: Soft, nondistended, no masses  Sumner catheter intact with gross hematuria noted  Extremities: No distal leg edema b/l  Skin:  No rash   Neuro: AAO x 3, conversant, fluent speech, moves all extremities  Invasive Devices     Peripheral Intravenous Line            Peripheral IV 21 Left Forearm 1 day          Drain            Nephrostomy Left 1 8 Fr  20 days    Nephrostomy Right 1 8 Fr  20 days    Urethral Catheter Latex 24 Fr  2 days              Labs studies:   I have personally reviewed pertinent labs  Results from last 7 days   Lab Units 21  0609 21  0456 21  0539   POTASSIUM mmol/L 4 4 4 2 4 3   CHLORIDE mmol/L 106 104 109*   CO2 mmol/L 26 23 24   BUN mg/dL 22 24 25   CREATININE mg/dL 1 62* 1 90* 1 43*   EGFR ml/min/1 73sq m 40 33 47   CALCIUM mg/dL 7 8* 8 2* 8 0*     Results from last 7 days   Lab Units 21  0609 21  0456 21  1830 21  0539   WBC Thousand/uL 10 41* 9 67  --  6 50   HEMOGLOBIN g/dL 7 9* 8 1* 8 7* 7 3*   PLATELETS Thousands/uL 143* 164  --  152     Results from last 7 days   Lab Units 21  1829 21  1731   BLOOD CULTURE  No Growth After 4 Days  No Growth After 4 Days  Imaging Studies:   I have personally reviewed pertinent imaging study reports

## 2021-08-24 NOTE — PROGRESS NOTES
Armando 43  Progress Note Anderson Davis 1945, 66 y o  male MRN: [id number]  Unit/Bed#: [address] [place] [address] Encounter: [id number]  Primary Care Provider: Beatrice Anna MD   Date and time admitted to hospital: 8/17/2021 12:20 PM    * Urinary retention  Assessment & Plan  Secondary to hematuria with mild blood clots and radiation cystitis  Patient is status post Sumner catheter and was on CBI  Appreciate urology input  · Patient is status post cystoscopy, evacuation of clots, left bladder floor biopsy, extensive fulguration left bladder floor left lateral wall, right retrograde pyelogram, right ureteroscopy biopsy suspicious distal ureteral lesion laser fulguration bleeding distal ureteral, right ureteral stent placement on 8/22  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, left trigone bladder floor lesion, right radiation ureteritis, right ureteral lesion with bleeding  · Patient had been on CBI on 08/23, nursing reported at that time was clear  CBI was stopped and return of hematuria was noted, nursing staff indicated that the hand irrigated Sumner catheter and did obtain some clots  · Sumner catheter still maintain, currently hematuria noted  As per urology will continue with Sumner catheter for today and attempt removal in a m  With voiding trial   · Patient aware        Hematuria  Assessment & Plan  Likely due to cystitis, left trigone bladder floor lesion, right radiation ureteritis/right ureteral lesion/right ureteral bleeding per OR findings today  Patient was recently treated for UTI  As noted above CBI was discontinued on 08/23, does continue with some hematuria    Will continue with Sumner catheter today and attempt a voiding trial in a m , follow-up with urology recommendations  Appreciate urology input      Acute kidney injury superimposed on CKD [facility])  Assessment & Plan  Lab Results   Component Value Date    EGFR 33 08/23/2021    EGFR 47 08/22/2021    EGFR 38 08/21/2021 CREATININE 1 90 (H) 08/23/2021    CREATININE 1 43 (H) 08/22/2021    CREATININE 1 69 (H) 08/21/2021   Baseline creatinine appears to be around 1 4-1 5's  Creatinine 1 57 on admission  Creatinine peaked to 1 97, trending down   Renal and bladder ultrasound no evidence of hydro  Avoid nephrotoxins and hypotension  Blood transfusion as needed to keep hemoglobin above 7  Appreciate nephrology input  · Suspect prerenal  · Continue IV fluids as per Nephrology  · Repeat BMP in a m  Sepsis [facility])  Assessment & Plan  Evolving since admission, as evidenced by fever, leukocytosis, lactic acidosis, with source of infection likely cystitis, most likely secondary to urinary retention  Fever 103 6 8/19, WBC 14, lactic acid 2 5  Lactic acid normalized with IV fluid boluses  UA 8/19 showed trace leukocytes, large blood  Patient did receive 1 dose of IV Rocephin on 8/17  Urine culture not sent  Blood cultures no growth to date  Procalcitonin negative x1, repeat pending today  Urine culture on 7/30/2021 showed Enterococcus and Klebsiella pneumoniae  Renal ultrasound unremarkable  Appreciate ID input  · Changed antibiotic to IV Unasyn, then transitioned to p o  Augmentin 500 mg p o  Q 12 hours for 7 days through August 26 as per ID recommendation      Results from last 7 days   Lab Units 08/22/21  0539 08/21/21  0521 08/19/21  2030 08/19/21  1732   LACTIC ACID mmol/L  --   --  1 0 2 5*   PROCALCITONIN ng/ml 0 08 0 14  --   --      Results from last 7 days   Lab Units 08/23/21  0456 08/22/21  0539 08/21/21  0521 08/20/21  0532 08/19/21  1732 08/18/21  0514 08/17/21  1309   WBC Thousand/uL 9 67 6 50 7 37 10 63* 14 01* 11 61* 9 61              Acute kidney injury superimposed on chronic kidney disease [facility])  Assessment & Plan  Lab Results   Component Value Date    EGFR 33 08/23/2021    EGFR 47 08/22/2021    EGFR 38 08/21/2021    CREATININE 1 90 (H) 08/23/2021    CREATININE 1 43 (H) 08/22/2021    CREATININE 1 69 (H) 08/21/2021   Most likely etiology secondary to postobstructive  Nephrology on board, currently on IV fluids  Patient is around his baseline creatinine of around 1 3-1 6,   Monitor BMP in a m  Anemia  Assessment & Plan  Baseline hemoglobin appears to be around 8-9's likely due to iron deficiency  Patient on iron supplements at home  Hemoglobin 9 2 on admission  Trending down due to hematuria  Hemoglobin trended down to 6 8 8/20, received 1 unit RBC  Hemoglobin up to low 8's now down to 7s post transfusion  Monitor H&H every 8 hours, transfuse for hemoglobin less than 7 or sooner if hemodynamically unstable  Hb stable in low 8 range  But BP running low in 59U systolic, patient received 1 unit PRBC on 08/23  Continue iron supplement, B12, MVI from home  Monitor  Prostate cancer [facility])  Assessment & Plan  Status post radiation, had radiation cystitis  Status post bilateral nephrostomy tubes due to recurrent hematuria, currently capped  Urology following, appreciate input  Chronic obstructive pulmonary disease (HCC)  Assessment & Plan  Continue on Breo and fluticasone  No evidence of exacerbation  Chest x-ray NAD  Continue albuterol p r n  RA (rheumatoid arthritis) ([facility])  Assessment & Plan  Continue supportive care          VTE Pharmacologic Prophylaxis:   Pharmacologic: Pharmacologic VTE Prophylaxis contraindicated due to Hematuria and anemia  Mechanical VTE Prophylaxis in Place: Yes    Patient Centered Rounds: I have performed bedside rounds with nursing staff today  Discussions with Specialists or Other Care Team Provider:  Urology and nephrology notes appreciated    Education and Discussions with Family / Patient:  I spoke with the patient at the bedside, I have answered all questions to the best my abilities  Family provided update via phone    Time Spent for Care: 30 minutes  More than 50% of total time spent on counseling and coordination of care as described above      Current Length of Stay: 6 day(s)    Current Patient Status: Inpatient   Certification Statement: The patient will continue to require additional inpatient hospital stay due to Monitoring hemoglobin, potential voiding trial in a m  Discharge Plan:  Most likely stable for discharge in the next 24 hours pending patient's progress    Code Status: Level 1 - Full Code      Subjective:   Patient seen lying in bed resting comfortably  He reports that he has some hematuria with a Sumner catheter  Reports fair appetite this morning  No nausea or vomiting  Objective:     Vitals:   Temp (24hrs), Av 2 °F (37 3 °C), Min:97 7 °F (36 5 °C), Max:100 1 °F (37 8 °C)    Temp:  [97 7 °F (36 5 °C)-100 1 °F (37 8 °C)] 100 1 °F (37 8 °C)  HR:  [] 75  Resp:  [16-18] 18  BP: (111-156)/(49-70) 111/49  SpO2:  [91 %-96 %] 93 %  Body mass index is 31 75 kg/m²  Input and Output Summary (last 24 hours): Intake/Output Summary (Last 24 hours) at 2021 1538  Last data filed at 2021 0601  Gross per 24 hour   Intake 1750 ml   Output 2200 ml   Net -450 ml       Physical Exam:     Physical Exam  Vitals and nursing note reviewed  Constitutional:       General: He is not in acute distress  Appearance: Normal appearance  HENT:      Head: Normocephalic  Nose: Nose normal       Mouth/Throat:      Mouth: Mucous membranes are moist    Eyes:      Extraocular Movements: Extraocular movements intact  Pupils: Pupils are equal, round, and reactive to light  Cardiovascular:      Rate and Rhythm: Normal rate and regular rhythm  Pulses: Normal pulses  Heart sounds: Normal heart sounds  Pulmonary:      Effort: Pulmonary effort is normal       Breath sounds: Normal breath sounds  Abdominal:      General: Bowel sounds are normal  There is no distension  Palpations: Abdomen is soft  Tenderness: There is no abdominal tenderness     Genitourinary:     Comments: Sumner catheter present draining hematuria  Musculoskeletal:         General: No swelling  Normal range of motion  Cervical back: Normal range of motion  Skin:     General: Skin is warm and dry  Capillary Refill: Capillary refill takes less than 2 seconds  Neurological:      General: No focal deficit present  Mental Status: He is alert and oriented to person, place, and time  Psychiatric:         Mood and Affect: Mood normal          Behavior: Behavior normal          Thought Content: Thought content normal          Judgment: Judgment normal          Additional Data:     Labs:    Results from last 7 days   Lab Units 08/24/21  0609   WBC Thousand/uL 10 41*   HEMOGLOBIN g/dL 7 9*   HEMATOCRIT % 26 5*   PLATELETS Thousands/uL 143*   NEUTROS PCT % 71   LYMPHS PCT % 10*   MONOS PCT % 10   EOS PCT % 8*     Results from last 7 days   Lab Units 08/24/21  0609   POTASSIUM mmol/L 4 4   CHLORIDE mmol/L 106   CO2 mmol/L 26   BUN mg/dL 22   CREATININE mg/dL 1 62*   CALCIUM mg/dL 7 8*           * I Have Reviewed All Lab Data Listed Above  * Additional Pertinent Lab Tests Reviewed: All Labs Within Last 24 Hours Reviewed    Imaging:    Imaging Reports Reviewed Today Include:  None  Imaging Personally Reviewed by Myself Includes:  None    Recent Cultures (last 7 days):     Results from last 7 days   Lab Units 08/19/21  1829 08/19/21  1731   BLOOD CULTURE  No Growth After 4 Days  No Growth After 4 Days         Last 24 Hours Medication List:   Current Facility-Administered Medications   Medication Dose Route Frequency Provider Last Rate    acetaminophen  650 mg Oral Q4H PRN Yanelis Craft MD      albuterol  2 puff Inhalation Q4H PRN Ottie Hamman, CRNP      albuterol  2 5 mg Nebulization TID Ottie Hamman, CRNP      amoxicillin-clavulanate  1 tablet Oral [facility] Panchito Pfeiffer DO      atorvastatin  20 mg Oral HS Yanelis Craft MD      belladonna-opium  30 mg Rectal Q8H PRN Yanelis Craft MD      cyanocobalamin  500 mcg Oral Daily Jean Loco MD      ferrous sulfate  325 mg Oral Daily With Breakfast Jean Loco MD      fluticasone  1 spray Nasal Daily Jean Loco MD      fluticasone-vilanterol  1 puff Inhalation HS Jean Loco MD      loratadine  10 mg Oral Daily Jean Loco MD      metoprolol tartrate  12 5 mg Oral Q12H [facility] Jean Loco MD      multivitamin-minerals  1 tablet Oral Daily Jean Loco MD      saccharomyces boulardii  250 mg Oral BID Jean Loco MD      sodium chloride  125 mL/hr Intravenous Continuous Grayson Rose  mL/hr (08/24/21 6922)        Today, Patient Was Seen By: Juliaette Severe, CRNP    ** Please Note: Dictation voice to text software may have been used in the creation of this document   **

## 2021-08-24 NOTE — ASSESSMENT & PLAN NOTE
Evolving since admission, as evidenced by fever, leukocytosis, lactic acidosis, with source of infection likely cystitis, most likely secondary to urinary retention  Fever 103 6 8/19, WBC 14, lactic acid 2 5  Lactic acid normalized with IV fluid boluses  UA 8/19 showed trace leukocytes, large blood  Patient did receive 1 dose of IV Rocephin on 8/17  Urine culture not sent  Blood cultures no growth to date  Procalcitonin negative x1, repeat pending today  Urine culture on 7/30/2021 showed Enterococcus and Klebsiella pneumoniae  Renal ultrasound unremarkable  Appreciate ID input  · Changed antibiotic to IV Unasyn, then transitioned to p o  Augmentin 500 mg p o  Q 12 hours for 7 days through August 26 as per ID recommendation      Results from last 7 days   Lab Units 08/22/21  0539 08/21/21  0521 08/19/21  2030 08/19/21  1732   LACTIC ACID mmol/L  --   --  1 0 2 5*   PROCALCITONIN ng/ml 0 08 0 14  --   --      Results from last 7 days   Lab Units 08/23/21  0456 08/22/21  0539 08/21/21  0521 08/20/21  0532 08/19/21  1732 08/18/21  0514 08/17/21  1309   WBC Thousand/uL 9 67 6 50 7 37 10 63* 14 01* 11 61* 9 61

## 2021-08-25 ENCOUNTER — APPOINTMENT (INPATIENT)
Dept: RADIOLOGY | Facility: HOSPITAL | Age: 76
DRG: 659 | End: 2021-08-25
Payer: MEDICARE

## 2021-08-25 PROBLEM — J44.1 CHRONIC OBSTRUCTIVE PULMONARY DISEASE WITH ACUTE EXACERBATION (HCC): Status: ACTIVE | Noted: 2021-04-14

## 2021-08-25 LAB
ALBUMIN SERPL ELPH-MCNC: 2.92 G/DL (ref 3.5–5)
ALBUMIN SERPL ELPH-MCNC: 47.8 % (ref 52–65)
ALPHA1 GLOB SERPL ELPH-MCNC: 0.5 G/DL (ref 0.1–0.4)
ALPHA1 GLOB SERPL ELPH-MCNC: 8.2 % (ref 2.5–5)
ALPHA2 GLOB SERPL ELPH-MCNC: 1 G/DL (ref 0.4–1.2)
ALPHA2 GLOB SERPL ELPH-MCNC: 16.4 % (ref 7–13)
ANION GAP SERPL CALCULATED.3IONS-SCNC: 8 MMOL/L (ref 4–13)
BACTERIA BLD CULT: NORMAL
BACTERIA BLD CULT: NORMAL
BACTERIA UR QL AUTO: ABNORMAL /HPF
BACTERIA UR QL AUTO: ABNORMAL /HPF
BASOPHILS # BLD AUTO: 0.03 THOUSANDS/ΜL (ref 0–0.1)
BASOPHILS NFR BLD AUTO: 0 % (ref 0–1)
BETA GLOB ABNORMAL SERPL ELPH-MCNC: 0.41 G/DL (ref 0.4–0.8)
BETA1 GLOB SERPL ELPH-MCNC: 6.7 % (ref 5–13)
BETA2 GLOB SERPL ELPH-MCNC: 4.4 % (ref 2–8)
BETA2+GAMMA GLOB SERPL ELPH-MCNC: 0.27 G/DL (ref 0.2–0.5)
BILIRUB UR QL STRIP: NEGATIVE
BILIRUB UR QL STRIP: NEGATIVE
BUN SERPL-MCNC: 21 MG/DL (ref 5–25)
CALCIUM SERPL-MCNC: 7.4 MG/DL (ref 8.3–10.1)
CHLORIDE SERPL-SCNC: 104 MMOL/L (ref 100–108)
CLARITY UR: ABNORMAL
CLARITY UR: ABNORMAL
CO2 SERPL-SCNC: 25 MMOL/L (ref 21–32)
COLOR UR: YELLOW
COLOR UR: YELLOW
CREAT SERPL-MCNC: 1.72 MG/DL (ref 0.6–1.3)
EOSINOPHIL # BLD AUTO: 0.13 THOUSAND/ΜL (ref 0–0.61)
EOSINOPHIL NFR BLD AUTO: 1 % (ref 0–6)
ERYTHROCYTE [DISTWIDTH] IN BLOOD BY AUTOMATED COUNT: 19.9 % (ref 11.6–15.1)
ERYTHROCYTE [DISTWIDTH] IN BLOOD BY AUTOMATED COUNT: 19.9 % (ref 11.6–15.1)
FINE GRAN CASTS URNS QL MICRO: ABNORMAL /LPF
GAMMA GLOB ABNORMAL SERPL ELPH-MCNC: 1.01 G/DL (ref 0.5–1.6)
GAMMA GLOB SERPL ELPH-MCNC: 16.5 % (ref 12–22)
GFR SERPL CREATININE-BSD FRML MDRD: 38 ML/MIN/1.73SQ M
GLUCOSE SERPL-MCNC: 123 MG/DL (ref 65–140)
GLUCOSE UR STRIP-MCNC: NEGATIVE MG/DL
GLUCOSE UR STRIP-MCNC: NEGATIVE MG/DL
HCT VFR BLD AUTO: 21.9 % (ref 36.5–49.3)
HCT VFR BLD AUTO: 28.6 % (ref 36.5–49.3)
HGB BLD-MCNC: 6.6 G/DL (ref 12–17)
HGB BLD-MCNC: 8.1 G/DL (ref 12–17)
HGB UR QL STRIP.AUTO: ABNORMAL
HGB UR QL STRIP.AUTO: ABNORMAL
HYALINE CASTS #/AREA URNS LPF: ABNORMAL /LPF
IGG/ALB SER: 0.92 {RATIO} (ref 1.1–1.8)
IMM GRANULOCYTES # BLD AUTO: 0.16 THOUSAND/UL (ref 0–0.2)
IMM GRANULOCYTES NFR BLD AUTO: 1 % (ref 0–2)
INTERPRETATION UR IFE-IMP: NORMAL
KAPPA LC FREE SER-MCNC: 47.9 MG/L (ref 3.3–19.4)
KAPPA LC FREE/LAMBDA FREE SER: 1.27 {RATIO} (ref 0.26–1.65)
KETONES UR STRIP-MCNC: NEGATIVE MG/DL
KETONES UR STRIP-MCNC: NEGATIVE MG/DL
LAMBDA LC FREE SERPL-MCNC: 37.6 MG/L (ref 5.7–26.3)
LEUKOCYTE ESTERASE UR QL STRIP: ABNORMAL
LEUKOCYTE ESTERASE UR QL STRIP: ABNORMAL
LYMPHOCYTES # BLD AUTO: 1.1 THOUSANDS/ΜL (ref 0.6–4.47)
LYMPHOCYTES NFR BLD AUTO: 8 % (ref 14–44)
M PROTEIN 1 MFR SERPL ELPH: 4 %
M PROTEIN 1 SERPL ELPH-MCNC: 0.24 G/DL
MCH RBC QN AUTO: 23.6 PG (ref 26.8–34.3)
MCH RBC QN AUTO: 24.3 PG (ref 26.8–34.3)
MCHC RBC AUTO-ENTMCNC: 28.3 G/DL (ref 31.4–37.4)
MCHC RBC AUTO-ENTMCNC: 29.7 G/DL (ref 31.4–37.4)
MCV RBC AUTO: 79 FL (ref 82–98)
MCV RBC AUTO: 86 FL (ref 82–98)
MONOCYTES # BLD AUTO: 1.5 THOUSAND/ΜL (ref 0.17–1.22)
MONOCYTES NFR BLD AUTO: 11 % (ref 4–12)
NEUTROPHILS # BLD AUTO: 10.63 THOUSANDS/ΜL (ref 1.85–7.62)
NEUTS SEG NFR BLD AUTO: 79 % (ref 43–75)
NITRITE UR QL STRIP: NEGATIVE
NITRITE UR QL STRIP: NEGATIVE
NON-SQ EPI CELLS URNS QL MICRO: ABNORMAL /HPF
NON-SQ EPI CELLS URNS QL MICRO: ABNORMAL /HPF
NRBC BLD AUTO-RTO: 0 /100 WBCS
OTHER STN SPEC: ABNORMAL
OTHER STN SPEC: ABNORMAL
PH UR STRIP.AUTO: 5.5 [PH]
PH UR STRIP.AUTO: 6 [PH]
PLATELET # BLD AUTO: 143 THOUSANDS/UL (ref 149–390)
PLATELET # BLD AUTO: 157 THOUSANDS/UL (ref 149–390)
PMV BLD AUTO: 10.5 FL (ref 8.9–12.7)
PMV BLD AUTO: 10.6 FL (ref 8.9–12.7)
POTASSIUM SERPL-SCNC: 4.2 MMOL/L (ref 3.5–5.3)
PROT PATTERN SERPL ELPH-IMP: ABNORMAL
PROT SERPL-MCNC: 6.1 G/DL (ref 6.4–8.2)
PROT UR STRIP-MCNC: ABNORMAL MG/DL
PROT UR STRIP-MCNC: ABNORMAL MG/DL
RBC # BLD AUTO: 2.76 MILLION/UL (ref 3.88–5.62)
RBC # BLD AUTO: 3.34 MILLION/UL (ref 3.88–5.62)
RBC #/AREA URNS AUTO: ABNORMAL /HPF
RBC #/AREA URNS AUTO: ABNORMAL /HPF
SODIUM SERPL-SCNC: 137 MMOL/L (ref 136–145)
SP GR UR STRIP.AUTO: 1.02 (ref 1–1.03)
SP GR UR STRIP.AUTO: 1.02 (ref 1–1.03)
UROBILINOGEN UR QL STRIP.AUTO: 0.2 E.U./DL
UROBILINOGEN UR QL STRIP.AUTO: 0.2 E.U./DL
WBC # BLD AUTO: 13.55 THOUSAND/UL (ref 4.31–10.16)
WBC # BLD AUTO: 14.78 THOUSAND/UL (ref 4.31–10.16)
WBC #/AREA URNS AUTO: ABNORMAL /HPF
WBC #/AREA URNS AUTO: ABNORMAL /HPF

## 2021-08-25 PROCEDURE — 99232 SBSQ HOSP IP/OBS MODERATE 35: CPT | Performed by: INTERNAL MEDICINE

## 2021-08-25 PROCEDURE — P9016 RBC LEUKOCYTES REDUCED: HCPCS

## 2021-08-25 PROCEDURE — 87186 SC STD MICRODIL/AGAR DIL: CPT | Performed by: NURSE PRACTITIONER

## 2021-08-25 PROCEDURE — 94640 AIRWAY INHALATION TREATMENT: CPT

## 2021-08-25 PROCEDURE — 71045 X-RAY EXAM CHEST 1 VIEW: CPT

## 2021-08-25 PROCEDURE — 81001 URINALYSIS AUTO W/SCOPE: CPT | Performed by: NURSE PRACTITIONER

## 2021-08-25 PROCEDURE — 80048 BASIC METABOLIC PNL TOTAL CA: CPT | Performed by: INTERNAL MEDICINE

## 2021-08-25 PROCEDURE — 85027 COMPLETE CBC AUTOMATED: CPT | Performed by: NURSE PRACTITIONER

## 2021-08-25 PROCEDURE — G1004 CDSM NDSC: HCPCS

## 2021-08-25 PROCEDURE — 87106 FUNGI IDENTIFICATION YEAST: CPT | Performed by: NURSE PRACTITIONER

## 2021-08-25 PROCEDURE — 87086 URINE CULTURE/COLONY COUNT: CPT | Performed by: NURSE PRACTITIONER

## 2021-08-25 PROCEDURE — 30233N1 TRANSFUSION OF NONAUTOLOGOUS RED BLOOD CELLS INTO PERIPHERAL VEIN, PERCUTANEOUS APPROACH: ICD-10-PCS | Performed by: INTERNAL MEDICINE

## 2021-08-25 PROCEDURE — 74176 CT ABD & PELVIS W/O CONTRAST: CPT

## 2021-08-25 PROCEDURE — 99232 SBSQ HOSP IP/OBS MODERATE 35: CPT | Performed by: NURSE PRACTITIONER

## 2021-08-25 PROCEDURE — 85025 COMPLETE CBC W/AUTO DIFF WBC: CPT | Performed by: INTERNAL MEDICINE

## 2021-08-25 PROCEDURE — 87077 CULTURE AEROBIC IDENTIFY: CPT | Performed by: NURSE PRACTITIONER

## 2021-08-25 PROCEDURE — 94760 N-INVAS EAR/PLS OXIMETRY 1: CPT

## 2021-08-25 RX ORDER — METHYLPREDNISOLONE SODIUM SUCCINATE 40 MG/ML
20 INJECTION, POWDER, LYOPHILIZED, FOR SOLUTION INTRAMUSCULAR; INTRAVENOUS EVERY 8 HOURS SCHEDULED
Status: DISCONTINUED | OUTPATIENT
Start: 2021-08-25 | End: 2021-08-26

## 2021-08-25 RX ORDER — DIPHENHYDRAMINE HCL 25 MG
25 TABLET ORAL ONCE
Status: COMPLETED | OUTPATIENT
Start: 2021-08-25 | End: 2021-08-25

## 2021-08-25 RX ADMIN — SODIUM CHLORIDE 100 ML/HR: 0.9 INJECTION, SOLUTION INTRAVENOUS at 21:45

## 2021-08-25 RX ADMIN — AMOXICILLIN AND CLAVULANATE POTASSIUM 1 TABLET: 500; 125 TABLET, FILM COATED ORAL at 06:28

## 2021-08-25 RX ADMIN — ALBUTEROL SULFATE 2.5 MG: 2.5 SOLUTION RESPIRATORY (INHALATION) at 07:29

## 2021-08-25 RX ADMIN — DIPHENHYDRAMINE HYDROCHLORIDE 25 MG: 25 TABLET ORAL at 09:06

## 2021-08-25 RX ADMIN — SODIUM CHLORIDE 125 ML/HR: 0.9 INJECTION, SOLUTION INTRAVENOUS at 02:23

## 2021-08-25 RX ADMIN — METHYLPREDNISOLONE SODIUM SUCCINATE 20 MG: 40 INJECTION, POWDER, FOR SOLUTION INTRAMUSCULAR; INTRAVENOUS at 14:06

## 2021-08-25 RX ADMIN — ACETAMINOPHEN 650 MG: 325 TABLET, FILM COATED ORAL at 08:04

## 2021-08-25 RX ADMIN — Medication 250 MG: at 17:15

## 2021-08-25 RX ADMIN — Medication 12.5 MG: at 21:39

## 2021-08-25 RX ADMIN — METHYLPREDNISOLONE SODIUM SUCCINATE 20 MG: 40 INJECTION, POWDER, FOR SOLUTION INTRAMUSCULAR; INTRAVENOUS at 21:39

## 2021-08-25 RX ADMIN — PIPERACILLIN AND TAZOBACTAM 3.38 G: 36; 4.5 INJECTION, POWDER, FOR SOLUTION INTRAVENOUS at 13:01

## 2021-08-25 RX ADMIN — SODIUM CHLORIDE 100 ML/HR: 0.9 INJECTION, SOLUTION INTRAVENOUS at 11:40

## 2021-08-25 RX ADMIN — ALBUTEROL SULFATE 2.5 MG: 2.5 SOLUTION RESPIRATORY (INHALATION) at 21:03

## 2021-08-25 RX ADMIN — ATORVASTATIN CALCIUM 20 MG: 20 TABLET, FILM COATED ORAL at 21:39

## 2021-08-25 RX ADMIN — ALBUTEROL SULFATE 2.5 MG: 2.5 SOLUTION RESPIRATORY (INHALATION) at 15:32

## 2021-08-25 RX ADMIN — PIPERACILLIN AND TAZOBACTAM 3.38 G: 36; 4.5 INJECTION, POWDER, FOR SOLUTION INTRAVENOUS at 21:40

## 2021-08-25 RX ADMIN — Medication 12.5 MG: at 08:04

## 2021-08-25 RX ADMIN — FLUTICASONE FUROATE AND VILANTEROL TRIFENATATE 1 PUFF: 200; 25 POWDER RESPIRATORY (INHALATION) at 21:39

## 2021-08-25 NOTE — ASSESSMENT & PLAN NOTE
Lab Results   Component Value Date    EGFR 38 08/25/2021    EGFR 40 08/24/2021    EGFR 33 08/23/2021    CREATININE 1 72 (H) 08/25/2021    CREATININE 1 62 (H) 08/24/2021    CREATININE 1 90 (H) 08/23/2021   Baseline creatinine appears to be around 1 4-1 5's  Creatinine 1 57 on admission  Creatinine peaked to 1 97, trending down   Renal and bladder ultrasound no evidence of hydro  Avoid nephrotoxins and hypotension  Blood transfusion as needed to keep hemoglobin above 7  Appreciate nephrology input  · Likely multifactorial secondary to obstructive uropathy, pre renal, sepsis  · Continue IV fluids as per Nephrology  · Repeat BMP in a m

## 2021-08-25 NOTE — ASSESSMENT & PLAN NOTE
Likely due to cystitis, left trigone bladder floor lesion, right radiation ureteritis/right ureteral lesion/right ureteral bleeding per OR findings  Patient was recently treated for UTI  As noted above CBI was discontinued on 08/23, does continue with some hematuria  Blood-tinged urine along the Sumner tubing    Sumner management per Urology

## 2021-08-25 NOTE — ASSESSMENT & PLAN NOTE
Baseline hemoglobin appears to be around 8-9's likely due to iron deficiency  Patient on iron supplements at home  Hemoglobin 9 2 on admission  Trending down due to hematuria  Hemoglobin trended down to 6 8 8/20, received 1 unit RBC  Patient received 1 unit PRBC on 08/23 due to soft BP despite Hb 8 1  Hemoglobin today 6 6  Patient received 1 unit of RBC  Repeat H&H 2 hours post transfusion pending  Monitor H&H, transfuse for hemoglobin less than 7 or sooner if hemodynamically unstable  Continue iron supplement, B12, MVI from home  Monitor

## 2021-08-25 NOTE — ASSESSMENT & PLAN NOTE
Evolving since admission, as evidenced by fever, leukocytosis, lactic acidosis, with source of infection likely cystitis, most likely secondary to urinary retention  Fever 103 6 8/19, WBC 14, lactic acid 2 5   Lactic acid normalized with IV fluid boluses  UA 8/19 showed trace leukocytes, large blood  Patient did receive 1 dose of IV Rocephin on 8/17  Urine culture not sent  Blood cultures after 5 days  Procalcitonin negative x 2  Urine culture on 7/30/2021 showed Enterococcus and Klebsiella pneumoniae  Renal ultrasound unremarkable  Appreciate ID input  · Changed antibiotic to IV Unasyn, then transitioned to p o  Augmentin 500 mg p o  Q 8 hours , initially planned for 7 days through August 26 as per ID recommendation  · Patient with persistent fever since yesterday  Fever 103 1 this morning  WBC up to 13 55  · Discussed with ID, Nephrology, Urology, we will connect PCN tube to drainage bag  · Change antibiotic to IV Zosyn  Will send UA via PCN tube on both side  Repeat CT abdomen pelvis  Repeat CBC with diff, procalcitonin in a m      Results from last 7 days   Lab Units 08/22/21  0539 08/21/21  0521 08/19/21  2030 08/19/21  1732   LACTIC ACID mmol/L  --   --  1 0 2 5*   PROCALCITONIN ng/ml 0 08 0 14  --   --      Results from last 7 days   Lab Units 08/25/21  0515 08/24/21  0609 08/23/21  0456 08/22/21  0539 08/21/21  0521 08/20/21  0532 08/19/21  1732   WBC Thousand/uL 13 55* 10 41* 9 67 6 50 7 37 10 63* 14 01*

## 2021-08-25 NOTE — ASSESSMENT & PLAN NOTE
Status post radiation, had radiation cystitis  Status post bilateral nephrostomy tubes due to recurrent hematuria,capped on admission  Urology following, appreciate input  Will connect PCN tube to bag due to persistent fever per discussion with ID, Urology, Nephrology

## 2021-08-25 NOTE — PLAN OF CARE
Problem: MOBILITY - ADULT  Goal: Maintain or return to baseline ADL function  Description: INTERVENTIONS:  -  Assess patient's ability to carry out ADLs; assess patient's baseline for ADL function and identify physical deficits which impact ability to perform ADLs (bathing, care of mouth/teeth, toileting, grooming, dressing, etc )  - Assess/evaluate cause of self-care deficits   - Assess range of motion  - Assess patient's mobility; develop plan if impaired  - Assess patient's need for assistive devices and provide as appropriate  - Encourage maximum independence but intervene and supervise when necessary  - Involve family in performance of ADLs  - Assess for home care needs following discharge   - Consider OT consult to assist with ADL evaluation and planning for discharge  - Provide patient education as appropriate  Outcome: Progressing  Goal: Maintains/Returns to pre admission functional level  Description: INTERVENTIONS:  - Perform BMAT or MOVE assessment daily    - Set and communicate daily mobility goal to care team and patient/family/caregiver     - Collaborate with rehabilitation services on mobility goals if consulted  - Out of bed for toileting  - Record patient progress and toleration of activity level   Outcome: Progressing     Problem: GENITOURINARY - ADULT  Goal: Maintains or returns to baseline urinary function  Description: INTERVENTIONS:  - Assess urinary function  - Encourage oral fluids to ensure adequate hydration if ordered  - Administer IV fluids as ordered to ensure adequate hydration  - Administer ordered medications as needed  - Offer frequent toileting  - Follow urinary retention protocol if ordered  Outcome: Progressing  Goal: Absence of urinary retention  Description: INTERVENTIONS:  - Assess patients ability to void and empty bladder  - Monitor I/O  - Bladder scan as needed  - Discuss with physician/AP medications to alleviate retention as needed  - Discuss catheterization for long term situations as appropriate  Outcome: Progressing  Goal: Urinary catheter remains patent  Description: INTERVENTIONS:  - Assess patency of urinary catheter  - If patient has a chronic alanis, consider changing catheter if non-functioning  - Follow guidelines for intermittent irrigation of non-functioning urinary catheter  Outcome: Progressing     Problem: Potential for Falls  Goal: Patient will remain free of falls  Description: INTERVENTIONS:  - Educate patient/family on patient safety including physical limitations  - Instruct patient to call for assistance with activity   - Consult OT/PT to assist with strengthening/mobility   - Keep Call bell within reach  - Keep bed low and locked with side rails adjusted as appropriate  - Keep care items and personal belongings within reach  - Initiate and maintain comfort rounds  - Make Fall Risk Sign visible to staff  - Apply yellow socks and bracelet for high fall risk patients  - Consider moving patient to room near nurses station  Outcome: Progressing     Problem: RESPIRATORY - ADULT  Goal: Achieves optimal ventilation and oxygenation  Description: INTERVENTIONS:  - Assess for changes in respiratory status  - Assess for changes in mentation and behavior  - Position to facilitate oxygenation and minimize respiratory effort  - Oxygen administered by appropriate delivery if ordered  - Initiate smoking cessation education as indicated  - Encourage broncho-pulmonary hygiene including cough, deep breathe, Incentive Spirometry  - Assess the need for suctioning and aspirate as needed  - Assess and instruct to report SOB or any respiratory difficulty  - Respiratory Therapy support as indicated  Outcome: Progressing     Problem: Prexisting or High Potential for Compromised Skin Integrity  Goal: Skin integrity is maintained or improved  Description: INTERVENTIONS:  - Identify patients at risk for skin breakdown  - Assess and monitor skin integrity  - Assess and monitor nutrition and hydration status  - Monitor labs   - Assess for incontinence   - Turn and reposition patient  - Assist with mobility/ambulation  - Relieve pressure over bony prominences  - Avoid friction and shearing  - Provide appropriate hygiene as needed including keeping skin clean and dry  - Evaluate need for skin moisturizer/barrier cream  - Collaborate with interdisciplinary team   - Patient/family teaching  - Consider wound care consult   Outcome: Progressing

## 2021-08-25 NOTE — ASSESSMENT & PLAN NOTE
Lab Results   Component Value Date    EGFR 38 08/25/2021    EGFR 40 08/24/2021    EGFR 33 08/23/2021    CREATININE 1 72 (H) 08/25/2021    CREATININE 1 62 (H) 08/24/2021    CREATININE 1 90 (H) 08/23/2021   Likely multifactorial secondary to postobstructive, pre renal, sepsis  Nephrology on board, currently on IV fluids  Avoid hypotension and nephrotoxins  Creatinine today 1 72  Monitor BMP in a m

## 2021-08-25 NOTE — PHYSICAL THERAPY NOTE
PT treatment cancelled today as pt receiving a blood transfusion  Will follow    Nathaly Gonzáles PT  [id number]         08/25/21 1051   Note Type   Note Type Treatment   Cancel Reasons Medical status

## 2021-08-25 NOTE — PROGRESS NOTES
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  Prerenal/possible sepsis  · Current creatinine:  1 7 to mildly higher  · Peak creatinine:  1 97 as of 08/20/2021  · UA:  Large occult blood/no proteinuria/innumerable RBCs  · Renal imaging :  Ultrasound 08/20/2021:  No evidence of hydronephrosis at that time  · Status post cystoscopy on 08/22/2021:  Extensive fulguration left bladder floor left lateral wall right retrograde pyelogram right ureteroscopy biopsy for suspicious distal lesion in laser fulguration of bleeding distal ureter and suspicious lesions stent placed  · Chest x-ray with wheezing ordered by primary service  Treatment:  · IV fluids for now especially with outstanding urine output to avoid postobstructive diuresis:  Will decrease rate pending chest x-ray  · Follow-up with Urology  · Maintain MAP:  Over 65 mmHg if possible/avoid hypoperfusion:  Hold parameters on blood pressure medications  · Avoid nephrotoxic agents such as NSAIDs, and IV contrast if possible     2  CKD:  3B  · Baseline creatinine:  1 3-1 6  · Etiology:  Chronic obstructive uropathy/hypertensive nephrosclerosis/arteriolar nephrosclerosis     3 Volume/hypertension:  · Volume:  IV fluids  · Blood pressure:  No significant low blood pressures in general  Recommend:  · Continue IV fluids and monitor blood pressure     4 Electrolytes:  · All acceptable        5  MBD of CKD/AK I:  · Magnesium now repleted 2 0     6  Anemia:  Secondary to urinary bleeding and CKD/chronic disease  · Current hemoglobin:  6 6 lower  · For completeness check multiple myeloma evaluation given CKD and anemia:  PENDING  · Oral iron given infection  Treatment:  · Transfuse for hemoglobin less than 7 0 per primary service       7  Major reason for hospitalization:  ? Urinary retention with sepsis being treated by Infectious Disease with cystitis PATIENT HAS RADIATION CYSTITIS AND RADIATION URETERITIS AND RIGHT STENT PER UROLOGY  ? Ongoing sepsis from cystitis/UTI with ongoing fevers to be followed closely by Infectious Disease     8  Other problems:  ? Prostate CA status post XRT  ? Rheumatoid arthritis  ? COPD              Subjective: The patient is asymptomatic  No chest pain or shortness of breath  No nausea vomiting or diarrhea  Still has a Sumner catheter in place but no gross hematuria  Ongoing high fever of 103 this morning    Objective:     Vitals: Blood pressure 152/91, pulse 88, temperature (!) 103 1 °F (39 5 °C), temperature source Oral, resp  rate (!) 24, height 5' 9" (1 753 m), weight 97 5 kg (215 lb), SpO2 90 %  ,Body mass index is 31 75 kg/m²  Weight (last 2 days)     Date/Time   Weight    08/24/21 0600   97 5 (215)    08/23/21 0549   94 8 (209)                Intake/Output Summary (Last 24 hours) at 8/25/2021 0841  Last data filed at 8/24/2021 1711  Gross per 24 hour   Intake --   Output 1300 ml   Net -1300 ml       Urethral Catheter Latex 24 Fr   (Active)   Reasons to continue Urinary Catheter  Acute urinary retention/obstruction failing urinary retention protocol 08/24/21 1711   Goal for Removal Will consult urology 08/24/21 1711   Site Assessment Clean;Skin intact 08/24/21 1711   Sumner Care Done 08/25/21 0835   Collection Container Standard drainage bag 08/24/21 1711   Securement Method Securing device (Describe) 08/24/21 1711   Output (mL) 400 mL 08/24/21 1711       Physical Exam: General:  No acute distress  Skin:  No acute rash  Eyes:  No scleral icterus and noninjected  ENT:  Moist mucous membranes  Neck:  Supple, no jugular venous distention, trachea midline, overall appearance is normal  Chest:  Very slight wheezing bilaterally no rales  CVS:  Regular rate and rhythm, without a rub or gallops  Abdomen:  Normal bowel sounds, soft and nontender and nondistended  Extremities:  No edema, and no cyanosis, no significant arthritic changes  Neuro:  No gross focality  Psych:  Alert and oriented and appropriate                Medications:    Scheduled Meds:  Current Facility-Administered Medications   Medication Dose Route Frequency Provider Last Rate    acetaminophen  650 mg Oral Q4H PRN Kentrell Abreu MD      albuterol  2 puff Inhalation Q4H PRN CORWIN Champion      albuterol  2 5 mg Nebulization TID Max CORWIN Lundberg      amoxicillin-clavulanate  1 tablet Oral [facility] Panchito Hernandez DO      atorvastatin  20 mg Oral HS Kentrell Abreu MD      belladonna-opium  30 mg Rectal Q8H PRN Kentrell Abreu MD      cyanocobalamin  500 mcg Oral Daily Kentrell Abreu MD      ferrous sulfate  325 mg Oral Daily With Breakfast Kentrell Abreu MD      fluticasone  1 spray Nasal Daily Kentrell Abreu MD      fluticasone-vilanterol  1 puff Inhalation HS Kentrell Abreu MD      loratadine  10 mg Oral Daily Kentrell Abreu MD      metoprolol tartrate  12 5 mg Oral Q12H [address] Kentrell Abreu MD      multivitamin-minerals  1 tablet Oral Daily Kentrell Abreu MD      saccharomyces boulardii  250 mg Oral BID Kentrell Abreu MD      sodium chloride  125 mL/hr Intravenous Continuous [facility]  mL/hr (08/25/21 0223)       PRN Meds:   acetaminophen    albuterol    belladonna-opium    Continuous Infusions:sodium chloride, 125 mL/hr, Last Rate: 125 mL/hr (08/25/21 0223)        Lab, Imaging and other studies: I have personally reviewed pertinent labs    Laboratory Results:  Results from last 7 days   Lab Units 08/25/21  2124 08/24/21  2204 08/24/21  0609 08/23/21  0456 08/22/21  1830 08/22/21  1017 08/22/21  0539 08/22/21  0005 08/21/21  0521 08/20/21  0532 08/19/21  1732   WBC Thousand/uL 13 55*  --  10 41* 9 67  --   --  6 50  --  7 37 10 63* 14 01*   HEMOGLOBIN g/dL 6 6* 7 0* 7 9* 8 1* 8 7* 7 6* 7 3* 7 2* 7 8* 7 2* 8 9*   HEMATOCRIT % 21 9*  --  26 5* 27 9* 29 9* 26 4* 24 8* 24 4* 25 7* 24 6* 31 6*   PLATELETS Thousands/uL 157  --  143* 164  --   --  152  --  151 175 232   POTASSIUM mmol/L 4 2  --  4 4 4 2  --   --  4 3  --  3 8 3 8  --    CHLORIDE mmol/L 104  --  106 104  --   --  109*  --  105 102  --    CO2 mmol/L 25  --  26 23  --   --  24  --  25 25  --    BUN mg/dL 21  --  22 24  --   --  25  --  38* 43*  --    CREATININE mg/dL 1 72*  --  1 62* 1 90*  --   --  1 43*  --  1 69* 1 97*  --    CALCIUM mg/dL 7 4*  --  7 8* 8 2*  --   --  8 0*  --  7 4* 7 7*  --    MAGNESIUM mg/dL  --   --  2 0 1 7  --   --  2 0  --  1 9  --   --      Urinalysis:   Lab Results   Component Value Date    COLORU Orange 08/19/2021    CLARITYU Slightly Cloudy 08/19/2021    SPECGRAV 1 010 08/19/2021    PHUR 6 0 08/19/2021    PHUR 5 5 03/25/2018    LEUKOCYTESUR Trace (A) 08/19/2021    NITRITE Negative 08/19/2021    GLUCOSEU Negative 08/19/2021    KETONESU Negative 08/19/2021    BILIRUBINUR Negative 08/19/2021    BLOODU Large (A) 08/19/2021     ABGs: No results found for: [facility]  Radiology review:     Portions of the record may have been created with voice recognition software  Occasional wrong word or "sound a like" substitutions may have occurred due to the inherent limitations of voice recognition software  Read the chart carefully and recognize, using context, where substitutions have occurred

## 2021-08-25 NOTE — ASSESSMENT & PLAN NOTE
Continue on Breo and fluticasone  Diffuse mild wheeze on auscultation today  Chest x-ray NAD on 8/20  Repeat chest x-ray today showed - Mild bibasilar atelectasis  Continue albuterol   Start low-dose Solu-Medrol 20 q 8 hours  Patient is satting 89% on room air  Supplemental oxygen maintain sats above 90%

## 2021-08-25 NOTE — PROGRESS NOTES
Progress Note - Infectious Disease   Demetrio Bracket 66 y o  male MRN: [id number]  Unit/Bed#: 2 [place] [address] Encounter: [id number]      Impression/Recommendations:  1  Sepsis, not present on admission: but developed during this admission with fever and leukocytosis   Source of sepsis is most likely urinary retention   Rebound fevers last night and today up to 103 F  Blood cultures have no growth thus far  Antibiotic plan as below  Check blood cx if pt spikes fever agin  Monitor temperature curve, WBC, clinical response     2  Cystitis, most likely secondary to urinary retention  Rebound fevers noted  Patient had recent urine culture last month with growth of Klebsiella and ampicillin susceptible Enterococcus  8/22 s/p cystoscopy with clot evacuation and bladder floor biopsy  Repeat UA and urine cx  Send urine from nephrostomies also  CT abd/pelv ordered by primary team  Discontinue Augmentin   Broaden to Zosyn 3 375g iv q 8 hours  Monitor temperature curve, WBC, clinical response      3  Urinary retention, most likely secondary to hematuria and XRT cystitis   Hematuria improving but still persists  Patient has bilateral PCN, which were capped  S/p cystoscopy on 8/22  Sumner catheter drainage in place  Agree with uncapping percutaneous nephrostomy to allow for drainage and for urine culture testing     4  CKD   Creatinine is relatively stable  Antibiotic dosages adjusted accordingly  Repeat creatinine in am   Avoid nephrotoxins      5  XRT cystitis      6  Prostate cancer, status post XRT  Urology follow-up      Discussed with patient in regarding the above plan  Discussed with CORWIN Kumar and Dr Mabel Hook from the [facility] service      Antibiotics:  Zosyn #1  ABx # 6     Subjective:  Pt had fevers overnight going and today up to 103 and feels cold  He denies abdominal or flank pain  Gross hematuria is improving  He is tolerating antibiotic well    No nausea, vomiting or diarrhea      Objective:  Vitals:  Temp: [98 1 °F (36 7 °C)-103 1 °F (39 5 °C)] 98 8 °F (37 1 °C)  HR:  [60-89] 60  Resp:  [16-24] 18  BP: ()/(46-91) 96/54  SpO2:  [90 %-95 %] 92 %  Temp (24hrs), Av 4 °F (38 °C), Min:98 1 °F (36 7 °C), Max:103 1 °F (39 5 °C)  Current: Temperature: 98 8 °F (37 1 °C)    Physical Exam:     General: Awake, alert, cooperative, no distress  Neck:  Supple  Lungs: CTA b/l, no wheezing, respirations unlabored  Heart:  Regular rate and rhythm, S1 and S2 normal, no murmur  Abd/: Soft, nondistended, no masses  Sumner catheter intact with concentrated urine noted  B/L nephrostomies are draining urine, LT PCN has concentrated urine, RT PCN has clear urine  Extremities: No distal leg edema b/l  Skin:  No rash   Neuro: AAO x 3, conversant, fluent speech, moves all extremities  Invasive Devices     Peripheral Intravenous Line            Peripheral IV 21 Left Forearm 2 days    Peripheral IV 21 Right Forearm <1 day          Drain            Nephrostomy Left 1 8 Fr  21 days    Nephrostomy Right 1 8 Fr  21 days    Urethral Catheter Latex 24 Fr  3 days              Labs studies:   I have personally reviewed pertinent labs  Results from last 7 days   Lab Units 21  0515 21  0609 21  0456   POTASSIUM mmol/L 4 2 4 4 4 2   CHLORIDE mmol/L 104 106 104   CO2 mmol/L 25 26 23   BUN mg/dL 21 22 24   CREATININE mg/dL 1 72* 1 62* 1 90*   EGFR ml/min/1 73sq m 38 40 33   CALCIUM mg/dL 7 4* 7 8* 8 2*     Results from last 7 days   Lab Units 21  0515 21  2204 21  0609 21  0456   WBC Thousand/uL 13 55*  --  10 41* 9 67   HEMOGLOBIN g/dL 6 6* 7 0* 7 9* 8 1*   PLATELETS Thousands/uL 157  --  143* 164     Results from last 7 days   Lab Units 21  1829 21  1731   BLOOD CULTURE  No Growth After 5 Days  No Growth After 5 Days  Imaging Studies:   I have personally reviewed pertinent imaging study reports

## 2021-08-25 NOTE — PROGRESS NOTES
Arm[address] 43  Progress Note Tanika Calvillo 1945, 66 y o  male MRN: [id number]  Unit/Bed#: [address] [place] [address] Encounter: [id number]  Primary Care Provider: Moe Sellers MD   Date and time admitted to hospital: 8/17/2021 12:20 PM    * Urinary retention  Assessment & Plan  Secondary to hematuria with mild blood clots and radiation cystitis  Patient is status post Sumner catheter and was on CBI  Appreciate urology input  · Patient is status post cystoscopy, evacuation of clots, left bladder floor biopsy, extensive fulguration left bladder floor left lateral wall, right retrograde pyelogram, right ureteroscopy biopsy suspicious distal ureteral lesion laser fulguration bleeding distal ureteral, right ureteral stent placement on 8/22  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, left trigone bladder floor lesion, right radiation ureteritis, right ureteral lesion with bleeding  · CBI was stopped on 8/23 and return of hematuria was noted, nursing staff indicated that the hand irrigated Sumner catheter and did obtain some clots  · Sumner catheter still maintain, currently blood-tinged urine along the tubing  · Sumner management per Urology          Sepsis [facility])  Assessment & Plan  Evolving since admission, as evidenced by fever, leukocytosis, lactic acidosis, with source of infection likely cystitis, most likely secondary to urinary retention  Fever 103 6 8/19, WBC 14, lactic acid 2 5   Lactic acid normalized with IV fluid boluses  UA 8/19 showed trace leukocytes, large blood  Patient did receive 1 dose of IV Rocephin on 8/17  Urine culture not sent  Blood cultures after 5 days  Procalcitonin negative x 2  Urine culture on 7/30/2021 showed Enterococcus and Klebsiella pneumoniae  Renal ultrasound unremarkable  Appreciate ID input  · Changed antibiotic to IV Unasyn, then transitioned to p o   Augmentin 500 mg p o  Q 8 hours , initially planned for 7 days through August 26 as per ID recommendation  · Patient with persistent fever since yesterday  Fever 103 1 this morning  WBC up to 13 55  · Discussed with ID, Nephrology, Urology, we will connect PCN tube to drainage bag  · Change antibiotic to IV Zosyn  Will send UA via PCN tube on both side  Repeat CT abdomen pelvis  Repeat CBC with diff, procalcitonin in a m  Results from last 7 days   Lab Units 08/22/21  0539 08/21/21  0521 08/19/21  2030 08/19/21  1732   LACTIC ACID mmol/L  --   --  1 0 2 5*   PROCALCITONIN ng/ml 0 08 0 14  --   --      Results from last 7 days   Lab Units 08/25/21  0515 08/24/21  0609 08/23/21  0456 08/22/21  0539 08/21/21  0521 08/20/21  0532 08/19/21  1732   WBC Thousand/uL 13 55* 10 41* 9 67 6 50 7 37 10 63* 14 01*              Hematuria  Assessment & Plan  Likely due to cystitis, left trigone bladder floor lesion, right radiation ureteritis/right ureteral lesion/right ureteral bleeding per OR findings  Patient was recently treated for UTI  As noted above CBI was discontinued on 08/23, does continue with some hematuria  Blood-tinged urine along the Sumner tubing  Sumner management per Urology      Anemia  Assessment & Plan  Baseline hemoglobin appears to be around 8-9's likely due to iron deficiency  Patient on iron supplements at home  Hemoglobin 9 2 on admission  Trending down due to hematuria  Hemoglobin trended down to 6 8 8/20, received 1 unit RBC  Patient received 1 unit PRBC on 08/23 due to soft BP despite Hb 8 1  Hemoglobin today 6 6  Patient received 1 unit of RBC  Repeat H&H 2 hours post transfusion pending  Monitor H&H, transfuse for hemoglobin less than 7 or sooner if hemodynamically unstable  Continue iron supplement, B12, MVI from home  Monitor      Acute kidney injury superimposed on CKD [facility])  Assessment & Plan  Lab Results   Component Value Date    EGFR 38 08/25/2021    EGFR 40 08/24/2021    EGFR 33 08/23/2021    CREATININE 1 72 (H) 08/25/2021    CREATININE 1 62 (H) 08/24/2021 CREATININE 1 90 (H) 08/23/2021   Baseline creatinine appears to be around 1 4-1 5's  Creatinine 1 57 on admission  Creatinine peaked to 1 97, trending down   Renal and bladder ultrasound no evidence of hydro  Avoid nephrotoxins and hypotension  Blood transfusion as needed to keep hemoglobin above 7  Appreciate nephrology input  · Likely multifactorial secondary to obstructive uropathy, pre renal, sepsis  · Continue IV fluids as per Nephrology  · Repeat BMP in a m  Prostate cancer [facility])  Assessment & Plan  Status post radiation, had radiation cystitis  Status post bilateral nephrostomy tubes due to recurrent hematuria,capped on admission  Urology following, appreciate input  Will connect PCN tube to bag due to persistent fever per discussion with ID, Urology, Nephrology  RA (rheumatoid arthritis) ([facility])  Assessment & Plan  Continue supportive care    Chronic obstructive pulmonary disease with acute exacerbation ([facility])  Assessment & Plan  Continue on Breo and fluticasone  Diffuse mild wheeze on auscultation today  Chest x-ray NAD on 8/20  Repeat chest x-ray today showed - Mild bibasilar atelectasis  Continue albuterol   Start low-dose Solu-Medrol 20 q 8 hours  Patient is satting 89% on room air  Supplemental oxygen maintain sats above 90%  VTE Pharmacologic Prophylaxis:   Pharmacologic: Pharmacologic VTE Prophylaxis contraindicated due to sepsis  Mechanical VTE Prophylaxis in Place: Yes    Patient Centered Rounds: I have performed bedside rounds with nursing staff today  Discussions with Specialists or Other Care Team Provider: attending, ID,nephrology,urology    Education and Discussions with Family / Patient: yes    Time Spent for Care: 20 minutes  More than 50% of total time spent on counseling and coordination of care as described above      Current Length of Stay: 7 day(s)    Current Patient Status: Inpatient   Certification Statement: The patient will continue to require additional inpatient hospital stay due to sepsis,hematuria    Discharge Plan: home once medically cleared    Code Status: Level 1 - Full Code      Subjective:   Patient denies abdominal or pelvic pain, bilateral flank pain  Denies SOB, chest pain, headache, dizziness, nausea vomiting diarrhea, constipation  Objective:     Vitals:   Temp (24hrs), Av 4 °F (38 °C), Min:98 1 °F (36 7 °C), Max:103 1 °F (39 5 °C)    Temp:  [98 1 °F (36 7 °C)-103 1 °F (39 5 °C)] 98 8 °F (37 1 °C)  HR:  [60-89] 60  Resp:  [16-24] 18  BP: ()/(46-91) 96/54  SpO2:  [90 %-95 %] 92 %  Body mass index is 31 75 kg/m²  Input and Output Summary (last 24 hours): Intake/Output Summary (Last 24 hours) at 2021 1400  Last data filed at 2021 0850  Gross per 24 hour   Intake --   Output 1050 ml   Net -1050 ml       Physical Exam:     Physical Exam  Vitals and nursing note reviewed  Constitutional:       Appearance: He is well-developed  He is obese  HENT:      Head: Normocephalic and atraumatic  Neck:      Thyroid: No thyromegaly  Vascular: No JVD  Trachea: No tracheal deviation  Cardiovascular:      Rate and Rhythm: Normal rate and regular rhythm  Heart sounds: Normal heart sounds  Pulmonary:      Effort: No respiratory distress  Breath sounds: Wheezing present  No rales  Comments: Diffuse mild expiratory wheezing on auscultation  Patient appears mild tachypneic on exam   Abdominal:      General: Bowel sounds are normal  There is no distension  Palpations: Abdomen is soft  Tenderness: There is no abdominal tenderness  There is no guarding  Genitourinary:     Comments: Sumner draining blood-tinged urine  Musculoskeletal:         General: Swelling present  No deformity  Cervical back: Neck supple  Comments: Bilateral pedal edema   Skin:     General: Skin is warm and dry  Neurological:      General: No focal deficit present        Mental Status: He is alert and oriented to person, place, and time  Psychiatric:         Mood and Affect: Mood normal          Judgment: Judgment normal          Additional Data:     Labs:    Results from last 7 days   Lab Units 08/25/21  0515   WBC Thousand/uL 13 55*   HEMOGLOBIN g/dL 6 6*   HEMATOCRIT % 21 9*   PLATELETS Thousands/uL 157   NEUTROS PCT % 79*   LYMPHS PCT % 8*   MONOS PCT % 11   EOS PCT % 1     Results from last 7 days   Lab Units 08/25/21  0515   POTASSIUM mmol/L 4 2   CHLORIDE mmol/L 104   CO2 mmol/L 25   BUN mg/dL 21   CREATININE mg/dL 1 72*   CALCIUM mg/dL 7 4*           * I Have Reviewed All Lab Data Listed Above  * Additional Pertinent Lab Tests Reviewed: [address] [facility] Admission Reviewed    Imaging:    Imaging Reports Reviewed Today Include:  Chest x-ray  Imaging Personally Reviewed by Myself Includes:  Chest X ray    Recent Cultures (last 7 days):     Results from last 7 days   Lab Units 08/19/21  1829 08/19/21  1731   BLOOD CULTURE  No Growth After 5 Days  No Growth After 5 Days         Last 24 Hours Medication List:   Current Facility-Administered Medications   Medication Dose Route Frequency Provider Last Rate    acetaminophen  650 mg Oral Q4H PRN Leigh Garcia MD      albuterol  2 puff Inhalation Q4H PRN Iman Cough, CRNP      albuterol  2 5 mg Nebulization TID Iman Cough, CRNP      atorvastatin  20 mg Oral HS Leigh Garcia MD      belladonna-opium  30 mg Rectal Q8H PRN Leigh Garcia MD      cyanocobalamin  500 mcg Oral Daily Leigh Garcia MD      ferrous sulfate  325 mg Oral Daily With Breakfast Leigh Garcia MD      fluticasone  1 spray Nasal Daily Leigh Garcia MD      fluticasone-vilanterol  1 puff Inhalation  Leigh Garcia MD      loratadine  10 mg Oral Daily Leigh Garcia MD      methylPREDNISolone sodium succinate  20 mg Intravenous Q8H [address] CuiyiCORWIN Alvarado      metoprolol tartrate  12 5 mg Oral Q12H [address] Reyes MD      multivitamin-minerals  1 tablet Oral Daily Shayy Neil MD      piperacillin-tazobactam  3 375 g Intravenous Q6H CORWIN Braden 3 375 g (08/25/21 1301)    saccharomyces boulardii  250 mg Oral BID Shayy Neil MD      sodium chloride  100 mL/hr Intravenous Continuous Dorothy Romeo  mL/hr (08/25/21 1140)        Today, Patient Was Seen By: CORWIN Andrews    ** Please Note: Dragon 360 Dictation voice to text software may have been used in the creation of this document   **

## 2021-08-25 NOTE — CASE MANAGEMENT
CM met with patient and wife Jass Black at bedside to offer to set up [address] which is the recommendation charted by the Physical Therapy Team   Patient and spouse decline any referrals to home healthcare, deny needs  Spouse reports that patient is independent at home with no use of DME and she assists as needed  No referrals to [facility] placed per patient and spouse's choice

## 2021-08-25 NOTE — ASSESSMENT & PLAN NOTE
Secondary to hematuria with mild blood clots and radiation cystitis  Patient is status post Sumner catheter and was on CBI  Appreciate urology input  · Patient is status post cystoscopy, evacuation of clots, left bladder floor biopsy, extensive fulguration left bladder floor left lateral wall, right retrograde pyelogram, right ureteroscopy biopsy suspicious distal ureteral lesion laser fulguration bleeding distal ureteral, right ureteral stent placement on 8/22  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, left trigone bladder floor lesion, right radiation ureteritis, right ureteral lesion with bleeding  · CBI was stopped on 8/23 and return of hematuria was noted, nursing staff indicated that the hand irrigated Sumner catheter and did obtain some clots  · Sumner catheter still maintain, currently blood-tinged urine along the tubing     · Sumner management per Urology

## 2021-08-25 NOTE — PROGRESS NOTES
Progress Note - Urology      Patient: Radha Prince   : 1945 Sex: male   MRN: [id number]     CSN: [id number]  Unit/Bed#: [address] [place] [address]     SUBJECTIVE:   Spiked to 103 last night  Bilateral nephrostomy tubes opened  Urine cultures sent  Id on board continuing Zosyn Q 8  Sumner urine minimally hematuria patient does have stent      Objective   Vitals: /59 (BP Location: Right arm)   Pulse 60   Temp 98 4 °F (36 9 °C) (Tympanic)   Resp 20   Ht 5' 9" (1 753 m)   Wt 97 5 kg (215 lb)   SpO2 93%   BMI 31 75 kg/m²     I/O last 24 hours:   In: 20 [Other:20]  Out: 950 [Urine:950]      Physical Exam:   General Alert awake   Normocephalic atraumatic PERRLA  Lungs clear bilaterally  Cardiac normal S1 normal S2  Abdomen soft, flank pain  Extremities no edema      Lab Results: CBC:   Lab Results   Component Value Date    WBC 14 78 (H) 2021    HGB 8 1 (L) 2021    HCT 28 6 (L) 2021    MCV 86 2021     (L) 2021    MCH 24 3 (L) 2021    MCHC 28 3 (L) 2021    RDW 19 9 (H) 2021    MPV 10 5 2021    NRBC 0 2021     CMP:   Lab Results   Component Value Date     2021    CO2 25 2021    BUN 21 2021    CREATININE 1 72 (H) 2021    CALCIUM 7 4 (L) 2021    AST 20 2021    ALT 31 2021    ALKPHOS 96 2021    EGFR 38 2021     Urinalysis:   Lab Results   Component Value Date    COLORU Yellow 2021    CLARITYU Cloudy 2021    SPECGRAV 1 020 2021    PHUR 5 5 2021    PHUR 5 5 2018    LEUKOCYTESUR Moderate (A) 2021    NITRITE Negative 2021    GLUCOSEU Negative 2021    KETONESU Negative 2021    BILIRUBINUR Negative 2021    BLOODU Large (A) 2021     Urine Culture:   Lab Results   Component Value Date    URINECX >100,000 cfu/ml Klebsiella pneumoniae (A) 2021    URINECX >100,000 cfu/ml Enterococcus faecalis (A) 2021     PSA: No results found for: PSA      Assessment/ Plan:  Fevers  Questionable nephrostomy tube infection  Bilateral nephrostomy tubes open  Cultures from nephrostomy tube not sent  Discussed with staff to order cultures from each tube at this time  Continue Zosyn as per ID  Sumner urine clearing will DC Sumner tomorrow morning voiding trial  Patient aware that in light of open nephrostomy tubes and right stent scant urine going to be voided          Darrel Song MD

## 2021-08-26 LAB
ABO GROUP BLD BPU: NORMAL
ALBUMIN SERPL BCP-MCNC: 2 G/DL (ref 3.5–5)
ALP SERPL-CCNC: 70 U/L (ref 46–116)
ALT SERPL W P-5'-P-CCNC: 45 U/L (ref 12–78)
ANION GAP SERPL CALCULATED.3IONS-SCNC: 7 MMOL/L (ref 4–13)
AST SERPL W P-5'-P-CCNC: 30 U/L (ref 5–45)
BASOPHILS # BLD AUTO: 0.02 THOUSANDS/ΜL (ref 0–0.1)
BASOPHILS NFR BLD AUTO: 0 % (ref 0–1)
BILIRUB SERPL-MCNC: 0.23 MG/DL (ref 0.2–1)
BPU ID: NORMAL
BUN SERPL-MCNC: 26 MG/DL (ref 5–25)
CALCIUM ALBUM COR SERPL-MCNC: 9.4 MG/DL (ref 8.3–10.1)
CALCIUM SERPL-MCNC: 7.8 MG/DL (ref 8.3–10.1)
CHLORIDE SERPL-SCNC: 106 MMOL/L (ref 100–108)
CO2 SERPL-SCNC: 25 MMOL/L (ref 21–32)
CREAT SERPL-MCNC: 1.67 MG/DL (ref 0.6–1.3)
CROSSMATCH: NORMAL
EOSINOPHIL # BLD AUTO: 0 THOUSAND/ΜL (ref 0–0.61)
EOSINOPHIL NFR BLD AUTO: 0 % (ref 0–6)
ERYTHROCYTE [DISTWIDTH] IN BLOOD BY AUTOMATED COUNT: 19.8 % (ref 11.6–15.1)
GFR SERPL CREATININE-BSD FRML MDRD: 39 ML/MIN/1.73SQ M
GLUCOSE SERPL-MCNC: 211 MG/DL (ref 65–140)
HCT VFR BLD AUTO: 24.7 % (ref 36.5–49.3)
HCT VFR BLD AUTO: 24.8 % (ref 36.5–49.3)
HGB BLD-MCNC: 7.3 G/DL (ref 12–17)
HGB BLD-MCNC: 7.5 G/DL (ref 12–17)
IMM GRANULOCYTES # BLD AUTO: 0.24 THOUSAND/UL (ref 0–0.2)
IMM GRANULOCYTES NFR BLD AUTO: 2 % (ref 0–2)
LYMPHOCYTES # BLD AUTO: 0.78 THOUSANDS/ΜL (ref 0.6–4.47)
LYMPHOCYTES NFR BLD AUTO: 7 % (ref 14–44)
MAGNESIUM SERPL-MCNC: 2.1 MG/DL (ref 1.6–2.6)
MCH RBC QN AUTO: 24.2 PG (ref 26.8–34.3)
MCHC RBC AUTO-ENTMCNC: 29.4 G/DL (ref 31.4–37.4)
MCV RBC AUTO: 82 FL (ref 82–98)
MONOCYTES # BLD AUTO: 0.42 THOUSAND/ΜL (ref 0.17–1.22)
MONOCYTES NFR BLD AUTO: 4 % (ref 4–12)
NEUTROPHILS # BLD AUTO: 9.1 THOUSANDS/ΜL (ref 1.85–7.62)
NEUTS SEG NFR BLD AUTO: 87 % (ref 43–75)
NRBC BLD AUTO-RTO: 0 /100 WBCS
PLATELET # BLD AUTO: 163 THOUSANDS/UL (ref 149–390)
PMV BLD AUTO: 10.2 FL (ref 8.9–12.7)
POTASSIUM SERPL-SCNC: 5.1 MMOL/L (ref 3.5–5.3)
PROCALCITONIN SERPL-MCNC: 0.19 NG/ML
PROT SERPL-MCNC: 5.9 G/DL (ref 6.4–8.2)
RBC # BLD AUTO: 3.02 MILLION/UL (ref 3.88–5.62)
SODIUM SERPL-SCNC: 138 MMOL/L (ref 136–145)
UNIT DISPENSE STATUS: NORMAL
UNIT PRODUCT CODE: NORMAL
UNIT PRODUCT VOLUME: 350 ML
UNIT RH: NORMAL
WBC # BLD AUTO: 10.56 THOUSAND/UL (ref 4.31–10.16)

## 2021-08-26 PROCEDURE — 85025 COMPLETE CBC W/AUTO DIFF WBC: CPT | Performed by: NURSE PRACTITIONER

## 2021-08-26 PROCEDURE — 83735 ASSAY OF MAGNESIUM: CPT | Performed by: NURSE PRACTITIONER

## 2021-08-26 PROCEDURE — 99232 SBSQ HOSP IP/OBS MODERATE 35: CPT | Performed by: INTERNAL MEDICINE

## 2021-08-26 PROCEDURE — 84145 PROCALCITONIN (PCT): CPT | Performed by: NURSE PRACTITIONER

## 2021-08-26 PROCEDURE — 99232 SBSQ HOSP IP/OBS MODERATE 35: CPT | Performed by: NURSE PRACTITIONER

## 2021-08-26 PROCEDURE — 80053 COMPREHEN METABOLIC PANEL: CPT | Performed by: NURSE PRACTITIONER

## 2021-08-26 PROCEDURE — 85018 HEMOGLOBIN: CPT | Performed by: NURSE PRACTITIONER

## 2021-08-26 PROCEDURE — 94640 AIRWAY INHALATION TREATMENT: CPT

## 2021-08-26 PROCEDURE — 99231 SBSQ HOSP IP/OBS SF/LOW 25: CPT | Performed by: INTERNAL MEDICINE

## 2021-08-26 PROCEDURE — 85014 HEMATOCRIT: CPT | Performed by: NURSE PRACTITIONER

## 2021-08-26 PROCEDURE — 94760 N-INVAS EAR/PLS OXIMETRY 1: CPT

## 2021-08-26 RX ORDER — METHYLPREDNISOLONE SODIUM SUCCINATE 40 MG/ML
20 INJECTION, POWDER, LYOPHILIZED, FOR SOLUTION INTRAMUSCULAR; INTRAVENOUS EVERY 12 HOURS SCHEDULED
Status: DISCONTINUED | OUTPATIENT
Start: 2021-08-26 | End: 2021-08-27 | Stop reason: HOSPADM

## 2021-08-26 RX ADMIN — ALBUTEROL SULFATE 2.5 MG: 2.5 SOLUTION RESPIRATORY (INHALATION) at 07:23

## 2021-08-26 RX ADMIN — Medication 12.5 MG: at 20:29

## 2021-08-26 RX ADMIN — ATORVASTATIN CALCIUM 20 MG: 20 TABLET, FILM COATED ORAL at 21:24

## 2021-08-26 RX ADMIN — Medication 1 TABLET: at 08:59

## 2021-08-26 RX ADMIN — PIPERACILLIN AND TAZOBACTAM 3.38 G: 36; 4.5 INJECTION, POWDER, FOR SOLUTION INTRAVENOUS at 07:43

## 2021-08-26 RX ADMIN — PIPERACILLIN AND TAZOBACTAM 3.38 G: 36; 4.5 INJECTION, POWDER, FOR SOLUTION INTRAVENOUS at 15:12

## 2021-08-26 RX ADMIN — ALBUTEROL SULFATE 2.5 MG: 2.5 SOLUTION RESPIRATORY (INHALATION) at 14:49

## 2021-08-26 RX ADMIN — SODIUM CHLORIDE 100 ML/HR: 0.9 INJECTION, SOLUTION INTRAVENOUS at 09:13

## 2021-08-26 RX ADMIN — Medication 12.5 MG: at 08:59

## 2021-08-26 RX ADMIN — PIPERACILLIN AND TAZOBACTAM 3.38 G: 36; 4.5 INJECTION, POWDER, FOR SOLUTION INTRAVENOUS at 20:29

## 2021-08-26 RX ADMIN — LORATADINE 10 MG: 10 TABLET ORAL at 09:00

## 2021-08-26 RX ADMIN — METHYLPREDNISOLONE SODIUM SUCCINATE 20 MG: 40 INJECTION, POWDER, FOR SOLUTION INTRAMUSCULAR; INTRAVENOUS at 06:09

## 2021-08-26 RX ADMIN — ALBUTEROL SULFATE 2 PUFF: 90 AEROSOL, METERED RESPIRATORY (INHALATION) at 17:59

## 2021-08-26 RX ADMIN — METHYLPREDNISOLONE SODIUM SUCCINATE 20 MG: 40 INJECTION, POWDER, FOR SOLUTION INTRAMUSCULAR; INTRAVENOUS at 20:29

## 2021-08-26 RX ADMIN — Medication 250 MG: at 18:00

## 2021-08-26 RX ADMIN — FERROUS SULFATE TAB 325 MG (65 MG ELEMENTAL FE) 325 MG: 325 (65 FE) TAB at 08:59

## 2021-08-26 RX ADMIN — FLUTICASONE FUROATE AND VILANTEROL TRIFENATATE 1 PUFF: 200; 25 POWDER RESPIRATORY (INHALATION) at 21:24

## 2021-08-26 RX ADMIN — FLUTICASONE PROPIONATE 1 SPRAY: 50 SPRAY, METERED NASAL at 09:00

## 2021-08-26 RX ADMIN — CYANOCOBALAMIN TAB 500 MCG 500 MCG: 500 TAB at 08:59

## 2021-08-26 RX ADMIN — Medication 250 MG: at 08:59

## 2021-08-26 NOTE — ASSESSMENT & PLAN NOTE
Baseline hemoglobin appears to be around 8-9's likely due to iron deficiency  Patient on iron supplements at home  Hemoglobin 9 2 on admission  Trending down due to hematuria  Hemoglobin trended down to 6 8 8/20, received 1 unit RBC  Patient received 1 unit PRBC on 08/23 due to soft BP despite Hb 8 1  Hemoglobin 6 6 8/25  Patient received 1 unit of RBC  Hemoglobin today 7 3, repeat 7 5  Monitor H&H, transfuse for hemoglobin less than 7 or sooner if hemodynamically unstable  Continue iron supplement, B12, MVI from home  Repeat CBC in a m

## 2021-08-26 NOTE — PROGRESS NOTES
Progress Note - Infectious Disease   Oscar Noyola 66 y o  male MRN: [id number]  Unit/Bed#: 2 [place] [address] Encounter: [id number]      Impression/Recommendations:  1  Sepsis, not present on admission: but developed during this admission with fever and leukocytosis   Source of sepsis is most likely urinary retention and complicated UTI   Rebound fevers on night of 8/24  Blood cultures have no growth thus far  Antibiotic plan as below  Monitor temperature curve, WBC, clinical response     2  Complicated UTI with concern for b/l pyelonephritis in the setting of urinary retention  Rebound fevers noted on 8/24 and 8/25  Patient had recent urine culture last month with growth of Klebsiella and ampicillin susceptible Enterococcus  8/22 s/p cystoscopy with clot evacuation and bladder floor biopsy  8/25 CT abd raises concern for ascending urinary infection as it demonstrates bilateral perinephric and periureteral fat stranding  Follow urine cx from nephrostomies  Continue Zosyn 3 375g iv q 8 hours  Monitor temperature curve, WBC, clinical response      3  Urinary retention, most likely secondary to hematuria and XRT cystitis  Song Grise now resolved  Patient has bilateral PCN, which were previously capped, but now draining  S/p cystoscopy on 8/22  Sumner catheter removed today per urology and pt has since voided urine  Percutaneous nephrostomies remain open for drainage      4  CKD   Creatinine is relatively stable  Antibiotic dosages adjusted accordingly  Repeat creatinine in am   Avoid nephrotoxins      5  XRT cystitis      6  Prostate cancer, status post XRT  Urology follow-up      Discussed with patient in detail regarding the above plan  Discussed with CORWIN Austin from the [employer] [facility]  ID service will follow      Antibiotics:  Zosyn #2  ABx # 7     Subjective:  Fevers improving since administration of Zosyn yesterday  He denies abdominal or flank pain  Gross hematuria has resolved    Sumner catheter was removed this a m  and patient has since voided urine  He is tolerating antibiotic well  No nausea, vomiting or diarrhea      Objective:  Vitals:  Temp:  [97 5 °F (36 4 °C)-98 8 °F (37 1 °C)] 98 8 °F (37 1 °C)  HR:  [55-72] 71  Resp:  [17-19] 18  BP: (116-135)/(54-63) 127/55  SpO2:  [88 %-98 %] 93 %  Temp (24hrs), Av °F (36 7 °C), Min:97 5 °F (36 4 °C), Max:98 8 °F (37 1 °C)  Current: Temperature: 98 8 °F (37 1 °C)    Physical Exam:     General: Awake, alert, cooperative, no distress  Neck:  Supple  Lungs: CTA b/l, no wheezing, respirations unlabored  Heart:  Regular rate and rhythm, S1 and S2 normal, no murmur  Abd/: Soft, nondistended, no masses  B/L nephrostomies are draining urine   Extremities: No distal leg edema b/l  Skin:  No rash   Neuro: AAO x 3, conversant, fluent speech, moves all extremities  Invasive Devices     Peripheral Intravenous Line            Peripheral IV 21 Left Forearm 3 days    Peripheral IV 21 Right Forearm 1 day          Drain            Nephrostomy Left 1 8 Fr  22 days    Nephrostomy Right 1 8 Fr  22 days              Labs studies:   I have personally reviewed pertinent labs  Results from last 7 days   Lab Units 21  0617 21  0515 21  0609   POTASSIUM mmol/L 5 1 4 2 4 4   CHLORIDE mmol/L 106 104 106   CO2 mmol/L 25 25 26   BUN mg/dL 26* 21 22   CREATININE mg/dL 1 67* 1 72* 1 62*   EGFR ml/min/1 73sq m 39 38 40   CALCIUM mg/dL 7 8* 7 4* 7 8*   AST U/L 30  --   --    ALT U/L 45  --   --    ALK PHOS U/L 70  --   --      Results from last 7 days   Lab Units 21  0617 21  1553 21  0515   WBC Thousand/uL 10 56* 14 78* 13 55*   HEMOGLOBIN g/dL 7 3* 8 1* 6 6*   PLATELETS Thousands/uL 163 143* 157     Results from last 7 days   Lab Units 21  1829 21  1731   BLOOD CULTURE  No Growth After 5 Days  No Growth After 5 Days  Imaging Studies:    CT abd/pelv: known cystitis   Bilateral perinephric and periureteral fat stranding, nonspecific, although slightly increased on the right since 4/24/2021  Correlate for possibility of ascending urinary tract infection  I have personally reviewed pertinent imaging study reports

## 2021-08-26 NOTE — ASSESSMENT & PLAN NOTE
Status post radiation, had radiation cystitis  Status post bilateral nephrostomy tubes due to recurrent hematuria,capped on admission  Urology following, appreciate input  PCN tube connected to bag yesterday    Draining slight cloudy yellow urine

## 2021-08-26 NOTE — ASSESSMENT & PLAN NOTE
Secondary to hematuria with mild blood clots and radiation cystitis  Patient is status post Sumner catheter and was on CBI  Appreciate urology input  · Patient is status post cystoscopy, evacuation of clots, left bladder floor biopsy, extensive fulguration left bladder floor left lateral wall, right retrograde pyelogram, right ureteroscopy biopsy suspicious distal ureteral lesion laser fulguration bleeding distal ureteral, right ureteral stent placement on 8/22  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, left trigone bladder floor lesion, right radiation ureteritis, right ureteral lesion with bleeding  · Sumner catheter still maintain, blood-tinged urine in tubing    · Sumner management per Urology

## 2021-08-26 NOTE — ASSESSMENT & PLAN NOTE
Lab Results   Component Value Date    EGFR 39 08/26/2021    EGFR 38 08/25/2021    EGFR 40 08/24/2021    CREATININE 1 67 (H) 08/26/2021    CREATININE 1 72 (H) 08/25/2021    CREATININE 1 62 (H) 08/24/2021   Baseline creatinine appears to be around 1 4-1 5's  Creatinine 1 57 on admission  Creatinine peaked to 1 97, trending down   Renal and bladder ultrasound no evidence of hydro  Avoid nephrotoxins and hypotension  Blood transfusion as needed to keep hemoglobin above 7  Appreciate nephrology input  · Likely multifactorial secondary to obstructive uropathy, pre renal, sepsis  · Received IV fluids as per Nephrology  · Repeat BMP in a m

## 2021-08-26 NOTE — ASSESSMENT & PLAN NOTE
Likely due to cystitis, left trigone bladder floor lesion, right radiation ureteritis/right ureteral lesion/right ureteral bleeding per OR findings  Patient was recently treated for UTI  Blood-tinged urine along the Sumner tubing today    Sumner management per Urology

## 2021-08-26 NOTE — PROGRESS NOTES
Progress Note - Urology      Patient: Humble Sanchez   : 1945 Sex: male   MRN: [id number]     CSN: [id number]  Unit/Bed#: [address] [place] [address]     SUBJECTIVE:   No complaints  Urine clear      Objective   Vitals: /63 (BP Location: Left arm)   Pulse 64   Temp 98 2 °F (36 8 °C) (Temporal)   Resp 17   Ht 5' 9" (1 753 m)   Wt 97 5 kg (215 lb)   SpO2 97%   BMI 31 75 kg/m²     I/O last 24 hours: In: 1020 [I V :1000;  Other:20]  Out: 1950 [YUHRE:2613]      Physical Exam:   General Alert awake   Normocephalic atraumatic PERRLA  Lungs clear bilaterally  Cardiac normal S1 normal S2  Abdomen soft, flank pain  Extremities no edema      Lab Results: CBC:   Lab Results   Component Value Date    WBC 10 56 (H) 2021    HGB 7 3 (L) 2021    HCT 24 8 (L) 2021    MCV 82 2021     2021    MCH 24 2 (L) 2021    MCHC 29 4 (L) 2021    RDW 19 8 (H) 2021    MPV 10 2 2021    NRBC 0 2021     CMP:   Lab Results   Component Value Date     2021    CO2 25 2021    BUN 26 (H) 2021    CREATININE 1 67 (H) 2021    CALCIUM 7 8 (L) 2021    AST 30 2021    ALT 45 2021    ALKPHOS 70 2021    EGFR 39 2021     Urinalysis:   Lab Results   Component Value Date    COLORU Yellow 2021    CLARITYU Cloudy 2021    SPECGRAV 1 020 2021    PHUR 5 5 2021    PHUR 5 5 2018    LEUKOCYTESUR Moderate (A) 2021    NITRITE Negative 2021    GLUCOSEU Negative 2021    KETONESU Negative 2021    BILIRUBINUR Negative 2021    BLOODU Large (A) 2021     Urine Culture:   Lab Results   Component Value Date    URINECX >100,000 cfu/ml Klebsiella pneumoniae (A) 2021    URINECX >100,000 cfu/ml Enterococcus faecalis (A) 2021     PSA: No results found for: PSA      Assessment/ Plan:  Urine clear  Awaiting cultures from nephrostomy tubes sent last night  Feeling better  DC Sumner voiding trial  Continue Celestino Harrell MD

## 2021-08-26 NOTE — ASSESSMENT & PLAN NOTE
Continue on Breo and fluticasone  Diffuse mild wheeze on auscultation yesterday  Chest x-ray NAD on 8/20  Repeat chest x-ray 8/25 showed - Mild bibasilar atelectasis  Continue albuterol , change to p r n  Per patient request  Started on low-dose Solu-Medrol 20 q 8 hours 8/25, decrease to q 12 hours today  Consider DC in AM if continues to improve  Patient is satting low 90s on room air today  Supplemental oxygen maintain sats above 90%

## 2021-08-26 NOTE — PROGRESS NOTES
Progress Note - Nephrology   Ambre Swain 66 y o  male MRN: [id number]  Unit/Bed#: [address] [place] [address] Encounter: [id number]    ASSESSMENT AND PLAN:  57-year-old male with history of prostate cancer status post radiation therapy/radiation cystitis of the recurrent episodes of hematuria and iron deficiency anemia from iron loss/COPD/hypertension/dyslipidemia/CKD stage 3 who was admitted with pelvic pain and urinary retention  Negro Richardson has bilateral nephrostomy tubes   We are seeing him for acute kidney injury on CKD stage 3:     1 AK I:  Etiology:  Obstructive uropathy?  Prerenal/possible sepsis  · Current creatinine:  1 67 slightly better and essentially at baseline  · Peak creatinine:  1 97 as of 08/20/2021  · UA:  Large occult blood/no proteinuria/innumerable RBCs  · Renal imaging :  Ultrasound 08/20/2021:  No evidence of hydronephrosis at that time  · Status post cystoscopy on 08/22/2021:  Extensive fulguration left bladder floor left lateral wall right retrograde pyelogram right ureteroscopy biopsy for suspicious distal lesion in laser fulguration of bleeding distal ureter and suspicious lesions stent placed  · Chest x-ray without any overt CHF  Treatment:  · Hep-Lock IV fluid  · Follow-up with Urology/both PCNs are opened  · Maintain MAP:  Over 65 mmHg if possible/avoid hypoperfusion:  Hold parameters on blood pressure medications  · Avoid nephrotoxic agents such as NSAIDs, and IV contrast if possible     2  CKD:  3B  · Baseline creatinine:  1 3-1 6  · Etiology:  Chronic obstructive uropathy/hypertensive nephrosclerosis/arteriolar nephrosclerosis     3 Volume/hypertension:  · Volume:  Hep-Lock  · Blood pressure:  No significant low blood pressures in general     4 Electrolytes:  · All acceptable:  Borderline high potassium will be monitored a low-potassium diet        5  MBD of CKD/AK I:  · Magnesium now repleted 2 1     6   Anemia:  Secondary to urinary bleeding and CKD/chronic disease  · Current hemoglobin:  7 3 improved but slightly down compared to last evening  · For completeness check multiple myeloma evaluation given CKD and anemia:  · SPEP IGG MONOCLONAL ABNORMALITY ON IMMUNOFIXATION  · LIGHT CHAIN RATIO NORMAL  · UPEP PENDING  RECOMMEND:  OUTPATIENT HEMATOLOGY CONSULTATION TO RULE OUT PLASMA CELL DYSCRASIA  · Oral iron given infection  Treatment:  · Transfuse for hemoglobin less than 7 0 per primary service       7  Major reason for hospitalization:  ? Urinary retention with sepsis being treated by Infectious Disease with cystitis PATIENT HAS RADIATION CYSTITIS AND RADIATION URETERITIS AND RIGHT STENT PER UROLOGY  ? Ongoing sepsis from cystitis/UTI with ongoing fevers to be followed closely by Infectious Disease     8  Other problems:  ? Prostate CA status post XRT  ? Rheumatoid arthritis  ? COPD     Discussed with SLIM regarding hep locking IV fluids and monitor      Subjective: The patient is asymptomatic  No chest pain or shortness of breath  No nausea vomiting or diarrhea  Patient has a Sumner catheter with clearing urine less hematuria; both PCN tubes are opened    Objective:     Vitals: Blood pressure 135/63, pulse 64, temperature 98 2 °F (36 8 °C), temperature source Temporal, resp  rate 17, height 5' 9" (1 753 m), weight 97 5 kg (215 lb), SpO2 97 %  ,Body mass index is 31 75 kg/m²  Weight (last 2 days)     Date/Time   Weight    08/24/21 0600   97 5 (215)                Intake/Output Summary (Last 24 hours) at 8/26/2021 1004  Last data filed at 8/26/2021 0913  Gross per 24 hour   Intake 1020 ml   Output 1300 ml   Net -280 ml       Urethral Catheter Latex 24 Fr   (Active)   Reasons to continue Urinary Catheter  Acute urinary retention/obstruction failing urinary retention protocol 08/26/21 0917   Goal for Removal Will consult urology 08/26/21 0917   Site Assessment Clean;Skin intact 08/26/21 0917   Sumner Care Done 08/26/21 0917   Collection Container Standard drainage bag 08/26/21 0917   Securement Method Securing device (Describe) 08/26/21 0917   Output (mL) 600 mL 08/26/21 0617       Physical Exam: General:  No acute distress  Skin:  No acute rash  Eyes:  No scleral icterus and noninjected  ENT:  Moist mucous membranes  Neck:  Supple, no jugular venous distention, trachea midline, overall appearance is normal  Chest:  Clear to auscultation, some very minimal decreased breath sounds at the right base  CVS:  Regular rate and rhythm, without a rub or gallops  Abdomen:  Normal bowel sounds, soft and nontender and nondistended  Extremities:  No edema, and no cyanosis, no significant arthritic changes  Neuro:  No gross focality  Psych:  Alert and oriented and appropriate                Medications:    Scheduled Meds:  Current Facility-Administered Medications   Medication Dose Route Frequency Provider Last Rate    acetaminophen  650 mg Oral Q4H PRN Ramírez Rivera MD      albuterol  2 puff Inhalation Q4H PRN CORWIN Florenitno      albuterol  2 5 mg Nebulization TID CORWIN Florentino      atorvastatin  20 mg Oral HS Ramírez Rivera MD      belladonna-opium  30 mg Rectal Q8H PRN Ramírez Rivera MD      cyanocobalamin  500 mcg Oral Daily Ramírez Rivera MD      ferrous sulfate  325 mg Oral Daily With Breakfast Ramírez Rivera MD      fluticasone  1 spray Nasal Daily Ramírez Rivera MD      fluticasone-vilanterol  1 puff Inhalation HS Ramírez Rivera MD      loratadine  10 mg Oral Daily Ramírez Rivera MD      methylPREDNISolone sodium succinate  20 mg Intravenous Q8H [address] CORWIN Braden      metoprolol tartrate  12 5 mg Oral Q12H [address] Ramírez Rivera MD      multivitamin-minerals  1 tablet Oral Daily Ramírez Rivera MD      piperacillin-tazobactam  3 375 g Intravenous Q8H Panchito Arevalo DO 3 375 g (08/26/21 0743)    saccharomyces boulardii  250 mg Oral BID Ramírez Rivera MD      sodium chloride  100 mL/hr Intravenous Continuous Tino Burt  mL/hr (08/26/21 0913)       PRN Meds:   acetaminophen    albuterol    belladonna-opium    Continuous Infusions:sodium chloride, 100 mL/hr, Last Rate: 100 mL/hr (08/26/21 0913)        Lab, Imaging and other studies: I have personally reviewed pertinent labs  Laboratory Results:  Results from last 7 days   Lab Units 08/26/21  0617 08/25/21  1553 08/25/21  0515 08/24/21  2204 08/24/21  0609 08/23/21  0456 08/22/21  1830 08/22/21  1017 08/22/21  0539 08/21/21  0521 08/20/21  0532   WBC Thousand/uL 10 56* 14 78* 13 55*  --  10 41* 9 67  --   --  6 50 7 37 10 63*   HEMOGLOBIN g/dL 7 3* 8 1* 6 6* 7 0* 7 9* 8 1* 8 7* 7 6* 7 3* 7 8* 7 2*   HEMATOCRIT % 24 8* 28 6* 21 9*  --  26 5* 27 9* 29 9* 26 4* 24 8* 25 7* 24 6*   PLATELETS Thousands/uL 163 143* 157  --  143* 164  --   --  152 151 175   POTASSIUM mmol/L 5 1  --  4 2  --  4 4 4 2  --   --  4 3 3 8 3 8   CHLORIDE mmol/L 106  --  104  --  106 104  --   --  109* 105 102   CO2 mmol/L 25  --  25  --  26 23  --   --  24 25 25   BUN mg/dL 26*  --  21  --  22 24  --   --  25 38* 43*   CREATININE mg/dL 1 67*  --  1 72*  --  1 62* 1 90*  --   --  1 43* 1 69* 1 97*   CALCIUM mg/dL 7 8*  --  7 4*  --  7 8* 8 2*  --   --  8 0* 7 4* 7 7*   MAGNESIUM mg/dL 2 1  --   --   --  2 0 1 7  --   --  2 0 1 9  --      Urinalysis:   Lab Results   Component Value Date    COLORU Yellow 08/25/2021    CLARITYU Cloudy 08/25/2021    SPECGRAV 1 020 08/25/2021    PHUR 5 5 08/25/2021    PHUR 5 5 03/25/2018    LEUKOCYTESUR Moderate (A) 08/25/2021    NITRITE Negative 08/25/2021    GLUCOSEU Negative 08/25/2021    KETONESU Negative 08/25/2021    BILIRUBINUR Negative 08/25/2021    BLOODU Large (A) 08/25/2021     ABGs: No results found for: [facility]  Radiology review:     Portions of the record may have been created with voice recognition software  Occasional wrong word or "sound a like" substitutions may have occurred due to the inherent limitations of voice recognition software    Read the chart carefully and recognize, using context, where substitutions have occurred

## 2021-08-27 ENCOUNTER — TELEPHONE (OUTPATIENT)
Dept: NEPHROLOGY | Facility: CLINIC | Age: 76
End: 2021-08-27

## 2021-08-27 VITALS
RESPIRATION RATE: 19 BRPM | BODY MASS INDEX: 31.84 KG/M2 | HEIGHT: 69 IN | DIASTOLIC BLOOD PRESSURE: 66 MMHG | WEIGHT: 215 LBS | OXYGEN SATURATION: 92 % | TEMPERATURE: 97.6 F | HEART RATE: 71 BPM | SYSTOLIC BLOOD PRESSURE: 137 MMHG

## 2021-08-27 DIAGNOSIS — N17.9 AKI (ACUTE KIDNEY INJURY) (HCC): Primary | ICD-10-CM

## 2021-08-27 LAB
ANION GAP SERPL CALCULATED.3IONS-SCNC: 9 MMOL/L (ref 4–13)
BASOPHILS # BLD AUTO: 0.02 THOUSANDS/ΜL (ref 0–0.1)
BASOPHILS NFR BLD AUTO: 0 % (ref 0–1)
BUN SERPL-MCNC: 26 MG/DL (ref 5–25)
CALCIUM SERPL-MCNC: 8 MG/DL (ref 8.3–10.1)
CHLORIDE SERPL-SCNC: 107 MMOL/L (ref 100–108)
CO2 SERPL-SCNC: 21 MMOL/L (ref 21–32)
CREAT SERPL-MCNC: 1.55 MG/DL (ref 0.6–1.3)
EOSINOPHIL # BLD AUTO: 0 THOUSAND/ΜL (ref 0–0.61)
EOSINOPHIL NFR BLD AUTO: 0 % (ref 0–6)
ERYTHROCYTE [DISTWIDTH] IN BLOOD BY AUTOMATED COUNT: 19.8 % (ref 11.6–15.1)
GFR SERPL CREATININE-BSD FRML MDRD: 43 ML/MIN/1.73SQ M
GLUCOSE SERPL-MCNC: 182 MG/DL (ref 65–140)
HCT VFR BLD AUTO: 24.2 % (ref 36.5–49.3)
HGB BLD-MCNC: 7.3 G/DL (ref 12–17)
IMM GRANULOCYTES # BLD AUTO: 0.3 THOUSAND/UL (ref 0–0.2)
IMM GRANULOCYTES NFR BLD AUTO: 2 % (ref 0–2)
LYMPHOCYTES # BLD AUTO: 0.98 THOUSANDS/ΜL (ref 0.6–4.47)
LYMPHOCYTES NFR BLD AUTO: 7 % (ref 14–44)
MCH RBC QN AUTO: 24.7 PG (ref 26.8–34.3)
MCHC RBC AUTO-ENTMCNC: 30.2 G/DL (ref 31.4–37.4)
MCV RBC AUTO: 82 FL (ref 82–98)
MONOCYTES # BLD AUTO: 0.54 THOUSAND/ΜL (ref 0.17–1.22)
MONOCYTES NFR BLD AUTO: 4 % (ref 4–12)
NEUTROPHILS # BLD AUTO: 11.81 THOUSANDS/ΜL (ref 1.85–7.62)
NEUTS SEG NFR BLD AUTO: 87 % (ref 43–75)
NRBC BLD AUTO-RTO: 0 /100 WBCS
PLATELET # BLD AUTO: 199 THOUSANDS/UL (ref 149–390)
PMV BLD AUTO: 10.1 FL (ref 8.9–12.7)
POTASSIUM SERPL-SCNC: 5 MMOL/L (ref 3.5–5.3)
PROCALCITONIN SERPL-MCNC: 0.08 NG/ML
RBC # BLD AUTO: 2.96 MILLION/UL (ref 3.88–5.62)
SODIUM SERPL-SCNC: 137 MMOL/L (ref 136–145)
WBC # BLD AUTO: 13.65 THOUSAND/UL (ref 4.31–10.16)

## 2021-08-27 PROCEDURE — 99232 SBSQ HOSP IP/OBS MODERATE 35: CPT | Performed by: INTERNAL MEDICINE

## 2021-08-27 PROCEDURE — 99239 HOSP IP/OBS DSCHRG MGMT >30: CPT | Performed by: NURSE PRACTITIONER

## 2021-08-27 PROCEDURE — 99231 SBSQ HOSP IP/OBS SF/LOW 25: CPT | Performed by: INTERNAL MEDICINE

## 2021-08-27 PROCEDURE — 84145 PROCALCITONIN (PCT): CPT | Performed by: NURSE PRACTITIONER

## 2021-08-27 PROCEDURE — 97110 THERAPEUTIC EXERCISES: CPT

## 2021-08-27 PROCEDURE — 80048 BASIC METABOLIC PNL TOTAL CA: CPT | Performed by: INTERNAL MEDICINE

## 2021-08-27 PROCEDURE — 85025 COMPLETE CBC W/AUTO DIFF WBC: CPT | Performed by: INTERNAL MEDICINE

## 2021-08-27 RX ORDER — CEFPODOXIME PROXETIL 200 MG/1
200 TABLET, FILM COATED ORAL 2 TIMES DAILY
Qty: 8 TABLET | Refills: 0 | Status: SHIPPED | OUTPATIENT
Start: 2021-08-27 | End: 2021-08-31

## 2021-08-27 RX ORDER — SACCHAROMYCES BOULARDII 250 MG
250 CAPSULE ORAL 2 TIMES DAILY
Qty: 8 CAPSULE | Refills: 0 | Status: SHIPPED | OUTPATIENT
Start: 2021-08-27 | End: 2021-08-31

## 2021-08-27 RX ADMIN — LORATADINE 10 MG: 10 TABLET ORAL at 08:02

## 2021-08-27 RX ADMIN — ALBUTEROL SULFATE 2 PUFF: 90 AEROSOL, METERED RESPIRATORY (INHALATION) at 08:06

## 2021-08-27 RX ADMIN — CYANOCOBALAMIN TAB 500 MCG 500 MCG: 500 TAB at 08:02

## 2021-08-27 RX ADMIN — FERROUS SULFATE TAB 325 MG (65 MG ELEMENTAL FE) 325 MG: 325 (65 FE) TAB at 08:02

## 2021-08-27 RX ADMIN — Medication 250 MG: at 08:05

## 2021-08-27 RX ADMIN — PIPERACILLIN AND TAZOBACTAM 3.38 G: 36; 4.5 INJECTION, POWDER, FOR SOLUTION INTRAVENOUS at 13:15

## 2021-08-27 RX ADMIN — Medication 12.5 MG: at 08:02

## 2021-08-27 RX ADMIN — METHYLPREDNISOLONE SODIUM SUCCINATE 20 MG: 40 INJECTION, POWDER, FOR SOLUTION INTRAMUSCULAR; INTRAVENOUS at 08:02

## 2021-08-27 RX ADMIN — Medication 250 MG: at 17:54

## 2021-08-27 RX ADMIN — PIPERACILLIN AND TAZOBACTAM 3.38 G: 36; 4.5 INJECTION, POWDER, FOR SOLUTION INTRAVENOUS at 05:06

## 2021-08-27 NOTE — DISCHARGE SUMMARY
Armando 43  Discharge- Charbel Dickinson 1945, 66 y o  male MRN: [id number]  Unit/Bed#: [address] [place] [address] Encounter: [id number]  Primary Care Provider: Bridgette Squires MD   Date and time admitted to hospital: 8/17/2021 12:20 PM    * Urinary retention  Assessment & Plan  Secondary to hematuria with mild blood clots and radiation cystitis  Patient is status post Sumner catheter and was on CBI  Appreciate urology input  · Patient is status post cystoscopy, evacuation of clots, left bladder floor biopsy, extensive fulguration left bladder floor left lateral wall, right retrograde pyelogram, right ureteroscopy biopsy suspicious distal ureteral lesion laser fulguration bleeding distal ureteral, right ureteral stent placement on 8/22  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, left trigone bladder floor lesion, right radiation ureteritis, right ureteral lesion with bleeding  · Patient underwent removal of Sumner catheter, successful voiding trial   Patient also has bilateral percutaneous nephrostomy tubes in place, 2 drainage bags  · Urine clear  · Patient has follow-up appointment with Urology next week  · Patient will be discharged home today  Hematuria  Assessment & Plan  Likely due to cystitis, left trigone bladder floor lesion, right radiation ureteritis/right ureteral lesion/right ureteral bleeding per OR findings  Patient was recently treated for UTI  Hematuria resolves, Sumner catheter removed as noted above  Bilateral percutaneous nephrostomy tubes in place draining clear yellow urine  Patient to follow-up with outpatient Urology next week  Discharged patient to home today      Acute kidney injury superimposed on CKD [facility])  Assessment & Plan  Lab Results   Component Value Date    EGFR 43 08/27/2021    EGFR 39 08/26/2021    EGFR 38 08/25/2021    CREATININE 1 55 (H) 08/27/2021    CREATININE 1 67 (H) 08/26/2021    CREATININE 1 72 (H) 08/25/2021   Baseline creatinine appears to be around 1 4-1 5's  Creatinine 1 57 on admission  Creatinine peaked to 1 97, trending down   Renal and bladder ultrasound no evidence of hydro  Avoid nephrotoxins and hypotension  Blood transfusion as needed to keep hemoglobin above 7  Appreciate nephrology input  · Likely multifactorial secondary to obstructive uropathy, pre renal, sepsis  · Stabilized  · Discussed with Nephrology okay for patient to be discharged home today  · Repeat BMP in 1 week  · Will follow-up with outpatient nephrology  · Discussed with patient      Sepsis [facility])  Assessment & Plan  Evolving since admission, as evidenced by fever, leukocytosis, lactic acidosis, with source of infection likely complicated UTI with concern for bilateral pyelonephritis in the setting of urinary retention  Fever 103 6 8/19, WBC 14, lactic acid 2 5   Lactic acid normalized with IV fluid boluses  UA 8/19 showed trace leukocytes, large blood  Patient did receive 1 dose of IV Rocephin on 8/17  Urine culture not sent  Blood cultures after 5 days  Procalcitonin negative x 2  Urine culture on 7/30/2021 showed Enterococcus and Klebsiella pneumoniae  Renal ultrasound unremarkable  CT abdomen pelvis 8/25 due to persistent fever showed - Cystitis; Bilateral perinephric and periureteral fat stranding, nonspecific, although slightly increased on the right since 4/24/2021  Correlate for possibility of ascending urinary tract infection;  Slight increase in size of retroperitoneal lymph nodes measuring up to 1 2 cm, possibly reactive  Attention on follow-up imaging is recommended  Appreciate ID input  · Changed antibiotic to IV Unasyn, then transitioned to p o  Augmentin 500 mg p o , restarted IV Zosyn 8/25 due to persistent high fever, leukocytosis  · UA from bilateral PCN tube are concerning for infection, urine culture pending     · Discussed with ID, patient is being transition to cefopodoxime 200 mg q 12 hours through 8/31  · Discussed with ID okay for patient to be discharged home today  · Follow-up with CBC in 1 week  · Patient will follow-up with his primary care physician 1-2 weeks post discharge  · Patient instructed on signs and symptoms of infection to watch for          Results from last 7 days   Lab Units 08/27/21  0652 08/26/21  0617 08/22/21  0539 08/21/21  0521   PROCALCITONIN ng/ml 0 08 0 19 0 08 0 14     Results from last 7 days   Lab Units 08/27/21  0519 08/26/21  0617 08/25/21  1553 08/25/21  0515 08/24/21  0609 08/23/21  0456 08/22/21  0539 08/21/21  0521   WBC Thousand/uL 13 65* 10 56* 14 78* 13 55* 10 41* 9 67 6 50 7 37              Anemia  Assessment & Plan  Baseline hemoglobin appears to be around 8-9's likely due to iron deficiency  Patient on iron supplements at home  Hemoglobin 9 2 on admission  Trending down due to hematuria  Hemoglobin trended down to 6 8 8/20, received 1 unit RBC  Patient received 1 unit PRBC on 08/23 due to soft BP despite Hb 8 1  Hemoglobin 6 6 8/25  Patient received 1 unit of RBC  Continue iron supplement, B12, MVI from home  Repeat CBC in 1 week  He will follow-up with his outpatient nephrology and PCP 1-2 weeks post discharge    Prostate cancer [facility])  Assessment & Plan  Status post radiation, had radiation cystitis  Status post bilateral nephrostomy tubes due to recurrent hematuria,capped on admission  Urology following, appreciate input  Percutaneous bilateral nephrostomy tubes present, draining clear yellow urine  Patient will be following with his outpatient PCP 1-2 weeks post discharge  Follow-up with urology next week outpatient  Patient verbalized understanding    Chronic obstructive pulmonary disease with acute exacerbation (HCC)  Assessment & Plan  Continue on Breo and fluticasone  Diffuse mild wheeze on auscultation yesterday  Chest x-ray NAD on 8/20  Repeat chest x-ray 8/25 showed - Mild bibasilar atelectasis  Continue albuterol , change to p r n   Per patient request  Patient had been on low-dose Solu-Medrol, no wheezing appreciated on exam   Discontinued  Patient is satting low 90s to mid 90s on room air today  Follow-up with PCP 1-2 weeks post discharge    RA (rheumatoid arthritis) [facility])  Assessment & Plan  Continue supportive care  Follow-up outpatient PCP        Medical Problems     Resolved Problems  Date Reviewed: 8/27/2021    None              Discharging Physician / Practitioner: CORWIN Valdez  PCP: Beatrice Anna MD  Admission Date:   Admission Orders (From admission, onward)     Ordered        08/18/21 0939  Inpatient Admission  Once         08/17/21 1352  Place in Observation  Once                   Discharge Date: 08/27/21    Consultations During Hospital Stay:  · Infectious disease  · Nephrology  · Urology    Procedures Performed:   · Cystoscopy with clot evacuation and bladder floor biopsy on 08/22  · Sumner catheter removed 8/26 with voiding trial, percutaneous nephrostomies open for drainage  Significant Findings / Test Results:   · Chest x-ray performed 8/20 showed no acute cardiopulmonary disease as per radiology report  · Ultrasound of kidney and bladder performed on 08/20 showed no evidence of hydronephrosis, Sumner catheter noted as per radiology report  · Chest x-ray performed on 08/25 showed mild bibasilar atelectasis as per radiology report  · CT of abdomen and pelvis performed on 08/25 showed bladder collapsed around Sumner catheter with diffuse wall thickening compatible with known cystitis, bilateral perinephric and periureteral fat stranding, nonspecific although slightly increased on right since 04/24/2021, correlate for possibility of ascending urinary tract infection, slight increase in size of retroperitoneal lymph nodes measuring up to 1 2 cm, possibly reactive, attention on follow-up imaging is recommended as per radiology  Incidental Findings:   · See above    Test Results Pending at Discharge (will require follow up):    · Final cultures of urine     Outpatient Tests Requested:  · Repeat BMP and CBC in 1 week    Complications:  Sepsis    Reason for Admission:  Urinary retention    Hospital Course:   Jaems Araujo is a 66 y o  male patient past medical history significant for prostate cancer status post radiation and radiation cystitis with recurrent episodes that hematuria, iron deficiency anemia, CKD stage 3, Chronic obstrucive pulmonary disease, hypertension and hyperlipidemia who originally presented to the hospital on 8/17/2021 due to episode of hematuria and pelvic pain, urinary retention  Patient indicated he had recently been discharged after having a similar episode hematuria and pelvic pain  He had been doing relatively well until evening prior to admission when he had a coughing spell followed by pain in his pelvis and urinary retention after that  Patient presented to the emergency department for further evaluation and treatment  He was noted to have bilateral nephrostomy tubes that had been clamped  He denied any fevers or chills, no back pain  Vital signs were stable, lab work also stable  Sumner catheter was placed and CBI was initiated as patient had gross hematuria  Urology was consulted, patient underwent cystoscopy with stent placement  He continued with hematuria, and subsequently during hospitalization developed leukocytosis and fevers  Id was consulted, patient was placed on Zosyn IV, and ultimately transition to cefopodoxime 200 mg p o  Q 12 hours through 8/31 as per discussion with ID  Patient did experience acute kidney injury during hospitalization also, and Nephrology was consulted  Creatinine down to 1 55 prior to discharge  Patient had mildly elevated potassium, recommending low-potassium diet at home  He is to have follow-up BMP and CBC in 1 week  He is to follow with outpatient nephrology 1-2 weeks post discharge  He is also to follow-up with outpatient urology in 1 week    He is also advised to follow up with his primary care physician 1-2 weeks post discharge  Medications were E scribed to the patient's pharmacy for pickup  He will be discharged to home today  This was discussed with the patient at the bedside, he verbalized understanding and is agreeable to the plan  Please see above list of diagnoses and related plan for additional information  Condition at Discharge: good    Discharge Day Visit / Exam:   Subjective:  Patient seen sitting up in bed resting comfortably  Reports that he is feeling overall much improved and is hopeful to be going home today  Did discuss with patient needed to talk with specialist prior to discharge, was able to report back to him that specialists are okay with patient being discharged with lab work in 1 week and follow-up visits in the next 1-2 weeks  Reports a good appetite, has  bilateral nephrostomy tubes draining clear yellow urine and is also able to urinate without any hematuria or pain  Reports good appetite, no nausea or vomiting  Vitals: Blood Pressure: 137/66 (08/27/21 1557)  Pulse: 71 (08/27/21 1557)  Temperature: 97 6 °F (36 4 °C) (08/27/21 1557)  Temp Source: Oral (08/27/21 0800)  Respirations: 19 (08/27/21 0800)  Height: 5' 9" (175 3 cm) (08/17/21 2300)  Weight - Scale: 97 5 kg (215 lb) (08/27/21 0600)  SpO2: 92 % (08/27/21 1557)  Exam:   Physical Exam  Vitals and nursing note reviewed  Constitutional:       General: He is not in acute distress  Appearance: Normal appearance  HENT:      Head: Normocephalic  Nose: Nose normal       Mouth/Throat:      Mouth: Mucous membranes are moist    Eyes:      Extraocular Movements: Extraocular movements intact  Pupils: Pupils are equal, round, and reactive to light  Cardiovascular:      Rate and Rhythm: Normal rate and regular rhythm  Pulses: Normal pulses  Heart sounds: Normal heart sounds     Pulmonary:      Effort: Pulmonary effort is normal       Breath sounds: Normal breath sounds  No wheezing  Abdominal:      General: Bowel sounds are normal  There is no distension  Palpations: Abdomen is soft  Tenderness: There is no abdominal tenderness  Genitourinary:     Comments: Bilateral percutaneous nephrostomy tubes present, draining clear yellow urine  Sumner catheter out, patient reports voiding  Musculoskeletal:         General: No swelling  Normal range of motion  Cervical back: Normal range of motion  Skin:     General: Skin is warm and dry  Capillary Refill: Capillary refill takes less than 2 seconds  Neurological:      General: No focal deficit present  Mental Status: He is alert and oriented to person, place, and time  Psychiatric:         Mood and Affect: Mood normal          Behavior: Behavior normal          Thought Content: Thought content normal          Judgment: Judgment normal          Discussion with Family: Patient declined call to   Discharge instructions/Information to patient and family:   See after visit summary for information provided to patient and family  Provisions for Follow-Up Care:  See after visit summary for information related to follow-up care and any pertinent home health orders  Disposition:   Home    Planned Readmission:  Now     Discharge Statement:  I spent greater than 30 minutes discharging the patient  This time was spent on the day of discharge  I had direct contact with the patient on the day of discharge  Greater than 50% of the total time was spent examining patient, answering all patient questions, arranging and discussing plan of care with patient as well as directly providing post-discharge instructions  Additional time then spent on discharge activities  Discharge Medications:  See after visit summary for reconciled discharge medications provided to patient and/or family        **Please Note: This note may have been constructed using a voice recognition system**

## 2021-08-27 NOTE — PLAN OF CARE
Problem: MOBILITY - ADULT  Goal: Maintain or return to baseline ADL function  Description: INTERVENTIONS:  -  Assess patient's ability to carry out ADLs; assess patient's baseline for ADL function and identify physical deficits which impact ability to perform ADLs (bathing, care of mouth/teeth, toileting, grooming, dressing, etc )  - Assess/evaluate cause of self-care deficits   - Assess range of motion  - Assess patient's mobility; develop plan if impaired  - Assess patient's need for assistive devices and provide as appropriate  - Encourage maximum independence but intervene and supervise when necessary  - Involve family in performance of ADLs  - Assess for home care needs following discharge   - Consider OT consult to assist with ADL evaluation and planning for discharge  - Provide patient education as appropriate  8/27/2021 1836 by Adarsh Guadalupe RN  Outcome: Completed  8/27/2021 1141 by Adarsh Guadalupe RN  Outcome: Progressing  Goal: Maintains/Returns to pre admission functional level  Description: INTERVENTIONS:  - Perform BMAT or MOVE assessment daily    - Set and communicate daily mobility goal to care team and patient/family/caregiver  - Collaborate with rehabilitation services on mobility goals if consulted  - Perform Range of Motion 3 times a day  - Reposition patient every 2 hours    - Dangle patient 3 times a day  - Stand patient 3 times a day  - Ambulate patient 3 times a day  - Out of bed to chair 3 times a day   - Out of bed for meals 3 times a day  - Out of bed for toileting  - Record patient progress and toleration of activity level   8/27/2021 1836 by Adarsh Guadalupe RN  Outcome: Completed  8/27/2021 1141 by Adarsh Guadalupe RN  Outcome: Progressing     Problem: GENITOURINARY - ADULT  Goal: Maintains or returns to baseline urinary function  Description: INTERVENTIONS:  - Assess urinary function  - Encourage oral fluids to ensure adequate hydration if ordered  - Administer IV fluids as ordered to ensure adequate hydration  - Administer ordered medications as needed  - Offer frequent toileting  - Follow urinary retention protocol if ordered  8/27/2021 1836 by Phillip Chacko RN  Outcome: Completed  8/27/2021 1141 by Phillip Chacko RN  Outcome: Progressing  Goal: Absence of urinary retention  Description: INTERVENTIONS:  - Assess patients ability to void and empty bladder  - Monitor I/O  - Bladder scan as needed  - Discuss with physician/AP medications to alleviate retention as needed  - Discuss catheterization for long term situations as appropriate  8/27/2021 1836 by Phillip Chacko RN  Outcome: Completed  8/27/2021 1141 by Phillip Chacko RN  Outcome: Progressing  Goal: Urinary catheter remains patent  Description: INTERVENTIONS:  - Assess patency of urinary catheter  - If patient has a chronic alanis, consider changing catheter if non-functioning  - Follow guidelines for intermittent irrigation of non-functioning urinary catheter  8/27/2021 1836 by Phillip Chacko RN  Outcome: Completed  8/27/2021 1141 by Phillip Chacko RN  Outcome: Progressing     Problem: Potential for Falls  Goal: Patient will remain free of falls  Description: INTERVENTIONS:  - Educate patient/family on patient safety including physical limitations  - Instruct patient to call for assistance with activity   - Consult OT/PT to assist with strengthening/mobility   - Keep Call bell within reach  - Keep bed low and locked with side rails adjusted as appropriate  - Keep care items and personal belongings within reach  - Initiate and maintain comfort rounds  - Make Fall Risk Sign visible to staff  - Offer Toileting every 2 Hours, in advance of need  - Initiate/Maintain bed alarm  - Obtain necessary fall risk management equipment: call bell   - Apply yellow socks and bracelet for high fall risk patients  - Consider moving patient to room near nurses station  8/27/2021 1836 by Phillip Chacko RN  Outcome: Completed  8/27/2021 1141 by Martin Raman RN  Outcome: Progressing     Problem: RESPIRATORY - ADULT  Goal: Achieves optimal ventilation and oxygenation  Description: INTERVENTIONS:  - Assess for changes in respiratory status  - Assess for changes in mentation and behavior  - Position to facilitate oxygenation and minimize respiratory effort  - Oxygen administered by appropriate delivery if ordered  - Initiate smoking cessation education as indicated  - Encourage broncho-pulmonary hygiene including cough, deep breathe, Incentive Spirometry  - Assess the need for suctioning and aspirate as needed  - Assess and instruct to report SOB or any respiratory difficulty  - Respiratory Therapy support as indicated  8/27/2021 1836 by Martin Raman RN  Outcome: Completed  8/27/2021 1141 by Martin Raman RN  Outcome: Progressing     Problem: Prexisting or High Potential for Compromised Skin Integrity  Goal: Skin integrity is maintained or improved  Description: INTERVENTIONS:  - Identify patients at risk for skin breakdown  - Assess and monitor skin integrity  - Assess and monitor nutrition and hydration status  - Monitor labs   - Assess for incontinence   - Turn and reposition patient  - Assist with mobility/ambulation  - Relieve pressure over bony prominences  - Avoid friction and shearing  - Provide appropriate hygiene as needed including keeping skin clean and dry  - Evaluate need for skin moisturizer/barrier cream  - Collaborate with interdisciplinary team   - Patient/family teaching  - Consider wound care consult   8/27/2021 1836 by Martin Rmaan RN  Outcome: Completed  8/27/2021 1141 by Martin Raman RN  Outcome: Progressing

## 2021-08-27 NOTE — ASSESSMENT & PLAN NOTE
Likely due to cystitis, left trigone bladder floor lesion, right radiation ureteritis/right ureteral lesion/right ureteral bleeding per OR findings  Patient was recently treated for UTI  Hematuria resolves, Sumner catheter removed as noted above  Bilateral percutaneous nephrostomy tubes in place draining clear yellow urine  Patient to follow-up with outpatient Urology next week  Discharged patient to home today

## 2021-08-27 NOTE — ASSESSMENT & PLAN NOTE
Evolving since admission, as evidenced by fever, leukocytosis, lactic acidosis, with source of infection likely complicated UTI with concern for bilateral pyelonephritis in the setting of urinary retention  Fever 103 6 8/19, WBC 14, lactic acid 2 5   Lactic acid normalized with IV fluid boluses  UA 8/19 showed trace leukocytes, large blood  Patient did receive 1 dose of IV Rocephin on 8/17  Urine culture not sent  Blood cultures after 5 days  Procalcitonin negative x 2  Urine culture on 7/30/2021 showed Enterococcus and Klebsiella pneumoniae  Renal ultrasound unremarkable  CT abdomen pelvis 8/25 due to persistent fever showed - Cystitis; Bilateral perinephric and periureteral fat stranding, nonspecific, although slightly increased on the right since 4/24/2021  Correlate for possibility of ascending urinary tract infection;  Slight increase in size of retroperitoneal lymph nodes measuring up to 1 2 cm, possibly reactive  Attention on follow-up imaging is recommended  Appreciate ID input  · Changed antibiotic to IV Unasyn, then transitioned to p o  Augmentin 500 mg p o , restarted IV Zosyn 8/25 due to persistent high fever, leukocytosis  · UA from bilateral PCN tube are concerning for infection, urine culture pending     · Discussed with ID, patient is being transition to cefopodoxime 200 mg q 12 hours through 8/31  · Discussed with ID okay for patient to be discharged home today  · Follow-up with CBC in 1 week  · Patient will follow-up with his primary care physician 1-2 weeks post discharge  · Patient instructed on signs and symptoms of infection to watch for          Results from last 7 days   Lab Units 08/27/21  0652 08/26/21  0617 08/22/21  0539 08/21/21  0521   PROCALCITONIN ng/ml 0 08 0 19 0 08 0 14     Results from last 7 days   Lab Units 08/27/21  0519 08/26/21  0617 08/25/21  1553 08/25/21  0515 08/24/21  0609 08/23/21  0456 08/22/21  0539 08/21/21  0521   WBC Thousand/uL 13 65* 10 56* 14 78* 13 55* 10 41* 9 67 6 50 7 37

## 2021-08-27 NOTE — ASSESSMENT & PLAN NOTE
Continue on Breo and fluticasone  Diffuse mild wheeze on auscultation yesterday  Chest x-ray NAD on 8/20  Repeat chest x-ray 8/25 showed - Mild bibasilar atelectasis  Continue albuterol , change to p r n  Per patient request  Patient had been on low-dose Solu-Medrol, no wheezing appreciated on exam   Discontinued  Patient is satting low 90s to mid 90s on room air today    Follow-up with PCP 1-2 weeks post discharge

## 2021-08-27 NOTE — PROGRESS NOTES
Armando 43  Progress Note Miguel Angel Schafer 1945, 66 y o  male MRN: [id number]  Unit/Bed#: [address] [place] [address] Encounter: [id number]  Primary Care Provider: Fredrick Marr MD   Date and time admitted to hospital: 8/17/2021 12:20 PM    * Urinary retention  Assessment & Plan  Secondary to hematuria with mild blood clots and radiation cystitis  Patient is status post Sumner catheter and was on CBI  Appreciate urology input  · Patient is status post cystoscopy, evacuation of clots, left bladder floor biopsy, extensive fulguration left bladder floor left lateral wall, right retrograde pyelogram, right ureteroscopy biopsy suspicious distal ureteral lesion laser fulguration bleeding distal ureteral, right ureteral stent placement on 8/22  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, left trigone bladder floor lesion, right radiation ureteritis, right ureteral lesion with bleeding  · Sumner catheter still maintain, blood-tinged urine in tubing  · Sumner management per Urology          Sepsis [facility])  Assessment & Plan  Evolving since admission, as evidenced by fever, leukocytosis, lactic acidosis, with source of infection likely complicated UTI with concern for bilateral pyelonephritis in the setting of urinary retention  Fever 103 6 8/19, WBC 14, lactic acid 2 5   Lactic acid normalized with IV fluid boluses  UA 8/19 showed trace leukocytes, large blood  Patient did receive 1 dose of IV Rocephin on 8/17  Urine culture not sent  Blood cultures after 5 days  Procalcitonin negative x 2  Urine culture on 7/30/2021 showed Enterococcus and Klebsiella pneumoniae  Renal ultrasound unremarkable  CT abdomen pelvis 8/25 due to persistent fever showed - Cystitis; Bilateral perinephric and periureteral fat stranding, nonspecific, although slightly increased on the right since 4/24/2021  Correlate for possibility of ascending urinary tract infection;  Slight increase in size of retroperitoneal lymph nodes measuring up to 1 2 cm, possibly reactive  Attention on follow-up imaging is recommended  Appreciate ID input  · Changed antibiotic to IV Unasyn, then transitioned to p o  Augmentin 500 mg p o , restarted IV Zosyn 8/25 due to persistent high fever, leukocytosis  · UA from bilateral PCN tube are concerning for infection, urine culture pending  WBC improving today, no fever overnight and today  Repeat CBC in AM       Results from last 7 days   Lab Units 08/26/21  0617 08/22/21  0539 08/21/21  0521 08/19/21  2030   LACTIC ACID mmol/L  --   --   --  1 0   PROCALCITONIN ng/ml 0 19 0 08 0 14  --      Results from last 7 days   Lab Units 08/26/21  0617 08/25/21  1553 08/25/21  0515 08/24/21  0609 08/23/21  0456 08/22/21  0539 08/21/21  0521 08/20/21  0532   WBC Thousand/uL 10 56* 14 78* 13 55* 10 41* 9 67 6 50 7 37 10 63*              Hematuria  Assessment & Plan  Likely due to cystitis, left trigone bladder floor lesion, right radiation ureteritis/right ureteral lesion/right ureteral bleeding per OR findings  Patient was recently treated for UTI  Blood-tinged urine along the Sumner tubing today  Sumner management per Urology      Anemia  Assessment & Plan  Baseline hemoglobin appears to be around 8-9's likely due to iron deficiency  Patient on iron supplements at home  Hemoglobin 9 2 on admission  Trending down due to hematuria  Hemoglobin trended down to 6 8 8/20, received 1 unit RBC  Patient received 1 unit PRBC on 08/23 due to soft BP despite Hb 8 1  Hemoglobin 6 6 8/25  Patient received 1 unit of RBC  Hemoglobin today 7 3, repeat 7 5  Monitor H&H, transfuse for hemoglobin less than 7 or sooner if hemodynamically unstable  Continue iron supplement, B12, MVI from home  Repeat CBC in a m      Acute kidney injury superimposed on CKD [facility])  Assessment & Plan  Lab Results   Component Value Date    EGFR 39 08/26/2021    EGFR 38 08/25/2021    EGFR 40 08/24/2021    CREATININE 1 67 (H) 08/26/2021 CREATININE 1 72 (H) 08/25/2021    CREATININE 1 62 (H) 08/24/2021   Baseline creatinine appears to be around 1 4-1 5's  Creatinine 1 57 on admission  Creatinine peaked to 1 97, trending down   Renal and bladder ultrasound no evidence of hydro  Avoid nephrotoxins and hypotension  Blood transfusion as needed to keep hemoglobin above 7  Appreciate nephrology input  · Likely multifactorial secondary to obstructive uropathy, pre renal, sepsis  · Received IV fluids as per Nephrology  · Repeat BMP in a m  Prostate cancer [facility])  Assessment & Plan  Status post radiation, had radiation cystitis  Status post bilateral nephrostomy tubes due to recurrent hematuria,capped on admission  Urology following, appreciate input  PCN tube connected to bag yesterday  Draining slight cloudy yellow urine    RA (rheumatoid arthritis) ([facility])  Assessment & Plan  Continue supportive care    Chronic obstructive pulmonary disease with acute exacerbation ([facility])  Assessment & Plan  Continue on Breo and fluticasone  Diffuse mild wheeze on auscultation yesterday  Chest x-ray NAD on 8/20  Repeat chest x-ray 8/25 showed - Mild bibasilar atelectasis  Continue albuterol , change to p r n  Per patient request  Started on low-dose Solu-Medrol 20 q 8 hours 8/25, decrease to q 12 hours today  Consider DC in AM if continues to improve  Patient is satting low 90s on room air today  Supplemental oxygen maintain sats above 90%  VTE Pharmacologic Prophylaxis:   Pharmacologic: Pharmacologic VTE Prophylaxis contraindicated due to Hematuria  Mechanical VTE Prophylaxis in Place: Yes    Patient Centered Rounds: I have performed bedside rounds with nursing staff today  Discussions with Specialists or Other Care Team Provider:  ID, infection control    Education and Discussions with Family / Patient:  Yes    Time Spent for Care: 20 minutes  More than 50% of total time spent on counseling and coordination of care as described above      Current Length of Stay: 8 day(s)    Current Patient Status: Inpatient   Certification Statement: The patient will continue to require additional inpatient hospital stay due to Sepsis, complicated UTI, hematuria    Discharge Plan:  Likely home once medically cleared    Code Status: Level 1 - Full Code      Subjective:   Patient denies lower abdominal pain, flank pain, nausea vomiting diarrhea, SOB, chest pain, headache, dizziness, fever, chills  Reports feeling better  Objective:     Vitals:   Temp (24hrs), Av 9 °F (36 6 °C), Min:97 3 °F (36 3 °C), Max:98 8 °F (37 1 °C)    Temp:  [97 3 °F (36 3 °C)-98 8 °F (37 1 °C)] 97 3 °F (36 3 °C)  HR:  [55-72] 67  Resp:  [17-19] 18  BP: (116-135)/(54-65) 132/65  SpO2:  [88 %-98 %] 94 %  Body mass index is 31 75 kg/m²  Input and Output Summary (last 24 hours): Intake/Output Summary (Last 24 hours) at 2021  Last data filed at 2021 1800  Gross per 24 hour   Intake 1000 ml   Output 1275 ml   Net -275 ml       Physical Exam:     Physical Exam  Vitals and nursing note reviewed  Constitutional:       Appearance: He is well-developed  He is obese  HENT:      Head: Normocephalic and atraumatic  Neck:      Thyroid: No thyromegaly  Vascular: No JVD  Trachea: No tracheal deviation  Cardiovascular:      Rate and Rhythm: Normal rate and regular rhythm  Heart sounds: Normal heart sounds  Pulmonary:      Effort: Pulmonary effort is normal  No respiratory distress  Breath sounds: No wheezing or rales  Comments: Breath sounds clear diminished bilateral, on room air, satting low 90s  Abdominal:      General: Bowel sounds are normal  There is no distension  Palpations: Abdomen is soft  Tenderness: There is no abdominal tenderness  There is no guarding  Genitourinary:     Comments: Bilateral nephrostomy tube draining yellowish slight cloudy urine    Sumner draining blood-tinged urine along the tubing  Musculoskeletal: General: Swelling present  Cervical back: Neck supple  Comments: Pedal edema exam   Skin:     General: Skin is warm and dry  Neurological:      General: No focal deficit present  Mental Status: He is alert and oriented to person, place, and time  Psychiatric:         Mood and Affect: Mood normal          Judgment: Judgment normal          Additional Data:     Labs:    Results from last 7 days   Lab Units 08/26/21  1527 08/26/21  0617   WBC Thousand/uL  --  10 56*   HEMOGLOBIN g/dL 7 5* 7 3*   HEMATOCRIT % 24 7* 24 8*   PLATELETS Thousands/uL  --  163   NEUTROS PCT %  --  87*   LYMPHS PCT %  --  7*   MONOS PCT %  --  4   EOS PCT %  --  0     Results from last 7 days   Lab Units 08/26/21  0617   POTASSIUM mmol/L 5 1   CHLORIDE mmol/L 106   CO2 mmol/L 25   BUN mg/dL 26*   CREATININE mg/dL 1 67*   CALCIUM mg/dL 7 8*   ALK PHOS U/L 70   ALT U/L 45   AST U/L 30           * I Have Reviewed All Lab Data Listed Above  * Additional Pertinent Lab Tests Reviewed:  Bony 64 Admission Reviewed    Imaging:    Imaging Reports Reviewed Today Include:  CT abdomen pelvis  Imaging Personally Reviewed by Myself Includes:  None    Recent Cultures (last 7 days):           Last 24 Hours Medication List:   Current Facility-Administered Medications   Medication Dose Route Frequency Provider Last Rate    acetaminophen  650 mg Oral Q4H PRN Yen Cao MD      albuterol  2 puff Inhalation Q4H PRN Tad Nageotte, CRNP      atorvastatin  20 mg Oral HS Yen Cao MD      belladonna-opium  30 mg Rectal Q8H PRN Yen Cao MD      cyanocobalamin  500 mcg Oral Daily Yen Cao MD      ferrous sulfate  325 mg Oral Daily With Breakfast Yen Cao MD      fluticasone  1 spray Nasal Daily Yen Cao MD      fluticasone-vilanterol  1 puff Inhalation HS Yen Cao MD      loratadine  10 mg Oral Daily Yen Cao MD      methylPREDNISolone sodium succinate  20 mg Intravenous Q12H [address] CORWIN Braden      metoprolol tartrate  12 5 mg Oral Q12H [address] Gume Molina MD      multivitamin-minerals  1 tablet Oral Daily Gume Molina MD      piperacillin-tazobactam  3 375 g Intravenous Q8H Panchito Arevalo DO 3 375 g (08/26/21 1512)    saccharomyces boulardii  250 mg Oral BID Gume Molina MD          Today, Patient Was Seen By: CORWIN Overton    ** Please Note: Dragon 360 Dictation voice to text software may have been used in the creation of this document   **

## 2021-08-27 NOTE — ASSESSMENT & PLAN NOTE
Status post radiation, had radiation cystitis  Status post bilateral nephrostomy tubes due to recurrent hematuria,capped on admission  Urology following, appreciate input  Percutaneous bilateral nephrostomy tubes present, draining clear yellow urine  Patient will be following with his outpatient PCP 1-2 weeks post discharge  Follow-up with urology next week outpatient    Patient verbalized understanding

## 2021-08-27 NOTE — DISCHARGE INSTRUCTIONS
Acute Hematuria   WHAT YOU SHOULD KNOW:   Hematuria is blood in your urine from an injury or medical condition  Acute means the problem starts suddenly, worsens quickly, and lasts a short time  Your urine may be bright red to dark brown  Some common causes of hematuria are bladder infection, kidney stone, trauma to the kidneys or bladder, and some medications  Sometimes blood clots can block the urethra so that you cannot empty your bladder  AFTER YOU LEAVE:   Medicines:  Ask about these or other medicines you may need to treat the cause of your acute hematuria:  · Antibiotics: This medicine is given to fight or prevent an infection caused by bacteria  Always take your antibiotics exactly as ordered by your healthcare provider  Do not stop taking your medicine unless directed by your healthcare provider  Never save antibiotics or take leftover antibiotics that were given to you for another illness  · Take your medicine as directed  Call your healthcare provider if you think your medicine is not helping or if you have side effects  Tell him if you are allergic to any medicine  Keep a list of the medicines, vitamins, and herbs you take  Include the amounts, and when and why you take them  Bring the list or the pill bottles to follow-up visits  Carry your medicine list with you in case of an emergency  Increase the amount of fluid you drink:  Drink clear fluids to help flush the blood from your urinary tract  Follow instructions about how much fluid to drink  Follow up with your healthcare provider as directed: Your healthcare provider will tell you how often to come in for follow-up visits  He may refer you to a specialist, such as a urologist or nephrologist  The specialists may do tests or procedures to find more serious problems with your urinary system  Write down your questions so you remember to ask them during your visits     Contact your healthcare provider if:   · You have a fever that gets worse or does not go away with treatment  · You cannot keep liquids or medicines down  · Your urine gets darker, even after you drink extra liquids  · You have questions or concerns about your condition, treatment, or care  Seek care immediately or call 911 if:   · You have blood in your urine after a new injury, such as a fall  · You are urinating very small amounts or not at all  · You feel like you cannot empty your bladder  · You have severe back or side pain that does not go away with treatment  © 2014 [address] is for End User's use only and may not be sold, redistributed or otherwise used for commercial purposes  All illustrations and images included in CareNotes® are the copyrighted property of A D A M , Inc  or Giorgi Wang  The above information is an  only  It is not intended as medical advice for individual conditions or treatments  Talk to your doctor, nurse or pharmacist before following any medical regimen to see if it is safe and effective for you  Acute Kidney Injury   WHAT YOU NEED TO KNOW:   Acute kidney injury (SHANTI) is also called acute kidney failure, or acute renal failure  SHANTI happens when your kidneys suddenly stop working correctly  Normally, the kidneys remove fluid, chemicals, and waste from your blood  These wastes are turned into urine by your kidneys  SHANTI usually happens over hours or days  When you have SHANTI, your kidneys do not remove the waste, chemicals, or extra fluid from your body  A normal amount of urine is not produced  SHANTI is usually temporary, it can take days to months to recover  SHANTI can also become a chronic kidney condition  DISCHARGE INSTRUCTIONS:   Call 911 if:   · You have sudden chest pain or trouble breathing  Seek care immediately if:   · Your symptoms get worse  Contact your healthcare provider if:   · Your symptoms return      · Your blood sugar or blood pressure level is not within the range your healthcare provider recommends  · You have questions or concerns about your condition or care  Nutrition:  Your healthcare provider may tell you to eat food low in sodium (salt), potassium, phosphorus, or protein  A dietitian can help you plan your meals  Drink liquids as directed: Your healthcare provider may recommend that you drink a certain amount of liquids  This will help your kidneys work better and decrease your risk for dehydration  Ask how much liquid to drink each day and which liquids are best for you  Prevent acute kidney injury:   · Manage other health conditions  such as diabetes, high blood pressure, or heart disease  These conditions increase your risk for acute kidney injury  Take your medicines for these conditions as directed  Also, monitor your blood sugar and blood pressure levels as directed  Contact your healthcare provider if your levels are not in the range he or she says it should be  · Talk to your healthcare provider before you take over-the-counter-medicine  NSAIDs, stomach medicine, or laxatives may harm your kidneys and increase your risk for acute kidney injury  If it is okay to take the medicine, follow the directions on the package  Do not take more than directed  · Tell healthcare providers you have had acute kidney injury  before you get contrast liquid for an x-ray or CT scan  Your healthcare provider may give you medicine to prevent kidney problems caused by the liquid  Follow up with your healthcare provider as directed: You will need to return for more tests to make sure your kidneys are working properly  You may also be referred to a kidney specialist  Write down your questions so you remember to ask them during your visits  © Copyright [employer] 2021 Information is for End User's use only and may not be sold, redistributed or otherwise used for commercial purposes   All illustrations and images included in CareNotes® are the copyrighted property of A D A M , Inc  or [address]   The above information is an  only  It is not intended as medical advice for individual conditions or treatments  Talk to your doctor, nurse or pharmacist before following any medical regimen to see if it is safe and effective for you  Sepsis   WHAT YOU NEED TO KNOW:   Sepsis happens when an infection spreads and causes your body to react strongly to germs  Your body's defense system normally releases chemicals to fight off infection at the infected area  When infection spreads, chemicals are released throughout your body  The chemicals cause inflammation and clotting in small blood vessels that is difficult to control  Inflammation and clotting decrease blood flow and oxygen to your organs  This may cause your organs to stop working correctly  Sepsis is a life-threatening emergency  DISCHARGE INSTRUCTIONS:   Call your local emergency number (911 in the [address] [facility]) if:   · You are short of breath or you cough up blood  · You have a fast heart rate and your chest hurts  · You feel so dizzy that you have trouble standing up  Seek care immediately if:   · You have increased swelling in your legs, feet, or abdomen  · Your lips or fingernails are blue  Call your doctor if:   · You have a fever  · You have muscle or joint pain  · You begin to have trouble sleeping, nightmares, or panic attacks  · You have questions or concerns about your condition or care  Medicines:   · Medicines  help treat an infection or decrease your symptoms  · Take your medicine as directed  Contact your healthcare provider if you think your medicine is not helping or if you have side effects  Tell him of her if you are allergic to any medicine  Keep a list of the medicines, vitamins, and herbs you take  Include the amounts, and when and why you take them  Bring the list or the pill bottles to follow-up visits   Carry your medicine list with you in case of an emergency  Prevent infection:   · Wash your hands often  Use soap and water  Wash your hands after you use the bathroom, change a child's diapers, or sneeze  Do not touch your eyes, nose, or mouth unless you have washed your hands first  Wash your hands before you prepare or eat food  Carry hand  with you  You can use it to clean your hands when soap and water are not available  · Ask about vaccines  Vaccines can decrease your risk for certain infections, such as the flu or pneumonia  · Prevent the spread of germs  Try to stay away from people who have a cold or the flu  If you are sick, stay away from others as much as possible  Follow up with your doctor as directed:  Write down your questions so you remember to ask them during your visits  © Copyright [employer] 2021 Information is for End User's use only and may not be sold, redistributed or otherwise used for commercial purposes  All illustrations and images included in CareNotes® are the copyrighted property of [employer]  or [facility] [employer]   The above information is an  only  It is not intended as medical advice for individual conditions or treatments  Talk to your doctor, nurse or pharmacist before following any medical regimen to see if it is safe and effective for you

## 2021-08-27 NOTE — ASSESSMENT & PLAN NOTE
Evolving since admission, as evidenced by fever, leukocytosis, lactic acidosis, with source of infection likely complicated UTI with concern for bilateral pyelonephritis in the setting of urinary retention  Fever 103 6 8/19, WBC 14, lactic acid 2 5   Lactic acid normalized with IV fluid boluses  UA 8/19 showed trace leukocytes, large blood  Patient did receive 1 dose of IV Rocephin on 8/17  Urine culture not sent  Blood cultures after 5 days  Procalcitonin negative x 2  Urine culture on 7/30/2021 showed Enterococcus and Klebsiella pneumoniae  Renal ultrasound unremarkable  CT abdomen pelvis 8/25 due to persistent fever showed - Cystitis; Bilateral perinephric and periureteral fat stranding, nonspecific, although slightly increased on the right since 4/24/2021  Correlate for possibility of ascending urinary tract infection;  Slight increase in size of retroperitoneal lymph nodes measuring up to 1 2 cm, possibly reactive  Attention on follow-up imaging is recommended  Appreciate ID input  · Changed antibiotic to IV Unasyn, then transitioned to p o  Augmentin 500 mg p o , restarted IV Zosyn 8/25 due to persistent high fever, leukocytosis  · UA from bilateral PCN tube are concerning for infection, urine culture pending  WBC improving today, no fever overnight and today     Repeat CBC in AM       Results from last 7 days   Lab Units 08/26/21  0617 08/22/21  0539 08/21/21  0521 08/19/21  2030   LACTIC ACID mmol/L  --   --   --  1 0   PROCALCITONIN ng/ml 0 19 0 08 0 14  --      Results from last 7 days   Lab Units 08/26/21  0617 08/25/21  1553 08/25/21  0515 08/24/21  0609 08/23/21  0456 08/22/21  0539 08/21/21  0521 08/20/21  0532   WBC Thousand/uL 10 56* 14 78* 13 55* 10 41* 9 67 6 50 7 37 10 63*

## 2021-08-27 NOTE — PHYSICAL THERAPY NOTE
PT TREATMENT     08/27/21 1100   Note Type   Note Type Treatment   Pain Assessment   Pain Assessment Tool Pyle-Baker FACES   Pyle-Baker FACES Pain Rating 0   Restrictions/Precautions   Other Precautions Fall Risk  (nephrostomy tube)   General   Chart Reviewed Yes   Family/Caregiver Present No   Cognition   Arousal/Participation Cooperative   Subjective   Subjective patient states wanting to have a cane for home use   Bed Mobility   Supine to Sit 7  Independent   Transfers   Sit to Stand 7  Independent   Stand to Sit 5  Supervision  (verbal cuing for hand placement)   Additional Comments SpO2 initially dropping to 89% with gait on room air, and increased to 92% on room air with stair climbing and increased walking   Ambulation/Elevation   Gait Assistance 5  Supervision   Additional items Verbal cues   Assistive Device Rolling walker   Distance 40 feet with no balance loss noted, first gait with single point cane with min assist of 1, slow gait patterning and guarded  Education completed for use of roller walker for improved gait patterning and fall prevention   Stair Management Assistance 5  Supervision   Stair Management Technique One rail R;With cane; Step to pattern   Number of Stairs 8   Balance   Static Sitting Good   Dynamic Sitting Fair +   Static Standing Fair +   Dynamic Standing Fair   Ambulatory Fair -   Activity Tolerance   Activity Tolerance Patient tolerated treatment well;Patient limited by fatigue   Nurse Made Aware yes   Exercises   Hip Flexion Sitting;10 reps;Bilateral   Knee AROM Long Arc Quad Sitting;10 reps;Bilateral   Ankle Pumps Sitting;20 reps;Bilateral   Balance training  sidestepping and backward walking also completed for balance and coordination    Assessment   Assessment patient cooperative and tolerated gait well with roller walker and states having a roller walker at home and will utilize as needed  Patient will benefit from increasing functional mobility to return to independence  The patient's AM-PAC Basic Mobility Inpatient Short Form Raw Score is 21, Standardized Score is 45 55  A standardized score greater than 42 9 suggests the patient may benefit from discharge to home  Please also refer to the recommendation of the Physical Therapist for safe discharge planning  Plan   Treatment/Interventions ADL retraining;Functional transfer training;LE strengthening/ROM; Elevations; Therapeutic exercise; Endurance training;Patient/family training;Equipment eval/education;Gait training; Compensatory technique education   PT Frequency 5x/wk   Recommendation   PT Discharge Recommendation Home with home health rehabilitation   [address] [place] [address] Mobility Inpatient   Turning in Bed Without Bedrails 4   Lying on Back to Sitting on Edge of Flat Bed 4   Moving Bed to Chair 3   Standing Up From Chair 4   Walk in Room 3   Climb 3-5 Stairs 3   Basic Mobility Inpatient Raw Score 21   Basic Mobility Standardized Score 56 46   Licensure   [place] License Number  Jerry Light PT [id number]

## 2021-08-27 NOTE — ASSESSMENT & PLAN NOTE
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

## 2021-08-27 NOTE — PROGRESS NOTES
Progress Note - Infectious Disease   Juliana Ro 66 y o  male MRN: [id number]  Unit/Bed#: 2 [place] [address] Encounter: [id number]      Impression/Recommendations:  1  Sepsis, not present on admission: but developed during this admission with fever and leukocytosis   Source of sepsis is most likely urinary retention and complicated UTI   Rebound fevers on night of 8/24  Blood cultures have no growth thus far  Antibiotic plan as below  Monitor temperature curve, WBC, clinical response     2  Complicated UTI with concern for b/l pyelonephritis in the setting of urinary retention  Rebound fevers noted on 8/24 and 8/25 while on Augmentin po  Patient had recent urine culture last month with growth of Klebsiella and ampicillin susceptible Enterococcus  8/22 s/p cystoscopy with clot evacuation and bladder floor biopsy  8/25 CT abd raises concern for ascending urinary infection as it demonstrates bilateral perinephric and periureteral fat stranding  Follow urine cx from nephrostomies  Improving with Zosyn 3 375g iv q 8 hours  Transition to cefpodoxime 200mg po q12 hours until 8/31  Martir Sandoval for discharge from ID standpoint  Monitor temperature curve, WBC, clinical response      3  Urinary retention, most likely secondary to hematuria and XRT cystitis  Dorrine Penitas now resolved  Patient has bilateral PCN, which were previously capped, but now draining  S/p cystoscopy on 8/22  Sumner catheter removed on 8/26 and pt has since voided urine  Percutaneous nephrostomies remain open for drainage      4  CKD   Creatinine is stable  Antibiotic dosages adjusted accordingly  Avoid nephrotoxins      5  XRT cystitis      6  Prostate cancer, status post XRT  Urology follow-up      Discussed with patient in detail regarding the above plan  Discussed with Dr Piper Raomn from the [facility] service  ID service will formally re-evaluate this patient on Monday if here    Please contact ID attending on call with questions prn this weekend      Antibiotics:  Zosyn #3  ABx # 8     Subjective:  Pt denies fever or chills  He denies abdominal or flank pain  Gross hematuria has resolved  He is tolerating antibiotic well  No SOB, rash, nausea, vomiting or diarrhea      Objective:  Vitals:  Temp:  [97 4 °F (36 3 °C)-98 °F (36 7 °C)] 97 6 °F (36 4 °C)  HR:  [62-70] 67  Resp:  [16-19] 19  BP: (132-135)/(67-71) 135/71  SpO2:  [91 %-94 %] 91 %  Temp (24hrs), Av 7 °F (36 5 °C), Min:97 4 °F (36 3 °C), Max:98 °F (36 7 °C)  Current: Temperature: 97 6 °F (36 4 °C)    Physical Exam:     General: Awake, alert, cooperative, no distress  Neck:  Supple  Lungs: CTA b/l, no wheezing, respirations unlabored  Heart:  Regular rate and rhythm, S1 and S2 normal, no murmur  Abd/: Soft, nondistended, no masses  B/L nephrostomies are draining urine   Extremities: No distal leg edema b/l  Skin:  No rash   Neuro: AAO x 3, conversant, fluent speech, moves all extremities  Invasive Devices     Peripheral Intravenous Line            Peripheral IV 21 Left Forearm 4 days    Peripheral IV 21 Right Forearm 2 days          Drain            Nephrostomy Left 1 8 Fr  23 days    Nephrostomy Right 1 8 Fr  23 days              Labs studies:   I have personally reviewed pertinent labs  Results from last 7 days   Lab Units 21  0521  0617 21  0515   POTASSIUM mmol/L 5 0 5 1 4 2   CHLORIDE mmol/L 107 106 104   CO2 mmol/L 21 25 25   BUN mg/dL 26* 26* 21   CREATININE mg/dL 1 55* 1 67* 1 72*   EGFR ml/min/1 73sq m 43 39 38   CALCIUM mg/dL 8 0* 7 8* 7 4*   AST U/L  --  30  --    ALT U/L  --  45  --    ALK PHOS U/L  --  70  --      Results from last 7 days   Lab Units 21  0519 21  1527 21  0617 21  1553   WBC Thousand/uL 13 65*  --  10 56* 14 78*   HEMOGLOBIN g/dL 7 3* 7 5* 7 3* 8 1*   PLATELETS Thousands/uL 199  --  163 143*     Imaging Studies:    CT abd/pelv: known cystitis   Bilateral perinephric and periureteral fat stranding, nonspecific, although slightly increased on the right since 4/24/2021  Correlate for possibility of ascending urinary tract infection  I have personally reviewed pertinent imaging study reports

## 2021-08-27 NOTE — PROGRESS NOTES
Pastoral Care Progress Note    2021  Patient: Amber Swain : 1945  Admission Date & Time: 2021 1220  MRN: [id number] [facility]: [id number]                     Chaplaincy Interventions Utilized:     Chaplaincy Outcomes Achieved:  Declined  support     21 1300   Clinical Encounter Type   Visited With Patient   Routine Visit Follow-up

## 2021-08-27 NOTE — CASE MANAGEMENT
LOS - 9 days    SW following to monitor needs and assist with DCP  Home with home therapy is being recommended  SW met with pt and wife to review plans and recommendations  Offered home care services to pt  Pt declined home care services  Per pt he feels with his walker and new cane he will be fine once he returns home  SW offered to assist with planning if pt or wife change their decision about services  IMM reviewed with patient and wife  Both agree with discharge determination  Patient signed IMM and declined copy  Copy placed in scan bin for chart  SW will be available for assistance if needed

## 2021-08-27 NOTE — ASSESSMENT & PLAN NOTE
Baseline hemoglobin appears to be around 8-9's likely due to iron deficiency  Patient on iron supplements at home  Hemoglobin 9 2 on admission  Trending down due to hematuria  Hemoglobin trended down to 6 8 8/20, received 1 unit RBC  Patient received 1 unit PRBC on 08/23 due to soft BP despite Hb 8 1  Hemoglobin 6 6 8/25  Patient received 1 unit of RBC  Continue iron supplement, B12, MVI from home  Repeat CBC in 1 week    He will follow-up with his outpatient nephrology and PCP 1-2 weeks post discharge

## 2021-08-27 NOTE — TELEPHONE ENCOUNTER
----- Message from Rose Mary Sanford MD sent at 8/27/2021  9:05 AM EDT -----  This patient is being discharged potentially from [address] [place] [address] today  He absolutely wants to go to the [place] office    Recommend:  1  He needs a basic metabolic profile 1 week after discharge  2  He needs to be maintained on a low-potassium diet  3  Follow-up appointment 2-3 weeks after discharge possibly little longer depending upon his labs 1 week later  4  He absolutely needs a hematology consultation for IgG lambda monoclonal spike on immunofixation in the setting of anemia and prostate cancer as well as pancytopenia    I think this is going to be set up upon discharge with please make sure      Thank you

## 2021-08-27 NOTE — PLAN OF CARE
Problem: MOBILITY - ADULT  Goal: Maintain or return to baseline ADL function  Description: INTERVENTIONS:  -  Assess patient's ability to carry out ADLs; assess patient's baseline for ADL function and identify physical deficits which impact ability to perform ADLs (bathing, care of mouth/teeth, toileting, grooming, dressing, etc )  - Assess/evaluate cause of self-care deficits   - Assess range of motion  - Assess patient's mobility; develop plan if impaired  - Assess patient's need for assistive devices and provide as appropriate  - Encourage maximum independence but intervene and supervise when necessary  - Involve family in performance of ADLs  - Assess for home care needs following discharge   - Consider OT consult to assist with ADL evaluation and planning for discharge  - Provide patient education as appropriate  Outcome: Progressing  Goal: Maintains/Returns to pre admission functional level  Description: INTERVENTIONS:  - Perform BMAT or MOVE assessment daily    - Set and communicate daily mobility goal to care team and patient/family/caregiver  - Collaborate with rehabilitation services on mobility goals if consulted  - Perform Range of Motion 3 times a day  - Reposition patient every 2 hours    - Dangle patient 3 times a day  - Stand patient 3 times a day  - Ambulate patient 3 times a day  - Out of bed to chair 3 times a day   - Out of bed for meals 3 times a day  - Out of bed for toileting  - Record patient progress and toleration of activity level   Outcome: Progressing     Problem: GENITOURINARY - ADULT  Goal: Maintains or returns to baseline urinary function  Description: INTERVENTIONS:  - Assess urinary function  - Encourage oral fluids to ensure adequate hydration if ordered  - Administer IV fluids as ordered to ensure adequate hydration  - Administer ordered medications as needed  - Offer frequent toileting  - Follow urinary retention protocol if ordered  Outcome: Progressing  Goal: Absence of urinary retention  Description: INTERVENTIONS:  - Assess patients ability to void and empty bladder  - Monitor I/O  - Bladder scan as needed  - Discuss with physician/AP medications to alleviate retention as needed  - Discuss catheterization for long term situations as appropriate  Outcome: Progressing  Goal: Urinary catheter remains patent  Description: INTERVENTIONS:  - Assess patency of urinary catheter  - If patient has a chronic alanis, consider changing catheter if non-functioning  - Follow guidelines for intermittent irrigation of non-functioning urinary catheter  Outcome: Progressing     Problem: Potential for Falls  Goal: Patient will remain free of falls  Description: INTERVENTIONS:  - Educate patient/family on patient safety including physical limitations  - Instruct patient to call for assistance with activity   - Consult OT/PT to assist with strengthening/mobility   - Keep Call bell within reach  - Keep bed low and locked with side rails adjusted as appropriate  - Keep care items and personal belongings within reach  - Initiate and maintain comfort rounds  - Make Fall Risk Sign visible to staff  - Offer Toileting every 2 Hours, in advance of need  - Initiate/Maintain bed/chair alarm  - Obtain necessary fall risk management equipment: call bell  - Apply yellow socks and bracelet for high fall risk patients  - Consider moving patient to room near nurses station  Outcome: Progressing     Problem: RESPIRATORY - ADULT  Goal: Achieves optimal ventilation and oxygenation  Description: INTERVENTIONS:  - Assess for changes in respiratory status  - Assess for changes in mentation and behavior  - Position to facilitate oxygenation and minimize respiratory effort  - Oxygen administered by appropriate delivery if ordered  - Initiate smoking cessation education as indicated  - Encourage broncho-pulmonary hygiene including cough, deep breathe, Incentive Spirometry  - Assess the need for suctioning and aspirate as needed  - Assess and instruct to report SOB or any respiratory difficulty  - Respiratory Therapy support as indicated  Outcome: Progressing     Problem: Prexisting or High Potential for Compromised Skin Integrity  Goal: Skin integrity is maintained or improved  Description: INTERVENTIONS:  - Identify patients at risk for skin breakdown  - Assess and monitor skin integrity  - Assess and monitor nutrition and hydration status  - Monitor labs   - Assess for incontinence   - Turn and reposition patient  - Assist with mobility/ambulation  - Relieve pressure over bony prominences  - Avoid friction and shearing  - Provide appropriate hygiene as needed including keeping skin clean and dry  - Evaluate need for skin moisturizer/barrier cream  - Collaborate with interdisciplinary team   - Patient/family teaching  - Consider wound care consult   Outcome: Progressing

## 2021-08-27 NOTE — ASSESSMENT & PLAN NOTE
Lab Results   Component Value Date    EGFR 43 08/27/2021    EGFR 39 08/26/2021    EGFR 38 08/25/2021    CREATININE 1 55 (H) 08/27/2021    CREATININE 1 67 (H) 08/26/2021    CREATININE 1 72 (H) 08/25/2021   Baseline creatinine appears to be around 1 4-1 5's  Creatinine 1 57 on admission  Creatinine peaked to 1 97, trending down   Renal and bladder ultrasound no evidence of hydro  Avoid nephrotoxins and hypotension  Blood transfusion as needed to keep hemoglobin above 7  Appreciate nephrology input  · Likely multifactorial secondary to obstructive uropathy, pre renal, sepsis  · Stabilized  · Discussed with Nephrology okay for patient to be discharged home today  · Repeat BMP in 1 week  · Will follow-up with outpatient nephrology    · Discussed with patient

## 2021-08-27 NOTE — PROGRESS NOTES
Progress Note - Nephrology   Amber Swain 66 y o  male MRN: [id number]  Unit/Bed#: 2 [place] [address] Encounter: [id number]    ASSESSMENT AND PLAN:  78-year-old male with history of prostate cancer status post radiation therapy/radiation cystitis of the recurrent episodes of hematuria and iron deficiency anemia from iron loss/COPD/hypertension/dyslipidemia/CKD stage 3 who was admitted with pelvic pain and urinary retention  Negrodeborah Suazopavel has bilateral nephrostomy tubes   We are seeing him for acute kidney injury on CKD stage 3:     1 AK I:  Etiology:  Obstructive uropathy?  Prerenal/possible sepsis  · Current creatinine:  1 55 slightly better and at baseline  · Peak creatinine:  1 97 as of 08/20/2021  · UA:  Large occult blood/no proteinuria/innumerable RBCs  · Renal imaging :  Ultrasound 08/20/2021:  No evidence of hydronephrosis at that time  · Status post cystoscopy on 08/22/2021:  Extensive fulguration left bladder floor left lateral wall right retrograde pyelogram right ureteroscopy biopsy for suspicious distal lesion in laser fulguration of bleeding distal ureter and suspicious lesions stent placed  · Chest x-ray without any overt CHF  Treatment:  · Oral intake  · Follow-up with Urology/both PCNs are opened  · Maintain MAP:  Over 65 mmHg if possible/avoid hypoperfusion:  Hold parameters on blood pressure medications  · Avoid nephrotoxic agents such as NSAIDs, and IV contrast if possible     2  CKD:  3B  · Baseline creatinine:  1 3-1 6  · Etiology:  Chronic obstructive uropathy/hypertensive nephrosclerosis/arteriolar nephrosclerosis     3 Volume/hypertension:  · Volume:  Euvolemic on oral intake  · Blood pressure:  Markedly improved no low blood pressure     4 Electrolytes:  · All acceptable:  Borderline high potassium will be monitored a low-potassium diet        5  MBD of CKD/AK I:  · Magnesium now repleted 2 1     6   Anemia:  Secondary to urinary bleeding and CKD/chronic disease  · Current hemoglobin:  7 3 stable  · For completeness check multiple myeloma evaluation given CKD and anemia:  · SPEP IGG lambda MONOCLONAL ABNORMALITY ON IMMUNOFIXATION  · LIGHT CHAIN RATIO NORMAL  · UPEP PENDING  RECOMMEND:  OUTPATIENT HEMATOLOGY CONSULTATION TO RULE OUT PLASMA CELL DYSCRASIA  · Oral iron given infection  Treatment:  · Transfuse for hemoglobin less than 7 0 per primary service       7  Major reason for hospitalization:  ? Urinary retention with sepsis being treated by Infectious Disease with cystitis PATIENT HAS RADIATION CYSTITIS AND RADIATION URETERITIS AND RIGHT STENT PER UROLOGY  ? Ongoing sepsis from cystitis/UTI with ongoing fevers to be followed closely by Infectious Disease:  Bilateral pyelonephritis followed by ID awaiting cultures from the nephrostomies to determine outpatient regimen per ID     8  Other problems:  ? Prostate CA status post XRT  ? Rheumatoid arthritis  ? COPD           Subjective:   Overall feels great  He is urinating without a Sumner catheter in also has both of his PCN tubes patent with urine output  No nausea vomiting or diarrhea  No chest pain or shortness of breath    Objective:     Vitals: Blood pressure 135/71, pulse 67, temperature 97 6 °F (36 4 °C), temperature source Oral, resp  rate 19, height 5' 9" (1 753 m), weight 97 5 kg (215 lb), SpO2 91 %  ,Body mass index is 31 75 kg/m²      Weight (last 2 days)     Date/Time   Weight    08/27/21 0600   97 5 (215)                Intake/Output Summary (Last 24 hours) at 8/27/2021 0902  Last data filed at 8/27/2021 0701  Gross per 24 hour   Intake 1000 ml   Output 1225 ml   Net -225 ml            Physical Exam: General:  No acute distress  Skin:  No acute rash  Eyes:  No scleral icterus and noninjected  ENT:  Moist mucous membranes  Neck:  Supple, no jugular venous distention, trachea midline, overall appearance is normal  Chest:  Clear to auscultation except for right basilar crackles which have been intermittent  Back:  Both PCN tubes bandaged and no tenderness  CVS:  Regular rate and rhythm, without a rub or gallops  Abdomen:  Normal bowel sounds, soft and nontender and nondistended  Extremities:  No edema, and no cyanosis, no significant arthritic changes  Neuro:  No gross focality  Psych:  Alert and oriented and appropriate                Medications:    Scheduled Meds:  Current Facility-Administered Medications   Medication Dose Route Frequency Provider Last Rate    acetaminophen  650 mg Oral Q4H PRN Tiarra Dunham MD      albuterol  2 puff Inhalation Q4H PRN CORWIN Balderas      atorvastatin  20 mg Oral HS Tiarra Dunham MD      belladonna-opium  30 mg Rectal Q8H PRN Tiarra Dunham MD      cyanocobalamin  500 mcg Oral Daily Tiarra Dunham MD      ferrous sulfate  325 mg Oral Daily With Breakfast Tiarra Dunham MD      fluticasone  1 spray Nasal Daily Tiarra Dunham MD      fluticasone-vilanterol  1 puff Inhalation HS Tiarra Dunham MD      loratadine  10 mg Oral Daily Tiarra Dunham MD      methylPREDNISolone sodium succinate  20 mg Intravenous Q12H [address] CORWIN Braden      metoprolol tartrate  12 5 mg Oral Q12H [address] Tiarra Dunham MD      multivitamin-minerals  1 tablet Oral Daily Tiarra Dunham MD      piperacillin-tazobactam  3 375 g Intravenous Q8H Panchito Arevalo DO 3 375 g (08/27/21 0506)    saccharomyces boulardii  250 mg Oral BID Tiarra Dunham MD         PRN Meds:   acetaminophen    albuterol    belladonna-opium    Continuous Infusions:     Lab, Imaging and other studies: I have personally reviewed pertinent labs    Laboratory Results:  Results from last 7 days   Lab Units 08/27/21  0519 08/26/21  1527 08/26/21  0617 08/25/21  1553 08/25/21  0515 08/24/21  2204 08/24/21  0609 08/23/21  0456 08/22/21  0539 08/21/21  0521   WBC Thousand/uL 13 65*  --  10 56* 14 78* 13 55*  --  10 41* 9 67 6 50 7 37   HEMOGLOBIN g/dL 7 3* 7 5* 7 3* 8 1* 6 6* 7 0* 7 9* 8 1* 7 3* 7 8*   HEMATOCRIT % 24 2* 24 7* 24 8* 28 6* 21 9* --  26 5* 27 9* 24 8* 25 7*   PLATELETS Thousands/uL 199  --  163 143* 157  --  143* 164 152 151   POTASSIUM mmol/L 5 0  --  5 1  --  4 2  --  4 4 4 2 4 3 3 8   CHLORIDE mmol/L 107  --  106  --  104  --  106 104 109* 105   CO2 mmol/L 21  --  25  --  25  --  26 23 24 25   BUN mg/dL 26*  --  26*  --  21  --  22 24 25 38*   CREATININE mg/dL 1 55*  --  1 67*  --  1 72*  --  1 62* 1 90* 1 43* 1 69*   CALCIUM mg/dL 8 0*  --  7 8*  --  7 4*  --  7 8* 8 2* 8 0* 7 4*   MAGNESIUM mg/dL  --   --  2 1  --   --   --  2 0 1 7 2 0 1 9     Urinalysis:   Lab Results   Component Value Date    COLORU Yellow 08/25/2021    CLARITYU Cloudy 08/25/2021    SPECGRAV 1 020 08/25/2021    PHUR 5 5 08/25/2021    PHUR 5 5 03/25/2018    LEUKOCYTESUR Moderate (A) 08/25/2021    NITRITE Negative 08/25/2021    GLUCOSEU Negative 08/25/2021    KETONESU Negative 08/25/2021    BILIRUBINUR Negative 08/25/2021    BLOODU Large (A) 08/25/2021     ABGs: No results found for: [facility]  Radiology review:     Portions of the record may have been created with voice recognition software  Occasional wrong word or "sound a like" substitutions may have occurred due to the inherent limitations of voice recognition software  Read the chart carefully and recognize, using context, where substitutions have occurred

## 2021-08-28 LAB
BACTERIA UR CULT: ABNORMAL

## 2021-08-31 NOTE — TELEPHONE ENCOUNTER
I left message for patient to call the office to schedule a hospital follow up appointment in the [place] office

## 2021-09-02 NOTE — TELEPHONE ENCOUNTER
Patient called the office back and stated due to other appointments, he would like a call back later in the week next week to schedule a hospital follow up  I will follow up with patient to schedule a follow up

## 2021-09-02 NOTE — TELEPHONE ENCOUNTER
I left message for patient to call the office to schedule a hospital follow up in [facility]

## 2021-09-06 ENCOUNTER — HOSPITAL ENCOUNTER (INPATIENT)
Facility: HOSPITAL | Age: 76
LOS: 3 days | Discharge: HOME | DRG: 698 | End: 2021-09-11
Attending: EMERGENCY MEDICINE | Admitting: INTERNAL MEDICINE
Payer: MEDICARE

## 2021-09-06 DIAGNOSIS — N18.9 ACUTE KIDNEY INJURY SUPERIMPOSED ON CKD (HCC): ICD-10-CM

## 2021-09-06 DIAGNOSIS — R31.9 HEMATURIA: Primary | ICD-10-CM

## 2021-09-06 DIAGNOSIS — N39.0 COMPLICATED UTI (URINARY TRACT INFECTION): ICD-10-CM

## 2021-09-06 DIAGNOSIS — N17.9 ACUTE KIDNEY INJURY SUPERIMPOSED ON CKD (HCC): ICD-10-CM

## 2021-09-06 PROCEDURE — 99284 EMERGENCY DEPT VISIT MOD MDM: CPT

## 2021-09-06 PROCEDURE — 99285 EMERGENCY DEPT VISIT HI MDM: CPT | Performed by: EMERGENCY MEDICINE

## 2021-09-07 LAB
ALBUMIN SERPL BCP-MCNC: 3.1 G/DL (ref 3.5–5)
ALP SERPL-CCNC: 97 U/L (ref 46–116)
ALT SERPL W P-5'-P-CCNC: 21 U/L (ref 12–78)
ANION GAP SERPL CALCULATED.3IONS-SCNC: 9 MMOL/L (ref 4–13)
AST SERPL W P-5'-P-CCNC: 18 U/L (ref 5–45)
BACTERIA UR QL AUTO: ABNORMAL /HPF
BASOPHILS # BLD AUTO: 0.04 THOUSANDS/ΜL (ref 0–0.1)
BASOPHILS # BLD AUTO: 0.05 THOUSANDS/ΜL (ref 0–0.1)
BASOPHILS NFR BLD AUTO: 0 % (ref 0–1)
BASOPHILS NFR BLD AUTO: 0 % (ref 0–1)
BILIRUB SERPL-MCNC: 0.37 MG/DL (ref 0.2–1)
BILIRUB UR QL STRIP: ABNORMAL
BUN SERPL-MCNC: 31 MG/DL (ref 5–25)
CALCIUM ALBUM COR SERPL-MCNC: 9.6 MG/DL (ref 8.3–10.1)
CALCIUM SERPL-MCNC: 8.9 MG/DL (ref 8.3–10.1)
CHLORIDE SERPL-SCNC: 102 MMOL/L (ref 100–108)
CLARITY UR: ABNORMAL
CO2 SERPL-SCNC: 28 MMOL/L (ref 21–32)
COLOR UR: ABNORMAL
CREAT SERPL-MCNC: 1.9 MG/DL (ref 0.6–1.3)
EOSINOPHIL # BLD AUTO: 0.28 THOUSAND/ΜL (ref 0–0.61)
EOSINOPHIL # BLD AUTO: 0.45 THOUSAND/ΜL (ref 0–0.61)
EOSINOPHIL NFR BLD AUTO: 2 % (ref 0–6)
EOSINOPHIL NFR BLD AUTO: 4 % (ref 0–6)
ERYTHROCYTE [DISTWIDTH] IN BLOOD BY AUTOMATED COUNT: 20.6 % (ref 11.6–15.1)
ERYTHROCYTE [DISTWIDTH] IN BLOOD BY AUTOMATED COUNT: 20.9 % (ref 11.6–15.1)
GFR SERPL CREATININE-BSD FRML MDRD: 33 ML/MIN/1.73SQ M
GLUCOSE SERPL-MCNC: 109 MG/DL (ref 65–140)
GLUCOSE UR STRIP-MCNC: NEGATIVE MG/DL
HCT VFR BLD AUTO: 26.8 % (ref 36.5–49.3)
HCT VFR BLD AUTO: 31.1 % (ref 36.5–49.3)
HGB BLD-MCNC: 7.8 G/DL (ref 12–17)
HGB BLD-MCNC: 9.2 G/DL (ref 12–17)
HGB UR QL STRIP.AUTO: ABNORMAL
IMM GRANULOCYTES # BLD AUTO: 0.06 THOUSAND/UL (ref 0–0.2)
IMM GRANULOCYTES # BLD AUTO: 0.07 THOUSAND/UL (ref 0–0.2)
IMM GRANULOCYTES NFR BLD AUTO: 1 % (ref 0–2)
IMM GRANULOCYTES NFR BLD AUTO: 1 % (ref 0–2)
KETONES UR STRIP-MCNC: ABNORMAL MG/DL
LEUKOCYTE ESTERASE UR QL STRIP: ABNORMAL
LYMPHOCYTES # BLD AUTO: 1.16 THOUSANDS/ΜL (ref 0.6–4.47)
LYMPHOCYTES # BLD AUTO: 1.86 THOUSANDS/ΜL (ref 0.6–4.47)
LYMPHOCYTES NFR BLD AUTO: 15 % (ref 14–44)
LYMPHOCYTES NFR BLD AUTO: 9 % (ref 14–44)
MCH RBC QN AUTO: 24.2 PG (ref 26.8–34.3)
MCH RBC QN AUTO: 24.2 PG (ref 26.8–34.3)
MCHC RBC AUTO-ENTMCNC: 29.1 G/DL (ref 31.4–37.4)
MCHC RBC AUTO-ENTMCNC: 29.6 G/DL (ref 31.4–37.4)
MCV RBC AUTO: 82 FL (ref 82–98)
MCV RBC AUTO: 83 FL (ref 82–98)
MONOCYTES # BLD AUTO: 0.81 THOUSAND/ΜL (ref 0.17–1.22)
MONOCYTES # BLD AUTO: 0.84 THOUSAND/ΜL (ref 0.17–1.22)
MONOCYTES NFR BLD AUTO: 6 % (ref 4–12)
MONOCYTES NFR BLD AUTO: 7 % (ref 4–12)
NEUTROPHILS # BLD AUTO: 10.92 THOUSANDS/ΜL (ref 1.85–7.62)
NEUTROPHILS # BLD AUTO: 8.96 THOUSANDS/ΜL (ref 1.85–7.62)
NEUTS SEG NFR BLD AUTO: 73 % (ref 43–75)
NEUTS SEG NFR BLD AUTO: 82 % (ref 43–75)
NITRITE UR QL STRIP: POSITIVE
NON-SQ EPI CELLS URNS QL MICRO: ABNORMAL /HPF
NRBC BLD AUTO-RTO: 0 /100 WBCS
NRBC BLD AUTO-RTO: 0 /100 WBCS
PH UR STRIP.AUTO: 6 [PH]
PLATELET # BLD AUTO: 272 THOUSANDS/UL (ref 149–390)
PLATELET # BLD AUTO: 322 THOUSANDS/UL (ref 149–390)
PMV BLD AUTO: 9.3 FL (ref 8.9–12.7)
PMV BLD AUTO: 9.8 FL (ref 8.9–12.7)
POTASSIUM SERPL-SCNC: 4.3 MMOL/L (ref 3.5–5.3)
PROT SERPL-MCNC: 7.7 G/DL (ref 6.4–8.2)
PROT UR STRIP-MCNC: >=300 MG/DL
RBC # BLD AUTO: 3.22 MILLION/UL (ref 3.88–5.62)
RBC # BLD AUTO: 3.8 MILLION/UL (ref 3.88–5.62)
RBC #/AREA URNS AUTO: ABNORMAL /HPF
SODIUM SERPL-SCNC: 139 MMOL/L (ref 136–145)
SP GR UR STRIP.AUTO: 1.02 (ref 1–1.03)
UROBILINOGEN UR QL STRIP.AUTO: 1 E.U./DL
WBC # BLD AUTO: 12.23 THOUSAND/UL (ref 4.31–10.16)
WBC # BLD AUTO: 13.27 THOUSAND/UL (ref 4.31–10.16)
WBC #/AREA URNS AUTO: ABNORMAL /HPF

## 2021-09-07 PROCEDURE — 85025 COMPLETE CBC W/AUTO DIFF WBC: CPT | Performed by: PHYSICIAN ASSISTANT

## 2021-09-07 PROCEDURE — 96374 THER/PROPH/DIAG INJ IV PUSH: CPT

## 2021-09-07 PROCEDURE — 87186 SC STD MICRODIL/AGAR DIL: CPT | Performed by: PHYSICIAN ASSISTANT

## 2021-09-07 PROCEDURE — 97167 OT EVAL HIGH COMPLEX 60 MIN: CPT

## 2021-09-07 PROCEDURE — 81001 URINALYSIS AUTO W/SCOPE: CPT | Performed by: EMERGENCY MEDICINE

## 2021-09-07 PROCEDURE — 36415 COLL VENOUS BLD VENIPUNCTURE: CPT | Performed by: EMERGENCY MEDICINE

## 2021-09-07 PROCEDURE — 80053 COMPREHEN METABOLIC PANEL: CPT | Performed by: EMERGENCY MEDICINE

## 2021-09-07 PROCEDURE — 87077 CULTURE AEROBIC IDENTIFY: CPT | Performed by: PHYSICIAN ASSISTANT

## 2021-09-07 PROCEDURE — 96361 HYDRATE IV INFUSION ADD-ON: CPT

## 2021-09-07 PROCEDURE — 85025 COMPLETE CBC W/AUTO DIFF WBC: CPT | Performed by: EMERGENCY MEDICINE

## 2021-09-07 PROCEDURE — 87086 URINE CULTURE/COLONY COUNT: CPT | Performed by: PHYSICIAN ASSISTANT

## 2021-09-07 PROCEDURE — 99220 PR INITIAL OBSERVATION CARE/DAY 70 MINUTES: CPT | Performed by: INTERNAL MEDICINE

## 2021-09-07 RX ORDER — SODIUM CHLORIDE 9 MG/ML
100 INJECTION, SOLUTION INTRAVENOUS CONTINUOUS
Status: DISCONTINUED | OUTPATIENT
Start: 2021-09-07 | End: 2021-09-07

## 2021-09-07 RX ORDER — CEFTRIAXONE 1 G/50ML
1000 INJECTION, SOLUTION INTRAVENOUS ONCE
Status: DISCONTINUED | OUTPATIENT
Start: 2021-09-07 | End: 2021-09-07

## 2021-09-07 RX ORDER — MORPHINE SULFATE 4 MG/ML
4 INJECTION, SOLUTION INTRAMUSCULAR; INTRAVENOUS ONCE
Status: COMPLETED | OUTPATIENT
Start: 2021-09-07 | End: 2021-09-07

## 2021-09-07 RX ORDER — CEFEPIME HYDROCHLORIDE 1 G/50ML
1000 INJECTION, SOLUTION INTRAVENOUS EVERY 12 HOURS
Status: DISCONTINUED | OUTPATIENT
Start: 2021-09-07 | End: 2021-09-09

## 2021-09-07 RX ORDER — ATORVASTATIN CALCIUM 20 MG/1
20 TABLET, FILM COATED ORAL
Status: DISCONTINUED | OUTPATIENT
Start: 2021-09-07 | End: 2021-09-11 | Stop reason: HOSPADM

## 2021-09-07 RX ORDER — POLYETHYLENE GLYCOL 3350 17 G/17G
17 POWDER, FOR SOLUTION ORAL DAILY PRN
Status: DISCONTINUED | OUTPATIENT
Start: 2021-09-07 | End: 2021-09-11 | Stop reason: HOSPADM

## 2021-09-07 RX ORDER — FLUTICASONE FUROATE AND VILANTEROL 200; 25 UG/1; UG/1
1 POWDER RESPIRATORY (INHALATION)
Status: DISCONTINUED | OUTPATIENT
Start: 2021-09-07 | End: 2021-09-11 | Stop reason: HOSPADM

## 2021-09-07 RX ORDER — FERROUS SULFATE 325(65) MG
325 TABLET ORAL
Status: DISCONTINUED | OUTPATIENT
Start: 2021-09-07 | End: 2021-09-11 | Stop reason: HOSPADM

## 2021-09-07 RX ORDER — ACETAMINOPHEN 325 MG/1
650 TABLET ORAL EVERY 6 HOURS PRN
Status: DISCONTINUED | OUTPATIENT
Start: 2021-09-07 | End: 2021-09-11 | Stop reason: HOSPADM

## 2021-09-07 RX ORDER — CALCIUM CARBONATE 200(500)MG
1000 TABLET,CHEWABLE ORAL DAILY PRN
Status: DISCONTINUED | OUTPATIENT
Start: 2021-09-07 | End: 2021-09-11 | Stop reason: HOSPADM

## 2021-09-07 RX ORDER — ATROPA BELLADONNA AND OPIUM 16.2; 3 MG/1; MG/1
30 SUPPOSITORY RECTAL EVERY 8 HOURS PRN
Status: DISCONTINUED | OUTPATIENT
Start: 2021-09-07 | End: 2021-09-11 | Stop reason: HOSPADM

## 2021-09-07 RX ORDER — ONDANSETRON 2 MG/ML
4 INJECTION INTRAMUSCULAR; INTRAVENOUS EVERY 6 HOURS PRN
Status: DISCONTINUED | OUTPATIENT
Start: 2021-09-07 | End: 2021-09-11 | Stop reason: HOSPADM

## 2021-09-07 RX ORDER — ALBUTEROL SULFATE 90 UG/1
2 AEROSOL, METERED RESPIRATORY (INHALATION) 4 TIMES DAILY
Status: DISCONTINUED | OUTPATIENT
Start: 2021-09-07 | End: 2021-09-11 | Stop reason: HOSPADM

## 2021-09-07 RX ORDER — ALBUTEROL SULFATE 90 UG/1
2 AEROSOL, METERED RESPIRATORY (INHALATION) EVERY 4 HOURS PRN
Status: DISCONTINUED | OUTPATIENT
Start: 2021-09-07 | End: 2021-09-11 | Stop reason: HOSPADM

## 2021-09-07 RX ADMIN — ATORVASTATIN CALCIUM 20 MG: 20 TABLET, FILM COATED ORAL at 21:50

## 2021-09-07 RX ADMIN — SODIUM CHLORIDE 100 ML/HR: 0.9 INJECTION, SOLUTION INTRAVENOUS at 03:50

## 2021-09-07 RX ADMIN — METOPROLOL TARTRATE 25 MG: 25 TABLET, FILM COATED ORAL at 21:50

## 2021-09-07 RX ADMIN — METOPROLOL TARTRATE 25 MG: 25 TABLET, FILM COATED ORAL at 09:23

## 2021-09-07 RX ADMIN — ALBUTEROL SULFATE 2 PUFF: 90 AEROSOL, METERED RESPIRATORY (INHALATION) at 21:42

## 2021-09-07 RX ADMIN — FLUTICASONE FUROATE AND VILANTEROL TRIFENATATE 1 PUFF: 200; 25 POWDER RESPIRATORY (INHALATION) at 21:42

## 2021-09-07 RX ADMIN — FERROUS SULFATE TAB 325 MG (65 MG ELEMENTAL FE) 325 MG: 325 (65 FE) TAB at 09:23

## 2021-09-07 RX ADMIN — CEFEPIME HYDROCHLORIDE 1000 MG: 1 INJECTION, SOLUTION INTRAVENOUS at 16:48

## 2021-09-07 RX ADMIN — SODIUM CHLORIDE 1000 ML: 0.9 INJECTION, SOLUTION INTRAVENOUS at 00:45

## 2021-09-07 RX ADMIN — MORPHINE SULFATE 4 MG: 4 INJECTION INTRAVENOUS at 01:28

## 2021-09-07 RX ADMIN — ALBUTEROL SULFATE 2 PUFF: 90 AEROSOL, METERED RESPIRATORY (INHALATION) at 17:52

## 2021-09-07 RX ADMIN — CEFEPIME HYDROCHLORIDE 1000 MG: 1 INJECTION, SOLUTION INTRAVENOUS at 04:52

## 2021-09-07 RX ADMIN — CYANOCOBALAMIN TAB 500 MCG 500 MCG: 500 TAB at 09:23

## 2021-09-07 RX ADMIN — ACETAMINOPHEN 650 MG: 325 TABLET, FILM COATED ORAL at 09:23

## 2021-09-07 NOTE — ASSESSMENT & PLAN NOTE
Chronic anemia in the setting of ongoing hematuria, iron deficiency  Continue iron supplements  Hg 9 2 on admission, above recent baseline, recently required transfusion of 2 U during previous admission  Continue to monitor and transfuse as needed for Hg < 7

## 2021-09-07 NOTE — CONSULTS
H&P Exam - Urology       Patient: Linda Christensen   : 1945 Sex: male   MRN: [id number]     CSN: [id number]      History of Present Illness   HPI:  Linda Christensen is a 66 y o  male who presents with recurrent radiation cystitis gross hematuria just discharged from [address] [place] [address] last week doing well seen in the office clamping nephrostomy tubes for hydration        Review of Systems:   Constitutional:  Negative for activity change, fever, chills and diaphoresis  HENT: Negative for hearing loss and trouble swallowing  Eyes: Negative for itching and visual disturbance  Respiratory: Negative for chest tightness and shortness of breath  Cardiovascular: Negative for chest pain, edema  Gastrointestinal: Negative for abdominal distention, na abdominal pain, constipation, diarrhea, Nausea and vomiting  Genitourinary: Negative for decreased urine volume, difficulty urinating, dysuria, enuresis, frequency, hematuria and urgency  Musculoskeletal: Negative for gait problem and myalgias  Neurological: Negative for dizziness and headaches  Hematological: Does not bruise/bleed easily         Historical Information   Past Medical History:   Diagnosis Date    Cancer [facility])     prostate    COPD (chronic obstructive pulmonary disease) ([facility] [address] )     Hyperlipidemia     Hypertension      Past Surgical History:   Procedure Laterality Date    APPENDECTOMY      FL RETROGRADE PYELOGRAM  2021    HERNIA REPAIR      IR NEPHROSTOMY TUBE CHECK AND/OR REMOVAL  2021    IR NEPHROSTOMY TUBE CHECK/CHANGE/REPOSITION/REINSERTION/UPSIZE  5/10/2021    IR NEPHROSTOMY TUBE CHECK/CHANGE/REPOSITION/REINSERTION/UPSIZE  2021    IR NEPHROSTOMY TUBE PLACEMENT  2021    IN CYSTOURETHROSCOPY W/IRRIG & EVAC CLOTS Bilateral 2021    Procedure: CYSTOSCOPY EVACUATION OF CLOTS bilateral retrograde pyelograms possible TURBT bladder biopsy;  Surgeon: Shayy Neil MD;  Location: [address] [place] [address];  Service: Urology    IN CYSTOURETHROSCOPY W/IRRIG & EVAC CLOTS N/A 2021    Procedure: CYSTOSCOPY EVACUATION OF CLOTS fulguration;  Surgeon: Lattie Hatchet, MD;  Location[facility];  Service: Urology    ME CYSTOURETHROSCOPY W/IRRIG & EVAC CLOTS N/A 2021    Procedure: CYSTOSCOPY EVACUATION OF CLOTS BILATERAL ANTIROGRADES NEPHROSTOMY Wilian Palacios ;  Surgeon: Lattie Hatchet, MD;  Location: [address] [place] [address];  Service: Urology    ME CYSTOURETHROSCOPY W/IRRIG & EVAC CLOTS N/A 2021    Procedure: CYSTOSCOPY, RIGHT RETROGRADE, EVACUATION OF CLOTS, BLADDER BIOPSY X2 AND FULGERATION OF BLADDER LESIONS, URETEROSCOPY, 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;  Surgeon: Lattie Hatchet, MD;  Location: [address] [place] [address];  Service: Urology    [address]      right shoulder     Social History   Social History     Substance and Sexual Activity   Alcohol Use Never     Social History     Substance and Sexual Activity   Drug Use No     Social History     Tobacco Use   Smoking Status Former Smoker    Packs/day: 0 50    Years: 50 00    Pack years: 25 00    Quit date: 2017    Years since quittin 0   Smokeless Tobacco Never Used   Tobacco Comment    quit one month ago     Family History:   Family History   Problem Relation Age of Onset    Diabetes Mother     Stroke Mother     Diabetes Brother     Stroke Brother        Meds/Allergies   Medications Prior to Admission   Medication    albuterol (PROVENTIL HFA,VENTOLIN HFA) 90 mcg/act inhaler    atorvastatin (LIPITOR) 20 mg tablet    cyanocobalamin (VITAMIN B-12) 1000 MCG tablet    ferrous sulfate 324 (65 Fe) mg    fluticasone-vilanterol (Breo Ellipta) 200-25 MCG/INH inhaler    metoprolol tartrate (LOPRESSOR) 25 mg tablet     No Known Allergies    Objective   Vitals: /73   Pulse 90   Temp 98 5 °F (36 9 °C)   Resp 18   Ht 5' 11" (1 803 m)   Wt 98 4 kg (217 lb)   SpO2 94%   BMI 30 27 kg/m² Physical Exam:  General Alert awake   Normocephalic atraumatic PERRLA  Lungs clear bilaterally  Cardiac normal S1 normal S2  Abdomen soft, flank pain  CBI clear   Extremities no edema    I/O last 24 hours: In: 0 [Other:2300; IV Piggyback:1000]  Out: 2600 [Urine:2600]    Invasive Devices     Peripheral Intravenous Line            Peripheral IV 09/07/21 Left;Ventral (anterior) Forearm <1 day          Drain            Nephrostomy Left 1 8 Fr  34 days    Nephrostomy Right 1 8 Fr   34 days    Continuous Bladder Irrigation Three-way <1 day                    Lab Results: CBC:   Lab Results   Component Value Date    WBC 13 27 (H) 09/07/2021    HGB 7 8 (L) 09/07/2021    HCT 26 8 (L) 09/07/2021    MCV 83 09/07/2021     09/07/2021    MCH 24 2 (L) 09/07/2021    MCHC 29 1 (L) 09/07/2021    RDW 20 6 (H) 09/07/2021    MPV 9 8 09/07/2021    NRBC 0 09/07/2021     CMP:   Lab Results   Component Value Date     09/07/2021    CO2 28 09/07/2021    BUN 31 (H) 09/07/2021    CREATININE 1 90 (H) 09/07/2021    CALCIUM 8 9 09/07/2021    AST 18 09/07/2021    ALT 21 09/07/2021    ALKPHOS 97 09/07/2021    EGFR 33 09/07/2021     Urinalysis:   Lab Results   Component Value Date    COLORU Dark Eva 09/07/2021    CLARITYU Slightly Cloudy 09/07/2021    SPECGRAV 1 020 09/07/2021    PHUR 6 0 09/07/2021    PHUR 5 5 03/25/2018    LEUKOCYTESUR Moderate (A) 09/07/2021    NITRITE Positive (A) 09/07/2021    GLUCOSEU Negative 09/07/2021    KETONESU Trace (A) 09/07/2021    BILIRUBINUR Interference- unable to analyze (A) 09/07/2021    BLOODU Large (A) 09/07/2021     Urine Culture:   Lab Results   Component Value Date    URINECX 30,000-39,000 cfu/ml Candida albicans (A) 08/25/2021    URINECX 20,000-29,000 cfu/ml Pseudomonas aeruginosa (A) 08/25/2021     PSA: No results found for: PSA        Assessment/ Plan:  Radiation cystitis  Recurrent hematuria  Continue CBI at this time      Kentrell Abreu MD

## 2021-09-07 NOTE — OCCUPATIONAL THERAPY NOTE
Occupational Therapy Evaluation         09/07/21 1122   Note Type   Note type Evaluation   Restrictions/Precautions   Other Precautions Chair Alarm; Bed Alarm; Fall Risk;Pain   Pain Assessment   Pain Assessment Tool 0-10   Pain Score 5   Pain Location/Orientation Location: Groin   Home Living   Type of Home Apartment   Home Layout One level  (2nd floor apt, [address])   Bathroom Shower/Tub Tub/shower unit   Bathroom Toilet Standard   Bathroom Equipment Grab bars in shower;Hand-held shower   Home Equipment Walker;Cane  (Pt reports using both cane & RW in home; cane when outdoors )   Additional Comments Pt admitted w/ hematuria x 1 day  Prior Function   Level of [place] Independent with ADLs and functional mobility   Lives With Spouse   Receives Help From Family   ADL Assistance Independent   IADLs Independent  (Pt was driving up until 2 weeks ago)   Comments PTA, pt reports independence in ADLs and functional mobility w/ use of cane  Psychosocial   Psychosocial (WDL) WDL   ADL   Eating Assistance 7  Independent   Grooming Assistance 5  Supervision/Setup   UB Bathing Assistance 5  Supervision/Setup   LB Bathing Assistance 4  Minimal Assistance   UB Dressing Assistance 5  Supervision/Setup   LB Dressing Assistance 4  Minimal Assistance   Toileting Assistance  3  Moderate Assistance  ([place] catheter)   Additional Comments ADLs limited by pain at this time   Bed Mobility   Supine to Sit 4  Minimal assistance   Additional items Verbal cues; Assist x 1;Bedrails   Sit to Supine 4  Minimal assistance   Additional items Verbal cues; Assist x 1;Bedrails   Transfers   Sit to Stand 4  Minimal assistance  (from EOB, w/ RW)   Additional items Assist x 1;Verbal cues   Stand to Sit 4  Minimal assistance   Additional items Assist x 1;Verbal cues   Functional Mobility   Functional Mobility 4  Minimal assistance   Additional Comments Pt took few steps alongside EOB w/ min A and RW for support   Further mobility deferred as pt is limited by pain  Balance   Static Sitting Fair   Dynamic Sitting Fair -   Static Standing Fair   Dynamic Standing [address] -  (w/ RW)   Activity Tolerance   Activity Tolerance Patient limited by pain   RUE Assessment   RUE Assessment WFL   LUE Assessment   LUE Assessment WFL   Vision-Basic Assessment   Current Vision Wears glasses all the time   Cognition   Overall Cognitive Status WFL   Arousal/Participation Alert; Cooperative   Attention Within functional limits   Orientation Level Oriented X4   Following Commands Follows all commands and directions without difficulty   Assessment   Limitation Decreased ADL status; Decreased UE strength;Decreased Safe judgement during ADL;Decreased endurance;Decreased self-care trans;Decreased high-level ADLs  (Decreased mobility and balance)   Prognosis Good   Assessment Patient evaluated by Occupational Therapy  Patient admitted with Hematuria  The patients occupational profile, medical and therapy history includes a extensive additional review of physical, cognitive, or psychosocial history related to current functional performance  Comorbidities affecting functional mobility and ADLS include: UTI, anemia, SHANTI superimposed on CKD, COPD w/ acute exacerbation, dyslipidemia, essential HTN  Prior to admission, patient was independent with functional mobility with cane, independent with ADLS, independent with IADLS and living with spouse in a one level home with six steps to enter  The evaluation identifies the following performance deficits: weakness, decreased ROM, impaired balance, decreased endurance, decreased coordination, increased fall risk, new onset of impairment of functional mobility, decreased ADLS, decreased IADLS, pain, decreased activity tolerance, decreased safety awareness, impaired judgement and decreased strength, that result in activity limitations and/or participation restrictions   This evaluation requires clinical decision making of high complexity, because the patient presents with comorbidites that affect occupational performance and required significant modification of tasks or assistance with consideration of multiple treatment options  The Barthel Index was used as a functional outcome tool presenting with a score of 40, indicating marked limitations of functional mobility and ADLS  The patient's raw score on the -PAC Daily Activity inpatient short form is 19, standardized score is 40 22, greater than 39 4  Patients at this level are likely to benefit from DC to home  Please refer to the recommendation of the Occupational Therapist for safe DC planning  Patient will benefit from returning home with support of his spouse and the addition of skilled home OT services to regain independence in ADLs and functional mobility  Patient will benefit from skilled Occupational Therapy services to address above deficits and facilitate a safe return to prior level of function  Goals   Patient Goals to feel better   STG Time Frame   (1-7)   Short Term Goal  Goals established to promote patient goal of to feel better:  Patient will increase standing tolerance to 5 minutes during ADL task to decrease assistance level and decrease fall risk; Patient will increase bed mobility to independent in preparation for ADLS and transfers;  Patient will increase functional mobility to and from bathroom with rolling walker with supervision to increase performance with ADLS and to use a toilet; Patient will tolerate 10 minutes of UE ROM/strengthening to increase general activity tolerance and performance in ADLS/IADLS; Patient will improve functional activity tolerance to 10 minutes of sustained functional tasks to increase participation in basic self-care and decrease assistance level;  Patient will be able to to verbalize understanding and perform energy conservation/proper body mechanics during ADLS and functional mobility at least 75% of the time with minimal cueing to decrease signs of fatigue and increase stamina to return to prior level of function; Patient will increase dynamic sitting balance to fair to improve the ability to sit at edge of bed or on a chair for ADLS;  Patient will increase dynamic standing balance to fair to improve postural stability and decrease fall risk during standing ADLS and transfers  LTG Time Frame   (8-14)   Long Term Goal Patient will increase standing tolerance to 10 minutes during ADL task to decrease assistance level and decrease fall risk; Patient will increase functional mobility to and from bathroom with rolling walker independently to increase performance with ADLS and to use a toilet; Patient will tolerate 15 minutes of UE ROM/strengthening to increase general activity tolerance and performance in ADLS/IADLS; Patient will improve functional activity tolerance to 15 minutes of sustained functional tasks to increase participation in basic self-care and decrease assistance level;  Patient will be able to to verbalize understanding and perform energy conservation/proper body mechanics during ADLS and functional mobility at least 90% of the time with no cueing to decrease signs of fatigue and increase stamina to return to prior level of function; Patient will increase dynamic sitting balance to fair+ to improve the ability to sit at edge of bed or on a chair for ADLS;  Patient will increase dynamic standing balance to fair+ to improve postural stability and decrease fall risk during standing ADLS and transfers  Pt will score >/= 21/24 on AM-PAC Daily Activity Inpatient scale to promote safe independence with ADLs and functional mobility; Pt will score >/= 75/100 on Barthel Index in order to decrease caregiver assistance needed and increase ability to perform ADLs and functional mobility     Functional Transfer Goals   Pt Will Perform All Functional Transfers   (STG supervision, LTG independent)   ADL Goals   Pt Will Perform Grooming (LTG independent)   Pt Will Perform Bathing   (STG supervision, LTG independent)   Pt Will Perform UE Dressing   (LTG independent)   Pt Will Perform LE Dressing   (STG supervision, LTG independent)   Pt Will Perform Toileting   (STG min assist, LTG independent)   Plan   Treatment Interventions ADL retraining;Functional transfer training;UE strengthening/ROM; Endurance training;Patient/family training;Equipment evaluation/education; Compensatory technique education;Continued evaluation; Energy conservation; Activityengagement   Goal Expiration Date 09/21/21   OT Frequency 3-5x/wk   Recommendation   OT Discharge Recommendation Home with home health rehabilitation   AM-PAC Daily Activity Inpatient   Lower Body Dressing 3   Bathing 3   Toileting 2   Upper Body Dressing 3   Grooming 4   Eating 4   Daily Activity Raw Score 19   Daily Activity Standardized Score (Calc for Raw Score >=11) 40 22   AM-PAC Applied Cognition Inpatient   Following a Speech/Presentation 4   Understanding Ordinary Conversation 4   Taking Medications 4   Remembering Where Things Are Placed or Put Away 4   Remembering List of 4-5 Errands 4   Taking Care of Complicated Tasks 4   Applied Cognition Raw Score 24   Applied Cognition Standardized Score 62 21   Barthel Index   Feeding 10   Bathing 0   Grooming Score 0   Dressing Score 5   Bladder Score 0   Bowels Score 10   Toilet Use Score 5   Transfers (Bed/Chair) Score 10   Mobility (Level Surface) Score 0   Stairs Score 0   Barthel Index Score 40   Licensure   [place] License Number  [employer], [employer]

## 2021-09-07 NOTE — PHYSICAL THERAPY NOTE
09/07/21 1327   PT Last Visit   PT Visit Date 09/07/21   Note Type   Note type Evaluation   Cancel Reasons Other  (Pt refused due to CBI running)   [facility] [place] Insurance Number  [employer] PT [id number]

## 2021-09-07 NOTE — H&P
Armando 43  H&P- Matt Martinez 1945, 66 y o  male MRN: [id number]  Unit/Bed#: ED 01 Encounter: [id number]  Primary Care Provider: Arya Montoya MD   Date and time admitted to hospital: 9/6/2021 11:41 PM    * Hematuria  Assessment & Plan  Patient presents with hematuria x1 day  He did touch base with urology outpatient and tried increasing fluid intake  He continued with consistent hematuria and presented to the ED  Extensive urologic Hx with cystitis, left trigone bladder floor lesion, right radiation ureteritis/right ureteral lesion/right ureteral bleeding per recent OR findings  Recently had stent removed in the office on Friday  Bilateral nephrostomy tubes are capped    - He complains of some lower abdominal pain and some pain after Sumner was inserted, currently CBI is running  - Ed discussed with urology, will continue CBI and monitor  - Further urology recommendations appreciated     UTI (urinary tract infection)  Assessment & Plan  UA grossly abnormal, patient recently treated for UTI with hematuria  Previous culture grew Pseudomonas, sensitive to cefepime - will continue  F/u urine culture results   Afebrile since his recent discharge  Appreciate urology recommendations    Anemia  Assessment & Plan  Chronic anemia in the setting of ongoing hematuria, iron deficiency  Continue iron supplements  Hg 9 2 on admission, above recent baseline, recently required transfusion of 2 U during previous admission  Continue to monitor and transfuse as needed for Hg < 7    Acute kidney injury superimposed on CKD [facility])  Assessment & Plan  Lab Results   Component Value Date    EGFR 33 09/07/2021    EGFR 43 08/27/2021    EGFR 39 08/26/2021    CREATININE 1 90 (H) 09/07/2021    CREATININE 1 55 (H) 08/27/2021    CREATININE 1 67 (H) 08/26/2021     Cr 1 90 on admission  Baseline approximately 1 4-1 5, was 1 55 on discharge on 8/27  Avoid nephrotoxins and hypotension  Patient was recently given a 5-day course of Lasix for LE edema which has since resolved, possibly contributing to SHANTI ? Trend Cr in response to gentle IVF    Chronic obstructive pulmonary disease with acute exacerbation (HCC)  Assessment & Plan  Continue Breo, albuterol inhaler PRN  No evidence of acute exacerbation on admission    Dyslipidemia  Assessment & Plan  Continue atorvastatin 20 mg daily     Essential hypertension  Assessment & Plan  Continue Lopressor 25 mg Q12      VTE Prophylaxis: Pharmacologic VTE Prophylaxis contraindicated due to hematuria  / sequential compression device   Code Status: level 1  POLST: POLST is not applicable to this patient  Discussion with family: Yes, patient's wife present in the ED     Anticipated Length of Stay:  Patient will be admitted on an Observation basis with an anticipated length of stay of  < 2 midnights  Justification for Hospital Stay: hematuria, currently running CBI    Total Time for Visit, including Counseling / Coordination of Care: 30 minutes  Greater than 50% of this total time spent on direct patient counseling and coordination of care  Chief Complaint:   hematuria    History of Present Illness:    Matt Martinez is a 66 y o  male who presents with PMH of HTN, HLD, COPD, prostate cancer s/p radiation and chemotherapy, Hx 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 with multiple admissions for the same and bilateral nephrostomy tubes which have been capped  He presented to the ED today due to gross hematuria  He also admits to some lower abdominal pain but denies any fevers, chills  He was recently admitted from 8/17-8/27 for similar complaints  He had a Sumner placed at that time as well and CBI was initiated and subsequently underwent cystoscopy with stent placement and developed fevers and leukocytosis  He was started on Zosyn and transitioned to cefpodoxime 200 mg Q 12 through 8/31   He had the stent removed outpatient on Friday 9/03 and on [place] morning started with some hematuria  The hematuria continued through to Monday and he contacted urology, but since the hematuria continued he came to the ED for further evaluation  CBI was again initiated in the ED and patient is admitted for further management  Review of Systems:    Review of Systems   Constitutional: Negative for chills, fatigue and fever  HENT: Negative for congestion, rhinorrhea and sore throat  Respiratory: Negative for cough, shortness of breath and wheezing  Cardiovascular: Negative for chest pain, palpitations and leg swelling  Gastrointestinal: Positive for abdominal pain  Negative for abdominal distention, diarrhea, nausea and vomiting  Genitourinary: Positive for hematuria  Negative for decreased urine volume, dysuria, flank pain, frequency, penile pain and urgency  Musculoskeletal: Negative for gait problem and myalgias  Skin: Negative for rash and wound  Neurological: Negative for dizziness, weakness, light-headedness, numbness and headaches          Past Medical and Surgical History:     Past Medical History:   Diagnosis Date    Cancer [facility])     prostate    COPD [facility]chronic obstructive pulmonary disease) ([facility] [address] )     Hyperlipidemia     Hypertension        Past Surgical History:   Procedure Laterality Date    APPENDECTOMY      FL RETROGRADE PYELOGRAM  4/14/2021    HERNIA REPAIR      IR NEPHROSTOMY TUBE CHECK AND/OR REMOVAL  7/8/2021    IR NEPHROSTOMY TUBE CHECK/CHANGE/REPOSITION/REINSERTION/UPSIZE  5/10/2021    IR NEPHROSTOMY TUBE CHECK/CHANGE/REPOSITION/REINSERTION/UPSIZE  8/4/2021    IR NEPHROSTOMY TUBE PLACEMENT  5/7/2021    AK CYSTOURETHROSCOPY W/IRRIG & EVAC CLOTS Bilateral 4/14/2021    Procedure: CYSTOSCOPY EVACUATION OF CLOTS bilateral retrograde pyelograms possible TURBT bladder biopsy;  Surgeon: Hari Peacock MD;  Location: [facility];  Service: Urology    AK CYSTOURETHROSCOPY W/IRRIG & EVAC CLOTS N/A 4/24/2021    Procedure: CYSTOSCOPY EVACUATION OF CLOTS fulguration;  Surgeon: David Cassidy MD;  Location: [facility];  Service: Urology    DC CYSTOURETHROSCOPY W/IRRIG & EVAC CLOTS N/A 2021    Procedure: CYSTOSCOPY EVACUATION OF CLOTS BILATERAL ANTIROGRADES NEPHROSTOMY Manan Harrell ;  Surgeon: David Cassidy MD;  Location: [address];  Service: Urology    DC CYSTOURETHROSCOPY W/IRRIG & EVAC CLOTS N/A 2021    Procedure: CYSTOSCOPY, RIGHT RETROGRADE, EVACUATION OF CLOTS, BLADDER BIOPSY X2 AND FULGERATION OF BLADDER LESIONS, URETEROSCOPY, 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;  Surgeon: David Cassidy MD;  Location: [facility];  Service: Urology    [address]      right shoulder       Meds/Allergies:    Prior to Admission medications    Medication Sig Start Date End Date Taking? Authorizing Provider   albuterol (PROVENTIL HFA,VENTOLIN HFA) 90 mcg/act inhaler Inhale 2 puffs every 4 (four) hours as needed for wheezing 17  Yes Kelly Rivera DO   atorvastatin (LIPITOR) 20 mg tablet Take 20 mg by mouth daily at bedtime   Yes Historical Provider, MD   cyanocobalamin (VITAMIN B-12) 1000 MCG tablet Take 0 5 tablets (500 mcg total) by mouth daily 8/3/21  Yes Erasmo Pina MD   ferrous sulfate 324 (65 Fe) mg Take 1 tablet (324 mg total) by mouth daily before breakfast 21  Yes Erasmo Pina MD   fluticasone-vilanterol (Breo Ellipta) 200-25 MCG/INH inhaler Inhale 1 puff daily at bedtime Rinse mouth after use     Yes Historical Provider, MD   metoprolol tartrate (LOPRESSOR) 25 mg tablet Take 25 mg by mouth every 12 (twelve) hours    Yes Historical Provider, MD       Allergies: No Known Allergies    Social History:    Social History     Substance and Sexual Activity   Alcohol Use Never     Social History     Tobacco Use   Smoking Status Former Smoker    Packs/day: 0 50    Years: 50 00    Pack years: 25 00    Quit date: 2017    Years since quittin 0 Smokeless Tobacco Never Used   Tobacco Comment    quit one month ago     Social History     Substance and Sexual Activity   Drug Use No       Family History:    Family History   Problem Relation Age of Onset    Diabetes Mother     Stroke Mother     Diabetes Brother     Stroke Brother        Physical Exam:     Vitals:   Blood Pressure: 158/73 (09/06/21 2349)  Pulse: 82 (09/06/21 2346)  Temperature: 98 1 °F (36 7 °C) (09/06/21 2346)  Temp Source: Tympanic (09/06/21 2346)  Respirations: 18 (09/06/21 2346)  Weight - Scale: 98 4 kg (217 lb) (09/06/21 2346)  SpO2: 94 % (09/06/21 2349)    Physical Exam  Vitals reviewed  Constitutional:       General: He is not in acute distress  Appearance: He is well-developed  He is not ill-appearing  HENT:      Head: Normocephalic and atraumatic  Cardiovascular:      Rate and Rhythm: Normal rate and regular rhythm  Heart sounds: Normal heart sounds  No murmur heard  No gallop  Pulmonary:      Effort: Pulmonary effort is normal  No respiratory distress  Breath sounds: Normal breath sounds  No wheezing, rhonchi or rales  Abdominal:      General: Bowel sounds are normal  There is no distension  Palpations: Abdomen is soft  Tenderness: There is abdominal tenderness (mild suprapubic TTP)  There is no guarding  Musculoskeletal:         General: No tenderness  Right lower leg: No edema  Left lower leg: No edema  Skin:     General: Skin is warm and dry  Findings: No erythema or rash  Comments: B/l nephrostomy tubes in place, no surrounding erythema     Neurological:      Mental Status: He is alert and oriented to person, place, and time  Psychiatric:         Mood and Affect: Mood normal          Behavior: Behavior normal          Additional Data:     Lab Results: I have personally reviewed pertinent reports        Results from last 7 days   Lab Units 09/07/21  0043   WBC Thousand/uL 12 23*   HEMOGLOBIN g/dL 9 2*   HEMATOCRIT % 31 1*   PLATELETS Thousands/uL 322   NEUTROS PCT % 73   LYMPHS PCT % 15   MONOS PCT % 7   EOS PCT % 4     Results from last 7 days   Lab Units 09/07/21  0043   SODIUM mmol/L 139   POTASSIUM mmol/L 4 3   CHLORIDE mmol/L 102   CO2 mmol/L 28   BUN mg/dL 31*   CREATININE mg/dL 1 90*   ANION GAP mmol/L 9   CALCIUM mg/dL 8 9   ALBUMIN g/dL 3 1*   TOTAL BILIRUBIN mg/dL 0 37   ALK PHOS U/L 97   ALT U/L 21   AST U/L 18   GLUCOSE RANDOM mg/dL 109                       Imaging: I have personally reviewed pertinent reports  No orders to display       EKG, Pathology, and Other Studies Reviewed on Admission:   · EKG: none on admission    Allscripts / Epic Records Reviewed: Yes     ** Please Note: This note has been constructed using a voice recognition system   **

## 2021-09-07 NOTE — ASSESSMENT & PLAN NOTE
Lab Results   Component Value Date    EGFR 33 09/07/2021    EGFR 43 08/27/2021    EGFR 39 08/26/2021    CREATININE 1 90 (H) 09/07/2021    CREATININE 1 55 (H) 08/27/2021    CREATININE 1 67 (H) 08/26/2021     Cr 1 90 on admission  Baseline approximately 1 4-1 5, was 1 55 on discharge on 8/27  Avoid nephrotoxins and hypotension  Patient was recently given a 5-day course of Lasix for LE edema which has since resolved, possibly contributing to SHANTI ?    Trend Cr in response to gentle IVF

## 2021-09-07 NOTE — PLAN OF CARE
Problem: Potential for Falls  Goal: Patient will remain free of falls  Description: INTERVENTIONS:  - Educate patient/family on patient safety including physical limitations  - Instruct patient to call for assistance with activity   - Consult OT/PT to assist with strengthening/mobility   - Keep Call bell within reach  - Keep bed low and locked with side rails adjusted as appropriate  - Keep care items and personal belongings within reach  - Initiate and maintain comfort rounds  - Make Fall Risk Sign visible to staff  - Offer Toileting every 2 Hours, in advance of need  - Initiate/Maintain 1alarm  - Obtain necessary fall risk management equipment: call bell use, bed alarm  - Apply yellow socks and bracelet for high fall risk patients  - Consider moving patient to room near nurses station  Outcome: Progressing     Problem: PAIN - ADULT  Goal: Verbalizes/displays adequate comfort level or baseline comfort level  Description: Interventions:  - Encourage patient to monitor pain and request assistance  - Assess pain using appropriate pain scale  - Administer analgesics based on type and severity of pain and evaluate response  - Implement non-pharmacological measures as appropriate and evaluate response  - Consider cultural and social influences on pain and pain management  - Notify physician/advanced practitioner if interventions unsuccessful or patient reports new pain  Outcome: Progressing     Problem: INFECTION - ADULT  Goal: Absence or prevention of progression during hospitalization  Description: INTERVENTIONS:  - Assess and monitor for signs and symptoms of infection  - Monitor lab/diagnostic results  - Monitor all insertion sites, i e  indwelling lines, tubes, and drains  - Monitor endotracheal if appropriate and nasal secretions for changes in amount and color  - [place] appropriate cooling/warming therapies per order  - Administer medications as ordered  - Instruct and encourage patient and family to use good hand hygiene technique  - Identify and instruct in appropriate isolation precautions for identified infection/condition  Outcome: Progressing  Goal: Absence of fever/infection during neutropenic period  Description: INTERVENTIONS:  - Monitor WBC    Outcome: Progressing     Problem: SAFETY ADULT  Goal: Patient will remain free of falls  Description: INTERVENTIONS:  - Educate patient/family on patient safety including physical limitations  - Instruct patient to call for assistance with activity   - Consult OT/PT to assist with strengthening/mobility   - Keep Call bell within reach  - Keep bed low and locked with side rails adjusted as appropriate  - Keep care items and personal belongings within reach  - Initiate and maintain comfort rounds  - Make Fall Risk Sign visible to staff  - Offer Toileting every 3 Hours, in advance of need  - Initiate/Maintain 3alarm  - Obtain necessary fall risk management equipment: call bell, bed alarm  - Apply yellow socks and bracelet for high fall risk patients  - Consider moving patient to room near nurses station  Outcome: Progressing  Goal: Maintain or return to baseline ADL function  Description: INTERVENTIONS:  -  Assess patient's ability to carry out ADLs; assess patient's baseline for ADL function and identify physical deficits which impact ability to perform ADLs (bathing, care of mouth/teeth, toileting, grooming, dressing, etc )  - Assess/evaluate cause of self-care deficits   - Assess range of motion  - Assess patient's mobility; develop plan if impaired  - Assess patient's need for assistive devices and provide as appropriate  - Encourage maximum independence but intervene and supervise when necessary  - Involve family in performance of ADLs  - Assess for home care needs following discharge   - Consider OT consult to assist with ADL evaluation and planning for discharge  - Provide patient education as appropriate  Outcome: Progressing  Goal: Maintains/Returns to pre admission functional level  Description: INTERVENTIONS:  - Perform BMAT or MOVE assessment daily    - Set and communicate daily mobility goal to care team and patient/family/caregiver  - Collaborate with rehabilitation services on mobility goals if consulted  - Perform Range of Motion 3 times a day  - Reposition patient every 3 hours    - Dangle patient 3 times a day  - Stand patient 3 times a day  - Ambulate patient 3 times a day  - Out of bed to chair 3 times a day   - Out of bed for meals 3 times a day  - Out of bed for toileting  - Record patient progress and toleration of activity level   Outcome: Progressing

## 2021-09-07 NOTE — ED PROVIDER NOTES
History  Chief Complaint   Patient presents with    Blood in Urine     patient c/o blood in urine, passing clots, still passing urine and clots, spoke to Dr Spencer Mancuso and was told to drink water to see if it clears up     Patient presents for evaluation of blood in his urine and passing clots  States the blood started yesterday and continued today  Spoke to Dr Spencer Mancuso this morning was told to increase his fluid intake trach flush out his urine  Patient also has bilateral nephrostomy Tuesday been clamped for last couple days he tried the CP can urinate from below  Patient recent hospital admission for the same and 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 Taking? albuterol (PROVENTIL HFA,VENTOLIN HFA) 90 mcg/act inhaler 9/7/2021 at Unknown time  No Yes   Sig: Inhale 2 puffs every 4 (four) hours as needed for wheezing   atorvastatin (LIPITOR) 20 mg tablet 9/6/2021 at Unknown time  Yes Yes   Sig: Take 20 mg by mouth daily at bedtime   cyanocobalamin (VITAMIN B-12) 1000 MCG tablet 9/6/2021 at Unknown time  No Yes   Sig: Take 0 5 tablets (500 mcg total) by mouth daily   ferrous sulfate 324 (65 Fe) mg 9/6/2021 at Unknown time  No Yes   Sig: Take 1 tablet (324 mg total) by mouth daily before breakfast   fluticasone-vilanterol (Breo Ellipta) 200-25 MCG/INH inhaler 9/6/2021 at Unknown time Self Yes Yes   Sig: Inhale 1 puff daily at bedtime Rinse mouth after use     metoprolol tartrate (LOPRESSOR) 25 mg tablet 9/6/2021 at Unknown time  Yes Yes   Sig: Take 25 mg by mouth every 12 (twelve) hours       Facility-Administered Medications: None       Past Medical History:   Diagnosis Date    Cancer [facility])     prostate    COPD (chronic obstructive pulmonary disease) (HCC)     Hyperlipidemia     Hypertension        Past Surgical History:   Procedure Laterality Date    APPENDECTOMY      FL RETROGRADE PYELOGRAM  4/14/2021    HERNIA REPAIR      IR NEPHROSTOMY TUBE CHECK AND/OR REMOVAL  7/8/2021    IR NEPHROSTOMY TUBE CHECK/CHANGE/REPOSITION/REINSERTION/UPSIZE  5/10/2021    IR NEPHROSTOMY TUBE CHECK/CHANGE/REPOSITION/REINSERTION/UPSIZE  8/4/2021    IR NEPHROSTOMY TUBE PLACEMENT  5/7/2021    OH CYSTOURETHROSCOPY W/IRRIG & EVAC CLOTS Bilateral 4/14/2021    Procedure: CYSTOSCOPY EVACUATION OF CLOTS bilateral retrograde pyelograms possible TURBT bladder biopsy;  Surgeon: Alan Cervantes MD;  Location: [place] [facility];  Service: Urology    [place] CYSTOURETHROSCOPY W/IRRIG & EVAC CLOTS N/A 4/24/2021    Procedure: CYSTOSCOPY EVACUATION OF CLOTS fulguration;  Surgeon: Alan Cervantes MD;  Location: [address] [place] [address];  Service: Urology    OH CYSTOURETHROSCOPY W/IRRIG & EVAC CLOTS N/A 7/8/2021    Procedure: CYSTOSCOPY EVACUATION OF CLOTS BILATERAL ANTIROGRADES NEPHROSTOMY Cherelle Gibson ;  Surgeon: Alan Cervantes MD;  Location: [address] [place] [address];  Service: Urology    OH CYSTOURETHROSCOPY W/IRRIG & EVAC CLOTS N/A 8/22/2021    Procedure: CYSTOSCOPY, RIGHT RETROGRADE, EVACUATION OF CLOTS, BLADDER BIOPSY X2 AND FULGERATION OF BLADDER LESIONS, URETEROSCOPY, 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;  Surgeon: Alan Cervantes MD;  Location: [facility];  Service: Urology    PROSTATECTOMY      ROTATOR CUFF REPAIR      right shoulder       Family History   Problem Relation Age of Onset    Diabetes Mother     Stroke Mother     Diabetes Brother     Stroke Brother      I have reviewed and agree with the history as documented      E-Cigarette/Vaping    E-Cigarette Use Former User      E-Cigarette/Vaping Substances    Nicotine No     THC No     CBD No     Flavoring No     Other No     Unknown No      Social History Tobacco Use    Smoking status: Former Smoker     Packs/day: 0 50     Years: 50 00     Pack years: 25 00     Quit date: 2017     Years since quittin 0    Smokeless tobacco: Never Used    Tobacco comment: quit one month ago   Vaping Use    Vaping Use: Former   Substance Use Topics    Alcohol use: Never    Drug use: No       Review of Systems   Constitutional: Negative for chills and fever  HENT: Negative for ear pain and sore throat  Eyes: Negative for pain and visual disturbance  Respiratory: Negative for cough and shortness of breath  Cardiovascular: Negative for chest pain and palpitations  Gastrointestinal: Negative for abdominal pain and vomiting  Genitourinary: Positive for hematuria  Negative for dysuria  Musculoskeletal: Negative for arthralgias and back pain  Skin: Negative for color change and rash  Neurological: Negative for seizures and syncope  All other systems reviewed and are negative  Physical Exam  Physical Exam  Vitals and nursing note reviewed  Constitutional:       General: He is not in acute distress  Appearance: Normal appearance  HENT:      Head: Atraumatic  Right Ear: External ear normal       Left Ear: External ear normal       Nose: Nose normal       Mouth/Throat:      Mouth: Mucous membranes are moist       Pharynx: Oropharynx is clear  Eyes:      General: No scleral icterus  Conjunctiva/sclera: Conjunctivae normal    Cardiovascular:      Rate and Rhythm: Normal rate and regular rhythm  Pulses: Normal pulses  Pulmonary:      Effort: Pulmonary effort is normal  No respiratory distress  Breath sounds: Normal breath sounds  Abdominal:      General: Abdomen is flat  Bowel sounds are normal  There is no distension  Palpations: Abdomen is soft  Tenderness: There is no abdominal tenderness  There is no guarding or rebound  Musculoskeletal:         General: No deformity  Normal range of motion     Skin: Capillary Refill: Capillary refill takes less than 2 seconds  Findings: No rash  Neurological:      General: No focal deficit present  Mental Status: He is alert and oriented to person, place, and time           Vital Signs  ED Triage Vitals   Temperature Pulse Respirations Blood Pressure SpO2   09/06/21 2346 09/06/21 2346 09/06/21 2346 09/06/21 2349 09/06/21 2349   98 1 °F (36 7 °C) 82 18 158/73 94 %      Temp Source Heart Rate Source Patient Position - Orthostatic VS BP Location FiO2 (%)   09/06/21 2346 09/06/21 2346 09/07/21 0253 09/07/21 0253 --   Tympanic Monitor Lying Right arm       Pain Score       09/06/21 2346       6           Vitals:    09/06/21 2346 09/06/21 2349 09/07/21 0253   BP:  158/73 162/73   Pulse: 82  80   Patient Position - Orthostatic VS:   Lying         Visual Acuity      ED Medications  Medications   atorvastatin (LIPITOR) tablet 20 mg (has no administration in time range)   metoprolol tartrate (LOPRESSOR) tablet 25 mg (has no administration in time range)   albuterol (PROVENTIL HFA,VENTOLIN HFA) inhaler 2 puff (has no administration in time range)   ferrous sulfate tablet 325 mg (has no administration in time range)   fluticasone-vilanterol (BREO ELLIPTA) 200-25 MCG/INH inhaler 1 puff (has no administration in time range)   cyanocobalamin (VITAMIN B-12) tablet 500 mcg (has no administration in time range)   acetaminophen (TYLENOL) tablet 650 mg (has no administration in time range)   calcium carbonate (TUMS) chewable tablet 1,000 mg (has no administration in time range)   polyethylene glycol (MIRALAX) packet 17 g (has no administration in time range)   ondansetron (ZOFRAN) injection 4 mg (has no administration in time range)   sodium chloride 0 9 % infusion (has no administration in time range)   belladonna-opium (B&O SUPPOSITORY) 16 2-30 mg suppository 1 suppository (has no administration in time range)   cefepime (MAXIPIME) 500 mg in sodium chloride 0 9 % 50 mL IVPB (has no administration in time range)   sodium chloride 0 9 % bolus 1,000 mL (0 mL Intravenous Stopped 9/7/21 0245)   morphine (PF) 4 mg/mL injection 4 mg (4 mg Intravenous Given 9/7/21 0128)       Diagnostic Studies  Results Reviewed     Procedure Component Value Units Date/Time    Comprehensive metabolic panel [[id number]]  (Abnormal) Collected: 09/07/21 0043    Lab Status: Final result Specimen: Blood from Arm, Left Updated: 09/07/21 0116     Sodium 139 mmol/L      Potassium 4 3 mmol/L      Chloride 102 mmol/L      CO2 28 mmol/L      ANION GAP 9 mmol/L      BUN 31 mg/dL      Creatinine 1 90 mg/dL      Glucose 109 mg/dL      Calcium 8 9 mg/dL      Corrected Calcium 9 6 mg/dL      AST 18 U/L      ALT 21 U/L      Alkaline Phosphatase 97 U/L      Total Protein 7 7 g/dL      Albumin 3 1 g/dL      Total Bilirubin 0 37 mg/dL      eGFR 33 ml/min/1 73sq m     Narrative:      [place] guidelines for Chronic Kidney Disease (CKD):     Stage 1 with normal or high GFR (GFR > 90 mL/min/1 73 square meters)    Stage 2 Mild CKD (GFR = 60-89 mL/min/1 73 square meters)    Stage 3A Moderate CKD (GFR = 45-59 mL/min/1 73 square meters)    Stage 3B Moderate CKD (GFR = 30-44 mL/min/1 73 square meters)    Stage 4 Severe CKD (GFR = 15-29 mL/min/1 73 square meters)    Stage 5 End Stage CKD (GFR <15 mL/min/1 73 square meters)  Note: GFR calculation is accurate only with a steady state creatinine    Urine culture [[id number]]     Lab Status: No result Specimen: Urine, Straight Cath     UA (URINE) with reflex to Scope [[id number]]  (Abnormal) Collected: 09/07/21 0043    Lab Status: Final result Specimen: Urine, Clean Catch Updated: 09/07/21 0103     Color, UA Dark Eva     Clarity, UA Slightly Cloudy     Specific [place], UA 1 020     pH, UA 6 0     Leukocytes, UA Moderate     Nitrite, UA Positive     Protein, UA >=300 mg/dl      Glucose, UA Negative mg/dl      Ketones, UA Trace mg/dl      Urobilinogen, UA 1 0 E U /dl Bilirubin, UA Interference- unable to analyze     Blood, UA Large    Urine Microscopic [[id number]]  (Abnormal) Collected: 09/07/21 0043    Lab Status: Final result Specimen: Urine, Clean Catch Updated: 09/07/21 0103     RBC, UA Innumerable /hpf      WBC, UA 10-20 /hpf      Epithelial Cells None Seen /hpf      Bacteria, UA Moderate /hpf     CBC and differential [[id number]]  (Abnormal) Collected: 09/07/21 0043    Lab Status: Final result Specimen: Blood from Arm, Left Updated: 09/07/21 0051     WBC 12 23 Thousand/uL      RBC 3 80 Million/uL      Hemoglobin 9 2 g/dL      Hematocrit 31 1 %      MCV 82 fL      MCH 24 2 pg      MCHC 29 6 g/dL      RDW 20 9 %      MPV 9 3 fL      Platelets 411 Thousands/uL      nRBC 0 /100 WBCs      Neutrophils Relative 73 %      Immat GRANS % 1 %      Lymphocytes Relative 15 %      Monocytes Relative 7 %      Eosinophils Relative 4 %      Basophils Relative 0 %      Neutrophils Absolute 8 96 Thousands/µL      Immature Grans Absolute 0 07 Thousand/uL      Lymphocytes Absolute 1 86 Thousands/µL      Monocytes Absolute 0 84 Thousand/µL      Eosinophils Absolute 0 45 Thousand/µL      Basophils Absolute 0 05 Thousands/µL                  No orders to display              Procedures  Procedures         ED Course                                           MDM  Number of Diagnoses or Management Options  Hematuria  Diagnosis management comments: Pulse ox 97 percent on room air indicating adequate oxygenation  Three way Sumner catheter placed to CBI started  Patient also hydrated with IV fluids  Hemoglobin renal function stable compared to prior  Reach out to Dr Cindy Langston patient's she urologist recommended observation under medical service         Amount and/or Complexity of Data Reviewed  Clinical lab tests: ordered and reviewed  Decide to obtain previous medical records or to obtain history from someone other than the patient: yes  Review and summarize past medical records: yes  Discuss the patient with other providers: yes    Patient Progress  Patient progress: stable      Disposition  Final diagnoses:   Hematuria     Time reflects when diagnosis was documented in both MDM as applicable and the Disposition within this note     Time User Action Codes Description Comment    9/7/2021  2:16 AM Cristo Carreon Add [R31 9] Hematuria       ED Disposition     ED Disposition Condition Date/Time Comment    Admit Stable Tue Sep 7, 2021  2:16 AM Case was discussed with KIMBERLEY Cuadra and the patient's admission status was agreed to be Admission Status: observation status to the service of Dr Jessica Diaz  Follow-up Information    None         Current Discharge Medication List      CONTINUE these medications which have NOT CHANGED    Details   albuterol (PROVENTIL HFA,VENTOLIN HFA) 90 mcg/act inhaler Inhale 2 puffs every 4 (four) hours as needed for wheezing  Qty: 1 Inhaler, Refills: 0      atorvastatin (LIPITOR) 20 mg tablet Take 20 mg by mouth daily at bedtime      cyanocobalamin (VITAMIN B-12) 1000 MCG tablet Take 0 5 tablets (500 mcg total) by mouth daily  Qty: 30 tablet, Refills: 0    Associated Diagnoses: Anemia, unspecified type      ferrous sulfate 324 (65 Fe) mg Take 1 tablet (324 mg total) by mouth daily before breakfast  Qty: 30 tablet, Refills: 0    Associated Diagnoses: Anemia, unspecified type      fluticasone-vilanterol (Breo Ellipta) 200-25 MCG/INH inhaler Inhale 1 puff daily at bedtime Rinse mouth after use  metoprolol tartrate (LOPRESSOR) 25 mg tablet Take 25 mg by mouth every 12 (twelve) hours            No discharge procedures on file      PDMP Review       Value Time User    PDMP Reviewed  Yes 7/13/2021 10:25 AM Linnette Hawley          ED Provider  Electronically Signed by           Shan Ceja DO  09/07/21 0059

## 2021-09-07 NOTE — ASSESSMENT & PLAN NOTE
UA grossly abnormal, patient recently treated for UTI with hematuria  Previous culture grew Pseudomonas, sensitive to cefepime - will continue  F/u urine culture results   Afebrile since his recent discharge  Appreciate urology recommendations

## 2021-09-07 NOTE — PLAN OF CARE
Problem: Potential for Falls  Goal: Patient will remain free of falls  Description: INTERVENTIONS:  - Educate patient/family on patient safety including physical limitations  - Instruct patient to call for assistance with activity   - Consult OT/PT to assist with strengthening/mobility   - Keep Call bell within reach  - Keep bed low and locked with side rails adjusted as appropriate  - Keep care items and personal belongings within reach  - Initiate and maintain comfort rounds  - Make Fall Risk Sign visible to staff  - Offer Toileting every 2 Hours, in advance of need  - Initiate/Maintain bed alarm  - Obtain necessary fall risk management equipment: yes  - Apply yellow socks and bracelet for high fall risk patients  - Consider moving patient to room near nurses station  Outcome: Progressing     Problem: PAIN - ADULT  Goal: Verbalizes/displays adequate comfort level or baseline comfort level  Description: Interventions:  - Encourage patient to monitor pain and request assistance  - Assess pain using appropriate pain scale  - Administer analgesics based on type and severity of pain and evaluate response  - Implement non-pharmacological measures as appropriate and evaluate response  - Consider cultural and social influences on pain and pain management  - Notify physician/advanced practitioner if interventions unsuccessful or patient reports new pain  Outcome: Progressing     Problem: INFECTION - ADULT  Goal: Absence or prevention of progression during hospitalization  Description: INTERVENTIONS:  - Assess and monitor for signs and symptoms of infection  - Monitor lab/diagnostic results  - Monitor all insertion sites, i e  indwelling lines, tubes, and drains  - Monitor endotracheal if appropriate and nasal secretions for changes in amount and color  - [place] appropriate cooling/warming therapies per order  - Administer medications as ordered  - Instruct and encourage patient and family to use good hand hygiene technique  - Identify and instruct in appropriate isolation precautions for identified infection/condition  Outcome: Progressing  Goal: Absence of fever/infection during neutropenic period  Description: INTERVENTIONS:  - Monitor WBC    Outcome: Progressing     Problem: SAFETY ADULT  Goal: Patient will remain free of falls  Description: INTERVENTIONS:  - Educate patient/family on patient safety including physical limitations  - Instruct patient to call for assistance with activity   - Consult OT/PT to assist with strengthening/mobility   - Keep Call bell within reach  - Keep bed low and locked with side rails adjusted as appropriate  - Keep care items and personal belongings within reach  - Initiate and maintain comfort rounds  - Make Fall Risk Sign visible to staff  - Offer Toileting every 2 Hours, in advance of need  - Initiate/Maintain bed alarm  - Obtain necessary fall risk management equipment: yes  - Apply yellow socks and bracelet for high fall risk patients  - Consider moving patient to room near nurses station  Outcome: Progressing  Goal: Maintain or return to baseline ADL function  Description: INTERVENTIONS:  -  Assess patient's ability to carry out ADLs; assess patient's baseline for ADL function and identify physical deficits which impact ability to perform ADLs (bathing, care of mouth/teeth, toileting, grooming, dressing, etc )  - Assess/evaluate cause of self-care deficits   - Assess range of motion  - Assess patient's mobility; develop plan if impaired  - Assess patient's need for assistive devices and provide as appropriate  - Encourage maximum independence but intervene and supervise when necessary  - Involve family in performance of ADLs  - Assess for home care needs following discharge   - Consider OT consult to assist with ADL evaluation and planning for discharge  - Provide patient education as appropriate  Outcome: Progressing  Goal: Maintains/Returns to pre admission functional level  Description: INTERVENTIONS:  - Perform BMAT or MOVE assessment daily    - Set and communicate daily mobility goal to care team and patient/family/caregiver  - Collaborate with rehabilitation services on mobility goals if consulted  - Perform Range of Motion 3 times a day  - Reposition patient every 2 hours  - Dangle patient 3 times a day  - Stand patient 3 times a day  - Ambulate patient 3 times a day  - Out of bed to chair 3 times a day   - Out of bed for meals 3 times a day  - Out of bed for toileting  - Record patient progress and toleration of activity level   Outcome: Progressing     Problem: DISCHARGE PLANNING  Goal: Discharge to home or other facility with appropriate resources  Description: INTERVENTIONS:  - Identify barriers to discharge w/patient and caregiver  - Arrange for needed discharge resources and transportation as appropriate  - Identify discharge learning needs (meds, wound care, etc )  - Arrange for interpretive services to assist at discharge as needed  - Refer to Case Management Department for coordinating discharge planning if the patient needs post-hospital services based on physician/advanced practitioner order or complex needs related to functional status, cognitive ability, or social support system  Outcome: Progressing     Problem: Knowledge Deficit  Goal: Patient/family/caregiver demonstrates understanding of disease process, treatment plan, medications, and discharge instructions  Description: Complete learning assessment and assess knowledge base    Interventions:  - Provide teaching at level of understanding  - Provide teaching via preferred learning methods  Outcome: Progressing

## 2021-09-07 NOTE — ASSESSMENT & PLAN NOTE
Patient presents with hematuria x1 day  He did touch base with urology outpatient and tried increasing fluid intake  He continued with consistent hematuria and presented to the ED  Extensive urologic Hx with cystitis, left trigone bladder floor lesion, right radiation ureteritis/right ureteral lesion/right ureteral bleeding per recent OR findings  Recently had stent removed in the office on Friday  Bilateral nephrostomy tubes are capped    - He complains of some lower abdominal pain and some pain after Sumner was inserted, currently CBI is running  - Ed discussed with urology, will continue CBI and monitor  - Further urology recommendations appreciated

## 2021-09-07 NOTE — PROGRESS NOTES
[address] Internal Medicine Progress Note  Patient: Oscar Noyola 66 y o  male   MRN: [id number]  PCP: Luis Muhammad MD  Unit/Bed#: [address] [place] [address] Encounter: [id number]  Date Of Visit: 09/07/21    Problem List:    Principal Problem:    Hematuria  Active Problems:    Anemia    Chronic obstructive pulmonary disease with acute exacerbation ([address] )    UTI (urinary tract infection)    Acute kidney injury superimposed on CKD [facility])    Essential hypertension    Dyslipidemia      Assessment & Plan:    * Hematuria  Assessment & Plan  Patient presents with recurrent hematuria  He did touch base with urology outpatient and tried increasing fluid intake  He continued with consistent hematuria and presented to the ED  Extensive urologic Hx with radiation cystitis, left trigone bladder floor lesion, right radiation ureteritis/right ureteral lesion/right ureteral bleeding per recent OR findings  Remains with recurrent hematuria despite bilateral nephrostomy and hyperbaric treatment for radiation cystitis  Prior cytology, biopsy negative for malignancy  Recently had stent removed in the office on Friday  Bilateral nephrostomy tubes are capped  · Discussed with Urology, will continue CBI and monitor  · Follow-up urology evaluation    Acute kidney injury superimposed on CKD [facility])  Assessment & Plan  Lab Results   Component Value Date    EGFR 33 09/07/2021    EGFR 43 08/27/2021    EGFR 39 08/26/2021    CREATININE 1 90 (H) 09/07/2021    CREATININE 1 55 (H) 08/27/2021    CREATININE 1 67 (H) 08/26/2021     Cr 1 90 on admission  Baseline approximately 1 4-1 5, was 1 55 on discharge on 8/27  Avoid nephrotoxins and hypotension  Patient was recently given a 5-day course of Lasix for LE edema which has since resolved, possibly contributing to SHANTI ?    · Continue IV fluid while NPO  · Monitor intake output    UTI (urinary tract infection)  Assessment & Plan  UA grossly abnormal, patient recently treated for UTI with hematuria  Previous culture grew Pseudomonas, sensitive to cefepime  Denies any fever chills but reports lower abdominal discomfort  · Follow-up urine culture  · Continue cefepime  · Consider repeat imaging if febrile or worsening of symptoms    Chronic obstructive pulmonary disease with acute exacerbation (HCC)  Assessment & Plan  Continue Breo, albuterol inhaler q 6 hours and PRN  No evidence of acute exacerbation on admission    Anemia  Assessment & Plan  Chronic anemia in the setting of ongoing hematuria, iron deficiency  Continue iron supplements  Hg 9 2 on admission, above recent baseline, recently required transfusion of 2 U during previous admission  Hemoglobin somewhat lower at 7 8 grams/deciliter today with ongoing hematuria thigh improving and IV fluids  · Continue to monitor and transfuse as needed for Hg < 7    Essential hypertension  Assessment & Plan  Continue Lopressor 25 mg Q12    Dyslipidemia  Assessment & Plan  Continue atorvastatin 20 mg daily           VTE Pharmacologic Prophylaxis:   High Risk (Score >/= 5) - Pharmacological DVT Prophylaxis Contraindicated  Sequential Compression Devices Ordered  Patient Centered Rounds: I performed bedside rounds with nursing staff today  Discussions with Specialists or Other Care Team Provider:  Dr Tequila Flores    Education and Discussions with Family / Patient: Patient declined call to   Time Spent for Care: 45 minutes  More than 50% of total time spent on counseling and coordination of care as described above  Current Length of Stay: 0 day(s)  Current Patient Status: Observation   Certification Statement: The patient will continue to require additional inpatient hospital stay due to Recurrent hematuria requiring CBI  Discharge Plan: Anticipate discharge in 24-48 hrs to home      Code Status: Level 1 - Full Code    Subjective:   Presented with recurrent hematuria  Patient reports that he underwent removal of the right nephroureteral stent last Friday  Subsequently he started with recurrent hematuria, was advised to clamp bilateral nephrostomies but hematuria continued   Patient was referred to ED for further evaluation by urologist  Patient denied any fever chills but reported lower abdominal discomfort    Objective:     Vitals:   Temp (24hrs), Av 5 °F (36 9 °C), Min:98 1 °F (36 7 °C), Max:99 3 °F (37 4 °C)    Temp:  [98 1 °F (36 7 °C)-99 3 °F (37 4 °C)] 99 3 °F (37 4 °C)  HR:  [80-95] 95  Resp:  [16-20] 20  BP: (135-162)/(73) 143/73  SpO2:  [94 %-97 %] 95 %  Body mass index is 30 27 kg/m²  Input and Output Summary (last 24 hours): Intake/Output Summary (Last 24 hours) at 2021 1104  Last data filed at 2021 1047  Gross per 24 hour   Intake 3300 ml   Output 3250 ml   Net 50 ml       Physical Exam:   Physical Exam  Constitutional:       General: He is not in acute distress  HENT:      Head: Normocephalic and atraumatic  Cardiovascular:      Rate and Rhythm: Normal rate  Pulmonary:      Effort: Pulmonary effort is normal  No respiratory distress  Breath sounds: No wheezing, rhonchi or rales  Chest:      Chest wall: No tenderness  Abdominal:      General: Bowel sounds are normal  There is no distension  Palpations: Abdomen is soft  Tenderness: There is no abdominal tenderness  There is no guarding or rebound  Comments: Bilateral nephrostomy is clamped   Genitourinary:     Comments: CBI, pink-tinged  Musculoskeletal:      Right lower leg: No edema  Left lower leg: No edema  Skin:     General: Skin is warm and dry  Findings: No rash  Neurological:      Mental Status: He is alert  Mental status is at baseline  Cranial Nerves: No cranial nerve deficit           Additional Data:     Labs:  Results from last 7 days   Lab Units 21  0458   WBC Thousand/uL 13 27*   HEMOGLOBIN g/dL 7 8*   HEMATOCRIT % 26 8*   PLATELETS Thousands/uL 272   NEUTROS PCT % 82*   LYMPHS PCT % 9*   MONOS PCT % 6   EOS PCT % 2     Results from last 7 days   Lab Units 09/07/21  0043   SODIUM mmol/L 139   POTASSIUM mmol/L 4 3   CHLORIDE mmol/L 102   CO2 mmol/L 28   BUN mg/dL 31*   CREATININE mg/dL 1 90*   ANION GAP mmol/L 9   CALCIUM mg/dL 8 9   ALBUMIN g/dL 3 1*   TOTAL BILIRUBIN mg/dL 0 37   ALK PHOS U/L 97   ALT U/L 21   AST U/L 18   GLUCOSE RANDOM mg/dL 109                       Lines/Drains:  Invasive Devices     Peripheral Intravenous Line            Peripheral IV 09/07/21 Left;Ventral (anterior) Forearm <1 day          Drain            Nephrostomy Left 1 8 Fr  34 days    Nephrostomy Right 1 8 Fr   34 days    Urethral Catheter 1 day    Continuous Bladder Irrigation Three-way <1 day              Urinary Catheter:  Goal for removal: As per urology               Imaging: Reviewed radiology reports from this admission including: abdominal/pelvic CT    Recent Cultures (last 7 days):         Last 24 Hours Medication List:   Current Facility-Administered Medications   Medication Dose Route Frequency Provider Last Rate    acetaminophen  650 mg Oral Q6H PRN Savannah Model, PA-C      albuterol  2 puff Inhalation Q4H PRN Savannah Model, PA-C      atorvastatin  20 mg Oral HS Savannah Model, PA-C      belladonna-opium  30 mg Rectal Q8H PRN Savannah Model, PA-C      calcium carbonate  1,000 mg Oral Daily PRN Savannah Model, PA-C      cefepime  1,000 mg Intravenous Q12H Savannah Model, PA-C 1,000 mg (09/07/21 0452)    cyanocobalamin  500 mcg Oral Daily Savannah Model, PA-C      ferrous sulfate  325 mg Oral Daily With Breakfast Savannah Model, PA-C      fluticasone-vilanterol  1 puff Inhalation HS Savannah Model, PA-C      metoprolol tartrate  25 mg Oral Q12H [address] Savannah Model, PA-C      ondansetron  4 mg Intravenous Q6H PRN Savannah Model, PA-C      polyethylene glycol  17 g Oral Daily PRN Savannah Model, PA-C      sodium chloride  100 mL/hr Intravenous Continuous Silvia Forbes PA-C 100 mL/hr (09/07/21 0350)        Today, Patient Was Seen By: Shruti Browning MD    ** Please Note: "This note has been constructed using a voice recognition system  Therefore there may be syntax, spelling, and/or grammatical errors   Please call if you have any questions  "**

## 2021-09-08 LAB
ANION GAP SERPL CALCULATED.3IONS-SCNC: 8 MMOL/L (ref 4–13)
BUN SERPL-MCNC: 32 MG/DL (ref 5–25)
CALCIUM SERPL-MCNC: 8.2 MG/DL (ref 8.3–10.1)
CHLORIDE SERPL-SCNC: 102 MMOL/L (ref 100–108)
CO2 SERPL-SCNC: 27 MMOL/L (ref 21–32)
CREAT SERPL-MCNC: 1.9 MG/DL (ref 0.6–1.3)
ERYTHROCYTE [DISTWIDTH] IN BLOOD BY AUTOMATED COUNT: 21 % (ref 11.6–15.1)
GFR SERPL CREATININE-BSD FRML MDRD: 33 ML/MIN/1.73SQ M
GLUCOSE P FAST SERPL-MCNC: 117 MG/DL (ref 65–99)
GLUCOSE SERPL-MCNC: 117 MG/DL (ref 65–140)
HCT VFR BLD AUTO: 26.8 % (ref 36.5–49.3)
HGB BLD-MCNC: 7.9 G/DL (ref 12–17)
MCH RBC QN AUTO: 23.9 PG (ref 26.8–34.3)
MCHC RBC AUTO-ENTMCNC: 29.5 G/DL (ref 31.4–37.4)
MCV RBC AUTO: 81 FL (ref 82–98)
PLATELET # BLD AUTO: 222 THOUSANDS/UL (ref 149–390)
PMV BLD AUTO: 9 FL (ref 8.9–12.7)
POTASSIUM SERPL-SCNC: 4.3 MMOL/L (ref 3.5–5.3)
RBC # BLD AUTO: 3.3 MILLION/UL (ref 3.88–5.62)
SODIUM SERPL-SCNC: 137 MMOL/L (ref 136–145)
WBC # BLD AUTO: 10.95 THOUSAND/UL (ref 4.31–10.16)

## 2021-09-08 PROCEDURE — 80048 BASIC METABOLIC PNL TOTAL CA: CPT | Performed by: INTERNAL MEDICINE

## 2021-09-08 PROCEDURE — 85027 COMPLETE CBC AUTOMATED: CPT | Performed by: INTERNAL MEDICINE

## 2021-09-08 PROCEDURE — 99232 SBSQ HOSP IP/OBS MODERATE 35: CPT | Performed by: NURSE PRACTITIONER

## 2021-09-08 PROCEDURE — 97163 PT EVAL HIGH COMPLEX 45 MIN: CPT

## 2021-09-08 PROCEDURE — 97530 THERAPEUTIC ACTIVITIES: CPT

## 2021-09-08 RX ADMIN — CEFEPIME HYDROCHLORIDE 1000 MG: 1 INJECTION, SOLUTION INTRAVENOUS at 04:32

## 2021-09-08 RX ADMIN — ATORVASTATIN CALCIUM 20 MG: 20 TABLET, FILM COATED ORAL at 21:24

## 2021-09-08 RX ADMIN — FLUTICASONE FUROATE AND VILANTEROL TRIFENATATE 1 PUFF: 200; 25 POWDER RESPIRATORY (INHALATION) at 21:23

## 2021-09-08 RX ADMIN — ALBUTEROL SULFATE 2 PUFF: 90 AEROSOL, METERED RESPIRATORY (INHALATION) at 09:08

## 2021-09-08 RX ADMIN — CEFEPIME HYDROCHLORIDE 1000 MG: 1 INJECTION, SOLUTION INTRAVENOUS at 15:58

## 2021-09-08 RX ADMIN — ALBUTEROL SULFATE 2 PUFF: 90 AEROSOL, METERED RESPIRATORY (INHALATION) at 17:46

## 2021-09-08 RX ADMIN — ALBUTEROL SULFATE 2 PUFF: 90 AEROSOL, METERED RESPIRATORY (INHALATION) at 11:22

## 2021-09-08 RX ADMIN — METOPROLOL TARTRATE 25 MG: 25 TABLET, FILM COATED ORAL at 09:07

## 2021-09-08 RX ADMIN — ACETAMINOPHEN 650 MG: 325 TABLET, FILM COATED ORAL at 17:44

## 2021-09-08 RX ADMIN — METOPROLOL TARTRATE 25 MG: 25 TABLET, FILM COATED ORAL at 21:25

## 2021-09-08 RX ADMIN — CYANOCOBALAMIN TAB 500 MCG 500 MCG: 500 TAB at 09:07

## 2021-09-08 NOTE — ASSESSMENT & PLAN NOTE
Lab Results   Component Value Date    EGFR 32 09/11/2021    EGFR 37 09/10/2021    EGFR 33 09/08/2021    CREATININE 1 95 (H) 09/11/2021    CREATININE 1 75 (H) 09/10/2021    CREATININE 1 90 (H) 09/08/2021     Cr 1 90 on admission  Baseline approximately 1 4-1 5, was 1 55 on discharge on 8/27  Patient was recently given a 5-day course of Lasix for LE edema which has since resolved, possibly contributing to SHANTI on admission?    · Creatinine improved to 1 75 but then increased again to 1 9 despite adequate urine output  · Given normal saline 250 mL bolus x1   · Encouraged oral hydration  · Plan to check a BMP as an outpatient in 1 week  · Patient to follow-up with nephrology

## 2021-09-08 NOTE — PHYSICAL THERAPY NOTE
PT EVALUATION     09/08/21 1330   PT Last Visit   PT Visit Date 09/08/21   Note Type   Note type Evaluation   Pain Assessment   Pain Assessment Tool Pain Assessment not indicated - pt denies pain   Home Living   Type of Home Apartment  (Second floor; 6 [address])   Home Layout One level   [address]  (RW)   Prior Function   Level of [place] Independent with ADLs and functional mobility   Lives With Spouse   Receives Help From Family   ADL Assistance Independent   IADLs Independent   Comments Pt ambulates with his RW inside, uses a cane outside and sometimes does not use an assistive device at all   Restrictions/Precautions   Other Precautions Fall Risk   General   Additional Pertinent History Pt admitted with gross hematuria  Pt had a recent stent removal   Pt also has bilateral nephrostomy tubes that are now capped  Family/Caregiver Present Yes  (Patient's spouse)   Cognition   Overall Cognitive Status WFL   Arousal/Participation Cooperative   Attention Within functional limits   Orientation Level Oriented X4   Following Commands Follows all commands and directions without difficulty   RLE Assessment   RLE Assessment WFL  (3+ to 4-/5)   LLE Assessment   LLE Assessment WFL  (3+ to 4-/5)   Light Touch   RLE Light Touch Grossly intact   LLE Light Touch Grossly intact   Bed Mobility   Supine to Sit 7  Independent   Sit to Supine 7  Independent   Transfers   Sit to Stand 5  Supervision   Stand to Sit 5  Supervision   Ambulation/Elevation   Gait pattern Foward flexed;Decreased foot clearance; Short stride   Gait Assistance 5  Supervision   Assistive Device Rolling walker   Distance 100 ft with change in direction   Balance   Static Sitting Good   Static Standing Fair +  (With RW)   Dynamic Standing Fair  (With RW)   Ambulatory Fair  (With RW)   Activity Tolerance   Activity Tolerance Patient limited by fatigue   Assessment   Problem List Decreased strength;Decreased range of motion;Decreased endurance; Impaired balance;Decreased mobility; Decreased coordination;Decreased safety awareness   Assessment Patient seen for Physical Therapy evaluation  Patient admitted with Hematuria  Comorbidities affecting patient's physical performance include:  Hematuria, hypertension, anemia, COPD, UTI, acute kidney injury superimposed on CKD, acute cystitis, hyperlipidemia, RA, prostate cancer, urinary retention, sepsis  Personal factors affecting patient at time of initial evaluation include: lives in one story house, ambulating with assistive device, stairs to enter home, inability to navigate community distances, inability to navigate level surfaces without external assistance and inability to perform dynamic tasks in community  Prior to admission, patient was independent with functional mobility with cane or RW, independent with ADLS, living with spouse in a one level home with 6 steps to enter and ambulating household distance  Please find objective findings from Physical Therapy assessment regarding body systems outlined above with impairments and limitations including weakness, decreased ROM, impaired balance, decreased endurance, impaired coordination, gait deviations, decreased activity tolerance, decreased functional mobility tolerance, decreased safety awareness and fall risk  The Barthel Index was used as a functional outcome tool presenting with a score of 45 today indicating marked limitations of functional mobility and ADLS  Patient's clinical presentation is currently unstable/unpredictable as seen in patient's presentation of vital sign response, changing level of pain, increased fall risk, new onset of impairment of functional mobility, decreased endurance and new onset of weakness  Pt would benefit from continued Physical Therapy treatment to address deficits as defined above and maximize level of functional mobility   As demonstrated by objective findings, the assigned level of complexity for this evaluation is high  The patient's [facility] Basic Mobility Inpatient Short Form Raw Score is 19, Standardized Score is 42 48  A standardized score less than 42 9 suggests the patient may benefit from discharge to post-acute rehabilitation services  Please also refer to the recommendation of the Physical Therapist for safe discharge planning  Despite ampac score, pt is safe for discharge home with support of wife and continued ambulation with a cane or RW  Pt denies need for home PT  Goals   Patient Goals Go home tomorrow   STG Expiration Date 09/15/21   Short Term Goal #1 Transfers-I; pt will ambulate with a RW or cane functional household distances-I   Short Term Goal #2 Balance with a RW/cane will be F/F+ for safe gait and mobility; up/down 6 steps with a railing and cane so pt can enter/exit his home in order to meet his goal of going home-Min assist/S   LTG Expiration Date 09/22/21   Long Term Goal #1 Pt will ambulate with a RW or cane independently functional household and community distances as previous   Long Term Goal #2 Balance with a RW or cane will be F+/good for safe gait and mobility and to decrease fall risk; strength lower extremities 4-to 4/5; up/down 6 steps with a cane and railing so pt can enter/exit his home-I   Plan   Treatment/Interventions ADL retraining;Functional transfer training;LE strengthening/ROM; Elevations; Therapeutic exercise; Endurance training;Patient/family training;Equipment eval/education; Bed mobility;Gait training; Compensatory technique education   PT Frequency 5x/wk   Recommendation   PT Discharge Recommendation No rehabilitation needs   Additional Comments Pt declines need for home PT   [facility] Basic Mobility Inpatient   Turning in Bed Without Bedrails 4   Lying on Back to Sitting on Edge of Flat Bed 3   Moving Bed to Chair 3   Standing Up From Chair 3   Walk in Room 3   Climb 3-5 Stairs 3   Basic Mobility Inpatient Raw Score 19   Basic Mobility Standardized Score 42 48 Barthel Index   Feeding 10   Bathing 0   Grooming Score 0   Dressing Score 5   Bladder Score 0   Bowels Score 10   Toilet Use Score 5   Transfers (Bed/Chair) Score 10   Mobility (Level Surface) Score 0   Stairs Score 5   Barthel Index Score 39   Licensure   [place] License Number  Stuart Mccloud PT [id number]     Time In:1330  Time Out:1340  Total Time: 10 mins      S:  It feels good to walk  O:  Pt transfers sit to stand with S and ambulates 10 ft to and from the bathroom  Pt is I in the bathroom with toileting and hygiene with RW and hand rails after BM  Pt ambulated back to bed and returned to supine in bed with S/I  Pt declined sitting out of bed in the chair  A:  Pt with good tolerance to bed mobility, transfers, ambulation with a RW and functional mobility/toileting  Pt will benefit from continued PT services to return him to his prior level of gait and functional mobility with a cane/RW  P:  Continue per PT POC  DCP-home with support of wife, continued ambulation with a cane/RW  Pt denies need for home PT  Stuart Mccloud [place]   [id number]    Portions of the documentation may have been created using voice recognition software  Occasional wrong word or sound alike substitutions may have occurred due to the inherent limitation of the voice recognition software  Read the chart carefully and recognize, using context, where substitutions have occurred

## 2021-09-08 NOTE — CASE MANAGEMENT
LOS - 2 days    Readmission    SW met with pt to assess needs and discuss plans  Discussed goals of making sure pt's needs are met upon discharge and that Freedom of Choice is offered  Pt is a 30 day readmission  Admissions are related  Explored with pt factors that may have led to readmission  Pt said he called Dr Nan Nicholas before returning but he still needed to come back  Pt lives with his wife  Independent with ADLs and mobility  Has walker and cane he uses as needed  No STR/HHC history  No MH or D&A issues  Pt's PCP is Dr Davonte Massey MD   Per pt he has prescription coverage and has no difficulty getting his medication as prescribed  Preferred pharmacy is [facility]  Pt's wife is healthcare representative  Discussed discharge plans with pt  Pt's plan is to return home with wife when discharged  No discharge needs expressed or anticipated by pt at this time  Offered support in future if needed  Will follow to monitor progress

## 2021-09-08 NOTE — ASSESSMENT & PLAN NOTE
Patient presents with recurrent hematuria  He did touch base with urology outpatient and tried increasing fluid intake  He continued with consistent hematuria and presented to the ED  Extensive urologic Hx with radiation cystitis, left trigone bladder floor lesion, right radiation ureteritis/right ureteral lesion/right ureteral bleeding per recent OR findings  Remains with recurrent hematuria despite bilateral nephrostomy and hyperbaric treatment for radiation cystitis  Prior cytology, biopsy negative for malignancy  Recently had stent removed in the office on Friday  Bilateral nephrostomy tubes are capped  · Appreciate urology input  · 9/8:  CBI clear  Discontinued CBI  Febrile  Uncapped right nephrostomy tube  · 9/9:  Afebrile  Right nephrostomy tube draining clear, yellow urine  Removed [place] catheter  · 9/10:  Capped right nephrostomy tube per Urology recommendations  Had some pink-tinged urine with small clots during BM  Remaining urine remained clear  Urine culture grew Klebsiella sensitive to cefazolin  Transitioned to Keflex    · 9/11:  No further episodes of hematuria  · Follow-up with urology as an outpatient

## 2021-09-08 NOTE — ASSESSMENT & PLAN NOTE
Lab Results   Component Value Date    EGFR 33 09/08/2021    EGFR 33 09/07/2021    EGFR 43 08/27/2021    CREATININE 1 90 (H) 09/08/2021    CREATININE 1 90 (H) 09/07/2021    CREATININE 1 55 (H) 08/27/2021     Cr 1 90 on admission  Baseline approximately 1 4-1 5, was 1 55 on discharge on 8/27  Avoid nephrotoxins and hypotension  Patient was recently given a 5-day course of Lasix for LE edema which has since resolved, possibly contributing to SHANTI ?    · Encourage oral hydration  · Monitor intake output

## 2021-09-08 NOTE — ASSESSMENT & PLAN NOTE
· Continue Breo, albuterol inhaler q 6 hours and PRN  · No evidence of acute exacerbation on admission

## 2021-09-08 NOTE — ASSESSMENT & PLAN NOTE
Patient presents with recurrent hematuria  He did touch base with urology outpatient and tried increasing fluid intake  He continued with consistent hematuria and presented to the ED  Extensive urologic Hx with radiation cystitis, left trigone bladder floor lesion, right radiation ureteritis/right ureteral lesion/right ureteral bleeding per recent OR findings  Remains with recurrent hematuria despite bilateral nephrostomy and hyperbaric treatment for radiation cystitis  Prior cytology, biopsy negative for malignancy  Recently had stent removed in the office on Friday  Bilateral nephrostomy tubes are capped    · Appreciate urology input  · Per nursing, urology recommends holding CBI as urine is now clear  · Urology will be by this afternoon to evaluate patient  · Follow-up urology evaluation

## 2021-09-08 NOTE — PLAN OF CARE
Problem: Potential for Falls  Goal: Patient will remain free of falls  Description: INTERVENTIONS:  - Educate patient/family on patient safety including physical limitations  - Instruct patient to call for assistance with activity   - Consult OT/PT to assist with strengthening/mobility   - Keep Call bell within reach  - Keep bed low and locked with side rails adjusted as appropriate  - Keep care items and personal belongings within reach  - Initiate and maintain comfort rounds  - Make Fall Risk Sign visible to staff  - Apply yellow socks and bracelet for high fall risk patients  - Consider moving patient to room near nurses station  Outcome: Progressing     Problem: PAIN - ADULT  Goal: Verbalizes/displays adequate comfort level or baseline comfort level  Description: Interventions:  - Encourage patient to monitor pain and request assistance  - Assess pain using appropriate pain scale  - Administer analgesics based on type and severity of pain and evaluate response  - Implement non-pharmacological measures as appropriate and evaluate response  - Consider cultural and social influences on pain and pain management  - Notify physician/advanced practitioner if interventions unsuccessful or patient reports new pain  Outcome: Progressing     Problem: INFECTION - ADULT  Goal: Absence or prevention of progression during hospitalization  Description: INTERVENTIONS:  - Assess and monitor for signs and symptoms of infection  - Monitor lab/diagnostic results  - Monitor all insertion sites, i e  indwelling lines, tubes, and drains  - Monitor endotracheal if appropriate and nasal secretions for changes in amount and color  - Muddy appropriate cooling/warming therapies per order  - Administer medications as ordered  - Instruct and encourage patient and family to use good hand hygiene technique  - Identify and instruct in appropriate isolation precautions for identified infection/condition  Outcome: Progressing  Goal: Absence of fever/infection during neutropenic period  Description: INTERVENTIONS:  - Monitor WBC    Outcome: Progressing     Problem: SAFETY ADULT  Goal: Patient will remain free of falls  Description: INTERVENTIONS:  - Educate patient/family on patient safety including physical limitations  - Instruct patient to call for assistance with activity   - Consult OT/PT to assist with strengthening/mobility   - Keep Call bell within reach  - Keep bed low and locked with side rails adjusted as appropriate  - Keep care items and personal belongings within reach  - Initiate and maintain comfort rounds  - Make Fall Risk Sign visible to staff  - Apply yellow socks and bracelet for high fall risk patients  - Consider moving patient to room near nurses station  Outcome: Progressing  Goal: Maintain or return to baseline ADL function  Description: INTERVENTIONS:  -  Assess patient's ability to carry out ADLs; assess patient's baseline for ADL function and identify physical deficits which impact ability to perform ADLs (bathing, care of mouth/teeth, toileting, grooming, dressing, etc )  - Assess/evaluate cause of self-care deficits   - Assess range of motion  - Assess patient's mobility; develop plan if impaired  - Assess patient's need for assistive devices and provide as appropriate  - Encourage maximum independence but intervene and supervise when necessary  - Involve family in performance of ADLs  - Assess for home care needs following discharge   - Consider OT consult to assist with ADL evaluation and planning for discharge  - Provide patient education as appropriate  Outcome: Progressing  Goal: Maintains/Returns to pre admission functional level  Description: INTERVENTIONS:  - Perform BMAT or MOVE assessment daily    - Set and communicate daily mobility goal to care team and patient/family/caregiver     - Collaborate with rehabilitation services on mobility goals if consulted  - Out of bed for toileting  - Record patient progress and toleration of activity level   Outcome: Progressing     Problem: DISCHARGE PLANNING  Goal: Discharge to home or other facility with appropriate resources  Description: INTERVENTIONS:  - Identify barriers to discharge w/patient and caregiver  - Arrange for needed discharge resources and transportation as appropriate  - Identify discharge learning needs (meds, wound care, etc )  - Arrange for interpretive services to assist at discharge as needed  - Refer to Case Management Department for coordinating discharge planning if the patient needs post-hospital services based on physician/advanced practitioner order or complex needs related to functional status, cognitive ability, or social support system  Outcome: Progressing     Problem: Knowledge Deficit  Goal: Patient/family/caregiver demonstrates understanding of disease process, treatment plan, medications, and discharge instructions  Description: Complete learning assessment and assess knowledge base    Interventions:  - Provide teaching at level of understanding  - Provide teaching via preferred learning methods  Outcome: Progressing     Problem: MOBILITY - ADULT  Goal: Maintain or return to baseline ADL function  Description: INTERVENTIONS:  -  Assess patient's ability to carry out ADLs; assess patient's baseline for ADL function and identify physical deficits which impact ability to perform ADLs (bathing, care of mouth/teeth, toileting, grooming, dressing, etc )  - Assess/evaluate cause of self-care deficits   - Assess range of motion  - Assess patient's mobility; develop plan if impaired  - Assess patient's need for assistive devices and provide as appropriate  - Encourage maximum independence but intervene and supervise when necessary  - Involve family in performance of ADLs  - Assess for home care needs following discharge   - Consider OT consult to assist with ADL evaluation and planning for discharge  - Provide patient education as appropriate  Outcome: Progressing  Goal: Maintains/Returns to pre admission functional level  Description: INTERVENTIONS:  - Perform BMAT or MOVE assessment daily    - Set and communicate daily mobility goal to care team and patient/family/caregiver     - Collaborate with rehabilitation services on mobility goals if consulted  - Out of bed for toileting  - Record patient progress and toleration of activity level   Outcome: Progressing

## 2021-09-08 NOTE — PROGRESS NOTES
Progress Note - Urology      Patient: Juliana Alaniz   : 1945 Sex: male   MRN: [id number]     CSN: [id number]  Unit/Bed#: [address] [place] [address]     SUBJECTIVE:   * T-max 102 9 now  Patient states he has ongoing right flank pain  Nephrostomy tube closed  CBI  This morning  Urine clear  Urine culture positive      Objective   Vitals: /72 (BP Location: Right arm)   Pulse 64   Temp (!) 102 9 °F (39 4 °C) (Oral)   Resp 16   Ht 5' 11" (1 803 m)   Wt 98 4 kg (217 lb)   SpO2 91%   BMI 30 27 kg/m²     I/O last 24 hours:   In: -   Out: 2550 [Urine:2550]      Physical Exam:   General Alert awake   Normocephalic atraumatic PERRLA  Lungs clear bilaterally  Cardiac normal S1 normal S2  Abdomen soft, flank pain  Extremities no edema      Lab Results: CBC:   Lab Results   Component Value Date    WBC 10 95 (H) 2021    HGB 7 9 (L) 2021    HCT 26 8 (L) 2021    MCV 81 (L) 2021     2021    MCH 23 9 (L) 2021    MCHC 29 5 (L) 2021    RDW 21 0 (H) 2021    MPV 9 0 2021    NRBC 0 2021     CMP:   Lab Results   Component Value Date     2021    CO2 27 2021    BUN 32 (H) 2021    CREATININE 1 90 (H) 2021    CALCIUM 8 2 (L) 2021    AST 18 2021    ALT 21 2021    ALKPHOS 97 2021    EGFR 33 2021     Urinalysis:   Lab Results   Component Value Date    COLORU Dark Eva 2021    CLARITYU Slightly Cloudy 2021    SPECGRAV 1 020 2021    PHUR 6 0 2021    PHUR 5 5 2018    LEUKOCYTESUR Moderate (A) 2021    NITRITE Positive (A) 2021    GLUCOSEU Negative 2021    KETONESU Trace (A) 2021    BILIRUBINUR Interference- unable to analyze (A) 2021    BLOODU Large (A) 2021     Urine Culture:   Lab Results   Component Value Date    URINECX >100,000 cfu/ml Klebsiella pneumoniae (A) 2021     PSA: No results found for: PSA      Assessment/ Plan:  Gross hematuria  UTI possible right pyelonephritis  Plan open right nephrostomy tube to bag drainage  DC [place] 8:00 a m   Tomorrow morning  Awaiting urine culture sensitivity          Chaz Hopkins MD

## 2021-09-08 NOTE — ASSESSMENT & PLAN NOTE
UA grossly abnormal, patient recently treated for UTI with hematuria  Previous culture grew Pseudomonas, sensitive to cefepime  Denies any fever chills but reports lower abdominal discomfort  · Patient initially started on cefepime  · Urine culture grew Klebsiella, sensitive to cefazolin  · Received cefepime followed by Rocephin, transition to Keflex    Plan to complete a 10 day course of therapy  · Follow-up with PCP

## 2021-09-08 NOTE — PROGRESS NOTES
Armando 43  Progress Note [place] 1945, 66 y o  male MRN: [id number]  Unit/Bed#: [address] [place] [address] Encounter: [id number]  Primary Care Provider: Luis Muhammad MD   Date and time admitted to hospital: 9/6/2021 11:41 PM    * Hematuria  Assessment & Plan  Patient presents with recurrent hematuria  He did touch base with urology outpatient and tried increasing fluid intake  He continued with consistent hematuria and presented to the ED  Extensive urologic Hx with radiation cystitis, left trigone bladder floor lesion, right radiation ureteritis/right ureteral lesion/right ureteral bleeding per recent OR findings  Remains with recurrent hematuria despite bilateral nephrostomy and hyperbaric treatment for radiation cystitis  Prior cytology, biopsy negative for malignancy  Recently had stent removed in the office on Friday  Bilateral nephrostomy tubes are capped  · Appreciate urology input  · Per nursing, urology recommends holding CBI as urine is now clear  · Urology will be by this afternoon to evaluate patient  · Follow-up urology evaluation    Acute kidney injury superimposed on CKD [facility])  Assessment & Plan  Lab Results   Component Value Date    EGFR 33 09/08/2021    EGFR 33 09/07/2021    EGFR 43 08/27/2021    CREATININE 1 90 (H) 09/08/2021    CREATININE 1 90 (H) 09/07/2021    CREATININE 1 55 (H) 08/27/2021     Cr 1 90 on admission  Baseline approximately 1 4-1 5, was 1 55 on discharge on 8/27  Avoid nephrotoxins and hypotension  Patient was recently given a 5-day course of Lasix for LE edema which has since resolved, possibly contributing to SHANTI ? · Encourage oral hydration  · Monitor intake output    UTI (urinary tract infection)  Assessment & Plan  UA grossly abnormal, patient recently treated for UTI with hematuria  Previous culture grew Pseudomonas, sensitive to cefepime  Denies any fever chills but reports lower abdominal discomfort    · Follow-up urine culture  · Continue cefepime  · Consider repeat imaging if febrile or worsening of symptoms    Anemia  Assessment & Plan  Chronic anemia in the setting of ongoing hematuria, iron deficiency  Continue iron supplements  Hg 9 2 on admission, above recent baseline, recently required transfusion of 2 U during previous admission  · Hemoglobin somewhat lower at 7 8 -> 7 9 grams/deciliter today  · Hematuria improved  · Continue to monitor and transfuse as needed for Hg < 7    Chronic obstructive pulmonary disease with acute exacerbation (HCC)  Assessment & Plan  Continue Breo, albuterol inhaler q 6 hours and PRN  No evidence of acute exacerbation on admission    Dyslipidemia  Assessment & Plan  Continue atorvastatin 20 mg daily     Essential hypertension  Assessment & Plan  Continue Lopressor 25 mg Q12    VTE Pharmacologic Prophylaxis:   Pharmacologic: Pharmacologic VTE Prophylaxis contraindicated due to hematuria  Mechanical VTE Prophylaxis in Place: Yes    Patient Centered Rounds: I have performed bedside rounds with nursing staff today  Discussions with Specialists or Other Care Team Provider: nursing, cm    Education and Discussions with Family / Patient: I have answered all questions to the best of my ability  Spoke with wife, Reilly Lopez     Time Spent for Care: 30 minutes  More than 50% of total time spent on counseling and coordination of care as described above  Current Length of Stay: 0 day(s)    Current Patient Status: Inpatient   Certification Statement: The patient will continue to require additional inpatient hospital stay due to awaiting urine culture and improvement in Cr     Discharge Plan: Patient is not medically stable for discharge today, likely in 24-48 hours pending progress  Code Status: Level 1 - Full Code      Subjective:   Patient seen and examined at bedside  Comfortable  No further hematuria this morning  Per nurse, urology recommend stopping CBI  Patient encouraged to remain hydrated  Patient denies any headache, dizziness, chest pain, shortness of breath  Patient desires discharge home with his wife and medically stable  Objective:     Vitals:   Temp (24hrs), Av 4 °F (37 4 °C), Min:97 8 °F (36 6 °C), Max:100 2 °F (37 9 °C)    Temp:  [97 8 °F (36 6 °C)-100 2 °F (37 9 °C)] 97 8 °F (36 6 °C)  HR:  [70-78] 71  Resp:  [18-20] 19  BP: (109-150)/(58-73) 112/58  SpO2:  [91 %-95 %] 91 %  Body mass index is 30 27 kg/m²  Input and Output Summary (last 24 hours): Intake/Output Summary (Last 24 hours) at 2021 1440  Last data filed at 2021 1000  Gross per 24 hour   Intake --   Output 1350 ml   Net -1350 ml       Physical Exam:     Physical Exam  Vitals and nursing note reviewed  Constitutional:       General: He is not in acute distress  Appearance: He is well-developed  He is not toxic-appearing or diaphoretic  HENT:      Head: Normocephalic  Cardiovascular:      Rate and Rhythm: Normal rate and regular rhythm  Pulmonary:      Effort: Pulmonary effort is normal  No respiratory distress  Breath sounds: Normal breath sounds  No wheezing, rhonchi or rales  Abdominal:      General: Bowel sounds are normal  There is no distension  Palpations: Abdomen is soft  Tenderness: There is no abdominal tenderness  Genitourinary:     Comments: CBI, draining clear yellow urine  Capped, bilateral nephrostomy tubes  Musculoskeletal:         General: No tenderness  Normal range of motion  Cervical back: Normal range of motion  Skin:     General: Skin is warm and dry  Findings: No rash  Neurological:      Mental Status: He is alert and oriented to person, place, and time  Mental status is at baseline  Deep Tendon Reflexes: Reflexes are normal and symmetric  Psychiatric:         Mood and Affect: Mood normal          Behavior: Behavior normal          Thought Content:  Thought content normal          Judgment: Judgment normal          Additional Data: Labs:    Results from last 7 days   Lab Units 09/08/21  0649 09/07/21  0458   WBC Thousand/uL 10 95* 13 27*   HEMOGLOBIN g/dL 7 9* 7 8*   HEMATOCRIT % 26 8* 26 8*   PLATELETS Thousands/uL 222 272   NEUTROS PCT %  --  82*   LYMPHS PCT %  --  9*   MONOS PCT %  --  6   EOS PCT %  --  2     Results from last 7 days   Lab Units 09/08/21  0649 09/07/21  0043   POTASSIUM mmol/L 4 3 4 3   CHLORIDE mmol/L 102 102   CO2 mmol/L 27 28   BUN mg/dL 32* 31*   CREATININE mg/dL 1 90* 1 90*   CALCIUM mg/dL 8 2* 8 9   ALK PHOS U/L  --  97   ALT U/L  --  21   AST U/L  --  18           * I Have Reviewed All Lab Data Listed Above  * Additional Pertinent Lab Tests Reviewed:  All Labs Within Last 24 Hours Reviewed    Imaging:    Imaging Reports Reviewed Today Include: CT A/P, CXR  Imaging Personally Reviewed by Myself Includes:  None     Recent Cultures (last 7 days):     Results from last 7 days   Lab Units 09/07/21  0043   URINE CULTURE  >100,000 cfu/ml Klebsiella pneumoniae*       Last 24 Hours Medication List:   Current Facility-Administered Medications   Medication Dose Route Frequency Provider Last Rate    acetaminophen  650 mg Oral Q6H PRN ROJAS Mcfadden-PIYUSH      albuterol  2 puff Inhalation Q4H PRN ROJAS Mcfadden-C      albuterol  2 puff Inhalation 4x Daily Alda Morrow MD      atorvastatin  20 mg Oral HS Sachin Boyer PA-C      belladonna-opium  30 mg Rectal Q8H PRN ROJAS Mcfadden-PIYUSH      calcium carbonate  1,000 mg Oral Daily PRN ROJAS Mcfadden-PIYUSH      cefepime  1,000 mg Intravenous Q12H ROJAS Mcfadden-C 1,000 mg (09/08/21 0432)    cyanocobalamin  500 mcg Oral Daily Sachin Boyer PA-C      ferrous sulfate  325 mg Oral Daily With Breakfast Sachin Boyer PA-C      fluticasone-vilanterol  1 puff Inhalation HS Sachin Boyer PA-C      metoprolol tartrate  25 mg Oral Q12H [address] Sachin Boyer PA-C      ondansetron  4 mg Intravenous Q6H PRN Aleksandar Silver PA-C      polyethylene glycol  17 g Oral Daily PRN Aleksandar Silver PA-C          Today, Patient Was Seen By: CORWIN Jean    ** Please Note: Dictation voice to text software may have been used in the creation of this document   **

## 2021-09-08 NOTE — ASSESSMENT & PLAN NOTE
Chronic anemia in the setting of ongoing hematuria, iron deficiency  Continue iron supplements  Hg 9 2 on admission, above recent baseline, recently required transfusion of 2 U during previous admission  · Hemoglobin somewhat lower at 7 8 -> 7 9 grams/deciliter   · Hematuria resolved  · HGB stable

## 2021-09-08 NOTE — ASSESSMENT & PLAN NOTE
UA grossly abnormal, patient recently treated for UTI with hematuria  Previous culture grew Pseudomonas, sensitive to cefepime  Denies any fever chills but reports lower abdominal discomfort    · Follow-up urine culture  · Continue cefepime  · Consider repeat imaging if febrile or worsening of symptoms

## 2021-09-09 PROCEDURE — 99232 SBSQ HOSP IP/OBS MODERATE 35: CPT | Performed by: NURSE PRACTITIONER

## 2021-09-09 RX ORDER — CEFTRIAXONE 1 G/50ML
1000 INJECTION, SOLUTION INTRAVENOUS EVERY 24 HOURS
Status: DISCONTINUED | OUTPATIENT
Start: 2021-09-09 | End: 2021-09-10

## 2021-09-09 RX ADMIN — CYANOCOBALAMIN TAB 500 MCG 500 MCG: 500 TAB at 08:34

## 2021-09-09 RX ADMIN — FERROUS SULFATE TAB 325 MG (65 MG ELEMENTAL FE) 325 MG: 325 (65 FE) TAB at 08:32

## 2021-09-09 RX ADMIN — ALBUTEROL SULFATE 2 PUFF: 90 AEROSOL, METERED RESPIRATORY (INHALATION) at 11:54

## 2021-09-09 RX ADMIN — ALBUTEROL SULFATE 2 PUFF: 90 AEROSOL, METERED RESPIRATORY (INHALATION) at 08:36

## 2021-09-09 RX ADMIN — ATORVASTATIN CALCIUM 20 MG: 20 TABLET, FILM COATED ORAL at 22:09

## 2021-09-09 RX ADMIN — CEFTRIAXONE 1000 MG: 1 INJECTION, SOLUTION INTRAVENOUS at 12:39

## 2021-09-09 RX ADMIN — CEFEPIME HYDROCHLORIDE 1000 MG: 1 INJECTION, SOLUTION INTRAVENOUS at 04:10

## 2021-09-09 RX ADMIN — ALBUTEROL SULFATE 2 PUFF: 90 AEROSOL, METERED RESPIRATORY (INHALATION) at 23:45

## 2021-09-09 RX ADMIN — METOPROLOL TARTRATE 25 MG: 25 TABLET, FILM COATED ORAL at 22:09

## 2021-09-09 RX ADMIN — METOPROLOL TARTRATE 25 MG: 25 TABLET, FILM COATED ORAL at 08:34

## 2021-09-09 NOTE — ASSESSMENT & PLAN NOTE
· POA, evolving since admission due to UTI 2/2 PCN as evidenced by leukocytosis and fevers   · Patient was started on Cefepime   · Urine culture growing Klebsiella, will transition to Rocephin 1 gm IV daily   · Follow-up final culture

## 2021-09-09 NOTE — PROGRESS NOTES
Progress Note - Urology      Patient: James Araujo   : 1945 Sex: male   MRN: [id number]     CSN: [id number]  Unit/Bed#: [address] [place] [address]     SUBJECTIVE:   Sleeping  Temperature trending down 98 3    Objective   Vitals: /55   Pulse 65   Temp 98 3 °F (36 8 °C)   Resp 18   Ht 5' 11" (1 803 m)   Wt 98 4 kg (217 lb)   SpO2 92%   BMI 30 27 kg/m²     I/O last 24 hours: In: 10 [Other:10]  Out: 3700 [Urine:3700]      Physical Exam:   General Alert awake   Normocephalic atraumatic PERRLA  Lungs clear bilaterally  Cardiac normal S1 normal S2  Abdomen soft, flank pain  Extremities no edema      Lab Results: CBC:   Lab Results   Component Value Date    WBC 10 95 (H) 2021    HGB 7 9 (L) 2021    HCT 26 8 (L) 2021    MCV 81 (L) 2021     2021    MCH 23 9 (L) 2021    MCHC 29 5 (L) 2021    RDW 21 0 (H) 2021    MPV 9 0 2021    NRBC 0 2021     CMP:   Lab Results   Component Value Date     2021    CO2 27 2021    BUN 32 (H) 2021    CREATININE 1 90 (H) 2021    CALCIUM 8 2 (L) 2021    AST 18 2021    ALT 21 2021    ALKPHOS 97 2021    EGFR 33 2021     Urinalysis:   Lab Results   Component Value Date    COLORU Dark Eva 2021    CLARITYU Slightly Cloudy 2021    SPECGRAV 1 020 2021    PHUR 6 0 2021    PHUR 5 5 2018    LEUKOCYTESUR Moderate (A) 2021    NITRITE Positive (A) 2021    GLUCOSEU Negative 2021    KETONESU Trace (A) 2021    BILIRUBINUR Interference- unable to analyze (A) 2021    BLOODU Large (A) 2021     Urine Culture:   Lab Results   Component Value Date    URINECX >100,000 cfu/ml Klebsiella pneumoniae (A) 2021     PSA: No results found for: PSA      Assessment/ Plan:   [place]  Voiding trial  Right nephrostomy tube open  Awaiting culture sensitivity  Complicated UTI          Radha Spring MD

## 2021-09-09 NOTE — PROGRESS NOTES
Armando 43  Progress Note Triny Angulo 1945, 66 y o  male MRN: [id number]  Unit/Bed#: [address] [place] [address] Encounter: [id number]  Primary Care Provider: Inga Zhao MD   Date and time admitted to hospital: 9/6/2021 11:41 PM    * Hematuria  Assessment & Plan  Patient presents with recurrent hematuria  He did touch base with urology outpatient and tried increasing fluid intake  He continued with consistent hematuria and presented to the ED  Extensive urologic Hx with radiation cystitis, left trigone bladder floor lesion, right radiation ureteritis/right ureteral lesion/right ureteral bleeding per recent OR findings  Remains with recurrent hematuria despite bilateral nephrostomy and hyperbaric treatment for radiation cystitis  Prior cytology, biopsy negative for malignancy  Recently had stent removed in the office on Friday  Bilateral nephrostomy tubes are capped  · Appreciate urology input  · 9/8:  CBI clear  Discontinued CBI  Febrile  Uncapped right nephrostomy tube  Left nephrostomy tube clamped  · 9/9:  Afebrile  Right nephrostomy tube draining clear yellow urine  Left nephrostomy tube remains clamped  Will remove Sumner catheter and attempt a voiding trial    Acute kidney injury superimposed on CKD [facility])  Assessment & Plan  Lab Results   Component Value Date    EGFR 33 09/08/2021    EGFR 33 09/07/2021    EGFR 43 08/27/2021    CREATININE 1 90 (H) 09/08/2021    CREATININE 1 90 (H) 09/07/2021    CREATININE 1 55 (H) 08/27/2021     Cr 1 90 on admission  Baseline approximately 1 4-1 5, was 1 55 on discharge on 8/27  Avoid nephrotoxins and hypotension  Patient was recently given a 5-day course of Lasix for LE edema which has since resolved, possibly contributing to SHANTI ?    · Encourage oral hydration  · Monitor intake output  · Check creatinine in the morning    Complicated UTI (urinary tract infection)  Assessment & Plan  UA grossly abnormal, patient recently treated for UTI with hematuria  Previous culture grew Pseudomonas, sensitive to cefepime  Denies any fever chills but reports lower abdominal discomfort  · Patient initially started on cefepime  · Urine culture growing Klebsiella  · Will transition to Rocephin 1 g IV daily  · Follow-up final urine culture  · Consider repeat imaging if febrile or worsening of symptoms    Anemia  Assessment & Plan  Chronic anemia in the setting of ongoing hematuria, iron deficiency  Continue iron supplements  Hg 9 2 on admission, above recent baseline, recently required transfusion of 2 U during previous admission  · Hemoglobin somewhat lower at 7 8 -> 7 9 grams/deciliter   · Hematuria resolved  · Continue to monitor and transfuse as needed for Hg < 7    Sepsis ([facility] [address] )  Assessment & Plan  · POA, evolving since admission due to UTI 2/2 PCN as evidenced by leukocytosis and fevers   · Patient was started on Cefepime   · Urine culture growing Klebsiella, will transition to Rocephin 1 gm IV daily   · Follow-up final culture     Chronic obstructive pulmonary disease with acute exacerbation (HCC)  Assessment & Plan  · Continue Breo, albuterol inhaler q 6 hours and PRN  · No evidence of acute exacerbation on admission    Dyslipidemia  Assessment & Plan  · Continue atorvastatin 20 mg daily     Essential hypertension  Assessment & Plan  · Continue Lopressor 25 mg Q12    VTE Pharmacologic Prophylaxis:   Pharmacologic: Pharmacologic VTE Prophylaxis contraindicated due to hematuria  Mechanical VTE Prophylaxis in Place: Yes    Patient Centered Rounds: I have performed bedside rounds with nursing staff today  Discussions with Specialists or Other Care Team Provider: nursing, cm, urology    Education and Discussions with Family / Patient: I have answered all questions to the best of my ability  Spoke with wife, Adelaide Perry, via telephone    Time Spent for Care: 30 minutes    More than 50% of total time spent on counseling and coordination of care as described above     Current Length of Stay: 1 day(s)    Current Patient Status: Inpatient   Certification Statement: The patient will continue to require additional inpatient hospital stay due to awaiting urine culture and improvement in Cr     Discharge Plan: Patient is not medically stable for discharge today, likely in 24-48 hours pending progress  Code Status: Level 1 - Full Code      Subjective:   Patient seen and examined at bedside this morning  Resting comfortably  Reports no further right flank pain since on capping the right nephrostomy tube  Afebrile since on capping right nephrostomy tube  Appetite fair  Denies chest pain or shortness of breath  Denies nausea, vomiting, or diarrhea  Objective:     Vitals:   Temp (24hrs), Av 3 °F (37 9 °C), Min:98 3 °F (36 8 °C), Max:102 9 °F (39 4 °C)    Temp:  [98 3 °F (36 8 °C)-102 9 °F (39 4 °C)] 98 3 °F (36 8 °C)  HR:  [62-65] 65  Resp:  [14-18] 18  BP: ()/(51-72) 112/55  SpO2:  [91 %-92 %] 92 %  Body mass index is 30 27 kg/m²  Input and Output Summary (last 24 hours): Intake/Output Summary (Last 24 hours) at 2021 1238  Last data filed at 2021 0801  Gross per 24 hour   Intake 10 ml   Output 1200 ml   Net -1190 ml       Physical Exam:     Physical Exam  Vitals and nursing note reviewed  Constitutional:       General: He is not in acute distress  Appearance: He is well-developed  He is not toxic-appearing or diaphoretic  Comments: Pleasant, talkative gentleman resting comfortably in bed on room air   HENT:      Head: Normocephalic  Cardiovascular:      Rate and Rhythm: Normal rate  Pulmonary:      Effort: Pulmonary effort is normal  No respiratory distress  Breath sounds: Normal breath sounds  No wheezing, rhonchi or rales  Abdominal:      General: Bowel sounds are normal  There is no distension  Palpations: Abdomen is soft  Tenderness: There is no abdominal tenderness     Genitourinary:     Comments: Bilateral PCN tubes  Right uncapped and draining clear yellow urine  Left remains capped  Musculoskeletal:         General: No tenderness  Normal range of motion  Cervical back: Normal range of motion  Right lower leg: No edema  Left lower leg: No edema  Skin:     General: Skin is warm and dry  Capillary Refill: Capillary refill takes less than 2 seconds  Findings: No rash  Neurological:      Mental Status: He is alert and oriented to person, place, and time  Mental status is at baseline  Psychiatric:         Mood and Affect: Mood normal          Behavior: Behavior normal          Thought Content: Thought content normal          Judgment: Judgment normal          Additional Data:     Labs:    Results from last 7 days   Lab Units 09/08/21  0649 09/07/21  0458   WBC Thousand/uL 10 95* 13 27*   HEMOGLOBIN g/dL 7 9* 7 8*   HEMATOCRIT % 26 8* 26 8*   PLATELETS Thousands/uL 222 272   NEUTROS PCT %  --  82*   LYMPHS PCT %  --  9*   MONOS PCT %  --  6   EOS PCT %  --  2     Results from last 7 days   Lab Units 09/08/21  0649 09/07/21  0043   POTASSIUM mmol/L 4 3 4 3   CHLORIDE mmol/L 102 102   CO2 mmol/L 27 28   BUN mg/dL 32* 31*   CREATININE mg/dL 1 90* 1 90*   CALCIUM mg/dL 8 2* 8 9   ALK PHOS U/L  --  97   ALT U/L  --  21   AST U/L  --  18           * I Have Reviewed All Lab Data Listed Above  * Additional Pertinent Lab Tests Reviewed:  All Labs Within Last 24 Hours Reviewed    Imaging:    Imaging Reports Reviewed Today Include: CT A/P, CXR  Imaging Personally Reviewed by Myself Includes:  None     Recent Cultures (last 7 days):     Results from last 7 days   Lab Units 09/07/21  0043   URINE CULTURE  >100,000 cfu/ml Klebsiella pneumoniae*       Last 24 Hours Medication List:   Current Facility-Administered Medications   Medication Dose Route Frequency Provider Last Rate    acetaminophen  650 mg Oral Q6H PRN Brissa Baron PA-C      albuterol  2 puff Inhalation Q4H PRN Sachin Boyer PA-C      albuterol  2 puff Inhalation 4x Daily Alda Morrow MD      atorvastatin  20 mg Oral HS Sachin Boyer [place]      belladonna-opium  30 mg Rectal Q8H PRN Sachin Boyer PA-C      calcium carbonate  1,000 mg Oral Daily PRN Sachin Boyer PA-C      cefTRIAXone  1,000 mg Intravenous Q24H CORWIN Rao      cyanocobalamin  500 mcg Oral Daily Sachin Boyer [place]      ferrous sulfate  325 mg Oral Daily With Breakfast Yanet Mcfadden      fluticasone-vilanterol  1 puff Inhalation HS Sachin Boyer PA-C      metoprolol tartrate  25 mg Oral Q12H [address] Sachin Boyer PA-C      ondansetron  4 mg Intravenous Q6H PRN Sachin Boyer PA-C      polyethylene glycol  17 g Oral Daily PRN Sachin Boyer PA-C          Today, Patient Was Seen By: CORWIN Rao    ** Please Note: Dictation voice to text software may have been used in the creation of this document   **

## 2021-09-09 NOTE — PLAN OF CARE
Problem: PAIN - ADULT  Goal: Verbalizes/displays adequate comfort level or baseline comfort level  Description: Interventions:  - Encourage patient to monitor pain and request assistance  - Assess pain using appropriate pain scale  - Administer analgesics based on type and severity of pain and evaluate response  - Implement non-pharmacological measures as appropriate and evaluate response  - Consider cultural and social influences on pain and pain management  - Notify physician/advanced practitioner if interventions unsuccessful or patient reports new pain  Outcome: Progressing     Problem: INFECTION - ADULT  Goal: Absence or prevention of progression during hospitalization  Description: INTERVENTIONS:  - Assess and monitor for signs and symptoms of infection  - Monitor lab/diagnostic results  - Monitor all insertion sites, i e  indwelling lines, tubes, and drains  - Monitor endotracheal if appropriate and nasal secretions for changes in amount and color  - [place] appropriate cooling/warming therapies per order  - Administer medications as ordered  - Instruct and encourage patient and family to use good hand hygiene technique  - Identify and instruct in appropriate isolation precautions for identified infection/condition  Outcome: Progressing     Problem: DISCHARGE PLANNING  Goal: Discharge to home or other facility with appropriate resources  Description: INTERVENTIONS:  - Identify barriers to discharge w/patient and caregiver  - Arrange for needed discharge resources and transportation as appropriate  - Identify discharge learning needs (meds, wound care, etc )  - Arrange for interpretive services to assist at discharge as needed  - Refer to Case Management Department for coordinating discharge planning if the patient needs post-hospital services based on physician/advanced practitioner order or complex needs related to functional status, cognitive ability, or social support system  Outcome: Progressing

## 2021-09-10 PROBLEM — A41.9 SEPSIS (HCC): Status: RESOLVED | Noted: 2021-08-19 | Resolved: 2021-09-10

## 2021-09-10 LAB
ANION GAP SERPL CALCULATED.3IONS-SCNC: 9 MMOL/L (ref 4–13)
BACTERIA UR CULT: ABNORMAL
BACTERIA UR CULT: ABNORMAL
BASOPHILS # BLD AUTO: 0.05 THOUSANDS/ΜL (ref 0–0.1)
BASOPHILS NFR BLD AUTO: 1 % (ref 0–1)
BUN SERPL-MCNC: 34 MG/DL (ref 5–25)
CALCIUM SERPL-MCNC: 8.3 MG/DL (ref 8.3–10.1)
CHLORIDE SERPL-SCNC: 104 MMOL/L (ref 100–108)
CO2 SERPL-SCNC: 27 MMOL/L (ref 21–32)
CREAT SERPL-MCNC: 1.75 MG/DL (ref 0.6–1.3)
EOSINOPHIL # BLD AUTO: 0.72 THOUSAND/ΜL (ref 0–0.61)
EOSINOPHIL NFR BLD AUTO: 10 % (ref 0–6)
ERYTHROCYTE [DISTWIDTH] IN BLOOD BY AUTOMATED COUNT: 20.3 % (ref 11.6–15.1)
GFR SERPL CREATININE-BSD FRML MDRD: 37 ML/MIN/1.73SQ M
GLUCOSE SERPL-MCNC: 98 MG/DL (ref 65–140)
HCT VFR BLD AUTO: 26.6 % (ref 36.5–49.3)
HGB BLD-MCNC: 7.9 G/DL (ref 12–17)
IMM GRANULOCYTES # BLD AUTO: 0.04 THOUSAND/UL (ref 0–0.2)
IMM GRANULOCYTES NFR BLD AUTO: 1 % (ref 0–2)
LYMPHOCYTES # BLD AUTO: 1.2 THOUSANDS/ΜL (ref 0.6–4.47)
LYMPHOCYTES NFR BLD AUTO: 17 % (ref 14–44)
MCH RBC QN AUTO: 24 PG (ref 26.8–34.3)
MCHC RBC AUTO-ENTMCNC: 29.7 G/DL (ref 31.4–37.4)
MCV RBC AUTO: 81 FL (ref 82–98)
MONOCYTES # BLD AUTO: 0.8 THOUSAND/ΜL (ref 0.17–1.22)
MONOCYTES NFR BLD AUTO: 11 % (ref 4–12)
NEUTROPHILS # BLD AUTO: 4.47 THOUSANDS/ΜL (ref 1.85–7.62)
NEUTS SEG NFR BLD AUTO: 60 % (ref 43–75)
NRBC BLD AUTO-RTO: 0 /100 WBCS
PLATELET # BLD AUTO: 203 THOUSANDS/UL (ref 149–390)
PMV BLD AUTO: 10.2 FL (ref 8.9–12.7)
POTASSIUM SERPL-SCNC: 4.7 MMOL/L (ref 3.5–5.3)
RBC # BLD AUTO: 3.29 MILLION/UL (ref 3.88–5.62)
SODIUM SERPL-SCNC: 140 MMOL/L (ref 136–145)
WBC # BLD AUTO: 7.28 THOUSAND/UL (ref 4.31–10.16)

## 2021-09-10 PROCEDURE — 80048 BASIC METABOLIC PNL TOTAL CA: CPT | Performed by: NURSE PRACTITIONER

## 2021-09-10 PROCEDURE — 99239 HOSP IP/OBS DSCHRG MGMT >30: CPT | Performed by: NURSE PRACTITIONER

## 2021-09-10 PROCEDURE — 85025 COMPLETE CBC W/AUTO DIFF WBC: CPT | Performed by: NURSE PRACTITIONER

## 2021-09-10 RX ORDER — CEPHALEXIN 500 MG/1
500 CAPSULE ORAL EVERY 12 HOURS SCHEDULED
Status: DISCONTINUED | OUTPATIENT
Start: 2021-09-11 | End: 2021-09-11 | Stop reason: HOSPADM

## 2021-09-10 RX ADMIN — CEFTRIAXONE 1000 MG: 1 INJECTION, SOLUTION INTRAVENOUS at 13:20

## 2021-09-10 RX ADMIN — METOPROLOL TARTRATE 25 MG: 25 TABLET, FILM COATED ORAL at 22:21

## 2021-09-10 RX ADMIN — FLUTICASONE FUROATE AND VILANTEROL TRIFENATATE 1 PUFF: 200; 25 POWDER RESPIRATORY (INHALATION) at 22:21

## 2021-09-10 RX ADMIN — FERROUS SULFATE TAB 325 MG (65 MG ELEMENTAL FE) 325 MG: 325 (65 FE) TAB at 09:01

## 2021-09-10 RX ADMIN — ATORVASTATIN CALCIUM 20 MG: 20 TABLET, FILM COATED ORAL at 22:21

## 2021-09-10 RX ADMIN — ALBUTEROL SULFATE 2 PUFF: 90 AEROSOL, METERED RESPIRATORY (INHALATION) at 22:22

## 2021-09-10 RX ADMIN — CYANOCOBALAMIN TAB 500 MCG 500 MCG: 500 TAB at 08:59

## 2021-09-10 RX ADMIN — ALBUTEROL SULFATE 2 PUFF: 90 AEROSOL, METERED RESPIRATORY (INHALATION) at 13:20

## 2021-09-10 RX ADMIN — ALBUTEROL SULFATE 2 PUFF: 90 AEROSOL, METERED RESPIRATORY (INHALATION) at 10:02

## 2021-09-10 NOTE — DISCHARGE SUMMARY
Armando 43  Discharge- Dence Ro 1945, 66 y o  male MRN: [id number]  Unit/Bed#: [address] [place] [address] Encounter: [id number]  Primary Care Provider: Shira Apley, MD   Date and time admitted to hospital: 9/6/2021 11:41 PM    * Acute kidney injury superimposed on CKD [facility])  Assessment & Plan  Lab Results   Component Value Date    EGFR 32 09/11/2021    EGFR 37 09/10/2021    EGFR 33 09/08/2021    CREATININE 1 95 (H) 09/11/2021    CREATININE 1 75 (H) 09/10/2021    CREATININE 1 90 (H) 09/08/2021     Cr 1 90 on admission  Baseline approximately 1 4-1 5, was 1 55 on discharge on 8/27  Patient was recently given a 5-day course of Lasix for LE edema which has since resolved, possibly contributing to SHANTI on admission? · Creatinine improved to 1 75 but then increased again to 1 9 despite adequate urine output  · Given normal saline 250 mL bolus x1   · Encouraged oral hydration  · Plan to check a BMP as an outpatient in 1 week  · Patient to follow-up with nephrology    Hematuria  Assessment & Plan  Patient presents with recurrent hematuria  He did touch base with urology outpatient and tried increasing fluid intake  He continued with consistent hematuria and presented to the ED  Extensive urologic Hx with radiation cystitis, left trigone bladder floor lesion, right radiation ureteritis/right ureteral lesion/right ureteral bleeding per recent OR findings  Remains with recurrent hematuria despite bilateral nephrostomy and hyperbaric treatment for radiation cystitis  Prior cytology, biopsy negative for malignancy  Recently had stent removed in the office on Friday  Bilateral nephrostomy tubes are capped  · Appreciate urology input  · 9/8:  CBI clear  Discontinued CBI  Febrile  Uncapped right nephrostomy tube  · 9/9:  Afebrile  Right nephrostomy tube draining clear, yellow urine  Removed Sumner catheter  · 9/10:  Capped right nephrostomy tube per Urology recommendations    Had some pink-tinged urine with small clots during BM  Remaining urine remained clear  Urine culture grew Klebsiella sensitive to cefazolin  Transitioned to Keflex  · 9/11:  No further episodes of hematuria  · Follow-up with urology as an outpatient    Complicated UTI (urinary tract infection)  Assessment & Plan  UA grossly abnormal, patient recently treated for UTI with hematuria  Previous culture grew Pseudomonas, sensitive to cefepime  Denies any fever chills but reports lower abdominal discomfort  · Patient initially started on cefepime  · Urine culture grew Klebsiella, sensitive to cefazolin  · Received cefepime followed by Rocephin, transition to Keflex    Plan to complete a 10 day course of therapy  · Follow-up with PCP    Chronic obstructive pulmonary disease with acute exacerbation (HCC)  Assessment & Plan  · Continue Breo, albuterol inhaler q 6 hours and PRN  · No evidence of acute exacerbation on admission    Anemia  Assessment & Plan  Chronic anemia in the setting of ongoing hematuria, iron deficiency  Continue iron supplements  Hg 9 2 on admission, above recent baseline, recently required transfusion of 2 U during previous admission  · Hemoglobin somewhat lower at 7 8 -> 7 9 grams/deciliter   · Hematuria resolved  · HGB stable     Dyslipidemia  Assessment & Plan  · Continue atorvastatin 20 mg daily     Essential hypertension  Assessment & Plan  · Continue Lopressor 25 mg Q12    Sepsis (HCC)-resolved as of 9/10/2021  Assessment & Plan  · POA, evolving since admission due to UTI 2/2 PCN as evidenced by leukocytosis and fevers   · Patient was started on Cefepime   · Urine culture growing Klebsiella, will transition to Rocephin 1 gm IV daily   · Follow-up final culture       Discharging Physician / Practitioner: CORWIN oSlares  PCP: Shaila Harding MD  Admission Date:   Admission Orders (From admission, onward)     Ordered        09/08/21 1440  Inpatient Admission  Once         09/07/21 0216 Place in Observation  Once                   Discharge Date: 09/11/21    Medical Problems     Resolved Problems  Date Reviewed: 9/10/2021        Resolved    Sepsis ([address] ) 9/10/2021     Resolved by  Sherren Six, [address] [place] [address] Stay:  · Urology     Procedures Performed:   · None     Significant Findings / Test Results:   · Hematuria, SHANTI, UTI     Incidental Findings:   · None      Test Results Pending at Discharge (will require follow up): · None     Outpatient Tests Requested:  · BMP in 1 week     Complications:  None    Reason for Admission: SHANTI, hematuria, UTI    Hospital Course:     Demetrio Mccloud is a 66 y o  male patient with a past medical history including hypertension, hyperlipidemia, COPD, prostate cancer status post radiation and chemotherapy, and history of 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 and ureteritis with multiple admissions for the same and bilateral nephrostomy tubes who originally presented to the hospital on 9/6/2021 due to persistent hematuria  CBI was initiated in the ED  Patient's nephrostomy tubes were capped  Patient CBI was discontinued once his urine was clear  Patient complained of some right flank pain associated with fevers for which his right PCN was uncapped  His right PCN drained clear yellow urine  Eventually his right PCN was capped  He was urinating without difficulty  His urine remains clear  His urine culture grew Klebsiella, sensitive to cefazolin  He has been treated with IV Cefepime followed by IV Rocephin  He will transition to oral Keflex on discharge to complete a 10 day course of therapy  He is to check a BMP in 1 week to monitor renal function  He is to follow-up with urology as an outpatient  Please see above list of diagnoses and related plan for additional information       Condition at Discharge: stable     Discharge Day Visit / Exam:     Subjective:  Patient seen and examined at bedside this morning  Sitting at edge of bed  Anxious for discharge home  Denies any flank pain, chills, fever, abdominal pain, nausea, vomiting, or diarrhea  Denies chest pain or shortness of breath  Had a BM this morning  Urinating well  Plans to discharge home today with outpatient urology follow-up next week  Vitals: Blood Pressure: 111/63 (09/11/21 0855)  Pulse: 64 (09/11/21 0855)  Temperature: 97 5 °F (36 4 °C) (09/11/21 0740)  Temp Source: Oral (09/09/21 2348)  Respirations: 17 (09/11/21 0740)  Height: 5' 11" (180 3 cm) (09/07/21 0253)  Weight - Scale: 98 4 kg (217 lb) (09/06/21 2346)  SpO2: 94 % (09/10/21 2214)  Exam:   Physical Exam  Vitals and nursing note reviewed  Constitutional:       General: He is not in acute distress  Appearance: He is well-developed  He is not ill-appearing, toxic-appearing or diaphoretic  Comments: Pleasant, talkative gentleman resting comfortably in bed on room air   HENT:      Head: Normocephalic and atraumatic  Eyes:      Conjunctiva/sclera: Conjunctivae normal    Cardiovascular:      Rate and Rhythm: Normal rate and regular rhythm  Pulmonary:      Effort: Pulmonary effort is normal  No tachypnea or respiratory distress  Breath sounds: Examination of the right-lower field reveals decreased breath sounds  Examination of the left-lower field reveals decreased breath sounds  Decreased breath sounds present  No wheezing or rales  Abdominal:      General: Bowel sounds are normal  There is no distension  Palpations: Abdomen is soft  Tenderness: There is no abdominal tenderness  Genitourinary:     Comments: Bilateral nephrostomy tubes, capped  Musculoskeletal:         General: Normal range of motion  Cervical back: Normal range of motion  Right lower leg: No edema  Left lower leg: No edema  Skin:     General: Skin is warm and dry  Capillary Refill: Capillary refill takes less than 2 seconds     Neurological:      Mental Status: He is alert and oriented to person, place, and time  Mental status is at baseline  Psychiatric:         Mood and Affect: Mood normal          Behavior: Behavior normal          Thought Content: Thought content normal          Judgment: Judgment normal          Discussion with Family: Called wife, Amalia Long, twice  Went to [place]  Patient states he will call her for a ride home  Discharge instructions/Information to patient and family:   See after visit summary for information provided to patient and family  Provisions for Follow-Up Care:  See after visit summary for information related to follow-up care and any pertinent home health orders  Disposition:     Home    For Discharges to [facility] SNF:   · Not Applicable to this Patient - Not Applicable to this Patient    Planned Readmission: None     Discharge Statement:  I spent > 30 minutes discharging the patient  This time was spent on the day of discharge  I had direct contact with the patient on the day of discharge  Greater than 50% of the total time was spent examining patient, answering all patient questions, arranging and discussing plan of care with patient as well as directly providing post-discharge instructions  Additional time then spent on discharge activities  Discharge Medications:  See after visit summary for reconciled discharge medications provided to patient and family        ** Please Note: This note has been constructed using a voice recognition system **

## 2021-09-10 NOTE — DISCHARGE INSTR - AVS FIRST PAGE
Urinary Tract Infection:  · Remain well hydrated  · Take antibiotics for 6 more days with Keflex 500 mg every 12 hours     Hematuria:  · Follow-up with Dr Tequila Flores

## 2021-09-10 NOTE — PROGRESS NOTES
Progress Note - Urology      Patient: Glo Abbott   : 1945 Sex: male   MRN: [id number]     CSN: [id number]  Unit/Bed#: [address] [place] [address]     SUBJECTIVE:   Alert  Multiple questions about additional procedures if patient develops hematuria in the future  Wishes to further discuss them in the office with wife present      Objective   Vitals: /64   Pulse 68   Temp 98 6 °F (37 °C)   Resp 18   Ht 5' 11" (1 803 m)   Wt 98 4 kg (217 lb)   SpO2 94%   BMI 30 27 kg/m²     I/O last 24 hours:   In: -   Out: 1150 [Urine:1150]      Physical Exam:   General Alert awake   Normocephalic atraumatic PERRLA  Lungs clear bilaterally  Cardiac normal S1 normal S2  Abdomen soft, flank pain  Extremities no edema      Lab Results: CBC:   Lab Results   Component Value Date    WBC 7 28 09/10/2021    HGB 7 9 (L) 09/10/2021    HCT 26 6 (L) 09/10/2021    MCV 81 (L) 09/10/2021     09/10/2021    MCH 24 0 (L) 09/10/2021    MCHC 29 7 (L) 09/10/2021    RDW 20 3 (H) 09/10/2021    MPV 10 2 09/10/2021    NRBC 0 09/10/2021     CMP:   Lab Results   Component Value Date     09/10/2021    CO2 27 09/10/2021    BUN 34 (H) 09/10/2021    CREATININE 1 75 (H) 09/10/2021    CALCIUM 8 3 09/10/2021    AST 18 2021    ALT 21 2021    ALKPHOS 97 2021    EGFR 37 09/10/2021     Urinalysis:   Lab Results   Component Value Date    COLORU Dark Eva 2021    CLARITYU Slightly Cloudy 2021    SPECGRAV 1 020 2021    PHUR 6 0 2021    PHUR 5 5 2018    LEUKOCYTESUR Moderate (A) 2021    NITRITE Positive (A) 2021    GLUCOSEU Negative 2021    KETONESU Trace (A) 2021    BILIRUBINUR Interference- unable to analyze (A) 2021    BLOODU Large (A) 2021     Urine Culture:   Lab Results   Component Value Date    URINECX >100,000 cfu/ml Klebsiella pneumoniae (A) 2021    URINECX <10,000 cfu/ml Diphtheroids 2021     PSA: No results found for: PSA      Assessment/ Plan:  Radiation cystitis  Possible hemorrhagic cystitis  On antibiotics  Voiding clear urine small amounts  Right nephrostomy tube re-clamped  Long discussion with patient  Will be discharged tomorrow nephrostomy tubes clamped  If patient has further bleeding episodes would need interventional radiology consult for vesical artery angiogram and embolization of specific vessel appears to be coming from the left side          Lala Avalos MD (0) independent

## 2021-09-10 NOTE — PROGRESS NOTES
Armando 43  Progress Note Janice Hodges 1945, 66 y o  male MRN: [id number]  Unit/Bed#: [address] [place] [address] Encounter: [id number]  Primary Care Provider: Fanta Stout MD   Date and time admitted to hospital: 9/6/2021 11:41 PM    * Acute kidney injury superimposed on CKD [facility])  Assessment & Plan  Lab Results   Component Value Date    EGFR 37 09/10/2021    EGFR 33 09/08/2021    EGFR 33 09/07/2021    CREATININE 1 75 (H) 09/10/2021    CREATININE 1 90 (H) 09/08/2021    CREATININE 1 90 (H) 09/07/2021     Cr 1 90 on admission  Baseline approximately 1 4-1 5, was 1 55 on discharge on 8/27  Avoid nephrotoxins and hypotension  Patient was recently given a 5-day course of Lasix for LE edema which has since resolved, possibly contributing to SHANTI ? · Creatinine improved to 1 75  · No indication for IV fluids   · Encourage oral hydration    Hematuria  Assessment & Plan  Patient presents with recurrent hematuria  He did touch base with urology outpatient and tried increasing fluid intake  He continued with consistent hematuria and presented to the ED  Extensive urologic Hx with radiation cystitis, left trigone bladder floor lesion, right radiation ureteritis/right ureteral lesion/right ureteral bleeding per recent OR findings  Remains with recurrent hematuria despite bilateral nephrostomy and hyperbaric treatment for radiation cystitis  Prior cytology, biopsy negative for malignancy  Recently had stent removed in the office on Friday  Bilateral nephrostomy tubes are capped  · Appreciate urology input  · 9/8:  CBI clear  Discontinued CBI  Febrile  Uncapped right nephrostomy tube  Left nephrostomy tube clamped  · 9/9:  Afebrile  Right nephrostomy tube draining clear yellow urine  Left nephrostomy tube remains clamped  Will remove Sumner catheter and attempt a voiding trial  · 9/10:  Capped right nephrostomy tube per Urology recommendations    Urine culture growing Klebsiella sensitive to cefazolin  Today is day 4 of IV antibiotics  Plan to complete a 10-day course of therapy    Complicated UTI (urinary tract infection)  Assessment & Plan  UA grossly abnormal, patient recently treated for UTI with hematuria  Previous culture grew Pseudomonas, sensitive to cefepime  Denies any fever chills but reports lower abdominal discomfort  · Patient initially started on cefepime  · Urine culture grew Klebsiella, sensitive to cefazolin  · Received cefepime followed by Rocephin, will transition to Keflex starting tomorrow  Plan to complete a 10 day course of therapy    Chronic obstructive pulmonary disease with acute exacerbation (HCC)  Assessment & Plan  · Continue Breo, albuterol inhaler q 6 hours and PRN  · No evidence of acute exacerbation on admission    Anemia  Assessment & Plan  Chronic anemia in the setting of ongoing hematuria, iron deficiency  Continue iron supplements  Hg 9 2 on admission, above recent baseline, recently required transfusion of 2 U during previous admission  · Hemoglobin somewhat lower at 7 8 -> 7 9 grams/deciliter   · Hematuria resolved  · HGB stable     Dyslipidemia  Assessment & Plan  · Continue atorvastatin 20 mg daily     Essential hypertension  Assessment & Plan  · Continue Lopressor 25 mg Q12    Sepsis (HCC)-resolved as of 9/10/2021  Assessment & Plan  · POA, evolving since admission due to UTI 2/2 PCN as evidenced by leukocytosis and fevers   · Patient was started on Cefepime   · Urine culture growing Klebsiella, will transition to Rocephin 1 gm IV daily   · Follow-up final culture     VTE Pharmacologic Prophylaxis:   Pharmacologic: Pharmacologic VTE Prophylaxis contraindicated due to hematuria  Mechanical VTE Prophylaxis in Place: Yes    Patient Centered Rounds: I have performed bedside rounds with nursing staff today      Discussions with Specialists or Other Care Team Provider: nursing, cm    Education and Discussions with Family / Patient: I have answered all questions to the best of my ability  Updated wife, Madeline Garcia telephone     Time Spent for Care: 30 minutes  More than 50% of total time spent on counseling and coordination of care as described above  Current Length of Stay: 2 day(s)    Current Patient Status: Inpatient   Certification Statement: The patient will continue to require additional inpatient hospital stay due to SHANTI, UTI    Discharge Plan: Patient is not medically stable for discharge today, likely in 24 hours pending progress  Code Status: Level 1 - Full Code      Subjective:   Patient seen and examined at bedside  Urinating well  Did pass a few small clots/ pink tinged urine when having a BM but urine has been clear since  No flank tenderness  No SOB or CP  Objective:     Vitals:   Temp (24hrs), Av 6 °F (37 °C), Min:98 5 °F (36 9 °C), Max:98 8 °F (37 1 °C)    Temp:  [98 5 °F (36 9 °C)-98 8 °F (37 1 °C)] 98 6 °F (37 °C)  HR:  [60-68] 68  Resp:  [16-19] 18  BP: (106-116)/(49-64) 113/64  SpO2:  [92 %-94 %] 94 %  Body mass index is 30 27 kg/m²  Input and Output Summary (last 24 hours): Intake/Output Summary (Last 24 hours) at 9/10/2021 1830  Last data filed at 9/10/2021 0556  Gross per 24 hour   Intake --   Output 850 ml   Net -850 ml       Physical Exam:     Physical Exam  Vitals and nursing note reviewed  Constitutional:       General: He is not in acute distress  Appearance: He is well-developed  He is not ill-appearing or diaphoretic  Comments: Pleasant, talkative gentleman resting in bed on room air    HENT:      Head: Normocephalic  Cardiovascular:      Rate and Rhythm: Normal rate  Pulmonary:      Effort: Pulmonary effort is normal  No respiratory distress  Breath sounds: Normal breath sounds  No wheezing, rhonchi or rales  Abdominal:      General: Bowel sounds are normal  There is no distension  Palpations: Abdomen is soft  Tenderness: There is no abdominal tenderness        Comments: Capped bilateral PCN tubes    Musculoskeletal:         General: No tenderness  Normal range of motion  Cervical back: Normal range of motion  Right lower leg: No edema  Left lower leg: No edema  Skin:     General: Skin is warm and dry  Capillary Refill: Capillary refill takes less than 2 seconds  Neurological:      Mental Status: He is alert and oriented to person, place, and time  Mental status is at baseline  Psychiatric:         Mood and Affect: Mood normal          Behavior: Behavior normal          Judgment: Judgment normal          Additional Data:     Labs:    Results from last 7 days   Lab Units 09/10/21  0552   WBC Thousand/uL 7 28   HEMOGLOBIN g/dL 7 9*   HEMATOCRIT % 26 6*   PLATELETS Thousands/uL 203   NEUTROS PCT % 60   LYMPHS PCT % 17   MONOS PCT % 11   EOS PCT % 10*     Results from last 7 days   Lab Units 09/10/21  0552 09/07/21  0043   POTASSIUM mmol/L 4 7 4 3   CHLORIDE mmol/L 104 102   CO2 mmol/L 27 28   BUN mg/dL 34* 31*   CREATININE mg/dL 1 75* 1 90*   CALCIUM mg/dL 8 3 8 9   ALK PHOS U/L  --  97   ALT U/L  --  21   AST U/L  --  18           * I Have Reviewed All Lab Data Listed Above  * Additional Pertinent Lab Tests Reviewed:  All Labs Within Last 24 Hours Reviewed    Imaging:    Imaging Reports Reviewed Today Include: CT A/P, CXR  Imaging Personally Reviewed by Myself Includes:  None     Recent Cultures (last 7 days):     Results from last 7 days   Lab Units 09/07/21  0043   URINE CULTURE  >100,000 cfu/ml Klebsiella pneumoniae*  <10,000 cfu/ml Diphtheroids       Last 24 Hours Medication List:   Current Facility-Administered Medications   Medication Dose Route Frequency Provider Last Rate    acetaminophen  650 mg Oral Q6H PRN Mardel Jamshid, PA-PIYUSH      albuterol  2 puff Inhalation Q4H PRN Mardel Jamshid, PA-C      albuterol  2 puff Inhalation 4x Daily Cee Paris MD      atorvastatin  20 mg Oral HS Jaci Breen PA-C      belladonna-opium  30 mg Rectal Q8H PRN Laura Cooper PA-C      calcium carbonate  1,000 mg Oral Daily PRN Laura Cooper PA-C      cefTRIAXone  1,000 mg Intravenous Q24H CORWIN Perez 1,000 mg (09/10/21 1320)    cyanocobalamin  500 mcg Oral Daily Laura Cooper PA-C      ferrous sulfate  325 mg Oral Daily With Breakfast [place], [place]      fluticasone-vilanterol  1 puff Inhalation HS Laura Cooper PA-C      metoprolol tartrate  25 mg Oral Q12H [facility] [place], [place]      ondansetron  4 mg Intravenous Q6H PRN Laura Cooper PA-C      polyethylene glycol  17 g Oral Daily PRN Laura Cooper PA-C          Today, Patient Was Seen By: CORWIN Perez    ** Please Note: Dictation voice to text software may have been used in the creation of this document   **

## 2021-09-10 NOTE — NURSING NOTE
Call received from Dr Donnie Soto to give telephone orders  Orders to clamp right nephrostomy tube at this time  Per Dr Donnie Soto, he will be in tonight around [id number] to reassess patient  Nephrostomy tube clamped at this time

## 2021-09-10 NOTE — CASE MANAGEMENT
IMM reviewed with patient  Patient signed IMM but pt declined copy  Pt stated he has information from past hospitalizations  Copy placed in scan bin for chart

## 2021-09-11 VITALS
SYSTOLIC BLOOD PRESSURE: 111 MMHG | DIASTOLIC BLOOD PRESSURE: 63 MMHG | WEIGHT: 217 LBS | BODY MASS INDEX: 30.38 KG/M2 | OXYGEN SATURATION: 94 % | HEART RATE: 64 BPM | TEMPERATURE: 97.5 F | HEIGHT: 71 IN | RESPIRATION RATE: 17 BRPM

## 2021-09-11 LAB
ANION GAP SERPL CALCULATED.3IONS-SCNC: 6 MMOL/L (ref 4–13)
BUN SERPL-MCNC: 38 MG/DL (ref 5–25)
CALCIUM SERPL-MCNC: 8.4 MG/DL (ref 8.3–10.1)
CHLORIDE SERPL-SCNC: 103 MMOL/L (ref 100–108)
CO2 SERPL-SCNC: 30 MMOL/L (ref 21–32)
CREAT SERPL-MCNC: 1.95 MG/DL (ref 0.6–1.3)
GFR SERPL CREATININE-BSD FRML MDRD: 32 ML/MIN/1.73SQ M
GLUCOSE SERPL-MCNC: 111 MG/DL (ref 65–140)
POTASSIUM SERPL-SCNC: 4.8 MMOL/L (ref 3.5–5.3)
SODIUM SERPL-SCNC: 139 MMOL/L (ref 136–145)

## 2021-09-11 PROCEDURE — 80048 BASIC METABOLIC PNL TOTAL CA: CPT | Performed by: NURSE PRACTITIONER

## 2021-09-11 RX ORDER — CEPHALEXIN 500 MG/1
500 CAPSULE ORAL EVERY 12 HOURS SCHEDULED
Qty: 11 CAPSULE | Refills: 0 | Status: SHIPPED | OUTPATIENT
Start: 2021-09-11 | End: 2021-09-20 | Stop reason: HOSPADM

## 2021-09-11 RX ADMIN — CEPHALEXIN 500 MG: 500 CAPSULE ORAL at 08:52

## 2021-09-11 RX ADMIN — FERROUS SULFATE TAB 325 MG (65 MG ELEMENTAL FE) 325 MG: 325 (65 FE) TAB at 08:53

## 2021-09-11 RX ADMIN — ACETAMINOPHEN 650 MG: 325 TABLET, FILM COATED ORAL at 00:52

## 2021-09-11 RX ADMIN — CYANOCOBALAMIN TAB 500 MCG 500 MCG: 500 TAB at 08:53

## 2021-09-11 RX ADMIN — ALBUTEROL SULFATE 2 PUFF: 90 AEROSOL, METERED RESPIRATORY (INHALATION) at 08:55

## 2021-09-11 RX ADMIN — SODIUM CHLORIDE 250 ML: 0.9 INJECTION, SOLUTION INTRAVENOUS at 08:48

## 2021-09-11 NOTE — PLAN OF CARE
Problem: PAIN - ADULT  Goal: Verbalizes/displays adequate comfort level or baseline comfort level  Description: Interventions:  - Encourage patient to monitor pain and request assistance  - Assess pain using appropriate pain scale  - Administer analgesics based on type and severity of pain and evaluate response  - Implement non-pharmacological measures as appropriate and evaluate response  - Consider cultural and social influences on pain and pain management  - Notify physician/advanced practitioner if interventions unsuccessful or patient reports new pain  9/11/2021 1015 by Derick Marks RN  Outcome: Adequate for Discharge  9/11/2021 1014 by Derick Marks RN  Outcome: Adequate for Discharge     Problem: INFECTION - ADULT  Goal: Absence or prevention of progression during hospitalization  Description: INTERVENTIONS:  - Assess and monitor for signs and symptoms of infection  - Monitor lab/diagnostic results  - Monitor all insertion sites, i e  indwelling lines, tubes, and drains  - Monitor endotracheal if appropriate and nasal secretions for changes in amount and color  - [place] appropriate cooling/warming therapies per order  - Administer medications as ordered  - Instruct and encourage patient and family to use good hand hygiene technique  - Identify and instruct in appropriate isolation precautions for identified infection/condition  9/11/2021 1015 by Derick Marks RN  Outcome: Adequate for Discharge  9/11/2021 1014 by Derick Marks RN  Outcome: Adequate for Discharge     Problem: DISCHARGE PLANNING  Goal: Discharge to home or other facility with appropriate resources  Description: INTERVENTIONS:  - Identify barriers to discharge w/patient and caregiver  - Arrange for needed discharge resources and transportation as appropriate  - Identify discharge learning needs (meds, wound care, etc )  - Arrange for interpretive services to assist at discharge as needed  - Refer to Case Management Department for coordinating discharge planning if the patient needs post-hospital services based on physician/advanced practitioner order or complex needs related to functional status, cognitive ability, or social support system  9/11/2021 1015 by Nohelia Rockwell RN  Outcome: Adequate for Discharge  9/11/2021 1014 by Nohelia Rockwell, LAURA  Outcome: Adequate for Discharge

## 2021-09-11 NOTE — NURSING NOTE
Patients discharge instructions and follow up appointments reviewed and discussed  Patients questions answered and states understanding with follow up instructions  Medications reviewed  Belongings gathered and room checked for any missing items  All items accounted for per patient

## 2021-09-14 ENCOUNTER — HOSPITAL ENCOUNTER (EMERGENCY)
Facility: HOSPITAL | Age: 76
Discharge: HOME | DRG: 982 | End: 2021-09-14
Attending: EMERGENCY MEDICINE | Admitting: EMERGENCY MEDICINE
Payer: MEDICARE

## 2021-09-14 VITALS
HEART RATE: 63 BPM | OXYGEN SATURATION: 94 % | WEIGHT: 206 LBS | BODY MASS INDEX: 28.73 KG/M2 | TEMPERATURE: 98.5 F | SYSTOLIC BLOOD PRESSURE: 150 MMHG | RESPIRATION RATE: 18 BRPM | DIASTOLIC BLOOD PRESSURE: 69 MMHG

## 2021-09-14 DIAGNOSIS — R31.0 GROSS HEMATURIA: Primary | ICD-10-CM

## 2021-09-14 PROCEDURE — 99283 EMERGENCY DEPT VISIT LOW MDM: CPT

## 2021-09-14 PROCEDURE — 99284 EMERGENCY DEPT VISIT MOD MDM: CPT | Performed by: EMERGENCY MEDICINE

## 2021-09-14 PROCEDURE — 99284 EMERGENCY DEPT VISIT MOD MDM: CPT

## 2021-09-15 ENCOUNTER — HOSPITAL ENCOUNTER (INPATIENT)
Facility: HOSPITAL | Age: 76
LOS: 5 days | Discharge: HOME | DRG: 982 | End: 2021-09-20
Attending: EMERGENCY MEDICINE | Admitting: FAMILY MEDICINE
Payer: MEDICARE

## 2021-09-15 DIAGNOSIS — N30.01 ACUTE CYSTITIS WITH HEMATURIA: Primary | ICD-10-CM

## 2021-09-15 DIAGNOSIS — R31.0 GROSS HEMATURIA: ICD-10-CM

## 2021-09-15 LAB
ANION GAP SERPL CALCULATED.3IONS-SCNC: 9 MMOL/L (ref 4–13)
BASOPHILS # BLD AUTO: 0.04 THOUSANDS/ΜL (ref 0–0.1)
BASOPHILS NFR BLD AUTO: 0 % (ref 0–1)
BUN SERPL-MCNC: 41 MG/DL (ref 5–25)
CALCIUM SERPL-MCNC: 7.9 MG/DL (ref 8.3–10.1)
CHLORIDE SERPL-SCNC: 103 MMOL/L (ref 100–108)
CO2 SERPL-SCNC: 27 MMOL/L (ref 21–32)
CREAT SERPL-MCNC: 2.14 MG/DL (ref 0.6–1.3)
EOSINOPHIL # BLD AUTO: 1.45 THOUSAND/ΜL (ref 0–0.61)
EOSINOPHIL NFR BLD AUTO: 12 % (ref 0–6)
ERYTHROCYTE [DISTWIDTH] IN BLOOD BY AUTOMATED COUNT: 20 % (ref 11.6–15.1)
GFR SERPL CREATININE-BSD FRML MDRD: 29 ML/MIN/1.73SQ M
GLUCOSE SERPL-MCNC: 105 MG/DL (ref 65–140)
HCT VFR BLD AUTO: 25.6 % (ref 36.5–49.3)
HCT VFR BLD AUTO: 27.9 % (ref 36.5–49.3)
HGB BLD-MCNC: 7.6 G/DL (ref 12–17)
HGB BLD-MCNC: 8.3 G/DL (ref 12–17)
IMM GRANULOCYTES # BLD AUTO: 0.08 THOUSAND/UL (ref 0–0.2)
IMM GRANULOCYTES NFR BLD AUTO: 1 % (ref 0–2)
LYMPHOCYTES # BLD AUTO: 1.32 THOUSANDS/ΜL (ref 0.6–4.47)
LYMPHOCYTES NFR BLD AUTO: 11 % (ref 14–44)
MCH RBC QN AUTO: 24.1 PG (ref 26.8–34.3)
MCHC RBC AUTO-ENTMCNC: 29.7 G/DL (ref 31.4–37.4)
MCV RBC AUTO: 81 FL (ref 82–98)
MONOCYTES # BLD AUTO: 0.88 THOUSAND/ΜL (ref 0.17–1.22)
MONOCYTES NFR BLD AUTO: 8 % (ref 4–12)
NEUTROPHILS # BLD AUTO: 7.93 THOUSANDS/ΜL (ref 1.85–7.62)
NEUTS SEG NFR BLD AUTO: 68 % (ref 43–75)
NRBC BLD AUTO-RTO: 0 /100 WBCS
PLATELET # BLD AUTO: 246 THOUSANDS/UL (ref 149–390)
PMV BLD AUTO: 9 FL (ref 8.9–12.7)
POTASSIUM SERPL-SCNC: 4.8 MMOL/L (ref 3.5–5.3)
RBC # BLD AUTO: 3.44 MILLION/UL (ref 3.88–5.62)
SODIUM SERPL-SCNC: 139 MMOL/L (ref 136–145)
WBC # BLD AUTO: 11.7 THOUSAND/UL (ref 4.31–10.16)

## 2021-09-15 PROCEDURE — 80048 BASIC METABOLIC PNL TOTAL CA: CPT | Performed by: EMERGENCY MEDICINE

## 2021-09-15 PROCEDURE — 04VE3DZ RESTRICTION OF RIGHT INTERNAL ILIAC ARTERY WITH INTRALUMINAL DEVICE, PERCUTANEOUS APPROACH: ICD-10-PCS | Performed by: RADIOLOGY

## 2021-09-15 PROCEDURE — 85014 HEMATOCRIT: CPT | Performed by: NURSE PRACTITIONER

## 2021-09-15 PROCEDURE — 04VF3DZ RESTRICTION OF LEFT INTERNAL ILIAC ARTERY WITH INTRALUMINAL DEVICE, PERCUTANEOUS APPROACH: ICD-10-PCS | Performed by: RADIOLOGY

## 2021-09-15 PROCEDURE — 0T9B70Z DRAINAGE OF BLADDER WITH DRAINAGE DEVICE, VIA NATURAL OR ARTIFICIAL OPENING: ICD-10-PCS | Performed by: EMERGENCY MEDICINE

## 2021-09-15 PROCEDURE — 99285 EMERGENCY DEPT VISIT HI MDM: CPT | Performed by: EMERGENCY MEDICINE

## 2021-09-15 PROCEDURE — NC001 PR NO CHARGE: Performed by: RADIOLOGY

## 2021-09-15 PROCEDURE — B41C1ZZ FLUOROSCOPY OF PELVIC ARTERIES USING LOW OSMOLAR CONTRAST: ICD-10-PCS | Performed by: RADIOLOGY

## 2021-09-15 PROCEDURE — 36415 COLL VENOUS BLD VENIPUNCTURE: CPT | Performed by: EMERGENCY MEDICINE

## 2021-09-15 PROCEDURE — 99223 1ST HOSP IP/OBS HIGH 75: CPT | Performed by: INTERNAL MEDICINE

## 2021-09-15 PROCEDURE — 85018 HEMOGLOBIN: CPT | Performed by: NURSE PRACTITIONER

## 2021-09-15 PROCEDURE — B41B1ZZ FLUOROSCOPY OF OTHER INTRA-ABDOMINAL ARTERIES USING LOW OSMOLAR CONTRAST: ICD-10-PCS | Performed by: RADIOLOGY

## 2021-09-15 PROCEDURE — 85025 COMPLETE CBC W/AUTO DIFF WBC: CPT | Performed by: EMERGENCY MEDICINE

## 2021-09-15 RX ORDER — SODIUM CHLORIDE, SODIUM GLUCONATE, SODIUM ACETATE, POTASSIUM CHLORIDE, MAGNESIUM CHLORIDE, SODIUM PHOSPHATE, DIBASIC, AND POTASSIUM PHOSPHATE .53; .5; .37; .037; .03; .012; .00082 G/100ML; G/100ML; G/100ML; G/100ML; G/100ML; G/100ML; G/100ML
75 INJECTION, SOLUTION INTRAVENOUS CONTINUOUS
Status: DISCONTINUED | OUTPATIENT
Start: 2021-09-15 | End: 2021-09-20 | Stop reason: HOSPADM

## 2021-09-15 RX ORDER — ALBUTEROL SULFATE 90 UG/1
2 AEROSOL, METERED RESPIRATORY (INHALATION) EVERY 4 HOURS PRN
Status: DISCONTINUED | OUTPATIENT
Start: 2021-09-15 | End: 2021-09-20 | Stop reason: HOSPADM

## 2021-09-15 RX ORDER — CEFTRIAXONE 1 G/50ML
1000 INJECTION, SOLUTION INTRAVENOUS EVERY 24 HOURS
Status: DISCONTINUED | OUTPATIENT
Start: 2021-09-15 | End: 2021-09-20 | Stop reason: HOSPADM

## 2021-09-15 RX ORDER — FLUTICASONE FUROATE AND VILANTEROL 200; 25 UG/1; UG/1
1 POWDER RESPIRATORY (INHALATION)
Status: DISCONTINUED | OUTPATIENT
Start: 2021-09-15 | End: 2021-09-20 | Stop reason: HOSPADM

## 2021-09-15 RX ORDER — ATORVASTATIN CALCIUM 20 MG/1
20 TABLET, FILM COATED ORAL
Status: DISCONTINUED | OUTPATIENT
Start: 2021-09-15 | End: 2021-09-20 | Stop reason: HOSPADM

## 2021-09-15 RX ORDER — FERROUS SULFATE 325(65) MG
325 TABLET ORAL
Status: DISCONTINUED | OUTPATIENT
Start: 2021-09-15 | End: 2021-09-20 | Stop reason: HOSPADM

## 2021-09-15 RX ADMIN — METOPROLOL TARTRATE 25 MG: 25 TABLET, FILM COATED ORAL at 20:31

## 2021-09-15 RX ADMIN — ALBUTEROL SULFATE 2 PUFF: 90 AEROSOL, METERED RESPIRATORY (INHALATION) at 12:24

## 2021-09-15 RX ADMIN — FLUTICASONE FUROATE AND VILANTEROL TRIFENATATE 1 PUFF: 200; 25 POWDER RESPIRATORY (INHALATION) at 23:01

## 2021-09-15 RX ADMIN — CYANOCOBALAMIN TAB 500 MCG 500 MCG: 500 TAB at 10:36

## 2021-09-15 RX ADMIN — SODIUM CHLORIDE, SODIUM GLUCONATE, SODIUM ACETATE, POTASSIUM CHLORIDE, MAGNESIUM CHLORIDE, SODIUM PHOSPHATE, DIBASIC, AND POTASSIUM PHOSPHATE 75 ML/HR: .53; .5; .37; .037; .03; .012; .00082 INJECTION, SOLUTION INTRAVENOUS at 10:41

## 2021-09-15 RX ADMIN — METOPROLOL TARTRATE 25 MG: 25 TABLET, FILM COATED ORAL at 10:37

## 2021-09-15 RX ADMIN — CEFTRIAXONE 1000 MG: 1 INJECTION, SOLUTION INTRAVENOUS at 10:37

## 2021-09-15 RX ADMIN — ATORVASTATIN CALCIUM 20 MG: 20 TABLET, FILM COATED ORAL at 23:01

## 2021-09-15 RX ADMIN — ALBUTEROL SULFATE 2 PUFF: 90 AEROSOL, METERED RESPIRATORY (INHALATION) at 09:24

## 2021-09-16 LAB
HCT VFR BLD AUTO: 24.8 % (ref 36.5–49.3)
HCT VFR BLD AUTO: 25.1 % (ref 36.5–49.3)
HCT VFR BLD AUTO: 27.1 % (ref 36.5–49.3)
HGB BLD-MCNC: 7.3 G/DL (ref 12–17)
HGB BLD-MCNC: 7.4 G/DL (ref 12–17)
HGB BLD-MCNC: 8 G/DL (ref 12–17)

## 2021-09-16 PROCEDURE — 85014 HEMATOCRIT: CPT | Performed by: NURSE PRACTITIONER

## 2021-09-16 PROCEDURE — 85018 HEMOGLOBIN: CPT | Performed by: INTERNAL MEDICINE

## 2021-09-16 PROCEDURE — 85018 HEMOGLOBIN: CPT | Performed by: NURSE PRACTITIONER

## 2021-09-16 PROCEDURE — 85014 HEMATOCRIT: CPT | Performed by: INTERNAL MEDICINE

## 2021-09-16 PROCEDURE — 99233 SBSQ HOSP IP/OBS HIGH 50: CPT | Performed by: INTERNAL MEDICINE

## 2021-09-16 RX ADMIN — ATORVASTATIN CALCIUM 20 MG: 20 TABLET, FILM COATED ORAL at 22:00

## 2021-09-16 RX ADMIN — METOPROLOL TARTRATE 25 MG: 25 TABLET, FILM COATED ORAL at 11:43

## 2021-09-16 RX ADMIN — CYANOCOBALAMIN TAB 500 MCG 500 MCG: 500 TAB at 11:43

## 2021-09-16 RX ADMIN — SODIUM CHLORIDE, SODIUM GLUCONATE, SODIUM ACETATE, POTASSIUM CHLORIDE, MAGNESIUM CHLORIDE, SODIUM PHOSPHATE, DIBASIC, AND POTASSIUM PHOSPHATE 75 ML/HR: .53; .5; .37; .037; .03; .012; .00082 INJECTION, SOLUTION INTRAVENOUS at 05:54

## 2021-09-16 RX ADMIN — SODIUM CHLORIDE, SODIUM GLUCONATE, SODIUM ACETATE, POTASSIUM CHLORIDE, MAGNESIUM CHLORIDE, SODIUM PHOSPHATE, DIBASIC, AND POTASSIUM PHOSPHATE 75 ML/HR: .53; .5; .37; .037; .03; .012; .00082 INJECTION, SOLUTION INTRAVENOUS at 22:02

## 2021-09-16 RX ADMIN — FLUTICASONE FUROATE AND VILANTEROL TRIFENATATE 1 PUFF: 200; 25 POWDER RESPIRATORY (INHALATION) at 22:00

## 2021-09-16 RX ADMIN — FERROUS SULFATE TAB 325 MG (65 MG ELEMENTAL FE) 325 MG: 325 (65 FE) TAB at 11:43

## 2021-09-16 RX ADMIN — CEFTRIAXONE 1000 MG: 1 INJECTION, SOLUTION INTRAVENOUS at 09:56

## 2021-09-17 ENCOUNTER — HOSPITAL ENCOUNTER (OUTPATIENT)
Dept: RADIOLOGY | Facility: HOSPITAL | Age: 76
Discharge: HOME | DRG: 982 | End: 2021-09-17
Attending: RADIOLOGY | Admitting: RADIOLOGY
Payer: MEDICARE

## 2021-09-17 VITALS
HEART RATE: 72 BPM | SYSTOLIC BLOOD PRESSURE: 124 MMHG | RESPIRATION RATE: 16 BRPM | DIASTOLIC BLOOD PRESSURE: 63 MMHG | OXYGEN SATURATION: 100 %

## 2021-09-17 LAB
ANION GAP SERPL CALCULATED.3IONS-SCNC: 9 MMOL/L (ref 4–13)
BUN SERPL-MCNC: 34 MG/DL (ref 5–25)
CALCIUM SERPL-MCNC: 8.4 MG/DL (ref 8.3–10.1)
CHLORIDE SERPL-SCNC: 103 MMOL/L (ref 100–108)
CO2 SERPL-SCNC: 28 MMOL/L (ref 21–32)
CREAT SERPL-MCNC: 1.91 MG/DL (ref 0.6–1.3)
ERYTHROCYTE [DISTWIDTH] IN BLOOD BY AUTOMATED COUNT: 20 % (ref 11.6–15.1)
GFR SERPL CREATININE-BSD FRML MDRD: 33 ML/MIN/1.73SQ M
GLUCOSE SERPL-MCNC: 101 MG/DL (ref 65–140)
HCT VFR BLD AUTO: 26.1 % (ref 36.5–49.3)
HGB BLD-MCNC: 7.6 G/DL (ref 12–17)
INR PPP: 1.2 (ref 0.84–1.19)
MCH RBC QN AUTO: 23.7 PG (ref 26.8–34.3)
MCHC RBC AUTO-ENTMCNC: 29.1 G/DL (ref 31.4–37.4)
MCV RBC AUTO: 81 FL (ref 82–98)
PLATELET # BLD AUTO: 290 THOUSANDS/UL (ref 149–390)
PMV BLD AUTO: 9.5 FL (ref 8.9–12.7)
POTASSIUM SERPL-SCNC: 4.4 MMOL/L (ref 3.5–5.3)
PROTHROMBIN TIME: 14.7 SECONDS (ref 11.6–14.5)
RBC # BLD AUTO: 3.21 MILLION/UL (ref 3.88–5.62)
SODIUM SERPL-SCNC: 140 MMOL/L (ref 136–145)
WBC # BLD AUTO: 10.57 THOUSAND/UL (ref 4.31–10.16)

## 2021-09-17 PROCEDURE — C1894 INTRO/SHEATH, NON-LASER: HCPCS

## 2021-09-17 PROCEDURE — 99152 MOD SED SAME PHYS/QHP 5/>YRS: CPT

## 2021-09-17 PROCEDURE — 85610 PROTHROMBIN TIME: CPT | Performed by: RADIOLOGY

## 2021-09-17 PROCEDURE — C1769 GUIDE WIRE: HCPCS

## 2021-09-17 PROCEDURE — C1887 CATHETER, GUIDING: HCPCS

## 2021-09-17 PROCEDURE — 36247 INS CATH ABD/L-EXT ART 3RD: CPT | Performed by: RADIOLOGY

## 2021-09-17 PROCEDURE — 85027 COMPLETE CBC AUTOMATED: CPT | Performed by: HOSPITALIST

## 2021-09-17 PROCEDURE — 99153 MOD SED SAME PHYS/QHP EA: CPT

## 2021-09-17 PROCEDURE — 76937 US GUIDE VASCULAR ACCESS: CPT | Performed by: RADIOLOGY

## 2021-09-17 PROCEDURE — C1760 CLOSURE DEV, VASC: HCPCS

## 2021-09-17 PROCEDURE — 37244 VASC EMBOLIZE/OCCLUDE BLEED: CPT | Performed by: RADIOLOGY

## 2021-09-17 PROCEDURE — 37242 VASC EMBOLIZE/OCCLUDE ARTERY: CPT

## 2021-09-17 PROCEDURE — 76937 US GUIDE VASCULAR ACCESS: CPT

## 2021-09-17 PROCEDURE — 99232 SBSQ HOSP IP/OBS MODERATE 35: CPT | Performed by: INTERNAL MEDICINE

## 2021-09-17 PROCEDURE — 80048 BASIC METABOLIC PNL TOTAL CA: CPT | Performed by: HOSPITALIST

## 2021-09-17 RX ORDER — FENTANYL CITRATE 50 UG/ML
INJECTION, SOLUTION INTRAMUSCULAR; INTRAVENOUS CODE/TRAUMA/SEDATION MEDICATION
Status: DISCONTINUED | OUTPATIENT
Start: 2021-09-17 | End: 2021-09-20 | Stop reason: HOSPADM

## 2021-09-17 RX ORDER — METHYLPREDNISOLONE 16 MG/1
16 TABLET ORAL DAILY
Status: DISCONTINUED | OUTPATIENT
Start: 2021-09-19 | End: 2021-09-18 | Stop reason: HOSPADM

## 2021-09-17 RX ORDER — HYDROMORPHONE HYDROCHLORIDE 4 MG/ML
INJECTION, SOLUTION INTRAMUSCULAR; INTRAVENOUS; SUBCUTANEOUS CODE/TRAUMA/SEDATION MEDICATION
Status: COMPLETED | OUTPATIENT
Start: 2021-09-17 | End: 2021-09-17

## 2021-09-17 RX ORDER — METHYLPREDNISOLONE 4 MG/1
4 TABLET ORAL DAILY
Status: DISCONTINUED | OUTPATIENT
Start: 2021-09-22 | End: 2021-09-18 | Stop reason: HOSPADM

## 2021-09-17 RX ORDER — CIPROFLOXACIN 500 MG/1
500 TABLET, FILM COATED ORAL EVERY 12 HOURS SCHEDULED
Status: DISCONTINUED | OUTPATIENT
Start: 2021-09-17 | End: 2021-09-18 | Stop reason: HOSPADM

## 2021-09-17 RX ORDER — METHYLPREDNISOLONE 4 MG/1
8 TABLET ORAL DAILY
Status: DISCONTINUED | OUTPATIENT
Start: 2021-09-21 | End: 2021-09-18 | Stop reason: HOSPADM

## 2021-09-17 RX ORDER — PHENAZOPYRIDINE HYDROCHLORIDE 100 MG/1
200 TABLET, FILM COATED ORAL
Status: DISCONTINUED | OUTPATIENT
Start: 2021-09-17 | End: 2021-09-18 | Stop reason: HOSPADM

## 2021-09-17 RX ORDER — DOCUSATE SODIUM 100 MG/1
100 CAPSULE, LIQUID FILLED ORAL 2 TIMES DAILY
Status: DISCONTINUED | OUTPATIENT
Start: 2021-09-17 | End: 2021-09-18 | Stop reason: HOSPADM

## 2021-09-17 RX ORDER — OXYBUTYNIN CHLORIDE 5 MG/1
10 TABLET ORAL DAILY PRN
Status: DISCONTINUED | OUTPATIENT
Start: 2021-09-17 | End: 2021-09-18 | Stop reason: HOSPADM

## 2021-09-17 RX ORDER — METHYLPREDNISOLONE 4 MG/1
12 TABLET ORAL DAILY
Status: DISCONTINUED | OUTPATIENT
Start: 2021-09-20 | End: 2021-09-18 | Stop reason: HOSPADM

## 2021-09-17 RX ORDER — FENTANYL CITRATE 50 UG/ML
INJECTION, SOLUTION INTRAMUSCULAR; INTRAVENOUS CODE/TRAUMA/SEDATION MEDICATION
Status: COMPLETED | OUTPATIENT
Start: 2021-09-17 | End: 2021-09-17

## 2021-09-17 RX ORDER — MIDAZOLAM HYDROCHLORIDE 2 MG/2ML
INJECTION, SOLUTION INTRAMUSCULAR; INTRAVENOUS CODE/TRAUMA/SEDATION MEDICATION
Status: COMPLETED | OUTPATIENT
Start: 2021-09-17 | End: 2021-09-17

## 2021-09-17 RX ORDER — DIAZEPAM 5 MG/ML
INJECTION, SOLUTION INTRAMUSCULAR; INTRAVENOUS CODE/TRAUMA/SEDATION MEDICATION
Status: COMPLETED | OUTPATIENT
Start: 2021-09-17 | End: 2021-09-17

## 2021-09-17 RX ORDER — MIDAZOLAM HYDROCHLORIDE 2 MG/2ML
INJECTION, SOLUTION INTRAMUSCULAR; INTRAVENOUS CODE/TRAUMA/SEDATION MEDICATION
Status: DISCONTINUED | OUTPATIENT
Start: 2021-09-17 | End: 2021-09-20 | Stop reason: HOSPADM

## 2021-09-17 RX ORDER — CEFAZOLIN SODIUM 2 G/50ML
SOLUTION INTRAVENOUS
Status: COMPLETED | OUTPATIENT
Start: 2021-09-17 | End: 2021-09-17

## 2021-09-17 RX ADMIN — FENTANYL CITRATE 50 MCG: 50 INJECTION INTRAMUSCULAR; INTRAVENOUS at 12:05

## 2021-09-17 RX ADMIN — MIDAZOLAM 1 MG: 1 INJECTION INTRAMUSCULAR; INTRAVENOUS at 15:45

## 2021-09-17 RX ADMIN — Medication 100 MCG: at 12:55

## 2021-09-17 RX ADMIN — MIDAZOLAM 1 MG: 1 INJECTION INTRAMUSCULAR; INTRAVENOUS at 14:34

## 2021-09-17 RX ADMIN — CYANOCOBALAMIN TAB 500 MCG 500 MCG: 500 TAB at 08:43

## 2021-09-17 RX ADMIN — METOPROLOL TARTRATE 25 MG: 25 TABLET, FILM COATED ORAL at 08:43

## 2021-09-17 RX ADMIN — DIAZEPAM 10 MG: 10 INJECTION, SOLUTION INTRAMUSCULAR; INTRAVENOUS at 13:24

## 2021-09-17 RX ADMIN — FLUTICASONE FUROATE AND VILANTEROL TRIFENATATE 1 PUFF: 200; 25 POWDER RESPIRATORY (INHALATION) at 21:17

## 2021-09-17 RX ADMIN — CEFTRIAXONE 1000 MG: 1 INJECTION, SOLUTION INTRAVENOUS at 08:42

## 2021-09-17 RX ADMIN — MIDAZOLAM 1 MG: 1 INJECTION INTRAMUSCULAR; INTRAVENOUS at 12:05

## 2021-09-17 RX ADMIN — MIDAZOLAM 1 MG: 1 INJECTION INTRAMUSCULAR; INTRAVENOUS at 11:48

## 2021-09-17 RX ADMIN — Medication 100 MCG: at 15:46

## 2021-09-17 RX ADMIN — FENTANYL CITRATE 50 MCG: 50 INJECTION INTRAMUSCULAR; INTRAVENOUS at 11:48

## 2021-09-17 RX ADMIN — ATORVASTATIN CALCIUM 20 MG: 20 TABLET, FILM COATED ORAL at 21:17

## 2021-09-17 RX ADMIN — METOPROLOL TARTRATE 25 MG: 25 TABLET, FILM COATED ORAL at 21:17

## 2021-09-17 RX ADMIN — FENTANYL CITRATE 50 MCG: 50 INJECTION INTRAMUSCULAR; INTRAVENOUS at 15:01

## 2021-09-17 RX ADMIN — IOHEXOL 192 ML: 350 INJECTION, SOLUTION INTRAVENOUS at 16:28

## 2021-09-17 RX ADMIN — HYDROMORPHONE HYDROCHLORIDE 1 MG: 4 INJECTION, SOLUTION INTRAMUSCULAR; INTRAVENOUS; SUBCUTANEOUS at 13:54

## 2021-09-17 RX ADMIN — CEFAZOLIN SODIUM 2000 MG: 2 SOLUTION INTRAVENOUS at 13:28

## 2021-09-17 RX ADMIN — MIDAZOLAM 1 MG: 1 INJECTION INTRAMUSCULAR; INTRAVENOUS at 15:01

## 2021-09-17 RX ADMIN — FERROUS SULFATE TAB 325 MG (65 MG ELEMENTAL FE) 325 MG: 325 (65 FE) TAB at 08:42

## 2021-09-17 RX ADMIN — FENTANYL CITRATE 50 MCG: 50 INJECTION INTRAMUSCULAR; INTRAVENOUS at 14:34

## 2021-09-17 RX ADMIN — MIDAZOLAM 1 MG: 1 INJECTION INTRAMUSCULAR; INTRAVENOUS at 12:41

## 2021-09-17 RX ADMIN — FENTANYL CITRATE 50 MCG: 50 INJECTION, SOLUTION INTRAMUSCULAR; INTRAVENOUS at 11:59

## 2021-09-17 RX ADMIN — FENTANYL CITRATE 50 MCG: 50 INJECTION INTRAMUSCULAR; INTRAVENOUS at 15:45

## 2021-09-17 RX ADMIN — FENTANYL CITRATE 50 MCG: 50 INJECTION INTRAMUSCULAR; INTRAVENOUS at 12:41

## 2021-09-17 NOTE — DISCHARGE INSTRUCTIONS
Embolization   AMBULATORY CARE:   What you need to know about embolization: Embolization is a procedure to create a clot, or block, in a blood vessel  This stops blood from flowing to the area  The procedure may be used to treat many conditions  It can help stop heavy bleeding (hemorrhage), or prevent an aneurysm from rupturing  An abnormal connection between arteries can be removed  Embolization can stop blood flow to a tumor, such as a uterine fibroid or a cancer tumor  Chemotherapy medicine may be given during an embolization to treat a cancer tumor  This is called chemoembolization  How to prepare for the procedure: Embolization is sometimes done as an emergency procedure  This means you will not have time to prepare  For an embolization that is not an emergency, the following are general guidelines for how to prepare:  · Tell your provider about all your allergies  This includes if you have ever had an allergic reaction to contrast liquid, anesthesia, or antibiotics  You may be told not to eat or drink anything after midnight the night before your procedure  Arrange to have someone drive you home  The person should stay with you to help you and watch for problems that may develop  · Give your provider a list of your medicines  Include all medicines and supplements you take  You may need to stop taking blood thinners or aspirin several days before your procedure  This will help decrease your risk for bleeding  Do not stop taking medicines unless your healthcare provider tells you to stop  Your provider will tell you which medicines to take or not take on the day of your procedure  · You may need blood tests to check how well your blood clots and to check your kidney function  Depending on the reason for this procedure, you may an MRI, ultrasound, x-ray, or CT scan   These pictures will help your healthcare provider examine the area to be worked on      · If you are a woman, tell your provider if you know or think you might be pregnant  You may not be able to have certain tests because they may harm an unborn baby  Your provider may need to take extra precautions for other tests  What will happen during the procedure:   · You may be given general anesthesia to keep you asleep and pain-free  You may instead be given moderate sedation  This means you will be awake during the procedure, but you should not feel any pain  Your provider will put numbing medicine on your skin where the procedure will be done  A small incision will be made over an artery  A catheter (thin tube) will be guided into the artery  Contrast liquid will be used to help your healthcare provider see your arteries more easily  · Your provider will use a type of x-ray that gives a moving picture of the arteries  This will help him or her move the catheter into the right place  The catheter is moved up until it reaches the correct artery  Your provider will put medicine or a material into the artery to slow or stop blood from flowing  This may be a coil, foam, beads, a plug, or liquid  The liquid may also contain material that is larger than blood cells  · Your provider will remove the catheter  Pressure will be used to stop any bleeding that happens  The incision area does not need to be closed with stitches  It will be small and close on its own  It will be covered with a bandage to keep it from becoming infected  What to expect after the procedure:   · You may have pain for a few days  Depending on the reason you had this procedure, you may also have a headache or cramps  You may have pain, bleeding, or bruising where the catheter went into your leg  All of these symptoms are normal and should get better soon  You may be given pain medicine through your IV or a pump  A pump allows you to control when the pain medicine is given  · You should expect to stay in the hospital at least overnight   If you had this procedure to treat heavy bleeding, it may take 24 hours to know if the bleeding stopped  · Healthcare providers will help you walk around after your procedure  This will help prevent blood clots  Do not get up until healthcare providers say it is okay  They may want you to lie in one position for a certain amount of time  When they say it is okay to walk, they will help you stand and walk safely  Risks of embolization: You may bleed more than expected or develop an infection  The area being treated may be damaged during the procedure  Your artery may be damaged from the catheter, or you may develop a blood clot  Your kidneys may be damaged from the contrast liquid  The material being put into the artery may go to the wrong place  This can stop blood flow to healthy tissue  The procedure may not work, or it may not relieve your symptoms  Call your doctor or specialist if:   · You have a fever higher than 100 4°F (38°C)  · You have a fever, pain, and nausea that last longer than 3 days  · You suddenly have severe abdominal pain  · You cannot urinate, or you urinate very little  · You have signs of an infection at the catheter site, such as red streaks, pain, or swelling  · You have new or worsening pain  · You have questions or concerns about your condition or care  Medicines: You may need any of the following:  · NSAIDs help decrease swelling and pain or fever  This medicine is available with or without a doctor's order  NSAIDs can cause stomach bleeding or kidney problems in certain people  If you take blood thinner medicine, always ask your healthcare provider if NSAIDs are safe for you  Always read the medicine label and follow directions  · Prescription pain medicine may be given  Ask your healthcare provider how to take this medicine safely  Some prescription pain medicines contain acetaminophen   Do not take other medicines that contain acetaminophen without talking to your healthcare provider  Too much acetaminophen may cause liver damage  Prescription pain medicine may cause constipation  Ask your healthcare provider how to prevent or treat constipation  · Take your medicine as directed  Contact your healthcare provider if you think your medicine is not helping or if you have side effects  Tell him or her if you are allergic to any medicine  Keep a list of the medicines, vitamins, and herbs you take  Include the amounts, and when and why you take them  Bring the list or the pill bottles to follow-up visits  Carry your medicine list with you in case of an emergency  Self-care:   · Rest as needed  Rest and sleep will help your body heal      · Follow your healthcare provider's instructions for activity  He or she will tell you when it is okay to return to your normal activities and to start driving  He or she may want you to wait 1 to 2 weeks to return to work  · Care for the catheter site as directed  It is okay to shower after the procedure  You will only have a small cut in your skin from where the catheter went into your leg  Check the catheter site for signs of infection, including red streaks, pain, and swelling  · Treat symptoms of postembolization syndrome  This syndrome is common after an embolization procedure  It usually starts within 72 hours of the procedure and may last a few days  The main symptoms are fever, pain, and nausea  You will probably be able to manage your symptoms at home  Acetaminophen or an NSAID, such as ibuprofen, can reduce a fever and pain  You may need to eat lightly to manage nausea  Drink more liquids for the first week after the procedure to prevent dehydration  Follow up with your doctor or specialist as directed: You may need to have more tests to check if the procedure worked  Write down your questions so you remember to ask them during your visits    © Copyright [employer] 2020 Information is for End User's use only and may not be sold, redistributed or otherwise used for commercial purposes  All illustrations and images included in CareNotes® are the copyrighted property of A D A M [employer]  or Nick Valverde  The above information is an  only  It is not intended as medical advice for individual conditions or treatments  Talk to your doctor, nurse or pharmacist before following any medical regimen to see if it is safe and effective for you  ARTERIOGRAM    WHAT YOU SHOULD KNOW:   An angiogram is a procedure to look at arteries in your body  Arteries are the blood vessels that carry blood from your heart to your body  AFTER YOU LEAVE:     Self-care:   · Limit activity: Rest for the remainder of the day of your procedure  Have some one with you until the next morning  Keep your arm or leg straight as much as possible  Rest as much as possible, sitting lying or reclining  Walk only to go to the bathroom, to bed or to eat  If the angiogram catheter was put in your leg, use the stairs as little as possible  No driving  · Keep your wound clean and dry  You may shower 24 hours after your procedure  The bandage you have on should fall off in 2-3 days  If there is any drainage from the puncture site, you should put on a clean bandage  · Watch for bleeding and bruising: It is normal to have a bruise and soreness where the angiogram catheter went in  · Diet:   · You may resume your regular diet, Sips of flat soda will help with mild nausea  · Drink more liquids than usual for the next 24 hours      · IMMEDIATELY Contact Interventional Radiology at [phone] [place] PATIENTS: Contact Interventional Radiology at [phone]) Davy Menjivar PATIENTS: Contact Interventional Radiology at [phone]) if any of the following occur:  · If your bruise gets larger or if you notice any active bleeding  APPLY DIRECT PRESSURE TO THE BLEEDING SITE     · If you notice increased swelling or have increased pain at the puncture site   · If you have any numbness or pain in the extremity of the puncture site   · If that extremity seems cold or pale  · You have fever greater than 101  · Persistent nausea or vomitting    Follow up with your primary healthcare provider  as directed: Write down your questions so you remember to ask them during your visits          Procedural Sedation   WHAT YOU NEED TO KNOW:   Procedural sedation is medicine used during procedures to help you feel relaxed and calm  You will remember little to none of the procedure  After sedation you may feel tired, weak, or unsteady on your feet  You may also have trouble concentrating or short-term memory loss  These symptoms should go away in 24 hours or less  DISCHARGE INSTRUCTIONS:     Call 911 or have someone else call for any of the following:   · You have sudden trouble breathing      · You cannot be woken  Contact Interventional Radiology at [phone]   [place] PATIENTS: Contact Interventional Radiology at [phone]) Demar Conroy PATIENTS: Contact Interventional Radiology at [phone]) if any of the following occur:     · You have a severe headache or dizziness      · Your heart is beating faster than usual     · You have a fever or chills      · Your skin is itchy, swollen, or you have a rash      · You have nausea or are vomiting for more than 8 hours after the procedure       · You have questions or concerns about your condition or care  Self-care:   · Have someone stay with you for 24 hours  This person can drive you to errands and help you do things around the house  This person can also watch for problems       · Rest and do quiet activities for 24 hours  Do not exercise, ride a bike, or play sports  Stand up slowly to prevent dizziness and falls  Take short walks around the house with another person  Slowly return to your usual activities the next day       · Do not drive or use dangerous machines or tools for 24 hours  You may injure yourself or others  Examples include a lawnmower, saw, or drill  Do not return to work for 24 hours if you use dangerous machines or tools for work       · Do not make important decisions for 24 hours  For example, do not sign important papers or invest money       · Drink liquids as directed  Liquids help flush the sedation medicine out of your body  Ask how much liquid to drink each day and which liquids are best for you       · Eat small, frequent meals to prevent nausea and vomiting  Start with clear liquids such as juice or broth  If you do not vomit after clear liquids, you can eat your usual foods       · Do not drink alcohol or take medicines that make you drowsy  This includes medicines that help you sleep and anxiety medicines  Ask your healthcare provider if it is safe for you to take pain medicine  Follow up with your healthcare provider as directed: Write down your questions so you remember to ask them during your visits

## 2021-09-17 NOTE — PROGRESS NOTES
Transport not here  Report called to [facility]! Mahendra Nurse Rao Valenzuela at [phone]  VSS  Transport may be delayed until 1833

## 2021-09-17 NOTE — BRIEF OP NOTE (RAD/CATH)
INTERVENTIONAL RADIOLOGY PROCEDURE NOTE    Date: 9/17/2021    Procedure: IR EMBOLIZATION (SPECIFY VESSEL OR SITE)    Preoperative diagnosis: No diagnosis found  Postoperative diagnosis: Same  Surgeon: Kojo Trevino MD     Assistant: None  No qualified resident was available  Blood loss: Minimal    Specimens: None     Findings: Bilateral superior vesical artery embolization  Both arteries were embolized with 300-500 um embospheres to near stasis  Bilateral femoral artery access sites required, both closed with mynx closure devices  Complications: None immediate      Anesthesia: conscious sedation

## 2021-09-17 NOTE — SEDATION DOCUMENTATION
Embolization completed to Bilateral Bladder  Left CFA puncture closing with Mynx Device  VSS  C/O chronic low back pain

## 2021-09-17 NOTE — PROGRESS NOTES
Holding In IR for Transport  VSS  Ate 2 chicken Fingers  Bilateral groin dressings clean dry soft and intact  Denies pain or nausea at this time  [place] patent for light blood tinged urine

## 2021-09-17 NOTE — SEDATION DOCUMENTATION
Arrived to IR from [employer] [facility] at approx  [phone]   3 way alanis is draining blood tinged fluid CBI is capped  Bilateral PCN's are capped  Left upper arm IV is intact and flushes easily  Labs requested by Dr Neo Haile so Midline catheter placed in Left Basilic Vein at approximately 1130  Pelvic arteriogram begun at 1147  Unable to order labs due to patient showing as "On Leave Of Absence" from [facility]  PAC's Called x 4 unable to correct the registration  Epic line called and Epic staff able to correctly register patient  30-45  minutes involved with that process from [id number]  Charting was started new at 1215  The only medications given are in this encounter  There are meds listed on the [facility] as "held" doses  Those are not in addition to meds from this encounter  They are meds that were charted on the "[place]" encounter

## 2021-09-17 NOTE — SEDATION DOCUMENTATION
Mynx Closure device placed to Right and Left CFA  VSS  Transport called to arrange ride back to [address]  Distal pulses palpable +2

## 2021-09-18 PROCEDURE — 99233 SBSQ HOSP IP/OBS HIGH 50: CPT | Performed by: INTERNAL MEDICINE

## 2021-09-18 RX ORDER — METHYLPREDNISOLONE 4 MG/1
20 TABLET ORAL DAILY
Status: COMPLETED | OUTPATIENT
Start: 2021-09-19 | End: 2021-09-19

## 2021-09-18 RX ORDER — PHENAZOPYRIDINE HYDROCHLORIDE 100 MG/1
100 TABLET, FILM COATED ORAL
Status: DISCONTINUED | OUTPATIENT
Start: 2021-09-18 | End: 2021-09-20 | Stop reason: HOSPADM

## 2021-09-18 RX ORDER — METHYLPREDNISOLONE 4 MG/1
24 TABLET ORAL DAILY
Status: COMPLETED | OUTPATIENT
Start: 2021-09-18 | End: 2021-09-18

## 2021-09-18 RX ORDER — DOCUSATE SODIUM 100 MG/1
100 CAPSULE, LIQUID FILLED ORAL 2 TIMES DAILY
Status: DISCONTINUED | OUTPATIENT
Start: 2021-09-18 | End: 2021-09-20 | Stop reason: HOSPADM

## 2021-09-18 RX ORDER — METHYLPREDNISOLONE 4 MG/1
12 TABLET ORAL DAILY
Status: DISCONTINUED | OUTPATIENT
Start: 2021-09-21 | End: 2021-09-20 | Stop reason: HOSPADM

## 2021-09-18 RX ORDER — ATROPA BELLADONNA AND OPIUM 16.2; 3 MG/1; MG/1
30 SUPPOSITORY RECTAL ONCE
Status: COMPLETED | OUTPATIENT
Start: 2021-09-18 | End: 2021-09-18

## 2021-09-18 RX ORDER — METHYLPREDNISOLONE 4 MG/1
16 TABLET ORAL DAILY
Status: COMPLETED | OUTPATIENT
Start: 2021-09-20 | End: 2021-09-20

## 2021-09-18 RX ORDER — OXYBUTYNIN CHLORIDE 5 MG/1
5 TABLET, EXTENDED RELEASE ORAL DAILY
Status: DISCONTINUED | OUTPATIENT
Start: 2021-09-18 | End: 2021-09-20 | Stop reason: HOSPADM

## 2021-09-18 RX ORDER — ATROPA BELLADONNA AND OPIUM 16.2; 3 MG/1; MG/1
30 SUPPOSITORY RECTAL EVERY 8 HOURS PRN
Status: DISCONTINUED | OUTPATIENT
Start: 2021-09-18 | End: 2021-09-20 | Stop reason: HOSPADM

## 2021-09-18 RX ORDER — METHYLPREDNISOLONE 4 MG/1
4 TABLET ORAL DAILY
Status: DISCONTINUED | OUTPATIENT
Start: 2021-09-23 | End: 2021-09-20 | Stop reason: HOSPADM

## 2021-09-18 RX ORDER — METHYLPREDNISOLONE 4 MG/1
8 TABLET ORAL DAILY
Status: DISCONTINUED | OUTPATIENT
Start: 2021-09-22 | End: 2021-09-20 | Stop reason: HOSPADM

## 2021-09-18 RX ADMIN — METHYLPREDNISOLONE 24 MG: 4 TABLET ORAL at 14:25

## 2021-09-18 RX ADMIN — ATROPA BELLADONNA AND OPIUM 1 SUPPOSITORY: 16.2; 3 SUPPOSITORY RECTAL at 17:24

## 2021-09-18 RX ADMIN — ATROPA BELLADONNA AND OPIUM 1 SUPPOSITORY: 16.2; 3 SUPPOSITORY RECTAL at 01:10

## 2021-09-18 RX ADMIN — CYANOCOBALAMIN TAB 500 MCG 500 MCG: 500 TAB at 09:07

## 2021-09-18 RX ADMIN — METOPROLOL TARTRATE 25 MG: 25 TABLET, FILM COATED ORAL at 21:16

## 2021-09-18 RX ADMIN — METOPROLOL TARTRATE 25 MG: 25 TABLET, FILM COATED ORAL at 09:07

## 2021-09-18 RX ADMIN — FLUTICASONE FUROATE AND VILANTEROL TRIFENATATE 1 PUFF: 200; 25 POWDER RESPIRATORY (INHALATION) at 21:16

## 2021-09-18 RX ADMIN — ATROPA BELLADONNA AND OPIUM 1 SUPPOSITORY: 16.2; 3 SUPPOSITORY RECTAL at 22:04

## 2021-09-18 RX ADMIN — DOCUSATE SODIUM 100 MG: 100 CAPSULE, LIQUID FILLED ORAL at 14:25

## 2021-09-18 RX ADMIN — CEFTRIAXONE 1000 MG: 1 INJECTION, SOLUTION INTRAVENOUS at 09:06

## 2021-09-18 RX ADMIN — OXYBUTYNIN 5 MG: 5 TABLET, FILM COATED, EXTENDED RELEASE ORAL at 14:25

## 2021-09-18 RX ADMIN — FERROUS SULFATE TAB 325 MG (65 MG ELEMENTAL FE) 325 MG: 325 (65 FE) TAB at 09:07

## 2021-09-18 RX ADMIN — ATORVASTATIN CALCIUM 20 MG: 20 TABLET, FILM COATED ORAL at 21:16

## 2021-09-18 RX ADMIN — MORPHINE SULFATE 2 MG: 2 INJECTION, SOLUTION INTRAMUSCULAR; INTRAVENOUS at 02:52

## 2021-09-18 RX ADMIN — PHENAZOPYRIDINE 100 MG: 100 TABLET ORAL at 17:27

## 2021-09-19 LAB
ABO GROUP BLD: NORMAL
ANION GAP SERPL CALCULATED.3IONS-SCNC: 8 MMOL/L (ref 4–13)
BASOPHILS # BLD AUTO: 0.02 THOUSANDS/ΜL (ref 0–0.1)
BASOPHILS NFR BLD AUTO: 0 % (ref 0–1)
BLD GP AB SCN SERPL QL: NEGATIVE
BUN SERPL-MCNC: 24 MG/DL (ref 5–25)
CALCIUM SERPL-MCNC: 8.8 MG/DL (ref 8.3–10.1)
CHLORIDE SERPL-SCNC: 103 MMOL/L (ref 100–108)
CO2 SERPL-SCNC: 28 MMOL/L (ref 21–32)
CREAT SERPL-MCNC: 1.72 MG/DL (ref 0.6–1.3)
EOSINOPHIL # BLD AUTO: 0.01 THOUSAND/ΜL (ref 0–0.61)
EOSINOPHIL NFR BLD AUTO: 0 % (ref 0–6)
ERYTHROCYTE [DISTWIDTH] IN BLOOD BY AUTOMATED COUNT: 19.9 % (ref 11.6–15.1)
GFR SERPL CREATININE-BSD FRML MDRD: 38 ML/MIN/1.73SQ M
GLUCOSE SERPL-MCNC: 135 MG/DL (ref 65–140)
HCT VFR BLD AUTO: 23.4 % (ref 36.5–49.3)
HGB BLD-MCNC: 7 G/DL (ref 12–17)
IMM GRANULOCYTES # BLD AUTO: 0.15 THOUSAND/UL (ref 0–0.2)
IMM GRANULOCYTES NFR BLD AUTO: 1 % (ref 0–2)
LYMPHOCYTES # BLD AUTO: 1.15 THOUSANDS/ΜL (ref 0.6–4.47)
LYMPHOCYTES NFR BLD AUTO: 10 % (ref 14–44)
MCH RBC QN AUTO: 24.1 PG (ref 26.8–34.3)
MCHC RBC AUTO-ENTMCNC: 29.9 G/DL (ref 31.4–37.4)
MCV RBC AUTO: 81 FL (ref 82–98)
MONOCYTES # BLD AUTO: 0.59 THOUSAND/ΜL (ref 0.17–1.22)
MONOCYTES NFR BLD AUTO: 5 % (ref 4–12)
NEUTROPHILS # BLD AUTO: 9.17 THOUSANDS/ΜL (ref 1.85–7.62)
NEUTS SEG NFR BLD AUTO: 84 % (ref 43–75)
NRBC BLD AUTO-RTO: 0 /100 WBCS
PLATELET # BLD AUTO: 298 THOUSANDS/UL (ref 149–390)
PMV BLD AUTO: 9.1 FL (ref 8.9–12.7)
POTASSIUM SERPL-SCNC: 4.7 MMOL/L (ref 3.5–5.3)
RBC # BLD AUTO: 2.9 MILLION/UL (ref 3.88–5.62)
RH BLD: POSITIVE
SODIUM SERPL-SCNC: 139 MMOL/L (ref 136–145)
SPECIMEN EXPIRATION DATE: NORMAL
WBC # BLD AUTO: 11.09 THOUSAND/UL (ref 4.31–10.16)

## 2021-09-19 PROCEDURE — 86850 RBC ANTIBODY SCREEN: CPT | Performed by: INTERNAL MEDICINE

## 2021-09-19 PROCEDURE — 85025 COMPLETE CBC W/AUTO DIFF WBC: CPT | Performed by: INTERNAL MEDICINE

## 2021-09-19 PROCEDURE — 99233 SBSQ HOSP IP/OBS HIGH 50: CPT | Performed by: INTERNAL MEDICINE

## 2021-09-19 PROCEDURE — 86901 BLOOD TYPING SEROLOGIC RH(D): CPT | Performed by: INTERNAL MEDICINE

## 2021-09-19 PROCEDURE — 80048 BASIC METABOLIC PNL TOTAL CA: CPT | Performed by: INTERNAL MEDICINE

## 2021-09-19 PROCEDURE — 86900 BLOOD TYPING SEROLOGIC ABO: CPT | Performed by: INTERNAL MEDICINE

## 2021-09-19 RX ADMIN — DOCUSATE SODIUM 100 MG: 100 CAPSULE, LIQUID FILLED ORAL at 08:01

## 2021-09-19 RX ADMIN — PHENAZOPYRIDINE 100 MG: 100 TABLET ORAL at 11:58

## 2021-09-19 RX ADMIN — CYANOCOBALAMIN TAB 500 MCG 500 MCG: 500 TAB at 08:01

## 2021-09-19 RX ADMIN — FERROUS SULFATE TAB 325 MG (65 MG ELEMENTAL FE) 325 MG: 325 (65 FE) TAB at 08:01

## 2021-09-19 RX ADMIN — METOPROLOL TARTRATE 25 MG: 25 TABLET, FILM COATED ORAL at 08:01

## 2021-09-19 RX ADMIN — METOPROLOL TARTRATE 25 MG: 25 TABLET, FILM COATED ORAL at 21:14

## 2021-09-19 RX ADMIN — SODIUM CHLORIDE, SODIUM GLUCONATE, SODIUM ACETATE, POTASSIUM CHLORIDE, MAGNESIUM CHLORIDE, SODIUM PHOSPHATE, DIBASIC, AND POTASSIUM PHOSPHATE 75 ML/HR: .53; .5; .37; .037; .03; .012; .00082 INJECTION, SOLUTION INTRAVENOUS at 15:46

## 2021-09-19 RX ADMIN — CEFTRIAXONE 1000 MG: 1 INJECTION, SOLUTION INTRAVENOUS at 08:02

## 2021-09-19 RX ADMIN — FLUTICASONE FUROATE AND VILANTEROL TRIFENATATE 1 PUFF: 200; 25 POWDER RESPIRATORY (INHALATION) at 21:12

## 2021-09-19 RX ADMIN — DOCUSATE SODIUM 100 MG: 100 CAPSULE, LIQUID FILLED ORAL at 17:11

## 2021-09-19 RX ADMIN — ATORVASTATIN CALCIUM 20 MG: 20 TABLET, FILM COATED ORAL at 21:14

## 2021-09-19 RX ADMIN — PHENAZOPYRIDINE 100 MG: 100 TABLET ORAL at 08:01

## 2021-09-19 RX ADMIN — PHENAZOPYRIDINE 100 MG: 100 TABLET ORAL at 17:11

## 2021-09-19 RX ADMIN — METHYLPREDNISOLONE 20 MG: 4 TABLET ORAL at 08:03

## 2021-09-19 RX ADMIN — OXYBUTYNIN 5 MG: 5 TABLET, FILM COATED, EXTENDED RELEASE ORAL at 08:01

## 2021-09-20 VITALS
TEMPERATURE: 98.4 F | BODY MASS INDEX: 28.7 KG/M2 | HEIGHT: 71 IN | OXYGEN SATURATION: 93 % | WEIGHT: 205 LBS | SYSTOLIC BLOOD PRESSURE: 174 MMHG | RESPIRATION RATE: 18 BRPM | HEART RATE: 63 BPM | DIASTOLIC BLOOD PRESSURE: 89 MMHG

## 2021-09-20 LAB
BASOPHILS # BLD AUTO: 0.02 THOUSANDS/ΜL (ref 0–0.1)
BASOPHILS NFR BLD AUTO: 0 % (ref 0–1)
EOSINOPHIL # BLD AUTO: 0.01 THOUSAND/ΜL (ref 0–0.61)
EOSINOPHIL NFR BLD AUTO: 0 % (ref 0–6)
ERYTHROCYTE [DISTWIDTH] IN BLOOD BY AUTOMATED COUNT: 19.9 % (ref 11.6–15.1)
HCT VFR BLD AUTO: 24.1 % (ref 36.5–49.3)
HGB BLD-MCNC: 7.1 G/DL (ref 12–17)
IMM GRANULOCYTES # BLD AUTO: 0.18 THOUSAND/UL (ref 0–0.2)
IMM GRANULOCYTES NFR BLD AUTO: 1 % (ref 0–2)
LYMPHOCYTES # BLD AUTO: 1.48 THOUSANDS/ΜL (ref 0.6–4.47)
LYMPHOCYTES NFR BLD AUTO: 12 % (ref 14–44)
MCH RBC QN AUTO: 24.1 PG (ref 26.8–34.3)
MCHC RBC AUTO-ENTMCNC: 29.5 G/DL (ref 31.4–37.4)
MCV RBC AUTO: 82 FL (ref 82–98)
MONOCYTES # BLD AUTO: 0.81 THOUSAND/ΜL (ref 0.17–1.22)
MONOCYTES NFR BLD AUTO: 6 % (ref 4–12)
NEUTROPHILS # BLD AUTO: 10.19 THOUSANDS/ΜL (ref 1.85–7.62)
NEUTS SEG NFR BLD AUTO: 81 % (ref 43–75)
NRBC BLD AUTO-RTO: 0 /100 WBCS
PLATELET # BLD AUTO: 318 THOUSANDS/UL (ref 149–390)
PMV BLD AUTO: 9.5 FL (ref 8.9–12.7)
RBC # BLD AUTO: 2.95 MILLION/UL (ref 3.88–5.62)
WBC # BLD AUTO: 12.69 THOUSAND/UL (ref 4.31–10.16)

## 2021-09-20 PROCEDURE — 99239 HOSP IP/OBS DSCHRG MGMT >30: CPT | Performed by: INTERNAL MEDICINE

## 2021-09-20 PROCEDURE — 97161 PT EVAL LOW COMPLEX 20 MIN: CPT

## 2021-09-20 PROCEDURE — 85025 COMPLETE CBC W/AUTO DIFF WBC: CPT | Performed by: INTERNAL MEDICINE

## 2021-09-20 RX ORDER — METHYLPREDNISOLONE 4 MG/1
4 TABLET ORAL DAILY
Qty: 1 TABLET | Refills: 0 | Status: SHIPPED | OUTPATIENT
Start: 2021-09-23 | End: 2021-09-24

## 2021-09-20 RX ORDER — METHYLPREDNISOLONE 4 MG/1
12 TABLET ORAL DAILY
Qty: 3 TABLET | Refills: 0 | Status: SHIPPED | OUTPATIENT
Start: 2021-09-21 | End: 2021-09-22

## 2021-09-20 RX ORDER — ATROPA BELLADONNA AND OPIUM 16.2; 3 MG/1; MG/1
30 SUPPOSITORY RECTAL 2 TIMES DAILY PRN
Qty: 10 SUPPOSITORY | Refills: 0 | Status: SHIPPED | OUTPATIENT
Start: 2021-09-20 | End: 2021-10-11 | Stop reason: HOSPADM

## 2021-09-20 RX ORDER — METHYLPREDNISOLONE 8 MG/1
8 TABLET ORAL DAILY
Qty: 1 TABLET | Refills: 0 | Status: SHIPPED | OUTPATIENT
Start: 2021-09-22 | End: 2021-09-23

## 2021-09-20 RX ORDER — CIPROFLOXACIN 500 MG/1
500 TABLET, FILM COATED ORAL EVERY 12 HOURS SCHEDULED
Qty: 8 TABLET | Refills: 0 | Status: SHIPPED | OUTPATIENT
Start: 2021-09-20 | End: 2021-09-24

## 2021-09-20 RX ORDER — OXYBUTYNIN CHLORIDE 5 MG/1
5 TABLET, EXTENDED RELEASE ORAL DAILY
Qty: 5 TABLET | Refills: 0 | Status: SHIPPED | OUTPATIENT
Start: 2021-09-21 | End: 2021-09-26

## 2021-09-20 RX ADMIN — CEFTRIAXONE 1000 MG: 1 INJECTION, SOLUTION INTRAVENOUS at 08:29

## 2021-09-20 RX ADMIN — ALBUTEROL SULFATE 2 PUFF: 90 AEROSOL, METERED RESPIRATORY (INHALATION) at 12:36

## 2021-09-20 RX ADMIN — DOCUSATE SODIUM 100 MG: 100 CAPSULE, LIQUID FILLED ORAL at 08:42

## 2021-09-20 RX ADMIN — CYANOCOBALAMIN TAB 500 MCG 500 MCG: 500 TAB at 08:41

## 2021-09-20 RX ADMIN — FERROUS SULFATE TAB 325 MG (65 MG ELEMENTAL FE) 325 MG: 325 (65 FE) TAB at 08:41

## 2021-09-20 RX ADMIN — PHENAZOPYRIDINE 100 MG: 100 TABLET ORAL at 08:37

## 2021-09-20 RX ADMIN — METHYLPREDNISOLONE 16 MG: 4 TABLET ORAL at 08:38

## 2021-09-20 RX ADMIN — METOPROLOL TARTRATE 25 MG: 25 TABLET, FILM COATED ORAL at 08:42

## 2021-09-20 RX ADMIN — ATROPA BELLADONNA AND OPIUM 1 SUPPOSITORY: 16.2; 3 SUPPOSITORY RECTAL at 05:42

## 2021-09-20 RX ADMIN — SODIUM CHLORIDE, SODIUM GLUCONATE, SODIUM ACETATE, POTASSIUM CHLORIDE, MAGNESIUM CHLORIDE, SODIUM PHOSPHATE, DIBASIC, AND POTASSIUM PHOSPHATE 75 ML/HR: .53; .5; .37; .037; .03; .012; .00082 INJECTION, SOLUTION INTRAVENOUS at 05:42

## 2021-09-20 RX ADMIN — PHENAZOPYRIDINE 100 MG: 100 TABLET ORAL at 12:33

## 2021-09-20 RX ADMIN — OXYBUTYNIN 5 MG: 5 TABLET, FILM COATED, EXTENDED RELEASE ORAL at 08:41

## 2021-10-09 ENCOUNTER — HOSPITAL ENCOUNTER (INPATIENT)
Facility: HOSPITAL | Age: 76
LOS: 1 days | Discharge: HOME | DRG: 699 | End: 2021-10-11
Attending: EMERGENCY MEDICINE | Admitting: INTERNAL MEDICINE
Payer: MEDICARE

## 2021-10-09 ENCOUNTER — APPOINTMENT (OUTPATIENT)
Dept: RADIOLOGY | Facility: HOSPITAL | Age: 76
DRG: 699 | End: 2021-10-09
Payer: MEDICARE

## 2021-10-09 DIAGNOSIS — N39.0 COMPLICATED UTI (URINARY TRACT INFECTION): ICD-10-CM

## 2021-10-09 DIAGNOSIS — N18.9 CHRONIC KIDNEY DISEASE: ICD-10-CM

## 2021-10-09 DIAGNOSIS — N30.40 RADIATION CYSTITIS: ICD-10-CM

## 2021-10-09 DIAGNOSIS — N30.91 HEMORRHAGIC CYSTITIS: ICD-10-CM

## 2021-10-09 DIAGNOSIS — N30.01 ACUTE CYSTITIS WITH HEMATURIA: Primary | ICD-10-CM

## 2021-10-09 LAB
ALBUMIN SERPL BCP-MCNC: 3 G/DL (ref 3.5–5)
ALP SERPL-CCNC: 78 U/L (ref 46–116)
ALT SERPL W P-5'-P-CCNC: 22 U/L (ref 12–78)
ANION GAP SERPL CALCULATED.3IONS-SCNC: 7 MMOL/L (ref 4–13)
AST SERPL W P-5'-P-CCNC: 15 U/L (ref 5–45)
BACTERIA UR QL AUTO: ABNORMAL /HPF
BASOPHILS # BLD AUTO: 0.02 THOUSANDS/ΜL (ref 0–0.1)
BASOPHILS NFR BLD AUTO: 0 % (ref 0–1)
BILIRUB SERPL-MCNC: 0.43 MG/DL (ref 0.2–1)
BILIRUB UR QL STRIP: ABNORMAL
BUN SERPL-MCNC: 31 MG/DL (ref 5–25)
CALCIUM ALBUM COR SERPL-MCNC: 9.4 MG/DL (ref 8.3–10.1)
CALCIUM SERPL-MCNC: 8.6 MG/DL (ref 8.3–10.1)
CHLORIDE SERPL-SCNC: 103 MMOL/L (ref 100–108)
CLARITY UR: ABNORMAL
CO2 SERPL-SCNC: 29 MMOL/L (ref 21–32)
COLOR UR: ABNORMAL
CREAT SERPL-MCNC: 1.58 MG/DL (ref 0.6–1.3)
EOSINOPHIL # BLD AUTO: 0.25 THOUSAND/ΜL (ref 0–0.61)
EOSINOPHIL NFR BLD AUTO: 3 % (ref 0–6)
ERYTHROCYTE [DISTWIDTH] IN BLOOD BY AUTOMATED COUNT: 19.5 % (ref 11.6–15.1)
GFR SERPL CREATININE-BSD FRML MDRD: 42 ML/MIN/1.73SQ M
GLUCOSE SERPL-MCNC: 139 MG/DL (ref 65–140)
GLUCOSE UR STRIP-MCNC: NEGATIVE MG/DL
HCT VFR BLD AUTO: 27.9 % (ref 36.5–49.3)
HGB BLD-MCNC: 8.2 G/DL (ref 12–17)
HGB UR QL STRIP.AUTO: ABNORMAL
IMM GRANULOCYTES # BLD AUTO: 0.05 THOUSAND/UL (ref 0–0.2)
IMM GRANULOCYTES NFR BLD AUTO: 1 % (ref 0–2)
KETONES UR STRIP-MCNC: NEGATIVE MG/DL
LEUKOCYTE ESTERASE UR QL STRIP: ABNORMAL
LYMPHOCYTES # BLD AUTO: 1.02 THOUSANDS/ΜL (ref 0.6–4.47)
LYMPHOCYTES NFR BLD AUTO: 14 % (ref 14–44)
MAGNESIUM SERPL-MCNC: 2.2 MG/DL (ref 1.6–2.6)
MCH RBC QN AUTO: 23.8 PG (ref 26.8–34.3)
MCHC RBC AUTO-ENTMCNC: 29.4 G/DL (ref 31.4–37.4)
MCV RBC AUTO: 81 FL (ref 82–98)
MONOCYTES # BLD AUTO: 0.76 THOUSAND/ΜL (ref 0.17–1.22)
MONOCYTES NFR BLD AUTO: 10 % (ref 4–12)
NEUTROPHILS # BLD AUTO: 5.32 THOUSANDS/ΜL (ref 1.85–7.62)
NEUTS SEG NFR BLD AUTO: 72 % (ref 43–75)
NITRITE UR QL STRIP: POSITIVE
NON-SQ EPI CELLS URNS QL MICRO: ABNORMAL /HPF
NRBC BLD AUTO-RTO: 0 /100 WBCS
PH UR STRIP.AUTO: 5.5 [PH]
PLATELET # BLD AUTO: 183 THOUSANDS/UL (ref 149–390)
PMV BLD AUTO: 9.2 FL (ref 8.9–12.7)
POTASSIUM SERPL-SCNC: 4.4 MMOL/L (ref 3.5–5.3)
PROT SERPL-MCNC: 7.4 G/DL (ref 6.4–8.2)
PROT UR STRIP-MCNC: ABNORMAL MG/DL
RBC # BLD AUTO: 3.44 MILLION/UL (ref 3.88–5.62)
RBC #/AREA URNS AUTO: ABNORMAL /HPF
SODIUM SERPL-SCNC: 139 MMOL/L (ref 136–145)
SP GR UR STRIP.AUTO: 1.02 (ref 1–1.03)
UROBILINOGEN UR QL STRIP.AUTO: 0.2 E.U./DL
WBC # BLD AUTO: 7.42 THOUSAND/UL (ref 4.31–10.16)
WBC #/AREA URNS AUTO: ABNORMAL /HPF

## 2021-10-09 PROCEDURE — 36415 COLL VENOUS BLD VENIPUNCTURE: CPT | Performed by: EMERGENCY MEDICINE

## 2021-10-09 PROCEDURE — 1124F ACP DISCUSS-NO DSCNMKR DOCD: CPT | Performed by: EMERGENCY MEDICINE

## 2021-10-09 PROCEDURE — 96366 THER/PROPH/DIAG IV INF ADDON: CPT

## 2021-10-09 PROCEDURE — 71045 X-RAY EXAM CHEST 1 VIEW: CPT

## 2021-10-09 PROCEDURE — 51798 US URINE CAPACITY MEASURE: CPT

## 2021-10-09 PROCEDURE — 96365 THER/PROPH/DIAG IV INF INIT: CPT

## 2021-10-09 PROCEDURE — 94640 AIRWAY INHALATION TREATMENT: CPT

## 2021-10-09 PROCEDURE — 85025 COMPLETE CBC W/AUTO DIFF WBC: CPT | Performed by: EMERGENCY MEDICINE

## 2021-10-09 PROCEDURE — 87077 CULTURE AEROBIC IDENTIFY: CPT | Performed by: EMERGENCY MEDICINE

## 2021-10-09 PROCEDURE — 87186 SC STD MICRODIL/AGAR DIL: CPT | Performed by: EMERGENCY MEDICINE

## 2021-10-09 PROCEDURE — 99220 PR INITIAL OBSERVATION CARE/DAY 70 MINUTES: CPT | Performed by: INTERNAL MEDICINE

## 2021-10-09 PROCEDURE — 87086 URINE CULTURE/COLONY COUNT: CPT | Performed by: EMERGENCY MEDICINE

## 2021-10-09 PROCEDURE — 80053 COMPREHEN METABOLIC PANEL: CPT | Performed by: EMERGENCY MEDICINE

## 2021-10-09 PROCEDURE — 99284 EMERGENCY DEPT VISIT MOD MDM: CPT

## 2021-10-09 PROCEDURE — 81001 URINALYSIS AUTO W/SCOPE: CPT | Performed by: EMERGENCY MEDICINE

## 2021-10-09 PROCEDURE — 99284 EMERGENCY DEPT VISIT MOD MDM: CPT | Performed by: EMERGENCY MEDICINE

## 2021-10-09 PROCEDURE — 83735 ASSAY OF MAGNESIUM: CPT | Performed by: EMERGENCY MEDICINE

## 2021-10-09 PROCEDURE — 94760 N-INVAS EAR/PLS OXIMETRY 1: CPT

## 2021-10-09 RX ORDER — ALBUTEROL SULFATE 90 UG/1
2 AEROSOL, METERED RESPIRATORY (INHALATION) EVERY 4 HOURS PRN
Status: DISCONTINUED | OUTPATIENT
Start: 2021-10-09 | End: 2021-10-09

## 2021-10-09 RX ORDER — ATORVASTATIN CALCIUM 20 MG/1
20 TABLET, FILM COATED ORAL
Status: DISCONTINUED | OUTPATIENT
Start: 2021-10-09 | End: 2021-10-11 | Stop reason: HOSPADM

## 2021-10-09 RX ORDER — IPRATROPIUM BROMIDE AND ALBUTEROL SULFATE 2.5; .5 MG/3ML; MG/3ML
3 SOLUTION RESPIRATORY (INHALATION) ONCE
Status: COMPLETED | OUTPATIENT
Start: 2021-10-09 | End: 2021-10-09

## 2021-10-09 RX ORDER — LEVOFLOXACIN 5 MG/ML
750 INJECTION, SOLUTION INTRAVENOUS ONCE
Status: COMPLETED | OUTPATIENT
Start: 2021-10-09 | End: 2021-10-10

## 2021-10-09 RX ORDER — IPRATROPIUM BROMIDE AND ALBUTEROL SULFATE 2.5; .5 MG/3ML; MG/3ML
3 SOLUTION RESPIRATORY (INHALATION) EVERY 6 HOURS PRN
Status: DISCONTINUED | OUTPATIENT
Start: 2021-10-09 | End: 2021-10-11 | Stop reason: HOSPADM

## 2021-10-09 RX ORDER — FLUTICASONE FUROATE AND VILANTEROL 200; 25 UG/1; UG/1
1 POWDER RESPIRATORY (INHALATION)
Status: DISCONTINUED | OUTPATIENT
Start: 2021-10-09 | End: 2021-10-11 | Stop reason: HOSPADM

## 2021-10-09 RX ORDER — SODIUM CHLORIDE 9 MG/ML
50 INJECTION, SOLUTION INTRAVENOUS CONTINUOUS
Status: DISCONTINUED | OUTPATIENT
Start: 2021-10-09 | End: 2021-10-10

## 2021-10-09 RX ORDER — CALCIUM CARBONATE 200(500)MG
1000 TABLET,CHEWABLE ORAL DAILY PRN
Status: DISCONTINUED | OUTPATIENT
Start: 2021-10-09 | End: 2021-10-11 | Stop reason: HOSPADM

## 2021-10-09 RX ORDER — POLYETHYLENE GLYCOL 3350 17 G/17G
17 POWDER, FOR SOLUTION ORAL DAILY PRN
Status: DISCONTINUED | OUTPATIENT
Start: 2021-10-09 | End: 2021-10-11 | Stop reason: HOSPADM

## 2021-10-09 RX ORDER — FERROUS SULFATE 325(65) MG
325 TABLET ORAL
Status: DISCONTINUED | OUTPATIENT
Start: 2021-10-10 | End: 2021-10-11 | Stop reason: HOSPADM

## 2021-10-09 RX ORDER — GUAIFENESIN 100 MG/5ML
200 SOLUTION ORAL EVERY 4 HOURS PRN
Status: DISCONTINUED | OUTPATIENT
Start: 2021-10-09 | End: 2021-10-11 | Stop reason: HOSPADM

## 2021-10-09 RX ORDER — BENZONATATE 100 MG/1
100 CAPSULE ORAL 3 TIMES DAILY
Status: DISCONTINUED | OUTPATIENT
Start: 2021-10-09 | End: 2021-10-11 | Stop reason: HOSPADM

## 2021-10-09 RX ORDER — CEFEPIME HYDROCHLORIDE 2 G/50ML
2000 INJECTION, SOLUTION INTRAVENOUS EVERY 12 HOURS
Status: DISCONTINUED | OUTPATIENT
Start: 2021-10-09 | End: 2021-10-10 | Stop reason: CLARIF

## 2021-10-09 RX ORDER — ONDANSETRON 2 MG/ML
4 INJECTION INTRAMUSCULAR; INTRAVENOUS EVERY 6 HOURS PRN
Status: DISCONTINUED | OUTPATIENT
Start: 2021-10-09 | End: 2021-10-11 | Stop reason: HOSPADM

## 2021-10-09 RX ORDER — ACETAMINOPHEN 325 MG/1
650 TABLET ORAL EVERY 6 HOURS PRN
Status: DISCONTINUED | OUTPATIENT
Start: 2021-10-09 | End: 2021-10-11 | Stop reason: HOSPADM

## 2021-10-09 RX ADMIN — ATORVASTATIN CALCIUM 20 MG: 20 TABLET, FILM COATED ORAL at 21:30

## 2021-10-09 RX ADMIN — BENZONATATE 100 MG: 100 CAPSULE ORAL at 21:30

## 2021-10-09 RX ADMIN — IPRATROPIUM BROMIDE AND ALBUTEROL SULFATE 3 ML: 2.5; .5 SOLUTION RESPIRATORY (INHALATION) at 20:20

## 2021-10-09 RX ADMIN — IPRATROPIUM BROMIDE AND ALBUTEROL SULFATE 3 ML: 2.5; .5 SOLUTION RESPIRATORY (INHALATION) at 14:05

## 2021-10-09 RX ADMIN — SODIUM CHLORIDE 50 ML/HR: 0.9 INJECTION, SOLUTION INTRAVENOUS at 16:48

## 2021-10-09 RX ADMIN — LEVOFLOXACIN 750 MG: 5 INJECTION, SOLUTION INTRAVENOUS at 13:34

## 2021-10-09 RX ADMIN — FLUTICASONE FUROATE AND VILANTEROL TRIFENATATE 1 PUFF: 200; 25 POWDER RESPIRATORY (INHALATION) at 21:35

## 2021-10-09 RX ADMIN — CEFEPIME HYDROCHLORIDE 2000 MG: 2 INJECTION, SOLUTION INTRAVENOUS at 19:58

## 2021-10-09 RX ADMIN — METOPROLOL TARTRATE 25 MG: 25 TABLET, FILM COATED ORAL at 21:30

## 2021-10-09 RX ADMIN — BENZONATATE 100 MG: 100 CAPSULE ORAL at 16:48

## 2021-10-10 LAB
ANION GAP SERPL CALCULATED.3IONS-SCNC: 7 MMOL/L (ref 4–13)
BASOPHILS # BLD AUTO: 0.02 THOUSANDS/ΜL (ref 0–0.1)
BASOPHILS NFR BLD AUTO: 0 % (ref 0–1)
BUN SERPL-MCNC: 28 MG/DL (ref 5–25)
CALCIUM SERPL-MCNC: 8.3 MG/DL (ref 8.3–10.1)
CHLORIDE SERPL-SCNC: 105 MMOL/L (ref 100–108)
CO2 SERPL-SCNC: 27 MMOL/L (ref 21–32)
CREAT SERPL-MCNC: 1.61 MG/DL (ref 0.6–1.3)
EOSINOPHIL # BLD AUTO: 0.47 THOUSAND/ΜL (ref 0–0.61)
EOSINOPHIL NFR BLD AUTO: 6 % (ref 0–6)
ERYTHROCYTE [DISTWIDTH] IN BLOOD BY AUTOMATED COUNT: 19.4 % (ref 11.6–15.1)
GFR SERPL CREATININE-BSD FRML MDRD: 41 ML/MIN/1.73SQ M
GLUCOSE P FAST SERPL-MCNC: 103 MG/DL (ref 65–99)
GLUCOSE SERPL-MCNC: 103 MG/DL (ref 65–140)
HCT VFR BLD AUTO: 23.9 % (ref 36.5–49.3)
HCT VFR BLD AUTO: 26 % (ref 36.5–49.3)
HGB BLD-MCNC: 7 G/DL (ref 12–17)
HGB BLD-MCNC: 7.6 G/DL (ref 12–17)
IMM GRANULOCYTES # BLD AUTO: 0.04 THOUSAND/UL (ref 0–0.2)
IMM GRANULOCYTES NFR BLD AUTO: 1 % (ref 0–2)
LYMPHOCYTES # BLD AUTO: 1.16 THOUSANDS/ΜL (ref 0.6–4.47)
LYMPHOCYTES NFR BLD AUTO: 15 % (ref 14–44)
MAGNESIUM SERPL-MCNC: 1.9 MG/DL (ref 1.6–2.6)
MCH RBC QN AUTO: 23.9 PG (ref 26.8–34.3)
MCHC RBC AUTO-ENTMCNC: 29.3 G/DL (ref 31.4–37.4)
MCV RBC AUTO: 82 FL (ref 82–98)
MONOCYTES # BLD AUTO: 0.85 THOUSAND/ΜL (ref 0.17–1.22)
MONOCYTES NFR BLD AUTO: 11 % (ref 4–12)
NEUTROPHILS # BLD AUTO: 5.4 THOUSANDS/ΜL (ref 1.85–7.62)
NEUTS SEG NFR BLD AUTO: 67 % (ref 43–75)
NRBC BLD AUTO-RTO: 0 /100 WBCS
PLATELET # BLD AUTO: 178 THOUSANDS/UL (ref 149–390)
PMV BLD AUTO: 9.4 FL (ref 8.9–12.7)
POTASSIUM SERPL-SCNC: 4.4 MMOL/L (ref 3.5–5.3)
RBC # BLD AUTO: 2.93 MILLION/UL (ref 3.88–5.62)
SODIUM SERPL-SCNC: 139 MMOL/L (ref 136–145)
WBC # BLD AUTO: 7.94 THOUSAND/UL (ref 4.31–10.16)

## 2021-10-10 PROCEDURE — 80048 BASIC METABOLIC PNL TOTAL CA: CPT | Performed by: NURSE PRACTITIONER

## 2021-10-10 PROCEDURE — 94760 N-INVAS EAR/PLS OXIMETRY 1: CPT

## 2021-10-10 PROCEDURE — 99232 SBSQ HOSP IP/OBS MODERATE 35: CPT | Performed by: INTERNAL MEDICINE

## 2021-10-10 PROCEDURE — 83735 ASSAY OF MAGNESIUM: CPT | Performed by: NURSE PRACTITIONER

## 2021-10-10 PROCEDURE — 85014 HEMATOCRIT: CPT | Performed by: PHYSICIAN ASSISTANT

## 2021-10-10 PROCEDURE — 94640 AIRWAY INHALATION TREATMENT: CPT

## 2021-10-10 PROCEDURE — 85018 HEMOGLOBIN: CPT | Performed by: PHYSICIAN ASSISTANT

## 2021-10-10 PROCEDURE — 85025 COMPLETE CBC W/AUTO DIFF WBC: CPT | Performed by: NURSE PRACTITIONER

## 2021-10-10 RX ADMIN — CEFEPIME HYDROCHLORIDE 2000 MG: 2 INJECTION, SOLUTION INTRAVENOUS at 08:24

## 2021-10-10 RX ADMIN — METOPROLOL TARTRATE 25 MG: 25 TABLET, FILM COATED ORAL at 08:19

## 2021-10-10 RX ADMIN — IPRATROPIUM BROMIDE AND ALBUTEROL SULFATE 3 ML: 2.5; .5 SOLUTION RESPIRATORY (INHALATION) at 08:09

## 2021-10-10 RX ADMIN — BENZONATATE 100 MG: 100 CAPSULE ORAL at 17:14

## 2021-10-10 RX ADMIN — ATORVASTATIN CALCIUM 20 MG: 20 TABLET, FILM COATED ORAL at 22:09

## 2021-10-10 RX ADMIN — IPRATROPIUM BROMIDE AND ALBUTEROL SULFATE 3 ML: 2.5; .5 SOLUTION RESPIRATORY (INHALATION) at 20:38

## 2021-10-10 RX ADMIN — BENZONATATE 100 MG: 100 CAPSULE ORAL at 22:09

## 2021-10-10 RX ADMIN — FERROUS SULFATE TAB 325 MG (65 MG ELEMENTAL FE) 325 MG: 325 (65 FE) TAB at 06:57

## 2021-10-10 RX ADMIN — CEFEPIME HYDROCHLORIDE: 2 INJECTION, POWDER, FOR SOLUTION INTRAVENOUS at 20:01

## 2021-10-10 RX ADMIN — METOPROLOL TARTRATE 25 MG: 25 TABLET, FILM COATED ORAL at 22:10

## 2021-10-10 RX ADMIN — BENZONATATE 100 MG: 100 CAPSULE ORAL at 08:19

## 2021-10-10 RX ADMIN — FLUTICASONE FUROATE AND VILANTEROL TRIFENATATE 1 PUFF: 200; 25 POWDER RESPIRATORY (INHALATION) at 22:15

## 2021-10-10 RX ADMIN — CYANOCOBALAMIN TAB 500 MCG 500 MCG: 500 TAB at 08:19

## 2021-10-11 VITALS
TEMPERATURE: 97.2 F | HEART RATE: 74 BPM | BODY MASS INDEX: 28.12 KG/M2 | OXYGEN SATURATION: 93 % | RESPIRATION RATE: 18 BRPM | DIASTOLIC BLOOD PRESSURE: 62 MMHG | WEIGHT: 201.6 LBS | SYSTOLIC BLOOD PRESSURE: 117 MMHG

## 2021-10-11 PROBLEM — N39.0 UTI (URINARY TRACT INFECTION): Status: ACTIVE | Noted: 2021-10-11

## 2021-10-11 LAB
ABO GROUP BLD: NORMAL
ANION GAP SERPL CALCULATED.3IONS-SCNC: 7 MMOL/L (ref 4–13)
BLD GP AB SCN SERPL QL: NEGATIVE
BUN SERPL-MCNC: 28 MG/DL (ref 5–25)
CALCIUM SERPL-MCNC: 8.1 MG/DL (ref 8.3–10.1)
CHLORIDE SERPL-SCNC: 106 MMOL/L (ref 100–108)
CO2 SERPL-SCNC: 28 MMOL/L (ref 21–32)
CREAT SERPL-MCNC: 1.59 MG/DL (ref 0.6–1.3)
ERYTHROCYTE [DISTWIDTH] IN BLOOD BY AUTOMATED COUNT: 19.4 % (ref 11.6–15.1)
GFR SERPL CREATININE-BSD FRML MDRD: 41 ML/MIN/1.73SQ M
GLUCOSE SERPL-MCNC: 94 MG/DL (ref 65–140)
HCT VFR BLD AUTO: 24.6 % (ref 36.5–49.3)
HCT VFR BLD AUTO: 25.6 % (ref 36.5–49.3)
HGB BLD-MCNC: 7.3 G/DL (ref 12–17)
HGB BLD-MCNC: 7.6 G/DL (ref 12–17)
MCH RBC QN AUTO: 24.3 PG (ref 26.8–34.3)
MCHC RBC AUTO-ENTMCNC: 29.7 G/DL (ref 31.4–37.4)
MCV RBC AUTO: 82 FL (ref 82–98)
PLATELET # BLD AUTO: 191 THOUSANDS/UL (ref 149–390)
PMV BLD AUTO: 9.7 FL (ref 8.9–12.7)
POTASSIUM SERPL-SCNC: 4.3 MMOL/L (ref 3.5–5.3)
RBC # BLD AUTO: 3.01 MILLION/UL (ref 3.88–5.62)
RH BLD: POSITIVE
SODIUM SERPL-SCNC: 141 MMOL/L (ref 136–145)
SPECIMEN EXPIRATION DATE: NORMAL
WBC # BLD AUTO: 6.69 THOUSAND/UL (ref 4.31–10.16)

## 2021-10-11 PROCEDURE — 80048 BASIC METABOLIC PNL TOTAL CA: CPT | Performed by: PHYSICIAN ASSISTANT

## 2021-10-11 PROCEDURE — 85018 HEMOGLOBIN: CPT | Performed by: NURSE PRACTITIONER

## 2021-10-11 PROCEDURE — 90662 IIV NO PRSV INCREASED AG IM: CPT | Performed by: NURSE PRACTITIONER

## 2021-10-11 PROCEDURE — 85027 COMPLETE CBC AUTOMATED: CPT | Performed by: PHYSICIAN ASSISTANT

## 2021-10-11 PROCEDURE — 86850 RBC ANTIBODY SCREEN: CPT | Performed by: NURSE PRACTITIONER

## 2021-10-11 PROCEDURE — 86901 BLOOD TYPING SEROLOGIC RH(D): CPT | Performed by: NURSE PRACTITIONER

## 2021-10-11 PROCEDURE — 99239 HOSP IP/OBS DSCHRG MGMT >30: CPT | Performed by: NURSE PRACTITIONER

## 2021-10-11 PROCEDURE — 86900 BLOOD TYPING SEROLOGIC ABO: CPT | Performed by: NURSE PRACTITIONER

## 2021-10-11 PROCEDURE — 85014 HEMATOCRIT: CPT | Performed by: NURSE PRACTITIONER

## 2021-10-11 PROCEDURE — G0008 ADMIN INFLUENZA VIRUS VAC: HCPCS | Performed by: NURSE PRACTITIONER

## 2021-10-11 RX ORDER — CEPHALEXIN 500 MG/1
500 CAPSULE ORAL EVERY 6 HOURS SCHEDULED
Qty: 12 CAPSULE | Refills: 0 | Status: SHIPPED | OUTPATIENT
Start: 2021-10-11 | End: 2021-10-14

## 2021-10-11 RX ADMIN — CEFEPIME HYDROCHLORIDE: 2 INJECTION, POWDER, FOR SOLUTION INTRAVENOUS at 08:04

## 2021-10-11 RX ADMIN — BENZONATATE 100 MG: 100 CAPSULE ORAL at 15:56

## 2021-10-11 RX ADMIN — INFLUENZA A VIRUS A/VICTORIA/2570/2019 IVR-215 (H1N1) ANTIGEN (FORMALDEHYDE INACTIVATED), INFLUENZA A VIRUS A/TASMANIA/503/2020 IVR-221 (H3N2) ANTIGEN (FORMALDEHYDE INACTIVATED), INFLUENZA B VIRUS B/PHUKET/3073/2013 ANTIGEN (FORMALDEHYDE INACTIVATED), AND INFLUENZA B VIRUS B/WASHINGTON/02/2019 ANTIGEN (FORMALDEHYDE INACTIVATED) 0.7 ML: 60; 60; 60; 60 INJECTION, SUSPENSION INTRAMUSCULAR at 16:27

## 2021-10-11 RX ADMIN — METOPROLOL TARTRATE 25 MG: 25 TABLET, FILM COATED ORAL at 10:44

## 2021-10-11 RX ADMIN — CYANOCOBALAMIN TAB 500 MCG 500 MCG: 500 TAB at 10:45

## 2021-10-11 RX ADMIN — FERROUS SULFATE TAB 325 MG (65 MG ELEMENTAL FE) 325 MG: 325 (65 FE) TAB at 08:04

## 2021-10-11 RX ADMIN — BENZONATATE 100 MG: 100 CAPSULE ORAL at 10:44

## 2021-10-12 LAB
BACTERIA UR CULT: ABNORMAL
BACTERIA UR CULT: ABNORMAL

## 2021-10-13 ENCOUNTER — HOSPITAL ENCOUNTER (OUTPATIENT)
Dept: NON INVASIVE DIAGNOSTICS | Facility: HOSPITAL | Age: 76
Discharge: HOME | End: 2021-10-13
Attending: RADIOLOGY
Payer: MEDICARE

## 2021-10-13 DIAGNOSIS — C61 PROSTATE CANCER (HCC): ICD-10-CM

## 2021-10-13 PROCEDURE — 76080 X-RAY EXAM OF FISTULA: CPT

## 2021-10-13 PROCEDURE — 49424 ASSESS CYST CONTRAST INJECT: CPT

## 2021-10-13 PROCEDURE — 50389 REMOVE RENAL TUBE W/FLUORO: CPT | Performed by: RADIOLOGY

## 2021-10-13 RX ADMIN — IOHEXOL 15 ML: 350 INJECTION, SOLUTION INTRAVENOUS at 10:52

## 2021-10-14 ENCOUNTER — APPOINTMENT (OUTPATIENT)
Dept: LAB | Facility: HOSPITAL | Age: 76
End: 2021-10-14
Payer: MEDICARE

## 2021-10-14 DIAGNOSIS — N39.0 COMPLICATED UTI (URINARY TRACT INFECTION): ICD-10-CM

## 2021-10-14 DIAGNOSIS — N18.9 CHRONIC KIDNEY DISEASE: ICD-10-CM

## 2021-10-14 LAB
ANION GAP SERPL CALCULATED.3IONS-SCNC: 7 MMOL/L (ref 4–13)
BASOPHILS # BLD AUTO: 0.04 THOUSANDS/ΜL (ref 0–0.1)
BASOPHILS NFR BLD AUTO: 0 % (ref 0–1)
BUN SERPL-MCNC: 28 MG/DL (ref 5–25)
CALCIUM SERPL-MCNC: 8.8 MG/DL (ref 8.3–10.1)
CHLORIDE SERPL-SCNC: 105 MMOL/L (ref 100–108)
CO2 SERPL-SCNC: 28 MMOL/L (ref 21–32)
CREAT SERPL-MCNC: 1.59 MG/DL (ref 0.6–1.3)
EOSINOPHIL # BLD AUTO: 0.87 THOUSAND/ΜL (ref 0–0.61)
EOSINOPHIL NFR BLD AUTO: 9 % (ref 0–6)
ERYTHROCYTE [DISTWIDTH] IN BLOOD BY AUTOMATED COUNT: 19.2 % (ref 11.6–15.1)
GFR SERPL CREATININE-BSD FRML MDRD: 41 ML/MIN/1.73SQ M
GLUCOSE P FAST SERPL-MCNC: 109 MG/DL (ref 65–99)
HCT VFR BLD AUTO: 28.9 % (ref 36.5–49.3)
HGB BLD-MCNC: 8.3 G/DL (ref 12–17)
IMM GRANULOCYTES # BLD AUTO: 0.06 THOUSAND/UL (ref 0–0.2)
IMM GRANULOCYTES NFR BLD AUTO: 1 % (ref 0–2)
LYMPHOCYTES # BLD AUTO: 1.61 THOUSANDS/ΜL (ref 0.6–4.47)
LYMPHOCYTES NFR BLD AUTO: 17 % (ref 14–44)
MAGNESIUM SERPL-MCNC: 2.2 MG/DL (ref 1.6–2.6)
MCH RBC QN AUTO: 23.5 PG (ref 26.8–34.3)
MCHC RBC AUTO-ENTMCNC: 28.7 G/DL (ref 31.4–37.4)
MCV RBC AUTO: 82 FL (ref 82–98)
MONOCYTES # BLD AUTO: 0.79 THOUSAND/ΜL (ref 0.17–1.22)
MONOCYTES NFR BLD AUTO: 9 % (ref 4–12)
NEUTROPHILS # BLD AUTO: 5.94 THOUSANDS/ΜL (ref 1.85–7.62)
NEUTS SEG NFR BLD AUTO: 64 % (ref 43–75)
NRBC BLD AUTO-RTO: 0 /100 WBCS
PLATELET # BLD AUTO: 289 THOUSANDS/UL (ref 149–390)
PMV BLD AUTO: 9.6 FL (ref 8.9–12.7)
POTASSIUM SERPL-SCNC: 4.5 MMOL/L (ref 3.5–5.3)
RBC # BLD AUTO: 3.53 MILLION/UL (ref 3.88–5.62)
SODIUM SERPL-SCNC: 140 MMOL/L (ref 136–145)
WBC # BLD AUTO: 9.31 THOUSAND/UL (ref 4.31–10.16)

## 2021-10-14 PROCEDURE — 80048 BASIC METABOLIC PNL TOTAL CA: CPT

## 2021-10-14 PROCEDURE — 85025 COMPLETE CBC W/AUTO DIFF WBC: CPT

## 2021-10-14 PROCEDURE — 36415 COLL VENOUS BLD VENIPUNCTURE: CPT

## 2021-10-14 PROCEDURE — 83735 ASSAY OF MAGNESIUM: CPT

## 2021-11-05 ENCOUNTER — IMMUNIZATIONS (OUTPATIENT)
Dept: FAMILY MEDICINE CLINIC | Facility: HOSPITAL | Age: 76
End: 2021-11-05

## 2021-11-05 DIAGNOSIS — Z23 ENCOUNTER FOR IMMUNIZATION: Primary | ICD-10-CM

## 2021-11-05 PROCEDURE — 91306 COVID-19 MODERNA VACC 0.25 ML BOOSTER: CPT

## 2021-11-05 PROCEDURE — 0013A COVID-19 MODERNA VACC 0.25 ML BOOSTER: CPT

## 2022-02-18 NOTE — PLAN OF CARE
Problem: Potential for Falls  Goal: Patient will remain free of falls  Description: INTERVENTIONS:  - Assess patient frequently for physical needs  -  Identify cognitive and physical deficits and behaviors that affect risk of falls    -  [place] fall precautions as indicated by assessment   - Educate patient/family on patient safety including physical limitations  - Instruct patient to call for assistance with activity based on assessment  - Modify environment to reduce risk of injury  - Consider OT/PT consult to assist with strengthening/mobility  4/27/2021 1108 by Fay Saravia RN  Outcome: Completed  4/27/2021 1108 by Fay Saravia RN  Outcome: Progressing     Problem: PAIN - ADULT  Goal: Verbalizes/displays adequate comfort level or baseline comfort level  Description: Interventions:  - Encourage patient to monitor pain and request assistance  - Assess pain using appropriate pain scale  - Administer analgesics based on type and severity of pain and evaluate response  - Implement non-pharmacological measures as appropriate and evaluate response  - Consider cultural and social influences on pain and pain management  - Notify physician/advanced practitioner if interventions unsuccessful or patient reports new pain  4/27/2021 1108 by Fay Saravia RN  Outcome: Completed  4/27/2021 1108 by Fay Saravia RN  Outcome: Progressing     Problem: INFECTION - ADULT  Goal: Absence or prevention of progression during hospitalization  Description: INTERVENTIONS:  - Assess and monitor for signs and symptoms of infection  - Monitor lab/diagnostic results  - Monitor all insertion sites, i e  indwelling lines, tubes, and drains  - Monitor endotracheal if appropriate and nasal secretions for changes in amount and color  - [place] appropriate cooling/warming therapies per order  - Administer medications as ordered  - Instruct and encourage patient and family to use good hand hygiene technique  - Identify and negative... instruct in appropriate isolation precautions for identified infection/condition  4/27/2021 1108 by Nestor Jacobsen RN  Outcome: Completed  4/27/2021 1108 by Nestor Jacobsen RN  Outcome: Progressing     Problem: SAFETY ADULT  Goal: Patient will remain free of falls  Description: INTERVENTIONS:  - Assess patient frequently for physical needs  -  Identify cognitive and physical deficits and behaviors that affect risk of falls    -  [place] fall precautions as indicated by assessment   - Educate patient/family on patient safety including physical limitations  - Instruct patient to call for assistance with activity based on assessment  - Modify environment to reduce risk of injury  - Consider OT/PT consult to assist with strengthening/mobility  4/27/2021 1108 by Nestor Jacobsen RN  Outcome: Completed  4/27/2021 1108 by Nestor Jacobsen RN  Outcome: Progressing  Goal: Maintain or return to baseline ADL function  Description: INTERVENTIONS:  -  Assess patient's ability to carry out ADLs; assess patient's baseline for ADL function and identify physical deficits which impact ability to perform ADLs (bathing, care of mouth/teeth, toileting, grooming, dressing, etc )  - Assess/evaluate cause of self-care deficits   - Assess range of motion  - Assess patient's mobility; develop plan if impaired  - Assess patient's need for assistive devices and provide as appropriate  - Encourage maximum independence but intervene and supervise when necessary  - Involve family in performance of ADLs  - Assess for home care needs following discharge   - Consider OT consult to assist with ADL evaluation and planning for discharge  - Provide patient education as appropriate  4/27/2021 1108 by Nestor Jacobsen RN  Outcome: Completed  4/27/2021 1108 by Nestor Jacobsen RN  Outcome: Progressing  Goal: Maintain or return mobility status to optimal level  Description: INTERVENTIONS:  - Assess patient's baseline mobility status (ambulation, transfers, stairs, etc )    - Identify cognitive and physical deficits and behaviors that affect mobility  - Identify mobility aids required to assist with transfers and/or ambulation (gait belt, sit-to-stand, lift, walker, cane, etc )  - [place] fall precautions as indicated by assessment  - Record patient progress and toleration of activity level on Mobility SBAR; progress patient to next Phase/Stage  - Instruct patient to call for assistance with activity based on assessment  - Consider rehabilitation consult to assist with strengthening/weightbearing, etc   4/27/2021 1108 by Wilman Webber RN  Outcome: Completed  4/27/2021 1108 by Wilman Webber RN  Outcome: Progressing     Problem: DISCHARGE PLANNING  Goal: Discharge to home or other facility with appropriate resources  Description: INTERVENTIONS:  - Identify barriers to discharge w/patient and caregiver  - Arrange for needed discharge resources and transportation as appropriate  - Identify discharge learning needs (meds, wound care, etc )  - Arrange for interpretive services to assist at discharge as needed  - Refer to Case Management Department for coordinating discharge planning if the patient needs post-hospital services based on physician/advanced practitioner order or complex needs related to functional status, cognitive ability, or social support system  4/27/2021 1108 by Wilman Webber RN  Outcome: Completed  4/27/2021 1108 by Wilman Webber RN  Outcome: Progressing     Problem: Knowledge Deficit  Goal: Patient/family/caregiver demonstrates understanding of disease process, treatment plan, medications, and discharge instructions  Description: Complete learning assessment and assess knowledge base    Interventions:  - Provide teaching at level of understanding  - Provide teaching via preferred learning methods  4/27/2021 1108 by Wilman Webber RN  Outcome: Completed  4/27/2021 1108 by Wilman Webber RN  Outcome: Progressing     Problem: Nutrition/Hydration-ADULT  Goal: Nutrient/Hydration intake appropriate for improving, restoring or maintaining nutritional needs  Description: Monitor and assess patient's nutrition/hydration status for malnutrition  Collaborate with interdisciplinary team and initiate plan and interventions as ordered  Monitor patient's weight and dietary intake as ordered or per policy  Utilize nutrition screening tool and intervene as necessary  Determine patient's food preferences and provide high-protein, high-caloric foods as appropriate       INTERVENTIONS:  - Monitor oral intake, urinary output, labs, and treatment plans  - Assess nutrition and hydration status and recommend course of action  - Evaluate amount of meals eaten  - Assist patient with eating if necessary   - Allow adequate time for meals  - Recommend/ encourage appropriate diets, oral nutritional supplements, and vitamin/mineral supplements  - Order, calculate, and assess calorie counts as needed  - Recommend, monitor, and adjust tube feedings and TPN/PPN based on assessed needs  - Assess need for intravenous fluids  - Provide specific nutrition/hydration education as appropriate  - Include patient/family/caregiver in decisions related to nutrition  4/27/2021 1108 by Lore Hicks RN  Outcome: Completed  4/27/2021 1108 by Lore Hicks RN  Outcome: Progressing     Problem: RESPIRATORY - ADULT  Goal: Achieves optimal ventilation and oxygenation  Description: INTERVENTIONS:  - Assess for changes in respiratory status  - Assess for changes in mentation and behavior  - Position to facilitate oxygenation and minimize respiratory effort  - Oxygen administered by appropriate delivery if ordered  - Initiate smoking cessation education as indicated  - Encourage broncho-pulmonary hygiene including cough, deep breathe, Incentive Spirometry  - Assess the need for suctioning and aspirate as needed  - Assess and instruct to report SOB or any respiratory difficulty  - Respiratory Therapy support as indicated  4/27/2021 1108 by Fay Saravia RN  Outcome: Completed  4/27/2021 1108 by Fay Saravia RN  Outcome: Progressing Assessed

## 2022-05-06 NOTE — PROGRESS NOTES
HBO Treatment Course Details       Treatment Notes:  Patient tolerated treatment well     Treatment Course Number: 31  Total Treatments Ordered:  40     Diagnosis:   1  Irradiation cystitis with hematuria  [facility] oxygen [facility]       HBO Treatment Details:  In-Patient Visit:  No  Treatment Length:90 Minutes(Minutes)  Chamber #: Hard sided Monoplace Chamber    Pre-Treatment details:  Pre-treatment protocol Treatment Protocol: 2 0 FE X 90 minutes w/ 100% oxygen, no air break  Left ear clear?: yes  Right ear clear?: yes  Left ear intact?: yes  Right ear intact?: yes                    Left ear TEED scale: Grade 0  Right ear TEED scale: Grade 0   Pretreatment heart and lung assessment: Pretreatment heart and lung auscltation unremarkable  Patient cleared for HBOT     Treatment details:  FE Rate: 2  Started Compression: 0818  Reached Compression: 0829  Total Compression Time: 11 (Minutes)  Total Holding Time: 100 (Minutes)  Started Decompression: 1009  Reached Surface: 1017  Total Decompression Time: 8 (Minutes)  Total Airbreaks: 10 (Minutes)  Total Time of Treatment: 109 (Minutes)  Symptoms Noted During Treatment: None (Minutes)    Post treatment details:  Left ear clear?: yes  Right ear clear?: yes  Left ears intact?: yes  Right ears intact?: yes                    Left ear TEED scale: Grade 0  Right ear TEED scale: Grade 0  Post treatment heart and lung assessment: Post treatment heart and lung auscltation unremarkable  Patient cleared for discharge  Tolerated treatment well         Vital Signs:  HBO Glucose Reference Range: 100-350 mg/dl   Pre-Treatment Post-Treatment   Time vitals are taken: 0815 Time vitals are taken: 1020   Blood Pressure: 116/59 Blood Pressure: 132/62   Pulse: 71 Pulse: 80   Resp: 20 Resp: 16   Temp: 97 7 °F (36 5 °C) Temp: 97 4 °F (36 3 °C)   Pre-Inspection Glucose reading:  (n/a) Post-Inspection Glucose reading:  (n/a)       No Known Allergies  Patient Active Problem List    Diagnosis Date MD Lin Romano, RN  Caller: Unspecified (Yesterday,  3:44 PM)  Please confirm that no new symptoms, and keep an eye on her (Her son lives closer than [place] believe, because sudden changes in Na can be harmful     Fluid restriction to less than 64 oz.a day, repeat labs Mon/Tuesday      Noted    Prostate cancer ([place] [id number] ) 07/08/2021    Leukocytosis 05/04/2021    RA (rheumatoid arthritis) ([place] [id number] ) 05/04/2021    Acute blood loss anemia (ABLA) 04/26/2021    SHANTI (acute kidney injury) ([place] [id number] ) 04/24/2021    Hematuria due to cystitis     Anemia 04/14/2021    Chronic obstructive pulmonary disease ([place] [address] ) 04/14/2021    Hyperlipidemia 04/13/2021    Gross hematuria 04/12/2021    Cystitis 04/12/2021    Essential hypertension 04/12/2021    Dyslipidemia 04/12/2021     No orders of the defined types were placed in this encounter

## 2022-06-21 NOTE — PLAN OF CARE
Problem: Potential for Falls  Goal: Patient will remain free of falls  Description: INTERVENTIONS:  - Assess patient frequently for physical needs  -  Identify cognitive and physical deficits and behaviors that affect risk of falls    -  [place] fall precautions as indicated by assessment   - Educate patient/family on patient safety including physical limitations  - Instruct patient to call for assistance with activity based on assessment  - Modify environment to reduce risk of injury  - Consider OT/PT consult to assist with strengthening/mobility  Outcome: Progressing     Problem: RESPIRATORY - ADULT  Goal: Achieves optimal ventilation and oxygenation  Description: INTERVENTIONS:  - Assess for changes in respiratory status  - Assess for changes in mentation and behavior  - Position to facilitate oxygenation and minimize respiratory effort  - Oxygen administered by appropriate delivery if ordered  - Initiate smoking cessation education as indicated  - Encourage broncho-pulmonary hygiene including cough, deep breathe, Incentive Spirometry  - Assess the need for suctioning and aspirate as needed  - Assess and instruct to report SOB or any respiratory difficulty  - Respiratory Therapy support as indicated  Outcome: Progressing     Problem: GENITOURINARY - ADULT  Goal: Absence of urinary retention  Description: INTERVENTIONS:  - Assess patients ability to void and empty bladder  - Monitor I/O  - Bladder scan as needed  - Discuss with physician/AP medications to alleviate retention as needed  - Discuss catheterization for long term situations as appropriate  Outcome: Progressing  Goal: Urinary catheter remains patent  Description: INTERVENTIONS:  - Assess patency of urinary catheter  - If patient has a chronic alanis, consider changing catheter if non-functioning  - Follow guidelines for intermittent irrigation of non-functioning urinary catheter  Outcome: Progressing Other

## 2022-10-12 PROBLEM — N39.0 COMPLICATED UTI (URINARY TRACT INFECTION): Status: RESOLVED | Noted: 2021-04-24 | Resolved: 2022-10-12

## 2022-10-12 PROBLEM — N30.01 ACUTE CYSTITIS WITH HEMATURIA: Status: RESOLVED | Noted: 2021-04-12 | Resolved: 2022-10-12

## 2023-05-14 NOTE — PLAN OF CARE
Problem: Potential for Falls  Goal: Patient will remain free of falls  Description: INTERVENTIONS:  - Assess patient frequently for physical needs  -  Identify cognitive and physical deficits and behaviors that affect risk of falls  -  [place] fall precautions as indicated by assessment   - Educate patient/family on patient safety including physical limitations  - Instruct patient to call for assistance with activity based on assessment  - Modify environment to reduce risk of injury  - Consider OT/PT consult to assist with strengthening/mobility  Outcome: Progressing     Problem: SAFETY ADULT  Goal: Patient will remain free of falls  Description: INTERVENTIONS:  - Assess patient frequently for physical needs  -  Identify cognitive and physical deficits and behaviors that affect risk of falls    -  [place] fall precautions as indicated by assessment   - Educate patient/family on patient safety including physical limitations  - Instruct patient to call for assistance with activity based on assessment  - Modify environment to reduce risk of injury  - Consider OT/PT consult to assist with strengthening/mobility  Outcome: Progressing     Problem: GENITOURINARY - ADULT  Goal: Maintains or returns to baseline urinary function  Description: INTERVENTIONS:  - Assess urinary function  - Encourage oral fluids to ensure adequate hydration if ordered  - Administer IV fluids as ordered to ensure adequate hydration  - Administer ordered medications as needed  - Offer frequent toileting  - Follow urinary retention protocol if ordered  Outcome: Progressing  Goal: Urinary catheter remains patent  Description: INTERVENTIONS:  - Assess patency of urinary catheter  - If patient has a chronic alanis, consider changing catheter if non-functioning  - Follow guidelines for intermittent irrigation of non-functioning urinary catheter  Outcome: Progressing
Problem: Potential for Falls  Goal: Patient will remain free of falls  Description: INTERVENTIONS:  - Assess patient frequently for physical needs  -  Identify cognitive and physical deficits and behaviors that affect risk of falls  -  [place] fall precautions as indicated by assessment   - Educate patient/family on patient safety including physical limitations  - Instruct patient to call for assistance with activity based on assessment  - Modify environment to reduce risk of injury  - Consider OT/PT consult to assist with strengthening/mobility  Outcome: Progressing     Problem: SAFETY ADULT  Goal: Patient will remain free of falls  Description: INTERVENTIONS:  - Assess patient frequently for physical needs  -  Identify cognitive and physical deficits and behaviors that affect risk of falls    -  [place] fall precautions as indicated by assessment   - Educate patient/family on patient safety including physical limitations  - Instruct patient to call for assistance with activity based on assessment  - Modify environment to reduce risk of injury  - Consider OT/PT consult to assist with strengthening/mobility  Outcome: Progressing     Problem: GENITOURINARY - ADULT  Goal: Maintains or returns to baseline urinary function  Description: INTERVENTIONS:  - Assess urinary function  - Encourage oral fluids to ensure adequate hydration if ordered  - Administer IV fluids as ordered to ensure adequate hydration  - Administer ordered medications as needed  - Offer frequent toileting  - Follow urinary retention protocol if ordered  Outcome: Progressing
Problem: Potential for Falls  Goal: Patient will remain free of falls  Description: INTERVENTIONS:  - Assess patient frequently for physical needs  -  Identify cognitive and physical deficits and behaviors that affect risk of falls  -  [place] fall precautions as indicated by assessment   - Educate patient/family on patient safety including physical limitations  - Instruct patient to call for assistance with activity based on assessment  - Modify environment to reduce risk of injury  - Consider OT/PT consult to assist with strengthening/mobility  Outcome: Progressing     Problem: SAFETY ADULT  Goal: Patient will remain free of falls  Description: INTERVENTIONS:  - Assess patient frequently for physical needs  -  Identify cognitive and physical deficits and behaviors that affect risk of falls    -  [place] fall precautions as indicated by assessment   - Educate patient/family on patient safety including physical limitations  - Instruct patient to call for assistance with activity based on assessment  - Modify environment to reduce risk of injury  - Consider OT/PT consult to assist with strengthening/mobility  Outcome: Progressing     Problem: GENITOURINARY - ADULT  Goal: Maintains or returns to baseline urinary function  Description: INTERVENTIONS:  - Assess urinary function  - Encourage oral fluids to ensure adequate hydration if ordered  - Administer IV fluids as ordered to ensure adequate hydration  - Administer ordered medications as needed  - Offer frequent toileting  - Follow urinary retention protocol if ordered  Outcome: Progressing
Problem: Potential for Falls  Goal: Patient will remain free of falls  Description: INTERVENTIONS:  - Assess patient frequently for physical needs  -  Identify cognitive and physical deficits and behaviors that affect risk of falls  -  [place] fall precautions as indicated by assessment   - Educate patient/family on patient safety including physical limitations  - Instruct patient to call for assistance with activity based on assessment  - Modify environment to reduce risk of injury  - Consider OT/PT consult to assist with strengthening/mobility  Outcome: Progressing     Problem: SAFETY ADULT  Goal: Patient will remain free of falls  Description: INTERVENTIONS:  - Assess patient frequently for physical needs  -  Identify cognitive and physical deficits and behaviors that affect risk of falls    -  [place] fall precautions as indicated by assessment   - Educate patient/family on patient safety including physical limitations  - Instruct patient to call for assistance with activity based on assessment  - Modify environment to reduce risk of injury  - Consider OT/PT consult to assist with strengthening/mobility  Outcome: Progressing     Problem: GENITOURINARY - ADULT  Goal: Maintains or returns to baseline urinary function  Description: INTERVENTIONS:  - Assess urinary function  - Encourage oral fluids to ensure adequate hydration if ordered  - Administer IV fluids as ordered to ensure adequate hydration  - Administer ordered medications as needed  - Offer frequent toileting  - Follow urinary retention protocol if ordered  Outcome: Progressing
Problem: Potential for Falls  Goal: Patient will remain free of falls  Description: INTERVENTIONS:  - Assess patient frequently for physical needs  -  Identify cognitive and physical deficits and behaviors that affect risk of falls  -  [place] fall precautions as indicated by assessment   - Educate patient/family on patient safety including physical limitations  - Instruct patient to call for assistance with activity based on assessment  - Modify environment to reduce risk of injury  - Consider OT/PT consult to assist with strengthening/mobility  Outcome: Progressing     Problem: SAFETY ADULT  Goal: Patient will remain free of falls  Description: INTERVENTIONS:  - Assess patient frequently for physical needs  -  Identify cognitive and physical deficits and behaviors that affect risk of falls    -  [place] fall precautions as indicated by assessment   - Educate patient/family on patient safety including physical limitations  - Instruct patient to call for assistance with activity based on assessment  - Modify environment to reduce risk of injury  - Consider OT/PT consult to assist with strengthening/mobility  Outcome: Progressing     Problem: GENITOURINARY - ADULT  Goal: Maintains or returns to baseline urinary function  Description: INTERVENTIONS:  - Assess urinary function  - Encourage oral fluids to ensure adequate hydration if ordered  - Administer IV fluids as ordered to ensure adequate hydration  - Administer ordered medications as needed  - Offer frequent toileting  - Follow urinary retention protocol if ordered  Outcome: Progressing
Problem: Potential for Falls  Goal: Patient will remain free of falls  Description: INTERVENTIONS:  - Assess patient frequently for physical needs  -  Identify cognitive and physical deficits and behaviors that affect risk of falls  -  [place] fall precautions as indicated by assessment   - Educate patient/family on patient safety including physical limitations  - Instruct patient to call for assistance with activity based on assessment  - Modify environment to reduce risk of injury  - Consider OT/PT consult to assist with strengthening/mobility  Outcome: Progressing     Problem: SAFETY ADULT  Goal: Patient will remain free of falls  Description: INTERVENTIONS:  - Assess patient frequently for physical needs  -  Identify cognitive and physical deficits and behaviors that affect risk of falls    -  [place] fall precautions as indicated by assessment   - Educate patient/family on patient safety including physical limitations  - Instruct patient to call for assistance with activity based on assessment  - Modify environment to reduce risk of injury  - Consider OT/PT consult to assist with strengthening/mobility  Outcome: Progressing     Problem: GENITOURINARY - ADULT  Goal: Maintains or returns to baseline urinary function  Description: INTERVENTIONS:  - Assess urinary function  - Encourage oral fluids to ensure adequate hydration if ordered  - Administer IV fluids as ordered to ensure adequate hydration  - Administer ordered medications as needed  - Offer frequent toileting  - Follow urinary retention protocol if ordered  Outcome: Progressing  Goal: Urinary catheter remains patent  Description: INTERVENTIONS:  - Assess patency of urinary catheter  - If patient has a chronic alanis, consider changing catheter if non-functioning  - Follow guidelines for intermittent irrigation of non-functioning urinary catheter  Outcome: Progressing
Contraindicated

## 2023-06-02 ENCOUNTER — APPOINTMENT (EMERGENCY)
Dept: RADIOLOGY | Facility: HOSPITAL | Age: 78
End: 2023-06-02
Payer: MEDICARE

## 2023-06-02 ENCOUNTER — HOSPITAL ENCOUNTER (EMERGENCY)
Facility: HOSPITAL | Age: 78
Discharge: HOME | End: 2023-06-02
Attending: EMERGENCY MEDICINE
Payer: MEDICARE

## 2023-06-02 VITALS
WEIGHT: 200 LBS | HEIGHT: 71 IN | SYSTOLIC BLOOD PRESSURE: 125 MMHG | OXYGEN SATURATION: 92 % | HEART RATE: 66 BPM | TEMPERATURE: 97.9 F | RESPIRATION RATE: 24 BRPM | DIASTOLIC BLOOD PRESSURE: 64 MMHG | BODY MASS INDEX: 28 KG/M2

## 2023-06-02 DIAGNOSIS — J20.9 ACUTE BRONCHITIS: Primary | ICD-10-CM

## 2023-06-02 DIAGNOSIS — Z87.09 HISTORY OF COPD: ICD-10-CM

## 2023-06-02 DIAGNOSIS — J47.9 BRONCHIECTASIS (HCC): ICD-10-CM

## 2023-06-02 DIAGNOSIS — R91.8 PULMONARY NODULES/LESIONS, MULTIPLE: ICD-10-CM

## 2023-06-02 LAB
2HR DELTA HS TROPONIN: -1 NG/L
ALBUMIN SERPL BCP-MCNC: 3.9 G/DL (ref 3.5–5)
ALP SERPL-CCNC: 62 U/L (ref 34–104)
ALT SERPL W P-5'-P-CCNC: 14 U/L (ref 7–52)
ANION GAP SERPL CALCULATED.3IONS-SCNC: 8 MMOL/L (ref 4–13)
AST SERPL W P-5'-P-CCNC: 17 U/L (ref 13–39)
BASOPHILS # BLD AUTO: 0.03 THOUSANDS/ÂΜL (ref 0–0.1)
BASOPHILS NFR BLD AUTO: 0 % (ref 0–1)
BILIRUB SERPL-MCNC: 0.69 MG/DL (ref 0.2–1)
BNP SERPL-MCNC: 34 PG/ML (ref 0–100)
BUN SERPL-MCNC: 27 MG/DL (ref 5–25)
CALCIUM SERPL-MCNC: 9.2 MG/DL (ref 8.4–10.2)
CARDIAC TROPONIN I PNL SERPL HS: 12 NG/L
CARDIAC TROPONIN I PNL SERPL HS: 13 NG/L
CHLORIDE SERPL-SCNC: 103 MMOL/L (ref 96–108)
CO2 SERPL-SCNC: 27 MMOL/L (ref 21–32)
CREAT SERPL-MCNC: 1.31 MG/DL (ref 0.6–1.3)
EOSINOPHIL # BLD AUTO: 0.2 THOUSAND/ÂΜL (ref 0–0.61)
EOSINOPHIL NFR BLD AUTO: 3 % (ref 0–6)
ERYTHROCYTE [DISTWIDTH] IN BLOOD BY AUTOMATED COUNT: 15.4 % (ref 11.6–15.1)
GFR SERPL CREATININE-BSD FRML MDRD: 51 ML/MIN/1.73SQ M
GLUCOSE SERPL-MCNC: 107 MG/DL (ref 65–140)
HCT VFR BLD AUTO: 45.4 % (ref 36.5–49.3)
HGB BLD-MCNC: 14.9 G/DL (ref 12–17)
IMM GRANULOCYTES # BLD AUTO: 0.03 THOUSAND/UL (ref 0–0.2)
IMM GRANULOCYTES NFR BLD AUTO: 0 % (ref 0–2)
LYMPHOCYTES # BLD AUTO: 1.27 THOUSANDS/ÂΜL (ref 0.6–4.47)
LYMPHOCYTES NFR BLD AUTO: 16 % (ref 14–44)
MCH RBC QN AUTO: 29.7 PG (ref 26.8–34.3)
MCHC RBC AUTO-ENTMCNC: 32.8 G/DL (ref 31.4–37.4)
MCV RBC AUTO: 90 FL (ref 82–98)
MONOCYTES # BLD AUTO: 0.75 THOUSAND/ÂΜL (ref 0.17–1.22)
MONOCYTES NFR BLD AUTO: 9 % (ref 4–12)
NEUTROPHILS # BLD AUTO: 5.76 THOUSANDS/ÂΜL (ref 1.85–7.62)
NEUTS SEG NFR BLD AUTO: 72 % (ref 43–75)
NRBC BLD AUTO-RTO: 0 /100 WBCS
PLATELET # BLD AUTO: 148 THOUSANDS/UL (ref 149–390)
PMV BLD AUTO: 9.9 FL (ref 8.9–12.7)
POTASSIUM SERPL-SCNC: 4.4 MMOL/L (ref 3.5–5.3)
PROT SERPL-MCNC: 7.3 G/DL (ref 6.4–8.4)
RBC # BLD AUTO: 5.02 MILLION/UL (ref 3.88–5.62)
SODIUM SERPL-SCNC: 138 MMOL/L (ref 135–147)
WBC # BLD AUTO: 8.04 THOUSAND/UL (ref 4.31–10.16)

## 2023-06-02 PROCEDURE — 36415 COLL VENOUS BLD VENIPUNCTURE: CPT | Performed by: EMERGENCY MEDICINE

## 2023-06-02 PROCEDURE — 85025 COMPLETE CBC W/AUTO DIFF WBC: CPT | Performed by: EMERGENCY MEDICINE

## 2023-06-02 PROCEDURE — 84484 ASSAY OF TROPONIN QUANT: CPT | Performed by: EMERGENCY MEDICINE

## 2023-06-02 PROCEDURE — 93005 ELECTROCARDIOGRAM TRACING: CPT

## 2023-06-02 PROCEDURE — 99285 EMERGENCY DEPT VISIT HI MDM: CPT

## 2023-06-02 PROCEDURE — G1004 CDSM NDSC: HCPCS

## 2023-06-02 PROCEDURE — 94640 AIRWAY INHALATION TREATMENT: CPT

## 2023-06-02 PROCEDURE — 83880 ASSAY OF NATRIURETIC PEPTIDE: CPT | Performed by: EMERGENCY MEDICINE

## 2023-06-02 PROCEDURE — 96374 THER/PROPH/DIAG INJ IV PUSH: CPT

## 2023-06-02 PROCEDURE — 80053 COMPREHEN METABOLIC PANEL: CPT | Performed by: EMERGENCY MEDICINE

## 2023-06-02 PROCEDURE — 71250 CT THORAX DX C-: CPT

## 2023-06-02 RX ORDER — BENZONATATE 100 MG/1
100 CAPSULE ORAL EVERY 8 HOURS
Qty: 21 CAPSULE | Refills: 0 | Status: SHIPPED | OUTPATIENT
Start: 2023-06-02

## 2023-06-02 RX ORDER — ALBUTEROL SULFATE 2.5 MG/3ML
2.5 SOLUTION RESPIRATORY (INHALATION) ONCE
Status: COMPLETED | OUTPATIENT
Start: 2023-06-02 | End: 2023-06-02

## 2023-06-02 RX ORDER — METHYLPREDNISOLONE SODIUM SUCCINATE 125 MG/2ML
80 INJECTION, POWDER, LYOPHILIZED, FOR SOLUTION INTRAMUSCULAR; INTRAVENOUS ONCE
Status: COMPLETED | OUTPATIENT
Start: 2023-06-02 | End: 2023-06-02

## 2023-06-02 RX ORDER — IPRATROPIUM BROMIDE AND ALBUTEROL SULFATE 2.5; .5 MG/3ML; MG/3ML
3 SOLUTION RESPIRATORY (INHALATION) ONCE
Status: COMPLETED | OUTPATIENT
Start: 2023-06-02 | End: 2023-06-02

## 2023-06-02 RX ORDER — METHYLPREDNISOLONE 4 MG/1
TABLET ORAL
Qty: 21 TABLET | Refills: 0 | Status: SHIPPED | OUTPATIENT
Start: 2023-06-03

## 2023-06-02 RX ORDER — ALBUTEROL SULFATE 2.5 MG/3ML
2.5 SOLUTION RESPIRATORY (INHALATION) EVERY 6 HOURS PRN
Qty: 75 ML | Refills: 0 | Status: SHIPPED | OUTPATIENT
Start: 2023-06-02

## 2023-06-02 RX ADMIN — IPRATROPIUM BROMIDE AND ALBUTEROL SULFATE 3 ML: .5; 3 SOLUTION RESPIRATORY (INHALATION) at 11:40

## 2023-06-02 RX ADMIN — ALBUTEROL SULFATE 2.5 MG: 2.5 SOLUTION RESPIRATORY (INHALATION) at 14:37

## 2023-06-02 RX ADMIN — METHYLPREDNISOLONE SODIUM SUCCINATE 80 MG: 125 INJECTION, POWDER, FOR SOLUTION INTRAMUSCULAR; INTRAVENOUS at 11:38

## 2023-06-02 NOTE — DISCHARGE INSTRUCTIONS
Return to the ER for further concerns or worsening symptoms  Follow up with your primary care physician, pulmonology in 1-2 days  Take medication as prescribed

## 2023-06-02 NOTE — ED PROVIDER NOTES
"History  Chief Complaint   Patient presents with   • Cough     Sent from care now for fluid on the lungs     Patient is a 77-year-old male that presents emergency department per recommendation of his primary care physician  Patient states that he has had a productive cough for a week, had an outpatient x-ray today that showed \"fluid in his lungs\", patient was sent to the ED for further evaluation  I reviewed patient's x-ray report from [facility], -which was concerning for bronchitis  Patient has a history of COPD, is chronically hypoxic, per his own admission, ranging from 89 to 92%  Patient was noted to be 88% at the office today  He also has a history of hypertension, hyperlipidemia and prostate CA  He denies chest pain, fevers or chills  History provided by:  Patient   used: No    Cough  Cough characteristics:  Productive  Sputum characteristics:  Yellow  Severity:  Moderate  Onset quality:  Gradual  Timing:  Constant  Progression:  Unchanged  Chronicity:  New  Smoker: no    Relieved by:  Nothing  Worsened by:  Nothing  Ineffective treatments:  None tried      Prior to Admission Medications   Prescriptions Last Dose Informant Patient Reported? Taking? albuterol (PROVENTIL HFA,VENTOLIN HFA) 90 mcg/act inhaler   No No   Sig: Inhale 2 puffs every 4 (four) hours as needed for wheezing   atorvastatin (LIPITOR) 20 mg tablet   Yes No   Sig: Take 20 mg by mouth daily at bedtime   cyanocobalamin (VITAMIN B-12) 1000 MCG tablet   No No   Sig: Take 0 5 tablets (500 mcg total) by mouth daily   ferrous sulfate 324 (65 Fe) mg   No No   Sig: Take 1 tablet (324 mg total) by mouth daily before breakfast   fluticasone-vilanterol (Breo Ellipta) 200-25 MCG/INH inhaler  Self Yes No   Sig: Inhale 1 puff daily at bedtime Rinse mouth after use     metoprolol tartrate (LOPRESSOR) 25 mg tablet   Yes No   Sig: Take 25 mg by mouth every 12 (twelve) hours    oxybutynin (DITROPAN-XL) 5 mg 24 hr tablet   No No " Sig: Take 1 tablet (5 mg total) by mouth daily for 5 days      Facility-Administered Medications: None       Past Medical History:   Diagnosis Date   • Cancer ([address] )     prostate   • COPD (chronic obstructive pulmonary disease) (HCC)    • Hyperlipidemia    • Hypertension        Past Surgical History:   Procedure Laterality Date   • APPENDECTOMY     • FL RETROGRADE PYELOGRAM  4/14/2021   • HERNIA REPAIR     • IR EMBOLIZATION (SPECIFY VESSEL OR SITE)  9/17/2021   • IR NEPHROSTOMY TUBE CHECK AND/OR REMOVAL  7/8/2021   • IR NEPHROSTOMY TUBE CHECK/CHANGE/REPOSITION/REINSERTION/UPSIZE  5/10/2021   • IR NEPHROSTOMY TUBE CHECK/CHANGE/REPOSITION/REINSERTION/UPSIZE  8/4/2021   • IR NEPHROSTOMY TUBE CHECK/CHANGE/REPOSITION/REINSERTION/UPSIZE  10/13/2021   • IR NEPHROSTOMY TUBE PLACEMENT  5/7/2021   • OK CYSTO W/IRRIG & EVAC MULTPLE OBSTRUCTING CLOTS Bilateral 4/14/2021    Procedure: CYSTOSCOPY EVACUATION OF CLOTS bilateral retrograde pyelograms possible TURBT bladder biopsy;  Surgeon: Kaden Day MD;  Location: [address] [place] [address];  Service: Urology   • OK CYSTO W/IRRIG & EVAC MULTPLE OBSTRUCTING CLOTS N/A 4/24/2021    Procedure: CYSTOSCOPY EVACUATION OF CLOTS fulguration;  Surgeon: Kaden Day MD;  Location: [address] [place] [address];  Service: Urology   • OK CYSTO W/IRRIG & EVAC MULTPLE OBSTRUCTING CLOTS N/A 7/8/2021    Procedure: CYSTOSCOPY EVACUATION OF CLOTS BILATERAL ANTIROGRADES NEPHROSTOMY [facility] ;  Surgeon: Kaden Day MD;  Location: [address] [place] [address];  Service: Urology   • OK CYSTO W/IRRIG & EVAC MULTPLE OBSTRUCTING CLOTS N/A 8/22/2021    Procedure: CYSTOSCOPY, RIGHT RETROGRADE, EVACUATION OF CLOTS, BLADDER BIOPSY X2 AND FULGERATION OF BLADDER LESIONS, URETEROSCOPY, 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;  Surgeon: Kaden Day MD;  Location: [facility];  Service: Urology   • PROSTATECTOMY     • ROTATOR CUFF REPAIR      right shoulder       Family History   Problem Relation Age of Onset   • Diabetes Mother    • Stroke Mother    • Diabetes Brother    • Stroke Brother      I have reviewed and agree with the history as documented  E-Cigarette/Vaping   • E-Cigarette Use Former User      E-Cigarette/Vaping Substances   • Nicotine No    • THC No    • CBD No    • Flavoring No    • Other No    • Unknown No      Social History     Tobacco Use   • Smoking status: Former     Packs/day: 0 50     Years: 50 00     Total pack years: 25 00     Types: Cigarettes     Quit date: 2017     Years since quittin 7   • Smokeless tobacco: Never   • Tobacco comments:     quit one month ago   Vaping Use   • Vaping Use: Former   Substance Use Topics   • Alcohol use: Never   • Drug use: No       Review of Systems   Respiratory: Positive for cough          Physical Exam  Physical Exam    Vital Signs  ED Triage Vitals [23 1042]   Temperature Pulse Respirations Blood Pressure SpO2   97 9 °F (36 6 °C) 73 20 144/74 91 %      Temp src Heart Rate Source Patient Position - Orthostatic VS BP Location FiO2 (%)   -- -- -- -- --      Pain Score       No Pain           Vitals:    23 1100 23 1130 23 1145 23 1200   BP: 150/72 157/78  123/72   Pulse: 72 74 71 64         Visual Acuity      ED Medications  Medications   ipratropium-albuterol (DUO-NEB) 0 5-2 5 mg/3 mL inhalation solution 3 mL (3 mL Nebulization Given 23 1140)   methylPREDNISolone sodium succinate (Solu-MEDROL) injection 80 mg (80 mg Intravenous Given 23 1138)       Diagnostic Studies  Results Reviewed     Procedure Component Value Units Date/Time    HS Troponin I 2hr [[id number]]     Lab Status: No result Specimen: Blood     HS Troponin I 4hr [[id number]]     Lab Status: No result Specimen: Blood     HS Troponin 0hr (reflex protocol) [[id number]]  (Normal) Collected: 23 1143    Lab Status: Final result Specimen: Blood from Arm, Left Updated: 23 1219     hs TnI 0hr 13 ng/L     B-Type Natriuretic Peptide(BNP) [[id number]]  (Normal) Collected: 06/02/23 1143    Lab Status: Final result Specimen: Blood from Arm, Left Updated: 06/02/23 1217     BNP 34 pg/mL     Comprehensive metabolic panel [[id number]]  (Abnormal) Collected: 06/02/23 1143    Lab Status: Final result Specimen: Blood from Arm, Left Updated: 06/02/23 1211     Sodium 138 mmol/L      Potassium 4 4 mmol/L      Chloride 103 mmol/L      CO2 27 mmol/L      ANION GAP 8 mmol/L      BUN 27 mg/dL      Creatinine 1 31 mg/dL      Glucose 107 mg/dL      Calcium 9 2 mg/dL      AST 17 U/L      ALT 14 U/L      Alkaline Phosphatase 62 U/L      Total Protein 7 3 g/dL      Albumin 3 9 g/dL      Total Bilirubin 0 69 mg/dL      eGFR 51 ml/min/1 73sq m     Narrative:      [place] guidelines for Chronic Kidney Disease (CKD):   •  Stage 1 with normal or high GFR (GFR > 90 mL/min/1 73 square meters)  •  Stage 2 Mild CKD (GFR = 60-89 mL/min/1 73 square meters)  •  Stage 3A Moderate CKD (GFR = 45-59 mL/min/1 73 square meters)  •  Stage 3B Moderate CKD (GFR = 30-44 mL/min/1 73 square meters)  •  Stage 4 Severe CKD (GFR = 15-29 mL/min/1 73 square meters)  •  Stage 5 End Stage CKD (GFR <15 mL/min/1 73 square meters)  Note: GFR calculation is accurate only with a steady state creatinine    CBC and differential [[id number]]  (Abnormal) Collected: 06/02/23 1143    Lab Status: Final result Specimen: Blood from Arm, Left Updated: 06/02/23 1152     WBC 8 04 Thousand/uL      RBC 5 02 Million/uL      Hemoglobin 14 9 g/dL      Hematocrit 45 4 %      MCV 90 fL      MCH 29 7 pg      MCHC 32 8 g/dL      RDW 15 4 %      MPV 9 9 fL      Platelets 477 Thousands/uL      nRBC 0 /100 WBCs      Neutrophils Relative 72 %      Immat GRANS % 0 %      Lymphocytes Relative 16 %      Monocytes Relative 9 %      Eosinophils Relative 3 %      Basophils Relative 0 %      Neutrophils Absolute 5 76 Thousands/µL      Immature Grans Absolute 0 03 Thousand/uL      Lymphocytes Absolute 1 27 Thousands/µL      Monocytes Absolute 0 75 Thousand/µL      Eosinophils Absolute 0 20 Thousand/µL      Basophils Absolute 0 03 Thousands/µL                  No orders to display              Procedures  Procedures         ED Course  ED Course as of 06/02/23 1236   Fri Jun 02, 2023   1236 CHEST X-RAY, PA AND LATERAL:    HISTORY: cough;     PRIORS: None here  LUNGS: Clear of infiltrate and consolidation  There is peribronchial cuffing especially left perihilar on frontal view  No pleural abnormality seen  HEART: Small and normal size  No cardiomegaly  No congestive heart failure  MEDIASTINUM: Mediastinal appears within normal limits  Bilateral prominent josi, consistent with normal vessels  BONES: Mild to moderate degenerative changes, age-appropriate, not bad for age  I cannot exclude osteopenia  IMPRESSION:  Peribronchial cuffing, consistent with bronchitis or asthma  No infiltrate or consolidation  SBIRT 20yo+    Flowsheet Row Most Recent Value   Initial Alcohol Screen: US AUDIT-C     1  How often do you have a drink containing alcohol? 0 Filed at: 06/02/2023 1047   2  How many drinks containing alcohol do you have on a typical day you are drinking? 0 Filed at: 06/02/2023 1047   3a  Male UNDER 65: How often do you have five or more drinks on one occasion? 0 Filed at: 06/02/2023 1047   3b  FEMALE Any Age, or MALE 65+: How often do you have 4 or more drinks on one occassion? 0 Filed at: 06/02/2023 1047   Audit-C Score 0 Filed at: 06/02/2023 1047   NATALI: How many times in the past year have you    Used an illegal drug or used a prescription medication for non-medical reasons? Never Filed at: 06/02/2023 1047                    MDM    Disposition  Final diagnoses:   None     ED Disposition     None      Follow-up Information    None         Patient's Medications   Discharge Prescriptions    No medications on file       No discharge procedures on file      PDMP Review Value Time User    PDMP Reviewed  Yes 7/13/2021 10:25 AM CORWIN Fernando          ED Provider  Electronically Signed by past year have you    Used an illegal drug or used a prescription medication for non-medical reasons? Never Filed at: 06/02/2023 1047                    Medical Decision Making  Labs, EKG, chest x-ray ordered and reviewed by me  I reviewed outpatient x-ray report, obtained at [facility]   CT chest ordered and reviewed-negative for acute consolidation -concerning for several tiny nodules, bronchitis and a new diagnosis of bronchiectasis  Patient will be discharged to home to follow-up with primary care physician and pulmonology  Return precautions reviewed with patient  Patient will be started on a Medrol Dosepak, Tessalon Perles and albuterol prn    Amount and/or Complexity of Data Reviewed  Labs: ordered  Radiology: ordered  Risk  Prescription drug management  Disposition  Final diagnoses:   Acute bronchitis   Bronchiectasis ([facility] [address] )   Pulmonary nodules/lesions, multiple   History of COPD     Time reflects when diagnosis was documented in both MDM as applicable and the Disposition within this note     Time User Action Codes Description Comment    6/2/2023  2:17 PM Foye Civatte O Add [J20 9] Acute bronchitis     6/2/2023  2:17 PM Oyesanmi, Reynaldo Radha O Add [J47 9] Bronchiectasis ([facility] [address] )     6/2/2023  2:17 PM Foye Civatte O Add [R91 8] Pulmonary nodules/lesions, multiple     6/2/2023  2:24 PM Foye Civatte Add [Z87 09] History of COPD       ED Disposition     ED Disposition   Discharge    Condition   Stable    Date/Time   Fri Jun 2, 2023  2:17 PM    Comment   Crystal Márquez discharge to home/self care  Follow-up Information     Follow up With Specialties Details Why Contact Info Additional Information    Jett Orlando MD Internal Medicine Schedule an appointment as soon as possible for a visit in 1 day for follow up [address]    [facility] [id number] [facility] Schedule an appointment as soon as possible for a visit in 1 day for follow up [address] [facility] [address] [facility] [place], [address] [place], [place], [address]          Discharge Medication List as of 6/2/2023  3:18 PM      START taking these medications    Details   albuterol (2 5 mg/3 mL) 0 083 % nebulizer solution Take 3 mL (2 5 mg total) by nebulization every 6 (six) hours as needed for wheezing or shortness of breath, Starting Fri 6/2/2023, Normal      benzonatate (TESSALON PERLES) 100 mg capsule Take 1 capsule (100 mg total) by mouth every 8 (eight) hours, Starting Fri 6/2/2023, Normal      methylPREDNISolone 4 MG tablet therapy pack Use as directed on package Do not start before Marisol 3, 2023 , Normal         CONTINUE these medications which have NOT CHANGED    Details   albuterol (PROVENTIL HFA,VENTOLIN HFA) 90 mcg/act inhaler Inhale 2 puffs every 4 (four) hours as needed for wheezing, Starting Thu 8/31/2017, Print      atorvastatin (LIPITOR) 20 mg tablet Take 20 mg by mouth daily at bedtime, Historical Med      cyanocobalamin (VITAMIN B-12) 1000 MCG tablet Take 0 5 tablets (500 mcg total) by mouth daily, Starting Tue 8/3/2021, Normal      ferrous sulfate 324 (65 Fe) mg Take 1 tablet (324 mg total) by mouth daily before breakfast, Starting Wed 6/30/2021, Normal      fluticasone-vilanterol (Breo Ellipta) 200-25 MCG/INH inhaler Inhale 1 puff daily at bedtime Rinse mouth after use , Historical Med      metoprolol tartrate (LOPRESSOR) 25 mg tablet Take 25 mg by mouth every 12 (twelve) hours , Historical Med      oxybutynin (DITROPAN-XL) 5 mg 24 hr tablet Take 1 tablet (5 mg total) by mouth daily for 5 days, Starting Tue 9/21/2021, Until Sun 9/26/2021, Normal             No discharge procedures on file      PDMP Review       Value Time User    PDMP Reviewed  Yes 7/13/2021 10:25 AM CORWIN Joseph          ED Provider  Electronically Signed by           Shellie Núñez DO  06/05/23 3749

## 2023-06-05 LAB
ATRIAL RATE: 71 BPM
P AXIS: 52 DEGREES
PR INTERVAL: 140 MS
QRS AXIS: 9 DEGREES
QRSD INTERVAL: 90 MS
QT INTERVAL: 376 MS
QTC INTERVAL: 408 MS
T WAVE AXIS: 56 DEGREES
VENTRICULAR RATE: 71 BPM

## 2023-06-05 PROCEDURE — 93010 ELECTROCARDIOGRAM REPORT: CPT | Performed by: INTERNAL MEDICINE

## 2023-06-10 NOTE — PROGRESS NOTES
Progress Note - Urology      Patient: Juliana Alaniz   : 1945 Sex: male   MRN: [id number]     CSN: [id number]  Unit/Bed#: [address] [place] [address]     SUBJECTIVE:   Feeling better  After BM this morning had multiple clots continuing to have episodic gross hematuria after any straining  On Unasyn  Somewhat leery of stopping CBI despite being clear in light of clots forming with any straining  Hemoglobin drifting down      Objective   Vitals: /57 (BP Location: Left arm)   Pulse 100   Temp (!) 97 1 °F (36 2 °C) (Axillary)   Resp 18   Ht 5' 9" (1 753 m)   Wt 95 2 kg (209 lb 14 1 oz)   SpO2 92%   BMI 30 99 kg/m²     I/O last 24 hours: In: 4360 [P O :550;  I V :2820; Blood:350]  Out: -1600       Physical Exam:   General Alert awake   Normocephalic atraumatic PERRLA  Lungs clear bilaterally  Cardiac normal S1 normal S2  Abdomen soft, flank pain  Extremities no edema      Lab Results: CBC:   Lab Results   Component Value Date    WBC 7 37 2021    HGB 7 8 (L) 2021    HCT 25 7 (L) 2021    MCV 80 (L) 2021     2021    MCH 24 3 (L) 2021    MCHC 30 4 (L) 2021    RDW 19 1 (H) 2021    MPV 10 2 2021    NRBC 0 2021     CMP:   Lab Results   Component Value Date     2021    CO2 25 2021    BUN 38 (H) 2021    CREATININE 1 69 (H) 2021    CALCIUM 7 4 (L) 2021    AST 20 2021    ALT 31 2021    ALKPHOS 96 2021    EGFR 38 2021     Urinalysis:   Lab Results   Component Value Date    COLORU Orange 2021    CLARITYU Slightly Cloudy 2021    SPECGRAV 1 010 2021    PHUR 6 0 2021    PHUR 5 5 2018    LEUKOCYTESUR Trace (A) 2021    NITRITE Negative 2021    GLUCOSEU Negative 2021    KETONESU Negative 2021    BILIRUBINUR Negative 2021    BLOODU Large (A) 2021     Urine Culture:   Lab Results   Component Value Date    URINECX >100,000 cfu/ml Klebsiella pneumoniae (A) 07/30/2021    URINECX >100,000 cfu/ml Enterococcus faecalis (A) 07/30/2021     PSA: No results found for: PSA      Assessment/ Plan:  Radiation cystitis  Finishing 39/40  oxygen treatments  Continuing to have bleeding  Patient cysto fulguration canceled on Thursday in light of temperatures of 103 T-max last night 99  Urine cultures not sent in light of CBI  Blood cultures negative so for  NPO after midnight  Cysto evacuation of clots fulguration retrogrades tomorrow      Leonidas Veloz MD No

## 2023-10-17 ENCOUNTER — APPOINTMENT (OUTPATIENT)
Dept: LAB | Facility: HOSPITAL | Age: 78
End: 2023-10-17
Payer: MEDICARE

## 2023-10-17 ENCOUNTER — HOSPITAL ENCOUNTER (OUTPATIENT)
Dept: RADIOLOGY | Facility: HOSPITAL | Age: 78
Discharge: HOME | End: 2023-10-17
Attending: SPECIALIST
Payer: MEDICARE

## 2023-10-17 DIAGNOSIS — D49.4 BLADDER TUMOR: ICD-10-CM

## 2023-10-17 DIAGNOSIS — C67.9 MALIGNANT NEOPLASM OF BLADDER, UNSPECIFIED (HCC): ICD-10-CM

## 2023-10-17 LAB
BUN SERPL-MCNC: 34 MG/DL (ref 5–25)
CREAT SERPL-MCNC: 1.3 MG/DL (ref 0.6–1.3)
GFR SERPL CREATININE-BSD FRML MDRD: 51 ML/MIN/1.73SQ M

## 2023-10-17 PROCEDURE — 82565 ASSAY OF CREATININE: CPT

## 2023-10-17 PROCEDURE — G1004 CDSM NDSC: HCPCS

## 2023-10-17 PROCEDURE — 74178 CT ABD&PLV WO CNTR FLWD CNTR: CPT

## 2023-10-17 PROCEDURE — 36415 COLL VENOUS BLD VENIPUNCTURE: CPT

## 2023-10-17 PROCEDURE — 84520 ASSAY OF UREA NITROGEN: CPT

## 2023-10-17 RX ADMIN — IOHEXOL 100 ML: 350 INJECTION, SOLUTION INTRAVENOUS at 11:07

## 2023-11-10 ENCOUNTER — OFFICE VISIT (OUTPATIENT)
Dept: LAB | Facility: HOSPITAL | Age: 78
End: 2023-11-10
Attending: SPECIALIST
Payer: MEDICARE

## 2023-11-10 DIAGNOSIS — C67.9 MALIGNANT NEOPLASM OF URINARY BLADDER, UNSPECIFIED SITE (HCC): ICD-10-CM

## 2023-11-10 PROCEDURE — 93005 ELECTROCARDIOGRAM TRACING: CPT

## 2023-11-11 LAB
ATRIAL RATE: 80 BPM
P AXIS: 68 DEGREES
PR INTERVAL: 142 MS
QRS AXIS: 9 DEGREES
QRSD INTERVAL: 102 MS
QT INTERVAL: 368 MS
QTC INTERVAL: 424 MS
T WAVE AXIS: 47 DEGREES
VENTRICULAR RATE: 80 BPM

## 2023-11-11 PROCEDURE — 93010 ELECTROCARDIOGRAM REPORT: CPT | Performed by: INTERNAL MEDICINE

## 2023-11-14 RX ORDER — FLUTICASONE FUROATE, UMECLIDINIUM BROMIDE AND VILANTEROL TRIFENATATE 200; 62.5; 25 UG/1; UG/1; UG/1
1 POWDER RESPIRATORY (INHALATION) DAILY
COMMUNITY
Start: 2023-06-27 | End: 2024-06-26

## 2023-11-14 NOTE — PRE-PROCEDURE INSTRUCTIONS
Pre-Surgery Instructions:   Medication Instructions    albuterol (2.5 mg/3 mL) 0.083 % nebulizer solution Uses PRN- OK to take day of surgery    albuterol (PROVENTIL HFA,VENTOLIN HFA) 90 mcg/act inhaler Uses PRN- OK to take day of surgery    fluticasone-umeclidinium-vilanterol (Trelegy Ellipta) 200-62.5-25 mcg/actuation AEPB inhaler Take day of surgery. metoprolol tartrate (LOPRESSOR) 25 mg tablet Take day of surgery. Medication instructions for day surgery reviewed. Please use only a sip of water to take your instructed medications. Avoid all over the counter vitamins, supplements and NSAIDS for one week prior to surgery per anesthesia guidelines. Tylenol is ok to take as needed. You will receive a call one business day prior to surgery with an arrival time and hospital directions. If your surgery is scheduled on a Monday, the hospital will be calling you on the Friday prior to your surgery. If you have not heard from anyone by 8pm, please call the hospital supervisor through the hospital  at [phone]. Xavi Bueno [phone]). Do not eat or drink anything after midnight the night before your surgery, including candy, mints, lifesavers, or chewing gum. Do not drink alcohol 24hrs before your surgery. Try not to smoke at least 24hrs before your surgery. Follow the pre surgery showering instructions as listed in the [facility] Surgical Experience Booklet” or otherwise provided by your surgeon's office. Do not use a blade to shave the surgical area 1 week before surgery. It is okay to use a clean electric clippers up to 24 hours before surgery. Do not apply any lotions, creams, including makeup, cologne, deodorant, or perfumes after showering on the day of your surgery. Do not use dry shampoo, hair spray, hair gel, or any type of hair products. No contact lenses, eye make-up, or artificial eyelashes.  Remove nail polish, including gel polish, and any artificial, gel, or acrylic nails if possible. Remove all jewelry including rings and body piercing jewelry. Wear causal clothing that is easy to take on and off. Consider your type of surgery. Keep any valuables, jewelry, piercings at home. Please bring any specially ordered equipment (sling, braces) if indicated. Arrange for a responsible person to drive you to and from the hospital on the day of your surgery. Visitor Guidelines discussed. Call the surgeon's office with any new illnesses, exposures, or additional questions prior to surgery. Please reference your [facility] Surgical Experience Booklet” for additional information to prepare for your upcoming surgery.

## 2023-11-15 NOTE — H&P
H&P Exam - Urology       Patient: Bob Camarena   : 1945 Sex: male   MRN: [id number]     CSN: [id number]      History of Present Illness   HPI:  Bob Camarena is a 76 y.o. male well-known to me with history of prostate cancer status post radical prostatectomy by myself with recurrence to the periprostatic tissue undergoing radiation developing radiation cystitis and bladder tumors recently found on surveillance cystoscopy to lesion in the bladder to undergo cystoscopy bladder biopsy possible TURBT bilateral retrograde pyelograms aware risk of anesthesia infection bleeding postoperative Sumner additional procedures        Review of Systems:   Constitutional:  Negative for activity change, fever, chills and diaphoresis. HENT: Negative for hearing loss and trouble swallowing. Eyes: Negative for itching and visual disturbance. Respiratory: Negative for chest tightness and shortness of breath. Cardiovascular: Negative for chest pain, edema. Gastrointestinal: Negative for abdominal distention, na abdominal pain, constipation, diarrhea, Nausea and vomiting. Genitourinary: Negative for decreased urine volume, difficulty urinating, dysuria, enuresis, frequency, hematuria and urgency. Musculoskeletal: Negative for gait problem and myalgias. Neurological: Negative for dizziness and headaches. Hematological: Does not bruise/bleed easily.        Historical Information   Past Medical History:   Diagnosis Date    Cancer [facility])     prostate    COPD (chronic obstructive pulmonary disease) ([address] [facility])     Hyperlipidemia     Hypertension      Past Surgical History:   Procedure Laterality Date    APPENDECTOMY      FL RETROGRADE PYELOGRAM  2021    HERNIA REPAIR      IR EMBOLIZATION (SPECIFY VESSEL OR SITE)  2021    IR NEPHROSTOMY TUBE CHECK AND/OR REMOVAL  2021    IR NEPHROSTOMY TUBE CHECK/CHANGE/REPOSITION/REINSERTION/UPSIZE  5/10/2021    IR NEPHROSTOMY TUBE CHECK/CHANGE/REPOSITION/REINSERTION/UPSIZE 8/4/2021    IR NEPHROSTOMY TUBE CHECK/CHANGE/REPOSITION/REINSERTION/UPSIZE  10/13/2021    IR NEPHROSTOMY TUBE PLACEMENT  5/7/2021    MN CYSTO W/IRRIG & EVAC MULTPLE OBSTRUCTING CLOTS Bilateral 4/14/2021    Procedure: CYSTOSCOPY EVACUATION OF CLOTS bilateral retrograde pyelograms possible TURBT bladder biopsy;  Surgeon: Bambi Redmond MD;  Location: [facility];  Service: Urology    MN CYSTO [employer] & EVAC MULTPLE OBSTRUCTING CLOTS N/A 4/24/2021    Procedure: CYSTOSCOPY EVACUATION OF CLOTS fulguration;  Surgeon: Bambi Redmond MD;  Location: [facility];  Service: Urology    MN CYSTO [employer] & EVAC MULTPLE OBSTRUCTING CLOTS N/A 7/8/2021    Procedure: CYSTOSCOPY EVACUATION OF CLOTS BILATERAL ANTIROGRADES NEPHROSTOMY Hansel Patillas ;  Surgeon: Bambi Redmond MD;  Location: [facility];  Service: Urology    MN CYSTO [employer] & EVAC MULTPLE OBSTRUCTING CLOTS N/A 8/22/2021    Procedure: CYSTOSCOPY, RIGHT RETROGRADE, EVACUATION OF CLOTS, BLADDER BIOPSY X2 AND FULGERATION OF BLADDER LESIONS, URETEROSCOPY, 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;  Surgeon: Bambi Redmond MD;  Location: [facility];  Service: Urology    PROSTATECTOMY      ROTATOR CUFF REPAIR      right shoulder     Social History   Social History     Substance and Sexual Activity   Alcohol Use Never     Social History     Substance and Sexual Activity   Drug Use No     Social History     Tobacco Use   Smoking Status Every Day    Packs/day: 0.50    Years: 50.00    Total pack years: 25.00    Types: Cigarettes    Passive exposure: Never   Smokeless Tobacco Never     Family History:   Family History   Problem Relation Age of Onset    Diabetes Mother     Stroke Mother     Diabetes Brother     Stroke Brother        Meds/Allergies   No medications prior to admission. No Known Allergies    Objective   Vitals:  Wt 98.9 kg (218 lb)   BMI 30.40 kg/m²     Physical Exam:  General Alert awake   Normocephalic atraumatic PERRLA  Lungs clear bilaterally  Cardiac normal S1 normal S2  Abdomen soft, flank pain  Extremities no edema    No intake/output data recorded.     Invasive Devices       Peripheral Intravenous Line  Duration             Peripheral IV 06/02/23 Left Antecubital 165 days    Peripheral IV 10/17/23 Distal;Right;Upper;Ventral (anterior) Arm 28 days                        Lab Results: CBC:   Lab Results   Component Value Date    WBC 8.04 06/02/2023    HGB 14.9 06/02/2023    HCT 45.4 06/02/2023    MCV 90 06/02/2023     (L) 06/02/2023    RBC 5.02 06/02/2023    MCH 29.7 06/02/2023    MCHC 32.8 06/02/2023    RDW 15.4 (H) 06/02/2023    MPV 9.9 06/02/2023    NRBC 0 06/02/2023     CMP:   Lab Results   Component Value Date     06/02/2023    CO2 27 06/02/2023    BUN 34 (H) 10/17/2023    CREATININE 1.30 10/17/2023    CALCIUM 9.2 06/02/2023    AST 17 06/02/2023    ALT 14 06/02/2023    ALKPHOS 62 06/02/2023    EGFR 51 10/17/2023     Urinalysis:   Lab Results   Component Value Date    COLORU Eva 10/09/2021    CLARITYU Cloudy 10/09/2021    SPECGRAV 1.025 10/09/2021    PHUR 5.5 10/09/2021    PHUR 5.5 03/25/2018    LEUKOCYTESUR Moderate (A) 10/09/2021    NITRITE Positive (A) 10/09/2021    GLUCOSEU Negative 10/09/2021    KETONESU Negative 10/09/2021    BILIRUBINUR Interference- unable to analyze (A) 10/09/2021    BLOODU Large (A) 10/09/2021     Urine Culture:   Lab Results   Component Value Date    URINECX >100,000 cfu/ml Klebsiella pneumoniae (A) 10/09/2021    URINECX 30,000-39,000 cfu/ml Pseudomonas aeruginosa (A) 10/09/2021     PSA: No results found for: "PSA"        Assessment/ Plan:  Cystoscopy bilateral retrograde pyelograms bladder biopsy fulguration TURBT      Gume Tong MD

## 2023-11-16 ENCOUNTER — APPOINTMENT (OUTPATIENT)
Dept: RADIOLOGY | Facility: HOSPITAL | Age: 78
End: 2023-11-16
Payer: MEDICARE

## 2023-11-16 ENCOUNTER — ANESTHESIA EVENT (OUTPATIENT)
Dept: PERIOP | Facility: HOSPITAL | Age: 78
End: 2023-11-16
Payer: MEDICARE

## 2023-11-16 ENCOUNTER — HOSPITAL ENCOUNTER (OUTPATIENT)
Facility: HOSPITAL | Age: 78
Setting detail: OUTPATIENT SURGERY
Discharge: HOME | End: 2023-11-16
Attending: SPECIALIST | Admitting: SPECIALIST
Payer: MEDICARE

## 2023-11-16 ENCOUNTER — ANESTHESIA (OUTPATIENT)
Dept: PERIOP | Facility: HOSPITAL | Age: 78
End: 2023-11-16
Payer: MEDICARE

## 2023-11-16 VITALS
OXYGEN SATURATION: 95 % | HEART RATE: 87 BPM | RESPIRATION RATE: 18 BRPM | DIASTOLIC BLOOD PRESSURE: 79 MMHG | HEIGHT: 71 IN | WEIGHT: 215.17 LBS | TEMPERATURE: 97.3 F | BODY MASS INDEX: 30.12 KG/M2 | SYSTOLIC BLOOD PRESSURE: 158 MMHG

## 2023-11-16 DIAGNOSIS — C67.9 MALIGNANT PAPILLARY CARCINOMA OF BLADDER (HCC): ICD-10-CM

## 2023-11-16 DIAGNOSIS — C67.9 MALIGNANT NEOPLASM OF URINARY BLADDER, UNSPECIFIED SITE (HCC): Primary | ICD-10-CM

## 2023-11-16 PROCEDURE — 74420 UROGRAPHY RTRGR +-KUB: CPT

## 2023-11-16 PROCEDURE — 88305 TISSUE EXAM BY PATHOLOGIST: CPT | Performed by: STUDENT IN AN ORGANIZED HEALTH CARE EDUCATION/TRAINING PROGRAM

## 2023-11-16 PROCEDURE — 88342 IMHCHEM/IMCYTCHM 1ST ANTB: CPT | Performed by: STUDENT IN AN ORGANIZED HEALTH CARE EDUCATION/TRAINING PROGRAM

## 2023-11-16 PROCEDURE — 88112 CYTOPATH CELL ENHANCE TECH: CPT | Performed by: SPECIALIST

## 2023-11-16 PROCEDURE — 88341 IMHCHEM/IMCYTCHM EA ADD ANTB: CPT | Performed by: STUDENT IN AN ORGANIZED HEALTH CARE EDUCATION/TRAINING PROGRAM

## 2023-11-16 RX ORDER — FENTANYL CITRATE/PF 50 MCG/ML
25 SYRINGE (ML) INJECTION
Status: DISCONTINUED | OUTPATIENT
Start: 2023-11-16 | End: 2023-11-16 | Stop reason: HOSPADM

## 2023-11-16 RX ORDER — ONDANSETRON 2 MG/ML
4 INJECTION INTRAMUSCULAR; INTRAVENOUS ONCE AS NEEDED
Status: DISCONTINUED | OUTPATIENT
Start: 2023-11-16 | End: 2023-11-16 | Stop reason: HOSPADM

## 2023-11-16 RX ORDER — EPHEDRINE SULFATE 50 MG/ML
INJECTION INTRAVENOUS AS NEEDED
Status: DISCONTINUED | OUTPATIENT
Start: 2023-11-16 | End: 2023-11-16

## 2023-11-16 RX ORDER — SODIUM CHLORIDE, SODIUM LACTATE, POTASSIUM CHLORIDE, CALCIUM CHLORIDE 600; 310; 30; 20 MG/100ML; MG/100ML; MG/100ML; MG/100ML
INJECTION, SOLUTION INTRAVENOUS CONTINUOUS PRN
Status: DISCONTINUED | OUTPATIENT
Start: 2023-11-16 | End: 2023-11-16

## 2023-11-16 RX ORDER — GLYCINE 1.5 G/100ML
SOLUTION IRRIGATION AS NEEDED
Status: DISCONTINUED | OUTPATIENT
Start: 2023-11-16 | End: 2023-11-16 | Stop reason: HOSPADM

## 2023-11-16 RX ORDER — LIDOCAINE HYDROCHLORIDE 10 MG/ML
INJECTION, SOLUTION EPIDURAL; INFILTRATION; INTRACAUDAL; PERINEURAL AS NEEDED
Status: DISCONTINUED | OUTPATIENT
Start: 2023-11-16 | End: 2023-11-16

## 2023-11-16 RX ORDER — SODIUM CHLORIDE, SODIUM LACTATE, POTASSIUM CHLORIDE, CALCIUM CHLORIDE 600; 310; 30; 20 MG/100ML; MG/100ML; MG/100ML; MG/100ML
125 INJECTION, SOLUTION INTRAVENOUS CONTINUOUS
Status: DISCONTINUED | OUTPATIENT
Start: 2023-11-16 | End: 2023-11-16 | Stop reason: HOSPADM

## 2023-11-16 RX ORDER — CEFAZOLIN SODIUM 2 G/50ML
2000 SOLUTION INTRAVENOUS ONCE
Status: COMPLETED | OUTPATIENT
Start: 2023-11-16 | End: 2023-11-16

## 2023-11-16 RX ORDER — GLYCOPYRROLATE 0.2 MG/ML
INJECTION INTRAMUSCULAR; INTRAVENOUS AS NEEDED
Status: DISCONTINUED | OUTPATIENT
Start: 2023-11-16 | End: 2023-11-16

## 2023-11-16 RX ORDER — MAGNESIUM HYDROXIDE 1200 MG/15ML
LIQUID ORAL AS NEEDED
Status: DISCONTINUED | OUTPATIENT
Start: 2023-11-16 | End: 2023-11-16 | Stop reason: HOSPADM

## 2023-11-16 RX ORDER — PROPOFOL 10 MG/ML
INJECTION, EMULSION INTRAVENOUS AS NEEDED
Status: DISCONTINUED | OUTPATIENT
Start: 2023-11-16 | End: 2023-11-16

## 2023-11-16 RX ORDER — FENTANYL CITRATE 50 UG/ML
INJECTION, SOLUTION INTRAMUSCULAR; INTRAVENOUS AS NEEDED
Status: DISCONTINUED | OUTPATIENT
Start: 2023-11-16 | End: 2023-11-16

## 2023-11-16 RX ORDER — ONDANSETRON 2 MG/ML
INJECTION INTRAMUSCULAR; INTRAVENOUS AS NEEDED
Status: DISCONTINUED | OUTPATIENT
Start: 2023-11-16 | End: 2023-11-16

## 2023-11-16 RX ADMIN — FENTANYL CITRATE 25 MCG: 50 INJECTION INTRAMUSCULAR; INTRAVENOUS at 11:19

## 2023-11-16 RX ADMIN — EPHEDRINE SULFATE 10 MG: 50 INJECTION, SOLUTION INTRAVENOUS at 11:13

## 2023-11-16 RX ADMIN — FENTANYL CITRATE 50 MCG: 50 INJECTION INTRAMUSCULAR; INTRAVENOUS at 11:06

## 2023-11-16 RX ADMIN — LIDOCAINE HYDROCHLORIDE 50 MG: 10 INJECTION, SOLUTION EPIDURAL; INFILTRATION; INTRACAUDAL at 11:03

## 2023-11-16 RX ADMIN — GLYCOPYRROLATE 0.2 MG: 0.2 INJECTION, SOLUTION INTRAMUSCULAR; INTRAVENOUS at 11:16

## 2023-11-16 RX ADMIN — CEFAZOLIN SODIUM 2000 MG: 2 SOLUTION INTRAVENOUS at 11:07

## 2023-11-16 RX ADMIN — SODIUM CHLORIDE, SODIUM LACTATE, POTASSIUM CHLORIDE, AND CALCIUM CHLORIDE: .6; .31; .03; .02 INJECTION, SOLUTION INTRAVENOUS at 10:59

## 2023-11-16 RX ADMIN — FENTANYL CITRATE 25 MCG: 50 INJECTION INTRAMUSCULAR; INTRAVENOUS at 11:24

## 2023-11-16 RX ADMIN — ONDANSETRON 4 MG: 2 INJECTION INTRAMUSCULAR; INTRAVENOUS at 11:03

## 2023-11-16 RX ADMIN — EPHEDRINE SULFATE 10 MG: 50 INJECTION, SOLUTION INTRAVENOUS at 11:28

## 2023-11-16 RX ADMIN — PROPOFOL 150 MG: 10 INJECTION, EMULSION INTRAVENOUS at 11:03

## 2023-11-16 NOTE — OP NOTE
OPERATIVE REPORT  PATIENT NAME: Sangeetha Blanco    :  1945  MRN: [id number]  Pt Location: [facility] OR ROOM 04    SURGERY DATE: 2023    Surgeon(s) and Role:     * Dariana Mathews MD - Primary    Preop Diagnosis:  Malignant papillary carcinoma of bladder ([address]) [C67.9]    Post-Op Diagnosis Codes:     * Malignant papillary carcinoma of bladder ([address]) [C67.9]    Procedure(s):  CYSTOSCOPY. BILATERAL RETROGRADEY [address] [facility] 2.0 CM BLADDER TUMOR WITH BIOPSY    Specimen(s):  ID Type Source Tests Collected by Time Destination   1 :  Urine Urine, Cystoscopic CYTOLOGY, URINE Dariana Mathews MD 2023 1117    2 : POSTERIOR WALL 2 CM Tissue Urinary Bladder TISSUE EXAM Dariana Mathews MD 2023 1128        Estimated Blood Loss:   Minimal    Drains:  Urethral Catheter Latex; Three way 20 Fr.  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 Retrograde pyelograms confirming normal upper tracts    Complications:   None    Procedure and Technique:  Patient identified in the holding area consent obtained in the office willing to proceed with wife present placed napsylate. After anesthesia induced placed in lithotomy position draped and prepped in sterile fashion. Timeout performed. A 22 Uzbek cystoscope passed through to the bladder. Anterior urethra confirms no abnormalities membranous urethra open posterior bladder neck opened placement scope of the bladder urine collected for cytology. A view of the bladder with 37 degree lens there was a 2 cm velvety red lesion posterior wall.  5 Uzbek open-ended catheter placed to the right orifice right retrograde pyelogram confirmed normal filling and drainage left retrograde pyelogram confirmed normal filling and drainage biopsy placed and aggressive fulguration of the remaining tumor scope removed 20 Uzbek three-way Sumner cath inserted left the bag drainage time dictation mild hematuria noted patient procedure awakened taken recovery in stable condition   I was present for the entire procedure.     Patient Disposition:  PACU         SIGNATURE: Antelmo Roldan MD  DATE: November 16, 2023  TIME: 12:04 PM

## 2023-11-16 NOTE — ANESTHESIA POSTPROCEDURE EVALUATION
Post-Op Assessment Note    CV Status:  Stable  Pain Score: 0    Pain management: adequate       Mental Status:  Sleepy and somnolent   Hydration Status:  Euvolemic and stable   PONV Controlled:  Controlled   Airway Patency:  Patent  Two or more mitigation strategies used for obstructive sleep apnea   Post Op Vitals Reviewed: Yes    No anethesia notable event occurred.     Staff: CRNA               BP   1258/70   Temp      Pulse  72   Resp   16   SpO2   97%

## 2023-11-16 NOTE — DISCHARGE INSTR - AVS FIRST PAGE
#1 no heavy straining or lifting above 10 pounds for 2 weeks    #2 call office fevers, chills, or worsening blood in the urine. #3  Patient has follow-up with Dr. Maximo Grace tomorrow 10 AM to remove Sumner and postop discussion December 8 11:30 AM to review pathology      Elizabeth Khan M.D. [address] [place] [address] office  [address]  [phone]  8:30 AM to 4:30 PM  Monday through Friday    [facility]) office  [phone] [facility]), [address] [facility]  [phone]  1:00 to 5:00 PM  Wednesday

## 2023-11-16 NOTE — ANESTHESIA PREPROCEDURE EVALUATION
Procedure:  CYSTOSCOPY WITH BIOPSIES AND FULGERATION (Bladder)    Relevant Problems   CARDIO   (+) Essential hypertension   (+) Hyperlipidemia      /RENAL   (+) SHANTI (acute kidney injury) ([address])   (+) Chronic kidney disease   (+) Prostate cancer (HCC)      HEMATOLOGY   (+) Acute blood loss anemia (ABLA)   (+) Anemia      MUSCULOSKELETAL   (+) RA (rheumatoid arthritis) (HCC)      PULMONARY   (+) COPD (chronic obstructive pulmonary disease) (HCC)        Physical Exam    Airway    Mallampati score: II  TM Distance: >3 FB  Neck ROM: full     Dental   No notable dental hx     Cardiovascular  Cardiovascular exam normal    Pulmonary  Pulmonary exam normal     Other Findings        Anesthesia Plan  ASA Score- 2     Anesthesia Type- general with ASA Monitors. Additional Monitors:     Airway Plan: LMA. Plan Factors-Exercise tolerance (METS): >4 METS. Chart reviewed. Existing labs reviewed. Patient summary reviewed. Patient is a current smoker. Patient smoked on day of surgery. Obstructive sleep apnea risk education given perioperatively. Induction- intravenous. Postoperative Plan- Plan for postoperative opioid use. Informed Consent- Anesthetic plan and risks discussed with patient. I personally reviewed this patient with the CRNA. Discussed and agreed on the Anesthesia Plan with the CRNA. Lowell Jiménez

## 2023-11-16 NOTE — NURSING NOTE
1215: Patient arrived from PACU alert and oriented. Patient is not having any pain. Sumner intact w/ leg strap and draining pink-tinged urine. 1252: Discharge instructions reviewed with patient; pt expressed understanding. Per pt, understands how to use Sumner. Sumner continues to drain pink-tinged urine.

## 2023-11-16 NOTE — PROGRESS NOTES
Progress Note - Urology      Patient: Matilde Blackwell   : 1945 Sex: male   MRN: [id number]     CSN: [id number]  Unit/Bed#: OR POOL     SUBJECTIVE:   Patient seen in the surgical preop unit no change to history physical aware risk of recurrent bleeding postop catheter anesthesia infection additional loss procedures      Objective   Vitals: /75   Pulse 66   Temp 97.6 °F (36.4 °C) (Temporal)   Resp 18   Ht 5' 11" (1.803 m)   Wt 97.6 kg (215 lb 2.7 oz)   SpO2 92%   BMI 30.01 kg/m²     No intake/output data recorded.       Physical Exam:   General Alert awake   Normocephalic atraumatic PERRLA  Lungs clear bilaterally  Cardiac normal S1 normal S2  Abdomen soft, flank pain  Extremities no edema      Lab Results: CBC:   Lab Results   Component Value Date    WBC 8.04 2023    HGB 14.9 2023    HCT 45.4 2023    MCV 90 2023     (L) 2023    RBC 5.02 2023    MCH 29.7 2023    MCHC 32.8 2023    RDW 15.4 (H) 2023    MPV 9.9 2023    NRBC 0 2023     CMP:   Lab Results   Component Value Date     2023    CO2 27 2023    BUN 34 (H) 10/17/2023    CREATININE 1.30 10/17/2023    CALCIUM 9.2 2023    AST 17 2023    ALT 14 2023    ALKPHOS 62 2023    EGFR 51 10/17/2023     Urinalysis:   Lab Results   Component Value Date    COLORU Eav 10/09/2021    CLARITYU Cloudy 10/09/2021    SPECGRAV 1.025 10/09/2021    PHUR 5.5 10/09/2021    PHUR 5.5 2018    LEUKOCYTESUR Moderate (A) 10/09/2021    NITRITE Positive (A) 10/09/2021    GLUCOSEU Negative 10/09/2021    KETONESU Negative 10/09/2021    BILIRUBINUR Interference- unable to analyze (A) 10/09/2021    BLOODU Large (A) 10/09/2021     Urine Culture:   Lab Results   Component Value Date    URINECX >100,000 cfu/ml Klebsiella pneumoniae (A) 10/09/2021    URINECX 30,000-39,000 cfu/ml Pseudomonas aeruginosa (A) 10/09/2021     PSA: No results found for: "PSA"      Assessment/ Plan:  Cystoscopy TURBT bilateral retrograde pyelograms          Lyudmila Dorsey MD

## 2023-11-21 PROCEDURE — 88112 CYTOPATH CELL ENHANCE TECH: CPT | Performed by: SPECIALIST

## 2023-11-30 PROCEDURE — 88305 TISSUE EXAM BY PATHOLOGIST: CPT | Performed by: STUDENT IN AN ORGANIZED HEALTH CARE EDUCATION/TRAINING PROGRAM

## 2023-11-30 PROCEDURE — 88342 IMHCHEM/IMCYTCHM 1ST ANTB: CPT | Performed by: STUDENT IN AN ORGANIZED HEALTH CARE EDUCATION/TRAINING PROGRAM

## 2023-11-30 PROCEDURE — 88341 IMHCHEM/IMCYTCHM EA ADD ANTB: CPT | Performed by: STUDENT IN AN ORGANIZED HEALTH CARE EDUCATION/TRAINING PROGRAM

## 2024-02-05 LAB
LEFT EYE DIABETIC RETINOPATHY: NORMAL
RIGHT EYE DIABETIC RETINOPATHY: NORMAL

## 2024-02-29 ENCOUNTER — HOSPITAL ENCOUNTER (INPATIENT)
Facility: HOSPITAL | Age: 79
LOS: 3 days | Discharge: HOME | DRG: 699 | End: 2024-03-03
Attending: EMERGENCY MEDICINE | Admitting: FAMILY MEDICINE
Payer: COMMERCIAL

## 2024-02-29 DIAGNOSIS — R31.9 HEMATURIA: Primary | ICD-10-CM

## 2024-02-29 DIAGNOSIS — N30.90 CYSTITIS: ICD-10-CM

## 2024-02-29 PROBLEM — E66.9 OBESITY (BMI 30.0-34.9): Status: ACTIVE | Noted: 2024-02-29

## 2024-02-29 PROBLEM — Z72.0 NICOTINE ABUSE: Status: ACTIVE | Noted: 2024-02-29

## 2024-02-29 PROBLEM — E66.811 OBESITY (BMI 30.0-34.9): Status: ACTIVE | Noted: 2024-02-29

## 2024-02-29 LAB
ANION GAP SERPL CALCULATED.3IONS-SCNC: 8 MMOL/L
BACTERIA UR QL AUTO: ABNORMAL /HPF
BASOPHILS # BLD AUTO: 0.02 THOUSANDS/ÂΜL (ref 0–0.1)
BASOPHILS NFR BLD AUTO: 0 % (ref 0–1)
BILIRUB UR QL STRIP: NEGATIVE
BUN SERPL-MCNC: 35 MG/DL (ref 5–25)
CALCIUM SERPL-MCNC: 9.4 MG/DL (ref 8.4–10.2)
CHLORIDE SERPL-SCNC: 102 MMOL/L (ref 96–108)
CLARITY UR: ABNORMAL
CO2 SERPL-SCNC: 23 MMOL/L (ref 21–32)
COLOR UR: ABNORMAL
CREAT SERPL-MCNC: 1.39 MG/DL (ref 0.6–1.3)
EOSINOPHIL # BLD AUTO: 0.02 THOUSAND/ÂΜL (ref 0–0.61)
EOSINOPHIL NFR BLD AUTO: 0 % (ref 0–6)
ERYTHROCYTE [DISTWIDTH] IN BLOOD BY AUTOMATED COUNT: 14.6 % (ref 11.6–15.1)
GFR SERPL CREATININE-BSD FRML MDRD: 47 ML/MIN/1.73SQ M
GLUCOSE SERPL-MCNC: 129 MG/DL (ref 65–140)
GLUCOSE UR STRIP-MCNC: NEGATIVE MG/DL
HCT VFR BLD AUTO: 47.9 % (ref 36.5–49.3)
HGB BLD-MCNC: 15.9 G/DL (ref 12–17)
HGB UR QL STRIP.AUTO: ABNORMAL
IMM GRANULOCYTES # BLD AUTO: 0.08 THOUSAND/UL (ref 0–0.2)
IMM GRANULOCYTES NFR BLD AUTO: 1 % (ref 0–2)
KETONES UR STRIP-MCNC: ABNORMAL MG/DL
LEUKOCYTE ESTERASE UR QL STRIP: ABNORMAL
LYMPHOCYTES # BLD AUTO: 1.59 THOUSANDS/ÂΜL (ref 0.6–4.47)
LYMPHOCYTES NFR BLD AUTO: 12 % (ref 14–44)
MCH RBC QN AUTO: 29.5 PG (ref 26.8–34.3)
MCHC RBC AUTO-ENTMCNC: 33.2 G/DL (ref 31.4–37.4)
MCV RBC AUTO: 89 FL (ref 82–98)
MONOCYTES # BLD AUTO: 0.79 THOUSAND/ÂΜL (ref 0.17–1.22)
MONOCYTES NFR BLD AUTO: 6 % (ref 4–12)
NEUTROPHILS # BLD AUTO: 10.35 THOUSANDS/ÂΜL (ref 1.85–7.62)
NEUTS SEG NFR BLD AUTO: 81 % (ref 43–75)
NITRITE UR QL STRIP: POSITIVE
NON-SQ EPI CELLS URNS QL MICRO: ABNORMAL /HPF
NRBC BLD AUTO-RTO: 0 /100 WBCS
PH UR STRIP.AUTO: 7 [PH]
PLATELET # BLD AUTO: 163 THOUSANDS/UL (ref 149–390)
PMV BLD AUTO: 9.7 FL (ref 8.9–12.7)
POTASSIUM SERPL-SCNC: 4.8 MMOL/L (ref 3.5–5.3)
PROCALCITONIN SERPL-MCNC: 0.05 NG/ML
PROT UR STRIP-MCNC: >=300 MG/DL
RBC # BLD AUTO: 5.39 MILLION/UL (ref 3.88–5.62)
RBC #/AREA URNS AUTO: ABNORMAL /HPF
SODIUM SERPL-SCNC: 133 MMOL/L (ref 135–147)
SP GR UR STRIP.AUTO: 1.01 (ref 1–1.03)
UROBILINOGEN UR QL STRIP.AUTO: 1 E.U./DL
WBC # BLD AUTO: 12.85 THOUSAND/UL (ref 4.31–10.16)
WBC #/AREA URNS AUTO: ABNORMAL /HPF

## 2024-02-29 PROCEDURE — 99222 1ST HOSP IP/OBS MODERATE 55: CPT | Performed by: NURSE PRACTITIONER

## 2024-02-29 PROCEDURE — 94640 AIRWAY INHALATION TREATMENT: CPT

## 2024-02-29 PROCEDURE — 94760 N-INVAS EAR/PLS OXIMETRY 1: CPT

## 2024-02-29 PROCEDURE — 96365 THER/PROPH/DIAG IV INF INIT: CPT

## 2024-02-29 PROCEDURE — 84145 PROCALCITONIN (PCT): CPT | Performed by: NURSE PRACTITIONER

## 2024-02-29 PROCEDURE — 99284 EMERGENCY DEPT VISIT MOD MDM: CPT

## 2024-02-29 PROCEDURE — 36415 COLL VENOUS BLD VENIPUNCTURE: CPT | Performed by: EMERGENCY MEDICINE

## 2024-02-29 PROCEDURE — 99285 EMERGENCY DEPT VISIT HI MDM: CPT | Performed by: EMERGENCY MEDICINE

## 2024-02-29 PROCEDURE — 87186 SC STD MICRODIL/AGAR DIL: CPT | Performed by: EMERGENCY MEDICINE

## 2024-02-29 PROCEDURE — 87077 CULTURE AEROBIC IDENTIFY: CPT | Performed by: EMERGENCY MEDICINE

## 2024-02-29 PROCEDURE — 80048 BASIC METABOLIC PNL TOTAL CA: CPT | Performed by: EMERGENCY MEDICINE

## 2024-02-29 PROCEDURE — 85025 COMPLETE CBC W/AUTO DIFF WBC: CPT | Performed by: EMERGENCY MEDICINE

## 2024-02-29 PROCEDURE — 81001 URINALYSIS AUTO W/SCOPE: CPT | Performed by: EMERGENCY MEDICINE

## 2024-02-29 PROCEDURE — 87086 URINE CULTURE/COLONY COUNT: CPT | Performed by: EMERGENCY MEDICINE

## 2024-02-29 RX ORDER — FAMOTIDINE 20 MG/1
20 TABLET, FILM COATED ORAL
Status: COMPLETED | OUTPATIENT
Start: 2024-02-29 | End: 2024-03-01

## 2024-02-29 RX ORDER — SODIUM CHLORIDE 9 MG/ML
50 INJECTION, SOLUTION INTRAVENOUS CONTINUOUS
Status: DISPENSED | OUTPATIENT
Start: 2024-02-29 | End: 2024-03-01

## 2024-02-29 RX ORDER — LIDOCAINE HYDROCHLORIDE 20 MG/ML
1 JELLY TOPICAL ONCE
Status: COMPLETED | OUTPATIENT
Start: 2024-02-29 | End: 2024-02-29

## 2024-02-29 RX ORDER — FAMOTIDINE 20 MG/1
20 TABLET, FILM COATED ORAL
Status: DISCONTINUED | OUTPATIENT
Start: 2024-02-29 | End: 2024-02-29

## 2024-02-29 RX ORDER — PREDNISONE 20 MG/1
40 TABLET ORAL DAILY
Status: COMPLETED | OUTPATIENT
Start: 2024-03-01 | End: 2024-03-02

## 2024-02-29 RX ORDER — CEFEPIME HYDROCHLORIDE 1 G/50ML
1000 INJECTION, SOLUTION INTRAVENOUS EVERY 12 HOURS
Status: DISCONTINUED | OUTPATIENT
Start: 2024-03-01 | End: 2024-03-02

## 2024-02-29 RX ORDER — SACCHAROMYCES BOULARDII 250 MG
250 CAPSULE ORAL 2 TIMES DAILY
Status: DISCONTINUED | OUTPATIENT
Start: 2024-02-29 | End: 2024-03-03 | Stop reason: HOSPADM

## 2024-02-29 RX ORDER — CEFEPIME HYDROCHLORIDE 2 G/50ML
2000 INJECTION, SOLUTION INTRAVENOUS ONCE
Status: COMPLETED | OUTPATIENT
Start: 2024-02-29 | End: 2024-02-29

## 2024-02-29 RX ORDER — FLUTICASONE PROPIONATE 50 MCG
1 SPRAY, SUSPENSION (ML) NASAL DAILY
Status: ON HOLD | COMMUNITY

## 2024-02-29 RX ORDER — NICOTINE 21 MG/24HR
1 PATCH, TRANSDERMAL 24 HOURS TRANSDERMAL DAILY
Status: DISCONTINUED | OUTPATIENT
Start: 2024-02-29 | End: 2024-03-03 | Stop reason: HOSPADM

## 2024-02-29 RX ORDER — ACETAMINOPHEN 325 MG/1
650 TABLET ORAL EVERY 6 HOURS PRN
Status: DISCONTINUED | OUTPATIENT
Start: 2024-02-29 | End: 2024-03-03 | Stop reason: HOSPADM

## 2024-02-29 RX ORDER — PREDNISONE 20 MG/1
40 TABLET ORAL DAILY
Status: DISCONTINUED | OUTPATIENT
Start: 2024-03-01 | End: 2024-02-29

## 2024-02-29 RX ORDER — ALBUTEROL SULFATE 2.5 MG/3ML
2.5 SOLUTION RESPIRATORY (INHALATION) EVERY 6 HOURS PRN
Status: DISCONTINUED | OUTPATIENT
Start: 2024-02-29 | End: 2024-03-03 | Stop reason: HOSPADM

## 2024-02-29 RX ORDER — GUAIFENESIN 600 MG/1
600 TABLET, EXTENDED RELEASE ORAL EVERY 12 HOURS SCHEDULED
Status: DISCONTINUED | OUTPATIENT
Start: 2024-02-29 | End: 2024-03-03 | Stop reason: HOSPADM

## 2024-02-29 RX ORDER — FLUTICASONE PROPIONATE 50 MCG
1 SPRAY, SUSPENSION (ML) NASAL DAILY
Status: DISCONTINUED | OUTPATIENT
Start: 2024-03-01 | End: 2024-03-03 | Stop reason: HOSPADM

## 2024-02-29 RX ORDER — ONDANSETRON 2 MG/ML
4 INJECTION INTRAMUSCULAR; INTRAVENOUS EVERY 6 HOURS PRN
Status: DISCONTINUED | OUTPATIENT
Start: 2024-02-29 | End: 2024-03-03 | Stop reason: HOSPADM

## 2024-02-29 RX ORDER — FLUTICASONE FUROATE AND VILANTEROL 200; 25 UG/1; UG/1
1 POWDER RESPIRATORY (INHALATION) DAILY
Status: DISCONTINUED | OUTPATIENT
Start: 2024-03-01 | End: 2024-03-03 | Stop reason: HOSPADM

## 2024-02-29 RX ADMIN — CEFEPIME HYDROCHLORIDE 2000 MG: 2 INJECTION, SOLUTION INTRAVENOUS at 15:49

## 2024-02-29 RX ADMIN — SODIUM CHLORIDE 50 ML/HR: 0.9 INJECTION, SOLUTION INTRAVENOUS at 21:27

## 2024-02-29 RX ADMIN — NICOTINE 1 PATCH: 14 PATCH, EXTENDED RELEASE TRANSDERMAL at 21:30

## 2024-02-29 RX ADMIN — ALBUTEROL SULFATE 2.5 MG: 2.5 SOLUTION RESPIRATORY (INHALATION) at 20:49

## 2024-02-29 RX ADMIN — LIDOCAINE HYDROCHLORIDE 1 APPLICATION: 20 JELLY TOPICAL at 15:34

## 2024-02-29 RX ADMIN — Medication 250 MG: at 21:30

## 2024-02-29 RX ADMIN — METOPROLOL TARTRATE 25 MG: 25 TABLET, FILM COATED ORAL at 21:35

## 2024-02-29 RX ADMIN — FAMOTIDINE 20 MG: 20 TABLET, FILM COATED ORAL at 21:30

## 2024-02-29 RX ADMIN — GUAIFENESIN 600 MG: 600 TABLET, EXTENDED RELEASE ORAL at 21:30

## 2024-02-29 NOTE — Clinical Note
Case was discussed with MAI and the patient's admission status was agreed to be Admission Status: observation status to the service of Dr. Jones.

## 2024-02-29 NOTE — ED NOTES
ml by bladder scan. Patient states he has a great amount of pressure in bladder and bladder scan readings have not been accurate in the past.     Leia Russo RN  02/29/24 [id number]

## 2024-02-29 NOTE — CONSULTS
H&P Exam - Urology       Patient: Joel Wyman   : 1945 Sex: male   MRN: [id number]     CSN: [id number]      History of Present Illness   HPI:  Joel Wyman is a 78 y.o. male well-known to me history of prostate cancer status post radical retropubic prostatectomy by myself years ago with local recurrence undergoing external beam radiation to the periprostatic tissue developing radiation cystitis sphincter incompetency requiring hyperbaric oxygen therapy and developing bladder papillomas recently undergoing office flex cystoscopy few weeks ago confirming radiation cystitis only drank water today and gagged tremendously increasing abdominal pressure developing gross hematuria presenting to the ER now seen at the bedside with continuous bladder irrigation noting mild hematuria most likely related to his radiation cystitis urine analysis also consistent with hemorrhagic cystitis        Review of Systems:   Constitutional:  Negative for activity change, fever, chills and diaphoresis.   HENT: Negative for hearing loss and trouble swallowing.   Eyes: Negative for itching and visual disturbance.   Respiratory: Negative for chest tightness and shortness of breath.   Cardiovascular: Negative for chest pain, edema.   Gastrointestinal: Negative for abdominal distention, na abdominal pain, constipation, diarrhea, Nausea and vomiting.   Genitourinary: Negative for decreased urine volume, difficulty urinating, dysuria, enuresis, frequency, hematuria and urgency.   Musculoskeletal: Negative for gait problem and myalgias.   Neurological: Negative for dizziness and headaches.   Hematological: Does not bruise/bleed easily.       Historical Information   Past Medical History:   Diagnosis Date    Cancer (HCC)     prostate    COPD (chronic obstructive pulmonary disease) (HCC)     Hyperlipidemia     Hypertension      Past Surgical History:   Procedure Laterality Date    APPENDECTOMY      FL RETROGRADE PYELOGRAM  2021     FL RETROGRADE PYELOGRAM  11/16/2023    HERNIA REPAIR      IR EMBOLIZATION (SPECIFY VESSEL OR SITE)  9/17/2021    IR NEPHROSTOMY TUBE CHECK AND/OR REMOVAL  7/8/2021    IR NEPHROSTOMY TUBE CHECK/CHANGE/REPOSITION/REINSERTION/UPSIZE  5/10/2021    IR NEPHROSTOMY TUBE CHECK/CHANGE/REPOSITION/REINSERTION/UPSIZE  8/4/2021    IR NEPHROSTOMY TUBE CHECK/CHANGE/REPOSITION/REINSERTION/UPSIZE  10/13/2021    IR NEPHROSTOMY TUBE PLACEMENT  5/7/2021    NM CYSTO W/IRRIG & EVAC MULTPLE OBSTRUCTING CLOTS Bilateral 4/14/2021    Procedure: CYSTOSCOPY EVACUATION OF CLOTS bilateral retrograde pyelograms possible TURBT bladder biopsy;  Surgeon: Yovani Khan MD;  Location: [facility] OR;  Service: Urology    NM CYSTO W/IRRIG & EVAC MULTPLE OBSTRUCTING CLOTS N/A 4/24/2021    Procedure: CYSTOSCOPY EVACUATION OF CLOTS fulguration;  Surgeon: Yovani Khan MD;  Location: [facility];  Service: Urology    NM CYSTO W/IRRIG & EVAC MULTPLE OBSTRUCTING CLOTS N/A 7/8/2021    Procedure: CYSTOSCOPY EVACUATION OF CLOTS BILATERAL ANTIROGRADES NEPHROSTOMY TUBES, FULGERATION ;  Surgeon: Yovani Khan MD;  Location: [facility] OR;  Service: Urology    NM CYSTO W/IRRIG & EVAC MULTPLE OBSTRUCTING CLOTS N/A 8/22/2021    Procedure: CYSTOSCOPY, RIGHT RETROGRADE, EVACUATION OF CLOTS, BLADDER BIOPSY X2 AND FULGERATION OF BLADDER LESIONS, URETEROSCOPY, 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;  Surgeon: Yovani Khan MD;  Location: [facility];  Service: Urology    NM CYSTOURETHROSCOPY WITH BIOPSY N/A 11/16/2023    Procedure: CYSTOSCOPY, BILATERAL RETROGRADEY PYELOGRAM,  FULGERATION 2.0 CM BLADDER TUMOR WITH BIOPSY;  Surgeon: Yovani Khan MD;  Location: [facility];  Service: Urology    PROSTATECTOMY      ROTATOR CUFF REPAIR      right shoulder     Social History   Social History     Substance and Sexual Activity   Alcohol Use Never     Social History     Substance and Sexual Activity   Drug Use No     Social History      Tobacco Use   Smoking Status Some Days    Current packs/day: 0.50    Average packs/day: 0.5 packs/day for 50.0 years (25.0 ttl pk-yrs)    Types: Cigarettes    Passive exposure: Never   Smokeless Tobacco Never     Family History:   Family History   Problem Relation Age of Onset    Diabetes Mother     Stroke Mother     Diabetes Brother     Stroke Brother        Meds/Allergies   (Not in a hospital admission)    No Known Allergies    Objective   Vitals: /77   Pulse 56   Temp 97.9 °F (36.6 °C) (Temporal)   Resp 18   Wt 98.9 kg (218 lb)   SpO2 92%   BMI 30.40 kg/m²     Physical Exam:  General Alert awake   Normocephalic atraumatic PERRLA  Lungs clear bilaterally  Cardiac normal S1 normal S2  Abdomen soft, flank pain  Extremities no edema    I/O last 24 hours:  In: 50 [IV Piggyback:50]  Out: 2400 [Urine:2400]    Invasive Devices       Peripheral Intravenous Line  Duration             Peripheral IV 06/02/23 Left Antecubital 272 days    Peripheral IV 10/17/23 Distal;Right;Upper;Ventral (anterior) Arm 135 days    Peripheral IV 02/29/24 Right Antecubital <1 day              Drain  Duration             Urethral Catheter Three way 18 Fr. <1 day                        Lab Results: CBC:   Lab Results   Component Value Date    WBC 12.85 (H) 02/29/2024    HGB 15.9 02/29/2024    HCT 47.9 02/29/2024    MCV 89 02/29/2024     02/29/2024    RBC 5.39 02/29/2024    MCH 29.5 02/29/2024    MCHC 33.2 02/29/2024    RDW 14.6 02/29/2024    MPV 9.7 02/29/2024    NRBC 0 02/29/2024     CMP:   Lab Results   Component Value Date     02/29/2024    CO2 23 02/29/2024    BUN 35 (H) 02/29/2024    CREATININE 1.39 (H) 02/29/2024    CALCIUM 9.4 02/29/2024    AST 17 06/02/2023    ALT 14 06/02/2023    ALKPHOS 62 06/02/2023    EGFR 47 02/29/2024     Urinalysis:   Lab Results   Component Value Date    COLORU Red 02/29/2024    CLARITYU Cloudy 02/29/2024    SPECGRAV 1.015 02/29/2024    PHUR 7.0 02/29/2024    PHUR 5.5 03/25/2018     "LEUKOCYTESUR Moderate (A) 02/29/2024    NITRITE Positive (A) 02/29/2024    GLUCOSEU Negative 02/29/2024    KETONESU Trace (A) 02/29/2024    BILIRUBINUR Negative 02/29/2024    BLOODU Large (A) 02/29/2024     Urine Culture:   Lab Results   Component Value Date    URINECX >100,000 cfu/ml Klebsiella pneumoniae (A) 10/09/2021    URINECX 30,000-39,000 cfu/ml Pseudomonas aeruginosa (A) 10/09/2021     PSA: No results found for: \"PSA\"        Assessment/ Plan:  Hemorrhagic cystitis as evident on urinalysis  Gross hematuria could also be secondary to radiation cystitis from extensive abdominal straining  Continue CBI today  Start broad-spectrum antibiotics cultures pending  DC CBI in the morning voiding trial to follow      Yovani Khan MD    "

## 2024-02-29 NOTE — ED PROVIDER NOTES
"History  Chief Complaint   Patient presents with    Blood in Urine     States started dribbling urine this morning. Now bladder is full and urine is bloody and has clots. States he needs a alanis and wash out with saline.      Pt is an 79yo M who presents for hematuria and urinary distention.  Patient reports that yesterday he began with dysuria.  He states at 8 AM this morning he noticed hematuria and has not been able to urinate since that time.  Patient reports he did pass clots at that time and is now concerned that he is \"clotted off\".  Patient reports he has history of similar multiple times previously.  Patient states he has previously required bladder irrigation and sometimes admission to the hospital for similar.  Patient reports history significant for prostate cancer and follows with Dr. Khan of urology.  Patient denies any recent procedures.  Patient reports suprapubic abdominal discomfort that is started since he has been unable to urinate but denies any other abdominal or back pain.  Patient denies any fevers or chills.  Patient reports he is otherwise been feeling well.        Prior to Admission Medications   Prescriptions Last Dose Informant Patient Reported? Taking?   albuterol (2.5 mg/3 mL) 0.083 % nebulizer solution   No No   Sig: Take 3 mL (2.5 mg total) by nebulization every 6 (six) hours as needed for wheezing or shortness of breath   albuterol (PROVENTIL HFA,VENTOLIN HFA) 90 mcg/act inhaler   No No   Sig: Inhale 2 puffs every 4 (four) hours as needed for wheezing   fluticasone (FLONASE) 50 mcg/act nasal spray 2024  Yes Yes   Si spray into each nostril daily   fluticasone-umeclidinium-vilanterol (Trelegy Ellipta) 200-62.5-25 mcg/actuation AEPB inhaler 2024  Yes Yes   Sig: Inhale 1 puff daily   metoprolol tartrate (LOPRESSOR) 25 mg tablet 2024  Yes Yes   Sig: Take 25 mg by mouth every 12 (twelve) hours       Facility-Administered Medications: None       Past Medical History: "   Diagnosis Date    Borderline diabetes     Cancer (HCC)     prostate    COPD (chronic obstructive pulmonary disease) (HCC)     Hyperlipidemia     Hypertension        Past Surgical History:   Procedure Laterality Date    APPENDECTOMY      FL RETROGRADE PYELOGRAM  4/14/2021    FL RETROGRADE PYELOGRAM  11/16/2023    HERNIA REPAIR      IR EMBOLIZATION (SPECIFY VESSEL OR SITE)  9/17/2021    IR NEPHROSTOMY TUBE CHECK AND/OR REMOVAL  7/8/2021    IR NEPHROSTOMY TUBE CHECK/CHANGE/REPOSITION/REINSERTION/UPSIZE  5/10/2021    IR NEPHROSTOMY TUBE CHECK/CHANGE/REPOSITION/REINSERTION/UPSIZE  8/4/2021    IR NEPHROSTOMY TUBE CHECK/CHANGE/REPOSITION/REINSERTION/UPSIZE  10/13/2021    IR NEPHROSTOMY TUBE PLACEMENT  5/7/2021    OK CYSTO W/IRRIG & EVAC MULTPLE OBSTRUCTING CLOTS Bilateral 4/14/2021    Procedure: CYSTOSCOPY EVACUATION OF CLOTS bilateral retrograde pyelograms possible TURBT bladder biopsy;  Surgeon: Yovani Khan MD;  Location: [facility];  Service: Urology    OK CYSTO W/IRRIG & EVAC MULTPLE OBSTRUCTING CLOTS N/A 4/24/2021    Procedure: CYSTOSCOPY EVACUATION OF CLOTS fulguration;  Surgeon: Yovani Khan MD;  Location: [facility];  Service: Urology    OK CYSTO W/IRRIG & EVAC MULTPLE OBSTRUCTING CLOTS N/A 7/8/2021    Procedure: CYSTOSCOPY EVACUATION OF CLOTS BILATERAL ANTIROGRADES NEPHROSTOMY TUBES, FULGERATION ;  Surgeon: Yovani Khan MD;  Location: [facility];  Service: Urology    OK CYSTO W/IRRIG & EVAC MULTPLE OBSTRUCTING CLOTS N/A 8/22/2021    Procedure: CYSTOSCOPY, RIGHT RETROGRADE, EVACUATION OF CLOTS, BLADDER BIOPSY X2 AND FULGERATION OF BLADDER LESIONS, URETEROSCOPY, 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    E-Cigarette/Vaping    E-Cigarette Use Former User      E-Cigarette/Vaping Substances    Nicotine No     THC No     CBD No     Flavoring No     Other No     Unknown No      Social History     Tobacco Use    Smoking status: Some Days     Current packs/day: 0.50     Average packs/day: 0.5 packs/day for 50.0 years (25.0 ttl pk-yrs)     Types: Cigarettes     Passive exposure: Never    Smokeless tobacco: Never   Vaping Use    Vaping status: Former   Substance Use Topics    Alcohol use: Never    Drug use: No       Review of Systems   Gastrointestinal:  Positive for abdominal pain (suprapubic).   Genitourinary:  Positive for difficulty urinating, dysuria and hematuria.   All other systems reviewed and are negative.      Physical Exam  Physical Exam  Vitals reviewed.   Constitutional:       Appearance: He is well-developed. He is not toxic-appearing or diaphoretic.      Comments: Uncomfortable appearing   HENT:      Head: Normocephalic and atraumatic.      Right Ear: External ear normal.      Left Ear: External ear normal.      Nose: Nose normal.      Mouth/Throat:      Pharynx: Oropharynx is clear.   Eyes:      Extraocular Movements: Extraocular movements intact.      Pupils: Pupils are equal, round, and reactive to light.   Cardiovascular:      Rate and Rhythm: Normal rate and regular rhythm.      Heart sounds: Normal heart sounds.   Pulmonary:      Effort: Pulmonary effort is normal. No respiratory distress.      Breath sounds: Normal breath sounds.   Abdominal:      General: There is no distension.      Palpations: Abdomen is soft.      Tenderness: There is abdominal tenderness (suprapubic).   Genitourinary:     Comments: Blood  noted at the tip of the penis  Musculoskeletal:         General: Normal range of motion.      Cervical back: Normal range of motion and neck supple.   Skin:     General: Skin is warm and dry.      Capillary Refill: Capillary refill takes less than 2 seconds.   Neurological:      General: No focal deficit present.      Mental Status: He is alert and oriented to person, place, and time.   Psychiatric:         Speech: Speech normal.         Behavior: Behavior is cooperative.         Vital Signs  ED Triage Vitals [02/29/24 1430]   Temperature Pulse Respirations Blood Pressure SpO2   97.9 °F (36.6 °C) 71 18 (!) 205/95 93 %      Temp Source Heart Rate Source Patient Position - Orthostatic VS BP Location FiO2 (%)   Temporal Monitor Sitting Left arm --      Pain Score       10 - Worst Possible Pain           Vitals:    02/29/24 1531 02/29/24 1745 02/29/24 1900 02/29/24 1934   BP: 163/81 169/77 (!) 182/86 157/77   Pulse: 62 56 62 92   Patient Position - Orthostatic VS:             Visual Acuity      ED Medications  Medications   albuterol inhalation solution 2.5 mg (2.5 mg Nebulization Given 2/29/24 2049)   fluticasone (FLONASE) 50 mcg/act nasal spray 1 spray (has no administration in time range)   fluticasone-vilanterol 200-25 mcg/actuation 1 puff (has no administration in time range)     And   umeclidinium 62.5 mcg/actuation inhaler AEPB 1 puff (has no administration in time range)   metoprolol tartrate (LOPRESSOR) tablet 25 mg (has no administration in time range)   acetaminophen (TYLENOL) tablet 650 mg (has no administration in time range)   nicotine (NICODERM CQ) 14 mg/24hr TD 24 hr patch 1 patch (has no administration in time range)   sodium chloride 0.9 % infusion (has no administration in time range)   cefepime (MAXIPIME) IVPB (premix in dextrose) 1,000 mg 50 mL (has no administration in time range)   guaiFENesin (MUCINEX) 12 hr tablet 600 mg (has no administration in time range)   saccharomyces boulardii (FLORASTOR)  capsule 250 mg (has no administration in time range)   predniSONE tablet 40 mg (has no administration in time range)   famotidine (PEPCID) tablet 20 mg (has no administration in time range)   ondansetron (ZOFRAN) injection 4 mg (has no administration in time range)   lidocaine (URO-JET) 2 % urethral/mucosal gel 1 Application (1 Application Urethral Given 2/29/24 1534)   cefepime (MAXIPIME) IVPB (premix in dextrose) 2,000 mg 50 mL (0 mg Intravenous Stopped 2/29/24 1619)       Diagnostic Studies  Results Reviewed       Procedure Component Value Units Date/Time    Basic metabolic panel [[id number]]  (Abnormal) Collected: 02/29/24 1517    Lab Status: Final result Specimen: Blood from Arm, Right Updated: 02/29/24 1604     Sodium 133 mmol/L      Potassium 4.8 mmol/L      Chloride 102 mmol/L      CO2 23 mmol/L      ANION GAP 8 mmol/L      BUN 35 mg/dL      Creatinine 1.39 mg/dL      Glucose 129 mg/dL      Calcium 9.4 mg/dL      eGFR 47 ml/min/1.73sq m     Narrative:      National Kid[employer] guidelines for Chronic Kidney Disease (CKD):     Stage 1 with normal or high GFR (GFR > 90 mL/min/1.73 square meters)    Stage 2 Mild CKD (GFR = 60-89 mL/min/1.73 square meters)    Stage 3A Moderate CKD (GFR = 45-59 mL/min/1.73 square meters)    Stage 3B Moderate CKD (GFR = 30-44 mL/min/1.73 square meters)    Stage 4 Severe CKD (GFR = 15-29 mL/min/1.73 square meters)    Stage 5 End Stage CKD (GFR <15 mL/min/1.73 square meters)  Note: GFR calculation is accurate only with a steady state creatinine    Urine Microscopic [[id number]]  (Abnormal) Collected: 02/29/24 1514    Lab Status: Final result Specimen: Urine, Indwelling Sumner Catheter Updated: 02/29/24 1601     RBC, UA Innumerable /hpf      WBC, UA       Field obscured, unable to enumerate     /hpf     Epithelial Cells       Field obscured, unable to enumerate     /hpf     Bacteria, UA       Field obscured, unable to enumerate     /hpf    UA (URINE) with reflex to Scope  [[id number]]  (Abnormal) Collected: 02/29/24 1514    Lab Status: Final result Specimen: Urine, Indwelling Sumner Catheter Updated: 02/29/24 1531     Color, UA Red     Clarity, UA Cloudy     Specific Gravity, UA 1.015     pH, UA 7.0     Leukocytes, UA Moderate     Nitrite, UA Positive     Protein, UA >=300 mg/dl      Glucose, UA Negative mg/dl      Ketones, UA Trace mg/dl      Urobilinogen, UA 1.0 E.U./dl      Bilirubin, UA Negative     Occult Blood, UA Large    CBC and differential [[id number]]  (Abnormal) Collected: 02/29/24 1517    Lab Status: Final result Specimen: Blood from Arm, Right Updated: 02/29/24 1527     WBC 12.85 Thousand/uL      RBC 5.39 Million/uL      Hemoglobin 15.9 g/dL      Hematocrit 47.9 %      MCV 89 fL      MCH 29.5 pg      MCHC 33.2 g/dL      RDW 14.6 %      MPV 9.7 fL      Platelets 163 Thousands/uL      nRBC 0 /100 WBCs      Neutrophils Relative 81 %      Immat GRANS % 1 %      Lymphocytes Relative 12 %      Monocytes Relative 6 %      Eosinophils Relative 0 %      Basophils Relative 0 %      Neutrophils Absolute 10.35 Thousands/µL      Immature Grans Absolute 0.08 Thousand/uL      Lymphocytes Absolute 1.59 Thousands/µL      Monocytes Absolute 0.79 Thousand/µL      Eosinophils Absolute 0.02 Thousand/µL      Basophils Absolute 0.02 Thousands/µL     Urine culture [[id number]] Collected: 02/29/24 1515    Lab Status: In process Specimen: Urine, Indwelling Sumner Catheter Updated: 02/29/24 1526                   No orders to display              Procedures  Procedures         ED Course  ED Course as of 02/29/24 2118   Thu Feb 29, 2024   1528 WBC(!): 12.85  Mildly elevated. Non-diagnostic. Awaiting further w/u.    1528 Hemoglobin: 15.9  WNL   1528 CBC and differential(!)  Reviewed and without actionable derangement.    1531 Leukocytes, UA(!): Moderate   1531 Nitrite, UA(!): Positive  Concerning for infection. Will initiate abx based on previous culture. Last admission showed Cefepime followed by  keflex upon DC with appropriate response.    1531 Blood, UA(!): Large   1531 Blood Pressure: 163/81  Interval improvement. Likely initially high 2/2 pain.    1604 Creatinine(!): 1.39  Largely stable from most recent prior.    1604 WBC, UA(!): Field obscured, unable to enumerate   1604 Bacteria, UA(!): Field obscured, unable to enumerate  Culture in progress.    1616 Reassessed pt who is resting comfortably. Reports he is feeling significantly better. CBI in progress and urine still blood-tinged but more clear than initially.    1730 Creatinine and hgb stable.    1800 Antario recommending admission. Will make medicine aware.                                SBIRT 20yo+      Flowsheet Row Most Recent Value   Initial Alcohol Screen: US AUDIT-C     1. How often do you have a drink containing alcohol? 0 Filed at: 02/29/2024 1432   Audit-C Score 0 Filed at: 02/29/2024 1432   NATALI: How many times in the past year have you...    Used an illegal drug or used a prescription medication for non-medical reasons? Never Filed at: 02/29/2024 1432                      Medical Decision Making  Pt is a 77yo M who presents with hematuria and urinary retention. Exam pertinent for suprapubic fullness and tenderness.    Differential diagnosis to include but not limited to hemorrhagic cystitis, anemia, SHANTI, retention.  Will plan for Sumner placement and bladder irrigation.  Will also send urinalysis as well as urine culture and obtain basic blood work.  See ED course for results and details.    Plan to admit pt to [facility]. Pt discussed with admitting team and admission orders placed. Pt admitted without incident.       Amount and/or Complexity of Data Reviewed  Labs: ordered. Decision-making details documented in ED Course.    Risk  Prescription drug management.  Decision regarding hospitalization.             Disposition  Final diagnoses:   Hematuria   Cystitis     Time reflects when diagnosis was documented in both MDM as applicable and the  Disposition within this note       Time User Action Codes Description Comment    2/29/2024  6:03 PM Nora Sena [R31.9] Hematuria     2/29/2024  6:03 PM Nora Sena [N30.90] Cystitis           ED Disposition       ED Disposition   Admit    Condition   Stable    Date/Time   Thu Feb 29, 2024 1821    Comment   Case was discussed with [facility] and the patient's admission status was agreed to be Admission Status: inpatient status to the service of Dr. Jones.               Follow-up Information    None         Current Discharge Medication List        CONTINUE these medications which have NOT CHANGED    Details   fluticasone (FLONASE) 50 mcg/act nasal spray 1 spray into each nostril daily      fluticasone-umeclidinium-vilanterol (Trelegy Ellipta) 200-62.5-25 mcg/actuation AEPB inhaler Inhale 1 puff daily      metoprolol tartrate (LOPRESSOR) 25 mg tablet Take 25 mg by mouth every 12 (twelve) hours       albuterol (2.5 mg/3 mL) 0.083 % nebulizer solution Take 3 mL (2.5 mg total) by nebulization every 6 (six) hours as needed for wheezing or shortness of breath  Qty: 75 mL, Refills: 0    Associated Diagnoses: Acute bronchitis      albuterol (PROVENTIL HFA,VENTOLIN HFA) 90 mcg/act inhaler Inhale 2 puffs every 4 (four) hours as needed for wheezing  Qty: 1 Inhaler, Refills: 0             No discharge procedures on file.    PDMP Review         Value Time User    PDMP Reviewed  Yes 7/13/2021 10:25 AM CORWIN Esquivel            ED Provider  Electronically Signed by             Nora Sena MD  02/29/24 0675

## 2024-03-01 LAB
ANION GAP SERPL CALCULATED.3IONS-SCNC: 7 MMOL/L
BUN SERPL-MCNC: 36 MG/DL (ref 5–25)
CALCIUM SERPL-MCNC: 8.8 MG/DL (ref 8.4–10.2)
CHLORIDE SERPL-SCNC: 104 MMOL/L (ref 96–108)
CO2 SERPL-SCNC: 25 MMOL/L (ref 21–32)
CREAT SERPL-MCNC: 1.28 MG/DL (ref 0.6–1.3)
ERYTHROCYTE [DISTWIDTH] IN BLOOD BY AUTOMATED COUNT: 14.7 % (ref 11.6–15.1)
GFR SERPL CREATININE-BSD FRML MDRD: 52 ML/MIN/1.73SQ M
GLUCOSE SERPL-MCNC: 102 MG/DL (ref 65–140)
HCT VFR BLD AUTO: 46.3 % (ref 36.5–49.3)
HGB BLD-MCNC: 14.9 G/DL (ref 12–17)
MAGNESIUM SERPL-MCNC: 2 MG/DL (ref 1.9–2.7)
MCH RBC QN AUTO: 28.8 PG (ref 26.8–34.3)
MCHC RBC AUTO-ENTMCNC: 32.2 G/DL (ref 31.4–37.4)
MCV RBC AUTO: 90 FL (ref 82–98)
PLATELET # BLD AUTO: 174 THOUSANDS/UL (ref 149–390)
PMV BLD AUTO: 9.9 FL (ref 8.9–12.7)
POTASSIUM SERPL-SCNC: 4.4 MMOL/L (ref 3.5–5.3)
PROCALCITONIN SERPL-MCNC: 0.06 NG/ML
RBC # BLD AUTO: 5.17 MILLION/UL (ref 3.88–5.62)
SODIUM SERPL-SCNC: 136 MMOL/L (ref 135–147)
WBC # BLD AUTO: 12.89 THOUSAND/UL (ref 4.31–10.16)

## 2024-03-01 PROCEDURE — 99232 SBSQ HOSP IP/OBS MODERATE 35: CPT | Performed by: FAMILY MEDICINE

## 2024-03-01 PROCEDURE — 85027 COMPLETE CBC AUTOMATED: CPT | Performed by: NURSE PRACTITIONER

## 2024-03-01 PROCEDURE — 83735 ASSAY OF MAGNESIUM: CPT | Performed by: NURSE PRACTITIONER

## 2024-03-01 PROCEDURE — 84145 PROCALCITONIN (PCT): CPT | Performed by: NURSE PRACTITIONER

## 2024-03-01 PROCEDURE — 80048 BASIC METABOLIC PNL TOTAL CA: CPT | Performed by: NURSE PRACTITIONER

## 2024-03-01 PROCEDURE — 97165 OT EVAL LOW COMPLEX 30 MIN: CPT

## 2024-03-01 RX ORDER — OXYBUTYNIN CHLORIDE 5 MG/1
5 TABLET ORAL ONCE
Status: COMPLETED | OUTPATIENT
Start: 2024-03-01 | End: 2024-03-01

## 2024-03-01 RX ADMIN — FLUTICASONE PROPIONATE 1 SPRAY: 50 SPRAY, METERED NASAL at 08:16

## 2024-03-01 RX ADMIN — Medication 250 MG: at 08:17

## 2024-03-01 RX ADMIN — PREDNISONE 40 MG: 20 TABLET ORAL at 08:16

## 2024-03-01 RX ADMIN — NICOTINE 1 PATCH: 14 PATCH, EXTENDED RELEASE TRANSDERMAL at 08:27

## 2024-03-01 RX ADMIN — CEFEPIME HYDROCHLORIDE 1000 MG: 1 INJECTION, SOLUTION INTRAVENOUS at 15:14

## 2024-03-01 RX ADMIN — UMECLIDINIUM 1 PUFF: 62.5 AEROSOL, POWDER ORAL at 08:16

## 2024-03-01 RX ADMIN — FLUTICASONE FUROATE AND VILANTEROL TRIFENATATE 1 PUFF: 200; 25 POWDER RESPIRATORY (INHALATION) at 08:16

## 2024-03-01 RX ADMIN — METOPROLOL TARTRATE 25 MG: 25 TABLET, FILM COATED ORAL at 22:04

## 2024-03-01 RX ADMIN — Medication 250 MG: at 17:48

## 2024-03-01 RX ADMIN — FAMOTIDINE 20 MG: 20 TABLET, FILM COATED ORAL at 22:04

## 2024-03-01 RX ADMIN — CEFEPIME HYDROCHLORIDE 1000 MG: 1 INJECTION, SOLUTION INTRAVENOUS at 03:25

## 2024-03-01 RX ADMIN — OXYBUTYNIN CHLORIDE 5 MG: 5 TABLET ORAL at 18:47

## 2024-03-01 RX ADMIN — METOPROLOL TARTRATE 25 MG: 25 TABLET, FILM COATED ORAL at 08:17

## 2024-03-01 NOTE — ASSESSMENT & PLAN NOTE
Lab Results   Component Value Date    EGFR 52 03/01/2024    EGFR 47 02/29/2024    EGFR 51 10/17/2023    CREATININE 1.28 03/01/2024    CREATININE 1.39 (H) 02/29/2024    CREATININE 1.30 10/17/2023   Baseline creatinine appears to be around 1.3's  Creatinine stable  Monitor

## 2024-03-01 NOTE — ASSESSMENT & PLAN NOTE
MEDICARE WELLNESS VISIT NOTE      HISTORY OF PRESENT ILLNESS:   Jd Linn presents for his First Annual Medicare Wellness Visit.   He has complaints or concerns which include SEE H&P.      Patient Care Team:  Tony Miguel MD as PCP - General (Internal Medicine)        Patient Active Problem List    Diagnosis Date Noted   • Pure hypercholesterolemia 05/16/2016     Priority: Low   • Raynaud's phenomenon 05/11/2015     Priority: Low         Past Medical History:   Diagnosis Date   • Allergy          Past Surgical History:   Procedure Laterality Date   • COLONOSCOPY  2013    F/U 2023   • HEMOCULT X1  02/07/2012   • REMOVE TONSILS/ADENOIDS,<13 Y/O      T & A         Social History   Substance Use Topics   • Smoking status: Never Smoker   • Smokeless tobacco: Never Used   • Alcohol use 0.5 oz/week     1 drink(s) per week     History   Drug Use Not on file     Family History - Reviewed    Current Outpatient Prescriptions   Medication Sig Dispense Refill   • aspirin 81 MG tablet Take 81 mg by mouth daily.       No current facility-administered medications for this visit.         Medicare Health Risk Assessment in electronic health record reviewed.  The following items were identified and addressed:    Need for increased physical activity  Vision and hearing screens documented:    Advanced Directive: Power of  for healthcare is on file  Cognitive Assessment: no evidence of cognitive dysfunction by direct observation    Needed Screening/Treatment:   none  Needed follow up:  None    See orders   See Patient Instructions section  No Follow-up on file.   Status post prostatectomy, radiation

## 2024-03-01 NOTE — ASSESSMENT & PLAN NOTE
Lab Results   Component Value Date    EGFR 47 02/29/2024    EGFR 51 10/17/2023    EGFR 51 06/02/2023    CREATININE 1.39 (H) 02/29/2024    CREATININE 1.30 10/17/2023    CREATININE 1.31 (H) 06/02/2023   Baseline creatinine appears to be around 1.3's  Creatinine stable  Monitor

## 2024-03-01 NOTE — PHYSICAL THERAPY NOTE
PT cancellation       03/01/24 1513   Note Type   Note type Cancelled Session   Cancel Reasons Refusal        Attempted PT evaluation however pt reports he gets spasms from the catheter when he is up and walking.  Pt encouraged to ambulate with nursing staff as able.  Pt was up with OT earlier this afternoon and did well.  Anticipate pt will have no DC needs but will follow up to ensure pt remains independent with functional mobility.   Radha Guerrero PT  [id number]

## 2024-03-01 NOTE — PROGRESS NOTES
Critical access hospital  Progress Note  Name: Joel Wyman I  MRN: [id number]  Unit/Bed#: 2 [address] Date of Admission: 2/29/2024   Date of Service: 3/1/2024 I Hospital Day: 1    Assessment/Plan   Nicotine abuse  Assessment & Plan  Nicotine patch, smoking cessation    Obesity (BMI 30.0-34.9)  Assessment & Plan  Body mass index is 30.4 kg/m².   Diet and lifestyle modification     Chronic kidney disease  Assessment & Plan  Lab Results   Component Value Date    EGFR 52 03/01/2024    EGFR 47 02/29/2024    EGFR 51 10/17/2023    CREATININE 1.28 03/01/2024    CREATININE 1.39 (H) 02/29/2024    CREATININE 1.30 10/17/2023   Baseline creatinine appears to be around 1.3's  Creatinine stable  Monitor    Prostate cancer (HCC)  Assessment & Plan  Status post prostatectomy, radiation    COPD (chronic obstructive pulmonary disease) ([facility])  Assessment & Plan  Patient reports having cold symptoms for about 6 days.  Reports chronic productive cough that has not changed, reports clogged nose.  Reports chills at home.  Saw PCP yesterday, prescribed Medrol pack/doxycycline for 7 days/Flonase/albuterol nebulizer as needed.  On Trelegy inhaler, Ventolin inhaler at home at baseline.  Mild scattered wheezing on auscultation.  Patient on room air, satting low 90s.  Will continue Flonase  Will order prednisone 40mg p.o. x 2 doses.  Hold doxycycline as patient is on cefepime.  Start Mucinex  Continue Trelegy    3/1 --> Patient with evidence of mild COPD exacerbation on admission. No evidence of wheezing today. Patient was prescribed for 2 doses of prednisone 40mg on admission which will be continued. Continue to monitor respiratory status.    Dyslipidemia  Assessment & Plan  Diet controlled    Essential hypertension  Assessment & Plan  Continue metoprolol  BP labile    * Hemorrhagic cystitis  Assessment & Plan  Patient presents with hematuria with dysuria started this morning with subsequent inability to urinate with suprapubic  "discomfort.  Reports history of prostate cancer and history of radiation cystitis and hematuria in the past.  Three-way [place] placed in ED. CBI started.  UA consistent with UTI   WBC 12.85, no bands, patient afebrile.  Likely due to steroid.  See below.  Hemoglobin 15.9.  Patient started on cefepime in ED. Prior urine culture in 2021 grew high colony Klebsiella pneumoniae and low colony Pseudomonas aeruginosa.    Will continue cefepime while pending urine culture.  Patient seen by urology in ED.  Appreciate input.  Check procalcitonin  Repeat CBC with differential in AM.    3/1 --> Patient admitted with evidence of hematuria which may be related to radiation cystitis and hemorrhagic cystitis. Urology is following. CBI management per urology. Continue with IV Cefepime for now. Follow up urine culture and sensitivity.             VTE Pharmacologic Prophylaxis:   SCD's.    Mobility:   Basic Mobility Inpatient Raw Score: 23  -[facility] Goal: 7: Walk 25 feet or more  JH-HLM Achieved: 5: Stand (1 or more minutes)    Patient Centered Rounds: I performed bedside rounds with nursing staff today.     Total Time Spent on Date of Encounter in care of patient: 30 mins. This time was spent on one or more of the following: performing physical exam; counseling and coordination of care; obtaining or reviewing history; documenting in the medical record; reviewing/ordering tests, medications or procedures; communicating with other healthcare professionals and discussing with patient's family/caregivers.    Current Length of Stay: 1 day(s)  Current Patient Status: Inpatient   Certification Statement: The patient will continue to require additional inpatient hospital stay due to hematuria, uti  Discharge Plan: Anticipate discharge in 24-48 hrs to home.    Code Status: Level 1 - Full Code    Subjective:   Patient seen and examined at bedside. No acute events overnight. No new complaints. Reports feeling \"better\" today.     Objective: "     Vitals:   Temp (24hrs), Av.7 °F (36.5 °C), Min:97.4 °F (36.3 °C), Max:98 °F (36.7 °C)    Temp:  [97.4 °F (36.3 °C)-98 °F (36.7 °C)] 97.9 °F (36.6 °C)  HR:  [51-92] 53  Resp:  [16-20] 18  BP: (139-205)/(75-95) 155/80  SpO2:  [92 %-94 %] 92 %  Body mass index is 30.4 kg/m².     Input and Output Summary (last 24 hours):     Intake/Output Summary (Last 24 hours) at 3/1/2024 1410  Last data filed at 2024 1858  Gross per 24 hour   Intake 50 ml   Output 3200 ml   Net -3150 ml     Physical Exam:   Physical Exam  HENT:      Head: Normocephalic.      Mouth/Throat:      Mouth: Mucous membranes are moist.   Eyes:      Extraocular Movements: Extraocular movements intact.   Cardiovascular:      Rate and Rhythm: Normal rate.   Pulmonary:      Effort: Pulmonary effort is normal. No respiratory distress.      Comments: No wheezing  Abdominal:      Palpations: Abdomen is soft.      Tenderness: There is no abdominal tenderness.   Genitourinary:     Comments: + alanis with CBI with pink tinged urine  Skin:     General: Skin is warm.   Neurological:      Mental Status: He is alert.   Psychiatric:         Mood and Affect: Mood normal.       Additional Data:     Labs:  Results from last 7 days   Lab Units 24  0505 24  1517   WBC Thousand/uL 12.89* 12.85*   HEMOGLOBIN g/dL 14.9 15.9   HEMATOCRIT % 46.3 47.9   PLATELETS Thousands/uL 174 163   NEUTROS PCT %  --  81*   LYMPHS PCT %  --  12*   MONOS PCT %  --  6   EOS PCT %  --  0     Results from last 7 days   Lab Units 24  0505   SODIUM mmol/L 136   POTASSIUM mmol/L 4.4   CHLORIDE mmol/L 104   CO2 mmol/L 25   BUN mg/dL 36*   CREATININE mg/dL 1.28   ANION GAP mmol/L 7   CALCIUM mg/dL 8.8   GLUCOSE RANDOM mg/dL 102     Results from last 7 days   Lab Units 24  0506 02/29/24  1517   PROCALCITONIN ng/ml 0.06 0.05     Lines/Drains:  Invasive Devices       Peripheral Intravenous Line  Duration             Peripheral IV 24 Right Antecubital <1 day               Drain  Duration             Urethral Catheter Three way 18 Fr. <1 day                       Recent Cultures (last 7 days):     Last 24 Hours Medication List:   Current Facility-Administered Medications   Medication Dose Route Frequency Provider Last Rate    acetaminophen  650 mg Oral Q6H PRN CORWIN Braden      albuterol  2.5 mg Nebulization Q6H PRN Damaso Azar, CORWIN      cefepime  1,000 mg Intravenous Q12H CORWIN Braden 1,000 mg (03/01/24 0325)    famotidine  20 mg Oral HS Cuiphillip Azar, CORWIN      fluticasone  1 spray Nasal Daily CORWIN Braden      fluticasone-vilanterol  1 puff Inhalation Daily CORWIN Braden      And    umeclidinium  1 puff Inhalation Daily Damaso Azar, CORWIN      guaiFENesin  600 mg Oral Q12H DHAVAL Damaso Azar, CORWIN      metoprolol tartrate  25 mg Oral Q12H DHAVAL CORWIN Braden      nicotine  1 patch Transdermal Daily CORWIN Braden      ondansetron  4 mg Intravenous Q6H PRN CORWIN Braden      predniSONE  40 mg Oral Daily CORWIN Braden      saccharomyces boulardii  250 mg Oral BID CORWIN Braden       Today, Patient Was Seen By: Cesar Blanco MD    **Please Note: This note may have been constructed using a voice recognition system.**

## 2024-03-01 NOTE — PHYSICAL THERAPY NOTE
PT cancellation     03/01/24 1038   Note Type   Note type Cancelled Session   Cancel Reasons Other  (Pt declined PT this morning but agreed to work with PT after lunch.  Will follow.)     Radha Guerrero PT  [id number]

## 2024-03-01 NOTE — PLAN OF CARE
Problem: RESPIRATORY - ADULT  Goal: Achieves optimal ventilation and oxygenation  Description: INTERVENTIONS:  - Assess for changes in respiratory status  - Assess for changes in mentation and behavior  - Position to facilitate oxygenation and minimize respiratory effort  - Oxygen administered by appropriate delivery if ordered  - Initiate smoking cessation education as indicated  - Encourage broncho-pulmonary hygiene including cough, deep breathe, Incentive Spirometry  - Assess the need for suctioning and aspirate as needed  - Assess and instruct to report SOB or any respiratory difficulty  - Respiratory Therapy support as indicated  Outcome: Progressing     Problem: PAIN - ADULT  Goal: Verbalizes/displays adequate comfort level or baseline comfort level  Description: Interventions:  - Encourage patient to monitor pain and request assistance  - Assess pain using appropriate pain scale  - Administer analgesics based on type and severity of pain and evaluate response  - Implement non-pharmacological measures as appropriate and evaluate response  - Consider cultural and social influences on pain and pain management  - Notify physician/advanced practitioner if interventions unsuccessful or patient reports new pain  Outcome: Progressing     Problem: INFECTION - ADULT  Goal: Absence or prevention of progression during hospitalization  Description: INTERVENTIONS:  - Assess and monitor for signs and symptoms of infection  - Monitor lab/diagnostic results  - Monitor all insertion sites, i.e. indwelling lines, tubes, and drains  - Monitor endotracheal if appropriate and nasal secretions for changes in amount and color  - [place] appropriate cooling/warming therapies per order  - Administer medications as ordered  - Instruct and encourage patient and family to use good hand hygiene technique  - Identify and instruct in appropriate isolation precautions for identified infection/condition  Outcome: Progressing     Problem:  GENITOURINARY - ADULT  Goal: Maintains or returns to baseline urinary function  Description: INTERVENTIONS:  - Assess urinary function  - Encourage oral fluids to ensure adequate hydration if ordered  - Administer IV fluids as ordered to ensure adequate hydration  - Administer ordered medications as needed  - Offer frequent toileting  - Follow urinary retention protocol if ordered  Outcome: Progressing  Goal: Absence of urinary retention  Description: INTERVENTIONS:  - Assess patient’s ability to void and empty bladder  - Monitor I/O  - Bladder scan as needed  - Discuss with physician/AP medications to alleviate retention as needed  - Discuss catheterization for long term situations as appropriate  Outcome: Progressing  Goal: Urinary catheter remains patent  Description: INTERVENTIONS:  - Assess patency of urinary catheter  - If patient has a chronic alanis, consider changing catheter if non-functioning  - Follow guidelines for intermittent irrigation of non-functioning urinary catheter  Outcome: Progressing

## 2024-03-01 NOTE — OCCUPATIONAL THERAPY NOTE
"  Occupational Therapy Evaluation       03/01/24 1308   OT Last Visit   OT Visit Date 03/01/24   Note Type   Note type Evaluation   Pain Assessment   Pain Assessment Tool 0-10   Pain Score No Pain   Home Living   Type of Home Apartment   Home Layout One level;Performs ADLs on one level   Bathroom Shower/Tub Tub/shower unit   Bathroom Toilet Standard   Bathroom Equipment Grab bars in shower;Toilet raiser   Home Equipment Walker;Cane   Prior Function   Level of [place] Independent with ADLs;Independent with functional mobility;Independent with IADLS   Lives With Spouse   Receives Help From Family   IADLs Independent with driving;Independent with medication management   Vocational Retired   General   Additional Pertinent History Pt admitted with hematuria with dysuria   Subjective   Subjective \"Once this thing comes out I'll be much better.\"   ADL   Eating Assistance 7  Independent   Grooming Assistance 7  Independent   UB Bathing Assistance 7  Independent   LB Bathing Assistance 7  Independent   UB Dressing Assistance 7  Independent   LB Dressing Assistance 7  Independent   Toileting Assistance  7  Independent   Bed Mobility   Supine to Sit 7  Independent   Sit to Supine 7  Independent   Transfers   Sit to Stand 7  Independent   Stand to Sit 7  Independent   Toilet transfer 7  Independent   Functional Mobility   Functional Mobility 7  Independent   Additional Comments bed<>bathroom no AD   Additional items   (no device)   Balance   Static Sitting Good   Dynamic Sitting Good   Static Standing Good   Dynamic Standing Fair +   Activity Tolerance   Activity Tolerance Patient tolerated treatment well   RUE Assessment   RUE Assessment WFL   LUE Assessment   LUE Assessment WFL   Vision-Basic Assessment   Current Vision Wears glasses all the time   Cognition   Overall Cognitive Status WFL   Arousal/Participation Alert;Cooperative   Attention Within functional limits   Orientation Level Oriented X4   Following Commands " Follows all commands and directions without difficulty   Assessment   Prognosis Good   Assessment Patient evaluated by Occupational Therapy.  Patient admitted with Hemorrhagic cystitis.  The patients occupational profile, medical and therapy history includes a extensive additional review of physical, cognitive, or psychosocial history related to current functional performance.  Comorbidities affecting functional mobility and ADLS include: prostate Ca, radiation cystitis, HTN, COPD, HLD, nicotine abuse, borderline DM.  Prior to admission, patient was independent with functional mobility without assistive device, independent with ADLS, and independent with IADLS.  The evaluation identifies the following performance deficits: none, that result in activity limitations and/or participation restrictions. This evaluation requires clinical decision making of low complexity, because the patients presents with no comorbidities that affect occupational performance and required no modification of tasks or assistance with consideration of a limited number of treatment options.  The Barthel Index was used as a functional outcome tool presenting with a score of Barthel Index Score: 90, indicating minimal limitations of functional mobility and ADLS.  The patient's raw score on the AM-PAC Daily Activity Inpatient Short Form is 24. A raw score of greater than or equal to 19 suggests the patient may benefit from discharge to home. Please refer to the recommendation of the Occupational Therapist for safe discharge planning.  Pt is functioning at baseline level of independence with ADLs and has no need for acute OT services at this time. Will d/c OT. Please re-consult if acute status changes.   Goals   Patient Goals to get better and go home   Discharge Recommendation   Rehab Resource Intensity Level, OT No post-acute rehabilitation needs   AM-PAC Daily Activity Inpatient   Lower Body Dressing 4   Bathing 4   Toileting 4   Upper Body  Dressing 4   Grooming 4   Eating 4   Daily Activity Raw Score 24   Daily Activity Standardized Score (Calc for Raw Score >=11) 57.54   AM-PAC Applied Cognition Inpatient   Following a Speech/Presentation 4   Understanding Ordinary Conversation 4   Taking Medications 4   Remembering Where Things Are Placed or Put Away 4   Remembering List of 4-5 Errands 4   Taking Care of Complicated Tasks 4   Applied Cognition Raw Score 24   Applied Cognition Standardized Score 62.21   Barthel Index   Feeding 10   Bathing 5   Grooming Score 5   Dressing Score 10   Bladder Score 0   Bowels Score 10   Toilet Use Score 10   Transfers (Bed/Chair) Score 15   Mobility (Level Surface) Score 15   Stairs Score 10   Barthel Index Score 90   Licensure   [place] License Number  Radha Julio OTR/L [id number]

## 2024-03-01 NOTE — ASSESSMENT & PLAN NOTE
Patient reports having cold symptoms for about 6 days.  Reports chronic productive cough that has not changed, reports clogged nose.  Reports chills at home.  Saw PCP yesterday, prescribed Medrol pack/doxycycline for 7 days/Flonase/albuterol nebulizer as needed.  On Trelegy inhaler, Ventolin inhaler at home at baseline.  Mild scattered wheezing on auscultation.  Patient on room air, satting low 90s.  Will continue Flonase  Will order prednisone 40mg p.o. x 2 doses.  Hold doxycycline as patient is on cefepime.  Start Mucinex  Continue Trelegy    3/1 --> Patient with evidence of mild COPD exacerbation on admission. No evidence of wheezing today. Patient was prescribed for 2 doses of prednisone 40mg on admission which will be continued. Continue to monitor respiratory status.

## 2024-03-01 NOTE — PLAN OF CARE
Problem: RESPIRATORY - ADULT  Goal: Achieves optimal ventilation and oxygenation  Description: INTERVENTIONS:  - Assess for changes in respiratory status  - Assess for changes in mentation and behavior  - Position to facilitate oxygenation and minimize respiratory effort  - Oxygen administered by appropriate delivery if ordered  - Initiate smoking cessation education as indicated  - Encourage broncho-pulmonary hygiene including cough, deep breathe, Incentive Spirometry  - Assess the need for suctioning and aspirate as needed  - Assess and instruct to report SOB or any respiratory difficulty  - Respiratory Therapy support as indicated  Outcome: Progressing     Problem: GENITOURINARY - ADULT  Goal: Maintains or returns to baseline urinary function  Description: INTERVENTIONS:  - Assess urinary function  - Encourage oral fluids to ensure adequate hydration if ordered  - Administer IV fluids as ordered to ensure adequate hydration  - Administer ordered medications as needed  - Offer frequent toileting  - Follow urinary retention protocol if ordered  Outcome: Progressing  Goal: Absence of urinary retention  Description: INTERVENTIONS:  - Assess patient’s ability to void and empty bladder  - Monitor I/O  - Bladder scan as needed  - Discuss with physician/AP medications to alleviate retention as needed  - Discuss catheterization for long term situations as appropriate  Outcome: Progressing  Goal: Urinary catheter remains patent  Description: INTERVENTIONS:  - Assess patency of urinary catheter  - If patient has a chronic alanis, consider changing catheter if non-functioning  - Follow guidelines for intermittent irrigation of non-functioning urinary catheter  Outcome: Progressing     Problem: PAIN - ADULT  Goal: Verbalizes/displays adequate comfort level or baseline comfort level  Description: Interventions:  - Encourage patient to monitor pain and request assistance  - Assess pain using appropriate pain scale  -  Administer analgesics based on type and severity of pain and evaluate response  - Implement non-pharmacological measures as appropriate and evaluate response  - Consider cultural and social influences on pain and pain management  - Notify physician/advanced practitioner if interventions unsuccessful or patient reports new pain  Outcome: Progressing     Problem: INFECTION - ADULT  Goal: Absence or prevention of progression during hospitalization  Description: INTERVENTIONS:  - Assess and monitor for signs and symptoms of infection  - Monitor lab/diagnostic results  - Monitor all insertion sites, i.e. indwelling lines, tubes, and drains  - Monitor endotracheal if appropriate and nasal secretions for changes in amount and color  - [place] appropriate cooling/warming therapies per order  - Administer medications as ordered  - Instruct and encourage patient and family to use good hand hygiene technique  - Identify and instruct in appropriate isolation precautions for identified infection/condition  Outcome: Progressing

## 2024-03-01 NOTE — ASSESSMENT & PLAN NOTE
Patient presents with hematuria with dysuria started this morning with subsequent inability to urinate with suprapubic discomfort.  Reports history of prostate cancer and history of radiation cystitis and hematuria in the past.  Three-way [place] placed in ED. CBI started.  UA consistent with UTI   WBC 12.85, no bands, patient afebrile.  Likely due to steroid.  See below.  Hemoglobin 15.9.  Patient started on cefepime in ED. Prior urine culture in 2021 grew high colony Klebsiella pneumoniae and low colony Pseudomonas aeruginosa.    Will continue cefepime while pending urine culture.  Patient seen by urology in ED.  Appreciate input.  Check procalcitonin  Repeat CBC with differential in AM.

## 2024-03-01 NOTE — H&P
[facility]  H&P  Name: Joel Wyman 78 y.o. male I MRN: [id number]  Unit/Bed#: 2 [address] Date of Admission: 2/29/2024   Date of Service: 2/29/2024 I Hospital Day: 0      Assessment/Plan   * Hemorrhagic cystitis  Assessment & Plan  Patient presents with hematuria with dysuria started this morning with subsequent inability to urinate with suprapubic discomfort.  Reports history of prostate cancer and history of radiation cystitis and hematuria in the past.  Three-way Sumner placed in ED. CBI started.  UA consistent with UTI   WBC 12.85, no bands, patient afebrile.  Likely due to steroid.  See below.  Hemoglobin 15.9.  Patient started on cefepime in ED. Prior urine culture in 2021 grew high colony Klebsiella pneumoniae and low colony Pseudomonas aeruginosa.    Will continue cefepime while pending urine culture.  Patient seen by urology in ED.  Appreciate input.  Check procalcitonin  Repeat CBC with differential in AM.      COPD (chronic obstructive pulmonary disease) ([facility])  Assessment & Plan  Patient reports having cold symptoms for about 6 days.  Reports chronic productive cough that has not changed, reports clogged nose.  Reports chills at home.  Saw PCP yesterday, prescribed Medrol pack/doxycycline for 7 days/Flonase/albuterol nebulizer as needed.  On Trelegy inhaler, Ventolin inhaler at home at baseline.  Mild scattered wheezing on auscultation.  Patient on room air, satting low 90s.  Will continue Flonase  Will order prednisone 40mg p.o. x 2 doses.  Hold doxycycline as patient is on cefepime.  Start Mucinex  Continue Trelegy    Chronic kidney disease  Assessment & Plan  Lab Results   Component Value Date    EGFR 47 02/29/2024    EGFR 51 10/17/2023    EGFR 51 06/02/2023    CREATININE 1.39 (H) 02/29/2024    CREATININE 1.30 10/17/2023    CREATININE 1.31 (H) 06/02/2023   Baseline creatinine appears to be around 1.3's  Creatinine stable  Monitor    Nicotine abuse  Assessment &  Plan  Nicotine patch, smoking cessation    Obesity (BMI 30.0-34.9)  Assessment & Plan  Body mass index is 30.4 kg/m².   Diet and lifestyle modification     Prostate cancer (HCC)  Assessment & Plan  Status post prostatectomy, radiation    Dyslipidemia  Assessment & Plan  Diet controlled    Essential hypertension  Assessment & Plan  Continue metoprolol  BP labile             VTE Prophylaxis: Pharmacologic VTE Prophylaxis contraindicated due to hematuria   / sequential compression device   Code Status: Full code  POLST: POLST form is not discussed and not completed at this time.    Anticipated Length of Stay:  Patient will be admitted on an Inpatient basis with an anticipated length of stay of  > 2 midnights.   Justification for Hospital Stay: Hemorrhagic cystitis    Total Time for Visit, including Counseling / Coordination of Care: 45 minutes.  Greater than 50% of this total time spent on direct patient counseling and coordination of care.    Chief Complaint:   Hematuria and difficulty urinating    History of Present Illness:    Joel Wyman is a 78 y.o. male with PMH of prostate cancer, radiation cystitis, hypertension, COPD, hyperlipidemia, nicotine abuse, borderline diabetes who presents with hematuria with dysuria started this morning with subsequent inability to urinate with suprapubic discomfort.  Reports history of prostate cancer and history of radiation cystitis and hematuria in the past.  Patient denies nausea vomiting diarrhea at home.  Denies suprapubic discomfort after Sumner inserted in ED. Patient reports having cold symptoms since Friday last week with clogged nose/cough/chills.  Reports chronic SOB with exertion due to COPD.  Reports saw PCP yesterday for cold symptoms, was given medications and took them this morning at home.  Patient denies bilateral flank pain or low back pain.  Denies chest pain, headache, dizziness.  Denies constipation.            Review of Systems:    Review of Systems    Constitutional:  Positive for chills.   HENT:          Clogged nose   Respiratory:  Positive for cough and shortness of breath.    Genitourinary:  Positive for difficulty urinating, dysuria and hematuria.        Suprapubic discomfort   All other systems reviewed and are negative.      Past Medical and Surgical History:     Past Medical History:   Diagnosis Date    Borderline diabetes     Cancer (HCC)     prostate    COPD (chronic obstructive pulmonary disease) (HCC)     Hyperlipidemia     Hypertension        Past Surgical History:   Procedure Laterality Date    APPENDECTOMY      FL RETROGRADE PYELOGRAM  4/14/2021    FL RETROGRADE PYELOGRAM  11/16/2023    HERNIA REPAIR      IR EMBOLIZATION (SPECIFY VESSEL OR SITE)  9/17/2021    IR NEPHROSTOMY TUBE CHECK AND/OR REMOVAL  7/8/2021    IR NEPHROSTOMY TUBE CHECK/CHANGE/REPOSITION/REINSERTION/UPSIZE  5/10/2021    IR NEPHROSTOMY TUBE CHECK/CHANGE/REPOSITION/REINSERTION/UPSIZE  8/4/2021    IR NEPHROSTOMY TUBE CHECK/CHANGE/REPOSITION/REINSERTION/UPSIZE  10/13/2021    IR NEPHROSTOMY TUBE PLACEMENT  5/7/2021    RI CYSTO W/IRRIG & EVAC MULTPLE OBSTRUCTING CLOTS Bilateral 4/14/2021    Procedure: CYSTOSCOPY EVACUATION OF CLOTS bilateral retrograde pyelograms possible TURBT bladder biopsy;  Surgeon: Yovani Khan MD;  Location: [facility];  Service: Urology    RI CYSTO W/IRRIG & EVAC MULTPLE OBSTRUCTING CLOTS N/A 4/24/2021    Procedure: CYSTOSCOPY EVACUATION OF CLOTS fulguration;  Surgeon: Yovani Khan MD;  Location: [facility];  Service: Urology    RI CYSTO W/IRRIG & EVAC MULTPLE OBSTRUCTING CLOTS N/A 7/8/2021    Procedure: CYSTOSCOPY EVACUATION OF CLOTS BILATERAL ANTIROGRADES NEPHROSTOMY TUBES, FULGERATION ;  Surgeon: Yovani Khan MD;  Location: [facility];  Service: Urology    RI CYSTO W/IRRIG & EVAC MULTPLE OBSTRUCTING CLOTS N/A 8/22/2021    Procedure: CYSTOSCOPY, RIGHT RETROGRADE, EVACUATION OF CLOTS, BLADDER BIOPSY X2 AND FULGERATION OF BLADDER LESIONS, URETEROSCOPY,  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 Authorizing Provider   fluticasone (FLONASE) 50 mcg/act nasal spray 1 spray into each nostril daily   Yes Historical Provider, MD   fluticasone-umeclidinium-vilanterol (Trelegy Ellipta) 200-62.5-25 mcg/actuation AEPB inhaler Inhale 1 puff daily 6/27/23 6/26/24 Yes Historical Provider, MD   metoprolol tartrate (LOPRESSOR) 25 mg tablet Take 25 mg by mouth every 12 (twelve) hours    Yes Historical Provider, MD   albuterol (2.5 mg/3 mL) 0.083 % nebulizer solution Take 3 mL (2.5 mg total) by nebulization every 6 (six) hours as needed for wheezing or shortness of breath 6/2/23   Nisha Mcnulty DO   albuterol (PROVENTIL HFA,VENTOLIN HFA) 90 mcg/act inhaler Inhale 2 puffs every 4 (four) hours as needed for wheezing 8/31/17   Kelly Rivera DO     I have reviewed home medications with patient personally.    Allergies: No Known Allergies    Social History:     Marital Status: /Civil Union   Occupation: Retired  Patient Pre-hospital Living Situation: Wife  Patient Pre-hospital Level of Mobility: Independent   Patient Pre-hospital Diet Restrictions: Cardiac diet  Substance Use History:   Social History     Substance and Sexual Activity   Alcohol Use Never     Social History     Tobacco Use   Smoking Status Some Days    Current packs/day: 0.50    Average packs/day: 0.5 packs/day for 50.0 years (25.0 ttl pk-yrs)    Types: Cigarettes    Passive exposure: Never   Smokeless  Tobacco Never     Social History     Substance and Sexual Activity   Drug Use No       Family History:    non-contributory    Physical Exam:     Vitals:   Blood Pressure: 157/77 (02/29/24 1934)  Pulse: 92 (02/29/24 1934)  Temperature: 97.6 °F (36.4 °C) (02/29/24 1934)  Temp Source: Probe (02/29/24 1934)  Respirations: 18 (02/29/24 1934)  Weight - Scale: 98.9 kg (218 lb) (02/29/24 1430)  SpO2: 94 % (02/29/24 1934)    Physical Exam  Vitals and nursing note reviewed.   Constitutional:       Appearance: He is well-developed. He is obese.   HENT:      Head: Normocephalic and atraumatic.   Neck:      Thyroid: No thyromegaly.      Vascular: No JVD.      Trachea: No tracheal deviation.   Cardiovascular:      Rate and Rhythm: Normal rate and regular rhythm.      Heart sounds: Normal heart sounds.   Pulmonary:      Effort: No respiratory distress.      Breath sounds: Wheezing present. No rales.      Comments: Mild scattered expiratory wheezing, mild dyspnea during conversation, on room air, satting low 90s.  Productive cough with light yellowish phlegm on exam.  Abdominal:      General: Bowel sounds are normal. There is no distension.      Palpations: Abdomen is soft.      Tenderness: There is no abdominal tenderness. There is no guarding.   Musculoskeletal:         General: Normal range of motion.      Cervical back: Neck supple.      Left lower leg: No edema.   Skin:     General: Skin is warm and dry.   Neurological:      General: No focal deficit present.      Mental Status: He is alert and oriented to person, place, and time.   Psychiatric:         Mood and Affect: Mood normal.         Judgment: Judgment normal.         Additional Data:     Lab Results: I have personally reviewed pertinent reports.      Results from last 7 days   Lab Units 02/29/24  1517   WBC Thousand/uL 12.85*   HEMOGLOBIN g/dL 15.9   HEMATOCRIT % 47.9   PLATELETS Thousands/uL 163   NEUTROS PCT % 81*   LYMPHS PCT % 12*   MONOS PCT % 6   EOS PCT % 0      Results from last 7 days   Lab Units 02/29/24  1517   POTASSIUM mmol/L 4.8   CHLORIDE mmol/L 102   CO2 mmol/L 23   BUN mg/dL 35*   CREATININE mg/dL 1.39*   CALCIUM mg/dL 9.4           Imaging: I have personally reviewed pertinent reports.      No results found.    EKG, Pathology, and Other Studies Reviewed on Admission:   EKG: N/A    [employer] Records Reviewed: Yes     ** Please Note: Dragon 360 Dictation voice to text software may have been used in the creation of this document. **

## 2024-03-01 NOTE — ASSESSMENT & PLAN NOTE
Patient reports having cold symptoms for about 6 days.  Reports chronic productive cough that has not changed, reports clogged nose.  Reports chills at home.  Saw PCP yesterday, prescribed Medrol pack/doxycycline for 7 days/Flonase/albuterol nebulizer as needed.  On Trelegy inhaler, Ventolin inhaler at home at baseline.  Mild scattered wheezing on auscultation.  Patient on room air, satting low 90s.  Will continue Flonase  Will order prednisone 40mg p.o. x 2 doses.  Hold doxycycline as patient is on cefepime.  Start Mucinex  Continue Trelegy

## 2024-03-01 NOTE — ASSESSMENT & PLAN NOTE
Patient presents with hematuria with dysuria started this morning with subsequent inability to urinate with suprapubic discomfort.  Reports history of prostate cancer and history of radiation cystitis and hematuria in the past.  Three-way Sumner placed in ED. CBI started.  UA consistent with UTI   WBC 12.85, no bands, patient afebrile.  Likely due to steroid.  See below.  Hemoglobin 15.9.  Patient started on cefepime in ED. Prior urine culture in 2021 grew high colony Klebsiella pneumoniae and low colony Pseudomonas aeruginosa.    Will continue cefepime while pending urine culture.  Patient seen by urology in ED.  Appreciate input.  Check procalcitonin  Repeat CBC with differential in AM.    3/1 --> Patient admitted with evidence of hematuria which may be related to radiation cystitis and hemorrhagic cystitis. Urology is following. CBI management per urology. Continue with IV Cefepime for now. Follow up urine culture and sensitivity.

## 2024-03-01 NOTE — UTILIZATION REVIEW
"Initial Clinical Review    Admission: Date/Time/Statement:   Admission Orders (From admission, onward)       Ordered        02/29/24 1822  INPATIENT ADMISSION  Once                          Orders Placed This Encounter   Procedures    INPATIENT ADMISSION     Standing Status:   Standing     Number of Occurrences:   1     Order Specific Question:   Level of Care     Answer:   Med Surg [16]     Order Specific Question:   Estimated length of stay     Answer:   More than 2 Midnights     Order Specific Question:   Certification     Answer:   I certify that inpatient services are medically necessary for this patient for a duration of greater than two midnights. See H&P and MD Progress Notes for additional information about the patient's course of treatment.     ED Arrival Information       Expected   -    Arrival   2/29/2024 14:16    Acuity   Urgent              Means of arrival   Walk-In    Escorted by   Spouse    Service   Hospitalist    Admission type   Emergency              Arrival complaint   Blood in urine             Chief Complaint   Patient presents with    Blood in Urine     States started dribbling urine this morning. Now bladder is full and urine is bloody and has clots. States he needs a alanis and wash out with saline.        Initial Presentation:   78 yom to ER from home hematuria and urinary distention. Patient reports that yesterday he began with dysuria. He states at 8 AM this morning he noticed hematuria and has not been able to urinate since that time. Patient reports he did pass clots at that time and is now concerned that he is \"clotted off\". Patient reports he has history of similar multiple times previously. Patient states he has previously required bladder irrigation and sometimes admission to the hospital for similar. Hx prostate cancer, radiation cystitis, hypertension, COPD, hyperlipidemia, nicotine abuse, borderline diabetes. Presents hypertensive with abd/suprapubic pain/tenderness, blood " noted tip of penis. Admission WBC 12.85, Na 133 BUN 35 Cr 1.39, +u/a.   Admitted to inpatient status for hemorrhagic cystitis.     Per urology:  Hemorrhagic cystitis as evident on urinalysis  Gross hematuria could also be secondary to radiation cystitis from extensive abdominal straining  Continue CBI today  Start broad-spectrum antibiotics cultures pending  DC CBI in the morning voiding trial to follow    Date: 3/1/24   Day 2:   IVABT in progress for hemorrhagic cystitis. Wbc 12.89, BUN 36. Sumner remains in place, urine clear, urology following.   Per PT: Attempted PT evaluation however pt reports he gets spasms from the catheter when he is up and walking. Pt encouraged to ambulate with nursing staff as able.     ED Triage Vitals [02/29/24 1430]   Temperature Pulse Respirations Blood Pressure SpO2   97.9 °F (36.6 °C) 71 18 (!) 205/95 93 %      Temp Source Heart Rate Source Patient Position - Orthostatic VS BP Location FiO2 (%)   Temporal Monitor Sitting Left arm --      Pain Score       10 - Worst Possible Pain          Wt Readings from Last 1 Encounters:   02/29/24 98.9 kg (218 lb)     Additional Vital Signs:   Date/Time Temp Pulse Resp BP MAP (mmHg) SpO2 O2 Device Patient Position - Orthostatic VS   03/01/24 07:47:28 97.9 °F (36.6 °C) 53 Abnormal  -- 155/80 105 92 % -- --   03/01/24 03:58:28 97.4 °F (36.3 °C) Abnormal  51 Abnormal  16 139/75 96 92 % -- --   02/29/24 22:39:27 97.4 °F (36.3 °C) Abnormal  60 17 142/80 101 92 % -- --   02/29/24 21:34:27 -- 60 -- 145/84 104 92 % -- --   02/29/24 2051 -- 64 20 -- -- -- None (Room air) --   02/29/24 1934 97.6 °F (36.4 °C) 92 18 157/77 -- 94 % -- --   02/29/24 1900 98 °F (36.7 °C) 62 20 182/86 Abnormal  123 92 % None (Room air) --   02/29/24 1745 -- 56 -- 169/77 110 92 % -- --   02/29/24 1531 -- 62 -- 163/81 113 94 % -- --   02/29/24 1430 97.9 °F (36.6 °C) 71 18 205/95 Abnormal  -- 93 % None (Room air) Sitting     Pertinent Labs/Diagnostic Test Results:     Results from  last 7 days   Lab Units 03/01/24  0505 02/29/24  1517   WBC Thousand/uL 12.89* 12.85*   HEMOGLOBIN g/dL 14.9 15.9   HEMATOCRIT % 46.3 47.9   PLATELETS Thousands/uL 174 163   NEUTROS ABS Thousands/µL  --  10.35*     Results from last 7 days   Lab Units 03/01/24  0505 02/29/24  1517   SODIUM mmol/L 136 133*   POTASSIUM mmol/L 4.4 4.8   CHLORIDE mmol/L 104 102   CO2 mmol/L 25 23   ANION GAP mmol/L 7 8   BUN mg/dL 36* 35*   CREATININE mg/dL 1.28 1.39*   EGFR ml/min/1.73sq m 52 47   CALCIUM mg/dL 8.8 9.4   MAGNESIUM mg/dL 2.0  --      Results from last 7 days   Lab Units 03/01/24  0505 02/29/24  1517   GLUCOSE RANDOM mg/dL 102 129     Results from last 7 days   Lab Units 03/01/24  0506 02/29/24  1517   PROCALCITONIN ng/ml 0.06 0.05     Results from last 7 days   Lab Units 02/29/24  1514   CLARITY UA  Cloudy   COLOR UA  Red   SPEC GRAV UA  1.015   PH UA  7.0   GLUCOSE UA mg/dl Negative   KETONES UA mg/dl Trace*   BLOOD UA  Large*   PROTEIN UA mg/dl >=300*   NITRITE UA  Positive*   BILIRUBIN UA  Negative   UROBILINOGEN UA E.U./dl 1.0   LEUKOCYTES UA  Moderate*   WBC UA /hpf Field obscured, unable to enumerate*   RBC UA /hpf Innumerable*   BACTERIA UA /hpf Field obscured, unable to enumerate*   EPITHELIAL CELLS WET PREP /hpf Field obscured, unable to enumerate*     Results from last 7 days   Lab Units 02/29/24  1515   URINE CULTURE  80,000-89,000 cfu/ml Gram Negative Lobo Enteric Like*       ED Treatment:   Medication Administration from 02/29/2024 1416 to 02/29/2024 1949         Date/Time Order Dose Route Action     02/29/2024 1534 EST lidocaine (URO-JET) 2 % urethral/mucosal gel 1 Application 1 Application Urethral Given     02/29/2024 1549 EST cefepime (MAXIPIME) IVPB (premix in dextrose) 2,000 mg 50 mL 2,000 mg Intravenous New Bag          Past Medical History:   Diagnosis Date    Borderline diabetes     Cancer (HCC)     prostate    COPD (chronic obstructive pulmonary disease) (HCC)     Hyperlipidemia     Hypertension       Present on Admission:   Prostate cancer (HCC)   Hemorrhagic cystitis   Essential hypertension   COPD (chronic obstructive pulmonary disease) (HCC)   Dyslipidemia   Chronic kidney disease      Admitting Diagnosis: Blood in urine [R31.9]  Cystitis [N30.90]  Hematuria [R31.9]  Age/Sex: 78 y.o. male  Admission Orders:  Sumner cath  Bladder irrigation  Pt/ot eval & tx  Scd  Consult urology    Scheduled Medications:  cefepime, 1,000 mg, Intravenous, Q12H  famotidine, 20 mg, Oral, HS  fluticasone, 1 spray, Nasal, Daily  fluticasone-vilanterol, 1 puff, Inhalation, Daily   And  umeclidinium, 1 puff, Inhalation, Daily  guaiFENesin, 600 mg, Oral, Q12H DHAVAL  metoprolol tartrate, 25 mg, Oral, Q12H DHAVAL  nicotine, 1 patch, Transdermal, Daily  predniSONE, 40 mg, Oral, Daily  saccharomyces boulardii, 250 mg, Oral, BID    PRN Meds:  acetaminophen, 650 mg, Oral, Q6H PRN  albuterol, 2.5 mg, Nebulization, Q6H PRN  ondansetron, 4 mg, Intravenous, Q6H PRN    Network Utilization Review Department  ATTENTION: Please call with any questions or concerns to [phone] and carefully listen to the prompts so that you are directed to the right person. All voicemails are confidential.   For Discharge needs, contact Care Management DC Support Team at [phone] opt. [phone]  Send all requests for admission clinical reviews, approved or denied determinations and any other requests to dedicated fax number below belonging to the [facility] where the patient is receiving treatment. List of dedicated fax numbers for the Facilities:  FACILITY NAME UR FAX NUMBER   ADMISSION DENIALS (Administrative/Medical Necessity) [phone]   DISCHARGE SUPPORT TEAM (NETWORK) [phone]   PARENT CHILD HEALTH (Maternity/NICU/Pediatrics) [phone]   [facility] [phone]   [facility] [phone]   formerly [facility] [phone]   [facility] [phone]   [facility]  [facility] [phone]   [facility] [phone]   [facility] [phone]   [facility] [phone]   [facility] [phone]   [facility] [phone]   [facility] [phone]   [facility] [phone]

## 2024-03-02 LAB
ANION GAP SERPL CALCULATED.3IONS-SCNC: 7 MMOL/L
BACTERIA UR CULT: ABNORMAL
BACTERIA UR CULT: ABNORMAL
BASOPHILS # BLD AUTO: 0.03 THOUSANDS/ÂΜL (ref 0–0.1)
BASOPHILS NFR BLD AUTO: 0 % (ref 0–1)
BUN SERPL-MCNC: 38 MG/DL (ref 5–25)
CALCIUM SERPL-MCNC: 8.5 MG/DL (ref 8.4–10.2)
CHLORIDE SERPL-SCNC: 102 MMOL/L (ref 96–108)
CO2 SERPL-SCNC: 27 MMOL/L (ref 21–32)
CREAT SERPL-MCNC: 1.41 MG/DL (ref 0.6–1.3)
EOSINOPHIL # BLD AUTO: 0.02 THOUSAND/ÂΜL (ref 0–0.61)
EOSINOPHIL NFR BLD AUTO: 0 % (ref 0–6)
ERYTHROCYTE [DISTWIDTH] IN BLOOD BY AUTOMATED COUNT: 14.6 % (ref 11.6–15.1)
GFR SERPL CREATININE-BSD FRML MDRD: 46 ML/MIN/1.73SQ M
GLUCOSE SERPL-MCNC: 106 MG/DL (ref 65–140)
HCT VFR BLD AUTO: 47.3 % (ref 36.5–49.3)
HGB BLD-MCNC: 15.3 G/DL (ref 12–17)
IMM GRANULOCYTES # BLD AUTO: 0.11 THOUSAND/UL (ref 0–0.2)
IMM GRANULOCYTES NFR BLD AUTO: 1 % (ref 0–2)
LYMPHOCYTES # BLD AUTO: 1.66 THOUSANDS/ÂΜL (ref 0.6–4.47)
LYMPHOCYTES NFR BLD AUTO: 12 % (ref 14–44)
MAGNESIUM SERPL-MCNC: 2.1 MG/DL (ref 1.9–2.7)
MCH RBC QN AUTO: 29.2 PG (ref 26.8–34.3)
MCHC RBC AUTO-ENTMCNC: 32.3 G/DL (ref 31.4–37.4)
MCV RBC AUTO: 90 FL (ref 82–98)
MONOCYTES # BLD AUTO: 1.25 THOUSAND/ÂΜL (ref 0.17–1.22)
MONOCYTES NFR BLD AUTO: 9 % (ref 4–12)
NEUTROPHILS # BLD AUTO: 10.29 THOUSANDS/ÂΜL (ref 1.85–7.62)
NEUTS SEG NFR BLD AUTO: 78 % (ref 43–75)
NRBC BLD AUTO-RTO: 0 /100 WBCS
PLATELET # BLD AUTO: 181 THOUSANDS/UL (ref 149–390)
PMV BLD AUTO: 10.4 FL (ref 8.9–12.7)
POTASSIUM SERPL-SCNC: 4.2 MMOL/L (ref 3.5–5.3)
RBC # BLD AUTO: 5.24 MILLION/UL (ref 3.88–5.62)
SODIUM SERPL-SCNC: 136 MMOL/L (ref 135–147)
WBC # BLD AUTO: 13.36 THOUSAND/UL (ref 4.31–10.16)

## 2024-03-02 PROCEDURE — 80048 BASIC METABOLIC PNL TOTAL CA: CPT | Performed by: FAMILY MEDICINE

## 2024-03-02 PROCEDURE — 83735 ASSAY OF MAGNESIUM: CPT | Performed by: FAMILY MEDICINE

## 2024-03-02 PROCEDURE — 85025 COMPLETE CBC W/AUTO DIFF WBC: CPT | Performed by: FAMILY MEDICINE

## 2024-03-02 PROCEDURE — 97161 PT EVAL LOW COMPLEX 20 MIN: CPT

## 2024-03-02 PROCEDURE — 99232 SBSQ HOSP IP/OBS MODERATE 35: CPT | Performed by: STUDENT IN AN ORGANIZED HEALTH CARE EDUCATION/TRAINING PROGRAM

## 2024-03-02 RX ORDER — TAMSULOSIN HYDROCHLORIDE 0.4 MG/1
0.4 CAPSULE ORAL
Qty: 30 CAPSULE | Refills: 0 | Status: CANCELLED | OUTPATIENT
Start: 2024-03-02

## 2024-03-02 RX ORDER — TAMSULOSIN HYDROCHLORIDE 0.4 MG/1
0.4 CAPSULE ORAL
Status: DISCONTINUED | OUTPATIENT
Start: 2024-03-02 | End: 2024-03-02

## 2024-03-02 RX ORDER — CEFDINIR 300 MG/1
300 CAPSULE ORAL EVERY 12 HOURS SCHEDULED
Status: DISCONTINUED | OUTPATIENT
Start: 2024-03-02 | End: 2024-03-03 | Stop reason: HOSPADM

## 2024-03-02 RX ORDER — OXYBUTYNIN CHLORIDE 5 MG/1
5 TABLET, EXTENDED RELEASE ORAL DAILY
Status: DISCONTINUED | OUTPATIENT
Start: 2024-03-02 | End: 2024-03-02

## 2024-03-02 RX ORDER — OXYBUTYNIN CHLORIDE 5 MG/1
5 TABLET ORAL ONCE
Status: COMPLETED | OUTPATIENT
Start: 2024-03-02 | End: 2024-03-02

## 2024-03-02 RX ADMIN — FLUTICASONE FUROATE AND VILANTEROL TRIFENATATE 1 PUFF: 200; 25 POWDER RESPIRATORY (INHALATION) at 09:28

## 2024-03-02 RX ADMIN — CEFDINIR 300 MG: 300 CAPSULE ORAL at 20:25

## 2024-03-02 RX ADMIN — OXYBUTYNIN CHLORIDE 5 MG: 5 TABLET ORAL at 10:38

## 2024-03-02 RX ADMIN — FLUTICASONE PROPIONATE 1 SPRAY: 50 SPRAY, METERED NASAL at 09:28

## 2024-03-02 RX ADMIN — Medication 250 MG: at 09:23

## 2024-03-02 RX ADMIN — OXYBUTYNIN CHLORIDE 5 MG: 5 TABLET ORAL at 03:57

## 2024-03-02 RX ADMIN — CEFDINIR 300 MG: 300 CAPSULE ORAL at 15:56

## 2024-03-02 RX ADMIN — NICOTINE 1 PATCH: 14 PATCH, EXTENDED RELEASE TRANSDERMAL at 09:26

## 2024-03-02 RX ADMIN — PREDNISONE 40 MG: 20 TABLET ORAL at 09:23

## 2024-03-02 RX ADMIN — CEFEPIME HYDROCHLORIDE 1000 MG: 1 INJECTION, SOLUTION INTRAVENOUS at 03:30

## 2024-03-02 RX ADMIN — UMECLIDINIUM 1 PUFF: 62.5 AEROSOL, POWDER ORAL at 09:28

## 2024-03-02 RX ADMIN — Medication 250 MG: at 17:24

## 2024-03-02 RX ADMIN — METOPROLOL TARTRATE 25 MG: 25 TABLET, FILM COATED ORAL at 09:23

## 2024-03-02 NOTE — ASSESSMENT & PLAN NOTE
Patient presented with hematuria, dysuria, and ultimately inability to urinate with suprapubic discomfort.  Three-way Sumner catheter placed in the emergency room and CBI initiated.  Urinalysis consistent with UTI    Treated with IV cefepime while inpatient; urine culture grew Klebsiella and Proteus  Urine has since cleared; hematuria was thought to be secondary to hemorrhagic cystitis versus radiation cystitis  Will continue on p.o. cefdinir to complete a 7-day course of antibiotic therapy on discharge  Passed voiding trial prior to discharge  Follow-up with Dr. Khan, urology tomorrow 3/4 as an outpatient

## 2024-03-02 NOTE — PROGRESS NOTES
INTERNAL MEDICINE PROGRESS NOTE     Name: Joel Wyman   Age & Sex: 78 y.o. male   MRN: [id number]  Unit/Bed#: [address] [place] [address]   Encounter: [id number]  Hospital Stay Days: 2      ASSESSMENT/PLAN     Principal Problem:    Hemorrhagic cystitis  Active Problems:    Essential hypertension    Dyslipidemia    COPD (chronic obstructive pulmonary disease) ([facility])    Prostate cancer ([facility])    Chronic kidney disease    Obesity (BMI 30.0-34.9)    Nicotine abuse      Nicotine abuse  Assessment & Plan  Nicotine patch, smoking cessation    Obesity (BMI 30.0-34.9)  Assessment & Plan  Body mass index is 30.4 kg/m².   Diet and lifestyle modification     Chronic kidney disease  Assessment & Plan  Lab Results   Component Value Date    EGFR 46 03/02/2024    EGFR 52 03/01/2024    EGFR 47 02/29/2024    CREATININE 1.41 (H) 03/02/2024    CREATININE 1.28 03/01/2024    CREATININE 1.39 (H) 02/29/2024   Baseline creatinine appears to be around 1.3's  Creatinine stable  Monitor    Prostate cancer ([facility])  Assessment & Plan  Status post prostatectomy, radiation    COPD (chronic obstructive pulmonary disease) ([facility])  Assessment & Plan  Patient reports having cold symptoms for about 6 days.  Reports chronic productive cough that has not changed, reports clogged nose.  Reports chills at home.  Saw PCP yesterday, prescribed Medrol pack/doxycycline for 7 days/Flonase/albuterol nebulizer as needed.  On Trelegy inhaler, Ventolin inhaler at home at baseline.  Mild scattered wheezing on auscultation.  Patient on room air, satting low 90s.  Will continue Flonase  Will order prednisone 40mg p.o. x 2 doses.  Hold doxycycline as patient is on cefepime.  Start Mucinex  Continue Trelegy    Respiratory status stable, currently on cefdinir for UTI symptoms, hold off steroid therapy.    Dyslipidemia  Assessment & Plan  Diet controlled    Essential hypertension  Assessment & Plan  Continue beta-blocker therapy, home medication    * Hemorrhagic cystitis  Assessment &  Plan  Patient presents with hematuria with dysuria started this morning with subsequent inability to urinate with suprapubic discomfort.  Reports history of prostate cancer and history of radiation cystitis and hematuria in the past.  Three-way Sumner placed in ED. CBI started.  UA consistent with UTI   WBC 12.85, no bands, patient afebrile.  Likely due to steroid.  See below.  Hemoglobin 15.9.  Patient started on cefepime in ED. Prior urine culture in 2021 grew high colony Klebsiella pneumoniae and low colony Pseudomonas aeruginosa.    Will continue cefepime while pending urine culture.  Patient seen by urology in ED.  Appreciate input.  Check procalcitonin  Repeat CBC with differential in AM.    3/1 --> Patient admitted with evidence of hematuria which may be related to radiation cystitis and hemorrhagic cystitis. Urology is following. CBI management per urology. Continue with IV Cefepime for now. Follow up urine culture and sensitivity.    Urine culture sensitivities reviewed, cefdinir x 7 days  Plan to discontinue Sumner in the morning, void trial to determine if Sumner needed for discharge tomorrow  Appreciate urology recommendations        VTE Pharmacologic Prophylaxis:   Pharmacologic: Pharmacologic VTE Prophylaxis contraindicated due to hematuria  Mechanical VTE Prophylaxis in Place: Yes    Patient Centered Rounds: I have performed bedside rounds with nursing staff today.    Discussions with Specialists or Other Care Team Provider: Appreciate urology recommendations    Education and Discussions with Family / Patient: Discussed with family at bedside    Time Spent for Care: 45 minutes.  More than 50% of total time spent on counseling and coordination of care as described above.    Current Length of Stay: 2 day(s)    Current Patient Status: Inpatient   Certification Statement: The patient will continue to require additional inpatient hospital stay due to hematuria requiring Sumner    Discharge Plan / Estimated  Discharge Date: 24 hours    Code Status: Level 1 - Full Code    Subjective:     Patient is a pleasant 78-year-old male seen and examined at bedside, overall doing well, CBI discontinued today, clear urine in Sumner.    Objective:   Vitals:   Temp (24hrs), Av.5 °F (36.4 °C), Min:97.2 °F (36.2 °C), Max:97.9 °F (36.6 °C)    Temp:  [97.2 °F (36.2 °C)-97.9 °F (36.6 °C)] 97.4 °F (36.3 °C)  HR:  [51-59] 51  Resp:  [18-20] 20  BP: (129-159)/(75-83) 129/75  SpO2:  [86 %-92 %] 91 %  Body mass index is 30.4 kg/m².     Input and Output Summary (last 24 hours):     Intake/Output Summary (Last 24 hours) at 3/2/2024 1649  Last data filed at 3/2/2024 0403  Gross per 24 hour   Intake --   Output 4430 ml   Net -4430 ml     Physical Exam:   Physical Exam  Constitutional:       General: He is not in acute distress.  HENT:      Head: Normocephalic and atraumatic.   Eyes:      General: No scleral icterus.     Conjunctiva/sclera: Conjunctivae normal.   Cardiovascular:      Rate and Rhythm: Normal rate and regular rhythm.      Pulses: Normal pulses.      Heart sounds: No murmur heard.  Pulmonary:      Effort: Pulmonary effort is normal. No respiratory distress.      Breath sounds: No wheezing or rales.   Abdominal:      General: Bowel sounds are normal. There is no distension.      Palpations: Abdomen is soft.      Tenderness: There is no abdominal tenderness. There is no guarding.   Genitourinary:     Comments: Sumner with clear urine, good output  Musculoskeletal:         General: No swelling. Normal range of motion.   Skin:     General: Skin is warm.      Capillary Refill: Capillary refill takes less than 2 seconds.      Coloration: Skin is not jaundiced.      Findings: No bruising.   Neurological:      General: No focal deficit present.      Mental Status: He is alert and oriented to person, place, and time. Mental status is at baseline.   Psychiatric:         Mood and Affect: Mood normal.         Behavior: Behavior normal.            Additional Data:   Basic Laboratory Review:   Results from last 7 days   Lab Units 03/02/24  0513 03/01/24  0505 02/29/24  1517   SODIUM mmol/L 136 136 133*   POTASSIUM mmol/L 4.2 4.4 4.8   CHLORIDE mmol/L 102 104 102   CO2 mmol/L 27 25 23   BUN mg/dL 38* 36* 35*   CREATININE mg/dL 1.41* 1.28 1.39*   GLUCOSE RANDOM mg/dL 106 102 129   CALCIUM mg/dL 8.5 8.8 9.4   EGFR ml/min/1.73sq m 46 52 47       Results from last 7 days   Lab Units 03/02/24  0513 03/01/24  0505 02/29/24  1517   WBC Thousand/uL 13.36* 12.89* 12.85*   HEMOGLOBIN g/dL 15.3 14.9 15.9   HEMATOCRIT % 47.3 46.3 47.9   PLATELETS Thousands/uL 181 174 163   NEUTROS PCT % 78*  --  81*   LYMPHS PCT % 12*  --  12*   MONOS PCT % 9  --  6   EOS PCT % 0  --  0   BASOS PCT % 0  --  0   NEUTROS ABS Thousands/µL 10.29*  --  10.35*   LYMPHS ABS Thousands/µL 1.66  --  1.59   MONOS ABS Thousand/µL 1.25*  --  0.79   EOS ABS Thousand/µL 0.02  --  0.02   BASOS ABS Thousands/µL 0.03  --  0.02               Additional Labs:  Results from last 7 days   Lab Units 03/02/24  0513 03/01/24  0505   MAGNESIUM mg/dL 2.1 2.0                Recent Cultures (last 7 days):   Results from last 7 days   Lab Units 02/29/24  1515   URINE CULTURE  80,000-89,000 cfu/ml Escherichia coli*  20,000-29,000 cfu/ml Proteus mirabilis*       * I Have Reviewed All Lab Data Listed Above.  * Additional Pertinent Lab Tests Reviewed: All Labs Within Last 24 Hours Reviewed    Imaging:  Prior imaging studies and reports reviewed    Last 24 Hours Medication List:   Current Facility-Administered Medications   Medication Dose Route Frequency Provider Last Rate    acetaminophen  650 mg Oral Q6H PRN CORWIN Braden      albuterol  2.5 mg Nebulization Q6H PRN CORWIN Braden      cefdinir  300 mg Oral Q12H DHAVAL Kasper,       fluticasone  1 spray Nasal Daily CORWIN Braden      fluticasone-vilanterol  1 puff Inhalation Daily CORWIN Braden      And    umeclidinium  1 puff  Inhalation Daily CORWIN Braden      guaiFENesin  600 mg Oral Q12H [facility] CORWIN Braden      metoprolol tartrate  25 mg Oral Q12H [facility] CORWIN Braden      nicotine  1 patch Transdermal Daily CORWIN Braden      ondansetron  4 mg Intravenous Q6H PRN CORWIN Braden      saccharomyces boulardii  250 mg Oral BID CORWIN Braden       Today, Patient Was Seen By: Higinio Kasper DO

## 2024-03-02 NOTE — NURSING NOTE
Spoke with Dr. Khan, states to D/C alanis catheter in the morning at 8am. Dr. Khan will see patient tomorrow. Dr. Kasper made aware.

## 2024-03-02 NOTE — UTILIZATION REVIEW
Continued Stay Review    SEE INITIAL REVIEW AT BOTTOM    Date: 03-02-24                          Current Patient Class: inpatient  Current Level of Care: medical    HPI:78 y.o. male initially admitted on      Assessment/Plan:     Vital Signs:     ate/Time Temp Pulse Resp BP MAP (mmHg) SpO2 O2 Device Patient Position - Orthostatic VS   03/02/24 08:53:22 97.9 °F (36.6 °C) 52 Abnormal  -- 135/75 95 86 % Abnormal  -- --   03/01/24 22:02:59 -- 55 -- 139/78 98 92 % -- --   03/01/24 2100 -- 56 -- -- -- 91 % -- --   03/01/24 20:40:40 -- 54 Abnormal  -- 154/83 107 92 % -- Lying   03/01/24 2038 -- -- -- -- -- -- None (Room air) --   03/01/24 20:35:32 97.2 °F (36.2 °C) Abnormal  59 19 159/83 108 90 % -- Lying   03/01/24 15:18:42 97.3 °F (36.3 °C) Abnormal  52 Abnormal  -- 150/81 104 87 % Abnormal  -- --                 Pertinent Labs/Diagnostic Results:       Results from last 7 days   Lab Units 03/02/24 0513 03/01/24  0505 02/29/24  1517   WBC Thousand/uL 13.36* 12.89* 12.85*   HEMOGLOBIN g/dL 15.3 14.9 15.9   HEMATOCRIT % 47.3 46.3 47.9   PLATELETS Thousands/uL 181 174 163   NEUTROS ABS Thousands/µL 10.29*  --  10.35*         Results from last 7 days   Lab Units 03/02/24 0513 03/01/24  0505 02/29/24  1517   SODIUM mmol/L 136 136 133*   POTASSIUM mmol/L 4.2 4.4 4.8   CHLORIDE mmol/L 102 104 102   CO2 mmol/L 27 25 23   ANION GAP mmol/L 7 7 8   BUN mg/dL 38* 36* 35*   CREATININE mg/dL 1.41* 1.28 1.39*   EGFR ml/min/1.73sq m 46 52 47   CALCIUM mg/dL 8.5 8.8 9.4   MAGNESIUM mg/dL 2.1 2.0  --              Results from last 7 days   Lab Units 03/02/24  0513 03/01/24  0505 02/29/24  1517   GLUCOSE RANDOM mg/dL 106 102 129         Results from last 7 days   Lab Units 03/01/24  0506 02/29/24  1517   PROCALCITONIN ng/ml 0.06 0.05       Results from last 7 days   Lab Units 02/29/24  1514   CLARITY UA  Cloudy   COLOR UA  Red   SPEC GRAV UA  1.015   PH UA  7.0   GLUCOSE UA mg/dl Negative   KETONES UA mg/dl Trace*   BLOOD UA  Large*    PROTEIN UA mg/dl >=300*   NITRITE UA  Positive*   BILIRUBIN UA  Negative   UROBILINOGEN UA E.U./dl 1.0   LEUKOCYTES UA  Moderate*   WBC UA /hpf Field obscured, unable to enumerate*   RBC UA /hpf Innumerable*   BACTERIA UA /hpf Field obscured, unable to enumerate*   EPITHELIAL CELLS WET PREP /hpf Field obscured, unable to enumerate*         Results from last 7 days   Lab Units 02/29/24  1515   URINE CULTURE  80,000-89,000 cfu/ml Escherichia coli*  20,000-29,000 cfu/ml Proteus mirabilis*                   Medications:   Scheduled Medications:  cefepime, 1,000 mg, Intravenous, Q12H  fluticasone, 1 spray, Nasal, Daily  fluticasone-vilanterol, 1 puff, Inhalation, Daily   And  umeclidinium, 1 puff, Inhalation, Daily  guaiFENesin, 600 mg, Oral, Q12H DHAVAL  metoprolol tartrate, 25 mg, Oral, Q12H DHAVAL  nicotine, 1 patch, Transdermal, Daily  saccharomyces boulardii, 250 mg, Oral, BID  tamsulosin, 0.4 mg, Oral, Daily With Dinner      Continuous IV Infusions:     PRN Meds:  acetaminophen, 650 mg, Oral, Q6H PRN  albuterol, 2.5 mg, Nebulization, Q6H PRN  ondansetron, 4 mg, Intravenous, Q6H PRN        Discharge Plan: D             Network Utilization Review Department  ATTENTION: Please call with any questions or concerns to [phone] and carefully listen to the prompts so that you are directed to the right person. All voicemails are confidential.   For Discharge needs, contact Care Management DC Support Team at [phone] opt. 2  Send all requests for admission clinical reviews, approved or denied determinations and any other requests to dedicated fax number below belonging to the campus where the patient is receiving treatment. List of dedicated fax numbers for the Facilities:  FACILITY NAME UR FAX NUMBER   ADMISSION DENIALS (Administrative/Medical Necessity) [phone]   DISCHARGE SUPPORT TEAM (NETWORK) [phone]   PARENT CHILD HEALTH (Maternity/NICU/Pediatrics) [phone]   [facility]  [phone]   [facility] [phone]   [facility] [phone]   [facility] [phone]   [facility] [phone]   [facility] [phone]   [facility] [phone]   [facility] [phone]   [facility] [phone]   [facility] [phone]   [facility] [phone]   [facility] [phone]

## 2024-03-02 NOTE — PHYSICAL THERAPY NOTE
PT EVALUATION    Patient is not in need of further skilled inpatient PT services while admitted as patient is independent.  Will DC skilled PT services at this time.  Patient encouraged to ambulate ad margo. on the unit while admitted    PHYSICAL THERAPY   DATE: 03/02/24  TIME: 1334    NAME:  Joel Wyman  AGE:   78 y.o.  Mrn:   [id number]  Length Of Stay: 2    ADMIT DX:  Blood in urine [R31.9]  Cystitis [N30.90]  Hematuria [R31.9]    Past Medical History:   Diagnosis Date    Borderline diabetes     Cancer (HCC)     prostate    COPD (chronic obstructive pulmonary disease) (HCC)     Hyperlipidemia     Hypertension      Past Surgical History:   Procedure Laterality Date    APPENDECTOMY      FL RETROGRADE PYELOGRAM  4/14/2021    FL RETROGRADE PYELOGRAM  11/16/2023    HERNIA REPAIR      IR EMBOLIZATION (SPECIFY VESSEL OR SITE)  9/17/2021    IR NEPHROSTOMY TUBE CHECK AND/OR REMOVAL  7/8/2021    IR NEPHROSTOMY TUBE CHECK/CHANGE/REPOSITION/REINSERTION/UPSIZE  5/10/2021    IR NEPHROSTOMY TUBE CHECK/CHANGE/REPOSITION/REINSERTION/UPSIZE  8/4/2021    IR NEPHROSTOMY TUBE CHECK/CHANGE/REPOSITION/REINSERTION/UPSIZE  10/13/2021    IR NEPHROSTOMY TUBE PLACEMENT  5/7/2021    NM CYSTO W/IRRIG & EVAC MULTPLE OBSTRUCTING CLOTS Bilateral 4/14/2021    Procedure: CYSTOSCOPY EVACUATION OF CLOTS bilateral retrograde pyelograms possible TURBT bladder biopsy;  Surgeon: Yovani Khan MD;  Location: [facility];  Service: Urology    NM CYSTO W/IRRIG & EVAC MULTPLE OBSTRUCTING CLOTS N/A 4/24/2021    Procedure: CYSTOSCOPY EVACUATION OF CLOTS fulguration;  Surgeon: Yovani Khan MD;  Location: [facility];  Service: Urology    NM CYSTO W/IRRIG & EVAC MULTPLE OBSTRUCTING CLOTS N/A 7/8/2021    Procedure: CYSTOSCOPY EVACUATION OF CLOTS BILATERAL ANTIROGRADES NEPHROSTOMY TUBES, FULGERATION ;  Surgeon: Yovani Khan MD;  Location: [facility];  Service: Urology    NM CYSTO W/IRRIG & EVAC MULTPLE OBSTRUCTING CLOTS N/A 8/22/2021    Procedure:  "CYSTOSCOPY, RIGHT RETROGRADE, EVACUATION OF CLOTS, BLADDER BIOPSY X2 AND FULGERATION OF BLADDER LESIONS, URETEROSCOPY, 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;  Surgeon: Yovani Khan MD;  Location: [facility];  Service: Urology    MI CYSTOURETHROSCOPY WITH BIOPSY N/A 11/16/2023    Procedure: CYSTOSCOPY, BILATERAL RETROGRADEY PYELOGRAM,  FULGERATION 2.0 CM BLADDER TUMOR WITH BIOPSY;  Surgeon: Yovani Khan MD;  Location: [facility];  Service: Urology    PROSTATECTOMY      ROTATOR CUFF REPAIR      right shoulder       Performed at least 2 patient identifiers during session: Name, Birthday, and ID bracelet     03/02/24 1334   PT Last Visit   PT Visit Date 03/02/24   Note Type   Note type Evaluation   Pain Assessment   Pain Assessment Tool 0-10   Pain Score 10 - Worst Possible Pain   Pain Location/Orientation   (\"Bladder spasms\")   Pain Onset/Description Frequency: Intermittent   Effect of Pain on Daily Activities Limits comfort and mobility   Patient's Stated Pain Goal No pain   Hospital Pain Intervention(s) Emotional support;Rest   Restrictions/Precautions   Other Precautions Pain  (Sumner catheter)   Home Living   Type of Home Apartment   Home Layout One level;Able to live on main level with bedroom/bathroom;Stairs to enter with rails  (6 steps to enter)   Bathroom Shower/Tub Tub/shower unit   Bathroom Toilet Standard   Bathroom Equipment Grab bars in shower;Toilet raiser   Bathroom Accessibility Accessible   Home Equipment Cane  (RW)   Prior Function   Level of [place] Independent with ADLs;Independent with functional mobility;Independent with IADLS   Lives With Spouse   Receives Help From Family   IADLs Independent with driving;Independent with meal prep;Independent with medication management   Falls in the last 6 months 0  (Denies)   Comments Patient ambulates without an assistive device prior to admission   General   Additional Pertinent History Patient is " "admitted with hematuria and difficulty ambulating.  Patient has a history of prostate cancer with radiation treatments as well as shortness of breath with COPD.   Family/Caregiver Present No   Cognition   Overall Cognitive Status WFL   Arousal/Participation Cooperative   Attention Within functional limits   Orientation Level Oriented X4   Memory Within functional limits   Following Commands Follows all commands and directions without difficulty   Subjective   Subjective They are coming to take the catheter out at 5:00 and then I am going home\"   RLE Assessment   RLE Assessment WFL  (3+ to 4 -/5)   LLE Assessment   LLE Assessment WFL  (3+ to 4 -/5)   Vision-Basic Assessment   Current Vision Wears glasses all the time   Bed Mobility   Supine to Sit 7  Independent   Sit to Supine 7  Independent   Transfers   Sit to Stand 7  Independent   Stand to Sit 7  Independent   Ambulation/Elevation   Gait pattern   (WFL)   Gait Assistance 7  Independent   Assistive Device None   Distance 150 feet with change in direction   Stair Management Assistance   (Patient declined stair training; \"I will be okay when I go home, there is 2 rails\")   Balance   Static Sitting Normal   Static Standing Good   Ambulatory Good   Activity Tolerance   Activity Tolerance Patient limited by fatigue;Patient limited by pain  (Bladder spasm at end of ambulation)   Assessment   Problem List Pain   Assessment Patient seen for Physical Therapy evaluation. Patient admitted with Hemorrhagic cystitis.  Comorbidities affecting patient's physical performance include: Essential hypertension, COPD, prostate cancer, CKD, gross hematuria, HLD, anemia, RA, urinary retention.  Personal factors affecting patient at time of initial evaluation include: lives in one story house, stairs to enter home, inability to navigate community distances, inability to navigate level surfaces without external assistance, and inability to perform dynamic tasks in community. Prior to " admission, patient was independent with functional mobility without assistive device, independent with ADLS, independent with IADLS, living with spouse in a one level home with 6 steps to enter, ambulating household distance, and ambulating community distances.  Please find objective findings from Physical Therapy assessment regarding body systems outlined above with impairments and limitations including pain, decreased activity tolerance, and decreased functional mobility tolerance.  The Barthel Index was used as a functional outcome tool presenting with a score of Barthel Index Score: 90 today indicating minimal limitations of functional mobility and ADLS.  Patient's clinical presentation is currently stable  as seen in patient's presentation of vital sign response, changing level of pain, increased fall risk, new onset of impairment of functional mobility, decreased endurance, and new onset of weakness.  Patient is not in need of further skilled inpatient PT services while admitted as patient is independent.  Will DC skilled PT services at this time.  Patient encouraged to ambulate ad margo. on the unit while admitted. As demonstrated by objective findings, the assigned level of complexity for this evaluation is low.    The patient's AM-PAC Basic Mobility Inpatient Short Form Raw Score is 24. A Raw score of greater than 16 suggests the patient may benefit from discharge to home. Please also refer to the recommendation of the Physical Therapist for safe discharge planning.   Goals   Patient Goals To go home   Plan   Treatment/Interventions ADL retraining;Functional transfer training;LE strengthening/ROM;Elevations;Therapeutic exercise;Endurance training;Patient/family training;Equipment eval/education;Bed mobility;Gait training;Compensatory technique education;Spoke to nursing;Spoke to case management   PT Frequency Other (Comment)  (1 visit only)   Discharge Recommendation   Rehab Resource Intensity Level, PT No  post-acute rehabilitation needs   Additional Comments Patient seen for PT evaluation only; patient is independent; encouraged to continue ambulation ad margo. on the nursing unit while admitted; no further skilled PT needs   AM-PAC Basic Mobility Inpatient   Turning in Flat Bed Without Bedrails 4   Lying on Back to Sitting on Edge of Flat Bed Without Bedrails 4   Moving Bed to Chair 4   Standing Up From Chair Using Arms 4   Walk in Room 4   Climb 3-5 Stairs With Railing 4   Basic Mobility Inpatient Raw Score 24   Basic Mobility Standardized Score 57.68   Highest Level Of Mobility   JH-HLM Goal 8: Walk 250 feet or more   JH-HLM Achieved 7: Walk 25 feet or more  (Patient ambulated 150 feet with PT, declined further distance)   Barthel Index   Feeding 10   Bathing 5   Grooming Score 5   Dressing Score 10   Bladder Score 0   Bowels Score 10   Toilet Use Score 10   Transfers (Bed/Chair) Score 15   Mobility (Level Surface) Score 15   Stairs Score 10   Barthel Index Score 90   End of Consult   Patient Position at End of Consult Supine;All needs within reach   Licensure   [place] License Number  Unique Esparza, PT [id number]   Portions of the documentation may have been created using voice recognition software. Occasional wrong word or sound alike substitutions may have occurred due to the inherent limitation of the voice recognition software. Read the chart carefully and recognize, using context, where substitutions have occurred.

## 2024-03-02 NOTE — ASSESSMENT & PLAN NOTE
Patient reports having cold symptoms for about 6 days.  Reports chronic productive cough that has not changed, reports clogged nose.  Reports chills at home.  Saw PCP yesterday, prescribed Medrol pack/doxycycline for 7 days/Flonase/albuterol nebulizer as needed.  On Trelegy inhaler, Ventolin inhaler at home at baseline.    Resume home inhaler regimen on discharge; completed prednisone therapy while inpatient  Will also be on cefdinir for his UTI which will provide appropriate pulmonary antibiotic coverage

## 2024-03-02 NOTE — PLAN OF CARE
Problem: INFECTION - ADULT  Goal: Absence or prevention of progression during hospitalization  Description: INTERVENTIONS:  - Assess and monitor for signs and symptoms of infection  - Monitor lab/diagnostic results  - Monitor all insertion sites, i.e. indwelling lines, tubes, and drains  - Monitor endotracheal if appropriate and nasal secretions for changes in amount and color  - [place] appropriate cooling/warming therapies per order  - Administer medications as ordered  - Instruct and encourage patient and family to use good hand hygiene technique  - Identify and instruct in appropriate isolation precautions for identified infection/condition  Outcome: Progressing     Problem: PAIN - ADULT  Goal: Verbalizes/displays adequate comfort level or baseline comfort level  Description: Interventions:  - Encourage patient to monitor pain and request assistance  - Assess pain using appropriate pain scale  - Administer analgesics based on type and severity of pain and evaluate response  - Implement non-pharmacological measures as appropriate and evaluate response  - Consider cultural and social influences on pain and pain management  - Notify physician/advanced practitioner if interventions unsuccessful or patient reports new pain  Outcome: Progressing     Problem: GENITOURINARY - ADULT  Goal: Maintains or returns to baseline urinary function  Description: INTERVENTIONS:  - Assess urinary function  - Encourage oral fluids to ensure adequate hydration if ordered  - Administer IV fluids as ordered to ensure adequate hydration  - Administer ordered medications as needed  - Offer frequent toileting  - Follow urinary retention protocol if ordered  Outcome: Progressing  Goal: Absence of urinary retention  Description: INTERVENTIONS:  - Assess patient’s ability to void and empty bladder  - Monitor I/O  - Bladder scan as needed  - Discuss with physician/AP medications to alleviate retention as needed  - Discuss catheterization  for long term situations as appropriate  Outcome: Progressing  Goal: Urinary catheter remains patent  Description: INTERVENTIONS:  - Assess patency of urinary catheter  - If patient has a chronic alanis, consider changing catheter if non-functioning  - Follow guidelines for intermittent irrigation of non-functioning urinary catheter  Outcome: Progressing     Problem: RESPIRATORY - ADULT  Goal: Achieves optimal ventilation and oxygenation  Description: INTERVENTIONS:  - Assess for changes in respiratory status  - Assess for changes in mentation and behavior  - Position to facilitate oxygenation and minimize respiratory effort  - Oxygen administered by appropriate delivery if ordered  - Initiate smoking cessation education as indicated  - Encourage broncho-pulmonary hygiene including cough, deep breathe, Incentive Spirometry  - Assess the need for suctioning and aspirate as needed  - Assess and instruct to report SOB or any respiratory difficulty  - Respiratory Therapy support as indicated  Outcome: Progressing

## 2024-03-02 NOTE — ASSESSMENT & PLAN NOTE
Lab Results   Component Value Date    EGFR 46 03/02/2024    EGFR 52 03/01/2024    EGFR 47 02/29/2024    CREATININE 1.41 (H) 03/02/2024    CREATININE 1.28 03/01/2024    CREATININE 1.39 (H) 02/29/2024   Baseline creatinine appears to be around 1.3's  Creatinine stable  Monitor   Statement Selected

## 2024-03-02 NOTE — PLAN OF CARE
Problem: RESPIRATORY - ADULT  Goal: Achieves optimal ventilation and oxygenation  Description: INTERVENTIONS:  - Assess for changes in respiratory status  - Assess for changes in mentation and behavior  - Position to facilitate oxygenation and minimize respiratory effort  - Oxygen administered by appropriate delivery if ordered  - Initiate smoking cessation education as indicated  - Encourage broncho-pulmonary hygiene including cough, deep breathe, Incentive Spirometry  - Assess the need for suctioning and aspirate as needed  - Assess and instruct to report SOB or any respiratory difficulty  - Respiratory Therapy support as indicated  Outcome: Progressing     Problem: GENITOURINARY - ADULT  Goal: Maintains or returns to baseline urinary function  Description: INTERVENTIONS:  - Assess urinary function  - Encourage oral fluids to ensure adequate hydration if ordered  - Administer IV fluids as ordered to ensure adequate hydration  - Administer ordered medications as needed  - Offer frequent toileting  - Follow urinary retention protocol if ordered  Outcome: Progressing  Goal: Absence of urinary retention  Description: INTERVENTIONS:  - Assess patient’s ability to void and empty bladder  - Monitor I/O  - Bladder scan as needed  - Discuss with physician/AP medications to alleviate retention as needed  - Discuss catheterization for long term situations as appropriate  Outcome: Progressing  Goal: Urinary catheter remains patent  Description: INTERVENTIONS:  - Assess patency of urinary catheter  - If patient has a chronic alanis, consider changing catheter if non-functioning  - Follow guidelines for intermittent irrigation of non-functioning urinary catheter  Outcome: Progressing     Problem: PAIN - ADULT  Goal: Verbalizes/displays adequate comfort level or baseline comfort level  Description: Interventions:  - Encourage patient to monitor pain and request assistance  - Assess pain using appropriate pain scale  -  Administer analgesics based on type and severity of pain and evaluate response  - Implement non-pharmacological measures as appropriate and evaluate response  - Consider cultural and social influences on pain and pain management  - Notify physician/advanced practitioner if interventions unsuccessful or patient reports new pain  Outcome: Progressing     Problem: INFECTION - ADULT  Goal: Absence or prevention of progression during hospitalization  Description: INTERVENTIONS:  - Assess and monitor for signs and symptoms of infection  - Monitor lab/diagnostic results  - Monitor all insertion sites, i.e. indwelling lines, tubes, and drains  - Monitor endotracheal if appropriate and nasal secretions for changes in amount and color  - [place] appropriate cooling/warming therapies per order  - Administer medications as ordered  - Instruct and encourage patient and family to use good hand hygiene technique  - Identify and instruct in appropriate isolation precautions for identified infection/condition  Outcome: Progressing

## 2024-03-02 NOTE — CASE MANAGEMENT
Case Management Assessment & Discharge Planning Note    Patient name Joel Wyman  Location 2 [facility]/2 [facility] 203 MRN [id number]  : 1945 Date 3/2/2024       Current Admission Date: 2024  Current Admission Diagnosis:Hemorrhagic cystitis   Patient Active Problem List    Diagnosis Date Noted    Obesity (BMI 30.0-34.9) 2024    Nicotine abuse 2024    Chronic kidney disease     Urinary retention 2021    Hematuria 2021    Prostate cancer (HCC) 2021    Leukocytosis 2021    RA (rheumatoid arthritis) ([facility]) 2021    Acute blood loss anemia (ABLA) 2021    SHANTI (acute kidney injury) ([facility]) 2021    Hemorrhagic cystitis     Anemia 2021    COPD (chronic obstructive pulmonary disease) ([facility]) 2021    Hyperlipidemia 2021    Gross hematuria 2021    Essential hypertension 2021    Dyslipidemia 2021      LOS (days): 2  Geometric Mean LOS (GMLOS) (days): 2.9  Days to GMLOS:1.1     OBJECTIVE:    Risk of Unplanned Readmission Score: 16.88     Current admission status: Inpatient    Preferred Pharmacy:   [facility]/pharmacy #[id number] - [facility] [address] [address]  Phone: [phone] Fax: [phone]    Primary Care Provider: Fabiano Carroll MD    Primary Insurance: [employer] [facility] REP  Secondary Insurance:     ASSESSMENT:  Active Health Care Proxies    There are no active Health Care Proxies on file.       Advance Directives  Does patient currently have a Health Care decision maker?: Yes, please see Health Care Proxy section  Primary Contact: Renee Wyman    Readmission Root Cause  30 Day Readmission: No    Patient Information  Admitted from:: Home  Mental Status: Alert  During Assessment patient was accompanied by: Daughter, Other-Comment (grandson)  Assessment information provided by:: Patient  Primary Caregiver: Family  Caregiver's Name:: Renee Wyman  Caregiver's Relationship to  Patient:: Family Member (wife)  Caregiver's Telephone Number:: [phone]  Support Systems: Self, Spouse/significant other, Daughter, Family members  County of Residence: [place]  What city do you live in?: [place]  Type of Current Residence: Apartment  Living Arrangements: Lives w/ Spouse/significant other    Activities of Daily Living Prior to Admission  Functional Status: Independent  Does patient use assisted devices?: Yes  Does patient currently own DME?: Yes  What DME does the patient currently own?: Straight Cane, Walker  Does patient currently have HHC?: No    Patient Information Continued  Income Source: Pension/USP  Does patient have prescription coverage?: Yes  Does patient receive dialysis treatments?: No      Social Determinants of Health (SDOH)      Flowsheet Row Most Recent Value   Housing Stability    In the last 12 months, was there a time when you were not able to pay the mortgage or rent on time? N   In the last 12 months, how many places have you lived? 1   In the last 12 months, was there a time when you did not have a steady place to sleep or slept in a shelter (including now)? N   Transportation Needs    In the past 12 months, has lack of transportation kept you from medical appointments or from getting medications? no   In the past 12 months, has lack of transportation kept you from meetings, work, or from getting things needed for daily living? No   Food Insecurity    Within the past 12 months, you worried that your food would run out before you got the money to buy more. Never true   Within the past 12 months, the food you bought just didn't last and you didn't have money to get more. Never true   Utilities    In the past 12 months has the electric, gas, oil, or water company threatened to shut off services in your home? No            DISCHARGE DETAILS:    Discharge planning discussed with:: Patient  Freedom of Choice: Yes  Comments - Freedom of Choice: SW met briefly with pt  to assess needs and discuss plans.  Pt's daughter and grandson were also present.  Pt's plan is to return home with wife when discharged.  No discharge needs expressed by pt.  Pt is anticipating discharging home today.  Offered assistance in future if needed.  CM contacted family/caregiver?: Yes    Contacts  Relationship to Patient:: Family (daughter and grandson)  Contact Method: In Person  Reason/Outcome: Discharge Planning    Requested Home Health Care         Is the patient interested in HHC at discharge?: No    DME Referral Provided  Referral made for DME?: No    Other Referral/Resources/Interventions Provided:  Interventions: None Indicated    Treatment Team Recommendation: Home  Discharge Destination Plan:: Home  Transport at Discharge : Family    IMM Given (Date):: 03/02/24  IMM Given to:: Patient (IMM #2 reviewed with pt.  Pt verbalized understanding and agrees with discharge determination.  Pt signed IMM and copy given. Copy also placed in scan bin for chart.)

## 2024-03-03 VITALS
BODY MASS INDEX: 30.52 KG/M2 | OXYGEN SATURATION: 91 % | HEIGHT: 71 IN | TEMPERATURE: 97.4 F | HEART RATE: 50 BPM | DIASTOLIC BLOOD PRESSURE: 72 MMHG | WEIGHT: 218 LBS | RESPIRATION RATE: 18 BRPM | SYSTOLIC BLOOD PRESSURE: 115 MMHG

## 2024-03-03 LAB
ALBUMIN SERPL BCP-MCNC: 3.6 G/DL (ref 3.5–5)
ALP SERPL-CCNC: 54 U/L (ref 34–104)
ALT SERPL W P-5'-P-CCNC: 18 U/L (ref 7–52)
ANION GAP SERPL CALCULATED.3IONS-SCNC: 8 MMOL/L
AST SERPL W P-5'-P-CCNC: 15 U/L (ref 13–39)
BASOPHILS # BLD AUTO: 0.03 THOUSANDS/ÂΜL (ref 0–0.1)
BASOPHILS NFR BLD AUTO: 0 % (ref 0–1)
BILIRUB SERPL-MCNC: 0.68 MG/DL (ref 0.2–1)
BUN SERPL-MCNC: 41 MG/DL (ref 5–25)
CALCIUM SERPL-MCNC: 8.5 MG/DL (ref 8.4–10.2)
CHLORIDE SERPL-SCNC: 102 MMOL/L (ref 96–108)
CO2 SERPL-SCNC: 25 MMOL/L (ref 21–32)
CREAT SERPL-MCNC: 1.42 MG/DL (ref 0.6–1.3)
EOSINOPHIL # BLD AUTO: 0.03 THOUSAND/ÂΜL (ref 0–0.61)
EOSINOPHIL NFR BLD AUTO: 0 % (ref 0–6)
ERYTHROCYTE [DISTWIDTH] IN BLOOD BY AUTOMATED COUNT: 14.7 % (ref 11.6–15.1)
GFR SERPL CREATININE-BSD FRML MDRD: 46 ML/MIN/1.73SQ M
GLUCOSE SERPL-MCNC: 92 MG/DL (ref 65–140)
HCT VFR BLD AUTO: 47 % (ref 36.5–49.3)
HGB BLD-MCNC: 15.6 G/DL (ref 12–17)
IMM GRANULOCYTES # BLD AUTO: 0.13 THOUSAND/UL (ref 0–0.2)
IMM GRANULOCYTES NFR BLD AUTO: 1 % (ref 0–2)
LYMPHOCYTES # BLD AUTO: 2 THOUSANDS/ÂΜL (ref 0.6–4.47)
LYMPHOCYTES NFR BLD AUTO: 17 % (ref 14–44)
MCH RBC QN AUTO: 29.6 PG (ref 26.8–34.3)
MCHC RBC AUTO-ENTMCNC: 33.2 G/DL (ref 31.4–37.4)
MCV RBC AUTO: 89 FL (ref 82–98)
MONOCYTES # BLD AUTO: 1.15 THOUSAND/ÂΜL (ref 0.17–1.22)
MONOCYTES NFR BLD AUTO: 10 % (ref 4–12)
NEUTROPHILS # BLD AUTO: 8.49 THOUSANDS/ÂΜL (ref 1.85–7.62)
NEUTS SEG NFR BLD AUTO: 72 % (ref 43–75)
NRBC BLD AUTO-RTO: 0 /100 WBCS
PLATELET # BLD AUTO: 184 THOUSANDS/UL (ref 149–390)
PMV BLD AUTO: 10.2 FL (ref 8.9–12.7)
POTASSIUM SERPL-SCNC: 3.9 MMOL/L (ref 3.5–5.3)
PROT SERPL-MCNC: 6.5 G/DL (ref 6.4–8.4)
RBC # BLD AUTO: 5.27 MILLION/UL (ref 3.88–5.62)
SODIUM SERPL-SCNC: 135 MMOL/L (ref 135–147)
WBC # BLD AUTO: 11.83 THOUSAND/UL (ref 4.31–10.16)

## 2024-03-03 PROCEDURE — 85025 COMPLETE CBC W/AUTO DIFF WBC: CPT | Performed by: STUDENT IN AN ORGANIZED HEALTH CARE EDUCATION/TRAINING PROGRAM

## 2024-03-03 PROCEDURE — 80053 COMPREHEN METABOLIC PANEL: CPT | Performed by: STUDENT IN AN ORGANIZED HEALTH CARE EDUCATION/TRAINING PROGRAM

## 2024-03-03 PROCEDURE — 99239 HOSP IP/OBS DSCHRG MGMT >30: CPT | Performed by: INTERNAL MEDICINE

## 2024-03-03 RX ORDER — CEFDINIR 300 MG/1
300 CAPSULE ORAL EVERY 12 HOURS SCHEDULED
Qty: 8 CAPSULE | Refills: 0 | Status: SHIPPED | OUTPATIENT
Start: 2024-03-03 | End: 2024-03-07

## 2024-03-03 RX ADMIN — UMECLIDINIUM 1 PUFF: 62.5 AEROSOL, POWDER ORAL at 08:07

## 2024-03-03 RX ADMIN — CEFDINIR 300 MG: 300 CAPSULE ORAL at 08:06

## 2024-03-03 RX ADMIN — NICOTINE 1 PATCH: 14 PATCH, EXTENDED RELEASE TRANSDERMAL at 08:06

## 2024-03-03 RX ADMIN — FLUTICASONE FUROATE AND VILANTEROL TRIFENATATE 1 PUFF: 200; 25 POWDER RESPIRATORY (INHALATION) at 08:07

## 2024-03-03 RX ADMIN — METOPROLOL TARTRATE 25 MG: 25 TABLET, FILM COATED ORAL at 08:06

## 2024-03-03 RX ADMIN — FLUTICASONE PROPIONATE 1 SPRAY: 50 SPRAY, METERED NASAL at 08:07

## 2024-03-03 RX ADMIN — Medication 250 MG: at 08:06

## 2024-03-03 NOTE — PLAN OF CARE
Problem: RESPIRATORY - ADULT  Goal: Achieves optimal ventilation and oxygenation  Description: INTERVENTIONS:  - Assess for changes in respiratory status  - Assess for changes in mentation and behavior  - Position to facilitate oxygenation and minimize respiratory effort  - Oxygen administered by appropriate delivery if ordered  - Initiate smoking cessation education as indicated  - Encourage broncho-pulmonary hygiene including cough, deep breathe, Incentive Spirometry  - Assess the need for suctioning and aspirate as needed  - Assess and instruct to report SOB or any respiratory difficulty  - Respiratory Therapy support as indicated  Outcome: Progressing     Problem: PAIN - ADULT  Goal: Verbalizes/displays adequate comfort level or baseline comfort level  Description: Interventions:  - Encourage patient to monitor pain and request assistance  - Assess pain using appropriate pain scale  - Administer analgesics based on type and severity of pain and evaluate response  - Implement non-pharmacological measures as appropriate and evaluate response  - Consider cultural and social influences on pain and pain management  - Notify physician/advanced practitioner if interventions unsuccessful or patient reports new pain  Outcome: Progressing     Problem: INFECTION - ADULT  Goal: Absence or prevention of progression during hospitalization  Description: INTERVENTIONS:  - Assess and monitor for signs and symptoms of infection  - Monitor lab/diagnostic results  - Monitor all insertion sites, i.e. indwelling lines, tubes, and drains  - Monitor endotracheal if appropriate and nasal secretions for changes in amount and color  - [place] appropriate cooling/warming therapies per order  - Administer medications as ordered  - Instruct and encourage patient and family to use good hand hygiene technique  - Identify and instruct in appropriate isolation precautions for identified infection/condition  Outcome: Progressing     Problem:  GENITOURINARY - ADULT  Goal: Maintains or returns to baseline urinary function  Description: INTERVENTIONS:  - Assess urinary function  - Encourage oral fluids to ensure adequate hydration if ordered  - Administer IV fluids as ordered to ensure adequate hydration  - Administer ordered medications as needed  - Offer frequent toileting  - Follow urinary retention protocol if ordered  Outcome: Progressing  Goal: Absence of urinary retention  Description: INTERVENTIONS:  - Assess patient’s ability to void and empty bladder  - Monitor I/O  - Bladder scan as needed  - Discuss with physician/AP medications to alleviate retention as needed  - Discuss catheterization for long term situations as appropriate  Outcome: Progressing  Goal: Urinary catheter remains patent  Description: INTERVENTIONS:  - Assess patency of urinary catheter  - If patient has a chronic alanis, consider changing catheter if non-functioning  - Follow guidelines for intermittent irrigation of non-functioning urinary catheter  Outcome: Progressing

## 2024-03-03 NOTE — PLAN OF CARE
Problem: RESPIRATORY - ADULT  Goal: Achieves optimal ventilation and oxygenation  Description: INTERVENTIONS:  - Assess for changes in respiratory status  - Assess for changes in mentation and behavior  - Position to facilitate oxygenation and minimize respiratory effort  - Oxygen administered by appropriate delivery if ordered  - Initiate smoking cessation education as indicated  - Encourage broncho-pulmonary hygiene including cough, deep breathe, Incentive Spirometry  - Assess the need for suctioning and aspirate as needed  - Assess and instruct to report SOB or any respiratory difficulty  - Respiratory Therapy support as indicated  Outcome: Progressing     Problem: GENITOURINARY - ADULT  Goal: Maintains or returns to baseline urinary function  Description: INTERVENTIONS:  - Assess urinary function  - Encourage oral fluids to ensure adequate hydration if ordered  - Administer IV fluids as ordered to ensure adequate hydration  - Administer ordered medications as needed  - Offer frequent toileting  - Follow urinary retention protocol if ordered  Outcome: Progressing  Goal: Absence of urinary retention  Description: INTERVENTIONS:  - Assess patient’s ability to void and empty bladder  - Monitor I/O  - Bladder scan as needed  - Discuss with physician/AP medications to alleviate retention as needed  - Discuss catheterization for long term situations as appropriate  Outcome: Progressing  Goal: Urinary catheter remains patent  Description: INTERVENTIONS:  - Assess patency of urinary catheter  - If patient has a chronic alanis, consider changing catheter if non-functioning  - Follow guidelines for intermittent irrigation of non-functioning urinary catheter  Outcome: Progressing     Problem: PAIN - ADULT  Goal: Verbalizes/displays adequate comfort level or baseline comfort level  Description: Interventions:  - Encourage patient to monitor pain and request assistance  - Assess pain using appropriate pain scale  -  Administer analgesics based on type and severity of pain and evaluate response  - Implement non-pharmacological measures as appropriate and evaluate response  - Consider cultural and social influences on pain and pain management  - Notify physician/advanced practitioner if interventions unsuccessful or patient reports new pain  Outcome: Progressing     Problem: INFECTION - ADULT  Goal: Absence or prevention of progression during hospitalization  Description: INTERVENTIONS:  - Assess and monitor for signs and symptoms of infection  - Monitor lab/diagnostic results  - Monitor all insertion sites, i.e. indwelling lines, tubes, and drains  - Monitor endotracheal if appropriate and nasal secretions for changes in amount and color  - [place] appropriate cooling/warming therapies per order  - Administer medications as ordered  - Instruct and encourage patient and family to use good hand hygiene technique  - Identify and instruct in appropriate isolation precautions for identified infection/condition  Outcome: Progressing

## 2024-03-03 NOTE — DISCHARGE SUMMARY
[facility]  Discharge- Joel Wyman 1945, 78 y.o. male MRN: [id number]  Unit/Bed#: [address] [place] [address] Encounter: [id number]  Primary Care Provider: Fabiano Carroll MD   Date and time admitted to hospital: 2/29/2024  2:34 PM    * Hemorrhagic cystitis  Assessment & Plan  Patient presented with hematuria, dysuria, and ultimately inability to urinate with suprapubic discomfort.  Three-way [place] catheter placed in the emergency room and CBI initiated.  Urinalysis consistent with UTI    Treated with IV cefepime while inpatient; urine culture grew Klebsiella and Proteus  Urine has since cleared; hematuria was thought to be secondary to hemorrhagic cystitis versus radiation cystitis  Will continue on p.o. cefdinir to complete a 7-day course of antibiotic therapy on discharge  Passed voiding trial prior to discharge  Follow-up with Dr. Khan, urology tomorrow 3/4 as an outpatient    COPD (chronic obstructive pulmonary disease) ([facility])  Assessment & Plan  Patient reports having cold symptoms for about 6 days.  Reports chronic productive cough that has not changed, reports clogged nose.  Reports chills at home.  Saw PCP yesterday, prescribed Medrol pack/doxycycline for 7 days/Flonase/albuterol nebulizer as needed.  On Trelegy inhaler, Ventolin inhaler at home at baseline.    Resume home inhaler regimen on discharge; completed prednisone therapy while inpatient  Will also be on cefdinir for his UTI which will provide appropriate pulmonary antibiotic coverage    Nicotine abuse  Assessment & Plan  Nicotine patch, smoking cessation    Obesity (BMI 30.0-34.9)  Assessment & Plan  Body mass index is 30.4 kg/m².   Diet and lifestyle modification     Chronic kidney disease  Assessment & Plan  Lab Results   Component Value Date    EGFR 46 03/02/2024    EGFR 52 03/01/2024    EGFR 47 02/29/2024    CREATININE 1.41 (H) 03/02/2024    CREATININE 1.28 03/01/2024    CREATININE 1.39 (H) 02/29/2024   Baseline  creatinine appears to be around 1.3's  Creatinine stable  Monitor    Prostate cancer (HCC)  Assessment & Plan  Status post prostatectomy, radiation    Dyslipidemia  Assessment & Plan  Diet controlled    Essential hypertension  Assessment & Plan  Resume home regimen on discharge      Medical Problems       Resolved Problems  Date Reviewed: 2/29/2024   None       Discharging Physician / Practitioner: Mike Zee DO  PCP: Fabiano Carroll MD  Admission Date:   Admission Orders (From admission, onward)       Ordered        02/29/24 1822  INPATIENT ADMISSION  Once                          Discharge Date: 03/03/24    Consultations During Hospital Stay:  Urology    Procedures Performed:   none    Significant Findings / Test Results:   Hemorrhagic cystitis versus radiation cystitis    Incidental Findings:   none     Test Results Pending at Discharge (will require follow up):   none     Outpatient Tests Requested:  none    Complications:  none    Reason for Admission: Hematuria and urinary retention    Hospital Course:   Joel Wyman is a 78 y.o. male patient who originally presented to the hospital on 2/29/2024 due to hematuria, difficulty voiding, and urinary retention.  In the emergency room a three-way [place] catheter was placed for relief of his hematuria and urinary retention.  Urinalysis was consistent with infection and he was started on IV cefepime.  He was admitted to the hospitalist service and received 3 days of IV antibiotics with resolution of his hematuria.  Catheter was removed prior to discharge and patient was adequately voiding.  He will be discharged home on p.o. cefdinir to complete a 7-day course of antibiotic therapy.  He will be following up with urology as an outpatient tomorrow 3/4.  Otherwise patient was feeling well at the time of discharge.  All questions and concerns were addressed.  He was eager to return home    Please see above list of diagnoses and related plan for  "additional information.     Condition at Discharge: good    Discharge Day Visit / Exam:   Subjective: Patient seen and examined on the day of discharge.  He is feeling well today.  Voiding.  Afebrile.  No new complaints.  Vitals: Blood Pressure: 115/72 (03/03/24 0709)  Pulse: (!) 50 (03/03/24 0709)  Temperature: (!) 97.4 °F (36.3 °C) (03/03/24 0709)  Temp Source: Oral (03/03/24 0709)  Respirations: 18 (03/03/24 0709)  Height: 5' 11\" (180.3 cm) (03/03/24 0900)  Weight - Scale: 98.9 kg (218 lb) (02/29/24 1430)  SpO2: 91 % (03/03/24 0709)    PHYSICAL EXAM:    Vitals signs reviewed  Constitutional   Awake and cooperative. NAD.   Head/Neck   Normocephalic. Atraumatic.   HEENT   No scleral icterus. EOMI.   Heart   Regular rate and rhythm. No murmers.   Lungs   Clear to auscultation bilaterally. Respirations unlaboured.   Abdomen   Soft. Nontender. Nondistended.    Skin   Skin color normal. No rashes.   Extremities   No deformities. No peripheral edema.   Neuro   Alert and oriented. No new deficits.   Psych   Mood stable. Affect normal.         Discussion with Family: Patient declined call to .     Discharge instructions/Information to patient and family:   See after visit summary for information provided to patient and family.      Provisions for Follow-Up Care:  See after visit summary for information related to follow-up care and any pertinent home health orders.      Mobility at time of Discharge:   Basic Mobility Inpatient Raw Score: 24  JH-HLM Goal: 8: Walk 250 feet or more  JH-HLM Achieved: 8: Walk 250 feet ot more  HLM Goal achieved. Continue to encourage appropriate mobility.     Disposition:   Home    Planned Readmission: no     Discharge Statement:  I spent 40 minutes discharging the patient. This time was spent on the day of discharge. I had direct contact with the patient on the day of discharge. Greater than 50% of the total time was spent examining patient, answering all patient questions, " arranging and discussing plan of care with patient as well as directly providing post-discharge instructions.  Additional time then spent on discharge activities.    Discharge Medications:  See after visit summary for reconciled discharge medications provided to patient and/or family.      **Please Note: This note may have been constructed using a voice recognition system**

## 2024-03-12 ENCOUNTER — HOSPITAL ENCOUNTER (EMERGENCY)
Facility: HOSPITAL | Age: 79
Discharge: HOME | End: 2024-03-12
Attending: EMERGENCY MEDICINE
Payer: COMMERCIAL

## 2024-03-12 VITALS
DIASTOLIC BLOOD PRESSURE: 117 MMHG | SYSTOLIC BLOOD PRESSURE: 215 MMHG | HEART RATE: 91 BPM | OXYGEN SATURATION: 95 % | RESPIRATION RATE: 20 BRPM | TEMPERATURE: 97.9 F

## 2024-03-12 DIAGNOSIS — R33.8 ACUTE URINARY RETENTION: Primary | ICD-10-CM

## 2024-03-12 PROCEDURE — 87077 CULTURE AEROBIC IDENTIFY: CPT | Performed by: EMERGENCY MEDICINE

## 2024-03-12 PROCEDURE — 87186 SC STD MICRODIL/AGAR DIL: CPT | Performed by: EMERGENCY MEDICINE

## 2024-03-12 PROCEDURE — 99283 EMERGENCY DEPT VISIT LOW MDM: CPT

## 2024-03-12 PROCEDURE — 99284 EMERGENCY DEPT VISIT MOD MDM: CPT | Performed by: EMERGENCY MEDICINE

## 2024-03-12 PROCEDURE — 87086 URINE CULTURE/COLONY COUNT: CPT | Performed by: EMERGENCY MEDICINE

## 2024-03-12 RX ORDER — LIDOCAINE HYDROCHLORIDE 20 MG/ML
1 JELLY TOPICAL ONCE
Status: COMPLETED | OUTPATIENT
Start: 2024-03-12 | End: 2024-03-12

## 2024-03-12 RX ADMIN — LIDOCAINE HYDROCHLORIDE 1 APPLICATION: 20 JELLY TOPICAL at 16:17

## 2024-03-13 NOTE — ED PROVIDER NOTES
History  Chief Complaint   Patient presents with    Urinary Retention     Patient c/o of painful and difficulty urinating restarting this morning.      Patient presents for evaluation of painful urination and difficulty urinating starting this morning.  Denies any hematuria.  He was seen by Dr. Rico this morning and was diagnosed with a urinary tract infection given a prescription for antibiotics.  States he came in tonight because the pain worsened and he is unable to pee at this time.      History provided by:  Patient   used: No        Prior to Admission Medications   Prescriptions Last Dose Informant Patient Reported? Taking?   albuterol (2.5 mg/3 mL) 0.083 % nebulizer solution   No No   Sig: Take 3 mL (2.5 mg total) by nebulization every 6 (six) hours as needed for wheezing or shortness of breath   albuterol (PROVENTIL HFA,VENTOLIN HFA) 90 mcg/act inhaler   No No   Sig: Inhale 2 puffs every 4 (four) hours as needed for wheezing   fluticasone (FLONASE) 50 mcg/act nasal spray   Yes No   Si spray into each nostril daily   fluticasone-umeclidinium-vilanterol (Trelegy Ellipta) 200-62.5-25 mcg/actuation AEPB inhaler   Yes No   Sig: Inhale 1 puff daily   metoprolol tartrate (LOPRESSOR) 25 mg tablet   Yes No   Sig: Take 25 mg by mouth every 12 (twelve) hours       Facility-Administered Medications: None       Past Medical History:   Diagnosis Date    Borderline diabetes     Cancer (HCC)     prostate    COPD (chronic obstructive pulmonary disease) (HCC)     Hyperlipidemia     Hypertension        Past Surgical History:   Procedure Laterality Date    APPENDECTOMY      FL RETROGRADE PYELOGRAM  2021    FL RETROGRADE PYELOGRAM  2023    HERNIA REPAIR      IR EMBOLIZATION (SPECIFY VESSEL OR SITE)  2021    IR NEPHROSTOMY TUBE CHECK AND/OR REMOVAL  2021    IR NEPHROSTOMY TUBE CHECK/CHANGE/REPOSITION/REINSERTION/UPSIZE  5/10/2021    IR NEPHROSTOMY TUBE  CHECK/CHANGE/REPOSITION/REINSERTION/UPSIZE  8/4/2021    IR NEPHROSTOMY TUBE CHECK/CHANGE/REPOSITION/REINSERTION/UPSIZE  10/13/2021    IR NEPHROSTOMY TUBE PLACEMENT  5/7/2021    AL CYSTO W/IRRIG & EVAC MULTPLE OBSTRUCTING CLOTS Bilateral 4/14/2021    Procedure: CYSTOSCOPY EVACUATION OF CLOTS bilateral retrograde pyelograms possible TURBT bladder biopsy;  Surgeon: Yovani Khan MD;  Location: [facility];  Service: Urology    AL CYSTO W/IRRIG & EVAC MULTPLE OBSTRUCTING CLOTS N/A 4/24/2021    Procedure: CYSTOSCOPY EVACUATION OF CLOTS fulguration;  Surgeon: Yovani Khan MD;  Location: [facility];  Service: Urology    AL CYSTO W/IRRIG & EVAC MULTPLE OBSTRUCTING CLOTS N/A 7/8/2021    Procedure: CYSTOSCOPY EVACUATION OF CLOTS BILATERAL ANTIROGRADES NEPHROSTOMY TUBES, FULGERATION ;  Surgeon: Yovani Khan MD;  [facility]: [facility];  Service: Urology    AL CYSTO W/IRRIG & EVAC MULTPLE OBSTRUCTING CLOTS N/A 8/22/2021    Procedure: CYSTOSCOPY, RIGHT RETROGRADE, EVACUATION OF CLOTS, BLADDER BIOPSY X2 AND FULGERATION OF BLADDER LESIONS, URETEROSCOPY, 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    E-Cigarette/Vaping    E-Cigarette Use Former User      E-Cigarette/Vaping Substances    Nicotine No     THC No     CBD No     Flavoring No     Other No     Unknown No      Social History     Tobacco Use    Smoking status: Some Days     Current  packs/day: 0.50     Average packs/day: 0.5 packs/day for 50.0 years (25.0 ttl pk-yrs)     Types: Cigarettes     Passive exposure: Never    Smokeless tobacco: Never   Vaping Use    Vaping status: Former   Substance Use Topics    Alcohol use: Never    Drug use: No       Review of Systems   Genitourinary:  Positive for difficulty urinating and dysuria.   All other systems reviewed and are negative.      Physical Exam  Physical Exam  Vitals and nursing note reviewed.   Constitutional:       General: He is not in acute distress.  Cardiovascular:      Rate and Rhythm: Normal rate.   Pulmonary:      Effort: Pulmonary effort is normal. No respiratory distress.   Abdominal:      Tenderness: There is abdominal tenderness.      Comments: Suprapubic tenderness   Neurological:      General: No focal deficit present.      Mental Status: He is alert and oriented to person, place, and time.         Vital Signs  ED Triage Vitals [03/12/24 1555]   Temperature Pulse Respirations Blood Pressure SpO2   97.9 °F (36.6 °C) 91 20 (!) 215/117 95 %      Temp Source Heart Rate Source Patient Position - Orthostatic VS BP Location FiO2 (%)   Tympanic Monitor Standing Left arm --      Pain Score       --           Vitals:    03/12/24 1555   BP: (!) 215/117   Pulse: 91   Patient Position - Orthostatic VS: Standing         Visual Acuity      ED Medications  Medications   lidocaine (URO-JET) 2 % urethral/mucosal gel 1 Application (1 Application Urethral Given 3/12/24 1617)       Diagnostic Studies  Results Reviewed       Procedure Component Value Units Date/Time    Urine culture [[id number]] Collected: 03/12/24 1640    Lab Status: In process Specimen: Urine, Indwelling Sumner Catheter Updated: 03/12/24 1707                   No orders to display              Procedures  Procedures         ED Course                                             Medical Decision Making  Pulse ox 95% on room air indicating adequate oxygenation.      Bladder scan showed  between 100-200 mL of fluid.  After discussion with the patient he would like to have a catheter placed.  Catheter placed a little over 200 mL of urinary output that is clear no gross hematuria.  Urine culture sent.  Will discharge patient with catheter in place to follow-up with Dr. Khan.  Take antibiotics as prescribed.    Amount and/or Complexity of Data Reviewed  Labs: ordered.    Risk  Prescription drug management.             Disposition  Final diagnoses:   Acute urinary retention     Time reflects when diagnosis was documented in both MDM as applicable and the Disposition within this note       Time User Action Codes Description Comment    3/12/2024  4:57 PM Fran Gale Add [R33.8] Acute urinary retention           ED Disposition       ED Disposition   Discharge    Condition   Stable    Date/Time   Tue Mar 12, 2024  4:57 PM    Comment   Joel Wyman discharge to home/self care.                   Follow-up Information       Follow up With Specialties Details Why Contact Info    Yovani Khan MD Urology In 3 days  [address] [facility] [address]  [phone]              Discharge Medication List as of 3/12/2024  4:57 PM        CONTINUE these medications which have NOT CHANGED    Details   albuterol (2.5 mg/3 mL) 0.083 % nebulizer solution Take 3 mL (2.5 mg total) by nebulization every 6 (six) hours as needed for wheezing or shortness of breath, Starting Fri 6/2/2023, Normal      albuterol (PROVENTIL HFA,VENTOLIN HFA) 90 mcg/act inhaler Inhale 2 puffs every 4 (four) hours as needed for wheezing, Starting Thu 8/31/2017, Print      fluticasone (FLONASE) 50 mcg/act nasal spray 1 spray into each nostril daily, Historical Med      fluticasone-umeclidinium-vilanterol (Trelegy Ellipta) 200-62.5-25 mcg/actuation AEPB inhaler Inhale 1 puff daily, Starting Tue 6/27/2023, Until Wed 6/26/2024, Historical Med      metoprolol tartrate (LOPRESSOR) 25 mg tablet Take 25 mg by mouth every 12 (twelve) hours  , Historical Med             No discharge procedures on file.    PDMP Review         Value Time User    PDMP Reviewed  Yes 7/13/2021 10:25 AM CORWIN Esquivel            ED Provider  Electronically Signed by             Fran Gale DO  03/12/24 5737

## 2024-03-14 LAB — BACTERIA UR CULT: ABNORMAL

## 2024-03-17 ENCOUNTER — HOSPITAL ENCOUNTER (INPATIENT)
Facility: HOSPITAL | Age: 79
LOS: 1 days | Discharge: HOME | DRG: 700 | End: 2024-03-19
Attending: EMERGENCY MEDICINE | Admitting: INTERNAL MEDICINE
Payer: COMMERCIAL

## 2024-03-17 DIAGNOSIS — R31.0 GROSS HEMATURIA: ICD-10-CM

## 2024-03-17 DIAGNOSIS — Z72.0 NICOTINE ABUSE: ICD-10-CM

## 2024-03-17 DIAGNOSIS — N39.0 URINARY TRACT INFECTION: ICD-10-CM

## 2024-03-17 DIAGNOSIS — R31.9 HEMATURIA, UNSPECIFIED TYPE: Primary | ICD-10-CM

## 2024-03-17 DIAGNOSIS — Z97.8 FOLEY CATHETER IN PLACE: ICD-10-CM

## 2024-03-17 LAB
ALBUMIN SERPL BCP-MCNC: 3.9 G/DL (ref 3.5–5)
ALP SERPL-CCNC: 66 U/L (ref 34–104)
ALT SERPL W P-5'-P-CCNC: 16 U/L (ref 7–52)
ANION GAP SERPL CALCULATED.3IONS-SCNC: 7 MMOL/L (ref 4–13)
AST SERPL W P-5'-P-CCNC: 16 U/L (ref 13–39)
BACTERIA UR QL AUTO: ABNORMAL /HPF
BASOPHILS # BLD AUTO: 0.02 THOUSANDS/ÂΜL (ref 0–0.1)
BASOPHILS NFR BLD AUTO: 0 % (ref 0–1)
BILIRUB SERPL-MCNC: 0.64 MG/DL (ref 0.2–1)
BILIRUB UR QL STRIP: NEGATIVE
BUN SERPL-MCNC: 26 MG/DL (ref 5–25)
CALCIUM SERPL-MCNC: 9.3 MG/DL (ref 8.4–10.2)
CHLORIDE SERPL-SCNC: 104 MMOL/L (ref 96–108)
CLARITY UR: ABNORMAL
CO2 SERPL-SCNC: 27 MMOL/L (ref 21–32)
COLOR UR: ABNORMAL
CREAT SERPL-MCNC: 1.32 MG/DL (ref 0.6–1.3)
EOSINOPHIL # BLD AUTO: 0.3 THOUSAND/ÂΜL (ref 0–0.61)
EOSINOPHIL NFR BLD AUTO: 3 % (ref 0–6)
ERYTHROCYTE [DISTWIDTH] IN BLOOD BY AUTOMATED COUNT: 14.8 % (ref 11.6–15.1)
GFR SERPL CREATININE-BSD FRML MDRD: 50 ML/MIN/1.73SQ M
GLUCOSE SERPL-MCNC: 119 MG/DL (ref 65–140)
GLUCOSE UR STRIP-MCNC: ABNORMAL MG/DL
HCT VFR BLD AUTO: 46.2 % (ref 36.5–49.3)
HGB BLD-MCNC: 15.3 G/DL (ref 12–17)
HGB UR QL STRIP.AUTO: ABNORMAL
IMM GRANULOCYTES # BLD AUTO: 0.03 THOUSAND/UL (ref 0–0.2)
IMM GRANULOCYTES NFR BLD AUTO: 0 % (ref 0–2)
KETONES UR STRIP-MCNC: ABNORMAL MG/DL
LEUKOCYTE ESTERASE UR QL STRIP: ABNORMAL
LYMPHOCYTES # BLD AUTO: 1.31 THOUSANDS/ÂΜL (ref 0.6–4.47)
LYMPHOCYTES NFR BLD AUTO: 15 % (ref 14–44)
MCH RBC QN AUTO: 29.5 PG (ref 26.8–34.3)
MCHC RBC AUTO-ENTMCNC: 33.1 G/DL (ref 31.4–37.4)
MCV RBC AUTO: 89 FL (ref 82–98)
MONOCYTES # BLD AUTO: 0.67 THOUSAND/ÂΜL (ref 0.17–1.22)
MONOCYTES NFR BLD AUTO: 8 % (ref 4–12)
NEUTROPHILS # BLD AUTO: 6.46 THOUSANDS/ÂΜL (ref 1.85–7.62)
NEUTS SEG NFR BLD AUTO: 74 % (ref 43–75)
NITRITE UR QL STRIP: POSITIVE
NON-SQ EPI CELLS URNS QL MICRO: ABNORMAL /HPF
NRBC BLD AUTO-RTO: 0 /100 WBCS
PH UR STRIP.AUTO: 7 [PH]
PLATELET # BLD AUTO: 158 THOUSANDS/UL (ref 149–390)
PMV BLD AUTO: 9.5 FL (ref 8.9–12.7)
POTASSIUM SERPL-SCNC: 4.5 MMOL/L (ref 3.5–5.3)
PROT SERPL-MCNC: 7.2 G/DL (ref 6.4–8.4)
PROT UR STRIP-MCNC: >=300 MG/DL
RBC # BLD AUTO: 5.18 MILLION/UL (ref 3.88–5.62)
RBC #/AREA URNS AUTO: ABNORMAL /HPF
SODIUM SERPL-SCNC: 138 MMOL/L (ref 135–147)
SP GR UR STRIP.AUTO: 1.01 (ref 1–1.03)
UROBILINOGEN UR QL STRIP.AUTO: 4 E.U./DL
WBC # BLD AUTO: 8.79 THOUSAND/UL (ref 4.31–10.16)
WBC #/AREA URNS AUTO: ABNORMAL /HPF

## 2024-03-17 PROCEDURE — 81001 URINALYSIS AUTO W/SCOPE: CPT | Performed by: PHYSICIAN ASSISTANT

## 2024-03-17 PROCEDURE — 85025 COMPLETE CBC W/AUTO DIFF WBC: CPT | Performed by: PHYSICIAN ASSISTANT

## 2024-03-17 PROCEDURE — 99223 1ST HOSP IP/OBS HIGH 75: CPT | Performed by: INTERNAL MEDICINE

## 2024-03-17 PROCEDURE — 87086 URINE CULTURE/COLONY COUNT: CPT | Performed by: PHYSICIAN ASSISTANT

## 2024-03-17 PROCEDURE — 99285 EMERGENCY DEPT VISIT HI MDM: CPT | Performed by: PHYSICIAN ASSISTANT

## 2024-03-17 PROCEDURE — 99285 EMERGENCY DEPT VISIT HI MDM: CPT

## 2024-03-17 PROCEDURE — 80053 COMPREHEN METABOLIC PANEL: CPT | Performed by: PHYSICIAN ASSISTANT

## 2024-03-17 PROCEDURE — 87077 CULTURE AEROBIC IDENTIFY: CPT | Performed by: PHYSICIAN ASSISTANT

## 2024-03-17 PROCEDURE — 36415 COLL VENOUS BLD VENIPUNCTURE: CPT | Performed by: PHYSICIAN ASSISTANT

## 2024-03-17 RX ORDER — ATORVASTATIN CALCIUM 20 MG/1
20 TABLET, FILM COATED ORAL DAILY
COMMUNITY

## 2024-03-17 RX ORDER — ALBUTEROL SULFATE 90 UG/1
2 AEROSOL, METERED RESPIRATORY (INHALATION) EVERY 4 HOURS PRN
Status: DISCONTINUED | OUTPATIENT
Start: 2024-03-17 | End: 2024-03-19 | Stop reason: HOSPADM

## 2024-03-17 RX ORDER — ATORVASTATIN CALCIUM 20 MG/1
20 TABLET, FILM COATED ORAL DAILY
Status: DISCONTINUED | OUTPATIENT
Start: 2024-03-17 | End: 2024-03-19 | Stop reason: HOSPADM

## 2024-03-17 RX ORDER — CIPROFLOXACIN 250 MG/1
250 TABLET, FILM COATED ORAL EVERY 12 HOURS SCHEDULED
Status: DISCONTINUED | OUTPATIENT
Start: 2024-03-17 | End: 2024-03-17

## 2024-03-17 RX ORDER — ACETAMINOPHEN 325 MG/1
650 TABLET ORAL EVERY 4 HOURS PRN
Status: DISCONTINUED | OUTPATIENT
Start: 2024-03-17 | End: 2024-03-19 | Stop reason: HOSPADM

## 2024-03-17 RX ORDER — ONDANSETRON 2 MG/ML
4 INJECTION INTRAMUSCULAR; INTRAVENOUS EVERY 4 HOURS PRN
Status: DISCONTINUED | OUTPATIENT
Start: 2024-03-17 | End: 2024-03-19 | Stop reason: HOSPADM

## 2024-03-17 RX ORDER — FLUTICASONE FUROATE AND VILANTEROL 200; 25 UG/1; UG/1
1 POWDER RESPIRATORY (INHALATION) DAILY
Status: DISCONTINUED | OUTPATIENT
Start: 2024-03-17 | End: 2024-03-19 | Stop reason: HOSPADM

## 2024-03-17 RX ORDER — CIPROFLOXACIN 250 MG/1
250 TABLET, FILM COATED ORAL EVERY 12 HOURS SCHEDULED
COMMUNITY
End: 2024-03-19

## 2024-03-17 RX ORDER — CIPROFLOXACIN 250 MG/1
250 TABLET, FILM COATED ORAL EVERY 12 HOURS SCHEDULED
Status: DISCONTINUED | OUTPATIENT
Start: 2024-03-17 | End: 2024-03-19

## 2024-03-17 RX ORDER — FLUTICASONE PROPIONATE 50 MCG
1 SPRAY, SUSPENSION (ML) NASAL DAILY
Status: DISCONTINUED | OUTPATIENT
Start: 2024-03-17 | End: 2024-03-19 | Stop reason: HOSPADM

## 2024-03-17 RX ADMIN — CIPROFLOXACIN 250 MG: 250 TABLET, FILM COATED ORAL at 12:26

## 2024-03-17 RX ADMIN — NICOTINE 1 PATCH: 7 PATCH, EXTENDED RELEASE TRANSDERMAL at 12:26

## 2024-03-17 RX ADMIN — CIPROFLOXACIN 250 MG: 250 TABLET, FILM COATED ORAL at 20:51

## 2024-03-17 RX ADMIN — FLUTICASONE PROPIONATE 1 SPRAY: 50 SPRAY, METERED NASAL at 12:26

## 2024-03-17 RX ADMIN — FLUTICASONE FUROATE AND VILANTEROL TRIFENATATE 1 PUFF: 200; 25 POWDER RESPIRATORY (INHALATION) at 12:26

## 2024-03-17 RX ADMIN — UMECLIDINIUM 1 PUFF: 62.5 AEROSOL, POWDER ORAL at 12:26

## 2024-03-17 RX ADMIN — METOPROLOL TARTRATE 25 MG: 25 TABLET, FILM COATED ORAL at 12:26

## 2024-03-17 RX ADMIN — ATORVASTATIN CALCIUM 20 MG: 20 TABLET, FILM COATED ORAL at 12:26

## 2024-03-17 NOTE — H&P
[facility]  H&P  Name: Joel Wyman 78 y.o. male I MRN: [id number]  Unit/Bed#: 2 [facility] 204 A  Date of Admission: 3/17/2024   Date of Service: 3/17/2024 I Hospital Day: 0      Assessment/Plan   * Hematuria  Assessment & Plan  History of prostate cancer status post XRT hypertension COPD hyperlipidemia presents with hematuria.  Had urinary retention and urinary catheter was placed on Tuesday in ED.  Saw Dr. Khan on Friday with recommendations to keep in over the weekend and started on ciprofloxacin.  Friday evening started to have hematuria and came to the ED today.  Will schedule flushes and to be seen by urology today    Stage 3b chronic kidney disease (HCC)  Assessment & Plan  Lab Results   Component Value Date    EGFR 50 03/17/2024    EGFR 46 03/03/2024    EGFR 46 03/02/2024    CREATININE 1.32 (H) 03/17/2024    CREATININE 1.42 (H) 03/03/2024    CREATININE 1.41 (H) 03/02/2024     Renal function at baseline    Prostate cancer (HCC)  Assessment & Plan  History of prostate cancer status post XRT.    COPD (chronic obstructive pulmonary disease) ([facility])  Assessment & Plan  No exacerbation.  Prior to admission on Trelegy.  Will order substituted equivalent    Hyperlipidemia  Assessment & Plan  Continue atorvastatin    Essential hypertension  Assessment & Plan  Continue metoprolol    VTE Pharmacologic Prophylaxis: VTE Score: 3 Moderate Risk (Score 3-4) - Pharmacological DVT Prophylaxis Contraindicated. Sequential Compression Devices Ordered.  Code Status: Level 1 - Full Code  Discussion with family:     Anticipated Length of Stay: Patient will be admitted on an observation basis with an anticipated length of stay of less than 2 midnights secondary to hematuria.    Total Time Spent on Date of Encounter in care of patient: This time was spent on one or more of the following: performing physical exam; counseling and coordination of care; obtaining or reviewing history; documenting in the medical  record; reviewing/ordering tests, medications or procedures; communicating with other healthcare professionals and discussing with patient's family/caregivers.    Chief Complaint:     Blood in Urine (Pt states he noticed blood in his alanis starting last night, has gotten worse this morning.  Also c/o bladder spasms )    History of Present Illness:    Joel Wyman is a 78 y.o. male with a past medical history of prostate cancer with episodes of radiation hypertension dyslipidemia CKD and COPD who presents with hematuria.  The patient was recently hospitalized here and discharged on 3/3/2024 for hemorrhagic cystitis.  He came back to the ED on 3/12 for urinary retention and urinary catheter was placed.  He went to see Dr. Khan his urologist on Friday and was started on ciprofloxacin with recommendations to maintain.  He came back to the ED today because Friday evening he started having hematuria that was concerning to him.  On exam he denies any chest pain shortness of breath nausea or vomiting.    Review of Systems:  Review of Systems   Constitutional:  Negative for chills and fever.   HENT:  Negative for facial swelling and trouble swallowing.    Eyes:  Negative for visual disturbance.   Respiratory:  Negative for shortness of breath.    Cardiovascular:  Negative for chest pain and palpitations.   Gastrointestinal:  Negative for abdominal distention, diarrhea, nausea and vomiting.   Genitourinary:  Positive for decreased urine volume (Retention and Alanis was placed on Tuesday) and hematuria. Negative for dysuria.   Musculoskeletal:  Negative for back pain and myalgias.   Skin:  Negative for rash.   Neurological:  Negative for seizures and speech difficulty.   Psychiatric/Behavioral:  The patient is not nervous/anxious.    All other systems reviewed and are negative.        Past Medical and Surgical History:   Past Medical History:   Diagnosis Date    Borderline diabetes     Cancer (HCC)     prostate    COPD  (chronic obstructive pulmonary disease) (HCC)     Hyperlipidemia     Hypertension      Past Surgical History:   Procedure Laterality Date    APPENDECTOMY      FL RETROGRADE PYELOGRAM  4/14/2021    FL RETROGRADE PYELOGRAM  11/16/2023    HERNIA REPAIR      IR EMBOLIZATION (SPECIFY VESSEL OR SITE)  9/17/2021    IR NEPHROSTOMY TUBE CHECK AND/OR REMOVAL  7/8/2021    IR NEPHROSTOMY TUBE CHECK/CHANGE/REPOSITION/REINSERTION/UPSIZE  5/10/2021    IR NEPHROSTOMY TUBE CHECK/CHANGE/REPOSITION/REINSERTION/UPSIZE  8/4/2021    IR NEPHROSTOMY TUBE CHECK/CHANGE/REPOSITION/REINSERTION/UPSIZE  10/13/2021    IR NEPHROSTOMY TUBE PLACEMENT  5/7/2021    FL CYSTO W/IRRIG & EVAC MULTPLE OBSTRUCTING CLOTS Bilateral 4/14/2021    Procedure: CYSTOSCOPY EVACUATION OF CLOTS bilateral retrograde pyelograms possible TURBT bladder biopsy;  Surgeon: Yovani Khan MD;  [facility]: [facility];  Service: Urology    FL CYSTO W/IRRIG & EVAC MULTPLE OBSTRUCTING CLOTS N/A 4/24/2021    Procedure: CYSTOSCOPY EVACUATION OF CLOTS fulguration;  Surgeon: Yovani Khan MD;  [facility];  Service: Urology    FL CYSTO W/IRRIG & EVAC MULTPLE OBSTRUCTING CLOTS N/A 7/8/2021    Procedure: CYSTOSCOPY EVACUATION OF CLOTS BILATERAL ANTIROGRADES NEPHROSTOMY TUBES, FULGERATION ;  Surgeon: Yovani Khan MD;  [facility];  Service: Urology    FL CYSTO W/IRRIG & EVAC MULTPLE OBSTRUCTING CLOTS N/A 8/22/2021    Procedure: CYSTOSCOPY, RIGHT RETROGRADE, EVACUATION OF CLOTS, BLADDER BIOPSY X2 AND FULGERATION OF BLADDER LESIONS, URETEROSCOPY, 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 Taking?   albuterol (PROVENTIL HFA,VENTOLIN HFA) 90 mcg/act inhaler Past Month  No Yes   Sig: Inhale 2 puffs every 4 (four) hours as needed for wheezing   atorvastatin (LIPITOR) 20 mg tablet   Yes Yes   Sig: Take 20 mg by mouth daily   ciprofloxacin (CIPRO) 250 mg tablet 3/16/2024 at 2030 Self Yes Yes   Sig: Take 250 mg by mouth every 12 (twelve) hours Began 3/15/24   fluticasone (FLONASE) 50 mcg/act nasal spray 3/16/2024 at 0900  Yes Yes   Si spray into each nostril daily   fluticasone-umeclidinium-vilanterol (Trelegy Ellipta) 200-62.5-25 mcg/actuation AEPB inhaler 3/16/2024 at 0900  Yes Yes   Sig: Inhale 1 puff daily   metoprolol tartrate (LOPRESSOR) 25 mg tablet 3/16/2024 at 1800  Yes Yes   Sig: Take 25 mg by mouth every 12 (twelve) hours       Facility-Administered Medications: None     Social History:     Social History     Socioeconomic History    Marital status: /Civil Union     Spouse name: Not on file    Number of children: Not on file    Years of education: Not on file    Highest education level: Not on file   Occupational History    Not on file   Tobacco Use    Smoking status: Some Days     Current packs/day: 0.50     Average packs/day: 0.5 packs/day for 50.0 years (25.0 ttl pk-yrs)     Types: Cigarettes     Passive exposure: Never    Smokeless tobacco: Never   Vaping Use    Vaping status: Former   Substance and Sexual Activity    Alcohol use: Never    Drug use: No    Sexual activity: Yes     Partners: Female   Other Topics Concern    Not on file   Social History Narrative    Not on file     Social Determinants of Health     Financial Resource Strain: Not on file   Food Insecurity: No Food Insecurity (3/2/2024)    Hunger Vital Sign     Worried About Running Out of Food in the Last Year: Never true     Ran Out of Food in the Last Year: Never  "true   Transportation Needs: No Transportation Needs (3/2/2024)    PRAPARE - Transportation     Lack of Transportation (Medical): No     Lack of Transportation (Non-Medical): No   Physical Activity: Not on file   Stress: Not on file   Social Connections: Not on file   Intimate Partner Violence: Not on file   Housing Stability: Low Risk  (3/2/2024)    Housing Stability Vital Sign     Unable to Pay for Housing in the Last Year: No     Number of Places Lived in the Last Year: 1     Unstable Housing in the Last Year: No     Patient Pre-hospital Living Situation: Lives alone  Patient Pre-hospital Level of Mobility:   Patient Pre-hospital Diet Restrictions:     Family History:  Family History   Problem Relation Age of Onset    Diabetes Mother     Stroke Mother     Diabetes Brother     Stroke Brother      Physical Exam:   Vitals:   Blood Pressure: 142/86 (03/17/24 1032)  Pulse: 69 (03/17/24 1032)  Temperature: 97.5 °F (36.4 °C) (03/17/24 1032)  Temp Source: Oral (03/17/24 0824)  Respirations: 18 (03/17/24 1032)  Height: 5' 11\" (180.3 cm) (03/17/24 1037)  Weight - Scale: 98.9 kg (218 lb) (03/17/24 1037)  SpO2: 93 % (03/17/24 1032)    Physical Exam  Vitals reviewed.   Constitutional:       General: He is not in acute distress.  HENT:      Head: Atraumatic.   Eyes:      Extraocular Movements: Extraocular movements intact.   Cardiovascular:      Rate and Rhythm: Regular rhythm.      Heart sounds: Normal heart sounds.   Pulmonary:      Effort: Pulmonary effort is normal.      Breath sounds: Decreased breath sounds present. No wheezing.   Abdominal:      General: Bowel sounds are normal.      Palpations: Abdomen is soft.      Tenderness: There is no abdominal tenderness.   Musculoskeletal:         General: No swelling.   Skin:     General: Skin is warm and dry.   Neurological:      General: No focal deficit present.      Mental Status: He is alert.      Cranial Nerves: No cranial nerve deficit.   Psychiatric:         Mood and " Affect: Mood normal.       Lab Results: I have personally reviewed pertinent reports.    Results from last 7 days   Lab Units 03/17/24  0903   WBC Thousand/uL 8.79   HEMOGLOBIN g/dL 15.3   HEMATOCRIT % 46.2   PLATELETS Thousands/uL 158   NEUTROS PCT % 74   LYMPHS PCT % 15   MONOS PCT % 8   EOS PCT % 3     Results from last 7 days   Lab Units 03/17/24  0903   SODIUM mmol/L 138   POTASSIUM mmol/L 4.5   CHLORIDE mmol/L 104   CO2 mmol/L 27   ANION GAP mmol/L 7   BUN mg/dL 26*   CREATININE mg/dL 1.32*   CALCIUM mg/dL 9.3   ALBUMIN g/dL 3.9   TOTAL BILIRUBIN mg/dL 0.64   ALK PHOS U/L 66   ALT U/L 16   AST U/L 16   EGFR ml/min/1.73sq m 50   GLUCOSE RANDOM mg/dL 119                              Results from last 7 days   Lab Units 03/17/24  0935   COLOR UA  Red   CLARITY UA  Cloudy   SPEC GRAV UA  1.015   PH UA  7.0   LEUKOCYTES UA  Moderate*   NITRITE UA  Positive*   GLUCOSE UA mg/dl 100 (1/10%)*   KETONES UA mg/dl 15 (1+)*   BILIRUBIN UA  Negative   BLOOD UA  Large*      Results from last 7 days   Lab Units 03/17/24  0935   RBC UA /hpf Innumerable*   WBC UA /hpf Field obscured, unable to enumerate*   EPITHELIAL CELLS WET PREP /hpf Field obscured, unable to enumerate*   BACTERIA UA /hpf Field obscured, unable to enumerate*            Lines/Drains  Invasive Devices       Peripheral Intravenous Line  Duration             Peripheral IV 03/17/24 Distal;Dorsal (posterior);Left Forearm <1 day              Drain  Duration             Urethral Catheter 16 Fr. 4 days                    Imaging:   No results found.    EKG, Pathology, and Other Studies Reviewed on Admission:       ** Please Note: This note has been constructed using a voice recognition system. **

## 2024-03-17 NOTE — PLAN OF CARE
Problem: INFECTION - ADULT  Goal: Absence or prevention of progression during hospitalization  Description: INTERVENTIONS:  - Assess and monitor for signs and symptoms of infection  - Monitor lab/diagnostic results  - Monitor all insertion sites, i.e. indwelling lines, tubes, and drains  - Monitor endotracheal if appropriate and nasal secretions for changes in amount and color  - [place] appropriate cooling/warming therapies per order  - Administer medications as ordered  - Instruct and encourage patient and family to use good hand hygiene technique  - Identify and instruct in appropriate isolation precautions for identified infection/condition  Outcome: Progressing  Goal: Absence of fever/infection during neutropenic period  Description: INTERVENTIONS:  - Monitor WBC    Outcome: Progressing

## 2024-03-17 NOTE — ASSESSMENT & PLAN NOTE
History of prostate cancer status post XRT hypertension COPD hyperlipidemia presents with hematuria.  Had urinary retention and urinary catheter was placed on Tuesday in ED.  Saw Dr. Khan on Friday with recommendations to keep in over the weekend and started on ciprofloxacin.  Friday evening started to have hematuria and came to the ED today.  Will schedule flushes and to be seen by urology today

## 2024-03-17 NOTE — ED PROVIDER NOTES
History  Chief Complaint   Patient presents with    Blood in Urine     Pt states he noticed blood in his alanis starting last night, has gotten worse this morning.  Also c/o bladder spasms        History provided by:  Patient and spouse  Urinary Catheter Problem  Severity:  Moderate  Onset quality:  Gradual  Duration:  2 days  Timing:  Constant  Progression:  Worsening  Context:  Noted new clots in Alanis bag this day.  Associated symptoms: shortness of breath (Chronic history of COPD.  No worsening symptoms.)    Associated symptoms: no abdominal pain, no congestion, no diarrhea, no fatigue, no fever, no headaches, no nausea, no rash and no wheezing        Prior to Admission Medications   Prescriptions Last Dose Informant Patient Reported? Taking?   albuterol (PROVENTIL HFA,VENTOLIN HFA) 90 mcg/act inhaler Past Month  No Yes   Sig: Inhale 2 puffs every 4 (four) hours as needed for wheezing   atorvastatin (LIPITOR) 20 mg tablet   Yes Yes   Sig: Take 20 mg by mouth daily   ciprofloxacin (CIPRO) 250 mg tablet 3/16/2024 at 2030 Self Yes Yes   Sig: Take 250 mg by mouth every 12 (twelve) hours Began 3/15/24   fluticasone (FLONASE) 50 mcg/act nasal spray 3/16/2024 at 0900  Yes Yes   Si spray into each nostril daily   fluticasone-umeclidinium-vilanterol (Trelegy Ellipta) 200-62.5-25 mcg/actuation AEPB inhaler 3/16/2024 at 0900  Yes Yes   Sig: Inhale 1 puff daily   metoprolol tartrate (LOPRESSOR) 25 mg tablet 3/16/2024 at 1800  Yes Yes   Sig: Take 25 mg by mouth every 12 (twelve) hours       Facility-Administered Medications: None       Past Medical History:   Diagnosis Date    Borderline diabetes     Cancer (HCC)     prostate    COPD (chronic obstructive pulmonary disease) (HCC)     Hyperlipidemia     Hypertension        Past Surgical History:   Procedure Laterality Date    APPENDECTOMY      FL RETROGRADE PYELOGRAM  2021    FL RETROGRADE PYELOGRAM  2023    HERNIA REPAIR      IR EMBOLIZATION (SPECIFY VESSEL OR  SITE)  9/17/2021    IR NEPHROSTOMY TUBE CHECK AND/OR REMOVAL  7/8/2021    IR NEPHROSTOMY TUBE CHECK/CHANGE/REPOSITION/REINSERTION/UPSIZE  5/10/2021    IR NEPHROSTOMY TUBE CHECK/CHANGE/REPOSITION/REINSERTION/UPSIZE  8/4/2021    IR NEPHROSTOMY TUBE CHECK/CHANGE/REPOSITION/REINSERTION/UPSIZE  10/13/2021    IR NEPHROSTOMY TUBE PLACEMENT  5/7/2021    AZ CYSTO W/IRRIG & EVAC MULTPLE OBSTRUCTING CLOTS Bilateral 4/14/2021    Procedure: CYSTOSCOPY EVACUATION OF CLOTS bilateral retrograde pyelograms possible TURBT bladder biopsy;  Surgeon: Yovani Khan MD;  Location: [facility];  Service: Urology    AZ CYSTO W/IRRIG & EVAC MULTPLE OBSTRUCTING CLOTS N/A 4/24/2021    Procedure: CYSTOSCOPY EVACUATION OF CLOTS fulguration;  Surgeon: Yovani Khan MD;  Location: [facility];  Service: Urology    AZ CYSTO W/IRRIG & EVAC MULTPLE OBSTRUCTING CLOTS N/A 7/8/2021    Procedure: CYSTOSCOPY EVACUATION OF CLOTS BILATERAL ANTIROGRADES NEPHROSTOMY TUBES, FULGERATION ;  Surgeon: Yovani Khan MD;  Location: [facility];  Service: Urology    AZ CYSTO W/IRRIG & EVAC MULTPLE OBSTRUCTING CLOTS N/A 8/22/2021    Procedure: CYSTOSCOPY, RIGHT RETROGRADE, EVACUATION OF CLOTS, BLADDER BIOPSY X2 AND FULGERATION OF BLADDER LESIONS, URETEROSCOPY, 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;  Surgeon: Yovani Khan MD;  Location: [facility];  Service: Urology    AZ CYSTOURETHROSCOPY WITH BIOPSY N/A 11/16/2023    Procedure: CYSTOSCOPY, BILATERAL RETROGRADEY PYELOGRAM,  FULGERATION 2.0 CM BLADDER TUMOR WITH BIOPSY;  Surgeon: Yovani Khan MD;  Location: [facility];  Service: Urology    PROSTATECTOMY      ROTATOR CUFF REPAIR      right shoulder       Family History   Problem Relation Age of Onset    Diabetes Mother     Stroke Mother     Diabetes Brother     Stroke Brother      I have reviewed and agree with the history as documented.    E-Cigarette/Vaping    E-Cigarette Use Former User      E-Cigarette/Vaping  Substances    Nicotine No     THC No     CBD No     Flavoring No     Other No     Unknown No      Social History     Tobacco Use    Smoking status: Some Days     Current packs/day: 0.50     Average packs/day: 0.5 packs/day for 50.0 years (25.0 ttl pk-yrs)     Types: Cigarettes     Passive exposure: Never    Smokeless tobacco: Never   Vaping Use    Vaping status: Former   Substance Use Topics    Alcohol use: Never    Drug use: No       Review of Systems   Constitutional:  Negative for fatigue and fever.   HENT:  Negative for congestion.    Respiratory:  Positive for shortness of breath (Chronic history of COPD.  No worsening symptoms.). Negative for wheezing.    Gastrointestinal:  Negative for abdominal pain, diarrhea and nausea.   Genitourinary:  Positive for hematuria and penile pain (Intermittent spasms within the penis.). Negative for difficulty urinating, dysuria, enuresis, flank pain, frequency, penile swelling and testicular pain.   Musculoskeletal:  Negative for back pain.   Skin:  Negative for rash.   Neurological:  Negative for headaches.   All other systems reviewed and are negative.      Physical Exam  Physical Exam  Constitutional:       Appearance: Normal appearance.   HENT:      Head: Normocephalic and atraumatic.      Nose: Nose normal.      Mouth/Throat:      Pharynx: Oropharynx is clear.   Eyes:      Conjunctiva/sclera: Conjunctivae normal.   Cardiovascular:      Rate and Rhythm: Normal rate.   Pulmonary:      Effort: No respiratory distress.   Abdominal:      General: There is no distension.      Tenderness: There is no abdominal tenderness. There is no guarding or rebound.      Hernia: No hernia is present.   Genitourinary:     Comments: Sumner catheter in place.  No blood noted around the meatus or on catheter external surface.  Bedside ultrasound provided no retention appreciated.  Musculoskeletal:         General: Normal range of motion.      Cervical back: Normal range of motion.   Skin:      General: Skin is warm and dry.      Capillary Refill: Capillary refill takes less than 2 seconds.   Neurological:      General: No focal deficit present.      Mental Status: He is alert and oriented to person, place, and time.         Vital Signs  ED Triage Vitals   Temperature Pulse Respirations Blood Pressure SpO2   03/17/24 0824 03/17/24 0824 03/17/24 0824 03/17/24 0824 03/17/24 0824   97.7 °F (36.5 °C) 89 20 137/85 100 %      Temp Source Heart Rate Source Patient Position - Orthostatic VS BP Location FiO2 (%)   03/17/24 0824 03/17/24 0824 03/17/24 0824 03/17/24 0824 --   Oral Monitor Sitting Right arm       Pain Score       03/17/24 0907       No Pain           Vitals:    03/17/24 0824 03/17/24 1032 03/17/24 1945   BP: 137/85 142/86 123/71   Pulse: 89 69 58   Patient Position - Orthostatic VS: Sitting           Visual Acuity      ED Medications  Medications   albuterol (PROVENTIL HFA,VENTOLIN HFA) inhaler 2 puff (has no administration in time range)   atorvastatin (LIPITOR) tablet 20 mg (20 mg Oral Given 3/17/24 1226)   fluticasone-vilanterol 200-25 mcg/actuation 1 puff (1 puff Inhalation Given 3/17/24 1226)     And   umeclidinium 62.5 mcg/actuation inhaler AEPB 1 puff (1 puff Inhalation Given 3/17/24 1226)   fluticasone (FLONASE) 50 mcg/act nasal spray 1 spray (1 spray Nasal Given 3/17/24 1226)   metoprolol tartrate (LOPRESSOR) tablet 25 mg (25 mg Oral Given 3/17/24 1226)   nicotine (NICODERM CQ) 7 mg/24hr TD 24 hr patch 1 patch (1 patch Transdermal Medication Applied 3/17/24 1226)   acetaminophen (TYLENOL) tablet 650 mg (has no administration in time range)   ondansetron (ZOFRAN) injection 4 mg (has no administration in time range)   ciprofloxacin (CIPRO) tablet 250 mg (250 mg Oral Given 3/17/24 1226)       Diagnostic Studies  Results Reviewed       Procedure Component Value Units Date/Time    Urine Microscopic [[id number]]  (Abnormal) Collected: 03/17/24 0935    Lab Status: Final result Specimen: Urine,  Indwelling Sumner Catheter Updated: 03/17/24 0949     RBC, UA Innumerable /hpf      WBC, UA       Field obscured, unable to enumerate     /hpf     Epithelial Cells       Field obscured, unable to enumerate     /hpf     Bacteria, UA       Field obscured, unable to enumerate     /hpf    Urine culture [[id number]] Collected: 03/17/24 0935    Lab Status: In process Specimen: Urine, Indwelling Sumner Catheter Updated: 03/17/24 0949    UA w Reflex to Microscopic w Reflex to Culture [[id number]]  (Abnormal) Collected: 03/17/24 0935    Lab Status: Final result Specimen: Urine, Indwelling Sumner Catheter Updated: 03/17/24 0942     Color, UA Red     Clarity, UA Cloudy     Specific Gravity, UA 1.015     pH, UA 7.0     Leukocytes, UA Moderate     Nitrite, UA Positive     Protein, UA >=300 mg/dl      Glucose,  (1/10%) mg/dl      Ketones, UA 15 (1+) mg/dl      Urobilinogen, UA 4.0 E.U./dl      Bilirubin, UA Negative     Occult Blood, UA Large    Comprehensive metabolic panel [[id number]]  (Abnormal) Collected: 03/17/24 0903    Lab Status: Final result Specimen: Blood from Arm, Left Updated: 03/17/24 0925     Sodium 138 mmol/L      Potassium 4.5 mmol/L      Chloride 104 mmol/L      CO2 27 mmol/L      ANION GAP 7 mmol/L      BUN 26 mg/dL      Creatinine 1.32 mg/dL      Glucose 119 mg/dL      Calcium 9.3 mg/dL      AST 16 U/L      ALT 16 U/L      Alkaline Phosphatase 66 U/L      Total Protein 7.2 g/dL      Albumin 3.9 g/dL      Total Bilirubin 0.64 mg/dL      eGFR 50 ml/min/1.73sq m     Narrative:      National Kid[employer] guidelines for Chronic Kidney Disease (CKD):     Stage 1 with normal or high GFR (GFR > 90 mL/min/1.73 square meters)    Stage 2 Mild CKD (GFR = 60-89 mL/min/1.73 square meters)    Stage 3A Moderate CKD (GFR = 45-59 mL/min/1.73 square meters)    Stage 3B Moderate CKD (GFR = 30-44 mL/min/1.73 square meters)    Stage 4 Severe CKD (GFR = 15-29 mL/min/1.73 square meters)    Stage 5 End Stage CKD (GFR <15  mL/min/1.73 square meters)  Note: GFR calculation is accurate only with a steady state creatinine    CBC and differential [[id number]] Collected: 03/17/24 0903    Lab Status: Final result Specimen: Blood from Arm, Left Updated: 03/17/24 0910     WBC 8.79 Thousand/uL      RBC 5.18 Million/uL      Hemoglobin 15.3 g/dL      Hematocrit 46.2 %      MCV 89 fL      MCH 29.5 pg      MCHC 33.1 g/dL      RDW 14.8 %      MPV 9.5 fL      Platelets 158 Thousands/uL      nRBC 0 /100 WBCs      Neutrophils Relative 74 %      Immature Grans % 0 %      Lymphocytes Relative 15 %      Monocytes Relative 8 %      Eosinophils Relative 3 %      Basophils Relative 0 %      Neutrophils Absolute 6.46 Thousands/µL      Absolute Immature Grans 0.03 Thousand/uL      Absolute Lymphocytes 1.31 Thousands/µL      Absolute Monocytes 0.67 Thousand/µL      Eosinophils Absolute 0.30 Thousand/µL      Basophils Absolute 0.02 Thousands/µL                    No orders to display              Procedures  Procedures         ED Course                                             Medical Decision Making  78-year-old male presents emergency department for hematuria patient has a complicated urological history but well-known to staff urologist.  Recent Sumner catheter mid last week with evaluation at urologist on Friday.  Patient states that Friday night he did notice intermittent throughout Saturday he had worsening signs.  This morning patient states all blood clots and was concerned.  Patient presents for evaluation due concern for approximate possible obstruction.  Patient admits to intermittent spasming within the penis itself.  Patient denies fever chills abdominal pain.  Bedside ultrasound demonstrates Sumner in the bladder without of obstruction.  Has moderate hematuria in bag.  Reached out to  patient's neurologist through [place] text.  At time rations are to admit to the internal medicine service urology consult.  Educated patient on plan and he is  agreeable..  Patient unable in the department and upon entering the standard nursing floor.    Amount and/or Complexity of Data Reviewed  Labs: ordered.    Risk  Decision regarding hospitalization.             Disposition  Final diagnoses:   Hematuria, unspecified type   Sumner catheter in place   Urinary tract infection   Gross hematuria     Time reflects when diagnosis was documented in both MDM as applicable and the Disposition within this note       Time User Action Codes Description Comment    3/17/2024  9:55 AM John Prince [R31.9] Hematuria, unspecified type     3/17/2024  9:55 AM John Prince [Z97.8] Sumner catheter in place     3/17/2024  9:55 AM John Prince [N39.0] Urinary tract infection     3/17/2024 11:01 AM John Prince [R31.0] Gross hematuria           ED Disposition       ED Disposition   Admit    Condition   Stable    Date/Time   Sun Mar 17, 2024  9:56 AM    Comment   Case was discussed with slim attending and the patient's admission status was agreed to be Admission Status: inpatient status to the service of Dr. Harvey .               Follow-up Information    None         Current Discharge Medication List        CONTINUE these medications which have NOT CHANGED    Details   albuterol (PROVENTIL HFA,VENTOLIN HFA) 90 mcg/act inhaler Inhale 2 puffs every 4 (four) hours as needed for wheezing  Qty: 1 Inhaler, Refills: 0      atorvastatin (LIPITOR) 20 mg tablet Take 20 mg by mouth daily      ciprofloxacin (CIPRO) 250 mg tablet Take 250 mg by mouth every 12 (twelve) hours Began 3/15/24      fluticasone (FLONASE) 50 mcg/act nasal spray 1 spray into each nostril daily      fluticasone-umeclidinium-vilanterol (Trelegy Ellipta) 200-62.5-25 mcg/actuation AEPB inhaler Inhale 1 puff daily      metoprolol tartrate (LOPRESSOR) 25 mg tablet Take 25 mg by mouth every 12 (twelve) hours              No discharge procedures on file.    PDMP Review         Value Time User    PDMP Reviewed  Yes  7/13/2021 10:25 AM CORWIN Esquivel            ED Provider  Electronically Signed by             John Prince PA-C  03/17/24 1959

## 2024-03-17 NOTE — CONSULTS
H&P Exam - Urology       Patient: Joel Wyman   : 1945 Sex: male   MRN: [id number]     CSN: [id number]      History of Present Illness   HPI:  Joel Wyman is a 78 y.o. 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 here at [facility] and diagnosed with bladder papillomas seen in my office for surveillance flex cystoscopy at 6-month intervals presenting to [facility] last Wednesday,  having Sumner catheter placed and seen in the office on Friday started on Cipro for what appeared to be hemorrhagic cystitis developing worsening hematuria on Saturday presenting to [facility] today with gross hematuria urine analysis positive for nitrates consistent with unresolved hemorrhagic cystitis though could be radiation cystitis or above        Review of Systems:   Constitutional:  Negative for activity change, fever, chills and diaphoresis.   HENT: Negative for hearing loss and trouble swallowing.   Eyes: Negative for itching and visual disturbance.   Respiratory: Negative for chest tightness and shortness of breath.   Cardiovascular: Negative for chest pain, edema.   Gastrointestinal: Negative for abdominal distention, na abdominal pain, constipation, diarrhea, Nausea and vomiting.   Genitourinary: Negative for decreased urine volume, difficulty urinating, dysuria, enuresis, frequency, hematuria and urgency.   Musculoskeletal: Negative for gait problem and myalgias.   Neurological: Negative for dizziness and headaches.   Hematological: Does not bruise/bleed easily.       Historical Information   Past Medical History:   Diagnosis Date    Borderline diabetes     Cancer (HCC)     prostate    COPD (chronic obstructive pulmonary disease) ([facility])      Hyperlipidemia     Hypertension      Past Surgical History:   Procedure Laterality Date    APPENDECTOMY      FL RETROGRADE PYELOGRAM  4/14/2021    FL RETROGRADE PYELOGRAM  11/16/2023    HERNIA REPAIR      IR EMBOLIZATION (SPECIFY VESSEL OR SITE)  9/17/2021    IR NEPHROSTOMY TUBE CHECK AND/OR REMOVAL  7/8/2021    IR NEPHROSTOMY TUBE CHECK/CHANGE/REPOSITION/REINSERTION/UPSIZE  5/10/2021    IR NEPHROSTOMY TUBE CHECK/CHANGE/REPOSITION/REINSERTION/UPSIZE  8/4/2021    IR NEPHROSTOMY TUBE CHECK/CHANGE/REPOSITION/REINSERTION/UPSIZE  10/13/2021    IR NEPHROSTOMY TUBE PLACEMENT  5/7/2021    FL CYSTO W/IRRIG & EVAC MULTPLE OBSTRUCTING CLOTS Bilateral 4/14/2021    Procedure: CYSTOSCOPY EVACUATION OF CLOTS bilateral retrograde pyelograms possible TURBT bladder biopsy;  Surgeon: Yovani Khan MD;  Location: [facility];  Service: Urology    FL CYSTO W/IRRIG & EVAC MULTPLE OBSTRUCTING CLOTS N/A 4/24/2021    Procedure: CYSTOSCOPY EVACUATION OF CLOTS fulguration;  Surgeon: Yovani Khan MD;  Location: [facility];  Service: Urology    FL CYSTO W/IRRIG & EVAC MULTPLE OBSTRUCTING CLOTS N/A 7/8/2021    Procedure: CYSTOSCOPY EVACUATION OF CLOTS BILATERAL ANTIROGRADES NEPHROSTOMY TUBES, FULGERATION ;  Surgeon: Yovani Khan MD;  Location: [facility];  Service: Urology    FL CYSTO W/IRRIG & EVAC MULTPLE OBSTRUCTING CLOTS N/A 8/22/2021    Procedure: CYSTOSCOPY, RIGHT RETROGRADE, EVACUATION OF CLOTS, BLADDER BIOPSY X2 AND FULGERATION OF BLADDER LESIONS, URETEROSCOPY, 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;  Surgeon: Yovani Khan MD;  Location: [facility];  Service: Urology    FL CYSTOURETHROSCOPY WITH BIOPSY N/A 11/16/2023    Procedure: CYSTOSCOPY, BILATERAL RETROGRADEY PYELOGRAM,  FULGERATION 2.0 CM BLADDER TUMOR WITH BIOPSY;  Surgeon: Yovani Khan MD;  Location: [facility];  Service: Urology    PROSTATECTOMY      ROTATOR CUFF REPAIR      right shoulder     Social History   Social  "History     Substance and Sexual Activity   Alcohol Use Never     Social History     Substance and Sexual Activity   Drug Use No     Social History     Tobacco Use   Smoking Status Some Days    Current packs/day: 0.50    Average packs/day: 0.5 packs/day for 50.0 years (25.0 ttl pk-yrs)    Types: Cigarettes    Passive exposure: Never   Smokeless Tobacco Never     Family History:   Family History   Problem Relation Age of Onset    Diabetes Mother     Stroke Mother     Diabetes Brother     Stroke Brother        Meds/Allergies   Medications Prior to Admission   Medication    albuterol (PROVENTIL HFA,VENTOLIN HFA) 90 mcg/act inhaler    atorvastatin (LIPITOR) 20 mg tablet    ciprofloxacin (CIPRO) 250 mg tablet    fluticasone (FLONASE) 50 mcg/act nasal spray    fluticasone-umeclidinium-vilanterol (Trelegy Ellipta) 200-62.5-25 mcg/actuation AEPB inhaler    metoprolol tartrate (LOPRESSOR) 25 mg tablet     No Known Allergies    Objective   Vitals: /86   Pulse 69   Temp 97.5 °F (36.4 °C)   Resp 18   Ht 5' 11\" (1.803 m)   Wt 98.9 kg (218 lb)   SpO2 93%   BMI 30.40 kg/m²     Physical Exam:  General Alert awake   Normocephalic atraumatic PERRLA  Lungs clear bilaterally  Cardiac normal S1 normal S2  Abdomen soft, flank pain  16 trend Sumner mild hematuria  Extremities no edema    I/O last 24 hours:  In: 300 [P.O.:300]  Out: 300 [Urine:300]    Invasive Devices       Peripheral Intravenous Line  Duration             Peripheral IV 03/17/24 Distal;Dorsal (posterior);Left Forearm <1 day              Drain  Duration             Urethral Catheter 16 Fr. 4 days                        Lab Results: CBC:   Lab Results   Component Value Date    WBC 8.79 03/17/2024    HGB 15.3 03/17/2024    HCT 46.2 03/17/2024    MCV 89 03/17/2024     03/17/2024    RBC 5.18 03/17/2024    MCH 29.5 03/17/2024    MCHC 33.1 03/17/2024    RDW 14.8 03/17/2024    MPV 9.5 03/17/2024    NRBC 0 03/17/2024     CMP:   Lab Results   Component Value Date " "    03/17/2024    CO2 27 03/17/2024    BUN 26 (H) 03/17/2024    CREATININE 1.32 (H) 03/17/2024    CALCIUM 9.3 03/17/2024    AST 16 03/17/2024    ALT 16 03/17/2024    ALKPHOS 66 03/17/2024    EGFR 50 03/17/2024     Urinalysis:   Lab Results   Component Value Date    COLORU Red 03/17/2024    CLARITYU Cloudy 03/17/2024    SPECGRAV 1.015 03/17/2024    PHUR 7.0 03/17/2024    PHUR 5.5 03/25/2018    LEUKOCYTESUR Moderate (A) 03/17/2024    NITRITE Positive (A) 03/17/2024    GLUCOSEU 100 (1/10%) (A) 03/17/2024    KETONESU 15 (1+) (A) 03/17/2024    BILIRUBINUR Negative 03/17/2024    BLOODU Large (A) 03/17/2024     Urine Culture:   Lab Results   Component Value Date    URINECX >100,000 cfu/ml Proteus mirabilis (A) 03/12/2024     PSA: No results found for: \"PSA\"        Assessment/ Plan:  Gross hematuria  Urinary retention  Radiation cystitis  History of prostate cancer status post radical prostatectomy status post radiation to the prostatic bed  Plan urine culture  Continue Cipro  Hematuria clearing  Awaiting repeat cultures      Yovani Khan MD    "

## 2024-03-17 NOTE — ASSESSMENT & PLAN NOTE
Lab Results   Component Value Date    EGFR 50 03/17/2024    EGFR 46 03/03/2024    EGFR 46 03/02/2024    CREATININE 1.32 (H) 03/17/2024    CREATININE 1.42 (H) 03/03/2024    CREATININE 1.41 (H) 03/02/2024     Renal function at baseline

## 2024-03-18 LAB
ALBUMIN SERPL BCP-MCNC: 3.6 G/DL (ref 3.5–5)
ALP SERPL-CCNC: 58 U/L (ref 34–104)
ALT SERPL W P-5'-P-CCNC: 13 U/L (ref 7–52)
ANION GAP SERPL CALCULATED.3IONS-SCNC: 5 MMOL/L (ref 4–13)
AST SERPL W P-5'-P-CCNC: 14 U/L (ref 13–39)
BILIRUB SERPL-MCNC: 0.81 MG/DL (ref 0.2–1)
BUN SERPL-MCNC: 25 MG/DL (ref 5–25)
CALCIUM SERPL-MCNC: 9 MG/DL (ref 8.4–10.2)
CHLORIDE SERPL-SCNC: 103 MMOL/L (ref 96–108)
CO2 SERPL-SCNC: 27 MMOL/L (ref 21–32)
CREAT SERPL-MCNC: 1.44 MG/DL (ref 0.6–1.3)
ERYTHROCYTE [DISTWIDTH] IN BLOOD BY AUTOMATED COUNT: 14.6 % (ref 11.6–15.1)
GFR SERPL CREATININE-BSD FRML MDRD: 45 ML/MIN/1.73SQ M
GLUCOSE P FAST SERPL-MCNC: 121 MG/DL (ref 65–99)
GLUCOSE SERPL-MCNC: 121 MG/DL (ref 65–140)
HCT VFR BLD AUTO: 45.3 % (ref 36.5–49.3)
HGB BLD-MCNC: 14.9 G/DL (ref 12–17)
MCH RBC QN AUTO: 29.2 PG (ref 26.8–34.3)
MCHC RBC AUTO-ENTMCNC: 32.9 G/DL (ref 31.4–37.4)
MCV RBC AUTO: 89 FL (ref 82–98)
PLATELET # BLD AUTO: 143 THOUSANDS/UL (ref 149–390)
PMV BLD AUTO: 9.6 FL (ref 8.9–12.7)
POTASSIUM SERPL-SCNC: 3.9 MMOL/L (ref 3.5–5.3)
PROT SERPL-MCNC: 6.5 G/DL (ref 6.4–8.4)
RBC # BLD AUTO: 5.1 MILLION/UL (ref 3.88–5.62)
SODIUM SERPL-SCNC: 135 MMOL/L (ref 135–147)
WBC # BLD AUTO: 8.64 THOUSAND/UL (ref 4.31–10.16)

## 2024-03-18 PROCEDURE — 85027 COMPLETE CBC AUTOMATED: CPT | Performed by: INTERNAL MEDICINE

## 2024-03-18 PROCEDURE — 80053 COMPREHEN METABOLIC PANEL: CPT | Performed by: INTERNAL MEDICINE

## 2024-03-18 PROCEDURE — 99232 SBSQ HOSP IP/OBS MODERATE 35: CPT

## 2024-03-18 RX ADMIN — CIPROFLOXACIN 250 MG: 250 TABLET, FILM COATED ORAL at 20:11

## 2024-03-18 RX ADMIN — METOPROLOL TARTRATE 25 MG: 25 TABLET, FILM COATED ORAL at 20:11

## 2024-03-18 RX ADMIN — ATORVASTATIN CALCIUM 20 MG: 20 TABLET, FILM COATED ORAL at 08:34

## 2024-03-18 RX ADMIN — UMECLIDINIUM 1 PUFF: 62.5 AEROSOL, POWDER ORAL at 08:35

## 2024-03-18 RX ADMIN — NICOTINE 1 PATCH: 7 PATCH, EXTENDED RELEASE TRANSDERMAL at 08:35

## 2024-03-18 RX ADMIN — CIPROFLOXACIN 250 MG: 250 TABLET, FILM COATED ORAL at 08:34

## 2024-03-18 RX ADMIN — FLUTICASONE FUROATE AND VILANTEROL TRIFENATATE 1 PUFF: 200; 25 POWDER RESPIRATORY (INHALATION) at 08:35

## 2024-03-18 RX ADMIN — FLUTICASONE PROPIONATE 1 SPRAY: 50 SPRAY, METERED NASAL at 08:35

## 2024-03-18 RX ADMIN — METOPROLOL TARTRATE 25 MG: 25 TABLET, FILM COATED ORAL at 08:34

## 2024-03-18 NOTE — ASSESSMENT & PLAN NOTE
History of prostate cancer status post XRT hypertension COPD hyperlipidemia presents with hematuria.  Had urinary retention and urinary catheter was placed on Tuesday in ED.  Saw Dr. Khan on Friday with recommendations to keep in over the weekend and started on ciprofloxacin.  Friday evening started to have hematuria and came to the ED today.  Will schedule flushes and to be seen by urology today  Continue Cipro  Follow up on urine cultures

## 2024-03-18 NOTE — PLAN OF CARE
Problem: PAIN - ADULT  Goal: Verbalizes/displays adequate comfort level or baseline comfort level  Description: Interventions:  - Encourage patient to monitor pain and request assistance  - Assess pain using appropriate pain scale  - Administer analgesics based on type and severity of pain and evaluate response  - Implement non-pharmacological measures as appropriate and evaluate response  - Consider cultural and social influences on pain and pain management  - Notify physician/advanced practitioner if interventions unsuccessful or patient reports new pain  Outcome: Progressing     Problem: INFECTION - ADULT  Goal: Absence or prevention of progression during hospitalization  Description: INTERVENTIONS:  - Assess and monitor for signs and symptoms of infection  - Monitor lab/diagnostic results  - Monitor all insertion sites, i.e. indwelling lines, tubes, and drains  - Monitor endotracheal if appropriate and nasal secretions for changes in amount and color  - [place] appropriate cooling/warming therapies per order  - Administer medications as ordered  - Instruct and encourage patient and family to use good hand hygiene technique  - Identify and instruct in appropriate isolation precautions for identified infection/condition  Outcome: Progressing  Goal: Absence of fever/infection during neutropenic period  Description: INTERVENTIONS:  - Monitor WBC    Outcome: Progressing     Problem: SAFETY ADULT  Goal: Patient will remain free of falls  Description: INTERVENTIONS:  - Educate patient/family on patient safety including physical limitations  - Instruct patient to call for assistance with activity   - Consult OT/PT to assist with strengthening/mobility   - Keep Call bell within reach  - Keep bed low and locked with side rails adjusted as appropriate  - Keep care items and personal belongings within reach  - Initiate and maintain comfort rounds  - Make Fall Risk Sign visible to staff  - Offer Toileting every 2 Hours,  in advance of need  - Initiate/Maintain alarm  - Obtain necessary fall risk management equipment  - Apply yellow socks and bracelet for high fall risk patients  - Consider moving patient to room near nurses station  Outcome: Progressing  Goal: Maintain or return to baseline ADL function  Description: INTERVENTIONS:  -  Assess patient's ability to carry out ADLs; assess patient's baseline for ADL function and identify physical deficits which impact ability to perform ADLs (bathing, care of mouth/teeth, toileting, grooming, dressing, etc.)  - Assess/evaluate cause of self-care deficits   - Assess range of motion  - Assess patient's mobility; develop plan if impaired  - Assess patient's need for assistive devices and provide as appropriate  - Encourage maximum independence but intervene and supervise when necessary  - Involve family in performance of ADLs  - Assess for home care needs following discharge   - Consider OT consult to assist with ADL evaluation and planning for discharge  - Provide patient education as appropriate  Outcome: Progressing  Goal: Maintains/Returns to pre admission functional level  Description: INTERVENTIONS:  - Perform AM-PAC 6 Click Basic Mobility/ Daily Activity assessment daily.  - Set and communicate daily mobility goal to care team and patient/family/caregiver.   - Collaborate with rehabilitation services on mobility goals if consulted  - Perform Range of Motion 3 times a day.  - Reposition patient every 2 hours.  - Dangle patient 3 times a day  - Stand patient 3 times a day  - Ambulate patient 3 times a day  - Out of bed to chair 3 times a day   - Out of bed for meals 3 times a day  - Out of bed for toileting  - Record patient progress and toleration of activity level   Outcome: Progressing     Problem: DISCHARGE PLANNING  Goal: Discharge to home or other facility with appropriate resources  Description: INTERVENTIONS:  - Identify barriers to discharge w/patient and caregiver  - Arrange  for needed discharge resources and transportation as appropriate  - Identify discharge learning needs (meds, wound care, etc.)  - Arrange for interpretive services to assist at discharge as needed  - Refer to Case Management Department for coordinating discharge planning if the patient needs post-hospital services based on physician/advanced practitioner order or complex needs related to functional status, cognitive ability, or social support system  Outcome: Progressing     Problem: Knowledge Deficit  Goal: Patient/family/caregiver demonstrates understanding of disease process, treatment plan, medications, and discharge instructions  Description: Complete learning assessment and assess knowledge base.  Interventions:  - Provide teaching at level of understanding  - Provide teaching via preferred learning methods  Outcome: Progressing

## 2024-03-18 NOTE — CASE MANAGEMENT
Case Management Assessment & Discharge Planning Note    Patient name Joel Wyman  Location 2 [facility] 204/2 [facility] 204 A MRN [id number]  : 1945 Date 3/18/2024       Current Admission Date: 3/17/2024  Current Admission Diagnosis:Hematuria   Patient Active Problem List    Diagnosis Date Noted    Obesity (BMI 30.0-34.9) 2024    Nicotine abuse 2024    Stage 3b chronic kidney disease (HCC)     Urinary retention 2021    Hematuria 2021    Prostate cancer (HCC) 2021    Leukocytosis 2021    RA (rheumatoid arthritis) ([facility]) 2021    Acute blood loss anemia (ABLA) 2021    SHANTI (acute kidney injury) ([facility]) 2021    Hemorrhagic cystitis     Anemia 2021    COPD (chronic obstructive pulmonary disease) ([facility]) 2021    Hyperlipidemia 2021    Gross hematuria 2021    Essential hypertension 2021    Dyslipidemia 2021      LOS (days): 0  Geometric Mean LOS (GMLOS) (days):   Days to GMLOS:     OBJECTIVE:     Current admission status: Observation    Preferred Pharmacy:   [facility]/pharmacy #[id number] - [facility] [address] [address]  Phone: [phone] Fax: [phone]    Primary Care Provider: Fabiano Carroll MD    Primary Insurance: [employer] RE[employer]  Secondary Insurance:     ASSESSMENT:  Active Health Care Proxies    There are no active Health Care Proxies on file.       Advance Directives  Does patient currently have a Health Care decision maker?: Yes, please see Health Care Proxy section  Primary Contact: Renee Wyman    Obs Notice Signed: 24 (SW met with pt to review Medicare Observation Notice.  Pt signed notice and copy given.  Copy also placed in scan bin for chart.)    Patient Information  Admitted from:: Home  Mental Status: Alert  During Assessment patient was accompanied by: Not accompanied during assessment  Assessment information provided by:: Patient  Primary Caregiver:  Spouse  Caregiver's Name:: Renee Wyman  Caregiver's Relationship to Patient:: Family Member (wife)  Caregiver's Telephone Number:: [phone]  Support Systems: Spouse/significant other, Family members  County of Residence: [place]  What [facility] do you live in?: [place]  Type of Current Residence: Apartment  Living Arrangements: Lives w/ Spouse/significant other    Activities of Daily Living Prior to Admission  Functional Status: Independent  Completes ADLs independently?: Yes  Ambulates independently?: Yes  Does patient use assisted devices?: Yes  Assisted Devices (DME) used: Straight Cane, Walker  Does patient currently own DME?: Yes  What DME does the patient currently own?: Straight Cane, Walker  Does patient currently have HHC?: No    Patient Information Continued  Income Source: Pension/skilled nursing  Does patient have prescription coverage?: Yes  Does patient receive dialysis treatments?: No  Does patient have a history of substance abuse?: No  Does patient have a history of Mental Health Diagnosis?: No      Social Determinants of Health (SDOH)      Flowsheet Row Most Recent Value   Housing Stability    In the last 12 months, was there a time when you were not able to pay the mortgage or rent on time? N   In the last 12 months, how many places have you lived? 1   In the last 12 months, was there a time when you did not have a steady place to sleep or slept in a shelter (including now)? N   Transportation Needs    In the past 12 months, has lack of transportation kept you from medical appointments or from getting medications? no   In the past 12 months, has lack of transportation kept you from meetings, work, or from getting things needed for daily living? No   Food Insecurity    Within the past 12 months, you worried that your food would run out before you got the money to buy more. Never true   Within the past 12 months, the food you bought just didn't last and you didn't have money to get more. Never true    Utilities    In the past 12 months has the electric, gas, oil, or water company threatened to shut off services in your home? No            DISCHARGE DETAILS:    Discharge planning discussed with:: Patient  Freedom of Choice: Yes  Comments - Freedom of Choice: SW met with pt to assess needs and assist with planning.  Pt's plan is to return home with his wife when discharged.  No discharge needs expressed or anticipated by pt at this time.  Pt plans on following up with Dr. Khan in his office at end of week.  SW offered assistance in future if needed.  CM contacted family/caregiver?: Yes (message left)  Were Treatment Team discharge recommendations reviewed with patient/caregiver?: Yes  Did patient/caregiver verbalize understanding of patient care needs?: Yes    Contacts  Patient Contacts: Renee Wyman  Relationship to Patient:: Family (wife)  Contact Method: Phone  Phone Number: [phone]  Reason/Outcome: Other (Comment) (left message)    Requested Home Health Care         Is the patient interested in HHC at discharge?: No    DME Referral Provided  Referral made for DME?: No    Other Referral/Resources/Interventions Provided:  Interventions: None Indicated    Treatment Team Recommendation: Home  Discharge Destination Plan:: Home  Transport at Discharge : Family

## 2024-03-18 NOTE — PROGRESS NOTES
"Progress Note - Urology      Patient: Joel Wyman   : 1945 Sex: male   MRN: [id number]     CSN: [id number]  Unit/Bed#: [address] [place] [address]     SUBJECTIVE:   Patient seen on evening rounds  Urine clear  Hemorrhagic cystitis cleared  Awaiting cultures      Objective   Vitals: /67   Pulse 55   Temp 98.1 °F (36.7 °C)   Resp 18   Ht 5' 11\" (1.803 m)   Wt 98.9 kg (218 lb)   SpO2 93%   BMI 30.40 kg/m²     I/O last 24 hours:  In: -   Out: 2200 [Urine:2200]      Physical Exam:   General Alert awake   Normocephalic atraumatic PERRLA  Lungs clear bilaterally  Cardiac normal S1 normal S2  Abdomen soft, flank pain  Extremities no edema      Lab Results: CBC:   Lab Results   Component Value Date    WBC 8.64 2024    HGB 14.9 2024    HCT 45.3 2024    MCV 89 2024     (L) 2024    RBC 5.10 2024    MCH 29.2 2024    MCHC 32.9 2024    RDW 14.6 2024    MPV 9.6 2024    NRBC 0 2024     CMP:   Lab Results   Component Value Date     2024    CO2 27 2024    BUN 25 2024    CREATININE 1.44 (H) 2024    CALCIUM 9.0 2024    AST 14 2024    ALT 13 2024    ALKPHOS 58 2024    EGFR 45 2024     Urinalysis:   Lab Results   Component Value Date    COLORU Red 2024    CLARITYU Cloudy 2024    SPECGRAV 1.015 2024    PHUR 7.0 2024    PHUR 5.5 2018    LEUKOCYTESUR Moderate (A) 2024    NITRITE Positive (A) 2024    GLUCOSEU 100 (1/10%) (A) 2024    KETONESU 15 (1+) (A) 2024    BILIRUBINUR Negative 2024    BLOODU Large (A) 2024     Urine Culture:   Lab Results   Component Value Date    URINECX Culture too young- will reincubate 2024     PSA: No results found for: \"PSA\"      Assessment/ Plan:  Recent urinary retention  History of prostate cancer status post radical prostatectomy with recurrence to periprostatic tissue status post radiation therapy " developing radiation cystitis  DC [place] 8:00 tomorrow  Continue antibiotics as per medical team          Yovani Khan MD

## 2024-03-18 NOTE — UTILIZATION REVIEW
Initial Clinical Review    Observation 3/17/24 @ 0957 converted to inpatient admission 3/18/24 @ 1418 for continued care & tx for hematuria.    Admission: Date/Time/Statement:   Admission Orders (From admission, onward)       Ordered        03/18/24 1418  Inpatient Admission  Once            03/17/24 0957  Place in Observation  Once                          Orders Placed This Encounter   Procedures    Inpatient Admission     Standing Status:   Standing     Number of Occurrences:   1     Order Specific Question:   Level of Care     Answer:   Med Surg [16]     Order Specific Question:   Bed Type     Answer:   [place] [4]     Order Specific Question:   Estimated length of stay     Answer:   More than 2 Midnights     Order Specific Question:   Certification     Answer:   I certify that inpatient services are medically necessary for this patient for a duration of greater than two midnights. See H&P and MD Progress Notes for additional information about the patient's course of treatment.     ED Arrival Information       Expected   -    Arrival   3/17/2024 08:12    Acuity   Urgent              Means of arrival   Walk-In    Escorted by   Self    Service   Hospitalist    Admission type   Emergency              Arrival complaint   bloody urine from alanis cath             Chief Complaint   Patient presents with    Blood in Urine     Pt states he noticed blood in his alanis starting last night, has gotten worse this morning.  Also c/o bladder spasms        Initial Presentation:   78 yom to ER from home for hematuria in alanis, intermittent spasms of penis. The patient was recently hospitalized here and discharged on 3/3/2024 for hemorrhagic cystitis. He came back to the ED on 3/12 for urinary retention and urinary catheter was placed. He went to see Dr. Khan his urologist on Friday and was started on ciprofloxacin with recommendations to maintain. He came back to the ED today because Friday evening he started having  hematuria. Hx prostate cancer with episodes of radiation hypertension dyslipidemia CKD and COPD. Presents with hematuria in alanis bag. Admission BUN 26, Cr 1.32, +U/a. Placed in observation status for hematuria.  Per urology:  Gross hematuria. Urinary retention. Radiation cystitis. History of prostate cancer status post radical prostatectomy status post radiation to the prostatic bed  Plan urine culture. Continue Cipro. Hematuria clearing. Awaiting repeat cultures    Observation to IP admission 3/18/24:  Hematuria POA. Alanis cath remains in place for clear yellow urine. ABT in progress at this time with repeat urine culture pending. Per urology, plan alanis d/c tomorrow. Continue to monitor output, labs.     3/19/24- Day 3: Has surpassed a 2nd midnight with active treatments and services.  Dx: hematuria. Urine clear, alanis cath d/c'd, voiding trial underway, monitor PVR. Cipro course in progress. Final urine culture pending. Urology following.      ED Triage Vitals   Temperature Pulse Respirations Blood Pressure SpO2   03/17/24 0824 03/17/24 0824 03/17/24 0824 03/17/24 0824 03/17/24 0824   97.7 °F (36.5 °C) 89 20 137/85 100 %      Temp Source Heart Rate Source Patient Position - Orthostatic VS BP Location FiO2 (%)   03/17/24 0824 03/17/24 0824 03/17/24 0824 03/17/24 0824 --   Oral Monitor Sitting Right arm       Pain Score       03/17/24 0907       No Pain          Wt Readings from Last 1 Encounters:   03/17/24 98.9 kg (218 lb)     Additional Vital Signs:   Date/Time Temp Pulse Resp BP MAP (mmHg) SpO2 O2 Device Patient Position - Orthostatic VS   03/18/24 03:08:05 98.3 °F (36.8 °C) 53 Abnormal  18 119/69 86 92 % -- --   03/17/24 23:51:05 97.7 °F (36.5 °C) 57 19 119/69 86 90 % -- --   03/17/24 20:52:44 -- 53 Abnormal  -- 126/70 89 93 % -- --   03/17/24 2051 -- 52 Abnormal  -- 126/80 -- -- -- --   03/17/24 19:45:51 98.2 °F (36.8 °C) 58 17 123/71 88 93 % None (Room air) Lying   03/17/24 10:32:56 97.5 °F (36.4 °C) 69 18  142/86 105 93 % -- --   03/17/24 0824 97.7 °F (36.5 °C) 89 20 137/85 -- 100 % None (Room air) Sitting     Pertinent Labs/Diagnostic Test Results:   No orders to display         Results from last 7 days   Lab Units 03/18/24  0518 03/17/24  0903   WBC Thousand/uL 8.64 8.79   HEMOGLOBIN g/dL 14.9 15.3   HEMATOCRIT % 45.3 46.2   PLATELETS Thousands/uL 143* 158   NEUTROS ABS Thousands/µL  --  6.46     Results from last 7 days   Lab Units 03/19/24  0510 03/18/24  0518 03/17/24  0903   SODIUM mmol/L 137 135 138   POTASSIUM mmol/L 3.9 3.9 4.5   CHLORIDE mmol/L 104 103 104   CO2 mmol/L 26 27 27   ANION GAP mmol/L 7 5 7   BUN mg/dL 30* 25 26*   CREATININE mg/dL 1.33* 1.44* 1.32*   EGFR ml/min/1.73sq m 50 45 50   CALCIUM mg/dL 9.1 9.0 9.3     Results from last 7 days   Lab Units 03/18/24  0518 03/17/24  0903   AST U/L 14 16   ALT U/L 13 16   ALK PHOS U/L 58 66   TOTAL PROTEIN g/dL 6.5 7.2   ALBUMIN g/dL 3.6 3.9   TOTAL BILIRUBIN mg/dL 0.81 0.64     Results from last 7 days   Lab Units 03/19/24  0510 03/18/24  0518 03/17/24  0903   GLUCOSE RANDOM mg/dL 102 121 119     Results from last 7 days   Lab Units 03/17/24  0935   CLARITY UA  Cloudy   COLOR UA  Red   SPEC GRAV UA  1.015   PH UA  7.0   GLUCOSE UA mg/dl 100 (1/10%)*   KETONES UA mg/dl 15 (1+)*   BLOOD UA  Large*   PROTEIN UA mg/dl >=300*   NITRITE UA  Positive*   BILIRUBIN UA  Negative   UROBILINOGEN UA E.U./dl 4.0*   LEUKOCYTES UA  Moderate*   WBC UA /hpf Field obscured, unable to enumerate*   RBC UA /hpf Innumerable*   BACTERIA UA /hpf Field obscured, unable to enumerate*   EPITHELIAL CELLS WET PREP /hpf Field obscured, unable to enumerate*     Results from last 7 days   Lab Units 03/17/24  0935 03/12/24  1640   URINE CULTURE  Culture too young- will reincubate >100,000 cfu/ml Proteus mirabilis*       Past Medical History:   Diagnosis Date    Borderline diabetes     Cancer (HCC)     prostate    COPD (chronic obstructive pulmonary disease) (HCC)     Hyperlipidemia      Hypertension      Present on Admission:   Stage 3b chronic kidney disease (HCC)   COPD (chronic obstructive pulmonary disease) (HCC)   Essential hypertension   Hyperlipidemia   Prostate cancer (HCC)   Hematuria    Admitting Diagnosis: Blood in urine [R31.9]  Urinary tract infection [N39.0]  Alanis catheter in place [Z97.8]  Hematuria, unspecified type [R31.9]  Age/Sex: 78 y.o. male  Admission Orders:  Consult urology  Scd/foot pumps  Irrigate alanis:   Use 60ml slip tip syringe (aka Fany, Piston, not Luer Lock) and 120 ml NSS. Aspirate & discard clots until clear. Do not reintroduce clot into bladder. . May use more saline as needed to prevent/treat catheter obstruction.       Scheduled Medications:  atorvastatin, 20 mg, Oral, Daily  ciprofloxacin, 250 mg, Oral, Q12H DHAVAL  fluticasone, 1 spray, Nasal, Daily  fluticasone-vilanterol, 1 puff, Inhalation, Daily   And  umeclidinium, 1 puff, Inhalation, Daily  metoprolol tartrate, 25 mg, Oral, Q12H DHAVAL  nicotine, 1 patch, Transdermal, Daily    PRN Meds:  acetaminophen, 650 mg, Oral, Q4H PRN  albuterol, 2 puff, Inhalation, Q4H PRN  ondansetron, 4 mg, Intravenous, Q4H PRN    Network Utilization Review Department  ATTENTION: Please call with any questions or concerns to [phone] and carefully listen to the prompts so that you are directed to the right person. All voicemails are confidential.   For Discharge needs, contact Care Management DC Support Team at [phone] opt. 2  Send all requests for admission clinical reviews, approved or denied determinations and any other requests to dedicated fax number below belonging to the campus where the patient is receiving treatment. List of dedicated fax numbers for the Facilities:  FACILITY NAME UR FAX NUMBER   ADMISSION DENIALS (Administrative/Medical Necessity) [phone]   DISCHARGE SUPPORT TEAM (NETWORK) [phone]   PARENT CHILD HEALTH (Maternity/NICU/Pediatrics) [phone]   [facility]  [phone]   [facility] [phone]   [facility] [phone]   [facility] [phone]   [facility] [phone]   [facility] [phone]   [facility] [phone]   [facility] [phone]   [facility] [phone]   [facility] [phone]   [facility] [phone]   [facility] [phone]

## 2024-03-18 NOTE — PROGRESS NOTES
[facility]  Progress Note  Name: Joel Wyman I  MRN: [id number]  Unit/Bed#: 2 [facility] 204 A  Date of Admission: 3/17/2024   Date of Service: 3/18/2024 I Hospital Day: 0    Assessment/Plan   * Hematuria  Assessment & Plan  History of prostate cancer status post XRT hypertension COPD hyperlipidemia presents with hematuria.  Had urinary retention and urinary catheter was placed on Tuesday in ED.  Saw Dr. Khan on Friday with recommendations to keep in over the weekend and started on ciprofloxacin.  Friday evening started to have hematuria and came to the ED today.  Will schedule flushes and to be seen by urology today  Continue Cipro  Follow up on urine cultures    Stage 3b chronic kidney disease (HCC)  Assessment & Plan  Lab Results   Component Value Date    EGFR 45 03/18/2024    EGFR 50 03/17/2024    EGFR 46 03/03/2024    CREATININE 1.44 (H) 03/18/2024    CREATININE 1.32 (H) 03/17/2024    CREATININE 1.42 (H) 03/03/2024     Renal function at baseline    Prostate cancer (HCC)  Assessment & Plan  History of prostate cancer status post XRT.    COPD (chronic obstructive pulmonary disease) ([facility])  Assessment & Plan  No exacerbation.  Prior to admission on Trelegy.  Will order substituted equivalent    Hyperlipidemia  Assessment & Plan  Continue atorvastatin    Essential hypertension  Assessment & Plan  Continue metoprolol               VTE Pharmacologic Prophylaxis: VTE Score: 3 Moderate Risk (Score 3-4) - Pharmacological DVT Prophylaxis Contraindicated. Sequential Compression Devices Ordered.    Mobility:   Basic Mobility Inpatient Raw Score: 24  JH-HLM Goal: 8: Walk 250 feet or more  JH-HLM Achieved: 8: Walk 250 feet ot more  JH-HLM Goal achieved. Continue to encourage appropriate mobility.    Patient Centered Rounds: I performed bedside rounds with nursing staff today.   Discussions with Specialists or Other Care Team Provider: RN    Education and Discussions with Family / Patient:  Patient  will update wife.     Total Time Spent on Date of Encounter in care of patient:  mins. This time was spent on one or more of the following: performing physical exam; counseling and coordination of care; obtaining or reviewing history; documenting in the medical record; reviewing/ordering tests, medications or procedures; communicating with other healthcare professionals and discussing with patient's family/caregivers.    Current Length of Stay: 0 day(s)  Current Patient Status: Observation   Certification Statement: The patient will continue to require additional inpatient hospital stay due to hematuria  Discharge Plan: Anticipate discharge tomorrow to home.    Code Status: Level 1 - Full Code    Subjective:   Seen and examined resting comfortably in bed. Denies any pain, shortness of breath, lightheadedness, dizziness, nausea or vomiting.    Objective:     Vitals:   Temp (24hrs), Av.1 °F (36.7 °C), Min:97.7 °F (36.5 °C), Max:98.3 °F (36.8 °C)    Temp:  [97.7 °F (36.5 °C)-98.3 °F (36.8 °C)] 98 °F (36.7 °C)  HR:  [52-58] 52  Resp:  [17-19] 18  BP: (119-126)/(68-80) 121/68  SpO2:  [90 %-93 %] 92 %  Body mass index is 30.4 kg/m².     Input and Output Summary (last 24 hours):     Intake/Output Summary (Last 24 hours) at 3/18/2024 1104  Last data filed at 3/18/2024 0528  Gross per 24 hour   Intake 300 ml   Output 1400 ml   Net -1100 ml       Physical Exam:   Physical Exam  HENT:      Head: Normocephalic.      Mouth/Throat:      Mouth: Mucous membranes are moist.   Eyes:      Pupils: Pupils are equal, round, and reactive to light.   Cardiovascular:      Rate and Rhythm: Normal rate.      Pulses: Normal pulses.   Pulmonary:      Effort: Pulmonary effort is normal.   Abdominal:      General: Bowel sounds are normal.      Palpations: Abdomen is soft.   Genitourinary:     Comments: Sumner catheter in place with clear yellow urine  Musculoskeletal:         General: Normal range of motion.      Cervical back: Normal range of  motion.   Skin:     General: Skin is warm and dry.   Neurological:      Mental Status: He is alert and oriented to person, place, and time.   Psychiatric:         Mood and Affect: Mood normal.         Behavior: Behavior normal.          Additional Data:     Labs:  Results from last 7 days   Lab Units 03/18/24  0518 03/17/24  0903   WBC Thousand/uL 8.64 8.79   HEMOGLOBIN g/dL 14.9 15.3   HEMATOCRIT % 45.3 46.2   PLATELETS Thousands/uL 143* 158   NEUTROS PCT %  --  74   LYMPHS PCT %  --  15   MONOS PCT %  --  8   EOS PCT %  --  3     Results from last 7 days   Lab Units 03/18/24  0518   SODIUM mmol/L 135   POTASSIUM mmol/L 3.9   CHLORIDE mmol/L 103   CO2 mmol/L 27   BUN mg/dL 25   CREATININE mg/dL 1.44*   ANION GAP mmol/L 5   CALCIUM mg/dL 9.0   ALBUMIN g/dL 3.6   TOTAL BILIRUBIN mg/dL 0.81   ALK PHOS U/L 58   ALT U/L 13   AST U/L 14   GLUCOSE RANDOM mg/dL 121                       Lines/Drains:  Invasive Devices       Peripheral Intravenous Line  Duration             Peripheral IV 03/17/24 Distal;Dorsal (posterior);Left Forearm 1 day              Drain  Duration             Urethral Catheter 16 Fr. 5 days                  Urinary Catheter:  Goal for removal: N/A- Discharging with Sumner               Imaging: No pertinent imaging reviewed.    Recent Cultures (last 7 days):   Results from last 7 days   Lab Units 03/12/24  1640   URINE CULTURE  >100,000 cfu/ml Proteus mirabilis*       Last 24 Hours Medication List:   Current Facility-Administered Medications   Medication Dose Route Frequency Provider Last Rate    acetaminophen  650 mg Oral Q4H PRN Atilio Harvey, DO      albuterol  2 puff Inhalation Q4H PRN Atilio Harvey, DO      atorvastatin  20 mg Oral Daily Atilio Harvey DO      ciprofloxacin  250 mg Oral Q12H [facility] Atilio Harvey,       fluticasone  1 spray Nasal Daily Atilio Harvey DO      fluticasone-vilanterol  1 puff Inhalation Daily Atilio Harvey DO      And    umeclidinium  1 puff Inhalation Daily Atilio  Wes,       metoprolol tartrate  25 mg Oral Q12H [facility]AL Atilio Harvey DO      nicotine  1 patch Transdermal Daily Atilio Harvey,       ondansetron  4 mg Intravenous Q4H PRN Atilio Harvey DO          Today, Patient Was Seen By: CORWIN Goetz    **Please Note: This note may have been constructed using a voice recognition system.**

## 2024-03-18 NOTE — ASSESSMENT & PLAN NOTE
Lab Results   Component Value Date    EGFR 45 03/18/2024    EGFR 50 03/17/2024    EGFR 46 03/03/2024    CREATININE 1.44 (H) 03/18/2024    CREATININE 1.32 (H) 03/17/2024    CREATININE 1.42 (H) 03/03/2024     Renal function at baseline

## 2024-03-19 VITALS
WEIGHT: 218 LBS | SYSTOLIC BLOOD PRESSURE: 114 MMHG | HEIGHT: 71 IN | RESPIRATION RATE: 18 BRPM | OXYGEN SATURATION: 93 % | DIASTOLIC BLOOD PRESSURE: 68 MMHG | HEART RATE: 51 BPM | TEMPERATURE: 98.2 F | BODY MASS INDEX: 30.52 KG/M2

## 2024-03-19 LAB
ANION GAP SERPL CALCULATED.3IONS-SCNC: 7 MMOL/L (ref 4–13)
BACTERIA UR CULT: ABNORMAL
BUN SERPL-MCNC: 30 MG/DL (ref 5–25)
CALCIUM SERPL-MCNC: 9.1 MG/DL (ref 8.4–10.2)
CHLORIDE SERPL-SCNC: 104 MMOL/L (ref 96–108)
CO2 SERPL-SCNC: 26 MMOL/L (ref 21–32)
CREAT SERPL-MCNC: 1.33 MG/DL (ref 0.6–1.3)
GFR SERPL CREATININE-BSD FRML MDRD: 50 ML/MIN/1.73SQ M
GLUCOSE SERPL-MCNC: 102 MG/DL (ref 65–140)
POTASSIUM SERPL-SCNC: 3.9 MMOL/L (ref 3.5–5.3)
SODIUM SERPL-SCNC: 137 MMOL/L (ref 135–147)

## 2024-03-19 PROCEDURE — NC001 PR NO CHARGE

## 2024-03-19 PROCEDURE — 99239 HOSP IP/OBS DSCHRG MGMT >30: CPT

## 2024-03-19 PROCEDURE — 80048 BASIC METABOLIC PNL TOTAL CA: CPT

## 2024-03-19 RX ADMIN — FLUTICASONE FUROATE AND VILANTEROL TRIFENATATE 1 PUFF: 200; 25 POWDER RESPIRATORY (INHALATION) at 08:13

## 2024-03-19 RX ADMIN — ATORVASTATIN CALCIUM 20 MG: 20 TABLET, FILM COATED ORAL at 08:12

## 2024-03-19 RX ADMIN — CIPROFLOXACIN 250 MG: 250 TABLET, FILM COATED ORAL at 08:12

## 2024-03-19 RX ADMIN — NICOTINE 1 PATCH: 7 PATCH, EXTENDED RELEASE TRANSDERMAL at 08:14

## 2024-03-19 RX ADMIN — METOPROLOL TARTRATE 25 MG: 25 TABLET, FILM COATED ORAL at 08:13

## 2024-03-19 RX ADMIN — UMECLIDINIUM 1 PUFF: 62.5 AEROSOL, POWDER ORAL at 08:13

## 2024-03-19 RX ADMIN — FLUTICASONE PROPIONATE 1 SPRAY: 50 SPRAY, METERED NASAL at 08:13

## 2024-03-19 NOTE — PROGRESS NOTES
[facility]  Progress Note  Name: Joel Wyman I  MRN: [id number]  Unit/Bed#: 2 [facility] 204 A  Date of Admission: 3/17/2024   Date of Service: 3/19/2024  Hospital Day: 1    Assessment/Plan   * Hematuria  Assessment & Plan  History of prostate cancer status post XRT hypertension COPD hyperlipidemia presents with hematuria.  Had urinary retention and urinary catheter was placed on Tuesday in ED.  Saw Dr. Khan on Friday with recommendations to keep in over the weekend and started on ciprofloxacin.  Friday evening started to have hematuria and came to the ED today. Alanis inserted  Urology recommendations appreciated  Continue Cipro till 3/20  Follow up on urine cultures  3/19: D/C alanis and monitor PVR    Stage 3b chronic kidney disease (HCC)  Assessment & Plan  Lab Results   Component Value Date    EGFR 50 03/19/2024    EGFR 45 03/18/2024    EGFR 50 03/17/2024    CREATININE 1.33 (H) 03/19/2024    CREATININE 1.44 (H) 03/18/2024    CREATININE 1.32 (H) 03/17/2024     Renal function at baseline    Prostate cancer (HCC)  Assessment & Plan  History of prostate cancer status post XRT.    COPD (chronic obstructive pulmonary disease) ([facility])  Assessment & Plan  No exacerbation.  Prior to admission on Trelegy.  Will order substituted equivalent    Hyperlipidemia  Assessment & Plan  Continue atorvastatin    Essential hypertension  Assessment & Plan  Continue metoprolol           VTE Pharmacologic Prophylaxis: VTE Score: 3 Moderate Risk (Score 3-4) - Pharmacological DVT Prophylaxis Contraindicated. Sequential Compression Devices Ordered.     Mobility:   Basic Mobility Inpatient Raw Score: 24  JH-HLM Goal: 8: Walk 250 feet or more  JH-HLM Achieved: 8: Walk 250 feet ot more  JH-HLM Goal achieved. Continue to encourage appropriate mobility.     Patient Centered Rounds: I performed bedside rounds with nursing staff today.   Discussions with Specialists or Other Care Team Provider: RN, MERY     Education and  Discussions with Family / Patient:  Patient will update wife.      Total Time Spent on Date of Encounter in care of patient:  mins. This time was spent on one or more of the following: performing physical exam; counseling and coordination of care; obtaining or reviewing history; documenting in the medical record; reviewing/ordering tests, medications or procedures; communicating with other healthcare professionals and discussing with patient's family/caregivers.     Current Length of Stay: 1 day(s)  Current Patient Status: Observation   Certification Statement: The patient will continue to require additional inpatient hospital stay due to hematuria  Discharge Plan: Anticipate discharge tomorrow to home.     Code Status: Level 1 - Full Code      Subjective:   Patient resting comfortably in bed. Denies any pain, dizziness, nausea or vomiting. No gross hematuria since removal of alanis.    Objective:     Vitals:   Temp (24hrs), Av.9 °F (36.6 °C), Min:97.4 °F (36.3 °C), Max:98.2 °F (36.8 °C)    Temp:  [97.4 °F (36.3 °C)-98.2 °F (36.8 °C)] 97.4 °F (36.3 °C)  HR:  [47-55] 54  Resp:  [16-18] 16  BP: (118-124)/(67-72) 118/70  SpO2:  [90 %-94 %] 90 %  Body mass index is 30.4 kg/m².     Input and Output Summary (last 24 hours):     Intake/Output Summary (Last 24 hours) at 3/19/2024 1143  Last data filed at 3/19/2024 0819  Gross per 24 hour   Intake 5 ml   Output 1000 ml   Net -995 ml       Physical Exam:   Physical Exam  Vitals and nursing note reviewed.   Constitutional:       General: He is not in acute distress.     Appearance: He is well-developed.   HENT:      Head: Normocephalic and atraumatic.   Eyes:      Conjunctiva/sclera: Conjunctivae normal.   Cardiovascular:      Rate and Rhythm: Normal rate and regular rhythm.      Heart sounds: No murmur heard.  Pulmonary:      Effort: Pulmonary effort is normal. No respiratory distress.      Breath sounds: Normal breath sounds.   Abdominal:      Palpations: Abdomen is soft.       Tenderness: There is no abdominal tenderness.   Musculoskeletal:         General: No swelling.      Cervical back: Neck supple.   Skin:     General: Skin is warm and dry.      Capillary Refill: Capillary refill takes less than 2 seconds.   Neurological:      Mental Status: He is alert and oriented to person, place, and time.   Psychiatric:         Mood and Affect: Mood normal.          Additional Data:     Labs:  Results from last 7 days   Lab Units 03/18/24  0518 03/17/24  0903   WBC Thousand/uL 8.64 8.79   HEMOGLOBIN g/dL 14.9 15.3   HEMATOCRIT % 45.3 46.2   PLATELETS Thousands/uL 143* 158   NEUTROS PCT %  --  74   LYMPHS PCT %  --  15   MONOS PCT %  --  8   EOS PCT %  --  3     Results from last 7 days   Lab Units 03/19/24  0510 03/18/24  0518   SODIUM mmol/L 137 135   POTASSIUM mmol/L 3.9 3.9   CHLORIDE mmol/L 104 103   CO2 mmol/L 26 27   BUN mg/dL 30* 25   CREATININE mg/dL 1.33* 1.44*   ANION GAP mmol/L 7 5   CALCIUM mg/dL 9.1 9.0   ALBUMIN g/dL  --  3.6   TOTAL BILIRUBIN mg/dL  --  0.81   ALK PHOS U/L  --  58   ALT U/L  --  13   AST U/L  --  14   GLUCOSE RANDOM mg/dL 102 121                       Lines/Drains:  Invasive Devices       Peripheral Intravenous Line  Duration             Peripheral IV 03/17/24 Distal;Dorsal (posterior);Left Forearm 2 days                          Imaging: No pertinent imaging reviewed.    Recent Cultures (last 7 days):   Results from last 7 days   Lab Units 03/17/24  0935 03/12/24  1640   URINE CULTURE  Culture too young- will reincubate >100,000 cfu/ml Proteus mirabilis*       Last 24 Hours Medication List:   Current Facility-Administered Medications   Medication Dose Route Frequency Provider Last Rate    acetaminophen  650 mg Oral Q4H PRN Atilio Harvey, DO      albuterol  2 puff Inhalation Q4H PRN Atilio Harvey, DO      atorvastatin  20 mg Oral Daily Atilio Harvey, DO      ciprofloxacin  250 mg Oral Q12H [facility] Atilio Harvey DO      fluticasone  1 spray Nasal Daily Atilio  Wes,       fluticasone-vilanterol  1 puff Inhalation Daily Atilio Harvey DO      And    umeclidinium  1 puff Inhalation Daily Atilio Harvey,       metoprolol tartrate  25 mg Oral Q12H [facility] Atilio Harvey DO      nicotine  1 patch Transdermal Daily Atilio Harvey,       ondansetron  4 mg Intravenous Q4H PRN Atilio Harvey DO          Today, Patient Was Seen By: CORWIN Goetz    **Please Note: This note may have been constructed using a voice recognition system.**

## 2024-03-19 NOTE — ASSESSMENT & PLAN NOTE
History of prostate cancer status post XRT hypertension COPD hyperlipidemia presents with hematuria.  Had urinary retention and urinary catheter was placed on Tuesday in ED.  Saw Dr. Khan on Friday with recommendations to keep in over the weekend and started on ciprofloxacin.  Friday evening started to have hematuria and came to the ED today. Alanis inserted  Urology recommendations appreciated  Continue Cipro till 3/20  Follow up on urine cultures  3/19: D/C alanis and monitor PVR

## 2024-03-19 NOTE — PLAN OF CARE
Problem: PAIN - ADULT  Goal: Verbalizes/displays adequate comfort level or baseline comfort level  Description: Interventions:  - Encourage patient to monitor pain and request assistance  - Assess pain using appropriate pain scale  - Administer analgesics based on type and severity of pain and evaluate response  - Implement non-pharmacological measures as appropriate and evaluate response  - Consider cultural and social influences on pain and pain management  - Notify physician/advanced practitioner if interventions unsuccessful or patient reports new pain  Outcome: Progressing     Problem: INFECTION - ADULT  Goal: Absence or prevention of progression during hospitalization  Description: INTERVENTIONS:  - Assess and monitor for signs and symptoms of infection  - Monitor lab/diagnostic results  - Monitor all insertion sites, i.e. indwelling lines, tubes, and drains  - Monitor endotracheal if appropriate and nasal secretions for changes in amount and color  - [place] appropriate cooling/warming therapies per order  - Administer medications as ordered  - Instruct and encourage patient and family to use good hand hygiene technique  - Identify and instruct in appropriate isolation precautions for identified infection/condition  Outcome: Progressing     Problem: SAFETY ADULT  Goal: Patient will remain free of falls  Description: INTERVENTIONS:  - Educate patient/family on patient safety including physical limitations  - Instruct patient to call for assistance with activity   - Consult OT/PT to assist with strengthening/mobility   - Keep Call bell within reach  - Keep bed low and locked with side rails adjusted as appropriate  - Keep care items and personal belongings within reach  - Initiate and maintain comfort rounds  - Make Fall Risk Sign visible to staff  - Offer Toileting every 2 Hours, in advance of need  - Initiate/Maintain bed alarm  Problem: DISCHARGE PLANNING  Goal: Discharge to home or other facility with  appropriate resources  Description: INTERVENTIONS:  - Identify barriers to discharge w/patient and caregiver  - Arrange for needed discharge resources and transportation as appropriate  - Identify discharge learning needs (meds, wound care, etc.)  - Arrange for interpretive services to assist at discharge as needed  - Refer to Case Management Department for coordinating discharge planning if the patient needs post-hospital services based on physician/advanced practitioner order or complex needs related to functional status, cognitive ability, or social support system  Outcome: Progressing     Problem: Knowledge Deficit  Goal: Patient/family/caregiver demonstrates understanding of disease process, treatment plan, medications, and discharge instructions  Description: Complete learning assessment and assess knowledge base.  Interventions:  - Provide teaching at level of understanding  - Provide teaching via preferred learning methods  Outcome: Progressing     - Apply yellow socks and bracelet for high fall risk patients  - Consider moving patient to room near nurses station  Outcome: Progressing

## 2024-03-19 NOTE — DISCHARGE SUMMARY
[facility]  Discharge- Joel Wyman 1945, 78 y.o. male MRN: [id number]  Unit/Bed#: 2 [place] [address] A Encounter: [id number]  Primary Care Provider: Fabiano Carroll MD   Date and time admitted to hospital: 3/17/2024  8:16 AM    * Hematuria  Assessment & Plan  History of prostate cancer status post XRT hypertension COPD hyperlipidemia presents with hematuria.  Had urinary retention and urinary catheter was placed on Tuesday in ED.  Saw Dr. Khan on Friday with recommendations to keep in over the weekend and started on ciprofloxacin.  Friday evening started to have hematuria and came to the ED today. Alanis inserted  Urology recommendations appreciated  Discontinue ciprofloxacin  Urine cultures demonstrated > 100,000 cfu/ml Corynebacterium urealyticum likely colonization from Alanis catheter  3/19: D/C alanis and monitor PVR      Stage 3b chronic kidney disease (HCC)  Assessment & Plan  Lab Results   Component Value Date    EGFR 50 03/19/2024    EGFR 45 03/18/2024    EGFR 50 03/17/2024    CREATININE 1.33 (H) 03/19/2024    CREATININE 1.44 (H) 03/18/2024    CREATININE 1.32 (H) 03/17/2024     Renal function at baseline    Prostate cancer (HCC)  Assessment & Plan  History of prostate cancer status post XRT.    COPD (chronic obstructive pulmonary disease) (HCC)  Assessment & Plan  No exacerbation.  Prior to admission on Trelegy.  Will order substituted equivalent    Hyperlipidemia  Assessment & Plan  Continue atorvastatin    Essential hypertension  Assessment & Plan  Continue metoprolol        Medical Problems       Resolved Problems  Date Reviewed: 3/19/2024   None       Discharging Physician / Practitioner: CORWIN Goetz  PCP: Fabiano Carroll MD  Admission Date:   Admission Orders (From admission, onward)       Ordered        03/18/24 1418  Inpatient Admission  Once            03/17/24 0957  Place in Observation  Once                          Discharge Date:  "03/19/24    Consultations During Hospital Stay:  Urology    Procedures Performed:   None    Significant Findings / Test Results:   None    Test Results Pending at Discharge (will require follow up):   None     Outpatient Tests Requested:  None    Complications: None    Reason for Admission: Hematuria    Hospital Course:   Joel Wyman is a 78 y.o. male patient who originally presented to the hospital on 3/17/2024 due to above.  Sumner catheter noted with blood and flushes were initiated.  Urology consulted and recommended keeping the Sumner catheter and continue with outpatient ciprofloxacin.  Patient was monitored for clearance of hematuria and Sumner catheter was discontinued on 3/19 with successful PVR.  UA positive for UTI and urine culture grew corynebacterium which is likely colonization.  Patient will be monitored off further antibiotic at this time.  Patient will be discharged home to follow-up with outpatient urology for further management.    Please see above list of diagnoses and related plan for additional information.     Condition at Discharge: good    Discharge Day Visit / Exam:     Subjective: Denies any pain and no reported bloody urine.    Vitals: Blood Pressure: 114/68 (03/19/24 1537)  Pulse: (!) 51 (03/19/24 1537)  Temperature: 98.2 °F (36.8 °C) (03/19/24 1537)  Temp Source: Tympanic (03/19/24 1500)  Respirations: 18 (03/19/24 1537)  Height: 5' 11\" (180.3 cm) (03/17/24 1037)  Weight - Scale: 98.9 kg (218 lb) (03/17/24 1037)  SpO2: 93 % (03/19/24 1537)    Exam:   Physical Exam  Vitals and nursing note reviewed.   Constitutional:       General: He is not in acute distress.     Appearance: He is well-developed.   HENT:      Head: Normocephalic and atraumatic.   Eyes:      Conjunctiva/sclera: Conjunctivae normal.   Cardiovascular:      Rate and Rhythm: Normal rate and regular rhythm.      Heart sounds: No murmur heard.  Pulmonary:      Effort: Pulmonary effort is normal. No respiratory distress.      " Breath sounds: Normal breath sounds.   Abdominal:      Palpations: Abdomen is soft.      Tenderness: There is no abdominal tenderness.   Musculoskeletal:         General: No swelling.      Cervical back: Neck supple.   Skin:     General: Skin is warm and dry.      Capillary Refill: Capillary refill takes less than 2 seconds.   Neurological:      Mental Status: He is alert and oriented to person, place, and time.   Psychiatric:         Mood and Affect: Mood normal.     Discussion with Family: YES    Discharge instructions/Information to patient and family:   See after visit summary for information provided to patient and family.      Provisions for Follow-Up Care:  See after visit summary for information related to follow-up care and any pertinent home health orders.      Mobility at time of Discharge:   Basic Mobility Inpatient Raw Score: 24  JH-HLM Goal: 8: Walk 250 feet or more  JH-HLM Achieved: 8: Walk 250 feet ot more  HLM Goal achieved. Continue to encourage appropriate mobility.     Disposition:   Home    Planned Readmission:      Discharge Statement:  I spent  minutes discharging the patient. This time was spent on the day of discharge. I had direct contact with the patient on the day of discharge. Greater than 50% of the total time was spent examining patient, answering all patient questions, arranging and discussing plan of care with patient as well as directly providing post-discharge instructions.  Additional time then spent on discharge activities.    Discharge Medications:  See after visit summary for reconciled discharge medications provided to patient and/or family.      **Please Note: This note may have been constructed using a voice recognition system**

## 2024-03-19 NOTE — PLAN OF CARE
Received discharge instructions and verbalized understanding of same. Left unit with all belongings

## 2024-03-19 NOTE — PROGRESS NOTES
"Progress Note - Urology      Patient: Joel Wyman   : 1945 Sex: male   MRN: [id number]     CSN: [id number]  Unit/Bed#: [address] [place] [address]     SUBJECTIVE:   Patient seen on afternoon rounds  Urine clear this morning but with ambulation minimal hematuria patient to undergo voiding trial and has been urinating without difficulties up to now      Objective   Vitals: /68   Pulse (!) 51   Temp 98.2 °F (36.8 °C)   Resp 18   Ht 5' 11\" (1.803 m)   Wt 98.9 kg (218 lb)   SpO2 93%   BMI 30.40 kg/m²     I/O last 24 hours:  In: 5 [I.V.:5]  Out: 1000 [Urine:1000]      Physical Exam:   General Alert awake   Normocephalic atraumatic PERRLA  Lungs clear bilaterally  Cardiac normal S1 normal S2  Abdomen soft, flank pain  Extremities no edema      Lab Results: CBC:   Lab Results   Component Value Date    WBC 8.64 2024    HGB 14.9 2024    HCT 45.3 2024    MCV 89 2024     (L) 2024    RBC 5.10 2024    MCH 29.2 2024    MCHC 32.9 2024    RDW 14.6 2024    MPV 9.6 2024    NRBC 0 2024     CMP:   Lab Results   Component Value Date     2024    CO2 26 2024    BUN 30 (H) 2024    CREATININE 1.33 (H) 2024    CALCIUM 9.1 2024    AST 14 2024    ALT 13 2024    ALKPHOS 58 2024    EGFR 50 2024     Urinalysis:   Lab Results   Component Value Date    COLORU Red 2024    CLARITYU Cloudy 2024    SPECGRAV 1.015 2024    PHUR 7.0 2024    PHUR 5.5 2018    LEUKOCYTESUR Moderate (A) 2024    NITRITE Positive (A) 2024    GLUCOSEU 100 (1/10%) (A) 2024    KETONESU 15 (1+) (A) 2024    BILIRUBINUR Negative 2024    BLOODU Large (A) 2024     Urine Culture:   Lab Results   Component Value Date    URINECX >100,000 cfu/ml Corynebacterium urealyticum (A) 2024     PSA: No results found for: \"PSA\"      Assessment/ Plan:  Complicated UTI  History of " hemorrhagic cystitis  [place] DC'd today voiding clear urine  To be discharged home ruddy Khan MD

## 2024-03-19 NOTE — ASSESSMENT & PLAN NOTE
Lab Results   Component Value Date    EGFR 50 03/19/2024    EGFR 45 03/18/2024    EGFR 50 03/17/2024    CREATININE 1.33 (H) 03/19/2024    CREATININE 1.44 (H) 03/18/2024    CREATININE 1.32 (H) 03/17/2024     Renal function at baseline

## 2024-03-19 NOTE — PLAN OF CARE
Problem: PAIN - ADULT  Goal: Verbalizes/displays adequate comfort level or baseline comfort level  Description: Interventions:  - Encourage patient to monitor pain and request assistance  - Assess pain using appropriate pain scale  - Administer analgesics based on type and severity of pain and evaluate response  - Implement non-pharmacological measures as appropriate and evaluate response  - Consider cultural and social influences on pain and pain management  - Notify physician/advanced practitioner if interventions unsuccessful or patient reports new pain  Outcome: Progressing     Problem: INFECTION - ADULT  Goal: Absence or prevention of progression during hospitalization  Description: INTERVENTIONS:  - Assess and monitor for signs and symptoms of infection  - Monitor lab/diagnostic results  - Monitor all insertion sites, i.e. indwelling lines, tubes, and drains  - Monitor endotracheal if appropriate and nasal secretions for changes in amount and color  - [place] appropriate cooling/warming therapies per order  - Administer medications as ordered  - Instruct and encourage patient and family to use good hand hygiene technique  - Identify and instruct in appropriate isolation precautions for identified infection/condition  Outcome: Progressing  Goal: Absence of fever/infection during neutropenic period  Description: INTERVENTIONS:  - Monitor WBC    Outcome: Progressing     Problem: SAFETY ADULT  Goal: Patient will remain free of falls  Description: INTERVENTIONS:  - Educate patient/family on patient safety including physical limitations  - Instruct patient to call for assistance with activity   - Consult OT/PT to assist with strengthening/mobility   - Keep Call bell within reach  - Keep bed low and locked with side rails adjusted as appropriate  - Keep care items and personal belongings within reach  - Initiate and maintain comfort rounds  - Make Fall Risk Sign visible to staff  - Offer Toileting every 2 Hours,  in advance of need  - Initiate/Maintain bed alarm  - Obtain necessary fall risk management equipment: Call bell  - Apply yellow socks and bracelet for high fall risk patients  - Consider moving patient to room near nurses station  Outcome: Progressing  Goal: Maintain or return to baseline ADL function  Description: INTERVENTIONS:  -  Assess patient's ability to carry out ADLs; assess patient's baseline for ADL function and identify physical deficits which impact ability to perform ADLs (bathing, care of mouth/teeth, toileting, grooming, dressing, etc.)  - Assess/evaluate cause of self-care deficits   - Assess range of motion  - Assess patient's mobility; develop plan if impaired  - Assess patient's need for assistive devices and provide as appropriate  - Encourage maximum independence but intervene and supervise when necessary  - Involve family in performance of ADLs  - Assess for home care needs following discharge   - Consider OT consult to assist with ADL evaluation and planning for discharge  - Provide patient education as appropriate  Outcome: Progressing  Goal: Maintains/Returns to pre admission functional level  Description: INTERVENTIONS:  - Perform AM-PAC 6 Click Basic Mobility/ Daily Activity assessment daily.  - Set and communicate daily mobility goal to care team and patient/family/caregiver.   - Collaborate with rehabilitation services on mobility goals if consulted  - Perform Range of Motion 2 times a day.  - Reposition patient every 2 hours.  - Dangle patient 2 times a day  - Stand patient 2 times a day  - Ambulate patient 2 times a day  - Out of bed to chair 2 times a day   - Out of bed for meals 2 times a day  - Out of bed for toileting  - Record patient progress and toleration of activity level   Outcome: Progressing     Problem: DISCHARGE PLANNING  Goal: Discharge to home or other facility with appropriate resources  Description: INTERVENTIONS:  - Identify barriers to discharge w/patient and  caregiver  - Arrange for needed discharge resources and transportation as appropriate  - Identify discharge learning needs (meds, wound care, etc.)  - Arrange for interpretive services to assist at discharge as needed  - Refer to Case Management Department for coordinating discharge planning if the patient needs post-hospital services based on physician/advanced practitioner order or complex needs related to functional status, cognitive ability, or social support system  Outcome: Progressing     Problem: Knowledge Deficit  Goal: Patient/family/caregiver demonstrates understanding of disease process, treatment plan, medications, and discharge instructions  Description: Complete learning assessment and assess knowledge base.  Interventions:  - Provide teaching at level of understanding  - Provide teaching via preferred learning methods  Outcome: Progressing

## 2024-03-19 NOTE — ASSESSMENT & PLAN NOTE
History of prostate cancer status post XRT hypertension COPD hyperlipidemia presents with hematuria.  Had urinary retention and urinary catheter was placed on Tuesday in ED.  Saw Dr. Khan on Friday with recommendations to keep in over the weekend and started on ciprofloxacin.  Friday evening started to have hematuria and came to the ED today. Alanis inserted  Urology recommendations appreciated  Discontinue ciprofloxacin  Urine cultures demonstrated > 100,000 cfu/ml Corynebacterium urealyticum likely colonization from Alanis catheter  3/19: D/C alanis and monitor PVR

## 2024-03-20 NOTE — UTILIZATION REVIEW
NOTIFICATION OF ADMISSION DISCHARGE   This is a Notification of Discharge from [facility]. Please be advised that this patient has been discharge from our facility. Below you will find the admission and discharge date and time including the patient’s disposition.   UTILIZATION REVIEW CONTACT:  Danuta Byrne  Utilization   Network Utilization Review Department  Phone: [phone] x carefully listen to the prompts. All voicemails are confidential.  Email: [email or web address]     ADMISSION INFORMATION  PRESENTATION DATE: 3/17/2024  8:16 AM  OBERVATION ADMISSION DATE:   INPATIENT ADMISSION DATE: 3/18/24  2:18 PM   DISCHARGE DATE: 3/19/2024  6:18 PM   DISPOSITION:Home/Self Care    Network Utilization Review Department  ATTENTION: Please call with any questions or concerns to [phone] and carefully listen to the prompts so that you are directed to the right person. All voicemails are confidential.   For Discharge needs, contact Care Management DC Support Team at [phone] opt. 2  Send all requests for admission clinical reviews, approved or denied determinations and any other requests to dedicated fax number below belonging to the campus where the patient is receiving treatment. List of dedicated fax numbers for the Facilities:  FACILITY NAME UR FAX NUMBER   ADMISSION DENIALS (Administrative/Medical Necessity) [phone]   DISCHARGE SUPPORT TEAM ([facility]) [phone]   PARENT CHILD HEALTH (Maternity/NICU/Pediatrics) [phone]   [facility] [phone]   [facility] [phone]   [facility] [phone]   [facility] [phone]   [facility] [phone]   [facility] [phone]   [facility] [phone]   [facility] [phone]    [facility] [phone]   [facility] [phone]   [facility] [phone]   [facility] [phone]

## 2024-04-03 RX ORDER — MULTIVIT-MIN/IRON/FOLIC ACID/K 18-600-40
CAPSULE ORAL DAILY
COMMUNITY
End: 2024-04-26

## 2024-04-03 RX ORDER — LEVOFLOXACIN 250 MG/1
250 TABLET, FILM COATED ORAL EVERY MORNING
Status: ON HOLD | COMMUNITY
End: 2024-04-11

## 2024-04-03 RX ORDER — MELOXICAM 15 MG/1
15 TABLET ORAL DAILY
COMMUNITY
End: 2024-04-26

## 2024-04-03 NOTE — PRE-PROCEDURE INSTRUCTIONS
Pre-Surgery Instructions:   Medication Instructions    albuterol (PROVENTIL HFA,VENTOLIN HFA) 90 mcg/act inhaler Uses PRN- OK to take day of surgery    Cholecalciferol (Vitamin D) 50 MCG (2000 UT) CAPS Stop taking 7 days prior to surgery.    fluticasone (FLONASE) 50 mcg/act nasal spray Uses PRN- OK to take day of surgery    fluticasone-umeclidinium-vilanterol (Trelegy Ellipta) 200-62.5-25 mcg/actuation AEPB inhaler Take day of surgery.    levofloxacin (LEVAQUIN) 250 mg tablet Treatment will be completed prior to surgery    meloxicam (MOBIC) 15 mg tablet Stop taking 3 days prior to surgery.    metoprolol tartrate (LOPRESSOR) 25 mg tablet Take day of surgery.    Medication instructions for day surgery reviewed. Please use only a sip of water to take your instructed medications. Avoid all over the counter vitamins, supplements and NSAIDS for one week prior to surgery per anesthesia guidelines. Tylenol is ok to take as needed.     You will receive a call one business day prior to surgery with an arrival time and hospital directions. If your surgery is scheduled on a Monday, the hospital will be calling you on the Friday prior to your surgery. If you have not heard from anyone by 8pm, please call the hospital supervisor through the hospital  at [phone]. ([place] [phone] or [place] [phone]).    Do not eat or drink anything after midnight the night before your surgery, including candy, mints, lifesavers, or chewing gum. Do not drink alcohol 24hrs before your surgery. Try not to smoke at least 24hrs before your surgery.       Follow the pre surgery showering instructions as listed in the “My Surgical Experience Booklet” or otherwise provided by your surgeon's office. Do not use a blade to shave the surgical area 1 week before surgery. It is okay to use a clean electric clippers up to 24 hours before surgery. Do not apply any lotions, creams, including makeup, cologne, deodorant, or perfumes after  showering on the day of your surgery. Do not use dry shampoo, hair spray, hair gel, or any type of hair products.     No contact lenses, eye make-up, or artificial eyelashes. Remove nail polish, including gel polish, and any artificial, gel, or acrylic nails if possible. Remove all jewelry including rings and body piercing jewelry.     Wear causal clothing that is easy to take on and off. Consider your type of surgery.    Keep any valuables, jewelry, piercings at home. Please bring any specially ordered equipment (sling, braces) if indicated.    Arrange for a responsible person to drive you to and from the hospital on the day of your surgery. Please confirm the visitor policy for the day of your procedure when you receive your phone call with an arrival time.     Call the surgeon's office with any new illnesses, exposures, or additional questions prior to surgery.    Please reference your “My Surgical Experience Booklet” for additional information to prepare for your upcoming surgery.

## 2024-04-07 ENCOUNTER — HOSPITAL ENCOUNTER (EMERGENCY)
Facility: HOSPITAL | Age: 79
Discharge: HOME | End: 2024-04-07
Attending: EMERGENCY MEDICINE | Admitting: EMERGENCY MEDICINE
Payer: COMMERCIAL

## 2024-04-07 VITALS
SYSTOLIC BLOOD PRESSURE: 134 MMHG | BODY MASS INDEX: 29.57 KG/M2 | WEIGHT: 212 LBS | DIASTOLIC BLOOD PRESSURE: 85 MMHG | OXYGEN SATURATION: 95 % | HEART RATE: 72 BPM | RESPIRATION RATE: 20 BRPM | TEMPERATURE: 97.8 F

## 2024-04-07 DIAGNOSIS — N39.0 UTI (URINARY TRACT INFECTION): ICD-10-CM

## 2024-04-07 DIAGNOSIS — R31.9 HEMATURIA: Primary | ICD-10-CM

## 2024-04-07 LAB
ALBUMIN SERPL BCP-MCNC: 3.8 G/DL (ref 3.5–5)
ALP SERPL-CCNC: 61 U/L (ref 34–104)
ALT SERPL W P-5'-P-CCNC: 16 U/L (ref 7–52)
ANION GAP SERPL CALCULATED.3IONS-SCNC: 6 MMOL/L (ref 4–13)
AST SERPL W P-5'-P-CCNC: 16 U/L (ref 13–39)
BACTERIA UR QL AUTO: ABNORMAL /HPF
BASOPHILS # BLD AUTO: 0.03 THOUSANDS/ÂΜL (ref 0–0.1)
BASOPHILS NFR BLD AUTO: 0 % (ref 0–1)
BILIRUB SERPL-MCNC: 0.42 MG/DL (ref 0.2–1)
BILIRUB UR QL STRIP: NEGATIVE
BUN SERPL-MCNC: 40 MG/DL (ref 5–25)
CALCIUM SERPL-MCNC: 9.1 MG/DL (ref 8.4–10.2)
CHLORIDE SERPL-SCNC: 107 MMOL/L (ref 96–108)
CLARITY UR: ABNORMAL
CO2 SERPL-SCNC: 27 MMOL/L (ref 21–32)
COLOR UR: ABNORMAL
CREAT SERPL-MCNC: 1.54 MG/DL (ref 0.6–1.3)
EOSINOPHIL # BLD AUTO: 0.28 THOUSAND/ÂΜL (ref 0–0.61)
EOSINOPHIL NFR BLD AUTO: 3 % (ref 0–6)
ERYTHROCYTE [DISTWIDTH] IN BLOOD BY AUTOMATED COUNT: 15.1 % (ref 11.6–15.1)
GFR SERPL CREATININE-BSD FRML MDRD: 41 ML/MIN/1.73SQ M
GLUCOSE SERPL-MCNC: 116 MG/DL (ref 65–140)
GLUCOSE UR STRIP-MCNC: ABNORMAL MG/DL
HCT VFR BLD AUTO: 46.1 % (ref 36.5–49.3)
HGB BLD-MCNC: 15.1 G/DL (ref 12–17)
HGB UR QL STRIP.AUTO: ABNORMAL
IMM GRANULOCYTES # BLD AUTO: 0.02 THOUSAND/UL (ref 0–0.2)
IMM GRANULOCYTES NFR BLD AUTO: 0 % (ref 0–2)
KETONES UR STRIP-MCNC: ABNORMAL MG/DL
LEUKOCYTE ESTERASE UR QL STRIP: ABNORMAL
LYMPHOCYTES # BLD AUTO: 1.6 THOUSANDS/ÂΜL (ref 0.6–4.47)
LYMPHOCYTES NFR BLD AUTO: 16 % (ref 14–44)
MCH RBC QN AUTO: 29.3 PG (ref 26.8–34.3)
MCHC RBC AUTO-ENTMCNC: 32.8 G/DL (ref 31.4–37.4)
MCV RBC AUTO: 90 FL (ref 82–98)
MONOCYTES # BLD AUTO: 0.97 THOUSAND/ÂΜL (ref 0.17–1.22)
MONOCYTES NFR BLD AUTO: 10 % (ref 4–12)
NEUTROPHILS # BLD AUTO: 7.28 THOUSANDS/ÂΜL (ref 1.85–7.62)
NEUTS SEG NFR BLD AUTO: 71 % (ref 43–75)
NITRITE UR QL STRIP: POSITIVE
NON-SQ EPI CELLS URNS QL MICRO: ABNORMAL /HPF
NRBC BLD AUTO-RTO: 0 /100 WBCS
PH UR STRIP.AUTO: 7 [PH]
PLATELET # BLD AUTO: 171 THOUSANDS/UL (ref 149–390)
PMV BLD AUTO: 9.7 FL (ref 8.9–12.7)
POTASSIUM SERPL-SCNC: 4.1 MMOL/L (ref 3.5–5.3)
PROT SERPL-MCNC: 6.8 G/DL (ref 6.4–8.4)
PROT UR STRIP-MCNC: >=300 MG/DL
RBC # BLD AUTO: 5.15 MILLION/UL (ref 3.88–5.62)
RBC #/AREA URNS AUTO: ABNORMAL /HPF
SODIUM SERPL-SCNC: 140 MMOL/L (ref 135–147)
SP GR UR STRIP.AUTO: 1.02 (ref 1–1.03)
UROBILINOGEN UR QL STRIP.AUTO: 1 E.U./DL
WBC # BLD AUTO: 10.18 THOUSAND/UL (ref 4.31–10.16)
WBC #/AREA URNS AUTO: ABNORMAL /HPF

## 2024-04-07 PROCEDURE — 87086 URINE CULTURE/COLONY COUNT: CPT | Performed by: EMERGENCY MEDICINE

## 2024-04-07 PROCEDURE — 80053 COMPREHEN METABOLIC PANEL: CPT | Performed by: EMERGENCY MEDICINE

## 2024-04-07 PROCEDURE — 81001 URINALYSIS AUTO W/SCOPE: CPT | Performed by: EMERGENCY MEDICINE

## 2024-04-07 PROCEDURE — 85025 COMPLETE CBC W/AUTO DIFF WBC: CPT | Performed by: EMERGENCY MEDICINE

## 2024-04-07 PROCEDURE — 99284 EMERGENCY DEPT VISIT MOD MDM: CPT | Performed by: EMERGENCY MEDICINE

## 2024-04-07 PROCEDURE — 36415 COLL VENOUS BLD VENIPUNCTURE: CPT | Performed by: EMERGENCY MEDICINE

## 2024-04-07 RX ORDER — LIDOCAINE HYDROCHLORIDE 20 MG/ML
1 JELLY TOPICAL ONCE
Status: COMPLETED | OUTPATIENT
Start: 2024-04-07 | End: 2024-04-07

## 2024-04-07 RX ORDER — CEPHALEXIN 500 MG/1
500 CAPSULE ORAL EVERY 12 HOURS SCHEDULED
Qty: 14 CAPSULE | Refills: 0 | Status: SHIPPED | OUTPATIENT
Start: 2024-04-07 | End: 2024-04-14

## 2024-04-07 RX ORDER — CEFTRIAXONE 1 G/50ML
1000 INJECTION, SOLUTION INTRAVENOUS ONCE
Status: COMPLETED | OUTPATIENT
Start: 2024-04-07 | End: 2024-04-07

## 2024-04-07 RX ADMIN — CEFTRIAXONE 1000 MG: 1 INJECTION, SOLUTION INTRAVENOUS at 22:17

## 2024-04-07 RX ADMIN — LIDOCAINE HYDROCHLORIDE 1 APPLICATION: 20 JELLY TOPICAL at 21:16

## 2024-04-08 NOTE — DISCHARGE INSTRUCTIONS
Rest at home tonight.  Your blood count is good.  Your urine continues to show signs of infection.  You got a dose of IV antibiotic tonight and new prescription to  tomorrow.  See Dr. Khan tomorrow or return to ER if you can't urinate and have pain, pressure or if you get lightheaded or pass out.

## 2024-04-08 NOTE — ED NOTES
Attempted urinary cath. Unsuccessful. MD notified and aware. 2 Rns attempted.     Samantha M Goodell, RN  04/07/24 [id number]

## 2024-04-08 NOTE — ED PROVIDER NOTES
"History  Chief Complaint   Patient presents with    Blood in Urine     States he has been having a issue with hematuria and is scheduled for procedure with Dr. Khan on . States urine has been pink but around 5 pm he sneezed several times and now having \" just blood \" .      79 yo male with h/o prostate cancer recurrence and hematuria scheduled for TURP and cystoscopy in 4 days.  Pt. Says he sneezed about 4 hours ago and started just bleeding out his penis (not with urination).  Pt. Denies pain anywhere.  No recent fever, cough, vomiting.  Not on any blood thinners.      History provided by:  Patient   used: No    Blood in Urine  Associated symptoms include dysuria. Pertinent negatives include no fever or vomiting.       Prior to Admission Medications   Prescriptions Last Dose Informant Patient Reported? Taking?   Cholecalciferol (Vitamin D) 50 MCG ( UT) CAPS   Yes No   Sig: Take by mouth in the morning Last dose 4/3/24   albuterol (PROVENTIL HFA,VENTOLIN HFA) 90 mcg/act inhaler   No No   Sig: Inhale 2 puffs every 4 (four) hours as needed for wheezing   atorvastatin (LIPITOR) 20 mg tablet   Yes No   Sig: Take 20 mg by mouth daily   fluticasone (FLONASE) 50 mcg/act nasal spray   Yes No   Si spray into each nostril daily as needed   fluticasone-umeclidinium-vilanterol (Trelegy Ellipta) 200-62.5-25 mcg/actuation AEPB inhaler   Yes No   Sig: Inhale 1 puff every morning   levofloxacin (LEVAQUIN) 250 mg tablet   Yes No   Sig: Take 250 mg by mouth every morning   meloxicam (MOBIC) 15 mg tablet   Yes No   Sig: Take 15 mg by mouth daily   metoprolol tartrate (LOPRESSOR) 25 mg tablet   Yes No   Sig: Take 12.5 mg by mouth every 12 (twelve) hours   nicotine (NICODERM CQ) 7 mg/24hr TD 24 hr patch   No No   Sig: Place 1 patch on the skin over 24 hours daily      Facility-Administered Medications: None       Past Medical History:   Diagnosis Date    Bladder infection 2024    Borderline diabetes "     Cancer (HCC)     prostate    COPD (chronic obstructive pulmonary disease) (HCC)     Hematuria     History of transfusion     x2    Hyperlipidemia     Hypertension     Radiation cystitis     Smoker     Wears glasses        Past Surgical History:   Procedure Laterality Date    APPENDECTOMY      FL RETROGRADE PYELOGRAM  04/14/2021    FL RETROGRADE PYELOGRAM  11/16/2023    HERNIA REPAIR      IR EMBOLIZATION (SPECIFY VESSEL OR SITE)  09/17/2021    IR NEPHROSTOMY TUBE CHECK AND/OR REMOVAL  07/08/2021    IR NEPHROSTOMY TUBE CHECK/CHANGE/REPOSITION/REINSERTION/UPSIZE  05/10/2021    IR NEPHROSTOMY TUBE CHECK/CHANGE/REPOSITION/REINSERTION/UPSIZE  08/04/2021    IR NEPHROSTOMY TUBE CHECK/CHANGE/REPOSITION/REINSERTION/UPSIZE  10/13/2021    IR NEPHROSTOMY TUBE PLACEMENT  05/07/2021    IL CYSTO W/IRRIG & EVAC MULTPLE OBSTRUCTING CLOTS Bilateral 04/14/2021    Procedure: CYSTOSCOPY EVACUATION OF CLOTS bilateral retrograde pyelograms possible TURBT bladder biopsy;  Surgeon: Yovani Khan MD;  Location: [facility];  Service: Urology    IL CYSTO W/IRRIG & EVAC MULTPLE OBSTRUCTING CLOTS N/A 04/24/2021    Procedure: CYSTOSCOPY EVACUATION OF CLOTS fulguration;  Surgeon: Yovani Khan MD;  [facility]: [facility];  Service: Urology    IL CYSTO W/IRRIG & EVAC MULTPLE OBSTRUCTING CLOTS N/A 07/08/2021    Procedure: CYSTOSCOPY EVACUATION OF CLOTS BILATERAL ANTIROGRADES NEPHROSTOMY TUBES, FULGERATION ;  Surgeon: Yovani Khan MD;  [facility];  Service: Urology    IL CYSTO W/IRRIG & EVAC MULTPLE OBSTRUCTING CLOTS N/A 08/22/2021    Procedure: CYSTOSCOPY, RIGHT RETROGRADE, EVACUATION OF CLOTS, BLADDER BIOPSY X2 AND FULGERATION OF BLADDER LESIONS, URETEROSCOPY, 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;  Surgeon: Yovani Khan MD;  [facility];  Service: Urology    IL CYSTOURETHROSCOPY WITH BIOPSY N/A 11/16/2023    Procedure: CYSTOSCOPY, BILATERAL RETROGRADEY PYELOGRAM,   FULGERATION 2.0 CM BLADDER TUMOR WITH BIOPSY;  Surgeon: Yovani Khan MD;  [facility]: [facility];  Service: Urology    PROSTATECTOMY  2014    ROTATOR CUFF REPAIR      right shoulder       Family History   Problem Relation Age of Onset    Diabetes Mother     Stroke Mother     Diabetes Brother     Stroke Brother      I have reviewed and agree with the history as documented.    E-Cigarette/Vaping    E-Cigarette Use Former User      E-Cigarette/Vaping Substances    Nicotine No     THC No     CBD No     Flavoring No     Other No     Unknown No      Social History     Tobacco Use    Smoking status: Some Days     Current packs/day: 0.50     Average packs/day: 0.5 packs/day for 50.0 years (25.0 ttl pk-yrs)     Types: Cigarettes     Passive exposure: Never    Smokeless tobacco: Never   Vaping Use    Vaping status: Former   Substance Use Topics    Alcohol use: Not Currently     Comment: None in over 50 yrs    Drug use: No       Review of Systems   Constitutional:  Negative for fever.   Respiratory:  Negative for cough.    Gastrointestinal:  Negative for diarrhea and vomiting.   Genitourinary:  Positive for dysuria and hematuria.       Physical Exam  Physical Exam  Vitals and nursing note reviewed.   Constitutional:       General: He is not in acute distress.     Appearance: He is not ill-appearing.   HENT:      Head: Normocephalic and atraumatic.   Cardiovascular:      Rate and Rhythm: Normal rate and regular rhythm.      Heart sounds: Normal heart sounds.   Pulmonary:      Effort: Pulmonary effort is normal. No respiratory distress.      Breath sounds: Normal breath sounds.   Abdominal:      Palpations: Abdomen is soft.      Tenderness: There is no abdominal tenderness.   Musculoskeletal:         General: No deformity. Normal range of motion.      Cervical back: Normal range of motion and neck supple.      Right lower leg: No edema.      Left lower leg: No edema.   Skin:     General: Skin is warm and dry.   Neurological:       General: No focal deficit present.      Mental Status: He is alert and oriented to person, place, and time.   Psychiatric:         Mood and Affect: Mood normal.         Behavior: Behavior normal.         Vital Signs  ED Triage Vitals [04/07/24 1955]   Temperature Pulse Respirations Blood Pressure SpO2   97.8 °F (36.6 °C) 85 (!) 24 142/69 92 %      Temp Source Heart Rate Source Patient Position - Orthostatic VS BP Location FiO2 (%)   Tympanic Monitor Sitting Left arm --      Pain Score       No Pain           Vitals:    04/07/24 1955 04/07/24 2229   BP: 142/69 125/82   Pulse: 85 84   Patient Position - Orthostatic VS: Sitting          Visual Acuity      ED Medications  Medications   lidocaine (URO-JET) 2 % urethral/mucosal gel 1 Application (1 Application Urethral Given 4/7/24 2116)   cefTRIAXone (ROCEPHIN) IVPB (premix in dextrose) 1,000 mg 50 mL (0 mg Intravenous Stopped 4/7/24 2250)       Diagnostic Studies  Results Reviewed       Procedure Component Value Units Date/Time    Comprehensive metabolic panel [[id number]]  (Abnormal) Collected: 04/07/24 2217    Lab Status: Final result Specimen: Blood from Arm, Left Updated: 04/07/24 2254     Sodium 140 mmol/L      Potassium 4.1 mmol/L      Chloride 107 mmol/L      CO2 27 mmol/L      ANION GAP 6 mmol/L      BUN 40 mg/dL      Creatinine 1.54 mg/dL      Glucose 116 mg/dL      Calcium 9.1 mg/dL      AST 16 U/L      ALT 16 U/L      Alkaline Phosphatase 61 U/L      Total Protein 6.8 g/dL      Albumin 3.8 g/dL      Total Bilirubin 0.42 mg/dL      eGFR 41 ml/min/1.73sq m     Narrative:      National Kid[employer] guidelines for Chronic Kidney Disease (CKD):     Stage 1 with normal or high GFR (GFR > 90 mL/min/1.73 square meters)    Stage 2 Mild CKD (GFR = 60-89 mL/min/1.73 square meters)    Stage 3A Moderate CKD (GFR = 45-59 mL/min/1.73 square meters)    Stage 3B Moderate CKD (GFR = 30-44 mL/min/1.73 square meters)    Stage 4 Severe CKD (GFR = 15-29 mL/min/1.73  square meters)    Stage 5 End Stage CKD (GFR <15 mL/min/1.73 square meters)  Note: GFR calculation is accurate only with a steady state creatinine    CBC and differential [[id number]]  (Abnormal) Collected: 04/07/24 2217    Lab Status: Final result Specimen: Blood from Arm, Left Updated: 04/07/24 2236     WBC 10.18 Thousand/uL      RBC 5.15 Million/uL      Hemoglobin 15.1 g/dL      Hematocrit 46.1 %      MCV 90 fL      MCH 29.3 pg      MCHC 32.8 g/dL      RDW 15.1 %      MPV 9.7 fL      Platelets 171 Thousands/uL      nRBC 0 /100 WBCs      Neutrophils Relative 71 %      Immature Grans % 0 %      Lymphocytes Relative 16 %      Monocytes Relative 10 %      Eosinophils Relative 3 %      Basophils Relative 0 %      Neutrophils Absolute 7.28 Thousands/µL      Absolute Immature Grans 0.02 Thousand/uL      Absolute Lymphocytes 1.60 Thousands/µL      Absolute Monocytes 0.97 Thousand/µL      Eosinophils Absolute 0.28 Thousand/µL      Basophils Absolute 0.03 Thousands/µL     Urine culture [[id number]] Collected: 04/07/24 2230    Lab Status: No result Specimen: Urine, Clean Catch Updated: 04/07/24 2230    Urine Microscopic [[id number]]  (Abnormal) Collected: 04/07/24 2052    Lab Status: Final result Specimen: Urine, Other Updated: 04/07/24 2154     RBC, UA Innumerable /hpf      WBC, UA       Field obscured, unable to enumerate     /hpf     Epithelial Cells       Field obscured, unable to enumerate     /hpf     Bacteria, UA       Field obscured, unable to enumerate     /hpf    UA (URINE) with reflex to Scope [[id number]]  (Abnormal) Collected: 04/07/24 2052    Lab Status: Final result Specimen: Urine, Other Updated: 04/07/24 2146     Color, UA Red     Clarity, UA Cloudy     Specific Gravity, UA 1.025     pH, UA 7.0     Leukocytes, UA Trace     Nitrite, UA Positive     Protein, UA >=300 mg/dl      Glucose,  (1/4%) mg/dl      Ketones, UA Trace mg/dl      Urobilinogen, UA 1.0 E.U./dl      Bilirubin, UA Negative     Occult Blood,  UA Large                   No orders to display              Procedures  Procedures         ED Course                               SBIRT 22yo+      Flowsheet Row Most Recent Value   Initial Alcohol Screen: US AUDIT-C     1. How often do you have a drink containing alcohol? 0 Filed at: 04/07/2024 1957   Audit-C Score 0 Filed at: 04/07/2024 1957   NATALI: How many times in the past year have you...    Used an illegal drug or used a prescription medication for non-medical reasons? Never Filed at: 04/07/2024 1957                      Medical Decision Making  I wanted to try to irrigate bladder but nurses unable to pass 3 way or [place] catheter.  Bladder scan 15 ml.  Pt. Did give urine sample on arrival.  He doesn't feel like his bladder is full or urge to go.  UA continues to be positive for nitrite.  Just finished Levaquin today.  Discussed with Dr. Khan who said pt. Can go home, switch to Keflex, call office in am and he will see him.    2300 - WBC 10, Cr. Mildly up at 1.54 from 1.33. pt. Advised to drink fluids.  Given IV rocephin in ER tonight.  Pt. Has appointment with Dr. Khan already scheduled tomorrow.    Amount and/or Complexity of Data Reviewed  Labs: ordered.    Risk  Prescription drug management.             Disposition  Final diagnoses:   Hematuria   UTI (urinary tract infection)     Time reflects when diagnosis was documented in both MDM as applicable and the Disposition within this note       Time User Action Codes Description Comment    4/7/2024 10:43 PM Chitra Cho Add [R31.9] Hematuria     4/7/2024 10:43 PM Chitra Cho Add [N39.0] UTI (urinary tract infection)           ED Disposition       ED Disposition   Discharge    Condition   Stable    Date/Time   Sun Apr 7, 2024 9698    Comment   Joel Wyman discharge to home/self care.                   Follow-up Information    None         Patient's Medications   Discharge Prescriptions    CEPHALEXIN (KEFLEX) 500 MG CAPSULE    Take 1 capsule  (500 mg total) by mouth every 12 (twelve) hours for 7 days       Start Date: 4/7/2024  End Date: 4/14/2024       Order Dose: 500 mg       Quantity: 14 capsule    Refills: 0       No discharge procedures on file.    PDMP Review         Value Time User    PDMP Reviewed  Yes 7/13/2021 10:25 AM CORWIN Esquivel            ED Provider  Electronically Signed by             Chitra Cho MD  04/07/24 5669

## 2024-04-09 LAB — BACTERIA UR CULT: NORMAL

## 2024-04-10 ENCOUNTER — ANESTHESIA EVENT (OUTPATIENT)
Dept: PERIOP | Facility: HOSPITAL | Age: 79
End: 2024-04-10
Payer: COMMERCIAL

## 2024-04-10 ENCOUNTER — APPOINTMENT (OUTPATIENT)
Dept: LAB | Facility: HOSPITAL | Age: 79
End: 2024-04-10
Payer: COMMERCIAL

## 2024-04-10 DIAGNOSIS — R31.0 GROSS HEMATURIA: ICD-10-CM

## 2024-04-10 NOTE — H&P
H&P Exam - Urology       Patient: Joel Wyman   : 1945 Sex: male   MRN: [id number]     CSN: [id number]      History of Present Illness   HPI:  Joel Wyman is a 78 y.o. 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  Bladder neck contracture?  Now to undergo exam under anesthesia cystoscopy possible internal urethrotomy bladder neck contracture incision retrograde pyelogram fulguration bladder lesions TURBT        Review of Systems:   Constitutional:  Negative for activity change, fever, chills and diaphoresis.   HENT: Negative for hearing loss and trouble swallowing.   Eyes: Negative for itching and visual disturbance.   Respiratory: Negative for chest tightness and shortness of breath.   Cardiovascular: Negative for chest pain, edema.   Gastrointestinal: Negative for abdominal distention, na abdominal pain, constipation, diarrhea, Nausea and vomiting.   Genitourinary: Negative for decreased urine volume, difficulty urinating, dysuria, enuresis, frequency, hematuria and urgency.   Musculoskeletal: Negative for gait problem and myalgias.   Neurological: Negative for dizziness and headaches.   Hematological: Does not bruise/bleed easily.       Historical Information   Past Medical History:   Diagnosis Date    Bladder infection 2024    Borderline diabetes     Cancer (HCC)     prostate    COPD (chronic obstructive pulmonary disease) (HCC)     Hematuria     History of transfusion     x2    Hyperlipidemia     Hypertension     Radiation cystitis     Smoker     Wears glasses      Past Surgical History:   Procedure  Laterality Date    APPENDECTOMY      FL RETROGRADE PYELOGRAM  04/14/2021    FL RETROGRADE PYELOGRAM  11/16/2023    HERNIA REPAIR      IR EMBOLIZATION (SPECIFY VESSEL OR SITE)  09/17/2021    IR NEPHROSTOMY TUBE CHECK AND/OR REMOVAL  07/08/2021    IR NEPHROSTOMY TUBE CHECK/CHANGE/REPOSITION/REINSERTION/UPSIZE  05/10/2021    IR NEPHROSTOMY TUBE CHECK/CHANGE/REPOSITION/REINSERTION/UPSIZE  08/04/2021    IR NEPHROSTOMY TUBE CHECK/CHANGE/REPOSITION/REINSERTION/UPSIZE  10/13/2021    IR NEPHROSTOMY TUBE PLACEMENT  05/07/2021    OR CYSTO W/IRRIG & EVAC MULTPLE OBSTRUCTING CLOTS Bilateral 04/14/2021    Procedure: CYSTOSCOPY EVACUATION OF CLOTS bilateral retrograde pyelograms possible TURBT bladder biopsy;  Surgeon: Yovani Khan MD;  Location: [facility] OR;  Service: Urology    OR CYSTO W/IRRIG & EVAC MULTPLE OBSTRUCTING CLOTS N/A 04/24/2021    Procedure: CYSTOSCOPY EVACUATION OF CLOTS fulguration;  Surgeon: Yovani Khan MD;  [facility];  Service: Urology    OR CYSTO W/IRRIG & EVAC MULTPLE OBSTRUCTING CLOTS N/A 07/08/2021    Procedure: CYSTOSCOPY EVACUATION OF CLOTS BILATERAL ANTIROGRADES NEPHROSTOMY TUBES, FULGERATION ;  Surgeon: Yovani Khan MD;  [facility];  Service: Urology    OR CYSTO W/IRRIG & EVAC MULTPLE OBSTRUCTING CLOTS N/A 08/22/2021    Procedure: CYSTOSCOPY, RIGHT RETROGRADE, EVACUATION OF CLOTS, BLADDER BIOPSY X2 AND FULGERATION OF BLADDER LESIONS, URETEROSCOPY, 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;  Surgeon: Yovani Khan MD;  [facility];  Service: Urology    OR CYSTOURETHROSCOPY WITH BIOPSY N/A 11/16/2023    Procedure: CYSTOSCOPY, BILATERAL RETROGRADEY PYELOGRAM,  FULGERATION 2.0 CM BLADDER TUMOR WITH BIOPSY;  Surgeon: Yovani Khan MD;  [facility];  Service: Urology    PROSTATECTOMY  2014    ROTATOR CUFF REPAIR      right shoulder     Social History   Social History     Substance and Sexual Activity   Alcohol Use  Not Currently    Comment: None in over 50 yrs     Social History     Substance and Sexual Activity   Drug Use No     Social History     Tobacco Use   Smoking Status Some Days    Current packs/day: 0.50    Average packs/day: 0.5 packs/day for 50.0 years (25.0 ttl pk-yrs)    Types: Cigarettes    Passive exposure: Never   Smokeless Tobacco Never     Family History:   Family History   Problem Relation Age of Onset    Diabetes Mother     Stroke Mother     Diabetes Brother     Stroke Brother        Meds/Allergies   No medications prior to admission.     No Known Allergies    Objective   Vitals: There were no vitals taken for this visit.    Physical Exam:  General Alert awake   Normocephalic atraumatic PERRLA  Lungs clear bilaterally  Cardiac normal S1 normal S2  Abdomen soft, flank pain  Extremities no edema    No intake/output data recorded.    Invasive Devices       None                       Lab Results: CBC:   Lab Results   Component Value Date    WBC 10.18 (H) 04/07/2024    HGB 15.1 04/07/2024    HCT 46.1 04/07/2024    MCV 90 04/07/2024     04/07/2024    RBC 5.15 04/07/2024    MCH 29.3 04/07/2024    MCHC 32.8 04/07/2024    RDW 15.1 04/07/2024    MPV 9.7 04/07/2024    NRBC 0 04/07/2024     CMP:   Lab Results   Component Value Date     04/07/2024    CO2 27 04/07/2024    BUN 40 (H) 04/07/2024    CREATININE 1.54 (H) 04/07/2024    CALCIUM 9.1 04/07/2024    AST 16 04/07/2024    ALT 16 04/07/2024    ALKPHOS 61 04/07/2024    EGFR 41 04/07/2024     Urinalysis:   Lab Results   Component Value Date    COLORU Red 04/07/2024    CLARITYU Cloudy 04/07/2024    SPECGRAV 1.025 04/07/2024    PHUR 7.0 04/07/2024    PHUR 5.5 03/25/2018    LEUKOCYTESUR Trace (A) 04/07/2024    NITRITE Positive (A) 04/07/2024    GLUCOSEU 250 (1/4%) (A) 04/07/2024    KETONESU Trace (A) 04/07/2024    BILIRUBINUR Negative 04/07/2024    BLOODU Large (A) 04/07/2024     Urine Culture:   Lab Results   Component Value Date    URINECX No Growth <1000  "cfu/mL 04/07/2024     PSA: No results found for: \"PSA\"        Assessment/ Plan:  Cystoscopy bilateral retrogrades possible bladder fulguration TURBT possible DVIU      Yovani Khan MD  "

## 2024-04-11 ENCOUNTER — ANESTHESIA (OUTPATIENT)
Dept: PERIOP | Facility: HOSPITAL | Age: 79
End: 2024-04-11
Payer: COMMERCIAL

## 2024-04-11 ENCOUNTER — APPOINTMENT (OUTPATIENT)
Dept: RADIOLOGY | Facility: HOSPITAL | Age: 79
End: 2024-04-11
Payer: COMMERCIAL

## 2024-04-11 ENCOUNTER — HOSPITAL ENCOUNTER (OUTPATIENT)
Facility: HOSPITAL | Age: 79
Setting detail: OUTPATIENT SURGERY
Discharge: HOME | End: 2024-04-11
Attending: SPECIALIST | Admitting: SPECIALIST
Payer: COMMERCIAL

## 2024-04-11 VITALS
OXYGEN SATURATION: 96 % | RESPIRATION RATE: 18 BRPM | WEIGHT: 213.41 LBS | TEMPERATURE: 97.6 F | BODY MASS INDEX: 29.88 KG/M2 | HEART RATE: 64 BPM | DIASTOLIC BLOOD PRESSURE: 77 MMHG | SYSTOLIC BLOOD PRESSURE: 174 MMHG | HEIGHT: 71 IN

## 2024-04-11 DIAGNOSIS — C67.9 MALIGNANT NEOPLASM OF URINARY BLADDER, UNSPECIFIED SITE (HCC): Primary | ICD-10-CM

## 2024-04-11 LAB
ATRIAL RATE: 56 BPM
P AXIS: 59 DEGREES
PR INTERVAL: 154 MS
QRS AXIS: 18 DEGREES
QRSD INTERVAL: 94 MS
QT INTERVAL: 398 MS
QTC INTERVAL: 384 MS
T WAVE AXIS: 34 DEGREES
VENTRICULAR RATE: 56 BPM

## 2024-04-11 PROCEDURE — 88342 IMHCHEM/IMCYTCHM 1ST ANTB: CPT | Performed by: PATHOLOGY

## 2024-04-11 PROCEDURE — 88341 IMHCHEM/IMCYTCHM EA ADD ANTB: CPT | Performed by: PATHOLOGY

## 2024-04-11 PROCEDURE — 74420 UROGRAPHY RTRGR +-KUB: CPT

## 2024-04-11 PROCEDURE — C1758 CATHETER, URETERAL: HCPCS | Performed by: SPECIALIST

## 2024-04-11 PROCEDURE — C1769 GUIDE WIRE: HCPCS | Performed by: SPECIALIST

## 2024-04-11 PROCEDURE — 88307 TISSUE EXAM BY PATHOLOGIST: CPT | Performed by: PATHOLOGY

## 2024-04-11 PROCEDURE — 88112 CYTOPATH CELL ENHANCE TECH: CPT | Performed by: PATHOLOGY

## 2024-04-11 PROCEDURE — 93010 ELECTROCARDIOGRAM REPORT: CPT | Performed by: INTERNAL MEDICINE

## 2024-04-11 RX ORDER — SODIUM CHLORIDE, SODIUM LACTATE, POTASSIUM CHLORIDE, CALCIUM CHLORIDE 600; 310; 30; 20 MG/100ML; MG/100ML; MG/100ML; MG/100ML
125 INJECTION, SOLUTION INTRAVENOUS CONTINUOUS
Status: DISCONTINUED | OUTPATIENT
Start: 2024-04-11 | End: 2024-04-11 | Stop reason: HOSPADM

## 2024-04-11 RX ORDER — EPHEDRINE SULFATE 50 MG/ML
INJECTION INTRAVENOUS AS NEEDED
Status: DISCONTINUED | OUTPATIENT
Start: 2024-04-11 | End: 2024-04-11

## 2024-04-11 RX ORDER — SODIUM CHLORIDE, SODIUM LACTATE, POTASSIUM CHLORIDE, CALCIUM CHLORIDE 600; 310; 30; 20 MG/100ML; MG/100ML; MG/100ML; MG/100ML
INJECTION, SOLUTION INTRAVENOUS CONTINUOUS PRN
Status: DISCONTINUED | OUTPATIENT
Start: 2024-04-11 | End: 2024-04-11

## 2024-04-11 RX ORDER — DEXAMETHASONE SODIUM PHOSPHATE 10 MG/ML
INJECTION, SOLUTION INTRAMUSCULAR; INTRAVENOUS AS NEEDED
Status: DISCONTINUED | OUTPATIENT
Start: 2024-04-11 | End: 2024-04-11

## 2024-04-11 RX ORDER — GLYCINE 1.5 G/100ML
SOLUTION IRRIGATION AS NEEDED
Status: DISCONTINUED | OUTPATIENT
Start: 2024-04-11 | End: 2024-04-11 | Stop reason: HOSPADM

## 2024-04-11 RX ORDER — ONDANSETRON 2 MG/ML
INJECTION INTRAMUSCULAR; INTRAVENOUS AS NEEDED
Status: DISCONTINUED | OUTPATIENT
Start: 2024-04-11 | End: 2024-04-11

## 2024-04-11 RX ORDER — CEFAZOLIN SODIUM 2 G/50ML
2000 SOLUTION INTRAVENOUS ONCE
Status: DISCONTINUED | OUTPATIENT
Start: 2024-04-11 | End: 2024-04-11 | Stop reason: HOSPADM

## 2024-04-11 RX ORDER — FENTANYL CITRATE 50 UG/ML
INJECTION, SOLUTION INTRAMUSCULAR; INTRAVENOUS AS NEEDED
Status: DISCONTINUED | OUTPATIENT
Start: 2024-04-11 | End: 2024-04-11

## 2024-04-11 RX ORDER — ONDANSETRON 2 MG/ML
4 INJECTION INTRAMUSCULAR; INTRAVENOUS ONCE AS NEEDED
Status: DISCONTINUED | OUTPATIENT
Start: 2024-04-11 | End: 2024-04-11 | Stop reason: HOSPADM

## 2024-04-11 RX ORDER — LIDOCAINE HYDROCHLORIDE 10 MG/ML
INJECTION, SOLUTION EPIDURAL; INFILTRATION; INTRACAUDAL; PERINEURAL AS NEEDED
Status: DISCONTINUED | OUTPATIENT
Start: 2024-04-11 | End: 2024-04-11

## 2024-04-11 RX ORDER — PROPOFOL 10 MG/ML
INJECTION, EMULSION INTRAVENOUS AS NEEDED
Status: DISCONTINUED | OUTPATIENT
Start: 2024-04-11 | End: 2024-04-11

## 2024-04-11 RX ORDER — FENTANYL CITRATE/PF 50 MCG/ML
25 SYRINGE (ML) INJECTION
Status: DISCONTINUED | OUTPATIENT
Start: 2024-04-11 | End: 2024-04-11 | Stop reason: HOSPADM

## 2024-04-11 RX ORDER — MAGNESIUM HYDROXIDE 1200 MG/15ML
LIQUID ORAL AS NEEDED
Status: DISCONTINUED | OUTPATIENT
Start: 2024-04-11 | End: 2024-04-11 | Stop reason: HOSPADM

## 2024-04-11 RX ORDER — CEFAZOLIN SODIUM 2 G/50ML
SOLUTION INTRAVENOUS AS NEEDED
Status: DISCONTINUED | OUTPATIENT
Start: 2024-04-11 | End: 2024-04-11

## 2024-04-11 RX ADMIN — FENTANYL CITRATE 25 MCG: 50 INJECTION INTRAMUSCULAR; INTRAVENOUS at 13:41

## 2024-04-11 RX ADMIN — SODIUM CHLORIDE, SODIUM LACTATE, POTASSIUM CHLORIDE, AND CALCIUM CHLORIDE: .6; .31; .03; .02 INJECTION, SOLUTION INTRAVENOUS at 12:04

## 2024-04-11 RX ADMIN — LIDOCAINE HYDROCHLORIDE 50 MG: 10 INJECTION, SOLUTION EPIDURAL; INFILTRATION; INTRACAUDAL at 12:14

## 2024-04-11 RX ADMIN — FENTANYL CITRATE 25 MCG: 50 INJECTION, SOLUTION INTRAMUSCULAR; INTRAVENOUS at 12:19

## 2024-04-11 RX ADMIN — DEXAMETHASONE SODIUM PHOSPHATE 10 MG: 10 INJECTION, SOLUTION INTRAMUSCULAR; INTRAVENOUS at 12:21

## 2024-04-11 RX ADMIN — FENTANYL CITRATE 25 MCG: 50 INJECTION, SOLUTION INTRAMUSCULAR; INTRAVENOUS at 12:14

## 2024-04-11 RX ADMIN — FENTANYL CITRATE 50 MCG: 50 INJECTION, SOLUTION INTRAMUSCULAR; INTRAVENOUS at 12:27

## 2024-04-11 RX ADMIN — FENTANYL CITRATE 25 MCG: 50 INJECTION INTRAMUSCULAR; INTRAVENOUS at 13:53

## 2024-04-11 RX ADMIN — CEFAZOLIN SODIUM 2000 MG: 2 SOLUTION INTRAVENOUS at 12:08

## 2024-04-11 RX ADMIN — PROPOFOL 150 MG: 10 INJECTION, EMULSION INTRAVENOUS at 12:14

## 2024-04-11 RX ADMIN — ONDANSETRON 4 MG: 2 INJECTION INTRAMUSCULAR; INTRAVENOUS at 12:21

## 2024-04-11 RX ADMIN — EPHEDRINE SULFATE 5 MG: 50 INJECTION, SOLUTION INTRAVENOUS at 12:20

## 2024-04-11 NOTE — PROGRESS NOTES
"Progress Note - Urology      Patient: Joel Wyman   : 1945 Sex: male   MRN: [id number]     CSN: [id number]  Unit/Bed#: OR POOL     SUBJECTIVE:   Patient surgical preop unit  No change history physical  Aware risk of anesthesia infection bleeding postop catheter      Objective   Vitals: /69   Pulse 58   Temp (!) 96 °F (35.6 °C) (Temporal)   Resp 18   Ht 5' 11\" (1.803 m)   Wt 96.8 kg (213 lb 6.5 oz)   SpO2 99%   BMI 29.76 kg/m²     No intake/output data recorded.      Physical Exam:   General Alert awake   Normocephalic atraumatic PERRLA  Lungs clear bilaterally  Cardiac normal S1 normal S2  Abdomen soft, flank pain  Extremities no edema      Lab Results: CBC:   Lab Results   Component Value Date    WBC 10.18 (H) 2024    HGB 15.1 2024    HCT 46.1 2024    MCV 90 2024     2024    RBC 5.15 2024    MCH 29.3 2024    MCHC 32.8 2024    RDW 15.1 2024    MPV 9.7 2024    NRBC 0 2024     CMP:   Lab Results   Component Value Date     2024    CO2 27 2024    BUN 40 (H) 2024    CREATININE 1.54 (H) 2024    CALCIUM 9.1 2024    AST 16 2024    ALT 16 2024    ALKPHOS 61 2024    EGFR 41 2024     Urinalysis:   Lab Results   Component Value Date    COLORU Red 2024    CLARITYU Cloudy 2024    SPECGRAV 1.025 2024    PHUR 7.0 2024    PHUR 5.5 2018    LEUKOCYTESUR Trace (A) 2024    NITRITE Positive (A) 2024    GLUCOSEU 250 (1/4%) (A) 2024    KETONESU Trace (A) 2024    BILIRUBINUR Negative 2024    BLOODU Large (A) 2024     Urine Culture:   Lab Results   Component Value Date    URINECX No Growth <1000 cfu/mL 2024     PSA: No results found for: \"PSA\"      Assessment/ Plan:  Gross hematuria  History of radiation cystitis  Cystoscopy possible internal urethrotomy bladder neck contracture incision retrograde pyelogram " fulguration DONNA Khan MD

## 2024-04-11 NOTE — ANESTHESIA PREPROCEDURE EVALUATION
Procedure:  TRANSURETHRAL RESECTION OF BLADDER TUMOR (TURBT) (Bladder)  CYSTOSCOPY RETROGRADE PYELOGRAM WITH INSERTION STENT URETERAL (Bilateral: Bladder)  TRANSURETHRAL RESECTION OF BLADDER TUMOR W/ HOLMIUM LASER (Bladder)    Relevant Problems   CARDIO   (+) Essential hypertension   (+) Hyperlipidemia      /RENAL   (+) SHANTI (acute kidney injury) (HCC)   (+) Prostate cancer (HCC)   (+) Stage 3b chronic kidney disease (HCC)      HEMATOLOGY   (+) Acute blood loss anemia (ABLA)   (+) Anemia      MUSCULOSKELETAL   (+) RA (rheumatoid arthritis) (HCC)      PULMONARY   (+) COPD (chronic obstructive pulmonary disease) (HCC)        Physical Exam    Airway    Mallampati score: II  TM Distance: >3 FB  Neck ROM: full     Dental   No notable dental hx     Cardiovascular  Cardiovascular exam normal    Pulmonary  Pulmonary exam normal Decreased breath sounds    Other Findings        Anesthesia Plan  ASA Score- 3     Anesthesia Type- general with ASA Monitors.         Additional Monitors:     Airway Plan: LMA.           Plan Factors-Exercise tolerance (METS): >4 METS.    Chart reviewed. EKG reviewed.  Existing labs reviewed. Patient summary reviewed.    Patient is a current smoker.  Patient smoked on day of surgery.    Obstructive sleep apnea risk education given perioperatively.        Induction- intravenous.    Postoperative Plan- Plan for postoperative opioid use.     Informed Consent- Anesthetic plan and risks discussed with patient.  I personally reviewed this patient with the CRNA. Discussed and agreed on the Anesthesia Plan with the CRNA..

## 2024-04-11 NOTE — DISCHARGE INSTR - AVS FIRST PAGE
#1 no heavy straining or lifting above 10 pounds for 2 weeks    #2 call office fevers, chills, or worsening blood in the urine.    #3  Patient to be seen in office this Wednesday, April 17 to have cath removed 9:30 AM no straining lifting patient has follow-up to go over pathology May 1 1:15 PM      Yovani Khan M.D. [facility] [place] [place] office  [address] [place] [address].  [place] [address]  [phone]  8:30 AM to 4:30 PM  Monday through Friday    [place] office  [address]  [address]  [place] [address] [phone]  1:00 to 5:00 PM  Wednesday

## 2024-04-11 NOTE — ANESTHESIA POSTPROCEDURE EVALUATION
Post-Op Assessment Note    CV Status:  Stable  Pain Score: 0    Pain management: adequate    Multimodal analgesia used between 6 hours prior to anesthesia start to PACU discharge    Mental Status:  Sleepy   Hydration Status:  Stable   PONV Controlled:  None   Airway Patency:  Patent  Two or more mitigation strategies used for obstructive sleep apnea   Post Op Vitals Reviewed: Yes    No anethesia notable event occurred.    Staff: CRNA               BP   140/64   Temp 97   Pulse 52   Resp 16   SpO2 100

## 2024-04-11 NOTE — PERIOPERATIVE NURSING NOTE
Received from Pacu alert, awake.  Denies pain at present.  22french alanis patent for clyde urine. Wife at bedside.

## 2024-04-16 PROCEDURE — 88341 IMHCHEM/IMCYTCHM EA ADD ANTB: CPT | Performed by: PATHOLOGY

## 2024-04-16 PROCEDURE — 88112 CYTOPATH CELL ENHANCE TECH: CPT | Performed by: PATHOLOGY

## 2024-04-16 PROCEDURE — 88307 TISSUE EXAM BY PATHOLOGIST: CPT | Performed by: PATHOLOGY

## 2024-04-16 PROCEDURE — 88342 IMHCHEM/IMCYTCHM 1ST ANTB: CPT | Performed by: PATHOLOGY

## 2024-04-24 ENCOUNTER — APPOINTMENT (OUTPATIENT)
Dept: RADIOLOGY | Facility: HOSPITAL | Age: 79
DRG: 669 | End: 2024-04-24
Payer: COMMERCIAL

## 2024-04-24 ENCOUNTER — HOSPITAL ENCOUNTER (INPATIENT)
Facility: HOSPITAL | Age: 79
LOS: 1 days | Discharge: HOME | DRG: 669 | End: 2024-04-26
Attending: EMERGENCY MEDICINE | Admitting: INTERNAL MEDICINE
Payer: COMMERCIAL

## 2024-04-24 ENCOUNTER — ANESTHESIA EVENT (OUTPATIENT)
Dept: PERIOP | Facility: HOSPITAL | Age: 79
DRG: 669 | End: 2024-04-24
Payer: COMMERCIAL

## 2024-04-24 ENCOUNTER — ANESTHESIA (OUTPATIENT)
Dept: PERIOP | Facility: HOSPITAL | Age: 79
DRG: 669 | End: 2024-04-24
Payer: COMMERCIAL

## 2024-04-24 DIAGNOSIS — R31.9 HEMATURIA: ICD-10-CM

## 2024-04-24 DIAGNOSIS — N30.40 RADIATION CYSTITIS: ICD-10-CM

## 2024-04-24 DIAGNOSIS — C61 PROSTATE CANCER (HCC): ICD-10-CM

## 2024-04-24 DIAGNOSIS — R52 INTRACTABLE PAIN: ICD-10-CM

## 2024-04-24 DIAGNOSIS — R33.9 URINARY RETENTION: Primary | ICD-10-CM

## 2024-04-24 DIAGNOSIS — R31.0 GROSS HEMATURIA: ICD-10-CM

## 2024-04-24 PROBLEM — D64.9 ANEMIA: Status: RESOLVED | Noted: 2021-04-14 | Resolved: 2024-04-24

## 2024-04-24 PROBLEM — R65.10 SIRS (SYSTEMIC INFLAMMATORY RESPONSE SYNDROME) (HCC): Status: ACTIVE | Noted: 2024-04-24

## 2024-04-24 PROBLEM — D62 ACUTE BLOOD LOSS ANEMIA (ABLA): Status: RESOLVED | Noted: 2021-04-26 | Resolved: 2024-04-24

## 2024-04-24 PROBLEM — IMO0001 SMOKING: Status: ACTIVE | Noted: 2021-04-12

## 2024-04-24 LAB
ALBUMIN SERPL BCP-MCNC: 4.2 G/DL (ref 3.5–5)
ALP SERPL-CCNC: 78 U/L (ref 34–104)
ALT SERPL W P-5'-P-CCNC: 17 U/L (ref 7–52)
ANION GAP SERPL CALCULATED.3IONS-SCNC: 10 MMOL/L (ref 4–13)
AST SERPL W P-5'-P-CCNC: 20 U/L (ref 13–39)
BASOPHILS # BLD AUTO: 0.04 THOUSANDS/ÂΜL (ref 0–0.1)
BASOPHILS NFR BLD AUTO: 0 % (ref 0–1)
BILIRUB SERPL-MCNC: 0.59 MG/DL (ref 0.2–1)
BUN SERPL-MCNC: 30 MG/DL (ref 5–25)
CALCIUM SERPL-MCNC: 9.7 MG/DL (ref 8.4–10.2)
CHLORIDE SERPL-SCNC: 102 MMOL/L (ref 96–108)
CO2 SERPL-SCNC: 25 MMOL/L (ref 21–32)
CREAT SERPL-MCNC: 1.35 MG/DL (ref 0.6–1.3)
EOSINOPHIL # BLD AUTO: 0.24 THOUSAND/ÂΜL (ref 0–0.61)
EOSINOPHIL NFR BLD AUTO: 2 % (ref 0–6)
ERYTHROCYTE [DISTWIDTH] IN BLOOD BY AUTOMATED COUNT: 15.1 % (ref 11.6–15.1)
GFR SERPL CREATININE-BSD FRML MDRD: 49 ML/MIN/1.73SQ M
GLUCOSE SERPL-MCNC: 106 MG/DL (ref 65–140)
HCT VFR BLD AUTO: 48 % (ref 36.5–49.3)
HGB BLD-MCNC: 15.8 G/DL (ref 12–17)
IMM GRANULOCYTES # BLD AUTO: 0.07 THOUSAND/UL (ref 0–0.2)
IMM GRANULOCYTES NFR BLD AUTO: 1 % (ref 0–2)
LYMPHOCYTES # BLD AUTO: 1.71 THOUSANDS/ÂΜL (ref 0.6–4.47)
LYMPHOCYTES NFR BLD AUTO: 14 % (ref 14–44)
MCH RBC QN AUTO: 29.1 PG (ref 26.8–34.3)
MCHC RBC AUTO-ENTMCNC: 32.9 G/DL (ref 31.4–37.4)
MCV RBC AUTO: 88 FL (ref 82–98)
MONOCYTES # BLD AUTO: 0.87 THOUSAND/ÂΜL (ref 0.17–1.22)
MONOCYTES NFR BLD AUTO: 7 % (ref 4–12)
NEUTROPHILS # BLD AUTO: 9.17 THOUSANDS/ÂΜL (ref 1.85–7.62)
NEUTS SEG NFR BLD AUTO: 76 % (ref 43–75)
NRBC BLD AUTO-RTO: 0 /100 WBCS
PLATELET # BLD AUTO: 211 THOUSANDS/UL (ref 149–390)
PMV BLD AUTO: 9.7 FL (ref 8.9–12.7)
POTASSIUM SERPL-SCNC: 4.2 MMOL/L (ref 3.5–5.3)
PROT SERPL-MCNC: 7.9 G/DL (ref 6.4–8.4)
RBC # BLD AUTO: 5.43 MILLION/UL (ref 3.88–5.62)
SODIUM SERPL-SCNC: 137 MMOL/L (ref 135–147)
WBC # BLD AUTO: 12.1 THOUSAND/UL (ref 4.31–10.16)

## 2024-04-24 PROCEDURE — 0TCB8ZZ EXTIRPATION OF MATTER FROM BLADDER, VIA NATURAL OR ARTIFICIAL OPENING ENDOSCOPIC: ICD-10-PCS | Performed by: SPECIALIST

## 2024-04-24 PROCEDURE — 74430 CONTRAST X-RAY BLADDER: CPT

## 2024-04-24 PROCEDURE — 3E1K78Z IRRIGATION OF GENITOURINARY TRACT USING IRRIGATING SUBSTANCE, VIA NATURAL OR ARTIFICIAL OPENING: ICD-10-PCS | Performed by: SPECIALIST

## 2024-04-24 PROCEDURE — 36415 COLL VENOUS BLD VENIPUNCTURE: CPT | Performed by: EMERGENCY MEDICINE

## 2024-04-24 PROCEDURE — 88342 IMHCHEM/IMCYTCHM 1ST ANTB: CPT | Performed by: PATHOLOGY

## 2024-04-24 PROCEDURE — 85025 COMPLETE CBC W/AUTO DIFF WBC: CPT | Performed by: EMERGENCY MEDICINE

## 2024-04-24 PROCEDURE — 96365 THER/PROPH/DIAG IV INF INIT: CPT

## 2024-04-24 PROCEDURE — 96367 TX/PROPH/DG ADDL SEQ IV INF: CPT

## 2024-04-24 PROCEDURE — 96375 TX/PRO/DX INJ NEW DRUG ADDON: CPT

## 2024-04-24 PROCEDURE — 80053 COMPREHEN METABOLIC PANEL: CPT | Performed by: EMERGENCY MEDICINE

## 2024-04-24 PROCEDURE — 99285 EMERGENCY DEPT VISIT HI MDM: CPT | Performed by: EMERGENCY MEDICINE

## 2024-04-24 PROCEDURE — 99222 1ST HOSP IP/OBS MODERATE 55: CPT | Performed by: NURSE PRACTITIONER

## 2024-04-24 PROCEDURE — 99283 EMERGENCY DEPT VISIT LOW MDM: CPT

## 2024-04-24 PROCEDURE — 88307 TISSUE EXAM BY PATHOLOGIST: CPT | Performed by: PATHOLOGY

## 2024-04-24 PROCEDURE — 0T5C8ZZ DESTRUCTION OF BLADDER NECK, VIA NATURAL OR ARTIFICIAL OPENING ENDOSCOPIC: ICD-10-PCS | Performed by: SPECIALIST

## 2024-04-24 PROCEDURE — C1769 GUIDE WIRE: HCPCS | Performed by: SPECIALIST

## 2024-04-24 RX ORDER — SODIUM CHLORIDE 9 MG/ML
50 INJECTION, SOLUTION INTRAVENOUS CONTINUOUS
Status: DISPENSED | OUTPATIENT
Start: 2024-04-24 | End: 2024-04-25

## 2024-04-24 RX ORDER — CEFTRIAXONE 1 G/50ML
1000 INJECTION, SOLUTION INTRAVENOUS ONCE
Status: COMPLETED | OUTPATIENT
Start: 2024-04-24 | End: 2024-04-24

## 2024-04-24 RX ORDER — SODIUM CHLORIDE, SODIUM LACTATE, POTASSIUM CHLORIDE, CALCIUM CHLORIDE 600; 310; 30; 20 MG/100ML; MG/100ML; MG/100ML; MG/100ML
INJECTION, SOLUTION INTRAVENOUS CONTINUOUS PRN
Status: DISCONTINUED | OUTPATIENT
Start: 2024-04-24 | End: 2024-04-24

## 2024-04-24 RX ORDER — ACETAMINOPHEN 10 MG/ML
1000 INJECTION, SOLUTION INTRAVENOUS ONCE
Status: COMPLETED | OUTPATIENT
Start: 2024-04-24 | End: 2024-04-24

## 2024-04-24 RX ORDER — ONDANSETRON 2 MG/ML
INJECTION INTRAMUSCULAR; INTRAVENOUS AS NEEDED
Status: DISCONTINUED | OUTPATIENT
Start: 2024-04-24 | End: 2024-04-24

## 2024-04-24 RX ORDER — ONDANSETRON 2 MG/ML
4 INJECTION INTRAMUSCULAR; INTRAVENOUS EVERY 6 HOURS PRN
Status: DISCONTINUED | OUTPATIENT
Start: 2024-04-24 | End: 2024-04-26 | Stop reason: HOSPADM

## 2024-04-24 RX ORDER — FLUTICASONE FUROATE AND VILANTEROL 200; 25 UG/1; UG/1
1 POWDER RESPIRATORY (INHALATION) DAILY
Status: DISCONTINUED | OUTPATIENT
Start: 2024-04-25 | End: 2024-04-26 | Stop reason: HOSPADM

## 2024-04-24 RX ORDER — FLUTICASONE PROPIONATE 50 MCG
1 SPRAY, SUSPENSION (ML) NASAL DAILY PRN
Status: DISCONTINUED | OUTPATIENT
Start: 2024-04-24 | End: 2024-04-26 | Stop reason: HOSPADM

## 2024-04-24 RX ORDER — LIDOCAINE HYDROCHLORIDE 10 MG/ML
INJECTION, SOLUTION EPIDURAL; INFILTRATION; INTRACAUDAL; PERINEURAL AS NEEDED
Status: DISCONTINUED | OUTPATIENT
Start: 2024-04-24 | End: 2024-04-24

## 2024-04-24 RX ORDER — GLYCINE 1.5 G/100ML
SOLUTION IRRIGATION AS NEEDED
Status: DISCONTINUED | OUTPATIENT
Start: 2024-04-24 | End: 2024-04-24 | Stop reason: HOSPADM

## 2024-04-24 RX ORDER — DEXAMETHASONE SODIUM PHOSPHATE 10 MG/ML
INJECTION, SOLUTION INTRAMUSCULAR; INTRAVENOUS AS NEEDED
Status: DISCONTINUED | OUTPATIENT
Start: 2024-04-24 | End: 2024-04-24

## 2024-04-24 RX ORDER — MAGNESIUM HYDROXIDE/ALUMINUM HYDROXICE/SIMETHICONE 120; 1200; 1200 MG/30ML; MG/30ML; MG/30ML
30 SUSPENSION ORAL EVERY 6 HOURS PRN
Status: DISCONTINUED | OUTPATIENT
Start: 2024-04-24 | End: 2024-04-26 | Stop reason: HOSPADM

## 2024-04-24 RX ORDER — HYDROMORPHONE HCL/PF 1 MG/ML
1 SYRINGE (ML) INJECTION ONCE
Status: COMPLETED | OUTPATIENT
Start: 2024-04-24 | End: 2024-04-24

## 2024-04-24 RX ORDER — FENTANYL CITRATE/PF 50 MCG/ML
50 SYRINGE (ML) INJECTION
Status: DISCONTINUED | OUTPATIENT
Start: 2024-04-24 | End: 2024-04-24 | Stop reason: HOSPADM

## 2024-04-24 RX ORDER — LORAZEPAM 2 MG/ML
0.5 INJECTION INTRAMUSCULAR ONCE
Status: COMPLETED | OUTPATIENT
Start: 2024-04-24 | End: 2024-04-24

## 2024-04-24 RX ORDER — FENTANYL CITRATE 50 UG/ML
INJECTION, SOLUTION INTRAMUSCULAR; INTRAVENOUS AS NEEDED
Status: DISCONTINUED | OUTPATIENT
Start: 2024-04-24 | End: 2024-04-24

## 2024-04-24 RX ORDER — ONDANSETRON 2 MG/ML
4 INJECTION INTRAMUSCULAR; INTRAVENOUS ONCE AS NEEDED
Status: DISCONTINUED | OUTPATIENT
Start: 2024-04-24 | End: 2024-04-24 | Stop reason: HOSPADM

## 2024-04-24 RX ORDER — ALBUTEROL SULFATE 90 UG/1
2 AEROSOL, METERED RESPIRATORY (INHALATION) EVERY 4 HOURS PRN
Status: DISCONTINUED | OUTPATIENT
Start: 2024-04-24 | End: 2024-04-26 | Stop reason: HOSPADM

## 2024-04-24 RX ORDER — ACETAMINOPHEN 325 MG/1
650 TABLET ORAL EVERY 6 HOURS PRN
Status: DISCONTINUED | OUTPATIENT
Start: 2024-04-24 | End: 2024-04-26 | Stop reason: HOSPADM

## 2024-04-24 RX ORDER — PROPOFOL 10 MG/ML
INJECTION, EMULSION INTRAVENOUS AS NEEDED
Status: DISCONTINUED | OUTPATIENT
Start: 2024-04-24 | End: 2024-04-24

## 2024-04-24 RX ADMIN — CEFTRIAXONE 1000 MG: 1 INJECTION, SOLUTION INTRAVENOUS at 19:14

## 2024-04-24 RX ADMIN — IOHEXOL 20 ML: 240 INJECTION, SOLUTION INTRATHECAL; INTRAVASCULAR; INTRAVENOUS; ORAL at 23:11

## 2024-04-24 RX ADMIN — LIDOCAINE HYDROCHLORIDE 50 MG: 10 INJECTION, SOLUTION EPIDURAL; INFILTRATION; INTRACAUDAL; PERINEURAL at 21:41

## 2024-04-24 RX ADMIN — ACETAMINOPHEN 1000 MG: 10 INJECTION INTRAVENOUS at 18:51

## 2024-04-24 RX ADMIN — SODIUM CHLORIDE, SODIUM LACTATE, POTASSIUM CHLORIDE, AND CALCIUM CHLORIDE: .6; .31; .03; .02 INJECTION, SOLUTION INTRAVENOUS at 21:35

## 2024-04-24 RX ADMIN — PROPOFOL 150 MG: 10 INJECTION, EMULSION INTRAVENOUS at 21:41

## 2024-04-24 RX ADMIN — LORAZEPAM 0.5 MG: 2 INJECTION INTRAMUSCULAR; INTRAVENOUS at 18:49

## 2024-04-24 RX ADMIN — ONDANSETRON 4 MG: 2 INJECTION INTRAMUSCULAR; INTRAVENOUS at 21:51

## 2024-04-24 RX ADMIN — FENTANYL CITRATE 25 MCG: 50 INJECTION, SOLUTION INTRAMUSCULAR; INTRAVENOUS at 22:01

## 2024-04-24 RX ADMIN — Medication 12.5 MG: at 23:21

## 2024-04-24 RX ADMIN — HYDROMORPHONE HYDROCHLORIDE 1 MG: 1 INJECTION, SOLUTION INTRAMUSCULAR; INTRAVENOUS; SUBCUTANEOUS at 19:47

## 2024-04-24 RX ADMIN — FENTANYL CITRATE 50 MCG: 50 INJECTION, SOLUTION INTRAMUSCULAR; INTRAVENOUS at 21:41

## 2024-04-24 RX ADMIN — SODIUM CHLORIDE 50 ML/HR: 0.9 INJECTION, SOLUTION INTRAVENOUS at 23:21

## 2024-04-24 RX ADMIN — DEXAMETHASONE SODIUM PHOSPHATE 10 MG: 10 INJECTION, SOLUTION INTRAMUSCULAR; INTRAVENOUS at 21:51

## 2024-04-24 RX ADMIN — FENTANYL CITRATE 25 MCG: 50 INJECTION, SOLUTION INTRAMUSCULAR; INTRAVENOUS at 22:07

## 2024-04-24 NOTE — ED PROVIDER NOTES
History  Chief Complaint   Patient presents with    Urinary Retention     Sent in from dr Khan, he was unable to get catheter in to be admitted pt very uncomfortable unable to urinate     HPI  Patient is an 78-year-old male history of COPD, prostate cancer, radiation cystitis presenting for evaluation of suprapubic pain, urinary retention.  Patient had cystoscopy with scraping a few weeks ago, had catheter removed a week ago and states that over the course of the past few days he has had progressive pain, hematuria, and today he was unable to urinate.  Patient was evaluated in urology office by Dr. Khan who attempted to place a Sumner catheter but was unsuccessful despite multiple attempts.  Patient continues to feel severe pain.  Patient Nu[place] fevers, chills, flank pain, nausea, vomiting.  Prior to Admission Medications   Prescriptions Last Dose Informant Patient Reported? Taking?   Cholecalciferol (Vitamin D) 50 MCG ( UT) CAPS   Yes No   Sig: Take by mouth in the morning Last dose 4/3/24   albuterol (PROVENTIL HFA,VENTOLIN HFA) 90 mcg/act inhaler   No No   Sig: Inhale 2 puffs every 4 (four) hours as needed for wheezing   fluticasone (FLONASE) 50 mcg/act nasal spray   Yes No   Si spray into each nostril daily as needed   fluticasone-umeclidinium-vilanterol (Trelegy Ellipta) 200-62.5-25 mcg/actuation AEPB inhaler   Yes No   Sig: Inhale 1 puff every morning   meloxicam (MOBIC) 15 mg tablet Not Taking  Yes No   Sig: Take 15 mg by mouth daily   Patient not taking: Reported on 2024   metoprolol tartrate (LOPRESSOR) 25 mg tablet   Yes No   Sig: Take 12.5 mg by mouth every 12 (twelve) hours      Facility-Administered Medications: None       Past Medical History:   Diagnosis Date    Bladder infection 2024    Borderline diabetes     Cancer (HCC)     prostate    COPD (chronic obstructive pulmonary disease) (HCC)     Hematuria     History of transfusion     x2    Hyperlipidemia     Hypertension      Radiation cystitis     Smoker     Wears glasses        Past Surgical History:   Procedure Laterality Date    APPENDECTOMY      FL RETROGRADE PYELOGRAM  04/14/2021    FL RETROGRADE PYELOGRAM  11/16/2023    FL RETROGRADE PYELOGRAM  4/11/2024    HERNIA REPAIR      IR EMBOLIZATION (SPECIFY VESSEL OR SITE)  09/17/2021    IR NEPHROSTOMY TUBE CHECK AND/OR REMOVAL  07/08/2021    IR NEPHROSTOMY TUBE CHECK/CHANGE/REPOSITION/REINSERTION/UPSIZE  05/10/2021    IR NEPHROSTOMY TUBE CHECK/CHANGE/REPOSITION/REINSERTION/UPSIZE  08/04/2021    IR NEPHROSTOMY TUBE CHECK/CHANGE/REPOSITION/REINSERTION/UPSIZE  10/13/2021    IR NEPHROSTOMY TUBE PLACEMENT  05/07/2021    MO CYSTO BLADDER W/URETERAL CATHETERIZATION Bilateral 4/11/2024    Procedure: BILATERAL CYSTOSCOPY WITH RETROGRADE PYELOGRAM, BLADDER NECK CONTRACTURE;  Surgeon: Yovani Khan MD;  Location: [facility];  Service: Urology    MO CYSTO W/IRRIG & EVAC MULTPLE OBSTRUCTING CLOTS Bilateral 04/14/2021    Procedure: CYSTOSCOPY EVACUATION OF CLOTS bilateral retrograde pyelograms possible TURBT bladder biopsy;  Surgeon: Yovani Khan MD;  Location: [facility];  Service: Urology    MO CYSTO W/IRRIG & EVAC MULTPLE OBSTRUCTING CLOTS N/A 04/24/2021    Procedure: CYSTOSCOPY EVACUATION OF CLOTS fulguration;  Surgeon: Yovani Khan MD;  Location: [facility];  Service: Urology    MO CYSTO W/IRRIG & EVAC MULTPLE OBSTRUCTING CLOTS N/A 07/08/2021    Procedure: CYSTOSCOPY EVACUATION OF CLOTS BILATERAL ANTIROGRADES NEPHROSTOMY TUBES, FULGERATION ;  Surgeon: Yovani Khan MD;  Location: [facility];  Service: Urology    MO CYSTO W/IRRIG & EVAC MULTPLE OBSTRUCTING CLOTS N/A 08/22/2021    Procedure: CYSTOSCOPY, RIGHT RETROGRADE, EVACUATION OF CLOTS, BLADDER BIOPSY X2 AND FULGERATION OF BLADDER LESIONS, URETEROSCOPY, 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;  Surgeon: Yovani Khan MD;  Location: [facility];  Service: Urology    MO CYSTOURETHROSCOPY  W/DEST &/RMVL MED BLADDER AMARI N/A 4/11/2024    Procedure: TRANSURETHRAL RESECTION OF BLADDER TUMOR (TURBT);  Surgeon: Yovani Khan MD;  Location: [facility];  Service: Urology    AZ CYSTOURETHROSCOPY WITH BIOPSY N/A 11/16/2023    Procedure: CYSTOSCOPY, BILATERAL RETROGRADEY PYELOGRAM,  FULGERATION 2.0 CM BLADDER TUMOR WITH BIOPSY;  Surgeon: Yovani Khan MD;  Location: [facility];  Service: Urology    PROSTATECTOMY  2014    ROTATOR CUFF REPAIR      right shoulder       Family History   Problem Relation Age of Onset    Diabetes Mother     Stroke Mother     Diabetes Brother     Stroke Brother      I have reviewed and agree with the history as documented.    E-Cigarette/Vaping    E-Cigarette Use Former User      E-Cigarette/Vaping Substances    Nicotine No     THC No     CBD No     Flavoring No     Other No     Unknown No      Social History     Tobacco Use    Smoking status: Some Days     Current packs/day: 0.50     Average packs/day: 0.5 packs/day for 50.0 years (25.0 ttl pk-yrs)     Types: Cigarettes     Passive exposure: Never    Smokeless tobacco: Never   Vaping Use    Vaping status: Former   Substance Use Topics    Alcohol use: Not Currently     Comment: None in over 50 yrs    Drug use: No       Review of Systems   Constitutional:  Negative for chills, fatigue and fever.   Respiratory:  Negative for cough and shortness of breath.    Gastrointestinal:  Negative for diarrhea, nausea and vomiting.   Genitourinary:  Positive for difficulty urinating and hematuria. Negative for flank pain and frequency.   Musculoskeletal:  Negative for arthralgias and myalgias.   Neurological:  Negative for headaches.   Psychiatric/Behavioral:  Negative for confusion.    All other systems reviewed and are negative.      Physical Exam  Physical Exam  Vitals and nursing note reviewed.   Constitutional:       General: He is not in acute distress.     Appearance: Normal appearance. He is not ill-appearing, toxic-appearing or  diaphoretic.      Comments: Somewhat uncomfortable appearing but nontoxic nondistressed   HENT:      Head: Normocephalic and atraumatic.      Comments: Moist mucous membranes     Right Ear: External ear normal.      Left Ear: External ear normal.   Eyes:      General:         Right eye: No discharge.         Left eye: No discharge.   Cardiovascular:      Comments: Regular rate and rhythm, no murmurs rubs or gallops.  Extremities warm and well-perfused without mottling  Pulmonary:      Effort: No respiratory distress.      Comments: Mild generalized wheezing.  No rales or rhonchi.  Satting 92% on room air indicating borderline oxygenation  Abdominal:      General: There is no distension.   Genitourinary:     Comments: Mild suprapubic tenderness.  No CVA tenderness.  Musculoskeletal:         General: No deformity.      Cervical back: Normal range of motion.   Skin:     Findings: No lesion or rash.   Neurological:      Mental Status: He is alert and oriented to person, place, and time. Mental status is at baseline.      Comments: Awake, alert, pleasant, interactive   Psychiatric:         Mood and Affect: Mood and affect normal.         Vital Signs  ED Triage Vitals [04/24/24 1817]   Temperature Pulse Respirations Blood Pressure SpO2   99.1 °F (37.3 °C) 97 (!) 24 (!) 205/95 92 %      Temp Source Heart Rate Source Patient Position - Orthostatic VS BP Location FiO2 (%)   Tympanic Monitor Sitting Right arm --      Pain Score       10 - Worst Possible Pain           Vitals:    04/24/24 1817 04/24/24 1916   BP: (!) 205/95 137/69   Pulse: 97 87   Patient Position - Orthostatic VS: Sitting          Visual Acuity      ED Medications  Medications   cefTRIAXone (ROCEPHIN) IVPB (premix in dextrose) 1,000 mg 50 mL (1,000 mg Intravenous New Bag 4/24/24 1914)   HYDROmorphone (DILAUDID) injection 1 mg (has no administration in time range)   acetaminophen (Ofirmev) injection 1,000 mg (0 mg Intravenous Stopped 4/24/24 1913)   LORazepam  (ATIVAN) injection 0.5 mg (0.5 mg Intravenous Given 4/24/24 1849)       Diagnostic Studies  Results Reviewed       Procedure Component Value Units Date/Time    Comprehensive metabolic panel [[id number]]  (Abnormal) Collected: 04/24/24 1853    Lab Status: Final result Specimen: Blood from Arm, Left Updated: 04/24/24 1923     Sodium 137 mmol/L      Potassium 4.2 mmol/L      Chloride 102 mmol/L      CO2 25 mmol/L      ANION GAP 10 mmol/L      BUN 30 mg/dL      Creatinine 1.35 mg/dL      Glucose 106 mg/dL      Calcium 9.7 mg/dL      AST 20 U/L      ALT 17 U/L      Alkaline Phosphatase 78 U/L      Total Protein 7.9 g/dL      Albumin 4.2 g/dL      Total Bilirubin 0.59 mg/dL      eGFR 49 ml/min/1.73sq m     Narrative:      National Kidney Disease Foundation guidelines for Chronic Kidney Disease (CKD):     Stage 1 with normal or high GFR (GFR > 90 mL/min/1.73 square meters)    Stage 2 Mild CKD (GFR = 60-89 mL/min/1.73 square meters)    Stage 3A Moderate CKD (GFR = 45-59 mL/min/1.73 square meters)    Stage 3B Moderate CKD (GFR = 30-44 mL/min/1.73 square meters)    Stage 4 Severe CKD (GFR = 15-29 mL/min/1.73 square meters)    Stage 5 End Stage CKD (GFR <15 mL/min/1.73 square meters)  Note: GFR calculation is accurate only with a steady state creatinine    CBC and differential [[id number]]  (Abnormal) Collected: 04/24/24 1853    Lab Status: Final result Specimen: Blood from Arm, Left Updated: 04/24/24 1904     WBC 12.10 Thousand/uL      RBC 5.43 Million/uL      Hemoglobin 15.8 g/dL      Hematocrit 48.0 %      MCV 88 fL      MCH 29.1 pg      MCHC 32.9 g/dL      RDW 15.1 %      MPV 9.7 fL      Platelets 211 Thousands/uL      nRBC 0 /100 WBCs      Segmented % 76 %      Immature Grans % 1 %      Lymphocytes % 14 %      Monocytes % 7 %      Eosinophils Relative 2 %      Basophils Relative 0 %      Absolute Neutrophils 9.17 Thousands/µL      Absolute Immature Grans 0.07 Thousand/uL      Absolute Lymphocytes 1.71 Thousands/µL       Absolute Monocytes 0.87 Thousand/µL      Eosinophils Absolute 0.24 Thousand/µL      Basophils Absolute 0.04 Thousands/µL                    No orders to display              Procedures  Procedures         ED Course                                             Medical Decision Making  I obtained history from the patient.  I discussed patient with Dr. Khan with urology who recommends admission for placement of Sumner catheter under sedation versus suprapubic catheter if unsuccessful, IV antibiotics.  I ordered and reviewed lab work including CBC, CMP.  I treated patient with Dilaudid, Ativan.  I discussed patient with the hospitalist and admitted patient for further evaluation and management.    Amount and/or Complexity of Data Reviewed  Labs: ordered.    Risk  Prescription drug management.  Decision regarding hospitalization.             Disposition  Final diagnoses:   Urinary retention   Radiation cystitis   Hematuria   Intractable pain     Time reflects when diagnosis was documented in both MDM as applicable and the Disposition within this note       Time User Action Codes Description Comment    4/24/2024  7:14 PM Yovani Khan Add [R31.0] Gross hematuria     4/24/2024  7:14 PM Yovani Khan Add [C61] Prostate cancer (HCC)     4/24/2024  7:37 PM Patrick Bhandari Add [R33.9] Urinary retention     4/24/2024  7:37 PM Patrick Bhandari Add [N30.40] Radiation cystitis     4/24/2024  7:37 PM Patrick Bhandari Modify [R31.0] Gross hematuria     4/24/2024  7:37 PM Patrick Bhandari Modify [R33.9] Urinary retention     4/24/2024  7:38 PM Patrick Bhandari Add [R31.9] Hematuria     4/24/2024  7:38 PM Patrick Bhandari Add [R52] Intractable pain           ED Disposition       ED Disposition   Admit    Condition   Stable    Date/Time   Wed Apr 24, 2024  7:37 PM    Comment   Case was discussed with [facility] and the patient's admission status was agreed to be Admission Status: observation status to the  service of Dr. Churchill .               Follow-up Information    None         Patient's Medications   Discharge Prescriptions    No medications on file       No discharge procedures on file.    PDMP Review         Value Time User    PDMP Reviewed  Yes 7/13/2021 10:25 AM CORWIN Esquivel            ED Provider  Electronically Signed by             Patrick Bhandari MD  04/24/24 8690

## 2024-04-24 NOTE — QUICK NOTE
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 after midnight if hematuria does not clear for cystoscopy evacuation of clots fulguration

## 2024-04-25 LAB
ANION GAP SERPL CALCULATED.3IONS-SCNC: 5 MMOL/L (ref 4–13)
BASOPHILS # BLD AUTO: 0.01 THOUSANDS/ÂΜL (ref 0–0.1)
BASOPHILS NFR BLD AUTO: 0 % (ref 0–1)
BUN SERPL-MCNC: 30 MG/DL (ref 5–25)
CALCIUM SERPL-MCNC: 8.8 MG/DL (ref 8.4–10.2)
CHLORIDE SERPL-SCNC: 102 MMOL/L (ref 96–108)
CO2 SERPL-SCNC: 29 MMOL/L (ref 21–32)
CREAT SERPL-MCNC: 1.32 MG/DL (ref 0.6–1.3)
EOSINOPHIL # BLD AUTO: 0 THOUSAND/ÂΜL (ref 0–0.61)
EOSINOPHIL NFR BLD AUTO: 0 % (ref 0–6)
ERYTHROCYTE [DISTWIDTH] IN BLOOD BY AUTOMATED COUNT: 14.8 % (ref 11.6–15.1)
GFR SERPL CREATININE-BSD FRML MDRD: 50 ML/MIN/1.73SQ M
GLUCOSE SERPL-MCNC: 189 MG/DL (ref 65–140)
HCT VFR BLD AUTO: 44.7 % (ref 36.5–49.3)
HGB BLD-MCNC: 14.2 G/DL (ref 12–17)
IMM GRANULOCYTES # BLD AUTO: 0.05 THOUSAND/UL (ref 0–0.2)
IMM GRANULOCYTES NFR BLD AUTO: 1 % (ref 0–2)
LYMPHOCYTES # BLD AUTO: 0.79 THOUSANDS/ÂΜL (ref 0.6–4.47)
LYMPHOCYTES NFR BLD AUTO: 7 % (ref 14–44)
MAGNESIUM SERPL-MCNC: 1.9 MG/DL (ref 1.9–2.7)
MCH RBC QN AUTO: 29 PG (ref 26.8–34.3)
MCHC RBC AUTO-ENTMCNC: 31.8 G/DL (ref 31.4–37.4)
MCV RBC AUTO: 91 FL (ref 82–98)
MONOCYTES # BLD AUTO: 0.13 THOUSAND/ÂΜL (ref 0.17–1.22)
MONOCYTES NFR BLD AUTO: 1 % (ref 4–12)
NEUTROPHILS # BLD AUTO: 9.74 THOUSANDS/ÂΜL (ref 1.85–7.62)
NEUTS SEG NFR BLD AUTO: 91 % (ref 43–75)
NRBC BLD AUTO-RTO: 0 /100 WBCS
PLATELET # BLD AUTO: 177 THOUSANDS/UL (ref 149–390)
PLATELET BLD QL SMEAR: ADEQUATE
PMV BLD AUTO: 9.7 FL (ref 8.9–12.7)
POTASSIUM SERPL-SCNC: 4.8 MMOL/L (ref 3.5–5.3)
RBC # BLD AUTO: 4.89 MILLION/UL (ref 3.88–5.62)
RBC MORPH BLD: NORMAL
SODIUM SERPL-SCNC: 136 MMOL/L (ref 135–147)
WBC # BLD AUTO: 10.72 THOUSAND/UL (ref 4.31–10.16)

## 2024-04-25 PROCEDURE — 80048 BASIC METABOLIC PNL TOTAL CA: CPT | Performed by: NURSE PRACTITIONER

## 2024-04-25 PROCEDURE — 94760 N-INVAS EAR/PLS OXIMETRY 1: CPT

## 2024-04-25 PROCEDURE — 85025 COMPLETE CBC W/AUTO DIFF WBC: CPT | Performed by: NURSE PRACTITIONER

## 2024-04-25 PROCEDURE — 99232 SBSQ HOSP IP/OBS MODERATE 35: CPT | Performed by: INTERNAL MEDICINE

## 2024-04-25 PROCEDURE — 83735 ASSAY OF MAGNESIUM: CPT | Performed by: NURSE PRACTITIONER

## 2024-04-25 RX ORDER — CEFTRIAXONE 1 G/50ML
1000 INJECTION, SOLUTION INTRAVENOUS EVERY 24 HOURS
Status: DISCONTINUED | OUTPATIENT
Start: 2024-04-25 | End: 2024-04-26 | Stop reason: HOSPADM

## 2024-04-25 RX ORDER — HYDROMORPHONE HCL IN WATER/PF 6 MG/30 ML
0.2 PATIENT CONTROLLED ANALGESIA SYRINGE INTRAVENOUS EVERY 4 HOURS PRN
Status: DISCONTINUED | OUTPATIENT
Start: 2024-04-25 | End: 2024-04-26 | Stop reason: HOSPADM

## 2024-04-25 RX ORDER — SACCHAROMYCES BOULARDII 250 MG
250 CAPSULE ORAL 2 TIMES DAILY
Status: DISCONTINUED | OUTPATIENT
Start: 2024-04-25 | End: 2024-04-26 | Stop reason: HOSPADM

## 2024-04-25 RX ORDER — PHENAZOPYRIDINE HYDROCHLORIDE 100 MG/1
100 TABLET, FILM COATED ORAL
Status: DISCONTINUED | OUTPATIENT
Start: 2024-04-25 | End: 2024-04-26 | Stop reason: HOSPADM

## 2024-04-25 RX ADMIN — HYDROMORPHONE HYDROCHLORIDE 0.2 MG: 0.2 INJECTION, SOLUTION INTRAMUSCULAR; INTRAVENOUS; SUBCUTANEOUS at 17:33

## 2024-04-25 RX ADMIN — ALBUTEROL SULFATE 2 PUFF: 90 AEROSOL, METERED RESPIRATORY (INHALATION) at 11:55

## 2024-04-25 RX ADMIN — HYDROMORPHONE HYDROCHLORIDE 0.2 MG: 0.2 INJECTION, SOLUTION INTRAMUSCULAR; INTRAVENOUS; SUBCUTANEOUS at 13:08

## 2024-04-25 RX ADMIN — CEFTRIAXONE 1000 MG: 1 INJECTION, SOLUTION INTRAVENOUS at 17:30

## 2024-04-25 RX ADMIN — NICOTINE 1 PATCH: 7 PATCH, EXTENDED RELEASE TRANSDERMAL at 08:51

## 2024-04-25 RX ADMIN — UMECLIDINIUM 1 PUFF: 62.5 AEROSOL, POWDER ORAL at 08:51

## 2024-04-25 RX ADMIN — Medication 12.5 MG: at 21:23

## 2024-04-25 RX ADMIN — Medication 12.5 MG: at 08:51

## 2024-04-25 RX ADMIN — FLUTICASONE FUROATE AND VILANTEROL TRIFENATATE 1 PUFF: 200; 25 POWDER RESPIRATORY (INHALATION) at 08:51

## 2024-04-25 RX ADMIN — Medication 250 MG: at 08:51

## 2024-04-25 RX ADMIN — Medication 2000 UNITS: at 08:51

## 2024-04-25 RX ADMIN — HYDROMORPHONE HYDROCHLORIDE 0.2 MG: 0.2 INJECTION, SOLUTION INTRAMUSCULAR; INTRAVENOUS; SUBCUTANEOUS at 22:59

## 2024-04-25 NOTE — OCCUPATIONAL THERAPY NOTE
OT EVALUATION       04/25/24 0931   Note Type   Note type Screen   Additional Comments Patient is independent with ADLS per PT, no further skilled OT needs required at this time   Discharge Recommendation   Rehab Resource Intensity Level, OT No post-acute rehabilitation needs   Licensure   [place] License Number  Martha Duran MS OTR/L [id number]

## 2024-04-25 NOTE — ASSESSMENT & PLAN NOTE
Patient presents with inability to urinate started around 1 PM today, Sumner was unable to be placed in urology's office.  Reports chronic hematuria.  Had a procedure done on 4/11 with Sumner catheter insertion, Sumner catheter was removed a week ago, has worsening hematuria since yesterday.  Reports somewhat chronic dysuria.  Chart reviewed.  History of prostate cancer, status post radical prostatectomy, status post radiation therapy for periprostatic recurrence.  History of bladder neck contracture, status post cystoscopy transurethral resection bladder neck contracture bladder tumor evacuation of clots on 4/11/2024.  Patient was hospitalized in February 2024 for hemorrhagic cystitis, required three-way Sumner, urine culture grew Klebsiella and Proteus, patient was treated with cefepime/cefdinir for 7 days total.  Patient was again hospitalized in March 2024 for hematuria/urinary retention, required Sumner insertion, urine culture grew high colony corynebacterium urealyticum suspected colonization from Sumner catheter.  Sumner catheter was discontinued prior to discharge.  Bladder scan showed 12 cc in ED  Went to OR urgently with urology from ED. Underwent bilateral cystoscopy evacuation of clots fulguration bladder neck tumors and biopsy, retrograde urethrogram/cystogram DVIU, three-way Sumner catheter insertion.  UA pending  WBC 12.10, no bands, low-grade fever.  Meets SIRS criteria.  See below.  Given Rocephin in ED empirically, will continue.  Management per urology

## 2024-04-25 NOTE — OP NOTE
OPERATIVE REPORT  PATIENT NAME: Joel Wyman    :  1945  MRN: [id number]  Pt Location: [facility] ROOM 04    SURGERY DATE: 2024    Surgeons and Role:     * Yovani Khan MD - Primary    Preop Diagnosis:  Gross hematuria [R31.0]  Prostate cancer (HCC) [C61]    Post-Op Diagnosis Codes:     * Gross hematuria [R31.0]     * Prostate cancer (HCC) [C61]    Procedure(s):  Bilateral - CYSTOSCOPY EVACUATION OF CLOTS fulguration bladder neck tumors and biopsy. Retrograde urethrogram/cystogramDVIU    Specimen(s):  ID Type Source Tests Collected by Time Destination   1 : Bladder neck tumor Tissue Urinary Bladder TISSUE EXAM Yovani Khan MD 2024        Estimated Blood Loss:   Minimal    Drains:  Urethral Catheter Latex;Three way 22 Fr. (Active)   Number of days: 0       Anesthesia Type:   General/LMA    Operative Indications:  Gross hematuria [R31.0]  Prostate cancer (HCC) [C61]  This 78-year-old male status post cystoscopy transurethral resection bladder neck contracture bladder tumor 2 weeks ago undergoing Sumner catheter removal confirming severe necrotic fibrotic tissue status post radiation to the bladder neck for recurrent prostate cancer status post radical prostatectomy presented to the office with worsening gross hematuria unable to pass Sumner catheter in light of patient's anxiety sent into the ER tonight and now to undergo emergency cystoscopy evacuation of clots retrograde urethrogram Sumner catheter placement where risk of anesthesia effectively    Operative Findings:  External sphincter /bulbar urethra scarring unable to pass 22 Welsh cystoscope requiring mild DVIU significant bladder neck necrotic tissue obstructing the bladder neck removed with alligator sent for biopsy significant using to the bladder neck fulguration    Complications:   None    Procedure and Technique:  Patient identified in the holding area consent discussed reviewed aware risk of worsening incontinence placed in the op  suite after anesthesia induced placed in lithotomy position draped and prepped standard fashion timeout form.  22 Vietnamese cystoscope passed to the anterior urethra noting significant scarring and necrotic white tissue in the membranous urethra with oozing through the external sphincter 0.038 wire passed through the opening into the bladder neck and into the bladder under fluoroscopic control of the safety backloaded off attempts at passing the cystoscope through this area was unsuccessful despite moderate pushing.  The cystoscope was removed and the urethrotome was placed and at 12:00 incision was made to mildly open the scarred membranous urethra exposing tremendous necrotic fibrotic tissue the urethrotome was then removed and then the 22 Vietnamese cystoscope was then replaced and then with an alligator multiple amounts of necrotic white tissue were removed exposing the bladder neck with a view into the 2 bladder lumen the scope was then pushed with passive resistance into the bladder no intraluminal abnormalities noted the scope was then pulled out into the inflamed bladder neck the Bugbee was placed and oozing vessels were cauterized remaining necrotic tissue easily bluntly removed with the alligator now opening the entire bladder neck.  The scope was removed specimens were sent and a 22 Vietnamese three-way Sumner catheter was inserted into the bladder fluoroscopic cystogram confirmed bladder position 30 cc placed in the balloon left to bag drainage time dictation urine is clear   I was present for the entire procedure.    Patient Disposition:  PACU         SIGNATURE: Yovani Khan MD  DATE: April 24, 2024  TIME: 10:23 PM

## 2024-04-25 NOTE — H&P
[facility]  H&P  Name: Joel Wyman 78 y.o. male I MRN: [id number]  Unit/Bed#: 2 [address] Date of Admission: 4/24/2024   Date of Service: 4/25/2024 I Hospital Day: 0      Assessment/Plan   * Urinary retention  Assessment & Plan  Patient presents with inability to urinate started around 1 PM today, Sumner was unable to be placed in urology's office.  Reports chronic hematuria.  Had a procedure done on 4/11 with Sumner catheter insertion, Sumner catheter was removed a week ago, has worsening hematuria since yesterday.  Reports somewhat chronic dysuria.  Chart reviewed.  History of prostate cancer, status post radical prostatectomy, status post radiation therapy for periprostatic recurrence.  History of bladder neck contracture, status post cystoscopy transurethral resection bladder neck contracture bladder tumor evacuation of clots on 4/11/2024.  Patient was hospitalized in February 2024 for hemorrhagic cystitis, required three-way Sumner, urine culture grew Klebsiella and Proteus, patient was treated with cefepime/cefdinir for 7 days total.  Patient was again hospitalized in March 2024 for hematuria/urinary retention, required Sumner insertion, urine culture grew high colony corynebacterium urealyticum suspected colonization from Sumner catheter.  Sumner catheter was discontinued prior to discharge.  Bladder scan showed 12 cc in ED  Went to OR urgently with urology from ED. Underwent bilateral cystoscopy evacuation of clots fulguration bladder neck tumors and biopsy, retrograde urethrogram/cystogram DVIU, three-way Sumner catheter insertion.  UA pending  WBC 12.10, no bands, low-grade fever.  Meets SIRS criteria.  See below.  Given Rocephin in ED empirically, will continue.  Management per urology    Gross hematuria  Assessment & Plan  Worsening from baseline.  Three-way inserted in OR  Management per urology  Monitor CBC    SIRS (systemic inflammatory response syndrome) (HCC)  Assessment &  Plan  SIRS versus sepsis, present on admission, as evidenced by leukocytosis, tachycardia, tachypnea.  UA pending  Started Rocephin empirically  Would obtain blood cultures/lactic acid should patient spike fever  Repeat CBC with differential in the morning    Stage 3b chronic kidney disease (HCC)  Assessment & Plan  Lab Results   Component Value Date    EGFR 49 04/24/2024    EGFR 41 04/07/2024    EGFR 50 03/19/2024    CREATININE 1.35 (H) 04/24/2024    CREATININE 1.54 (H) 04/07/2024    CREATININE 1.33 (H) 03/19/2024   Baseline creatinine appears to be around 1.3's  Creatinine close to baseline  Monitor BMP    COPD (chronic obstructive pulmonary disease) (Formerly [facility])  Assessment & Plan  Reports chronic SOB with exertion, chronic cough with phlegm, not on home O2.  Continue albuterol as needed, Trelegy, Flonase  No wheezing on auscultation    Prostate cancer (Formerly [facility]) - s/p resection  Assessment & Plan  As above  Noted history    Hyperlipidemia  Assessment & Plan  Diet controlled    Smoking  Assessment & Plan  Nicotine patch, smoking cessation    Essential hypertension  Assessment & Plan  Continue metoprolol  BP acceptable          VTE Prophylaxis: Pharmacologic VTE Prophylaxis contraindicated due to hematuria   / sequential compression device   Code Status: Full code  POLST: POLST form is not discussed and not completed at this time.    Anticipated Length of Stay:  Patient will be admitted on an Observation basis with an anticipated length of stay of  < 2 midnights.   Justification for Hospital Stay: Urinary retention, hematuria    Total Time for Visit, including Counseling / Coordination of Care: 45 minutes.  Greater than 50% of this total time spent on direct patient counseling and coordination of care.    Chief Complaint:   Unable to urinate, worsening hematuria    History of Present Illness:    Joel Wyman is a 78 y.o. male with PMH of prostate cancer, bladder neck contracture, UTIs, CKD 3, nicotine abuse, hypertension,  hyperlipidemia, COPD who presents with  inability to urinate started around 1 PM today, Sumner was unable to be placed in urology's office.  Reports chronic hematuria.  Had a procedure done on 4/11 with Sumner catheter insertion, Sumner catheter was removed a week ago, has worsening hematuria since yesterday.  Reports somewhat chronic dysuria.  Patient reports spasm pain in urethral.  Patient denies fever chills.  Denies chest pain, headache, dizziness, nausea vomiting, constipation.  Reports chronic SOB with exertion and chronic cough with phlegm.             Review of Systems:    Review of Systems   Respiratory:  Positive for cough and shortness of breath.    Genitourinary:  Positive for difficulty urinating and hematuria.   All other systems reviewed and are negative.      Past Medical and Surgical History:     Past Medical History:   Diagnosis Date    Bladder infection 03/2024    Borderline diabetes     Cancer (HCC)     prostate    COPD (chronic obstructive pulmonary disease) (HCC)     Hematuria     History of transfusion     x2    Hyperlipidemia     Hypertension     Radiation cystitis     Smoker     Wears glasses        Past Surgical History:   Procedure Laterality Date    APPENDECTOMY      FL RETROGRADE PYELOGRAM  04/14/2021    FL RETROGRADE PYELOGRAM  11/16/2023    FL RETROGRADE PYELOGRAM  4/11/2024    HERNIA REPAIR      IR EMBOLIZATION (SPECIFY VESSEL OR SITE)  09/17/2021    IR NEPHROSTOMY TUBE CHECK AND/OR REMOVAL  07/08/2021    IR NEPHROSTOMY TUBE CHECK/CHANGE/REPOSITION/REINSERTION/UPSIZE  05/10/2021    IR NEPHROSTOMY TUBE CHECK/CHANGE/REPOSITION/REINSERTION/UPSIZE  08/04/2021    IR NEPHROSTOMY TUBE CHECK/CHANGE/REPOSITION/REINSERTION/UPSIZE  10/13/2021    IR NEPHROSTOMY TUBE PLACEMENT  05/07/2021    UT CYSTO BLADDER W/URETERAL CATHETERIZATION Bilateral 4/11/2024    Procedure: BILATERAL CYSTOSCOPY WITH RETROGRADE PYELOGRAM, BLADDER NECK CONTRACTURE;  Surgeon: Yovani Khan MD;  Location: [facility];   Service: Urology    IL CYSTO W/IRRIG & EVAC MULTPLE OBSTRUCTING CLOTS Bilateral 04/14/2021    Procedure: CYSTOSCOPY EVACUATION OF CLOTS bilateral retrograde pyelograms possible TURBT bladder biopsy;  Surgeon: Yovani Khan MD;  Location: [facility];  Service: Urology    IL CYSTO W/IRRIG & EVAC MULTPLE OBSTRUCTING CLOTS N/A 04/24/2021    Procedure: CYSTOSCOPY EVACUATION OF CLOTS fulguration;  Surgeon: Yovani Khan MD;  Location: [facility];  Service: Urology    IL CYSTO W/IRRIG & EVAC MULTPLE OBSTRUCTING CLOTS N/A 07/08/2021    Procedure: CYSTOSCOPY EVACUATION OF CLOTS BILATERAL ANTIROGRADES NEPHROSTOMY TUBES, FULGERATION ;  Surgeon: Yovani Khan MD;  Location: [facility];  Service: Urology    IL CYSTO W/IRRIG & EVAC MULTPLE OBSTRUCTING CLOTS N/A 08/22/2021    Procedure: CYSTOSCOPY, RIGHT RETROGRADE, EVACUATION OF CLOTS, BLADDER BIOPSY X2 AND FULGERATION OF BLADDER LESIONS, URETEROSCOPY, 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;  Surgeon: Yovani Khan MD;  Location: [facility];  Service: Urology    IL CYSTOURETHROSCOPY W/DEST &/RMVL MED BLADDER AMARI N/A 4/11/2024    Procedure: TRANSURETHRAL RESECTION OF BLADDER TUMOR (TURBT);  Surgeon: Yovani Khan MD;  Location: [facility];  Service: Urology    IL CYSTOURETHROSCOPY WITH BIOPSY N/A 11/16/2023    Procedure: CYSTOSCOPY, BILATERAL RETROGRADEY PYELOGRAM,  FULGERATION 2.0 CM BLADDER TUMOR WITH BIOPSY;  Surgeon: Yovani Khan MD;  Location: [facility];  Service: Urology    PROSTATECTOMY  2014    ROTATOR CUFF REPAIR      right shoulder       Meds/Allergies:    Prior to Admission medications    Medication Sig Start Date End Date Taking? Authorizing Provider   albuterol (PROVENTIL HFA,VENTOLIN HFA) 90 mcg/act inhaler Inhale 2 puffs every 4 (four) hours as needed for wheezing 8/31/17  Yes Kelly Anepu, DO   fluticasone (FLONASE) 50 mcg/act nasal spray 1 spray into each nostril daily as needed   Yes Historical  "Provider, MD   fluticasone-umeclidinium-vilanterol (Trelegy Ellipta) 200-62.5-25 mcg/actuation AEPB inhaler Inhale 1 puff every morning 6/27/23 6/26/24 Yes Historical Provider, MD   metoprolol tartrate (LOPRESSOR) 25 mg tablet Take 12.5 mg by mouth every 12 (twelve) hours   Yes Historical Provider, MD   Cholecalciferol (Vitamin D) 50 MCG (2000 UT) CAPS Take by mouth in the morning Last dose 4/3/24  Patient not taking: Reported on 4/24/2024    Historical Provider, MD   meloxicam (MOBIC) 15 mg tablet Take 15 mg by mouth daily  Patient not taking: Reported on 4/24/2024    Historical Provider, MD     I have reviewed home medications with patient personally.    Allergies: No Known Allergies    Social History:     Marital Status: /Civil Union   Occupation: Retired  Patient Pre-hospital Living Situation: Wife  Patient Pre-hospital Level of Mobility: Independent  Patient Pre-hospital Diet Restrictions: Cardiac diet  Substance Use History:   Social History     Substance and Sexual Activity   Alcohol Use Not Currently    Comment: None in over 50 yrs     Social History     Tobacco Use   Smoking Status Some Days    Current packs/day: 0.50    Average packs/day: 0.5 packs/day for 50.0 years (25.0 ttl pk-yrs)    Types: Cigarettes    Passive exposure: Never   Smokeless Tobacco Never     Social History     Substance and Sexual Activity   Drug Use No       Family History:    non-contributory    Physical Exam:     Vitals:   Blood Pressure: 160/93 (04/24/24 2320)  Pulse: 83 (04/24/24 2320)  Temperature: 97.8 °F (36.6 °C) (04/24/24 2230)  Temp Source: Tympanic (04/24/24 2230)  Respirations: 16 (04/24/24 2300)  Height: 5' 11\" (180.3 cm) (04/24/24 2337)  Weight - Scale: 96.2 kg (212 lb) (04/24/24 2337)  SpO2: 97 % (04/24/24 2320)    Physical Exam  Vitals and nursing note reviewed.   Constitutional:       Appearance: He is well-developed.      Comments: Patient appears overweight   HENT:      Head: Normocephalic and atraumatic. "   Neck:      Thyroid: No thyromegaly.      Vascular: No JVD.      Trachea: No tracheal deviation.   Cardiovascular:      Rate and Rhythm: Normal rate and regular rhythm.      Heart sounds: Normal heart sounds.   Pulmonary:      Effort: Pulmonary effort is normal. No respiratory distress.      Breath sounds: No wheezing or rales.      Comments: Diminished breath sounds bilateral, ON room air, satting low 90s  Abdominal:      General: Bowel sounds are normal. There is no distension.      Palpations: Abdomen is soft.      Tenderness: There is no abdominal tenderness. There is no guarding.   Genitourinary:     Comments: Bleeding from urethra.  Patient holding penis on exam due to spasm pain.  Musculoskeletal:      Cervical back: Neck supple.      Right lower leg: No edema.      Left lower leg: No edema.   Skin:     General: Skin is warm and dry.   Neurological:      General: No focal deficit present.      Mental Status: He is alert and oriented to person, place, and time.   Psychiatric:         Mood and Affect: Mood normal.         Judgment: Judgment normal.         Additional Data:     Lab Results: I have personally reviewed pertinent reports.      Results from last 7 days   Lab Units 04/24/24  1853   WBC Thousand/uL 12.10*   HEMOGLOBIN g/dL 15.8   HEMATOCRIT % 48.0   PLATELETS Thousands/uL 211   SEGS PCT % 76*   LYMPHO PCT % 14   MONO PCT % 7   EOS PCT % 2     Results from last 7 days   Lab Units 04/24/24  1853   POTASSIUM mmol/L 4.2   CHLORIDE mmol/L 102   CO2 mmol/L 25   BUN mg/dL 30*   CREATININE mg/dL 1.35*   CALCIUM mg/dL 9.7   ALK PHOS U/L 78   ALT U/L 17   AST U/L 20           Imaging: I have personally reviewed pertinent reports.      FL retrograde pyelogram    Result Date: 4/12/2024  Narrative: BILATERAL RETROGRADE PYELOGRAM INDICATION: C67.9: Malignant neoplasm of bladder, unspecified. COMPARISON: 11/16/2023 IMAGES: 6 FLUOROSCOPY TIME: 19.7 SECONDS CONTRAST: 20 mL of iohexol (OMNIPAQUE) FINDINGS: Retrograde  opacification of the left ureter demonstrates normal course and caliber without evidence of filling defects. The upper tract is unremarkable. Retrograde opacification of the right ureter demonstrates normal course and caliber without evidence of filling defects. The upper tract is unremarkable. Osseous and soft tissue detail limited by technique.     Impression: Fluoroscopic guidance provided for bilateral retrograde pyelogram. Please see separate procedure report for additional details. Workstation performed: [id number]       EKG, Pathology, and Other Studies Reviewed on Admission:   EKG: NA    Allscripts Records Reviewed: Yes     ** Please Note: Dragon 360 Dictation voice to text software may have been used in the creation of this document. **

## 2024-04-25 NOTE — ASSESSMENT & PLAN NOTE
Lab Results   Component Value Date    EGFR 49 04/24/2024    EGFR 41 04/07/2024    EGFR 50 03/19/2024    CREATININE 1.35 (H) 04/24/2024    CREATININE 1.54 (H) 04/07/2024    CREATININE 1.33 (H) 03/19/2024   Baseline creatinine appears to be around 1.3's  Creatinine close to baseline  Monitor BMP

## 2024-04-25 NOTE — PLAN OF CARE
Problem: PAIN - ADULT  Goal: Verbalizes/displays adequate comfort level or baseline comfort level  Description: Interventions:  - Encourage patient to monitor pain and request assistance  - Assess pain using appropriate pain scale  - Administer analgesics based on type and severity of pain and evaluate response  - Implement non-pharmacological measures as appropriate and evaluate response  - Consider cultural and social influences on pain and pain management  - Notify physician/advanced practitioner if interventions unsuccessful or patient reports new pain  Outcome: Progressing     Problem: INFECTION - ADULT  Goal: Absence or prevention of progression during hospitalization  Description: INTERVENTIONS:  - Assess and monitor for signs and symptoms of infection  - Monitor lab/diagnostic results  - Monitor all insertion sites, i.e. indwelling lines, tubes, and drains  - Monitor endotracheal if appropriate and nasal secretions for changes in amount and color  - [place] appropriate cooling/warming therapies per order  - Administer medications as ordered  - Instruct and encourage patient and family to use good hand hygiene technique  - Identify and instruct in appropriate isolation precautions for identified infection/condition  Outcome: Progressing     Problem: SAFETY ADULT  Goal: Maintain or return to baseline ADL function  Description: INTERVENTIONS:  -  Assess patient's ability to carry out ADLs; assess patient's baseline for ADL function and identify physical deficits which impact ability to perform ADLs (bathing, care of mouth/teeth, toileting, grooming, dressing, etc.)  - Assess/evaluate cause of self-care deficits   - Assess range of motion  - Assess patient's mobility; develop plan if impaired  - Assess patient's need for assistive devices and provide as appropriate  - Encourage maximum independence but intervene and supervise when necessary  - Involve family in performance of ADLs  - Assess for home care  needs following discharge   - Consider OT consult to assist with ADL evaluation and planning for discharge  - Provide patient education as appropriate  Outcome: Progressing     Problem: DISCHARGE PLANNING  Goal: Discharge to home or other facility with appropriate resources  Description: INTERVENTIONS:  - Identify barriers to discharge w/patient and caregiver  - Arrange for needed discharge resources and transportation as appropriate  - Identify discharge learning needs (meds, wound care, etc.)  - Arrange for interpretive services to assist at discharge as needed  - Refer to Case Management Department for coordinating discharge planning if the patient needs post-hospital services based on physician/advanced practitioner order or complex needs related to functional status, cognitive ability, or social support system  Outcome: Progressing     Problem: Knowledge Deficit  Goal: Patient/family/caregiver demonstrates understanding of disease process, treatment plan, medications, and discharge instructions  Description: Complete learning assessment and assess knowledge base.  Interventions:  - Provide teaching at level of understanding  - Provide teaching via preferred learning methods  Outcome: Progressing     Problem: GENITOURINARY - ADULT  Goal: Maintains or returns to baseline urinary function  Description: INTERVENTIONS:  - Assess urinary function  - Encourage oral fluids to ensure adequate hydration if ordered  - Administer IV fluids as ordered to ensure adequate hydration  - Administer ordered medications as needed  - Offer frequent toileting  - Follow urinary retention protocol if ordered  Outcome: Progressing

## 2024-04-25 NOTE — ED NOTES
Pt wiping himself down and replace gown and socks in prep for OR     Samantha M Goodell, RN  04/24/24 2042

## 2024-04-25 NOTE — PLAN OF CARE
Problem: PAIN - ADULT  Goal: Verbalizes/displays adequate comfort level or baseline comfort level  Description: Interventions:  - Encourage patient to monitor pain and request assistance  - Assess pain using appropriate pain scale  - Administer analgesics based on type and severity of pain and evaluate response  - Implement non-pharmacological measures as appropriate and evaluate response  - Consider cultural and social influences on pain and pain management  - Notify physician/advanced practitioner if interventions unsuccessful or patient reports new pain  Outcome: Progressing     Problem: INFECTION - ADULT  Goal: Absence or prevention of progression during hospitalization  Description: INTERVENTIONS:  - Assess and monitor for signs and symptoms of infection  - Monitor lab/diagnostic results  - Monitor all insertion sites, i.e. indwelling lines, tubes, and drains  - Monitor endotracheal if appropriate and nasal secretions for changes in amount and color  - [place] appropriate cooling/warming therapies per order  - Administer medications as ordered  - Instruct and encourage patient and family to use good hand hygiene technique  - Identify and instruct in appropriate isolation precautions for identified infection/condition  Outcome: Progressing     Problem: SAFETY ADULT  Goal: Patient will remain free of falls  Description: INTERVENTIONS:  - Educate patient/family on patient safety including physical limitations  - Instruct patient to call for assistance with activity   - Consult OT/PT to assist with strengthening/mobility   - Keep Call bell within reach  - Keep bed low and locked with side rails adjusted as appropriate  - Keep care items and personal belongings within reach  - Initiate and maintain comfort rounds  - Make Fall Risk Sign visible to staff  - Offer Toileting every 2 Hours, in advance of need  - Initiate/Maintain alarm  - Obtain necessary fall risk management equipment  - Apply yellow socks and  bracelet for high fall risk patients  - Consider moving patient to room near nurses station  Outcome: Progressing  Goal: Maintain or return to baseline ADL function  Description: INTERVENTIONS:  -  Assess patient's ability to carry out ADLs; assess patient's baseline for ADL function and identify physical deficits which impact ability to perform ADLs (bathing, care of mouth/teeth, toileting, grooming, dressing, etc.)  - Assess/evaluate cause of self-care deficits   - Assess range of motion  - Assess patient's mobility; develop plan if impaired  - Assess patient's need for assistive devices and provide as appropriate  - Encourage maximum independence but intervene and supervise when necessary  - Involve family in performance of ADLs  - Assess for home care needs following discharge   - Consider OT consult to assist with ADL evaluation and planning for discharge  - Provide patient education as appropriate  Outcome: Progressing  Goal: Maintains/Returns to pre admission functional level  Description: INTERVENTIONS:  - Perform AM-PAC 6 Click Basic Mobility/ Daily Activity assessment daily.  - Set and communicate daily mobility goal to care team and patient/family/caregiver.   - Collaborate with rehabilitation services on mobility goals if consulted  - Perform Range of Motion 3 times a day.  - Reposition patient every 2 hours.  - Dangle patient 3 times a day  - Stand patient 3 times a day  - Ambulate patient 3 times a day  - Out of bed to chair 3 times a day   - Out of bed for meals 3 times a day  - Out of bed for toileting  - Record patient progress and toleration of activity level   Outcome: Progressing     Problem: DISCHARGE PLANNING  Goal: Discharge to home or other facility with appropriate resources  Description: INTERVENTIONS:  - Identify barriers to discharge w/patient and caregiver  - Arrange for needed discharge resources and transportation as appropriate  - Identify discharge learning needs (meds, wound care,  etc.)  - Arrange for interpretive services to assist at discharge as needed  - Refer to Case Management Department for coordinating discharge planning if the patient needs post-hospital services based on physician/advanced practitioner order or complex needs related to functional status, cognitive ability, or social support system  Outcome: Progressing     Problem: Knowledge Deficit  Goal: Patient/family/caregiver demonstrates understanding of disease process, treatment plan, medications, and discharge instructions  Description: Complete learning assessment and assess knowledge base.  Interventions:  - Provide teaching at level of understanding  - Provide teaching via preferred learning methods  Outcome: Progressing     Problem: GENITOURINARY - ADULT  Goal: Maintains or returns to baseline urinary function  Description: INTERVENTIONS:  - Assess urinary function  - Encourage oral fluids to ensure adequate hydration if ordered  - Administer IV fluids as ordered to ensure adequate hydration  - Administer ordered medications as needed  - Offer frequent toileting  - Follow urinary retention protocol if ordered  Outcome: Progressing  Goal: Absence of urinary retention  Description: INTERVENTIONS:  - Assess patient’s ability to void and empty bladder  - Monitor I/O  - Bladder scan as needed  - Discuss with physician/AP medications to alleviate retention as needed  - Discuss catheterization for long term situations as appropriate  Outcome: Progressing  Goal: Urinary catheter remains patent  Description: INTERVENTIONS:  - Assess patency of urinary catheter  - If patient has a chronic alanis, consider changing catheter if non-functioning  - Follow guidelines for intermittent irrigation of non-functioning urinary catheter  Outcome: Progressing      Problem: MUSCULOSKELETAL - ADULT  Goal: Maintain or return mobility to safest level of function  Description: INTERVENTIONS:  - Assess patient's ability to carry out ADLs; assess  patient's baseline for ADL function and identify physical deficits which impact ability to perform ADLs (bathing, care of mouth/teeth, toileting, grooming, dressing, etc.)  - Assess/evaluate cause of self-care deficits   - Assess range of motion  - Assess patient's mobility  - Assess patient's need for assistive devices and provide as appropriate  - Encourage maximum independence but intervene and supervise when necessary  - Involve family in performance of ADLs  - Assess for home care needs following discharge   - Consider OT consult to assist with ADL evaluation and planning for discharge  - Provide patient education as appropriate  Outcome: Progressing  Goal: Maintain proper alignment of affected body part  Description: INTERVENTIONS:  - Support, maintain and protect limb and body alignment  - Provide patient/ family with appropriate education  Outcome: Progressing

## 2024-04-25 NOTE — ANESTHESIA POSTPROCEDURE EVALUATION
Post-Op Assessment Note    CV Status:  Stable  Pain Score: 0    Pain management: adequate       Mental Status:  Sleepy   Hydration Status:  Stable   PONV Controlled:  None   Airway Patency:  Patent     Post Op Vitals Reviewed: Yes    No anethesia notable event occurred.    Staff: Anesthesiologist               BP  137/66   Temp   97.8   Pulse  68   Resp      SpO2  100%

## 2024-04-25 NOTE — ASSESSMENT & PLAN NOTE
Lab Results   Component Value Date    EGFR 50 04/25/2024    EGFR 49 04/24/2024    EGFR 41 04/07/2024    CREATININE 1.32 (H) 04/25/2024    CREATININE 1.35 (H) 04/24/2024    CREATININE 1.54 (H) 04/07/2024   Baseline creatinine appears to be around 1.3's  Creatinine close to baseline

## 2024-04-25 NOTE — ASSESSMENT & PLAN NOTE
Reports chronic SOB with exertion, chronic cough with phlegm, not on home O2.  Continue albuterol as needed, Trelegy, Flonase  No wheezing on auscultation

## 2024-04-25 NOTE — PHYSICAL THERAPY NOTE
Physical Therapy Screen Note       04/25/24 0918   Note Type   Note type Screen   Additional Comments PT orders received and acknowledged. Per discussion with pt he is IND at baseline with all ADLs/functional mobility. Observed pt ambulating IND within room with AD. Pt denies any need for PT services. Will D/C PT - please reorder if needs arise.   Discharge Recommendation   Rehab Resource Intensity Level, PT No post-acute rehabilitation needs   Licensure   [place] License Number  Martha Riveraans [id number]

## 2024-04-25 NOTE — PROGRESS NOTES
"Progress Note - Urology      Patient: Joel Wyman   : 1945 Sex: male   MRN: [id number]     CSN: [id number]  Unit/Bed#: [address] [place] [address]     SUBJECTIVE:   Patient seen on morning rounds  CBI clear  DC CBI patient to go home with Sumner      Objective   Vitals: /87 (BP Location: Right arm)   Pulse 61   Temp 98 °F (36.7 °C) (Oral)   Resp 18   Ht 5' 11\" (1.803 m)   Wt 96.2 kg (212 lb)   SpO2 95%   BMI 29.57 kg/m²     I/O last 24 hours:  In: 853.3 [I.V.:700; IV Piggyback:153.3]  Out: 3400 [Urine:3400]      Physical Exam:   General Alert awake   Normocephalic atraumatic PERRLA  Lungs clear bilaterally  Cardiac normal S1 normal S2  Abdomen soft, flank pain  Extremities no edema      Lab Results: CBC:   Lab Results   Component Value Date    WBC 10.72 (H) 2024    HGB 14.2 2024    HCT 44.7 2024    MCV 91 2024     2024    RBC 4.89 2024    MCH 29.0 2024    MCHC 31.8 2024    RDW 14.8 2024    MPV 9.7 2024    NRBC 0 2024     CMP:   Lab Results   Component Value Date     2024    CO2 29 2024    BUN 30 (H) 2024    CREATININE 1.32 (H) 2024    CALCIUM 8.8 2024    AST 20 2024    ALT 17 2024    ALKPHOS 78 2024    EGFR 50 2024     Urinalysis:   Lab Results   Component Value Date    COLORU Red 2024    CLARITYU Cloudy 2024    SPECGRAV 1.025 2024    PHUR 7.0 2024    PHUR 5.5 2018    LEUKOCYTESUR Trace (A) 2024    NITRITE Positive (A) 2024    GLUCOSEU 250 (1/4%) (A) 2024    KETONESU Trace (A) 2024    BILIRUBINUR Negative 2024    BLOODU Large (A) 2024     Urine Culture:   Lab Results   Component Value Date    URINECX No Growth <1000 cfu/mL 2024     PSA: No results found for: \"PSA\"      Assessment/ Plan:  Status post cystoscopy evacuation of clots internal urethrotomy fulguration bladder neck bladder tumor day #1  DC " CBI  Patient to be discharged home later today with indwelling Sumner        Yovani Khan MD

## 2024-04-25 NOTE — UTILIZATION REVIEW
Initial Clinical Review    Observation 4/24/24 @ 1938 converted to inpatient admission 4/25/24 @ 1635 for continued care & tx for urinary retention.      Admission: Date/Time/Statement:   Admission Orders (From admission, onward)       Ordered        04/25/24 1635  INPATIENT ADMISSION  Once            04/24/24 1938  Place in Observation  Once                          Orders Placed This Encounter   Procedures    INPATIENT ADMISSION     Standing Status:   Standing     Number of Occurrences:   1     Order Specific Question:   Level of Care     Answer:   Med Surg [16]     Order Specific Question:   Estimated length of stay     Answer:   More than 2 Midnights     Order Specific Question:   Certification     Answer:   I certify that inpatient services are medically necessary for this patient for a duration of greater than two midnights. See H&P and MD Progress Notes for additional information about the patient's course of treatment.     ED Arrival Information       Expected   -    Arrival   4/24/2024 17:51    Acuity   Emergent              Means of arrival   Walk-In    Escorted by   Spouse    Service   Hospitalist    Admission type   Emergency              Arrival complaint   testicle pain sent by              Chief Complaint   Patient presents with    Urinary Retention     Sent in from dr Khan, he was unable to get catheter in to be admitted pt very uncomfortable unable to urinate       Initial Presentation:    78-year-old male history of COPD, prostate cancer, radiation cystitis presenting for evaluation of suprapubic pain, urinary retention.  Patient had cystoscopy with scraping a few weeks ago, had catheter removed a week ago and states that over the course of the past few days he has had progressive pain, hematuria, and today he was unable to urinate. Sumner catheter placement attempted in urology office, but was unsuccessful despite multiple attempts, therefore sent to ER. Presents febrile, tachypneic,  hypertensive, severe pain, suprapubic tenderness. Admission WBC 12.10, BUN 30, Cr 1.35. placed in observation status for urinary retention.  Per urol:   Gross hematuria. History of bladder neck contracture. History of prostate cancer status post radical prostatectomy status post radiation therapy for periprostatic recurrence. Status post cystoscopy transurethral resection bladder neck contracture bladder tumor evacuation of clot 13 days ago  Plan: OR tonight for cystoscopy evacuation of clots possible transurethral resection fulguration bladder nec  To OR for:  Bilateral - CYSTOSCOPY EVACUATION OF CLOTS fulguration bladder neck tumors and biopsy. Retrograde urethrogram/cystogramDVIU   Anesthesia Type: General/LMA  Operative Findings:  External sphincter /bulbar urethra scarring unable to pass 22 Icelandic cystoscope requiring mild DVIU significant bladder neck necrotic tissue obstructing the bladder neck removed with alligator sent for biopsy significant using to the bladder neck fulguration      Anticipated Length of Stay/Certification Statement: Patient will be admitted on an Observation basis with an anticipated length of stay of  < 2 midnights.   Justification for Hospital Stay: Urinary retention, hematuria     4/25/24- observation:  S/p  cystoscopy evacuation of clots internal urethrotomy fulguration bladder neck bladder tumor day #1. Reports some cramping in the penile region. Urine punch colored, trail off CBI, monitor. Maintain alanis cath. IVABT continued.     ED Triage Vitals [04/24/24 1817]   Temperature Pulse Respirations Blood Pressure SpO2   99.1 °F (37.3 °C) 97 (!) 24 (!) 205/95 92 %      Temp Source Heart Rate Source Patient Position - Orthostatic VS BP Location FiO2 (%)   Tympanic Monitor Sitting Right arm --      Pain Score       10 - Worst Possible Pain          Wt Readings from Last 1 Encounters:   04/24/24 96.2 kg (212 lb)     Additional Vital Signs:   Date/Time Temp Pulse Resp BP MAP (mmHg) SpO2  Calculated FIO2 (%) - Nasal Cannula O2 Flow Rate (L/min) Nasal Cannula O2 Flow Rate (L/min) O2 Device Cardiac (WDL) Patient Position - Orthostatic VS   04/25/24 0609 -- -- -- -- -- 92 % 28 -- 2 L/min Nasal cannula -- --   04/25/24 03:10:56 98.5 °F (36.9 °C) 51 Abnormal  18 116/69 85 92 % -- -- -- -- -- --   04/24/24 23:20:35 -- 83 -- 160/93 115 97 % 28 -- 2 L/min Nasal cannula -- --   04/24/24 2300 -- 76 16 151/83 -- 98 % 28 -- 2 L/min Nasal cannula -- --   04/24/24 2245 -- 78 16 148/83 -- 99 % 28 -- 2 L/min Nasal cannula -- --   04/24/24 2230 97.8 °F (36.6 °C) 68 16 137/66 -- 100 % -- 6 L/min -- Simple mask WDL --   04/24/24 2033 -- 78 20 169/78 138 91 % -- -- -- -- -- --   04/24/24 1916 -- 87 20 137/69 -- 92 % -- -- -- -- -- --   04/24/24 1817 99.1 °F (37.3 °C) 97 24 Abnormal  205/95 Abnormal  -- 92 % -- -- -- None (Room air) -- Sitting     Pertinent Labs/Diagnostic Test Results:     Results from last 7 days   Lab Units 04/25/24  0501 04/24/24  1853   WBC Thousand/uL 10.72* 12.10*   HEMOGLOBIN g/dL 14.2 15.8   HEMATOCRIT % 44.7 48.0   PLATELETS Thousands/uL 177 211   TOTAL NEUT ABS Thousands/µL 9.74* 9.17*     Results from last 7 days   Lab Units 04/25/24  0501 04/24/24  1853   SODIUM mmol/L 136 137   POTASSIUM mmol/L 4.8 4.2   CHLORIDE mmol/L 102 102   CO2 mmol/L 29 25   ANION GAP mmol/L 5 10   BUN mg/dL 30* 30*   CREATININE mg/dL 1.32* 1.35*   EGFR ml/min/1.73sq m 50 49   CALCIUM mg/dL 8.8 9.7   MAGNESIUM mg/dL 1.9  --      Results from last 7 days   Lab Units 04/24/24  1853   AST U/L 20   ALT U/L 17   ALK PHOS U/L 78   TOTAL PROTEIN g/dL 7.9   ALBUMIN g/dL 4.2   TOTAL BILIRUBIN mg/dL 0.59     Results from last 7 days   Lab Units 04/25/24  0501 04/24/24  1853   GLUCOSE RANDOM mg/dL 189* 106     ED Treatment:   Medication Administration from 04/24/2024 1750 to 04/24/2024 2105         Date/Time Order Dose Route Action     04/24/2024 1851 EDT acetaminophen (Ofirmev) injection 1,000 mg 1,000 mg Intravenous New Bag      04/24/2024 1849 EDT LORazepam (ATIVAN) injection 0.5 mg 0.5 mg Intravenous Given     04/24/2024 1914 EDT cefTRIAXone (ROCEPHIN) IVPB (premix in dextrose) 1,000 mg 50 mL 1,000 mg Intravenous New Bag     04/24/2024 1947 EDT HYDROmorphone (DILAUDID) injection 1 mg 1 mg Intravenous Given          Past Medical History:   Diagnosis Date    Bladder infection 03/2024    Borderline diabetes     Cancer (HCC)     prostate    COPD (chronic obstructive pulmonary disease) (HCC)     Hematuria     History of transfusion     x2    Hyperlipidemia     Hypertension     Radiation cystitis     Smoker     Wears glasses      Present on Admission:   Stage 3b chronic kidney disease (HCC)   Prostate cancer (HCC) - s/p resection   Hyperlipidemia   Essential hypertension   COPD (chronic obstructive pulmonary disease) (HCC)   Gross hematuria   Urinary retention      Admitting Diagnosis: Urinary retention [R33.9]  Prostate cancer (HCC) [C61]  Radiation cystitis [N30.40]  Hematuria [R31.9]  Gross hematuria [R31.0]  Intractable pain [R52]  Age/Sex: 78 y.o. male  Admission Orders:  Consult urology  Pt/ot eval & tx  scd    Scheduled Medications:  cefTRIAXone, 1,000 mg, Intravenous, Q24H  Cholecalciferol, 2,000 Units, Oral, Daily  fluticasone-vilanterol, 1 puff, Inhalation, Daily   And  umeclidinium, 1 puff, Inhalation, Daily  metoprolol tartrate, 12.5 mg, Oral, Q12H DHAVAL  nicotine, 1 patch, Transdermal, Daily  saccharomyces boulardii, 250 mg, Oral, BID    Continuous IV Infusions:  sodium chloride, 50 mL/hr, Intravenous, Continuous    PRN Meds:  acetaminophen, 650 mg, Oral, Q6H PRN  albuterol, 2 puff, Inhalation, Q4H PRN  aluminum-magnesium hydroxide-simethicone, 30 mL, Oral, Q6H PRN  fluticasone, 1 spray, Each Nare, Daily PRN  lactated ringers, 1,000 mL, Intravenous, Once PRN   And  lactated ringers, 1,000 mL, Intravenous, Once PRN  ondansetron, 4 mg, Intravenous, Q6H PRN  sodium chloride, 1,000 mL, Intravenous, Once PRN   And  sodium chloride,  1,000 mL, Intravenous, Once PRN      Network Utilization Review Department  ATTENTION: Please call with any questions or concerns to [phone] and carefully listen to the prompts so that you are directed to the right person. All voicemails are confidential.   For Discharge needs, contact Care Management DC Support Team at [phone] opt. [phone]  Send all requests for admission clinical reviews, approved or denied determinations and any other requests to dedicated fax number below belonging to the campus where the patient is receiving treatment. List of dedicated fax numbers for the Facilities:  FACILITY NAME UR FAX NUMBER   ADMISSION DENIALS (Administrative/Medical Necessity) [phone]   DISCHARGE SUPPORT TEAM (NETWORK) [phone]   PARENT CHILD HEALTH (Maternity/NICU/Pediatrics) [phone]   [facility] [phone]   [facility] [phone]   [facility] [phone]   [facility] [phone]   [facility] [phone]   [facility] [phone]   [facility] [phone]   [facility] [phone]   [facility] [phone]   [facility] [phone]   [facility] [phone]   [facility] [phone]          none Doxycycline Pregnancy And Lactation Text: This medication is Pregnancy Category D and not consider safe during pregnancy. It is also excreted in breast milk but is considered safe for shorter treatment courses.

## 2024-04-25 NOTE — ASSESSMENT & PLAN NOTE
Worsening from baseline.  Patient had CBI overnight which was discontinued this morning  Management per urology  Slight drop in hemoglobin but overall stable

## 2024-04-25 NOTE — ASSESSMENT & PLAN NOTE
Patient presents with inability to urinate started around 1 PM on the day of admission, Sumner was unable to be placed in urology's office.  Reports chronic hematuria.  Had a procedure done on 4/11 with Sumner catheter insertion, Sumner catheter was removed a week ago, has worsening hematuria since yesterday.  Reports somewhat chronic dysuria.  Chart reviewed.  History of prostate cancer, status post radical prostatectomy, status post radiation therapy for periprostatic recurrence.  History of bladder neck contracture, status post cystoscopy transurethral resection bladder neck contracture bladder tumor evacuation of clots on 4/11/2024.  Patient was hospitalized in February 2024 for hemorrhagic cystitis, required three-way Sumner, urine culture grew Klebsiella and Proteus, patient was treated with cefepime/cefdinir for 7 days total.  Patient was again hospitalized in March 2024 for hematuria/urinary retention, required Sumner insertion, urine culture grew high colony corynebacterium urealyticum suspected colonization from Sumner catheter.  Sumner catheter was discontinued prior to discharge.  Bladder scan showed 12 cc in ED  Went to OR urgently with urology from ED. Underwent bilateral cystoscopy evacuation of clots fulguration bladder neck tumors and biopsy, retrograde urethrogram/cystogram DVIU, three-way Sumner catheter insertion.  Patient noted a mildly elevated white count which has improved and is close to normal  Patient currently on empiric Rocephin  Patient was on CBI overnight which was discontinued.  Patient to be discharged with Sumner catheter.

## 2024-04-25 NOTE — ASSESSMENT & PLAN NOTE
SIRS versus sepsis, present on admission, as evidenced by leukocytosis, tachycardia, tachypnea.  UA was not obtained  Patient currently on empiric ceftriaxone  White count has improved

## 2024-04-25 NOTE — ASSESSMENT & PLAN NOTE
SIRS versus sepsis, present on admission, as evidenced by leukocytosis, tachycardia, tachypnea.  UA pending  Started Rocephin empirically  Would obtain blood cultures/lactic acid should patient spike fever  Repeat CBC with differential in the morning

## 2024-04-25 NOTE — ANESTHESIA PREPROCEDURE EVALUATION
Procedure:  CYSTOSCOPY EVACUATION OF CLOTS fulguration retrograde pyelograms (Bilateral: Bladder)    Relevant Problems   CARDIO   (+) Essential hypertension   (+) Hyperlipidemia      /RENAL   (+) SHANTI (acute kidney injury) (HCC)   (+) Prostate cancer (HCC) - s/p resection   (+) Stage 3b chronic kidney disease (HCC)      HEMATOLOGY   (+) Acute blood loss anemia (ABLA) (Resolved)   (+) Anemia (Resolved)      MUSCULOSKELETAL   (+) RA (rheumatoid arthritis) (HCC)      PULMONARY   (+) COPD (chronic obstructive pulmonary disease) (HCC)   (+) Smoking      Urinary   (+) Urinary retention      Other   (+) Obesity (BMI 30.0-34.9)        Physical Exam    Airway    Mallampati score: II  TM Distance: >3 FB  Neck ROM: full     Dental       Cardiovascular  Rhythm: regular, Rate: normal    Pulmonary   Breath sounds clear to auscultation    Other Findings  90% on room air      Anesthesia Plan  ASA Score- 3 Emergent    Anesthesia Type- general with ASA Monitors.         Additional Monitors:     Airway Plan: LMA.    Comment: Last po at 10:30 am.       Plan Factors-    Chart reviewed.        Patient is a current smoker. Patient not instructed to abstain from smoking on day of procedure. Patient smoked on day of surgery.            Induction- intravenous.    Postoperative Plan- Plan for postoperative opioid use.     Informed Consent- Anesthetic plan and risks discussed with patient.

## 2024-04-25 NOTE — CONSULTS
H&P Exam - Urology       Patient: Joel Wyman   : 1945 Sex: male   MRN: [id number]     CSN: [id number]      History of Present Illness   HPI:  Joel Wyman is a 78 y.o. male who presents with please see quick note for full summary        Review of Systems:   Constitutional:  Negative for activity change, fever, chills and diaphoresis.   HENT: Negative for hearing loss and trouble swallowing.   Eyes: Negative for itching and visual disturbance.   Respiratory: Negative for chest tightness and shortness of breath.   Cardiovascular: Negative for chest pain, edema.   Gastrointestinal: Negative for abdominal distention, na abdominal pain, constipation, diarrhea, Nausea and vomiting.   Genitourinary: Negative for decreased urine volume, difficulty urinating, dysuria, enuresis, frequency, hematuria and urgency.   Musculoskeletal: Negative for gait problem and myalgias.   Neurological: Negative for dizziness and headaches.   Hematological: Does not bruise/bleed easily.       Historical Information   Past Medical History:   Diagnosis Date    Bladder infection 2024    Borderline diabetes     Cancer (HCC)     prostate    COPD (chronic obstructive pulmonary disease) (HCC)     Hematuria     History of transfusion     x2    Hyperlipidemia     Hypertension     Radiation cystitis     Smoker     Wears glasses      Past Surgical History:   Procedure Laterality Date    APPENDECTOMY      FL RETROGRADE PYELOGRAM  2021    FL RETROGRADE PYELOGRAM  2023    FL RETROGRADE PYELOGRAM  2024    HERNIA REPAIR      IR EMBOLIZATION (SPECIFY VESSEL OR SITE)  2021    IR NEPHROSTOMY TUBE CHECK AND/OR REMOVAL  2021    IR NEPHROSTOMY TUBE CHECK/CHANGE/REPOSITION/REINSERTION/UPSIZE  05/10/2021    IR NEPHROSTOMY TUBE CHECK/CHANGE/REPOSITION/REINSERTION/UPSIZE  2021    IR NEPHROSTOMY TUBE CHECK/CHANGE/REPOSITION/REINSERTION/UPSIZE  10/13/2021    IR NEPHROSTOMY TUBE PLACEMENT  2021    KY CYSTO BLADDER  W/URETERAL CATHETERIZATION Bilateral 4/11/2024    Procedure: BILATERAL CYSTOSCOPY WITH RETROGRADE PYELOGRAM, BLADDER NECK CONTRACTURE;  Surgeon: Yovani Khan MD;  Location: [facility];  Service: Urology    RI CYSTO W/IRRIG & EVAC MULTPLE OBSTRUCTING CLOTS Bilateral 04/14/2021    Procedure: CYSTOSCOPY EVACUATION OF CLOTS bilateral retrograde pyelograms possible TURBT bladder biopsy;  Surgeon: Yovani Khan MD;  Location: [facility];  Service: Urology    RI CYSTO W/IRRIG & EVAC MULTPLE OBSTRUCTING CLOTS N/A 04/24/2021    Procedure: CYSTOSCOPY EVACUATION OF CLOTS fulguration;  Surgeon: Yovani Khan MD;  Location: [facility];  Service: Urology    RI CYSTO W/IRRIG & EVAC MULTPLE OBSTRUCTING CLOTS N/A 07/08/2021    Procedure: CYSTOSCOPY EVACUATION OF CLOTS BILATERAL ANTIROGRADES NEPHROSTOMY TUBES, FULGERATION ;  Surgeon: Yovani Khan MD;  Location: [facility];  Service: Urology    RI CYSTO W/IRRIG & EVAC MULTPLE OBSTRUCTING CLOTS N/A 08/22/2021    Procedure: CYSTOSCOPY, RIGHT RETROGRADE, EVACUATION OF CLOTS, BLADDER BIOPSY X2 AND FULGERATION OF BLADDER LESIONS, URETEROSCOPY, 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;  Surgeon: Yovani Khan MD;  Location: [facility];  Service: Urology    RI CYSTOURETHROSCOPY W/DEST &/RMVL MED BLADDER AMARI N/A 4/11/2024    Procedure: TRANSURETHRAL RESECTION OF BLADDER TUMOR (TURBT);  Surgeon: Yovani Khan MD;  Location: [facility];  Service: Urology    RI CYSTOURETHROSCOPY WITH BIOPSY N/A 11/16/2023    Procedure: CYSTOSCOPY, BILATERAL RETROGRADEY PYELOGRAM,  FULGERATION 2.0 CM BLADDER TUMOR WITH BIOPSY;  Surgeon: Yovani Khan MD;  Location: [facility];  Service: Urology    PROSTATECTOMY  2014    ROTATOR CUFF REPAIR      right shoulder     Social History   Social History     Substance and Sexual Activity   Alcohol Use Not Currently    Comment: None in over 50 yrs     Social History     Substance and Sexual Activity   Drug Use No      Social History     Tobacco Use   Smoking Status Some Days    Current packs/day: 0.50    Average packs/day: 0.5 packs/day for 50.0 years (25.0 ttl pk-yrs)    Types: Cigarettes    Passive exposure: Never   Smokeless Tobacco Never     Family History:   Family History   Problem Relation Age of Onset    Diabetes Mother     Stroke Mother     Diabetes Brother     Stroke Brother        Meds/Allergies   Medications Prior to Admission   Medication    albuterol (PROVENTIL HFA,VENTOLIN HFA) 90 mcg/act inhaler    Cholecalciferol (Vitamin D) 50 MCG (2000 UT) CAPS    fluticasone (FLONASE) 50 mcg/act nasal spray    fluticasone-umeclidinium-vilanterol (Trelegy Ellipta) 200-62.5-25 mcg/actuation AEPB inhaler    meloxicam (MOBIC) 15 mg tablet    metoprolol tartrate (LOPRESSOR) 25 mg tablet     No Known Allergies    Objective   Vitals: /78   Pulse 78   Temp 99.1 °F (37.3 °C) (Tympanic)   Resp 20   SpO2 91%     Physical Exam:  General Alert awake   Normocephalic atraumatic PERRLA  Lungs clear bilaterally  Cardiac normal S1 normal S2  Abdomen soft, flank pain  Bladder scan 120 cc  Unable to pass Sumner  Extremities no edema    I/O last 24 hours:  In: 153.3 [IV Piggyback:153.3]  Out: -     Invasive Devices       Peripheral Intravenous Line  Duration             Peripheral IV 04/24/24 Distal;Left;Ventral (anterior) Forearm <1 day                        Lab Results: CBC:   Lab Results   Component Value Date    WBC 12.10 (H) 04/24/2024    HGB 15.8 04/24/2024    HCT 48.0 04/24/2024    MCV 88 04/24/2024     04/24/2024    RBC 5.43 04/24/2024    MCH 29.1 04/24/2024    MCHC 32.9 04/24/2024    RDW 15.1 04/24/2024    MPV 9.7 04/24/2024    NRBC 0 04/24/2024     CMP:   Lab Results   Component Value Date     04/24/2024    CO2 25 04/24/2024    BUN 30 (H) 04/24/2024    CREATININE 1.35 (H) 04/24/2024    CALCIUM 9.7 04/24/2024    AST 20 04/24/2024    ALT 17 04/24/2024    ALKPHOS 78 04/24/2024    EGFR 49 04/24/2024     Urinalysis:  "  Lab Results   Component Value Date    COLORU Red 04/07/2024    CLARITYU Cloudy 04/07/2024    SPECGRAV 1.025 04/07/2024    PHUR 7.0 04/07/2024    PHUR 5.5 03/25/2018    LEUKOCYTESUR Trace (A) 04/07/2024    NITRITE Positive (A) 04/07/2024    GLUCOSEU 250 (1/4%) (A) 04/07/2024    KETONESU Trace (A) 04/07/2024    BILIRUBINUR Negative 04/07/2024    BLOODU Large (A) 04/07/2024     Urine Culture:   Lab Results   Component Value Date    URINECX No Growth <1000 cfu/mL 04/07/2024     PSA: No results found for: \"PSA\"        Assessment/ Plan:  Gross hematuria  History of bladder neck contracture  History of prostate cancer status post radical prostatectomy status post radiation therapy for periprostatic recurrence  Status post cystoscopy transurethral resection bladder neck contracture bladder tumor evacuation of clot 13 days ago  N.p.o.   OR tonight for cystoscopy evacuation of clots possible transurethral resection fulguration bladder neck      Yovani Khan MD    "

## 2024-04-25 NOTE — PROGRESS NOTES
Critical access hospital  Progress Note  Name: Joel Wyman I  MRN: [id number]  Unit/Bed#: [address] Date of Admission: 4/24/2024   Date of Service: 4/25/2024 I Hospital Day: 0    Assessment/Plan   Gross hematuria  Assessment & Plan  Worsening from baseline.  Patient had CBI overnight which was discontinued this morning  Management per urology  Slight drop in hemoglobin but overall stable    * Urinary retention  Assessment & Plan  Patient presents with inability to urinate started around 1 PM on the day of admission, Sumner was unable to be placed in urology's office.  Reports chronic hematuria.  Had a procedure done on 4/11 with Sumner catheter insertion, Sumner catheter was removed a week ago, has worsening hematuria since yesterday.  Reports somewhat chronic dysuria.  Chart reviewed.  History of prostate cancer, status post radical prostatectomy, status post radiation therapy for periprostatic recurrence.  History of bladder neck contracture, status post cystoscopy transurethral resection bladder neck contracture bladder tumor evacuation of clots on 4/11/2024.  Patient was hospitalized in February 2024 for hemorrhagic cystitis, required three-way Sumner, urine culture grew Klebsiella and Proteus, patient was treated with cefepime/cefdinir for 7 days total.  Patient was again hospitalized in March 2024 for hematuria/urinary retention, required Sumner insertion, urine culture grew high colony corynebacterium urealyticum suspected colonization from Sumner catheter.  Sumner catheter was discontinued prior to discharge.  Bladder scan showed 12 cc in ED  Went to OR urgently with urology from ED. Underwent bilateral cystoscopy evacuation of clots fulguration bladder neck tumors and biopsy, retrograde urethrogram/cystogram DVIU, three-way Sumner catheter insertion.  Patient noted a mildly elevated white count which has improved and is close to normal  Patient currently on empiric Rocephin  Patient was on CBI  overnight which was discontinued.  Patient to be discharged with Sumner catheter.    COPD (chronic obstructive pulmonary disease) (Formerly [facility])  Assessment & Plan  Reports chronic SOB with exertion, chronic cough with phlegm, not on home O2.  Continue albuterol as needed, Trelegy, Flonase  No wheezing on auscultation    SIRS (systemic inflammatory response syndrome) (Formerly [facility])  Assessment & Plan  SIRS versus sepsis, present on admission, as evidenced by leukocytosis, tachycardia, tachypnea.  UA was not obtained  Patient currently on empiric ceftriaxone  White count has improved    Stage 3b chronic kidney disease (Formerly [facility])  Assessment & Plan  Lab Results   Component Value Date    EGFR 50 04/25/2024    EGFR 49 04/24/2024    EGFR 41 04/07/2024    CREATININE 1.32 (H) 04/25/2024    CREATININE 1.35 (H) 04/24/2024    CREATININE 1.54 (H) 04/07/2024   Baseline creatinine appears to be around 1.3's  Creatinine close to baseline      Prostate cancer (Formerly [facility]) - s/p resection  Assessment & Plan  As above  Noted history    Hyperlipidemia  Assessment & Plan  Diet controlled    Smoking  Assessment & Plan  Nicotine patch, smoking cessation    Essential hypertension  Assessment & Plan  Continue metoprolol  BP acceptable               VTE Pharmacologic Prophylaxis:   Moderate Risk (Score 3-4) - Pharmacological DVT Prophylaxis Contraindicated. Sequential Compression Devices Ordered.    Mobility:   Basic Mobility Inpatient Raw Score: 24  JH-HLM Goal: 8: Walk 250 feet or more  JH-HLM Achieved: 8: Walk 250 feet ot more  JH-HLM Goal achieved. Continue to encourage appropriate mobility.    Patient Centered Rounds: I performed bedside rounds with nursing staff today.   Discussions with Specialists or Other Care Team Provider: Urology    Education and Discussions with Family / Patient: yes    Total Time Spent on Date of Encounter in care of patient: 35 mins. This time was spent on one or more of the following: performing physical exam; counseling and coordination  of care; obtaining or reviewing history; documenting in the medical record; reviewing/ordering tests, medications or procedures; communicating with other healthcare professionals and discussing with patient's family/caregivers.    Current Length of Stay: 0 day(s)  Current Patient Status: Inpatient   Certification Statement: The patient will continue to require additional inpatient hospital stay due to urinary retention and gross hematuria s/p cystoscopy  Discharge Plan: Anticipate discharge tomorrow to home.    Code Status: Level 1 - Full Code    Subjective:   Patient reports some cramping in the penile region.  Denies any abdominal pain.    Objective:     Vitals:   Temp (24hrs), Av.2 °F (36.8 °C), Min:97.6 °F (36.4 °C), Max:99.1 °F (37.3 °C)    Temp:  [97.6 °F (36.4 °C)-99.1 °F (37.3 °C)] 97.6 °F (36.4 °C)  HR:  [51-97] 67  Resp:  [16-24] 18  BP: (116-205)/(65-95) 130/65  SpO2:  [90 %-100 %] 90 %  Body mass index is 29.57 kg/m².     Input and Output Summary (last 24 hours):     Intake/Output Summary (Last 24 hours) at 2024 1635  Last data filed at 2024 1521  Gross per 24 hour   Intake 853.33 ml   Output 4000 ml   Net -3146.67 ml       Physical Exam:   Physical Exam  Constitutional:       Appearance: Normal appearance.   HENT:      Head: Normocephalic and atraumatic.      Nose: Nose normal.      Mouth/Throat:      Mouth: Mucous membranes are moist.      Pharynx: Oropharynx is clear.   Eyes:      Extraocular Movements: Extraocular movements intact.      Pupils: Pupils are equal, round, and reactive to light.   Cardiovascular:      Rate and Rhythm: Normal rate and regular rhythm.   Pulmonary:      Effort: Pulmonary effort is normal.      Breath sounds: Normal breath sounds.   Abdominal:      General: Bowel sounds are normal. There is no distension.      Palpations: Abdomen is soft.      Tenderness: There is no abdominal tenderness.   Genitourinary:     Comments: Sumner with punch colored  urine  Musculoskeletal:         General: No swelling.      Cervical back: Normal range of motion and neck supple.   Skin:     General: Skin is warm and dry.   Neurological:      General: No focal deficit present.      Mental Status: He is alert.          Additional Data:     Labs:  Results from last 7 days   Lab Units 04/25/24  0501   WBC Thousand/uL 10.72*   HEMOGLOBIN g/dL 14.2   HEMATOCRIT % 44.7   PLATELETS Thousands/uL 177   SEGS PCT % 91*   LYMPHO PCT % 7*   MONO PCT % 1*   EOS PCT % 0     Results from last 7 days   Lab Units 04/25/24  0501 04/24/24  1853   SODIUM mmol/L 136 137   POTASSIUM mmol/L 4.8 4.2   CHLORIDE mmol/L 102 102   CO2 mmol/L 29 25   BUN mg/dL 30* 30*   CREATININE mg/dL 1.32* 1.35*   ANION GAP mmol/L 5 10   CALCIUM mg/dL 8.8 9.7   ALBUMIN g/dL  --  4.2   TOTAL BILIRUBIN mg/dL  --  0.59   ALK PHOS U/L  --  78   ALT U/L  --  17   AST U/L  --  20   GLUCOSE RANDOM mg/dL 189* 106                       Lines/Drains:  Invasive Devices       Peripheral Intravenous Line  Duration             Peripheral IV 04/24/24 Distal;Left;Ventral (anterior) Forearm <1 day              Drain  Duration             Urethral Catheter Latex;Three way 22 Fr. <1 day                  Urinary Catheter:  Goal for removal: N/A- Discharging with Sumner               Imaging:     Recent Cultures (last 7 days):         Last 24 Hours Medication List:   Current Facility-Administered Medications   Medication Dose Route Frequency Provider Last Rate    acetaminophen  650 mg Oral Q6H PRN CORWIN Braden      albuterol  2 puff Inhalation Q4H PRN CORWIN Braden      aluminum-magnesium hydroxide-simethicone  30 mL Oral Q6H PRN Yovani Khan MD      cefTRIAXone  1,000 mg Intravenous Q24H CORWIN Braden      Cholecalciferol  2,000 Units Oral Daily CORWIN Braden      fluticasone  1 spray Each Nare Daily PRN CORWIN Braden      fluticasone-vilanterol  1 puff Inhalation Daily CORWIN Braden      And    umeclidinium   1 puff Inhalation Daily CORWIN Braden      HYDROmorphone  0.2 mg Intravenous Q4H PRN Abel Jones MD      lactated ringers  1,000 mL Intravenous Once PRN Yovani Khan MD      And    lactated ringers  1,000 mL Intravenous Once PRN Yovani Khan MD      metoprolol tartrate  12.5 mg Oral Q12H DHAVAL Damaso Azar, CORWIN      nicotine  1 patch Transdermal Daily CORWIN Braden      ondansetron  4 mg Intravenous Q6H PRN CORWIN Braden      phenazopyridine  100 mg Oral TID With Meals Abel Jones MD      saccharomyces boulardii  250 mg Oral BID CORWIN Braden      sodium chloride  1,000 mL Intravenous Once PRN Yovani Khan MD      And    sodium chloride  1,000 mL Intravenous Once PRN Yovani Khan MD      sodium chloride  50 mL/hr Intravenous Continuous CORWIN Braden 50 mL/hr (04/24/24 4759)        Today, Patient Was Seen By: Abel Jones MD    **Please Note: This note may have been constructed using a voice recognition system.**

## 2024-04-26 VITALS
DIASTOLIC BLOOD PRESSURE: 70 MMHG | HEIGHT: 71 IN | HEART RATE: 54 BPM | OXYGEN SATURATION: 93 % | RESPIRATION RATE: 16 BRPM | TEMPERATURE: 98.2 F | BODY MASS INDEX: 29.68 KG/M2 | SYSTOLIC BLOOD PRESSURE: 143 MMHG | WEIGHT: 212 LBS

## 2024-04-26 PROCEDURE — 99239 HOSP IP/OBS DSCHRG MGMT >30: CPT | Performed by: INTERNAL MEDICINE

## 2024-04-26 RX ADMIN — HYDROMORPHONE HYDROCHLORIDE 0.2 MG: 0.2 INJECTION, SOLUTION INTRAMUSCULAR; INTRAVENOUS; SUBCUTANEOUS at 07:24

## 2024-04-26 RX ADMIN — NICOTINE 1 PATCH: 7 PATCH, EXTENDED RELEASE TRANSDERMAL at 08:59

## 2024-04-26 RX ADMIN — HYDROMORPHONE HYDROCHLORIDE 0.2 MG: 0.2 INJECTION, SOLUTION INTRAMUSCULAR; INTRAVENOUS; SUBCUTANEOUS at 11:25

## 2024-04-26 RX ADMIN — ALBUTEROL SULFATE 2 PUFF: 90 AEROSOL, METERED RESPIRATORY (INHALATION) at 08:58

## 2024-04-26 RX ADMIN — Medication 12.5 MG: at 08:59

## 2024-04-26 RX ADMIN — UMECLIDINIUM 1 PUFF: 62.5 AEROSOL, POWDER ORAL at 08:59

## 2024-04-26 RX ADMIN — Medication 2000 UNITS: at 08:59

## 2024-04-26 RX ADMIN — FLUTICASONE FUROATE AND VILANTEROL TRIFENATATE 1 PUFF: 200; 25 POWDER RESPIRATORY (INHALATION) at 08:59

## 2024-04-26 NOTE — ASSESSMENT & PLAN NOTE
Reports chronic SOB with exertion, chronic cough with phlegm, not on home O2.  Continue albuterol as needed, Trelegy, Flonase  No evidence of exacerbation

## 2024-04-26 NOTE — ASSESSMENT & PLAN NOTE
Worsening from baseline.  Patient had CBI overnight which was discontinued   Management per urology  Slight drop in hemoglobin but overall stable  Possible secondary to prostate cancer versus radiation cystitis  Hematuria has resolved.

## 2024-04-26 NOTE — PLAN OF CARE
Problem: PAIN - ADULT  Goal: Verbalizes/displays adequate comfort level or baseline comfort level  Description: Interventions:  - Encourage patient to monitor pain and request assistance  - Assess pain using appropriate pain scale  - Administer analgesics based on type and severity of pain and evaluate response  - Implement non-pharmacological measures as appropriate and evaluate response  - Consider cultural and social influences on pain and pain management  - Notify physician/advanced practitioner if interventions unsuccessful or patient reports new pain  Outcome: Progressing     Problem: INFECTION - ADULT  Goal: Absence or prevention of progression during hospitalization  Description: INTERVENTIONS:  - Assess and monitor for signs and symptoms of infection  - Monitor lab/diagnostic results  - Monitor all insertion sites, i.e. indwelling lines, tubes, and drains  - Monitor endotracheal if appropriate and nasal secretions for changes in amount and color  - Andrews appropriate cooling/warming therapies per order  - Administer medications as ordered  - Instruct and encourage patient and family to use good hand hygiene technique  - Identify and instruct in appropriate isolation precautions for identified infection/condition  Outcome: Progressing     Problem: SAFETY ADULT  Goal: Patient will remain free of falls  Description: INTERVENTIONS:  - Educate patient/family on patient safety including physical limitations  - Instruct patient to call for assistance with activity   - Consult OT/PT to assist with strengthening/mobility   - Keep Call bell within reach  - Keep bed low and locked with side rails adjusted as appropriate  - Keep care items and personal belongings within reach  - Initiate and maintain comfort rounds  - Make Fall Risk Sign visible to staff  - Offer Toileting every 2 Hours, in advance of need  - Initiate/Maintain alarm  - Obtain necessary fall risk management equipment  - Apply yellow socks and  bracelet for high fall risk patients  - Consider moving patient to room near nurses station  Outcome: Progressing  Goal: Maintain or return to baseline ADL function  Description: INTERVENTIONS:  -  Assess patient's ability to carry out ADLs; assess patient's baseline for ADL function and identify physical deficits which impact ability to perform ADLs (bathing, care of mouth/teeth, toileting, grooming, dressing, etc.)  - Assess/evaluate cause of self-care deficits   - Assess range of motion  - Assess patient's mobility; develop plan if impaired  - Assess patient's need for assistive devices and provide as appropriate  - Encourage maximum independence but intervene and supervise when necessary  - Involve family in performance of ADLs  - Assess for home care needs following discharge   - Consider OT consult to assist with ADL evaluation and planning for discharge  - Provide patient education as appropriate  Outcome: Progressing  Goal: Maintains/Returns to pre admission functional level  Description: INTERVENTIONS:  - Perform AM-PAC 6 Click Basic Mobility/ Daily Activity assessment daily.  - Set and communicate daily mobility goal to care team and patient/family/caregiver.   - Collaborate with rehabilitation services on mobility goals if consulted  - Perform Range of Motion 3 times a day.  - Reposition patient every 2 hours.  - Dangle patient 3 times a day  - Stand patient 3 times a day  - Ambulate patient 3 times a day  - Out of bed to chair 3 times a day   - Out of bed for meals 3 times a day  - Out of bed for toileting  - Record patient progress and toleration of activity level   Outcome: Progressing     Problem: DISCHARGE PLANNING  Goal: Discharge to home or other facility with appropriate resources  Description: INTERVENTIONS:  - Identify barriers to discharge w/patient and caregiver  - Arrange for needed discharge resources and transportation as appropriate  - Identify discharge learning needs (meds, wound care,  etc.)  - Arrange for interpretive services to assist at discharge as needed  - Refer to Case Management Department for coordinating discharge planning if the patient needs post-hospital services based on physician/advanced practitioner order or complex needs related to functional status, cognitive ability, or social support system  Outcome: Progressing     Problem: Knowledge Deficit  Goal: Patient/family/caregiver demonstrates understanding of disease process, treatment plan, medications, and discharge instructions  Description: Complete learning assessment and assess knowledge base.  Interventions:  - Provide teaching at level of understanding  - Provide teaching via preferred learning methods  Outcome: Progressing     Problem: GENITOURINARY - ADULT  Goal: Maintains or returns to baseline urinary function  Description: INTERVENTIONS:  - Assess urinary function  - Encourage oral fluids to ensure adequate hydration if ordered  - Administer IV fluids as ordered to ensure adequate hydration  - Administer ordered medications as needed  - Offer frequent toileting  - Follow urinary retention protocol if ordered  Outcome: Progressing  Goal: Absence of urinary retention  Description: INTERVENTIONS:  - Assess patient’s ability to void and empty bladder  - Monitor I/O  - Bladder scan as needed  - Discuss with physician/AP medications to alleviate retention as needed  - Discuss catheterization for long term situations as appropriate  Outcome: Progressing  Goal: Urinary catheter remains patent  Description: INTERVENTIONS:  - Assess patency of urinary catheter  - If patient has a chronic alanis, consider changing catheter if non-functioning  - Follow guidelines for intermittent irrigation of non-functioning urinary catheter  Outcome: Progressing      Problem: MUSCULOSKELETAL - ADULT  Goal: Maintain or return mobility to safest level of function  Description: INTERVENTIONS:  - Assess patient's ability to carry out ADLs; assess  patient's baseline for ADL function and identify physical deficits which impact ability to perform ADLs (bathing, care of mouth/teeth, toileting, grooming, dressing, etc.)  - Assess/evaluate cause of self-care deficits   - Assess range of motion  - Assess patient's mobility  - Assess patient's need for assistive devices and provide as appropriate  - Encourage maximum independence but intervene and supervise when necessary  - Involve family in performance of ADLs  - Assess for home care needs following discharge   - Consider OT consult to assist with ADL evaluation and planning for discharge  - Provide patient education as appropriate  Outcome: Progressing  Goal: Maintain proper alignment of affected body part  Description: INTERVENTIONS:  - Support, maintain and protect limb and body alignment  - Provide patient/ family with appropriate education  Outcome: Progressing

## 2024-04-26 NOTE — ASSESSMENT & PLAN NOTE
Suspected SIRS, present on admission, as evidenced by leukocytosis, tachycardia, tachypnea.  UA was abnormal  Patient received ceftriaxone during hospitalization.  White count has improved

## 2024-04-26 NOTE — ASSESSMENT & PLAN NOTE
Patient presents with inability to urinate started around 1 PM on the day of admission, Sumner was unable to be placed in urology's office.  Reports chronic hematuria.  Had a procedure done on 4/11 with Sumner catheter insertion, Sumner catheter was removed a week ago, has worsening hematuria since yesterday.  Reports somewhat chronic dysuria.  Chart reviewed.  History of prostate cancer, status post radical prostatectomy, status post radiation therapy for periprostatic recurrence.  History of bladder neck contracture, status post cystoscopy transurethral resection bladder neck contracture bladder tumor evacuation of clots on 4/11/2024.  Patient was hospitalized in February 2024 for hemorrhagic cystitis, required three-way Sumner, urine culture grew Klebsiella and Proteus, patient was treated with cefepime/cefdinir for 7 days total.  Patient was again hospitalized in March 2024 for hematuria/urinary retention, required Sumner insertion, urine culture grew high colony corynebacterium urealyticum suspected colonization from Sumner catheter.  Sumner catheter was discontinued prior to discharge.  Bladder scan showed 12 cc in ED  Went to OR urgently with urology from ED. Underwent bilateral cystoscopy evacuation of clots fulguration bladder neck tumors and biopsy, retrograde urethrogram/cystogram DVIU, three-way Sumner catheter insertion.  Patient noted a mildly elevated white count which has improved and is close to normal  Patient currently on empiric Rocephin  Patient was on CBI postprocedure which was discontinued yesterday morning.  Patient to be discharged with Sumner catheter.

## 2024-04-26 NOTE — DISCHARGE SUMMARY
[facility]  Discharge- Joel Wyman 1945, 78 y.o. male MRN: [id number]  Unit/Bed#: [address] [place] [address] Encounter: [id number]  Primary Care Provider: Fabiano Carroll MD   Date and time admitted to hospital: 4/24/2024  6:34 PM    Gross hematuria  Assessment & Plan  Worsening from baseline.  Patient had CBI overnight which was discontinued   Management per urology  Slight drop in hemoglobin but overall stable  Possible secondary to prostate cancer versus radiation cystitis  Hematuria has resolved.    * Urinary retention  Assessment & Plan  Patient presents with inability to urinate started around 1 PM on the day of admission, Sumner was unable to be placed in urology's office.  Reports chronic hematuria.  Had a procedure done on 4/11 with Sumner catheter insertion, Sumner catheter was removed a week ago, has worsening hematuria since yesterday.  Reports somewhat chronic dysuria.  Chart reviewed.  History of prostate cancer, status post radical prostatectomy, status post radiation therapy for periprostatic recurrence.  History of bladder neck contracture, status post cystoscopy transurethral resection bladder neck contracture bladder tumor evacuation of clots on 4/11/2024.  Patient was hospitalized in February 2024 for hemorrhagic cystitis, required three-way Sumner, urine culture grew Klebsiella and Proteus, patient was treated with cefepime/cefdinir for 7 days total.  Patient was again hospitalized in March 2024 for hematuria/urinary retention, required Sumner insertion, urine culture grew high colony corynebacterium urealyticum suspected colonization from Sumner catheter.  Sumner catheter was discontinued prior to discharge.  Bladder scan showed 12 cc in ED  Went to OR urgently with urology from ED. Underwent bilateral cystoscopy evacuation of clots fulguration bladder neck tumors and biopsy, retrograde urethrogram/cystogram DVIU, three-way Sumner catheter insertion.  Patient noted a mildly  elevated white count which has improved and is close to normal  Patient currently on empiric Rocephin  Patient was on CBI postprocedure which was discontinued yesterday morning.  Patient to be discharged with Sumner catheter.    COPD (chronic obstructive pulmonary disease) ([facility])  Assessment & Plan  Reports chronic SOB with exertion, chronic cough with phlegm, not on home O2.  Continue albuterol as needed, Trelegy, Flonase  No evidence of exacerbation    SIRS (systemic inflammatory response syndrome) ([facility])  Assessment & Plan  Suspected SIRS, present on admission, as evidenced by leukocytosis, tachycardia, tachypnea.  UA was abnormal  Patient received ceftriaxone during hospitalization.  White count has improved    Stage 3b chronic kidney disease ([facility])  Assessment & Plan  Lab Results   Component Value Date    EGFR 50 04/25/2024    EGFR 49 04/24/2024    EGFR 41 04/07/2024    CREATININE 1.32 (H) 04/25/2024    CREATININE 1.35 (H) 04/24/2024    CREATININE 1.54 (H) 04/07/2024   Baseline creatinine appears to be around 1.3's  Creatinine close to baseline      Prostate cancer ([facility]) - s/p resection  Assessment & Plan  As above  Noted history    Hyperlipidemia  Assessment & Plan  Diet controlled    Smoking  Assessment & Plan  Nicotine patch, smoking cessation    Essential hypertension  Assessment & Plan  Continue metoprolol  BP acceptable        Medical Problems       Resolved Problems  Date Reviewed: 4/26/2024   None       Discharging Physician / Practitioner: Abel Jones MD  PCP: Fabiano Carroll MD  Admission Date:   Admission Orders (From admission, onward)       Ordered        04/25/24 1635  INPATIENT ADMISSION  Once            04/24/24 1938  Place in Observation  Once                          Discharge Date: 04/26/24    Consultations During Hospital Stay:  Urology    Procedures Performed:   Cystoscopy with evacuation of clots, fulguration of bladder neck tumors and biopsy with retrograde urethrogram  "cystogram DVIU       Outpatient Tests Requested:  Follow-up with urology    Complications: None    Reason for Admission: Difficulty with urinating and blood in the urine    Hospital Course:   Joel Wyman is a 78 y.o. male patient with past medical history of prostate cancer, bladder neck contracture, UTIs, CKD 3, nicotine abuse, hypertension, hyperlipidemia, COPD who originally presented to the hospital on 4/24/2024 due to inability to urinate with bloody urine.  Patient was seen by urology in the ED and emergently taken for cystoscopy with evacuation of clots, fulguration of bladder neck tumors with retrograde urethrogram/cystogram with a DVIU.  Patient was on CBI initially which was later discontinued.  Patient received ceftriaxone during hospitalization.  Patient continued remained stable with improved hematuria which is clearing up and patient was discharged home with outpatient follow-up with urology with [facility] Sumner.    Please see above list of diagnoses and related plan for additional information.     Condition at Discharge: stable    Discharge Day Visit / Exam:   Subjective: Patient still having some cramping in the anal region.  Did have a bowel movement this morning.  Denies any abdominal pain  Vitals: Blood Pressure: 143/70 (04/26/24 0721)  Pulse: (!) 54 (04/26/24 0721)  Temperature: 98.2 °F (36.8 °C) (04/26/24 0721)  Temp Source: Oral (04/26/24 0721)  Respirations: 16 (04/26/24 0721)  Height: 5' 11\" (180.3 cm) (04/24/24 2337)  Weight - Scale: 96.2 kg (212 lb) (04/24/24 2337)  SpO2: 93 % (04/26/24 0721)  Exam:   Physical Exam  Constitutional:       Appearance: Normal appearance.   HENT:      Head: Normocephalic and atraumatic.   Eyes:      Extraocular Movements: Extraocular movements intact.      Pupils: Pupils are equal, round, and reactive to light.   Cardiovascular:      Rate and Rhythm: Normal rate and regular rhythm.      Heart sounds: No murmur heard.     No gallop.   Pulmonary:      Effort: " Pulmonary effort is normal.      Breath sounds: Normal breath sounds.   Abdominal:      General: Bowel sounds are normal.      Palpations: Abdomen is soft.      Tenderness: There is no abdominal tenderness.   Genitourinary:     Comments: Sumner with reddish tinged urine without any blood clots  Musculoskeletal:         General: No swelling or deformity. Normal range of motion.      Cervical back: Normal range of motion and neck supple.   Skin:     General: Skin is warm and dry.   Neurological:      General: No focal deficit present.      Mental Status: He is alert.         Discharge instructions/Information to patient and family:   See after visit summary for information provided to patient and family.      Provisions for Follow-Up Care:  See after visit summary for information related to follow-up care and any pertinent home health orders.      Mobility at time of Discharge:   Basic Mobility Inpatient Raw Score: 24  JH-HLM Goal: 8: Walk 250 feet or more  JH-HLM Achieved: 8: Walk 250 feet ot more  HLM Goal achieved. Continue to encourage appropriate mobility.     Disposition:   Home    Planned Readmission: No     Discharge Statement:  I spent 35 minutes discharging the patient. This time was spent on the day of discharge. I had direct contact with the patient on the day of discharge. Greater than 50% of the total time was spent examining patient, answering all patient questions, arranging and discussing plan of care with patient as well as directly providing post-discharge instructions.  Additional time then spent on discharge activities.    Discharge Medications:  See after visit summary for reconciled discharge medications provided to patient and/or family.      **Please Note: This note may have been constructed using a voice recognition system**

## 2024-04-26 NOTE — PLAN OF CARE
Problem: DISCHARGE PLANNING  Goal: Discharge to home or other facility with appropriate resources  Description: INTERVENTIONS:  - Identify barriers to discharge w/patient and caregiver  - Arrange for needed discharge resources and transportation as appropriate  - Identify discharge learning needs (meds, wound care, etc.)  - Arrange for interpretive services to assist at discharge as needed  - Refer to Case Management Department for coordinating discharge planning if the patient needs post-hospital services based on physician/advanced practitioner order or complex needs related to functional status, cognitive ability, or social support system  Outcome: Adequate for Discharge     Problem: GENITOURINARY - ADULT  Goal: Urinary catheter remains patent  Description: INTERVENTIONS:  - Assess patency of urinary catheter  - If patient has a chronic alanis, consider changing catheter if non-functioning  - Follow guidelines for intermittent irrigation of non-functioning urinary catheter  Outcome: Adequate for Discharge

## 2024-04-29 ENCOUNTER — HOSPITAL ENCOUNTER (INPATIENT)
Facility: HOSPITAL | Age: 79
LOS: 6 days | Discharge: HOME | DRG: 690 | End: 2024-05-06
Attending: EMERGENCY MEDICINE | Admitting: INTERNAL MEDICINE
Payer: COMMERCIAL

## 2024-04-29 DIAGNOSIS — N32.89 BLADDER SPASM: ICD-10-CM

## 2024-04-29 DIAGNOSIS — Z72.0 NICOTINE ABUSE: ICD-10-CM

## 2024-04-29 DIAGNOSIS — K59.00 CONSTIPATION: ICD-10-CM

## 2024-04-29 DIAGNOSIS — R31.0 GROSS HEMATURIA: ICD-10-CM

## 2024-04-29 DIAGNOSIS — R33.9 URINARY RETENTION: ICD-10-CM

## 2024-04-29 DIAGNOSIS — T83.9XXA FOLEY CATHETER PROBLEM (HCC): Primary | ICD-10-CM

## 2024-04-29 DIAGNOSIS — N30.91 HEMORRHAGIC CYSTITIS: ICD-10-CM

## 2024-04-29 LAB
ANION GAP SERPL CALCULATED.3IONS-SCNC: 8 MMOL/L (ref 4–13)
BASOPHILS # BLD AUTO: 0.04 THOUSANDS/ÂΜL (ref 0–0.1)
BASOPHILS NFR BLD AUTO: 0 % (ref 0–1)
BUN SERPL-MCNC: 39 MG/DL (ref 5–25)
CALCIUM SERPL-MCNC: 9.1 MG/DL (ref 8.4–10.2)
CHLORIDE SERPL-SCNC: 103 MMOL/L (ref 96–108)
CO2 SERPL-SCNC: 25 MMOL/L (ref 21–32)
CREAT SERPL-MCNC: 1.29 MG/DL (ref 0.6–1.3)
EOSINOPHIL # BLD AUTO: 0.24 THOUSAND/ÂΜL (ref 0–0.61)
EOSINOPHIL NFR BLD AUTO: 2 % (ref 0–6)
ERYTHROCYTE [DISTWIDTH] IN BLOOD BY AUTOMATED COUNT: 15 % (ref 11.6–15.1)
GFR SERPL CREATININE-BSD FRML MDRD: 52 ML/MIN/1.73SQ M
GLUCOSE SERPL-MCNC: 110 MG/DL (ref 65–140)
HCT VFR BLD AUTO: 44.2 % (ref 36.5–49.3)
HGB BLD-MCNC: 14.5 G/DL (ref 12–17)
IMM GRANULOCYTES # BLD AUTO: 0.07 THOUSAND/UL (ref 0–0.2)
IMM GRANULOCYTES NFR BLD AUTO: 1 % (ref 0–2)
LYMPHOCYTES # BLD AUTO: 1.55 THOUSANDS/ÂΜL (ref 0.6–4.47)
LYMPHOCYTES NFR BLD AUTO: 13 % (ref 14–44)
MCH RBC QN AUTO: 28.8 PG (ref 26.8–34.3)
MCHC RBC AUTO-ENTMCNC: 32.8 G/DL (ref 31.4–37.4)
MCV RBC AUTO: 88 FL (ref 82–98)
MONOCYTES # BLD AUTO: 0.9 THOUSAND/ÂΜL (ref 0.17–1.22)
MONOCYTES NFR BLD AUTO: 8 % (ref 4–12)
NEUTROPHILS # BLD AUTO: 9.05 THOUSANDS/ÂΜL (ref 1.85–7.62)
NEUTS SEG NFR BLD AUTO: 76 % (ref 43–75)
NRBC BLD AUTO-RTO: 0 /100 WBCS
PLATELET # BLD AUTO: 209 THOUSANDS/UL (ref 149–390)
PMV BLD AUTO: 9.6 FL (ref 8.9–12.7)
POTASSIUM SERPL-SCNC: 4.2 MMOL/L (ref 3.5–5.3)
RBC # BLD AUTO: 5.03 MILLION/UL (ref 3.88–5.62)
SODIUM SERPL-SCNC: 136 MMOL/L (ref 135–147)
WBC # BLD AUTO: 11.85 THOUSAND/UL (ref 4.31–10.16)

## 2024-04-29 PROCEDURE — 99222 1ST HOSP IP/OBS MODERATE 55: CPT | Performed by: INTERNAL MEDICINE

## 2024-04-29 PROCEDURE — 36415 COLL VENOUS BLD VENIPUNCTURE: CPT | Performed by: EMERGENCY MEDICINE

## 2024-04-29 PROCEDURE — 85025 COMPLETE CBC W/AUTO DIFF WBC: CPT | Performed by: EMERGENCY MEDICINE

## 2024-04-29 PROCEDURE — 80048 BASIC METABOLIC PNL TOTAL CA: CPT | Performed by: EMERGENCY MEDICINE

## 2024-04-29 PROCEDURE — 99283 EMERGENCY DEPT VISIT LOW MDM: CPT

## 2024-04-29 RX ORDER — TRAMADOL HYDROCHLORIDE 50 MG/1
50 TABLET ORAL EVERY 6 HOURS PRN
Status: DISCONTINUED | OUTPATIENT
Start: 2024-04-29 | End: 2024-05-06 | Stop reason: HOSPADM

## 2024-04-29 RX ORDER — ACETAMINOPHEN 325 MG/1
650 TABLET ORAL EVERY 6 HOURS PRN
Status: DISCONTINUED | OUTPATIENT
Start: 2024-04-29 | End: 2024-05-06 | Stop reason: HOSPADM

## 2024-04-29 RX ORDER — HYDROMORPHONE HCL/PF 1 MG/ML
1 SYRINGE (ML) INJECTION ONCE
Status: DISCONTINUED | OUTPATIENT
Start: 2024-04-29 | End: 2024-05-06 | Stop reason: HOSPADM

## 2024-04-29 RX ADMIN — TRAMADOL HYDROCHLORIDE 50 MG: 50 TABLET, COATED ORAL at 22:54

## 2024-04-29 NOTE — UTILIZATION REVIEW
Initial Clinical Review    Observation 4/24/24 @ 1938 converted to inpatient admission 4/25/24 @ 1635 for continued care & tx for urinary retention.      Admission: Date/Time/Statement:   Admission Orders (From admission, onward)       Ordered        04/25/24 1635  INPATIENT ADMISSION  Once            04/24/24 1938  Place in Observation  Once                          Orders Placed This Encounter   Procedures    INPATIENT ADMISSION     Standing Status:   Standing     Number of Occurrences:   1     Order Specific Question:   Level of Care     Answer:   Med Surg [16]     Order Specific Question:   Estimated length of stay     Answer:   More than 2 Midnights     Order Specific Question:   Certification     Answer:   I certify that inpatient services are medically necessary for this patient for a duration of greater than two midnights. See H&P and MD Progress Notes for additional information about the patient's course of treatment.     ED Arrival Information       Expected   -    Arrival   4/24/2024 17:51    Acuity   Emergent              Means of arrival   Walk-In    Escorted by   Spouse    Service   Hospitalist    Admission type   Emergency              Arrival complaint   testicle pain sent by              Chief Complaint   Patient presents with    Urinary Retention     Sent in from dr Khan, he was unable to get catheter in to be admitted pt very uncomfortable unable to urinate       Initial Presentation:    78-year-old male history of COPD, prostate cancer, radiation cystitis presenting for evaluation of suprapubic pain, urinary retention.  Patient had cystoscopy with scraping a few weeks ago, had catheter removed a week ago and states that over the course of the past few days he has had progressive pain, hematuria, and today he was unable to urinate. Sumner catheter placement attempted in urology office, but was unsuccessful despite multiple attempts, therefore sent to ER. Presents febrile, tachypneic,  hypertensive, severe pain, suprapubic tenderness. Admission WBC 12.10, BUN 30, Cr 1.35. placed in observation status for urinary retention.  Per urol:   Gross hematuria. History of bladder neck contracture. History of prostate cancer status post radical prostatectomy status post radiation therapy for periprostatic recurrence. Status post cystoscopy transurethral resection bladder neck contracture bladder tumor evacuation of clot 13 days ago  Plan: OR tonight for cystoscopy evacuation of clots possible transurethral resection fulguration bladder nec  To OR for:  Bilateral - CYSTOSCOPY EVACUATION OF CLOTS fulguration bladder neck tumors and biopsy. Retrograde urethrogram/cystogramDVIU   Anesthesia Type: General/LMA  Operative Findings:  External sphincter /bulbar urethra scarring unable to pass 22 [place] cystoscope requiring mild DVIU significant bladder neck necrotic tissue obstructing the bladder neck removed with alligator sent for biopsy significant using to the bladder neck fulguration      Anticipated Length of Stay/Certification Statement: Patient will be admitted on an Observation basis with an anticipated length of stay of  < 2 midnights.   Justification for Hospital Stay: Urinary retention, hematuria     4/25/24- observation:  S/p  cystoscopy evacuation of clots internal urethrotomy fulguration bladder neck bladder tumor day #1. Reports some cramping in the penile region. Urine punch colored, trail off CBI, monitor. Maintain alanis cath. IVABT continued.     ED Triage Vitals [04/24/24 1817]   Temperature Pulse Respirations Blood Pressure SpO2   99.1 °F (37.3 °C) 97 (!) 24 (!) 205/95 92 %      Temp Source Heart Rate Source Patient Position - Orthostatic VS BP Location FiO2 (%)   Tympanic Monitor Sitting Right arm --      Pain Score       10 - Worst Possible Pain          Wt Readings from Last 1 Encounters:   04/24/24 96.2 kg (212 lb)     Additional Vital Signs:   Date/Time Temp Pulse Resp BP MAP (mmHg) SpO2  Calculated FIO2 (%) - Nasal Cannula O2 Flow Rate (L/min) Nasal Cannula O2 Flow Rate (L/min) O2 Device Cardiac (WDL) Patient Position - Orthostatic VS   04/25/24 0609 -- -- -- -- -- 92 % 28 -- 2 L/min Nasal cannula -- --   04/25/24 03:10:56 98.5 °F (36.9 °C) 51 Abnormal  18 116/69 85 92 % -- -- -- -- -- --   04/24/24 23:20:35 -- 83 -- 160/93 115 97 % 28 -- 2 L/min Nasal cannula -- --   04/24/24 2300 -- 76 16 151/83 -- 98 % 28 -- 2 L/min Nasal cannula -- --   04/24/24 2245 -- 78 16 148/83 -- 99 % 28 -- 2 L/min Nasal cannula -- --   04/24/24 2230 97.8 °F (36.6 °C) 68 16 137/66 -- 100 % -- 6 L/min -- Simple mask WDL --   04/24/24 2033 -- 78 20 169/78 138 91 % -- -- -- -- -- --   04/24/24 1916 -- 87 20 137/69 -- 92 % -- -- -- -- -- --   04/24/24 1817 99.1 °F (37.3 °C) 97 24 Abnormal  205/95 Abnormal  -- 92 % -- -- -- None (Room air) -- Sitting     Pertinent Labs/Diagnostic Test Results:     Results from last 7 days   Lab Units 04/25/24  0501 04/24/24  1853   WBC Thousand/uL 10.72* 12.10*   HEMOGLOBIN g/dL 14.2 15.8   HEMATOCRIT % 44.7 48.0   PLATELETS Thousands/uL 177 211   TOTAL NEUT ABS Thousands/µL 9.74* 9.17*     Results from last 7 days   Lab Units 04/25/24  0501 04/24/24  1853   SODIUM mmol/L 136 137   POTASSIUM mmol/L 4.8 4.2   CHLORIDE mmol/L 102 102   CO2 mmol/L 29 25   ANION GAP mmol/L 5 10   BUN mg/dL 30* 30*   CREATININE mg/dL 1.32* 1.35*   EGFR ml/min/1.73sq m 50 49   CALCIUM mg/dL 8.8 9.7   MAGNESIUM mg/dL 1.9  --      Results from last 7 days   Lab Units 04/24/24  1853   AST U/L 20   ALT U/L 17   ALK PHOS U/L 78   TOTAL PROTEIN g/dL 7.9   ALBUMIN g/dL 4.2   TOTAL BILIRUBIN mg/dL 0.59     Results from last 7 days   Lab Units 04/25/24  0501 04/24/24  1853   GLUCOSE RANDOM mg/dL 189* 106     ED Treatment:   Medication Administration from 04/24/2024 1750 to 04/24/2024 2105         Date/Time Order Dose Route Action     04/24/2024 1851 EDT acetaminophen (Ofirmev) injection 1,000 mg 1,000 mg Intravenous New Bag      04/24/2024 1849 EDT LORazepam (ATIVAN) injection 0.5 mg 0.5 mg Intravenous Given     04/24/2024 1914 EDT cefTRIAXone (ROCEPHIN) IVPB (premix in dextrose) 1,000 mg 50 mL 1,000 mg Intravenous New Bag     04/24/2024 1947 EDT HYDROmorphone (DILAUDID) injection 1 mg 1 mg Intravenous Given          Past Medical History:   Diagnosis Date    Bladder infection 03/2024    Borderline diabetes     Cancer (HCC)     prostate    COPD (chronic obstructive pulmonary disease) (HCC)     Hematuria     History of transfusion     x2    Hyperlipidemia     Hypertension     Radiation cystitis     Smoker     Wears glasses      Present on Admission:   Stage 3b chronic kidney disease (HCC)   Prostate cancer (HCC) - s/p resection   Hyperlipidemia   Essential hypertension   COPD (chronic obstructive pulmonary disease) (HCC)   Gross hematuria   Urinary retention      Admitting Diagnosis: Urinary retention [R33.9]  Prostate cancer (HCC) [C61]  Radiation cystitis [N30.40]  Hematuria [R31.9]  Gross hematuria [R31.0]  Intractable pain [R52]  Age/Sex: 78 y.o. male  Admission Orders:  Consult urology  Pt/ot eval & tx  scd    Scheduled Medications:  cefTRIAXone, 1,000 mg, Intravenous, Q24H  Cholecalciferol, 2,000 Units, Oral, Daily  fluticasone-vilanterol, 1 puff, Inhalation, Daily   And  umeclidinium, 1 puff, Inhalation, Daily  metoprolol tartrate, 12.5 mg, Oral, Q12H DHAVAL  nicotine, 1 patch, Transdermal, Daily  saccharomyces boulardii, 250 mg, Oral, BID    Continuous IV Infusions:  sodium chloride, 50 mL/hr, Intravenous, Continuous    PRN Meds:  acetaminophen, 650 mg, Oral, Q6H PRN  albuterol, 2 puff, Inhalation, Q4H PRN  aluminum-magnesium hydroxide-simethicone, 30 mL, Oral, Q6H PRN  fluticasone, 1 spray, Each Nare, Daily PRN  lactated ringers, 1,000 mL, Intravenous, Once PRN   And  lactated ringers, 1,000 mL, Intravenous, Once PRN  ondansetron, 4 mg, Intravenous, Q6H PRN  sodium chloride, 1,000 mL, Intravenous, Once PRN   And  sodium chloride,  1,000 mL, Intravenous, Once PRN      Network Utilization Review Department  ATTENTION: Please call with any questions or concerns to [phone] and carefully listen to the prompts so that you are directed to the right person. All voicemails are confidential.   For Discharge needs, contact Care Management DC Support Team at [phone] opt. [phone]  Send all requests for admission clinical reviews, approved or denied determinations and any other requests to dedicated fax number below belonging to the campus where the patient is receiving treatment. List of dedicated fax numbers for the Facilities:  FACILITY NAME UR FAX NUMBER   ADMISSION DENIALS (Administrative/Medical Necessity) [phone]   DISCHARGE SUPPORT TEAM (NETWORK) [phone]   PARENT CHILD HEALTH (Maternity/NICU/Pediatrics) [phone]   [facility] [phone]   [facility] [phone]   Critical access hospital [phone]   [facility] [phone]   Pending sale to [facility] [phone]   [facility] [phone]   [facility] [phone]   [facility] [phone]   [facility] [phone]   [facility] [phone]   [facility] [phone]   [facility] [phone]

## 2024-04-30 PROBLEM — R82.90 ABNORMAL URINALYSIS: Status: ACTIVE | Noted: 2024-04-30

## 2024-04-30 LAB
ALBUMIN SERPL BCP-MCNC: 3.5 G/DL (ref 3.5–5)
ALP SERPL-CCNC: 62 U/L (ref 34–104)
ALT SERPL W P-5'-P-CCNC: 15 U/L (ref 7–52)
ANION GAP SERPL CALCULATED.3IONS-SCNC: 10 MMOL/L (ref 4–13)
AST SERPL W P-5'-P-CCNC: 15 U/L (ref 13–39)
BACTERIA UR QL AUTO: ABNORMAL /HPF
BILIRUB SERPL-MCNC: 0.74 MG/DL (ref 0.2–1)
BILIRUB UR QL STRIP: NEGATIVE
BUN SERPL-MCNC: 33 MG/DL (ref 5–25)
CALCIUM SERPL-MCNC: 8.8 MG/DL (ref 8.4–10.2)
CHLORIDE SERPL-SCNC: 102 MMOL/L (ref 96–108)
CLARITY UR: ABNORMAL
CO2 SERPL-SCNC: 25 MMOL/L (ref 21–32)
COLOR UR: ABNORMAL
CREAT SERPL-MCNC: 1.19 MG/DL (ref 0.6–1.3)
GFR SERPL CREATININE-BSD FRML MDRD: 57 ML/MIN/1.73SQ M
GLUCOSE SERPL-MCNC: 108 MG/DL (ref 65–140)
GLUCOSE UR STRIP-MCNC: NEGATIVE MG/DL
HGB UR QL STRIP.AUTO: ABNORMAL
KETONES UR STRIP-MCNC: ABNORMAL MG/DL
LEUKOCYTE ESTERASE UR QL STRIP: ABNORMAL
MAGNESIUM SERPL-MCNC: 1.9 MG/DL (ref 1.9–2.7)
NITRITE UR QL STRIP: NEGATIVE
NON-SQ EPI CELLS URNS QL MICRO: ABNORMAL /HPF
PH UR STRIP.AUTO: 7.5 [PH]
PHOSPHATE SERPL-MCNC: 3.4 MG/DL (ref 2.3–4.1)
POTASSIUM SERPL-SCNC: 4 MMOL/L (ref 3.5–5.3)
PROT SERPL-MCNC: 6.6 G/DL (ref 6.4–8.4)
PROT UR STRIP-MCNC: ABNORMAL MG/DL
RBC #/AREA URNS AUTO: ABNORMAL /HPF
SODIUM SERPL-SCNC: 137 MMOL/L (ref 135–147)
SP GR UR STRIP.AUTO: 1.02 (ref 1–1.03)
UROBILINOGEN UR STRIP-ACNC: <2 MG/DL
WBC #/AREA URNS AUTO: ABNORMAL /HPF

## 2024-04-30 PROCEDURE — 88307 TISSUE EXAM BY PATHOLOGIST: CPT | Performed by: PATHOLOGY

## 2024-04-30 PROCEDURE — 81001 URINALYSIS AUTO W/SCOPE: CPT | Performed by: EMERGENCY MEDICINE

## 2024-04-30 PROCEDURE — 80053 COMPREHEN METABOLIC PANEL: CPT | Performed by: INTERNAL MEDICINE

## 2024-04-30 PROCEDURE — 83735 ASSAY OF MAGNESIUM: CPT | Performed by: INTERNAL MEDICINE

## 2024-04-30 PROCEDURE — 87086 URINE CULTURE/COLONY COUNT: CPT | Performed by: EMERGENCY MEDICINE

## 2024-04-30 PROCEDURE — 84100 ASSAY OF PHOSPHORUS: CPT | Performed by: INTERNAL MEDICINE

## 2024-04-30 PROCEDURE — 99285 EMERGENCY DEPT VISIT HI MDM: CPT | Performed by: EMERGENCY MEDICINE

## 2024-04-30 PROCEDURE — 88342 IMHCHEM/IMCYTCHM 1ST ANTB: CPT | Performed by: PATHOLOGY

## 2024-04-30 PROCEDURE — 99232 SBSQ HOSP IP/OBS MODERATE 35: CPT | Performed by: FAMILY MEDICINE

## 2024-04-30 RX ORDER — ALBUTEROL SULFATE 90 UG/1
2 AEROSOL, METERED RESPIRATORY (INHALATION) EVERY 4 HOURS PRN
Status: DISCONTINUED | OUTPATIENT
Start: 2024-04-30 | End: 2024-05-06 | Stop reason: HOSPADM

## 2024-04-30 RX ORDER — NICOTINE 21 MG/24HR
1 PATCH, TRANSDERMAL 24 HOURS TRANSDERMAL DAILY
Status: DISCONTINUED | OUTPATIENT
Start: 2024-04-30 | End: 2024-05-06 | Stop reason: HOSPADM

## 2024-04-30 RX ORDER — CEFTRIAXONE 1 G/50ML
1000 INJECTION, SOLUTION INTRAVENOUS EVERY 24 HOURS
Status: DISCONTINUED | OUTPATIENT
Start: 2024-04-30 | End: 2024-05-01

## 2024-04-30 RX ORDER — FLUTICASONE FUROATE AND VILANTEROL 100; 25 UG/1; UG/1
1 POWDER RESPIRATORY (INHALATION) DAILY
Status: DISCONTINUED | OUTPATIENT
Start: 2024-04-30 | End: 2024-05-06 | Stop reason: HOSPADM

## 2024-04-30 RX ADMIN — NICOTINE 1 PATCH: 14 PATCH, EXTENDED RELEASE TRANSDERMAL at 10:26

## 2024-04-30 RX ADMIN — CEFTRIAXONE 1000 MG: 1 INJECTION, SOLUTION INTRAVENOUS at 11:06

## 2024-04-30 RX ADMIN — MORPHINE SULFATE 2 MG: 2 INJECTION, SOLUTION INTRAMUSCULAR; INTRAVENOUS at 20:46

## 2024-04-30 RX ADMIN — FLUTICASONE FUROATE AND VILANTEROL TRIFENATATE 1 PUFF: 100; 25 POWDER RESPIRATORY (INHALATION) at 10:25

## 2024-04-30 RX ADMIN — UMECLIDINIUM 1 PUFF: 62.5 AEROSOL, POWDER ORAL at 10:25

## 2024-04-30 RX ADMIN — TRAMADOL HYDROCHLORIDE 50 MG: 50 TABLET, COATED ORAL at 19:08

## 2024-04-30 RX ADMIN — MORPHINE SULFATE 2 MG: 2 INJECTION, SOLUTION INTRAMUSCULAR; INTRAVENOUS at 00:43

## 2024-04-30 RX ADMIN — TRAMADOL HYDROCHLORIDE 50 MG: 50 TABLET, COATED ORAL at 10:37

## 2024-04-30 RX ADMIN — ALBUTEROL SULFATE 2 PUFF: 90 AEROSOL, METERED RESPIRATORY (INHALATION) at 16:23

## 2024-04-30 RX ADMIN — MORPHINE SULFATE 2 MG: 2 INJECTION, SOLUTION INTRAMUSCULAR; INTRAVENOUS at 06:00

## 2024-04-30 RX ADMIN — Medication 12.5 MG: at 20:38

## 2024-04-30 RX ADMIN — MORPHINE SULFATE 2 MG: 2 INJECTION, SOLUTION INTRAMUSCULAR; INTRAVENOUS at 14:02

## 2024-04-30 RX ADMIN — Medication 12.5 MG: at 10:25

## 2024-04-30 NOTE — ASSESSMENT & PLAN NOTE
-Patient presents with acute on chronic urinary retention, Sumner catheter in place  -History of bladder neck contracture, status post cystoscopy transurethral resection bladder neck contracture bladder tumor evacuation of clots on 4/11/2024.   -Was discharged with Sumner in place on previous admission.  -CBI initiated, urology consulted

## 2024-04-30 NOTE — PLAN OF CARE
Problem: PAIN - ADULT  Goal: Verbalizes/displays adequate comfort level or baseline comfort level  Description: Interventions:  - Encourage patient to monitor pain and request assistance  - Assess pain using appropriate pain scale  - Administer analgesics based on type and severity of pain and evaluate response  - Implement non-pharmacological measures as appropriate and evaluate response  - Consider cultural and social influences on pain and pain management  - Notify physician/advanced practitioner if interventions unsuccessful or patient reports new pain  4/30/2024 1936 by Nivia Sánchez RN  Outcome: Progressing  4/30/2024 1330 by Nivia Sánchez RN  Outcome: Progressing     Problem: INFECTION - ADULT  Goal: Absence or prevention of progression during hospitalization  Description: INTERVENTIONS:  - Assess and monitor for signs and symptoms of infection  - Monitor lab/diagnostic results  - Monitor all insertion sites, i.e. indwelling lines, tubes, and drains  - Monitor endotracheal if appropriate and nasal secretions for changes in amount and color  - [place] appropriate cooling/warming therapies per order  - Administer medications as ordered  - Instruct and encourage patient and family to use good hand hygiene technique  - Identify and instruct in appropriate isolation precautions for identified infection/condition  4/30/2024 1936 by Nivia Sánchez RN  Outcome: Progressing  4/30/2024 1330 by Nivia Sánchez RN  Outcome: Progressing     Problem: DISCHARGE PLANNING  Goal: Discharge to home or other facility with appropriate resources  Description: INTERVENTIONS:  - Identify barriers to discharge w/patient and caregiver  - Arrange for needed discharge resources and transportation as appropriate  - Identify discharge learning needs (meds, wound care, etc.)  - Arrange for interpretive services to assist at discharge as needed  - Refer to Case Management Department for coordinating discharge  planning if the patient needs post-hospital services based on physician/advanced practitioner order or complex needs related to functional status, cognitive ability, or social support system  4/30/2024 1936 by Nivia Sánchez RN  Outcome: Progressing  4/30/2024 1330 by Nivia Sánchez RN  Outcome: Progressing     Problem: GENITOURINARY - ADULT  Goal: Maintains or returns to baseline urinary function  Description: INTERVENTIONS:  - Assess urinary function  - Encourage oral fluids to ensure adequate hydration if ordered  - Administer IV fluids as ordered to ensure adequate hydration  - Administer ordered medications as needed  - Offer frequent toileting  - Follow urinary retention protocol if ordered  4/30/2024 1936 by Nivia Sánchez RN  Outcome: Progressing  4/30/2024 1330 by Nivia Sánchez RN  Outcome: Progressing  Goal: Absence of urinary retention  Description: INTERVENTIONS:  - Assess patient’s ability to void and empty bladder  - Monitor I/O  - Bladder scan as needed  - Discuss with physician/AP medications to alleviate retention as needed  - Discuss catheterization for long term situations as appropriate  4/30/2024 1936 by Nivia Sánchez RN  Outcome: Progressing  4/30/2024 1330 by Nivia Sánchez RN  Outcome: Progressing  Goal: Urinary catheter remains patent  Description: INTERVENTIONS:  - Assess patency of urinary catheter  - If patient has a chronic alanis, consider changing catheter if non-functioning  - Follow guidelines for intermittent irrigation of non-functioning urinary catheter  4/30/2024 1936 by Nivia Sánchez RN  Outcome: Progressing  4/30/2024 1330 by Nivia Sánchez RN  Outcome: Progressing

## 2024-04-30 NOTE — PLAN OF CARE
Problem: INFECTION - ADULT  Goal: Absence or prevention of progression during hospitalization  Description: INTERVENTIONS:  - Assess and monitor for signs and symptoms of infection  - Monitor lab/diagnostic results  - Monitor all insertion sites, i.e. indwelling lines, tubes, and drains  - Monitor endotracheal if appropriate and nasal secretions for changes in amount and color  - [place] appropriate cooling/warming therapies per order  - Administer medications as ordered  - Instruct and encourage patient and family to use good hand hygiene technique  - Identify and instruct in appropriate isolation precautions for identified infection/condition  Outcome: Progressing  Goal: Absence of fever/infection during neutropenic period  Description: INTERVENTIONS:  - Monitor WBC    Outcome: Progressing     Problem: DISCHARGE PLANNING  Goal: Discharge to home or other facility with appropriate resources  Description: INTERVENTIONS:  - Identify barriers to discharge w/patient and caregiver  - Arrange for needed discharge resources and transportation as appropriate  - Identify discharge learning needs (meds, wound care, etc.)  - Arrange for interpretive services to assist at discharge as needed  - Refer to Case Management Department for coordinating discharge planning if the patient needs post-hospital services based on physician/advanced practitioner order or complex needs related to functional status, cognitive ability, or social support system  Outcome: Progressing     Problem: Knowledge Deficit  Goal: Patient/family/caregiver demonstrates understanding of disease process, treatment plan, medications, and discharge instructions  Description: Complete learning assessment and assess knowledge base.  Interventions:  - Provide teaching at level of understanding  - Provide teaching via preferred learning methods  Outcome: Progressing     Problem: GENITOURINARY - ADULT  Goal: Maintains or returns to baseline urinary  function  Description: INTERVENTIONS:  - Assess urinary function  - Encourage oral fluids to ensure adequate hydration if ordered  - Administer IV fluids as ordered to ensure adequate hydration  - Administer ordered medications as needed  - Offer frequent toileting  - Follow urinary retention protocol if ordered  Outcome: Progressing  Goal: Absence of urinary retention  Description: INTERVENTIONS:  - Assess patient’s ability to void and empty bladder  - Monitor I/O  - Bladder scan as needed  - Discuss with physician/AP medications to alleviate retention as needed  - Discuss catheterization for long term situations as appropriate  Outcome: Progressing  Goal: Urinary catheter remains patent  Description: INTERVENTIONS:  - Assess patency of urinary catheter  - If patient has a chronic alanis, consider changing catheter if non-functioning  - Follow guidelines for intermittent irrigation of non-functioning urinary catheter  Outcome: Progressing

## 2024-04-30 NOTE — PROGRESS NOTES
[facility]  Progress Note  Name: Joel Wyman I  MRN: [id number]  Unit/Bed#: 2 [address] Date of Admission: 4/29/2024   Date of Service: 4/30/2024 I Hospital Day: 0    Assessment/Plan   * Hemorrhagic cystitis  Assessment & Plan  Patient admitted with hematuria on previous admission.   -Status post cystoscopy with evacuation of clots, fulguration of bladder neck tumors and biopsy.  Retrograde urethrogram/cystogram DVIU was done on 4/24  -Lokesh blood with clots in alanis bag and inability to urinate  -CBI was initiated and currently hematuria has resolved  -Hgb stable  -Appreciate urology input    Urinary retention  Assessment & Plan  Patient presented with acute on chronic urinary retention, Alanis catheter in place  -History of bladder neck contracture, status post cystoscopy transurethral resection bladder neck contracture bladder tumor evacuation of clots on 4/11/2024.   -Was discharged with Alanis in place on previous admission    Abnormal urinalysis  Assessment & Plan  Will start empirically on IV Rocephin follow-up pending urine culture    Stage 3b chronic kidney disease (HCC)  Assessment & Plan  Creatinine at baseline  Repeat lab work in a.m.    Nicotine abuse  Assessment & Plan  -Continue nicotine patch.    Prostate cancer (HCC) - s/p resection  Assessment & Plan  -History of prostate cancer status post XRT    COPD (chronic obstructive pulmonary disease) ([facility])  Assessment & Plan  No evidence of exacerbation  -Continue home inhalers    Hyperlipidemia  Assessment & Plan  Diet controlled    Essential hypertension  Assessment & Plan  Continue Lopressor               VTE Pharmacologic Prophylaxis: VTE Score: 4 Moderate Risk (Score 3-4) - Pharmacological DVT Prophylaxis Contraindicated. Sequential Compression Devices Ordered.    Mobility:   Basic Mobility Inpatient Raw Score: 22  JH-HLM Goal: 7: Walk 25 feet or more  JH-HLM Achieved: 6: Walk 10 steps or more  JH-HLM Goal NOT achieved.  Continue with multidisciplinary rounding and encourage appropriate mobility to improve upon [facility] goals.    Patient Centered Rounds: I performed bedside rounds with nursing staff today.   Discussions with Specialists or Other Care Team Provider: yes    Education and Discussions with Family / Patient: Patient declined call to .  - states he will update his wife    Total Time Spent on Date of Encounter in care of patient: This time was spent on one or more of the following: performing physical exam; counseling and coordination of care; obtaining or reviewing history; documenting in the medical record; reviewing/ordering tests, medications or procedures; communicating with other healthcare professionals and discussing with patient's family/caregivers.    Current Length of Stay: 0 day(s)  Current Patient Status: Inpatient   Certification Statement: The patient will continue to require additional inpatient hospital stay due to gross hematuria, abnormal UA  Discharge Plan: Anticipate discharge in 48-72 hrs to home.    Code Status: Level 1 - Full Code    Subjective:     Patient continues to report some intermittent spasms.  Denies any abdominal/suprapubic pain    Objective:     Vitals:   Temp (24hrs), Av.6 °F (36.4 °C), Min:97.3 °F (36.3 °C), Max:98 °F (36.7 °C)    Temp:  [97.3 °F (36.3 °C)-98 °F (36.7 °C)] 97.3 °F (36.3 °C)  HR:  [62-88] 72  Resp:  [18-20] 20  BP: (137-173)/(73-98) 154/81  SpO2:  [91 %-98 %] 91 %  Body mass index is 30.42 kg/m².     Input and Output Summary (last 24 hours):     Intake/Output Summary (Last 24 hours) at 2024 1329  Last data filed at 2024 0603  Gross per 24 hour   Intake --   Output 3050 ml   Net -3050 ml       Physical Exam:   Physical Exam  Vitals reviewed.   Constitutional:       General: He is not in acute distress.     Appearance: He is not ill-appearing.   HENT:      Head: Normocephalic and atraumatic.   Eyes:      General:         Right eye: No discharge.          Left eye: No discharge.   Cardiovascular:      Rate and Rhythm: Normal rate and regular rhythm.   Pulmonary:      Effort: Pulmonary effort is normal. No respiratory distress.      Breath sounds: Normal breath sounds. No wheezing or rales.   Abdominal:      General: Bowel sounds are normal. There is no distension.      Palpations: Abdomen is soft.      Tenderness: There is no abdominal tenderness.   Genitourinary:     Comments: Sumner in place with CBI running with no further hematuria  Neurological:      Mental Status: He is alert.          Additional Data:     Labs:  Results from last 7 days   Lab Units 04/29/24  2150   WBC Thousand/uL 11.85*   HEMOGLOBIN g/dL 14.5   HEMATOCRIT % 44.2   PLATELETS Thousands/uL 209   SEGS PCT % 76*   LYMPHO PCT % 13*   MONO PCT % 8   EOS PCT % 2     Results from last 7 days   Lab Units 04/30/24  0603   SODIUM mmol/L 137   POTASSIUM mmol/L 4.0   CHLORIDE mmol/L 102   CO2 mmol/L 25   BUN mg/dL 33*   CREATININE mg/dL 1.19   ANION GAP mmol/L 10   CALCIUM mg/dL 8.8   ALBUMIN g/dL 3.5   TOTAL BILIRUBIN mg/dL 0.74   ALK PHOS U/L 62   ALT U/L 15   AST U/L 15   GLUCOSE RANDOM mg/dL 108                       Lines/Drains:  Invasive Devices       Peripheral Intravenous Line  Duration             Peripheral IV 04/29/24 Distal;Dorsal (posterior);Left Forearm <1 day              Drain  Duration             Urethral Catheter Latex;Three way 22 Fr. 5 days                  Urinary Catheter:  Goal for removal: N/A- Discharging with Sumner               Imaging: Reviewed radiology reports from this admission including: procedure reports from prior admission    Recent Cultures (last 7 days):         Last 24 Hours Medication List:   Current Facility-Administered Medications   Medication Dose Route Frequency Provider Last Rate    acetaminophen  650 mg Oral Q6H PRN Jamel Harris MD      albuterol  2 puff Inhalation Q4H PRN Jamel Harris MD      cefTRIAXone  1,000 mg Intravenous Q24H Janny  DO Shelli 1,000 mg (04/30/24 1106)    Fluticasone Furoate-Vilanterol  1 puff Inhalation Daily Jamel Harris MD      And    umeclidinium  1 puff Inhalation Daily Jamel Harris MD      HYDROmorphone  1 mg Intravenous Once Arsenio Carpenter DO      metoprolol tartrate  12.5 mg Oral Q12H DHAVAL Jamel Harris MD      morphine injection  2 mg Intravenous Q6H PRN Jamel Harris MD      nicotine  1 patch Transdermal Daily Jamel Harirs MD      traMADol  50 mg Oral Q6H PRN Jamel Harris MD          Today, Patient Was Seen By: Janny Marques DO    **Please Note: This note may have been constructed using a voice recognition system.**

## 2024-04-30 NOTE — PLAN OF CARE
Problem: PAIN - ADULT  Goal: Verbalizes/displays adequate comfort level or baseline comfort level  Description: Interventions:  - Encourage patient to monitor pain and request assistance  - Assess pain using appropriate pain scale  - Administer analgesics based on type and severity of pain and evaluate response  - Implement non-pharmacological measures as appropriate and evaluate response  - Consider cultural and social influences on pain and pain management  - Notify physician/advanced practitioner if interventions unsuccessful or patient reports new pain  Outcome: Progressing     Problem: INFECTION - ADULT  Goal: Absence or prevention of progression during hospitalization  Description: INTERVENTIONS:  - Assess and monitor for signs and symptoms of infection  - Monitor lab/diagnostic results  - Monitor all insertion sites, i.e. indwelling lines, tubes, and drains  - Monitor endotracheal if appropriate and nasal secretions for changes in amount and color  - [place] appropriate cooling/warming therapies per order  - Administer medications as ordered  - Instruct and encourage patient and family to use good hand hygiene technique  - Identify and instruct in appropriate isolation precautions for identified infection/condition  Outcome: Progressing  Goal: Absence of fever/infection during neutropenic period  Description: INTERVENTIONS:  - Monitor WBC    Outcome: Progressing     Problem: SAFETY ADULT  Goal: Patient will remain free of falls  Description: INTERVENTIONS:  - Educate patient/family on patient safety including physical limitations  - Instruct patient to call for assistance with activity   - Consult OT/PT to assist with strengthening/mobility   - Keep Call bell within reach  - Keep bed low and locked with side rails adjusted as appropriate  - Keep care items and personal belongings within reach  - Initiate and maintain comfort rounds  - Make Fall Risk Sign visible to staff  - Offer Toileting every 3 Hours,  in advance of need  - Initiate/Maintain bed alarm  - Obtain necessary fall risk management equipment:   - Apply yellow socks and bracelet for high fall risk patients  - Consider moving patient to room near nurses station  Outcome: Progressing  Goal: Maintain or return to baseline ADL function  Description: INTERVENTIONS:  -  Assess patient's ability to carry out ADLs; assess patient's baseline for ADL function and identify physical deficits which impact ability to perform ADLs (bathing, care of mouth/teeth, toileting, grooming, dressing, etc.)  - Assess/evaluate cause of self-care deficits   - Assess range of motion  - Assess patient's mobility; develop plan if impaired  - Assess patient's need for assistive devices and provide as appropriate  - Encourage maximum independence but intervene and supervise when necessary  - Involve family in performance of ADLs  - Assess for home care needs following discharge   - Consider OT consult to assist with ADL evaluation and planning for discharge  - Provide patient education as appropriate  Outcome: Progressing  Goal: Maintains/Returns to pre admission functional level  Description: INTERVENTIONS:  - Perform AM-PAC 6 Click Basic Mobility/ Daily Activity assessment daily.  - Set and communicate daily mobility goal to care team and patient/family/caregiver.   - Collaborate with rehabilitation services on mobility goals if consulted  - Perform Range of Motion 3 times a day.  - Reposition patient every 3 hours.  - Dangle patient 3 times a day  - Stand patient 3 times a day  - Ambulate patient 3 times a day  - Out of bed to chair bed times a day   - Out of bed for meals 3 times a day  - Out of bed for toileting  - Record patient progress and toleration of activity level   Outcome: Progressing     Problem: GENITOURINARY - ADULT  Goal: Maintains or returns to baseline urinary function  Description: INTERVENTIONS:  - Assess urinary function  - Encourage oral fluids to ensure adequate  hydration if ordered  - Administer IV fluids as ordered to ensure adequate hydration  - Administer ordered medications as needed  - Offer frequent toileting  - Follow urinary retention protocol if ordered  Outcome: Progressing  Goal: Absence of urinary retention  Description: INTERVENTIONS:  - Assess patient’s ability to void and empty bladder  - Monitor I/O  - Bladder scan as needed  - Discuss with physician/AP medications to alleviate retention as needed  - Discuss catheterization for long term situations as appropriate  Outcome: Progressing  Goal: Urinary catheter remains patent  Description: INTERVENTIONS:  - Assess patency of urinary catheter  - If patient has a chronic alanis, consider changing catheter if non-functioning  - Follow guidelines for intermittent irrigation of non-functioning urinary catheter  Outcome: Progressing

## 2024-04-30 NOTE — UTILIZATION REVIEW
Initial Clinical Review    Observation 4/29/24 @ 2125 converted to inpatient admission 4/30/24 @ 1329 for continued care & tx for hemorrhagic cystitis.    Admission: Date/Time/Statement:   Admission Orders (From admission, onward)       Ordered        04/30/24 1329  INPATIENT ADMISSION  Once            04/29/24 2125  Place in Observation  Once                          Orders Placed This Encounter   Procedures    INPATIENT ADMISSION     Standing Status:   Standing     Number of Occurrences:   1     Order Specific Question:   Level of Care     Answer:   Med Surg [16]     Order Specific Question:   Estimated length of stay     Answer:   More than 2 Midnights     Order Specific Question:   Certification     Answer:   I certify that inpatient services are medically necessary for this patient for a duration of greater than two midnights. See H&P and MD Progress Notes for additional information about the patient's course of treatment.     ED Arrival Information       Expected   -    Arrival   4/29/2024 18:52    Acuity   Urgent              Means of arrival   Walk-In    Escorted by   Spouse    Service   Hospitalist    Admission type   Emergency              Arrival complaint   Cathether Problem             Chief Complaint   Patient presents with    Urinary Catheter Problem     Reported that he has been having bleeding to his catch since yesterday. Call Dr Khan office [phone] and told to increase water intake. Since then, no urine out put and pt is reported of pain 10/10 and spasm.        Initial Presentation:   78 yom to ER from home c/o hematuria, unable to urinate. Hx prostate cancer, bladder neck contracture, UTIs, chronic alanis, CKD 3, nicotine abuse, hypertension, hyperlipidemia, COPD.  Presents hypertensive with leobardo blood & clots in alanis bag. Admission labs: WBC 12.10, BUN 30, Cr 1.35, u/a+ketones, blood, prot, leuk. Placed in observation status for hemorrhagic cystitis. Started on bladder irrigation per urology.      Per urol: Gross hematuria  Bladder tumor path from second TURBT pending  22 Danish three-way Alanis catheter hand irrigated  Mild clots  Continuous bladder irrigation  Will put IR consult in for SP tube placement at this point in light of significant urethral stricture disease bladder neck contracture and necrotic bladder neck more than likely going to recontract if catheter removed    Observation to IP admission 4/30/24:  Dx: hemorrhagic cystitis. IVABT in progress at this time. Persistent intermittent spasms. Alanis in place with CBI running, urine pink. Urology following. Continue to monitor urine output, maintain CBI per urology. Follow H&H, cultures.    Date: 5/1/24  Day 3: Has surpassed a 2nd midnight with active treatments and services.  Dx: hemorrhagic cystitis. Persistent hematuria, clots in alanis bag, worse with ambulation. Alanis in place, CBI running. Hgb 14.4. reports intermittent pain, spasms requiring IV Morphine, Ultram. Scheduled for CT urogram to evaluate the upper tracts and surrounding tissue per urology. Monitor output, follow H&H. Urology following.      ED Triage Vitals [04/29/24 1920]   Temperature Pulse Respirations Blood Pressure SpO2   98 °F (36.7 °C) 88 20 (!) 173/98 98 %      Temp Source Heart Rate Source Patient Position - Orthostatic VS BP Location FiO2 (%)   Temporal Monitor Sitting Left arm --      Pain Score       10 - Worst Possible Pain          Wt Readings from Last 1 Encounters:   04/29/24 96.2 kg (212 lb)     Additional Vital Signs:   Date/Time Temp Pulse Resp BP MAP (mmHg) SpO2 O2 Device Patient Position - Orthostatic VS   04/30/24 0549 -- 62 18 137/73 94 91 % -- --   04/30/24 00:43:26 -- 68 18 142/77 99 91 % -- --   04/29/24 22:47:08 97.4 °F (36.3 °C) Abnormal  73 18 149/80 103 94 % -- --   04/29/24 1920 98 °F (36.7 °C) 88 20 173/98 Abnormal  -- 98 % None (Room air) Sitting     Pertinent Labs/Diagnostic Test Results:   CT renal protocol   Final Result by Bogdan Dickerson  MD Floyd (05/01 1314)      Decompressed bladder with diffuse wall thickening with surrounding inflammatory changes. Intraluminal Sumner balloon with small amount of contrast.      Unremarkable renal collecting systems and ureters.   Unchanged subcentimeter renal cysts bilaterally.        Results from last 7 days   Lab Units 05/01/24 0622 04/29/24 2150 04/25/24 0501 04/24/24  1853   WBC Thousand/uL 10.51* 11.85* 10.72* 12.10*   HEMOGLOBIN g/dL 14.4 14.5 14.2 15.8   HEMATOCRIT % 43.6 44.2 44.7 48.0   PLATELETS Thousands/uL 213 209 177 211   TOTAL NEUT ABS Thousands/µL  --  9.05* 9.74* 9.17*     Results from last 7 days   Lab Units 05/01/24 0622 04/30/24 0603 04/29/24 2150 04/25/24 0501 04/24/24  1853   SODIUM mmol/L 136 137 136 136 137   POTASSIUM mmol/L 4.2 4.0 4.2 4.8 4.2   CHLORIDE mmol/L 102 102 103 102 102   CO2 mmol/L 28 25 25 29 25   ANION GAP mmol/L 6 10 8 5 10   BUN mg/dL 29* 33* 39* 30* 30*   CREATININE mg/dL 1.24 1.19 1.29 1.32* 1.35*   EGFR ml/min/1.73sq m 54 57 52 50 49   CALCIUM mg/dL 9.0 8.8 9.1 8.8 9.7   MAGNESIUM mg/dL  --  1.9  --  1.9  --    PHOSPHORUS mg/dL  --  3.4  --   --   --      Results from last 7 days   Lab Units 04/30/24 0603 04/24/24  1853   AST U/L 15 20   ALT U/L 15 17   ALK PHOS U/L 62 78   TOTAL PROTEIN g/dL 6.6 7.9   ALBUMIN g/dL 3.5 4.2   TOTAL BILIRUBIN mg/dL 0.74 0.59     Results from last 7 days   Lab Units 05/01/24 0622 04/30/24 0603 04/29/24 2150 04/25/24  0501 04/24/24  1853   GLUCOSE RANDOM mg/dL 108 108 110 189* 106     Results from last 7 days   Lab Units 04/30/24  0559   CLARITY UA  Slightly Cloudy   COLOR UA  Dark Red   SPEC GRAV UA  1.025   PH UA  7.5   GLUCOSE UA mg/dl Negative   KETONES UA mg/dl 10 (1+)*   BLOOD UA  Large*   PROTEIN UA mg/dl 300 (3+)*   NITRITE UA  Negative   BILIRUBIN UA  Negative   UROBILINOGEN UA (BE) mg/dl <2.0   LEUKOCYTES UA  Small*   WBC UA /hpf Field obscured, unable to enumerate*   RBC UA /hpf Innumerable*   BACTERIA UA /hpf  Field obscured, unable to enumerate*   EPITHELIAL CELLS WET PREP /hpf Field obscured, unable to enumerate*     Results from last 7 days   Lab Units 04/30/24  0559   URINE CULTURE  No Growth <1000 cfu/mL       Past Medical History:   Diagnosis Date    Bladder infection 03/2024    Borderline diabetes     Cancer (HCC)     prostate    COPD (chronic obstructive pulmonary disease) (HCC)     Hematuria     History of transfusion     x2    Hyperlipidemia     Hypertension     Radiation cystitis     Smoker     Wears glasses      Present on Admission:   Gross hematuria   Urinary retention   COPD (chronic obstructive pulmonary disease) (HCC)   Stage 3b chronic kidney disease (HCC)   Prostate cancer (HCC) - s/p resection   Hyperlipidemia   Nicotine abuse   Essential hypertension   Abnormal urinalysis      Admitting Diagnosis: Hemorrhagic cystitis [N30.91]  Bladder spasm [N32.89]  Sumner catheter problem (HCC) [T83.9XXA]  Problem with urinary catheter (HCC) [T83.9XXA]  Age/Sex: 78 y.o. male  Admission Orders:  CBI/Bladder irrigation  Consult urology  Scd/foot pumps    Scheduled Medications:  Medications 04/22 04/23 04/24 04/25 04/26 04/27 04/28 04/29 04/30 05/01   acetaminophen (Ofirmev) injection 1,000 mg  Dose: 1,000 mg  Freq: Once Route: IV  Last Dose: Stopped (04/24/24 1913)  Start: 04/24/24 1845 End: 04/24/24 1913   Admin Instructions:         1851 1913               ceFAZolin (ANCEF) IVPB (premix in dextrose) 2,000 mg 50 mL  Dose: 2,000 mg  Freq: Once Route: IV  Indications of Use: PROPHYLAXIS  Start: 04/11/24 1000 End: 04/11/24 1646   Admin Instructions:      Order specific questions:                   cefTRIAXone (ROCEPHIN) IVPB (premix in dextrose) 1,000 mg 50 mL  Dose: 1,000 mg  Freq: Every 24 hours Route: IV  Last Dose: 1,000 mg (04/30/24 1106)  Start: 04/30/24 1045 End: 05/01/24 1006   Admin Instructions:      Order specific questions:               1106      1006-D/C'd      cefTRIAXone (ROCEPHIN) IVPB (premix in  dextrose) 1,000 mg 50 mL  Dose: 1,000 mg  Freq: Every 24 hours Route: IV  Last Dose: 1,000 mg (04/25/24 1730)  Start: 04/25/24 1830 End: 04/26/24 1512   Admin Instructions:      Order specific questions:          1730      1512-D/C'd           cefTRIAXone (ROCEPHIN) IVPB (premix in dextrose) 1,000 mg 50 mL  Dose: 1,000 mg  Freq: Once Route: IV  Last Dose: Stopped (04/24/24 1946)  Start: 04/24/24 1845 End: 04/24/24 1946   Admin Instructions:      Order specific questions:         1914 1946               cefTRIAXone (ROCEPHIN) IVPB (premix in dextrose) 1,000 mg 50 mL  Dose: 1,000 mg  Freq: Once Route: IV  Last Dose: Stopped (04/07/24 2250)  Start: 04/07/24 2200 End: 04/07/24 2250   Admin Instructions:      Order specific questions:                   Cholecalciferol (VITAMIN D3) tablet 2,000 Units  Dose: 2,000 Units  Freq: Daily Route: PO  Start: 04/25/24 0900 End: 04/26/24 1512   Admin Instructions:          0851      0859     1512-D/C'd            Fluticasone Furoate-Vilanterol 100-25 mcg/actuation 1 puff  Dose: 1 puff  Freq: Daily Route: IN  Start: 04/30/24 0900   Admin Instructions:               1025      0927        And  umeclidinium 62.5 mcg/actuation inhaler AEPB 1 puff  Dose: 1 puff  Freq: Daily Route: IN  Start: 04/30/24 0900            1025      0927         fluticasone-vilanterol 200-25 mcg/actuation 1 puff  Dose: 1 puff  Freq: Daily Route: IN  Start: 04/25/24 0900 End: 04/26/24 1512   Admin Instructions:          0851      0859     1512-D/C'd           And  umeclidinium 62.5 mcg/actuation inhaler AEPB 1 puff  Dose: 1 puff  Freq: Daily Route: IN  Start: 04/25/24 0900 End: 04/26/24 1512       0851      0859     1512-D/C'd           HYDROmorphone (DILAUDID) injection 1 mg  Dose: 1 mg  Freq: Once Route: IV  Start: 04/29/24 2130   Admin Instructions:              (2347)          HYDROmorphone (DILAUDID) injection 1 mg  Dose: 1 mg  Freq: Once Route: IV  Start: 04/24/24 1945 End: 04/24/24 1947   Admin  Instructions:         1947               lidocaine (URO-JET) 2 % urethral/mucosal gel 1 Application  Dose: 1 Application  Freq: Once Route: UR  Start: 04/07/24 2115 End: 04/07/24 2116   Admin Instructions:                   LORazepam (ATIVAN) injection 0.5 mg  Dose: 0.5 mg  Freq: Once Route: IV  Start: 04/24/24 1845 End: 04/24/24 1849   Admin Instructions:         1849               metoprolol tartrate (LOPRESSOR) partial tablet 12.5 mg  Dose: 12.5 mg  Freq: Every 12 hours scheduled Route: PO  Start: 04/30/24 0015   Admin Instructions:      Order specific questions:               (0994) [phone] 0027 1690        metoprolol tartrate (LOPRESSOR) partial tablet 12.5 mg  Dose: 12.5 mg  Freq: Every 12 hours scheduled Route: PO  Start: 04/24/24 2315 End: 04/26/24 1512   Admin Instructions:      Order specific questions:         2326      0882     2121      0817     1512-D/C'd           nicotine (NICODERM CQ) 14 mg/24hr TD 24 hr patch 1 patch  Dose: 1 patch  Freq: Daily Route: TD  Start: 04/30/24 0900   Admin Instructions:               1020      0924     0909        nicotine (NICODERM CQ) 7 mg/24hr TD 24 hr patch 1 patch  Dose: 1 patch  Freq: Daily Route: TD  Start: 04/25/24 0900 End: 04/26/24 1512   Admin Instructions:          0851      0873     0834     1311 [C]     1512-D/C'd           phenazopyridine (PYRIDIUM) tablet 100 mg  Dose: 100 mg  Freq: 3 times daily with meals Route: PO  Start: 04/25/24 1630 End: 04/26/24 1512       (4129) (2855)     (8406)     1512-D/C'd           saccharomyces boulardii (FLORASTOR) capsule 250 mg  Dose: 250 mg  Freq: 2 times daily Route: PO  Start: 04/25/24 0900 End: 04/26/24 1512       0851     (3323)      (6062)     1512-D/C'd                       Continuous Meds Sorted by Name  for Joel Wyman as of 04/22/24 through 5/1/24  Legend:       Medications 04/22 04/23 04/24 04/25 04/26 04/27 04/28 04/29 04/30 05/01   lactated ringers infusion  Freq: Continuous PRN  Route: IV  Start: 04/24/24 2135 End: 04/24/24 2230      2135     2221     2230-D/C'd             lactated ringers infusion  Freq: Continuous PRN Route: IV  Start: 04/11/24 1204 End: 04/11/24 1306                lactated ringers infusion  Rate: 125 mL/hr Dose: 125 mL/hr  Freq: Continuous Route: IV  Start: 04/11/24 1000 End: 04/11/24 1646                sodium chloride 0.9 % infusion  Rate: 50 mL/hr Dose: 50 mL/hr  Freq: Continuous Route: IV  Indications of Use: IV Hydration  Last Dose: Stopped (04/26/24 1059)  Start: 04/24/24 2315 End: 04/25/24 2320 2321      2320-D/C'd    1059                         PRN Meds Sorted by Name  for Joel Wyman as of 04/22/24 through 5/1/24  Legend:       Medications 04/22 04/23 04/24 04/25 04/26 04/27 04/28 04/29 04/30 05/01   acetaminophen (TYLENOL) tablet 650 mg  Dose: 650 mg  Freq: Every 6 hours PRN Route: PO  PRN Reasons: mild pain,fever  Start: 04/29/24 2212                acetaminophen (TYLENOL) tablet 650 mg  Dose: 650 mg  Freq: Every 6 hours PRN Route: PO  PRN Reasons: mild pain,headaches,fever  Indications of Use: FEVER,HEADACHE,MILD PAIN  Start: 04/24/24 2312 End: 04/26/24 1512        1512-D/C'd           albuterol (PROVENTIL HFA,VENTOLIN HFA) inhaler 2 puff  Dose: 2 puff  Freq: Every 4 hours PRN Route: IN  PRN Reason: wheezing  Start: 04/30/24 0006   Admin Instructions:               1623         albuterol (PROVENTIL HFA,VENTOLIN HFA) inhaler 2 puff  Dose: 2 puff  Freq: Every 4 hours PRN Route: IN  PRN Reasons: wheezing,shortness of breath  Start: 04/24/24 2312 End: 04/26/24 1512   Admin Instructions:          1155      0858     1512-D/C'd           aluminum-magnesium hydroxide-simethicone (MAALOX) oral suspension 30 mL  Dose: 30 mL  Freq: Every 6 hours PRN Route: PO  PRN Reasons: indigestion,heartburn  Start: 04/24/24 2312 End: 04/26/24 1512   Admin Instructions:           1512-D/C'd           ceFAZolin (ANCEF) IVPB (premix in dextrose)  Freq: As needed Route:  IV  Start: 04/11/24 1208 End: 04/11/24 1306                dexamethasone (PF) (DECADRON) injection  Freq: As needed Route: IV  Start: 04/24/24 2151 End: 04/24/24 2230 2151     2230-D/C'd             dexamethasone (PF) (DECADRON) injection  Freq: As needed Route: IV  Start: 04/11/24 1221 End: 04/11/24 1306                ePHEDrine injection  Freq: As needed Route: IV  Start: 04/11/24 1220 End: 04/11/24 1306                fentaNYL (SUBLIMAZE) injection 25 mcg  Dose: 25 mcg  Freq: Every 3 minutes PRN Route: IV  PRN Reason: Pain - PACU  PRN Comment: Breakthrough - first line  Indications of Use: PAIN  Start: 04/11/24 1339 End: 04/11/24 1445   Admin Instructions:                   fentaNYL (SUBLIMAZE) injection 50 mcg  Dose: 50 mcg  Freq: Every 3 minutes PRN Route: IV  PRN Reason: Pain - PACU  PRN Comment: Breakthrough - first line  Start: 04/24/24 2221 End: 04/24/24 2312   Admin Instructions:         2312-D/C'd             fentaNYL injection  Freq: As needed Route: IV  Start: 04/24/24 2141 End: 04/24/24 2230 2141 2201 2207     2230-D/C'd             fentaNYL injection  Freq: As needed Route: IV  Start: 04/11/24 1227 End: 04/11/24 1306                fluticasone (FLONASE) 50 mcg/act nasal spray 1 spray  Dose: 1 spray  Freq: Daily PRN Route: EACH NARE  PRN Reasons: rhinitis,allergies  Start: 04/24/24 2312 End: 04/26/24 1512   Admin Instructions:           1512-D/C'd           glycine urologic 1.5 % irrigation SOLN  Freq: As needed  Start: 04/24/24 2214 End: 04/24/24 2230 2214 [C]     2230-D/C'd             glycine urologic 1.5 % irrigation SOLN  Freq: As needed  Start: 04/11/24 1233 End: 04/11/24 1302                HYDROmorphone HCl (DILAUDID) injection 0.2 mg  Dose: 0.2 mg  Freq: Every 4 hours PRN Route: IV  PRN Reason: severe pain  Start: 04/25/24 1303 End: 04/26/24 1512   Admin Instructions:          1308     1733     2259      0724     1125     1512-D/C'd           iohexol (OMNIPAQUE) 240  MG/ML solution  Freq: As needed  Start: 04/11/24 1255 End: 04/11/24 1302                iohexol (OMNIPAQUE) 240 MG/ML solution 20 mL  Dose: 20 mL  Freq: Once in imaging Route: OTHER  PRN Reason: contrast  Start: 04/24/24 2310 End: 04/24/24 2311 2311               iohexol (OMNIPAQUE) 240 MG/ML solution 20 mL  Dose: 20 mL  Freq: Once in imaging Route: IV  PRN Reason: contrast  Start: 04/24/24 2308 End: 04/24/24 2310      2310-D/C'd             iohexol (OMNIPAQUE) 350 MG/ML injection (MULTI-DOSE) 100 mL  Dose: 100 mL  Freq: Once in imaging Route: IV  PRN Reason: contrast  Start: 05/01/24 1057 End: 05/01/24 1057 1057         lactated ringers bolus 1,000 mL  Dose: 1,000 mL  Freq: Once as needed Route: IV  PRN Comment: For SBP less than 100mmHg and K level less than or equal to 4.8 as per Urology Hypotension Protocol  Start: 04/24/24 2312 End: 04/25/24 2311   Admin Instructions:          2311-D/C'd            And  lactated ringers bolus 1,000 mL  Dose: 1,000 mL  Freq: Once as needed Route: IV  PRN Comment: For SBP less than 100mmHg and K level less than or equal to 4.8 as per Urology Hypotension Protocol  Start: 04/24/24 2312 End: 04/25/24 2311   Admin Instructions:          2311-D/C'd            lactated ringers infusion  Freq: Continuous PRN Route: IV  Start: 04/24/24 2135 End: 04/24/24 2230 2135 2221     2230-D/C'd             lactated ringers infusion  Freq: Continuous PRN Route: IV  Start: 04/11/24 1204 End: 04/11/24 1306                lidocaine (PF) (XYLOCAINE-MPF) 1 % injection  Freq: As needed Route: IV  Start: 04/24/24 2141 End: 04/24/24 2230      2141     2230-D/C'd             lidocaine (PF) (XYLOCAINE-MPF) 1 % injection  Freq: As needed Route: IV  Start: 04/11/24 1214 End: 04/11/24 1306                morphine injection 2 mg  Dose: 2 mg  Freq: Every 6 hours PRN Route: IV  PRN Reason: severe pain  Start: 04/29/24 2212   Admin Instructions:               0043     0600     1402      2046      0307     0928        ondansetron (ZOFRAN) injection  Freq: As needed Route: IV  Start: 04/24/24 2151 End: 04/24/24 2230      2151     2230-D/C'd             ondansetron (ZOFRAN) injection  Freq: As needed Route: IV  Start: 04/11/24 1221 End: 04/11/24 1306                ondansetron (ZOFRAN) injection 4 mg  Dose: 4 mg  Freq: Once as needed Route: IV  PRN Reason: nausea  PRN Comment: First Line For Nausea  Start: 04/24/24 2221 End: 04/24/24 2312   Admin Instructions:         2312-D/C'd             ondansetron (ZOFRAN) injection 4 mg  Dose: 4 mg  Freq: Every 6 hours PRN Route: IV  PRN Reasons: nausea,vomiting  Start: 04/24/24 2312 End: 04/26/24 1512   Admin Instructions:           1512-D/C'd           ondansetron (ZOFRAN) injection 4 mg  Dose: 4 mg  Freq: Once as needed Route: IV  PRN Reason: nausea  PRN Comment: First Line For Nausea  Start: 04/11/24 1339 End: 04/11/24 1445   Admin Instructions:                   phenylephrine bolus from bag  Freq: As needed Route: IV  Start: 04/24/24 2152 End: 04/24/24 2230      2152     2201     2230-D/C'd             propofol (DIPRIVAN) 200 MG/20ML bolus injection  Freq: As needed Route: IV  Start: 04/24/24 2141 End: 04/24/24 2230      2141     2230-D/C'd             propofol (DIPRIVAN) 200 MG/20ML bolus injection  Freq: As needed Route: IV  Start: 04/11/24 1214 End: 04/11/24 1306                 sodium chloride 0.9 % bolus 1,000 mL  Dose: 1,000 mL  Freq: Once as needed Route: IV  PRN Comment: For SBP less than 100mmHg and K level greater than 4.8 as per Urology Hypotension Protocol  Start: 04/24/24 2312 End: 04/25/24 2311   Admin Instructions:          2311-D/C'd            And  sodium chloride 0.9 % bolus 1,000 mL  Dose: 1,000 mL  Freq: Once as needed Route: IV  PRN Comment: For SBP less than 100mmHg and K level greater than 4.8 as per Urology Hypotension Protocol  Start: 04/24/24 2312 End: 04/25/24 2311   Admin Instructions:          2311-D/C'd            sodium  chloride 0.9 % irrigation solution  Freq: As needed  Start: 04/11/24 1234 End: 04/11/24 1302                traMADol (ULTRAM) tablet 50 mg  Dose: 50 mg  Freq: Every 6 hours PRN Route: PO  PRN Reason: moderate pain  Start: 04/29/24 2212   Admin Instructions:              2254      1037     1908      0716     1407              Network Utilization Review Department  ATTENTION: Please call with any questions or concerns to [phone] and carefully listen to the prompts so that you are directed to the right person. All voicemails are confidential.   For Discharge needs, contact Care Management DC Support Team at [phone] opt. 2  Send all requests for admission clinical reviews, approved or denied determinations and any other requests to dedicated fax number below belonging to the campus where the patient is receiving treatment. List of dedicated fax numbers for the Facilities:  FACILITY NAME UR FAX NUMBER   ADMISSION DENIALS (Administrative/Medical Necessity) [phone]   DISCHARGE SUPPORT TEAM (NETWORK) [phone]   PARENT CHILD HEALTH (Maternity/NICU/Pediatrics) [phone]   [facility] [phone]   [facility] [phone]   [facility] [phone]   [facility] [phone]   [facility] [phone]   [facility] [phone]   [facility] [phone]   [facility] [phone]   [facility] [phone]   [facility] [phone]   [facility] [phone]   [facility] [phone]

## 2024-04-30 NOTE — ASSESSMENT & PLAN NOTE
Patient admitted with hematuria on previous admission.   -Status post cystoscopy with evacuation of clots, fulguration of bladder neck tumors and biopsy.  Retrograde urethrogram/cystogram DVIU was done on 4/24  -Lokesh blood with clots in alanis bag and inability to urinate  -CBI was initiated and currently hematuria has resolved  -Hgb stable  -Appreciate urology input

## 2024-04-30 NOTE — ED NOTES
This RN irrigated urinary catheter. Clot present on irrigation.  Pt felt instant relief upon irrigation. Pt is currently draining red urine at this time.     Penelope Waterman RN  04/29/24 2008

## 2024-04-30 NOTE — ASSESSMENT & PLAN NOTE
-Patient with hematuria noted on previous admission  -Lokesh blood with clots in alanis bag and inability to urinate  -CBI initiated  -Hgb stable  -UA pending  -Urology consulted

## 2024-04-30 NOTE — ASSESSMENT & PLAN NOTE
Patient presented with acute on chronic urinary retention, Sumner catheter in place  -History of bladder neck contracture, status post cystoscopy transurethral resection bladder neck contracture bladder tumor evacuation of clots on 4/11/2024.   -Was discharged with Sumner in place on previous admission

## 2024-04-30 NOTE — PROGRESS NOTES
"Progress Note - Urology      Patient: Joel Wyman   : 1945 Sex: male   MRN: [id number]     CSN: [id number]  Unit/Bed#: [address] [place] [address]     SUBJECTIVE:   Vs stable  Cbi pink  Continue cbi today  Bladder lesion fibric tissue no cancer      Objective   Vitals: /81 (BP Location: Left arm)   Pulse 72   Temp (!) 97.3 °F (36.3 °C) (Oral)   Resp 20   Ht 5' 10\" (1.778 m)   Wt 96.2 kg (212 lb)   SpO2 91%   BMI 30.42 kg/m²     I/O last 24 hours:  In: -   Out: 3050 [Urine:3050]      Physical Exam:   General Alert awake   Normocephalic atraumatic PERRLA  Lungs clear bilaterally  Cardiac normal S1 normal S2  Abdomen soft, flank pain  Extremities no edema      Lab Results: CBC:   Lab Results   Component Value Date    WBC 11.85 (H) 2024    HGB 14.5 2024    HCT 44.2 2024    MCV 88 2024     2024    RBC 5.03 2024    MCH 28.8 2024    MCHC 32.8 2024    RDW 15.0 2024    MPV 9.6 2024    NRBC 0 2024     CMP:   Lab Results   Component Value Date     2024    CO2 25 2024    BUN 33 (H) 2024    CREATININE 1.19 2024    CALCIUM 8.8 2024    AST 15 2024    ALT 15 2024    ALKPHOS 62 2024    EGFR 57 2024     Urinalysis:   Lab Results   Component Value Date    COLORU Dark Red 2024    CLARITYU Slightly Cloudy 2024    SPECGRAV 1.025 2024    PHUR 7.5 2024    PHUR 5.5 2018    LEUKOCYTESUR Small (A) 2024    NITRITE Negative 2024    GLUCOSEU Negative 2024    KETONESU 10 (1+) (A) 2024    BILIRUBINUR Negative 2024    BLOODU Large (A) 2024     Urine Culture:   Lab Results   Component Value Date    URINECX No Growth <1000 cfu/mL 2024     PSA: No results found for: \"PSA\"      Assessment/ Plan:  Gross hematuria  S/p cyto/tur bnc/bt x day 5  Continue cbi  Hand irrigated by the bedside no clots  Patient appears to have necrosis of the bladder " neck urethra possibly from radiation changes poor blood flow  No cancer noted  Long discussion with patient and wife present strong consideration for suprapubic tube placement  In light of gross bleeding and history of bladder papillomas will schedule CT urogram to evaluate the upper tracts and surrounding tissue  If normal will schedule IR intervention for SP tube if family agrees to discuss it with family members ruddy Khan MD

## 2024-04-30 NOTE — ED NOTES
Pt stating that he feels another spasm in bladder area. MD made aware     Penelope Waterman RN  04/29/24 2043       Penelope Waterman RN  04/29/24 2059

## 2024-04-30 NOTE — H&P
Former[facility]  H&P  Name: Joel Wyman 78 y.o. male I MRN: [id number]  Unit/Bed#: 2 [address] Date of Admission: 4/29/2024   Date of Service: 4/30/2024 I Hospital Day: 0      Assessment/Plan   * Hemorrhagic cystitis  Assessment & Plan  -Patient with hematuria noted on previous admission  -Lokesh blood with clots in alanis bag and inability to urinate  -CBI initiated  -Hgb stable  -UA pending  -Urology consulted    Urinary retention  Assessment & Plan  -Patient presents with acute on chronic urinary retention, Alanis catheter in place  -History of bladder neck contracture, status post cystoscopy transurethral resection bladder neck contracture bladder tumor evacuation of clots on 4/11/2024.   -Was discharged with Alanis in place on previous admission.  -CBI initiated, urology consulted    Nicotine abuse  Assessment & Plan  -Continue nicotine patch    Stage 3b chronic kidney disease (HCC)  Assessment & Plan  Renal function at baseline    Prostate cancer ([facility]) - s/p resection  Assessment & Plan  -History of prostate cancer status post XRT.     COPD (chronic obstructive pulmonary disease) ([facility])  Assessment & Plan  -No evidence of exacerbation  -Continue home inhalers    Hyperlipidemia  Assessment & Plan  -Diet controlled    Essential hypertension  Assessment & Plan  Continue metoprolol    VTE Pharmacologic Prophylaxis: VTE Score: 4 Low Risk (Score 0-2) - Encourage Ambulation.  Code Status: Level 1 - Full Code   Discussion with family: Patient declined call to .     Anticipated Length of Stay: Patient will be admitted on an observation basis with an anticipated length of stay of less than 2 midnights secondary to hematuria.    Total Time Spent on Date of Encounter in care of patient: 25 mins. This time was spent on one or more of the following: performing physical exam; counseling and coordination of care; obtaining or reviewing history; documenting in the medical record;  reviewing/ordering tests, medications or procedures; communicating with other healthcare professionals and discussing with patient's family/caregivers.    Chief Complaint: hematuria    History of Present Illness:  Joel Wyman is a 78 y.o. male with a PMH of 78 y.o. male patient with past medical history of prostate cancer, bladder neck contracture, UTIs, CKD 3, nicotine abuse, hypertension, hyperlipidemia, COPD who presented to the hospital due to hematuria and inability to urinate. Patient was previously hospitalized here at [facility] on 4/24/2024 due to inability to urinate with bloody urine. Patient was seen by urology in the ED and emergently taken for cystoscopy with evacuation of clots, fulguration of bladder neck tumors with retrograde urethrogram/cystogram with a DVIU.  Patient was on CBI initially which was later discontinued.  Patient received ceftriaxone during hospitalization.  Patient continued remained stable with improved hematuria and was discharged home with outpatient follow-up with urology with chronic Sumner. He now returns with recurrent symptoms of hematuria and urinary retention.    Review of Systems:  Review of Systems   Constitutional: Negative.    HENT: Negative.     Eyes: Negative.    Respiratory: Negative.     Cardiovascular: Negative.    Gastrointestinal: Negative.    Endocrine: Negative.    Genitourinary:  Positive for decreased urine volume and hematuria.   Musculoskeletal: Negative.    Allergic/Immunologic: Negative.    Neurological: Negative.    Hematological: Negative.    Psychiatric/Behavioral: Negative.         Past Medical and Surgical History:   Past Medical History:   Diagnosis Date    Bladder infection 03/2024    Borderline diabetes     Cancer (HCC)     prostate    COPD (chronic obstructive pulmonary disease) (HCC)     Hematuria     History of transfusion     x2    Hyperlipidemia     Hypertension     Radiation cystitis     Smoker     Wears glasses        Past  Surgical History:   Procedure Laterality Date    APPENDECTOMY      FL RETROGRADE PYELOGRAM  04/14/2021    FL RETROGRADE PYELOGRAM  11/16/2023    FL RETROGRADE PYELOGRAM  4/11/2024    HERNIA REPAIR      IR EMBOLIZATION (SPECIFY VESSEL OR SITE)  09/17/2021    IR NEPHROSTOMY TUBE CHECK AND/OR REMOVAL  07/08/2021    IR NEPHROSTOMY TUBE CHECK/CHANGE/REPOSITION/REINSERTION/UPSIZE  05/10/2021    IR NEPHROSTOMY TUBE CHECK/CHANGE/REPOSITION/REINSERTION/UPSIZE  08/04/2021    IR NEPHROSTOMY TUBE CHECK/CHANGE/REPOSITION/REINSERTION/UPSIZE  10/13/2021    IR NEPHROSTOMY TUBE PLACEMENT  05/07/2021    NH CYSTO BLADDER W/URETERAL CATHETERIZATION Bilateral 4/11/2024    Procedure: BILATERAL CYSTOSCOPY WITH RETROGRADE PYELOGRAM, BLADDER NECK CONTRACTURE;  Surgeon: Yovani Khan MD;  [facility]: [facility];  Service: Urology    NH CYSTO W/IRRIG & EVAC MULTPLE OBSTRUCTING CLOTS Bilateral 04/14/2021    Procedure: CYSTOSCOPY EVACUATION OF CLOTS bilateral retrograde pyelograms possible TURBT bladder biopsy;  Surgeon: Yovani Khan MD;  [facility];  Service: Urology    NH CYSTO W/IRRIG & EVAC MULTPLE OBSTRUCTING CLOTS N/A 04/24/2021    Procedure: CYSTOSCOPY EVACUATION OF CLOTS fulguration;  Surgeon: Yovani Khan MD;  [facility];  Service: Urology    NH CYSTO W/IRRIG & EVAC MULTPLE OBSTRUCTING CLOTS N/A 07/08/2021    Procedure: CYSTOSCOPY EVACUATION OF CLOTS BILATERAL ANTIROGRADES NEPHROSTOMY TUBES, FULGERATION ;  Surgeon: Yovani Khan MD;  [facility];  Service: Urology    NH CYSTO W/IRRIG & EVAC MULTPLE OBSTRUCTING CLOTS N/A 08/22/2021    Procedure: CYSTOSCOPY, RIGHT RETROGRADE, EVACUATION OF CLOTS, BLADDER BIOPSY X2 AND FULGERATION OF BLADDER LESIONS, URETEROSCOPY, 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;  Surgeon: Yovani Khan MD;  [facility];  Service: Urology    NH CYSTO W/IRRIG & EVAC MULTPLE OBSTRUCTING CLOTS Bilateral 4/24/2024     Procedure: CYSTOSCOPY EVACUATION OF CLOTS fulguration bladder neck tumors and biopsy, retrograde pyelograms, DVIU;  Surgeon: Yovani Khan MD;  [facility];  Service: Urology    PA CYSTOURETHROSCOPY W/DEST &/RMVL MED BLADDER AMARI N/A 4/11/2024    Procedure: TRANSURETHRAL RESECTION OF BLADDER TUMOR (TURBT);  Surgeon: Yovani Khan MD;  [facility];  Service: Urology    PA CYSTOURETHROSCOPY WITH BIOPSY N/A 11/16/2023    Procedure: CYSTOSCOPY, BILATERAL RETROGRADEY PYELOGRAM,  FULGERATION 2.0 CM BLADDER TUMOR WITH BIOPSY;  Surgeon: Yovani Khan MD;  [facility];  Service: Urology    PROSTATECTOMY  2014    ROTATOR CUFF REPAIR      right shoulder       Meds/Allergies:  Prior to Admission medications    Medication Sig Start Date End Date Taking? Authorizing Provider   albuterol (PROVENTIL HFA,VENTOLIN HFA) 90 mcg/act inhaler Inhale 2 puffs every 4 (four) hours as needed for wheezing 8/31/17  Yes Kelly Rivera,    fluticasone (FLONASE) 50 mcg/act nasal spray 1 spray into each nostril daily as needed   Yes Historical Provider, MD   fluticasone-umeclidinium-vilanterol (Trelegy Ellipta) 200-62.5-25 mcg/actuation AEPB inhaler Inhale 1 puff every morning 6/27/23 6/26/24 Yes Historical Provider, MD   metoprolol tartrate (LOPRESSOR) 25 mg tablet Take 12.5 mg by mouth every 12 (twelve) hours   Yes Historical Provider, MD     I have reviewed home medications using recent Epic encounter.    Allergies: No Known Allergies    Social History:  Marital Status: /Civil Union   Patient Pre-hospital Living Situation: Home  Patient Pre-hospital Level of Mobility: walks  Substance Use History:   Social History     Substance and Sexual Activity   Alcohol Use Not Currently    Comment: None in over 50 yrs     Social History     Tobacco Use   Smoking Status Some Days    Current packs/day: 0.50    Average packs/day: 0.5 packs/day for 50.0 years (25.0 ttl pk-yrs)    Types: Cigarettes    Passive exposure:  "Never   Smokeless Tobacco Never     Social History     Substance and Sexual Activity   Drug Use No       Family History:  Family History   Problem Relation Age of Onset    Diabetes Mother     Stroke Mother     Diabetes Brother     Stroke Brother        Physical Exam:     Vitals:   Blood Pressure: 142/77 (04/30/24 0043)  Pulse: 68 (04/30/24 0043)  Temperature: (!) 97.4 °F (36.3 °C) (04/29/24 2247)  Temp Source: Temporal (04/29/24 1920)  Respirations: 18 (04/30/24 0043)  Height: 5' 10\" (177.8 cm) (04/29/24 2300)  Weight - Scale: 96.2 kg (212 lb) (04/29/24 2300)  SpO2: 91 % (04/30/24 0043)    Physical Exam  HENT:      Head: Normocephalic.      Nose: Nose normal.      Mouth/Throat:      Mouth: Mucous membranes are moist.   Eyes:      Pupils: Pupils are equal, round, and reactive to light.   Cardiovascular:      Rate and Rhythm: Normal rate and regular rhythm.      Pulses: Normal pulses.      Heart sounds: Normal heart sounds.   Pulmonary:      Effort: Pulmonary effort is normal.      Breath sounds: Normal breath sounds.   Abdominal:      General: Abdomen is flat. Bowel sounds are normal.      Palpations: Abdomen is soft.   Genitourinary:     Comments: Sumner in place  Lokesh blood and clots in Sumner catheter bag  Musculoskeletal:         General: Normal range of motion.      Cervical back: Normal range of motion.   Skin:     General: Skin is warm and dry.      Capillary Refill: Capillary refill takes less than 2 seconds.   Neurological:      General: No focal deficit present.      Mental Status: He is alert and oriented to person, place, and time. Mental status is at baseline.   Psychiatric:         Mood and Affect: Mood normal.          Additional Data:     Lab Results:  Results from last 7 days   Lab Units 04/29/24  2150   WBC Thousand/uL 11.85*   HEMOGLOBIN g/dL 14.5   HEMATOCRIT % 44.2   PLATELETS Thousands/uL 209   SEGS PCT % 76*   LYMPHO PCT % 13*   MONO PCT % 8   EOS PCT % 2     Results from last 7 days   Lab Units " 04/29/24  2150 04/25/24  0501 04/24/24  1853   SODIUM mmol/L 136   < > 137   POTASSIUM mmol/L 4.2   < > 4.2   CHLORIDE mmol/L 103   < > 102   CO2 mmol/L 25   < > 25   BUN mg/dL 39*   < > 30*   CREATININE mg/dL 1.29   < > 1.35*   ANION GAP mmol/L 8   < > 10   CALCIUM mg/dL 9.1   < > 9.7   ALBUMIN g/dL  --   --  4.2   TOTAL BILIRUBIN mg/dL  --   --  0.59   ALK PHOS U/L  --   --  78   ALT U/L  --   --  17   AST U/L  --   --  20   GLUCOSE RANDOM mg/dL 110   < > 106    < > = values in this interval not displayed.                       Lines/Drains:  Invasive Devices       Peripheral Intravenous Line  Duration             Peripheral IV 04/29/24 Distal;Dorsal (posterior);Left Forearm <1 day              Drain  Duration             Urethral Catheter Latex;Three way 22 Fr. 5 days                  Urinary Catheter:  Goal for removal: N/A - Chronic Sumner             Imaging: No pertinent imaging reviewed.  No orders to display       EKG and Other Studies Reviewed on Admission:   EKG: No EKG obtained.    ** Please Note: This note has been constructed using a voice recognition system. **

## 2024-04-30 NOTE — CONSULTS
H&P Exam - Urology       Patient: Joel Wyman   : 1945 Sex: male   MRN: [id number]     CSN: [id number]      History of Present Illness   HPI:  Joel Wyman is a 78 y.o. 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 and bladder papillomas recently found to have recurrent gross hematuria found on cystoscopy retrogrades to have suspicious bladder tissue throughout the entire neck and urethra undergoing cystoscopy TUR bladder neck contracture fulguration Path confirming inflammatory tissue no carcinoma recently readmitted last week for urinary retention difficult cath undergoing cystoscopy internal urethrotomy transurethral resection bladder neck contracture irrigation of clots removal bladder tumor second pathology pending patient presents to the ER now with indwelling three-way Sumner catheter with multiple clots hand irrigated by staff urologic consult called for hand irrigated now removing clots and bladder debris going to admit with continuous bladder irrigation patient and family has already delegated to move forward with suprapubic tube placement in light of significant urethral stricture disease to be scheduled by interventional radiology in a few weeks or at this point during this admission pending hematuria        Review of Systems:   Constitutional:  Negative for activity change, fever, chills and diaphoresis.   HENT: Negative for hearing loss and trouble swallowing.   Eyes: Negative for itching and visual disturbance.   Respiratory: Negative for chest tightness and shortness of breath.   Cardiovascular: Negative for chest pain, edema.   Gastrointestinal: Negative for abdominal distention, na abdominal pain, constipation, diarrhea, Nausea and vomiting.   Genitourinary: Negative for decreased  urine volume, difficulty urinating, dysuria, enuresis, frequency, hematuria and urgency.   Musculoskeletal: Negative for gait problem and myalgias.   Neurological: Negative for dizziness and headaches.   Hematological: Does not bruise/bleed easily.       Historical Information   Past Medical History:   Diagnosis Date    Bladder infection 03/2024    Borderline diabetes     Cancer (HCC)     prostate    COPD (chronic obstructive pulmonary disease) (HCC)     Hematuria     History of transfusion     x2    Hyperlipidemia     Hypertension     Radiation cystitis     Smoker     Wears glasses      Past Surgical History:   Procedure Laterality Date    APPENDECTOMY      FL RETROGRADE PYELOGRAM  04/14/2021    FL RETROGRADE PYELOGRAM  11/16/2023    FL RETROGRADE PYELOGRAM  4/11/2024    HERNIA REPAIR      IR EMBOLIZATION (SPECIFY VESSEL OR SITE)  09/17/2021    IR NEPHROSTOMY TUBE CHECK AND/OR REMOVAL  07/08/2021    IR NEPHROSTOMY TUBE CHECK/CHANGE/REPOSITION/REINSERTION/UPSIZE  05/10/2021    IR NEPHROSTOMY TUBE CHECK/CHANGE/REPOSITION/REINSERTION/UPSIZE  08/04/2021    IR NEPHROSTOMY TUBE CHECK/CHANGE/REPOSITION/REINSERTION/UPSIZE  10/13/2021    IR NEPHROSTOMY TUBE PLACEMENT  05/07/2021    MA CYSTO BLADDER W/URETERAL CATHETERIZATION Bilateral 4/11/2024    Procedure: BILATERAL CYSTOSCOPY WITH RETROGRADE PYELOGRAM, BLADDER NECK CONTRACTURE;  Surgeon: Yovani Khan MD;  [facility];  Service: Urology    MA CYSTO W/IRRIG & EVAC MULTPLE OBSTRUCTING CLOTS Bilateral 04/14/2021    Procedure: CYSTOSCOPY EVACUATION OF CLOTS bilateral retrograde pyelograms possible TURBT bladder biopsy;  Surgeon: Yovani Khan MD;  [facility];  Service: Urology    MA CYSTO W/IRRIG & EVAC MULTPLE OBSTRUCTING CLOTS N/A 04/24/2021    Procedure: CYSTOSCOPY EVACUATION OF CLOTS fulguration;  Surgeon: Yovani Khan MD;  [facility];  Service: Urology    MA CYSTO W/IRRIG & EVAC MULTPLE OBSTRUCTING CLOTS N/A 07/08/2021    Procedure:  CYSTOSCOPY EVACUATION OF CLOTS BILATERAL ANTIROGRADES NEPHROSTOMY TUBES, FULGERATION ;  Surgeon: Yovani Khan MD;  [facility];  Service: Urology    [place] CYSTO W/IRRIG & EVAC MULTPLE OBSTRUCTING CLOTS N/A 08/22/2021    Procedure: CYSTOSCOPY, RIGHT RETROGRADE, EVACUATION OF CLOTS, BLADDER BIOPSY X2 AND FULGERATION OF BLADDER LESIONS, URETEROSCOPY, 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;  Surgeon: Yovani Khan MD;  [facility];  Service: Urology    TX CYSTO W/IRRIG & EVAC MULTPLE OBSTRUCTING CLOTS Bilateral 4/24/2024    Procedure: CYSTOSCOPY EVACUATION OF CLOTS fulguration bladder neck tumors and biopsy, retrograde pyelograms, DVIU;  Surgeon: Yovani Khan MD;  [facility];  Service: Urology    TX CYSTOURETHROSCOPY W/DEST &/RMVL MED BLADDER AMARI N/A 4/11/2024    Procedure: TRANSURETHRAL RESECTION OF BLADDER TUMOR (TURBT);  Surgeon: Yovani Khan MD;  [facility];  Service: Urology    TX CYSTOURETHROSCOPY WITH BIOPSY N/A 11/16/2023    Procedure: CYSTOSCOPY, BILATERAL RETROGRADEY PYELOGRAM,  FULGERATION 2.0 CM BLADDER TUMOR WITH BIOPSY;  Surgeon: Yovani Khan MD;  [facility];  Service: Urology    PROSTATECTOMY  2014    ROTATOR CUFF REPAIR      right shoulder     Social History   Social History     Substance and Sexual Activity   Alcohol Use Not Currently    Comment: None in over 50 yrs     Social History     Substance and Sexual Activity   Drug Use No     Social History     Tobacco Use   Smoking Status Some Days    Current packs/day: 0.50    Average packs/day: 0.5 packs/day for 50.0 years (25.0 ttl pk-yrs)    Types: Cigarettes    Passive exposure: Never   Smokeless Tobacco Never     Family History:   Family History   Problem Relation Age of Onset    Diabetes Mother     Stroke Mother     Diabetes Brother     Stroke Brother        Meds/Allergies   (Not in a hospital admission)    No Known Allergies    Objective  "  Vitals: BP (!) 173/98 (BP Location: Left arm)   Pulse 88   Temp 98 °F (36.7 °C) (Temporal)   Resp 20   Wt 96.2 kg (212 lb)   SpO2 98%   BMI 29.57 kg/m²     Physical Exam:  General Alert awake   Normocephalic atraumatic PERRLA  Lungs clear bilaterally  Cardiac normal S1 normal S2  Abdomen soft, flank pain  Extremities no edema    No intake/output data recorded.    Invasive Devices       Drain  Duration             Urethral Catheter Latex;Three way 22 Fr. 4 days                        Lab Results: CBC:   Lab Results   Component Value Date    WBC 10.72 (H) 04/25/2024    HGB 14.2 04/25/2024    HCT 44.7 04/25/2024    MCV 91 04/25/2024     04/25/2024    RBC 4.89 04/25/2024    MCH 29.0 04/25/2024    MCHC 31.8 04/25/2024    RDW 14.8 04/25/2024    MPV 9.7 04/25/2024    NRBC 0 04/25/2024     CMP:   Lab Results   Component Value Date     04/25/2024    CO2 29 04/25/2024    BUN 30 (H) 04/25/2024    CREATININE 1.32 (H) 04/25/2024    CALCIUM 8.8 04/25/2024    AST 20 04/24/2024    ALT 17 04/24/2024    ALKPHOS 78 04/24/2024    EGFR 50 04/25/2024     Urinalysis:   Lab Results   Component Value Date    COLORU Red 04/07/2024    CLARITYU Cloudy 04/07/2024    SPECGRAV 1.025 04/07/2024    PHUR 7.0 04/07/2024    PHUR 5.5 03/25/2018    LEUKOCYTESUR Trace (A) 04/07/2024    NITRITE Positive (A) 04/07/2024    GLUCOSEU 250 (1/4%) (A) 04/07/2024    KETONESU Trace (A) 04/07/2024    BILIRUBINUR Negative 04/07/2024    BLOODU Large (A) 04/07/2024     Urine Culture:   Lab Results   Component Value Date    URINECX No Growth <1000 cfu/mL 04/07/2024     PSA: No results found for: \"PSA\"        Assessment/ Plan:  Gross hematuria  Bladder tumor path from second TURBT pending  22 Korean three-way Sumner catheter hand irrigated  Mild clots  Continuous bladder irrigation  Will put IR consult in for SP tube placement at this point in light of significant urethral stricture disease bladder neck contracture and necrotic bladder neck more than " likely going to recontract if catheter removed      Yovani Khan MD

## 2024-04-30 NOTE — ED PROVIDER NOTES
History  Chief Complaint   Patient presents with    Urinary Catheter Problem     Reported that he has been having bleeding to his catch since yesterday. Call Dr Khan office 1545 and told to increase water intake. Since then, no urine out put and pt is reported of pain 10/10 and spasm.      78-year-old male with past history of prostate cancer, radiation cystitis, hypertension, hyperlipidemia, COPD, hematuria, presents to the ED for evaluation of clogged Sumner catheter.  Patient states that he has had clots out of the Sumner catheter.  Patient has not had urine output since this afternoon and he is having extreme bladder spasm.  Subsequently he came to the ED for further evaluation.      History provided by:  Patient  Urinary Catheter Problem  Associated symptoms: no abdominal pain, no chest pain, no cough, no ear pain, no fever, no rash, no shortness of breath, no sore throat and no vomiting        Prior to Admission Medications   Prescriptions Last Dose Informant Patient Reported? Taking?   albuterol (PROVENTIL HFA,VENTOLIN HFA) 90 mcg/act inhaler 2024  No Yes   Sig: Inhale 2 puffs every 4 (four) hours as needed for wheezing   fluticasone (FLONASE) 50 mcg/act nasal spray 2024  Yes Yes   Si spray into each nostril daily as needed   fluticasone-umeclidinium-vilanterol (Trelegy Ellipta) 200-62.5-25 mcg/actuation AEPB inhaler 2024  Yes Yes   Sig: Inhale 1 puff every morning   metoprolol tartrate (LOPRESSOR) 25 mg tablet 2024  Yes Yes   Sig: Take 12.5 mg by mouth every 12 (twelve) hours      Facility-Administered Medications: None       Past Medical History:   Diagnosis Date    Bladder infection 2024    Borderline diabetes     Cancer (HCC)     prostate    COPD (chronic obstructive pulmonary disease) (HCC)     Hematuria     History of transfusion     x2    Hyperlipidemia     Hypertension     Radiation cystitis     Smoker     Wears glasses        Past Surgical History:   Procedure Laterality  Date    APPENDECTOMY      FL RETROGRADE PYELOGRAM  04/14/2021    FL RETROGRADE PYELOGRAM  11/16/2023    FL RETROGRADE PYELOGRAM  4/11/2024    HERNIA REPAIR      IR EMBOLIZATION (SPECIFY VESSEL OR SITE)  09/17/2021    IR NEPHROSTOMY TUBE CHECK AND/OR REMOVAL  07/08/2021    IR NEPHROSTOMY TUBE CHECK/CHANGE/REPOSITION/REINSERTION/UPSIZE  05/10/2021    IR NEPHROSTOMY TUBE CHECK/CHANGE/REPOSITION/REINSERTION/UPSIZE  08/04/2021    IR NEPHROSTOMY TUBE CHECK/CHANGE/REPOSITION/REINSERTION/UPSIZE  10/13/2021    IR NEPHROSTOMY TUBE PLACEMENT  05/07/2021    NJ CYSTO BLADDER W/URETERAL CATHETERIZATION Bilateral 4/11/2024    Procedure: BILATERAL CYSTOSCOPY WITH RETROGRADE PYELOGRAM, BLADDER NECK CONTRACTURE;  Surgeon: Yovani Khan MD;  Location: [facility];  Service: Urology    NJ CYSTO W/IRRIG & EVAC MULTPLE OBSTRUCTING CLOTS Bilateral 04/14/2021    Procedure: CYSTOSCOPY EVACUATION OF CLOTS bilateral retrograde pyelograms possible TURBT bladder biopsy;  Surgeon: Yovani Khan MD;  Location: [facility];  Service: Urology    NJ CYSTO W/IRRIG & EVAC MULTPLE OBSTRUCTING CLOTS N/A 04/24/2021    Procedure: CYSTOSCOPY EVACUATION OF CLOTS fulguration;  Surgeon: Yovani Khan MD;  Location: [facility];  Service: Urology    NJ CYSTO W/IRRIG & EVAC MULTPLE OBSTRUCTING CLOTS N/A 07/08/2021    Procedure: CYSTOSCOPY EVACUATION OF CLOTS BILATERAL ANTIROGRADES NEPHROSTOMY TUBES, FULGERATION ;  Surgeon: Yovani Khan MD;  Location: [facility];  Service: Urology    NJ CYSTO W/IRRIG & EVAC MULTPLE OBSTRUCTING CLOTS N/A 08/22/2021    Procedure: CYSTOSCOPY, RIGHT RETROGRADE, EVACUATION OF CLOTS, BLADDER BIOPSY X2 AND FULGERATION OF BLADDER LESIONS, URETEROSCOPY, 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;  Surgeon: Yovani Khan MD;  Location: [facility];  Service: Urology    NJ CYSTO W/IRRIG & EVAC MULTPLE OBSTRUCTING CLOTS Bilateral 4/24/2024    Procedure: CYSTOSCOPY EVACUATION OF CLOTS  fulguration bladder neck tumors and biopsy, retrograde pyelograms, DVIU;  Surgeon: Yovani Khan MD;  Location: [facility];  Service: Urology    IA CYSTOURETHROSCOPY W/DEST &/RMVL MED BLADDER AMARI N/A 4/11/2024    Procedure: TRANSURETHRAL RESECTION OF BLADDER TUMOR (TURBT);  Surgeon: Yovani Khan MD;  Location: [facility];  Service: Urology    IA CYSTOURETHROSCOPY WITH BIOPSY N/A 11/16/2023    Procedure: CYSTOSCOPY, BILATERAL RETROGRADEY PYELOGRAM,  FULGERATION 2.0 CM BLADDER TUMOR WITH BIOPSY;  Surgeon: Yovani Khan MD;  Location: [facility];  Service: Urology    PROSTATECTOMY  2014    ROTATOR CUFF REPAIR      right shoulder       Family History   Problem Relation Age of Onset    Diabetes Mother     Stroke Mother     Diabetes Brother     Stroke Brother      I have reviewed and agree with the history as documented.    E-Cigarette/Vaping    E-Cigarette Use Former User      E-Cigarette/Vaping Substances    Nicotine No     THC No     CBD No     Flavoring No     Other No     Unknown No      Social History     Tobacco Use    Smoking status: Some Days     Current packs/day: 0.50     Average packs/day: 0.5 packs/day for 50.0 years (25.0 ttl pk-yrs)     Types: Cigarettes     Passive exposure: Never    Smokeless tobacco: Never   Vaping Use    Vaping status: Former   Substance Use Topics    Alcohol use: Not Currently     Comment: None in over 50 yrs    Drug use: No       Review of Systems   Constitutional:  Negative for chills and fever.   HENT:  Negative for ear pain and sore throat.    Eyes:  Negative for pain and visual disturbance.   Respiratory:  Negative for cough and shortness of breath.    Cardiovascular:  Negative for chest pain and palpitations.   Gastrointestinal:  Negative for abdominal pain and vomiting.   Genitourinary:  Negative for dysuria and hematuria.   Musculoskeletal:  Negative for arthralgias and back pain.   Skin:  Negative for color change and rash.   Neurological:  Negative for seizures and  syncope.   All other systems reviewed and are negative.      Physical Exam  Physical Exam  Vitals and nursing note reviewed.   Constitutional:       General: He is not in acute distress.     Appearance: He is well-developed.   HENT:      Head: Normocephalic and atraumatic.   Eyes:      Conjunctiva/sclera: Conjunctivae normal.   Cardiovascular:      Rate and Rhythm: Normal rate and regular rhythm.      Heart sounds: No murmur heard.  Pulmonary:      Effort: Pulmonary effort is normal. No respiratory distress.      Breath sounds: Normal breath sounds.   Abdominal:      General: Abdomen is flat. Bowel sounds are normal. There is no distension.      Palpations: Abdomen is soft.      Tenderness: There is abdominal tenderness.      Comments: Tenderness to palpation noted to the suprapubic region.  Abdomen is otherwise soft, nondistended, with bowel sound present all 4 quadrants.   Genitourinary:     Comments: Sumner catheter noted in place with some bloody output.  Patient appears to be in pain secondary to bladder spasm.  Musculoskeletal:         General: No swelling.      Cervical back: Neck supple.   Skin:     General: Skin is warm and dry.      Capillary Refill: Capillary refill takes less than 2 seconds.   Neurological:      Mental Status: He is alert.   Psychiatric:         Mood and Affect: Mood normal.         Vital Signs  ED Triage Vitals [04/29/24 1920]   Temperature Pulse Respirations Blood Pressure SpO2   98 °F (36.7 °C) 88 20 (!) 173/98 98 %      Temp Source Heart Rate Source Patient Position - Orthostatic VS BP Location FiO2 (%)   Temporal Monitor Sitting Left arm --      Pain Score       10 - Worst Possible Pain           Vitals:    04/29/24 1920 04/29/24 2247 04/30/24 0043   BP: (!) 173/98 149/80 142/77   Pulse: 88 73 68   Patient Position - Orthostatic VS: Sitting           Visual Acuity      ED Medications  Medications   HYDROmorphone (DILAUDID) injection 1 mg (1 mg Intravenous Not Given 4/29/24 1717)    albuterol (PROVENTIL HFA,VENTOLIN HFA) inhaler 2 puff (has no administration in time range)   Fluticasone Furoate-Vilanterol 100-25 mcg/actuation 1 puff (has no administration in time range)     And   umeclidinium 62.5 mcg/actuation inhaler AEPB 1 puff (has no administration in time range)   metoprolol tartrate (LOPRESSOR) partial tablet 12.5 mg (12.5 mg Oral Not Given 4/30/24 0017)   nicotine (NICODERM CQ) 14 mg/24hr TD 24 hr patch 1 patch (has no administration in time range)   acetaminophen (TYLENOL) tablet 650 mg (has no administration in time range)   morphine injection 2 mg (2 mg Intravenous Given 4/30/24 0043)   traMADol (ULTRAM) tablet 50 mg (50 mg Oral Given 4/29/24 2254)       Diagnostic Studies  Results Reviewed       Procedure Component Value Units Date/Time    Basic metabolic panel [[id number]]  (Abnormal) Collected: 04/29/24 2150    Lab Status: Final result Specimen: Blood from Arm, Left Updated: 04/29/24 2219     Sodium 136 mmol/L      Potassium 4.2 mmol/L      Chloride 103 mmol/L      CO2 25 mmol/L      ANION GAP 8 mmol/L      BUN 39 mg/dL      Creatinine 1.29 mg/dL      Glucose 110 mg/dL      Calcium 9.1 mg/dL      eGFR 52 ml/min/1.73sq m     Narrative:      National Kid[employer] Foundation guidelines for Chronic Kidney Disease (CKD):     Stage 1 with normal or high GFR (GFR > 90 mL/min/1.73 square meters)    Stage 2 Mild CKD (GFR = 60-89 mL/min/1.73 square meters)    Stage 3A Moderate CKD (GFR = 45-59 mL/min/1.73 square meters)    Stage 3B Moderate CKD (GFR = 30-44 mL/min/1.73 square meters)    Stage 4 Severe CKD (GFR = 15-29 mL/min/1.73 square meters)    Stage 5 End Stage CKD (GFR <15 mL/min/1.73 square meters)  Note: GFR calculation is accurate only with a steady state creatinine    CBC and differential [[id number]]  (Abnormal) Collected: 04/29/24 2150    Lab Status: Final result Specimen: Blood from Arm, Left Updated: 04/29/24 2156     WBC 11.85 Thousand/uL      RBC 5.03 Million/uL       Hemoglobin 14.5 g/dL      Hematocrit 44.2 %      MCV 88 fL      MCH 28.8 pg      MCHC 32.8 g/dL      RDW 15.0 %      MPV 9.6 fL      Platelets 209 Thousands/uL      nRBC 0 /100 WBCs      Segmented % 76 %      Immature Grans % 1 %      Lymphocytes % 13 %      Monocytes % 8 %      Eosinophils Relative 2 %      Basophils Relative 0 %      Absolute Neutrophils 9.05 Thousands/µL      Absolute Immature Grans 0.07 Thousand/uL      Absolute Lymphocytes 1.55 Thousands/µL      Absolute Monocytes 0.90 Thousand/µL      Eosinophils Absolute 0.24 Thousand/µL      Basophils Absolute 0.04 Thousands/µL     UA w Reflex to Microscopic w Reflex to Culture [[id number]]     Lab Status: No result Specimen: Urine                    No orders to display              Procedures  Procedures         ED Course  ED Course as of 04/30/24 0106   Mon Apr 29, 2024 1943 Spoke with patient's urologist, Dr. Khan who notes that patient has history of radiation cystitis and some necrotic tissue.  Patient has a Sumner in place.  He recommends irrigating Sumner with saline and if patient feels better then patient can be discharged with outpatient urology follow-up.   2043 Patient's Sumner catheter was irrigated with passage of a large clot.  Patient now feels significantly better.  Patient has had 300 cc of bloody urine output.  Spoke with Dr. Khan who will come and see patient at bedside prior to discharge.   2109 Urologist, Dr. Khan at bedside to evaluate patient.   2117 Patient seen by Dr. Khan who started CBI.  Further irrigation performed with large amounts of clots obtained.  At this time recommends admit to medicine overnight for observation.  He recommends Dilaudid for pain control for bladder spasm.                                             Medical Decision Making  Sumner catheter initially irrigated and large clot was dislodged and patient felt better.  Patient then started to have abdominal pain and bladder spasm again.  Patient was  seen by his urologist, Dr. Khan who further irrigated Sumner catheter with more clots.  Subsequently CBI started.  Patient admitted for further observation overnight.  Patient agrees with admission plans.    Amount and/or Complexity of Data Reviewed  Labs: ordered. Decision-making details documented in ED Course.    Risk  Prescription drug management.  Decision regarding hospitalization.             Disposition  Final diagnoses:   Sumner catheter problem (HCC)   Hemorrhagic cystitis   Bladder spasm     Time reflects when diagnosis was documented in both MDM as applicable and the Disposition within this note       Time User Action Codes Description Comment    4/29/2024  9:21 PM Arsenio Carpenter Add [T83.9XXA] Sumner catheter problem (HCC)     4/29/2024  9:21 PM Kelly Carpenterira Add [N30.91] Hemorrhagic cystitis     4/29/2024  9:23 PM Arsenio Carpenter Add [N32.89] Bladder spasm           ED Disposition       ED Disposition   Admit    Condition   Stable    Date/Time   Mon Apr 29, 2024  9:24 PM    Comment   Case was discussed with Dr. Harris and the patient's admission status was agreed to be Admission Status: observation status to the service of Dr. Harris.               Follow-up Information    None         Current Discharge Medication List        CONTINUE these medications which have NOT CHANGED    Details   albuterol (PROVENTIL HFA,VENTOLIN HFA) 90 mcg/act inhaler Inhale 2 puffs every 4 (four) hours as needed for wheezing  Qty: 1 Inhaler, Refills: 0      fluticasone (FLONASE) 50 mcg/act nasal spray 1 spray into each nostril daily as needed      fluticasone-umeclidinium-vilanterol (Trelegy Ellipta) 200-62.5-25 mcg/actuation AEPB inhaler Inhale 1 puff every morning      metoprolol tartrate (LOPRESSOR) 25 mg tablet Take 12.5 mg by mouth every 12 (twelve) hours             No discharge procedures on file.    PDMP Review         Value Time User    PDMP Reviewed  Yes 7/13/2021 10:25 AM CORWIN Esquivel             ED Provider  Electronically Signed by             Arsenio Carpenter DO  04/30/24 0106

## 2024-04-30 NOTE — ED NOTES
Get labs completed.   Debrox for ear wax - use only 1 week at a time   Trial of physical therapy for knee pain - if not improving, return to clinic    Pt urine output after irrigation was 300 cc bright red blood      Penelope Waterman, RN  04/29/24 2041

## 2024-05-01 ENCOUNTER — APPOINTMENT (INPATIENT)
Dept: RADIOLOGY | Facility: HOSPITAL | Age: 79
DRG: 690 | End: 2024-05-01
Payer: COMMERCIAL

## 2024-05-01 LAB
ANION GAP SERPL CALCULATED.3IONS-SCNC: 6 MMOL/L (ref 4–13)
BACTERIA UR CULT: NORMAL
BUN SERPL-MCNC: 29 MG/DL (ref 5–25)
CALCIUM SERPL-MCNC: 9 MG/DL (ref 8.4–10.2)
CHLORIDE SERPL-SCNC: 102 MMOL/L (ref 96–108)
CO2 SERPL-SCNC: 28 MMOL/L (ref 21–32)
CREAT SERPL-MCNC: 1.24 MG/DL (ref 0.6–1.3)
ERYTHROCYTE [DISTWIDTH] IN BLOOD BY AUTOMATED COUNT: 14.9 % (ref 11.6–15.1)
GFR SERPL CREATININE-BSD FRML MDRD: 54 ML/MIN/1.73SQ M
GLUCOSE SERPL-MCNC: 108 MG/DL (ref 65–140)
HCT VFR BLD AUTO: 43.6 % (ref 36.5–49.3)
HGB BLD-MCNC: 14.4 G/DL (ref 12–17)
MCH RBC QN AUTO: 29 PG (ref 26.8–34.3)
MCHC RBC AUTO-ENTMCNC: 33 G/DL (ref 31.4–37.4)
MCV RBC AUTO: 88 FL (ref 82–98)
PLATELET # BLD AUTO: 213 THOUSANDS/UL (ref 149–390)
PMV BLD AUTO: 9.8 FL (ref 8.9–12.7)
POTASSIUM SERPL-SCNC: 4.2 MMOL/L (ref 3.5–5.3)
RBC # BLD AUTO: 4.96 MILLION/UL (ref 3.88–5.62)
SODIUM SERPL-SCNC: 136 MMOL/L (ref 135–147)
WBC # BLD AUTO: 10.51 THOUSAND/UL (ref 4.31–10.16)

## 2024-05-01 PROCEDURE — 99232 SBSQ HOSP IP/OBS MODERATE 35: CPT | Performed by: FAMILY MEDICINE

## 2024-05-01 PROCEDURE — 80048 BASIC METABOLIC PNL TOTAL CA: CPT | Performed by: FAMILY MEDICINE

## 2024-05-01 PROCEDURE — 74178 CT ABD&PLV WO CNTR FLWD CNTR: CPT

## 2024-05-01 PROCEDURE — 85027 COMPLETE CBC AUTOMATED: CPT | Performed by: FAMILY MEDICINE

## 2024-05-01 RX ADMIN — MORPHINE SULFATE 2 MG: 2 INJECTION, SOLUTION INTRAMUSCULAR; INTRAVENOUS at 16:02

## 2024-05-01 RX ADMIN — MORPHINE SULFATE 2 MG: 2 INJECTION, SOLUTION INTRAMUSCULAR; INTRAVENOUS at 09:28

## 2024-05-01 RX ADMIN — Medication 12.5 MG: at 09:27

## 2024-05-01 RX ADMIN — UMECLIDINIUM 1 PUFF: 62.5 AEROSOL, POWDER ORAL at 09:27

## 2024-05-01 RX ADMIN — Medication 12.5 MG: at 22:13

## 2024-05-01 RX ADMIN — IOHEXOL 100 ML: 350 INJECTION, SOLUTION INTRAVENOUS at 10:57

## 2024-05-01 RX ADMIN — TRAMADOL HYDROCHLORIDE 50 MG: 50 TABLET, COATED ORAL at 07:16

## 2024-05-01 RX ADMIN — FLUTICASONE FUROATE AND VILANTEROL TRIFENATATE 1 PUFF: 100; 25 POWDER RESPIRATORY (INHALATION) at 09:27

## 2024-05-01 RX ADMIN — NICOTINE 1 PATCH: 14 PATCH, EXTENDED RELEASE TRANSDERMAL at 09:28

## 2024-05-01 RX ADMIN — TRAMADOL HYDROCHLORIDE 50 MG: 50 TABLET, COATED ORAL at 14:07

## 2024-05-01 RX ADMIN — MORPHINE SULFATE 2 MG: 2 INJECTION, SOLUTION INTRAMUSCULAR; INTRAVENOUS at 22:16

## 2024-05-01 RX ADMIN — MORPHINE SULFATE 2 MG: 2 INJECTION, SOLUTION INTRAMUSCULAR; INTRAVENOUS at 03:07

## 2024-05-01 NOTE — ASSESSMENT & PLAN NOTE
Chief Complaint   Patient presents with    Chest Pain       Patient Active Problem List    Diagnosis Date Noted    RBBB (right bundle branch block with left anterior fascicular block) 02/12/2024    HTN (hypertension) 02/12/2024    SOB (shortness of breath) 02/12/2024    Chest pain 02/09/2024       Current Outpatient Medications   Medication Sig Dispense Refill    amLODIPine (NORVASC) 10 MG tablet Take 1 tablet by mouth daily      LOSARTAN POTASSIUM PO Take by mouth daily      carvedilol (COREG) 12.5 MG tablet Take 1 tablet by mouth daily      allopurinol (ZYLOPRIM) 300 MG tablet Take 1 tablet by mouth daily      aspirin 81 MG EC tablet Take 1 tablet by mouth daily      fluticasone (FLONASE) 50 MCG/ACT nasal spray 2 sprays by Nasal route daily 2 sprays each nostril once daily 16 g 1     Current Facility-Administered Medications   Medication Dose Route Frequency Provider Last Rate Last Admin    perflutren lipid microspheres (DEFINITY) injection 1.5 mL  1.5 mL IntraVENous ONCE PRN Nehemiah Hanson MD        regadenoson (LEXISCAN) injection 0.4 mg  0.4 mg IntraVENous ONCE PRN Nehemiah Hanson MD            No Known Allergies    Vitals:    02/12/24 0752   BP: (!) 122/91   Pulse: 96   Resp: 18   Weight: 101.5 kg (223 lb 12.8 oz)   Height: 1.753 m (5' 9\")                 SUBJECTIVE: Les Romero presents to the office today for consult - dr henry    He complains of dyspnea and orthopnea and denies   paroxysmal nocturnal dyspnea and syncope.  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  This is progressive, and now complains of orthopnea  Visited [facility] yesterday - CXR report claims chf - no therapy given  Hx of HTN.           Physical Exam   BP (!) 122/91   Pulse 96   Resp 18   Ht 1.753 m (5' 9\")   Wt 101.5 kg (223 lb 12.8 oz)   BMI 33.05 kg/m²   Constitutional: Oriented to person, place, and time. Well-developed and well-nourished. No distress.    Neck: No JVD  Started on empirically on IV Rocephin which has been discontinued as urine culture negative

## 2024-05-01 NOTE — PLAN OF CARE
Problem: PAIN - ADULT  Goal: Verbalizes/displays adequate comfort level or baseline comfort level  Description: Interventions:  - Encourage patient to monitor pain and request assistance  - Assess pain using appropriate pain scale  - Administer analgesics based on type and severity of pain and evaluate response  - Implement non-pharmacological measures as appropriate and evaluate response  - Consider cultural and social influences on pain and pain management  - Notify physician/advanced practitioner if interventions unsuccessful or patient reports new pain  Outcome: Progressing     Problem: INFECTION - ADULT  Goal: Absence or prevention of progression during hospitalization  Description: INTERVENTIONS:  - Assess and monitor for signs and symptoms of infection  - Monitor lab/diagnostic results  - Monitor all insertion sites, i.e. indwelling lines, tubes, and drains  - Monitor endotracheal if appropriate and nasal secretions for changes in amount and color  - [place] appropriate cooling/warming therapies per order  - Administer medications as ordered  - Instruct and encourage patient and family to use good hand hygiene technique  - Identify and instruct in appropriate isolation precautions for identified infection/condition  Outcome: Progressing  Goal: Absence of fever/infection during neutropenic period  Description: INTERVENTIONS:  - Monitor WBC    Outcome: Progressing     Problem: DISCHARGE PLANNING  Goal: Discharge to home or other facility with appropriate resources  Description: INTERVENTIONS:  - Identify barriers to discharge w/patient and caregiver  - Arrange for needed discharge resources and transportation as appropriate  - Identify discharge learning needs (meds, wound care, etc.)  - Arrange for interpretive services to assist at discharge as needed  - Refer to Case Management Department for coordinating discharge planning if the patient needs post-hospital services based on physician/advanced  practitioner order or complex needs related to functional status, cognitive ability, or social support system  Outcome: Progressing     Problem: Knowledge Deficit  Goal: Patient/family/caregiver demonstrates understanding of disease process, treatment plan, medications, and discharge instructions  Description: Complete learning assessment and assess knowledge base.  Interventions:  - Provide teaching at level of understanding  - Provide teaching via preferred learning methods  Outcome: Progressing     Problem: GENITOURINARY - ADULT  Goal: Maintains or returns to baseline urinary function  Description: INTERVENTIONS:  - Assess urinary function  - Encourage oral fluids to ensure adequate hydration if ordered  - Administer IV fluids as ordered to ensure adequate hydration  - Administer ordered medications as needed  - Offer frequent toileting  - Follow urinary retention protocol if ordered  Outcome: Progressing  Goal: Absence of urinary retention  Description: INTERVENTIONS:  - Assess patient’s ability to void and empty bladder  - Monitor I/O  - Bladder scan as needed  - Discuss with physician/AP medications to alleviate retention as needed  - Discuss catheterization for long term situations as appropriate  Outcome: Progressing  Goal: Urinary catheter remains patent  Description: INTERVENTIONS:  - Assess patency of urinary catheter  - If patient has a chronic alanis, consider changing catheter if non-functioning  - Follow guidelines for intermittent irrigation of non-functioning urinary catheter  Outcome: Progressing

## 2024-05-01 NOTE — ASSESSMENT & PLAN NOTE
Patient presented with acute on chronic urinary retention, Sumner catheter in place  -History of bladder neck contracture, status post cystoscopy transurethral resection bladder neck contracture bladder tumor evacuation of clots on 4/11/2024.   -Was discharged with Sumner in place on previous admission.  -Further management as noted above

## 2024-05-01 NOTE — PROGRESS NOTES
[facility]  Progress Note  Name: Joel Wyman I  MRN: [id number]  Unit/Bed#: 2 [address] Date of Admission: 4/29/2024   Date of Service: 5/1/2024 I Hospital Day: 1    Assessment/Plan   * Gross hematuria  Assessment & Plan  Patient admitted with hematuria on previous admission.   -Status post cystoscopy with evacuation of clots, fulguration of bladder neck tumors and biopsy.  Retrograde urethrogram/cystogram DVIU was done on 4/24  -Lokesh blood with clots in alanis bag and inability to urinate  -CBI was initiated and continues to have some hematuria which is worse with ambulation per patient  -Plan for CT urogram and possible suprapubic catheter placement  -Hgb stable  -Appreciate urology input    Urinary retention  Assessment & Plan  Patient presented with acute on chronic urinary retention, Alanis catheter in place  -History of bladder neck contracture, status post cystoscopy transurethral resection bladder neck contracture bladder tumor evacuation of clots on 4/11/2024.   -Was discharged with Alanis in place on previous admission.  -Further management as noted above    Abnormal urinalysis  Assessment & Plan  Started on empirically on IV Rocephin which has been discontinued as urine culture negative    Stage 3b chronic kidney disease (HCC)  Assessment & Plan  Creatinine at baseline.    Nicotine abuse  Assessment & Plan  -Continue nicotine patch    Prostate cancer (HCC) - s/p resection  Assessment & Plan  -History of prostate cancer status post XRT.    COPD (chronic obstructive pulmonary disease) ([facility])  Assessment & Plan  No evidence of exacerbation  -Continue home inhalers.    Hyperlipidemia  Assessment & Plan  Diet controlled.    Essential hypertension  Assessment & Plan  Continue Lopressor.               VTE Pharmacologic Prophylaxis: VTE Score: 4 Moderate Risk (Score 3-4) - Pharmacological DVT Prophylaxis Contraindicated. Sequential Compression Devices Ordered.    Mobility:   Basic  Mobility Inpatient Raw Score: 22  JH-HLM Goal: 7: Walk 25 feet or more  JH-HLM Achieved: 7: Walk 25 feet or more  JH-HLM Goal achieved. Continue to encourage appropriate mobility.    Patient Centered Rounds: I performed bedside rounds with nursing staff today.   Discussions with Specialists or Other Care Team Provider: yes     Education and Discussions with Family / Patient: Patient declined call to .     Total Time Spent on Date of Encounter in care of patient:  This time was spent on one or more of the following: performing physical exam; counseling and coordination of care; obtaining or reviewing history; documenting in the medical record; reviewing/ordering tests, medications or procedures; communicating with other healthcare professionals and discussing with patient's family/caregivers.    Current Length of Stay: 1 day(s)  Current Patient Status: Inpatient   Certification Statement: The patient will continue to require additional inpatient hospital stay due to gross hematuria requiring CT cystogram and possible suprapubic catheter placement and  Discharge Plan: Anticipate discharge in 48-72 hrs to home.    Code Status: Level 1 - Full Code    Subjective:     Patient reports intermittent spasms.  States the bleeding in alanis gets worse with ambulation but tends to clear up while at rest    Objective:     Vitals:   Temp (24hrs), Av.9 °F (36.6 °C), Min:97.3 °F (36.3 °C), Max:98.7 °F (37.1 °C)    Temp:  [97.3 °F (36.3 °C)-98.7 °F (37.1 °C)] 97.9 °F (36.6 °C)  HR:  [57-72] 61  Resp:  [16-20] 16  BP: (120-155)/(66-93) 125/71  SpO2:  [91 %-94 %] 91 %  Body mass index is 30.42 kg/m².     Input and Output Summary (last 24 hours):     Intake/Output Summary (Last 24 hours) at 2024 1007  Last data filed at 2024 0601  Gross per 24 hour   Intake --   Output 15504 ml   Net -52635 ml       Physical Exam:   Physical Exam  Vitals reviewed.   Constitutional:       General: He is not in acute distress.      Appearance: He is not toxic-appearing.   HENT:      Head: Normocephalic and atraumatic.   Eyes:      General:         Right eye: No discharge.         Left eye: No discharge.      Extraocular Movements: Extraocular movements intact.   Cardiovascular:      Rate and Rhythm: Normal rate and regular rhythm.   Pulmonary:      Effort: Pulmonary effort is normal. No respiratory distress.      Breath sounds: Normal breath sounds. No wheezing or rales.   Abdominal:      General: Bowel sounds are normal. There is no distension.      Palpations: Abdomen is soft.      Tenderness: There is no abdominal tenderness.   Genitourinary:     Comments: Sumner in place with CBI running.  Hematuria and some clots noted in Sumner bag  Neurological:      Mental Status: He is alert and oriented to person, place, and time.          Additional Data:     Labs:  Results from last 7 days   Lab Units 05/01/24  0622 04/29/24  2150   WBC Thousand/uL 10.51* 11.85*   HEMOGLOBIN g/dL 14.4 14.5   HEMATOCRIT % 43.6 44.2   PLATELETS Thousands/uL 213 209   SEGS PCT %  --  76*   LYMPHO PCT %  --  13*   MONO PCT %  --  8   EOS PCT %  --  2     Results from last 7 days   Lab Units 05/01/24  0622 04/30/24  0603   SODIUM mmol/L 136 137   POTASSIUM mmol/L 4.2 4.0   CHLORIDE mmol/L 102 102   CO2 mmol/L 28 25   BUN mg/dL 29* 33*   CREATININE mg/dL 1.24 1.19   ANION GAP mmol/L 6 10   CALCIUM mg/dL 9.0 8.8   ALBUMIN g/dL  --  3.5   TOTAL BILIRUBIN mg/dL  --  0.74   ALK PHOS U/L  --  62   ALT U/L  --  15   AST U/L  --  15   GLUCOSE RANDOM mg/dL 108 108                       Lines/Drains:  Invasive Devices       Peripheral Intravenous Line  Duration             Peripheral IV 04/29/24 Distal;Dorsal (posterior);Left Forearm 1 day              Drain  Duration             Urethral Catheter Latex;Three way 22 Fr. 6 days                  Urinary Catheter:  Goal for removal:  Pending SPT               Imaging: No pertinent imaging reviewed.    Recent Cultures (last 7 days):    Results from last 7 days   Lab Units 04/30/24  0559   URINE CULTURE  No Growth <1000 cfu/mL       Last 24 Hours Medication List:   Current Facility-Administered Medications   Medication Dose Route Frequency Provider Last Rate    acetaminophen  650 mg Oral Q6H PRN Jamel Harris MD      albuterol  2 puff Inhalation Q4H PRN Jamel Harris MD      Fluticasone Furoate-Vilanterol  1 puff Inhalation Daily Jamel Harris MD      And    umeclidinium  1 puff Inhalation Daily Jamel Harris MD      HYDROmorphone  1 mg Intravenous Once Arsenio Carpenter DO      metoprolol tartrate  12.5 mg Oral Q12H DHAVAL Jamel Harris MD      morphine injection  2 mg Intravenous Q6H PRN Jamel Harris MD      nicotine  1 patch Transdermal Daily Jamel Harris MD      traMADol  50 mg Oral Q6H PRN Jamel Harris MD          Today, Patient Was Seen By: Janny Marques DO    **Please Note: This note may have been constructed using a voice recognition system.**

## 2024-05-01 NOTE — ASSESSMENT & PLAN NOTE
Patient admitted with hematuria on previous admission.   -Status post cystoscopy with evacuation of clots, fulguration of bladder neck tumors and biopsy.  Retrograde urethrogram/cystogram DVIU was done on 4/24  -Lokesh blood with clots in alanis bag and inability to urinate  -CBI was initiated and continues to have some hematuria which is worse with ambulation per patient  -Plan for CT urogram and possible suprapubic catheter placement  -Hgb stable  -Appreciate urology input

## 2024-05-02 PROCEDURE — 99232 SBSQ HOSP IP/OBS MODERATE 35: CPT | Performed by: FAMILY MEDICINE

## 2024-05-02 PROCEDURE — NC001 PR NO CHARGE: Performed by: RADIOLOGY

## 2024-05-02 RX ORDER — BISACODYL 5 MG/1
10 TABLET, DELAYED RELEASE ORAL DAILY
Status: DISCONTINUED | OUTPATIENT
Start: 2024-05-02 | End: 2024-05-06 | Stop reason: HOSPADM

## 2024-05-02 RX ORDER — POLYETHYLENE GLYCOL 3350 17 G/17G
17 POWDER, FOR SOLUTION ORAL DAILY
Status: DISCONTINUED | OUTPATIENT
Start: 2024-05-02 | End: 2024-05-06 | Stop reason: HOSPADM

## 2024-05-02 RX ADMIN — TRAMADOL HYDROCHLORIDE 50 MG: 50 TABLET, COATED ORAL at 08:09

## 2024-05-02 RX ADMIN — TRAMADOL HYDROCHLORIDE 50 MG: 50 TABLET, COATED ORAL at 02:05

## 2024-05-02 RX ADMIN — MORPHINE SULFATE 2 MG: 2 INJECTION, SOLUTION INTRAMUSCULAR; INTRAVENOUS at 12:34

## 2024-05-02 RX ADMIN — FLUTICASONE FUROATE AND VILANTEROL TRIFENATATE 1 PUFF: 100; 25 POWDER RESPIRATORY (INHALATION) at 08:11

## 2024-05-02 RX ADMIN — UMECLIDINIUM 1 PUFF: 62.5 AEROSOL, POWDER ORAL at 08:11

## 2024-05-02 RX ADMIN — MORPHINE SULFATE 2 MG: 2 INJECTION, SOLUTION INTRAMUSCULAR; INTRAVENOUS at 05:45

## 2024-05-02 RX ADMIN — Medication 12.5 MG: at 08:09

## 2024-05-02 RX ADMIN — Medication 12.5 MG: at 21:43

## 2024-05-02 RX ADMIN — MORPHINE SULFATE 2 MG: 2 INJECTION, SOLUTION INTRAMUSCULAR; INTRAVENOUS at 19:11

## 2024-05-02 RX ADMIN — TRAMADOL HYDROCHLORIDE 50 MG: 50 TABLET, COATED ORAL at 21:53

## 2024-05-02 RX ADMIN — BISACODYL 10 MG: 5 TABLET, COATED ORAL at 10:57

## 2024-05-02 RX ADMIN — NICOTINE 1 PATCH: 14 PATCH, EXTENDED RELEASE TRANSDERMAL at 08:09

## 2024-05-02 NOTE — PROGRESS NOTES
[facility]  Progress Note  Name: Joel Wyman I  MRN: [id number]  Unit/Bed#: 2 [address] Date of Admission: 4/29/2024   Date of Service: 5/2/2024 I Hospital Day: 2    Assessment/Plan   * Gross hematuria  Assessment & Plan  Patient admitted with hematuria on previous admission.   -Status post cystoscopy with evacuation of clots, fulguration of bladder neck tumors and biopsy.  Retrograde urethrogram/cystogram DVIU was done on 4/24  -Lokesh blood with clots in alanis bag and inability to urinate initially  -CBI was initiated and continues to have intermittent hematuria which is worse with ambulation and spasms per patient  -CT urogram -decompressed bladder was noted with diffuse wall thickness & Inflammatory changes. subcentimeter renal cysts noted bilaterally   - possible suprapubic catheter placement today versus tomorrow  -Hgb stable on lab work yesterday.  Lab holiday today.  Repeat lab work in a.m.  -Appreciate urology input    Urinary retention  Assessment & Plan  Patient presented with acute on chronic urinary retention, Alanis catheter in place  -History of bladder neck contracture, status post cystoscopy transurethral resection bladder neck contracture bladder tumor evacuation of clots on 4/11/2024.   -Was discharged with Alanis in place on previous admission.  -Further management as noted above.    Abnormal urinalysis  Assessment & Plan  Started on empirically on IV Rocephin which was discontinued as urine culture negative    Stage 3b chronic kidney disease (HCC)  Assessment & Plan  Creatinine at baseline On prior lab work.  Repeat lab work in a.m.    Nicotine abuse  Assessment & Plan  Continue nicotine patch    Prostate cancer (HCC) - s/p resection  Assessment & Plan  -History of prostate cancer status post XRT    COPD (chronic obstructive pulmonary disease) (HCC)  Assessment & Plan  No evidence of exacerbation.  -Continue home inhalers.    Hyperlipidemia  Assessment & Plan  Diet  controlled    Essential hypertension  Assessment & Plan  Continue Lopressor               VTE Pharmacologic Prophylaxis: VTE Score: 4 Moderate Risk (Score 3-4) - Pharmacological DVT Prophylaxis Contraindicated. Sequential Compression Devices Ordered.    Mobility:   Basic Mobility Inpatient Raw Score: 24  JH-HLM Goal: 8: Walk 250 feet or more  JH-HLM Achieved: 8: Walk 250 feet ot more  JH-HLM Goal achieved. Continue to encourage appropriate mobility.    Patient Centered Rounds: I performed bedside rounds with nursing staff today.   Discussions with Specialists or Other Care Team Provider: yes     Education and Discussions with Family / Patient: Patient declined call to .     Total Time Spent on Date of Encounter in care of patient: This time was spent on one or more of the following: performing physical exam; counseling and coordination of care; obtaining or reviewing history; documenting in the medical record; reviewing/ordering tests, medications or procedures; communicating with other healthcare professionals and discussing with patient's family/caregivers.    Current Length of Stay: 2 day(s)  Current Patient Status: Inpatient   Certification Statement: The patient will continue to require additional inpatient hospital stay due to gross hematuria requiring suprapubic catheter placement  Discharge Plan: Anticipate discharge in 24-48 hrs to home.    Code Status: Level 1 - Full Code    Subjective:     Patient reports intermittent spasms although improved compared to before.  Reports constipation    Objective:     Vitals:   Temp (24hrs), Av.7 °F (36.5 °C), Min:97.1 °F (36.2 °C), Max:98.2 °F (36.8 °C)    Temp:  [97.1 °F (36.2 °C)-98.2 °F (36.8 °C)] 97.8 °F (36.6 °C)  HR:  [60-66] 60  Resp:  [18] 18  BP: (126-132)/(67-75) 132/70  SpO2:  [91 %-93 %] 93 %  Body mass index is 30.42 kg/m².     Input and Output Summary (last 24 hours):     Intake/Output Summary (Last 24 hours) at 2024 1048  Last data  filed at 5/2/2024 0600  Gross per 24 hour   Intake --   Output 46371 ml   Net -21548 ml       Physical Exam:   Physical Exam  Vitals reviewed.   Constitutional:       General: He is not in acute distress.     Appearance: He is not toxic-appearing.   HENT:      Head: Normocephalic and atraumatic.   Eyes:      General:         Right eye: No discharge.         Left eye: No discharge.      Extraocular Movements: Extraocular movements intact.   Cardiovascular:      Rate and Rhythm: Normal rate and regular rhythm.   Pulmonary:      Effort: Pulmonary effort is normal. No respiratory distress.      Breath sounds: Normal breath sounds. No wheezing or rales.   Abdominal:      General: Bowel sounds are normal. There is no distension.      Palpations: Abdomen is soft.      Tenderness: There is no abdominal tenderness.   Genitourinary:     Comments: Sumner in place with no further hematuria at this time  Neurological:      Mental Status: He is alert and oriented to person, place, and time.          Additional Data:     Labs:  Results from last 7 days   Lab Units 05/01/24  0622 04/29/24  2150   WBC Thousand/uL 10.51* 11.85*   HEMOGLOBIN g/dL 14.4 14.5   HEMATOCRIT % 43.6 44.2   PLATELETS Thousands/uL 213 209   SEGS PCT %  --  76*   LYMPHO PCT %  --  13*   MONO PCT %  --  8   EOS PCT %  --  2     Results from last 7 days   Lab Units 05/01/24  0622 04/30/24  0603   SODIUM mmol/L 136 137   POTASSIUM mmol/L 4.2 4.0   CHLORIDE mmol/L 102 102   CO2 mmol/L 28 25   BUN mg/dL 29* 33*   CREATININE mg/dL 1.24 1.19   ANION GAP mmol/L 6 10   CALCIUM mg/dL 9.0 8.8   ALBUMIN g/dL  --  3.5   TOTAL BILIRUBIN mg/dL  --  0.74   ALK PHOS U/L  --  62   ALT U/L  --  15   AST U/L  --  15   GLUCOSE RANDOM mg/dL 108 108                       Lines/Drains:  Invasive Devices       Peripheral Intravenous Line  Duration             Peripheral IV 04/29/24 Distal;Dorsal (posterior);Left Forearm 2 days              Drain  Duration             Urethral Catheter  Latex;Three way 22 Fr. 7 days                  Urinary Catheter:  Goal for removal:  pEnding SPT placement               Imaging: No pertinent imaging reviewed.    Recent Cultures (last 7 days):   Results from last 7 days   Lab Units 04/30/24  0559   URINE CULTURE  No Growth <1000 cfu/mL       Last 24 Hours Medication List:   Current Facility-Administered Medications   Medication Dose Route Frequency Provider Last Rate    acetaminophen  650 mg Oral Q6H PRN Jamel Harris MD      albuterol  2 puff Inhalation Q4H PRN Jamel Harris MD      bisacodyl  10 mg Oral Daily Janny Marques DO      Fluticasone Furoate-Vilanterol  1 puff Inhalation Daily Jamel Harris MD      And    umeclidinium  1 puff Inhalation Daily Jamel Harris MD      HYDROmorphone  1 mg Intravenous Once Komadalton Carpenter DO      metoprolol tartrate  12.5 mg Oral Q12H DHAVAL Jamel Harris MD      morphine injection  2 mg Intravenous Q6H PRN Jamel Harris MD      nicotine  1 patch Transdermal Daily Jamel Harris MD      traMADol  50 mg Oral Q6H PRN Jamel Harris MD          Today, Patient Was Seen By: Janny Marques DO    **Please Note: This note may have been constructed using a voice recognition system.**

## 2024-05-02 NOTE — ASSESSMENT & PLAN NOTE
Continue nicotine patch   Regular rate and rhythm, Heart sounds S1 S2 present, no murmurs, rubs or gallops

## 2024-05-02 NOTE — PLAN OF CARE
Problem: PAIN - ADULT  Goal: Verbalizes/displays adequate comfort level or baseline comfort level  Description: Interventions:  - Encourage patient to monitor pain and request assistance  - Assess pain using appropriate pain scale  - Administer analgesics based on type and severity of pain and evaluate response  - Implement non-pharmacological measures as appropriate and evaluate response  - Consider cultural and social influences on pain and pain management  - Notify physician/advanced practitioner if interventions unsuccessful or patient reports new pain  Outcome: Progressing     Problem: INFECTION - ADULT  Goal: Absence or prevention of progression during hospitalization  Description: INTERVENTIONS:  - Assess and monitor for signs and symptoms of infection  - Monitor lab/diagnostic results  - Monitor all insertion sites, i.e. indwelling lines, tubes, and drains  - Monitor endotracheal if appropriate and nasal secretions for changes in amount and color  - [place] appropriate cooling/warming therapies per order  - Administer medications as ordered  - Instruct and encourage patient and family to use good hand hygiene technique  - Identify and instruct in appropriate isolation precautions for identified infection/condition  Outcome: Progressing  Goal: Absence of fever/infection during neutropenic period  Description: INTERVENTIONS:  - Monitor WBC    Outcome: Progressing     Problem: SAFETY ADULT  Goal: Patient will remain free of falls  Description: INTERVENTIONS:  - Educate patient/family on patient safety including physical limitations  - Instruct patient to call for assistance with activity   - Consult OT/PT to assist with strengthening/mobility   - Keep Call bell within reach  - Keep bed low and locked with side rails adjusted as appropriate  - Keep care items and personal belongings within reach  - Initiate and maintain comfort rounds  - Make Fall Risk Sign visible to staff  - Offer Toileting every 2 Hours,  in advance of need  - Initiate/Maintain alarm  - Obtain necessary fall risk management equipment  - Apply yellow socks and bracelet for high fall risk patients  - Consider moving patient to room near nurses station  Outcome: Progressing  Goal: Maintain or return to baseline ADL function  Description: INTERVENTIONS:  -  Assess patient's ability to carry out ADLs; assess patient's baseline for ADL function and identify physical deficits which impact ability to perform ADLs (bathing, care of mouth/teeth, toileting, grooming, dressing, etc.)  - Assess/evaluate cause of self-care deficits   - Assess range of motion  - Assess patient's mobility; develop plan if impaired  - Assess patient's need for assistive devices and provide as appropriate  - Encourage maximum independence but intervene and supervise when necessary  - Involve family in performance of ADLs  - Assess for home care needs following discharge   - Consider OT consult to assist with ADL evaluation and planning for discharge  - Provide patient education as appropriate  Outcome: Progressing  Goal: Maintains/Returns to pre admission functional level  Description: INTERVENTIONS:  - Perform AM-PAC 6 Click Basic Mobility/ Daily Activity assessment daily.  - Set and communicate daily mobility goal to care team and patient/family/caregiver.   - Collaborate with rehabilitation services on mobility goals if consulted  - Perform Range of Motion 3 times a day.  - Reposition patient every 2 hours.  - Dangle patient 3 times a day  - Stand patient 3 times a day  - Ambulate patient 3 times a day  - Out of bed to chair 3 times a day   - Out of bed for meals 3 times a day  - Out of bed for toileting  - Record patient progress and toleration of activity level   Outcome: Progressing     Problem: DISCHARGE PLANNING  Goal: Discharge to home or other facility with appropriate resources  Description: INTERVENTIONS:  - Identify barriers to discharge w/patient and caregiver  - Arrange  for needed discharge resources and transportation as appropriate  - Identify discharge learning needs (meds, wound care, etc.)  - Arrange for interpretive services to assist at discharge as needed  - Refer to Case Management Department for coordinating discharge planning if the patient needs post-hospital services based on physician/advanced practitioner order or complex needs related to functional status, cognitive ability, or social support system  Outcome: Progressing     Problem: Knowledge Deficit  Goal: Patient/family/caregiver demonstrates understanding of disease process, treatment plan, medications, and discharge instructions  Description: Complete learning assessment and assess knowledge base.  Interventions:  - Provide teaching at level of understanding  - Provide teaching via preferred learning methods  Outcome: Progressing     Problem: GENITOURINARY - ADULT  Goal: Maintains or returns to baseline urinary function  Description: INTERVENTIONS:  - Assess urinary function  - Encourage oral fluids to ensure adequate hydration if ordered  - Administer IV fluids as ordered to ensure adequate hydration  - Administer ordered medications as needed  - Offer frequent toileting  - Follow urinary retention protocol if ordered  Outcome: Progressing  Goal: Absence of urinary retention  Description: INTERVENTIONS:  - Assess patient’s ability to void and empty bladder  - Monitor I/O  - Bladder scan as needed  - Discuss with physician/AP medications to alleviate retention as needed  - Discuss catheterization for long term situations as appropriate  Outcome: Progressing  Goal: Urinary catheter remains patent  Description: INTERVENTIONS:  - Assess patency of urinary catheter  - If patient has a chronic alanis, consider changing catheter if non-functioning  - Follow guidelines for intermittent irrigation of non-functioning urinary catheter  Outcome: Progressing

## 2024-05-02 NOTE — ASSESSMENT & PLAN NOTE
Patient admitted with hematuria on previous admission.   -Status post cystoscopy with evacuation of clots, fulguration of bladder neck tumors and biopsy.  Retrograde urethrogram/cystogram DVIU was done on 4/24  -Lokesh blood with clots in alanis bag and inability to urinate initially  -CBI was initiated and continues to have intermittent hematuria which is worse with ambulation and spasms per patient  -CT urogram -decompressed bladder was noted with diffuse wall thickness & Inflammatory changes. subcentimeter renal cysts noted bilaterally   - possible suprapubic catheter placement today versus tomorrow  -Hgb stable on lab work yesterday.  Lab holiday today.  Repeat lab work in a.m.  -Appreciate urology input

## 2024-05-02 NOTE — PLAN OF CARE
Problem: PAIN - ADULT  Goal: Verbalizes/displays adequate comfort level or baseline comfort level  Description: Interventions:  - Encourage patient to monitor pain and request assistance  - Assess pain using appropriate pain scale  - Administer analgesics based on type and severity of pain and evaluate response  - Implement non-pharmacological measures as appropriate and evaluate response  - Consider cultural and social influences on pain and pain management  - Notify physician/advanced practitioner if interventions unsuccessful or patient reports new pain  Outcome: Progressing     Problem: INFECTION - ADULT  Goal: Absence or prevention of progression during hospitalization  Description: INTERVENTIONS:  - Assess and monitor for signs and symptoms of infection  - Monitor lab/diagnostic results  - Monitor all insertion sites, i.e. indwelling lines, tubes, and drains  - Monitor endotracheal if appropriate and nasal secretions for changes in amount and color  - [place] appropriate cooling/warming therapies per order  - Administer medications as ordered  - Instruct and encourage patient and family to use good hand hygiene technique  - Identify and instruct in appropriate isolation precautions for identified infection/condition  Outcome: Progressing  Goal: Absence of fever/infection during neutropenic period  Description: INTERVENTIONS:  - Monitor WBC    Outcome: Progressing     Problem: SAFETY ADULT  Goal: Patient will remain free of falls  Description: INTERVENTIONS:  - Educate patient/family on patient safety including physical limitations  - Instruct patient to call for assistance with activity   - Consult OT/PT to assist with strengthening/mobility   - Keep Call bell within reach  - Keep bed low and locked with side rails adjusted as appropriate  - Keep care items and personal belongings within reach  - Initiate and maintain comfort rounds  - Make Fall Risk Sign visible to staff  - Offer Toileting every 2 Hours,  in advance of need  - Initiate/Maintain bed alarm  - Obtain necessary fall risk management equipment: walker   - Apply yellow socks and bracelet for high fall risk patients  - Consider moving patient to room near nurses station  Outcome: Progressing  Goal: Maintain or return to baseline ADL function  Description: INTERVENTIONS:  -  Assess patient's ability to carry out ADLs; assess patient's baseline for ADL function and identify physical deficits which impact ability to perform ADLs (bathing, care of mouth/teeth, toileting, grooming, dressing, etc.)  - Assess/evaluate cause of self-care deficits   - Assess range of motion  - Assess patient's mobility; develop plan if impaired  - Assess patient's need for assistive devices and provide as appropriate  - Encourage maximum independence but intervene and supervise when necessary  - Involve family in performance of ADLs  - Assess for home care needs following discharge   - Consider OT consult to assist with ADL evaluation and planning for discharge  - Provide patient education as appropriate  Outcome: Progressing  Goal: Maintains/Returns to pre admission functional level  Description: INTERVENTIONS:  - Perform AM-PAC 6 Click Basic Mobility/ Daily Activity assessment daily.  - Set and communicate daily mobility goal to care team and patient/family/caregiver.   - Collaborate with rehabilitation services on mobility goals if consulted  - Perform Range of Motion 3 times a day.  - Reposition patient every 2 hours.  - Dangle patient 3 times a day  - Stand patient 3 times a day  - Ambulate patient 3 times a day  - Out of bed to chair 3 times a day   - Out of bed for meals 3 times a day  - Out of bed for toileting  - Record patient progress and toleration of activity level   Outcome: Progressing     Problem: DISCHARGE PLANNING  Goal: Discharge to home or other facility with appropriate resources  Description: INTERVENTIONS:  - Identify barriers to discharge w/patient and  caregiver  - Arrange for needed discharge resources and transportation as appropriate  - Identify discharge learning needs (meds, wound care, etc.)  - Arrange for interpretive services to assist at discharge as needed  - Refer to Case Management Department for coordinating discharge planning if the patient needs post-hospital services based on physician/advanced practitioner order or complex needs related to functional status, cognitive ability, or social support system  Outcome: Progressing     Problem: Knowledge Deficit  Goal: Patient/family/caregiver demonstrates understanding of disease process, treatment plan, medications, and discharge instructions  Description: Complete learning assessment and assess knowledge base.  Interventions:  - Provide teaching at level of understanding  - Provide teaching via preferred learning methods  Outcome: Progressing     Problem: GENITOURINARY - ADULT  Goal: Maintains or returns to baseline urinary function  Description: INTERVENTIONS:  - Assess urinary function  - Encourage oral fluids to ensure adequate hydration if ordered  - Administer IV fluids as ordered to ensure adequate hydration  - Administer ordered medications as needed  - Offer frequent toileting  - Follow urinary retention protocol if ordered  Outcome: Progressing  Goal: Absence of urinary retention  Description: INTERVENTIONS:  - Assess patient’s ability to void and empty bladder  - Monitor I/O  - Bladder scan as needed  - Discuss with physician/AP medications to alleviate retention as needed  - Discuss catheterization for long term situations as appropriate  Outcome: Progressing  Goal: Urinary catheter remains patent  Description: INTERVENTIONS:  - Assess patency of urinary catheter  - If patient has a chronic alanis, consider changing catheter if non-functioning  - Follow guidelines for intermittent irrigation of non-functioning urinary catheter  Outcome: Progressing

## 2024-05-02 NOTE — CONSULTS
e-Consult (IPC)  - Interventional Radiology  Joel Wyman 78 y.o. male MRN: [id number]  Unit/Bed#: [address] [address] Encounter: [id number]          Interventional Radiology has been consulted to evaluate Joel Wyman    Inpatient Consult to IR  Consult performed by: Giovany Hall MD  Consult ordered by: Yovani Khan MD        05/02/24    Assessment/Recommendation:   78 year old male with history of prostate cancer s/p prostatectomy and radiation therapy c/b radiation cystitis and bladder neck contracture s/p transurethral resection.  Patient admitted with gross hematuria and cystoscopy performed with evacuation of clots and continuous bladder irrigation initiated.  Patient currently with Sumner catheter due to significant urethral stricture disease.  Bladder neck urethra noted to have necrosis.  Patient is referred for suprapubic catheter placement.    - Plan for suprapubic catheter placement today vs tomorrow pending IR schedule availability.  - NPO  - Hold blood thinners      5-10 minutes, >50% of the total time devoted to medical consultative verbal/EMR discussion between providers. Written report will be generated in the EMR.     Thank you for allowing Interventional Radiology to participate in the care of Joel Wyman. Please don't hesitate to call or TigerText us with any questions.     Giovany Hall MD

## 2024-05-02 NOTE — PROGRESS NOTES
"Progress Note - Urology      Patient: Joel Wyman   : 1945 Sex: male   MRN: [id number]     CSN: [id number]  Unit/Bed#: [address] [place] [address]     SUBJECTIVE:   Patient seen on afternoon rounds CBI now completely clear  IR consult put in yesterday confirming that suprapubic tube can be done on this admission but will be done tomorrow  Patient has been n.p.o. to this minute      Objective   Vitals: /71 (BP Location: Left arm)   Pulse 56   Temp 97.8 °F (36.6 °C) (Oral)   Resp 18   Ht 5' 10\" (1.778 m)   Wt 96.2 kg (212 lb)   SpO2 92%   BMI 30.42 kg/m²     I/O last 24 hours:  In: -   Out: 26251 [Urine:86191]      Physical Exam:   General Alert awake   Normocephalic atraumatic PERRLA  Lungs clear bilaterally  Cardiac normal S1 normal S2  Abdomen soft, flank pain  CBI fluid clear  Extremities no edema      Lab Results: CBC:   Lab Results   Component Value Date    WBC 10.51 (H) 2024    HGB 14.4 2024    HCT 43.6 2024    MCV 88 2024     2024    RBC 4.96 2024    MCH 29.0 2024    MCHC 33.0 2024    RDW 14.9 2024    MPV 9.8 2024    NRBC 0 2024     CMP:   Lab Results   Component Value Date     2024    CO2 28 2024    BUN 29 (H) 2024    CREATININE 1.24 2024    CALCIUM 9.0 2024    AST 15 2024    ALT 15 2024    ALKPHOS 62 2024    EGFR 54 2024     Urinalysis:   Lab Results   Component Value Date    COLORU Dark Red 2024    CLARITYU Slightly Cloudy 2024    SPECGRAV 1.025 2024    PHUR 7.5 2024    PHUR 5.5 2018    LEUKOCYTESUR Small (A) 2024    NITRITE Negative 2024    GLUCOSEU Negative 2024    KETONESU 10 (1+) (A) 2024    BILIRUBINUR Negative 2024    BLOODU Large (A) 2024     Urine Culture:   Lab Results   Component Value Date    URINECX No Growth <1000 cfu/mL 2024     PSA: No results found for: \"PSA\"      Assessment/ " Plan:  Recurrent urethral stricture  Current urinary retention  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 after midnight for suprapubic tube placement in the morning          Yovani Khan MD

## 2024-05-03 ENCOUNTER — APPOINTMENT (OUTPATIENT)
Dept: NON INVASIVE DIAGNOSTICS | Facility: HOSPITAL | Age: 79
DRG: 690 | End: 2024-05-03
Attending: RADIOLOGY
Payer: COMMERCIAL

## 2024-05-03 ENCOUNTER — ANESTHESIA EVENT (INPATIENT)
Dept: NON INVASIVE DIAGNOSTICS | Facility: HOSPITAL | Age: 79
DRG: 690 | End: 2024-05-03
Payer: COMMERCIAL

## 2024-05-03 PROBLEM — F17.200 SMOKING: Status: RESOLVED | Noted: 2021-04-12 | Resolved: 2024-05-03

## 2024-05-03 PROBLEM — R82.90 ABNORMAL URINALYSIS: Status: RESOLVED | Noted: 2024-04-30 | Resolved: 2024-05-03

## 2024-05-03 PROBLEM — IMO0001 SMOKING: Status: RESOLVED | Noted: 2021-04-12 | Resolved: 2024-05-03

## 2024-05-03 LAB
ANION GAP SERPL CALCULATED.3IONS-SCNC: 8 MMOL/L (ref 4–13)
BUN SERPL-MCNC: 36 MG/DL (ref 5–25)
CALCIUM SERPL-MCNC: 9.3 MG/DL (ref 8.4–10.2)
CHLORIDE SERPL-SCNC: 101 MMOL/L (ref 96–108)
CO2 SERPL-SCNC: 26 MMOL/L (ref 21–32)
CREAT SERPL-MCNC: 1.45 MG/DL (ref 0.6–1.3)
ERYTHROCYTE [DISTWIDTH] IN BLOOD BY AUTOMATED COUNT: 15.1 % (ref 11.6–15.1)
GFR SERPL CREATININE-BSD FRML MDRD: 45 ML/MIN/1.73SQ M
GLUCOSE SERPL-MCNC: 98 MG/DL (ref 65–140)
HCT VFR BLD AUTO: 43.8 % (ref 36.5–49.3)
HGB BLD-MCNC: 14.4 G/DL (ref 12–17)
INR PPP: 1.13 (ref 0.84–1.19)
MCH RBC QN AUTO: 29.1 PG (ref 26.8–34.3)
MCHC RBC AUTO-ENTMCNC: 32.9 G/DL (ref 31.4–37.4)
MCV RBC AUTO: 89 FL (ref 82–98)
PLATELET # BLD AUTO: 205 THOUSANDS/UL (ref 149–390)
PMV BLD AUTO: 9.5 FL (ref 8.9–12.7)
POTASSIUM SERPL-SCNC: 4.5 MMOL/L (ref 3.5–5.3)
PROTHROMBIN TIME: 14.7 SECONDS (ref 11.6–14.5)
RBC # BLD AUTO: 4.94 MILLION/UL (ref 3.88–5.62)
SODIUM SERPL-SCNC: 135 MMOL/L (ref 135–147)
WBC # BLD AUTO: 10.89 THOUSAND/UL (ref 4.31–10.16)

## 2024-05-03 PROCEDURE — 51102 DRAIN BL W/CATH INSERTION: CPT

## 2024-05-03 PROCEDURE — 85610 PROTHROMBIN TIME: CPT | Performed by: RADIOLOGY

## 2024-05-03 PROCEDURE — 80048 BASIC METABOLIC PNL TOTAL CA: CPT | Performed by: FAMILY MEDICINE

## 2024-05-03 PROCEDURE — 76942 ECHO GUIDE FOR BIOPSY: CPT | Performed by: RADIOLOGY

## 2024-05-03 PROCEDURE — 85027 COMPLETE CBC AUTOMATED: CPT | Performed by: FAMILY MEDICINE

## 2024-05-03 PROCEDURE — 76942 ECHO GUIDE FOR BIOPSY: CPT

## 2024-05-03 PROCEDURE — 51102 DRAIN BL W/CATH INSERTION: CPT | Performed by: RADIOLOGY

## 2024-05-03 PROCEDURE — 0T9B30Z DRAINAGE OF BLADDER WITH DRAINAGE DEVICE, PERCUTANEOUS APPROACH: ICD-10-PCS | Performed by: RADIOLOGY

## 2024-05-03 PROCEDURE — C1725 CATH, TRANSLUMIN NON-LASER: HCPCS

## 2024-05-03 PROCEDURE — 99232 SBSQ HOSP IP/OBS MODERATE 35: CPT | Performed by: FAMILY MEDICINE

## 2024-05-03 RX ORDER — ONDANSETRON 2 MG/ML
4 INJECTION INTRAMUSCULAR; INTRAVENOUS ONCE AS NEEDED
Status: DISCONTINUED | OUTPATIENT
Start: 2024-05-03 | End: 2024-05-06 | Stop reason: HOSPADM

## 2024-05-03 RX ORDER — FENTANYL CITRATE 50 UG/ML
INJECTION, SOLUTION INTRAMUSCULAR; INTRAVENOUS AS NEEDED
Status: DISCONTINUED | OUTPATIENT
Start: 2024-05-03 | End: 2024-05-03

## 2024-05-03 RX ORDER — PROPOFOL 10 MG/ML
INJECTION, EMULSION INTRAVENOUS AS NEEDED
Status: DISCONTINUED | OUTPATIENT
Start: 2024-05-03 | End: 2024-05-03

## 2024-05-03 RX ORDER — FENTANYL CITRATE/PF 50 MCG/ML
25 SYRINGE (ML) INJECTION
Status: COMPLETED | OUTPATIENT
Start: 2024-05-03 | End: 2024-05-03

## 2024-05-03 RX ORDER — CEFAZOLIN SODIUM 2 G/50ML
SOLUTION INTRAVENOUS AS NEEDED
Status: DISCONTINUED | OUTPATIENT
Start: 2024-05-03 | End: 2024-05-03

## 2024-05-03 RX ORDER — HYDROMORPHONE HCL/PF 1 MG/ML
0.5 SYRINGE (ML) INJECTION
Status: DISCONTINUED | OUTPATIENT
Start: 2024-05-03 | End: 2024-05-06 | Stop reason: HOSPADM

## 2024-05-03 RX ORDER — LIDOCAINE HYDROCHLORIDE 10 MG/ML
INJECTION, SOLUTION EPIDURAL; INFILTRATION; INTRACAUDAL; PERINEURAL AS NEEDED
Status: DISCONTINUED | OUTPATIENT
Start: 2024-05-03 | End: 2024-05-03

## 2024-05-03 RX ORDER — SODIUM CHLORIDE 9 MG/ML
INJECTION, SOLUTION INTRAVENOUS CONTINUOUS PRN
Status: DISCONTINUED | OUTPATIENT
Start: 2024-05-03 | End: 2024-05-03

## 2024-05-03 RX ORDER — PROPOFOL 10 MG/ML
INJECTION, EMULSION INTRAVENOUS CONTINUOUS PRN
Status: DISCONTINUED | OUTPATIENT
Start: 2024-05-03 | End: 2024-05-03

## 2024-05-03 RX ORDER — LIDOCAINE WITH 8.4% SOD BICARB 0.9%(10ML)
SYRINGE (ML) INJECTION AS NEEDED
Status: COMPLETED | OUTPATIENT
Start: 2024-05-03 | End: 2024-05-03

## 2024-05-03 RX ADMIN — MORPHINE SULFATE 2 MG: 2 INJECTION, SOLUTION INTRAMUSCULAR; INTRAVENOUS at 23:53

## 2024-05-03 RX ADMIN — Medication 12.5 MG: at 08:22

## 2024-05-03 RX ADMIN — FENTANYL CITRATE 25 MCG: 50 INJECTION, SOLUTION INTRAMUSCULAR; INTRAVENOUS at 12:42

## 2024-05-03 RX ADMIN — IOHEXOL 10 ML: 350 INJECTION, SOLUTION INTRAVENOUS at 13:14

## 2024-05-03 RX ADMIN — Medication 20 ML: at 12:54

## 2024-05-03 RX ADMIN — NICOTINE 1 PATCH: 14 PATCH, EXTENDED RELEASE TRANSDERMAL at 08:23

## 2024-05-03 RX ADMIN — FENTANYL CITRATE 25 MCG: 50 INJECTION INTRAMUSCULAR; INTRAVENOUS at 13:44

## 2024-05-03 RX ADMIN — FENTANYL CITRATE 25 MCG: 50 INJECTION INTRAMUSCULAR; INTRAVENOUS at 14:22

## 2024-05-03 RX ADMIN — MORPHINE SULFATE 2 MG: 2 INJECTION, SOLUTION INTRAMUSCULAR; INTRAVENOUS at 17:48

## 2024-05-03 RX ADMIN — TRAMADOL HYDROCHLORIDE 50 MG: 50 TABLET, COATED ORAL at 19:50

## 2024-05-03 RX ADMIN — PROPOFOL 50 MG: 10 INJECTION, EMULSION INTRAVENOUS at 12:36

## 2024-05-03 RX ADMIN — ALBUTEROL SULFATE 2 PUFF: 90 AEROSOL, METERED RESPIRATORY (INHALATION) at 19:52

## 2024-05-03 RX ADMIN — BISACODYL 10 MG: 5 TABLET, COATED ORAL at 08:22

## 2024-05-03 RX ADMIN — FENTANYL CITRATE 50 MCG: 50 INJECTION, SOLUTION INTRAMUSCULAR; INTRAVENOUS at 12:46

## 2024-05-03 RX ADMIN — CEFAZOLIN SODIUM 2000 MG: 2 SOLUTION INTRAVENOUS at 12:39

## 2024-05-03 RX ADMIN — ACETAMINOPHEN 650 MG: 325 TABLET ORAL at 22:29

## 2024-05-03 RX ADMIN — FENTANYL CITRATE 25 MCG: 50 INJECTION INTRAMUSCULAR; INTRAVENOUS at 13:56

## 2024-05-03 RX ADMIN — LIDOCAINE HYDROCHLORIDE 20 MG: 10 INJECTION, SOLUTION EPIDURAL; INFILTRATION; INTRACAUDAL; PERINEURAL at 12:36

## 2024-05-03 RX ADMIN — MORPHINE SULFATE 2 MG: 2 INJECTION, SOLUTION INTRAMUSCULAR; INTRAVENOUS at 01:22

## 2024-05-03 RX ADMIN — FENTANYL CITRATE 25 MCG: 50 INJECTION, SOLUTION INTRAMUSCULAR; INTRAVENOUS at 12:36

## 2024-05-03 RX ADMIN — FENTANYL CITRATE 25 MCG: 50 INJECTION INTRAMUSCULAR; INTRAVENOUS at 14:11

## 2024-05-03 RX ADMIN — SODIUM CHLORIDE: 0.9 INJECTION, SOLUTION INTRAVENOUS at 12:29

## 2024-05-03 RX ADMIN — PROPOFOL 70 MCG/KG/MIN: 10 INJECTION, EMULSION INTRAVENOUS at 12:36

## 2024-05-03 RX ADMIN — MORPHINE SULFATE 2 MG: 2 INJECTION, SOLUTION INTRAMUSCULAR; INTRAVENOUS at 09:27

## 2024-05-03 RX ADMIN — FLUTICASONE FUROATE AND VILANTEROL TRIFENATATE 1 PUFF: 100; 25 POWDER RESPIRATORY (INHALATION) at 08:23

## 2024-05-03 RX ADMIN — Medication 12.5 MG: at 22:27

## 2024-05-03 RX ADMIN — UMECLIDINIUM 1 PUFF: 62.5 AEROSOL, POWDER ORAL at 08:23

## 2024-05-03 NOTE — DISCHARGE INSTR - AVS FIRST PAGE
#1 no heavy straining or lifting above 10 pounds for 2 weeks    #2 call office fevers, chills, or worsening blood in the urine.    #3  Patient has office appointment May 21 will remove penile catheter at that time continue with SP tube      Yovani Khan M.D. [facility] office  [address] [place] [address].  [place] [address]  [phone]  8:30 AM to 4:30 PM  Monday through Friday    [place] office  [address]  [address]  [place] [address] [phone]  1:00 to 5:00 PM  Wednesday     As discussed keep yourself well-hydrated    Do not drive or operate heavy machinery while taking tramadol    Continue Colace as you normally do    Take Tylenol as needed for pain

## 2024-05-03 NOTE — PROGRESS NOTES
"Progress Note - Urology      Patient: Joel Wyman   : 1945 Sex: male   MRN: [id number]     CSN: [id number]  Unit/Bed#: [address] [place] [address]     SUBJECTIVE:   Patient seen on evening rounds  SP tube placed earlier today 16 [place]alan draining clear urine  22 [place] three-way Sumner catheter left in as per my instruction draining clear urine  And irrigated small amount of bladder debris from fibrinous necrotic bladder neck noted      Objective   Vitals: /81   Pulse 75   Temp (!) 97.2 °F (36.2 °C) (Oral)   Resp 18   Ht 5' 10\" (1.778 m)   Wt 96.2 kg (212 lb)   SpO2 93%   BMI 30.42 kg/m²     I/O last 24 hours:  In: 200 [I.V.:200]  Out: 19267 [Urine:00000]      Physical Exam:   General Alert awake   Normocephalic atraumatic PERRLA  Lungs clear bilaterally  Cardiac normal S1 normal S2  Abdomen soft, flank pain  SP tube clear  Penile catheter clear  Extremities no edema      Lab Results: CBC:   Lab Results   Component Value Date    WBC 10.89 (H) 2024    HGB 14.4 2024    HCT 43.8 2024    MCV 89 2024     2024    RBC 4.94 2024    MCH 29.1 2024    MCHC 32.9 2024    RDW 15.1 2024    MPV 9.5 2024    NRBC 0 2024     CMP:   Lab Results   Component Value Date     2024    CO2 26 2024    BUN 36 (H) 2024    CREATININE 1.45 (H) 2024    CALCIUM 9.3 2024    AST 15 2024    ALT 15 2024    ALKPHOS 62 2024    EGFR 45 2024     Urinalysis:   Lab Results   Component Value Date    COLORU Dark Red 2024    CLARITYU Slightly Cloudy 2024    SPECGRAV 1.025 2024    PHUR 7.5 2024    PHUR 5.5 2018    LEUKOCYTESUR Small (A) 2024    NITRITE Negative 2024    GLUCOSEU Negative 2024    KETONESU 10 (1+) (A) 2024    BILIRUBINUR Negative 2024    BLOODU Large (A) 2024     Urine Culture:   Lab Results   Component Value Date    URINECX No Growth <1000 cfu/mL " "04/30/2024     PSA: No results found for: \"PSA\"      Assessment/ Plan:  Status post SP tube placement today for necrotic fibrinous bladder neck recurrent urethral stricture disease  Patient to be discharged tomorrow with both suprapubic tube and penile catheter to bag drainage  Will be seen in the office on May 21 and if urine clear penile catheter to be removed          Yovani Khan MD  "

## 2024-05-03 NOTE — ANESTHESIA PREPROCEDURE EVALUATION
Procedure:  IR SUPRAPUBIC CATHETER PLACEMENT    Relevant Problems   CARDIO   (+) Essential hypertension   (+) Hyperlipidemia      /RENAL   (+) SHANTI (acute kidney injury) (HCC)   (+) Prostate cancer (HCC) - s/p resection   (+) Stage 3b chronic kidney disease (HCC)      MUSCULOSKELETAL   (+) RA (rheumatoid arthritis) (HCC)      PULMONARY   (+) COPD (chronic obstructive pulmonary disease) (HCC)   (+) Smoking (Resolved)        Physical Exam    Airway    Mallampati score: II  TM Distance: >3 FB  Neck ROM: full     Dental       Cardiovascular  Rhythm: regular, Rate: normal    Pulmonary   Breath sounds clear to auscultation    Other Findings        Anesthesia Plan  ASA Score- 3     Anesthesia Type- general with ASA Monitors.         Additional Monitors:     Airway Plan: LMA.           Plan Factors-    Chart reviewed.        Patient is not a current smoker.              Induction- intravenous.    Postoperative Plan- Plan for postoperative opioid use.     Informed Consent- Anesthetic plan and risks discussed with patient.  I personally reviewed this patient with the CRNA. Discussed and agreed on the Anesthesia Plan with the CRNA..

## 2024-05-03 NOTE — PLAN OF CARE
Problem: PAIN - ADULT  Goal: Verbalizes/displays adequate comfort level or baseline comfort level  Description: Interventions:  - Encourage patient to monitor pain and request assistance  - Assess pain using appropriate pain scale  - Administer analgesics based on type and severity of pain and evaluate response  - Implement non-pharmacological measures as appropriate and evaluate response  - Consider cultural and social influences on pain and pain management  - Notify physician/advanced practitioner if interventions unsuccessful or patient reports new pain  Outcome: Progressing     Problem: INFECTION - ADULT  Goal: Absence or prevention of progression during hospitalization  Description: INTERVENTIONS:  - Assess and monitor for signs and symptoms of infection  - Monitor lab/diagnostic results  - Monitor all insertion sites, i.e. indwelling lines, tubes, and drains  - Monitor endotracheal if appropriate and nasal secretions for changes in amount and color  - [place] appropriate cooling/warming therapies per order  - Administer medications as ordered  - Instruct and encourage patient and family to use good hand hygiene technique  - Identify and instruct in appropriate isolation precautions for identified infection/condition  Outcome: Progressing  Goal: Absence of fever/infection during neutropenic period  Description: INTERVENTIONS:  - Monitor WBC    Outcome: Progressing     Problem: SAFETY ADULT  Goal: Patient will remain free of falls  Description: INTERVENTIONS:  - Educate patient/family on patient safety including physical limitations  - Instruct patient to call for assistance with activity   - Consult OT/PT to assist with strengthening/mobility   - Keep Call bell within reach  - Keep bed low and locked with side rails adjusted as appropriate  - Keep care items and personal belongings within reach  - Initiate and maintain comfort rounds  - Make Fall Risk Sign visible to staff  - Offer Toileting every 2 Hours,  in advance of need  - Initiate/Maintain bed/chair alarm  - Obtain necessary fall risk management equipment:   - Apply yellow socks and bracelet for high fall risk patients  - Consider moving patient to room near nurses station  Outcome: Progressing  Goal: Maintain or return to baseline ADL function  Description: INTERVENTIONS:  -  Assess patient's ability to carry out ADLs; assess patient's baseline for ADL function and identify physical deficits which impact ability to perform ADLs (bathing, care of mouth/teeth, toileting, grooming, dressing, etc.)  - Assess/evaluate cause of self-care deficits   - Assess range of motion  - Assess patient's mobility; develop plan if impaired  - Assess patient's need for assistive devices and provide as appropriate  - Encourage maximum independence but intervene and supervise when necessary  - Involve family in performance of ADLs  - Assess for home care needs following discharge   - Consider OT consult to assist with ADL evaluation and planning for discharge  - Provide patient education as appropriate  Outcome: Progressing  Goal: Maintains/Returns to pre admission functional level  Description: INTERVENTIONS:  - Perform AM-PAC 6 Click Basic Mobility/ Daily Activity assessment daily.  - Set and communicate daily mobility goal to care team and patient/family/caregiver.   - Collaborate with rehabilitation services on mobility goals if consulted  - Perform Range of Motion 3 times a day.  - Reposition patient every 2 hours.  - Dangle patient 3 times a day  - Stand patient 3 times a day  - Ambulate patient 3 times a day  - Out of bed to chair 3 times a day   - Out of bed for meals 3 times a day  - Out of bed for toileting  - Record patient progress and toleration of activity level   Outcome: Progressing     Problem: DISCHARGE PLANNING  Goal: Discharge to home or other facility with appropriate resources  Description: INTERVENTIONS:  - Identify barriers to discharge w/patient and  caregiver  - Arrange for needed discharge resources and transportation as appropriate  - Identify discharge learning needs (meds, wound care, etc.)  - Arrange for interpretive services to assist at discharge as needed  - Refer to Case Management Department for coordinating discharge planning if the patient needs post-hospital services based on physician/advanced practitioner order or complex needs related to functional status, cognitive ability, or social support system  Outcome: Progressing     Problem: Knowledge Deficit  Goal: Patient/family/caregiver demonstrates understanding of disease process, treatment plan, medications, and discharge instructions  Description: Complete learning assessment and assess knowledge base.  Interventions:  - Provide teaching at level of understanding  - Provide teaching via preferred learning methods  Outcome: Progressing     Problem: GENITOURINARY - ADULT  Goal: Maintains or returns to baseline urinary function  Description: INTERVENTIONS:  - Assess urinary function  - Encourage oral fluids to ensure adequate hydration if ordered  - Administer IV fluids as ordered to ensure adequate hydration  - Administer ordered medications as needed  - Offer frequent toileting  - Follow urinary retention protocol if ordered  Outcome: Progressing  Goal: Absence of urinary retention  Description: INTERVENTIONS:  - Assess patient’s ability to void and empty bladder  - Monitor I/O  - Bladder scan as needed  - Discuss with physician/AP medications to alleviate retention as needed  - Discuss catheterization for long term situations as appropriate  Outcome: Progressing  Goal: Urinary catheter remains patent  Description: INTERVENTIONS:  - Assess patency of urinary catheter  - If patient has a chronic alanis, consider changing catheter if non-functioning  - Follow guidelines for intermittent irrigation of non-functioning urinary catheter  Outcome: Progressing

## 2024-05-03 NOTE — PROGRESS NOTES
Spiritual Care Progress Note    5/3/2024  Patient: Joel Wyman : 1945  Admission Date & Time: 2024 191  MRN: [id number] CSN: [id number]     attempted to visit patient per RN referral. Patient off the floor, pt's wife in room.  introduced self & role to pt's wife and provided support. Pt's wife thanked , no further needs identified at this time. Spiritual care remains available.     24 1300   Clinical Encounter Type   Visited With Family;Patient not available   Routine Visit Introduction   Referral From Nurse

## 2024-05-03 NOTE — PLAN OF CARE
Problem: PAIN - ADULT  Goal: Verbalizes/displays adequate comfort level or baseline comfort level  Description: Interventions:  - Encourage patient to monitor pain and request assistance  - Assess pain using appropriate pain scale  - Administer analgesics based on type and severity of pain and evaluate response  - Implement non-pharmacological measures as appropriate and evaluate response  - Consider cultural and social influences on pain and pain management  - Notify physician/advanced practitioner if interventions unsuccessful or patient reports new pain  Outcome: Progressing     Problem: INFECTION - ADULT  Goal: Absence or prevention of progression during hospitalization  Description: INTERVENTIONS:  - Assess and monitor for signs and symptoms of infection  - Monitor lab/diagnostic results  - Monitor all insertion sites, i.e. indwelling lines, tubes, and drains  - Monitor endotracheal if appropriate and nasal secretions for changes in amount and color  - [place] appropriate cooling/warming therapies per order  - Administer medications as ordered  - Instruct and encourage patient and family to use good hand hygiene technique  - Identify and instruct in appropriate isolation precautions for identified infection/condition  Outcome: Progressing  Goal: Absence of fever/infection during neutropenic period  Description: INTERVENTIONS:  - Monitor WBC    Outcome: Progressing     Problem: SAFETY ADULT  Goal: Patient will remain free of falls  Description: INTERVENTIONS:  - Educate patient/family on patient safety including physical limitations  - Instruct patient to call for assistance with activity   - Consult OT/PT to assist with strengthening/mobility   - Keep Call bell within reach  - Keep bed low and locked with side rails adjusted as appropriate  - Keep care items and personal belongings within reach  - Initiate and maintain comfort rounds  - Make Fall Risk Sign visible to staff  - Offer Toileting every 2 Hours,  in advance of need  - Initiate/Maintain bed alarm  - Obtain necessary fall risk management equipment: walker   - Apply yellow socks and bracelet for high fall risk patients  - Consider moving patient to room near nurses station  Outcome: Progressing  Goal: Maintain or return to baseline ADL function  Description: INTERVENTIONS:  -  Assess patient's ability to carry out ADLs; assess patient's baseline for ADL function and identify physical deficits which impact ability to perform ADLs (bathing, care of mouth/teeth, toileting, grooming, dressing, etc.)  - Assess/evaluate cause of self-care deficits   - Assess range of motion  - Assess patient's mobility; develop plan if impaired  - Assess patient's need for assistive devices and provide as appropriate  - Encourage maximum independence but intervene and supervise when necessary  - Involve family in performance of ADLs  - Assess for home care needs following discharge   - Consider OT consult to assist with ADL evaluation and planning for discharge  - Provide patient education as appropriate  Outcome: Progressing  Goal: Maintains/Returns to pre admission functional level  Description: INTERVENTIONS:  - Perform AM-PAC 6 Click Basic Mobility/ Daily Activity assessment daily.  - Set and communicate daily mobility goal to care team and patient/family/caregiver.   - Collaborate with rehabilitation services on mobility goals if consulted  - Perform Range of Motion 3 times a day.  - Reposition patient every 2 hours.  - Dangle patient 3 times a day  - Stand patient 3 times a day  - Ambulate patient 3 times a day  - Out of bed to chair 3 times a day   - Out of bed for meals 3 times a day  - Out of bed for toileting  - Record patient progress and toleration of activity level   Outcome: Progressing     Problem: DISCHARGE PLANNING  Goal: Discharge to home or other facility with appropriate resources  Description: INTERVENTIONS:  - Identify barriers to discharge w/patient and  caregiver  - Arrange for needed discharge resources and transportation as appropriate  - Identify discharge learning needs (meds, wound care, etc.)  - Arrange for interpretive services to assist at discharge as needed  - Refer to Case Management Department for coordinating discharge planning if the patient needs post-hospital services based on physician/advanced practitioner order or complex needs related to functional status, cognitive ability, or social support system  Outcome: Progressing     Problem: Knowledge Deficit  Goal: Patient/family/caregiver demonstrates understanding of disease process, treatment plan, medications, and discharge instructions  Description: Complete learning assessment and assess knowledge base.  Interventions:  - Provide teaching at level of understanding  - Provide teaching via preferred learning methods  Outcome: Progressing     Problem: GENITOURINARY - ADULT  Goal: Maintains or returns to baseline urinary function  Description: INTERVENTIONS:  - Assess urinary function  - Encourage oral fluids to ensure adequate hydration if ordered  - Administer IV fluids as ordered to ensure adequate hydration  - Administer ordered medications as needed  - Offer frequent toileting  - Follow urinary retention protocol if ordered  Outcome: Progressing  Goal: Absence of urinary retention  Description: INTERVENTIONS:  - Assess patient’s ability to void and empty bladder  - Monitor I/O  - Bladder scan as needed  - Discuss with physician/AP medications to alleviate retention as needed  - Discuss catheterization for long term situations as appropriate  Outcome: Progressing  Goal: Urinary catheter remains patent  Description: INTERVENTIONS:  - Assess patency of urinary catheter  - If patient has a chronic alanis, consider changing catheter if non-functioning  - Follow guidelines for intermittent irrigation of non-functioning urinary catheter  Outcome: Progressing

## 2024-05-03 NOTE — BRIEF OP NOTE (RAD/CATH)
INTERVENTIONAL RADIOLOGY PROCEDURE NOTE    Date: 5/3/2024    Procedure: IR SUPRAPUBIC CATHETER PLACEMENT     Preoperative diagnosis:   1. Sumner catheter problem (HCC)    2. Hemorrhagic cystitis    3. Bladder spasm    4. Urinary retention       Postoperative diagnosis: Same.    Surgeon: Doe Flood MD     Assistant: None. No qualified resident was available.    Blood loss: minimal    Specimens: none    Findings: Successful suprapubic catheter placement.    Complications: None immediate.    Anesthesia: general anesthesia

## 2024-05-03 NOTE — PROGRESS NOTES
[facility]  Progress Note  Name: Joel Wyman I  MRN: [id number]  Unit/Bed#: 2 [address] Date of Admission: 4/29/2024   Date of Service: 5/3/2024 I Hospital Day: 3    Assessment/Plan   * Gross hematuria  Assessment & Plan  Patient admitted with hematuria on previous admission.   -Status post cystoscopy with evacuation of clots, fulguration of bladder neck tumors and biopsy.  Retrograde urethrogram/cystogram DVIU was done on 4/24  -Lokesh blood with clots in alanis bag and inability to urinate initially  -CBI was initiated and continues to have intermittent hematuria which is worse with ambulation and spasms per patient  -CT urogram -decompressed bladder was noted with diffuse wall thickness & Inflammatory changes. subcentimeter renal cysts noted bilaterally   - suprapubic catheter placement today   -Hgb continues to remain stable  -Appreciate urology input    Urinary retention  Assessment & Plan  Patient presented with acute on chronic urinary retention, Alanis catheter in place  -History of bladder neck contracture, status post cystoscopy transurethral resection bladder neck contracture bladder tumor evacuation of clots on 4/11/2024.   -Was discharged with Alanis in place on previous admission.  -Further management as noted above    Abnormal urinalysis-resolved as of 5/3/2024  Assessment & Plan  Started on empirically on IV Rocephin which was discontinued as urine culture negative    Stage 3b chronic kidney disease (HCC)  Assessment & Plan  Creatinine trended up but still at baseline compared to prior lab work.      Nicotine abuse  Assessment & Plan  Continue nicotine patch.    Prostate cancer (HCC) - s/p resection  Assessment & Plan  -History of prostate cancer status post XRT.    COPD (chronic obstructive pulmonary disease) (HCC)  Assessment & Plan  No evidence of exacerbation  -Continue home inhalers.    Hyperlipidemia  Assessment & Plan  Diet controlled.    Essential  hypertension  Assessment & Plan  Continue Lopressor.               VTE Pharmacologic Prophylaxis: VTE Score: 4 Moderate Risk (Score 3-4) - Pharmacological DVT Prophylaxis Contraindicated. Sequential Compression Devices Ordered.    Mobility:   Basic Mobility Inpatient Raw Score: 24  JH-HLM Goal: 8: Walk 250 feet or more  JH-HLM Achieved: 7: Walk 25 feet or more  JH-HLM Goal NOT achieved. Continue with multidisciplinary rounding and encourage appropriate mobility to improve upon JH-HLM goals.    Patient Centered Rounds: I performed bedside rounds with nursing staff today.   Discussions with Specialists or Other Care Team Provider: yes - urology    Education and Discussions with Family / Patient: Updated  (wife) at bedside.    Total Time Spent on Date of Encounter in care of patient: This time was spent on one or more of the following: performing physical exam; counseling and coordination of care; obtaining or reviewing history; documenting in the medical record; reviewing/ordering tests, medications or procedures; communicating with other healthcare professionals and discussing with patient's family/caregivers.    Current Length of Stay: 3 day(s)  Current Patient Status: Inpatient   Certification Statement: The patient will continue to require additional inpatient hospital stay due to status post suprapubic catheter placement, hold CBI and monitor for recurrent hematuria  Discharge Plan: Anticipate discharge tomorrow to home.    Code Status: Level 1 - Full Code    Subjective:     Patient seen earlier this a.m.  Continues to report intermittent spasms.  Reports having bowel movements.    Objective:     Vitals:   Temp (24hrs), Av °F (36.7 °C), Min:97.2 °F (36.2 °C), Max:99.1 °F (37.3 °C)    Temp:  [97.2 °F (36.2 °C)-99.1 °F (37.3 °C)] 97.2 °F (36.2 °C)  HR:  [53-72] 61  Resp:  [16-18] 18  BP: (117-176)/(71-83) 157/81  SpO2:  [87 %-100 %] 87 %  Body mass index is 30.42 kg/m².     Input and Output  Summary (last 24 hours):     Intake/Output Summary (Last 24 hours) at 5/3/2024 1609  Last data filed at 5/3/2024 1304  Gross per 24 hour   Intake 200 ml   Output 8375 ml   Net -8175 ml       Physical Exam:   Physical Exam  Vitals reviewed.   Constitutional:       General: He is not in acute distress.     Appearance: He is not toxic-appearing.   HENT:      Head: Normocephalic and atraumatic.   Eyes:      General:         Right eye: No discharge.         Left eye: No discharge.   Cardiovascular:      Rate and Rhythm: Normal rate and regular rhythm.   Pulmonary:      Effort: Pulmonary effort is normal. No respiratory distress.      Breath sounds: Normal breath sounds. No wheezing or rales.   Abdominal:      General: Bowel sounds are normal. There is no distension.      Palpations: Abdomen is soft.      Tenderness: There is no abdominal tenderness.   Genitourinary:     Comments: Sumner in place with no further hematuria in bag  Neurological:      Mental Status: He is alert and oriented to person, place, and time.          Additional Data:     Labs:  Results from last 7 days   Lab Units 05/03/24  0614 05/01/24  0622 04/29/24  2150   WBC Thousand/uL 10.89*   < > 11.85*   HEMOGLOBIN g/dL 14.4   < > 14.5   HEMATOCRIT % 43.8   < > 44.2   PLATELETS Thousands/uL 205   < > 209   SEGS PCT %  --   --  76*   LYMPHO PCT %  --   --  13*   MONO PCT %  --   --  8   EOS PCT %  --   --  2    < > = values in this interval not displayed.     Results from last 7 days   Lab Units 05/03/24  0614 05/01/24  0622 04/30/24  0603   SODIUM mmol/L 135   < > 137   POTASSIUM mmol/L 4.5   < > 4.0   CHLORIDE mmol/L 101   < > 102   CO2 mmol/L 26   < > 25   BUN mg/dL 36*   < > 33*   CREATININE mg/dL 1.45*   < > 1.19   ANION GAP mmol/L 8   < > 10   CALCIUM mg/dL 9.3   < > 8.8   ALBUMIN g/dL  --   --  3.5   TOTAL BILIRUBIN mg/dL  --   --  0.74   ALK PHOS U/L  --   --  62   ALT U/L  --   --  15   AST U/L  --   --  15   GLUCOSE RANDOM mg/dL 98   < > 108    <  > = values in this interval not displayed.     Results from last 7 days   Lab Units 05/03/24  0614   INR  1.13                   Lines/Drains:  Invasive Devices       Peripheral Intravenous Line  Duration             Peripheral IV 04/29/24 Distal;Dorsal (posterior);Left Forearm 3 days              Drain  Duration             Urethral Catheter Latex;Three way 22 Fr. 8 days    Suprapubic Catheter 16 Fr. <1 day                  Urinary Catheter:  Goal for removal:  per urology               Imaging: No pertinent imaging reviewed.    Recent Cultures (last 7 days):   Results from last 7 days   Lab Units 04/30/24  0559   URINE CULTURE  No Growth <1000 cfu/mL       Last 24 Hours Medication List:   Current Facility-Administered Medications   Medication Dose Route Frequency Provider Last Rate    acetaminophen  650 mg Oral Q6H PRN Jamel Harris MD      albuterol  2 puff Inhalation Q4H PRN Jamel Harris MD      bisacodyl  10 mg Oral Daily Janny Marques, DO      Fluticasone Furoate-Vilanterol  1 puff Inhalation Daily Jamel Harris MD      And    umeclidinium  1 puff Inhalation Daily Jamel Harris MD      HYDROmorphone  0.5 mg Intravenous Q5 Min PRN Asuncion Melendez CRNA      HYDROmorphone  1 mg Intravenous Once Komaira Ferdous, DO      metoprolol tartrate  12.5 mg Oral Q12H DHAVAL Jamel Harris MD      morphine injection  2 mg Intravenous Q6H PRN Jamel Harris MD      nicotine  1 patch Transdermal Daily Jamel Harris MD      ondansetron  4 mg Intravenous Once PRN Asuncion Melendez CRNA      polyethylene glycol  17 g Oral Daily Janny Marques DO      traMADol  50 mg Oral Q6H PRN Jamel Harris MD          Today, Patient Was Seen By: Janny Marques DO    **Please Note: This note may have been constructed using a voice recognition system.**

## 2024-05-03 NOTE — PROGRESS NOTES
"Progress Note - Urology      Patient: Joel Wyman   : 1945 Sex: male   MRN: [id number]     CSN: [id number]  Unit/Bed#: [address] [place] [address]     SUBJECTIVE:   Patient seen on morning rounds  CBI restarted slight hematuria  N.p.o. for suprapubic tube placement today      Objective   Vitals: /75   Pulse 61   Temp (!) 97.2 °F (36.2 °C)   Resp 18   Ht 5' 10\" (1.778 m)   Wt 96.2 kg (212 lb)   SpO2 92%   BMI 30.42 kg/m²     I/O last 24 hours:  In: -   Out: 8525 [Urine:8525]      Physical Exam:   General Alert awake   Normocephalic atraumatic PERRLA  Lungs clear bilaterally  Cardiac normal S1 normal S2  Abdomen soft, flank pain  Extremities no edema      Lab Results: CBC:   Lab Results   Component Value Date    WBC 10.89 (H) 2024    HGB 14.4 2024    HCT 43.8 2024    MCV 89 2024     2024    RBC 4.94 2024    MCH 29.1 2024    MCHC 32.9 2024    RDW 15.1 2024    MPV 9.5 2024    NRBC 0 2024     CMP:   Lab Results   Component Value Date     2024    CO2 26 2024    BUN 36 (H) 2024    CREATININE 1.45 (H) 2024    CALCIUM 9.3 2024    AST 15 2024    ALT 15 2024    ALKPHOS 62 2024    EGFR 45 2024     Urinalysis:   Lab Results   Component Value Date    COLORU Dark Red 2024    CLARITYU Slightly Cloudy 2024    SPECGRAV 1.025 2024    PHUR 7.5 2024    PHUR 5.5 2018    LEUKOCYTESUR Small (A) 2024    NITRITE Negative 2024    GLUCOSEU Negative 2024    KETONESU 10 (1+) (A) 2024    BILIRUBINUR Negative 2024    BLOODU Large (A) 2024     Urine Culture:   Lab Results   Component Value Date    URINECX No Growth <1000 cfu/mL 2024     PSA: No results found for: \"PSA\"      Assessment/ Plan:  Bladder neck necrosis  Recurrent urethral stricture  Radiation changes to the bladder   N.p.o.  Suprapubic tube placement today          Yovani " MD Bill

## 2024-05-03 NOTE — ASSESSMENT & PLAN NOTE
Patient admitted with hematuria on previous admission.   -Status post cystoscopy with evacuation of clots, fulguration of bladder neck tumors and biopsy.  Retrograde urethrogram/cystogram DVIU was done on 4/24  -Lokesh blood with clots in alanis bag and inability to urinate initially  -CBI was initiated and continues to have intermittent hematuria which is worse with ambulation and spasms per patient  -CT urogram -decompressed bladder was noted with diffuse wall thickness & Inflammatory changes. subcentimeter renal cysts noted bilaterally   - suprapubic catheter placement today   -Hgb continues to remain stable  -Appreciate urology input

## 2024-05-04 PROCEDURE — 99232 SBSQ HOSP IP/OBS MODERATE 35: CPT | Performed by: FAMILY MEDICINE

## 2024-05-04 RX ADMIN — FLUTICASONE FUROATE AND VILANTEROL TRIFENATATE 1 PUFF: 100; 25 POWDER RESPIRATORY (INHALATION) at 08:47

## 2024-05-04 RX ADMIN — MORPHINE SULFATE 2 MG: 2 INJECTION, SOLUTION INTRAMUSCULAR; INTRAVENOUS at 19:50

## 2024-05-04 RX ADMIN — Medication 12.5 MG: at 21:50

## 2024-05-04 RX ADMIN — Medication 12.5 MG: at 08:46

## 2024-05-04 RX ADMIN — MORPHINE SULFATE 2 MG: 2 INJECTION, SOLUTION INTRAMUSCULAR; INTRAVENOUS at 05:53

## 2024-05-04 RX ADMIN — ALBUTEROL SULFATE 2 PUFF: 90 AEROSOL, METERED RESPIRATORY (INHALATION) at 21:50

## 2024-05-04 RX ADMIN — NICOTINE 1 PATCH: 14 PATCH, EXTENDED RELEASE TRANSDERMAL at 08:48

## 2024-05-04 RX ADMIN — MORPHINE SULFATE 2 MG: 2 INJECTION, SOLUTION INTRAMUSCULAR; INTRAVENOUS at 12:18

## 2024-05-04 RX ADMIN — TRAMADOL HYDROCHLORIDE 50 MG: 50 TABLET, COATED ORAL at 15:56

## 2024-05-04 RX ADMIN — UMECLIDINIUM 1 PUFF: 62.5 AEROSOL, POWDER ORAL at 08:47

## 2024-05-04 RX ADMIN — TRAMADOL HYDROCHLORIDE 50 MG: 50 TABLET, COATED ORAL at 22:53

## 2024-05-04 RX ADMIN — TRAMADOL HYDROCHLORIDE 50 MG: 50 TABLET, COATED ORAL at 02:48

## 2024-05-04 RX ADMIN — TRAMADOL HYDROCHLORIDE 50 MG: 50 TABLET, COATED ORAL at 08:57

## 2024-05-04 NOTE — PROGRESS NOTES
[facility]  Progress Note  Name: Joel Wyman I  MRN: [id number]  Unit/Bed#: 2 [address] Date of Admission: 4/29/2024   Date of Service: 5/4/2024 I Hospital Day: 4    Assessment/Plan   * Gross hematuria  Assessment & Plan  Patient admitted with hematuria on previous admission.   -Status post cystoscopy with evacuation of clots, fulguration of bladder neck tumors and biopsy.  Retrograde urethrogram/cystogram DVIU was done on 4/24  -Lokesh blood with clots in alanis bag and inability to urinate initially  -CBI was initiated and then discontinued yesterday.  CBI has been restarted due to mild hematuria again.  Patient reports intermittent hematuria which is worse with ambulation and spasms  -CT urogram -decompressed bladder was noted with diffuse wall thickness & Inflammatory changes. subcentimeter renal cysts noted bilaterally   - s/p suprapubic catheter placement 5/3   -Hemoglobin stable on prior lab work.  Repeat lab work in a.m.  -Appreciate urology input    Urinary retention  Assessment & Plan  Patient presented with acute on chronic urinary retention, Alanis catheter in place  -History of bladder neck contracture, status post cystoscopy transurethral resection bladder neck contracture bladder tumor evacuation of clots on 4/11/2024.   -Was discharged with Alanis in place on previous admission.  - now with SPT in place  -Further management as noted above.    Abnormal urinalysis-resolved as of 5/3/2024  Assessment & Plan  Started on empirically on IV Rocephin which was discontinued as urine culture negative    Stage 3b chronic kidney disease (HCC)  Assessment & Plan  Creatinine trended up yest but still at baseline compared to prior lab work  Rpt labs in AM    Nicotine abuse  Assessment & Plan  Continue nicotine patch    Prostate cancer (HCC) - s/p resection  Assessment & Plan  History of prostate cancer status post XRT.    COPD (chronic obstructive pulmonary disease) ([facility])  Assessment &  Plan  No evidence of exacerbation.  -Continue home inhalers.    Hyperlipidemia  Assessment & Plan  Diet controlled    Essential hypertension  Assessment & Plan  Continue Lopressor               VTE Pharmacologic Prophylaxis: VTE Score: 4 Moderate Risk (Score 3-4) - Pharmacological DVT Prophylaxis Contraindicated. Sequential Compression Devices Ordered.    Mobility:   Basic Mobility Inpatient Raw Score: 24  JH-HLM Goal: 8: Walk 250 feet or more  JH-HLM Achieved: 8: Walk 250 feet ot more  JH-HLM Goal achieved. Continue to encourage appropriate mobility.    Patient Centered Rounds: I performed bedside rounds with nursing staff today.   Discussions with Specialists or Other Care Team Provider: yes    Education and Discussions with Family / Patient: Patient declined call to .     Total Time Spent on Date of Encounter in care of patient: This time was spent on one or more of the following: performing physical exam; counseling and coordination of care; obtaining or reviewing history; documenting in the medical record; reviewing/ordering tests, medications or procedures; communicating with other healthcare professionals and discussing with patient's family/caregivers.    Current Length of Stay: 4 day(s)  Current Patient Status: Inpatient   Certification Statement: The patient will continue to require additional inpatient hospital stay due to recurrent gross hematuria requiring CBI  Discharge Plan: Anticipate discharge in 24-48 hrs to home.    Code Status: Level 1 - Full Code    Subjective:     Patient states hematuria had resolved but then restarted again.  States he starts to notice bloody urine when he has spasms    Objective:     Vitals:   Temp (24hrs), Av.2 °F (36.8 °C), Min:98.1 °F (36.7 °C), Max:98.2 °F (36.8 °C)    Temp:  [98.1 °F (36.7 °C)-98.2 °F (36.8 °C)] 98.1 °F (36.7 °C)  HR:  [61-78] 71  Resp:  [18] 18  BP: (117-183)/(71-95) 117/72  SpO2:  [87 %-100 %] 90 %  Body mass index is 30.42 kg/m².      Input and Output Summary (last 24 hours):     Intake/Output Summary (Last 24 hours) at 5/4/2024 1317  Last data filed at 5/4/2024 0501  Gross per 24 hour   Intake 1170 ml   Output 1975 ml   Net -805 ml       Physical Exam:   Physical Exam  Vitals reviewed.   Constitutional:       General: He is not in acute distress.     Appearance: He is not toxic-appearing.   HENT:      Head: Normocephalic and atraumatic.   Eyes:      General:         Right eye: No discharge.         Left eye: No discharge.   Cardiovascular:      Rate and Rhythm: Normal rate and regular rhythm.   Pulmonary:      Effort: Pulmonary effort is normal. No respiratory distress.      Breath sounds: Normal breath sounds. No wheezing or rales.   Abdominal:      General: Bowel sounds are normal. There is no distension.      Palpations: Abdomen is soft.      Tenderness: There is no abdominal tenderness.      Comments: SPT in place   Genitourinary:     Comments: Sumner in place with CBI running with some mild hematuria  Neurological:      Mental Status: He is alert and oriented to person, place, and time.          Additional Data:     Labs:  Results from last 7 days   Lab Units 05/03/24  0614 05/01/24  0622 04/29/24  2150   WBC Thousand/uL 10.89*   < > 11.85*   HEMOGLOBIN g/dL 14.4   < > 14.5   HEMATOCRIT % 43.8   < > 44.2   PLATELETS Thousands/uL 205   < > 209   SEGS PCT %  --   --  76*   LYMPHO PCT %  --   --  13*   MONO PCT %  --   --  8   EOS PCT %  --   --  2    < > = values in this interval not displayed.     Results from last 7 days   Lab Units 05/03/24  0614 05/01/24  0622 04/30/24  0603   SODIUM mmol/L 135   < > 137   POTASSIUM mmol/L 4.5   < > 4.0   CHLORIDE mmol/L 101   < > 102   CO2 mmol/L 26   < > 25   BUN mg/dL 36*   < > 33*   CREATININE mg/dL 1.45*   < > 1.19   ANION GAP mmol/L 8   < > 10   CALCIUM mg/dL 9.3   < > 8.8   ALBUMIN g/dL  --   --  3.5   TOTAL BILIRUBIN mg/dL  --   --  0.74   ALK PHOS U/L  --   --  62   ALT U/L  --   --  15   AST  U/L  --   --  15   GLUCOSE RANDOM mg/dL 98   < > 108    < > = values in this interval not displayed.     Results from last 7 days   Lab Units 05/03/24  0614   INR  1.13                   Lines/Drains:  Invasive Devices       Peripheral Intravenous Line  Duration             Peripheral IV 04/29/24 Distal;Dorsal (posterior);Left Forearm 4 days              Drain  Duration             Urethral Catheter Latex;Three way 22 Fr. 9 days    Suprapubic Catheter 16 Fr. 1 day                  Urinary Catheter:  Goal for removal: N/A- Discharging with Sumner               Imaging: No pertinent imaging reviewed.    Recent Cultures (last 7 days):   Results from last 7 days   Lab Units 04/30/24  0559   URINE CULTURE  No Growth <1000 cfu/mL       Last 24 Hours Medication List:   Current Facility-Administered Medications   Medication Dose Route Frequency Provider Last Rate    acetaminophen  650 mg Oral Q6H PRN Jamel Harris MD      albuterol  2 puff Inhalation Q4H PRN Jamel Harris MD      bisacodyl  10 mg Oral Daily Janny Marques DO      Fluticasone Furoate-Vilanterol  1 puff Inhalation Daily Jamel Harris MD      And    umeclidinium  1 puff Inhalation Daily Jamel Harris MD      HYDROmorphone  0.5 mg Intravenous Q5 Min PRN Asuncion Melendez CRNA      HYDROmorphone  1 mg Intravenous Once Komaira Ferdous,       metoprolol tartrate  12.5 mg Oral Q12H DHAVAL Jamel Harris MD      morphine injection  2 mg Intravenous Q6H PRN Jamel Harris MD      nicotine  1 patch Transdermal Daily Jamel Harris MD      ondansetron  4 mg Intravenous Once PRN Asuncion Melendez CRNA      polyethylene glycol  17 g Oral Daily Janny Marques DO      traMADol  50 mg Oral Q6H PRN Jamel Harris MD          Today, Patient Was Seen By: Janny Marques DO    **Please Note: This note may have been constructed using a voice recognition system.**

## 2024-05-04 NOTE — ASSESSMENT & PLAN NOTE
Patient admitted with hematuria on previous admission.   -Status post cystoscopy with evacuation of clots, fulguration of bladder neck tumors and biopsy.  Retrograde urethrogram/cystogram DVIU was done on 4/24  -Lokesh blood with clots in alanis bag and inability to urinate initially  -CBI was initiated and then discontinued yesterday.  CBI has been restarted due to mild hematuria again.  Patient reports intermittent hematuria which is worse with ambulation and spasms  -CT urogram -decompressed bladder was noted with diffuse wall thickness & Inflammatory changes. subcentimeter renal cysts noted bilaterally   - s/p suprapubic catheter placement 5/3   -Hemoglobin stable on prior lab work.  Repeat lab work in a.m.  -Appreciate urology input

## 2024-05-04 NOTE — PROGRESS NOTES
"Progress Note - Urology      Patient: Joel Wyman   : 1945 Sex: male   MRN: [id number]     CSN: [id number]  Unit/Bed#: [address] [place] [address]     SUBJECTIVE:   Off cbi  Mild hematuria   Will watch today      Objective   Vitals: /72 (BP Location: Right arm)   Pulse 71   Temp 98.1 °F (36.7 °C)   Resp 18   Ht 5' 10\" (1.778 m)   Wt 96.2 kg (212 lb)   SpO2 90%   BMI 30.42 kg/m²     I/O last 24 hours:  In: 1370 [P.O.:1160; I.V.:210]  Out: 5450 [Urine:5450]      Physical Exam:   General Alert awake   Normocephalic atraumatic PERRLA  Lungs clear bilaterally  Cardiac normal S1 normal S2  Abdomen soft, flank pain none  Mild hematuria  Extremities no edema      Lab Results: CBC:   Lab Results   Component Value Date    WBC 10.89 (H) 2024    HGB 14.4 2024    HCT 43.8 2024    MCV 89 2024     2024    RBC 4.94 2024    MCH 29.1 2024    MCHC 32.9 2024    RDW 15.1 2024    MPV 9.5 2024    NRBC 0 2024     CMP:   Lab Results   Component Value Date     2024    CO2 26 2024    BUN 36 (H) 2024    CREATININE 1.45 (H) 2024    CALCIUM 9.3 2024    AST 15 2024    ALT 15 2024    ALKPHOS 62 2024    EGFR 45 2024     Urinalysis:   Lab Results   Component Value Date    COLORU Dark Red 2024    CLARITYU Slightly Cloudy 2024    SPECGRAV 1.025 2024    PHUR 7.5 2024    PHUR 5.5 2018    LEUKOCYTESUR Small (A) 2024    NITRITE Negative 2024    GLUCOSEU Negative 2024    KETONESU 10 (1+) (A) 2024    BILIRUBINUR Negative 2024    BLOODU Large (A) 2024     Urine Culture:   Lab Results   Component Value Date    URINECX No Growth <1000 cfu/mL 2024     PSA: No results found for: \"PSA\"      Assessment/ Plan:  Unresolved hematuria starting today  Restart cbi  Straining?  Oozing will watch for today          Yovani Khan MD  "

## 2024-05-04 NOTE — PLAN OF CARE
Problem: PAIN - ADULT  Goal: Verbalizes/displays adequate comfort level or baseline comfort level  Description: Interventions:  - Encourage patient to monitor pain and request assistance  - Assess pain using appropriate pain scale  - Administer analgesics based on type and severity of pain and evaluate response  - Implement non-pharmacological measures as appropriate and evaluate response  - Consider cultural and social influences on pain and pain management  - Notify physician/advanced practitioner if interventions unsuccessful or patient reports new pain  5/4/2024 0806 by Marlee Kasper RN  Outcome: Progressing  5/4/2024 0801 by Marlee Kasper RN  Outcome: Progressing     Problem: INFECTION - ADULT  Goal: Absence or prevention of progression during hospitalization  Description: INTERVENTIONS:  - Assess and monitor for signs and symptoms of infection  - Monitor lab/diagnostic results  - Monitor all insertion sites, i.e. indwelling lines, tubes, and drains  - Monitor endotracheal if appropriate and nasal secretions for changes in amount and color  - [place] appropriate cooling/warming therapies per order  - Administer medications as ordered  - Instruct and encourage patient and family to use good hand hygiene technique  - Identify and instruct in appropriate isolation precautions for identified infection/condition  5/4/2024 0806 by Marlee Kasper RN  Outcome: Progressing  5/4/2024 0801 by Marlee Kasper RN  Outcome: Progressing     Problem: SAFETY ADULT  Goal: Patient will remain free of falls  Description: INTERVENTIONS:  - Educate patient/family on patient safety including physical limitations  - Instruct patient to call for assistance with activity   - Consult OT/PT to assist with strengthening/mobility   - Keep Call bell within reach  - Keep bed low and locked with side rails adjusted as appropriate  - Keep care items and personal belongings within reach  - Initiate  and maintain comfort rounds  - Make Fall Risk Sign visible to staff  - Offer Toileting every 2 Hours, in advance of need  - Initiate/Maintain bed/chair a  - Apply yellow socks and bracelet for high fall risk patients  - Consider moving patient to room near nurses station  Outcome: Progressing     Problem: DISCHARGE PLANNING  Goal: Discharge to home or other facility with appropriate resources  Description: INTERVENTIONS:  - Identify barriers to discharge w/patient and caregiver  - Arrange for needed discharge resources and transportation as appropriate  - Identify discharge learning needs (meds, wound care, etc.)  - Arrange for interpretive services to assist at discharge as needed  - Refer to Case Management Department for coordinating discharge planning if the patient needs post-hospital services based on physician/advanced practitioner order or complex needs related to functional status, cognitive ability, or social support system  Outcome: Progressing     Problem: Knowledge Deficit  Goal: Patient/family/caregiver demonstrates understanding of disease process, treatment plan, medications, and discharge instructions  Description: Complete learning assessment and assess knowledge base.  Interventions:  - Provide teaching at level of understanding  - Provide teaching via preferred learning methods  Outcome: Progressing     Problem: GENITOURINARY - ADULT  Goal: Maintains or returns to baseline urinary function  Description: INTERVENTIONS:  - Assess urinary function  - Encourage oral fluids to ensure adequate hydration if ordered  - Administer IV fluids as ordered to ensure adequate hydration  - Administer ordered medications as needed  - Offer frequent toileting  - Follow urinary retention protocol if ordered  Outcome: Progressing  Goal: Absence of urinary retention  Description: INTERVENTIONS:  - Assess patient’s ability to void and empty bladder  - Monitor I/O  - Bladder scan as needed  - Discuss with physician/AP  medications to alleviate retention as needed  - Discuss catheterization for long term situations as appropriate  Outcome: Progressing  Goal: Urinary catheter remains patent  Description: INTERVENTIONS:  - Assess patency of urinary catheter  - If patient has a chronic alanis, consider changing catheter if non-functioning  - Follow guidelines for intermittent irrigation of non-functioning urinary catheter  Outcome: Progressing

## 2024-05-05 LAB
ANION GAP SERPL CALCULATED.3IONS-SCNC: 7 MMOL/L (ref 4–13)
BUN SERPL-MCNC: 30 MG/DL (ref 5–25)
CALCIUM SERPL-MCNC: 8.9 MG/DL (ref 8.4–10.2)
CHLORIDE SERPL-SCNC: 103 MMOL/L (ref 96–108)
CO2 SERPL-SCNC: 26 MMOL/L (ref 21–32)
CREAT SERPL-MCNC: 1.33 MG/DL (ref 0.6–1.3)
ERYTHROCYTE [DISTWIDTH] IN BLOOD BY AUTOMATED COUNT: 15.2 % (ref 11.6–15.1)
GFR SERPL CREATININE-BSD FRML MDRD: 50 ML/MIN/1.73SQ M
GLUCOSE SERPL-MCNC: 110 MG/DL (ref 65–140)
HCT VFR BLD AUTO: 43.1 % (ref 36.5–49.3)
HGB BLD-MCNC: 14.3 G/DL (ref 12–17)
MCH RBC QN AUTO: 29.1 PG (ref 26.8–34.3)
MCHC RBC AUTO-ENTMCNC: 33.2 G/DL (ref 31.4–37.4)
MCV RBC AUTO: 88 FL (ref 82–98)
PLATELET # BLD AUTO: 192 THOUSANDS/UL (ref 149–390)
PMV BLD AUTO: 9.3 FL (ref 8.9–12.7)
POTASSIUM SERPL-SCNC: 4.4 MMOL/L (ref 3.5–5.3)
RBC # BLD AUTO: 4.92 MILLION/UL (ref 3.88–5.62)
SODIUM SERPL-SCNC: 136 MMOL/L (ref 135–147)
WBC # BLD AUTO: 10.52 THOUSAND/UL (ref 4.31–10.16)

## 2024-05-05 PROCEDURE — 85027 COMPLETE CBC AUTOMATED: CPT | Performed by: FAMILY MEDICINE

## 2024-05-05 PROCEDURE — 99232 SBSQ HOSP IP/OBS MODERATE 35: CPT | Performed by: FAMILY MEDICINE

## 2024-05-05 PROCEDURE — 80048 BASIC METABOLIC PNL TOTAL CA: CPT | Performed by: FAMILY MEDICINE

## 2024-05-05 RX ORDER — OXYBUTYNIN CHLORIDE 5 MG/1
15 TABLET, EXTENDED RELEASE ORAL DAILY
Status: DISCONTINUED | OUTPATIENT
Start: 2024-05-05 | End: 2024-05-06 | Stop reason: HOSPADM

## 2024-05-05 RX ADMIN — UMECLIDINIUM 1 PUFF: 62.5 AEROSOL, POWDER ORAL at 08:56

## 2024-05-05 RX ADMIN — Medication 12.5 MG: at 08:58

## 2024-05-05 RX ADMIN — NICOTINE 1 PATCH: 14 PATCH, EXTENDED RELEASE TRANSDERMAL at 08:58

## 2024-05-05 RX ADMIN — MORPHINE SULFATE 2 MG: 2 INJECTION, SOLUTION INTRAMUSCULAR; INTRAVENOUS at 09:05

## 2024-05-05 RX ADMIN — MORPHINE SULFATE 2 MG: 2 INJECTION, SOLUTION INTRAMUSCULAR; INTRAVENOUS at 02:53

## 2024-05-05 RX ADMIN — MORPHINE SULFATE 2 MG: 2 INJECTION, SOLUTION INTRAMUSCULAR; INTRAVENOUS at 22:58

## 2024-05-05 RX ADMIN — FLUTICASONE FUROATE AND VILANTEROL TRIFENATATE 1 PUFF: 100; 25 POWDER RESPIRATORY (INHALATION) at 08:56

## 2024-05-05 RX ADMIN — MORPHINE SULFATE 2 MG: 2 INJECTION, SOLUTION INTRAMUSCULAR; INTRAVENOUS at 16:23

## 2024-05-05 RX ADMIN — POLYETHYLENE GLYCOL 3350 17 G: 17 POWDER, FOR SOLUTION ORAL at 08:57

## 2024-05-05 RX ADMIN — Medication 12.5 MG: at 20:01

## 2024-05-05 RX ADMIN — BISACODYL 10 MG: 5 TABLET, COATED ORAL at 08:57

## 2024-05-05 RX ADMIN — OXYBUTYNIN CHLORIDE 15 MG: 5 TABLET, EXTENDED RELEASE ORAL at 16:18

## 2024-05-05 NOTE — PLAN OF CARE
Problem: PAIN - ADULT  Goal: Verbalizes/displays adequate comfort level or baseline comfort level  Description: Interventions:  - Encourage patient to monitor pain and request assistance  - Assess pain using appropriate pain scale  - Administer analgesics based on type and severity of pain and evaluate response  - Implement non-pharmacological measures as appropriate and evaluate response  - Consider cultural and social influences on pain and pain management  - Notify physician/advanced practitioner if interventions unsuccessful or patient reports new pain  Outcome: Progressing     Problem: INFECTION - ADULT  Goal: Absence or prevention of progression during hospitalization  Description: INTERVENTIONS:  - Assess and monitor for signs and symptoms of infection  - Monitor lab/diagnostic results  - Monitor all insertion sites, i.e. indwelling lines, tubes, and drains  - Monitor endotracheal if appropriate and nasal secretions for changes in amount and color  - [place] appropriate cooling/warming therapies per order  - Administer medications as ordered  - Instruct and encourage patient and family to use good hand hygiene technique  - Identify and instruct in appropriate isolation precautions for identified infection/condition  Outcome: Progressing  Goal: Absence of fever/infection during neutropenic period  Description: INTERVENTIONS:  - Monitor WBC    Outcome: Progressing     Problem: SAFETY ADULT  Goal: Patient will remain free of falls  Description: INTERVENTIONS:  - Educate patient/family on patient safety including physical limitations  - Instruct patient to call for assistance with activity   - Consult OT/PT to assist with strengthening/mobility   - Keep Call bell within reach  - Keep bed low and locked with side rails adjusted as appropriate  - Keep care items and personal belongings within reach  - Initiate and maintain comfort rounds  - Make Fall Risk Sign visible to staff  - Offer Toileting every 2 Hours,  in advance of need  - Initiate/Maintain bed/chair a  - Apply yellow socks and bracelet for high fall risk patients  - Consider moving patient to room near nurses station  Outcome: Progressing  Goal: Maintain or return to baseline ADL function  Description: INTERVENTIONS:  -  Assess patient's ability to carry out ADLs; assess patient's baseline for ADL function and identify physical deficits which impact ability to perform ADLs (bathing, care of mouth/teeth, toileting, grooming, dressing, etc.)  - Assess/evaluate cause of self-care deficits   - Assess range of motion  - Assess patient's mobility; develop plan if impaired  - Assess patient's need for assistive devices and provide as appropriate  - Encourage maximum independence but intervene and supervise when necessary  - Involve family in performance of ADLs  - Assess for home care needs following discharge   - Consider OT consult to assist with ADL evaluation and planning for discharge  - Provide patient education as appropriate  Outcome: Progressing  Goal: Maintains/Returns to pre admission functional level  Description: INTERVENTIONS:  - Perform AM-PAC 6 Click Basic Mobility/ Daily Activity assessment daily.  - Set and communicate daily mobility goal to care team and patient/family/caregiver.   - Collaborate with rehabilitation services on mobility goals if consulted  - Perform Range of Motion 2 times a day.  - Reposition patient every 2 hours.  - Dangle patient 2 times a day  - Stand patient 2 times a day  - Ambulate patient 2 times a day  - Out of bed to chair 2 times a day   - Out of bed for meals 3 times a day  - Out of bed for toileting  - Record patient progress and toleration of activity level   Outcome: Progressing     Problem: DISCHARGE PLANNING  Goal: Discharge to home or other facility with appropriate resources  Description: INTERVENTIONS:  - Identify barriers to discharge w/patient and caregiver  - Arrange for needed discharge resources and  transportation as appropriate  - Identify discharge learning needs (meds, wound care, etc.)  - Arrange for interpretive services to assist at discharge as needed  - Refer to Case Management Department for coordinating discharge planning if the patient needs post-hospital services based on physician/advanced practitioner order or complex needs related to functional status, cognitive ability, or social support system  Outcome: Progressing     Problem: Knowledge Deficit  Goal: Patient/family/caregiver demonstrates understanding of disease process, treatment plan, medications, and discharge instructions  Description: Complete learning assessment and assess knowledge base.  Interventions:  - Provide teaching at level of understanding  - Provide teaching via preferred learning methods  Outcome: Progressing     Problem: GENITOURINARY - ADULT  Goal: Maintains or returns to baseline urinary function  Description: INTERVENTIONS:  - Assess urinary function  - Encourage oral fluids to ensure adequate hydration if ordered  - Administer IV fluids as ordered to ensure adequate hydration  - Administer ordered medications as needed  - Offer frequent toileting  - Follow urinary retention protocol if ordered  Outcome: Progressing  Goal: Absence of urinary retention  Description: INTERVENTIONS:  - Assess patient’s ability to void and empty bladder  - Monitor I/O  - Bladder scan as needed  - Discuss with physician/AP medications to alleviate retention as needed  - Discuss catheterization for long term situations as appropriate  Outcome: Progressing  Goal: Urinary catheter remains patent  Description: INTERVENTIONS:  - Assess patency of urinary catheter  - If patient has a chronic alanis, consider changing catheter if non-functioning  - Follow guidelines for intermittent irrigation of non-functioning urinary catheter  Outcome: Progressing

## 2024-05-05 NOTE — PROGRESS NOTES
[facility]  Progress Note  Name: Joel Wyman I  MRN: [id number]  Unit/Bed#: 2 [address] Date of Admission: 4/29/2024   Date of Service: 5/5/2024 I Hospital Day: 5    Assessment/Plan   * Gross hematuria  Assessment & Plan  Patient admitted with hematuria on previous admission.   -Status post cystoscopy with evacuation of clots, fulguration of bladder neck tumors and biopsy.  Retrograde urethrogram/cystogram DVIU was done on 4/24  -Lokesh blood with clots in alanis bag and inability to urinate initially  -CBI was initiated and then discontinued 5/3.  CBI restarted ON 5/4 due to mild hematuria again.  Patient reports intermittent hematuria which is worse with ambulation and spasms  -Per urology plan to discontinue CBI at 7 AM tomorrow.  Ditropan XL started while hospitalized for spasms.  -CT urogram -decompressed bladder was noted with diffuse wall thickness & Inflammatory changes. subcentimeter renal cysts noted bilaterally   - s/p suprapubic catheter placement 5/3   -Hemoglobin continues to remain stable on lab work  -Appreciate urology input    Urinary retention  Assessment & Plan  Patient presented with acute on chronic urinary retention, Alanis catheter in place  -History of bladder neck contracture, status post cystoscopy transurethral resection bladder neck contracture bladder tumor evacuation of clots on 4/11/2024.   -Was discharged with Alanis in place on previous admission.  - now with SPT in place.  -Further management as noted above.    Abnormal urinalysis-resolved as of 5/3/2024  Assessment & Plan  Started on empirically on IV Rocephin which was discontinued as urine culture negative    Stage 3b chronic kidney disease (HCC)  Assessment & Plan  Creatinine 1.3 but appears close to baseline    Nicotine abuse  Assessment & Plan  Continue nicotine patch.    Prostate cancer (HCC) - s/p resection  Assessment & Plan  History of prostate cancer status post XRT    COPD (chronic obstructive  pulmonary disease) (HCC)  Assessment & Plan  No evidence of exacerbation  -Continue home inhalers.    Hyperlipidemia  Assessment & Plan  Diet controlled.    Essential hypertension  Assessment & Plan  Continue Lopressor.               VTE Pharmacologic Prophylaxis: VTE Score: 4 Moderate Risk (Score 3-4) - Pharmacological DVT Prophylaxis Contraindicated. Sequential Compression Devices Ordered.    Mobility:   Basic Mobility Inpatient Raw Score: 24  JH-HLM Goal: 8: Walk 250 feet or more  JH-HLM Achieved: 7: Walk 25 feet or more  JH-HLM Goal NOT achieved. Continue with multidisciplinary rounding and encourage appropriate mobility to improve upon JH-HLM goals.    Patient Centered Rounds: I performed bedside rounds with nursing staff today.   Discussions with Specialists or Other Care Team Provider: yes - urology    Education and Discussions with Family / Patient: Patient declined call to .     Total Time Spent on Date of Encounter in care of patient: This time was spent on one or more of the following: performing physical exam; counseling and coordination of care; obtaining or reviewing history; documenting in the medical record; reviewing/ordering tests, medications or procedures; communicating with other healthcare professionals and discussing with patient's family/caregivers.    Current Length of Stay: 5 day(s)  Current Patient Status: Inpatient   Certification Statement: The patient will continue to require additional inpatient hospital stay due to gross hematuria requiring CBI today with likely discontinuation tomorrow  Discharge Plan: Anticipate discharge tomorrow to home.    Code Status: Level 1 - Full Code    Subjective:     Patient continues to report intermittent spasms and notices hematuria with the spasms    Objective:     Vitals:   Temp (24hrs), Av.9 °F (36.6 °C), Min:97.7 °F (36.5 °C), Max:98.1 °F (36.7 °C)    Temp:  [97.7 °F (36.5 °C)-98.1 °F (36.7 °C)] 97.7 °F (36.5 °C)  HR:  [58-69]  61  Resp:  [18] 18  BP: (135-140)/(68-75) 135/74  SpO2:  [90 %-93 %] 92 %  Body mass index is 30.42 kg/m².     Input and Output Summary (last 24 hours):     Intake/Output Summary (Last 24 hours) at 5/5/2024 1326  Last data filed at 5/5/2024 0601  Gross per 24 hour   Intake 1000 ml   Output 8150 ml   Net -7150 ml       Physical Exam:   Physical Exam  Vitals reviewed.   Constitutional:       General: He is not in acute distress.     Appearance: He is not toxic-appearing.   HENT:      Head: Normocephalic and atraumatic.   Eyes:      General:         Right eye: No discharge.         Left eye: No discharge.   Cardiovascular:      Rate and Rhythm: Normal rate and regular rhythm.   Pulmonary:      Effort: Pulmonary effort is normal. No respiratory distress.      Breath sounds: Normal breath sounds. No wheezing or rales.   Abdominal:      General: Bowel sounds are normal. There is no distension.      Palpations: Abdomen is soft.      Tenderness: There is no abdominal tenderness.      Comments: SPT in place   Genitourinary:     Comments: Sumner in place with CBI running with pinkish colored urine  Neurological:      Mental Status: He is alert and oriented to person, place, and time.          Additional Data:     Labs:  Results from last 7 days   Lab Units 05/05/24  0617 05/01/24 0622 04/29/24  2150   WBC Thousand/uL 10.52*   < > 11.85*   HEMOGLOBIN g/dL 14.3   < > 14.5   HEMATOCRIT % 43.1   < > 44.2   PLATELETS Thousands/uL 192   < > 209   SEGS PCT %  --   --  76*   LYMPHO PCT %  --   --  13*   MONO PCT %  --   --  8   EOS PCT %  --   --  2    < > = values in this interval not displayed.     Results from last 7 days   Lab Units 05/05/24  0617 05/01/24 0622 04/30/24  0603   SODIUM mmol/L 136   < > 137   POTASSIUM mmol/L 4.4   < > 4.0   CHLORIDE mmol/L 103   < > 102   CO2 mmol/L 26   < > 25   BUN mg/dL 30*   < > 33*   CREATININE mg/dL 1.33*   < > 1.19   ANION GAP mmol/L 7   < > 10   CALCIUM mg/dL 8.9   < > 8.8   ALBUMIN g/dL   --   --  3.5   TOTAL BILIRUBIN mg/dL  --   --  0.74   ALK PHOS U/L  --   --  62   ALT U/L  --   --  15   AST U/L  --   --  15   GLUCOSE RANDOM mg/dL 110   < > 108    < > = values in this interval not displayed.     Results from last 7 days   Lab Units 05/03/24  0614   INR  1.13                   Lines/Drains:  Invasive Devices       Peripheral Intravenous Line  Duration             Peripheral IV 05/03/24 Distal;Dorsal (posterior);Left Forearm 2 days              Drain  Duration             Urethral Catheter Latex;Three way 22 Fr. 10 days    Suprapubic Catheter 16 Fr. 2 days    Continuous Bladder Irrigation Three-way 1 day                  Urinary Catheter:  Goal for removal: N/A- Discharging with Sumner               Imaging: No pertinent imaging reviewed.    Recent Cultures (last 7 days):   Results from last 7 days   Lab Units 04/30/24  0559   URINE CULTURE  No Growth <1000 cfu/mL       Last 24 Hours Medication List:   Current Facility-Administered Medications   Medication Dose Route Frequency Provider Last Rate    acetaminophen  650 mg Oral Q6H PRN Jamel Harris MD      albuterol  2 puff Inhalation Q4H PRN Jamel Harris MD      bisacodyl  10 mg Oral Daily Janny Marques DO      Fluticasone Furoate-Vilanterol  1 puff Inhalation Daily Jamel Harris MD      And    umeclidinium  1 puff Inhalation Daily Jamel Harris MD      HYDROmorphone  0.5 mg Intravenous Q5 Min PRN Asuncion Melendez CRNA      HYDROmorphone  1 mg Intravenous Once Komaira Ferdous, DO      metoprolol tartrate  12.5 mg Oral Q12H [facility] Jamel Harris MD      morphine injection  2 mg Intravenous Q6H PRN Jamel Harris MD      nicotine  1 patch Transdermal Daily Jamel Harris MD      ondansetron  4 mg Intravenous Once PRN Asuncion Melendez CRNA      polyethylene glycol  17 g Oral Daily Janny Marques DO      traMADol  50 mg Oral Q6H PRN Jamel Harris MD          Today, Patient Was Seen By: Janny Marques DO    **Please Note: This note may have  been constructed using a voice recognition system.**

## 2024-05-05 NOTE — PROGRESS NOTES
"Progress Note - Urology      Patient: Joel Wyman   : 1945 Sex: male   MRN: [id number]     CSN: [id number]  Unit/Bed#: [address] [place] [address]     SUBJECTIVE:   Patient seen on afternoon rounds  CBI restarted yesterday due to mild hematuria now crystal-clear  Continues to have significant spasms post radiation changes severe bladder instability  CBI crystal-clear at this time      Objective   Vitals: /74   Pulse 61   Temp 97.7 °F (36.5 °C) (Oral)   Resp 18   Ht 5' 10\" (1.778 m)   Wt 96.2 kg (212 lb)   SpO2 92%   BMI 30.42 kg/m²     I/O last 24 hours:  In: 1720 [P.O.:1700; I.V.:20]  Out: 9200 [Urine:9200]      Physical Exam:   General Alert awake   Normocephalic atraumatic PERRLA  Lungs clear bilaterally  Cardiac normal S1 normal S2  Abdomen soft, flank pain  Extremities no edema      Lab Results: CBC:   Lab Results   Component Value Date    WBC 10.52 (H) 2024    HGB 14.3 2024    HCT 43.1 2024    MCV 88 2024     2024    RBC 4.92 2024    MCH 29.1 2024    MCHC 33.2 2024    RDW 15.2 (H) 2024    MPV 9.3 2024    NRBC 0 2024     CMP:   Lab Results   Component Value Date     2024    CO2 26 2024    BUN 30 (H) 2024    CREATININE 1.33 (H) 2024    CALCIUM 8.9 2024    AST 15 2024    ALT 15 2024    ALKPHOS 62 2024    EGFR 50 2024     Urinalysis:   Lab Results   Component Value Date    COLORU Dark Red 2024    CLARITYU Slightly Cloudy 2024    SPECGRAV 1.025 2024    PHUR 7.5 2024    PHUR 5.5 2018    LEUKOCYTESUR Small (A) 2024    NITRITE Negative 2024    GLUCOSEU Negative 2024    KETONESU 10 (1+) (A) 2024    BILIRUBINUR Negative 2024    BLOODU Large (A) 2024     Urine Culture:   Lab Results   Component Value Date    URINECX No Growth <1000 cfu/mL 2024     PSA: No results found for: \"PSA\"      Assessment/ Plan:  Gross " hematuria  Urinary retention  Current urethral stricture disease bladder neck contracture status post radiation therapy for recurrent prostate cancer with necrosis to the bladder neck  History of prostate cancer  Continue CBI today  DC CBI in a.m. tomorrow  If urine clear patient to be discharged home with both suprapubic tube open and SP tube open to bag drainage          Yovani Khan MD

## 2024-05-05 NOTE — ASSESSMENT & PLAN NOTE
Patient presented with acute on chronic urinary retention, Sumner catheter in place  -History of bladder neck contracture, status post cystoscopy transurethral resection bladder neck contracture bladder tumor evacuation of clots on 4/11/2024.   -Was discharged with Sumner in place on previous admission.  - now with SPT in place.  -Further management as noted above.

## 2024-05-05 NOTE — ASSESSMENT & PLAN NOTE
Patient admitted with hematuria on previous admission.   -Status post cystoscopy with evacuation of clots, fulguration of bladder neck tumors and biopsy.  Retrograde urethrogram/cystogram DVIU was done on 4/24  -Lokesh blood with clots in alanis bag and inability to urinate initially  -CBI was initiated and then discontinued 5/3.  CBI restarted ON 5/4 due to mild hematuria again.  Patient reports intermittent hematuria which is worse with ambulation and spasms  -Per urology plan to discontinue CBI at 7 AM tomorrow.  Ditropan XL started while hospitalized for spasms.  -CT urogram -decompressed bladder was noted with diffuse wall thickness & Inflammatory changes. subcentimeter renal cysts noted bilaterally   - s/p suprapubic catheter placement 5/3   -Hemoglobin continues to remain stable on lab work  -Appreciate urology input

## 2024-05-05 NOTE — PLAN OF CARE
Problem: PAIN - ADULT  Goal: Verbalizes/displays adequate comfort level or baseline comfort level  Description: Interventions:  - Encourage patient to monitor pain and request assistance  - Assess pain using appropriate pain scale  - Administer analgesics based on type and severity of pain and evaluate response  - Implement non-pharmacological measures as appropriate and evaluate response  - Consider cultural and social influences on pain and pain management  - Notify physician/advanced practitioner if interventions unsuccessful or patient reports new pain  Outcome: Progressing     Problem: INFECTION - ADULT  Goal: Absence or prevention of progression during hospitalization  Description: INTERVENTIONS:  - Assess and monitor for signs and symptoms of infection  - Monitor lab/diagnostic results  - Monitor all insertion sites, i.e. indwelling lines, tubes, and drains  - Monitor endotracheal if appropriate and nasal secretions for changes in amount and color  - [place] appropriate cooling/warming therapies per order  - Administer medications as ordered  - Instruct and encourage patient and family to use good hand hygiene technique  - Identify and instruct in appropriate isolation precautions for identified infection/condition  Outcome: Progressing     Problem: SAFETY ADULT  Goal: Patient will remain free of falls  Description: INTERVENTIONS:  - Educate patient/family on patient safety including physical limitations  - Instruct patient to call for assistance with activity   - Consult OT/PT to assist with strengthening/mobility   - Keep Call bell within reach  - Keep bed low and locked with side rails adjusted as appropriate  - Keep care items and personal belongings within reach  - Initiate and maintain comfort rounds  - Make Fall Risk Sign visible to staff  - Offer Toileting every 2 Hours, in advance of need  - Initiate/Maintain bed/chair a  - Apply yellow socks and bracelet for high fall risk patients  - Consider  moving patient to room near nurses station  Outcome: Progressing     Problem: DISCHARGE PLANNING  Goal: Discharge to home or other facility with appropriate resources  Description: INTERVENTIONS:  - Identify barriers to discharge w/patient and caregiver  - Arrange for needed discharge resources and transportation as appropriate  - Identify discharge learning needs (meds, wound care, etc.)  - Arrange for interpretive services to assist at discharge as needed  - Refer to Case Management Department for coordinating discharge planning if the patient needs post-hospital services based on physician/advanced practitioner order or complex needs related to functional status, cognitive ability, or social support system  Outcome: Progressing     Problem: Knowledge Deficit  Goal: Patient/family/caregiver demonstrates understanding of disease process, treatment plan, medications, and discharge instructions  Description: Complete learning assessment and assess knowledge base.  Interventions:  - Provide teaching at level of understanding  - Provide teaching via preferred learning methods  Outcome: Progressing     Problem: GENITOURINARY - ADULT  Goal: Maintains or returns to baseline urinary function  Description: INTERVENTIONS:  - Assess urinary function  - Encourage oral fluids to ensure adequate hydration if ordered  - Administer IV fluids as ordered to ensure adequate hydration  - Administer ordered medications as needed  - Offer frequent toileting  - Follow urinary retention protocol if ordered  Outcome: Progressing  Goal: Absence of urinary retention  Description: INTERVENTIONS:  - Assess patient’s ability to void and empty bladder  - Monitor I/O  - Bladder scan as needed  - Discuss with physician/AP medications to alleviate retention as needed  - Discuss catheterization for long term situations as appropriate  Outcome: Progressing  Goal: Urinary catheter remains patent  Description: INTERVENTIONS:  - Assess patency of  urinary catheter  - If patient has a chronic alnais, consider changing catheter if non-functioning  - Follow guidelines for intermittent irrigation of non-functioning urinary catheter  Outcome: Progressing

## 2024-05-06 VITALS
HEART RATE: 62 BPM | TEMPERATURE: 97.4 F | OXYGEN SATURATION: 93 % | DIASTOLIC BLOOD PRESSURE: 68 MMHG | SYSTOLIC BLOOD PRESSURE: 123 MMHG | BODY MASS INDEX: 30.35 KG/M2 | WEIGHT: 212 LBS | HEIGHT: 70 IN | RESPIRATION RATE: 18 BRPM

## 2024-05-06 PROCEDURE — 99239 HOSP IP/OBS DSCHRG MGMT >30: CPT | Performed by: FAMILY MEDICINE

## 2024-05-06 RX ORDER — TRAMADOL HYDROCHLORIDE 50 MG/1
50 TABLET ORAL EVERY 8 HOURS PRN
Qty: 10 TABLET | Refills: 0 | Status: SHIPPED | OUTPATIENT
Start: 2024-05-06

## 2024-05-06 RX ORDER — POLYETHYLENE GLYCOL 3350 17 G/17G
17 POWDER, FOR SOLUTION ORAL DAILY PRN
Start: 2024-05-06

## 2024-05-06 RX ORDER — NICOTINE 21 MG/24HR
1 PATCH, TRANSDERMAL 24 HOURS TRANSDERMAL DAILY
Qty: 28 PATCH | Refills: 0 | Status: SHIPPED | OUTPATIENT
Start: 2024-05-07 | End: 2024-05-18

## 2024-05-06 RX ADMIN — NICOTINE 1 PATCH: 14 PATCH, EXTENDED RELEASE TRANSDERMAL at 11:24

## 2024-05-06 RX ADMIN — BISACODYL 10 MG: 5 TABLET, COATED ORAL at 11:23

## 2024-05-06 RX ADMIN — FLUTICASONE FUROATE AND VILANTEROL TRIFENATATE 1 PUFF: 100; 25 POWDER RESPIRATORY (INHALATION) at 11:21

## 2024-05-06 RX ADMIN — OXYBUTYNIN CHLORIDE 15 MG: 5 TABLET, EXTENDED RELEASE ORAL at 11:23

## 2024-05-06 RX ADMIN — Medication 12.5 MG: at 11:24

## 2024-05-06 RX ADMIN — MORPHINE SULFATE 2 MG: 2 INJECTION, SOLUTION INTRAMUSCULAR; INTRAVENOUS at 05:03

## 2024-05-06 RX ADMIN — POLYETHYLENE GLYCOL 3350 17 G: 17 POWDER, FOR SOLUTION ORAL at 11:24

## 2024-05-06 RX ADMIN — MORPHINE SULFATE 2 MG: 2 INJECTION, SOLUTION INTRAMUSCULAR; INTRAVENOUS at 12:13

## 2024-05-06 RX ADMIN — UMECLIDINIUM 1 PUFF: 62.5 AEROSOL, POWDER ORAL at 11:20

## 2024-05-06 NOTE — ASSESSMENT & PLAN NOTE
Patient presented with acute on chronic urinary retention, Sumner catheter in place  -History of bladder neck contracture, status post cystoscopy transurethral resection bladder neck contracture bladder tumor evacuation of clots on 4/11/2024.   - Was discharged with Sumner in place on previous admission.  - now with SPT in place.  -Further management as noted above.

## 2024-05-06 NOTE — DISCHARGE SUMMARY
[facility]  Discharge- Joel Wyman 1945, 78 y.o. male MRN: [id number]  Unit/Bed#: 2 [place] [address] Encounter: [id number]  Primary Care Provider: Fabiano Carroll MD   Date and time admitted to hospital: 4/29/2024  7:16 PM    * Gross hematuria  Assessment & Plan  Patient admitted with hematuria on previous admission.   -S/P cystoscopy with evacuation of clots, fulguration of bladder neck tumors and biopsy.  Retrograde urethrogram/cystogram DVIU was done on 4/24  -Lokesh blood with clots in alanis bag and inability to urinate initially  -CBI was initiated and then discontinued 5/3.  CBI restarted ON 5/4 due to mild hematuria again.  Patient reports intermittent hematuria which is worse with ambulation and spasms  -Discontinued CBI this a.m. and still with some mild hematuria. Ditropan XL started while hospitalized for spasms. Per urology, he has another medication at home for spasms  -CT urogram -decompressed bladder was noted with diffuse wall thickness & Inflammatory changes. subcentimeter renal cysts noted bilaterally   - s/p suprapubic catheter placement 5/3   - Hemoglobin continues to remain stable. Outpt labwork  - F/U urology after discharge    Urinary retention  Assessment & Plan  Patient presented with acute on chronic urinary retention, Alanis catheter in place  -History of bladder neck contracture, status post cystoscopy transurethral resection bladder neck contracture bladder tumor evacuation of clots on 4/11/2024.   - Was discharged with Alanis in place on previous admission.  - now with SPT in place.  -Further management as noted above.    Abnormal urinalysis-resolved as of 5/3/2024  Assessment & Plan  Started on empirically on IV Rocephin which was discontinued as urine culture negative    Stage 3b chronic kidney disease (HCC)  Assessment & Plan  Creatinine 1.3, appears close to baseline    Nicotine abuse  Assessment & Plan  Continue nicotine patch    Prostate cancer (HCC)  - s/p resection  Assessment & Plan  History of prostate cancer status post XRT.    COPD (chronic obstructive pulmonary disease) (HCC)  Assessment & Plan  No evidence of exacerbation  -Continue home inhalers    Hyperlipidemia  Assessment & Plan  Diet controlled    Essential hypertension  Assessment & Plan  Continue Lopressor      Medical Problems       Resolved Problems  Date Reviewed: 5/6/2024            Resolved    Abnormal urinalysis 5/3/2024     Resolved by  Janny Marques DO        Discharging Physician / Practitioner: Janny Marques DO  PCP: Fabiano Carroll MD  Admission Date:   Admission Orders (From admission, onward)       Ordered        04/30/24 1329  INPATIENT ADMISSION  Once            04/29/24 2125  Place in Observation  Once                          Discharge Date: 05/06/24    Consultations During Hospital Stay:  Urology  IR    Procedures Performed:   SPT placement    Significant Findings / Test Results:   See above    Incidental Findings:   none     Test Results Pending at Discharge (will require follow up):   none     Outpatient Tests Requested:  cbc    Complications:  none    Reason for Admission: gross hematuria    Hospital Course:   Joel Wyman is a 78 y.o. male patient who originally presented to the hospital on 4/29/2024 due to Gross hematuria and inability to urinate.  He was recently admitted here for the same and taken for cystoscopy with evacuation of clots, fulguration of bladder neck tumors with retrograde urethrogram with DVIU.  He received ceftriaxone during that hospitalization.  Patient was admitted and seen by urology while here.  Placed on CBI.  Also had SPT placed during this admission.  Patient continues to have hematuria despite CBI here. cleared by urology prior to discharge.  Discharge plan discussed in details with patient and his wife at bedside    Please see above list of diagnoses and related plan for additional information.     Condition at Discharge:  "stable    Discharge Day Visit / Exam:   Subjective: Continues to report spasms.  States urine starts to turn red with spasms and ambulation    Vitals: Blood Pressure: 123/68 (05/06/24 1538)  Pulse: 62 (05/06/24 1538)  Temperature: (!) 97.4 °F (36.3 °C) (05/06/24 1538)  Temp Source: Oral (05/06/24 0716)  Respirations: 18 (05/06/24 1538)  Height: 5' 10\" (177.8 cm) (04/29/24 2300)  Weight - Scale: 96.2 kg (212 lb) (04/29/24 2300)  SpO2: 93 % (05/06/24 1538)  Exam:   Physical Exam  Vitals reviewed.   Constitutional:       General: He is not in acute distress.     Appearance: He is not toxic-appearing.   HENT:      Head: Normocephalic and atraumatic.   Eyes:      General:         Right eye: No discharge.         Left eye: No discharge.   Cardiovascular:      Rate and Rhythm: Normal rate and regular rhythm.   Pulmonary:      Effort: Pulmonary effort is normal. No respiratory distress.      Breath sounds: Normal breath sounds. No wheezing or rales.   Abdominal:      General: Bowel sounds are normal. There is no distension.      Palpations: Abdomen is soft.      Tenderness: There is no abdominal tenderness.      Comments: SPT in place with light red urine   Genitourinary:     Comments: Sumner in place with pink/light red urine in bag  Musculoskeletal:      Right lower leg: No edema.      Left lower leg: No edema.   Neurological:      Mental Status: He is alert and oriented to person, place, and time.         Discussion with Family: Updated  (wife) at bedside.    Discharge instructions/Information to patient and family:   See after visit summary for information provided to patient and family.      Provisions for Follow-Up Care:  See after visit summary for information related to follow-up care and any pertinent home health orders.      Mobility at time of Discharge:   Basic Mobility Inpatient Raw Score: 22  JH-HLM Goal: 7: Walk 25 feet or more  JH-HLM Achieved: 8: Walk 250 feet ot more  HLM Goal achieved. " Continue to encourage appropriate mobility.     Disposition:   Home    Planned Readmission: no     Discharge Statement:  I spent > 30 minutes discharging the patient. This time was spent on the day of discharge. I had direct contact with the patient on the day of discharge. Greater than 50% of the total time was spent examining patient, answering all patient questions, arranging and discussing plan of care with patient as well as directly providing post-discharge instructions.  Additional time then spent on discharge activities.    Discharge Medications:  See after visit summary for reconciled discharge medications provided to patient and/or family.      **Please Note: This note may have been constructed using a voice recognition system**

## 2024-05-06 NOTE — NURSING NOTE
Patient has been discharged home with a suprapubic catheter and a alanis catheter, instruction on catheter care was given, patient left with all  belongings. AVS was explained.

## 2024-05-06 NOTE — ASSESSMENT & PLAN NOTE
Patient admitted with hematuria on previous admission.   -S/P cystoscopy with evacuation of clots, fulguration of bladder neck tumors and biopsy.  Retrograde urethrogram/cystogram DVIU was done on 4/24  -Lokesh blood with clots in alanis bag and inability to urinate initially  -CBI was initiated and then discontinued 5/3.  CBI restarted ON 5/4 due to mild hematuria again.  Patient reports intermittent hematuria which is worse with ambulation and spasms  -Discontinued CBI this a.m. and still with some mild hematuria. Ditropan XL started while hospitalized for spasms. Per urology, he has another medication at home for spasms  -CT urogram -decompressed bladder was noted with diffuse wall thickness & Inflammatory changes. subcentimeter renal cysts noted bilaterally   - s/p suprapubic catheter placement 5/3   - Hemoglobin continues to remain stable. Outpt labwork  - F/U urology after discharge

## 2024-05-06 NOTE — CASE MANAGEMENT
Case Management Discharge Planning Note    Patient name Joel Wyman  Location 2 [facility] [address] [place] [address] MRN [id number]  : 1945 Date 2024       Current Admission Date: 2024  Current Admission Diagnosis:Gross hematuria   Patient Active Problem List    Diagnosis Date Noted    SIRS (systemic inflammatory response syndrome) ([facility]) 2024    Obesity (BMI 30.0-34.9) 2024    Nicotine abuse 2024    Stage 3b chronic kidney disease ([facility])     Urinary retention 2021    Hematuria 2021    Prostate cancer ([facility]) - s/p resection 2021    Leukocytosis 2021    RA (rheumatoid arthritis) ([facility]) 2021    SHANTI (acute kidney injury) ([facility]) 2021    Gross hematuria     COPD (chronic obstructive pulmonary disease) ([facility]) 2021    Hyperlipidemia 2021    Essential hypertension 2021    Dyslipidemia 2021      LOS (days): 6  Geometric Mean LOS (GMLOS) (days): 2.9  Days to GMLOS:-3.1     OBJECTIVE:  Risk of Unplanned Readmission Score: 25.93     Current admission status: Inpatient   Preferred Pharmacy:   [facility]/pharmacy #[id number] [address] [address]  Phone: [phone] Fax: [phone]    Primary Care Provider: Fabiano Carroll MD    Primary Insurance: A[employer] REP  Secondary Insurance:     DISCHARGE DETAILS:    Treatment Team Recommendation: Home  Discharge Destination Plan:: Home  Transport at Discharge : Family    IMM Given (Date):: 24  IMM Given to:: Patient (IMM #2 reviewed with pt. Pt verbalized understanding.  Pt signed IMM and copy given. Copy also placed in scan bin for chart.)

## 2024-05-06 NOTE — PROGRESS NOTES
"Progress Note - Urology      Patient: Joel Wyman   : 1945 Sex: male   MRN: [id number]     CSN: [id number]  Unit/Bed#: [address] [place] [address]     SUBJECTIVE:   Redy for discharge  Severe spasms  D/c cbi  Home today ifclear      Objective   Vitals: /66 (BP Location: Left arm)   Pulse 56   Temp 97.7 °F (36.5 °C) (Oral)   Resp 18   Ht 5' 10\" (1.778 m)   Wt 96.2 kg (212 lb)   SpO2 92%   BMI 30.42 kg/m²     I/O last 24 hours:  In: 1040 [P.O.:1040]  Out: 6450 [Urine:6450]      Physical Exam:   General Alert awake   Normocephalic atraumatic PERRLA  Lungs clear bilaterally  Cardiac normal S1 normal S2  Abdomen soft, flank pain  Extremities no edema      Lab Results: CBC:   Lab Results   Component Value Date    WBC 10.52 (H) 2024    HGB 14.3 2024    HCT 43.1 2024    MCV 88 2024     2024    RBC 4.92 2024    MCH 29.1 2024    MCHC 33.2 2024    RDW 15.2 (H) 2024    MPV 9.3 2024    NRBC 0 2024     CMP:   Lab Results   Component Value Date     2024    CO2 26 2024    BUN 30 (H) 2024    CREATININE 1.33 (H) 2024    CALCIUM 8.9 2024    AST 15 2024    ALT 15 2024    ALKPHOS 62 2024    EGFR 50 2024     Urinalysis:   Lab Results   Component Value Date    COLORU Dark Red 2024    CLARITYU Slightly Cloudy 2024    SPECGRAV 1.025 2024    PHUR 7.5 2024    PHUR 5.5 2018    LEUKOCYTESUR Small (A) 2024    NITRITE Negative 2024    GLUCOSEU Negative 2024    KETONESU 10 (1+) (A) 2024    BILIRUBINUR Negative 2024    BLOODU Large (A) 2024     Urine Culture:   Lab Results   Component Value Date    URINECX No Growth <1000 cfu/mL 2024     PSA: No results found for: \"PSA\"      Assessment/ Plan:  Gross hematuria  Bladder neck necrosis  S/p rt recurrant prostate cancer  D/c cbi  Home both alanis open bag drainage          Yovani Khan MD  "

## 2024-05-14 ENCOUNTER — HOSPITAL ENCOUNTER (INPATIENT)
Facility: HOSPITAL | Age: 79
LOS: 3 days | Discharge: HOME | DRG: 700 | End: 2024-05-18
Attending: EMERGENCY MEDICINE | Admitting: FAMILY MEDICINE
Payer: COMMERCIAL

## 2024-05-14 ENCOUNTER — APPOINTMENT (OUTPATIENT)
Dept: RADIOLOGY | Facility: HOSPITAL | Age: 79
DRG: 700 | End: 2024-05-14
Payer: COMMERCIAL

## 2024-05-14 DIAGNOSIS — R31.9 HEMATURIA: Primary | ICD-10-CM

## 2024-05-14 LAB
ALBUMIN SERPL BCP-MCNC: 3.8 G/DL (ref 3.5–5)
ALP SERPL-CCNC: 69 U/L (ref 34–104)
ALT SERPL W P-5'-P-CCNC: 17 U/L (ref 7–52)
ANION GAP SERPL CALCULATED.3IONS-SCNC: 8 MMOL/L (ref 4–13)
APTT PPP: 32 SECONDS (ref 23–37)
AST SERPL W P-5'-P-CCNC: 18 U/L (ref 13–39)
BACTERIA UR QL AUTO: ABNORMAL /HPF
BASOPHILS # BLD AUTO: 0.05 THOUSANDS/ÂΜL (ref 0–0.1)
BASOPHILS NFR BLD AUTO: 0 % (ref 0–1)
BILIRUB SERPL-MCNC: 0.53 MG/DL (ref 0.2–1)
BILIRUB UR QL STRIP: NEGATIVE
BUN SERPL-MCNC: 36 MG/DL (ref 5–25)
CALCIUM SERPL-MCNC: 9.4 MG/DL (ref 8.4–10.2)
CHLORIDE SERPL-SCNC: 104 MMOL/L (ref 96–108)
CLARITY UR: ABNORMAL
CO2 SERPL-SCNC: 23 MMOL/L (ref 21–32)
COLOR UR: ABNORMAL
CREAT SERPL-MCNC: 1.45 MG/DL (ref 0.6–1.3)
EOSINOPHIL # BLD AUTO: 0.24 THOUSAND/ÂΜL (ref 0–0.61)
EOSINOPHIL NFR BLD AUTO: 2 % (ref 0–6)
ERYTHROCYTE [DISTWIDTH] IN BLOOD BY AUTOMATED COUNT: 15.3 % (ref 11.6–15.1)
GFR SERPL CREATININE-BSD FRML MDRD: 45 ML/MIN/1.73SQ M
GLUCOSE SERPL-MCNC: 119 MG/DL (ref 65–140)
GLUCOSE UR STRIP-MCNC: NEGATIVE MG/DL
HCT VFR BLD AUTO: 40.7 % (ref 36.5–49.3)
HCT VFR BLD AUTO: 42.5 % (ref 36.5–49.3)
HGB BLD-MCNC: 13.4 G/DL (ref 12–17)
HGB BLD-MCNC: 14.3 G/DL (ref 12–17)
HGB UR QL STRIP.AUTO: ABNORMAL
IMM GRANULOCYTES # BLD AUTO: 0.04 THOUSAND/UL (ref 0–0.2)
IMM GRANULOCYTES NFR BLD AUTO: 0 % (ref 0–2)
INR PPP: 1.08 (ref 0.84–1.19)
KETONES UR STRIP-MCNC: NEGATIVE MG/DL
LEUKOCYTE ESTERASE UR QL STRIP: ABNORMAL
LYMPHOCYTES # BLD AUTO: 1.35 THOUSANDS/ÂΜL (ref 0.6–4.47)
LYMPHOCYTES NFR BLD AUTO: 12 % (ref 14–44)
MCH RBC QN AUTO: 29.5 PG (ref 26.8–34.3)
MCHC RBC AUTO-ENTMCNC: 33.6 G/DL (ref 31.4–37.4)
MCV RBC AUTO: 88 FL (ref 82–98)
MONOCYTES # BLD AUTO: 0.9 THOUSAND/ÂΜL (ref 0.17–1.22)
MONOCYTES NFR BLD AUTO: 8 % (ref 4–12)
NEUTROPHILS # BLD AUTO: 9.04 THOUSANDS/ÂΜL (ref 1.85–7.62)
NEUTS SEG NFR BLD AUTO: 78 % (ref 43–75)
NITRITE UR QL STRIP: NEGATIVE
NON-SQ EPI CELLS URNS QL MICRO: ABNORMAL /HPF
NRBC BLD AUTO-RTO: 0 /100 WBCS
PH UR STRIP.AUTO: 8.5 [PH]
PLATELET # BLD AUTO: 217 THOUSANDS/UL (ref 149–390)
PMV BLD AUTO: 9.6 FL (ref 8.9–12.7)
POTASSIUM SERPL-SCNC: 4.3 MMOL/L (ref 3.5–5.3)
PROT SERPL-MCNC: 7.3 G/DL (ref 6.4–8.4)
PROT UR STRIP-MCNC: ABNORMAL MG/DL
PROTHROMBIN TIME: 14.2 SECONDS (ref 11.6–14.5)
RBC # BLD AUTO: 4.85 MILLION/UL (ref 3.88–5.62)
RBC #/AREA URNS AUTO: ABNORMAL /HPF
SODIUM SERPL-SCNC: 135 MMOL/L (ref 135–147)
SP GR UR STRIP.AUTO: <1.005 (ref 1–1.03)
TRI-PHOS CRY URNS QL MICRO: ABNORMAL /HPF
UROBILINOGEN UR STRIP-ACNC: <2 MG/DL
WBC # BLD AUTO: 11.62 THOUSAND/UL (ref 4.31–10.16)
WBC #/AREA URNS AUTO: ABNORMAL /HPF

## 2024-05-14 PROCEDURE — 74174 CTA ABD&PLVS W/CONTRAST: CPT

## 2024-05-14 PROCEDURE — NC001 PR NO CHARGE: Performed by: RADIOLOGY

## 2024-05-14 PROCEDURE — 36415 COLL VENOUS BLD VENIPUNCTURE: CPT | Performed by: EMERGENCY MEDICINE

## 2024-05-14 PROCEDURE — 85025 COMPLETE CBC W/AUTO DIFF WBC: CPT | Performed by: EMERGENCY MEDICINE

## 2024-05-14 PROCEDURE — 80053 COMPREHEN METABOLIC PANEL: CPT | Performed by: EMERGENCY MEDICINE

## 2024-05-14 PROCEDURE — 99285 EMERGENCY DEPT VISIT HI MDM: CPT

## 2024-05-14 PROCEDURE — 81001 URINALYSIS AUTO W/SCOPE: CPT | Performed by: EMERGENCY MEDICINE

## 2024-05-14 PROCEDURE — 85730 THROMBOPLASTIN TIME PARTIAL: CPT | Performed by: EMERGENCY MEDICINE

## 2024-05-14 PROCEDURE — 99285 EMERGENCY DEPT VISIT HI MDM: CPT | Performed by: EMERGENCY MEDICINE

## 2024-05-14 PROCEDURE — 85014 HEMATOCRIT: CPT | Performed by: FAMILY MEDICINE

## 2024-05-14 PROCEDURE — 85610 PROTHROMBIN TIME: CPT | Performed by: EMERGENCY MEDICINE

## 2024-05-14 PROCEDURE — 99223 1ST HOSP IP/OBS HIGH 75: CPT | Performed by: FAMILY MEDICINE

## 2024-05-14 PROCEDURE — 85018 HEMOGLOBIN: CPT | Performed by: FAMILY MEDICINE

## 2024-05-14 RX ORDER — NICOTINE 21 MG/24HR
1 PATCH, TRANSDERMAL 24 HOURS TRANSDERMAL DAILY
Status: DISCONTINUED | OUTPATIENT
Start: 2024-05-15 | End: 2024-05-14

## 2024-05-14 RX ORDER — LIDOCAINE HYDROCHLORIDE 20 MG/ML
1 JELLY TOPICAL ONCE
Status: COMPLETED | OUTPATIENT
Start: 2024-05-14 | End: 2024-05-14

## 2024-05-14 RX ORDER — FLUTICASONE FUROATE AND VILANTEROL 100; 25 UG/1; UG/1
1 POWDER RESPIRATORY (INHALATION) DAILY
Status: DISCONTINUED | OUTPATIENT
Start: 2024-05-15 | End: 2024-05-18 | Stop reason: HOSPADM

## 2024-05-14 RX ORDER — NICOTINE 21 MG/24HR
21 PATCH, TRANSDERMAL 24 HOURS TRANSDERMAL DAILY
Status: DISCONTINUED | OUTPATIENT
Start: 2024-05-14 | End: 2024-05-18 | Stop reason: HOSPADM

## 2024-05-14 RX ORDER — ALBUTEROL SULFATE 90 UG/1
2 AEROSOL, METERED RESPIRATORY (INHALATION) EVERY 4 HOURS PRN
Status: DISCONTINUED | OUTPATIENT
Start: 2024-05-14 | End: 2024-05-18 | Stop reason: HOSPADM

## 2024-05-14 RX ORDER — POLYETHYLENE GLYCOL 3350 17 G/17G
17 POWDER, FOR SOLUTION ORAL DAILY PRN
Status: DISCONTINUED | OUTPATIENT
Start: 2024-05-14 | End: 2024-05-18 | Stop reason: HOSPADM

## 2024-05-14 RX ADMIN — ALBUTEROL SULFATE 2 PUFF: 90 AEROSOL, METERED RESPIRATORY (INHALATION) at 18:50

## 2024-05-14 RX ADMIN — IOHEXOL 100 ML: 350 INJECTION, SOLUTION INTRAVENOUS at 15:52

## 2024-05-14 RX ADMIN — NICOTINE 21 MG: 21 PATCH, EXTENDED RELEASE TRANSDERMAL at 18:59

## 2024-05-14 RX ADMIN — MORPHINE SULFATE 2 MG: 2 INJECTION, SOLUTION INTRAMUSCULAR; INTRAVENOUS at 22:05

## 2024-05-14 RX ADMIN — LIDOCAINE HYDROCHLORIDE 1 APPLICATION: 20 JELLY TOPICAL at 18:31

## 2024-05-14 RX ADMIN — Medication 12.5 MG: at 20:39

## 2024-05-14 NOTE — CONSULTS
[facility] Electronic Health Record Consult  IR has been consulted to evaluate the patient, determine the appropriate procedure, and whether or not a procedure can and should be performed regarding the care of Joel Wyman.    We were consulted by ER concerning bleeding around suprapubic tube, and to possibly perform a angiogram if medically appropriate for the patient. The patient is aware that a specialty consultation is taking place, and agrees to the IR Consult on their behalf.     Assessment/Plan:   78 year old male with history of prostate cancer s/p prostatectomy. Presented with bleeding from bladder and found to have necrotic bladder neck. IR placed SPT recently. There is continued blood in his urine and bleeding around the SPT. If bleeding is coming from the bladder embolization of the bladder would not be performed. The prostate has already been removed, which limits areas we can treat with embolization. Can order CTA to evaluate bleeding elsewhere including abdominal wall with history of bleeding around the SPT.    I spent 10 minutes in medical consultative time evaluating the medical record and imaging of Joel Wyman.    Thank you for allowing [facility] to participate in the care of Joel Wyman. Please don't hesitate to call or TigerText us with any questions.     Ross Diehl MD

## 2024-05-14 NOTE — ED PROVIDER NOTES
History  Chief Complaint   Patient presents with    Blood in Urine     Pt. Has a alanis and a suprapubic catheter, bleeding from both. Sent by urologist.      Patient is an 78-year-old male with a history of radiation cystitis, COPD, hypertension, hyperlipidemia with a recent suprapubic catheter placement that presents emergency department with complaint of persistent hematuria.  Patient has been evaluated by urology and sent to the ED for admission and possible IR embolization.      History provided by:  Patient   used: No        Prior to Admission Medications   Prescriptions Last Dose Informant Patient Reported? Taking?   albuterol (PROVENTIL HFA,VENTOLIN HFA) 90 mcg/act inhaler 2024  No Yes   Sig: Inhale 2 puffs every 4 (four) hours as needed for wheezing   fluticasone (FLONASE) 50 mcg/act nasal spray 2024  Yes Yes   Si spray into each nostril daily as needed   fluticasone-umeclidinium-vilanterol (Trelegy Ellipta) 200-62.5-25 mcg/actuation AEPB inhaler 2024  Yes Yes   Sig: Inhale 1 puff every morning   metoprolol tartrate (LOPRESSOR) 25 mg tablet 2024  Yes Yes   Sig: Take 12.5 mg by mouth every 12 (twelve) hours   nicotine (NICODERM CQ) 14 mg/24hr TD 24 hr patch Not Taking  No No   Sig: Place 1 patch on the skin over 24 hours daily   Patient not taking: Reported on 2024   polyethylene glycol (MIRALAX) 17 g packet 2024  No Yes   Sig: Take 17 g by mouth daily as needed (constipation)   traMADol (ULTRAM) 50 mg tablet 2024  No Yes   Sig: Take 1 tablet (50 mg total) by mouth every 8 (eight) hours as needed for moderate pain or severe pain for up to 10 doses      Facility-Administered Medications: None       Past Medical History:   Diagnosis Date    Bladder infection 2024    Borderline diabetes     Cancer (HCC)     prostate    COPD (chronic obstructive pulmonary disease) (HCC)     Hematuria     History of transfusion     x2    Hyperlipidemia     Hypertension      Radiation cystitis     Smoker     Wears glasses        Past Surgical History:   Procedure Laterality Date    APPENDECTOMY      FL CYSTOGRAM  4/24/2024    FL RETROGRADE PYELOGRAM  04/14/2021    FL RETROGRADE PYELOGRAM  11/16/2023    FL RETROGRADE PYELOGRAM  4/11/2024    HERNIA REPAIR      IR EMBOLIZATION (SPECIFY VESSEL OR SITE)  09/17/2021    IR NEPHROSTOMY TUBE CHECK AND/OR REMOVAL  07/08/2021    IR NEPHROSTOMY TUBE CHECK/CHANGE/REPOSITION/REINSERTION/UPSIZE  05/10/2021    IR NEPHROSTOMY TUBE CHECK/CHANGE/REPOSITION/REINSERTION/UPSIZE  08/04/2021    IR NEPHROSTOMY TUBE CHECK/CHANGE/REPOSITION/REINSERTION/UPSIZE  10/13/2021    IR NEPHROSTOMY TUBE PLACEMENT  05/07/2021    IR SUPRAPUBIC CATHETER PLACEMENT  5/3/2024    KY CYSTO BLADDER W/URETERAL CATHETERIZATION Bilateral 4/11/2024    Procedure: BILATERAL CYSTOSCOPY WITH RETROGRADE PYELOGRAM, BLADDER NECK CONTRACTURE;  Surgeon: Yovani Khan MD;  Location: [facility];  Service: Urology    KY CYSTO W/IRRIG & EVAC MULTPLE OBSTRUCTING CLOTS Bilateral 04/14/2021    Procedure: CYSTOSCOPY EVACUATION OF CLOTS bilateral retrograde pyelograms possible TURBT bladder biopsy;  Surgeon: Yovani Khan MD;  Location: [facility];  Service: Urology    KY CYSTO W/IRRIG & EVAC MULTPLE OBSTRUCTING CLOTS N/A 04/24/2021    Procedure: CYSTOSCOPY EVACUATION OF CLOTS fulguration;  Surgeon: Yovani Khan MD;  Location: [facility];  Service: Urology    KY CYSTO W/IRRIG & EVAC MULTPLE OBSTRUCTING CLOTS N/A 07/08/2021    Procedure: CYSTOSCOPY EVACUATION OF CLOTS BILATERAL ANTIROGRADES NEPHROSTOMY TUBES, FULGERATION ;  Surgeon: Yovani Khan MD;  Location: [facility];  Service: Urology    KY CYSTO W/IRRIG & EVAC MULTPLE OBSTRUCTING CLOTS N/A 08/22/2021    Procedure: CYSTOSCOPY, RIGHT RETROGRADE, EVACUATION OF CLOTS, BLADDER BIOPSY X2 AND FULGERATION OF BLADDER LESIONS, URETEROSCOPY, 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;  Surgeon: Yovani  MD Bill;  Location: [facility];  Service: Urology    [place] CYSTO W/IRRIG & EVAC MULTPLE OBSTRUCTING CLOTS Bilateral 4/24/2024    Procedure: CYSTOSCOPY EVACUATION OF CLOTS fulguration bladder neck tumors and biopsy, retrograde pyelograms, DVIU;  Surgeon: Yovani Khan MD;  Location: [facility];  Service: Urology    [place] CYSTOURETHROSCOPY W/DEST &/RMVL MED BLADDER AMARI N/A 4/11/2024    Procedure: TRANSURETHRAL RESECTION OF BLADDER TUMOR (TURBT);  Surgeon: Yovani Khan MD;  Location: [facility];  Service: Urology    [place] CYSTOURETHROSCOPY WITH BIOPSY N/A 11/16/2023    Procedure: CYSTOSCOPY, BILATERAL RETROGRADEY PYELOGRAM,  FULGERATION 2.0 CM BLADDER TUMOR WITH BIOPSY;  Surgeon: Yovani Khan MD;  Location: [facility];  Service: Urology    PROSTATECTOMY  2014    ROTATOR CUFF REPAIR      right shoulder       Family History   Problem Relation Age of Onset    Diabetes Mother     Stroke Mother     Diabetes Brother     Stroke Brother      I have reviewed and agree with the history as documented.    E-Cigarette/Vaping    E-Cigarette Use Former User      E-Cigarette/Vaping Substances    Nicotine No     THC No     CBD No     Flavoring No     Other No     Unknown No      Social History     Tobacco Use    Smoking status: Some Days     Current packs/day: 0.50     Average packs/day: 0.5 packs/day for 50.0 years (25.0 ttl pk-yrs)     Types: Cigarettes     Passive exposure: Never    Smokeless tobacco: Never   Vaping Use    Vaping status: Former   Substance Use Topics    Alcohol use: Not Currently     Comment: None in over 50 yrs    Drug use: No       Review of Systems   Constitutional:  Negative for chills and fever.   Respiratory:  Negative for cough, shortness of breath and wheezing.    Cardiovascular:  Negative for chest pain and palpitations.   Gastrointestinal:  Negative for abdominal pain, constipation, diarrhea, nausea and vomiting.   Genitourinary:  Negative for dysuria, flank pain, hematuria and urgency.    Musculoskeletal:  Negative for back pain.   Skin:  Negative for color change and rash.   Psychiatric/Behavioral:  The patient is nervous/anxious.    All other systems reviewed and are negative.      Physical Exam  Physical Exam  Vitals and nursing note reviewed.   Constitutional:       Appearance: He is well-developed.   HENT:      Head: Normocephalic and atraumatic.   Eyes:      Pupils: Pupils are equal, round, and reactive to light.   Cardiovascular:      Rate and Rhythm: Normal rate and regular rhythm.      Heart sounds: Normal heart sounds.   Pulmonary:      Effort: Pulmonary effort is normal.      Breath sounds: Normal breath sounds.   Abdominal:      General: Bowel sounds are normal. There is no distension.      Palpations: Abdomen is soft. There is no mass.      Tenderness: There is no abdominal tenderness. There is no guarding or rebound.   Musculoskeletal:      Cervical back: Normal range of motion and neck supple.   Skin:     General: Skin is warm and dry.      Capillary Refill: Capillary refill takes less than 2 seconds.   Neurological:      General: No focal deficit present.      Mental Status: He is alert and oriented to person, place, and time.   Psychiatric:         Behavior: Behavior normal.         Thought Content: Thought content normal.         Judgment: Judgment normal.         Vital Signs  ED Triage Vitals   Temperature Pulse Respirations Blood Pressure SpO2   05/14/24 1247 05/14/24 1247 05/14/24 1247 05/14/24 1247 05/14/24 1247   98.5 °F (36.9 °C) 95 22 115/81 92 %      Temp Source Heart Rate Source Patient Position - Orthostatic VS BP Location FiO2 (%)   05/14/24 1825 05/14/24 1825 -- 05/14/24 1825 --   Oral Monitor  Right arm       Pain Score       05/14/24 1247       6           Vitals:    05/14/24 1247 05/14/24 1557 05/14/24 1824 05/14/24 1825   BP: 115/81 147/83 121/65 121/65   Pulse: 95 98 83 83         Visual Acuity  Visual Acuity      Flowsheet Row Most Recent Value   L Pupil Size  (mm) 3   R Pupil Size (mm) 3   L Pupil Shape Round   R Pupil Shape Round            ED Medications  Medications   Fluticasone Furoate-Vilanterol 100-25 mcg/actuation 1 puff (has no administration in time range)     And   umeclidinium 62.5 mcg/actuation inhaler AEPB 1 puff (has no administration in time range)   metoprolol tartrate (LOPRESSOR) partial tablet 12.5 mg (has no administration in time range)   albuterol (PROVENTIL HFA,VENTOLIN HFA) inhaler 2 puff (has no administration in time range)   polyethylene glycol (MIRALAX) packet 17 g (has no administration in time range)   nicotine (NICODERM CQ) 21 mg/24 hr TD 24 hr patch 21 mg (21 mg Transdermal Medication Applied 5/14/24 1859)   morphine injection 2 mg (has no administration in time range)   iohexol (OMNIPAQUE) 350 MG/ML injection (MULTI-DOSE) 100 mL (100 mL Intravenous Given 5/14/24 1552)   lidocaine (URO-JET) 2 % urethral/mucosal gel 1 Application (1 Application Urethral Given 5/14/24 1831)       Diagnostic Studies  Results Reviewed       Procedure Component Value Units Date/Time    Urine Microscopic [[id number]]  (Abnormal) Collected: 05/14/24 1335    Lab Status: Final result Specimen: Urine, Suprapubic catheter Updated: 05/14/24 1406     RBC, UA Innumerable /hpf      WBC, UA 0-1 /hpf      Epithelial Cells None Seen /hpf      Bacteria, UA Moderate /hpf      Triplep Phos Shanika, UA Occasional /hpf     UA w Reflex to Microscopic w Reflex to Culture [[id number]]  (Abnormal) Collected: 05/14/24 1335    Lab Status: Final result Specimen: Urine, Suprapubic catheter Updated: 05/14/24 1356     Color, UA Light Brown     Clarity, UA Cloudy     Specific Gravity, UA <1.005     pH, UA 8.5     Leukocytes, UA Large     Nitrite, UA Negative     Protein,  (3+) mg/dl      Glucose, UA Negative mg/dl      Ketones, UA Negative mg/dl      Urobilinogen, UA <2.0 mg/dl      Bilirubin, UA Negative     Occult Blood, UA Large    Comprehensive metabolic panel [[id number]]  (Abnormal)  Collected: 05/14/24 1325    Lab Status: Final result Specimen: Blood from Arm, Left Updated: 05/14/24 1353     Sodium 135 mmol/L      Potassium 4.3 mmol/L      Chloride 104 mmol/L      CO2 23 mmol/L      ANION GAP 8 mmol/L      BUN 36 mg/dL      Creatinine 1.45 mg/dL      Glucose 119 mg/dL      Calcium 9.4 mg/dL      AST 18 U/L      ALT 17 U/L      Alkaline Phosphatase 69 U/L      Total Protein 7.3 g/dL      Albumin 3.8 g/dL      Total Bilirubin 0.53 mg/dL      eGFR 45 ml/min/1.73sq m     Narrative:      National Kidney Disease Foundation guidelines for Chronic Kidney Disease (CKD):     Stage 1 with normal or high GFR (GFR > 90 mL/min/1.73 square meters)    Stage 2 Mild CKD (GFR = 60-89 mL/min/1.73 square meters)    Stage 3A Moderate CKD (GFR = 45-59 mL/min/1.73 square meters)    Stage 3B Moderate CKD (GFR = 30-44 mL/min/1.73 square meters)    Stage 4 Severe CKD (GFR = 15-29 mL/min/1.73 square meters)    Stage 5 End Stage CKD (GFR <15 mL/min/1.73 square meters)  Note: GFR calculation is accurate only with a steady state creatinine    Protime-INR [[id number]]  (Normal) Collected: 05/14/24 1325    Lab Status: Final result Specimen: Blood from Arm, Left Updated: 05/14/24 1344     Protime 14.2 seconds      INR 1.08    APTT [[id number]]  (Normal) Collected: 05/14/24 1325    Lab Status: Final result Specimen: Blood from Arm, Left Updated: 05/14/24 1344     PTT 32 seconds     CBC and differential [[id number]]  (Abnormal) Collected: 05/14/24 1325    Lab Status: Final result Specimen: Blood from Arm, Left Updated: 05/14/24 1330     WBC 11.62 Thousand/uL      RBC 4.85 Million/uL      Hemoglobin 14.3 g/dL      Hematocrit 42.5 %      MCV 88 fL      MCH 29.5 pg      MCHC 33.6 g/dL      RDW 15.3 %      MPV 9.6 fL      Platelets 217 Thousands/uL      nRBC 0 /100 WBCs      Segmented % 78 %      Immature Grans % 0 %      Lymphocytes % 12 %      Monocytes % 8 %      Eosinophils Relative 2 %      Basophils Relative 0 %      Absolute  Neutrophils 9.04 Thousands/µL      Absolute Immature Grans 0.04 Thousand/uL      Absolute Lymphocytes 1.35 Thousands/µL      Absolute Monocytes 0.90 Thousand/µL      Eosinophils Absolute 0.24 Thousand/µL      Basophils Absolute 0.05 Thousands/µL                    CTA abdomen pelvis w wo contrast   Final Result by Javier Hess MD (05/14 1713)         1. Suprapubic Sumner catheter balloon in expected position. Findings compatible with cystitis. No gross evidence of hematuria or hematoma.   2. No evidence of pyelonephritis or obstructive uropathy.   3. No evidence of abdominal aortic aneurysm or dissection. No significant stenosis in the renal or mesenteric arteries.         Workstation performed: [id number]                    Procedures  Procedures         ED Course                               SBIRT 22yo+      Flowsheet Row Most Recent Value   Initial Alcohol Screen: US AUDIT-C     1. How often do you have a drink containing alcohol? 0 Filed at: 05/14/2024 1249   2. How many drinks containing alcohol do you have on a typical day you are drinking?  0 Filed at: 05/14/2024 1249   3a. Male UNDER 65: How often do you have five or more drinks on one occasion? 0 Filed at: 05/14/2024 1249   3b. FEMALE Any Age, or MALE 65+: How often do you have 4 or more drinks on one occassion? 0 Filed at: 05/14/2024 1249   Audit-C Score 0 Filed at: 05/14/2024 1249                      Medical Decision Making  78-year-old male in the ED for evaluation of persistent hematuria.  I reviewed patient with interventional radiologist, who requests a CTA prior to admission.  Patient will be admitted to hospitalist.    Amount and/or Complexity of Data Reviewed  Labs: ordered.    Risk  Decision regarding hospitalization.             Disposition  Final diagnoses:   Hematuria     Time reflects when diagnosis was documented in both MDM as applicable and the Disposition within this note       Time User Action Codes Description Comment     5/14/2024  2:19 PM Nisha Mcnulty [R31.9] Hematuria           ED Disposition       ED Disposition   Admit    Condition   Stable    Date/Time   Tue May 14, 2024 1949    Comment   Case was discussed with Dr Khan and Dr Marques and the patient's admission status was agreed to be Admission Status: observation status to the service of Dr. Marques .               Follow-up Information    None         Current Discharge Medication List        CONTINUE these medications which have NOT CHANGED    Details   albuterol (PROVENTIL HFA,VENTOLIN HFA) 90 mcg/act inhaler Inhale 2 puffs every 4 (four) hours as needed for wheezing  Qty: 1 Inhaler, Refills: 0      fluticasone (FLONASE) 50 mcg/act nasal spray 1 spray into each nostril daily as needed      fluticasone-umeclidinium-vilanterol (Trelegy Ellipta) 200-62.5-25 mcg/actuation AEPB inhaler Inhale 1 puff every morning      metoprolol tartrate (LOPRESSOR) 25 mg tablet Take 12.5 mg by mouth every 12 (twelve) hours      polyethylene glycol (MIRALAX) 17 g packet Take 17 g by mouth daily as needed (constipation)    Associated Diagnoses: Constipation      traMADol (ULTRAM) 50 mg tablet Take 1 tablet (50 mg total) by mouth every 8 (eight) hours as needed for moderate pain or severe pain for up to 10 doses  Qty: 10 tablet, Refills: 0    Associated Diagnoses: Bladder spasm      nicotine (NICODERM CQ) 14 mg/24hr TD 24 hr patch Place 1 patch on the skin over 24 hours daily  Qty: 28 patch, Refills: 0    Associated Diagnoses: Nicotine abuse             No discharge procedures on file.    PDMP Review         Value Time User    PDMP Reviewed  Yes 5/14/2024  4:31 PM Janny Marques DO            ED Provider  Electronically Signed by             Nisha Mcnulty DO  05/14/24 2036

## 2024-05-14 NOTE — ASSESSMENT & PLAN NOTE
Previously was on IV Rocephin which was later discontinued as urine culture negative  Hold off on antibiotics

## 2024-05-14 NOTE — ASSESSMENT & PLAN NOTE
Patient advised to come to the ER by urology due to recurrent gross hematuria  Per chart review, recently hospitalized from 4/29 - 5/6 with gross hematuria and was on CBI and had suprapubic catheter placed  Underwent cystoscopy with evacuation of clots, fulguration of bladder neck tumors and biopsy.  Retrograde urethrogram/cystogram DVIU on 4/24  Status post cystoscopy transurethral resection bladder neck contracture bladder tumor evacuation of clots on 4/11  Per urology initiate CBI and IR consulted for possible angiogram  CTA per IR recommendation  Urology consult

## 2024-05-14 NOTE — PLAN OF CARE
Problem: PAIN - ADULT  Goal: Verbalizes/displays adequate comfort level or baseline comfort level  Description: Interventions:  - Encourage patient to monitor pain and request assistance  - Assess pain using appropriate pain scale  - Administer analgesics based on type and severity of pain and evaluate response  - Implement non-pharmacological measures as appropriate and evaluate response  - Consider cultural and social influences on pain and pain management  - Notify physician/advanced practitioner if interventions unsuccessful or patient reports new pain  Outcome: Progressing     Problem: INFECTION - ADULT  Goal: Absence or prevention of progression during hospitalization  Description: INTERVENTIONS:  - Assess and monitor for signs and symptoms of infection  - Monitor lab/diagnostic results  - Monitor all insertion sites, i.e. indwelling lines, tubes, and drains  - Monitor endotracheal if appropriate and nasal secretions for changes in amount and color  - [place] appropriate cooling/warming therapies per order  - Administer medications as ordered  - Instruct and encourage patient and family to use good hand hygiene technique  - Identify and instruct in appropriate isolation precautions for identified infection/condition  Outcome: Progressing  Goal: Absence of fever/infection during neutropenic period  Description: INTERVENTIONS:  - Monitor WBC    Outcome: Progressing     Problem: SAFETY ADULT  Goal: Patient will remain free of falls  Description: INTERVENTIONS:  - Educate patient/family on patient safety including physical limitations  - Instruct patient to call for assistance with activity   - Consult OT/PT to assist with strengthening/mobility   - Keep Call bell within reach  - Keep bed low and locked with side rails adjusted as appropriate  - Keep care items and personal belongings within reach  - Initiate and maintain comfort rounds  - Make Fall Risk Sign visible to staff  - Offer Toileting every 2 Hours,  in advance of need  - Initiate/Maintain bed alarm  - Obtain necessary fall risk management equipment: non skid socks  - Apply yellow socks and bracelet for high fall risk patients  - Consider moving patient to room near nurses station  Outcome: Progressing  Goal: Maintain or return to baseline ADL function  Description: INTERVENTIONS:  -  Assess patient's ability to carry out ADLs; assess patient's baseline for ADL function and identify physical deficits which impact ability to perform ADLs (bathing, care of mouth/teeth, toileting, grooming, dressing, etc.)  - Assess/evaluate cause of self-care deficits   - Assess range of motion  - Assess patient's mobility; develop plan if impaired  - Assess patient's need for assistive devices and provide as appropriate  - Encourage maximum independence but intervene and supervise when necessary  - Involve family in performance of ADLs  - Assess for home care needs following discharge   - Consider OT consult to assist with ADL evaluation and planning for discharge  - Provide patient education as appropriate  Outcome: Progressing  Goal: Maintains/Returns to pre admission functional level  Description: INTERVENTIONS:  - Perform AM-PAC 6 Click Basic Mobility/ Daily Activity assessment daily.  - Set and communicate daily mobility goal to care team and patient/family/caregiver.   - Collaborate with rehabilitation services on mobility goals if consulted  - Perform Range of Motion 3 times a day.  - Reposition patient every 2 hours.  - Dangle patient 3 times a day  - Stand patient 3 times a day  - Ambulate patient 3 times a day  - Out of bed to chair 3 times a day   - Out of bed for meals 3 times a day  - Out of bed for toileting  - Record patient progress and toleration of activity level   Outcome: Progressing     Problem: DISCHARGE PLANNING  Goal: Discharge to home or other facility with appropriate resources  Description: INTERVENTIONS:  - Identify barriers to discharge w/patient and  caregiver  - Arrange for needed discharge resources and transportation as appropriate  - Identify discharge learning needs (meds, wound care, etc.)  - Arrange for interpretive services to assist at discharge as needed  - Refer to Case Management Department for coordinating discharge planning if the patient needs post-hospital services based on physician/advanced practitioner order or complex needs related to functional status, cognitive ability, or social support system  Outcome: Progressing     Problem: Knowledge Deficit  Goal: Patient/family/caregiver demonstrates understanding of disease process, treatment plan, medications, and discharge instructions  Description: Complete learning assessment and assess knowledge base.  Interventions:  - Provide teaching at level of understanding  - Provide teaching via preferred learning methods  Outcome: Progressing

## 2024-05-14 NOTE — CONSULTS
H&P Exam - Urology       Patient: Joel Wyman   : 1945 Sex: male   MRN: [id number]     CSN: [id number]      History of Present Illness   HPI:  Joel Wyman is a 78 y.o. 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 developing gross hematuria again with multiple admissions to [facility] with radiation changes tissue necrosis of the bladder neck and urethra biopsies into different operative procedures for unresolved bleeding confirming inflammatory tissue/bladder neck contracture no cancer recently undergoing suprapubic tube sent home with three-way Sumner catheter for unresolved hematuria seen in the office yesterday hand irrigated for multiple clots and bladder debris continuing to bleed sent in to [facility] today for continuous bladder irrigation and interventional radiology consult for possible repeat vesicle artery embolization patient.  Patient also suffers with small capacity bladder causing severe bladder spasms with worsening hematuria despite antispasmodics aware that if this could not be performed would strongly recommend cystectomy in light of ongoing issues poor quality of life and uncontrolled bleeding with history of bladder papillomas        Review of Systems:   Constitutional:  Negative for activity change, fever, chills and diaphoresis.   HENT: Negative for hearing loss and trouble swallowing.   Eyes: Negative for itching and visual disturbance.   Respiratory: Negative for chest tightness and shortness of breath.   Cardiovascular: Negative for chest pain, edema.   Gastrointestinal: Negative for abdominal distention, na abdominal pain,  constipation, diarrhea, Nausea and vomiting.   Genitourinary: Negative for decreased urine volume, difficulty urinating, dysuria, enuresis, frequency, hematuria and urgency.   Musculoskeletal: Negative for gait problem and myalgias.   Neurological: Negative for dizziness and headaches.   Hematological: Does not bruise/bleed easily.       Historical Information   Past Medical History:   Diagnosis Date    Bladder infection 03/2024    Borderline diabetes     Cancer (HCC)     prostate    COPD (chronic obstructive pulmonary disease) (HCC)     Hematuria     History of transfusion     x2    Hyperlipidemia     Hypertension     Radiation cystitis     Smoker     Wears glasses      Past Surgical History:   Procedure Laterality Date    APPENDECTOMY      FL CYSTOGRAM  4/24/2024    FL RETROGRADE PYELOGRAM  04/14/2021    FL RETROGRADE PYELOGRAM  11/16/2023    FL RETROGRADE PYELOGRAM  4/11/2024    HERNIA REPAIR      IR EMBOLIZATION (SPECIFY VESSEL OR SITE)  09/17/2021    IR NEPHROSTOMY TUBE CHECK AND/OR REMOVAL  07/08/2021    IR NEPHROSTOMY TUBE CHECK/CHANGE/REPOSITION/REINSERTION/UPSIZE  05/10/2021    IR NEPHROSTOMY TUBE CHECK/CHANGE/REPOSITION/REINSERTION/UPSIZE  08/04/2021    IR NEPHROSTOMY TUBE CHECK/CHANGE/REPOSITION/REINSERTION/UPSIZE  10/13/2021    IR NEPHROSTOMY TUBE PLACEMENT  05/07/2021    IR SUPRAPUBIC CATHETER PLACEMENT  5/3/2024    IL CYSTO BLADDER W/URETERAL CATHETERIZATION Bilateral 4/11/2024    Procedure: BILATERAL CYSTOSCOPY WITH RETROGRADE PYELOGRAM, BLADDER NECK CONTRACTURE;  Surgeon: Yovani Khan MD;  Location: [facility];  Service: Urology    IL CYSTO W/IRRIG & EVAC MULTPLE OBSTRUCTING CLOTS Bilateral 04/14/2021    Procedure: CYSTOSCOPY EVACUATION OF CLOTS bilateral retrograde pyelograms possible TURBT bladder biopsy;  Surgeon: Yovani Khan MD;  Location: [facility];  Service: Urology    IL CYSTO W/IRRIG & EVAC MULTPLE OBSTRUCTING CLOTS N/A 04/24/2021    Procedure: CYSTOSCOPY EVACUATION OF CLOTS  fulguration;  Surgeon: Yovani Khan MD;  Location: [facility];  Service: Urology    ME CYSTO W/IRRIG & EVAC MULTPLE OBSTRUCTING CLOTS N/A 07/08/2021    Procedure: CYSTOSCOPY EVACUATION OF CLOTS BILATERAL ANTIROGRADES NEPHROSTOMY TUBES, FULGERATION ;  Surgeon: Yovani Khan MD;  Location: [facility];  Service: Urology    ME CYSTO W/IRRIG & EVAC MULTPLE OBSTRUCTING CLOTS N/A 08/22/2021    Procedure: CYSTOSCOPY, RIGHT RETROGRADE, EVACUATION OF CLOTS, BLADDER BIOPSY X2 AND FULGERATION OF BLADDER LESIONS, URETEROSCOPY, 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;  Surgeon: Yovani Khan MD;  Location: [facility];  Service: Urology    ME CYSTO W/IRRIG & EVAC MULTPLE OBSTRUCTING CLOTS Bilateral 4/24/2024    Procedure: CYSTOSCOPY EVACUATION OF CLOTS fulguration bladder neck tumors and biopsy, retrograde pyelograms, DVIU;  Surgeon: Yovani Khan MD;  Location: [facility];  Service: Urology    ME CYSTOURETHROSCOPY W/DEST &/RMVL MED BLADDER AMARI N/A 4/11/2024    Procedure: TRANSURETHRAL RESECTION OF BLADDER TUMOR (TURBT);  Surgeon: Yovani Khan MD;  Location: [facility];  Service: Urology    ME CYSTOURETHROSCOPY WITH BIOPSY N/A 11/16/2023    Procedure: CYSTOSCOPY, BILATERAL RETROGRADEY PYELOGRAM,  FULGERATION 2.0 CM BLADDER TUMOR WITH BIOPSY;  Surgeon: Yovani Khan MD;  Location: [facility];  Service: Urology    PROSTATECTOMY  2014    ROTATOR CUFF REPAIR      right shoulder     Social History   Social History     Substance and Sexual Activity   Alcohol Use Not Currently    Comment: None in over 50 yrs     Social History     Substance and Sexual Activity   Drug Use No     Social History     Tobacco Use   Smoking Status Some Days    Current packs/day: 0.50    Average packs/day: 0.5 packs/day for 50.0 years (25.0 ttl pk-yrs)    Types: Cigarettes    Passive exposure: Never   Smokeless Tobacco Never     Family History:   Family History   Problem Relation Age of Onset     Diabetes Mother     Stroke Mother     Diabetes Brother     Stroke Brother        Meds/Allergies   Medications Prior to Admission   Medication    albuterol (PROVENTIL HFA,VENTOLIN HFA) 90 mcg/act inhaler    fluticasone (FLONASE) 50 mcg/act nasal spray    fluticasone-umeclidinium-vilanterol (Trelegy Ellipta) 200-62.5-25 mcg/actuation AEPB inhaler    metoprolol tartrate (LOPRESSOR) 25 mg tablet    nicotine (NICODERM CQ) 14 mg/24hr TD 24 hr patch    polyethylene glycol (MIRALAX) 17 g packet    traMADol (ULTRAM) 50 mg tablet     No Known Allergies    Objective   Vitals: /83   Pulse 98   Temp 97.7 °F (36.5 °C)   Resp 19   Wt 92.5 kg (204 lb)   SpO2 96%   BMI 29.27 kg/m²     Physical Exam:  General Alert awake   Normocephalic atraumatic PERRLA  Lungs clear bilaterally  Cardiac normal S1 normal S2  Abdomen soft, flank pain  16 [place] SP tube draining clear urine  22 [place] three-way Sumner catheter CBI  Extremities no edema    No intake/output data recorded.    Invasive Devices       Peripheral Intravenous Line  Duration             Peripheral IV 05/14/24 Left Wrist <1 day              Drain  Duration             Urethral Catheter Latex;Three way 22 Fr. 19 days    Suprapubic Catheter 16 Fr. 11 days    Continuous Bladder Irrigation Three-way 10 days                        Lab Results: CBC:   Lab Results   Component Value Date    WBC 11.62 (H) 05/14/2024    HGB 14.3 05/14/2024    HCT 42.5 05/14/2024    MCV 88 05/14/2024     05/14/2024    RBC 4.85 05/14/2024    MCH 29.5 05/14/2024    MCHC 33.6 05/14/2024    RDW 15.3 (H) 05/14/2024    MPV 9.6 05/14/2024    NRBC 0 05/14/2024     CMP:   Lab Results   Component Value Date     05/14/2024    CO2 23 05/14/2024    BUN 36 (H) 05/14/2024    CREATININE 1.45 (H) 05/14/2024    CALCIUM 9.4 05/14/2024    AST 18 05/14/2024    ALT 17 05/14/2024    ALKPHOS 69 05/14/2024    EGFR 45 05/14/2024     Urinalysis:   Lab Results   Component Value Date    COLORU Light Brown  "05/14/2024    CLARITYU Cloudy 05/14/2024    SPECGRAV <1.005 (L) 05/14/2024    PHUR 8.5 (A) 05/14/2024    PHUR 5.5 03/25/2018    LEUKOCYTESUR Large (A) 05/14/2024    NITRITE Negative 05/14/2024    GLUCOSEU Negative 05/14/2024    KETONESU Negative 05/14/2024    BILIRUBINUR Negative 05/14/2024    BLOODU Large (A) 05/14/2024     Urine Culture:   Lab Results   Component Value Date    URINECX No Growth <1000 cfu/mL 04/30/2024     PSA: No results found for: \"PSA\"        Assessment/ Plan:  Gross hematuria related to radiation cystitis 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

## 2024-05-14 NOTE — H&P
[facility]  H&P  Name: Joel Wyman 78 y.o. male I MRN: [id number]  Unit/Bed#: ED 05 I Date of Admission: 5/14/2024   Date of Service: 5/14/2024 I Hospital Day: 0      Assessment/Plan   * Gross hematuria  Assessment & Plan  Patient advised to come to the ER by urology due to recurrent gross hematuria  Per chart review, recently hospitalized from 4/29 - 5/6 with gross hematuria and was on CBI and had suprapubic catheter placed  Underwent cystoscopy with evacuation of clots, fulguration of bladder neck tumors and biopsy.  Retrograde urethrogram/cystogram DVIU on 4/24  Status post cystoscopy transurethral resection bladder neck contracture bladder tumor evacuation of clots on 4/11  Per urology initiate CBI and IR consulted for possible angiogram  CTA per IR recommendation  Urology consult    Abnormal urinalysis  Assessment & Plan  Previously was on IV Rocephin which was later discontinued as urine culture negative  Hold off on antibiotics    Stage 3b chronic kidney disease (HCC)  Assessment & Plan  Lab Results   Component Value Date    EGFR 45 05/14/2024    EGFR 50 05/05/2024    EGFR 45 05/03/2024    CREATININE 1.45 (H) 05/14/2024    CREATININE 1.33 (H) 05/05/2024    CREATININE 1.45 (H) 05/03/2024     Creatinine appears at baseline  Repeat lab work in a.m.    Nicotine abuse  Assessment & Plan  Nicotine patch.  Smoking cessation    Prostate cancer (HCC) - s/p resection  Assessment & Plan  Status post XRT    Essential hypertension  Assessment & Plan  Continue Lopressor       VTE Pharmacologic Prophylaxis:    none  Code Status: Level 1 - Full Code   Discussion with family: Updated  (wife) at bedside.    Anticipated Length of Stay: Patient will be admitted on an observation basis with an anticipated length of stay of less than 2 midnights secondary to gross hematuria.    Total Time Spent on Date of Encounter in care of patient: This time was spent on one or more of the following:  performing physical exam; counseling and coordination of care; obtaining or reviewing history; documenting in the medical record; reviewing/ordering tests, medications or procedures; communicating with other healthcare professionals and discussing with patient's family/caregivers.    Chief Complaint: blood in urine    History of Present Illness:  Joel Wyman is a 78 y.o. male with a PMH of chronic kidney disease, prostate cancer who presents with recurrent gross hematuria.  Patient was recently admitted from 4/29 - 5/6 for gross hematuria and had suprapubic catheter placed on that admission.  Patient states on 5/11 he started to notice increased bleeding in both the superior pubic catheter and Sumner catheter.  He saw urologist yesterday who recommended to monitor for now.  Patient states he continued to bleed overnight and was advised to come to the ER.  Patient reports intermittent spasms which is worse with movement and occasional suprapubic pain as well.  Patient/wife states urine was bright red and sometimes dark red/purple in color with occasional clots.    Review of Systems:  Review of Systems   Constitutional:  Negative for appetite change, chills and fever.   HENT:  Negative for congestion.    Eyes:  Negative for visual disturbance.   Respiratory:  Negative for chest tightness and shortness of breath.    Cardiovascular:  Negative for chest pain and leg swelling.   Gastrointestinal:  Positive for abdominal pain. Negative for diarrhea, nausea and vomiting.   Genitourinary:  Positive for hematuria. Negative for dysuria.   Musculoskeletal:  Negative for back pain.   Skin:  Negative for wound.   Neurological:  Negative for light-headedness.   Hematological:  Does not bruise/bleed easily.   Psychiatric/Behavioral:  Negative for agitation.        Past Medical and Surgical History:   Past Medical History:   Diagnosis Date    Bladder infection 03/2024    Borderline diabetes     Cancer (HCC)     prostate    COPD  (chronic obstructive pulmonary disease) (HCC)     Hematuria     History of transfusion     x2    Hyperlipidemia     Hypertension     Radiation cystitis     Smoker     Wears glasses        Past Surgical History:   Procedure Laterality Date    APPENDECTOMY      FL CYSTOGRAM  4/24/2024    FL RETROGRADE PYELOGRAM  04/14/2021    FL RETROGRADE PYELOGRAM  11/16/2023    FL RETROGRADE PYELOGRAM  4/11/2024    HERNIA REPAIR      IR EMBOLIZATION (SPECIFY VESSEL OR SITE)  09/17/2021    IR NEPHROSTOMY TUBE CHECK AND/OR REMOVAL  07/08/2021    IR NEPHROSTOMY TUBE CHECK/CHANGE/REPOSITION/REINSERTION/UPSIZE  05/10/2021    IR NEPHROSTOMY TUBE CHECK/CHANGE/REPOSITION/REINSERTION/UPSIZE  08/04/2021    IR NEPHROSTOMY TUBE CHECK/CHANGE/REPOSITION/REINSERTION/UPSIZE  10/13/2021    IR NEPHROSTOMY TUBE PLACEMENT  05/07/2021    IR SUPRAPUBIC CATHETER PLACEMENT  5/3/2024    VA CYSTO BLADDER W/URETERAL CATHETERIZATION Bilateral 4/11/2024    Procedure: BILATERAL CYSTOSCOPY WITH RETROGRADE PYELOGRAM, BLADDER NECK CONTRACTURE;  Surgeon: Yovani Khan MD;  Location: [facility];  Service: Urology    VA CYSTO W/IRRIG & EVAC MULTPLE OBSTRUCTING CLOTS Bilateral 04/14/2021    Procedure: CYSTOSCOPY EVACUATION OF CLOTS bilateral retrograde pyelograms possible TURBT bladder biopsy;  Surgeon: Yovani Khan MD;  Location: [facility];  Service: Urology    VA CYSTO W/IRRIG & EVAC MULTPLE OBSTRUCTING CLOTS N/A 04/24/2021    Procedure: CYSTOSCOPY EVACUATION OF CLOTS fulguration;  Surgeon: Yovani Khan MD;  Location: [facility];  Service: Urology    VA CYSTO W/IRRIG & EVAC MULTPLE OBSTRUCTING CLOTS N/A 07/08/2021    Procedure: CYSTOSCOPY EVACUATION OF CLOTS BILATERAL ANTIROGRADES NEPHROSTOMY TUBES, FULGERATION ;  Surgeon: Yovani Khan MD;  Location: [facility];  Service: Urology    VA CYSTO W/IRRIG & EVAC MULTPLE OBSTRUCTING CLOTS N/A 08/22/2021    Procedure: CYSTOSCOPY, RIGHT RETROGRADE, EVACUATION OF CLOTS, BLADDER BIOPSY X2 AND FULGERATION OF BLADDER  LESIONS, URETEROSCOPY, 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;  Surgeon: Yovani Khan MD;  Location: [facility];  Service: Urology    MN CYSTO W/IRRIG & EVAC MULTPLE OBSTRUCTING CLOTS Bilateral 4/24/2024    Procedure: CYSTOSCOPY EVACUATION OF CLOTS fulguration bladder neck tumors and biopsy, retrograde pyelograms, DVIU;  Surgeon: Yovani Khan MD;  Location: [facility];  Service: Urology    MN CYSTOURETHROSCOPY W/DEST &/RMVL MED BLADDER AMARI N/A 4/11/2024    Procedure: TRANSURETHRAL RESECTION OF BLADDER TUMOR (TURBT);  Surgeon: Yovani Khan MD;  Location: [facility];  Service: Urology    MN CYSTOURETHROSCOPY WITH BIOPSY N/A 11/16/2023    Procedure: CYSTOSCOPY, BILATERAL RETROGRADEY PYELOGRAM,  FULGERATION 2.0 CM BLADDER TUMOR WITH BIOPSY;  Surgeon: Yovani Khan MD;  Location: [facility];  Service: Urology    PROSTATECTOMY  2014    ROTATOR CUFF REPAIR      right shoulder       Meds/Allergies:  Prior to Admission medications    Medication Sig Start Date End Date Taking? Authorizing Provider   albuterol (PROVENTIL HFA,VENTOLIN HFA) 90 mcg/act inhaler Inhale 2 puffs every 4 (four) hours as needed for wheezing 8/31/17   Kelly Anepu, DO   fluticasone (FLONASE) 50 mcg/act nasal spray 1 spray into each nostril daily as needed    Historical Provider, MD   fluticasone-umeclidinium-vilanterol (Trelegy Ellipta) 200-62.5-25 mcg/actuation AEPB inhaler Inhale 1 puff every morning 6/27/23 6/26/24  Historical Provider, MD   metoprolol tartrate (LOPRESSOR) 25 mg tablet Take 12.5 mg by mouth every 12 (twelve) hours    Historical Provider, MD   nicotine (NICODERM CQ) 14 mg/24hr TD 24 hr patch Place 1 patch on the skin over 24 hours daily 5/7/24   Janny Marques DO   polyethylene glycol (MIRALAX) 17 g packet Take 17 g by mouth daily as needed (constipation) 5/6/24   Janny Marques DO   traMADol (ULTRAM) 50 mg tablet Take 1 tablet (50 mg total) by mouth every 8 (eight) hours  as needed for moderate pain or severe pain for up to 10 doses 5/6/24   Janny Marques,      I have reviewed home medications using recent Epic encounter.    Allergies: No Known Allergies    Social History:  Marital Status: /Civil Union   Occupation: none  Patient Pre-hospital Living Situation: With spouse  Patient Pre-hospital Level of Mobility: walks  Patient Pre-hospital Diet Restrictions: none  Substance Use History:   Social History     Substance and Sexual Activity   Alcohol Use Not Currently    Comment: None in over 50 yrs     Social History     Tobacco Use   Smoking Status Some Days    Current packs/day: 0.50    Average packs/day: 0.5 packs/day for 50.0 years (25.0 ttl pk-yrs)    Types: Cigarettes    Passive exposure: Never   Smokeless Tobacco Never     Social History     Substance and Sexual Activity   Drug Use No       Family History:  Family History   Problem Relation Age of Onset    Diabetes Mother     Stroke Mother     Diabetes Brother     Stroke Brother        Physical Exam:     Vitals:   Blood Pressure: 115/81 (05/14/24 1247)  Pulse: 95 (05/14/24 1247)  Temperature: 98.5 °F (36.9 °C) (05/14/24 1247)  Respirations: 22 (05/14/24 1247)  Weight - Scale: 92.5 kg (204 lb) (05/14/24 1247)  SpO2: 92 % (05/14/24 1247)    Physical Exam  Vitals reviewed.   Constitutional:       General: He is not in acute distress.     Appearance: He is not toxic-appearing.   HENT:      Head: Normocephalic and atraumatic.   Eyes:      General:         Right eye: No discharge.         Left eye: No discharge.      Extraocular Movements: Extraocular movements intact.   Cardiovascular:      Rate and Rhythm: Normal rate and regular rhythm.   Pulmonary:      Effort: Pulmonary effort is normal. No respiratory distress.      Breath sounds: Normal breath sounds. No wheezing or rales.   Abdominal:      General: Bowel sounds are normal. There is no distension.      Palpations: Abdomen is soft.      Tenderness: There is no  abdominal tenderness.      Comments: SPT in place with dark pink colored urine   Genitourinary:     Comments: Sumner in place with dark pink colored hematuria  Neurological:      Mental Status: He is alert and oriented to person, place, and time.          Additional Data:     Lab Results:  Results from last 7 days   Lab Units 05/14/24  1325   WBC Thousand/uL 11.62*   HEMOGLOBIN g/dL 14.3   HEMATOCRIT % 42.5   PLATELETS Thousands/uL 217   SEGS PCT % 78*   LYMPHO PCT % 12*   MONO PCT % 8   EOS PCT % 2     Results from last 7 days   Lab Units 05/14/24  1325   SODIUM mmol/L 135   POTASSIUM mmol/L 4.3   CHLORIDE mmol/L 104   CO2 mmol/L 23   BUN mg/dL 36*   CREATININE mg/dL 1.45*   ANION GAP mmol/L 8   CALCIUM mg/dL 9.4   ALBUMIN g/dL 3.8   TOTAL BILIRUBIN mg/dL 0.53   ALK PHOS U/L 69   ALT U/L 17   AST U/L 18   GLUCOSE RANDOM mg/dL 119     Results from last 7 days   Lab Units 05/14/24  1325   INR  1.08                   Lines/Drains:  Invasive Devices       Drain  Duration             Urethral Catheter Latex;Three way 22 Fr. 19 days    Suprapubic Catheter 16 Fr. 11 days    Continuous Bladder Irrigation Three-way 10 days                  Urinary Catheter:  Goal for removal: N/A- Discharging with Sumner             Imaging: No pertinent imaging reviewed.  CTA abdomen pelvis w wo contrast    (Results Pending)       EKG and Other Studies Reviewed on Admission:   EKG:  no EKG noted.    ** Please Note: This note has been constructed using a voice recognition system. **

## 2024-05-14 NOTE — ASSESSMENT & PLAN NOTE
Lab Results   Component Value Date    EGFR 45 05/14/2024    EGFR 50 05/05/2024    EGFR 45 05/03/2024    CREATININE 1.45 (H) 05/14/2024    CREATININE 1.33 (H) 05/05/2024    CREATININE 1.45 (H) 05/03/2024     Creatinine appears at baseline  Repeat lab work in a.m.

## 2024-05-15 LAB
ANION GAP SERPL CALCULATED.3IONS-SCNC: 7 MMOL/L (ref 4–13)
BUN SERPL-MCNC: 33 MG/DL (ref 5–25)
CALCIUM SERPL-MCNC: 9.2 MG/DL (ref 8.4–10.2)
CHLORIDE SERPL-SCNC: 104 MMOL/L (ref 96–108)
CO2 SERPL-SCNC: 24 MMOL/L (ref 21–32)
CREAT SERPL-MCNC: 1.45 MG/DL (ref 0.6–1.3)
ERYTHROCYTE [DISTWIDTH] IN BLOOD BY AUTOMATED COUNT: 15.4 % (ref 11.6–15.1)
GFR SERPL CREATININE-BSD FRML MDRD: 45 ML/MIN/1.73SQ M
GLUCOSE SERPL-MCNC: 123 MG/DL (ref 65–140)
HCT VFR BLD AUTO: 41.1 % (ref 36.5–49.3)
HGB BLD-MCNC: 13.7 G/DL (ref 12–17)
MCH RBC QN AUTO: 29.4 PG (ref 26.8–34.3)
MCHC RBC AUTO-ENTMCNC: 33.3 G/DL (ref 31.4–37.4)
MCV RBC AUTO: 88 FL (ref 82–98)
PLATELET # BLD AUTO: 220 THOUSANDS/UL (ref 149–390)
PMV BLD AUTO: 9.6 FL (ref 8.9–12.7)
POTASSIUM SERPL-SCNC: 4.4 MMOL/L (ref 3.5–5.3)
RBC # BLD AUTO: 4.66 MILLION/UL (ref 3.88–5.62)
SODIUM SERPL-SCNC: 135 MMOL/L (ref 135–147)
WBC # BLD AUTO: 12.73 THOUSAND/UL (ref 4.31–10.16)

## 2024-05-15 PROCEDURE — 87077 CULTURE AEROBIC IDENTIFY: CPT | Performed by: FAMILY MEDICINE

## 2024-05-15 PROCEDURE — 85027 COMPLETE CBC AUTOMATED: CPT | Performed by: FAMILY MEDICINE

## 2024-05-15 PROCEDURE — 87086 URINE CULTURE/COLONY COUNT: CPT | Performed by: FAMILY MEDICINE

## 2024-05-15 PROCEDURE — 80048 BASIC METABOLIC PNL TOTAL CA: CPT | Performed by: FAMILY MEDICINE

## 2024-05-15 PROCEDURE — 99232 SBSQ HOSP IP/OBS MODERATE 35: CPT | Performed by: FAMILY MEDICINE

## 2024-05-15 RX ORDER — TRAMADOL HYDROCHLORIDE 50 MG/1
50 TABLET ORAL EVERY 8 HOURS PRN
Status: DISCONTINUED | OUTPATIENT
Start: 2024-05-15 | End: 2024-05-15

## 2024-05-15 RX ORDER — CEFTRIAXONE 1 G/50ML
1000 INJECTION, SOLUTION INTRAVENOUS EVERY 24 HOURS
Status: DISCONTINUED | OUTPATIENT
Start: 2024-05-15 | End: 2024-05-16

## 2024-05-15 RX ORDER — BISACODYL 10 MG
10 SUPPOSITORY, RECTAL RECTAL DAILY
Status: DISCONTINUED | OUTPATIENT
Start: 2024-05-15 | End: 2024-05-18 | Stop reason: HOSPADM

## 2024-05-15 RX ORDER — TRAMADOL HYDROCHLORIDE 50 MG/1
50 TABLET ORAL EVERY 6 HOURS PRN
Status: DISCONTINUED | OUTPATIENT
Start: 2024-05-15 | End: 2024-05-18 | Stop reason: HOSPADM

## 2024-05-15 RX ADMIN — MORPHINE SULFATE 2 MG: 2 INJECTION, SOLUTION INTRAMUSCULAR; INTRAVENOUS at 16:33

## 2024-05-15 RX ADMIN — TRAMADOL HYDROCHLORIDE 50 MG: 50 TABLET, COATED ORAL at 22:19

## 2024-05-15 RX ADMIN — FLUTICASONE FUROATE AND VILANTEROL TRIFENATATE 1 PUFF: 100; 25 POWDER RESPIRATORY (INHALATION) at 08:19

## 2024-05-15 RX ADMIN — POLYETHYLENE GLYCOL 3350 17 G: 17 POWDER, FOR SOLUTION ORAL at 08:15

## 2024-05-15 RX ADMIN — MORPHINE SULFATE 2 MG: 2 INJECTION, SOLUTION INTRAMUSCULAR; INTRAVENOUS at 06:09

## 2024-05-15 RX ADMIN — ALBUTEROL SULFATE 2 PUFF: 90 AEROSOL, METERED RESPIRATORY (INHALATION) at 04:24

## 2024-05-15 RX ADMIN — UMECLIDINIUM 1 PUFF: 62.5 AEROSOL, POWDER ORAL at 08:19

## 2024-05-15 RX ADMIN — BISACODYL 10 MG: 10 SUPPOSITORY RECTAL at 18:03

## 2024-05-15 RX ADMIN — TRAMADOL HYDROCHLORIDE 50 MG: 50 TABLET, COATED ORAL at 15:03

## 2024-05-15 RX ADMIN — Medication 12.5 MG: at 08:12

## 2024-05-15 RX ADMIN — CEFTRIAXONE 1000 MG: 1 INJECTION, SOLUTION INTRAVENOUS at 18:04

## 2024-05-15 RX ADMIN — NICOTINE 21 MG: 21 PATCH, EXTENDED RELEASE TRANSDERMAL at 08:14

## 2024-05-15 RX ADMIN — Medication 12.5 MG: at 20:21

## 2024-05-15 NOTE — UTILIZATION REVIEW
Initial Clinical Review    Observation 5/14/24 @ 1423 converted to inpatient admission 5/15/24 @ 1821 for continued care & tx for gross hematuria    Admission: Date/Time/Statement:   Admission Orders (From admission, onward)       Ordered        05/15/24 1821  INPATIENT ADMISSION  Once            05/14/24 1423  Place in Observation  Once                          Orders Placed This Encounter   Procedures    INPATIENT ADMISSION     Standing Status:   Standing     Number of Occurrences:   1     Order Specific Question:   Level of Care     Answer:   Med Surg [16]     Order Specific Question:   Estimated length of stay     Answer:   More than 2 Midnights     Order Specific Question:   Certification     Answer:   I certify that inpatient services are medically necessary for this patient for a duration of greater than two midnights. See H&P and MD Progress Notes for additional information about the patient's course of treatment.     ED Arrival Information       Expected   -    Arrival   5/14/2024 12:39    Acuity   Urgent              Means of arrival   Walk-In    Escorted by   Spouse    Service   Hospitalist    Admission type   Emergency              Arrival complaint   blood in urine             Chief Complaint   Patient presents with    Blood in Urine     Pt. Has a alanis and a suprapubic catheter, bleeding from both. Sent by urologist.        Initial Presentation:   78 yom to ER from home for persistent hematuria, bleeding from both alanis cath & suprapubic cath. Reports intermittent spasms which is worse with movement and occasional suprapubic pain as well.  Hx radiation cystitis, COPD, hypertension, hyperlipidemia with a recent suprapubic catheter placement. Presents tahypnmeic with hematuria. Admission CTA showing Suprapubic Alanis catheter balloon in expected position. Findings compatible with cystitis. Labs: WBC 11.62, Hgb 14.3, BUN 36, Cr 1.45, u/alight brown, pH 8.5, +blood, prot, leuk, bacteria. Placed in  observation status for gross hematuria. Started on CBI, urology consulted.    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 CBI restarted. Interventional radiology consultation for possible repeat vesicle artery embolization. If unable to be performed patient would need strongly to consider cystectomy in light of radiation cystitis small capacity bladder overactive bladder and sphincter incompetency  Per IR: prostate cancer s/p prostatectomy. Presented with bleeding from bladder and found to have necrotic bladder neck. IR placed SPT recently. There is continued blood in his urine and bleeding around the SPT. If bleeding is coming from the bladder embolization of the bladder would not be performed. The prostate has already been removed, which limits areas we can treat with embolization. Can order CTA to evaluate bleeding elsewhere including abdominal wall with history of bleeding around the SPT.     Observation to IP admission 5/15/24:  Persistent hematuria, darker with ambulation. IVABT in progress for cystitis. Urology following. Urine cleared throughout the day, trial off CBI. Per urology: watch overnight. If develops losing active bleeding tomorrow will take the OR for cystoscopy aggressive fulguration change of catheter. Continue to monitor output, follow labs.     Date: 5/16/24  Day 3: Has surpassed a 2nd midnight with active treatments and services.  Dx: gross hematuria, urology following. IVABT continued for cystitis.  Per urology: CBI stopped this morning patient has been n.p.o. with no IV fluids. Urine mildly hematuric but expected in light of no hydration. Restart CBI. Will try to avoid yet another anesthesia for cystoscopy fulguration. Continue to monitor output, follow labs.       ED Triage Vitals   Temperature Pulse Respirations Blood Pressure SpO2   05/14/24 1247  05/14/24 1247 05/14/24 1247 05/14/24 1247 05/14/24 1247   98.5 °F (36.9 °C) 95 22 115/81 92 %      Temp Source Heart Rate Source Patient Position - Orthostatic VS BP Location FiO2 (%)   05/14/24 1825 05/14/24 1825 -- 05/14/24 1825 --   Oral Monitor  Right arm       Pain Score       05/14/24 1247       6          Wt Readings from Last 1 Encounters:   05/14/24 92.5 kg (204 lb)     Additional Vital Signs:   Date/Time Temp Pulse Resp BP MAP (mmHg) SpO2 O2 Device   05/15/24 08:11:31 97.8 °F (36.6 °C) 77 -- 122/70 87 92 % --   05/15/24 03:13:40 98 °F (36.7 °C) 74 -- 120/69 86 92 % --   05/14/24 22:02:12 97.9 °F (36.6 °C) 60 20 114/65 81 94 % --   05/14/24 20:38:35 -- 86 -- 129/64 86 92 % --   05/14/24 2000 -- -- -- -- -- -- None (Room air)   05/14/24 1901 -- -- -- -- -- -- None (Room air)   05/14/24 1900 -- -- -- -- -- -- None (Room air)   05/14/24 1831 -- -- -- -- -- -- None (Room air)   05/14/24 1825 98.3 °F (36.8 °C) 83 18 121/65 -- -- --   05/14/24 18:24:49 98.3 °F (36.8 °C) 83 18 121/65 84 91 % None (Room air)   05/14/24 1800 -- -- -- -- -- -- None (Room air)   05/14/24 1700 -- -- -- -- -- -- None (Room air)   05/14/24 1600 -- -- -- -- -- -- None (Room air)   05/14/24 15:57:18 97.7 °F (36.5 °C) 98 19 147/83 104 96 % --   05/14/24 1247 98.5 °F (36.9 °C) 95 22 115/81 -- 92 % None (Room air)     Pertinent Labs/Diagnostic Test Results:   CTA abdomen pelvis w wo contrast   Final Result by Javier Hess MD (05/14 9563)         1. Suprapubic Sumner catheter balloon in expected position. Findings compatible with cystitis. No gross evidence of hematuria or hematoma.   2. No evidence of pyelonephritis or obstructive uropathy.   3. No evidence of abdominal aortic aneurysm or dissection. No significant stenosis in the renal or mesenteric arteries.         Workstation performed: [id number]               Results from last 7 days   Lab Units 05/16/24  0349 05/15/24  0431 05/14/24  1933 05/14/24  1325   WBC Thousand/uL 11.00*  12.73*  --  11.62*   HEMOGLOBIN g/dL 12.9 13.7 13.4 14.3   HEMATOCRIT % 39.8 41.1 40.7 42.5   PLATELETS Thousands/uL 188 220  --  217   TOTAL NEUT ABS Thousands/µL  --   --   --  9.04*     Results from last 7 days   Lab Units 05/16/24  0349 05/15/24  0431 05/14/24  1325   SODIUM mmol/L 135 135 135   POTASSIUM mmol/L 4.1 4.4 4.3   CHLORIDE mmol/L 102 104 104   CO2 mmol/L 25 24 23   ANION GAP mmol/L 8 7 8   BUN mg/dL 36* 33* 36*   CREATININE mg/dL 1.45* 1.45* 1.45*   EGFR ml/min/1.73sq m 45 45 45   CALCIUM mg/dL 8.9 9.2 9.4     Results from last 7 days   Lab Units 05/14/24  1325   AST U/L 18   ALT U/L 17   ALK PHOS U/L 69   TOTAL PROTEIN g/dL 7.3   ALBUMIN g/dL 3.8   TOTAL BILIRUBIN mg/dL 0.53     Results from last 7 days   Lab Units 05/16/24  0349 05/15/24  0431 05/14/24  1325   GLUCOSE RANDOM mg/dL 113 123 119     Results from last 7 days   Lab Units 05/14/24  1325   PROTIME seconds 14.2   INR  1.08   PTT seconds 32     Results from last 7 days   Lab Units 05/14/24  1335   CLARITY UA  Cloudy   COLOR UA  Light Brown   SPEC GRAV UA  <1.005*   PH UA  8.5*   GLUCOSE UA mg/dl Negative   KETONES UA mg/dl Negative   BLOOD UA  Large*   PROTEIN UA mg/dl 300 (3+)*   NITRITE UA  Negative   BILIRUBIN UA  Negative   UROBILINOGEN UA (BE) mg/dl <2.0   LEUKOCYTES UA  Large*   WBC UA /hpf 0-1   RBC UA /hpf Innumerable*   BACTERIA UA /hpf Moderate*   EPITHELIAL CELLS WET PREP /hpf None Seen     Results from last 7 days   Lab Units 05/15/24  1457   URINE CULTURE  No Growth <100 cfu/mL       ED Treatment:   Medication Administration from 05/14/2024 1239 to 05/14/2024 1554         Date/Time Order Dose Route Action     05/14/2024 1552 EDT iohexol (OMNIPAQUE) 350 MG/ML injection (MULTI-DOSE) 100 mL 100 mL Intravenous Given          Past Medical History:   Diagnosis Date    Bladder infection 03/2024    Borderline diabetes     Cancer (HCC)     prostate    COPD (chronic obstructive pulmonary disease) (HCC)     Hematuria     History of  transfusion     x2    Hyperlipidemia     Hypertension     Radiation cystitis     Smoker     Wears glasses      Present on Admission:   Essential hypertension   Gross hematuria   Stage 3b chronic kidney disease (HCC)   Nicotine abuse   Prostate cancer (HCC) - s/p resection   Abnormal urinalysis      Admitting Diagnosis: Blood in urine [R31.9]  Hematuria [R31.9]  Age/Sex: 78 y.o. male  Admission Orders:  Consult IR  Consult urology  Scd/foot pumps  CBI    Scheduled Medications:  Medications 05/07 05/08 05/09 05/10 05/11 05/12 05/13 05/14 05/15 05/16   bisacodyl (DULCOLAX) EC tablet 10 mg  Dose: 10 mg  Freq: Daily Route: PO  Start: 05/02/24 1100 End: 05/06/24 2018   Admin Instructions:                   bisacodyl (DULCOLAX) rectal suppository 10 mg  Dose: 10 mg  Freq: Daily Route: RE  Start: 05/15/24 1730            1803      (6743)        cefTRIAXone (ROCEPHIN) IVPB (premix in dextrose) 1,000 mg 50 mL  Dose: 1,000 mg  Freq: Every 24 hours Route: IV  Last Dose: 1,000 mg (05/15/24 1804)  Start: 05/15/24 1800   Admin Instructions:      Order specific questions:               1804      1800         Fluticasone Furoate-Vilanterol 100-25 mcg/actuation 1 puff  Dose: 1 puff  Freq: Daily Route: IN  Start: 05/15/24 0900   Admin Instructions:               0819      0839        And   umeclidinium 62.5 mcg/actuation inhaler AEPB 1 puff  Dose: 1 puff  Freq: Daily Route: IN  Start: 05/15/24 0900            0819      0840         Fluticasone Furoate-Vilanterol 100-25 mcg/actuation 1 puff  Dose: 1 puff  Freq: Daily Route: IN  Start: 04/30/24 0900 End: 05/06/24 2018   Admin Instructions:                   And   umeclidinium 62.5 mcg/actuation inhaler AEPB 1 puff  Dose: 1 puff  Freq: Daily Route: IN  Start: 04/30/24 0900 End: 05/06/24 2018                HYDROmorphone (DILAUDID) injection 1 mg  Dose: 1 mg  Freq: Once Route: IV  Start: 04/29/24 2130 End: 05/06/24 2018   Admin Instructions:                   HYDROmorphone HCl (DILAUDID)  injection 0.2 mg  Dose: 0.2 mg  Freq: Once Route: IV  Start: 05/16/24 0315 End: 05/16/24 0336   Admin Instructions:                0336        lidocaine (URO-JET) 2 % urethral/mucosal gel 1 Application  Dose: 1 Application  Freq: Once Route: UR  Start: 05/14/24 1745 End: 05/14/24 1831   Admin Instructions:              1831          metoprolol tartrate (LOPRESSOR) partial tablet 12.5 mg  Dose: 12.5 mg  Freq: Every 12 hours scheduled Route: PO  Start: 05/14/24 2100   Admin Instructions:      Order specific questions:              2039 0812 2021 [C]      0838     2100        metoprolol tartrate (LOPRESSOR) partial tablet 12.5 mg  Dose: 12.5 mg  Freq: Every 12 hours scheduled Route: PO  Start: 04/30/24 0015 End: 05/06/24 2018   Admin Instructions:      Order specific questions:                   nicotine (NICODERM CQ) 14 mg/24hr TD 24 hr patch 1 patch  Dose: 1 patch  Freq: Daily Route: TD  Start: 05/15/24 0900 End: 05/14/24 1842   Admin Instructions:              1842-D/C'd        nicotine (NICODERM CQ) 14 mg/24hr TD 24 hr patch 1 patch  Dose: 1 patch  Freq: Daily Route: TD  Start: 04/30/24 0900 End: 05/06/24 2018   Admin Instructions:                   nicotine (NICODERM CQ) 21 mg/24 hr TD 24 hr patch 21 mg  Dose: 21 mg  Freq: Daily Route: TD  Start: 05/14/24 1845   Admin Instructions:              1859      0812     0814      0838     0842        oxybutynin (DITROPAN-XL) 24 hr tablet 15 mg  Dose: 15 mg  Freq: Daily Route: PO  Start: 05/05/24 1545 End: 05/06/24 2018   Admin Instructions:                   polyethylene glycol (MIRALAX) packet 17 g  Dose: 17 g  Freq: Daily Route: PO  Start: 05/02/24 1500 End: 05/06/24 2018   Admin Instructions:                               Continuous Meds Sorted by Name  for Joel Wyman as of 05/07/24 through 5/16/24  Legend:       Medications 05/07 05/08 05/09 05/10 05/11 05/12 05/13 05/14 05/15 05/16               PRN Meds Sorted by Name  for Joel Wyman as of  05/07/24 through 5/16/24  Legend:         Medications 05/07 05/08 05/09 05/10 05/11 05/12 05/13 05/14 05/15 05/16   acetaminophen (TYLENOL) tablet 650 mg  Dose: 650 mg  Freq: Every 6 hours PRN Route: PO  PRN Reasons: mild pain,fever  Start: 04/29/24 2212 End: 05/06/24 2018                albuterol (PROVENTIL HFA,VENTOLIN HFA) inhaler 2 puff  Dose: 2 puff  Freq: Every 4 hours PRN Route: IN  PRN Reason: wheezing  Start: 05/14/24 1631   Admin Instructions:              1850      0424         albuterol (PROVENTIL HFA,VENTOLIN HFA) inhaler 2 puff  Dose: 2 puff  Freq: Every 4 hours PRN Route: IN  PRN Reason: wheezing  Start: 04/30/24 0006 End: 05/06/24 2018   Admin Instructions:                   HYDROmorphone (DILAUDID) injection 0.5 mg  Dose: 0.5 mg  Freq: Every 5 minutes PRN Route: IV  PRN Reason: Pain - PACU  PRN Comment: Breakthrough Pain. Second Line  Start: 05/03/24 1342 End: 05/06/24 2018   Admin Instructions:                   iohexol (OMNIPAQUE) 350 MG/ML injection (MULTI-DOSE) 100 mL  Dose: 100 mL  Freq: Once in imaging Route: IV  PRN Reason: contrast  Start: 05/14/24 1552 End: 05/14/24 1552           1552          morphine injection 2 mg  Dose: 2 mg  Freq: Every 6 hours PRN Route: IV  PRN Reason: breakthrough pain  Start: 05/15/24 1155   Admin Instructions:               1633      0033     1517        morphine injection 2 mg  Dose: 2 mg  Freq: Every 8 hours PRN Route: IV  PRN Reason: severe pain  Start: 05/14/24 1846 End: 05/15/24 1156   Admin Instructions:              2205      0609     1156-D/C'd       morphine injection 2 mg  Dose: 2 mg  Freq: Every 6 hours PRN Route: IV  PRN Reason: severe pain  Start: 04/29/24 2212 End: 05/06/24 2018   Admin Instructions:                   ondansetron (ZOFRAN) injection 4 mg  Dose: 4 mg  Freq: Once as needed Route: IV  PRN Reason: nausea  PRN Comment: First Line For Nausea  Start: 05/03/24 1334 End: 05/06/24 2018   Admin Instructions:                   polyethylene  glycol (MIRALAX) packet 17 g  Dose: 17 g  Freq: Daily PRN Route: PO  PRN Comment: constipation  Start: 05/14/24 1631   Admin Instructions:               0815         traMADol (ULTRAM) tablet 50 mg  Dose: 50 mg  Freq: Every 6 hours PRN Route: PO  PRN Reasons: moderate pain,severe pain  Start: 05/15/24 1155   Admin Instructions:               1503     2219      0614     1209        traMADol (ULTRAM) tablet 50 mg  Dose: 50 mg  Freq: Every 8 hours PRN Route: PO  PRN Reasons: moderate pain,severe pain  Start: 05/15/24 1154 End: 05/15/24 1156   Admin Instructions:               1156-D/C'd           Network Utilization Review Department  ATTENTION: Please call with any questions or concerns to [phone] and carefully listen to the prompts so that you are directed to the right person. All voicemails are confidential.   For Discharge needs, contact Care Management DC Support Team at [phone] opt. 2  Send all requests for admission clinical reviews, approved or denied determinations and any other requests to dedicated fax number below belonging to the campus where the patient is receiving treatment. List of dedicated fax numbers for the Facilities:  FACILITY NAME UR FAX NUMBER   ADMISSION DENIALS (Administrative/Medical Necessity) [phone]   DISCHARGE SUPPORT TEAM (NETWORK) [phone]   PARENT CHILD HEALTH (Maternity/NICU/Pediatrics) [phone]   [facility] [phone]   [facility] [phone]   [facility] [phone]   [facility] [phone]   [facility] [phone]   [facility] [phone]   [facility] [phone]   [facility] [phone]   [facility] [phone]   [facility] [phone]   [facility]  [place] [phone]   [facility] [place] [phone]

## 2024-05-15 NOTE — PROGRESS NOTES
Patient arrived to [address] @ 1600. Patient was oriented to room & call carrillo. Masimo was placed. Admission assessment & dual skin assessment were completed. Patient is AxOx4 & is on RA. VSS. Patient complains of penile pain, PRN Lidocaine was given. Patient is a standby assist for care. Suprapubic catheter & alanis catheter are maintained. Bed is in lowest position. All needs are within reach.

## 2024-05-15 NOTE — PLAN OF CARE
Problem: PAIN - ADULT  Goal: Verbalizes/displays adequate comfort level or baseline comfort level  Description: Interventions:  - Encourage patient to monitor pain and request assistance  - Assess pain using appropriate pain scale  - Administer analgesics based on type and severity of pain and evaluate response  - Implement non-pharmacological measures as appropriate and evaluate response  - Consider cultural and social influences on pain and pain management  - Notify physician/advanced practitioner if interventions unsuccessful or patient reports new pain  Outcome: Progressing     Problem: INFECTION - ADULT  Goal: Absence or prevention of progression during hospitalization  Description: INTERVENTIONS:  - Assess and monitor for signs and symptoms of infection  - Monitor lab/diagnostic results  - Monitor all insertion sites, i.e. indwelling lines, tubes, and drains  - Monitor endotracheal if appropriate and nasal secretions for changes in amount and color  - Gregory appropriate cooling/warming therapies per order  - Administer medications as ordered  - Instruct and encourage patient and family to use good hand hygiene technique  - Identify and instruct in appropriate isolation precautions for identified infection/condition  Outcome: Progressing  Goal: Absence of fever/infection during neutropenic period  Description: INTERVENTIONS:  - Monitor WBC    Outcome: Progressing     Problem: SAFETY ADULT  Goal: Patient will remain free of falls  Description: INTERVENTIONS:  - Educate patient/family on patient safety including physical limitations  - Instruct patient to call for assistance with activity   - Consult OT/PT to assist with strengthening/mobility   - Keep Call bell within reach  - Keep bed low and locked with side rails adjusted as appropriate  - Keep care items and personal belongings within reach  - Initiate and maintain comfort rounds  - Make Fall Risk Sign visible to staff  - Offer Toileting every 2 Hours,  in advance of need  - Initiate/Maintain bed alarm  - Apply yellow socks and bracelet for high fall risk patients  - Consider moving patient to room near nurses station  Outcome: Progressing  Goal: Maintain or return to baseline ADL function  Description: INTERVENTIONS:  -  Assess patient's ability to carry out ADLs; assess patient's baseline for ADL function and identify physical deficits which impact ability to perform ADLs (bathing, care of mouth/teeth, toileting, grooming, dressing, etc.)  - Assess/evaluate cause of self-care deficits   - Assess range of motion  - Assess patient's mobility; develop plan if impaired  - Assess patient's need for assistive devices and provide as appropriate  - Encourage maximum independence but intervene and supervise when necessary  - Involve family in performance of ADLs  - Assess for home care needs following discharge   - Consider OT consult to assist with ADL evaluation and planning for discharge  - Provide patient education as appropriate  Outcome: Progressing  Goal: Maintains/Returns to pre admission functional level  Description: INTERVENTIONS:  - Perform AM-PAC 6 Click Basic Mobility/ Daily Activity assessment daily.  - Set and communicate daily mobility goal to care team and patient/family/caregiver.   - Collaborate with rehabilitation services on mobility goals if consulted  - Perform Range of Motion 2 times a day.  - Reposition patient every 2 hours.  - Dangle patient 2 times a day  - Stand patient 2 times a day  - Ambulate patient 2 times a day  - Out of bed to chair 2 times a day   - Out of bed for meals 2 times a day  - Out of bed for toileting  - Record patient progress and toleration of activity level   Outcome: Progressing     Problem: DISCHARGE PLANNING  Goal: Discharge to home or other facility with appropriate resources  Description: INTERVENTIONS:  - Identify barriers to discharge w/patient and caregiver  - Arrange for needed discharge resources and  transportation as appropriate  - Identify discharge learning needs (meds, wound care, etc.)  - Arrange for interpretive services to assist at discharge as needed  - Refer to Case Management Department for coordinating discharge planning if the patient needs post-hospital services based on physician/advanced practitioner order or complex needs related to functional status, cognitive ability, or social support system  Outcome: Progressing     Problem: Knowledge Deficit  Goal: Patient/family/caregiver demonstrates understanding of disease process, treatment plan, medications, and discharge instructions  Description: Complete learning assessment and assess knowledge base.  Interventions:  - Provide teaching at level of understanding  - Provide teaching via preferred learning methods  Outcome: Progressing     Problem: Nutrition/Hydration-ADULT  Goal: Nutrient/Hydration intake appropriate for improving, restoring or maintaining nutritional needs  Description: Monitor and assess patient's nutrition/hydration status for malnutrition. Collaborate with interdisciplinary team and initiate plan and interventions as ordered.  Monitor patient's weight and dietary intake as ordered or per policy. Utilize nutrition screening tool and intervene as necessary. Determine patient's food preferences and provide high-protein, high-caloric foods as appropriate.     INTERVENTIONS:  - Monitor oral intake, urinary output, labs, and treatment plans  - Assess nutrition and hydration status and recommend course of action  - Evaluate amount of meals eaten  - Assist patient with eating if necessary   - Allow adequate time for meals  - Recommend/ encourage appropriate diets, oral nutritional supplements, and vitamin/mineral supplements  - Order, calculate, and assess calorie counts as needed  - Recommend, monitor, and adjust tube feedings and TPN/PPN based on assessed needs  - Assess need for intravenous fluids  - Provide specific  nutrition/hydration education as appropriate  - Include patient/family/caregiver in decisions related to nutrition  Outcome: Progressing

## 2024-05-15 NOTE — ASSESSMENT & PLAN NOTE
CTA patient advised to come to the ER by urology due to recurrent gross hematuria  Per chart review, recently hospitalized from 4/29 - 5/6 with gross hematuria and was on CBI and had suprapubic catheter placed  Underwent cystoscopy with evacuation of clots, fulguration of bladder neck tumors and biopsy.  Retrograde urethrogram/cystogram DVIU on 4/24  Status post cystoscopy transurethral resection bladder neck contracture bladder tumor evacuation of clots on 4/11  CTA with findings of cystitis but no active hemorrhage.  Nothing to intervene per IR  Hb stable  Started on CBI per urology  N.p.o. after midnight for possible OR intervention

## 2024-05-15 NOTE — ASSESSMENT & PLAN NOTE
Lab Results   Component Value Date    EGFR 45 05/15/2024    EGFR 45 05/14/2024    EGFR 50 05/05/2024    CREATININE 1.45 (H) 05/15/2024    CREATININE 1.45 (H) 05/14/2024    CREATININE 1.33 (H) 05/05/2024     Creatinine appears at baseline  Repeat lab work in a.m

## 2024-05-15 NOTE — PROGRESS NOTES
[facility]  Progress Note  Name: Joel Wyman I  MRN: [id number]  Unit/Bed#: 4 [address] Date of Admission: 5/14/2024   Date of Service: 5/15/2024 I Hospital Day: 0    Assessment & Plan   * Gross hematuria  Assessment & Plan  CTA patient advised to come to the ER by urology due to recurrent gross hematuria  Per chart review, recently hospitalized from 4/29 - 5/6 with gross hematuria and was on CBI and had suprapubic catheter placed  Underwent cystoscopy with evacuation of clots, fulguration of bladder neck tumors and biopsy.  Retrograde urethrogram/cystogram DVIU on 4/24  Status post cystoscopy transurethral resection bladder neck contracture bladder tumor evacuation of clots on 4/11  CTA with findings of cystitis but no active hemorrhage.  Nothing to intervene per IR  Hb stable  Started on CBI per urology  N.p.o. 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  Smoking cessation.    Prostate cancer (HCC) - s/p resection  Assessment & Plan  Status post XRT.    Essential hypertension  Assessment & Plan  Continue Lopressor.               VTE Pharmacologic Prophylaxis: VTE Score: 4 Moderate Risk (Score 3-4) - Pharmacological DVT Prophylaxis Contraindicated. Sequential Compression Devices Ordered.    Mobility:   Basic Mobility Inpatient Raw Score: 23  JH-HLM Goal: 7: Walk 25 feet or more  JH-HLM  Achieved: 7: Walk 25 feet or more  -HL Goal achieved. Continue to encourage appropriate mobility.    Patient Centered Rounds: I performed bedside rounds with nursing staff today.   Discussions with Specialists or Other Care Team Provider: yes - urology    Education and Discussions with Family / Patient: yes    Total Time Spent on Date of Encounter in care of patient: This time was spent on one or more of the following: performing physical exam; counseling and coordination of care; obtaining or reviewing history; documenting in the medical record; reviewing/ordering tests, medications or procedures; communicating with other healthcare professionals and discussing with patient's family/caregivers.    Current Length of Stay: 0 day(s)  Current Patient Status: Inpatient   Certification Statement: The patient will continue to require additional inpatient hospital stay due to gross hematuria  Discharge Plan: Anticipate discharge in 24-48 hrs to home.    Code Status: Level 1 - Full Code    Subjective:     Reports intermittent spasms.  States the urine appears less red at rest but gets darker red with ambulation    Objective:     Vitals:   Temp (24hrs), Av.1 °F (36.7 °C), Min:97.7 °F (36.5 °C), Max:98.5 °F (36.9 °C)    Temp:  [97.7 °F (36.5 °C)-98.5 °F (36.9 °C)] 97.8 °F (36.6 °C)  HR:  [60-98] 77  Resp:  [18-22] 20  BP: (114-147)/(64-83) 122/70  SpO2:  [91 %-96 %] 92 %  Body mass index is 28.45 kg/m².     Input and Output Summary (last 24 hours):     Intake/Output Summary (Last 24 hours) at 5/15/2024 1005  Last data filed at 5/15/2024 0605  Gross per 24 hour   Intake --   Output 750 ml   Net -750 ml       Physical Exam:   Physical Exam  Vitals reviewed.   Constitutional:       General: He is not in acute distress.     Appearance: He is not toxic-appearing.   HENT:      Head: Normocephalic and atraumatic.   Eyes:      General:         Right eye: No discharge.         Left eye: No discharge.   Cardiovascular:      Rate  and Rhythm: Normal rate and regular rhythm.   Pulmonary:      Effort: Pulmonary effort is normal. No respiratory distress.      Breath sounds: Normal breath sounds. No wheezing or rales.   Abdominal:      General: Bowel sounds are normal. There is no distension.      Palpations: Abdomen is soft.      Tenderness: There is no abdominal tenderness.      Comments: SPT in place   Genitourinary:     Comments: Sumner catheter in place  Neurological:      Mental Status: He is alert and oriented to person, place, and time.          Additional Data:     Labs:  Results from last 7 days   Lab Units 05/15/24  0431 05/14/24  1933 05/14/24  1325   WBC Thousand/uL 12.73*  --  11.62*   HEMOGLOBIN g/dL 13.7   < > 14.3   HEMATOCRIT % 41.1   < > 42.5   PLATELETS Thousands/uL 220  --  217   SEGS PCT %  --   --  78*   LYMPHO PCT %  --   --  12*   MONO PCT %  --   --  8   EOS PCT %  --   --  2    < > = values in this interval not displayed.     Results from last 7 days   Lab Units 05/15/24  0431 05/14/24  1325   SODIUM mmol/L 135 135   POTASSIUM mmol/L 4.4 4.3   CHLORIDE mmol/L 104 104   CO2 mmol/L 24 23   BUN mg/dL 33* 36*   CREATININE mg/dL 1.45* 1.45*   ANION GAP mmol/L 7 8   CALCIUM mg/dL 9.2 9.4   ALBUMIN g/dL  --  3.8   TOTAL BILIRUBIN mg/dL  --  0.53   ALK PHOS U/L  --  69   ALT U/L  --  17   AST U/L  --  18   GLUCOSE RANDOM mg/dL 123 119     Results from last 7 days   Lab Units 05/14/24  1325   INR  1.08                   Lines/Drains:  Invasive Devices       Peripheral Intravenous Line  Duration             Peripheral IV 05/14/24 Left Wrist <1 day              Drain  Duration             Urethral Catheter Latex;Three way 22 Fr. 20 days    Suprapubic Catheter 16 Fr. 11 days    Continuous Bladder Irrigation Three-way 10 days                  Urinary Catheter:  Goal for removal: N/A - Chronic Sumner               Imaging: Reviewed radiology reports from this admission including: abdominal/pelvic CT    Recent Cultures (last 7 days):          Last 24 Hours Medication List:   Current Facility-Administered Medications   Medication Dose Route Frequency Provider Last Rate    albuterol  2 puff Inhalation Q4H PRN Janny Marques DO      Fluticasone Furoate-Vilanterol  1 puff Inhalation Daily Janny Marques DO      And    umeclidinium  1 puff Inhalation Daily Janny Marques DO      metoprolol tartrate  12.5 mg Oral Q12H DHAVAL Janny Marques DO      morphine injection  2 mg Intravenous Q8H PRN Janny Marques DO      nicotine  21 mg Transdermal Daily Janny Marques DO      polyethylene glycol  17 g Oral Daily PRN Janny Marques DO          Today, Patient Was Seen By: Janny Marques DO    **Please Note: This note may have been constructed using a voice recognition system.**

## 2024-05-15 NOTE — ASSESSMENT & PLAN NOTE
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

## 2024-05-15 NOTE — PLAN OF CARE
Problem: PAIN - ADULT  Goal: Verbalizes/displays adequate comfort level or baseline comfort level  Description: Interventions:  - Encourage patient to monitor pain and request assistance  - Assess pain using appropriate pain scale  - Administer analgesics based on type and severity of pain and evaluate response  - Implement non-pharmacological measures as appropriate and evaluate response  - Consider cultural and social influences on pain and pain management  - Notify physician/advanced practitioner if interventions unsuccessful or patient reports new pain  Outcome: Progressing     Problem: INFECTION - ADULT  Goal: Absence or prevention of progression during hospitalization  Description: INTERVENTIONS:  - Assess and monitor for signs and symptoms of infection  - Monitor lab/diagnostic results  - Monitor all insertion sites, i.e. indwelling lines, tubes, and drains  - Monitor endotracheal if appropriate and nasal secretions for changes in amount and color  - Champion appropriate cooling/warming therapies per order  - Administer medications as ordered  - Instruct and encourage patient and family to use good hand hygiene technique  - Identify and instruct in appropriate isolation precautions for identified infection/condition  Outcome: Progressing  Goal: Absence of fever/infection during neutropenic period  Description: INTERVENTIONS:  - Monitor WBC    Outcome: Progressing     Problem: SAFETY ADULT  Goal: Patient will remain free of falls  Description: INTERVENTIONS:  - Educate patient/family on patient safety including physical limitations  - Instruct patient to call for assistance with activity   - Consult OT/PT to assist with strengthening/mobility   - Keep Call bell within reach  - Keep bed low and locked with side rails adjusted as appropriate  - Keep care items and personal belongings within reach  - Initiate and maintain comfort rounds  - Make Fall Risk Sign visible to staff  - Offer Toileting every 2 Hours,  in advance of need  - Initiate/Maintain bed alarm  - Obtain necessary fall risk management equipment: yellow socks  - Apply yellow socks and bracelet for high fall risk patients  - Consider moving patient to room near nurses station  Outcome: Progressing  Goal: Maintain or return to baseline ADL function  Description: INTERVENTIONS:  -  Assess patient's ability to carry out ADLs; assess patient's baseline for ADL function and identify physical deficits which impact ability to perform ADLs (bathing, care of mouth/teeth, toileting, grooming, dressing, etc.)  - Assess/evaluate cause of self-care deficits   - Assess range of motion  - Assess patient's mobility; develop plan if impaired  - Assess patient's need for assistive devices and provide as appropriate  - Encourage maximum independence but intervene and supervise when necessary  - Involve family in performance of ADLs  - Assess for home care needs following discharge   - Consider OT consult to assist with ADL evaluation and planning for discharge  - Provide patient education as appropriate  Outcome: Progressing  Goal: Maintains/Returns to pre admission functional level  Description: INTERVENTIONS:  - Perform AM-PAC 6 Click Basic Mobility/ Daily Activity assessment daily.  - Set and communicate daily mobility goal to care team and patient/family/caregiver.   - Collaborate with rehabilitation services on mobility goals if consulted  - Perform Range of Motion 4 times a day.  - Reposition patient every 2 hours.  - Dangle patient 3 times a day  - Stand patient 3 times a day  - Ambulate patient 3 times a day  - Out of bed to chair 3 times a day   - Out of bed for meals 3 times a day  - Out of bed for toileting  - Record patient progress and toleration of activity level   Outcome: Progressing     Problem: DISCHARGE PLANNING  Goal: Discharge to home or other facility with appropriate resources  Description: INTERVENTIONS:  - Identify barriers to discharge w/patient and  caregiver  - Arrange for needed discharge resources and transportation as appropriate  - Identify discharge learning needs (meds, wound care, etc.)  - Arrange for interpretive services to assist at discharge as needed  - Refer to Case Management Department for coordinating discharge planning if the patient needs post-hospital services based on physician/advanced practitioner order or complex needs related to functional status, cognitive ability, or social support system  Outcome: Progressing     Problem: Knowledge Deficit  Goal: Patient/family/caregiver demonstrates understanding of disease process, treatment plan, medications, and discharge instructions  Description: Complete learning assessment and assess knowledge base.  Interventions:  - Provide teaching at level of understanding  - Provide teaching via preferred learning methods  Outcome: Progressing     Problem: Nutrition/Hydration-ADULT  Goal: Nutrient/Hydration intake appropriate for improving, restoring or maintaining nutritional needs  Description: Monitor and assess patient's nutrition/hydration status for malnutrition. Collaborate with interdisciplinary team and initiate plan and interventions as ordered.  Monitor patient's weight and dietary intake as ordered or per policy. Utilize nutrition screening tool and intervene as necessary. Determine patient's food preferences and provide high-protein, high-caloric foods as appropriate.     INTERVENTIONS:  - Monitor oral intake, urinary output, labs, and treatment plans  - Assess nutrition and hydration status and recommend course of action  - Evaluate amount of meals eaten  - Assist patient with eating if necessary   - Allow adequate time for meals  - Recommend/ encourage appropriate diets, oral nutritional supplements, and vitamin/mineral supplements  - Order, calculate, and assess calorie counts as needed  - Recommend, monitor, and adjust tube feedings and TPN/PPN based on assessed needs  - Assess need for  intravenous fluids  - Provide specific nutrition/hydration education as appropriate  - Include patient/family/caregiver in decisions related to nutrition  Outcome: Progressing

## 2024-05-15 NOTE — PROGRESS NOTES
"Progress Note - Urology      Patient: Joel Wyman   : 1945 Sex: male   MRN: [id number]     CSN: [id number]  Unit/Bed#: [address] [place] [address]     SUBJECTIVE:   Patient seen on evening rounds  CBI restarted due to bladder tissue debris obstruction and mild hematuria now clear  Recent CTA shows no active bleeding  Patient appears to have oozing from necrotic bladder neck urethra  Long discussion today about his bladder issues including radiation cystitis small capacity bladder overactive bladder and chronic hematuria strong candidate for radical cystectomy willing to move forward with that and has already been referred to outpatient tertiary center in light of multiple comorbidities and difficult anatomy we will schedule after discharge      Objective   Vitals: /70 (BP Location: Right arm)   Pulse 77   Temp 97.8 °F (36.6 °C) (Oral)   Resp 18   Ht 5' 11\" (1.803 m)   Wt 92.5 kg (204 lb)   SpO2 92%   BMI 28.45 kg/m²     I/O last 24 hours:  In: 730 [P.O.:720; I.V.:10]  Out: 1050 [Urine:1050]      Physical Exam:   General Alert awake   Normocephalic atraumatic PERRLA  Lungs clear bilaterally  Cardiac normal S1 normal S2  Abdomen soft, flank pain  Extremities no edema      Lab Results: CBC:   Lab Results   Component Value Date    WBC 12.73 (H) 05/15/2024    HGB 13.7 05/15/2024    HCT 41.1 05/15/2024    MCV 88 05/15/2024     05/15/2024    RBC 4.66 05/15/2024    MCH 29.4 05/15/2024    MCHC 33.3 05/15/2024    RDW 15.4 (H) 05/15/2024    MPV 9.6 05/15/2024    NRBC 0 2024     CMP:   Lab Results   Component Value Date     05/15/2024    CO2 24 05/15/2024    BUN 33 (H) 05/15/2024    CREATININE 1.45 (H) 05/15/2024    CALCIUM 9.2 05/15/2024    AST 18 2024    ALT 17 2024    ALKPHOS 69 2024    EGFR 45 05/15/2024     Urinalysis:   Lab Results   Component Value Date    COLORU Light Brown 2024    CLARITYU Cloudy 2024    SPECGRAV <1.005 (L) 2024    PHUR 8.5 (A) " "05/14/2024    PHUR 5.5 03/25/2018    LEUKOCYTESUR Large (A) 05/14/2024    NITRITE Negative 05/14/2024    GLUCOSEU Negative 05/14/2024    KETONESU Negative 05/14/2024    BILIRUBINUR Negative 05/14/2024    BLOODU Large (A) 05/14/2024     Urine Culture:   Lab Results   Component Value Date    URINECX No Growth <1000 cfu/mL 04/30/2024     PSA: No results found for: \"PSA\"      Assessment/ Plan:  Gross hematuria secondary to radiation cystitis necrotic bladder neck urethra  CBI clear  DC CBI at this time  Watch overnight  If develops losing active bleeding tomorrow will take the OR for cystoscopy aggressive fulguration change of catheter  Patient to be sent to [facility] for cystectomy once able to be discharged from active bleeding electively          Yovani Khan MD  "

## 2024-05-16 LAB
ANION GAP SERPL CALCULATED.3IONS-SCNC: 8 MMOL/L (ref 4–13)
BUN SERPL-MCNC: 36 MG/DL (ref 5–25)
CALCIUM SERPL-MCNC: 8.9 MG/DL (ref 8.4–10.2)
CHLORIDE SERPL-SCNC: 102 MMOL/L (ref 96–108)
CO2 SERPL-SCNC: 25 MMOL/L (ref 21–32)
CREAT SERPL-MCNC: 1.45 MG/DL (ref 0.6–1.3)
ERYTHROCYTE [DISTWIDTH] IN BLOOD BY AUTOMATED COUNT: 15.4 % (ref 11.6–15.1)
GFR SERPL CREATININE-BSD FRML MDRD: 45 ML/MIN/1.73SQ M
GLUCOSE SERPL-MCNC: 113 MG/DL (ref 65–140)
HCT VFR BLD AUTO: 39.8 % (ref 36.5–49.3)
HGB BLD-MCNC: 12.9 G/DL (ref 12–17)
MCH RBC QN AUTO: 28.7 PG (ref 26.8–34.3)
MCHC RBC AUTO-ENTMCNC: 32.4 G/DL (ref 31.4–37.4)
MCV RBC AUTO: 89 FL (ref 82–98)
PLATELET # BLD AUTO: 188 THOUSANDS/UL (ref 149–390)
PMV BLD AUTO: 9.6 FL (ref 8.9–12.7)
POTASSIUM SERPL-SCNC: 4.1 MMOL/L (ref 3.5–5.3)
RBC # BLD AUTO: 4.49 MILLION/UL (ref 3.88–5.62)
SODIUM SERPL-SCNC: 135 MMOL/L (ref 135–147)
WBC # BLD AUTO: 11 THOUSAND/UL (ref 4.31–10.16)

## 2024-05-16 PROCEDURE — 80048 BASIC METABOLIC PNL TOTAL CA: CPT | Performed by: FAMILY MEDICINE

## 2024-05-16 PROCEDURE — 99232 SBSQ HOSP IP/OBS MODERATE 35: CPT | Performed by: FAMILY MEDICINE

## 2024-05-16 PROCEDURE — 85027 COMPLETE CBC AUTOMATED: CPT | Performed by: FAMILY MEDICINE

## 2024-05-16 RX ORDER — HYDROMORPHONE HCL IN WATER/PF 6 MG/30 ML
0.2 PATIENT CONTROLLED ANALGESIA SYRINGE INTRAVENOUS ONCE
Status: COMPLETED | OUTPATIENT
Start: 2024-05-16 | End: 2024-05-16

## 2024-05-16 RX ADMIN — Medication 12.5 MG: at 08:38

## 2024-05-16 RX ADMIN — UMECLIDINIUM 1 PUFF: 62.5 AEROSOL, POWDER ORAL at 08:40

## 2024-05-16 RX ADMIN — Medication 12.5 MG: at 20:28

## 2024-05-16 RX ADMIN — FLUTICASONE FUROATE AND VILANTEROL TRIFENATATE 1 PUFF: 100; 25 POWDER RESPIRATORY (INHALATION) at 08:39

## 2024-05-16 RX ADMIN — ALBUTEROL SULFATE 2 PUFF: 90 AEROSOL, METERED RESPIRATORY (INHALATION) at 19:54

## 2024-05-16 RX ADMIN — HYDROMORPHONE HYDROCHLORIDE 0.2 MG: 0.2 INJECTION, SOLUTION INTRAMUSCULAR; INTRAVENOUS; SUBCUTANEOUS at 03:36

## 2024-05-16 RX ADMIN — MORPHINE SULFATE 2 MG: 2 INJECTION, SOLUTION INTRAMUSCULAR; INTRAVENOUS at 23:20

## 2024-05-16 RX ADMIN — NICOTINE 21 MG: 21 PATCH, EXTENDED RELEASE TRANSDERMAL at 08:42

## 2024-05-16 RX ADMIN — TRAMADOL HYDROCHLORIDE 50 MG: 50 TABLET, COATED ORAL at 06:14

## 2024-05-16 RX ADMIN — TRAMADOL HYDROCHLORIDE 50 MG: 50 TABLET, COATED ORAL at 12:09

## 2024-05-16 RX ADMIN — TRAMADOL HYDROCHLORIDE 50 MG: 50 TABLET, COATED ORAL at 19:53

## 2024-05-16 RX ADMIN — MORPHINE SULFATE 2 MG: 2 INJECTION, SOLUTION INTRAMUSCULAR; INTRAVENOUS at 15:17

## 2024-05-16 RX ADMIN — MORPHINE SULFATE 2 MG: 2 INJECTION, SOLUTION INTRAMUSCULAR; INTRAVENOUS at 00:33

## 2024-05-16 NOTE — ASSESSMENT & PLAN NOTE
CTA patient advised to come to the ER by urology due to recurrent gross hematuria  Per chart review, recently hospitalized from 4/29 - 5/6 with gross hematuria and was on CBI and had suprapubic catheter placed  Underwent cystoscopy with evacuation of clots, fulguration of bladder neck tumors and biopsy.  Retrograde urethrogram/cystogram DVIU on 4/24  Status post cystoscopy transurethral resection bladder neck contracture bladder tumor evacuation of clots on 4/11  CTA with findings of cystitis but no active hemorrhage.  Nothing to intervene per IR  Hb continues to remain stable  Started on CBI per urology but then discontinued  Will need cystectomy as outpatient at [place]

## 2024-05-16 NOTE — NURSING NOTE
Provider notified patient was having red urine from alanis catheter. Provider notified nursing to restart CBI at this time.

## 2024-05-16 NOTE — CASE MANAGEMENT
Case Management Assessment & Discharge Planning Note    Patient name Joel Wyman  Location [address] [address]/[address] [address]-* MRN [id number]  : 1945 Date 2024       Current Admission Date: 2024  Current Admission Diagnosis:Gross hematuria   Patient Active Problem List    Diagnosis Date Noted Date Diagnosed    Abnormal urinalysis 2024     SIRS (systemic inflammatory response syndrome) ([facility]) 2024     Obesity (BMI 30.0-34.9) 2024     Nicotine abuse 2024     Stage 3b chronic kidney disease ([facility])      Urinary retention 2021     Hematuria 2021     Prostate cancer ([facility]) - s/p resection 2021     Leukocytosis 2021     RA (rheumatoid arthritis) ([facility]) 2021     SHANTI (acute kidney injury) ([facility]) 2021     Gross hematuria      COPD (chronic obstructive pulmonary disease) ([facility]) 2021     Hyperlipidemia 2021     Essential hypertension 2021     Dyslipidemia 2021       LOS (days): 1  Geometric Mean LOS (GMLOS) (days): 2.3  Days to GMLOS:1.5     OBJECTIVE:  PATIENT READMITTED TO HOSPITAL  Risk of Unplanned Readmission Score: 27.85       Current admission status: Inpatient  Referral Reason: Other (Discharge planning)    Preferred Pharmacy:   [id number] [address] [address]  Phone: [phone] Fax: [phone]    Primary Care Provider: Fabiano Carroll MD    Primary Insurance: [employer] MC REP  Secondary Insurance:     ASSESSMENT:  Active Health Care Proxies       Renee Wyman Health Care Representative - Spouse   Primary Phone: [phone] (Mobile)  Home Phone: [phone]                  Obs Notice Signed: 24    Readmission Root Cause  30 Day Readmission: Yes  Who directed you to return to the hospital?: Specialist (Urology)  Did you understand whom to contact if you had questions or problems?: Yes  Did you get your prescriptions before you left the  hospital?: No  Reason:: Not preferred pharmacy  Were you able to get your prescriptions filled when you left the hospital?: Yes  Did you take your medications as prescribed?: Yes  Were you able to get to your follow-up appointments?: Yes  During previous admission, was a post-acute recommendation made?: No  Patient was readmitted due to: Gross hematuria  Action Plan: Medical management, plan is for return home with outpatient follow-up with urology, possible cystectomy    Patient Information  Admitted from:: Home  During Assessment patient was accompanied by: Not accompanied during assessment  Assessment information provided by:: Patient  Primary Caregiver: Self  Support Systems: Spouse/significant other, Family members  County of Residence: [place]  What city do you live in?: [place]  Home entry access options. Select all that apply.: Stairs  Number of steps to enter home.: 6  Type of Current Residence: Apartment  Floor Level: 1  Upon entering residence, is there a bedroom on the main floor (no further steps)?: Yes  Upon entering residence, is there a bathroom on the main floor (no further steps)?: Yes  Living Arrangements: Lives w/ Spouse/significant other    Activities of Daily Living Prior to Admission  Functional Status: Independent  Completes ADLs independently?: Yes  Ambulates independently?: Yes  Does patient use assisted devices?: Yes  Assisted Devices (DME) used: Straight Cane, Walker  Does patient currently own DME?: Yes  What DME does the patient currently own?: Straight Cane, Walker  Does the patient have a history of Short-Term Rehab?: No  Does patient have a history of HHC?: No  Does patient currently have HHC?: No    Patient Information Continued  Income Source: Pension/custodial  Does patient have prescription coverage?: Yes  Does patient receive dialysis treatments?: No  Does patient have a history of substance abuse?: No  Does patient have a history of Mental Health Diagnosis?: No    Means of  Transportation  Means of Transport to Appts:: Drives Self      Social Determinants of Health (SDOH)      Flowsheet Row Most Recent Value   Housing Stability    In the last 12 months, was there a time when you were not able to pay the mortgage or rent on time? N   In the past 12 months, how many times have you moved where you were living? 1   At any time in the past 12 months, were you homeless or living in a shelter (including now)? N   Transportation Needs    In the past 12 months, has lack of transportation kept you from medical appointments or from getting medications? no   In the past 12 months, has lack of transportation kept you from meetings, work, or from getting things needed for daily living? No   Food Insecurity    Within the past 12 months, you worried that your food would run out before you got the money to buy more. Never true   Within the past 12 months, the food you bought just didn't last and you didn't have money to get more. Never true   Utilities    In the past 12 months has the electric, gas, oil, or water company threatened to shut off services in your home? No            DISCHARGE DETAILS:    Discharge planning discussed with:: Patient  Freedom of Choice: Yes    Comments - Freedom of Choice: SW spoke with patient at bedside to introduce role of CM, conduct assessment and discuss discharge planning.  Patient is independent at baseline and drives.  There are no anticipated rehabilitation needs and patient denied any needs or concerns at this time.  SW will continue to follow.      CM contacted family/caregiver?: No- see comments (Patient is independent, declined call to spouse)  Were Treatment Team discharge recommendations reviewed with patient/caregiver?: Yes  Did patient/caregiver verbalize understanding of patient care needs?: Yes  Were patient/caregiver advised of the risks associated with not following Treatment Team discharge recommendations?: Yes    Contacts  Patient Contacts: Renee  Juliaderek (spouse)  Relationship to Patient:: Family  Contact Method: Phone  Phone Number: [phone]    Requested Home Health Care         Is the patient interested in HHC at discharge?: No    DME Referral Provided  Referral made for DME?: No    Other Referral/Resources/Interventions Provided:  Interventions: None Indicated    Would you like to participate in our [facility] service program?  : No - Declined    Treatment Team Recommendation: Home  Discharge Destination Plan:: Home  Transport at Discharge : Family         IMM Given (Date):: 05/16/24  IMM Given to:: Patient (IMM#1 reviewed with and signed by patient.  Copy given to patient and copy placed in scan bin for chart.)

## 2024-05-16 NOTE — PLAN OF CARE
Problem: PAIN - ADULT  Goal: Verbalizes/displays adequate comfort level or baseline comfort level  Description: Interventions:  - Encourage patient to monitor pain and request assistance  - Assess pain using appropriate pain scale  - Administer analgesics based on type and severity of pain and evaluate response  - Implement non-pharmacological measures as appropriate and evaluate response  - Consider cultural and social influences on pain and pain management  - Notify physician/advanced practitioner if interventions unsuccessful or patient reports new pain  Outcome: Progressing     Problem: INFECTION - ADULT  Goal: Absence or prevention of progression during hospitalization  Description: INTERVENTIONS:  - Assess and monitor for signs and symptoms of infection  - Monitor lab/diagnostic results  - Monitor all insertion sites, i.e. indwelling lines, tubes, and drains  - Monitor endotracheal if appropriate and nasal secretions for changes in amount and color  - [place] appropriate cooling/warming therapies per order  - Administer medications as ordered  - Instruct and encourage patient and family to use good hand hygiene technique  - Identify and instruct in appropriate isolation precautions for identified infection/condition  Outcome: Progressing  Goal: Absence of fever/infection during neutropenic period  Description: INTERVENTIONS:  - Monitor WBC    Outcome: Progressing     Problem: SAFETY ADULT  Goal: Patient will remain free of falls  Description: INTERVENTIONS:  - Educate patient/family on patient safety including physical limitations  - Instruct patient to call for assistance with activity   - Consult OT/PT to assist with strengthening/mobility   - Keep Call bell within reach  - Keep bed low and locked with side rails adjusted as appropriate  - Keep care items and personal belongings within reach  - Initiate and maintain comfort rounds  - Make Fall Risk Sign visible to staff  - Offer Toileting every 2 Hours,  in advance of need  - Initiate/Maintain bed alarm  - Obtain necessary fall risk management equipment: yellow socks  - Apply yellow socks and bracelet for high fall risk patients  - Consider moving patient to room near nurses station  Outcome: Progressing  Goal: Maintain or return to baseline ADL function  Description: INTERVENTIONS:  -  Assess patient's ability to carry out ADLs; assess patient's baseline for ADL function and identify physical deficits which impact ability to perform ADLs (bathing, care of mouth/teeth, toileting, grooming, dressing, etc.)  - Assess/evaluate cause of self-care deficits   - Assess range of motion  - Assess patient's mobility; develop plan if impaired  - Assess patient's need for assistive devices and provide as appropriate  - Encourage maximum independence but intervene and supervise when necessary  - Involve family in performance of ADLs  - Assess for home care needs following discharge   - Consider OT consult to assist with ADL evaluation and planning for discharge  - Provide patient education as appropriate  Outcome: Progressing  Goal: Maintains/Returns to pre admission functional level  Description: INTERVENTIONS:  - Perform AM-PAC 6 Click Basic Mobility/ Daily Activity assessment daily.  - Set and communicate daily mobility goal to care team and patient/family/caregiver.   - Collaborate with rehabilitation services on mobility goals if consulted  - Perform Range of Motion 3 times a day.  - Reposition patient every 2 hours.  - Dangle patient 3 times a day  - Stand patient 3 times a day  - Ambulate patient 3 times a day  - Out of bed to chair 3 times a day   - Out of bed for meals 3 times a day  - Out of bed for toileting  - Record patient progress and toleration of activity level   Outcome: Progressing     Problem: DISCHARGE PLANNING  Goal: Discharge to home or other facility with appropriate resources  Description: INTERVENTIONS:  - Identify barriers to discharge w/patient and  caregiver  - Arrange for needed discharge resources and transportation as appropriate  - Identify discharge learning needs (meds, wound care, etc.)  - Arrange for interpretive services to assist at discharge as needed  - Refer to Case Management Department for coordinating discharge planning if the patient needs post-hospital services based on physician/advanced practitioner order or complex needs related to functional status, cognitive ability, or social support system  Outcome: Progressing     Problem: Knowledge Deficit  Goal: Patient/family/caregiver demonstrates understanding of disease process, treatment plan, medications, and discharge instructions  Description: Complete learning assessment and assess knowledge base.  Interventions:  - Provide teaching at level of understanding  - Provide teaching via preferred learning methods  Outcome: Progressing     Problem: Nutrition/Hydration-ADULT  Goal: Nutrient/Hydration intake appropriate for improving, restoring or maintaining nutritional needs  Description: Monitor and assess patient's nutrition/hydration status for malnutrition. Collaborate with interdisciplinary team and initiate plan and interventions as ordered.  Monitor patient's weight and dietary intake as ordered or per policy. Utilize nutrition screening tool and intervene as necessary. Determine patient's food preferences and provide high-protein, high-caloric foods as appropriate.     INTERVENTIONS:  - Monitor oral intake, urinary output, labs, and treatment plans  - Assess nutrition and hydration status and recommend course of action  - Evaluate amount of meals eaten  - Assist patient with eating if necessary   - Allow adequate time for meals  - Recommend/ encourage appropriate diets, oral nutritional supplements, and vitamin/mineral supplements  - Order, calculate, and assess calorie counts as needed  - Recommend, monitor, and adjust tube feedings and TPN/PPN based on assessed needs  - Assess need for  intravenous fluids  - Provide specific nutrition/hydration education as appropriate  - Include patient/family/caregiver in decisions related to nutrition  Outcome: Progressing

## 2024-05-16 NOTE — PROGRESS NOTES
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  Retrograde urethrogram/cystogram DVIU on 4/24  Status post cystoscopy transurethral resection bladder neck contracture bladder tumor evacuation of clots on 4/11  CTA with findings of cystitis but no active hemorrhage.  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  Discontinue IV Rocephin    Stage 3b chronic kidney disease (HCC)  Assessment & Plan  Lab Results   Component Value Date    EGFR 45 05/16/2024    EGFR 45 05/15/2024    EGFR 45 05/14/2024    CREATININE 1.45 (H) 05/16/2024    CREATININE 1.45 (H) 05/15/2024    CREATININE 1.45 (H) 05/14/2024     Creatinine appears at baseline.  Repeat lab work in a.m    Nicotine abuse  Assessment & Plan  Nicotine patch.  Smoking cessation    Prostate cancer (HCC) - s/p resection  Assessment & Plan  Status post XRT    Essential hypertension  Assessment & Plan  Continue Lopressor               VTE Pharmacologic Prophylaxis: VTE Score: 4 Moderate Risk (Score 3-4) - Pharmacological DVT Prophylaxis Contraindicated. Sequential Compression Devices Ordered.    Mobility:   Basic Mobility Inpatient Raw Score: 23  -[facility] Goal: 7:  Walk 25 feet or more  JH-HLM Achieved: 7: Walk 25 feet or more  JH-HLM Goal achieved. Continue to encourage appropriate mobility.    Patient Centered Rounds: I performed bedside rounds with nursing staff today.   Discussions with Specialists or Other Care Team Provider: yes     Education and Discussions with Family / Patient: Patient declined call to .     Total Time Spent on Date of Encounter in care of patient: This time was spent on one or more of the following: performing physical exam; counseling and coordination of care; obtaining or reviewing history; documenting in the medical record; reviewing/ordering tests, medications or procedures; communicating with other healthcare professionals and discussing with patient's family/caregivers.    Current Length of Stay: 1 day(s)  Current Patient Status: Inpatient   Certification Statement: The patient will continue to require additional inpatient hospital stay due to gross hematuria  Discharge Plan: Anticipate discharge tomorrow to home.    Code Status: Level 1 - Full Code    Subjective:     Patient continues to report intermittent bladder spasms and some burning along his penile area    Objective:     Vitals:   Temp (24hrs), Av.7 °F (36.5 °C), Min:97.1 °F (36.2 °C), Max:98.1 °F (36.7 °C)    Temp:  [97.1 °F (36.2 °C)-98.1 °F (36.7 °C)] 97.1 °F (36.2 °C)  HR:  [60-74] 60  Resp:  [19] 19  BP: (108-121)/(63-68) 108/63  SpO2:  [91 %-94 %] 92 %  Body mass index is 28.45 kg/m².     Input and Output Summary (last 24 hours):     Intake/Output Summary (Last 24 hours) at 2024 1636  Last data filed at 2024 0901  Gross per 24 hour   Intake 0 ml   Output 3250 ml   Net -3250 ml       Physical Exam:   Physical Exam  Vitals reviewed.   Constitutional:       General: He is not in acute distress.     Appearance: He is not toxic-appearing.   HENT:      Head: Normocephalic and atraumatic.   Eyes:      General:         Right eye: No discharge.         Left eye:  No discharge.   Cardiovascular:      Rate and Rhythm: Normal rate and regular rhythm.   Pulmonary:      Effort: Pulmonary effort is normal. No respiratory distress.      Breath sounds: Normal breath sounds. No wheezing or rales.   Abdominal:      General: Bowel sounds are normal. There is no distension.      Palpations: Abdomen is soft.      Tenderness: There is no abdominal tenderness.      Comments: SPT in place.  Pinkish urine noted in bag   Genitourinary:     Comments: Sumner catheter in place  Musculoskeletal:      Right lower leg: No edema.      Left lower leg: No edema.   Neurological:      Mental Status: He is alert and oriented to person, place, and time.          Additional Data:     Labs:  Results from last 7 days   Lab Units 05/16/24  0349 05/14/24  1933 05/14/24  1325   WBC Thousand/uL 11.00*   < > 11.62*   HEMOGLOBIN g/dL 12.9   < > 14.3   HEMATOCRIT % 39.8   < > 42.5   PLATELETS Thousands/uL 188   < > 217   SEGS PCT %  --   --  78*   LYMPHO PCT %  --   --  12*   MONO PCT %  --   --  8   EOS PCT %  --   --  2    < > = values in this interval not displayed.     Results from last 7 days   Lab Units 05/16/24  0349 05/15/24  0431 05/14/24  1325   SODIUM mmol/L 135   < > 135   POTASSIUM mmol/L 4.1   < > 4.3   CHLORIDE mmol/L 102   < > 104   CO2 mmol/L 25   < > 23   BUN mg/dL 36*   < > 36*   CREATININE mg/dL 1.45*   < > 1.45*   ANION GAP mmol/L 8   < > 8   CALCIUM mg/dL 8.9   < > 9.4   ALBUMIN g/dL  --   --  3.8   TOTAL BILIRUBIN mg/dL  --   --  0.53   ALK PHOS U/L  --   --  69   ALT U/L  --   --  17   AST U/L  --   --  18   GLUCOSE RANDOM mg/dL 113   < > 119    < > = values in this interval not displayed.     Results from last 7 days   Lab Units 05/14/24  1325   INR  1.08                   Lines/Drains:  Invasive Devices       Peripheral Intravenous Line  Duration             Peripheral IV 05/14/24 Left Wrist 2 days              Drain  Duration             Urethral Catheter Latex;Three way 22 Fr. 21 days     Suprapubic Catheter 16 Fr. 13 days    Continuous Bladder Irrigation Three-way 12 days    Continuous Bladder Irrigation 1 day                  Urinary Catheter:  Goal for removal: N/A- Discharging with Sumner               Imaging: No pertinent imaging reviewed.    Recent Cultures (last 7 days):   Results from last 7 days   Lab Units 05/15/24  1457   URINE CULTURE  No Growth <100 cfu/mL       Last 24 Hours Medication List:   Current Facility-Administered Medications   Medication Dose Route Frequency Provider Last Rate    albuterol  2 puff Inhalation Q4H PRN Janny Marques, DO      bisacodyl  10 mg Rectal Daily Janny Marques, DO      Fluticasone Furoate-Vilanterol  1 puff Inhalation Daily Janny Marques, DO      And    umeclidinium  1 puff Inhalation Daily Janny Pulidoar, DO      metoprolol tartrate  12.5 mg Oral Q12H DHAVAL Janny Marques, DO      morphine injection  2 mg Intravenous Q6H PRN Janny Marques, DO      nicotine  21 mg Transdermal Daily Janny Marques, DO      polyethylene glycol  17 g Oral Daily PRN Janny Marques, DO      traMADol  50 mg Oral Q6H PRN Janny Marques, DO          Today, Patient Was Seen By: Janny Marques DO    **Please Note: This note may have been constructed using a voice recognition system.**

## 2024-05-16 NOTE — PROGRESS NOTES
"Progress Note - Urology      Patient: Joel Wyman   : 1945 Sex: male   MRN: [id number]     CSN: [id number]  Unit/Bed#: [address] [place] [address]     SUBJECTIVE:   Patient seen on afternoon rounds  CBI stopped this morning patient has been n.p.o. with no IV fluids  Urine mildly hematuric but expected in light of no hydration  Will try to avoid yet another anesthesia for cystoscopy fulguration        Objective   Vitals: /66   Pulse 61   Temp 98.1 °F (36.7 °C)   Resp 18   Ht 5' 11\" (1.803 m)   Wt 92.5 kg (204 lb)   SpO2 91%   BMI 28.45 kg/m²     I/O last 24 hours:  In: 730 [P.O.:720; I.V.:10]  Out: 3550 [Urine:3550]      Physical Exam:   General Alert awake   Normocephalic atraumatic PERRLA  Lungs clear bilaterally  Cardiac normal S1 normal S2  Abdomen soft, flank pain  Extremities no edema      Lab Results: CBC:   Lab Results   Component Value Date    WBC 11.00 (H) 2024    HGB 12.9 2024    HCT 39.8 2024    MCV 89 2024     2024    RBC 4.49 2024    MCH 28.7 2024    MCHC 32.4 2024    RDW 15.4 (H) 2024    MPV 9.6 2024    NRBC 0 2024     CMP:   Lab Results   Component Value Date     2024    CO2 25 2024    BUN 36 (H) 2024    CREATININE 1.45 (H) 2024    CALCIUM 8.9 2024    AST 18 2024    ALT 17 2024    ALKPHOS 69 2024    EGFR 45 2024     Urinalysis:   Lab Results   Component Value Date    COLORU Light Brown 2024    CLARITYU Cloudy 2024    SPECGRAV <1.005 (L) 2024    PHUR 8.5 (A) 2024    PHUR 5.5 2018    LEUKOCYTESUR Large (A) 2024    NITRITE Negative 2024    GLUCOSEU Negative 2024    KETONESU Negative 2024    BILIRUBINUR Negative 2024    BLOODU Large (A) 2024     Urine Culture:   Lab Results   Component Value Date    URINECX No Growth <1000 cfu/mL 2024     PSA: No results found for: \"PSA\"      Assessment/ " Plan:  Radiation cystitis  Gross hematuria  Restart CBI  Regular diet  Patient to present down to [place] for elective cystectomy discussed with Dr. Lazo  As soon as next week          Yovani Khan MD

## 2024-05-16 NOTE — ASSESSMENT & PLAN NOTE
Lab Results   Component Value Date    EGFR 45 05/16/2024    EGFR 45 05/15/2024    EGFR 45 05/14/2024    CREATININE 1.45 (H) 05/16/2024    CREATININE 1.45 (H) 05/15/2024    CREATININE 1.45 (H) 05/14/2024     Creatinine appears at baseline.  Repeat lab work in a.m

## 2024-05-16 NOTE — PLAN OF CARE
Problem: PAIN - ADULT  Goal: Verbalizes/displays adequate comfort level or baseline comfort level  Description: Interventions:  - Encourage patient to monitor pain and request assistance  - Assess pain using appropriate pain scale  - Administer analgesics based on type and severity of pain and evaluate response  - Implement non-pharmacological measures as appropriate and evaluate response  - Consider cultural and social influences on pain and pain management  - Notify physician/advanced practitioner if interventions unsuccessful or patient reports new pain  Outcome: Progressing     Problem: INFECTION - ADULT  Goal: Absence or prevention of progression during hospitalization  Description: INTERVENTIONS:  - Assess and monitor for signs and symptoms of infection  - Monitor lab/diagnostic results  - Monitor all insertion sites, i.e. indwelling lines, tubes, and drains  - Monitor endotracheal if appropriate and nasal secretions for changes in amount and color  - [place] appropriate cooling/warming therapies per order  - Administer medications as ordered  - Instruct and encourage patient and family to use good hand hygiene technique  - Identify and instruct in appropriate isolation precautions for identified infection/condition  Outcome: Progressing  Goal: Absence of fever/infection during neutropenic period  Description: INTERVENTIONS:  - Monitor WBC    Outcome: Progressing     Problem: SAFETY ADULT  Goal: Patient will remain free of falls  Description: INTERVENTIONS:  - Educate patient/family on patient safety including physical limitations  - Instruct patient to call for assistance with activity   - Consult OT/PT to assist with strengthening/mobility   - Keep Call bell within reach  - Keep bed low and locked with side rails adjusted as appropriate  - Keep care items and personal belongings within reach  - Initiate and maintain comfort rounds  - Make Fall Risk Sign visible to staff  - Offer Toileting every 2 Hours,  in advance of need  - Initiate/Maintain bed/chair alarm  - Obtain necessary fall risk management equipment: bed/chair alarm   - Apply yellow socks and bracelet for high fall risk patients  - Consider moving patient to room near nurses station  Outcome: Progressing  Goal: Maintain or return to baseline ADL function  Description: INTERVENTIONS:  -  Assess patient's ability to carry out ADLs; assess patient's baseline for ADL function and identify physical deficits which impact ability to perform ADLs (bathing, care of mouth/teeth, toileting, grooming, dressing, etc.)  - Assess/evaluate cause of self-care deficits   - Assess range of motion  - Assess patient's mobility; develop plan if impaired  - Assess patient's need for assistive devices and provide as appropriate  - Encourage maximum independence but intervene and supervise when necessary  - Involve family in performance of ADLs  - Assess for home care needs following discharge   - Consider OT consult to assist with ADL evaluation and planning for discharge  - Provide patient education as appropriate  Outcome: Progressing  Goal: Maintains/Returns to pre admission functional level  Description: INTERVENTIONS:  - Perform AM-PAC 6 Click Basic Mobility/ Daily Activity assessment daily.  - Set and communicate daily mobility goal to care team and patient/family/caregiver.   - Collaborate with rehabilitation services on mobility goals if consulted  - Perform Range of Motion 3 times a day.  - Reposition patient every 2 hours.  - Dangle patient 3 times a day  - Stand patient 3 times a day  - Ambulate patient 3 times a day  - Out of bed to chair 3 times a day   - Out of bed for meals 3 times a day  - Out of bed for toileting  - Record patient progress and toleration of activity level   Outcome: Progressing     Problem: DISCHARGE PLANNING  Goal: Discharge to home or other facility with appropriate resources  Description: INTERVENTIONS:  - Identify barriers to discharge  w/patient and caregiver  - Arrange for needed discharge resources and transportation as appropriate  - Identify discharge learning needs (meds, wound care, etc.)  - Arrange for interpretive services to assist at discharge as needed  - Refer to Case Management Department for coordinating discharge planning if the patient needs post-hospital services based on physician/advanced practitioner order or complex needs related to functional status, cognitive ability, or social support system  Outcome: Progressing     Problem: Knowledge Deficit  Goal: Patient/family/caregiver demonstrates understanding of disease process, treatment plan, medications, and discharge instructions  Description: Complete learning assessment and assess knowledge base.  Interventions:  - Provide teaching at level of understanding  - Provide teaching via preferred learning methods  Outcome: Progressing     Problem: Nutrition/Hydration-ADULT  Goal: Nutrient/Hydration intake appropriate for improving, restoring or maintaining nutritional needs  Description: Monitor and assess patient's nutrition/hydration status for malnutrition. Collaborate with interdisciplinary team and initiate plan and interventions as ordered.  Monitor patient's weight and dietary intake as ordered or per policy. Utilize nutrition screening tool and intervene as necessary. Determine patient's food preferences and provide high-protein, high-caloric foods as appropriate.     INTERVENTIONS:  - Monitor oral intake, urinary output, labs, and treatment plans  - Assess nutrition and hydration status and recommend course of action  - Evaluate amount of meals eaten  - Assist patient with eating if necessary   - Allow adequate time for meals  - Recommend/ encourage appropriate diets, oral nutritional supplements, and vitamin/mineral supplements  - Order, calculate, and assess calorie counts as needed  - Recommend, monitor, and adjust tube feedings and TPN/PPN based on assessed needs  -  Assess need for intravenous fluids  - Provide specific nutrition/hydration education as appropriate  - Include patient/family/caregiver in decisions related to nutrition  Outcome: Progressing

## 2024-05-16 NOTE — ASSESSMENT & PLAN NOTE
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  Discontinue IV Rocephin

## 2024-05-17 LAB
ANION GAP SERPL CALCULATED.3IONS-SCNC: 8 MMOL/L (ref 4–13)
BUN SERPL-MCNC: 37 MG/DL (ref 5–25)
CALCIUM SERPL-MCNC: 8.8 MG/DL (ref 8.4–10.2)
CHLORIDE SERPL-SCNC: 103 MMOL/L (ref 96–108)
CO2 SERPL-SCNC: 24 MMOL/L (ref 21–32)
CREAT SERPL-MCNC: 1.46 MG/DL (ref 0.6–1.3)
ERYTHROCYTE [DISTWIDTH] IN BLOOD BY AUTOMATED COUNT: 15.2 % (ref 11.6–15.1)
GFR SERPL CREATININE-BSD FRML MDRD: 44 ML/MIN/1.73SQ M
GLUCOSE SERPL-MCNC: 102 MG/DL (ref 65–140)
HCT VFR BLD AUTO: 39.8 % (ref 36.5–49.3)
HGB BLD-MCNC: 13 G/DL (ref 12–17)
MCH RBC QN AUTO: 28.7 PG (ref 26.8–34.3)
MCHC RBC AUTO-ENTMCNC: 32.7 G/DL (ref 31.4–37.4)
MCV RBC AUTO: 88 FL (ref 82–98)
PLATELET # BLD AUTO: 202 THOUSANDS/UL (ref 149–390)
PMV BLD AUTO: 9.6 FL (ref 8.9–12.7)
POTASSIUM SERPL-SCNC: 4.4 MMOL/L (ref 3.5–5.3)
RBC # BLD AUTO: 4.53 MILLION/UL (ref 3.88–5.62)
SODIUM SERPL-SCNC: 135 MMOL/L (ref 135–147)
WBC # BLD AUTO: 11.31 THOUSAND/UL (ref 4.31–10.16)

## 2024-05-17 PROCEDURE — 99232 SBSQ HOSP IP/OBS MODERATE 35: CPT | Performed by: FAMILY MEDICINE

## 2024-05-17 PROCEDURE — 85027 COMPLETE CBC AUTOMATED: CPT | Performed by: FAMILY MEDICINE

## 2024-05-17 PROCEDURE — 80048 BASIC METABOLIC PNL TOTAL CA: CPT | Performed by: FAMILY MEDICINE

## 2024-05-17 RX ADMIN — MORPHINE SULFATE 2 MG: 2 INJECTION, SOLUTION INTRAMUSCULAR; INTRAVENOUS at 21:09

## 2024-05-17 RX ADMIN — TRAMADOL HYDROCHLORIDE 50 MG: 50 TABLET, COATED ORAL at 01:39

## 2024-05-17 RX ADMIN — UMECLIDINIUM 1 PUFF: 62.5 AEROSOL, POWDER ORAL at 08:02

## 2024-05-17 RX ADMIN — MORPHINE SULFATE 2 MG: 2 INJECTION, SOLUTION INTRAMUSCULAR; INTRAVENOUS at 12:00

## 2024-05-17 RX ADMIN — Medication 12.5 MG: at 21:02

## 2024-05-17 RX ADMIN — FLUTICASONE FUROATE AND VILANTEROL TRIFENATATE 1 PUFF: 100; 25 POWDER RESPIRATORY (INHALATION) at 08:02

## 2024-05-17 RX ADMIN — BISACODYL 10 MG: 10 SUPPOSITORY RECTAL at 17:57

## 2024-05-17 RX ADMIN — Medication 12.5 MG: at 08:02

## 2024-05-17 RX ADMIN — MORPHINE SULFATE 2 MG: 2 INJECTION, SOLUTION INTRAMUSCULAR; INTRAVENOUS at 05:06

## 2024-05-17 RX ADMIN — NICOTINE 21 MG: 21 PATCH, EXTENDED RELEASE TRANSDERMAL at 08:03

## 2024-05-17 RX ADMIN — TRAMADOL HYDROCHLORIDE 50 MG: 50 TABLET, COATED ORAL at 09:07

## 2024-05-17 RX ADMIN — TRAMADOL HYDROCHLORIDE 50 MG: 50 TABLET, COATED ORAL at 16:54

## 2024-05-17 NOTE — PROGRESS NOTES
"Progress Note - Urology      Patient: Joel Wyman   : 1945 Sex: male   MRN: [id number]     CSN: [id number]  Unit/Bed#: [address] [place] [address]     SUBJECTIVE:   Patient seen on morning rounds  CBI clear  Will DC CBI  Watch overnight  If cleared tomorrow can be discharged home  To see Dr. Lazo  at [facility] for cystectomy ASAP      Objective   Vitals: /61   Pulse 65   Temp 98.1 °F (36.7 °C)   Resp 15   Ht 5' 11\" (1.803 m)   Wt 92.5 kg (204 lb)   SpO2 91%   BMI 28.45 kg/m²     I/O last 24 hours:  In: 780 [P.O.:780]  Out: 3800 [Urine:3800]      Physical Exam:   General Alert awake   Normocephalic atraumatic PERRLA  Lungs clear bilaterally  Cardiac normal S1 normal S2  Abdomen soft, flank pain  Extremities no edema      Lab Results: CBC:   Lab Results   Component Value Date    WBC 11.31 (H) 2024    HGB 13.0 2024    HCT 39.8 2024    MCV 88 2024     2024    RBC 4.53 2024    MCH 28.7 2024    MCHC 32.7 2024    RDW 15.2 (H) 2024    MPV 9.6 2024    NRBC 0 2024     CMP:   Lab Results   Component Value Date     2024    CO2 24 2024    BUN 37 (H) 2024    CREATININE 1.46 (H) 2024    CALCIUM 8.8 2024    AST 18 2024    ALT 17 2024    ALKPHOS 69 2024    EGFR 44 2024     Urinalysis:   Lab Results   Component Value Date    COLORU Light Brown 2024    CLARITYU Cloudy 2024    SPECGRAV <1.005 (L) 2024    PHUR 8.5 (A) 2024    PHUR 5.5 2018    LEUKOCYTESUR Large (A) 2024    NITRITE Negative 2024    GLUCOSEU Negative 2024    KETONESU Negative 2024    BILIRUBINUR Negative 2024    BLOODU Large (A) 2024     Urine Culture:   Lab Results   Component Value Date    URINECX Culture too young- will reincubate 05/15/2024     PSA: No results found for: \"PSA\"      Assessment/ Plan:  Hemorrhagic cystitis  Bladder neck necrosis " bleeding  Overactive bladder  Sphincter incompetency  Continue SP tube  Sumner catheter to be discharged tomorrow if urine minimally hematuric          Yovani Khan MD

## 2024-05-17 NOTE — PLAN OF CARE
Problem: PAIN - ADULT  Goal: Verbalizes/displays adequate comfort level or baseline comfort level  Description: Interventions:  - Encourage patient to monitor pain and request assistance  - Assess pain using appropriate pain scale  - Administer analgesics based on type and severity of pain and evaluate response  - Implement non-pharmacological measures as appropriate and evaluate response  - Consider cultural and social influences on pain and pain management  - Notify physician/advanced practitioner if interventions unsuccessful or patient reports new pain  Outcome: Progressing     Problem: INFECTION - ADULT  Goal: Absence or prevention of progression during hospitalization  Description: INTERVENTIONS:  - Assess and monitor for signs and symptoms of infection  - Monitor lab/diagnostic results  - Monitor all insertion sites, i.e. indwelling lines, tubes, and drains  - Monitor endotracheal if appropriate and nasal secretions for changes in amount and color  - [place] appropriate cooling/warming therapies per order  - Administer medications as ordered  - Instruct and encourage patient and family to use good hand hygiene technique  - Identify and instruct in appropriate isolation precautions for identified infection/condition  Outcome: Progressing  Goal: Absence of fever/infection during neutropenic period  Description: INTERVENTIONS:  - Monitor WBC    Outcome: Progressing     Problem: SAFETY ADULT  Goal: Patient will remain free of falls  Description: INTERVENTIONS:  - Educate patient/family on patient safety including physical limitations  - Instruct patient to call for assistance with activity   - Consult OT/PT to assist with strengthening/mobility   - Keep Call bell within reach  - Keep bed low and locked with side rails adjusted as appropriate  - Keep care items and personal belongings within reach  - Initiate and maintain comfort rounds  - Make Fall Risk Sign visible to staff  - Apply yellow socks and bracelet  for high fall risk patients  - Consider moving patient to room near nurses station  Outcome: Progressing  Goal: Maintain or return to baseline ADL function  Description: INTERVENTIONS:  -  Assess patient's ability to carry out ADLs; assess patient's baseline for ADL function and identify physical deficits which impact ability to perform ADLs (bathing, care of mouth/teeth, toileting, grooming, dressing, etc.)  - Assess/evaluate cause of self-care deficits   - Assess range of motion  - Assess patient's mobility; develop plan if impaired  - Assess patient's need for assistive devices and provide as appropriate  - Encourage maximum independence but intervene and supervise when necessary  - Involve family in performance of ADLs  - Assess for home care needs following discharge   - Consider OT consult to assist with ADL evaluation and planning for discharge  - Provide patient education as appropriate  Outcome: Progressing  Goal: Maintains/Returns to pre admission functional level  Description: INTERVENTIONS:  - Perform AM-PAC 6 Click Basic Mobility/ Daily Activity assessment daily.  - Set and communicate daily mobility goal to care team and patient/family/caregiver.   - Collaborate with rehabilitation services on mobility goals if consulted  - Out of bed for toileting  - Record patient progress and toleration of activity level   Outcome: Progressing     Problem: DISCHARGE PLANNING  Goal: Discharge to home or other facility with appropriate resources  Description: INTERVENTIONS:  - Identify barriers to discharge w/patient and caregiver  - Arrange for needed discharge resources and transportation as appropriate  - Identify discharge learning needs (meds, wound care, etc.)  - Arrange for interpretive services to assist at discharge as needed  - Refer to Case Management Department for coordinating discharge planning if the patient needs post-hospital services based on physician/advanced practitioner order or complex needs  related to functional status, cognitive ability, or social support system  Outcome: Progressing     Problem: Knowledge Deficit  Goal: Patient/family/caregiver demonstrates understanding of disease process, treatment plan, medications, and discharge instructions  Description: Complete learning assessment and assess knowledge base.  Interventions:  - Provide teaching at level of understanding  - Provide teaching via preferred learning methods  Outcome: Progressing     Problem: Nutrition/Hydration-ADULT  Goal: Nutrient/Hydration intake appropriate for improving, restoring or maintaining nutritional needs  Description: Monitor and assess patient's nutrition/hydration status for malnutrition. Collaborate with interdisciplinary team and initiate plan and interventions as ordered.  Monitor patient's weight and dietary intake as ordered or per policy. Utilize nutrition screening tool and intervene as necessary. Determine patient's food preferences and provide high-protein, high-caloric foods as appropriate.     INTERVENTIONS:  - Monitor oral intake, urinary output, labs, and treatment plans  - Assess nutrition and hydration status and recommend course of action  - Evaluate amount of meals eaten  - Assist patient with eating if necessary   - Allow adequate time for meals  - Recommend/ encourage appropriate diets, oral nutritional supplements, and vitamin/mineral supplements  - Order, calculate, and assess calorie counts as needed  - Recommend, monitor, and adjust tube feedings and TPN/PPN based on assessed needs  - Assess need for intravenous fluids  - Provide specific nutrition/hydration education as appropriate  - Include patient/family/caregiver in decisions related to nutrition  Outcome: Progressing

## 2024-05-17 NOTE — ASSESSMENT & PLAN NOTE
Lab Results   Component Value Date    EGFR 44 05/17/2024    EGFR 45 05/16/2024    EGFR 45 05/15/2024    CREATININE 1.46 (H) 05/17/2024    CREATININE 1.45 (H) 05/16/2024    CREATININE 1.45 (H) 05/15/2024     Creatinine appears at baseline  Repeat lab work in a.m

## 2024-05-17 NOTE — ASSESSMENT & PLAN NOTE
CTA patient advised to come to the ER by urology due to recurrent gross hematuria  Per chart review, recently hospitalized from 4/29 - 5/6 with gross hematuria and was on CBI and had suprapubic catheter placed  Underwent cystoscopy with evacuation of clots, fulguration of bladder neck tumors and biopsy.  Retrograde urethrogram/cystogram DVIU on 4/24  Status post cystoscopy transurethral resection bladder neck contracture bladder tumor evacuation of clots on 4/11  CTA with findings of cystitis but no active hemorrhage.  Nothing to intervene per IR  Hb continues to remain stable  Started on CBI per urology but dc per urology and monitor overnight  urology to call and try to schedule appointment at [place] early next week for cystectomy

## 2024-05-17 NOTE — ASSESSMENT & PLAN NOTE
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  Discontinued IV Rocephin

## 2024-05-17 NOTE — PROGRESS NOTES
Pending sale to [facility]  Progress Note  Name: Joel Wyman I  MRN: [id number]  Unit/Bed#: 4 [address]  Date of Admission: 5/14/2024   Date of Service: 5/17/2024 I Hospital Day: 2    Assessment & Plan   * Gross hematuria  Assessment & Plan  CTA patient advised to come to the ER by urology due to recurrent gross hematuria  Per chart review, recently hospitalized from 4/29 - 5/6 with gross hematuria and was on CBI and had suprapubic catheter placed  Underwent cystoscopy with evacuation of clots, fulguration of bladder neck tumors and biopsy.  Retrograde urethrogram/cystogram DVIU on 4/24  Status post cystoscopy transurethral resection bladder neck contracture bladder tumor evacuation of clots on 4/11  CTA with findings of cystitis but no active hemorrhage.  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  Discontinued IV Rocephin    Stage 3b chronic kidney disease (HCC)  Assessment & Plan  Lab Results   Component Value Date    EGFR 44 05/17/2024    EGFR 45 05/16/2024    EGFR 45 05/15/2024    CREATININE 1.46 (H) 05/17/2024    CREATININE 1.45 (H) 05/16/2024    CREATININE 1.45 (H) 05/15/2024     Creatinine appears at baseline  Repeat lab work in a.m    Nicotine abuse  Assessment & Plan  Nicotine patch.  Smoking cessation.    Prostate cancer (HCC) - s/p resection  Assessment & Plan  Status post XRT.    Essential hypertension  Assessment & Plan  Continue Lopressor.               VTE Pharmacologic Prophylaxis: VTE Score: 4 Moderate Risk (Score 3-4) - Pharmacological DVT Prophylaxis Contraindicated. Sequential Compression Devices  Ordered.    Mobility:   Basic Mobility Inpatient Raw Score: 23  JH-HLM Goal: 7: Walk 25 feet or more  JH-HLM Achieved: 7: Walk 25 feet or more  JH-HLM Goal achieved. Continue to encourage appropriate mobility.    Patient Centered Rounds: I performed bedside rounds with nursing staff today.   Discussions with Specialists or Other Care Team Provider: yes - urology    Education and Discussions with Family / Patient: Patient declined call to .     Total Time Spent on Date of Encounter in care of patient: This time was spent on one or more of the following: performing physical exam; counseling and coordination of care; obtaining or reviewing history; documenting in the medical record; reviewing/ordering tests, medications or procedures; communicating with other healthcare professionals and discussing with patient's family/caregivers.    Current Length of Stay: 2 day(s)  Current Patient Status: Inpatient   Certification Statement: The patient will continue to require additional inpatient hospital stay due to gross hematuria  Discharge Plan: Anticipate discharge tomorrow to home.    Code Status: Level 1 - Full Code    Subjective:     Patient states he is doing the same as yesterday.  Still with intermittent spasms.  Agreeable with cystectomy    Objective:     Vitals:   Temp (24hrs), Av.4 °F (36.3 °C), Min:97.1 °F (36.2 °C), Max:97.7 °F (36.5 °C)    Temp:  [97.1 °F (36.2 °C)-97.7 °F (36.5 °C)] 97.7 °F (36.5 °C)  HR:  [60-75] 61  Resp:  [18-20] 20  BP: (107-119)/(55-63) 118/62  SpO2:  [88 %-92 %] 92 %  Body mass index is 28.45 kg/m².     Input and Output Summary (last 24 hours):     Intake/Output Summary (Last 24 hours) at 2024 1337  Last data filed at 2024 1101  Gross per 24 hour   Intake 420 ml   Output 2300 ml   Net -1880 ml       Physical Exam:   Physical Exam  Vitals reviewed.   Constitutional:       General: He is not in acute distress.     Appearance: He is not toxic-appearing.   HENT:       Head: Normocephalic and atraumatic.   Eyes:      General:         Right eye: No discharge.         Left eye: No discharge.   Cardiovascular:      Rate and Rhythm: Normal rate and regular rhythm.   Pulmonary:      Effort: Pulmonary effort is normal. No respiratory distress.      Breath sounds: Normal breath sounds. No wheezing or rales.   Abdominal:      General: Bowel sounds are normal. There is no distension.      Palpations: Abdomen is soft.      Tenderness: There is no abdominal tenderness.   Genitourinary:     Comments: [place] in place. SPT in place.  Dark pink colored urine in bag  Neurological:      Mental Status: He is alert and oriented to person, place, and time.          Additional Data:     Labs:  Results from last 7 days   Lab Units 05/17/24  0519 05/14/24  1933 05/14/24  1325   WBC Thousand/uL 11.31*   < > 11.62*   HEMOGLOBIN g/dL 13.0   < > 14.3   HEMATOCRIT % 39.8   < > 42.5   PLATELETS Thousands/uL 202   < > 217   SEGS PCT %  --   --  78*   LYMPHO PCT %  --   --  12*   MONO PCT %  --   --  8   EOS PCT %  --   --  2    < > = values in this interval not displayed.     Results from last 7 days   Lab Units 05/17/24  0519 05/15/24  0431 05/14/24  1325   SODIUM mmol/L 135   < > 135   POTASSIUM mmol/L 4.4   < > 4.3   CHLORIDE mmol/L 103   < > 104   CO2 mmol/L 24   < > 23   BUN mg/dL 37*   < > 36*   CREATININE mg/dL 1.46*   < > 1.45*   ANION GAP mmol/L 8   < > 8   CALCIUM mg/dL 8.8   < > 9.4   ALBUMIN g/dL  --   --  3.8   TOTAL BILIRUBIN mg/dL  --   --  0.53   ALK PHOS U/L  --   --  69   ALT U/L  --   --  17   AST U/L  --   --  18   GLUCOSE RANDOM mg/dL 102   < > 119    < > = values in this interval not displayed.     Results from last 7 days   Lab Units 05/14/24  1325   INR  1.08                   Lines/Drains:  Invasive Devices       Peripheral Intravenous Line  Duration             Peripheral IV 05/14/24 Left Wrist 3 days              Drain  Duration             Urethral Catheter Latex;Three way 22 Fr. 22  days    Suprapubic Catheter 16 Fr. 14 days    Continuous Bladder Irrigation Three-way 13 days    Continuous Bladder Irrigation 1 day                  Urinary Catheter:  Goal for removal: N/A- Discharging with [place]               Imaging: No pertinent imaging reviewed.    Recent Cultures (last 7 days):   Results from last 7 days   Lab Units 05/15/24  1457   URINE CULTURE  No Growth <100 cfu/mL       Last 24 Hours Medication List:   Current Facility-Administered Medications   Medication Dose Route Frequency Provider Last Rate    albuterol  2 puff Inhalation Q4H PRN Janny Marques, DO      bisacodyl  10 mg Rectal Daily Janny Pulidoar, DO      Fluticasone Furoate-Vilanterol  1 puff Inhalation Daily Janny Marques, DO      And    umeclidinium  1 puff Inhalation Daily Janny Pulidoar, DO      metoprolol tartrate  12.5 mg Oral Q12H DHAVAL Janny Marques, DO      morphine injection  2 mg Intravenous Q6H PRN Janny Pulidoar, DO      nicotine  21 mg Transdermal Daily Janny Marques, DO      polyethylene glycol  17 g Oral Daily PRN Janny Marques, DO      traMADol  50 mg Oral Q6H PRN Janyn Marques, DO          Today, Patient Was Seen By: Janny Marques DO    **Please Note: This note may have been constructed using a voice recognition system.**

## 2024-05-18 VITALS
HEART RATE: 57 BPM | SYSTOLIC BLOOD PRESSURE: 124 MMHG | HEIGHT: 71 IN | TEMPERATURE: 98 F | BODY MASS INDEX: 28.56 KG/M2 | WEIGHT: 204 LBS | OXYGEN SATURATION: 91 % | DIASTOLIC BLOOD PRESSURE: 64 MMHG | RESPIRATION RATE: 20 BRPM

## 2024-05-18 PROBLEM — R82.90 ABNORMAL URINALYSIS: Status: RESOLVED | Noted: 2024-04-30 | Resolved: 2024-05-18

## 2024-05-18 LAB
ANION GAP SERPL CALCULATED.3IONS-SCNC: 8 MMOL/L (ref 4–13)
BUN SERPL-MCNC: 36 MG/DL (ref 5–25)
CALCIUM SERPL-MCNC: 8.8 MG/DL (ref 8.4–10.2)
CHLORIDE SERPL-SCNC: 102 MMOL/L (ref 96–108)
CO2 SERPL-SCNC: 25 MMOL/L (ref 21–32)
CREAT SERPL-MCNC: 1.34 MG/DL (ref 0.6–1.3)
GFR SERPL CREATININE-BSD FRML MDRD: 49 ML/MIN/1.73SQ M
GLUCOSE SERPL-MCNC: 112 MG/DL (ref 65–140)
HCT VFR BLD AUTO: 37.7 % (ref 36.5–49.3)
HGB BLD-MCNC: 12.5 G/DL (ref 12–17)
POTASSIUM SERPL-SCNC: 4.2 MMOL/L (ref 3.5–5.3)
SODIUM SERPL-SCNC: 135 MMOL/L (ref 135–147)

## 2024-05-18 PROCEDURE — 80048 BASIC METABOLIC PNL TOTAL CA: CPT | Performed by: FAMILY MEDICINE

## 2024-05-18 PROCEDURE — 99239 HOSP IP/OBS DSCHRG MGMT >30: CPT | Performed by: STUDENT IN AN ORGANIZED HEALTH CARE EDUCATION/TRAINING PROGRAM

## 2024-05-18 PROCEDURE — 85018 HEMOGLOBIN: CPT | Performed by: FAMILY MEDICINE

## 2024-05-18 PROCEDURE — 85014 HEMATOCRIT: CPT | Performed by: FAMILY MEDICINE

## 2024-05-18 RX ADMIN — UMECLIDINIUM 1 PUFF: 62.5 AEROSOL, POWDER ORAL at 08:55

## 2024-05-18 RX ADMIN — NICOTINE 21 MG: 21 PATCH, EXTENDED RELEASE TRANSDERMAL at 08:57

## 2024-05-18 RX ADMIN — FLUTICASONE FUROATE AND VILANTEROL TRIFENATATE 1 PUFF: 100; 25 POWDER RESPIRATORY (INHALATION) at 08:55

## 2024-05-18 RX ADMIN — TRAMADOL HYDROCHLORIDE 50 MG: 50 TABLET, COATED ORAL at 08:54

## 2024-05-18 RX ADMIN — MORPHINE SULFATE 2 MG: 2 INJECTION, SOLUTION INTRAMUSCULAR; INTRAVENOUS at 03:12

## 2024-05-18 RX ADMIN — TRAMADOL HYDROCHLORIDE 50 MG: 50 TABLET, COATED ORAL at 00:34

## 2024-05-18 RX ADMIN — Medication 12.5 MG: at 08:55

## 2024-05-18 NOTE — PROGRESS NOTES
"Progress Note - Urology      Patient: Joel Wyman   : 1945 Sex: male   MRN: [id number]     CSN: [id number]  Unit/Bed#: [address] [place] [address]     SUBJECTIVE:   Hematuria resolving  Severe bladder spasms causing bleeding  Radiattion cystitis      Objective   Vitals: /64 (BP Location: Left arm)   Pulse 57   Temp 98 °F (36.7 °C) (Oral)   Resp 20   Ht 5' 11\" (1.803 m)   Wt 92.5 kg (204 lb)   SpO2 91%   BMI 28.45 kg/m²     I/O last 24 hours:  In: 660 [P.O.:660]  Out:  [Urine:]      Physical Exam:   General Alert awake   Normocephalic atraumatic PERRLA  Lungs clear bilaterally  Cardiac normal S1 normal S2  Abdomen soft, flank pain  Extremities no edema      Lab Results: CBC:   Lab Results   Component Value Date    WBC 11.31 (H) 2024    HGB 12.5 2024    HCT 37.7 2024    MCV 88 2024     2024    RBC 4.53 2024    MCH 28.7 2024    MCHC 32.7 2024    RDW 15.2 (H) 2024    MPV 9.6 2024    NRBC 0 2024     CMP:   Lab Results   Component Value Date     2024    CO2 25 2024    BUN 36 (H) 2024    CREATININE 1.34 (H) 2024    CALCIUM 8.8 2024    AST 18 2024    ALT 17 2024    ALKPHOS 69 2024    EGFR 49 2024     Urinalysis:   Lab Results   Component Value Date    COLORU Light Brown 2024    CLARITYU Cloudy 2024    SPECGRAV <1.005 (L) 2024    PHUR 8.5 (A) 2024    PHUR 5.5 2018    LEUKOCYTESUR Large (A) 2024    NITRITE Negative 2024    GLUCOSEU Negative 2024    KETONESU Negative 2024    BILIRUBINUR Negative 2024    BLOODU Large (A) 2024     Urine Culture:   Lab Results   Component Value Date    URINECX Culture too young- will reincubate 05/15/2024     PSA: No results found for: \"PSA\"      Assessment/ Plan:  Hematuria  Cta no active bleeding  D/c home  Elective cystectomy to be strongly recommended          Yovani Khan, " MD

## 2024-05-18 NOTE — ASSESSMENT & PLAN NOTE
Lab Results   Component Value Date    EGFR 49 05/18/2024    EGFR 44 05/17/2024    EGFR 45 05/16/2024    CREATININE 1.34 (H) 05/18/2024    CREATININE 1.46 (H) 05/17/2024    CREATININE 1.45 (H) 05/16/2024     Creatinine appears at baseline

## 2024-05-18 NOTE — PLAN OF CARE
Problem: PAIN - ADULT  Goal: Verbalizes/displays adequate comfort level or baseline comfort level  Description: Interventions:  - Encourage patient to monitor pain and request assistance  - Assess pain using appropriate pain scale  - Administer analgesics based on type and severity of pain and evaluate response  - Implement non-pharmacological measures as appropriate and evaluate response  - Consider cultural and social influences on pain and pain management  - Notify physician/advanced practitioner if interventions unsuccessful or patient reports new pain  Outcome: Progressing     Problem: INFECTION - ADULT  Goal: Absence or prevention of progression during hospitalization  Description: INTERVENTIONS:  - Assess and monitor for signs and symptoms of infection  - Monitor lab/diagnostic results  - Monitor all insertion sites, i.e. indwelling lines, tubes, and drains  - Monitor endotracheal if appropriate and nasal secretions for changes in amount and color  - [place] appropriate cooling/warming therapies per order  - Administer medications as ordered  - Instruct and encourage patient and family to use good hand hygiene technique  - Identify and instruct in appropriate isolation precautions for identified infection/condition  Outcome: Progressing  Goal: Absence of fever/infection during neutropenic period  Description: INTERVENTIONS:  - Monitor WBC    Outcome: Progressing     Problem: SAFETY ADULT  Goal: Patient will remain free of falls  Description: INTERVENTIONS:  - Educate patient/family on patient safety including physical limitations  - Instruct patient to call for assistance with activity   - Consult OT/PT to assist with strengthening/mobility   - Keep Call bell within reach  - Keep bed low and locked with side rails adjusted as appropriate  - Keep care items and personal belongings within reach  - Initiate and maintain comfort rounds  - Make Fall Risk Sign visible to staff  - Apply yellow socks and bracelet  for high fall risk patients  - Consider moving patient to room near nurses station  Outcome: Progressing  Goal: Maintain or return to baseline ADL function  Description: INTERVENTIONS:  -  Assess patient's ability to carry out ADLs; assess patient's baseline for ADL function and identify physical deficits which impact ability to perform ADLs (bathing, care of mouth/teeth, toileting, grooming, dressing, etc.)  - Assess/evaluate cause of self-care deficits   - Assess range of motion  - Assess patient's mobility; develop plan if impaired  - Assess patient's need for assistive devices and provide as appropriate  - Encourage maximum independence but intervene and supervise when necessary  - Involve family in performance of ADLs  - Assess for home care needs following discharge   - Consider OT consult to assist with ADL evaluation and planning for discharge  - Provide patient education as appropriate  Outcome: Progressing  Goal: Maintains/Returns to pre admission functional level  Description: INTERVENTIONS:  - Perform AM-PAC 6 Click Basic Mobility/ Daily Activity assessment daily.  - Set and communicate daily mobility goal to care team and patient/family/caregiver.   - Collaborate with rehabilitation services on mobility goals if consulted  Problem: Nutrition/Hydration-ADULT  Goal: Nutrient/Hydration intake appropriate for improving, restoring or maintaining nutritional needs  Description: Monitor and assess patient's nutrition/hydration status for malnutrition. Collaborate with interdisciplinary team and initiate plan and interventions as ordered.  Monitor patient's weight and dietary intake as ordered or per policy. Utilize nutrition screening tool and intervene as necessary. Determine patient's food preferences and provide high-protein, high-caloric foods as appropriate.     INTERVENTIONS:  - Monitor oral intake, urinary output, labs, and treatment plans  - Assess nutrition and hydration status and recommend course of  action  - Evaluate amount of meals eaten  - Assist patient with eating if necessary   - Allow adequate time for meals  - Recommend/ encourage appropriate diets, oral nutritional supplements, and vitamin/mineral supplements  - Order, calculate, and assess calorie counts as needed  - Recommend, monitor, and adjust tube feedings and TPN/PPN based on assessed needs  - Assess need for intravenous fluids  - Provide specific nutrition/hydration education as appropriate  - Include patient/family/caregiver in decisions related to nutrition  Outcome: Progressing     Problem: Knowledge Deficit  Goal: Patient/family/caregiver demonstrates understanding of disease process, treatment plan, medications, and discharge instructions  Description: Complete learning assessment and assess knowledge base.  Interventions:  - Provide teaching at level of understanding  - Provide teaching via preferred learning methods  Outcome: Progressing     Problem: DISCHARGE PLANNING  Goal: Discharge to home or other facility with appropriate resources  Description: INTERVENTIONS:  - Identify barriers to discharge w/patient and caregiver  - Arrange for needed discharge resources and transportation as appropriate  - Identify discharge learning needs (meds, wound care, etc.)  - Arrange for interpretive services to assist at discharge as needed  - Refer to Case Management Department for coordinating discharge planning if the patient needs post-hospital services based on physician/advanced practitioner order or complex needs related to functional status, cognitive ability, or social support system  Outcome: Progressing     - Out of bed for toileting  - Record patient progress and toleration of activity level   Outcome: Progressing

## 2024-05-18 NOTE — NURSING NOTE
Patient discharge home with wife. AVS review with patient and  wife and made aware of follow up wit Dr. Michele.

## 2024-05-18 NOTE — PLAN OF CARE
Problem: PAIN - ADULT  Goal: Verbalizes/displays adequate comfort level or baseline comfort level  Description: Interventions:  - Encourage patient to monitor pain and request assistance  - Assess pain using appropriate pain scale  - Administer analgesics based on type and severity of pain and evaluate response  - Implement non-pharmacological measures as appropriate and evaluate response  - Consider cultural and social influences on pain and pain management  - Notify physician/advanced practitioner if interventions unsuccessful or patient reports new pain  Outcome: Progressing     Problem: INFECTION - ADULT  Goal: Absence or prevention of progression during hospitalization  Description: INTERVENTIONS:  - Assess and monitor for signs and symptoms of infection  - Monitor lab/diagnostic results  - Monitor all insertion sites, i.e. indwelling lines, tubes, and drains  - Monitor endotracheal if appropriate and nasal secretions for changes in amount and color  - [place] appropriate cooling/warming therapies per order  - Administer medications as ordered  - Instruct and encourage patient and family to use good hand hygiene technique  - Identify and instruct in appropriate isolation precautions for identified infection/condition  Outcome: Progressing  Goal: Absence of fever/infection during neutropenic period  Description: INTERVENTIONS:  - Monitor WBC    Outcome: Progressing     Problem: SAFETY ADULT  Goal: Patient will remain free of falls  Description: INTERVENTIONS:  - Educate patient/family on patient safety including physical limitations  - Instruct patient to call for assistance with activity   - Consult OT/PT to assist with strengthening/mobility   - Keep Call bell within reach  - Keep bed low and locked with side rails adjusted as appropriate  - Keep care items and personal belongings within reach  - Initiate and maintain comfort rounds  - Make Fall Risk Sign visible to staff  - Offer Toileting every 4 Hours,  in advance of need  - Initiate/Maintain bed/chair alarm  - Apply yellow socks and bracelet for high fall risk patients  - Consider moving patient to room near nurses station  Outcome: Progressing  Goal: Maintain or return to baseline ADL function  Description: INTERVENTIONS:  -  Assess patient's ability to carry out ADLs; assess patient's baseline for ADL function and identify physical deficits which impact ability to perform ADLs (bathing, care of mouth/teeth, toileting, grooming, dressing, etc.)  - Assess/evaluate cause of self-care deficits   - Assess range of motion  - Assess patient's mobility; develop plan if impaired  - Assess patient's need for assistive devices and provide as appropriate  - Encourage maximum independence but intervene and supervise when necessary  - Involve family in performance of ADLs  - Assess for home care needs following discharge   - Consider OT consult to assist with ADL evaluation and planning for discharge  - Provide patient education as appropriate  Outcome: Progressing  Goal: Maintains/Returns to pre admission functional level  Description: INTERVENTIONS:  - Perform AM-PAC 6 Click Basic Mobility/ Daily Activity assessment daily.  - Set and communicate daily mobility goal to care team and patient/family/caregiver.   - Collaborate with rehabilitation services on mobility goals if consulted  - Perform Range of Motion 3 times a day.  - Reposition patient every 3 hours.  - Dangle patient 3 times a day  - Stand patient 3 times a day  - Ambulate patient 3 times a day  - Out of bed to chair 3 times a day   - Out of bed for meals 3 times a day  - Out of bed for toileting  - Record patient progress and toleration of activity level   Outcome: Progressing     Problem: DISCHARGE PLANNING  Goal: Discharge to home or other facility with appropriate resources  Description: INTERVENTIONS:  - Identify barriers to discharge w/patient and caregiver  - Arrange for needed discharge resources and  transportation as appropriate  - Identify discharge learning needs (meds, wound care, etc.)  - Arrange for interpretive services to assist at discharge as needed  - Refer to Case Management Department for coordinating discharge planning if the patient needs post-hospital services based on physician/advanced practitioner order or complex needs related to functional status, cognitive ability, or social support system  Outcome: Progressing     Problem: Knowledge Deficit  Goal: Patient/family/caregiver demonstrates understanding of disease process, treatment plan, medications, and discharge instructions  Description: Complete learning assessment and assess knowledge base.  Interventions:  - Provide teaching at level of understanding  - Provide teaching via preferred learning methods  Outcome: Progressing     Problem: Nutrition/Hydration-ADULT  Goal: Nutrient/Hydration intake appropriate for improving, restoring or maintaining nutritional needs  Description: Monitor and assess patient's nutrition/hydration status for malnutrition. Collaborate with interdisciplinary team and initiate plan and interventions as ordered.  Monitor patient's weight and dietary intake as ordered or per policy. Utilize nutrition screening tool and intervene as necessary. Determine patient's food preferences and provide high-protein, high-caloric foods as appropriate.     INTERVENTIONS:  - Monitor oral intake, urinary output, labs, and treatment plans  - Assess nutrition and hydration status and recommend course of action  - Evaluate amount of meals eaten  - Assist patient with eating if necessary   - Allow adequate time for meals  - Recommend/ encourage appropriate diets, oral nutritional supplements, and vitamin/mineral supplements  - Order, calculate, and assess calorie counts as needed  - Recommend, monitor, and adjust tube feedings and TPN/PPN based on assessed needs  - Assess need for intravenous fluids  - Provide specific  nutrition/hydration education as appropriate  - Include patient/family/caregiver in decisions related to nutrition  Outcome: Progressing

## 2024-05-18 NOTE — DISCHARGE SUMMARY
INTERNAL MEDICINE  DISCHARGE SUMMARY     Joel Wyman   78 y.o. male  MRN: [id number]  Room/Bed: [address] [place] [address]/[address] [place] [address]-*     [address]   Encounter: [id number]    Principal Problem:    Gross hematuria  Active Problems:    Essential hypertension    Prostate cancer (HCC) - s/p resection    Stage 3b chronic kidney disease (HCC)    Nicotine abuse      Nicotine abuse  Assessment & Plan  Nicotine patch.  Smoking cessation.    Stage 3b chronic kidney disease (HCC)  Assessment & Plan  Lab Results   Component Value Date    EGFR 49 05/18/2024    EGFR 44 05/17/2024    EGFR 45 05/16/2024    CREATININE 1.34 (H) 05/18/2024    CREATININE 1.46 (H) 05/17/2024    CREATININE 1.45 (H) 05/16/2024     Creatinine appears at baseline    Prostate cancer (HCC) - s/p resection  Assessment & Plan  Status post XRT.    Essential hypertension  Assessment & Plan  Continue beta-blocker therapy    * Gross hematuria  Assessment & Plan  CTA patient advised to come to the ER by urology due to recurrent gross hematuria  Per chart review, recently hospitalized from 4/29 - 5/6 with gross hematuria and was on CBI and had suprapubic catheter placed  Underwent cystoscopy with evacuation of clots, fulguration of bladder neck tumors and biopsy.  Retrograde urethrogram/cystogram DVIU on 4/24  Status post cystoscopy transurethral resection bladder neck contracture bladder tumor evacuation of clots on 4/11  CTA with findings of cystitis but no active hemorrhage.  Nothing to intervene per IR  Hb continues to remain stable  Started on CBI per urology but dc per urology and monitor overnight  urology to call and try to schedule appointment at [facility] early next week for cystectomy    5/18/2024, discussed case with urology, plan for discharge with Sumner catheter, blood-tinged urine but no gross hematuria.  Patient will follow-up closely in the outpatient setting for further evaluation.        DETAILS OF HOSPITAL COURSE  "    Patient is a pleasant 78-year-old male presenting for admission secondary to gross hematuria, CTA findings of cystitis but no active hemorrhage, patient was seen by urology, CBI was discontinued, monitored overnight, blood-tinged urine but no gross hematuria, blood work stable.  Discussed case with urology, plan for discharge today 5/18/2024, follow-up with urology this coming week, may benefit from cystoscopy with [facility] in the outpatient setting.    Patient is excited to be leaving, vital stable, blood work stable, follow-up with urology closely in the outpatient setting, follow-up with primary care provider for transition of care management.    Urology recommending elective cystoscopy in the outpatient setting.      Discharging Physician / Practitioner: Higinio Kasper DO  PCP: Fabiano Carroll MD  Admission Date:   Admission Orders (From admission, onward)       Ordered        05/15/24 1821  INPATIENT ADMISSION  Once            05/14/24 1423  Place in Observation  Once                          Discharge Date: 05/18/24    Medical Problems       Resolved Problems  Date Reviewed: 5/17/2024            Resolved    Abnormal urinalysis 5/18/2024     Resolved by  Higinio Kasper DO          Consultations During Hospital Stay:  Close follow-up with urology outpatient    Discharge Day Visit / Exam:     Subjective: Patient is a pleasant 78-year-old male seen and examined at bedside this morning, gross hematuria improvement, urinary catheter in appropriate position, discharge with close follow-up urology outpatient recommended.    Vitals: Blood Pressure: 124/64 (05/18/24 0713)  Pulse: 57 (05/18/24 0713)  Temperature: 98 °F (36.7 °C) (05/18/24 0713)  Temp Source: Oral (05/18/24 0716)  Respirations: 20 (05/18/24 0716)  Height: 5' 11\" (180.3 cm) (05/14/24 1825)  Weight - Scale: 92.5 kg (204 lb) (05/14/24 1825)  SpO2: 91 % (05/18/24 0713)  Exam:   Physical Exam  Vitals and nursing note " reviewed.   Constitutional:       General: He is not in acute distress.     Appearance: He is well-developed.   HENT:      Head: Normocephalic and atraumatic.   Eyes:      Conjunctiva/sclera: Conjunctivae normal.   Cardiovascular:      Rate and Rhythm: Normal rate and regular rhythm.      Heart sounds: No murmur heard.  Pulmonary:      Effort: Pulmonary effort is normal. No respiratory distress.      Breath sounds: Normal breath sounds.   Abdominal:      Palpations: Abdomen is soft.      Tenderness: There is no abdominal tenderness.   Genitourinary:     Comments: Urine catheter in appropriate position, blood-tinged urine  Musculoskeletal:         General: No swelling.      Cervical back: Neck supple.   Skin:     General: Skin is warm and dry.      Capillary Refill: Capillary refill takes less than 2 seconds.   Neurological:      Mental Status: He is alert.   Psychiatric:         Mood and Affect: Mood normal.           Discharge instructions/Information to patient and family:   See after visit summary for information provided to patient and family.      Provisions for Follow-Up Care:  See after visit summary for information related to follow-up care and any pertinent home health orders.      Disposition:     Home     Discharge Statement:  I spent 45 minutes discharging the patient. This time was spent on the day of discharge. I had direct contact with the patient on the day of discharge. Greater than 50% of the total time was spent examining patient, answering all patient questions, arranging and discussing plan of care with patient as well as directly providing post-discharge instructions.  Additional time then spent on discharge activities.    Discharge Medications:  See after visit summary for reconciled discharge medications provided to patient and family.      ** Please Note: This note has been constructed using a voice recognition system **

## 2024-05-18 NOTE — ASSESSMENT & PLAN NOTE
CTA patient advised to come to the ER by urology due to recurrent gross hematuria  Per chart review, recently hospitalized from 4/29 - 5/6 with gross hematuria and was on CBI and had suprapubic catheter placed  Underwent cystoscopy with evacuation of clots, fulguration of bladder neck tumors and biopsy.  Retrograde urethrogram/cystogram DVIU on 4/24  Status post cystoscopy transurethral resection bladder neck contracture bladder tumor evacuation of clots on 4/11  CTA with findings of cystitis but no active hemorrhage.  Nothing to intervene per IR  Hb continues to remain stable  Started on CBI per urology but dc per urology and monitor overnight  urology to call and try to schedule appointment at [place] early next week for cystectomy    5/18/2024, discussed case with urology, plan for discharge with Sumner catheter, blood-tinged urine but no gross hematuria.  Patient will follow-up closely in the outpatient setting for further evaluation.

## 2024-05-19 LAB — BACTERIA UR CULT: ABNORMAL

## 2024-05-20 DIAGNOSIS — N30.90 CYSTITIS: Primary | ICD-10-CM

## 2024-05-20 RX ORDER — CEFUROXIME AXETIL 500 MG/1
500 TABLET ORAL EVERY 12 HOURS SCHEDULED
Qty: 14 TABLET | Refills: 0 | Status: ON HOLD | OUTPATIENT
Start: 2024-05-20 | End: 2024-05-22

## 2024-05-20 NOTE — PROGRESS NOTES
Gross coverage urine culture positive for Corynebacterium species, called patient stated he has been having urine burning with urination that has not resolved, follow-up appointment with urologist via phone call today.  Informed sent patient over 7-day course of antibiotics for complicated UTI to [facility] in [place].  Patient informed to notify urologist upon follow-up visit

## 2024-05-22 ENCOUNTER — HOSPITAL ENCOUNTER (INPATIENT)
Facility: HOSPITAL | Age: 79
LOS: 2 days | Discharge: HOME | DRG: 653 | End: 2024-05-25
Attending: EMERGENCY MEDICINE | Admitting: STUDENT IN AN ORGANIZED HEALTH CARE EDUCATION/TRAINING PROGRAM
Payer: COMMERCIAL

## 2024-05-22 DIAGNOSIS — Z01.818 PREOPERATIVE EXAMINATION: ICD-10-CM

## 2024-05-22 DIAGNOSIS — J44.9 COPD (CHRONIC OBSTRUCTIVE PULMONARY DISEASE) (HCC): ICD-10-CM

## 2024-05-22 DIAGNOSIS — N39.0 COMPLICATED UTI (URINARY TRACT INFECTION): ICD-10-CM

## 2024-05-22 DIAGNOSIS — N30.40 RADIATION CYSTITIS: Primary | ICD-10-CM

## 2024-05-22 DIAGNOSIS — R31.0 GROSS HEMATURIA: ICD-10-CM

## 2024-05-22 DIAGNOSIS — R33.9 RETENTION OF URINE, UNSPECIFIED: ICD-10-CM

## 2024-05-22 DIAGNOSIS — R33.9 URINARY RETENTION: ICD-10-CM

## 2024-05-22 DIAGNOSIS — Z01.818 PRE-OP EXAMINATION: ICD-10-CM

## 2024-05-22 LAB
ALBUMIN SERPL BCP-MCNC: 3.9 G/DL (ref 3.5–5)
ALP SERPL-CCNC: 74 U/L (ref 34–104)
ALT SERPL W P-5'-P-CCNC: 19 U/L (ref 7–52)
ANION GAP SERPL CALCULATED.3IONS-SCNC: 9 MMOL/L (ref 4–13)
AST SERPL W P-5'-P-CCNC: 17 U/L (ref 13–39)
BASOPHILS # BLD AUTO: 0.05 THOUSANDS/ÂΜL (ref 0–0.1)
BASOPHILS NFR BLD AUTO: 0 % (ref 0–1)
BILIRUB SERPL-MCNC: 0.48 MG/DL (ref 0.2–1)
BUN SERPL-MCNC: 36 MG/DL (ref 5–25)
CALCIUM SERPL-MCNC: 9.4 MG/DL (ref 8.4–10.2)
CHLORIDE SERPL-SCNC: 105 MMOL/L (ref 96–108)
CO2 SERPL-SCNC: 24 MMOL/L (ref 21–32)
CREAT SERPL-MCNC: 1.66 MG/DL (ref 0.6–1.3)
EOSINOPHIL # BLD AUTO: 0.39 THOUSAND/ÂΜL (ref 0–0.61)
EOSINOPHIL NFR BLD AUTO: 3 % (ref 0–6)
ERYTHROCYTE [DISTWIDTH] IN BLOOD BY AUTOMATED COUNT: 15.3 % (ref 11.6–15.1)
GFR SERPL CREATININE-BSD FRML MDRD: 38 ML/MIN/1.73SQ M
GLUCOSE SERPL-MCNC: 195 MG/DL (ref 65–140)
HCT VFR BLD AUTO: 42.3 % (ref 36.5–49.3)
HGB BLD-MCNC: 14 G/DL (ref 12–17)
IMM GRANULOCYTES # BLD AUTO: 0.06 THOUSAND/UL (ref 0–0.2)
IMM GRANULOCYTES NFR BLD AUTO: 1 % (ref 0–2)
LYMPHOCYTES # BLD AUTO: 1.58 THOUSANDS/ÂΜL (ref 0.6–4.47)
LYMPHOCYTES NFR BLD AUTO: 12 % (ref 14–44)
MAGNESIUM SERPL-MCNC: 1.9 MG/DL (ref 1.9–2.7)
MCH RBC QN AUTO: 29.3 PG (ref 26.8–34.3)
MCHC RBC AUTO-ENTMCNC: 33.1 G/DL (ref 31.4–37.4)
MCV RBC AUTO: 89 FL (ref 82–98)
MONOCYTES # BLD AUTO: 0.94 THOUSAND/ÂΜL (ref 0.17–1.22)
MONOCYTES NFR BLD AUTO: 7 % (ref 4–12)
NEUTROPHILS # BLD AUTO: 10.05 THOUSANDS/ÂΜL (ref 1.85–7.62)
NEUTS SEG NFR BLD AUTO: 77 % (ref 43–75)
NRBC BLD AUTO-RTO: 0 /100 WBCS
PLATELET # BLD AUTO: 261 THOUSANDS/UL (ref 149–390)
PMV BLD AUTO: 9.3 FL (ref 8.9–12.7)
POTASSIUM SERPL-SCNC: 4.3 MMOL/L (ref 3.5–5.3)
PROCALCITONIN SERPL-MCNC: 0.09 NG/ML
PROT SERPL-MCNC: 7.4 G/DL (ref 6.4–8.4)
RBC # BLD AUTO: 4.78 MILLION/UL (ref 3.88–5.62)
SODIUM SERPL-SCNC: 138 MMOL/L (ref 135–147)
WBC # BLD AUTO: 13.07 THOUSAND/UL (ref 4.31–10.16)

## 2024-05-22 PROCEDURE — 99222 1ST HOSP IP/OBS MODERATE 55: CPT | Performed by: NURSE PRACTITIONER

## 2024-05-22 PROCEDURE — 84145 PROCALCITONIN (PCT): CPT | Performed by: NURSE PRACTITIONER

## 2024-05-22 PROCEDURE — 85025 COMPLETE CBC W/AUTO DIFF WBC: CPT | Performed by: EMERGENCY MEDICINE

## 2024-05-22 PROCEDURE — 36415 COLL VENOUS BLD VENIPUNCTURE: CPT | Performed by: EMERGENCY MEDICINE

## 2024-05-22 PROCEDURE — 99285 EMERGENCY DEPT VISIT HI MDM: CPT

## 2024-05-22 PROCEDURE — 83036 HEMOGLOBIN GLYCOSYLATED A1C: CPT | Performed by: NURSE PRACTITIONER

## 2024-05-22 PROCEDURE — 83735 ASSAY OF MAGNESIUM: CPT | Performed by: NURSE PRACTITIONER

## 2024-05-22 PROCEDURE — 80053 COMPREHEN METABOLIC PANEL: CPT | Performed by: EMERGENCY MEDICINE

## 2024-05-22 PROCEDURE — 99285 EMERGENCY DEPT VISIT HI MDM: CPT | Performed by: EMERGENCY MEDICINE

## 2024-05-22 RX ORDER — NICOTINE 21 MG/24HR
1 PATCH, TRANSDERMAL 24 HOURS TRANSDERMAL DAILY
Status: DISCONTINUED | OUTPATIENT
Start: 2024-05-23 | End: 2024-05-25 | Stop reason: HOSPADM

## 2024-05-22 RX ORDER — TRAMADOL HYDROCHLORIDE 50 MG/1
25 TABLET ORAL EVERY 6 HOURS PRN
Status: DISCONTINUED | OUTPATIENT
Start: 2024-05-22 | End: 2024-05-25 | Stop reason: HOSPADM

## 2024-05-22 RX ORDER — MORPHINE SULFATE 4 MG/ML
4 INJECTION, SOLUTION INTRAMUSCULAR; INTRAVENOUS ONCE
Status: COMPLETED | OUTPATIENT
Start: 2024-05-22 | End: 2024-05-22

## 2024-05-22 RX ORDER — HYDROMORPHONE HCL IN WATER/PF 6 MG/30 ML
0.2 PATIENT CONTROLLED ANALGESIA SYRINGE INTRAVENOUS
Status: DISCONTINUED | OUTPATIENT
Start: 2024-05-22 | End: 2024-05-22

## 2024-05-22 RX ORDER — SODIUM CHLORIDE 9 MG/ML
50 INJECTION, SOLUTION INTRAVENOUS CONTINUOUS
Status: DISCONTINUED | OUTPATIENT
Start: 2024-05-22 | End: 2024-05-25 | Stop reason: HOSPADM

## 2024-05-22 RX ORDER — ONDANSETRON 2 MG/ML
4 INJECTION INTRAMUSCULAR; INTRAVENOUS EVERY 6 HOURS PRN
Status: DISCONTINUED | OUTPATIENT
Start: 2024-05-22 | End: 2024-05-25 | Stop reason: HOSPADM

## 2024-05-22 RX ORDER — ACETAMINOPHEN 325 MG/1
650 TABLET ORAL EVERY 6 HOURS PRN
Status: DISCONTINUED | OUTPATIENT
Start: 2024-05-22 | End: 2024-05-25 | Stop reason: HOSPADM

## 2024-05-22 RX ORDER — ALBUTEROL SULFATE 2.5 MG/3ML
2.5 SOLUTION RESPIRATORY (INHALATION) EVERY 6 HOURS PRN
Status: DISCONTINUED | OUTPATIENT
Start: 2024-05-22 | End: 2024-05-24

## 2024-05-22 RX ORDER — CEFEPIME HYDROCHLORIDE 1 G/50ML
1000 INJECTION, SOLUTION INTRAVENOUS EVERY 12 HOURS
Status: DISCONTINUED | OUTPATIENT
Start: 2024-05-23 | End: 2024-05-23

## 2024-05-22 RX ORDER — FLUTICASONE FUROATE AND VILANTEROL 200; 25 UG/1; UG/1
1 POWDER RESPIRATORY (INHALATION) DAILY
Status: DISCONTINUED | OUTPATIENT
Start: 2024-05-23 | End: 2024-05-25 | Stop reason: HOSPADM

## 2024-05-22 RX ORDER — POLYETHYLENE GLYCOL 3350 17 G/17G
17 POWDER, FOR SOLUTION ORAL DAILY PRN
Status: DISCONTINUED | OUTPATIENT
Start: 2024-05-22 | End: 2024-05-25 | Stop reason: HOSPADM

## 2024-05-22 RX ORDER — CEFEPIME HYDROCHLORIDE 1 G/50ML
1000 INJECTION, SOLUTION INTRAVENOUS ONCE
Status: COMPLETED | OUTPATIENT
Start: 2024-05-22 | End: 2024-05-22

## 2024-05-22 RX ORDER — SACCHAROMYCES BOULARDII 250 MG
250 CAPSULE ORAL 2 TIMES DAILY
Status: DISCONTINUED | OUTPATIENT
Start: 2024-05-22 | End: 2024-05-25 | Stop reason: HOSPADM

## 2024-05-22 RX ORDER — HYDROMORPHONE HCL IN WATER/PF 6 MG/30 ML
0.2 PATIENT CONTROLLED ANALGESIA SYRINGE INTRAVENOUS EVERY 4 HOURS PRN
Status: DISCONTINUED | OUTPATIENT
Start: 2024-05-22 | End: 2024-05-25 | Stop reason: HOSPADM

## 2024-05-22 RX ADMIN — Medication 12.5 MG: at 20:16

## 2024-05-22 RX ADMIN — TRAMADOL HYDROCHLORIDE 25 MG: 50 TABLET, COATED ORAL at 22:06

## 2024-05-22 RX ADMIN — Medication 250 MG: at 20:16

## 2024-05-22 RX ADMIN — MORPHINE SULFATE 4 MG: 4 INJECTION INTRAVENOUS at 18:50

## 2024-05-22 RX ADMIN — CEFEPIME HYDROCHLORIDE 1000 MG: 1 INJECTION, SOLUTION INTRAVENOUS at 18:50

## 2024-05-22 RX ADMIN — SODIUM CHLORIDE 50 ML/HR: 0.9 INJECTION, SOLUTION INTRAVENOUS at 20:16

## 2024-05-22 NOTE — ASSESSMENT & PLAN NOTE
"Patient presents with issues with Sumner catheter and suprapubic catheter.  Reports Sumner catheter and suprapubic catheter stopped draining today, went to urology, Sumner was taken out and was sent to ED for admission and OR tomorrow.   History of prostate cancer, status post radical prostatectomy, status post radiation therapy for periprostatic recurrence.  History of bladder neck contracture, status post cystoscopy transurethral resection bladder neck contracture bladder tumor evacuation of clots on 4/11/2024.  Frequent hospitalization for hemorrhagic cystitis, hematuria, urinary retention.  Patient was discharged 4 days ago after being admitted for gross hematuria, required CBI. Urology is trying to contact [facility] for cystectomy.  Suprapubic catheter in situ, not draining.  Per urology, no need for Sumner insertion tonight as patient \"leak\".  Patient denies suprapubic pain or discomfort  UA pending  Low-grade fever, tachycardic on ED arrival.  WBC 13, no bands.  Meets SIRS criteria.  See below  N.p.o. after midnight  Consult urology    "

## 2024-05-22 NOTE — ED PROVIDER NOTES
History  Chief Complaint   Patient presents with    Penis Pain     Catheter was removed about 4;30, pain since that time, currently has uti also has suprapubic catheter attached to leg bag     Pt is an 77yo M who presents for urinary problems.  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  Patient has been following with urology outpatient.  Patient currently has suprapubic catheter and previously had Sumner catheter that was removed earlier today.  Patient states that today his suprapubic catheter stopped draining and his Sumner catheter also was having issues.  Patient was seen by urology who sent him in for further evaluation.  Patient reports discomfort in the penis due to recent Sumner removal.  Patient states no other complaints at this time.    Dr. Khan called prior to pt's arrival stating recommendation for basic labs, IV abx, NPO at midnight, and OR tomorrow. Bill requesting that we do not attempt Sumner placement overnight.         Prior to Admission Medications   Prescriptions Last Dose Informant Patient Reported? Taking?   fluticasone-umeclidinium-vilanterol (Trelegy Ellipta) 200-62.5-25 mcg/actuation AEPB inhaler 5/22/2024  Yes Yes   Sig: Inhale 1 puff every morning   metoprolol tartrate (LOPRESSOR) 25 mg tablet 5/22/2024  Yes Yes   Sig: Take 12.5 mg by mouth every 12 (twelve) hours   polyethylene glycol (MIRALAX) 17 g packet   No No   Sig: Take 17 g by mouth daily as needed (constipation)      Facility-Administered Medications: None       Past Medical History:   Diagnosis Date    Bladder infection 03/2024    Borderline diabetes     Cancer (HCC)     prostate    COPD (chronic obstructive pulmonary disease) (HCC)     Hematuria     History of transfusion     x2    Hyperlipidemia     Hypertension     Radiation cystitis     Smoker     Wears glasses        Past Surgical History:   Procedure Laterality Date    APPENDECTOMY      FL CYSTOGRAM  4/24/2024    FL RETROGRADE PYELOGRAM  04/14/2021     FL RETROGRADE PYELOGRAM  11/16/2023    FL RETROGRADE PYELOGRAM  4/11/2024    HERNIA REPAIR      IR EMBOLIZATION (SPECIFY VESSEL OR SITE)  09/17/2021    IR NEPHROSTOMY TUBE CHECK AND/OR REMOVAL  07/08/2021    IR NEPHROSTOMY TUBE CHECK/CHANGE/REPOSITION/REINSERTION/UPSIZE  05/10/2021    IR NEPHROSTOMY TUBE CHECK/CHANGE/REPOSITION/REINSERTION/UPSIZE  08/04/2021    IR NEPHROSTOMY TUBE CHECK/CHANGE/REPOSITION/REINSERTION/UPSIZE  10/13/2021    IR NEPHROSTOMY TUBE PLACEMENT  05/07/2021    IR SUPRAPUBIC CATHETER PLACEMENT  5/3/2024    AK CYSTO BLADDER W/URETERAL CATHETERIZATION Bilateral 4/11/2024    Procedure: BILATERAL CYSTOSCOPY WITH RETROGRADE PYELOGRAM, BLADDER NECK CONTRACTURE;  Surgeon: Yovani Khan MD;  Location: [facility];  Service: Urology    AK CYSTO W/IRRIG & EVAC MULTPLE OBSTRUCTING CLOTS Bilateral 04/14/2021    Procedure: CYSTOSCOPY EVACUATION OF CLOTS bilateral retrograde pyelograms possible TURBT bladder biopsy;  Surgeon: Yovani Khan MD;  Location: [facility];  Service: Urology    AK CYSTO W/IRRIG & EVAC MULTPLE OBSTRUCTING CLOTS N/A 04/24/2021    Procedure: CYSTOSCOPY EVACUATION OF CLOTS fulguration;  Surgeon: Yovani Khan MD;  Location: [facility];  Service: Urology    AK CYSTO W/IRRIG & EVAC MULTPLE OBSTRUCTING CLOTS N/A 07/08/2021    Procedure: CYSTOSCOPY EVACUATION OF CLOTS BILATERAL ANTIROGRADES NEPHROSTOMY TUBES, FULGERATION ;  Surgeon: Yovani hKan MD;  Location: [facility];  Service: Urology    AK CYSTO W/IRRIG & EVAC MULTPLE OBSTRUCTING CLOTS N/A 08/22/2021    Procedure: CYSTOSCOPY, RIGHT RETROGRADE, EVACUATION OF CLOTS, BLADDER BIOPSY X2 AND FULGERATION OF BLADDER LESIONS, URETEROSCOPY, 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;  Surgeon: Yovani Khan MD;  Location: [facility];  Service: Urology    AK CYSTO W/IRRIG & EVAC MULTPLE OBSTRUCTING CLOTS Bilateral 4/24/2024    Procedure: CYSTOSCOPY EVACUATION OF CLOTS fulguration bladder  neck tumors and biopsy, retrograde pyelograms, DVIU;  Surgeon: Yovani Khan MD;  Location: [facility];  Service: Urology    WV CYSTOURETHROSCOPY W/DEST &/RMVL MED BLADDER AMARI N/A 4/11/2024    Procedure: TRANSURETHRAL RESECTION OF BLADDER TUMOR (TURBT);  Surgeon: Yovani Khan MD;  Location: [facility];  Service: Urology    WV CYSTOURETHROSCOPY WITH BIOPSY N/A 11/16/2023    Procedure: CYSTOSCOPY, BILATERAL RETROGRADEY PYELOGRAM,  FULGERATION 2.0 CM BLADDER TUMOR WITH BIOPSY;  Surgeon: Yovani Khan MD;  Location: [facility];  Service: Urology    PROSTATECTOMY  2014    ROTATOR CUFF REPAIR      right shoulder       Family History   Problem Relation Age of Onset    Diabetes Mother     Stroke Mother     Diabetes Brother     Stroke Brother      I have reviewed and agree with the history as documented.    E-Cigarette/Vaping    E-Cigarette Use Former User      E-Cigarette/Vaping Substances    Nicotine No     THC No     CBD No     Flavoring No     Other No     Unknown No      Social History     Tobacco Use    Smoking status: Some Days     Current packs/day: 0.50     Average packs/day: 0.5 packs/day for 50.0 years (25.0 ttl pk-yrs)     Types: Cigarettes     Passive exposure: Never    Smokeless tobacco: Never   Vaping Use    Vaping status: Former   Substance Use Topics    Alcohol use: Not Currently     Comment: None in over 50 yrs    Drug use: No       Physical Exam  Physical Exam  Vitals reviewed.   Constitutional:       General: He is not in acute distress.     Appearance: He is well-developed. He is not toxic-appearing or diaphoretic.   HENT:      Head: Normocephalic and atraumatic.      Right Ear: External ear normal.      Left Ear: External ear normal.      Nose: Nose normal.      Mouth/Throat:      Pharynx: Oropharynx is clear.   Eyes:      Extraocular Movements: Extraocular movements intact.      Pupils: Pupils are equal, round, and reactive to light.   Cardiovascular:      Rate and Rhythm: Normal rate and  regular rhythm.      Heart sounds: Normal heart sounds.   Pulmonary:      Effort: Pulmonary effort is normal. No respiratory distress.      Breath sounds: Normal breath sounds.   Abdominal:      General: Bowel sounds are normal. There is no distension.      Palpations: Abdomen is soft.      Tenderness: There is no abdominal tenderness.      Comments: Suprapubic in place   Musculoskeletal:         General: Normal range of motion.      Cervical back: Normal range of motion and neck supple.      Right lower leg: No edema.      Left lower leg: No edema.   Skin:     General: Skin is warm and dry.      Capillary Refill: Capillary refill takes less than 2 seconds.   Neurological:      General: No focal deficit present.      Mental Status: He is alert and oriented to person, place, and time.   Psychiatric:         Speech: Speech normal.         Behavior: Behavior is cooperative.         Vital Signs  ED Triage Vitals [05/22/24 1801]   Temperature Pulse Respirations Blood Pressure SpO2   100.2 °F (37.9 °C) 104 22 129/73 93 %      Temp Source Heart Rate Source Patient Position - Orthostatic VS BP Location FiO2 (%)   Tympanic Monitor Sitting Right arm --      Pain Score       10 - Worst Possible Pain           Vitals:    05/22/24 1954 05/22/24 1955 05/22/24 2228 05/22/24 2229   BP: 128/77  110/71 110/71   Pulse: 79  68 70   Patient Position - Orthostatic VS:  Sitting           Visual Acuity      ED Medications  Medications   fluticasone-vilanterol 200-25 mcg/actuation 1 puff (has no administration in time range)     And   umeclidinium 62.5 mcg/actuation inhaler AEPB 1 puff (has no administration in time range)   metoprolol tartrate (LOPRESSOR) partial tablet 12.5 mg (12.5 mg Oral Given 5/22/24 2016)   polyethylene glycol (MIRALAX) packet 17 g (has no administration in time range)   sodium chloride 0.9 % infusion (50 mL/hr Intravenous New Bag 5/22/24 2016)   acetaminophen (TYLENOL) tablet 650 mg (has no administration in time  range)   ondansetron (ZOFRAN) injection 4 mg (has no administration in time range)   nicotine (NICODERM CQ) 14 mg/24hr TD 24 hr patch 1 patch (has no administration in time range)   cefepime (MAXIPIME) IVPB (premix in dextrose) 1,000 mg 50 mL (has no administration in time range)   saccharomyces boulardii (FLORASTOR) capsule 250 mg (250 mg Oral Given 5/22/24 2016)   albuterol inhalation solution 2.5 mg (has no administration in time range)   traMADol (ULTRAM) tablet 25 mg (25 mg Oral Given 5/22/24 2206)   HYDROmorphone HCl (DILAUDID) injection 0.2 mg (has no administration in time range)   cefepime (MAXIPIME) IVPB (premix in dextrose) 1,000 mg 50 mL (0 mg Intravenous Stopped 5/22/24 1928)   morphine injection 4 mg (4 mg Intravenous Given 5/22/24 1850)       Diagnostic Studies  Results Reviewed       Procedure Component Value Units Date/Time    Procalcitonin [[id number]]  (Normal) Collected: 05/22/24 1820    Lab Status: Final result Specimen: Blood from Arm, Left Updated: 05/22/24 2018     Procalcitonin 0.09 ng/ml     Magnesium [[id number]]  (Normal) Collected: 05/22/24 1820    Lab Status: Final result Specimen: Blood from Arm, Left Updated: 05/22/24 1953     Magnesium 1.9 mg/dL     Hemoglobin A1C [[id number]] Collected: 05/22/24 1820    Lab Status: In process Specimen: Blood from Arm, Left Updated: 05/22/24 1941    UA w Reflex to Microscopic w Reflex to Culture [[id number]]     Lab Status: No result Specimen: Urine     Comprehensive metabolic panel [[id number]]  (Abnormal) Collected: 05/22/24 1820    Lab Status: Final result Specimen: Blood from Arm, Left Updated: 05/22/24 1844     Sodium 138 mmol/L      Potassium 4.3 mmol/L      Chloride 105 mmol/L      CO2 24 mmol/L      ANION GAP 9 mmol/L      BUN 36 mg/dL      Creatinine 1.66 mg/dL      Glucose 195 mg/dL      Calcium 9.4 mg/dL      AST 17 U/L      ALT 19 U/L      Alkaline Phosphatase 74 U/L      Total Protein 7.4 g/dL      Albumin 3.9 g/dL      Total Bilirubin 0.48  mg/dL      eGFR 38 ml/min/1.73sq m     Narrative:      National Kid[employer] guidelines for Chronic Kidney Disease (CKD):     Stage 1 with normal or high GFR (GFR > 90 mL/min/1.73 square meters)    Stage 2 Mild CKD (GFR = 60-89 mL/min/1.73 square meters)    Stage 3A Moderate CKD (GFR = 45-59 mL/min/1.73 square meters)    Stage 3B Moderate CKD (GFR = 30-44 mL/min/1.73 square meters)    Stage 4 Severe CKD (GFR = 15-29 mL/min/1.73 square meters)    Stage 5 End Stage CKD (GFR <15 mL/min/1.73 square meters)  Note: GFR calculation is accurate only with a steady state creatinine    CBC and differential [[id number]]  (Abnormal) Collected: 05/22/24 1820    Lab Status: Final result Specimen: Blood from Arm, Left Updated: 05/22/24 1824     WBC 13.07 Thousand/uL      RBC 4.78 Million/uL      Hemoglobin 14.0 g/dL      Hematocrit 42.3 %      MCV 89 fL      MCH 29.3 pg      MCHC 33.1 g/dL      RDW 15.3 %      MPV 9.3 fL      Platelets 261 Thousands/uL      nRBC 0 /100 WBCs      Segmented % 77 %      Immature Grans % 1 %      Lymphocytes % 12 %      Monocytes % 7 %      Eosinophils Relative 3 %      Basophils Relative 0 %      Absolute Neutrophils 10.05 Thousands/µL      Absolute Immature Grans 0.06 Thousand/uL      Absolute Lymphocytes 1.58 Thousands/µL      Absolute Monocytes 0.94 Thousand/µL      Eosinophils Absolute 0.39 Thousand/µL      Basophils Absolute 0.05 Thousands/µL     Urine culture [[id number]]     Lab Status: No result Specimen: Urine                    No orders to display              Procedures  Procedures         ED Course  ED Course as of 05/22/24 2232   Wed May 22, 2024   1833 WBC(!): 13.07  Mildly elevated. Non-diagnostic.    1844 Creatinine(!): 1.66  Increased from most recent prior but not meeting criteria for SHANTI.    1844 Comprehensive metabolic panel(!)  Reviewed and without actionable derangement.    1847 [facility] contacted for admission.    1854 Antario at bedside.                                 SBIRT 22yo+      Flowsheet Row Most Recent Value   Initial Alcohol Screen: US AUDIT-C     1. How often do you have a drink containing alcohol? 0 Filed at: 05/22/2024 1912   2. How many drinks containing alcohol do you have on a typical day you are drinking?  0 Filed at: 05/22/2024 1912   3a. Male UNDER 65: How often do you have five or more drinks on one occasion? 0 Filed at: 05/22/2024 1912   3b. FEMALE Any Age, or MALE 65+: How often do you have 4 or more drinks on one occassion? 0 Filed at: 05/22/2024 1912   Audit-C Score 0 Filed at: 05/22/2024 1912   NATALI: How many times in the past year have you...    Used an illegal drug or used a prescription medication for non-medical reasons? Never Filed at: 05/22/2024 1912                      Medical Decision Making  Pt is a 77yo M who presents with urinary issues.     Will plan for labs, antibiotics, and admission. See ED course for results and details.    Plan to admit pt to [facility]. Pt discussed with admitting team and admission orders placed. Pt admitted without incident.       Amount and/or Complexity of Data Reviewed  Labs: ordered. Decision-making details documented in ED Course.    Risk  Prescription drug management.  Decision regarding hospitalization.             Disposition  Final diagnoses:   Radiation cystitis   Urinary retention   Pre-op examination     Time reflects when diagnosis was documented in both MDM as applicable and the Disposition within this note       Time User Action Codes Description Comment    5/22/2024  6:48 PM Nora Sena Add [N30.40] Radiation cystitis     5/22/2024  6:49 PM Nora Sena Add [R33.9] Urinary retention     5/22/2024  6:58 PM Nora Sena Add [Z01.818] Pre-op examination     5/22/2024  7:37 PM Yovani Khan Add [R31.0] Gross hematuria           ED Disposition       ED Disposition   Admit    Condition   Stable    Date/Time   Wed May 22, 2024 6836    Comment   Case was discussed with  [facility] and the patient's admission status was agreed to be Admission Status: observation status to the service of Dr. Churchill.               Follow-up Information    None         Current Discharge Medication List        CONTINUE these medications which have NOT CHANGED    Details   fluticasone-umeclidinium-vilanterol (Trelegy Ellipta) 200-62.5-25 mcg/actuation AEPB inhaler Inhale 1 puff every morning      metoprolol tartrate (LOPRESSOR) 25 mg tablet Take 12.5 mg by mouth every 12 (twelve) hours      polyethylene glycol (MIRALAX) 17 g packet Take 17 g by mouth daily as needed (constipation)    Associated Diagnoses: Constipation             No discharge procedures on file.    PDMP Review         Value Time User    PDMP Reviewed  Yes 5/14/2024  4:31 PM Janny Marques DO            ED Provider  Electronically Signed by             Nora Sena MD  05/22/24 1553

## 2024-05-22 NOTE — QUICK NOTE
Patient seen in my office earlier today history of radiation cystitis small capacity bladder severe bladder instability necrotic prostate neck chronic bleeding 16 [place] SP tube placed by IR few weeks ago undergoing aggressive cystoscopy fulguration resection necrotic bladder neck 4 weeks ago sent home with indwelling 22 [place] three-way Sumner catheter seen at [facility] urology yesterday and to be scheduled for elective cystectomy once cardiac clearance patient seen in the office today with penile catheter obstructed unable to irrigate suprapubic tube minimally irrigated penile Sumner removed with bleeding noted  Did not attempt to replace catheter in light of severe pain penile bleeding patient to be admitted for antibiotic therapy and scheduled n.p.o. after midnight to undergo cystoscopy fulguration bleeding bladder neck placement of Sumner catheter  Cardiac clearance for general anesthesia  Patient to be scheduled at [facility] for elective cystectomy once cardiac clearance has been obtained hopefully after about a week or

## 2024-05-22 NOTE — ED NOTES
Leg bag removed and new leg bag applied. Old leg bag sealed in bag and at bedside with pt's label on it per Dr. Sena's request.     Mae Hedrick RN  05/22/24 6785

## 2024-05-22 NOTE — ASSESSMENT & PLAN NOTE
Baseline creatinine appears to be around 1.3's  Creatinine today 1.66.  Slightly elevated  Try gentle IV hydration  Repeat BMP in the morning

## 2024-05-22 NOTE — ASSESSMENT & PLAN NOTE
As above, present on admission  Given cefepime in ED for possible UTI.  Will continue.  Follow-up UA and urine culture  Check procalcitonin  Repeat CBC with differential in the morning

## 2024-05-23 ENCOUNTER — ANESTHESIA (OUTPATIENT)
Dept: PERIOP | Facility: HOSPITAL | Age: 79
DRG: 653 | End: 2024-05-23
Payer: COMMERCIAL

## 2024-05-23 ENCOUNTER — ANESTHESIA EVENT (OUTPATIENT)
Dept: PERIOP | Facility: HOSPITAL | Age: 79
DRG: 653 | End: 2024-05-23
Payer: COMMERCIAL

## 2024-05-23 ENCOUNTER — APPOINTMENT (OUTPATIENT)
Dept: RADIOLOGY | Facility: HOSPITAL | Age: 79
DRG: 653 | End: 2024-05-23
Payer: COMMERCIAL

## 2024-05-23 PROBLEM — A41.9 SEPSIS (HCC): Status: ACTIVE | Noted: 2024-04-24

## 2024-05-23 LAB
ANION GAP SERPL CALCULATED.3IONS-SCNC: 6 MMOL/L (ref 4–13)
BASOPHILS # BLD AUTO: 0.03 THOUSANDS/ÂΜL (ref 0–0.1)
BASOPHILS NFR BLD AUTO: 0 % (ref 0–1)
BUN SERPL-MCNC: 35 MG/DL (ref 5–25)
CALCIUM SERPL-MCNC: 8.9 MG/DL (ref 8.4–10.2)
CHLORIDE SERPL-SCNC: 107 MMOL/L (ref 96–108)
CO2 SERPL-SCNC: 26 MMOL/L (ref 21–32)
CREAT SERPL-MCNC: 1.41 MG/DL (ref 0.6–1.3)
EOSINOPHIL # BLD AUTO: 0.52 THOUSAND/ÂΜL (ref 0–0.61)
EOSINOPHIL NFR BLD AUTO: 6 % (ref 0–6)
ERYTHROCYTE [DISTWIDTH] IN BLOOD BY AUTOMATED COUNT: 15.4 % (ref 11.6–15.1)
EST. AVERAGE GLUCOSE BLD GHB EST-MCNC: 143 MG/DL
GFR SERPL CREATININE-BSD FRML MDRD: 46 ML/MIN/1.73SQ M
GLUCOSE P FAST SERPL-MCNC: 115 MG/DL (ref 65–99)
GLUCOSE SERPL-MCNC: 107 MG/DL (ref 65–140)
GLUCOSE SERPL-MCNC: 115 MG/DL (ref 65–140)
GLUCOSE SERPL-MCNC: 128 MG/DL (ref 65–140)
GLUCOSE SERPL-MCNC: 288 MG/DL (ref 65–140)
HBA1C MFR BLD: 6.6 %
HCT VFR BLD AUTO: 39.4 % (ref 36.5–49.3)
HGB BLD-MCNC: 12.6 G/DL (ref 12–17)
IMM GRANULOCYTES # BLD AUTO: 0.08 THOUSAND/UL (ref 0–0.2)
IMM GRANULOCYTES NFR BLD AUTO: 1 % (ref 0–2)
LYMPHOCYTES # BLD AUTO: 1.35 THOUSANDS/ÂΜL (ref 0.6–4.47)
LYMPHOCYTES NFR BLD AUTO: 14 % (ref 14–44)
MAGNESIUM SERPL-MCNC: 2 MG/DL (ref 1.9–2.7)
MCH RBC QN AUTO: 28.7 PG (ref 26.8–34.3)
MCHC RBC AUTO-ENTMCNC: 32 G/DL (ref 31.4–37.4)
MCV RBC AUTO: 90 FL (ref 82–98)
MONOCYTES # BLD AUTO: 0.9 THOUSAND/ÂΜL (ref 0.17–1.22)
MONOCYTES NFR BLD AUTO: 10 % (ref 4–12)
NEUTROPHILS # BLD AUTO: 6.64 THOUSANDS/ÂΜL (ref 1.85–7.62)
NEUTS SEG NFR BLD AUTO: 69 % (ref 43–75)
NRBC BLD AUTO-RTO: 0 /100 WBCS
PLATELET # BLD AUTO: 222 THOUSANDS/UL (ref 149–390)
PMV BLD AUTO: 9.5 FL (ref 8.9–12.7)
POTASSIUM SERPL-SCNC: 4.3 MMOL/L (ref 3.5–5.3)
PROCALCITONIN SERPL-MCNC: 0.08 NG/ML
RBC # BLD AUTO: 4.39 MILLION/UL (ref 3.88–5.62)
SODIUM SERPL-SCNC: 139 MMOL/L (ref 135–147)
WBC # BLD AUTO: 9.52 THOUSAND/UL (ref 4.31–10.16)

## 2024-05-23 PROCEDURE — 82948 REAGENT STRIP/BLOOD GLUCOSE: CPT

## 2024-05-23 PROCEDURE — 83735 ASSAY OF MAGNESIUM: CPT | Performed by: NURSE PRACTITIONER

## 2024-05-23 PROCEDURE — 0TTC8ZZ RESECTION OF BLADDER NECK, VIA NATURAL OR ARTIFICIAL OPENING ENDOSCOPIC: ICD-10-PCS | Performed by: SPECIALIST

## 2024-05-23 PROCEDURE — BT1B1ZZ FLUOROSCOPY OF BLADDER AND URETHRA USING LOW OSMOLAR CONTRAST: ICD-10-PCS | Performed by: SPECIALIST

## 2024-05-23 PROCEDURE — 85025 COMPLETE CBC W/AUTO DIFF WBC: CPT | Performed by: NURSE PRACTITIONER

## 2024-05-23 PROCEDURE — 74430 CONTRAST X-RAY BLADDER: CPT

## 2024-05-23 PROCEDURE — 93005 ELECTROCARDIOGRAM TRACING: CPT

## 2024-05-23 PROCEDURE — 84145 PROCALCITONIN (PCT): CPT | Performed by: NURSE PRACTITIONER

## 2024-05-23 PROCEDURE — 99223 1ST HOSP IP/OBS HIGH 75: CPT | Performed by: INTERNAL MEDICINE

## 2024-05-23 PROCEDURE — 99232 SBSQ HOSP IP/OBS MODERATE 35: CPT | Performed by: STUDENT IN AN ORGANIZED HEALTH CARE EDUCATION/TRAINING PROGRAM

## 2024-05-23 PROCEDURE — 80048 BASIC METABOLIC PNL TOTAL CA: CPT | Performed by: NURSE PRACTITIONER

## 2024-05-23 PROCEDURE — 99223 1ST HOSP IP/OBS HIGH 75: CPT | Performed by: STUDENT IN AN ORGANIZED HEALTH CARE EDUCATION/TRAINING PROGRAM

## 2024-05-23 RX ORDER — PROPOFOL 10 MG/ML
INJECTION, EMULSION INTRAVENOUS AS NEEDED
Status: DISCONTINUED | OUTPATIENT
Start: 2024-05-23 | End: 2024-05-23

## 2024-05-23 RX ORDER — SODIUM CHLORIDE, SODIUM LACTATE, POTASSIUM CHLORIDE, CALCIUM CHLORIDE 600; 310; 30; 20 MG/100ML; MG/100ML; MG/100ML; MG/100ML
INJECTION, SOLUTION INTRAVENOUS CONTINUOUS PRN
Status: DISCONTINUED | OUTPATIENT
Start: 2024-05-23 | End: 2024-05-23

## 2024-05-23 RX ORDER — MAGNESIUM HYDROXIDE/ALUMINUM HYDROXICE/SIMETHICONE 120; 1200; 1200 MG/30ML; MG/30ML; MG/30ML
30 SUSPENSION ORAL EVERY 6 HOURS PRN
Status: DISCONTINUED | OUTPATIENT
Start: 2024-05-23 | End: 2024-05-25 | Stop reason: HOSPADM

## 2024-05-23 RX ORDER — SODIUM CHLORIDE, SODIUM LACTATE, POTASSIUM CHLORIDE, CALCIUM CHLORIDE 600; 310; 30; 20 MG/100ML; MG/100ML; MG/100ML; MG/100ML
100 INJECTION, SOLUTION INTRAVENOUS CONTINUOUS
Status: DISCONTINUED | OUTPATIENT
Start: 2024-05-23 | End: 2024-05-24

## 2024-05-23 RX ORDER — MAGNESIUM HYDROXIDE 1200 MG/15ML
LIQUID ORAL AS NEEDED
Status: DISCONTINUED | OUTPATIENT
Start: 2024-05-23 | End: 2024-05-23 | Stop reason: HOSPADM

## 2024-05-23 RX ORDER — GLYCINE 1.5 G/100ML
SOLUTION IRRIGATION AS NEEDED
Status: DISCONTINUED | OUTPATIENT
Start: 2024-05-23 | End: 2024-05-23 | Stop reason: HOSPADM

## 2024-05-23 RX ORDER — LIDOCAINE HYDROCHLORIDE 10 MG/ML
INJECTION, SOLUTION EPIDURAL; INFILTRATION; INTRACAUDAL; PERINEURAL AS NEEDED
Status: DISCONTINUED | OUTPATIENT
Start: 2024-05-23 | End: 2024-05-23

## 2024-05-23 RX ORDER — EPHEDRINE SULFATE 50 MG/ML
INJECTION INTRAVENOUS AS NEEDED
Status: DISCONTINUED | OUTPATIENT
Start: 2024-05-23 | End: 2024-05-23

## 2024-05-23 RX ORDER — FENTANYL CITRATE 50 UG/ML
INJECTION, SOLUTION INTRAMUSCULAR; INTRAVENOUS AS NEEDED
Status: DISCONTINUED | OUTPATIENT
Start: 2024-05-23 | End: 2024-05-23

## 2024-05-23 RX ORDER — FENTANYL CITRATE/PF 50 MCG/ML
50 SYRINGE (ML) INJECTION
Status: DISCONTINUED | OUTPATIENT
Start: 2024-05-23 | End: 2024-05-23 | Stop reason: HOSPADM

## 2024-05-23 RX ORDER — INSULIN LISPRO 100 [IU]/ML
1-5 INJECTION, SOLUTION INTRAVENOUS; SUBCUTANEOUS
Status: DISCONTINUED | OUTPATIENT
Start: 2024-05-23 | End: 2024-05-25 | Stop reason: HOSPADM

## 2024-05-23 RX ORDER — ONDANSETRON 2 MG/ML
4 INJECTION INTRAMUSCULAR; INTRAVENOUS ONCE AS NEEDED
Status: DISCONTINUED | OUTPATIENT
Start: 2024-05-23 | End: 2024-05-23 | Stop reason: HOSPADM

## 2024-05-23 RX ORDER — DEXAMETHASONE SODIUM PHOSPHATE 4 MG/ML
INJECTION, SOLUTION INTRA-ARTICULAR; INTRALESIONAL; INTRAMUSCULAR; INTRAVENOUS; SOFT TISSUE AS NEEDED
Status: DISCONTINUED | OUTPATIENT
Start: 2024-05-23 | End: 2024-05-23

## 2024-05-23 RX ORDER — ONDANSETRON 2 MG/ML
INJECTION INTRAMUSCULAR; INTRAVENOUS AS NEEDED
Status: DISCONTINUED | OUTPATIENT
Start: 2024-05-23 | End: 2024-05-23

## 2024-05-23 RX ORDER — CEFEPIME HYDROCHLORIDE 1 G/50ML
1000 INJECTION, SOLUTION INTRAVENOUS EVERY 12 HOURS
Status: DISCONTINUED | OUTPATIENT
Start: 2024-05-24 | End: 2024-05-25 | Stop reason: HOSPADM

## 2024-05-23 RX ADMIN — DEXAMETHASONE SODIUM PHOSPHATE 4 MG: 4 INJECTION, SOLUTION INTRA-ARTICULAR; INTRALESIONAL; INTRAMUSCULAR; INTRAVENOUS; SOFT TISSUE at 14:48

## 2024-05-23 RX ADMIN — SODIUM CHLORIDE, SODIUM LACTATE, POTASSIUM CHLORIDE, AND CALCIUM CHLORIDE: .6; .31; .03; .02 INJECTION, SOLUTION INTRAVENOUS at 14:33

## 2024-05-23 RX ADMIN — UMECLIDINIUM 1 PUFF: 62.5 AEROSOL, POWDER ORAL at 10:36

## 2024-05-23 RX ADMIN — Medication 12.5 MG: at 10:35

## 2024-05-23 RX ADMIN — CEFEPIME HYDROCHLORIDE 1000 MG: 1 INJECTION, SOLUTION INTRAVENOUS at 05:31

## 2024-05-23 RX ADMIN — LIDOCAINE HYDROCHLORIDE 50 MG: 10 INJECTION, SOLUTION EPIDURAL; INFILTRATION; INTRACAUDAL at 14:37

## 2024-05-23 RX ADMIN — SODIUM CHLORIDE, SODIUM LACTATE, POTASSIUM CHLORIDE, AND CALCIUM CHLORIDE 100 ML/HR: .6; .31; .03; .02 INJECTION, SOLUTION INTRAVENOUS at 18:15

## 2024-05-23 RX ADMIN — NICOTINE 1 PATCH: 14 PATCH, EXTENDED RELEASE TRANSDERMAL at 10:35

## 2024-05-23 RX ADMIN — EPHEDRINE SULFATE 10 MG: 50 INJECTION, SOLUTION INTRAVENOUS at 14:46

## 2024-05-23 RX ADMIN — FLUTICASONE FUROATE AND VILANTEROL TRIFENATATE 1 PUFF: 200; 25 POWDER RESPIRATORY (INHALATION) at 10:36

## 2024-05-23 RX ADMIN — PROPOFOL 150 MG: 10 INJECTION, EMULSION INTRAVENOUS at 14:37

## 2024-05-23 RX ADMIN — Medication 250 MG: at 18:19

## 2024-05-23 RX ADMIN — CEFEPIME HYDROCHLORIDE 1000 MG: 1 INJECTION, SOLUTION INTRAVENOUS at 14:39

## 2024-05-23 RX ADMIN — FENTANYL CITRATE 50 MCG: 50 INJECTION INTRAMUSCULAR; INTRAVENOUS at 16:00

## 2024-05-23 RX ADMIN — Medication 250 MG: at 10:35

## 2024-05-23 RX ADMIN — ONDANSETRON 4 MG: 2 INJECTION INTRAMUSCULAR; INTRAVENOUS at 14:48

## 2024-05-23 RX ADMIN — FENTANYL CITRATE 50 MCG: 50 INJECTION INTRAMUSCULAR; INTRAVENOUS at 14:41

## 2024-05-23 RX ADMIN — TRAMADOL HYDROCHLORIDE 25 MG: 50 TABLET, COATED ORAL at 23:48

## 2024-05-23 NOTE — CONSULTS
Consultation - Pulmonary Medicine   Joel Wyman 78 y.o. male MRN: [id number]  Unit/Bed#: [address] [place] [address] Encounter: [id number]      Assessment/Plan:    Complicated UTI with urinary retention and history of prostate cancer status post radical prostatectomy and radiation therapy   Urology is consulted with plans for cystoscopy.   Continuing on cefepime.   Management per primary team and urology.    Preoperative pulmonary evaluation   ARISCAT score for in-hospital postoperative pulmonary complications including respiratory failure, respiratory infection, pleural effusion, atelectasis, pneumothorax, bronchospasm, and aspiration pneumonitis was 11 points or 1.  6% risk, which is low.  We discussed risk today and this is not prohibitive of any necessary procedure at this time.  His respiratory status is at baseline.  Will continue management of COPD as below.    Severe COPD without acute exacerbation   Maintains on Trelegy with albuterol as needed at home.   Can continue on Breo and Incruse with albuterol as needed while inpatient.    Nicotine dependence   Recommend complete cessation.    Discussed with primary team.    History of Present Illness   Physician Requesting Consult: Rufino Churchill MD  Reason for Consult / Principal Problem: COPD, preop evaluation  Hx and PE limited by: None  Chief Complaint: Urinary retention  HPI: Joel Wyman is a 78 y.o.  male who presented to [facility] with complaints of urinary retention.  Joel presented to the emergency department because his Sumner catheter and suprapubic catheter stopped draining.  He saw his urologist and was sent to the emergency department for evaluation and admission for cystoscopy.  Pulmonary consultation was placed today for preoperative evaluation.  Joel reports that he has a diagnosis of COPD given by his PCP.  He maintains on Trelegy and uses albuterol 1-2 times weekly, mostly for symptoms of cough with difficulty expectorating  mucus.  He is fairly active and has dyspnea with climbing a flight of stairs or carrying something heavy, but otherwise is quite independent with his ADLs.  He feels his breathing is at baseline and has no concerns for increased shortness of breath, wheezing, or cough.    Inpatient consult to Pulmonology  Consult performed by: CORWIN Valentin  Consult ordered by: Rufino Churchill MD        Review of Systems   All other systems reviewed and are negative.  A full 12-point review of systems was completed and is negative except for those outlined in the HPI.    Historical Information   Past Medical History:   Diagnosis Date    Bladder infection 03/2024    Borderline diabetes     Cancer (HCC)     prostate    COPD (chronic obstructive pulmonary disease) (HCC)     Hematuria     History of transfusion     x2    Hyperlipidemia     Hypertension     Radiation cystitis     Smoker     Wears glasses      Past Surgical History:   Procedure Laterality Date    APPENDECTOMY      FL CYSTOGRAM  4/24/2024    FL RETROGRADE PYELOGRAM  04/14/2021    FL RETROGRADE PYELOGRAM  11/16/2023    FL RETROGRADE PYELOGRAM  4/11/2024    HERNIA REPAIR      IR EMBOLIZATION (SPECIFY VESSEL OR SITE)  09/17/2021    IR NEPHROSTOMY TUBE CHECK AND/OR REMOVAL  07/08/2021    IR NEPHROSTOMY TUBE CHECK/CHANGE/REPOSITION/REINSERTION/UPSIZE  05/10/2021    IR NEPHROSTOMY TUBE CHECK/CHANGE/REPOSITION/REINSERTION/UPSIZE  08/04/2021    IR NEPHROSTOMY TUBE CHECK/CHANGE/REPOSITION/REINSERTION/UPSIZE  10/13/2021    IR NEPHROSTOMY TUBE PLACEMENT  05/07/2021    IR SUPRAPUBIC CATHETER PLACEMENT  5/3/2024    TN CYSTO BLADDER W/URETERAL CATHETERIZATION Bilateral 4/11/2024    Procedure: BILATERAL CYSTOSCOPY WITH RETROGRADE PYELOGRAM, BLADDER NECK CONTRACTURE;  Surgeon: Yovani Khan MD;  Location: [facility];  Service: Urology    TN CYSTO W/IRRIG & EVAC MULTPLE OBSTRUCTING CLOTS Bilateral 04/14/2021    Procedure: CYSTOSCOPY EVACUATION OF CLOTS bilateral retrograde  pyelograms possible TURBT bladder biopsy;  Surgeon: Yovani Khan MD;  Location: [facility];  Service: Urology    UT CYSTO W/IRRIG & EVAC MULTPLE OBSTRUCTING CLOTS N/A 04/24/2021    Procedure: CYSTOSCOPY EVACUATION OF CLOTS fulguration;  Surgeon: Yovani Khan MD;  Location: [facility];  Service: Urology    UT CYSTO W/IRRIG & EVAC MULTPLE OBSTRUCTING CLOTS N/A 07/08/2021    Procedure: CYSTOSCOPY EVACUATION OF CLOTS BILATERAL ANTIROGRADES NEPHROSTOMY TUBES, FULGERATION ;  Surgeon: Yovani Khan MD;  Location: [facility];  Service: Urology    UT CYSTO W/IRRIG & EVAC MULTPLE OBSTRUCTING CLOTS N/A 08/22/2021    Procedure: CYSTOSCOPY, RIGHT RETROGRADE, EVACUATION OF CLOTS, BLADDER BIOPSY X2 AND FULGERATION OF BLADDER LESIONS, URETEROSCOPY, 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;  Surgeon: Yovani Khan MD;  Location: [facility];  Service: Urology    UT CYSTO W/IRRIG & EVAC MULTPLE OBSTRUCTING CLOTS Bilateral 4/24/2024    Procedure: CYSTOSCOPY EVACUATION OF CLOTS fulguration bladder neck tumors and biopsy, retrograde pyelograms, DVIU;  Surgeon: Yovani Khan MD;  Location: [facility];  Service: Urology    UT CYSTOURETHROSCOPY W/DEST &/RMVL MED BLADDER AMARI N/A 4/11/2024    Procedure: TRANSURETHRAL RESECTION OF BLADDER TUMOR (TURBT);  Surgeon: Yovani Khan MD;  Location: [facility];  Service: Urology    UT CYSTOURETHROSCOPY WITH BIOPSY N/A 11/16/2023    Procedure: CYSTOSCOPY, BILATERAL RETROGRADEY PYELOGRAM,  FULGERATION 2.0 CM BLADDER TUMOR WITH BIOPSY;  Surgeon: Yovani Khan MD;  Location: [facility];  Service: Urology    PROSTATECTOMY  2014    ROTATOR CUFF REPAIR      right shoulder     Social History   Social History     Substance and Sexual Activity   Alcohol Use Not Currently    Comment: None in over 50 yrs     Social History     Substance and Sexual Activity   Drug Use No     Social History     Tobacco Use   Smoking Status Some Days    Current packs/day:  0.50    Average packs/day: 0.5 packs/day for 50.0 years (25.0 ttl pk-yrs)    Types: Cigarettes    Passive exposure: Never   Smokeless Tobacco Never     E-Cigarette/Vaping    E-Cigarette Use Former User      E-Cigarette/Vaping Substances    Nicotine No     THC No     CBD No     Flavoring No     Other No     Unknown No      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    Family History:   Family History   Problem Relation Age of Onset    Diabetes Mother     Stroke Mother     Diabetes Brother     Stroke Brother        Meds/Allergies   all current active meds have been reviewed, pertinent pulmonary meds have been reviewed, current meds:   Current Facility-Administered Medications   Medication Dose Route Frequency    acetaminophen (TYLENOL) tablet 650 mg  650 mg Oral Q6H PRN    albuterol inhalation solution 2.5 mg  2.5 mg Nebulization Q6H PRN    cefepime (MAXIPIME) IVPB (premix in dextrose) 1,000 mg 50 mL  1,000 mg Intravenous Q12H    fluticasone-vilanterol 200-25 mcg/actuation 1 puff  1 puff Inhalation Daily    And    umeclidinium 62.5 mcg/actuation inhaler AEPB 1 puff  1 puff Inhalation Daily    HYDROmorphone HCl (DILAUDID) injection 0.2 mg  0.2 mg Intravenous Q4H PRN    insulin lispro (HumALOG/ADMELOG) 100 units/mL subcutaneous injection 1-5 Units  1-5 Units Subcutaneous TID AC    insulin lispro (HumALOG/ADMELOG) 100 units/mL subcutaneous injection 1-5 Units  1-5 Units Subcutaneous HS    metoprolol tartrate (LOPRESSOR) partial tablet 12.5 mg  12.5 mg Oral Q12H DHAVAL    nicotine (NICODERM CQ) 14 mg/24hr TD 24 hr patch 1 patch  1 patch Transdermal Daily    ondansetron (ZOFRAN) injection 4 mg  4 mg Intravenous Q6H PRN    polyethylene glycol (MIRALAX) packet 17 g  17 g Oral Daily PRN    saccharomyces boulardii (FLORASTOR) capsule 250 mg  250 mg Oral BID    sodium chloride 0.9 % infusion  50 mL/hr Intravenous Continuous    traMADol (ULTRAM) tablet 25 mg  25 mg Oral Q6H PRN   , and PTA meds:   Prior to  "Admission Medications   Prescriptions Last Dose Informant Patient Reported? Taking?   fluticasone-umeclidinium-vilanterol (Trelegy Ellipta) 200-62.5-25 mcg/actuation AEPB inhaler 5/22/2024  Yes Yes   Sig: Inhale 1 puff every morning   metoprolol tartrate (LOPRESSOR) 25 mg tablet 5/22/2024  Yes Yes   Sig: Take 12.5 mg by mouth every 12 (twelve) hours   polyethylene glycol (MIRALAX) 17 g packet   No No   Sig: Take 17 g by mouth daily as needed (constipation)      Facility-Administered Medications: None       No Known Allergies    Objective   Vitals: Blood pressure 122/64, pulse 66, temperature 98.3 °F (36.8 °C), resp. rate 16, height 5' 10\" (1.778 m), weight 94.3 kg (208 lb), SpO2 94%.  Room air,Body mass index is 29.84 kg/m².    Intake/Output Summary (Last 24 hours) at 5/23/2024 1203  Last data filed at 5/23/2024 0531  Gross per 24 hour   Intake 50 ml   Output 0 ml   Net 50 ml     Invasive Devices       Peripheral Intravenous Line  Duration             Peripheral IV 05/22/24 Left Forearm <1 day              Drain  Duration             Urethral Catheter Latex;Three way 22 Fr. 28 days    Continuous Bladder Irrigation Three-way 19 days    Suprapubic Catheter 16 Fr. 19 days    Continuous Bladder Irrigation 7 days                    Physical Exam  Vitals reviewed.   Constitutional:       General: He is not in acute distress.     Appearance: He is well-developed. He is not toxic-appearing or diaphoretic.   HENT:      Head: Normocephalic and atraumatic.   Eyes:      General: No scleral icterus.  Neck:      Trachea: No tracheal deviation.   Cardiovascular:      Rate and Rhythm: Normal rate and regular rhythm.      Heart sounds: S1 normal and S2 normal. No murmur heard.     No friction rub. No gallop.   Pulmonary:      Effort: Pulmonary effort is normal. No tachypnea, accessory muscle usage or respiratory distress.      Breath sounds: Normal breath sounds. No stridor. No decreased breath sounds, wheezing, rhonchi or rales. "   Chest:      Chest wall: No tenderness.   Abdominal:      General: Bowel sounds are normal. There is no distension.      Palpations: Abdomen is soft.      Tenderness: There is no abdominal tenderness.   Musculoskeletal:         General: No tenderness.      Cervical back: Neck supple.      Right lower leg: No edema.      Left lower leg: No edema.   Skin:     General: Skin is warm and dry.      Findings: No rash.   Neurological:      Mental Status: He is alert and oriented to person, place, and time.      GCS: GCS eye subscore is 4. GCS verbal subscore is 5. GCS motor subscore is 6.   Psychiatric:         Speech: Speech normal.         Behavior: Behavior normal. Behavior is cooperative.       Lab Results: CBC:   Lab Results   Component Value Date    WBC 9.52 05/23/2024    HGB 12.6 05/23/2024    HCT 39.4 05/23/2024    MCV 90 05/23/2024     05/23/2024    RBC 4.39 05/23/2024    MCH 28.7 05/23/2024    MCHC 32.0 05/23/2024    RDW 15.4 (H) 05/23/2024    MPV 9.5 05/23/2024    NRBC 0 05/23/2024   , CMP:   Lab Results   Component Value Date    SODIUM 139 05/23/2024    K 4.3 05/23/2024     05/23/2024    CO2 26 05/23/2024    BUN 35 (H) 05/23/2024    CREATININE 1.41 (H) 05/23/2024    CALCIUM 8.9 05/23/2024    AST 17 05/22/2024    ALT 19 05/22/2024    ALKPHOS 74 05/22/2024    EGFR 46 05/23/2024     Procalcitonin: 0.08, 0.09    Imaging Studies: I have personally reviewed pertinent reports.   and I have personally reviewed pertinent films in PACS   Last CT chest showed several tiny pulmonary nodules.  This was done in June 2023.    EKG, Pathology, and Other Studies: I have personally reviewed pertinent reports.     Echocardiogram from 2020 shows EF 55% with grade 1 diastolic dysfunction.  Peak PA pressure was 35 mmHg.    Pulmonary Results (PFTs, PSG): I have personally reviewed pertinent reports.     PFTs from September 2017 show FEV1/FVC ratio 47% with FEV1 of 1.03 L or 33% predicted with significant bronchodilator  "response and significant air trapping with residual volume 208% predicted and total lung capacity 118% predicted.  DLCO corrected for patient's hemoglobin was 30.    Code Status: Level 1 - Full Code    Portions of the record may have been created with voice recognition software.  Occasional wrong word or \"sound a like\" substitutions may have occurred due to the inherent limitations of voice recognition software.  Read the chart carefully and recognize, using context, where substitutions have occurred.  "

## 2024-05-23 NOTE — ANESTHESIA POSTPROCEDURE EVALUATION
Post-Op Assessment Note    CV Status:  Stable  Pain Score: 0    Pain management: adequate       Mental Status:  Arousable and sleepy   PONV Controlled:  None   Airway Patency:  Patent     Post Op Vitals Reviewed: Yes    No anethesia notable event occurred.    Staff: CRNA               BP      Temp      Pulse     Resp      SpO2

## 2024-05-23 NOTE — H&P
"[facility]  H&P  Name: Joel Wyman 78 y.o. male I MRN: [id number]  Unit/Bed#: 2 [address] Date of Admission: 5/22/2024   Date of Service: 5/22/2024 I Hospital Day: 0      Assessment & Plan   * Urinary retention  Assessment & Plan  Patient presents with issues with Sumner catheter and suprapubic catheter.  Reports Sumner catheter and suprapubic catheter stopped draining today, went to urology, Sumner was taken out and was sent to ED for admission and OR tomorrow.   History of prostate cancer, status post radical prostatectomy, status post radiation therapy for periprostatic recurrence.  History of bladder neck contracture, status post cystoscopy transurethral resection bladder neck contracture bladder tumor evacuation of clots on 4/11/2024.  Frequent hospitalization for hemorrhagic cystitis, hematuria, urinary retention.  Patient was discharged 4 days ago after being admitted for gross hematuria, required CBI. Urology is trying to contact [place] for cystectomy.  Suprapubic catheter in situ, not draining.  Per urology, no need for Sumner insertion tonight as patient \"leak\".  Patient denies suprapubic pain or discomfort  UA pending  Low-grade fever, tachycardic on ED arrival.  WBC 13, no bands.  Meets SIRS criteria.  See below  N.p.o. after midnight  Consult urology      SIRS (systemic inflammatory response syndrome) ([facility])  Assessment & Plan  As above, present on admission  Given cefepime in ED for possible UTI.  Will continue.  Follow-up UA and urine culture  Check procalcitonin  Repeat CBC with differential in the morning    Stage 3b chronic kidney disease ([facility])  Assessment & Plan  Baseline creatinine appears to be around 1.3's  Creatinine today 1.66.  Slightly elevated  Try gentle IV hydration  Repeat BMP in the morning    COPD (chronic obstructive pulmonary disease) ([facility])  Assessment & Plan  On Trelegy, albuterol inhaler as needed at home  Mild wheezing in the lower lobes  On room air, " satting low to mid 90s.  Substitute Trelegy with Breo and Incruse  Will order albuterol as needed    Borderline diabetes  Assessment & Plan  Glucose 195  Check A1c    Nicotine abuse  Assessment & Plan  Nicotine patch, smoking cessation    Prostate cancer (HCC) - s/p resection  Assessment & Plan  Noted history    Essential hypertension  Assessment & Plan  Continue metoprolol  BP well-controlled               VTE Prophylaxis: Pharmacologic VTE Prophylaxis contraindicated due to hematuria   / sequential compression device   Code Status: Full code  POLST: There is no POLST form on file for this patient (pre-hospital)    Anticipated Length of Stay:  Patient will be admitted on an Observation basis with an anticipated length of stay of  < 2 midnights.   Justification for Hospital Stay: Urinary retention        Total Time for Visit, including Counseling / Coordination of Care: 45 minutes.  Greater than 50% of this total time spent on direct patient counseling and coordination of care.    Chief Complaint:   Sumner catheter and suprapubic catheter stopped draining today    History of Present Illness:    Joel Wyman is a 78 y.o. male with PMH of prostate cancer, hypertension, urinary retention, radiation cystitis, CKD 3, nicotine abuse, COPD, borderline diabetes who presents with issues with Sumner catheter and suprapubic catheter.  Reports Sumner catheter and suprapubic catheter stopped draining today, went to urology, Sumner was taken out and was sent to ED for admission and OR tomorrow.  Patient denies suprapubic pain or discomfort currently.  Reports bleeding from urethra which is chronic.  Denies fever chills.  Denies chest pain, headache, dizziness, SOB, nausea vomiting diarrhea constipation.           Review of Systems:    Review of Systems   Genitourinary:         Sumner and S/P catheter stopped draining today   All other systems reviewed and are negative.      Past Medical and Surgical History:     Past Medical  History:   Diagnosis Date    Bladder infection 03/2024    Borderline diabetes     Cancer (HCC)     prostate    COPD (chronic obstructive pulmonary disease) (HCC)     Hematuria     History of transfusion     x2    Hyperlipidemia     Hypertension     Radiation cystitis     Smoker     Wears glasses        Past Surgical History:   Procedure Laterality Date    APPENDECTOMY      FL CYSTOGRAM  4/24/2024    FL RETROGRADE PYELOGRAM  04/14/2021    FL RETROGRADE PYELOGRAM  11/16/2023    FL RETROGRADE PYELOGRAM  4/11/2024    HERNIA REPAIR      IR EMBOLIZATION (SPECIFY VESSEL OR SITE)  09/17/2021    IR NEPHROSTOMY TUBE CHECK AND/OR REMOVAL  07/08/2021    IR NEPHROSTOMY TUBE CHECK/CHANGE/REPOSITION/REINSERTION/UPSIZE  05/10/2021    IR NEPHROSTOMY TUBE CHECK/CHANGE/REPOSITION/REINSERTION/UPSIZE  08/04/2021    IR NEPHROSTOMY TUBE CHECK/CHANGE/REPOSITION/REINSERTION/UPSIZE  10/13/2021    IR NEPHROSTOMY TUBE PLACEMENT  05/07/2021    IR SUPRAPUBIC CATHETER PLACEMENT  5/3/2024    OR CYSTO BLADDER W/URETERAL CATHETERIZATION Bilateral 4/11/2024    Procedure: BILATERAL CYSTOSCOPY WITH RETROGRADE PYELOGRAM, BLADDER NECK CONTRACTURE;  Surgeon: Yovani Khan MD;  Location: [facility];  Service: Urology    OR CYSTO W/IRRIG & EVAC MULTPLE OBSTRUCTING CLOTS Bilateral 04/14/2021    Procedure: CYSTOSCOPY EVACUATION OF CLOTS bilateral retrograde pyelograms possible TURBT bladder biopsy;  Surgeon: Yovani Khan MD;  Location: [facility];  Service: Urology    OR CYSTO W/IRRIG & EVAC MULTPLE OBSTRUCTING CLOTS N/A 04/24/2021    Procedure: CYSTOSCOPY EVACUATION OF CLOTS fulguration;  Surgeon: Yovani Khan MD;  Location: [facility];  Service: Urology    OR CYSTO W/IRRIG & EVAC MULTPLE OBSTRUCTING CLOTS N/A 07/08/2021    Procedure: CYSTOSCOPY EVACUATION OF CLOTS BILATERAL ANTIROGRADES NEPHROSTOMY TUBES, FULGERATION ;  Surgeon: Yovani Khan MD;  Location: [facility];  Service: Urology    OR CYSTO W/IRRIG & EVAC MULTPLE OBSTRUCTING CLOTS N/A  08/22/2021    Procedure: CYSTOSCOPY, RIGHT RETROGRADE, EVACUATION OF CLOTS, BLADDER BIOPSY X2 AND FULGERATION OF BLADDER LESIONS, URETEROSCOPY, 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;  Surgeon: Yovani Khan MD;  Location: [facility];  Service: Urology    [place] CYSTO W/IRRIG & EVAC MULTPLE OBSTRUCTING CLOTS Bilateral 4/24/2024    Procedure: CYSTOSCOPY EVACUATION OF CLOTS fulguration bladder neck tumors and biopsy, retrograde pyelograms, DVIU;  Surgeon: Yovani Khan MD;  Location: [facility];  Service: Urology    [place] CYSTOURETHROSCOPY W/DEST &/RMVL MED BLADDER AMARI N/A 4/11/2024    Procedure: TRANSURETHRAL RESECTION OF BLADDER TUMOR (TURBT);  Surgeon: Yovani Khan MD;  Location: [facility];  Service: Urology    [place] CYSTOURETHROSCOPY WITH BIOPSY N/A 11/16/2023    Procedure: CYSTOSCOPY, BILATERAL RETROGRADEY PYELOGRAM,  FULGERATION 2.0 CM BLADDER TUMOR WITH BIOPSY;  Surgeon: Yovani Khan MD;  Location: [facility];  Service: Urology    PROSTATECTOMY  2014    ROTATOR CUFF REPAIR      right shoulder       Meds/Allergies:    Prior to Admission medications    Medication Sig Start Date End Date Taking? Authorizing Provider   fluticasone-umeclidinium-vilanterol (Trelegy Ellipta) 200-62.5-25 mcg/actuation AEPB inhaler Inhale 1 puff every morning 6/27/23 6/26/24 Yes Historical Provider, MD   metoprolol tartrate (LOPRESSOR) 25 mg tablet Take 12.5 mg by mouth every 12 (twelve) hours   Yes Historical Provider, MD   cefuroxime (CEFTIN) 500 mg tablet Take 1 tablet (500 mg total) by mouth every 12 (twelve) hours for 7 days 5/20/24 5/22/24 Yes Kandace Blanton MD   polyethylene glycol (MIRALAX) 17 g packet Take 17 g by mouth daily as needed (constipation) 5/6/24   Janny Marques, DO   albuterol (PROVENTIL HFA,VENTOLIN HFA) 90 mcg/act inhaler Inhale 2 puffs every 4 (four) hours as needed for wheezing 8/31/17 5/22/24  Kelly Rivera, DO   fluticasone (FLONASE) 50 mcg/act nasal spray 1  spray into each nostril daily as needed  5/22/24  Historical Provider, MD   traMADol (ULTRAM) 50 mg tablet Take 1 tablet (50 mg total) by mouth every 8 (eight) hours as needed for moderate pain or severe pain for up to 10 doses  Patient not taking: Reported on 5/22/2024 5/6/24 5/22/24  Jnany Marques, DO     I have reviewed home medications with patient personally.    Allergies: No Known Allergies    Social History:     Marital Status: /Civil Union   Occupation: Retired  Patient Pre-hospital Living Situation: Wife   Patient Pre-hospital Level of Mobility: Independent  Patient Pre-hospital Diet Restrictions: Cardiac  Substance Use History:   Social History     Substance and Sexual Activity   Alcohol Use Not Currently    Comment: None in over 50 yrs     Social History     Tobacco Use   Smoking Status Some Days    Current packs/day: 0.50    Average packs/day: 0.5 packs/day for 50.0 years (25.0 ttl pk-yrs)    Types: Cigarettes    Passive exposure: Never   Smokeless Tobacco Never     Social History     Substance and Sexual Activity   Drug Use No       Family History:    non-contributory    Physical Exam:     Vitals:   Blood Pressure: 128/77 (05/22/24 1954)  Pulse: 79 (05/22/24 1954)  Temperature: 98.4 °F (36.9 °C) (05/22/24 1954)  Temp Source: Tympanic (05/22/24 1801)  Respirations: 17 (05/22/24 1954)  SpO2: 95 % (05/22/24 1954)    Physical Exam  Vitals and nursing note reviewed.   Constitutional:       Appearance: He is well-developed.      Comments: Appears overweight   HENT:      Head: Normocephalic and atraumatic.   Neck:      Thyroid: No thyromegaly.      Vascular: No JVD.      Trachea: No tracheal deviation.   Cardiovascular:      Rate and Rhythm: Normal rate and regular rhythm.      Heart sounds: Normal heart sounds.   Pulmonary:      Effort: Pulmonary effort is normal. No respiratory distress.      Breath sounds: Wheezing present. No rales.      Comments: Mild wheezing in lower lobes, on room air,  respiration easy  Abdominal:      General: Bowel sounds are normal. There is no distension.      Palpations: Abdomen is soft.      Tenderness: There is no abdominal tenderness. There is no guarding.   Genitourinary:     Comments: Suprapubic catheter in situ, not draining.  Musculoskeletal:         General: Normal range of motion.      Cervical back: Neck supple.      Right lower leg: No edema.      Left lower leg: No edema.   Skin:     General: Skin is warm and dry.   Neurological:      General: No focal deficit present.      Mental Status: He is alert and oriented to person, place, and time.   Psychiatric:         Mood and Affect: Mood and affect and mood normal.         Judgment: Judgment normal.      Comments: Patient tearful during exam, emotional.         Additional Data:     Lab Results: I have personally reviewed pertinent reports.      Results from last 7 days   Lab Units 05/22/24  1820   WBC Thousand/uL 13.07*   HEMOGLOBIN g/dL 14.0   HEMATOCRIT % 42.3   PLATELETS Thousands/uL 261   SEGS PCT % 77*   LYMPHO PCT % 12*   MONO PCT % 7   EOS PCT % 3     Results from last 7 days   Lab Units 05/22/24  1820   POTASSIUM mmol/L 4.3   CHLORIDE mmol/L 105   CO2 mmol/L 24   BUN mg/dL 36*   CREATININE mg/dL 1.66*   CALCIUM mg/dL 9.4   ALK PHOS U/L 74   ALT U/L 19   AST U/L 17           Imaging: I have personally reviewed pertinent reports.      CTA abdomen pelvis w wo contrast    Result Date: 5/14/2024  Narrative: CT ANGIOGRAM OF THE ABDOMEN AND PELVIS WITH AND WITHOUT IV CONTRAST INDICATION: Hematuria. COMPARISON: 5/1/2024. TECHNIQUE: CT angiogram examination of the abdomen and pelvis was performed according to standard protocol. This examination, like all CT scans performed in the [facility], was performed utilizing techniques to minimize radiation dose exposure, including the use of iterative reconstruction and automated exposure control. Contrast as well as noncontrast images were obtained. Rad dose  1450.99 mGy-cm IV Contrast: 100 mL of iohexol (OMNIPAQUE) Enteric Contrast: Not administered. FINDINGS: VASCULAR STRUCTURES: Calcified plaque throughout the abdominal aorta (aneurysm or dissection. Plaque throughout the common iliac arteries without significant stenosis. No significant stenosis in the right external iliac or common femoral arteries. Severe stenosis versus occlusion of the distal left external iliac artery. Normal perfusion of the left common femoral artery without significant stenosis. Patent bilateral internal iliac arteries. No significant stenosis at the origins of the mesenteric or renal arteries. OTHER FINDINGS ABDOMEN LOWER CHEST: Subsegmental atelectasis at the right lung base. Bullous emphysematous changes also noted at the right lung base. LIVER/BILIARY TREE: Unremarkable. GALLBLADDER: No calcified gallstones. No pericholecystic inflammatory change. SPLEEN: Unremarkable. PANCREAS: Unremarkable. ADRENAL GLANDS: Unremarkable. KIDNEYS/URETERS: Symmetric nephrographic phase enhancement of the kidneys. Mild fullness of the ureters without evidence of obstructive uropathy or ureteritis. STOMACH AND BOWEL: Windsock diverticulum. No evidence of duodenitis. No findings to indicate bowel obstruction, colitis or diverticulitis. APPENDIX: No findings to suggest appendicitis. ABDOMINOPELVIC CAVITY: No ascites. No pneumoperitoneum. No lymphadenopathy. PELVIS REPRODUCTIVE ORGANS: Post prostatectomy. URINARY BLADDER: Suprapubic Sumner catheter. Partially collapsed bladder. Significant circumferential bladder wall thickening and pericystic fat stranding. Evaluation is somewhat limited due to partial decompression of the bladder. ABDOMINAL WALL/INGUINAL REGIONS: Unremarkable. BONES: No acute fracture or suspicious osseous lesion.     Impression: 1. Suprapubic Sumner catheter balloon in expected position. Findings compatible with cystitis. No gross evidence of hematuria or hematoma. 2. No evidence of  pyelonephritis or obstructive uropathy. 3. No evidence of abdominal aortic aneurysm or dissection. No significant stenosis in the renal or mesenteric arteries. Workstation performed: [id number]     IR suprapubic catheter placement    Result Date: 5/3/2024  Narrative: Ultrasound and Fluoroscopically Guided Suprapubic Catheter Placement Preop diagnosis: Urethral stricture and bladder outlet obstruction Postop diagnosis: Same Surgeon: Doe Flood MD. Complications: None immediate Contrast: 10 mL of iohexol (OMNIPAQUE) Fluoro time: 2.7 min Number of Images: 6 Radiation Dose: 23 mGy Conscious sedation time: General anesthesia Operative note: The risks, benefits and alternatives of the procedure were explained to the patient, and written informed consent was then obtained.  The patient was brought to the interventional radiology suite and identified verbally and by wristband. Next, using ultrasound guidance a 19 gauge needle was placed from a suprapubic approach into the bladder with the needle seen in the bladder by ultrasound followed by placement of a wire.  A 16 Chadian Sumner catheter was back loaded onto an 8mm x 6cm angioplasty balloon.  The angioplasty balloon was inserted into the bladder over the wire and inflated.  The Sumner catheter was advanced into the bladder over the balloon while deflating the balloon.  The angioplasty balloon was removed and the Sumner catheter balloon was inflated with 10cc of sterile water and sutured to the skin.  Contrast was introduced through the suprapubic Sumner catheter confirming good catheter position in the bladder.     Impression: Impression: Successful placement of a 16 Chadian suprapubic Sumner [place] catheter. Plan:  Follow up with Urology. Workstation performed: [id number]     CT renal protocol    Result Date: 5/1/2024  Narrative: CT ABDOMEN AND PELVIS WITH AND WITHOUT IV CONTRAST INDICATION: gross hematuria. COMPARISON: Compared with 10/17/2023 TECHNIQUE: Initial CT  of the abdomen and pelvis was performed without intravenous contrast. Subsequent dynamic CT evaluation of the abdomen and pelvis was performed after the administration of intravenous contrast in both nephrographic and delayed phases. Multiplanar 2D reformatted images were created from the source data. This examination, like all CT scans performed in the [facility], was performed utilizing techniques to minimize radiation dose exposure, including the use of iterative reconstruction and automated exposure control. Radiation dose length product (DLP) for this visit: 2069 mGy-cm IV Contrast: 100 mL of iohexol (OMNIPAQUE) Enteric Contrast: Not administered. FINDINGS: ABDOMEN RIGHT KIDNEY AND URETER: No solid renal mass or detectable urothelial mass. Subcentimeter hypodensities unchanged from prior study of cysts. No hydronephrosis or hydroureter. No urinary tract calculi. No perinephric collection. LEFT KIDNEY AND URETER: No solid renal mass or detectable urothelial mass. Subcentimeter hypodensities suggesting cysts unchanged from prior study. No hydronephrosis or hydroureter. No urinary tract calculi. No perinephric collection. URINARY BLADDER: Bladder is decompressed with wall thickening and surrounding inflammatory changes with intraluminal Sumner balloon. Tiny air pockets. Trace contrast material from recent cystogram. LOWER CHEST: Subsegmental atelectasis in the lung bases. Nodule in the right lower lobe in series 2 image 10 measuring 4.7 mm compared to 2.2 mm. LIVER/BILIARY TREE: Subcentimeter hypoattenuating lesion(s), too small to characterize but statistically likely benign, which do not require follow-up (ACR White Paper 2017) are unchanged. No suspicious mass. Normal hepatic contours. No biliary dilation. GALLBLADDER: No calcified gallstones. No pericholecystic inflammatory change. SPLEEN: Unremarkable. PANCREAS: Unremarkable. ADRENAL GLANDS: Unremarkable. STOMACH AND BOWEL: Duodenal  diverticulum.. APPENDIX: Surgically absent. ABDOMINOPELVIC CAVITY: No ascites. No pneumoperitoneum. No lymphadenopathy. VESSELS: Infrarenal aortic aneurysmal dilatation measuring 3 cm and near the bifurcation measuring 3.5 cm.. PELVIS REPRODUCTIVE ORGANS: Post prostatectomy. ABDOMINAL WALL/INGUINAL REGIONS: Fat-containing right inguinal hernia.. BONES: Degenerative disc disease with loss of disc height predominantly at L2-L3, L4-L5 and L5-S1. Large anterior osteophytes at L2-L3 and L3-L4. No acute fracture or suspicious osseous lesion.     Impression: Decompressed bladder with diffuse wall thickening with surrounding inflammatory changes. Intraluminal Sumner balloon with small amount of contrast. Unremarkable renal collecting systems and ureters. Unchanged subcentimeter renal cysts bilaterally. Workstation performed: [id number]     FL cystogram    Result Date: 5/1/2024  Narrative: CYSTOGRAM INDICATION:  Gross hematuria. COMPARISON: CT abdomen/pelvis 10/17/2023 IMAGES: 3 (including dose summary data page) FLUOROSCOPY TIME:   14.7s Contrast: 20 mL Omnipaque 240 FINDINGS: Intraoperative cystogram was performed by urology. Small amount of contrast is seen outlining a collapsed bladder on image 2 with inflated Sumner catheter balloon. Assessment of the bladder is limited.   No vesicoureteral reflux identified. Surgical clips in the pelvis bilaterally. Osseous and soft tissue detail limited by technique.     Impression: Fluoroscopic guidance for intraoperative cystogram demonstrating Sumner catheter placement within the bladder. Please see procedure report for further details. Workstation performed: [id number]       EKG, Pathology, and Other Studies Reviewed on Admission:   EKG: NA    Allscripts Records Reviewed: Yes     ** Please Note: Dragon 360 Dictation voice to text software may have been used in the creation of this document. **

## 2024-05-23 NOTE — OP NOTE
OPERATIVE REPORT  PATIENT NAME: Joel Wyamn    :  1945  MRN: [id number]  Pt Location: [facility] OR ROOM 04    SURGERY DATE: 2024    Surgeons and Role:     * Yovani Khan MD - Primary    Preop Diagnosis:  Radiation cystitis [N30.40]  Urinary retention [R33.9]  Gross hematuria [R31.0]    Post-Op Diagnosis Codes:     * Radiation cystitis [N30.40]     * Urinary retention [R33.9]     * Gross hematuria [R31.0]    Procedure(s):  CYSTOSCOPY FULGERATION RESECTION BLADDER NECK. FLUORSCOPIC CYSTOGRAM. EVACUATION OF CLOTS    Specimen(s):  * No specimens in log *    Estimated Blood Loss:   Minimal    Drains:  Urethral Catheter Latex;Three way 22 Fr. (Active)   Number of days: 29       Urethral Catheter Three way 24 Fr. (Active)   Number of days: 0       Suprapubic Catheter 16 Fr. (Active)   Site Assessment Bleeding 24 2253   Dressing Status Open to air 24 2253   Collection Container Leg bag 24 2253   Output (mL) 0 mL 24 2253   Number of days: 20       Suprapubic Catheter (Active)   Site Assessment Clean;Intact 24 1455   Dressing Status Clean;Dry;Intact 24 1455   Irrigant Normal saline 24 1516   Number of days:        Continuous Bladder Irrigation Three-way (Active)   Number of days: 19       Continuous Bladder Irrigation (Active)   Number of days: 8       Anesthesia Type:   General/LMA    Operative Indications:  Radiation cystitis [N30.40]  Urinary retention [R33.9]  Gross hematuria [R31.0]  78-year-old with severe radiation cystitis necrotic tissue gross hematuria obstructing penile catheter removed yesterday in the office obstructing suprapubic tube unable to be irrigated placed only 3 weeks ago to go to the OR for exam under anesthesia possible change SP tube fulguration resection bladder neck lesion placement 24 cm three-way Sumner catheter    Operative Findings:  #1 necrotic losing bladder neck undergoing full resection fulguration no bleeding at this time Path not  sent  #2 fluoroscopic cystogram confirmed small capacity bladder less than 200 cc right reflux  #3 16 Chadian SP tube placed by interventional radiology 3 weeks ago unable to be removed due to severe encrustation at the balloon site attempts to remove could cause significant bladder bleeding from known radiation cystitis and left in situ patient scheduled for cystectomy next week at [facility] able to irrigate at this time leave open to bag drainage      Complications:   None    Procedure and Technique:  Patient identified in holding area consent signed placed in the OR suite.  After anesthesia induced placed in the thigh position draped and prepped in standard fashion.  Timeout performed.  A 22 Chadian cystoscope with 30 lens was passed in the anterior to posterior urethra anteriorly confirmed no abnormalities posterior urethra confirmed necrotic bladder neck with calcifications noted in the tissue the indwelling SP tube was easily found and appeared to be totally encrusted.  The balloon was taken down from the SP tube and the bladder failed attempts at removing the catheter were unsuccessful due to the severe encrustation but finally was able to be irrigated of all debris and left open to bag drainage.  The cystoscope was then removed and a 24 Chadian resectoscope was placed with aggressive resection of necrotic tissue and fulguration Ellik removing tissue which was not sent in light of previous diagnosis of fibrotic necrotic tissue from radiation cystitis once the resection was completed 24 Chadian Sumner catheter was inserted fluoroscopic cystogram confirmed bladder capacity of no more than 200 cc 10 cc placed in the balloon placed to continuous bladder irrigation through the SP tube and out through the penis awakened to recovery stable condition   I was present for the entire procedure.    Patient Disposition:  PACU         SIGNATURE: Yovani Khan MD  DATE: May 23, 2024  TIME: 3:36 PM

## 2024-05-23 NOTE — ANESTHESIA PREPROCEDURE EVALUATION
Procedure:  CYSTOSCOPY EVACUATION OF CLOTS TURBT change suprapubic tube retrograde urethrogram (Bladder)    Relevant Problems   ANESTHESIA (within normal limits)      CARDIO   (+) Essential hypertension   (+) Hyperlipidemia      ENDO   (+) Type 2 diabetes mellitus, without long-term current use of insulin (HCC)      /RENAL   (+) SHANTI (acute kidney injury) (HCC)   (+) Acute renal failure superimposed on stage 3b chronic kidney disease (HCC)   (+) Prostate cancer ([facility]) - s/p resection      MUSCULOSKELETAL   (+) RA (rheumatoid arthritis) (HCC)      PULMONARY   (+) COPD (chronic obstructive pulmonary disease) ([facility])   S/p SPT     Physical Exam    Airway    Mallampati score: II  TM Distance: >3 FB  Neck ROM: full     Dental   Comment: Denies loose teeth     Cardiovascular  Rhythm: regular, Rate: normal    Pulmonary   Breath sounds clear to auscultation    Other Findings  Portions of exam deferred due to low yield and/or unknown COVID status      Anesthesia Plan  ASA Score- 3     Anesthesia Type- general with ASA Monitors.         Additional Monitors:     Airway Plan: LMA.           Plan Factors-Exercise tolerance (METS): >4 METS.    Chart reviewed.   Existing labs reviewed. Patient summary reviewed.    Patient is a current smoker.      Obstructive sleep apnea risk education given perioperatively.        Induction- intravenous.    Postoperative Plan-     Perioperative Resuscitation Plan - Level 1 - Full Code.       Informed Consent- Anesthetic plan and risks discussed with patient.  I personally reviewed this patient with the CRNA. Discussed and agreed on the Anesthesia Plan with the CRNA..

## 2024-05-23 NOTE — CONSULTS
H&P Exam - Urology       Patient: Joel Wyman   : 1945 Sex: male   MRN: [id number]     CSN: [id number]      History of Present Illness   HPI:  Joel Wyman is a 78 y.o. male is again history of localized adenocarcinoma of the prostate undergoing radical retropubic prostatectomy by myself 14 years ago developing local recurrence few years later receiving external beam radiation now in remission developing gross hematuria radiation cystitis 4 years ago requiring vesicle artery embolization and hyperbolic oxygen therapy developing superficial bladder papillomas 3 years ago undergoing surveillance cystoscopies in the last 3 months developing gross hematuria urinary retention undergoing cystoscopy TUR bladder neck contracture fulguration bladder neck found to have friable necrotic tissue consistent with worsening radiation cystitis changes admitted 3 weeks ago for hematuria undergoing continuous bladder irrigation having suprapubic tube placed with the hopes of removing penile catheter seen in the office Monday and undergoing consultation with [facility] urology for elective cystectomy which has now been scheduled for next Thursday patient presented to the office yesterday with Sumner catheter obstruction undergoing removal in light of obstruction sent into the ER for admission for gross hematuria seen at the bedside today draining into her diaper in light of sphincter incompetency discussion with [facility] will take the OR today for cystoscopy evacuation of clots fulguration bladder neck replacement of 22 Latvian three-way Sumner catheter removed yesterday in light of severe pain discomfort and attempting to place in the office patient also has severe bladder instability with severe secondary spasms and bladder pain        Review of Systems:   Constitutional:  Negative for activity change, fever, chills and diaphoresis.   HENT: Negative for hearing loss and trouble swallowing.   Eyes: Negative for  itching and visual disturbance.   Respiratory: Negative for chest tightness and shortness of breath.   Cardiovascular: Negative for chest pain, edema.   Gastrointestinal: Negative for abdominal distention, na abdominal pain, constipation, diarrhea, Nausea and vomiting.   Genitourinary: Negative for decreased urine volume, difficulty urinating, dysuria, enuresis, frequency, hematuria and urgency.   Musculoskeletal: Negative for gait problem and myalgias.   Neurological: Negative for dizziness and headaches.   Hematological: Does not bruise/bleed easily.       Historical Information   Past Medical History:   Diagnosis Date    Bladder infection 03/2024    Borderline diabetes     Cancer (HCC)     prostate    COPD (chronic obstructive pulmonary disease) (HCC)     Hematuria     History of transfusion     x2    Hyperlipidemia     Hypertension     Radiation cystitis     Smoker     Wears glasses      Past Surgical History:   Procedure Laterality Date    APPENDECTOMY      FL CYSTOGRAM  4/24/2024    FL RETROGRADE PYELOGRAM  04/14/2021    FL RETROGRADE PYELOGRAM  11/16/2023    FL RETROGRADE PYELOGRAM  4/11/2024    HERNIA REPAIR      IR EMBOLIZATION (SPECIFY VESSEL OR SITE)  09/17/2021    IR NEPHROSTOMY TUBE CHECK AND/OR REMOVAL  07/08/2021    IR NEPHROSTOMY TUBE CHECK/CHANGE/REPOSITION/REINSERTION/UPSIZE  05/10/2021    IR NEPHROSTOMY TUBE CHECK/CHANGE/REPOSITION/REINSERTION/UPSIZE  08/04/2021    IR NEPHROSTOMY TUBE CHECK/CHANGE/REPOSITION/REINSERTION/UPSIZE  10/13/2021    IR NEPHROSTOMY TUBE PLACEMENT  05/07/2021    IR SUPRAPUBIC CATHETER PLACEMENT  5/3/2024    CO CYSTO BLADDER W/URETERAL CATHETERIZATION Bilateral 4/11/2024    Procedure: BILATERAL CYSTOSCOPY WITH RETROGRADE PYELOGRAM, BLADDER NECK CONTRACTURE;  Surgeon: Yovani Khan MD;  Location: [facility];  Service: Urology    CO CYSTO W/IRRIG & EVAC MULTPLE OBSTRUCTING CLOTS Bilateral 04/14/2021    Procedure: CYSTOSCOPY EVACUATION OF CLOTS bilateral retrograde pyelograms  possible TURBT bladder biopsy;  Surgeon: Yovani Khan MD;  Location: [facility];  Service: Urology    OK CYSTO W/IRRIG & EVAC MULTPLE OBSTRUCTING CLOTS N/A 04/24/2021    Procedure: CYSTOSCOPY EVACUATION OF CLOTS fulguration;  Surgeon: Yovani Khan MD;  Location: [facility];  Service: Urology    OK CYSTO W/IRRIG & EVAC MULTPLE OBSTRUCTING CLOTS N/A 07/08/2021    Procedure: CYSTOSCOPY EVACUATION OF CLOTS BILATERAL ANTIROGRADES NEPHROSTOMY TUBES, FULGERATION ;  Surgeon: Yovani Khan MD;  Location: [facility];  Service: Urology    OK CYSTO W/IRRIG & EVAC MULTPLE OBSTRUCTING CLOTS N/A 08/22/2021    Procedure: CYSTOSCOPY, RIGHT RETROGRADE, EVACUATION OF CLOTS, BLADDER BIOPSY X2 AND FULGERATION OF BLADDER LESIONS, URETEROSCOPY, 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;  Surgeon: Yovani Khan MD;  Location: [facility];  Service: Urology    OK CYSTO W/IRRIG & EVAC MULTPLE OBSTRUCTING CLOTS Bilateral 4/24/2024    Procedure: CYSTOSCOPY EVACUATION OF CLOTS fulguration bladder neck tumors and biopsy, retrograde pyelograms, DVIU;  Surgeon: Yovani Khan MD;  Location: [facility];  Service: Urology    OK CYSTOURETHROSCOPY W/DEST &/RMVL MED BLADDER AMARI N/A 4/11/2024    Procedure: TRANSURETHRAL RESECTION OF BLADDER TUMOR (TURBT);  Surgeon: Yovani Khan MD;  Location: [facility];  Service: Urology    OK CYSTOURETHROSCOPY WITH BIOPSY N/A 11/16/2023    Procedure: CYSTOSCOPY, BILATERAL RETROGRADEY PYELOGRAM,  FULGERATION 2.0 CM BLADDER TUMOR WITH BIOPSY;  Surgeon: Yovani Khan MD;  Location: [facility];  Service: Urology    PROSTATECTOMY  2014    ROTATOR CUFF REPAIR      right shoulder     Social History   Social History     Substance and Sexual Activity   Alcohol Use Not Currently    Comment: None in over 50 yrs     Social History     Substance and Sexual Activity   Drug Use No     Social History     Tobacco Use   Smoking Status Some Days    Current packs/day: 0.50     "Average packs/day: 0.5 packs/day for 50.0 years (25.0 ttl pk-yrs)    Types: Cigarettes    Passive exposure: Never   Smokeless Tobacco Never     Family History:   Family History   Problem Relation Age of Onset    Diabetes Mother     Stroke Mother     Diabetes Brother     Stroke Brother        Meds/Allergies     Medications Prior to Admission:     fluticasone-umeclidinium-vilanterol (Trelegy Ellipta) 200-62.5-25 mcg/actuation AEPB inhaler    metoprolol tartrate (LOPRESSOR) 25 mg tablet    polyethylene glycol (MIRALAX) 17 g packet  No Known Allergies    Objective   Vitals: /64   Pulse 66   Temp 98.3 °F (36.8 °C)   Resp 16   Ht 5' 10\" (1.778 m)   Wt 94.3 kg (208 lb)   SpO2 94%   BMI 29.84 kg/m²     Physical Exam:  General Alert awake   Normocephalic atraumatic PERRLA  Lungs clear bilaterally  Cardiac normal S1 normal S2  Abdomen soft, flank pain  Extremities no edema    I/O last 24 hours:  In: 50 [IV Piggyback:50]  Out: 0     Invasive Devices       Peripheral Intravenous Line  Duration             Peripheral IV 05/22/24 Left Forearm <1 day              Drain  Duration             Urethral Catheter Latex;Three way 22 Fr. 28 days    Continuous Bladder Irrigation Three-way 19 days    Suprapubic Catheter 16 Fr. 19 days    Continuous Bladder Irrigation 7 days                        Lab Results: CBC:   Lab Results   Component Value Date    WBC 9.52 05/23/2024    HGB 12.6 05/23/2024    HCT 39.4 05/23/2024    MCV 90 05/23/2024     05/23/2024    RBC 4.39 05/23/2024    MCH 28.7 05/23/2024    MCHC 32.0 05/23/2024    RDW 15.4 (H) 05/23/2024    MPV 9.5 05/23/2024    NRBC 0 05/23/2024     CMP:   Lab Results   Component Value Date     05/23/2024    CO2 26 05/23/2024    BUN 35 (H) 05/23/2024    CREATININE 1.41 (H) 05/23/2024    CALCIUM 8.9 05/23/2024    AST 17 05/22/2024    ALT 19 05/22/2024    ALKPHOS 74 05/22/2024    EGFR 46 05/23/2024     Urinalysis:   Lab Results   Component Value Date    COLORU Light " "Brown 05/14/2024    CLARITYU Cloudy 05/14/2024    SPECGRAV <1.005 (L) 05/14/2024    PHUR 8.5 (A) 05/14/2024    PHUR 5.5 03/25/2018    LEUKOCYTESUR Large (A) 05/14/2024    NITRITE Negative 05/14/2024    GLUCOSEU Negative 05/14/2024    KETONESU Negative 05/14/2024    BILIRUBINUR Negative 05/14/2024    BLOODU Large (A) 05/14/2024     Urine Culture:   Lab Results   Component Value Date    URINECX >100,000 cfu/ml Corynebacterium urealyticum (A) 05/15/2024     PSA: No results found for: \"PSA\"        Assessment/ Plan:  Radiation cystitis  Chronic gross hematuria  Necrotic bladder neck  Continue IV antibiotics  N.p.o.  Patient to the OR today for cystoscopy evacuation of clots fulguration bladder neck difficult Sumner catheter insertion        Yovani Khan MD    "

## 2024-05-23 NOTE — UTILIZATION REVIEW
Initial Clinical Review    Observation 5/22/24 @ 1849 converted to inpatient admission 5/23/24 @ 1414 for continued care & tx for urinary retention.    Admission: Date/Time/Statement:   Admission Orders (From admission, onward)       Ordered        05/23/24 1414  INPATIENT ADMISSION  Once            05/22/24 1849  Place in Observation  Once                          Orders Placed This Encounter   Procedures    INPATIENT ADMISSION     Standing Status:   Standing     Number of Occurrences:   1     Order Specific Question:   Level of Care     Answer:   Med Surg [16]     Order Specific Question:   Estimated length of stay     Answer:   More than 2 Midnights     Order Specific Question:   Certification     Answer:   I certify that inpatient services are medically necessary for this patient for a duration of greater than two midnights. See H&P and MD Progress Notes for additional information about the patient's course of treatment.     ED Arrival Information       Expected   -    Arrival   5/22/2024 17:48    Acuity   Urgent              Means of arrival   Walk-In    Escorted by   Self    Service   Hospitalist    Admission type   Emergency              Arrival complaint   sent by phys             Chief Complaint   Patient presents with    Penis Pain     Catheter was removed about 4;30, pain since that time, currently has uti also has suprapubic catheter attached to leg bag       Initial Presentation:   78 yom to ER from home for urinary problems. 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 Patient has been following with urology outpatient. Patient currently has suprapubic catheter and previously had Sumner catheter that was removed earlier today. Patient states that today his suprapubic catheter stopped draining and his Sumner catheter also was having issues. Patient was seen by urology who sent him in for further evaluation. Patient reports discomfort in the penis due to recent Sumner removal. Presents  febrile, tachycardic, tachypneic, suprapubic cath in place. Admission WBC 13.07, BUN 36, Cr 1.66. Placed in observation status for urinary retention.     Per urology: Hx radiation cystitis small capacity bladder severe bladder instability necrotic prostate neck chronic bleeding 16 Macedonian SP tube placed by IR few weeks ago undergoing aggressive cystoscopy fulguration resection necrotic bladder neck 4 weeks ago sent home with indwelling 22 Macedonian three-way Sumner catheter seen at [facility] urology yesterday and to be scheduled for elective cystectomy once cardiac clearance patient seen in the office today with penile catheter obstructed unable to irrigate suprapubic tube minimally irrigated penile Sumner removed with bleeding noted  Did not attempt to replace catheter in light of severe pain penile bleeding patient to be admitted for antibiotic therapy and scheduled n.p.o. after midnight to undergo cystoscopy fulguration bleeding bladder neck placement of Sumner catheter. Cardiac clearance for general anesthesia    Observation to IP Admission 5/23/24:  Plan OR today for cystoscopy evacuation of clots fulguration bladder neck difficult Sumner catheter insertion. Pulm & cardio consulted for clearance for upcoming cystectomy.    Per pulm: pre-op clearance  COPD group B, mMRC 2-3, PFT with obstruction, FEV1 is 33 with air trapping, no recent flares PTA inhaler is Trelegy 100.  Currently on Incruse and Breo.  Nicotine dependence in remission  ARISCAT low risk at 1.6%, as documented below  Complicated UTI with urinary retention and history of prostate cancer status post radical prostatectomy plan for cystoscopy with urology service  Plan:  Low risk for ARISCAT score  Continue inhalers with Breo and Incruse, on discharge can go back to Trelegy  Goal saturations above 88%  If needed can extubate to BiPAP 16/8 if undergoing general anesthesia with endotracheal tube    Per cardio: pre-op clearance  Cardiology is consulted  for preoperative risk stratification for upcoming cystectomy.  He has significant risk factors for CAD including active smoking, obesity, hypertension and hyperlipidemia.  He will need an echocardiogram.  We will obtain a nuclear stress test as well, likely tomorrow.  He has wheezes bilaterally of examination and we advised pulmonary clearance from a COPD perspective as well.  Further recommendations to follow after above testing.     To OR for: CYSTOSCOPY FULGERATION RESECTION BLADDER NECK. FLUORSCOPIC CYSTOGRAM. EVACUATION OF CLOTS   Operative Findings:  #1 necrotic losing bladder neck undergoing full resection fulguration no bleeding at this time Path not sent  #2 fluoroscopic cystogram confirmed small capacity bladder less than 200 cc right reflux  #3 16 Bangladeshi SP tube placed by interventional radiology 3 weeks ago unable to be removed due to severe encrustation at the balloon site attempts to remove could cause significant bladder bleeding from known radiation cystitis and left in situ patient scheduled for cystectomy next week at [place] able to irrigate at this time leave open to bag drainage    Date: 5/24/24  Day 3: Has surpassed a 2nd midnight with active treatments and services.  Dx urinary retention. Pt s/p cystoscopy with evaluation of clots and alanis placement. Alanis cath draining clear yellow urine. IVABT continued at this time, plan de-escalate if remains afebrile. Urology following. Continue to monitor output, serial abd exams, pain mgt, monitor labs. Scheduled for echo & stress testing per cardio for pre-op cystectomy clearance work-up.      ED Triage Vitals [05/22/24 1801]   Temperature Pulse Respirations Blood Pressure SpO2   100.2 °F (37.9 °C) 104 22 129/73 93 %      Temp Source Heart Rate Source Patient Position - Orthostatic VS BP Location FiO2 (%)   Tympanic Monitor Sitting Right arm --      Pain Score       10 - Worst Possible Pain          Wt Readings from Last 1 Encounters:   05/22/24 94.3  kg (208 lb)     Additional Vital Signs:   Date/Time Temp Pulse Resp BP MAP (mmHg) SpO2 O2 Device Patient Position - Orthostatic VS   05/23/24 05:32:41 99.2 °F (37.3 °C) 62 16 119/62 81 91 % None (Room air) Sitting   05/22/24 22:29:04 98.7 °F (37.1 °C) 70 17 110/71 84 93 % None (Room air) Sitting   05/22/24 22:28:40 98.7 °F (37.1 °C) 68 -- 110/71 84 93 % -- --   05/22/24 1955 -- -- 17 -- -- -- None (Room air) Sitting   05/22/24 19:54:48 98.4 °F (36.9 °C) 79 17 128/77 94 95 % -- --   05/22/24 1845 -- -- -- -- -- -- None (Room air) --   05/22/24 1801 100.2 °F (37.9 °C) 104 22 129/73 -- 93 % None (Room air) Sitting     Pertinent Labs/Diagnostic Test Results:   FL cystogram   Final Result  (05/24 1033)      Cystogram performed during cystoscopy by urological service. Please refer to separate procedure report       Results from last 7 days   Lab Units 05/24/24 0510 05/23/24 0531 05/22/24 1820 05/18/24  0543   WBC Thousand/uL 10.72* 9.52 13.07*  --    HEMOGLOBIN g/dL 12.0 12.6 14.0 12.5   HEMATOCRIT % 36.6 39.4 42.3 37.7   PLATELETS Thousands/uL 207 222 261  --    TOTAL NEUT ABS Thousands/µL 9.00* 6.64 10.05*  --      Results from last 7 days   Lab Units 05/24/24 0510 05/23/24 0531 05/22/24 1820 05/18/24  0543   SODIUM mmol/L 135 139 138 135   POTASSIUM mmol/L 4.6 4.3 4.3 4.2   CHLORIDE mmol/L 105 107 105 102   CO2 mmol/L 25 26 24 25   ANION GAP mmol/L 5 6 9 8   BUN mg/dL 31* 35* 36* 36*   CREATININE mg/dL 1.09 1.41* 1.66* 1.34*   EGFR ml/min/1.73sq m 63 46 38 49   CALCIUM mg/dL 8.4 8.9 9.4 8.8   MAGNESIUM mg/dL 1.9 2.0 1.9  --      Results from last 7 days   Lab Units 05/22/24  1820   AST U/L 17   ALT U/L 19   ALK PHOS U/L 74   TOTAL PROTEIN g/dL 7.4   ALBUMIN g/dL 3.9   TOTAL BILIRUBIN mg/dL 0.48     Results from last 7 days   Lab Units 05/24/24  0732 05/23/24  2042 05/23/24  1646 05/23/24  1158   POC GLUCOSE mg/dl 143* 288* 128 107     Results from last 7 days   Lab Units 05/24/24  0510 05/23/24  0531  05/22/24  1820 05/18/24  0543   GLUCOSE RANDOM mg/dL 193* 115 195* 112     Results from last 7 days   Lab Units 05/22/24  1820   HEMOGLOBIN A1C % 6.6*   EAG mg/dl 143     Results from last 7 days   Lab Units 05/23/24  0531 05/22/24  1820   PROCALCITONIN ng/ml 0.08 0.09     ED Treatment:   Medication Administration from 05/22/2024 1748 to 05/22/2024 1935         Date/Time Order Dose Route Action     05/22/2024 1850 EDT cefepime (MAXIPIME) IVPB (premix in dextrose) 1,000 mg 50 mL 1,000 mg Intravenous New Bag     05/22/2024 1850 EDT morphine injection 4 mg 4 mg Intravenous Given          Past Medical History:   Diagnosis Date    Bladder infection 03/2024    Borderline diabetes     Cancer (HCC)     prostate    COPD (chronic obstructive pulmonary disease) (HCC)     Hematuria     History of transfusion     x2    Hyperlipidemia     Hypertension     Radiation cystitis     Smoker     Wears glasses      Present on Admission:   Urinary retention   Acute renal failure superimposed on stage 3b chronic kidney disease (HCC)   Prostate cancer (HCC) - s/p resection   Nicotine abuse   Essential hypertension   COPD (chronic obstructive pulmonary disease) (HCC)   Sepsis (HCC)   Complicated UTI (urinary tract infection)      Admitting Diagnosis: Urinary retention [R33.9]  Radiation cystitis [N30.40]  Penis pain [N48.89]  Pre-op examination [Z01.818]  Age/Sex: 78 y.o. male  Admission Orders:  Consult urology  Consult cardio  Scd     Scheduled Medications:  cefepime, 1,000 mg, Intravenous, Q12H  fluticasone-vilanterol, 1 puff, Inhalation, Daily   And  umeclidinium, 1 puff, Inhalation, Daily  metoprolol tartrate, 12.5 mg, Oral, Q12H DHAVAL  nicotine, 1 patch, Transdermal, Daily  saccharomyces boulardii, 250 mg, Oral, BID    Continuous IV Infusions:  lactated ringers infusion  Rate: 100 mL/hr Dose: 100 mL/hr  Freq: Continuous Route: IV  Indications of Use: IV Hydration  Last Dose: 100 mL/hr (05/24/24 0508)  Start: 05/23/24 1700  sodium  chloride 0.9 % infusion  Rate: 50 mL/hr Dose: 50 mL/hr  Freq: Continuous Route: IV  Indications of Use: IV Hydration  Last Dose: Stopped (05/23/24 1400)  Start: 05/22/24 1945    PRN Meds:  acetaminophen, 650 mg, Oral, Q6H PRN  albuterol, 2.5 mg, Nebulization, Q6H PRN  HYDROmorphone, 0.2 mg, Intravenous, Q4H PRN  ondansetron, 4 mg, Intravenous, Q6H PRN  polyethylene glycol, 17 g, Oral, Daily PRN  traMADol, 25 mg, Oral, Q6H PRN      Network Utilization Review Department  ATTENTION: Please call with any questions or concerns to [phone] and carefully listen to the prompts so that you are directed to the right person. All voicemails are confidential.   For Discharge needs, contact Care Management DC Support Team at [phone] opt. [phone]  Send all requests for admission clinical reviews, approved or denied determinations and any other requests to dedicated fax number below belonging to the [facility] where the patient is receiving treatment. List of dedicated fax numbers for the Facilities:  FACILITY NAME UR FAX NUMBER   ADMISSION DENIALS (Administrative/Medical Necessity) [phone]   DISCHARGE SUPPORT TEAM (NETWORK) [phone]   PARENT CHILD HEALTH (Maternity/NICU/Pediatrics) [phone]   [facility] [phone]   [facility] [phone]   [facility] [phone]   [facility] [phone]   [facility] [phone]   [facility] [phone]   [facility] [phone]   [facility] [phone]   [facility] [phone]   [facility] [phone]   [facility] [phone]   [facility] [phone]

## 2024-05-23 NOTE — CONSULTS
Consultation - Cardiology   [facility]     Joel Wyman 78 y.o. male MRN: [id number]  : 1945  Unit/Bed#: [address] [place] [address] Encounter: [id number]    Physician Requesting Consult: Rufino Churchill MD  Reason for Consult / Principal Problem: cardiac risk stratification         ASSESSMENT:    Presents on this occasion for cystoscopy evacuation of clots.  S/p prostatectomy with radiation cystitis and gross hematuria.  Noncardiac bladder neck.  Plan to undergo elective cystectomy next week at [facility].  Severe COPD.  Current half pack per day smoker; smoked for approximately 50 years.  PMHx includes hypertension, hyperlipidemia    EKG 2024: Sinus rhythm with PACs.  No diagnostic ischemic changes.    PLAN AND RECOMMENDATIONS:  Cardiology is consulted for preoperative risk stratification for upcoming cystectomy.  He has significant risk factors for CAD including active smoking, obesity, hypertension and hyperlipidemia.  He will need an echocardiogram.  We will obtain a nuclear stress test as well, likely tomorrow.  He has wheezes bilaterally of examination and we advised pulmonary clearance from a COPD perspective as well.  Further recommendations to follow after above testing.         History of Present Illness   HPI: Joel Wyman is a 78 y.o. year old male with past medical history of s/p prostatectomy, radiation cystitis, gross hematuria, necrotic bladder neck, COPD, current smoker, HTN, and HLD who presents for cystoscopy evacuation of clots.     Cardiology is consulted for risk stratification for planned cystectomy at [facility] next week. The patient has baseline SOB secondary to COPD and breathing has been unchanged when compared to the last few months.  Denies current/recent chest pain or heaviness.  Denies lightheadedness, near-syncope, syncope, orthopnea, GI bleeding, or LE edema.  Denies personal history of CAD, MI, CHF or arrhythmias.       Review of  Systems:  Constitutional: denies fevers, chills  Eyes: no changes in vision  Ears: no changes in hearing  Respiratory: Positive for chronic dyspnea secondary to COPD  Cardiovascular: denies chest pain, palpitations, peripheral edema  Gastrointestinal: denies abdominal pain,GI bleeding  : Positive for radiation cystitis, previous prostatectomy, hematuria  Neurologic: no lightheadedness, near syncope, syncope  Integumentary: denies rashes      Historical Information   Past Medical History:   Diagnosis Date    Bladder infection 03/2024    Borderline diabetes     Cancer (HCC)     prostate    COPD (chronic obstructive pulmonary disease) (HCC)     Hematuria     History of transfusion     x2    Hyperlipidemia     Hypertension     Radiation cystitis     Smoker     Wears glasses      Past Surgical History:   Procedure Laterality Date    APPENDECTOMY      FL CYSTOGRAM  4/24/2024    FL RETROGRADE PYELOGRAM  04/14/2021    FL RETROGRADE PYELOGRAM  11/16/2023    FL RETROGRADE PYELOGRAM  4/11/2024    HERNIA REPAIR      IR EMBOLIZATION (SPECIFY VESSEL OR SITE)  09/17/2021    IR NEPHROSTOMY TUBE CHECK AND/OR REMOVAL  07/08/2021    IR NEPHROSTOMY TUBE CHECK/CHANGE/REPOSITION/REINSERTION/UPSIZE  05/10/2021    IR NEPHROSTOMY TUBE CHECK/CHANGE/REPOSITION/REINSERTION/UPSIZE  08/04/2021    IR NEPHROSTOMY TUBE CHECK/CHANGE/REPOSITION/REINSERTION/UPSIZE  10/13/2021    IR NEPHROSTOMY TUBE PLACEMENT  05/07/2021    IR SUPRAPUBIC CATHETER PLACEMENT  5/3/2024    FL CYSTO BLADDER W/URETERAL CATHETERIZATION Bilateral 4/11/2024    Procedure: BILATERAL CYSTOSCOPY WITH RETROGRADE PYELOGRAM, BLADDER NECK CONTRACTURE;  Surgeon: Yovani Khan MD;  Location: [facility];  Service: Urology    FL CYSTO W/IRRIG & EVAC MULTPLE OBSTRUCTING CLOTS Bilateral 04/14/2021    Procedure: CYSTOSCOPY EVACUATION OF CLOTS bilateral retrograde pyelograms possible TURBT bladder biopsy;  Surgeon: Yovani Khan MD;  Location: [facility];  Service: Urology    FL CYSTO  W/IRRIG & EVAC MULTPLE OBSTRUCTING CLOTS N/A 04/24/2021    Procedure: CYSTOSCOPY EVACUATION OF CLOTS fulguration;  Surgeon: Yovani Khan MD;  Location: [facility];  Service: Urology    MA CYSTO W/IRRIG & EVAC MULTPLE OBSTRUCTING CLOTS N/A 07/08/2021    Procedure: CYSTOSCOPY EVACUATION OF CLOTS BILATERAL ANTIROGRADES NEPHROSTOMY TUBES, FULGERATION ;  Surgeon: Yovani Khan MD;  Location: [facility];  Service: Urology    MA CYSTO W/IRRIG & EVAC MULTPLE OBSTRUCTING CLOTS N/A 08/22/2021    Procedure: CYSTOSCOPY, RIGHT RETROGRADE, EVACUATION OF CLOTS, BLADDER BIOPSY X2 AND FULGERATION OF BLADDER LESIONS, URETEROSCOPY, 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;  Surgeon: Yovani Khan MD;  Location: [facility];  Service: Urology    MA CYSTO W/IRRIG & EVAC MULTPLE OBSTRUCTING CLOTS Bilateral 4/24/2024    Procedure: CYSTOSCOPY EVACUATION OF CLOTS fulguration bladder neck tumors and biopsy, retrograde pyelograms, DVIU;  Surgeon: Yovani Khan MD;  [facility]: [facility];  Service: Urology    MA CYSTOURETHROSCOPY W/DEST &/RMVL MED BLADDER AMARI N/A 4/11/2024    Procedure: TRANSURETHRAL RESECTION OF BLADDER TUMOR (TURBT);  Surgeon: Yovani Khan MD;  [facility];  Service: Urology    MA CYSTOURETHROSCOPY WITH BIOPSY N/A 11/16/2023    Procedure: CYSTOSCOPY, BILATERAL RETROGRADEY PYELOGRAM,  FULGERATION 2.0 CM BLADDER TUMOR WITH BIOPSY;  Surgeon: Yovani Khan MD;  Location: [facility];  Service: Urology    PROSTATECTOMY  2014    ROTATOR CUFF REPAIR      right shoulder     Social History     Substance and Sexual Activity   Alcohol Use Not Currently    Comment: None in over 50 yrs     Social History     Substance and Sexual Activity   Drug Use No     Social History     Tobacco Use   Smoking Status Some Days    Current packs/day: 0.50    Average packs/day: 0.5 packs/day for 50.0 years (25.0 ttl pk-yrs)    Types: Cigarettes    Passive exposure: Never   Smokeless Tobacco  Never       Family History:   Family History   Problem Relation Age of Onset    Diabetes Mother     Stroke Mother     Diabetes Brother     Stroke Brother        Meds/Allergies   current meds:   Current Facility-Administered Medications   Medication Dose Route Frequency    acetaminophen (TYLENOL) tablet 650 mg  650 mg Oral Q6H PRN    albuterol inhalation solution 2.5 mg  2.5 mg Nebulization Q6H PRN    cefepime (MAXIPIME) IVPB (premix in dextrose) 1,000 mg 50 mL  1,000 mg Intravenous Q12H    fluticasone-vilanterol 200-25 mcg/actuation 1 puff  1 puff Inhalation Daily    And    umeclidinium 62.5 mcg/actuation inhaler AEPB 1 puff  1 puff Inhalation Daily    HYDROmorphone HCl (DILAUDID) injection 0.2 mg  0.2 mg Intravenous Q4H PRN    insulin lispro (HumALOG/ADMELOG) 100 units/mL subcutaneous injection 1-5 Units  1-5 Units Subcutaneous TID AC    insulin lispro (HumALOG/ADMELOG) 100 units/mL subcutaneous injection 1-5 Units  1-5 Units Subcutaneous HS    metoprolol tartrate (LOPRESSOR) partial tablet 12.5 mg  12.5 mg Oral Q12H DHAVAL    nicotine (NICODERM CQ) 14 mg/24hr TD 24 hr patch 1 patch  1 patch Transdermal Daily    ondansetron (ZOFRAN) injection 4 mg  4 mg Intravenous Q6H PRN    polyethylene glycol (MIRALAX) packet 17 g  17 g Oral Daily PRN    saccharomyces boulardii (FLORASTOR) capsule 250 mg  250 mg Oral BID    sodium chloride 0.9 % infusion  50 mL/hr Intravenous Continuous    traMADol (ULTRAM) tablet 25 mg  25 mg Oral Q6H PRN    and PTA meds:   Prior to Admission Medications   Prescriptions Last Dose Informant Patient Reported? Taking?   fluticasone-umeclidinium-vilanterol (Trelegy Ellipta) 200-62.5-25 mcg/actuation AEPB inhaler 5/22/2024  Yes Yes   Sig: Inhale 1 puff every morning   metoprolol tartrate (LOPRESSOR) 25 mg tablet 5/22/2024  Yes Yes   Sig: Take 12.5 mg by mouth every 12 (twelve) hours   polyethylene glycol (MIRALAX) 17 g packet   No No   Sig: Take 17 g by mouth daily as needed (constipation)     "  Facility-Administered Medications: None     No Known Allergies    Objective   Vitals: Blood pressure 122/64, pulse 66, temperature 98.3 °F (36.8 °C), resp. rate 16, height 5' 10\" (1.778 m), weight 94.3 kg (208 lb), SpO2 94%., Body mass index is 29.84 kg/m².     Physical Exam:  General: pleasant, comfortable, in no acute distress  Neurologic: speech is coherent, alert and oriented x3, moves all 4 extremities  Head: normocephalic  Eyes: no conjunctivitis  Neck: supple, no JVD  Cardiovascular: regular S1 and S2, no murmurs  Pulmonary: Wheezes bilaterally  Skin: no rashes  Abdomen: soft, nontender, not distended  Extremities: no edema bilaterally    Lab Results:     Troponins:       CBC with diff:   Results from last 7 days   Lab Units 05/23/24  0531 05/22/24 1820 05/18/24  0543 05/17/24  0519   WBC Thousand/uL 9.52 13.07*  --  11.31*   HEMOGLOBIN g/dL 12.6 14.0 12.5 13.0   HEMATOCRIT % 39.4 42.3 37.7 39.8   MCV fL 90 89  --  88   PLATELETS Thousands/uL 222 261  --  202   RBC Million/uL 4.39 4.78  --  4.53   MCH pg 28.7 29.3  --  28.7   MCHC g/dL 32.0 33.1  --  32.7   RDW % 15.4* 15.3*  --  15.2*   MPV fL 9.5 9.3  --  9.6   NRBC AUTO /100 WBCs 0 0  --   --        CMP:   Results from last 7 days   Lab Units 05/23/24  0531 05/22/24  1820 05/18/24  0543 05/17/24  0519   POTASSIUM mmol/L 4.3 4.3 4.2 4.4   CHLORIDE mmol/L 107 105 102 103   CO2 mmol/L 26 24 25 24   BUN mg/dL 35* 36* 36* 37*   CREATININE mg/dL 1.41* 1.66* 1.34* 1.46*   CALCIUM mg/dL 8.9 9.4 8.8 8.8   AST U/L  --  17  --   --    ALT U/L  --  19  --   --    ALK PHOS U/L  --  74  --   --    EGFR ml/min/1.73sq m 46 38 49 44       Magnesium:   Results from last 7 days   Lab Units 05/23/24  0531 05/22/24  1820   MAGNESIUM mg/dL 2.0 1.9       Coags:       Lipid Profile:         Cardiac testing:   All relevant testing has been reviewed.  See HPI for summary.        Torsten Luna  5/23/2024  12:22 PM   [facility]    This note was completed in " part utilizing direct voice dictation software.  Grammatical errors, random word insertions, spelling mistakes, and incomplete sentences may be an occasional consequence of the system secondary to software limitations, ambient noise and hardware issues. Please read the chart carefully and recognize, using context, where substitutions have occurred.

## 2024-05-23 NOTE — ASSESSMENT & PLAN NOTE
On Trelegy, albuterol inhaler as needed at home  Mild wheezing in the lower lobes  On room air, satting low to mid 90s.  Substitute Trelegy with Breo and Incruse  Will order albuterol as needed

## 2024-05-23 NOTE — ASSESSMENT & PLAN NOTE
As evidenced by fever, tachycardia, leukocytosis and complicated UTI.  Currently on IV Cefepime with plan to follow urine culture

## 2024-05-23 NOTE — ASSESSMENT & PLAN NOTE
History of prostate cancer, status post radical prostatectomy, status post radiation therapy for periprostatic recurrence.  History of bladder neck contracture, status post cystoscopy transurethral resection bladder neck contracture bladder tumor evacuation of clots on 4/11/2024.  Frequent hospitalization for hemorrhagic cystitis, hematuria, urinary retention.    Per notes, pt is planned for outpt elective cystectomy in near future   Urology consulted for cystoscopy today

## 2024-05-23 NOTE — PROGRESS NOTES
Critical access hospital  Progress Note  Name: Joel Wyman I  MRN: [id number]  Unit/Bed#: 2 [facility] [address] I Date of Admission: 5/22/2024   Date of Service: 5/23/2024 I Hospital Day: 0    Assessment & Plan   Type 2 diabetes mellitus, without long-term current use of insulin ([facility])  Assessment & Plan  HgbA1c level is 6.6.    Will place on SSI     Sepsis ([facility])  Assessment & Plan  As evidenced by fever, tachycardia, leukocytosis and complicated UTI.  Currently on IV Cefepime with plan to follow urine culture     Acute renal failure superimposed on stage 3b chronic kidney disease ([facility])  Assessment & Plan  Baseline creatinine appears to be around 1.3's  Resume IV NS.  Will follow BMP     Prostate cancer ([facility]) - s/p resection  Assessment & Plan  Noted history    Complicated UTI (urinary tract infection)  Assessment & Plan  Resume IV Cefepime with plan to follow urine culture     COPD (chronic obstructive pulmonary disease) ([facility])  Assessment & Plan  Resume home regimen along with nebs prn     Essential hypertension  Assessment & Plan  Resume Metoprolol     * Urinary retention  Assessment & Plan    History of prostate cancer, status post radical prostatectomy, status post radiation therapy for periprostatic recurrence.  History of bladder neck contracture, status post cystoscopy transurethral resection bladder neck contracture bladder tumor evacuation of clots on 4/11/2024.  Frequent hospitalization for hemorrhagic cystitis, hematuria, urinary retention.    Per notes, pt is planned for outpt elective cystectomy in near future   Urology consulted for cystoscopy. Will have Cardiology and Pulmonary team evaluate for preop clearance                VTE Pharmacologic Prophylaxis:    SCDs; AC not ordered on admission due to concern for hematuria     Mobility:   Basic Mobility Inpatient Raw Score: 11  -[facility] Goal: 4: Move to chair/commode  -HL Achieved: 8: Walk 250 feet ot more    Patient Centered Rounds: I performed  bedside rounds with nursing staff today.   Discussions with Specialists or Other Care Team Provider: Case Management    Education and Discussions with Family / Patient: Patient declined call to .     Total Time Spent on Date of Encounter in care of patient: 35 mins. This time was spent on one or more of the following: performing physical exam; counseling and coordination of care; obtaining or reviewing history; documenting in the medical record; reviewing/ordering tests, medications or procedures; communicating with other healthcare professionals and discussing with patient's family/caregivers.    Current Length of Stay: 0 day(s)  Current Patient Status: Observation   Certification Statement: The patient will continue to require additional inpatient hospital stay due to complicated UTI; urinary retention   Discharge Plan: Anticipate discharge in 24-48 hrs to home.    Code Status: Level 1 - Full Code    Subjective:   Pt was seen and examined at bedside.  Denies any lower abdominal/suprapubic pain.  Denies any chest pain or SOB at rest.       Objective:     Vitals:   Temp (24hrs), Av °F (37.2 °C), Min:98.4 °F (36.9 °C), Max:100.2 °F (37.9 °C)    Temp:  [98.4 °F (36.9 °C)-100.2 °F (37.9 °C)] 99.2 °F (37.3 °C)  HR:  [] 62  Resp:  [16-22] 16  BP: (110-129)/(62-77) 119/62  SpO2:  [91 %-95 %] 91 %  Body mass index is 29.84 kg/m².     Input and Output Summary (last 24 hours):     Intake/Output Summary (Last 24 hours) at 2024 0943  Last data filed at 2024 0531  Gross per 24 hour   Intake 50 ml   Output 0 ml   Net 50 ml       Physical Exam:   General: in no acute distress, obese  HEENT: atraumatic, normocephalic  Skin: no jaundice  CVS: RRR, murmur present   Lungs: mild expiratory wheezing bilaterally   Abdomen: soft, nondistended, bowel sounds normal, nontender upon palpation, no guarding or rebound tenderness  Extremities: no edema, no calf swelling or tenderness  Neuro: alert and oriented  x3, no tremors   Psych: calm, cooperative      Additional Data:     Labs:  Results from last 7 days   Lab Units 05/23/24  0531   WBC Thousand/uL 9.52   HEMOGLOBIN g/dL 12.6   HEMATOCRIT % 39.4   PLATELETS Thousands/uL 222   SEGS PCT % 69   LYMPHO PCT % 14   MONO PCT % 10   EOS PCT % 6     Results from last 7 days   Lab Units 05/23/24  0531 05/22/24  1820   SODIUM mmol/L 139 138   POTASSIUM mmol/L 4.3 4.3   CHLORIDE mmol/L 107 105   CO2 mmol/L 26 24   BUN mg/dL 35* 36*   CREATININE mg/dL 1.41* 1.66*   ANION GAP mmol/L 6 9   CALCIUM mg/dL 8.9 9.4   ALBUMIN g/dL  --  3.9   TOTAL BILIRUBIN mg/dL  --  0.48   ALK PHOS U/L  --  74   ALT U/L  --  19   AST U/L  --  17   GLUCOSE RANDOM mg/dL 115 195*             Results from last 7 days   Lab Units 05/22/24  1820   HEMOGLOBIN A1C % 6.6*     Results from last 7 days   Lab Units 05/23/24  0531 05/22/24  1820   PROCALCITONIN ng/ml 0.08 0.09       Lines/Drains:  Invasive Devices       Peripheral Intravenous Line  Duration             Peripheral IV 05/22/24 Left Forearm <1 day              Drain  Duration             Urethral Catheter Latex;Three way 22 Fr. 28 days    Suprapubic Catheter 16 Fr. 19 days    Continuous Bladder Irrigation Three-way 18 days    Continuous Bladder Irrigation 7 days                  Urinary Catheter:  Goal for removal:  Urology consulted for evaluation                Imaging: No pertinent imaging reviewed.    Recent Cultures (last 7 days):         Last 24 Hours Medication List:   Current Facility-Administered Medications   Medication Dose Route Frequency Provider Last Rate    acetaminophen  650 mg Oral Q6H PRN CORWIN Braden      albuterol  2.5 mg Nebulization Q6H PRN CORWIN Braden      cefepime  1,000 mg Intravenous Q12H CORWIN Braden 1,000 mg (05/23/24 0531)    fluticasone-vilanterol  1 puff Inhalation Daily CORWIN Braden      And    umeclidinium  1 puff Inhalation Daily CORWIN Braden      HYDROmorphone  0.2 mg Intravenous Q4H  PRN Damaso Azar, ELIANENP      insulin lispro  1-5 Units Subcutaneous TID AC Rufino Churchill MD      insulin lispro  1-5 Units Subcutaneous HS Rufino Churchill MD      metoprolol tartrate  12.5 mg Oral Q12H DHAVAL Cudemetrius Azar, ELIANENP      nicotine  1 patch Transdermal Daily Cuiphillip Chandrak, CRNP      ondansetron  4 mg Intravenous Q6H PRN Amairaniiphillip Azar, CRNP      polyethylene glycol  17 g Oral Daily PRN Cuiphillip Jararik, CRNP      saccharomyces boulardii  250 mg Oral BID Cuiphillip Chandrak, CRNP      sodium chloride  50 mL/hr Intravenous Continuous Cuiphillip Azar, ELIANENP 50 mL/hr (05/22/24 2016)    traMADol  25 mg Oral Q6H PRN Damaso Azar, CORWIN          Today, Patient Was Seen By: Rufino Churchill MD    **Please Note: This note may have been constructed using a voice recognition system.**

## 2024-05-24 ENCOUNTER — APPOINTMENT (INPATIENT)
Dept: RADIOLOGY | Facility: HOSPITAL | Age: 79
DRG: 653 | End: 2024-05-24
Payer: COMMERCIAL

## 2024-05-24 ENCOUNTER — APPOINTMENT (INPATIENT)
Dept: NON INVASIVE DIAGNOSTICS | Facility: HOSPITAL | Age: 79
DRG: 653 | End: 2024-05-24
Payer: COMMERCIAL

## 2024-05-24 PROBLEM — Z01.818 PREOPERATIVE EVALUATION TO RULE OUT SURGICAL CONTRAINDICATION: Status: ACTIVE | Noted: 2024-05-24

## 2024-05-24 LAB
ANION GAP SERPL CALCULATED.3IONS-SCNC: 5 MMOL/L (ref 4–13)
AORTIC ROOT: 4.1 CM
AORTIC VALVE MEAN VELOCITY: 14.5 M/S
AV LVOT PEAK GRADIENT: 5 MMHG
AV MEAN GRADIENT: 10 MMHG
AV PEAK GRADIENT: 18 MMHG
BACTERIA UR QL AUTO: ABNORMAL /HPF
BASOPHILS # BLD AUTO: 0.01 THOUSANDS/ÂΜL (ref 0–0.1)
BASOPHILS NFR BLD AUTO: 0 % (ref 0–1)
BILIRUB UR QL STRIP: NEGATIVE
BSA FOR ECHO PROCEDURE: 2.12 M2
BUN SERPL-MCNC: 31 MG/DL (ref 5–25)
CALCIUM SERPL-MCNC: 8.4 MG/DL (ref 8.4–10.2)
CHLORIDE SERPL-SCNC: 105 MMOL/L (ref 96–108)
CLARITY UR: CLEAR
CO2 SERPL-SCNC: 25 MMOL/L (ref 21–32)
COLOR UR: YELLOW
CREAT SERPL-MCNC: 1.09 MG/DL (ref 0.6–1.3)
DOP CALC AO PEAK VEL: 2.12 M/S
DOP CALC AO VTI: 41.51 CM
DOP CALC LVOT AREA: 3.14 CM2
DOP CALC LVOT DIAMETER: 2 CM
E WAVE DECELERATION TIME: 276 MS
E/A RATIO: 0.78
EOSINOPHIL # BLD AUTO: 0.01 THOUSAND/ÂΜL (ref 0–0.61)
EOSINOPHIL NFR BLD AUTO: 0 % (ref 0–6)
ERYTHROCYTE [DISTWIDTH] IN BLOOD BY AUTOMATED COUNT: 14.8 % (ref 11.6–15.1)
FRACTIONAL SHORTENING: 30 (ref 28–44)
GFR SERPL CREATININE-BSD FRML MDRD: 63 ML/MIN/1.73SQ M
GLUCOSE SERPL-MCNC: 117 MG/DL (ref 65–140)
GLUCOSE SERPL-MCNC: 125 MG/DL (ref 65–140)
GLUCOSE SERPL-MCNC: 127 MG/DL (ref 65–140)
GLUCOSE SERPL-MCNC: 143 MG/DL (ref 65–140)
GLUCOSE SERPL-MCNC: 193 MG/DL (ref 65–140)
GLUCOSE UR STRIP-MCNC: NEGATIVE MG/DL
HCT VFR BLD AUTO: 36.6 % (ref 36.5–49.3)
HGB BLD-MCNC: 12 G/DL (ref 12–17)
HGB UR QL STRIP.AUTO: ABNORMAL
IMM GRANULOCYTES # BLD AUTO: 0.06 THOUSAND/UL (ref 0–0.2)
IMM GRANULOCYTES NFR BLD AUTO: 1 % (ref 0–2)
INTERVENTRICULAR SEPTUM IN DIASTOLE (PARASTERNAL SHORT AXIS VIEW): 1.2 CM
INTERVENTRICULAR SEPTUM: 1.2 CM (ref 0.6–1.1)
KETONES UR STRIP-MCNC: NEGATIVE MG/DL
LAAS-AP2: 20.4 CM2
LAAS-AP4: 18.9 CM2
LEFT ATRIUM AREA SYSTOLE SINGLE PLANE A4C: 20.3 CM2
LEFT ATRIUM SIZE: 3.2 CM
LEFT ATRIUM VOLUME (MOD BIPLANE): 55 ML
LEFT ATRIUM VOLUME INDEX (MOD BIPLANE): 25.9 ML/M2
LEFT INTERNAL DIMENSION IN SYSTOLE: 1.9 CM (ref 2.1–4)
LEFT VENTRICULAR INTERNAL DIMENSION IN DIASTOLE: 2.7 CM (ref 3.5–6)
LEFT VENTRICULAR POSTERIOR WALL IN END DIASTOLE: 1.3 CM
LEFT VENTRICULAR STROKE VOLUME: 15 ML
LEUKOCYTE ESTERASE UR QL STRIP: ABNORMAL
LVSV (TEICH): 15 ML
LYMPHOCYTES # BLD AUTO: 0.94 THOUSANDS/ÂΜL (ref 0.6–4.47)
LYMPHOCYTES NFR BLD AUTO: 9 % (ref 14–44)
MAGNESIUM SERPL-MCNC: 1.9 MG/DL (ref 1.9–2.7)
MAX HR PERCENT: 74 %
MAX HR: 104 BPM
MCH RBC QN AUTO: 29.2 PG (ref 26.8–34.3)
MCHC RBC AUTO-ENTMCNC: 32.8 G/DL (ref 31.4–37.4)
MCV RBC AUTO: 89 FL (ref 82–98)
MONOCYTES # BLD AUTO: 0.7 THOUSAND/ÂΜL (ref 0.17–1.22)
MONOCYTES NFR BLD AUTO: 7 % (ref 4–12)
MV E'TISSUE VEL-LAT: 8 CM/S
MV E'TISSUE VEL-SEP: 8 CM/S
MV PEAK A VEL: 1.25 M/S
MV PEAK E VEL: 97 CM/S
MV STENOSIS PRESSURE HALF TIME: 80 MS
MV VALVE AREA P 1/2 METHOD: 2.75
NEUTROPHILS # BLD AUTO: 9 THOUSANDS/ÂΜL (ref 1.85–7.62)
NEUTS SEG NFR BLD AUTO: 83 % (ref 43–75)
NITRITE UR QL STRIP: NEGATIVE
NON-SQ EPI CELLS URNS QL MICRO: ABNORMAL /HPF
NRBC BLD AUTO-RTO: 0 /100 WBCS
NUC STRESS EJECTION FRACTION: 75 %
PH UR STRIP.AUTO: 6.5 [PH]
PLATELET # BLD AUTO: 207 THOUSANDS/UL (ref 149–390)
PMV BLD AUTO: 9.4 FL (ref 8.9–12.7)
POTASSIUM SERPL-SCNC: 4.6 MMOL/L (ref 3.5–5.3)
PROT UR STRIP-MCNC: ABNORMAL MG/DL
PV PEAK GRADIENT: 2 MMHG
RATE PRESSURE PRODUCT: NORMAL
RBC # BLD AUTO: 4.11 MILLION/UL (ref 3.88–5.62)
RBC #/AREA URNS AUTO: ABNORMAL /HPF
RIGHT ATRIUM AREA SYSTOLE A4C: 16.8 CM2
RIGHT VENTRICLE ID DIMENSION: 2.9 CM
SL CV LEFT ATRIUM LENGTH A2C: 5.5 CM
SL CV LV EF: 70
SL CV PED ECHO LEFT VENTRICLE DIASTOLIC VOLUME (MOD BIPLANE) 2D: 27 ML
SL CV PED ECHO LEFT VENTRICLE SYSTOLIC VOLUME (MOD BIPLANE) 2D: 12 ML
SL CV REST NUCLEAR ISOTOPE DOSE: 11 MCI
SL CV STRESS NUCLEAR ISOTOPE DOSE: 32.5 MCI
SL CV STRESS RECOVERY BP: NORMAL MMHG
SL CV STRESS RECOVERY HR: 91 BPM
SL CV STRESS RECOVERY O2 SAT: 96 %
SODIUM SERPL-SCNC: 135 MMOL/L (ref 135–147)
SP GR UR STRIP.AUTO: 1.02 (ref 1–1.03)
STRESS ANGINA INDEX: 0
STRESS BASELINE BP: NORMAL MMHG
STRESS BASELINE HR: 75 BPM
STRESS O2 SAT REST: 94 %
STRESS PEAK HR: 104 BPM
STRESS POST ESTIMATED WORKLOAD: 1 METS
STRESS POST EXERCISE DUR MIN: 1 MIN
STRESS POST O2 SAT PEAK: 94 %
STRESS POST PEAK BP: 118 MMHG
STRESS/REST PERFUSION RATIO: 1.26
TR MAX PG: 23 MMHG
TR PEAK VELOCITY: 2.4 M/S
TRICUSPID ANNULAR PLANE SYSTOLIC EXCURSION: 3.1 CM
TRICUSPID VALVE PEAK REGURGITATION VELOCITY: 2.42 M/S
UROBILINOGEN UR STRIP-ACNC: <2 MG/DL
WBC # BLD AUTO: 10.72 THOUSAND/UL (ref 4.31–10.16)
WBC #/AREA URNS AUTO: ABNORMAL /HPF

## 2024-05-24 PROCEDURE — 94640 AIRWAY INHALATION TREATMENT: CPT

## 2024-05-24 PROCEDURE — 93306 TTE W/DOPPLER COMPLETE: CPT

## 2024-05-24 PROCEDURE — 94664 DEMO&/EVAL PT USE INHALER: CPT

## 2024-05-24 PROCEDURE — 94760 N-INVAS EAR/PLS OXIMETRY 1: CPT

## 2024-05-24 PROCEDURE — 80048 BASIC METABOLIC PNL TOTAL CA: CPT | Performed by: SPECIALIST

## 2024-05-24 PROCEDURE — A9502 TC99M TETROFOSMIN: HCPCS

## 2024-05-24 PROCEDURE — 99232 SBSQ HOSP IP/OBS MODERATE 35: CPT | Performed by: INTERNAL MEDICINE

## 2024-05-24 PROCEDURE — 85025 COMPLETE CBC W/AUTO DIFF WBC: CPT | Performed by: SPECIALIST

## 2024-05-24 PROCEDURE — 93306 TTE W/DOPPLER COMPLETE: CPT | Performed by: INTERNAL MEDICINE

## 2024-05-24 PROCEDURE — 99232 SBSQ HOSP IP/OBS MODERATE 35: CPT | Performed by: STUDENT IN AN ORGANIZED HEALTH CARE EDUCATION/TRAINING PROGRAM

## 2024-05-24 PROCEDURE — 83735 ASSAY OF MAGNESIUM: CPT | Performed by: SPECIALIST

## 2024-05-24 PROCEDURE — 82948 REAGENT STRIP/BLOOD GLUCOSE: CPT

## 2024-05-24 PROCEDURE — 93018 CV STRESS TEST I&R ONLY: CPT | Performed by: INTERNAL MEDICINE

## 2024-05-24 PROCEDURE — 78452 HT MUSCLE IMAGE SPECT MULT: CPT

## 2024-05-24 PROCEDURE — 93016 CV STRESS TEST SUPVJ ONLY: CPT | Performed by: INTERNAL MEDICINE

## 2024-05-24 PROCEDURE — 78452 HT MUSCLE IMAGE SPECT MULT: CPT | Performed by: INTERNAL MEDICINE

## 2024-05-24 PROCEDURE — 93017 CV STRESS TEST TRACING ONLY: CPT

## 2024-05-24 PROCEDURE — 81001 URINALYSIS AUTO W/SCOPE: CPT | Performed by: SPECIALIST

## 2024-05-24 RX ORDER — ALBUTEROL SULFATE 90 UG/1
2 AEROSOL, METERED RESPIRATORY (INHALATION) EVERY 4 HOURS PRN
Status: DISCONTINUED | OUTPATIENT
Start: 2024-05-24 | End: 2024-05-25 | Stop reason: HOSPADM

## 2024-05-24 RX ORDER — REGADENOSON 0.08 MG/ML
0.4 INJECTION, SOLUTION INTRAVENOUS ONCE
Status: COMPLETED | OUTPATIENT
Start: 2024-05-24 | End: 2024-05-24

## 2024-05-24 RX ORDER — ALBUTEROL SULFATE 2.5 MG/3ML
2.5 SOLUTION RESPIRATORY (INHALATION)
Status: DISCONTINUED | OUTPATIENT
Start: 2024-05-24 | End: 2024-05-25 | Stop reason: HOSPADM

## 2024-05-24 RX ADMIN — ACETAMINOPHEN 650 MG: 325 TABLET ORAL at 23:40

## 2024-05-24 RX ADMIN — HYDROMORPHONE HYDROCHLORIDE 0.2 MG: 0.2 INJECTION, SOLUTION INTRAMUSCULAR; INTRAVENOUS; SUBCUTANEOUS at 21:01

## 2024-05-24 RX ADMIN — TRAMADOL HYDROCHLORIDE 25 MG: 50 TABLET, COATED ORAL at 18:48

## 2024-05-24 RX ADMIN — Medication 250 MG: at 17:58

## 2024-05-24 RX ADMIN — Medication 12.5 MG: at 11:07

## 2024-05-24 RX ADMIN — CEFEPIME HYDROCHLORIDE 1000 MG: 1 INJECTION, SOLUTION INTRAVENOUS at 02:10

## 2024-05-24 RX ADMIN — NICOTINE 1 PATCH: 14 PATCH, EXTENDED RELEASE TRANSDERMAL at 11:08

## 2024-05-24 RX ADMIN — FLUTICASONE FUROATE AND VILANTEROL TRIFENATATE 1 PUFF: 200; 25 POWDER RESPIRATORY (INHALATION) at 11:07

## 2024-05-24 RX ADMIN — SODIUM CHLORIDE 50 ML/HR: 0.9 INJECTION, SOLUTION INTRAVENOUS at 13:46

## 2024-05-24 RX ADMIN — CEFEPIME HYDROCHLORIDE 1000 MG: 1 INJECTION, SOLUTION INTRAVENOUS at 17:58

## 2024-05-24 RX ADMIN — REGADENOSON 0.4 MG: 0.08 INJECTION, SOLUTION INTRAVENOUS at 10:05

## 2024-05-24 RX ADMIN — SODIUM CHLORIDE, SODIUM LACTATE, POTASSIUM CHLORIDE, AND CALCIUM CHLORIDE 100 ML/HR: .6; .31; .03; .02 INJECTION, SOLUTION INTRAVENOUS at 05:08

## 2024-05-24 RX ADMIN — Medication 250 MG: at 11:07

## 2024-05-24 RX ADMIN — ACETAMINOPHEN 650 MG: 325 TABLET ORAL at 02:09

## 2024-05-24 RX ADMIN — Medication 12.5 MG: at 21:01

## 2024-05-24 RX ADMIN — UMECLIDINIUM 1 PUFF: 62.5 AEROSOL, POWDER ORAL at 11:07

## 2024-05-24 RX ADMIN — ALBUTEROL SULFATE 2 PUFF: 90 AEROSOL, METERED RESPIRATORY (INHALATION) at 18:50

## 2024-05-24 RX ADMIN — ALBUTEROL SULFATE 2.5 MG: 2.5 SOLUTION RESPIRATORY (INHALATION) at 20:51

## 2024-05-24 NOTE — PLAN OF CARE
Problem: SAFETY ADULT  Goal: Patient will remain free of falls  Description: INTERVENTIONS:  - Educate patient/family on patient safety including physical limitations  - Instruct patient to call for assistance with activity   - Consult OT/PT to assist with strengthening/mobility   - Keep Call bell within reach  - Keep bed low and locked with side rails adjusted as appropriate  - Keep care items and personal belongings within reach  - Initiate and maintain comfort rounds  - Make Fall Risk Sign visible to staff  - Offer Toileting every 2 Hours, in advance of need  - Initiate/Maintain bed alarm  - Obtain necessary fall risk management equipment: yellow socks on  - Apply yellow socks and bracelet for high fall risk patients  - Consider moving patient to room near nurses station  Outcome: Progressing     Problem: SAFETY ADULT  Goal: Maintain or return to baseline ADL function  Description: INTERVENTIONS:  -  Assess patient's ability to carry out ADLs; assess patient's baseline for ADL function and identify physical deficits which impact ability to perform ADLs (bathing, care of mouth/teeth, toileting, grooming, dressing, etc.)  - Assess/evaluate cause of self-care deficits   - Assess range of motion  - Assess patient's mobility; develop plan if impaired  - Assess patient's need for assistive devices and provide as appropriate  - Encourage maximum independence but intervene and supervise when necessary  - Involve family in performance of ADLs  - Assess for home care needs following discharge   - Consider OT consult to assist with ADL evaluation and planning for discharge  - Provide patient education as appropriate  Outcome: Progressing

## 2024-05-24 NOTE — ASSESSMENT & PLAN NOTE
Due to hx of radiation cystitis and alanis catheter.  Currently on IV Cefepime (Day 3).  If remains afebrile will de-escalate to oral regimen to complete 5 day course.

## 2024-05-24 NOTE — ASSESSMENT & PLAN NOTE
History of prostate cancer, status post radical prostatectomy, status post radiation therapy for periprostatic recurrence.  History of bladder neck contracture, status post cystoscopy transurethral resection bladder neck contracture bladder tumor evacuation of clots on 4/11/2024.  Frequent hospitalization for hemorrhagic cystitis, hematuria, urinary retention.    Pt is s/p cystoscopy with evaluation of clots and alanis placement  Urology following

## 2024-05-24 NOTE — PROGRESS NOTES
"Pulmonary Progress Note   Joel yWman 78 y.o. male MRN: [id number]    Unit/Bed#: 2 [place] [address] Encounter: [id number]          Subjective:   Patient seen and examined.  No significant events overnight.   Denies any shortness of breath chest pain nausea vomiting.    Tolerated stress test echocardiogram and urology procedure yesterday    Physical Exam:    GEN: alert and oriented x 3, pleasant and cooperative   HEENT:  Normocephalic, atraumatic, anicteric  NECK: No JVD  HEART: Rate, normal S1 and S2  LUNGS: Faint wheeze expiratory  ABDOMEN:  Normoactive bowel sounds, soft, no tenderness, no distention  EXTREMITIES: peripheral pulses palpable; no edema  NEURO: no gross focal findings; cranial nerves grossly intact   SKIN:  Dry, intact, warm to touch    Vitals: Blood pressure 127/69, pulse 68, temperature 98.1 °F (36.7 °C), resp. rate 18, height 5' 10\" (1.778 m), weight 94.3 kg (208 lb), SpO2 93%., Body mass index is 29.84 kg/m².,   Orthostatic Blood Pressures      Flowsheet Row Most Recent Value   Blood Pressure 127/69 filed at 05/24/2024 1527   Patient Position - Orthostatic VS Lying filed at 05/24/2024 0817              Intake/Output Summary (Last 24 hours) at 5/24/2024 1839  Last data filed at 5/24/2024 1301  Gross per 24 hour   Intake --   Output 4100 ml   Net -4100 ml       Labs & Results:      Results from last 7 days   Lab Units 05/24/24  0510 05/23/24  0531 05/22/24  1820   POTASSIUM mmol/L 4.6 4.3 4.3   CO2 mmol/L 25 26 24   CHLORIDE mmol/L 105 107 105   BUN mg/dL 31* 35* 36*   CREATININE mg/dL 1.09 1.41* 1.66*     Results from last 7 days   Lab Units 05/24/24  0510 05/23/24  0531 05/22/24  1820   HEMOGLOBIN g/dL 12.0 12.6 14.0   HEMATOCRIT % 36.6 39.4 42.3   PLATELETS Thousands/uL 207 222 261       Imaging:  I have personally reviewed pertinent films in PACS    Micro: Reviewed      I have performed an independent review and interpreted all relevant images in PACS, labs, pathology and external records relevant " to patient care     Assessment:    COPD group B not in exacerbation, mMRC 2-3, PFT with obstruction, FEV1 of 33 with air trapping and no recent flares.  His PTA inhaler is Trelegy which is optimal therapy.  He also complains of chronic bronchitis which is associated with his smoking disease.  He has a faint expiratory wheeze which could be mild mucus impaction.  He is not complaining of increased shortness of breath or sputum color changes therefore would not call this a flare.  Nicotine dependence  Complicated UTI with urinary retention    Plan:    Start flutter valve for airway clearance as he does have a faint wheeze  Continue incentive spirometry  Can use DuoNebs while inpatient  Continue Breo and Incruse while inpatient and on discharge can use Trelegy    Sam Mancera MD  Pulmonary and Critical Care Medicine

## 2024-05-24 NOTE — PROGRESS NOTES
"Progress Note - Urology      Patient: Joel Wyman   : 1945 Sex: male   MRN: [id number]     CSN: [id number]  Unit/Bed#: [address] [place] [address]     SUBJECTIVE:   Undergoing cardiac evaluation stress test for upcoming anesthesia for elective cystectomy down to bone      Objective   Vitals: /71   Pulse 80   Temp (!) 97.3 °F (36.3 °C) (Oral)   Resp 20   Ht 5' 10\" (1.778 m)   Wt 94.3 kg (208 lb)   SpO2 94%   BMI 29.84 kg/m²     I/O last 24 hours:  In: 300 [I.V.:300]  Out: 2600 [Urine:2600]      Physical Exam:   General Alert awake   Normocephalic atraumatic PERRLA  Lungs clear bilaterally  Cardiac normal S1 normal S2  Abdomen soft, flank pain  Extremities no edema      Lab Results: CBC:   Lab Results   Component Value Date    WBC 10.72 (H) 2024    HGB 12.0 2024    HCT 36.6 2024    MCV 89 2024     2024    RBC 4.11 2024    MCH 29.2 2024    MCHC 32.8 2024    RDW 14.8 2024    MPV 9.4 2024    NRBC 0 2024     CMP:   Lab Results   Component Value Date     2024    CO2 25 2024    BUN 31 (H) 2024    CREATININE 1.09 2024    CALCIUM 8.4 2024    AST 17 2024    ALT 19 2024    ALKPHOS 74 2024    EGFR 63 2024     Urinalysis:   Lab Results   Component Value Date    COLORU Light Brown 2024    CLARITYU Cloudy 2024    SPECGRAV <1.005 (L) 2024    PHUR 8.5 (A) 2024    PHUR 5.5 2018    LEUKOCYTESUR Large (A) 2024    NITRITE Negative 2024    GLUCOSEU Negative 2024    KETONESU Negative 2024    BILIRUBINUR Negative 2024    BLOODU Large (A) 2024     Urine Culture:   Lab Results   Component Value Date    URINECX >100,000 cfu/ml Corynebacterium urealyticum (A) 05/15/2024     PSA: No results found for: \"PSA\"      Assessment/ Plan:  Radiation cystitis  Recurrent bladder neck necrosis  Small capacity noncompliant overactive bladder  Right reflux " noted on cystogram  Discharge home with open SP tube and penile catheter  Patient to be admitted to [facility] on Tuesday, May 28 for elective cystectomy may 30          Yovani Khan MD

## 2024-05-24 NOTE — PROGRESS NOTES
[facility]  Progress Note  Name: Joel Wyman I  MRN: [id number]  Unit/Bed#: 2 [address] Date of Admission: 5/22/2024   Date of Service: 5/24/2024 I Hospital Day: 1    Assessment & Plan   Cardiac Risk Assessment  Assessment & Plan  Cardiology consulted by Urology team for preop assessment.  TTE and Stress Test ordered    Type 2 diabetes mellitus, without long-term current use of insulin ([facility])  Assessment & Plan  Resume SSI regimen     Sepsis ([facility])  Assessment & Plan  Now resolved.  Currently on IV Cefepime Day 3 with plan to de-escalate if pt remains afebrile    Acute renal failure superimposed on stage 3b chronic kidney disease (HCC)  Assessment & Plan  Now resolved and back to baseline     Complicated UTI (urinary tract infection)  Assessment & Plan  Due to hx of radiation cystitis and alanis catheter.  Currently on IV Cefepime (Day 3).  If remains afebrile will de-escalate to oral regimen to complete 5 day course.     COPD (chronic obstructive pulmonary disease) ([facility])  Assessment & Plan  Resume home regimen along with nebs prn     Essential hypertension  Assessment & Plan  Resume Metoprolol     * Urinary retention  Assessment & Plan    History of prostate cancer, status post radical prostatectomy, status post radiation therapy for periprostatic recurrence.  History of bladder neck contracture, status post cystoscopy transurethral resection bladder neck contracture bladder tumor evacuation of clots on 4/11/2024.  Frequent hospitalization for hemorrhagic cystitis, hematuria, urinary retention.    Pt is s/p cystoscopy with evaluation of clots and alanis placement  Urology following                  VTE Pharmacologic Prophylaxis:    SCDs    Mobility:   Basic Mobility Inpatient Raw Score: 22  JH-HLM Goal: 7: Walk 25 feet or more  JH-HLM Achieved: 7: Walk 25 feet or more  JH-HLM Goal achieved. Continue to encourage appropriate mobility.    Patient Centered Rounds: I performed bedside rounds  with nursing staff today.   Discussions with Specialists or Other Care Team Provider: Case Management    Education and Discussions with Family / Patient: Patient declined call to .     Total Time Spent on Date of Encounter in care of patient: 35 mins. This time was spent on one or more of the following: performing physical exam; counseling and coordination of care; obtaining or reviewing history; documenting in the medical record; reviewing/ordering tests, medications or procedures; communicating with other healthcare professionals and discussing with patient's family/caregivers.    Current Length of Stay: 1 day(s)  Current Patient Status: Inpatient   Certification Statement: The patient will continue to require additional inpatient hospital stay due to cardiac risk assessment; stress testing; UTI treatment   Discharge Plan: Anticipate discharge tomorrow to home.    Code Status: Level 1 - Full Code    Subjective:   Pt was seen and examined at bedside.  Denies any chest pain, SOB or abdominal pain.       Objective:     Vitals:   Temp (24hrs), Av.7 °F (36.5 °C), Min:97.3 °F (36.3 °C), Max:98.3 °F (36.8 °C)    Temp:  [97.3 °F (36.3 °C)-98.3 °F (36.8 °C)] 97.3 °F (36.3 °C)  HR:  [55-79] 69  Resp:  [14-20] 17  BP: (100-163)/(59-79) 127/71  SpO2:  [90 %-100 %] 96 %  Body mass index is 29.84 kg/m².     Input and Output Summary (last 24 hours):     Intake/Output Summary (Last 24 hours) at 2024 1032  Last data filed at 2024 0516  Gross per 24 hour   Intake 300 ml   Output 2600 ml   Net -2300 ml       Physical Exam:   General: in no acute distress  HEENT: atraumatic, normocephalic  Skin: no jaundice  CVS: RRR, no murmurs appreciated  Lungs: CTAL, no wheezing or rales appreciated  Abdomen: soft, nondistended, bowel sounds normal, nontender upon palpation, no guarding or rebound tenderness  Extremities: no edema, no calf swelling or tenderness  Neuro: alert and oriented x3, no tremors   Psych: calm,  cooperative      Additional Data:     Labs:  Results from last 7 days   Lab Units 05/24/24  0510   WBC Thousand/uL 10.72*   HEMOGLOBIN g/dL 12.0   HEMATOCRIT % 36.6   PLATELETS Thousands/uL 207   SEGS PCT % 83*   LYMPHO PCT % 9*   MONO PCT % 7   EOS PCT % 0     Results from last 7 days   Lab Units 05/24/24  0510 05/23/24  0531 05/22/24  1820   SODIUM mmol/L 135   < > 138   POTASSIUM mmol/L 4.6   < > 4.3   CHLORIDE mmol/L 105   < > 105   CO2 mmol/L 25   < > 24   BUN mg/dL 31*   < > 36*   CREATININE mg/dL 1.09   < > 1.66*   ANION GAP mmol/L 5   < > 9   CALCIUM mg/dL 8.4   < > 9.4   ALBUMIN g/dL  --   --  3.9   TOTAL BILIRUBIN mg/dL  --   --  0.48   ALK PHOS U/L  --   --  74   ALT U/L  --   --  19   AST U/L  --   --  17   GLUCOSE RANDOM mg/dL 193*   < > 195*    < > = values in this interval not displayed.         Results from last 7 days   Lab Units 05/24/24  0732 05/23/24  2042 05/23/24  1646 05/23/24  1158   POC GLUCOSE mg/dl 143* 288* 128 107     Results from last 7 days   Lab Units 05/22/24  1820   HEMOGLOBIN A1C % 6.6*     Results from last 7 days   Lab Units 05/23/24  0531 05/22/24  1820   PROCALCITONIN ng/ml 0.08 0.09       Lines/Drains:  Invasive Devices       Peripheral Intravenous Line  Duration             Peripheral IV 05/22/24 Left Forearm 1 day              Drain  Duration             Suprapubic Catheter -- days    Continuous Bladder Irrigation 8 days    Urethral Catheter Three way 24 Fr. <1 day                  Urinary Catheter:  Goal for removal:  will be discharged with alanis catheter and follow up with Urology                Imaging: No pertinent imaging reviewed.    Recent Cultures (last 7 days):         Last 24 Hours Medication List:   Current Facility-Administered Medications   Medication Dose Route Frequency Provider Last Rate    acetaminophen  650 mg Oral Q6H PRN Yovani Khan MD      albuterol  2.5 mg Nebulization Q6H PRN Yovani Khan MD      aluminum-magnesium hydroxide-simethicone  30 mL  Oral Q6H PRN Yovani Khan MD      cefepime  1,000 mg Intravenous Q12H CuCORWIN Kerns 1,000 mg (05/24/24 0210)    fluticasone-vilanterol  1 puff Inhalation Daily Yovani Khan MD      And    umeclidinium  1 puff Inhalation Daily Yovani Khan MD      HYDROmorphone  0.2 mg Intravenous Q4H PRN Yovani Khan MD      insulin lispro  1-5 Units Subcutaneous TID AC Yovani Khan MD      insulin lispro  1-5 Units Subcutaneous HS Yovani Khan MD      lactated ringers  1,000 mL Intravenous Once PRN Yovani Khan MD      And    lactated ringers  1,000 mL Intravenous Once PRN Yovani Khan MD      lactated ringers  100 mL/hr Intravenous Continuous Ruddy Gaffney  mL/hr (05/24/24 0508)    metoprolol tartrate  12.5 mg Oral Q12H [facility] Yovani Khan MD      nicotine  1 patch Transdermal Daily Yovani Khan MD      ondansetron  4 mg Intravenous Q6H PRN Yovani Khan MD      polyethylene glycol  17 g Oral Daily PRN Yovani Khan MD      saccharomyces boulardii  250 mg Oral BID Yovani Khan MD      sodium chloride  1,000 mL Intravenous Once PRN Yovani Khan MD      And    sodium chloride  1,000 mL Intravenous Once PRN Yovani Khan MD      sodium chloride  50 mL/hr Intravenous Continuous Yovani Khan MD Stopped (05/23/24 1400)    traMADol  25 mg Oral Q6H PRN Yovani Khan MD          Today, Patient Was Seen By: Rufino Churchill MD    **Please Note: This note may have been constructed using a voice recognition system.**

## 2024-05-24 NOTE — PROGRESS NOTES
Cardiology Progress Note  [facility]     Joel Wyman 78 y.o. male MRN: [id number]  : 1945  Unit/Bed#: [address] [place] [address] Encounter: [id number]        ASSESSMENT:     Presents on this occasion for cystoscopy evacuation of clots.  S/p prostatectomy with radiation cystitis and gross hematuria.  Noncardiac bladder neck.  Plan to undergo elective cystectomy next week at [facility].  Severe COPD.  Current half pack per day smoker; smoked for approximately 50 years.  PMHx includes hypertension, hyperlipidemia     EKG 2024: Sinus rhythm with PACs.  No diagnostic ischemic changes.       PLAN AND RECOMMENDATIONS:  Cardiology is consulted for preoperative restratification for upcoming cystectomy at [facility] next week.  He underwent a nuclear stress test and echo, results pending.  Continue low-dose metoprolol tartrate 12.5 mg twice daily.  Received pulmonary risk stratification for history of severe PAD.  Check lipid panel in AM.  Further recommendations to follow after results of above cardiac testing.        Interval history: Underwent a nuclear stress test on echo.  Denies having any chest pain, shortness of breath, or lightheadedness.        Meds/Allergies   current meds:   Current Facility-Administered Medications   Medication Dose Route Frequency    acetaminophen (TYLENOL) tablet 650 mg  650 mg Oral Q6H PRN    albuterol inhalation solution 2.5 mg  2.5 mg Nebulization Q6H PRN    aluminum-magnesium hydroxide-simethicone (MAALOX) oral suspension 30 mL  30 mL Oral Q6H PRN    cefepime (MAXIPIME) IVPB (premix in dextrose) 1,000 mg 50 mL  1,000 mg Intravenous Q12H    fluticasone-vilanterol 200-25 mcg/actuation 1 puff  1 puff Inhalation Daily    And    umeclidinium 62.5 mcg/actuation inhaler AEPB 1 puff  1 puff Inhalation Daily    HYDROmorphone HCl (DILAUDID) injection 0.2 mg  0.2 mg Intravenous Q4H PRN    insulin lispro (HumALOG/ADMELOG) 100 units/mL subcutaneous injection 1-5  "Units  1-5 Units Subcutaneous TID AC    insulin lispro (HumALOG/ADMELOG) 100 units/mL subcutaneous injection 1-5 Units  1-5 Units Subcutaneous HS    lactated ringers bolus 1,000 mL  1,000 mL Intravenous Once PRN    And    lactated ringers bolus 1,000 mL  1,000 mL Intravenous Once PRN    lactated ringers infusion  100 mL/hr Intravenous Continuous    metoprolol tartrate (LOPRESSOR) partial tablet 12.5 mg  12.5 mg Oral Q12H DHAVAL    nicotine (NICODERM CQ) 14 mg/24hr TD 24 hr patch 1 patch  1 patch Transdermal Daily    ondansetron (ZOFRAN) injection 4 mg  4 mg Intravenous Q6H PRN    polyethylene glycol (MIRALAX) packet 17 g  17 g Oral Daily PRN    saccharomyces boulardii (FLORASTOR) capsule 250 mg  250 mg Oral BID    sodium chloride 0.9 % bolus 1,000 mL  1,000 mL Intravenous Once PRN    And    sodium chloride 0.9 % bolus 1,000 mL  1,000 mL Intravenous Once PRN    sodium chloride 0.9 % infusion  50 mL/hr Intravenous Continuous    traMADol (ULTRAM) tablet 25 mg  25 mg Oral Q6H PRN     No Known Allergies    Objective   Vitals: Blood pressure 127/71, pulse 80, temperature (!) 97.3 °F (36.3 °C), temperature source Oral, resp. rate 20, height 5' 10\" (1.778 m), weight 94.3 kg (208 lb), SpO2 94%.,       Intake/Output Summary (Last 24 hours) at 5/24/2024 1240  Last data filed at 5/24/2024 0516  Gross per 24 hour   Intake 300 ml   Output 2600 ml   Net -2300 ml       Physical Exam:  General: pleasant, comfortable, in no acute distress  Neurologic: speech is coherent, alert and oriented x3  Cardiovascular: regular S1 and S2, no murmurs  Pulmonary: Few wheezes bilaterally.  Abdomen: soft, nontender  Extremities: no edema bilaterally      Lab Results:   Troponins:       Recent Results (from the past 24 hour(s))   Fingerstick Glucose (POCT)    Collection Time: 05/23/24  4:46 PM   Result Value Ref Range    POC Glucose 128 65 - 140 mg/dl   Fingerstick Glucose (POCT)    Collection Time: 05/23/24  8:42 PM   Result Value Ref Range    POC " Glucose 288 (H) 65 - 140 mg/dl   Basic metabolic panel    Collection Time: 05/24/24  5:10 AM   Result Value Ref Range    Sodium 135 135 - 147 mmol/L    Potassium 4.6 3.5 - 5.3 mmol/L    Chloride 105 96 - 108 mmol/L    CO2 25 21 - 32 mmol/L    ANION GAP 5 4 - 13 mmol/L    BUN 31 (H) 5 - 25 mg/dL    Creatinine 1.09 0.60 - 1.30 mg/dL    Glucose 193 (H) 65 - 140 mg/dL    Calcium 8.4 8.4 - 10.2 mg/dL    eGFR 63 ml/min/1.73sq m   CBC and differential    Collection Time: 05/24/24  5:10 AM   Result Value Ref Range    WBC 10.72 (H) 4.31 - 10.16 Thousand/uL    RBC 4.11 3.88 - 5.62 Million/uL    Hemoglobin 12.0 12.0 - 17.0 g/dL    Hematocrit 36.6 36.5 - 49.3 %    MCV 89 82 - 98 fL    MCH 29.2 26.8 - 34.3 pg    MCHC 32.8 31.4 - 37.4 g/dL    RDW 14.8 11.6 - 15.1 %    MPV 9.4 8.9 - 12.7 fL    Platelets 207 149 - 390 Thousands/uL    nRBC 0 /100 WBCs    Segmented % 83 (H) 43 - 75 %    Immature Grans % 1 0 - 2 %    Lymphocytes % 9 (L) 14 - 44 %    Monocytes % 7 4 - 12 %    Eosinophils Relative 0 0 - 6 %    Basophils Relative 0 0 - 1 %    Absolute Neutrophils 9.00 (H) 1.85 - 7.62 Thousands/µL    Absolute Immature Grans 0.06 0.00 - 0.20 Thousand/uL    Absolute Lymphocytes 0.94 0.60 - 4.47 Thousands/µL    Absolute Monocytes 0.70 0.17 - 1.22 Thousand/µL    Eosinophils Absolute 0.01 0.00 - 0.61 Thousand/µL    Basophils Absolute 0.01 0.00 - 0.10 Thousands/µL   Magnesium    Collection Time: 05/24/24  5:10 AM   Result Value Ref Range    Magnesium 1.9 1.9 - 2.7 mg/dL   Fingerstick Glucose (POCT)    Collection Time: 05/24/24  7:32 AM   Result Value Ref Range    POC Glucose 143 (H) 65 - 140 mg/dl   Fingerstick Glucose (POCT)    Collection Time: 05/24/24 11:52 AM   Result Value Ref Range    POC Glucose 125 65 - 140 mg/dl   Echo complete w/ contrast if indicated    Collection Time: 05/24/24 12:00 PM   Result Value Ref Range    Triscuspid Valve Regurgitation Peak Gradient 23.0 mmHg    RAA A4C 16.8 cm2    VENTURA A4C 20.3 cm2    LA Volume Index (BP)  25.9 mL/m2    MV Peak A Bhupinder 1.25 m/s    MV stenosis pressure 1/2 time 80 ms    MV Peak E Bhupinder 97 cm/s    AV peak gradient 18 mmHg    Ao VTI 41.51 cm    Aortic valve peak velocity 2.12 m/s    LVOT diameter 2.0 cm    E wave deceleration time 276 ms    E/A ratio 0.78     PV peak gradient antegrade 2 mmHg    MV valve area p 1/2 method 2.80     AV LVOT peak gradient 5 mmHg    AV mean gradient 10 mmHg    TR Peak Bhupinder 2.4 m/s    RVID d 2.9 cm    Aortic valve mean velocity 14.50 m/s    Tricuspid valve peak regurgitation velocity 2.42 m/s    Left ventricular stroke volume (2D) 15.00 mL    IVSd 1.20 cm    Tricuspid annular plane systolic excursion 3.10 cm    Ao root 4.10 cm    LVPWd 1.30 cm    LA size 3.2 cm    LA volume (BP) 55 mL    FS 30 28 - 44    LVIDS 1.90 cm    IVS 1.2 cm    LVIDd 2.70 cm    LA length (A2C) 5.50 cm    LEFT VENTRICLE SYSTOLIC VOLUME (MOD BIPLANE) 2D 12 mL    LV DIASTOLIC VOLUME (MOD BIPLANE) 2D 27 mL    Left Atrium Area-systolic Four Chamber 18.9 cm2    Left Atrium Area-systolic Apical Two Chamber 20.4 cm2    MV E' Tissue Velocity Lateral 8 cm/s    MV E' Tissue Velocity Septal 8 cm/s    LVSV, 2D 15 mL    LVOT area 3.14 cm2    BSA 2.12 m2   NM Myocardial Perfusion Spect (Pharmacological Induced Stress and/or Rest)    Collection Time: 05/24/24 12:38 PM   Result Value Ref Range    Rest Nuclear Isotope Dose 11.00 mCi    Stress Nuclear Isotope Dose 32.50 mCi    Baseline HR 75 bpm    Baseline /70 mmHg    O2 sat rest 94 %    Stress peak  bpm    Post peak  mmHg    O2 sat peak 94 %    Recovery HR 91 bpm    Recovery /56 mmHg    O2 sat recovery 96 %    Max  bpm    Max HR Percent 74 %    Exercise duration (min) 1 min    Estimated workload 1.0 METS    Rate Pressure Product 12,272.0           Cardiac testing:   Relevant testing reviewed - see above    Torsten Luna  5/24/2024  2:14 PM     This note was completed in part utilizing direct voice dictation software.  Grammatical errors,  random word insertions, spelling mistakes, and incomplete sentences may be an occasional consequence of the system secondary to software limitations, ambient noise and hardware issues. Please read the chart carefully and recognize, using context, where substitutions have occurred.

## 2024-05-24 NOTE — CASE MANAGEMENT
Case Management Assessment & Discharge Planning Note    Patient name Joel Wyman  Location [address] [place] [address]/[address] [place] [address] MRN [id number]  : 1945 Date 2024       Current Admission Date: 2024  Current Admission Diagnosis:Urinary retention   Patient Active Problem List    Diagnosis Date Noted Date Diagnosed    Cardiac Risk Assessment 2024     Type 2 diabetes mellitus, without long-term current use of insulin ([facility])      Sepsis ([facility]) 2024     Obesity (BMI 30.0-34.9) 2024     Nicotine abuse 2024     Acute renal failure superimposed on stage 3b chronic kidney disease ([facility])      Urinary retention 2021     Hematuria 2021     Prostate cancer ([facility]) - s/p resection 2021     Leukocytosis 2021     RA (rheumatoid arthritis) ([facility]) 2021     SHANTI (acute kidney injury) ([facility]) 2021     Complicated UTI (urinary tract infection) 2021     Gross hematuria      COPD (chronic obstructive pulmonary disease) ([facility]) 2021     Hyperlipidemia 2021     Essential hypertension 2021     Dyslipidemia 2021       LOS (days): 1  Geometric Mean LOS (GMLOS) (days): 10.1  Days to GMLOS:9.1     OBJECTIVE:  PATIENT READMITTED TO HOSPITAL  Risk of Unplanned Readmission Score: 32.54         Current admission status: Inpatient     Preferred Pharmacy:   [facility]/pharmacy #[id number] [place] [address]  Phone: [phone] Fax: [phone]    Primary Care Provider: Fabiano Carroll MD    Primary Insurance: [facility]  Secondary Insurance:     ASSESSMENT:  Active Health Care Proxies       Renee Wyman Health Care Representative - Spouse   Primary Phone: [phone] (Mobile)  Home Phone: [phone]                 Readmission Root Cause  30 Day Readmission: No, Yes  Who directed you to return to the hospital?: Self  Did you understand whom to contact if you had questions or problems?: Yes  Did  Injectable Neurology Medication Teaching        Patient Name/:     Cesia Jennings   1983  Injectable Neurology Medication Regimen:  Aimovig 140mg SQ Q28 days  Date Started Medication: 2021               Initial Teaching Follow Up Comments      Safety      Storage instructions (away from children; away from heat/cold, sunlight, or moisture)       “How are you storing your medications?”, reminders on storage, proper handling (away from children, managing waste, etc.), disposal of medication with D/C or dosage change     Patient reports appropriate storage and handling.      Adherence       patient and/or caregiver on how to take medication, take with/without food, assess their adherence potential, stress importance of adherence, ways to manage adherence (pill boxes, phone reminders, calendars), what to do if miss a dose    “How are you taking your medication?” “How are you remembering to take your medication?”, “How many doses have you missed?”, determine reasons for non-adherence (not remembering, side effects, etc), ways to improve, overadherence? Remind patient of ways to improve/maintain adherence Confirmed dose of Aimovig 140 mgSQ Q28 days. Discussed importance of compliance. LD of Aimovig given in office on 2021. Refill processed at [facility] and mailed to patient.     Side Effects/Adverse Reactions       patient on potential side effects, s/s, ways to manage, when to call MD/seek help       Determine if patient experiencing side effects, ways to manage  Patient denies adverse effects.      Miscellaneous      Food interactions, DDIs, financial issues Determine if patient started any new medications (analyze for DDI)       Additional Notes: Discussed aforementioned material with patient by phone. All questions and concerns addressed. Patient provided with my contact information and instructed to call if any additional questions arise. Notified [facility] of refill  request.          you get your prescriptions before you left the hospital?: No  Reason:: Preference for own pharmacy  Were you able to get your prescriptions filled when you left the hospital?: Yes  Did you take your medications as prescribed?: Yes  Were you able to get to your follow-up appointments?: No  Reason:: Readmitted prior to appointment  During previous admission, was a post-acute recommendation made?: No  Patient was readmitted due to: Urinary retention  Action Plan: Urology consult, cardiology consult, stress test, Echo    Patient Information  Admitted from:: Home  Mental Status: Alert  During Assessment patient was accompanied by: Spouse  Assessment information provided by:: Patient  Primary Caregiver: Family  Caregiver's Name:: Renee Wyman (wife)  Caregiver's Relationship to Patient:: Significant Other  Caregiver's Telephone Number:: [phone]  Support Systems: Spouse/significant other  County of Residence: [place]  What [facility] do you live in?: [place]  Living Arrangements: Lives w/ Spouse/significant other    Activities of Daily Living Prior to Admission  Functional Status: Independent  Completes ADLs independently?: Yes  Ambulates independently?: Yes  Does patient use assisted devices?: No  Does patient currently own DME?: Yes  What DME does the patient currently own?: Straight [employer], Walker  Does the patient have a history of Short-Term Rehab?: No  Does patient have a history of HHC?: Yes  Does patient currently have HHC?: No     Patient Information Continued  Income Source: Pension/FPC  Does patient have prescription coverage?: Yes  Does patient receive dialysis treatments?: No     Means of Transportation  Means of Transport to [facility]:: Drives Self      Social Determinants of Health (SDOH)      Flowsheet Row Most Recent Value   Housing Stability    In the last 12 months, was there a time when you were not able to pay the mortgage or rent on time? N   In the past 12 months, how many times have you moved  where you were living? 0   At any time in the past 12 months, were you homeless or living in a shelter (including now)? N   Transportation Needs    In the past 12 months, has lack of transportation kept you from medical appointments or from getting medications? no   In the past 12 months, has lack of transportation kept you from meetings, work, or from getting things needed for daily living? No   Food Insecurity    Within the past 12 months, you worried that your food would run out before you got the money to buy more. Never true   Within the past 12 months, the food you bought just didn't last and you didn't have money to get more. Never true   Utilities    In the past 12 months has the electric, gas, oil, or water company threatened to shut off services in your home? No            DISCHARGE DETAILS:    Discharge planning discussed with:: Patient and wife  Freedom of Choice: Yes  Comments - Freedom of Choice: D/C home  CM contacted family/caregiver?: Yes  Were Treatment Team discharge recommendations reviewed with patient/caregiver?: Yes  Did patient/caregiver verbalize understanding of patient care needs?: Yes  Were patient/caregiver advised of the risks associated with not following Treatment Team discharge recommendations?: Yes    Contacts  Patient Contacts: Renee Wyman (spouse)  Relationship to Patient:: Family  Contact Method: In Person  Reason/Outcome: Discharge Planning, Continuity of Care    Requested Home Health Care         Is the patient interested in HHC at discharge?: No    DME Referral Provided  Referral made for DME?: No    Other Referral/Resources/Interventions Provided:  Interventions: None Indicated  Referral Comments: CM spoke with patient and wife at bedside, introduced self and role, and screen for any anticipated discharge planning needs requiring CM assistance. Patient stated he has been indepedent. Patient denied having any discharge needs at the moment but did report he has a procedure to  be done at [place] and will be requiring IV abx and  iv fluids and was wondering if this CM could arrange that. CM made patient and spouse aware thatit can be arranged by case management at [place] when it will be prescribed. Patient and wife verbalized understaning. No CM needs indicated at this time, CM will continue to follow.     Treatment Team Recommendation: Home  Discharge Destination Plan:: Home  Transport at Discharge : Family         IMM Given (Date):: 05/23/24

## 2024-05-25 VITALS
BODY MASS INDEX: 29.78 KG/M2 | OXYGEN SATURATION: 95 % | HEART RATE: 51 BPM | TEMPERATURE: 98.1 F | WEIGHT: 208 LBS | DIASTOLIC BLOOD PRESSURE: 71 MMHG | HEIGHT: 70 IN | SYSTOLIC BLOOD PRESSURE: 131 MMHG | RESPIRATION RATE: 17 BRPM

## 2024-05-25 LAB
ANION GAP SERPL CALCULATED.3IONS-SCNC: 8 MMOL/L (ref 4–13)
BASOPHILS # BLD AUTO: 0.02 THOUSANDS/ÂΜL (ref 0–0.1)
BASOPHILS NFR BLD AUTO: 0 % (ref 0–1)
BUN SERPL-MCNC: 27 MG/DL (ref 5–25)
CALCIUM SERPL-MCNC: 8.4 MG/DL (ref 8.4–10.2)
CHEST PAIN STATEMENT: NORMAL
CHLORIDE SERPL-SCNC: 104 MMOL/L (ref 96–108)
CHOLEST SERPL-MCNC: 142 MG/DL
CO2 SERPL-SCNC: 26 MMOL/L (ref 21–32)
CREAT SERPL-MCNC: 1.15 MG/DL (ref 0.6–1.3)
EOSINOPHIL # BLD AUTO: 0.24 THOUSAND/ÂΜL (ref 0–0.61)
EOSINOPHIL NFR BLD AUTO: 3 % (ref 0–6)
ERYTHROCYTE [DISTWIDTH] IN BLOOD BY AUTOMATED COUNT: 15.1 % (ref 11.6–15.1)
GFR SERPL CREATININE-BSD FRML MDRD: 59 ML/MIN/1.73SQ M
GLUCOSE SERPL-MCNC: 91 MG/DL (ref 65–140)
GLUCOSE SERPL-MCNC: 94 MG/DL (ref 65–140)
HCT VFR BLD AUTO: 39.4 % (ref 36.5–49.3)
HDLC SERPL-MCNC: 33 MG/DL
HGB BLD-MCNC: 12.6 G/DL (ref 12–17)
IMM GRANULOCYTES # BLD AUTO: 0.06 THOUSAND/UL (ref 0–0.2)
IMM GRANULOCYTES NFR BLD AUTO: 1 % (ref 0–2)
LDLC SERPL CALC-MCNC: 90 MG/DL (ref 0–100)
LYMPHOCYTES # BLD AUTO: 1.7 THOUSANDS/ÂΜL (ref 0.6–4.47)
LYMPHOCYTES NFR BLD AUTO: 18 % (ref 14–44)
MAGNESIUM SERPL-MCNC: 1.8 MG/DL (ref 1.9–2.7)
MAX DIASTOLIC BP: 70 MMHG
MAX PREDICTED HEART RATE: 140 BPM
MCH RBC QN AUTO: 28.5 PG (ref 26.8–34.3)
MCHC RBC AUTO-ENTMCNC: 32 G/DL (ref 31.4–37.4)
MCV RBC AUTO: 89 FL (ref 82–98)
MONOCYTES # BLD AUTO: 0.71 THOUSAND/ÂΜL (ref 0.17–1.22)
MONOCYTES NFR BLD AUTO: 7 % (ref 4–12)
NEUTROPHILS # BLD AUTO: 6.99 THOUSANDS/ÂΜL (ref 1.85–7.62)
NEUTS SEG NFR BLD AUTO: 71 % (ref 43–75)
NONHDLC SERPL-MCNC: 109 MG/DL
NRBC BLD AUTO-RTO: 0 /100 WBCS
PLATELET # BLD AUTO: 217 THOUSANDS/UL (ref 149–390)
PMV BLD AUTO: 10 FL (ref 8.9–12.7)
POTASSIUM SERPL-SCNC: 4 MMOL/L (ref 3.5–5.3)
PROTOCOL NAME: NORMAL
RBC # BLD AUTO: 4.42 MILLION/UL (ref 3.88–5.62)
REASON FOR TERMINATION: NORMAL
SODIUM SERPL-SCNC: 138 MMOL/L (ref 135–147)
STRESS POST EXERCISE DUR MIN: 1 MIN
STRESS POST EXERCISE DUR SEC: 0 SEC
STRESS POST PEAK HR: 104 BPM
STRESS POST PEAK SYSTOLIC BP: 142 MMHG
TARGET HR FORMULA: NORMAL
TEST INDICATION: NORMAL
TRIGL SERPL-MCNC: 93 MG/DL
WBC # BLD AUTO: 9.72 THOUSAND/UL (ref 4.31–10.16)

## 2024-05-25 PROCEDURE — 99232 SBSQ HOSP IP/OBS MODERATE 35: CPT | Performed by: STUDENT IN AN ORGANIZED HEALTH CARE EDUCATION/TRAINING PROGRAM

## 2024-05-25 PROCEDURE — 94640 AIRWAY INHALATION TREATMENT: CPT

## 2024-05-25 PROCEDURE — 80048 BASIC METABOLIC PNL TOTAL CA: CPT | Performed by: STUDENT IN AN ORGANIZED HEALTH CARE EDUCATION/TRAINING PROGRAM

## 2024-05-25 PROCEDURE — 99239 HOSP IP/OBS DSCHRG MGMT >30: CPT | Performed by: STUDENT IN AN ORGANIZED HEALTH CARE EDUCATION/TRAINING PROGRAM

## 2024-05-25 PROCEDURE — 82948 REAGENT STRIP/BLOOD GLUCOSE: CPT

## 2024-05-25 PROCEDURE — 94760 N-INVAS EAR/PLS OXIMETRY 1: CPT

## 2024-05-25 PROCEDURE — 80061 LIPID PANEL: CPT | Performed by: PHYSICIAN ASSISTANT

## 2024-05-25 PROCEDURE — 85025 COMPLETE CBC W/AUTO DIFF WBC: CPT | Performed by: STUDENT IN AN ORGANIZED HEALTH CARE EDUCATION/TRAINING PROGRAM

## 2024-05-25 PROCEDURE — 83735 ASSAY OF MAGNESIUM: CPT | Performed by: STUDENT IN AN ORGANIZED HEALTH CARE EDUCATION/TRAINING PROGRAM

## 2024-05-25 RX ORDER — CEPHALEXIN 500 MG/1
500 CAPSULE ORAL EVERY 6 HOURS SCHEDULED
Qty: 12 CAPSULE | Refills: 0 | Status: SHIPPED | OUTPATIENT
Start: 2024-05-25 | End: 2024-05-28

## 2024-05-25 RX ORDER — PHENAZOPYRIDINE HYDROCHLORIDE 200 MG/1
200 TABLET, FILM COATED ORAL
Qty: 10 TABLET | Refills: 0 | Status: SHIPPED | OUTPATIENT
Start: 2024-05-25 | End: 2024-05-28

## 2024-05-25 RX ADMIN — CEFEPIME HYDROCHLORIDE 1000 MG: 1 INJECTION, SOLUTION INTRAVENOUS at 01:01

## 2024-05-25 RX ADMIN — NICOTINE 1 PATCH: 14 PATCH, EXTENDED RELEASE TRANSDERMAL at 10:10

## 2024-05-25 RX ADMIN — UMECLIDINIUM 1 PUFF: 62.5 AEROSOL, POWDER ORAL at 10:10

## 2024-05-25 RX ADMIN — HYDROMORPHONE HYDROCHLORIDE 0.2 MG: 0.2 INJECTION, SOLUTION INTRAMUSCULAR; INTRAVENOUS; SUBCUTANEOUS at 05:15

## 2024-05-25 RX ADMIN — HYDROMORPHONE HYDROCHLORIDE 0.2 MG: 0.2 INJECTION, SOLUTION INTRAMUSCULAR; INTRAVENOUS; SUBCUTANEOUS at 01:01

## 2024-05-25 RX ADMIN — Medication 250 MG: at 10:07

## 2024-05-25 RX ADMIN — Medication 12.5 MG: at 10:07

## 2024-05-25 RX ADMIN — ALBUTEROL SULFATE 2.5 MG: 2.5 SOLUTION RESPIRATORY (INHALATION) at 07:20

## 2024-05-25 RX ADMIN — FLUTICASONE FUROATE AND VILANTEROL TRIFENATATE 1 PUFF: 200; 25 POWDER RESPIRATORY (INHALATION) at 10:09

## 2024-05-25 NOTE — ASSESSMENT & PLAN NOTE
History of prostate cancer, status post radical prostatectomy, status post radiation therapy for periprostatic recurrence.  History of bladder neck contracture, status post cystoscopy transurethral resection bladder neck contracture bladder tumor evacuation of clots on 4/11/2024.  Frequent hospitalization for hemorrhagic cystitis, hematuria, urinary retention.    Pt is s/p cystoscopy with evaluation of clots and alanis placement.  Urology recommends discharge home with outpt follow up

## 2024-05-25 NOTE — DISCHARGE SUMMARY
[facility]  Discharge- Joel Wyman 1945, 78 y.o. male MRN: [id number]  Unit/Bed#: [address] [place] [address] Encounter: [id number]  Primary Care Provider: Fabiano Carroll MD   Date and time admitted to hospital: 5/22/2024  6:06 PM    Cardiac Risk Assessment  Assessment & Plan  Stress test reveals no significant perfusion abnormality.  TTE reveals EF of EF of 70% with grade 1 diastolic dysfunction.    Type 2 diabetes mellitus, without long-term current use of insulin ([facility])  Assessment & Plan  Resume SSI regimen     Sepsis ([facility])  Assessment & Plan  Now resolved.  Currently on IV Cefepime Day 4.  Will discharge home on oral Keflex to complete 7 day regimen     Stage 3b chronic kidney disease ([facility])  Assessment & Plan  Creatinine is back to baseline     Prostate cancer ([facility]) - s/p resection  Assessment & Plan  Noted history    Complicated UTI (urinary tract infection)  Assessment & Plan  Due to hx of radiation cystitis and alanis catheter.  Currently on IV Cefepime Day 4. Will discharge home on Keflex to complete one week course     COPD (chronic obstructive pulmonary disease) ([facility])  Assessment & Plan  Resume home regimen along with nebs prn     Essential hypertension  Assessment & Plan  Resume Metoprolol     * Urinary retention  Assessment & Plan    History of prostate cancer, status post radical prostatectomy, status post radiation therapy for periprostatic recurrence.  History of bladder neck contracture, status post cystoscopy transurethral resection bladder neck contracture bladder tumor evacuation of clots on 4/11/2024.  Frequent hospitalization for hemorrhagic cystitis, hematuria, urinary retention.    Pt is s/p cystoscopy with evaluation of clots and alanis placement.  Urology recommends discharge home with outpt follow up             Medical Problems       Resolved Problems  Date Reviewed: 5/22/2024   None       Discharging Physician / Practitioner: Rufino Churchill MD  PCP: Fabiano Wilkinson  MD Carly  Admission Date:   Admission Orders (From admission, onward)       Ordered        05/23/24 1414  INPATIENT ADMISSION  Once            05/22/24 1849  Place in Observation  Once                          Discharge Date: 05/25/24    Consultations During Hospital Stay:  Urology  Pulmonary  Cardiology    Procedures Performed:   Stress test   cystoscopy    Significant Findings / Test Results:     5/23/24:   Cystoscopy report:   Operative Findings:  #1 necrotic losing bladder neck undergoing full resection fulguration no bleeding at this time Path not sent  #2 fluoroscopic cystogram confirmed small capacity bladder less than 200 cc right reflux  #3 16 [place] SP tube placed by interventional radiology 3 weeks ago unable to be removed due to severe encrustation at the balloon site attempts to remove could cause significant bladder bleeding from known radiation cystitis and left in situ patient scheduled for cystectomy next week at [place] able to irrigate at this time leave open to bag drainage    5/24/24  TTE:     Left Ventricle: Left ventricular cavity size is normal. Wall thickness is mildly increased. There is mild concentric hypertrophy. The left ventricular ejection fraction is 70% by visual estimation. Systolic function is vigorous. Although no diagnostic regional wall motion abnormality was identified, this possibility cannot be completely excluded on the basis of this study. Diastolic function is mildly abnormal, consistent with grade I (abnormal) relaxation.  Left atrial filling pressure is normal.    Aortic Valve: The aortic valve is trileaflet. The leaflets are moderately thickened. The leaflets are mildly calcified. There is mildly reduced mobility. There is mild stenosis.  Mean gradient 10 mmHg aortic valve area around 1.5 cm 2.    Mitral Valve: There is moderate annular calcification. There is trace to mild regurgitation.    Tricuspid Valve: There is mild regurgitation.  Pulmonary artery pressure  "around 30 to 35 mmHg.    Aorta: The aortic root is mildly dilated.  4.1 cm.  Visualized portion of ascending aorta appears to be mildly dilated consider alternate imaging modality for accurate sizing.    The quality of the study is limited due to poor acoustic windows with underlying COPD.            5/24/24:      Stress ECG: No ST deviation is noted. There were no arrhythmias during recovery. . The stress ECG is negative for ischemia after pharmacologic vasodilation, without reproduction of symptoms.    Perfusion Defect Conclusion: The right ventricle is dilated.    Perfusion: There is a left ventricular perfusion defect that is small in size with mild reduction in uptake present in the entire inferior location(s) that is fixed.  Most likely due to body attenuation artifact no prone images were done.    Stress Function: Left ventricular function post-stress is normal. Stress ejection fraction is 75%.    Stress Combined Conclusion: Left ventricular perfusion is probably normal.  EF 75%.  Mild intensity fixed inferior wall defect most likely due to body attenuation artifact.  No reversible perfusion abnormality noted.   TID index 1.26.    Incidental Findings:   Refer to above reports    I reviewed the above mentioned incidental findings with the patient and/or family and they expressed understanding.    Test Results Pending at Discharge (will require follow up):   none     Outpatient Tests Requested:  none    Complications:  none    Reason for Admission: alanis catheter malfunction    Hospital Course:   Joel Wyman is a 78 y.o. male with a PMH of CKD Stage III, COPD, DM, HTN and Prostate Cancer with radiation cystitis who presented with alanis catheter malfunction.  Per HPI the pt reported that his \"alanis catheter stopped draining\" and was advised to go to the ED by his Urologist. On arrival he was found to have a UTI and was started on IV Cefepime.  Urology evaluated the pt and recommended Cardiology clearance for " "cystoscopy.  He was also wheezing on exam and therefore Pulmonary was also consulted to optimize his respiratory status. He underwent a stress test which revealed which was negative for ischemia.  His TTE revealed an EF of 70% with grade 1 diastolic dysfunction. Urology performed a cystoscopy with CBI and alanis placement. As the pt remained afebrile he was discharged home on Keflex to complete a one week course of antibiotics for his UTI.  He was advised to follow up with his PCP and Urologist within one week of discharge. He was al     Please see above list of diagnoses and related plan for additional information.     Condition at Discharge: fair    Discharge Day Visit / Exam:   Subjective:  Pt was seen and examined at bedside.  Denies any abdominal pain.  Denies any chest pain or shortness of breath.     Vitals: Blood Pressure: 131/71 (05/25/24 0708)  Pulse: (!) 51 (05/25/24 0720)  Temperature: 98.1 °F (36.7 °C) (05/25/24 0708)  Temp Source: Oral (05/25/24 0708)  Respirations: 17 (05/25/24 0708)  Height: 5' 10\" (177.8 cm) (05/24/24 1116)  Weight - Scale: 94.3 kg (208 lb) (05/24/24 1116)  SpO2: 95 % (05/25/24 0720)  Exam:   General: in no acute distress  HEENT: atraumatic, normocephalic  Skin: no jaundice  CVS: RRR, no murmurs appreciated  Lungs: CTAL, no wheezing or rales appreciated  Abdomen: soft, nondistended, bowel sounds normal, nontender upon palpation, no guarding or rebound tenderness  Extremities: no edema, no calf swelling or tenderness  Neuro: alert and oriented x3, no tremors   Psych: calm, cooperative      Discussion with Family: Patient declined call to .     Discharge instructions/Information to patient and family:   See after visit summary for information provided to patient and family.      Provisions for Follow-Up Care:  See after visit summary for information related to follow-up care and any pertinent home health orders.      Mobility at time of Discharge:   Basic Mobility " Inpatient Raw Score: 23  JH-HLM Goal: 7: Walk 25 feet or more  JH-HLM Achieved: 6: Walk 10 steps or more  HLM Goal achieved. Continue to encourage appropriate mobility.     Disposition:   Home    Planned Readmission: no     Discharge Statement:  I spent 35 minutes discharging the patient. This time was spent on the day of discharge. I had direct contact with the patient on the day of discharge. Greater than 50% of the total time was spent examining patient, answering all patient questions, arranging and discussing plan of care with patient as well as directly providing post-discharge instructions.  Additional time then spent on discharge activities.    Discharge Medications:  See after visit summary for reconciled discharge medications provided to patient and/or family.      **Please Note: This note may have been constructed using a voice recognition system**

## 2024-05-25 NOTE — PROGRESS NOTES
Progress Note - Cardiology   [facility]     Joel Wyman 78 y.o. male MRN: [id number]  : 1945  Unit/Bed#: [address] [place] [address] Encounter: [id number]    Assessment and Plan:   Preoperative cardiac risk stratification.  - Cardiology consulted for preoperative cardiac risk stratification for simple cystectomy, with ileal conduit creation scheduled for 24 at [facility].  - Patient is considered low to intermediate risk for planned urological procedure.  No contraindication from a cardiology standpoint to proceed with surgery.  No further cardiac testing indicated at this time.  - 24 nuclear stress test: Stress Combined Conclusion: Left ventricular perfusion is probably normal. EF 75%. Mild intensity fixed inferior wall defect most likely due to body attenuation artifact. No reversible perfusion abnormality noted. TID index 1.26.   - 24 TTE: LVEF 70%. Diastolic function is mildly abnormal, consistent with grade I (abnormal) relaxation. Left atrial filling pressure is normal. Aortic Valve: The aortic valve is trileaflet. The leaflets are moderately thickened. The leaflets are mildly calcified. There is mildly reduced mobility. There is mild stenosis.  Mean gradient 10 mmHg aortic valve area around 1.5 cm 2. Mitral Valve: There is moderate annular calcification. There is trace to mild regurgitation.Tricuspid Valve: There is mild regurgitation.  Pulmonary artery pressure around 30 to 35 mmHg. Aorta: The aortic root is mildly dilated.  4.1 cm.  Visualized portion of ascending aorta appears to be mildly dilated consider alternate imaging modality for accurate sizing.    Essential hypertension.  - BP acceptable.  - Currently on Lopressor 12.5 mg twice daily.  - Continue to monitor BP.    Mild aortic valve stenosis.  - 24 TTE:   Aortic Valve The aortic valve is trileaflet. The leaflets are moderately thickened. The leaflets are mildly calcified. There is mildly reduced  "mobility. There is trace regurgitation. There is mild stenosis.  Mean gradient 10 mmHg aortic valve area around 1.5 cm 2.     Urinary retention.  - Patient with documented history of radical gold prostatectomy in 2014.   - S/p radiation.  - Review of chart notes that patient has had frequent hospitalizations for hemorrhagic cystitis, hematuria and urinary retention.  - S/p cystoscopy fulguration resection bladder neck, fluoroscopy cystogram, evacuation of clots on 5/23/24.   - Urology following.    Prostate cancer s/p radical prostatectomy.  - Patient with documented history of radical gold prostatectomy in 2014.   - S/p radiation.  - Review of chart notes that patient has had frequent hospitalizations for hemorrhagic cystitis, hematuria and urinary retention.  - Urology following.    COPD.  - Not in acute exacerbation.  - Pulmonology recommendations noted.    Hyperlipidemia.  - 5/25/24 lipid panel: Cholesterol 142, triglycerides 93, HDL 33, LDL 90.  - Not currently on statin therapy.    CKD stage III.  - Baseline creatinine appears between 1.2-1.3.  - Care per primary team.     Type II diabetes.   - 5/22/24 HgbA1c: 6.6.   - Care per primary team.  Subjective / Objective:         No acute events.  Patient pending discharge.    Vitals: Blood pressure 131/71, pulse (!) 51, temperature 98.1 °F (36.7 °C), temperature source Oral, resp. rate 17, height 5' 10\" (1.778 m), weight 94.3 kg (208 lb), SpO2 95%.  Vitals:    05/22/24 1955 05/24/24 1116   Weight: 94.3 kg (208 lb) 94.3 kg (208 lb)     Body mass index is 29.84 kg/m².  BP Readings from Last 3 Encounters:   05/25/24 131/71   05/18/24 124/64   05/06/24 123/68     Orthostatic Blood Pressures      Flowsheet Row Most Recent Value   Blood Pressure 131/71 filed at 05/25/2024 0708   Patient Position - Orthostatic VS Lying filed at 05/25/2024 0708          I/O         05/23 0701  05/24 0700 05/24 0701  05/25 0700 05/25 0701  05/26 0700    P.O.       I.V. (mL/kg) 300 (3.2)   "    IV Piggyback       Total Intake(mL/kg) 300 (3.2)      Urine (mL/kg/hr) 2600 (1.1) 5100 (2.3)     Total Output 2600 5100     Net -2300 -5100                  Invasive Devices       Peripheral Intravenous Line  Duration             Peripheral IV 05/22/24 Left Forearm 2 days              Drain  Duration             Suprapubic Catheter -- days    Continuous Bladder Irrigation 9 days    Urethral Catheter Three way 24 Fr. 1 day                      Intake/Output Summary (Last 24 hours) at 5/25/2024 0808  Last data filed at 5/25/2024 0516  Gross per 24 hour   Intake --   Output 5100 ml   Net -5100 ml         Physical Exam:   Physical Exam  Vitals reviewed.   Constitutional:       General: He is not in acute distress.  Cardiovascular:      Rate and Rhythm: Normal rate and regular rhythm.      Pulses: Normal pulses.      Heart sounds: Murmur heard.   Pulmonary:      Effort: Pulmonary effort is normal. No respiratory distress.      Breath sounds: Normal breath sounds.   Abdominal:      General: Abdomen is flat. There is no distension.      Palpations: Abdomen is soft.      Tenderness: There is no abdominal tenderness.   Musculoskeletal:      Right lower leg: No edema.      Left lower leg: No edema.   Skin:     General: Skin is warm and dry.   Neurological:      Mental Status: He is alert and oriented to person, place, and time.       Medications/ Allergies:     Current Facility-Administered Medications   Medication Dose Route Frequency Provider Last Rate    acetaminophen  650 mg Oral Q6H PRN Yovani Khan MD      albuterol  2 puff Inhalation Q4H PRN Rufino Churchill MD      albuterol  2.5 mg Nebulization 4x Daily Sam Mancera MD      aluminum-magnesium hydroxide-simethicone  30 mL Oral Q6H PRN Yovani Khan MD      cefepime  1,000 mg Intravenous Q12H CORWIN Braden 1,000 mg (05/25/24 0101)    fluticasone-vilanterol  1 puff Inhalation Daily Yovani Khan MD      And    umeclidinium  1 puff Inhalation Daily Yovani  MD Bill      HYDROmorphone  0.2 mg Intravenous Q4H PRN Yovani Khan MD      insulin lispro  1-5 Units Subcutaneous TID AC Yovani Khan MD      insulin lispro  1-5 Units Subcutaneous HS Yovani Khan MD      metoprolol tartrate  12.5 mg Oral Q12H DHAVAL Yovani Khan MD      nicotine  1 patch Transdermal Daily Yovani Khan MD      ondansetron  4 mg Intravenous Q6H PRN Yovani Khan MD      polyethylene glycol  17 g Oral Daily PRN Yovani Khan MD      saccharomyces boulardii  250 mg Oral BID Yovani Khan MD      sodium chloride  50 mL/hr Intravenous Continuous Yovani Khan MD 50 mL/hr (05/24/24 1346)    traMADol  25 mg Oral Q6H PRN Yovani Khan MD       acetaminophen, 650 mg, Q6H PRN  albuterol, 2 puff, Q4H PRN  aluminum-magnesium hydroxide-simethicone, 30 mL, Q6H PRN  HYDROmorphone, 0.2 mg, Q4H PRN  ondansetron, 4 mg, Q6H PRN  polyethylene glycol, 17 g, Daily PRN  traMADol, 25 mg, Q6H PRN      No Known Allergies    VTE Pharmacologic Prophylaxis:   Sequential compression device (Venodyne)     Labs:   Troponins:        CBC with diff:  Results from last 7 days   Lab Units 05/25/24  0514 05/24/24  0510 05/23/24  0531 05/22/24  1820   WBC Thousand/uL 9.72 10.72* 9.52 13.07*   HEMOGLOBIN g/dL 12.6 12.0 12.6 14.0   HEMATOCRIT % 39.4 36.6 39.4 42.3   MCV fL 89 89 90 89   PLATELETS Thousands/uL 217 207 222 261   RBC Million/uL 4.42 4.11 4.39 4.78   MCH pg 28.5 29.2 28.7 29.3   MCHC g/dL 32.0 32.8 32.0 33.1   RDW % 15.1 14.8 15.4* 15.3*   MPV fL 10.0 9.4 9.5 9.3   NRBC AUTO /100 WBCs 0 0 0 0       CMP:  Results from last 7 days   Lab Units 05/25/24  0514 05/24/24  0510 05/23/24  0531 05/22/24  1820   SODIUM mmol/L 138 135 139 138   POTASSIUM mmol/L 4.0 4.6 4.3 4.3   CHLORIDE mmol/L 104 105 107 105   CO2 mmol/L 26 25 26 24   ANION GAP mmol/L 8 5 6 9   BUN mg/dL 27* 31* 35* 36*   CREATININE mg/dL 1.15 1.09 1.41* 1.66*   GLUCOSE FASTING mg/dL  --   --  115*  --    CALCIUM mg/dL 8.4 8.4 8.9 9.4   AST  "U/L  --   --   --  17   ALT U/L  --   --   --  19   ALK PHOS U/L  --   --   --  74   TOTAL PROTEIN g/dL  --   --   --  7.4   ALBUMIN g/dL  --   --   --  3.9   TOTAL BILIRUBIN mg/dL  --   --   --  0.48   EGFR ml/min/1.73sq m 59 63 46 38       Magnesium:  Results from last 7 days   Lab Units 05/25/24  0514 05/24/24  0510 05/23/24  0531 05/22/24  1820   MAGNESIUM mg/dL 1.8* 1.9 2.0 1.9     Coags:    TSH:    No components found for: \"TSH3\"  Lipid Profile:  Results from last 7 days   Lab Units 05/25/24  0514   CHOLESTEROL mg/dL 142   TRIGLYCERIDES mg/dL 93   HDL mg/dL 33*   LDL CALC mg/dL 90     Hgb A1c:  Results from last 7 days   Lab Units 05/22/24  1820   HEMOGLOBIN A1C % 6.6*     NT-proBNP: No results for input(s): \"NTBNP\" in the last 72 hours.     Imaging & Testing   I have personally reviewed pertinent reports.      FL cystogram    Result Date: 5/24/2024    Impression: Cystogram performed during cystoscopy by urological service. Please refer to separate procedure report.    CTA abdomen pelvis w wo contrast    Result Date: 5/14/2024    Impression: 1. Suprapubic Sumner catheter balloon in expected position. Findings compatible with cystitis. No gross evidence of hematuria or hematoma. 2. No evidence of pyelonephritis or obstructive uropathy. 3. No evidence of abdominal aortic aneurysm or dissection. No significant stenosis in the renal or mesenteric arteries. Workstation performed: [id number]     IR suprapubic catheter placement    Result Date: 5/3/2024    Impression: Impression: Successful placement of a 16 Indonesian suprapubic Sumner Councill catheter. Plan:  Follow up with Urology.     CT renal protocol    Result Date: 5/1/2024    Impression: Decompressed bladder with diffuse wall thickening with surrounding inflammatory changes. Intraluminal Sumner balloon with small amount of contrast. Unremarkable renal collecting systems and ureters. Unchanged subcentimeter renal cysts bilaterally.        EKG / Monitor: Personally " reviewed.      Not on telemetry.     Cardiac testing:   NM Myocardial Perfusion Spect (Pharmacological Induced Stress and/or Rest)    Result Date: 5/24/2024  Narrative:   Stress ECG: No ST deviation is noted. There were no arrhythmias during recovery. . The stress ECG is negative for ischemia after pharmacologic vasodilation, without reproduction of symptoms.   Perfusion Defect Conclusion: The right ventricle is dilated.   Perfusion: There is a left ventricular perfusion defect that is small in size with mild reduction in uptake present in the entire inferior location(s) that is fixed.  Most likely due to body attenuation artifact no prone images were done.   Stress Function: Left ventricular function post-stress is normal. Stress ejection fraction is 75%.   Stress Combined Conclusion: Left ventricular perfusion is probably normal.  EF 75%.  Mild intensity fixed inferior wall defect most likely due to body attenuation artifact.  No reversible perfusion abnormality noted.   TID index 1.26.     Echo complete w/ contrast if indicated    Result Date: 5/24/2024  Narrative:   Left Ventricle: Left ventricular cavity size is normal. Wall thickness is mildly increased. There is mild concentric hypertrophy. The left ventricular ejection fraction is 70% by visual estimation. Systolic function is vigorous. Although no diagnostic regional wall motion abnormality was identified, this possibility cannot be completely excluded on the basis of this study. Diastolic function is mildly abnormal, consistent with grade I (abnormal) relaxation.  Left atrial filling pressure is normal.   Aortic Valve: The aortic valve is trileaflet. The leaflets are moderately thickened. The leaflets are mildly calcified. There is mildly reduced mobility. There is mild stenosis.  Mean gradient 10 mmHg aortic valve area around 1.5 cm 2.   Mitral Valve: There is moderate annular calcification. There is trace to mild regurgitation.   Tricuspid Valve: There  is mild regurgitation.  Pulmonary artery pressure around 30 to 35 mmHg.   Aorta: The aortic root is mildly dilated.  4.1 cm.  Visualized portion of ascending aorta appears to be mildly dilated consider alternate imaging modality for accurate sizing.   The quality of the study is limited due to poor acoustic windows with underlying COPD.         Lillian Galvez PA-C

## 2024-05-25 NOTE — ASSESSMENT & PLAN NOTE
Now resolved.  Currently on IV Cefepime Day 4.  Will discharge home on oral Keflex to complete 7 day regimen

## 2024-05-25 NOTE — PROGRESS NOTES
"Pulmonary Progress Note   Joel Wyman 78 y.o. male MRN: [id number]    Unit/Bed#: [address] [place] [address] Encounter: [id number]          Subjective:   Patient seen and examined.  No significant events overnight.   Denies any shortness of breath chest pain nausea vomiting.    Doing well today, no new complaints.  Says is going to be discharged today.  Was walking around the room    Physical Exam:    GEN: alert and oriented x 3, pleasant and cooperative   HEENT:  Normocephalic, atraumatic, anicteric  NECK: No JVD  HEART: Rate, normal S1 and S2  LUNGS: Faint wheeze expiratory  ABDOMEN:  Normoactive bowel sounds, soft, no tenderness, no distention  EXTREMITIES: peripheral pulses palpable; no edema  NEURO: no gross focal findings; cranial nerves grossly intact   SKIN:  Dry, intact, warm to touch    Vitals: Blood pressure 131/71, pulse (!) 51, temperature 98.1 °F (36.7 °C), temperature source Oral, resp. rate 17, height 5' 10\" (1.778 m), weight 94.3 kg (208 lb), SpO2 95%., Body mass index is 29.84 kg/m².,   Orthostatic Blood Pressures      Flowsheet Row Most Recent Value   Blood Pressure 131/71 filed at 05/25/2024 0708   Patient Position - Orthostatic VS Lying filed at 05/25/2024 0708              Intake/Output Summary (Last 24 hours) at 5/25/2024 1144  Last data filed at 5/25/2024 1001  Gross per 24 hour   Intake --   Output 5400 ml   Net -5400 ml       Labs & Results:      Results from last 7 days   Lab Units 05/25/24  0514 05/24/24  0510 05/23/24  0531   POTASSIUM mmol/L 4.0 4.6 4.3   CO2 mmol/L 26 25 26   CHLORIDE mmol/L 104 105 107   BUN mg/dL 27* 31* 35*   CREATININE mg/dL 1.15 1.09 1.41*     Results from last 7 days   Lab Units 05/25/24  0514 05/24/24  0510 05/23/24  0531   HEMOGLOBIN g/dL 12.6 12.0 12.6   HEMATOCRIT % 39.4 36.6 39.4   PLATELETS Thousands/uL 217 207 222       Imaging:  I have personally reviewed pertinent films in PACS    Micro: Reviewed      I have performed an independent review and interpreted all " relevant images in PACS, labs, pathology and external records relevant to patient care     Assessment:    COPD group B not in exacerbation, mMRC 2-3, PFT with obstruction, FEV1 of 33 with air trapping and no recent flares.  His PTA inhaler is Trelegy which is optimal therapy.  He also complains of chronic bronchitis which is associated with his smoking disease.  He has a faint expiratory wheeze which could be mild mucus impaction.  He is not complaining of increased shortness of breath or sputum color changes therefore would not call this a flare.  Nicotine dependence  Complicated UTI with urinary retention    Plan:    Continue flutter valve  Continue incentive spirometry  Can use nebulizer as needed at home  Resume Trelegy on discharge, he is aware    Sam Mancera MD  Pulmonary and Critical Care Medicine

## 2024-05-25 NOTE — PLAN OF CARE
P   Problem: SAFETY ADULT  Goal: Maintain or return to baseline ADL function  Description: INTERVENTIONS:  -  Assess patient's ability to carry out ADLs; assess patient's baseline for ADL function and identify physical deficits which impact ability to perform ADLs (bathing, care of mouth/teeth, toileting, grooming, dressing, etc.)  - Assess/evaluate cause of self-care deficits   - Assess range of motion  - Assess patient's mobility; develop plan if impaired  - Assess patient's need for assistive devices and provide as appropriate  - Encourage maximum independence but intervene and supervise when necessary  - Involve family in performance of ADLs  - Assess for home care needs following discharge   - Consider OT consult to assist with ADL evaluation and planning for discharge  - Provide patient education as appropriate  Outcome: Progressing     Problem: DISCHARGE PLANNING  Goal: Discharge to home or other facility with appropriate resources  Description: INTERVENTIONS:  - Identify barriers to discharge w/patient and caregiver  - Arrange for needed discharge resources and transportation as appropriate  - Identify discharge learning needs (meds, wound care, etc.)  - Arrange for interpretive services to assist at discharge as needed  - Refer to Case Management Department for coordinating discharge planning if the patient needs post-hospital services based on physician/advanced practitioner order or complex needs related to functional status, cognitive ability, or social support system  Outcome: Progressing

## 2024-05-25 NOTE — ASSESSMENT & PLAN NOTE
Due to hx of radiation cystitis and alanis catheter.  Currently on IV Cefepime Day 4. Will discharge home on Keflex to complete one week course

## 2024-05-25 NOTE — NURSING NOTE
Patient left with all belongings. AVS reviewed and explained to patient. IV removed. Left and stable condition

## 2024-05-25 NOTE — ASSESSMENT & PLAN NOTE
Stress test reveals no significant perfusion abnormality.  TTE reveals EF of EF of 70% with grade 1 diastolic dysfunction.

## 2024-05-27 LAB
CHEST PAIN STATEMENT: NORMAL
MAX DIASTOLIC BP: 70 MMHG
MAX PREDICTED HEART RATE: 140 BPM
PROTOCOL NAME: NORMAL
REASON FOR TERMINATION: NORMAL
STRESS POST EXERCISE DUR MIN: 1 MIN
STRESS POST EXERCISE DUR SEC: 0 SEC
STRESS POST PEAK HR: 104 BPM
STRESS POST PEAK SYSTOLIC BP: 142 MMHG
TARGET HR FORMULA: NORMAL
TEST INDICATION: NORMAL

## 2024-05-28 LAB
ATRIAL RATE: 65 BPM
P AXIS: 70 DEGREES
PR INTERVAL: 142 MS
QRS AXIS: 38 DEGREES
QRSD INTERVAL: 96 MS
QT INTERVAL: 418 MS
QTC INTERVAL: 434 MS
T WAVE AXIS: 57 DEGREES
VENTRICULAR RATE: 65 BPM

## 2024-05-28 PROCEDURE — 93010 ELECTROCARDIOGRAM REPORT: CPT | Performed by: INTERNAL MEDICINE

## 2024-05-28 NOTE — UTILIZATION REVIEW
NOTIFICATION OF ADMISSION DISCHARGE   This is a Notification of Discharge from [facility]. Please be advised that this patient has been discharge from our facility. Below you will find the admission and discharge date and time including the patient’s disposition.   UTILIZATION REVIEW CONTACT:  Danuta Byrne  Utilization   Network Utilization Review Department  Phone: [phone] x carefully listen to the prompts. All voicemails are confidential.  Email: [email or web address]     ADMISSION INFORMATION  PRESENTATION DATE: 5/22/2024  6:06 PM  OBERVATION ADMISSION DATE:   INPATIENT ADMISSION DATE: 5/23/24  2:14 PM   DISCHARGE DATE: 5/25/2024 11:35 AM   DISPOSITION:Home/Self Care    Network Utilization Review Department  ATTENTION: Please call with any questions or concerns to [phone] and carefully listen to the prompts so that you are directed to the right person. All voicemails are confidential.   For Discharge needs, contact Care Management DC Support Team at [phone] opt. 2  Send all requests for admission clinical reviews, approved or denied determinations and any other requests to dedicated fax number below belonging to the campus where the patient is receiving treatment. List of dedicated fax numbers for the Facilities:  FACILITY NAME UR FAX NUMBER   ADMISSION DENIALS (Administrative/Medical Necessity) [phone]   DISCHARGE SUPPORT TEAM ([facility]) [phone]   PARENT CHILD HEALTH (Maternity/NICU/Pediatrics) [phone]   [facility] [phone]   [facility] [phone]   [facility] [phone]   [facility] [phone]   [facility] [phone]   [facility] [phone]   [facility] [phone]   [facility] [phone]    [facility] [phone]   [facility], [facility] [phone]   [facility] [phone]   [facility] [phone]

## 2024-06-22 PROBLEM — A41.9 SEPSIS (HCC): Status: RESOLVED | Noted: 2024-04-24 | Resolved: 2024-06-22

## 2024-06-22 PROBLEM — N39.0 COMPLICATED UTI (URINARY TRACT INFECTION): Status: RESOLVED | Noted: 2021-04-24 | Resolved: 2024-06-22

## 2024-07-29 ENCOUNTER — HOSPITAL ENCOUNTER (OUTPATIENT)
Dept: RADIOLOGY | Facility: HOSPITAL | Age: 79
Discharge: HOME | End: 2024-07-29
Payer: COMMERCIAL

## 2024-07-29 DIAGNOSIS — N30.40 IRRADIATION CYSTITIS WITHOUT HEMATURIA: ICD-10-CM

## 2024-07-29 PROCEDURE — A9562 TC99M MERTIATIDE: HCPCS

## 2024-07-29 PROCEDURE — 78707 K FLOW/FUNCT IMAGE W/O DRUG: CPT

## 2024-08-26 ENCOUNTER — RA CDI HCC (OUTPATIENT)
Dept: OTHER | Facility: HOSPITAL | Age: 79
End: 2024-08-26

## 2024-08-30 ENCOUNTER — OFFICE VISIT (OUTPATIENT)
Dept: FAMILY MEDICINE CLINIC | Facility: CLINIC | Age: 79
End: 2024-08-30
Payer: COMMERCIAL

## 2024-08-30 VITALS
RESPIRATION RATE: 18 BRPM | BODY MASS INDEX: 29.25 KG/M2 | HEIGHT: 68 IN | TEMPERATURE: 97.1 F | HEART RATE: 54 BPM | SYSTOLIC BLOOD PRESSURE: 122 MMHG | WEIGHT: 193 LBS | DIASTOLIC BLOOD PRESSURE: 74 MMHG

## 2024-08-30 DIAGNOSIS — J44.1 CHRONIC OBSTRUCTIVE PULMONARY DISEASE WITH ACUTE EXACERBATION (HCC): ICD-10-CM

## 2024-08-30 DIAGNOSIS — C61 PROSTATE CANCER (HCC): ICD-10-CM

## 2024-08-30 DIAGNOSIS — E11.42 TYPE 2 DIABETES MELLITUS WITH DIABETIC POLYNEUROPATHY, WITHOUT LONG-TERM CURRENT USE OF INSULIN (HCC): Primary | ICD-10-CM

## 2024-08-30 DIAGNOSIS — M06.9 RHEUMATOID ARTHRITIS INVOLVING RIGHT HAND, UNSPECIFIED WHETHER RHEUMATOID FACTOR PRESENT (HCC): ICD-10-CM

## 2024-08-30 DIAGNOSIS — N18.32 STAGE 3B CHRONIC KIDNEY DISEASE (HCC): ICD-10-CM

## 2024-08-30 DIAGNOSIS — Z93.6 OTHER ARTIFICIAL OPENINGS OF URINARY TRACT STATUS (HCC): ICD-10-CM

## 2024-08-30 DIAGNOSIS — Z72.0 NICOTINE ABUSE: ICD-10-CM

## 2024-08-30 DIAGNOSIS — D72.829 LEUKOCYTOSIS, UNSPECIFIED TYPE: ICD-10-CM

## 2024-08-30 DIAGNOSIS — C67.9 MALIGNANT NEOPLASM OF URINARY BLADDER, UNSPECIFIED SITE (HCC): ICD-10-CM

## 2024-08-30 DIAGNOSIS — N30.40 RADIATION CYSTITIS: ICD-10-CM

## 2024-08-30 DIAGNOSIS — I10 ESSENTIAL HYPERTENSION: ICD-10-CM

## 2024-08-30 PROBLEM — N17.9 AKI (ACUTE KIDNEY INJURY) (HCC): Status: RESOLVED | Noted: 2021-04-24 | Resolved: 2024-08-30

## 2024-08-30 PROBLEM — E78.2 MIXED HYPERLIPIDEMIA: Status: ACTIVE | Noted: 2021-04-12

## 2024-08-30 PROBLEM — E78.2 MIXED HYPERLIPIDEMIA: Status: ACTIVE | Noted: 2021-04-13

## 2024-08-30 PROCEDURE — 99204 OFFICE O/P NEW MOD 45 MIN: CPT | Performed by: FAMILY MEDICINE

## 2024-08-30 RX ORDER — ALBUTEROL SULFATE 90 UG/1
2 AEROSOL, METERED RESPIRATORY (INHALATION) EVERY 6 HOURS PRN
Qty: 18 G | Refills: 0 | Status: SHIPPED | OUTPATIENT
Start: 2024-08-30

## 2024-08-30 RX ORDER — FLUTICASONE PROPIONATE 50 MCG
1 SPRAY, SUSPENSION (ML) NASAL DAILY
COMMUNITY

## 2024-08-30 NOTE — PROGRESS NOTES
Assessment/Plan:    1. Type 2 diabetes mellitus with diabetic polyneuropathy, without long-term current use of insulin (HCC)  -     Albumin / creatinine urine ratio; Future; Expected date: 11/30/2024  -     Comprehensive metabolic panel; Future; Expected date: 11/30/2024  -     Hemoglobin A1C; Future; Expected date: 11/30/2024  -     Lipid Panel with Direct LDL reflex; Future; Expected date: 11/30/2024  -     CBC and differential; Future; Expected date: 11/30/2024  -     Albumin / creatinine urine ratio  -     Comprehensive metabolic panel  -     Hemoglobin A1C  -     Lipid Panel with Direct LDL reflex  -     CBC and differential  2. Essential hypertension  -     Albumin / creatinine urine ratio; Future; Expected date: 11/30/2024  -     Comprehensive metabolic panel; Future; Expected date: 11/30/2024  -     Hemoglobin A1C; Future; Expected date: 11/30/2024  -     Lipid Panel with Direct LDL reflex; Future; Expected date: 11/30/2024  -     CBC and differential; Future; Expected date: 11/30/2024  -     Albumin / creatinine urine ratio  -     Comprehensive metabolic panel  -     Hemoglobin A1C  -     Lipid Panel with Direct LDL reflex  -     CBC and differential  3. Stage 3b chronic kidney disease (HCC)  4. Leukocytosis, unspecified type  -     Albumin / creatinine urine ratio; Future; Expected date: 11/30/2024  -     Comprehensive metabolic panel; Future; Expected date: 11/30/2024  -     Hemoglobin A1C; Future; Expected date: 11/30/2024  -     Lipid Panel with Direct LDL reflex; Future; Expected date: 11/30/2024  -     CBC and differential; Future; Expected date: 11/30/2024  -     Albumin / creatinine urine ratio  -     Comprehensive metabolic panel  -     Hemoglobin A1C  -     Lipid Panel with Direct LDL reflex  -     CBC and differential  5. Chronic obstructive pulmonary disease with acute exacerbation (HCC)  -     Albumin / creatinine urine ratio; Future; Expected date: 11/30/2024  -     Comprehensive metabolic  panel; Future; Expected date: 11/30/2024  -     Hemoglobin A1C; Future; Expected date: 11/30/2024  -     Lipid Panel with Direct LDL reflex; Future; Expected date: 11/30/2024  -     CBC and differential; Future; Expected date: 11/30/2024  -     Albumin / creatinine urine ratio  -     Comprehensive metabolic panel  -     Hemoglobin A1C  -     Lipid Panel with Direct LDL reflex  -     CBC and differential  -     albuterol (ProAir HFA) 90 mcg/act inhaler; Inhale 2 puffs every 6 (six) hours as needed for wheezing  6. Prostate cancer (HCC) - s/p resection  7. Radiation cystitis  8. Nicotine abuse  9. 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 Malignant neoplasm of urinary bladder, unspecified site (HCC)  11. Rheumatoid arthritis involving right hand, unspecified whether rheumatoid factor present (HCC)          There are no Patient Instructions on file for this visit.    Return in about 3 months (around 11/30/2024).    Subjective:      Patient ID: Joel Wyman is a 78 y.o. male.    Chief Complaint   Patient presents with   • Establish Care     Sas/cma       Pty is here for the first time to establish  Pt is a diabetic and he needs a foot exam    Pt had prostate cancer had a prostatectomy - 4 years later it came back he had radiation - devel;oped radiation cystitis - did HBO - ended up havuing his bladder removed    Pt recently had nuclear stress test and an echo        The following portions of the patient's history were reviewed and updated as appropriate: allergies, current medications, past family history, past medical history, past social history, past surgical history and problem list.    Review of Systems      Current Outpatient Medications   Medication Sig Dispense Refill   • albuterol (ProAir HFA) 90 mcg/act inhaler Inhale 2 puffs every 6 (six) hours as needed for wheezing 18 g 0   • fluticasone (FLONASE) 50 mcg/act nasal spray 1 spray into each nostril daily     •  "fluticasone-umeclidinium-vilanterol (Trelegy Ellipta) 200-62.5-25 mcg/actuation AEPB inhaler Inhale 1 puff every morning     • metoprolol tartrate (LOPRESSOR) 25 mg tablet Take 12.5 mg by mouth every 12 (twelve) hours       No current facility-administered medications for this visit.       Objective:    /74   Pulse (!) 54   Temp (!) 97.1 °F (36.2 °C)   Resp 18   Ht 5' 8\" (1.727 m)   Wt 87.5 kg (193 lb)   BMI 29.35 kg/m²        Physical Exam  Vitals and nursing note reviewed.   Constitutional:       General: He is not in acute distress.     Appearance: He is well-developed. He is not diaphoretic.   HENT:      Head: Normocephalic and atraumatic.      Right Ear: External ear normal.      Left Ear: External ear normal.      Nose: Nose normal.      Mouth/Throat:      Pharynx: No oropharyngeal exudate.   Eyes:      General: No scleral icterus.        Right eye: No discharge.         Left eye: No discharge.      Pupils: Pupils are equal, round, and reactive to light.   Neck:      Thyroid: No thyromegaly.   Cardiovascular:      Rate and Rhythm: Normal rate.      Pulses: no weak pulses.           Dorsalis pedis pulses are 2+ on the right side and 2+ on the left side.        Posterior tibial pulses are 2+ on the right side and 2+ on the left side.      Heart sounds: Normal heart sounds. No murmur heard.  Pulmonary:      Effort: Pulmonary effort is normal. No respiratory distress.      Breath sounds: Wheezing present.   Abdominal:      General: Bowel sounds are normal. There is no distension.      Palpations: Abdomen is soft. There is no mass.      Tenderness: There is no abdominal tenderness. There is no guarding or rebound.   Musculoskeletal:         General: Normal range of motion.      Comments: Ambulates with a cane   Feet:      Right foot:      Skin integrity: No ulcer, skin breakdown, erythema, warmth, callus or dry skin.      Left foot:      Skin integrity: No ulcer, skin breakdown, erythema, warmth, callus " or dry skin.   Skin:     General: Skin is warm and dry.      Findings: No erythema or rash.   Neurological:      Mental Status: He is alert.      Coordination: Coordination normal.      Deep Tendon Reflexes: Reflexes normal.   Psychiatric:         Behavior: Behavior normal.            Diabetic Foot Exam    Patient's shoes and socks removed.    Right Foot/Ankle   Right Foot Inspection  Skin Exam: skin normal. Skin not intact, no dry skin, no warmth, no callus, no erythema, no maceration, no abnormal color, no pre-ulcer, no ulcer and no callus.     Toe Exam: ROM and strength within normal limits.     Sensory   Vibration: intact  Proprioception: intact  Monofilament testing: intact    Vascular  Capillary refills: < 3 seconds  The right DP pulse is 2+. The right PT pulse is 2+.     Left Foot/Ankle  Left Foot Inspection  Skin Exam: skin normal. Skin not intact, no dry skin, no warmth, no erythema, no maceration, normal color, no pre-ulcer, no ulcer and no callus.     Toe Exam: ROM and strength within normal limits.     Sensory   Vibration: intact  Proprioception: intact  Monofilament testing: diminished    Vascular  Capillary refills: < 3 seconds  The left DP pulse is 2+. The left PT pulse is 2+.     Assign Risk Category  No deformity present  No loss of protective sensation  No weak pulses  Risk: 1      Frank Lombardi, DO

## 2024-09-03 ENCOUNTER — TELEPHONE (OUTPATIENT)
Dept: ADMINISTRATIVE | Facility: OTHER | Age: 79
End: 2024-09-03

## 2024-09-03 NOTE — LETTER
Diabetic Eye Exam Form    Date Requested: 24  Patient: Joel Wyman  Patient : 1945   Referring Provider: Frank Lombardi, DO      DIABETIC Eye Exam Date _______________________________      Type of Exam MUST be documented for Diabetic Eye Exams. Please CHECK ONE.     Retinal Exam       Dilated Retinal Exam       OCT       Optomap-Iris Exam      Fundus Photography       Left Eye - Please check Retinopathy or No Retinopathy        Exam did show retinopathy    Exam did not show retinopathy       Right Eye - Please check Retinopathy or No Retinopathy       Exam did show retinopathy    Exam did not show retinopathy       Comments __________________________________________________________    Practice Providing Exam ______________________________________________    Exam Performed By (print name) _______________________________________      Provider Signature ___________________________________________________      These reports are needed for  compliance.  Please fax this completed form and a copy of the Diabetic Eye Exam report to our office located at [address] [place] [address] as soon as possible via Fax [phone] mikey Lee: Phone [phone]  We thank you for your assistance in treating our mutual patient.

## 2024-09-03 NOTE — TELEPHONE ENCOUNTER
----- Message from Sydney VILLATORO sent at 8/30/2024 10:26 AM EDT -----  08/30/24 10:27 AM    Hello, our patient Joel Wyman has had Diabetic Eye Exam completed/performed. Please assist in updating the patient chart by making an External outreach to Dr. Barillas facility located in [place]. The date of service is within the last 6 months.    Thank you,  Sydney Kaplan MA  [facility]

## 2024-09-04 NOTE — TELEPHONE ENCOUNTER
Upon review of the In Basket request and the patient's chart, initial outreach has been made via fax to facility. Please see Contacts section for details.     Thank you  Rosa Haynes MA

## 2024-09-06 NOTE — TELEPHONE ENCOUNTER
Upon review of the In Basket request we were able to locate, review, and update the patient chart as requested for Diabetic Eye Exam.    Any additional questions or concerns should be emailed to the Practice Liaisons via the appropriate education email address, please do not reply via In Basket.    Thank you  Rosa Haynes MA   [facility]

## 2024-09-23 DIAGNOSIS — J44.1 CHRONIC OBSTRUCTIVE PULMONARY DISEASE WITH ACUTE EXACERBATION (HCC): ICD-10-CM

## 2024-09-24 RX ORDER — ALBUTEROL SULFATE 90 UG/1
INHALANT RESPIRATORY (INHALATION)
Qty: 6.7 G | Refills: 0 | Status: SHIPPED | OUTPATIENT
Start: 2024-09-24

## 2024-10-10 NOTE — PROGRESS NOTES
HBO Treatment Course Details       Treatment Notes: Patient treated in a hard sided mono chamber  No complaints  He tolerated HBOT well  Treatment Course Number: 28  Total Treatments Ordered:  40     Diagnosis:   1  Irradiation cystitis with hematuria  Hyperbaric oxygen thearpy   2  Anxiety associated with hyperbaric oxygen therapy  LORazepam (ATIVAN) 0 5 mg tablet       HBO Treatment Details:  In-Patient Visit: no  Treatment Length:90 Minutes(Minutes)  Chamber #: Hard sided Monoplace Chamber    Pre-Treatment details:  Pre-treatment protocol Treatment Protocol: 2 0 FE X 90 minutes w/ 100% oxygen, two 5 minute air breaks  Left ear clear?: yes  Right ear clear?: yes  Left ear intact?: yes  Right ear intact?: yes                    Left ear TEED scale: Grade 0  Right ear TEED scale: Grade 0   Pretreatment heart and lung assessment: Pretreatment heart and lung auscltation unremarkable  Patient cleared for HBOT     Treatment details:  FE Rate: 2  Started Compression: 0823  Reached Compression: 0833  Total Compression Time: 10 (Minutes)  Total Holding Time: 100 (Minutes)  Started Decompression: 1013  Reached Surface: 0582  Total Decompression Time: 10 (Minutes)  Total Airbreaks: 10 (Minutes)  Total Time of Treatment: 110 (Minutes)  Symptoms Noted During Treatment: None (Minutes)    Post treatment details:  Left ear clear?: yes  Right ear clear?: yes  Left ears intact?: yes  Right ears intact?: yes                    Left ear TEED scale: Grade 0  Right ear TEED scale: Grade 0  Post treatment heart and lung assessment: Post treatment heart and lung auscltation unremarkable  Patient cleared for discharge  Tolerated treatment well         Vital Signs:  HBO Glucose Reference Range: 100-350 mg/dl   Pre-Treatment Post-Treatment   Time vitals are taken: 0815 Time vitals are taken: 1025   Blood Pressure: 114/71 Blood Pressure: 157/75   Pulse: (!) 48 Pulse: 77   Resp: 16 Resp: 16   Temp: 98 3 °F (36 8 °C) Temp: 97 7 °F (36 5 °C)             No Known Allergies  Patient Active Problem List    Diagnosis Date Noted    Prostate cancer ([place] [address] ) 07/08/2021    Leukocytosis 05/04/2021    RA (rheumatoid arthritis) ([place] [address] ) 05/04/2021    Acute blood loss anemia ([facility]) 04/26/2021    SHANTI (acute kidney injury) ([place] [address] ) 04/24/2021    Hematuria due to cystitis     Anemia 04/14/2021    Chronic obstructive pulmonary disease ([place] [address] ) 04/14/2021    Hyperlipidemia 04/13/2021    Gross hematuria 04/12/2021    Cystitis 04/12/2021    Essential hypertension 04/12/2021    Dyslipidemia 04/12/2021     No orders of the defined types were placed in this encounter  [Time Spent: ___ minutes] : I have spent [unfilled] minutes of time on the encounter which excludes teaching and separately reported services. [FreeTextEntry3] : Agree with NP assessment and plan as outlined above.

## 2024-10-25 ENCOUNTER — HOSPITAL ENCOUNTER (OUTPATIENT)
Dept: RADIOLOGY | Facility: HOSPITAL | Age: 79
Discharge: HOME | End: 2024-10-25
Attending: SPECIALIST
Payer: COMMERCIAL

## 2024-10-25 DIAGNOSIS — C61 MALIGNANT NEOPLASM OF PROSTATE (HCC): ICD-10-CM

## 2024-10-25 DIAGNOSIS — C67.9 MALIGNANT NEOPLASM OF BLADDER, UNSPECIFIED (HCC): ICD-10-CM

## 2024-10-25 PROCEDURE — 74176 CT ABD & PELVIS W/O CONTRAST: CPT

## 2024-11-12 ENCOUNTER — APPOINTMENT (OUTPATIENT)
Dept: LAB | Facility: HOSPITAL | Age: 79
End: 2024-11-12
Attending: FAMILY MEDICINE
Payer: COMMERCIAL

## 2024-12-03 ENCOUNTER — OFFICE VISIT (OUTPATIENT)
Dept: FAMILY MEDICINE CLINIC | Facility: CLINIC | Age: 79
End: 2024-12-03
Payer: COMMERCIAL

## 2024-12-03 VITALS
RESPIRATION RATE: 18 BRPM | WEIGHT: 204.6 LBS | SYSTOLIC BLOOD PRESSURE: 122 MMHG | TEMPERATURE: 97.5 F | DIASTOLIC BLOOD PRESSURE: 70 MMHG | HEIGHT: 68 IN | BODY MASS INDEX: 31.01 KG/M2 | HEART RATE: 68 BPM

## 2024-12-03 DIAGNOSIS — J44.1 CHRONIC OBSTRUCTIVE PULMONARY DISEASE WITH ACUTE EXACERBATION (HCC): ICD-10-CM

## 2024-12-03 DIAGNOSIS — I73.9 INTERMITTENT CLAUDICATION (HCC): ICD-10-CM

## 2024-12-03 DIAGNOSIS — E78.2 MIXED HYPERLIPIDEMIA: ICD-10-CM

## 2024-12-03 DIAGNOSIS — I10 ESSENTIAL HYPERTENSION: ICD-10-CM

## 2024-12-03 DIAGNOSIS — C61 PROSTATE CANCER (HCC): ICD-10-CM

## 2024-12-03 DIAGNOSIS — Z00.00 MEDICARE ANNUAL WELLNESS VISIT, SUBSEQUENT: Primary | ICD-10-CM

## 2024-12-03 DIAGNOSIS — C67.9 MALIGNANT NEOPLASM OF URINARY BLADDER, UNSPECIFIED SITE (HCC): ICD-10-CM

## 2024-12-03 DIAGNOSIS — E11.42 TYPE 2 DIABETES MELLITUS WITH DIABETIC POLYNEUROPATHY, WITHOUT LONG-TERM CURRENT USE OF INSULIN (HCC): ICD-10-CM

## 2024-12-03 PROBLEM — M54.41 CHRONIC LOW BACK PAIN WITH BILATERAL SCIATICA: Status: ACTIVE | Noted: 2024-06-24

## 2024-12-03 PROBLEM — G89.29 CHRONIC LOW BACK PAIN WITH BILATERAL SCIATICA: Status: ACTIVE | Noted: 2024-06-24

## 2024-12-03 PROBLEM — D63.8 ANEMIA OF CHRONIC DISEASE: Status: ACTIVE | Noted: 2021-03-28

## 2024-12-03 PROBLEM — N20.0 NEPHROLITHIASIS: Status: ACTIVE | Noted: 2024-12-03

## 2024-12-03 PROBLEM — M54.42 CHRONIC LOW BACK PAIN WITH BILATERAL SCIATICA: Status: ACTIVE | Noted: 2024-06-24

## 2024-12-03 PROBLEM — N30.40 ACUTE RADIATION CYSTITIS: Status: ACTIVE | Noted: 2021-03-25

## 2024-12-03 PROBLEM — M19.049 LOCALIZED, PRIMARY OSTEOARTHRITIS OF HAND: Status: ACTIVE | Noted: 2022-07-27

## 2024-12-03 PROBLEM — N39.46 MIXED STRESS AND URGE URINARY INCONTINENCE: Status: ACTIVE | Noted: 2023-10-30

## 2024-12-03 PROBLEM — M17.0 ARTHRITIS OF BOTH KNEES: Status: ACTIVE | Noted: 2023-04-26

## 2024-12-03 PROCEDURE — G0439 PPPS, SUBSEQ VISIT: HCPCS | Performed by: FAMILY MEDICINE

## 2024-12-03 RX ORDER — ATORVASTATIN CALCIUM 20 MG/1
20 TABLET, FILM COATED ORAL DAILY
COMMUNITY

## 2024-12-03 RX ORDER — GABAPENTIN 100 MG/1
100 CAPSULE ORAL
Qty: 90 CAPSULE | Refills: 1 | Status: SHIPPED | OUTPATIENT
Start: 2024-12-03 | End: 2025-06-01

## 2024-12-03 RX ORDER — FLUTICASONE FUROATE, UMECLIDINIUM BROMIDE AND VILANTEROL TRIFENATATE 200; 62.5; 25 UG/1; UG/1; UG/1
1 POWDER RESPIRATORY (INHALATION) EVERY MORNING
Qty: 180 BLISTER | Refills: 5 | Status: SHIPPED | OUTPATIENT
Start: 2024-12-03 | End: 2026-05-12

## 2024-12-03 RX ORDER — METOPROLOL TARTRATE 25 MG/1
12.5 TABLET, FILM COATED ORAL EVERY 12 HOURS SCHEDULED
Qty: 90 TABLET | Refills: 3 | Status: SHIPPED | OUTPATIENT
Start: 2024-12-03 | End: 2025-12-03

## 2024-12-03 RX ORDER — MELOXICAM 15 MG/1
15 TABLET ORAL DAILY
COMMUNITY

## 2024-12-03 NOTE — ASSESSMENT & PLAN NOTE
stable    Orders:    Albumin / creatinine urine ratio; Future    Comprehensive metabolic panel; Future    Hemoglobin A1C; Future    CBC and differential; Future    Lipid Panel with Direct LDL reflex; Future    metoprolol tartrate (LOPRESSOR) 25 mg tablet; Take 0.5 tablets (12.5 mg total) by mouth every 12 (twelve) hours

## 2024-12-03 NOTE — PATIENT INSTRUCTIONS
Medicare Preventive Visit Patient Instructions  Thank you for completing your Welcome to Medicare Visit or Medicare Annual Wellness Visit today. Your next wellness visit will be due in one year (12/4/2025).  The screening/preventive services that you may require over the next 5-10 years are detailed below. Some tests may not apply to you based off risk factors and/or age. Screening tests ordered at today's visit but not completed yet may show as past due. Also, please note that scanned in results may not display below.  Preventive Screenings:  Service Recommendations Previous Testing/Comments   Colorectal Cancer Screening  Colonoscopy    Fecal Occult Blood Test (FOBT)/Fecal Immunochemical Test (FIT)  Fecal DNA/Cologuard Test  Flexible Sigmoidoscopy Age: 45-75 years old   Colonoscopy: every 10 years (May be performed more frequently if at higher risk)  OR  FOBT/FIT: every 1 year  OR  Cologuard: every 3 years  OR  Sigmoidoscopy: every 5 years  Screening may be recommended earlier than age 45 if at higher risk for colorectal cancer. Also, an individualized decision between you and your healthcare provider will decide whether screening between the ages of 76-85 would be appropriate. Colonoscopy: Not on file  FOBT/FIT: Not on file  Cologuard: Not on file  Sigmoidoscopy: Not on file          Prostate Cancer Screening Individualized decision between patient and health care provider in men between ages of 55-69   Medicare will cover every 12 months beginning on the day after your 50th birthday PSA: No results in last 5 years     History Prostate Cancer  Screening Not Indicated     Hepatitis C Screening Once for adults born between 1945 and 1965  More frequently in patients at high risk for Hepatitis C Hep C Antibody: Not on file        Diabetes Screening 1-2 times per year if you're at risk for diabetes or have pre-diabetes Fasting glucose: 106 mg/dL (11/12/2024)  A1C: 6.4 % (11/12/2024)  Screening Not Indicated  History  Diabetes   Cholesterol Screening Once every 5 years if you don't have a lipid disorder. May order more often based on risk factors. Lipid panel: 11/12/2024  Screening Not Indicated  History Lipid Disorder      Other Preventive Screenings Covered by Medicare:  Abdominal Aortic Aneurysm (AAA) Screening: covered once if your at risk. You're considered to be at risk if you have a family history of AAA or a male between the age of 65-75 who smoking at least 100 cigarettes in your lifetime.  Lung Cancer Screening: covers low dose CT scan once per year if you meet all of the following conditions: (1) Age 55-77; (2) No signs or symptoms of lung cancer; (3) Current smoker or have quit smoking within the last 15 years; (4) You have a tobacco smoking history of at least 20 pack years (packs per day x number of years you smoked); (5) You get a written order from a healthcare provider.  Glaucoma Screening: covered annually if you're considered high risk: (1) You have diabetes OR (2) Family history of glaucoma OR (3)  aged 50 and older OR (4)  American aged 65 and older  Osteoporosis Screening: covered every 2 years if you meet one of the following conditions: (1) Have a vertebral abnormality; (2) On glucocorticoid therapy for more than 3 months; (3) Have primary hyperparathyroidism; (4) On osteoporosis medications and need to assess response to drug therapy.  HIV Screening: covered annually if you're between the age of 15-65. Also covered annually if you are younger than 15 and older than 65 with risk factors for HIV infection. For pregnant patients, it is covered up to 3 times per pregnancy.    Immunizations:  Immunization Recommendations   Influenza Vaccine Annual influenza vaccination during flu season is recommended for all persons aged >= 6 months who do not have contraindications   Pneumococcal Vaccine   * Pneumococcal conjugate vaccine = PCV13 (Prevnar 13), PCV15 (Vaxneuvance), PCV20 (Prevnar  20)  * Pneumococcal polysaccharide vaccine = PPSV23 (Pneumovax) Adults 19-63 yo with certain risk factors or if 65+ yo  If never received any pneumonia vaccine: recommend Prevnar 20 (PCV20)  Give PCV20 if previously received 1 dose of PCV13 or PPSV23   Hepatitis B Vaccine 3 dose series if at intermediate or high risk (ex: diabetes, end stage renal disease, liver disease)   Respiratory syncytial virus (RSV) Vaccine - COVERED BY MEDICARE PART D  * RSVPreF3 (Arexvy) CDC recommends that adults 60 years of age and older may receive a single dose of RSV vaccine using shared clinical decision-making (SCDM)   Tetanus (Td) Vaccine - COST NOT COVERED BY MEDICARE PART B Following completion of primary series, a booster dose should be given every 10 years to maintain immunity against tetanus. Td may also be given as tetanus wound prophylaxis.   Tdap Vaccine - COST NOT COVERED BY MEDICARE PART B Recommended at least once for all adults. For pregnant patients, recommended with each pregnancy.   Shingles Vaccine (Shingrix) - COST NOT COVERED BY MEDICARE PART B  2 shot series recommended in those 19 years and older who have or will have weakened immune systems or those 50 years and older     Health Maintenance Due:      Topic Date Due   • Lung Cancer Screening  06/02/2024     Immunizations Due:      Topic Date Due   • Hepatitis A Vaccine (1 of 2 - Risk 2-dose series) Never done   • Pneumococcal Vaccine: 65+ Years (2 of 2 - PCV) 09/16/2021   • Influenza Vaccine (1) 09/01/2024   • COVID-19 Vaccine (4 - 2024-25 season) 09/01/2024     Advance Directives   What are advance directives?  Advance directives are legal documents that state your wishes and plans for medical care. These plans are made ahead of time in case you lose your ability to make decisions for yourself. Advance directives can apply to any medical decision, such as the treatments you want, and if you want to donate organs.   What are the types of advance directives?   There are many types of advance directives, and each state has rules about how to use them. You may choose a combination of any of the following:  Living will:  This is a written record of the treatment you want. You can also choose which treatments you do not want, which to limit, and which to stop at a certain time. This includes surgery, medicine, IV fluid, and tube feedings.   Durable power of  for healthcare (DPAHC):  This is a written record that states who you want to make healthcare choices for you when you are unable to make them for yourself. This person, called a proxy, is usually a family member or a friend. You may choose more than 1 proxy.  Do not resuscitate (DNR) order:  A DNR order is used in case your heart stops beating or you stop breathing. It is a request not to have certain forms of treatment, such as CPR. A DNR order may be included in other types of advance directives.  Medical directive:  This covers the care that you want if you are in a coma, near death, or unable to make decisions for yourself. You can list the treatments you want for each condition. Treatment may include pain medicine, surgery, blood transfusions, dialysis, IV or tube feedings, and a ventilator (breathing machine).  Values history:  This document has questions about your views, beliefs, and how you feel and think about life. This information can help others choose the care that you would choose.  Why are advance directives important?  An advance directive helps you control your care. Although spoken wishes may be used, it is better to have your wishes written down. Spoken wishes can be misunderstood, or not followed. Treatments may be given even if you do not want them. An advance directive may make it easier for your family to make difficult choices about your care.   Cigarette Smoking and Your Health   Risks to your health if you smoke:  Nicotine and other chemicals found in tobacco damage every cell in your  body. Even if you are a light smoker, you have an increased risk for cancer, heart disease, and lung disease. If you are pregnant or have diabetes, smoking increases your risk for complications.   Benefits to your health if you stop smoking:   You decrease respiratory symptoms such as coughing, wheezing, and shortness of breath.   You reduce your risk for cancers of the lung, mouth, throat, kidney, bladder, pancreas, stomach, and cervix. If you already have cancer, you increase the benefits of chemotherapy. You also reduce your risk for cancer returning or a second cancer from developing.   You reduce your risk for heart disease, blood clots, heart attack, and stroke.   You reduce your risk for lung infections, and diseases such as pneumonia, asthma, chronic bronchitis, and emphysema.  Your circulation improves. More oxygen can be delivered to your body. If you have diabetes, you lower your risk for complications, such as kidney, artery, and eye diseases. You also lower your risk for nerve damage. Nerve damage can lead to amputations, poor vision, and blindness.  You improve your body's ability to heal and to fight infections.  For more information and support to stop smoking:   [employer]  Phone: 2- 830 - 260-2279  Web Address: www.[employer]  Weight Management   Why it is important to manage your weight:  Being overweight increases your risk of health conditions such as heart disease, high blood pressure, type 2 diabetes, and certain types of cancer. It can also increase your risk for osteoarthritis, sleep apnea, and other respiratory problems. Aim for a slow, steady weight loss. Even a small amount of weight loss can lower your risk of health problems.  How to lose weight safely:  A safe and healthy way to lose weight is to eat fewer calories and get regular exercise. You can lose up about 1 pound a week by decreasing the number of calories you eat by 500 calories each day.   Healthy meal plan for weight  management:  A healthy meal plan includes a variety of foods, contains fewer calories, and helps you stay healthy. A healthy meal plan includes the following:  Eat whole-grain foods more often.  A healthy meal plan should contain fiber. Fiber is the part of grains, fruits, and vegetables that is not broken down by your body. Whole-grain foods are healthy and provide extra fiber in your diet. Some examples of whole-grain foods are whole-wheat breads and pastas, oatmeal, brown rice, and bulgur.  Eat a variety of vegetables every day.  Include dark, leafy greens such as spinach, kale, richard greens, and mustard greens. Eat yellow and orange vegetables such as carrots, sweet potatoes, and winter squash.   Eat a variety of fruits every day.  Choose fresh or canned fruit (canned in its own juice or light syrup) instead of juice. Fruit juice has very little or no fiber.  Eat low-fat dairy foods.  Drink fat-free (skim) milk or 1% milk. Eat fat-free yogurt and low-fat cottage cheese. Try low-fat cheeses such as mozzarella and other reduced-fat cheeses.  Choose meat and other protein foods that are low in fat.  Choose beans or other legumes such as split peas or lentils. Choose fish, skinless poultry (chicken or turkey), or lean cuts of red meat (beef or pork). Before you cook meat or poultry, cut off any visible fat.   Use less fat and oil.  Try baking foods instead of frying them. Add less fat, such as margarine, sour cream, regular salad dressing and mayonnaise to foods. Eat fewer high-fat foods. Some examples of high-fat foods include french fries, doughnuts, ice cream, and cakes.  Eat fewer sweets.  Limit foods and drinks that are high in sugar. This includes candy, cookies, regular soda, and sweetened drinks.  Exercise:  Exercise at least 30 minutes per day on most days of the week. Some examples of exercise include walking, biking, dancing, and swimming. You can also fit in more physical activity by taking the stairs  instead of the elevator or parking farther away from stores. Ask your healthcare provider about the best exercise plan for you.      © Copyright [employer] 2018 Information is for End User's use only and may not be sold, redistributed or otherwise used for commercial purposes. All illustrations and images included in CareNotes® are the copyrighted property of [employer] or [employer]

## 2024-12-03 NOTE — ASSESSMENT & PLAN NOTE
Orders:    Albumin / creatinine urine ratio; Future    Comprehensive metabolic panel; Future    Hemoglobin A1C; Future    CBC and differential; Future    Lipid Panel with Direct LDL reflex; Future    fluticasone-umeclidinium-vilanterol (Trelegy Ellipta) 200-62.5-25 mcg/actuation AEPB inhaler; Inhale 1 puff every morning

## 2024-12-03 NOTE — ASSESSMENT & PLAN NOTE
Lab Results   Component Value Date    HGBA1C 6.4 (H) 11/12/2024       Orders:    Albumin / creatinine urine ratio; Future    Comprehensive metabolic panel; Future    Hemoglobin A1C; Future    CBC and differential; Future    Lipid Panel with Direct LDL reflex; Future    gabapentin (NEURONTIN) 100 mg capsule; Take 1 capsule (100 mg total) by mouth daily at bedtime

## 2024-12-03 NOTE — PROGRESS NOTES
Name: Joel Wyman      : 1945      MRN: [id number]  Encounter Provider: Frank Lombardi, DO  Encounter Date: 12/3/2024   Encounter department: [facility]    Assessment & Plan  Medicare annual wellness visit, subsequent         Essential hypertension  stable    Orders:    Albumin / creatinine urine ratio; Future    Comprehensive metabolic panel; Future    Hemoglobin A1C; Future    CBC and differential; Future    Lipid Panel with Direct LDL reflex; Future    metoprolol tartrate (LOPRESSOR) 25 mg tablet; Take 0.5 tablets (12.5 mg total) by mouth every 12 (twelve) hours    Chronic obstructive pulmonary disease with acute exacerbation (HCC)    Orders:    Albumin / creatinine urine ratio; Future    Comprehensive metabolic panel; Future    Hemoglobin A1C; Future    CBC and differential; Future    Lipid Panel with Direct LDL reflex; Future    fluticasone-umeclidinium-vilanterol (Trelegy Ellipta) 200-62.5-25 mcg/actuation AEPB inhaler; Inhale 1 puff every morning    Type 2 diabetes mellitus with diabetic polyneuropathy, without long-term current use of insulin ([facility])    Lab Results   Component Value Date    HGBA1C 6.4 (H) 2024       Orders:    Albumin / creatinine urine ratio; Future    Comprehensive metabolic panel; Future    Hemoglobin A1C; Future    CBC and differential; Future    Lipid Panel with Direct LDL reflex; Future    gabapentin (NEURONTIN) 100 mg capsule; Take 1 capsule (100 mg total) by mouth daily at bedtime    Mixed hyperlipidemia    Orders:    Albumin / creatinine urine ratio; Future    Comprehensive metabolic panel; Future    Hemoglobin A1C; Future    CBC and differential; Future    Lipid Panel with Direct LDL reflex; Future    Prostate cancer (HCC) - s/p resection         Malignant neoplasm of urinary bladder, unspecified site (HCC)         Intermittent claudication (HCC)    Orders:    VAS ARTERIAL DUPLEX- LOWER LIMB BILATERAL; Future       Preventive health issues were  discussed with patient, and age appropriate screening tests were ordered as noted in patient's After Visit Summary. Personalized health advice and appropriate referrals for health education or preventive services given if needed, as noted in patient's After Visit Summary.    History of Present Illness     Pt is sched for an AWV  Had labs    Pt sees urology - pt states he was told he is developing kidney stones    Pt states for years and years pt has been dealing wioth numbness in his feet.  He also has aching in his legs whne he walk       Patient Care Team:  Frank Lombardi, DO as PCP - General (Family Medicine)  Frank Lombardi, DO as PCP - [facility]CP-[facility] ([facility]  Yovani Khan MD as Consulting Physician (Urology)  Fran Alexandre MD as Consulting Physician (Ophthalmology)  Doe Varela MD as Consulting Physician (Urology)    Review of Systems  Medical History Reviewed by provider this encounter:       Annual Wellness Visit Questionnaire   Joel is here for his Subsequent Wellness visit. Last Medicare Wellness visit information reviewed, patient interviewed, no change since last AWV.     Health Risk Assessment:   Patient rates overall health as very good. Patient feels that their physical health rating is same. Patient is satisfied with their life. Eyesight was rated as same. Hearing was rated as same. Patient feels that their emotional and mental health rating is same. Patients states they are sometimes angry. Patient states they are never, rarely unusually tired/fatigued. Pain experienced in the last 7 days has been a lot. Patient's pain rating has been 10/10. Patient states that he has experienced no weight loss or gain in last 6 months.     Depression Screening:   PHQ-2 Score: 0      Fall Risk Screening:   In the past year, patient has experienced: no history of falling in past year      Home Safety:  Patient has trouble with stairs inside or outside of their home. Patient has working smoke alarms and  has working carbon monoxide detector. Home safety hazards include: none.     Nutrition:   Current diet is Regular.     Medications:   Patient is currently taking over-the-counter supplements. OTC medications include: see medication list. Patient is able to manage medications.     Activities of Daily Living (ADLs)/Instrumental Activities of Daily Living (IADLs):   Walk and transfer into and out of bed and chair?: Yes  Dress and groom yourself?: Yes    Bathe or shower yourself?: Yes    Feed yourself? Yes  Do your laundry/housekeeping?: Yes  Manage your money, pay your bills and track your expenses?: Yes  Make your own meals?: Yes    Do your own shopping?: Yes    Previous Hospitalizations:   Any hospitalizations or ED visits within the last 12 months?: Yes    How many hospitalizations have you had in the last year?: more than 4    Advance Care Planning:   Living will: Yes    Advanced directive: Yes      Cognitive Screening:   Provider or family/friend/caregiver concerned regarding cognition?: No    PREVENTIVE SCREENINGS      Cardiovascular Screening:    General: Screening Not Indicated, History Lipid Disorder, Risks and Benefits Discussed and Screening Current    Due for: Lipid Panel      Diabetes Screening:     General: Screening Not Indicated, History Diabetes, Risks and Benefits Discussed and Screening Current    Due for: Blood Glucose      Colorectal Cancer Screening:     General: Patient Declines and Risks and Benefits Discussed      Prostate Cancer Screening:    General: History Prostate Cancer, Screening Not Indicated and Risks and Benefits Discussed    Due for: PSA      Osteoporosis Screening:    General: Patient Declines and Risks and Benefits Discussed      Abdominal Aortic Aneurysm (AAA) Screening:    Risk factors include: tobacco use        General: Screening Not Indicated      Lung Cancer Screening:     General: Screening Not Indicated      Hepatitis C Screening:    General: Risks and Benefits Discussed  "and Screening Current    Screening, Brief Intervention, and Referral to Treatment (SBIRT)    Screening      AUDIT-C Screenin) How often did you have a drink containing alcohol in the past year? never  2) How many drinks did you have on a typical day when you were drinking in the past year? 0  3) How often did you have 6 or more drinks on one occasion in the past year? never    AUDIT-C Score: 0  Interpretation: Score 0-3 (male): Negative screen for alcohol misuse    Single Item Drug Screening:  How often have you used an illegal drug (including marijuana) or a prescription medication for non-medical reasons in the past year? never    Single Item Drug Screen Score: 0  Interpretation: Negative screen for possible drug use disorder    Brief Intervention  Alcohol & drug use screenings were reviewed. No concerns regarding substance use disorder identified.     Social Drivers of Health     Food Insecurity: No Food Insecurity (12/3/2024)    Hunger Vital Sign     Worried About Running Out of Food in the Last Year: Never true     Ran Out of Food in the Last Year: Never true   Transportation Needs: No Transportation Needs (12/3/2024)    PRAPARE - Transportation     Lack of Transportation (Medical): No     Lack of Transportation (Non-Medical): No   Housing Stability: Low Risk  (12/3/2024)    Housing Stability Vital Sign     Unable to Pay for Housing in the Last Year: No     Number of Times Moved in the Last Year: 0     Homeless in the Last Year: No   Utilities: Not At Risk (12/3/2024)    [facility] Utilities     Threatened with loss of utilities: No     No results found.    Objective   /70   Pulse 68   Temp 97.5 °F (36.4 °C)   Resp 18   Ht 5' 8\" (1.727 m)   Wt 92.8 kg (204 lb 9.6 oz)   BMI 31.11 kg/m²     Physical Exam    "

## 2024-12-03 NOTE — ASSESSMENT & PLAN NOTE
Orders:    Albumin / creatinine urine ratio; Future    Comprehensive metabolic panel; Future    Hemoglobin A1C; Future    CBC and differential; Future    Lipid Panel with Direct LDL reflex; Future

## 2025-01-02 PROBLEM — N30.40 ACUTE RADIATION CYSTITIS: Status: RESOLVED | Noted: 2021-03-25 | Resolved: 2025-01-02

## 2025-01-28 ENCOUNTER — HOSPITAL ENCOUNTER (OUTPATIENT)
Dept: RADIOLOGY | Facility: HOSPITAL | Age: 80
Discharge: HOME | End: 2025-01-28
Payer: COMMERCIAL

## 2025-01-28 ENCOUNTER — APPOINTMENT (OUTPATIENT)
Dept: LAB | Facility: HOSPITAL | Age: 80
End: 2025-01-28
Payer: COMMERCIAL

## 2025-01-28 DIAGNOSIS — N20.0 URIC ACID NEPHROLITHIASIS: ICD-10-CM

## 2025-01-28 PROCEDURE — 74018 RADEX ABDOMEN 1 VIEW: CPT

## 2025-01-30 ENCOUNTER — APPOINTMENT (OUTPATIENT)
Dept: LAB | Facility: HOSPITAL | Age: 80
End: 2025-01-30
Attending: SPECIALIST
Payer: COMMERCIAL

## 2025-01-30 DIAGNOSIS — N20.0 URIC ACID NEPHROLITHIASIS: ICD-10-CM

## 2025-01-30 LAB
PERIOD: 24 HOURS
PERIOD: 24 HOURS
PH UR STRIP.AUTO: 8.5 [PH]
SODIUM 24H UR-SRATE: 151.2 MMOL/24 HRS (ref 40–220)
SPECIMEN VOL UR: 2100 ML
SPECIMEN VOL UR: 2100 ML

## 2025-01-30 PROCEDURE — 84300 ASSAY OF URINE SODIUM: CPT

## 2025-01-30 PROCEDURE — 82340 ASSAY OF CALCIUM IN URINE: CPT

## 2025-01-30 PROCEDURE — 84560 ASSAY OF URINE/URIC ACID: CPT

## 2025-01-30 PROCEDURE — 83986 ASSAY PH BODY FLUID NOS: CPT

## 2025-01-30 PROCEDURE — 83945 ASSAY OF OXALATE: CPT

## 2025-01-30 PROCEDURE — 82507 ASSAY OF CITRATE: CPT

## 2025-01-31 LAB
CALCIUM 24H UR-MCNC: 44.1 MG/24 HRS (ref 100–300)
PERIOD: 24 HOURS
SPECIMEN VOL UR: 2100 ML
SPECIMEN VOL UR: 2100 ML
URATE 24H UR-MCNC: 296.1 MG/24 HRS (ref 250–800)

## 2025-02-04 LAB
CITRATE 24H UR-MCNC: 1 MG/L
CITRATE 24H UR-MRATE: 2 MG/24 HR (ref 320–1240)
OXALATE 24H UR-MRATE: 42 MG/24 HR (ref 7–44)
OXALATE UR-MCNC: 20 MG/L

## 2025-02-12 ENCOUNTER — OFFICE VISIT (OUTPATIENT)
Dept: FAMILY MEDICINE CLINIC | Facility: CLINIC | Age: 80
End: 2025-02-12
Payer: COMMERCIAL

## 2025-02-12 VITALS
HEIGHT: 68 IN | BODY MASS INDEX: 31.89 KG/M2 | HEART RATE: 60 BPM | SYSTOLIC BLOOD PRESSURE: 122 MMHG | WEIGHT: 210.4 LBS | RESPIRATION RATE: 18 BRPM | TEMPERATURE: 96.9 F | DIASTOLIC BLOOD PRESSURE: 74 MMHG

## 2025-02-12 DIAGNOSIS — Z93.6 OTHER ARTIFICIAL OPENINGS OF URINARY TRACT STATUS (HCC): ICD-10-CM

## 2025-02-12 DIAGNOSIS — M06.9 RHEUMATOID ARTHRITIS INVOLVING RIGHT HAND, UNSPECIFIED WHETHER RHEUMATOID FACTOR PRESENT (HCC): ICD-10-CM

## 2025-02-12 DIAGNOSIS — I10 ESSENTIAL HYPERTENSION: ICD-10-CM

## 2025-02-12 DIAGNOSIS — N18.32 STAGE 3B CHRONIC KIDNEY DISEASE (HCC): ICD-10-CM

## 2025-02-12 DIAGNOSIS — E11.42 TYPE 2 DIABETES MELLITUS WITH DIABETIC POLYNEUROPATHY, WITHOUT LONG-TERM CURRENT USE OF INSULIN (HCC): Primary | ICD-10-CM

## 2025-02-12 DIAGNOSIS — J44.1 CHRONIC OBSTRUCTIVE PULMONARY DISEASE WITH ACUTE EXACERBATION (HCC): ICD-10-CM

## 2025-02-12 DIAGNOSIS — C67.9 MALIGNANT NEOPLASM OF URINARY BLADDER, UNSPECIFIED SITE (HCC): ICD-10-CM

## 2025-02-12 PROCEDURE — 99214 OFFICE O/P EST MOD 30 MIN: CPT | Performed by: FAMILY MEDICINE

## 2025-02-12 PROCEDURE — G2211 COMPLEX E/M VISIT ADD ON: HCPCS | Performed by: FAMILY MEDICINE

## 2025-02-12 RX ORDER — GABAPENTIN 100 MG/1
100 CAPSULE ORAL 3 TIMES DAILY
Qty: 300 CAPSULE | Refills: 3 | Status: SHIPPED | OUTPATIENT
Start: 2025-02-12 | End: 2026-02-12

## 2025-02-12 NOTE — PROGRESS NOTES
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)  STable       Rheumatoid arthritis involving right hand, unspecified whether rheumatoid factor present (HCC)  Stable       Malignant neoplasm of urinary bladder, unspecified site (HCC)         Chronic obstructive pulmonary disease with acute exacerbation (HCC)  Breathing is ok       Stage 3b chronic kidney disease (HCC)  Lab Results   Component Value Date    EGFR 45 2024    EGFR 59 2024    EGFR 63 2024    CREATININE 1.43 (H) 2024    CREATININE 1.15 2024    CREATININE 1.09 2024          Essential hypertension  stable              History of Present Illness   Pt is here for numbness and tingling in his lower extremity      Review of Systems   Constitutional:  Negative for activity change, appetite change, chills, diaphoresis, fatigue, fever and unexpected weight change.   HENT:  Negative for congestion, dental problem, ear pain, mouth sores, sinus pressure, sinus pain, sore throat and trouble swallowing.    Eyes:  Negative for photophobia, discharge and itching.   Respiratory:  Negative for apnea, chest tightness and shortness of breath.    Cardiovascular:  Negative for chest pain, palpitations and leg swelling.   Gastrointestinal:  Negative for abdominal distention, abdominal pain, blood in stool, nausea and vomiting.   Endocrine: Negative for cold intolerance, heat  "intolerance, polydipsia, polyphagia and polyuria.   Genitourinary:  Negative for difficulty urinating.   Musculoskeletal:  Negative for arthralgias.   Skin:  Negative for color change and wound.   Neurological:  Positive for numbness. Negative for dizziness, syncope, speech difficulty and headaches.   Hematological:  Negative for adenopathy.   Psychiatric/Behavioral:  Negative for agitation and behavioral problems.        Objective   /74   Pulse 60   Temp (!) 96.9 °F (36.1 °C)   Resp 18   Ht 5' 8\" (1.727 m)   Wt 95.4 kg (210 lb 6.4 oz)   BMI 31.99 kg/m²      Physical Exam  Vitals and nursing note reviewed.   Constitutional:       General: He is not in acute distress.     Appearance: He is well-developed. He is not diaphoretic.   HENT:      Head: Normocephalic and atraumatic.      Right Ear: External ear normal.      Left Ear: External ear normal.      Nose: Nose normal.      Mouth/Throat:      Pharynx: No oropharyngeal exudate.   Eyes:      General: No scleral icterus.        Right eye: No discharge.         Left eye: No discharge.      Pupils: Pupils are equal, round, and reactive to light.   Neck:      Thyroid: No thyromegaly.   Cardiovascular:      Rate and Rhythm: Normal rate.      Heart sounds: Normal heart sounds. No murmur heard.  Pulmonary:      Effort: Pulmonary effort is normal. No respiratory distress.      Breath sounds: Normal breath sounds. No wheezing.   Abdominal:      General: Bowel sounds are normal. There is no distension.      Palpations: Abdomen is soft. There is no mass.      Tenderness: There is no abdominal tenderness. There is no guarding or rebound.   Musculoskeletal:         General: Normal range of motion.   Skin:     General: Skin is warm and dry.      Findings: No erythema or rash.   Neurological:      Mental Status: He is alert.      Coordination: Coordination normal.      Deep Tendon Reflexes: Reflexes normal.   Psychiatric:         Behavior: Behavior normal.         "

## 2025-02-12 NOTE — ASSESSMENT & PLAN NOTE
Lab Results   Component Value Date    EGFR 45 11/12/2024    EGFR 59 05/25/2024    EGFR 63 05/24/2024    CREATININE 1.43 (H) 11/12/2024    CREATININE 1.15 05/25/2024    CREATININE 1.09 05/24/2024

## 2025-02-12 NOTE — ASSESSMENT & PLAN NOTE
Sounds like th ept is having neuropathy all day long - will increase to TID  Lab Results   Component Value Date    HGBA1C 6.4 (H) 11/12/2024     Orders:  •  gabapentin (NEURONTIN) 100 mg capsule; Take 1 capsule (100 mg total) by mouth 3 (three) times a day

## 2025-02-20 ENCOUNTER — HOSPITAL ENCOUNTER (OUTPATIENT)
Dept: RADIOLOGY | Facility: HOSPITAL | Age: 80
Discharge: HOME | End: 2025-02-20
Attending: FAMILY MEDICINE
Payer: COMMERCIAL

## 2025-02-20 DIAGNOSIS — I73.9 INTERMITTENT CLAUDICATION (HCC): ICD-10-CM

## 2025-02-20 PROCEDURE — 93925 LOWER EXTREMITY STUDY: CPT

## 2025-02-20 PROCEDURE — 93923 UPR/LXTR ART STDY 3+ LVLS: CPT

## 2025-02-21 PROCEDURE — 93922 UPR/L XTREMITY ART 2 LEVELS: CPT | Performed by: SURGERY

## 2025-02-21 PROCEDURE — 93925 LOWER EXTREMITY STUDY: CPT | Performed by: SURGERY

## 2025-02-28 ENCOUNTER — RESULTS FOLLOW-UP (OUTPATIENT)
Dept: FAMILY MEDICINE CLINIC | Facility: CLINIC | Age: 80
End: 2025-02-28

## 2025-02-28 DIAGNOSIS — I73.9 INTERMITTENT CLAUDICATION (HCC): Primary | ICD-10-CM

## 2025-02-28 DIAGNOSIS — I73.9 PERIPHERAL ARTERIAL DISEASE (HCC): ICD-10-CM

## 2025-02-28 NOTE — TELEPHONE ENCOUNTER
Called patient discussed results of the vascular study.  Study was ordered for intermittent claudication.  Looks like he does have significant blockages on 1 side.  Neck step for this would be for patient to see vascular surgery to see if there is anything can be done for this.  I will place order for patient to be seen by vascular surgery.  Okay to give this message to the patient when he calls back I left message for patient to call back

## 2025-03-03 NOTE — TELEPHONE ENCOUNTER
I spoke to the patient and informed him of Dr Lombardi's message. Patient is requesting a recommendation on who to call.    Nivia De Los Santos LPN

## 2025-03-05 NOTE — PROGRESS NOTES
"Name: Joel Wyman      : 1945      MRN: [id number]  Encounter Provider: Sandra Franco PA-C  Encounter Date: 3/7/2025   Enc[facility]: [facility]  :  Assessment & Plan  Atherosclerosis of native artery of both lower extremities with intermittent claudication (HCC)  79 yoM smoker, COPD, obesity, DM, HTN, HLD, CKD IIIB, rheumatoid arthritis, Prostate CA s/p resection bladder referred to vascular surgery for evaluation of peripheral arterial disease.     -Bilateral L>R leg fatigue at 10-20 yards for which he slows down but does not need to stop  -No ischemic rest pain or tissue loss  -Neuropathy in the feet; \"pre-diabetes\" with A1c 6.6 >6.4    -KERRIE 25:    R 1.32/52/90, normal PPG/ PVR; diffuse fem-pop disease s significant focal stenosis    L 0.98/88/88, dampened; 50-75% CFA w significant calcification. Collateral vessels and monophasic distal to obstruction.     -Exam:     R 2+ femoral, 2+ DP pulse; multiphasic AT/DP; PT signal present    L No easily palp pulses; monophasic AT/DP/PT signals    Feet stable; normal cap refill and no wounds.    Plan:  -PAD with claudication and suspect AIOD in patient who is smoker with diabetes  -Claudication in both legs, somewhat worse in the L leg but not holding him back from activities  -Discussed pathophysiology and treatment of PAD, including indications for procedural/surgical intervention  -Discussed the benefits of a regular walking program; offered PT or supervised exercise program but he does not want anyone in that environment pushing his to walk  -Agreeable to daily walking; start 10 minutes daily and try to build up to 20-30 minutes.   -Discussed critical importance of smoking cessation for overall health, as well as relationship to atherosclerotic disease  -Recommend addition of aspirin 81 mg daily  -Continue with statin therapy on atorvastatin 20 as per PCP  -Discussed the importance of good diabetes management  -Maintain good " foot care, avoid foot wounds and checking feet daily for any wounds or changes  -Follow-up vascular studies with AIOD and LEADS in 1 year with OV, but discussed reasons to be seen sooner  Orders:    VAS ARTERIAL DUPLEX- LOWER LIMB BILATERAL; Future    VAS abdominal aorta/iliac duplex; Future    aspirin (Ecotrin Low Strength) 81 mg EC tablet; Take 1 tablet (81 mg total) by mouth daily    Nicotine abuse  -3/4 PPD smoker  -Discussed the critical importance of smoking cessation specifically in the setting of vascular disease  -Will continue to work on smoking cessation       Type 2 diabetes mellitus with diabetic polyneuropathy, without long-term current use of insulin (HCC)    Lab Results   Component Value Date    HGBA1C 6.4 (H) 11/12/2024          Stage 3b chronic kidney disease (HCC)  Lab Results   Component Value Date    EGFR 45 11/12/2024    EGFR 59 05/25/2024    EGFR 63 05/24/2024    CREATININE 1.43 (H) 11/12/2024    CREATININE 1.15 05/25/2024    CREATININE 1.09 05/24/2024   BUN/creat 41/1.43, estimated GFR       Intermittent claudication (HCC)  -As above  Orders:    Ambulatory Referral to Vascular Surgery    Peripheral arterial disease (HCC)  -As above   Orders:    Ambulatory Referral to Vascular Surgery        History of Present Illness   CC: New patient, referred for PAD and presents today for evaluation. KERRIE done 2/20/25. Patient reports b/l leg aching and fatigue with walking x 10-20 yards. No wounds.     HPI  Joel Wyman is a 79 y.o. male who is referred for peripheral arterial disease.  Patient reports several years of tiredness and fatigue in both legs after walking 10 to 20 yards.  He also has numbness in his feet.  The symptoms have been going on for several years and not necessarily worsening.  He has no ischemic rest pain or tissue loss.    He reports that he has had a difficult last year due to prostate cancer and is doing better now that he had had bladder resection.    We reviewed his lower  "extremity arterial duplex study which shows normal ABIs.  On the right, PVR PPG tracings are normal with diffuse femoral-popliteal disease.  On the left there is diffuse atherosclerotic disease with a 50 to 75% stenosis in the common femoral artery.    Unfortunately, he is still smoking.  We discussed the critical importance of smoking cessation.      We discussed that he may have aortoiliac disease which we can do additional testing in the future.  Since there are no new or unstable symptoms. In the meantime, I recommend a walking program.  I offered a formal exercise, supervised walking program which he declined.  I also recommended the option of physical therapy to help increase activity and walking which he also declined.  We can refer to PT if he changes his mind.    We discussed the importance of preventative care, avoidance of wounds and monitoring feet daily for any wounds or changes.    He is already on statin therapy.  Will add aspirin 81.      Review of Systems   Constitutional: Negative.    HENT: Negative.     Eyes: Negative.    Respiratory:  Positive for cough.    Cardiovascular: Negative.    Gastrointestinal: Negative.    Endocrine: Negative.    Genitourinary: Negative.    Musculoskeletal:  Positive for arthralgias and back pain.   Skin: Negative.    Allergic/Immunologic: Negative.    Neurological:  Positive for numbness.   Hematological: Negative.    Psychiatric/Behavioral: Negative.            Objective   /72 (BP Location: Left arm, Patient Position: Sitting)   Pulse 56   Ht 5' 8\" (1.727 m)   Wt 98 kg (216 lb)   BMI 32.84 kg/m²          Physical Exam  Vitals and nursing note reviewed.   Constitutional:       Appearance: He is well-developed.   HENT:      Head: Normocephalic and atraumatic.   Eyes:      Pupils: Pupils are equal, round, and reactive to light.   Neck:      Thyroid: No thyromegaly.      Vascular: No JVD.      Trachea: Trachea normal.   Cardiovascular:      Rate and Rhythm: " Normal rate and regular rhythm.      Pulses:           Carotid pulses are 2+ on the right side and 2+ on the left side.       Radial pulses are 2+ on the right side and 2+ on the left side.        Femoral pulses are 2+ on the right side.       Dorsalis pedis pulses are 2+ on the right side and detected w/ Doppler on the left side.        Posterior tibial pulses are detected w/ Doppler on the right side and detected w/ Doppler on the left side.      Heart sounds: Normal heart sounds, S1 normal and S2 normal. No murmur heard.     No friction rub. No gallop.   Pulmonary:      Effort: Pulmonary effort is normal. No accessory muscle usage or respiratory distress.      Breath sounds: Normal breath sounds. No wheezing or rales.   Abdominal:      General: Bowel sounds are normal. There is no distension.      Palpations: Abdomen is soft.      Tenderness: There is no abdominal tenderness.   Musculoskeletal:         General: No deformity. Normal range of motion.      Cervical back: Neck supple.      Right lower leg: No edema.      Left lower leg: No edema.   Skin:     General: Skin is warm and dry.      Findings: No lesion or rash.      Nails: There is no clubbing.   Neurological:      Mental Status: He is alert and oriented to person, place, and time.      Comments: Grossly normal    Psychiatric:         Behavior: Behavior is cooperative.       I have reviewed and made appropriate changes to the review of systems input by the medical assistant.      KERRIE 2/20/25:  [facility]PORT  CLINICAL:  Indications:  Patient presents with pain in the bilateral after walking 1 block.  Denies any  open wounds or ulcers at this time. Patient complains of numbness in the  bilateral legs.  Operative History:  Patient denies cardiovascular procedures.  Risk Factors  The patient has history of HTN, Hyperlipidemia and smoking (current) 1-2 ppd.  Clinical  Right Pressure:  120/ mm Hg, Left Pressure:  122/ mm Hg.     FINDINGS:      Segment                Right                  Left                                            Waveform   PSV (cm/s)  Impression  Waveform    PSV (cm/s)    Common Femoral Artery                    128  50-75%                         206    Prox Profunda                             65                                  34    Prox SFA                                  64                                  30    Mid SFA                                   68                                  41    Dist SFA                                 137                                  42    Proximal Pop                              88                                  44    Distal Pop             Triphasic          72                                  44    Tibioperoneal                            104                                  34    Dist Post Tibial       Triphasic          39              Monophasic          20    Dist. Ant. Tibial      Triphasic          59              Monophasic          19    Dist Peroneal                             54              Monophasic          24             CONCLUSION:  Impression:  RIGHT LOWER LIMB:  Diffuse disease noted throughout the femoral-popliteal arteries without  significant focal stenosis.  Ankle/Brachial index: 1.32 which is in the normal category.  PVR/ PPG tracings are normal.  Metatarsal pressure of 52 mmHg.  Great toe pressure of 90 mmHg, within the healing range.     LEFT LOWER LIMB:  Evidence of 50-75% stenosis in the common femoral artery with significant  calcification.  Collateral vessels and monophasic flow noted distal to the obstruction.  Ankle/Brachial index: 0.98 which is in the normal category .  PVR/ PPG tracings are dampened.  Metatarsal pressure of 88 mmHg.  Great toe pressure of 88 mmHg, within the healing range.     No prior.        Vitals:    03/07/25 1527   BP: 116/72   BP Location: Left arm   Patient Position: Sitting   Pulse: 56   Weight: 98 kg (216 lb)   Height:  "5' 8\" (1.727 m)       Patient Active Problem List   Diagnosis    Essential hypertension    Mixed hyperlipidemia    Anemia of chronic disease    COPD (chronic obstructive pulmonary disease) (HCC)    Gross hematuria    Leukocytosis    RA (rheumatoid arthritis) (HCC)    Prostate cancer (HCC) - s/p resection    Urinary retention    Hematuria    Stage 3b chronic kidney disease (HCC)    Obesity (BMI 30.0-34.9)    Nicotine abuse    Type 2 diabetes mellitus with diabetic polyneuropathy, without long-term current use of insulin (HCC)    Cardiac Risk Assessment    Radiation cystitis    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)    Malignant neoplasm of urinary bladder, unspecified site (HCC)    Arthritis of both knees    Chronic low back pain with bilateral sciatica    Localized, primary osteoarthritis of hand    Mixed stress and urge urinary incontinence    Nephrolithiasis    Atheroscler native arteries the extremities w/intermit claudication (HCC)       Past Surgical History:   Procedure Laterality Date    APPENDECTOMY      FL CYSTOGRAM  4/24/2024    FL CYSTOGRAM  5/23/2024    FL RETROGRADE PYELOGRAM  04/14/2021    FL RETROGRADE PYELOGRAM  11/16/2023    FL RETROGRADE PYELOGRAM  4/11/2024    HERNIA REPAIR      IR EMBOLIZATION (SPECIFY VESSEL OR SITE)  09/17/2021    IR NEPHROSTOMY TUBE CHECK AND/OR REMOVAL  07/08/2021    IR NEPHROSTOMY TUBE CHECK/CHANGE/REPOSITION/REINSERTION/UPSIZE  05/10/2021    IR NEPHROSTOMY TUBE CHECK/CHANGE/REPOSITION/REINSERTION/UPSIZE  08/04/2021    IR NEPHROSTOMY TUBE CHECK/CHANGE/REPOSITION/REINSERTION/UPSIZE  10/13/2021    IR NEPHROSTOMY TUBE PLACEMENT  05/07/2021    IR SUPRAPUBIC CATHETER PLACEMENT  5/3/2024    SC CYSTO W/IRRIG & EVAC MULTPLE OBSTRUCTING CLOTS Bilateral 04/14/2021    Procedure: CYSTOSCOPY EVACUATION OF CLOTS bilateral retrograde pyelograms possible TURBT bladder biopsy;  Surgeon: Yovani Khan MD;  Location: [facility];  Service: Urology    SC CYSTO W/IRRIG & EVAC " MULTPLE OBSTRUCTING CLOTS N/A 04/24/2021    Procedure: CYSTOSCOPY EVACUATION OF CLOTS fulguration;  Surgeon: Yovani Khan MD;  Location: [facility];  Service: Urology    SC CYSTO W/IRRIG & EVAC MULTPLE OBSTRUCTING CLOTS N/A 07/08/2021    Procedure: CYSTOSCOPY EVACUATION OF CLOTS BILATERAL ANTIROGRADES NEPHROSTOMY TUBES, FULGERATION ;  Surgeon: Yovani Khan MD;  Location: [facility];  Service: Urology    SC CYSTO W/IRRIG & EVAC MULTPLE OBSTRUCTING CLOTS N/A 08/22/2021    Procedure: CYSTOSCOPY, RIGHT RETROGRADE, EVACUATION OF CLOTS, BLADDER BIOPSY X2 AND FULGERATION OF BLADDER LESIONS, URETEROSCOPY, 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;  Surgeon: Yovani Khan MD;  Location: [facility];  Service: Urology    SC CYSTO W/IRRIG & EVAC MULTPLE OBSTRUCTING CLOTS Bilateral 4/24/2024    Procedure: CYSTOSCOPY EVACUATION OF CLOTS fulguration bladder neck tumors and biopsy, retrograde pyelograms, DVIU;  Surgeon: Yovani Khan MD;  Location: [facility];  Service: Urology    SC CYSTO W/IRRIG & EVAC MULTPLE OBSTRUCTING CLOTS N/A 5/23/2024    Procedure: CYSTOSCOPY FULGERATION RESECTION BLADDER NECK, FLUORSCOPIC CYSTOGRAM, EVACUATION OF CLOTS;  Surgeon: Yovani Khan MD;  Location: [facility];  Service: Urology    SC CYSTOURETHROSCOPY W/DEST &/RMVL MED BLADDER AMARI N/A 4/11/2024    Procedure: TRANSURETHRAL RESECTION OF BLADDER TUMOR (TURBT);  Surgeon: Yovani Khan MD;  Location: [facility];  Service: Urology    SC CYSTOURETHROSCOPY W/URETERAL CATHETERIZATION Bilateral 4/11/2024    Procedure: BILATERAL CYSTOSCOPY WITH RETROGRADE PYELOGRAM, BLADDER NECK CONTRACTURE;  Surgeon: Yovani Khan MD;  Location: [facility];  Service: Urology    SC CYSTOURETHROSCOPY WITH BIOPSY N/A 11/16/2023    Procedure: CYSTOSCOPY, BILATERAL RETROGRADEY PYELOGRAM,  FULGERATION 2.0 CM BLADDER TUMOR WITH BIOPSY;  Surgeon: Yovani Khan MD;  Location: [facility];  Service: Urology     PROSTATECTOMY  2014    ROTATOR CUFF REPAIR      right shoulder       Family History   Problem Relation Age of Onset    Diabetes Mother     Stroke Mother     Diabetes Sister     Diabetes Brother     Stroke Brother     Mental illness Neg Hx        Social History     Socioeconomic History    Marital status: /Civil Union     Spouse name: Not on file    Number of children: Not on file    Years of education: Not on file    Highest education level: Not on file   Occupational History    Not on file   Tobacco Use    Smoking status: Every Day     Current packs/day: 0.50     Average packs/day: 0.5 packs/day for 50.0 years (25.0 ttl pk-yrs)     Types: Cigarettes     Passive exposure: Past    Smokeless tobacco: Never   Vaping Use    Vaping status: Never Used   Substance and Sexual Activity    Alcohol use: Not Currently     Comment: None in over 50 yrs    Drug use: No    Sexual activity: Yes     Partners: Female   Other Topics Concern    Not on file   Social History Narrative    Not on file     Social Drivers of Health     Financial Resource Strain: Not on file   Food Insecurity: No Food Insecurity (12/3/2024)    Hunger Vital Sign     Worried About Running Out of Food in the Last Year: Never true     Ran Out of Food in the Last Year: Never true   Transportation Needs: No Transportation Needs (12/3/2024)    PRAPARE - Transportation     Lack of Transportation (Medical): No     Lack of Transportation (Non-Medical): No   Physical Activity: Not on file   Stress: Not on file   Social Connections: Feeling Socially Integrated (7/15/2024)    [facility]    OASIS : Social Isolation     Frequency of experiencing loneliness or isolation: Never   Intimate Partner Violence: Not on file   Housing Stability: Low Risk  (12/3/2024)    Housing Stability Vital Sign     Unable to Pay for Housing in the Last Year: No     Number of Times Moved in the Last Year: 0     Homeless in the Last Year: No       No Known  Allergies      Current Outpatient Medications:     albuterol (PROVENTIL HFA,VENTOLIN HFA) 90 mcg/act inhaler, INHALE 2 PUFFS EVERY 6 HOURS AS NEEDED FOR WHEEZING, Disp: 6.7 g, Rfl: 0    atorvastatin (LIPITOR) 20 mg tablet, Take 20 mg by mouth daily, Disp: , Rfl:     fluticasone (FLONASE) 50 mcg/act nasal spray, 1 spray into each nostril daily, Disp: , Rfl:     fluticasone-umeclidinium-vilanterol (Trelegy Ellipta) 200-62.5-25 mcg/actuation AEPB inhaler, Inhale 1 puff every morning, Disp: 180 blister, Rfl: 5    gabapentin (NEURONTIN) 100 mg capsule, Take 1 capsule (100 mg total) by mouth 3 (three) times a day, Disp: 300 capsule, Rfl: 3    meloxicam (MOBIC) 15 mg tablet, Take 15 mg by mouth daily, Disp: , Rfl:     metoprolol tartrate (LOPRESSOR) 25 mg tablet, Take 0.5 tablets (12.5 mg total) by mouth every 12 (twelve) hours, Disp: 90 tablet, Rfl: 3

## 2025-03-07 ENCOUNTER — CONSULT (OUTPATIENT)
Dept: VASCULAR SURGERY | Facility: CLINIC | Age: 80
End: 2025-03-07
Payer: COMMERCIAL

## 2025-03-07 VITALS
WEIGHT: 216 LBS | HEART RATE: 56 BPM | SYSTOLIC BLOOD PRESSURE: 116 MMHG | DIASTOLIC BLOOD PRESSURE: 72 MMHG | HEIGHT: 68 IN | BODY MASS INDEX: 32.74 KG/M2

## 2025-03-07 DIAGNOSIS — E11.42 TYPE 2 DIABETES MELLITUS WITH DIABETIC POLYNEUROPATHY, WITHOUT LONG-TERM CURRENT USE OF INSULIN (HCC): ICD-10-CM

## 2025-03-07 DIAGNOSIS — I70.213 ATHEROSCLEROSIS OF NATIVE ARTERY OF BOTH LOWER EXTREMITIES WITH INTERMITTENT CLAUDICATION (HCC): Primary | ICD-10-CM

## 2025-03-07 DIAGNOSIS — Z72.0 NICOTINE ABUSE: ICD-10-CM

## 2025-03-07 DIAGNOSIS — I73.9 PERIPHERAL ARTERIAL DISEASE (HCC): ICD-10-CM

## 2025-03-07 DIAGNOSIS — I73.9 INTERMITTENT CLAUDICATION (HCC): ICD-10-CM

## 2025-03-07 DIAGNOSIS — N18.32 STAGE 3B CHRONIC KIDNEY DISEASE (HCC): ICD-10-CM

## 2025-03-07 PROBLEM — I70.219 ATHEROSCLER NATIVE ARTERIES THE EXTREMITIES W/INTERMIT CLAUDICATION (HCC): Status: ACTIVE | Noted: 2025-03-07

## 2025-03-07 PROCEDURE — 99204 OFFICE O/P NEW MOD 45 MIN: CPT | Performed by: PHYSICIAN ASSISTANT

## 2025-03-07 RX ORDER — ASPIRIN 81 MG/1
81 TABLET ORAL DAILY
Qty: 90 TABLET | Refills: 1 | Status: SHIPPED | OUTPATIENT
Start: 2025-03-07

## 2025-03-07 NOTE — ASSESSMENT & PLAN NOTE
Lab Results   Component Value Date    EGFR 45 11/12/2024    EGFR 59 05/25/2024    EGFR 63 05/24/2024    CREATININE 1.43 (H) 11/12/2024    CREATININE 1.15 05/25/2024    CREATININE 1.09 05/24/2024   BUN/creat 41/1.43, estimated GFR

## 2025-03-07 NOTE — ASSESSMENT & PLAN NOTE
-3/4 PPD smoker  -Discussed the critical importance of smoking cessation specifically in the setting of vascular disease  -Will continue to work on smoking cessation

## 2025-03-07 NOTE — PATIENT INSTRUCTIONS
Mr. Wyman,    Very nice to meet you in the office today.      I recommend medical therapy with aspirin and atorvastatin.  Try to walk every day starting at 10 minutes.      Remember to avoid foot wounds and check feet daily for any wounds or changes

## 2025-03-07 NOTE — ASSESSMENT & PLAN NOTE
"79 yoM smoker, COPD, obesity, DM, HTN, HLD, CKD IIIB, rheumatoid arthritis, Prostate CA s/p resection bladder referred to vascular surgery for evaluation of peripheral arterial disease.     -Bilateral L>R leg fatigue at 10-20 yards for which he slows down but does not need to stop  -No ischemic rest pain or tissue loss  -Neuropathy in the feet; \"pre-diabetes\" with A1c 6.6 >6.4    -KERRIE 2/20/25:    R 1.32/52/90, normal PPG/ PVR; diffuse fem-pop disease s significant focal stenosis    L 0.98/88/88, dampened; 50-75% CFA w significant calcification. Collateral vessels and monophasic distal to obstruction.     -Exam:     R 2+ femoral, 2+ DP pulse; multiphasic AT/DP; PT signal present    L No easily palp pulses; monophasic AT/DP/PT signals    Feet stable; normal cap refill and no wounds.    Plan:  -PAD with claudication and suspect AIOD in patient who is smoker with diabetes  -Claudication in both legs, somewhat worse in the L leg but not holding him back from activities  -Discussed pathophysiology and treatment of PAD, including indications for procedural/surgical intervention  -Discussed the benefits of a regular walking program; offered PT or supervised exercise program but he does not want anyone in that environment pushing his to walk  -Agreeable to daily walking; start 10 minutes daily and try to build up to 20-30 minutes.   -Discussed critical importance of smoking cessation for overall health, as well as relationship to atherosclerotic disease  -Recommend addition of aspirin 81 mg daily  -Continue with statin therapy on atorvastatin 20 as per PCP  -Discussed the importance of good diabetes management  -Maintain good foot care, avoid foot wounds and checking feet daily for any wounds or changes  -Follow-up vascular studies with AIOD and LEADS in 1 year with OV, but discussed reasons to be seen sooner  Orders:    VAS ARTERIAL DUPLEX- LOWER LIMB BILATERAL; Future    VAS abdominal aorta/iliac duplex; Future    aspirin " (Ecotrin Low Strength) 81 mg EC tablet; Take 1 tablet (81 mg total) by mouth daily

## 2025-03-11 ENCOUNTER — TELEPHONE (OUTPATIENT)
Age: 80
End: 2025-03-11

## 2025-03-11 NOTE — TELEPHONE ENCOUNTER
Order is in chart.  [facility] anyone who is local to them that can get him in - DR Bass is an excellent choice

## 2025-03-11 NOTE — TELEPHONE ENCOUNTER
Patient called in to inform Dr. Lombardi that he already had an appt  [facility]..

## 2025-03-31 DIAGNOSIS — J44.1 CHRONIC OBSTRUCTIVE PULMONARY DISEASE WITH ACUTE EXACERBATION (HCC): ICD-10-CM

## 2025-03-31 RX ORDER — ALBUTEROL SULFATE 90 UG/1
INHALANT RESPIRATORY (INHALATION)
Qty: 6.7 G | Refills: 5 | Status: SHIPPED | OUTPATIENT
Start: 2025-03-31

## 2025-03-31 NOTE — TELEPHONE ENCOUNTER
Patient called to request a refill for their albuterol inhaler advised a refill was requested on 3/31/25 and is pending approval. Patient verbalized understanding and is in agreement.     Does the patient have enough for 3 days?   [] Yes   [x] No - Send as HP to POD

## 2025-05-16 ENCOUNTER — HOSPITAL ENCOUNTER (INPATIENT)
Facility: HOSPITAL | Age: 80
LOS: 7 days | Discharge: HOME | DRG: 871 | End: 2025-05-23
Admitting: FAMILY MEDICINE
Payer: COMMERCIAL

## 2025-05-16 ENCOUNTER — ANESTHESIA (INPATIENT)
Dept: NON INVASIVE DIAGNOSTICS | Facility: HOSPITAL | Age: 80
DRG: 871 | End: 2025-05-16
Payer: COMMERCIAL

## 2025-05-16 ENCOUNTER — APPOINTMENT (EMERGENCY)
Dept: RADIOLOGY | Facility: HOSPITAL | Age: 80
DRG: 871 | End: 2025-05-16
Payer: COMMERCIAL

## 2025-05-16 ENCOUNTER — APPOINTMENT (INPATIENT)
Dept: NON INVASIVE DIAGNOSTICS | Facility: HOSPITAL | Age: 80
DRG: 871 | End: 2025-05-16
Attending: RADIOLOGY
Payer: COMMERCIAL

## 2025-05-16 DIAGNOSIS — N13.2 URETERAL STONE WITH HYDRONEPHROSIS: Primary | ICD-10-CM

## 2025-05-16 DIAGNOSIS — Z98.890 HISTORY OF UROSTOMY: ICD-10-CM

## 2025-05-16 DIAGNOSIS — A41.9 SEPTIC SHOCK (HCC): ICD-10-CM

## 2025-05-16 DIAGNOSIS — N18.32 ACUTE KIDNEY INJURY SUPERIMPOSED ON STAGE 3B CHRONIC KIDNEY DISEASE (HCC): ICD-10-CM

## 2025-05-16 DIAGNOSIS — N17.9 ACUTE KIDNEY INJURY SUPERIMPOSED ON STAGE 3B CHRONIC KIDNEY DISEASE (HCC): ICD-10-CM

## 2025-05-16 DIAGNOSIS — R78.81 BACTEREMIA: ICD-10-CM

## 2025-05-16 DIAGNOSIS — R10.31 RIGHT LOWER QUADRANT ABDOMINAL PAIN: ICD-10-CM

## 2025-05-16 DIAGNOSIS — E87.20 METABOLIC ACIDOSIS: ICD-10-CM

## 2025-05-16 DIAGNOSIS — I48.91 ATRIAL FIBRILLATION, UNSPECIFIED TYPE (HCC): ICD-10-CM

## 2025-05-16 DIAGNOSIS — R65.21 SEPTIC SHOCK (HCC): ICD-10-CM

## 2025-05-16 PROBLEM — I71.43 INFRARENAL ABDOMINAL AORTIC ANEURYSM (AAA) WITHOUT RUPTURE (HCC): Status: ACTIVE | Noted: 2025-05-16

## 2025-05-16 LAB
ALBUMIN SERPL BCG-MCNC: 4.2 G/DL (ref 3.5–5)
ALP SERPL-CCNC: 83 U/L (ref 34–104)
ALT SERPL W P-5'-P-CCNC: 16 U/L (ref 7–52)
ANION GAP SERPL CALCULATED.3IONS-SCNC: 10 MMOL/L (ref 4–13)
AST SERPL W P-5'-P-CCNC: 19 U/L (ref 13–39)
BACTERIA UR QL AUTO: ABNORMAL /HPF
BASOPHILS # BLD AUTO: 0.03 THOUSANDS/ÂΜL (ref 0–0.1)
BASOPHILS NFR BLD AUTO: 0 % (ref 0–1)
BILIRUB SERPL-MCNC: 0.45 MG/DL (ref 0.2–1)
BILIRUB UR QL STRIP: NEGATIVE
BUN SERPL-MCNC: 56 MG/DL (ref 5–25)
CALCIUM SERPL-MCNC: 9.6 MG/DL (ref 8.4–10.2)
CHLORIDE SERPL-SCNC: 107 MMOL/L (ref 96–108)
CLARITY UR: ABNORMAL
CO2 SERPL-SCNC: 18 MMOL/L (ref 21–32)
COLOR UR: YELLOW
CREAT SERPL-MCNC: 2.26 MG/DL (ref 0.6–1.3)
EOSINOPHIL # BLD AUTO: 0.1 THOUSAND/ÂΜL (ref 0–0.61)
EOSINOPHIL NFR BLD AUTO: 1 % (ref 0–6)
ERYTHROCYTE [DISTWIDTH] IN BLOOD BY AUTOMATED COUNT: 15.5 % (ref 11.6–15.1)
GFR SERPL CREATININE-BSD FRML MDRD: 26 ML/MIN/1.73SQ M
GLUCOSE SERPL-MCNC: 147 MG/DL (ref 65–140)
GLUCOSE UR STRIP-MCNC: NEGATIVE MG/DL
HCT VFR BLD AUTO: 42.1 % (ref 36.5–49.3)
HGB BLD-MCNC: 13.2 G/DL (ref 12–17)
HGB UR QL STRIP.AUTO: ABNORMAL
IMM GRANULOCYTES # BLD AUTO: 0.09 THOUSAND/UL (ref 0–0.2)
IMM GRANULOCYTES NFR BLD AUTO: 1 % (ref 0–2)
INR PPP: 1.16 (ref 0.85–1.19)
KETONES UR STRIP-MCNC: NEGATIVE MG/DL
LACTATE SERPL-SCNC: 0.7 MMOL/L (ref 0.5–2)
LEUKOCYTE ESTERASE UR QL STRIP: ABNORMAL
LIPASE SERPL-CCNC: 30 U/L (ref 11–82)
LYMPHOCYTES # BLD AUTO: 1 THOUSANDS/ÂΜL (ref 0.6–4.47)
LYMPHOCYTES NFR BLD AUTO: 5 % (ref 14–44)
MCH RBC QN AUTO: 28.5 PG (ref 26.8–34.3)
MCHC RBC AUTO-ENTMCNC: 31.4 G/DL (ref 31.4–37.4)
MCV RBC AUTO: 91 FL (ref 82–98)
MONOCYTES # BLD AUTO: 1.08 THOUSAND/ÂΜL (ref 0.17–1.22)
MONOCYTES NFR BLD AUTO: 6 % (ref 4–12)
NEUTROPHILS # BLD AUTO: 16.39 THOUSANDS/ÂΜL (ref 1.85–7.62)
NEUTS SEG NFR BLD AUTO: 87 % (ref 43–75)
NITRITE UR QL STRIP: POSITIVE
NON-SQ EPI CELLS URNS QL MICRO: ABNORMAL /HPF
NRBC BLD AUTO-RTO: 0 /100 WBCS
PH UR STRIP.AUTO: 8 [PH]
PLATELET # BLD AUTO: 232 THOUSANDS/UL (ref 149–390)
PMV BLD AUTO: 9.6 FL (ref 8.9–12.7)
POTASSIUM SERPL-SCNC: 5.2 MMOL/L (ref 3.5–5.3)
PROT SERPL-MCNC: 8.4 G/DL (ref 6.4–8.4)
PROT UR STRIP-MCNC: ABNORMAL MG/DL
PROTHROMBIN TIME: 15.3 SECONDS (ref 12.3–15)
RBC # BLD AUTO: 4.63 MILLION/UL (ref 3.88–5.62)
RBC #/AREA URNS AUTO: ABNORMAL /HPF
SODIUM SERPL-SCNC: 135 MMOL/L (ref 135–147)
SP GR UR STRIP.AUTO: 1.02 (ref 1–1.03)
TRI-PHOS CRY URNS QL MICRO: ABNORMAL /HPF
UROBILINOGEN UR STRIP-ACNC: <2 MG/DL
WBC # BLD AUTO: 18.69 THOUSAND/UL (ref 4.31–10.16)
WBC #/AREA URNS AUTO: ABNORMAL /HPF

## 2025-05-16 PROCEDURE — 87086 URINE CULTURE/COLONY COUNT: CPT

## 2025-05-16 PROCEDURE — 96375 TX/PRO/DX INJ NEW DRUG ADDON: CPT

## 2025-05-16 PROCEDURE — 99223 1ST HOSP IP/OBS HIGH 75: CPT | Performed by: FAMILY MEDICINE

## 2025-05-16 PROCEDURE — 87040 BLOOD CULTURE FOR BACTERIA: CPT

## 2025-05-16 PROCEDURE — 85025 COMPLETE CBC W/AUTO DIFF WBC: CPT

## 2025-05-16 PROCEDURE — 87205 SMEAR GRAM STAIN: CPT | Performed by: FAMILY MEDICINE

## 2025-05-16 PROCEDURE — 87154 CUL TYP ID BLD PTHGN 6+ TRGT: CPT

## 2025-05-16 PROCEDURE — C1729 CATH, DRAINAGE: HCPCS

## 2025-05-16 PROCEDURE — 96365 THER/PROPH/DIAG IV INF INIT: CPT

## 2025-05-16 PROCEDURE — 0T9330Z DRAINAGE OF RIGHT KIDNEY PELVIS WITH DRAINAGE DEVICE, PERCUTANEOUS APPROACH: ICD-10-PCS | Performed by: RADIOLOGY

## 2025-05-16 PROCEDURE — 50432 PLMT NEPHROSTOMY CATHETER: CPT

## 2025-05-16 PROCEDURE — 94640 AIRWAY INHALATION TREATMENT: CPT

## 2025-05-16 PROCEDURE — 87077 CULTURE AEROBIC IDENTIFY: CPT

## 2025-05-16 PROCEDURE — NC001 PR NO CHARGE: Performed by: RADIOLOGY

## 2025-05-16 PROCEDURE — 87077 CULTURE AEROBIC IDENTIFY: CPT | Performed by: FAMILY MEDICINE

## 2025-05-16 PROCEDURE — 83605 ASSAY OF LACTIC ACID: CPT

## 2025-05-16 PROCEDURE — 87186 SC STD MICRODIL/AGAR DIL: CPT | Performed by: FAMILY MEDICINE

## 2025-05-16 PROCEDURE — 87181 SC STD AGAR DILUTION PER AGT: CPT

## 2025-05-16 PROCEDURE — 81001 URINALYSIS AUTO W/SCOPE: CPT

## 2025-05-16 PROCEDURE — 96361 HYDRATE IV INFUSION ADD-ON: CPT

## 2025-05-16 PROCEDURE — 80053 COMPREHEN METABOLIC PANEL: CPT

## 2025-05-16 PROCEDURE — 83690 ASSAY OF LIPASE: CPT

## 2025-05-16 PROCEDURE — 36415 COLL VENOUS BLD VENIPUNCTURE: CPT

## 2025-05-16 PROCEDURE — 99285 EMERGENCY DEPT VISIT HI MDM: CPT

## 2025-05-16 PROCEDURE — 87070 CULTURE OTHR SPECIMN AEROBIC: CPT | Performed by: FAMILY MEDICINE

## 2025-05-16 PROCEDURE — 74176 CT ABD & PELVIS W/O CONTRAST: CPT

## 2025-05-16 PROCEDURE — 50432 PLMT NEPHROSTOMY CATHETER: CPT | Performed by: RADIOLOGY

## 2025-05-16 PROCEDURE — C1894 INTRO/SHEATH, NON-LASER: HCPCS

## 2025-05-16 PROCEDURE — 85610 PROTHROMBIN TIME: CPT | Performed by: RADIOLOGY

## 2025-05-16 PROCEDURE — 87186 SC STD MICRODIL/AGAR DIL: CPT

## 2025-05-16 RX ORDER — FLUTICASONE PROPIONATE 50 MCG
1 SPRAY, SUSPENSION (ML) NASAL DAILY
Status: DISCONTINUED | OUTPATIENT
Start: 2025-05-17 | End: 2025-05-23 | Stop reason: HOSPADM

## 2025-05-16 RX ORDER — ALBUTEROL SULFATE 0.83 MG/ML
5 SOLUTION RESPIRATORY (INHALATION) ONCE
Status: COMPLETED | OUTPATIENT
Start: 2025-05-16 | End: 2025-05-16

## 2025-05-16 RX ORDER — ACETAMINOPHEN 10 MG/ML
1000 INJECTION, SOLUTION INTRAVENOUS ONCE
Status: COMPLETED | OUTPATIENT
Start: 2025-05-16 | End: 2025-05-16

## 2025-05-16 RX ORDER — FENTANYL CITRATE 50 UG/ML
50 INJECTION, SOLUTION INTRAMUSCULAR; INTRAVENOUS ONCE
Refills: 0 | Status: COMPLETED | OUTPATIENT
Start: 2025-05-16 | End: 2025-05-16

## 2025-05-16 RX ORDER — GABAPENTIN 100 MG/1
100 CAPSULE ORAL 3 TIMES DAILY
Status: DISCONTINUED | OUTPATIENT
Start: 2025-05-16 | End: 2025-05-23 | Stop reason: HOSPADM

## 2025-05-16 RX ORDER — SODIUM CHLORIDE 9 MG/ML
100 INJECTION, SOLUTION INTRAVENOUS CONTINUOUS
Status: DISCONTINUED | OUTPATIENT
Start: 2025-05-16 | End: 2025-05-17

## 2025-05-16 RX ORDER — ONDANSETRON 2 MG/ML
4 INJECTION INTRAMUSCULAR; INTRAVENOUS EVERY 6 HOURS PRN
Status: DISCONTINUED | OUTPATIENT
Start: 2025-05-16 | End: 2025-05-23 | Stop reason: HOSPADM

## 2025-05-16 RX ORDER — ACETAMINOPHEN 325 MG/1
650 TABLET ORAL ONCE
Status: COMPLETED | OUTPATIENT
Start: 2025-05-16 | End: 2025-05-16

## 2025-05-16 RX ORDER — CEFEPIME HYDROCHLORIDE 2 G/50ML
2000 INJECTION, SOLUTION INTRAVENOUS EVERY 24 HOURS
Status: DISCONTINUED | OUTPATIENT
Start: 2025-05-16 | End: 2025-05-19

## 2025-05-16 RX ORDER — ONDANSETRON 2 MG/ML
4 INJECTION INTRAMUSCULAR; INTRAVENOUS ONCE
Status: COMPLETED | OUTPATIENT
Start: 2025-05-16 | End: 2025-05-16

## 2025-05-16 RX ORDER — SODIUM CHLORIDE 9 MG/ML
INJECTION, SOLUTION INTRAVENOUS CONTINUOUS PRN
Status: DISCONTINUED | OUTPATIENT
Start: 2025-05-16 | End: 2025-05-16

## 2025-05-16 RX ORDER — HEPARIN SODIUM 5000 [USP'U]/ML
5000 INJECTION, SOLUTION INTRAVENOUS; SUBCUTANEOUS EVERY 8 HOURS SCHEDULED
Status: DISCONTINUED | OUTPATIENT
Start: 2025-05-17 | End: 2025-05-17

## 2025-05-16 RX ORDER — ASPIRIN 81 MG/1
81 TABLET, CHEWABLE ORAL DAILY
Status: DISCONTINUED | OUTPATIENT
Start: 2025-05-16 | End: 2025-05-23 | Stop reason: HOSPADM

## 2025-05-16 RX ORDER — ACETAMINOPHEN 325 MG/1
650 TABLET ORAL EVERY 6 HOURS PRN
Status: DISCONTINUED | OUTPATIENT
Start: 2025-05-16 | End: 2025-05-23 | Stop reason: HOSPADM

## 2025-05-16 RX ORDER — CEFAZOLIN SODIUM 2 G/50ML
SOLUTION INTRAVENOUS AS NEEDED
Status: DISCONTINUED | OUTPATIENT
Start: 2025-05-16 | End: 2025-05-16

## 2025-05-16 RX ORDER — PROPOFOL 10 MG/ML
INJECTION, EMULSION INTRAVENOUS CONTINUOUS PRN
Status: DISCONTINUED | OUTPATIENT
Start: 2025-05-16 | End: 2025-05-16

## 2025-05-16 RX ORDER — HYDROMORPHONE HCL/PF 1 MG/ML
0.5 SYRINGE (ML) INJECTION EVERY 4 HOURS PRN
Status: DISCONTINUED | OUTPATIENT
Start: 2025-05-16 | End: 2025-05-23 | Stop reason: HOSPADM

## 2025-05-16 RX ORDER — FENTANYL CITRATE 50 UG/ML
INJECTION, SOLUTION INTRAMUSCULAR; INTRAVENOUS AS NEEDED
Status: DISCONTINUED | OUTPATIENT
Start: 2025-05-16 | End: 2025-05-16

## 2025-05-16 RX ORDER — FLUTICASONE FUROATE AND VILANTEROL 200; 25 UG/1; UG/1
1 POWDER RESPIRATORY (INHALATION) DAILY
Status: DISCONTINUED | OUTPATIENT
Start: 2025-05-16 | End: 2025-05-23 | Stop reason: HOSPADM

## 2025-05-16 RX ORDER — CEFTRIAXONE 1 G/50ML
1000 INJECTION, SOLUTION INTRAVENOUS EVERY 24 HOURS
Status: DISCONTINUED | OUTPATIENT
Start: 2025-05-17 | End: 2025-05-16

## 2025-05-16 RX ORDER — ACETAMINOPHEN 325 MG/1
325 TABLET ORAL ONCE
Status: DISCONTINUED | OUTPATIENT
Start: 2025-05-16 | End: 2025-05-17

## 2025-05-16 RX ORDER — NICOTINE 21 MG/24HR
1 PATCH, TRANSDERMAL 24 HOURS TRANSDERMAL DAILY
Status: DISCONTINUED | OUTPATIENT
Start: 2025-05-16 | End: 2025-05-23 | Stop reason: HOSPADM

## 2025-05-16 RX ORDER — LIDOCAINE WITH 8.4% SOD BICARB 0.9%(10ML)
SYRINGE (ML) INJECTION AS NEEDED
Status: COMPLETED | OUTPATIENT
Start: 2025-05-16 | End: 2025-05-16

## 2025-05-16 RX ORDER — MORPHINE SULFATE 4 MG/ML
4 INJECTION, SOLUTION INTRAMUSCULAR; INTRAVENOUS ONCE
Status: COMPLETED | OUTPATIENT
Start: 2025-05-16 | End: 2025-05-16

## 2025-05-16 RX ORDER — CEFTRIAXONE 1 G/50ML
1000 INJECTION, SOLUTION INTRAVENOUS ONCE
Status: COMPLETED | OUTPATIENT
Start: 2025-05-16 | End: 2025-05-16

## 2025-05-16 RX ORDER — ATORVASTATIN CALCIUM 20 MG/1
20 TABLET, FILM COATED ORAL
Status: DISCONTINUED | OUTPATIENT
Start: 2025-05-17 | End: 2025-05-23 | Stop reason: HOSPADM

## 2025-05-16 RX ORDER — IPRATROPIUM BROMIDE AND ALBUTEROL SULFATE 2.5; .5 MG/3ML; MG/3ML
3 SOLUTION RESPIRATORY (INHALATION) EVERY 4 HOURS PRN
Status: DISCONTINUED | OUTPATIENT
Start: 2025-05-16 | End: 2025-05-19

## 2025-05-16 RX ADMIN — ALBUTEROL SULFATE 5 MG: 2.5 SOLUTION RESPIRATORY (INHALATION) at 13:17

## 2025-05-16 RX ADMIN — GABAPENTIN 100 MG: 100 CAPSULE ORAL at 20:41

## 2025-05-16 RX ADMIN — NICOTINE 1 PATCH: 14 PATCH, EXTENDED RELEASE TRANSDERMAL at 18:31

## 2025-05-16 RX ADMIN — ONDANSETRON 4 MG: 2 INJECTION INTRAMUSCULAR; INTRAVENOUS at 10:12

## 2025-05-16 RX ADMIN — SODIUM CHLORIDE 100 ML/HR: 0.9 INJECTION, SOLUTION INTRAVENOUS at 18:25

## 2025-05-16 RX ADMIN — IPRATROPIUM BROMIDE 0.5 MG: 0.5 SOLUTION RESPIRATORY (INHALATION) at 13:17

## 2025-05-16 RX ADMIN — IOHEXOL 15 ML: 350 INJECTION, SOLUTION INTRAVENOUS at 16:36

## 2025-05-16 RX ADMIN — SODIUM CHLORIDE: 0.9 INJECTION, SOLUTION INTRAVENOUS at 16:03

## 2025-05-16 RX ADMIN — ACETAMINOPHEN 650 MG: 325 TABLET, FILM COATED ORAL at 14:12

## 2025-05-16 RX ADMIN — CEFAZOLIN SODIUM 2000 MG: 2 SOLUTION INTRAVENOUS at 16:09

## 2025-05-16 RX ADMIN — SODIUM CHLORIDE 500 ML: 0.9 INJECTION, SOLUTION INTRAVENOUS at 10:10

## 2025-05-16 RX ADMIN — ACETAMINOPHEN 1000 MG: 10 INJECTION INTRAVENOUS at 17:20

## 2025-05-16 RX ADMIN — FENTANYL CITRATE 50 MCG: 50 INJECTION INTRAMUSCULAR; INTRAVENOUS at 11:55

## 2025-05-16 RX ADMIN — FENTANYL CITRATE 50 MCG: 50 INJECTION, SOLUTION INTRAMUSCULAR; INTRAVENOUS at 16:03

## 2025-05-16 RX ADMIN — CEFEPIME HYDROCHLORIDE 2000 MG: 2 INJECTION, SOLUTION INTRAVENOUS at 19:46

## 2025-05-16 RX ADMIN — CEFTRIAXONE 1000 MG: 1 INJECTION, SOLUTION INTRAVENOUS at 12:36

## 2025-05-16 RX ADMIN — FENTANYL CITRATE 50 MCG: 50 INJECTION, SOLUTION INTRAMUSCULAR; INTRAVENOUS at 16:06

## 2025-05-16 RX ADMIN — SODIUM CHLORIDE 1000 ML: 0.9 INJECTION, SOLUTION INTRAVENOUS at 17:18

## 2025-05-16 RX ADMIN — Medication 5 ML: at 16:18

## 2025-05-16 RX ADMIN — MORPHINE SULFATE 4 MG: 4 INJECTION INTRAVENOUS at 10:12

## 2025-05-16 RX ADMIN — PROPOFOL 100 MCG/KG/MIN: 10 INJECTION, EMULSION INTRAVENOUS at 16:02

## 2025-05-16 RX ADMIN — SODIUM CHLORIDE 1000 ML: 0.9 INJECTION, SOLUTION INTRAVENOUS at 19:03

## 2025-05-16 NOTE — ED PROVIDER NOTES
Time reflects when diagnosis was documented in both MDM as applicable and the Disposition within this note       Time User Action Codes Description Comment    5/16/2025  1:21 PM Jeremías Silverio Add [N13.2] Ureteral stone with hydronephrosis     5/16/2025  1:21 PM Jeremías Silverio Add [R10.31] Right lower quadrant abdominal pain     5/16/2025  1:22 PM Jeremías Silverio Laura [Z98.890] History of urostomy           ED Disposition       ED Disposition   Admit    Condition   Stable    Date/Time   Fri May 16, 2025  1:21 PM    Comment   Case was discussed with [facility] and the patient's admission status was agreed to be Admission Status: inpatient status to the service of [facility].               Assessment & Plan       Medical Decision Making  Amount and/or Complexity of Data Reviewed  Labs: ordered. Decision-making details documented in ED Course.  Radiology: ordered.    Risk  Prescription drug management.  Decision regarding hospitalization.      80 y/o M with pmh prostate cancer, cystectomy s/p ileal conduit presents with acute onset R flank and RLQ abd pain. States started last night around 8pm, severe and prevent him from sleeping. 1x vomiting this morning. No BM yet today. Never had pain like this before. He thinks it is from abd hernia around the ileal conduit. Pt states he called urologist office this AM who recommended ED eval.   VSS  Pt overall nontoxic appearing with TTP and guarding to RLQ abd. Ileal conduit site appears normal with clear urine in bag.   Ddx includes incarcerated vs strangulated hernia, SBO, ureteral colic, perforated viscous, appendicitis, diverticulitis.   Plan for abd labs with lactic, CT imaging, UA to evaluate for above.   Acute leukocytosis and UA nitrite +. Cultures added for completeness. Abx given for UTI.  CT imaging concerning for infected ureteral stone with possible pyelonephritis. D/w urology, recommending IR consultation for nephrostomy. [facility] accepting.       ED Course as of 05/16/25 3588    Fri May 16, 2025   1032 WBC(!): 18.69  abnormal   1032 Technically SIRS+ due to respirations however respirations 2/2 chronic medical condition not felt acute    1055 Creatinine(!): 2.26  SHANTI       Medications   morphine injection 4 mg (4 mg Intravenous Given 5/16/25 1012)   ondansetron (ZOFRAN) injection 4 mg (4 mg Intravenous Given 5/16/25 1012)   sodium chloride 0.9 % bolus 500 mL (0 mL Intravenous Stopped 5/16/25 1045)   fentaNYL injection 50 mcg (50 mcg Intravenous Given 5/16/25 1155)   cefTRIAXone (ROCEPHIN) IVPB (premix in dextrose) 1,000 mg 50 mL (0 mg Intravenous Stopped 5/16/25 1310)   albuterol inhalation solution 5 mg (5 mg Nebulization Given 5/16/25 1317)   ipratropium (ATROVENT) 0.02 % inhalation solution 0.5 mg (0.5 mg Nebulization Given 5/16/25 1317)       ED Risk Strat Scores                    No data recorded                            History of Present Illness       Chief Complaint   Patient presents with    Hernia     Pt reported that he has hernia on his ileostomy site. Called DR Khan this am, reported of severe pain and nausea. Vomited x 1 this am        Past Medical History:   Diagnosis Date    Bladder infection 03/2024    Borderline diabetes     Cancer (HCC)     prostate    COPD (chronic obstructive pulmonary disease) (HCC)     Hematuria     History of transfusion     x2    Hyperlipidemia     Hypertension     Radiation cystitis     Smoker     Wears glasses       Past Surgical History:   Procedure Laterality Date    APPENDECTOMY      FL CYSTOGRAM  4/24/2024    FL CYSTOGRAM  5/23/2024    FL RETROGRADE PYELOGRAM  04/14/2021    FL RETROGRADE PYELOGRAM  11/16/2023    FL RETROGRADE PYELOGRAM  4/11/2024    HERNIA REPAIR      IR EMBOLIZATION (SPECIFY VESSEL OR SITE)  09/17/2021    IR NEPHROSTOMY TUBE CHECK AND/OR REMOVAL  07/08/2021    IR NEPHROSTOMY TUBE CHECK/CHANGE/REPOSITION/REINSERTION/UPSIZE  05/10/2021    IR NEPHROSTOMY TUBE CHECK/CHANGE/REPOSITION/REINSERTION/UPSIZE  08/04/2021    IR  NEPHROSTOMY TUBE CHECK/CHANGE/REPOSITION/REINSERTION/UPSIZE  10/13/2021    IR NEPHROSTOMY TUBE PLACEMENT  05/07/2021    IR SUPRAPUBIC CATHETER PLACEMENT  5/3/2024    PA CYSTO W/IRRIG & EVAC MULTPLE OBSTRUCTING CLOTS Bilateral 04/14/2021    Procedure: CYSTOSCOPY EVACUATION OF CLOTS bilateral retrograde pyelograms possible TURBT bladder biopsy;  Surgeon: Yovani Khan MD;  Location: [facility];  Service: Urology    PA CYSTO W/IRRIG & EVAC MULTPLE OBSTRUCTING CLOTS N/A 04/24/2021    Procedure: CYSTOSCOPY EVACUATION OF CLOTS fulguration;  Surgeon: Yovani Khan MD;  Location: [facility];  Service: Urology    PA CYSTO W/IRRIG & EVAC MULTPLE OBSTRUCTING CLOTS N/A 07/08/2021    Procedure: CYSTOSCOPY EVACUATION OF CLOTS BILATERAL ANTIROGRADES NEPHROSTOMY TUBES, FULGERATION ;  Surgeon: Yovani Khan MD;  Location: [facility];  Service: Urology    PA CYSTO W/IRRIG & EVAC MULTPLE OBSTRUCTING CLOTS N/A 08/22/2021    Procedure: CYSTOSCOPY, RIGHT RETROGRADE, EVACUATION OF CLOTS, BLADDER BIOPSY X2 AND FULGERATION OF BLADDER LESIONS, URETEROSCOPY, 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;  Surgeon: Yovani Khan MD;  Location: [facility];  Service: Urology    PA CYSTO W/IRRIG & EVAC MULTPLE OBSTRUCTING CLOTS Bilateral 4/24/2024    Procedure: CYSTOSCOPY EVACUATION OF CLOTS fulguration bladder neck tumors and biopsy, retrograde pyelograms, DVIU;  Surgeon: Yovani Khan MD;  Location: [facility];  Service: Urology    PA CYSTO W/IRRIG & EVAC MULTPLE OBSTRUCTING CLOTS N/A 5/23/2024    Procedure: CYSTOSCOPY FULGERATION RESECTION BLADDER NECK, FLUORSCOPIC CYSTOGRAM, EVACUATION OF CLOTS;  Surgeon: Yovani Khan MD;  Location: [facility];  Service: Urology    PA CYSTOURETHROSCOPY W/DEST &/RMVL MED BLADDER AMARI N/A 4/11/2024    Procedure: TRANSURETHRAL RESECTION OF BLADDER TUMOR (TURBT);  Surgeon: Yovani Khan MD;  Location: [facility];  Service: Urology    PA CYSTOURETHROSCOPY  W/URETERAL CATHETERIZATION Bilateral 4/11/2024    Procedure: BILATERAL CYSTOSCOPY WITH RETROGRADE PYELOGRAM, BLADDER NECK CONTRACTURE;  Surgeon: Yovani Khan MD;  Location: [facility];  Service: Urology    MS CYSTOURETHROSCOPY WITH BIOPSY N/A 11/16/2023    Procedure: CYSTOSCOPY, BILATERAL RETROGRADEY PYELOGRAM,  FULGERATION 2.0 CM BLADDER TUMOR WITH BIOPSY;  Surgeon: Yovani Khan MD;  Location: [facility];  Service: Urology    PROSTATECTOMY  2014    ROTATOR CUFF REPAIR      right shoulder      Family History   Problem Relation Age of Onset    Diabetes Mother     Stroke Mother     Diabetes Sister     Diabetes Brother     Stroke Brother     Mental illness Neg Hx       Social History[1]   E-Cigarette/Vaping    E-Cigarette Use Never User       E-Cigarette/Vaping Substances    Nicotine No     THC No     CBD No     Flavoring No     Other No     Unknown No       I have reviewed and agree with the history as documented.     HPI  See mdm  Review of Systems   Constitutional:  Negative for chills and fever.   HENT:  Negative for ear pain and sore throat.    Eyes:  Negative for pain and visual disturbance.   Respiratory:  Negative for cough and shortness of breath.    Cardiovascular:  Negative for chest pain and palpitations.   Gastrointestinal:  Positive for abdominal pain and vomiting.   Genitourinary:  Negative for dysuria and hematuria.   Musculoskeletal:  Negative for arthralgias and back pain.   Skin:  Negative for color change and rash.   Neurological:  Negative for seizures and syncope.   All other systems reviewed and are negative.          Objective       ED Triage Vitals [05/16/25 0958]   Temperature Pulse Blood Pressure Respirations SpO2 Patient Position - Orthostatic VS   97.6 °F (36.4 °C) 66 141/67 22 92 % Sitting      Temp Source Heart Rate Source BP Location FiO2 (%) Pain Score    Oral Monitor Right arm -- 10 - Worst Possible Pain      Vitals      Date and Time Temp Pulse SpO2 Resp BP Pain Score FACES Pain  Rating User   05/16/25 1321 -- 99 95 % 22 -- -- --    05/16/25 1315 -- 104 88 % -- -- -- --    05/16/25 1300 -- 91 91 % 22 164/78 -- --    05/16/25 1230 -- 86 92 % -- 158/78 -- --    05/16/25 1225 -- -- -- -- -- 4 --    05/16/25 1155 -- -- -- -- -- 5 --    05/16/25 1100 -- 72 92 % -- 140/71 -- --    05/16/25 1057 -- 71 92 % 22 140/71 -- --    05/16/25 1042 -- -- -- -- -- 8 --    05/16/25 1012 -- -- -- -- -- 10 - Worst Possible Pain --    05/16/25 0958 97.6 °F (36.4 °C) 66 92 % 22 141/67 10 - Worst Possible Pain -- SF            Physical Exam  Vitals and nursing note reviewed.   Constitutional:       General: He is not in acute distress.     Appearance: He is well-developed. He is not toxic-appearing.   HENT:      Head: Normocephalic and atraumatic.      Right Ear: External ear normal.      Left Ear: External ear normal.      Nose: Nose normal.      Mouth/Throat:      Pharynx: Oropharynx is clear. No oropharyngeal exudate or posterior oropharyngeal erythema.     Eyes:      Extraocular Movements: Extraocular movements intact.      Conjunctiva/sclera: Conjunctivae normal.      Pupils: Pupils are equal, round, and reactive to light.       Cardiovascular:      Rate and Rhythm: Normal rate and regular rhythm.      Pulses: Normal pulses.      Heart sounds: Normal heart sounds. No murmur heard.     No friction rub. No gallop.   Pulmonary:      Effort: Pulmonary effort is normal. No respiratory distress.      Breath sounds: Wheezing present. No rhonchi or rales.   Abdominal:      General: Abdomen is flat.      Palpations: Abdomen is soft.      Tenderness: There is abdominal tenderness. There is guarding. There is no rebound.      Comments: RLQ ileal conduit site appears normal, abd wall hernia noted lateral to this site which is nontender and reducible. Guarding medial RLQ. Abd nondistended and otherwise soft     Musculoskeletal:         General: Normal range of motion.      Cervical back: Normal range of  motion. No rigidity.      Right lower leg: No edema.      Left lower leg: No edema.     Skin:     General: Skin is warm and dry.      Capillary Refill: Capillary refill takes less than 2 seconds.     Neurological:      General: No focal deficit present.      Mental Status: He is alert.     Psychiatric:         Mood and Affect: Mood normal.         Results Reviewed       Procedure Component Value Units Date/Time    Blood culture #1 [[id number]] Collected: 05/16/25 1234    Lab Status: In process Specimen: Blood from Hand, Right Updated: 05/16/25 1242    Urine culture [[id number]] Collected: 05/16/25 1235    Lab Status: In process Specimen: Urine, Other Updated: 05/16/25 1242    Blood culture #2 [[id number]] Collected: 05/16/25 1220    Lab Status: In process Specimen: Blood from Arm, Right Updated: 05/16/25 1223    Urine Microscopic [[id number]]  (Abnormal) Collected: 05/16/25 1147    Lab Status: Final result Specimen: Urine, Other Updated: 05/16/25 1218     RBC, UA 10-20 /hpf      WBC, UA 10-20 /hpf      Epithelial Cells Occasional /hpf      Bacteria, UA Innumerable /hpf      Triplep Phos Shanika, UA Occasional /hpf     UA (URINE) with reflex to Scope [[id number]]  (Abnormal) Collected: 05/16/25 1147    Lab Status: Final result Specimen: Urine, Other Updated: 05/16/25 1156     Color, UA Yellow     Clarity, UA Cloudy     Specific Gravity, UA 1.020     pH, UA 8.0     Leukocytes, UA Large     Nitrite, UA Positive     Protein, UA 30 (1+) mg/dl      Glucose, UA Negative mg/dl      Ketones, UA Negative mg/dl      Urobilinogen, UA <2.0 mg/dl      Bilirubin, UA Negative     Occult Blood, UA Moderate    Comprehensive metabolic panel [[id number]]  (Abnormal) Collected: 05/16/25 1016    Lab Status: Final result Specimen: Blood from Arm, Left Updated: 05/16/25 1053     Sodium 135 mmol/L      Potassium 5.2 mmol/L      Chloride 107 mmol/L      CO2 18 mmol/L      ANION GAP 10 mmol/L      BUN 56 mg/dL      Creatinine 2.26 mg/dL      Glucose  147 mg/dL      Calcium 9.6 mg/dL      AST 19 U/L      ALT 16 U/L      Alkaline Phosphatase 83 U/L      Total Protein 8.4 g/dL      Albumin 4.2 g/dL      Total Bilirubin 0.45 mg/dL      eGFR 26 ml/min/1.73sq m     Narrative:      National Kid[employer] guidelines for Chronic Kidney Disease (CKD):     Stage 1 with normal or high GFR (GFR > 90 mL/min/1.73 square meters)    Stage 2 Mild CKD (GFR = 60-89 mL/min/1.73 square meters)    Stage 3A Moderate CKD (GFR = 45-59 mL/min/1.73 square meters)    Stage 3B Moderate CKD (GFR = 30-44 mL/min/1.73 square meters)    Stage 4 Severe CKD (GFR = 15-29 mL/min/1.73 square meters)    Stage 5 End Stage CKD (GFR <15 mL/min/1.73 square meters)  Note: GFR calculation is accurate only with a steady state creatinine    Lipase [[id number]]  (Normal) Collected: 05/16/25 1016    Lab Status: Final result Specimen: Blood from Arm, Left Updated: 05/16/25 1053     Lipase 30 u/L     Lactic acid, plasma (w/reflex if result > 2.0) [[id number]]  (Normal) Collected: 05/16/25 1016    Lab Status: Final result Specimen: Blood from Arm, Left Updated: 05/16/25 1052     LACTIC ACID 0.7 mmol/L     Narrative:      Result may be elevated if tourniquet was used during collection.    CBC and differential [[id number]]  (Abnormal) Collected: 05/16/25 1016    Lab Status: Final result Specimen: Blood from Arm, Left Updated: 05/16/25 1030     WBC 18.69 Thousand/uL      RBC 4.63 Million/uL      Hemoglobin 13.2 g/dL      Hematocrit 42.1 %      MCV 91 fL      MCH 28.5 pg      MCHC 31.4 g/dL      RDW 15.5 %      MPV 9.6 fL      Platelets 232 Thousands/uL      nRBC 0 /100 WBCs      Segmented % 87 %      Immature Grans % 1 %      Lymphocytes % 5 %      Monocytes % 6 %      Eosinophils Relative 1 %      Basophils Relative 0 %      Absolute Neutrophils 16.39 Thousands/µL      Absolute Immature Grans 0.09 Thousand/uL      Absolute Lymphocytes 1.00 Thousands/µL      Absolute Monocytes 1.08 Thousand/µL       Eosinophils Absolute 0.10 Thousand/µL      Basophils Absolute 0.03 Thousands/µL             CT abdomen pelvis wo contrast   Final Interpretation by Vicky Joseph MD ( 114)      Postsurgical changes from cystoprostatectomy with ileal conduit and right lower quadrant urostomy. Right urolithiasis with increased stone burden in comparison to 10/25/2024 including staghorn calculus in the right renal pelvis measuring 2 cm and    additional two obstructing calculi in the distal ureter just proximal to the anastomosis measuring up to 8 mm. Associated right hydroureteronephrosis and perinephric stranding. Infection cannot be excluded in the absence of intravenous contrast.      Parastomal hernia containing fat and a nondilated loop of small bowel.      Fusiform infrarenal abdominal aortic aneurysm measuring 3 cm.      The study was marked in EPIC for immediate notification.      Workstation performed: [id number]             Procedures    ED Medication and Procedure Management   Prior to Admission Medications   Prescriptions Last Dose Informant Patient Reported? Taking?   albuterol (PROVENTIL HFA,VENTOLIN HFA) 90 mcg/act inhaler   No No   Sig: TAKE 2 PUFFS BY MOUTH EVERY 6 HOURS AS NEEDED FOR WHEEZE   aspirin (Ecotrin Low Strength) 81 mg EC tablet   No No   Sig: Take 1 tablet (81 mg total) by mouth daily   atorvastatin (LIPITOR) 20 mg tablet  Self Yes No   Sig: Take 20 mg by mouth daily   fluticasone (FLONASE) 50 mcg/act nasal spray  Self Yes No   Si spray into each nostril daily   fluticasone-umeclidinium-vilanterol (Trelegy Ellipta) 200-62.5-25 mcg/actuation AEPB inhaler  Self No No   Sig: Inhale 1 puff every morning   gabapentin (NEURONTIN) 100 mg capsule  Self No No   Sig: Take 1 capsule (100 mg total) by mouth 3 (three) times a day   meloxicam (MOBIC) 15 mg tablet  Self Yes No   Sig: Take 15 mg by mouth daily   metoprolol tartrate (LOPRESSOR) 25 mg tablet  Self No No   Sig: Take 0.5 tablets (12.5 mg total) by  mouth every 12 (twelve) hours      Facility-Administered Medications: None     Patient's Medications   Discharge Prescriptions    No medications on file     No discharge procedures on file.  ED SEPSIS DOCUMENTATION   Time reflects when diagnosis was documented in both MDM as applicable and the Disposition within this note       Time User Action Codes Description Comment    5/16/2025  1:21 PM Jeremías Silverio [N13.2] Ureteral stone with hydronephrosis     5/16/2025  1:21 PM Jeremías Silverio [R10.31] Right lower quadrant abdominal pain     5/16/2025  1:22 PM Jeremías Silverio [Z98.890] History of urostomy            Initial Sepsis Screening       Row Name 05/16/25 1317                Is the patient's history suggestive of a new or worsening infection? Yes (Proceed)  -NA        Suspected source of infection urinary tract infection  -NA        Indicate SIRS criteria Tachypnea > 20 resp per min;Leukocytosis (WBC > 69412 IJL) OR Leukopenia (WBC <4000 IJL) OR Bandemia (WBC >10% bands)  -NA        Are two or more of the above signs & symptoms of infection both present and new to the patient? No  -NA                  User Key  (r) = Recorded By, (t) = Taken By, (c) = Cosigned By      Initials Name Provider Type    NA Jeremías Silverio MD Physician                         [1]   Social History  Tobacco Use    Smoking status: Every Day     Current packs/day: 0.50     Average packs/day: 0.5 packs/day for 50.0 years (25.0 ttl pk-yrs)     Types: Cigarettes     Passive exposure: Past    Smokeless tobacco: Never   Vaping Use    Vaping status: Never Used   Substance Use Topics    Alcohol use: Not Currently     Comment: None in over 50 yrs    Drug use: No        Jeremías Silverio MD  05/16/25 5615

## 2025-05-16 NOTE — ANESTHESIA POSTPROCEDURE EVALUATION
Post-Op Assessment Note    CV Status:  Stable  Pain Score: 1    Pain management: adequate       Mental Status:  Alert and awake   Hydration Status:  Euvolemic   PONV Controlled:  Controlled   Airway Patency:  Patent     Post Op Vitals Reviewed: Yes    No anethesia notable event occurred.    Staff: Anesthesiologist           Last Filed PACU Vitals:  Vitals Value Taken Time   Temp 101.2 °F (38.4 °C) 05/16/25 17:37   Pulse 138 05/16/25 17:37   /85 05/16/25 17:37   Resp 25 05/16/25 17:37   SpO2 94 % 05/16/25 17:37       Modified Wendi:     Vitals Value Taken Time   Activity 2 05/16/25 16:52   Respiration 2 05/16/25 16:52   Circulation 1 05/16/25 16:52   Consciousness 1 05/16/25 16:52   Oxygen Saturation 1 05/16/25 16:52     Modified Wendi Score: 7

## 2025-05-16 NOTE — ANESTHESIA PREPROCEDURE EVALUATION
Procedure:  IR NEPHROSTOMY TUBE PLACEMENT    Relevant Problems   ANESTHESIA (within normal limits)      CARDIO   (+) Essential hypertension   (+) Infrarenal abdominal aortic aneurysm (AAA) without rupture ([facility])   (+) Mixed hyperlipidemia      ENDO   (+) Type 2 diabetes mellitus with diabetic polyneuropathy, without long-term current use of insulin ([facility])      /RENAL   (+) Acute kidney injury superimposed on stage 3b chronic kidney disease ([facility])   (+) Prostate cancer ([facility]) - s/p resection   (+) Renal calculi      HEMATOLOGY   (+) Anemia of chronic disease      MUSCULOSKELETAL   (+) Arthritis of both knees   (+) Chronic low back pain with bilateral sciatica   (+) Localized, primary osteoarthritis of hand   (+) RA (rheumatoid arthritis) ([facility])      NEURO/PSYCH   (+) Chronic low back pain with bilateral sciatica   (+) Type 2 diabetes mellitus with diabetic polyneuropathy, without long-term current use of insulin ([facility])      PULMONARY   (+) COPD (chronic obstructive pulmonary disease) ([facility])   S/p SPT     Physical Exam    Airway     Mallampati score: II  TM Distance: >3 FB  Neck ROM: full  Mouth opening: unable to assess      Cardiovascular  Rhythm: regular, Rate: tachycardia, Pulse is palpable.     Dental   Comment: Denies loose teeth     Pulmonary   Breath sounds clear to auscultation    Neurological    He appears awake, alert and oriented x3.      Other Findings  Portions of exam deferred due to low yield and/or unknown COVID statusPortions of exam deferred due to low yield and/or unknown COVID status        Anesthesia Plan  ASA Score- 3 Emergent    Anesthesia Type- IV sedation with anesthesia with ASA Monitors.         Additional Monitors:     Airway Plan:            Plan Factors-Exercise tolerance (METS): >4 METS.    Chart reviewed.   Existing labs reviewed. Patient summary reviewed.    Patient is a current smoker.      Obstructive sleep apnea risk education given perioperatively.        Induction-  intravenous.    Postoperative Plan- .   Monitoring Plan - Monitoring plan - standard ASA monitoring  Post Operative Pain Plan - non-opiod analgesics    Perioperative Resuscitation Plan - Level 1 - Full Code.       Informed Consent- Anesthetic plan and risks discussed with patient and spouse.  I personally reviewed this patient with the CRNA. Discussed and agreed on the Anesthesia Plan with the CRNA..

## 2025-05-16 NOTE — BRIEF OP NOTE (RAD/CATH)
INTERVENTIONAL RADIOLOGY PROCEDURE NOTE    Date: 5/16/2025    Procedure:   Procedure Summary       Date: 05/16/25 Room / Location: [facility] Cardiac Cath Lab    Anesthesia Start: 1600 Anesthesia Stop:     Procedure: IR NEPHROSTOMY TUBE PLACEMENT Diagnosis: (sepsis, hydro)    Scheduled Providers:  Responsible Provider: Ruddy Gaffney MD    Anesthesia Type: IV sedation with anesthesia ASA Status: 3            Preoperative diagnosis:   1. Ureteral stone with hydronephrosis    2. Right lower quadrant abdominal pain    3. History of urostomy         Postoperative diagnosis: Same.    Surgeon: Ross Diehl MD     Assistant: None. No qualified resident was available.    Blood loss: Minimal    Specimens: Purulent ur\ine     Findings: 10 F right neph tube placed.    Complications: None immediate.    Anesthesia: conscious sedation

## 2025-05-16 NOTE — CONSULTS
H&P Exam - Urology       Patient: Joel Wyman   : 1945 Sex: male   MRN: [id number]     CSN: [id number]      History of Present Illness   HPI:  Joel Wyman is a 79 y.o. 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 calling my office this morning complaining of lethargy tiredness foul-smelling urine and right flank pain sent immediately in to [facility] ER found on CAT scan to have multiple right hydronephrosis renal stones up to 2 cm obstructing 8 and 6 mm proximal stone and white count of 18,000 with infected urine consulting interventional radiology immediately undergoing right nephrostomy tube drainage immediate drainage of 50 cc of pus sent for culture now seen in the recovery room evaluated by ICU staff felt not a candidate and placed on the floor seen at the bedside draining hematuric nephrostomy urine somnolent creatinine rising CAT scan also confirming new onset left hydronephrosis not noted on previous CAT scan few months ago anastomotic stricture?  Upper tract tumor?  Reflux?        Review of Systems:   Constitutional:  Negative for activity change, fever, chills and diaphoresis.   HENT: Negative for hearing loss and trouble swallowing.   Eyes: Negative for itching and visual disturbance.   Respiratory: Negative for chest tightness and shortness of breath.   Cardiovascular: Negative for chest pain, edema.   Gastrointestinal: Negative for abdominal distention, na abdominal pain, constipation, diarrhea, Nausea and vomiting.   Genitourinary: Negative for decreased urine volume, difficulty urinating, dysuria, enuresis, frequency, hematuria and urgency.   Musculoskeletal: Negative for gait problem and myalgias.    Neurological: Negative for dizziness and headaches.   Hematological: Does not bruise/bleed easily.       Historical Information   Past Medical History:   Diagnosis Date    Bladder infection 03/2024    Borderline diabetes     Cancer (HCC)     prostate    COPD (chronic obstructive pulmonary disease) (HCC)     Hematuria     History of transfusion     x2    Hyperlipidemia     Hypertension     Radiation cystitis     Smoker     Wears glasses      Past Surgical History:   Procedure Laterality Date    APPENDECTOMY      FL CYSTOGRAM  4/24/2024    FL CYSTOGRAM  5/23/2024    FL RETROGRADE PYELOGRAM  04/14/2021    FL RETROGRADE PYELOGRAM  11/16/2023    FL RETROGRADE PYELOGRAM  4/11/2024    HERNIA REPAIR      IR EMBOLIZATION (SPECIFY VESSEL OR SITE)  09/17/2021    IR NEPHROSTOMY TUBE CHECK AND/OR REMOVAL  07/08/2021    IR NEPHROSTOMY TUBE CHECK/CHANGE/REPOSITION/REINSERTION/UPSIZE  05/10/2021    IR NEPHROSTOMY TUBE CHECK/CHANGE/REPOSITION/REINSERTION/UPSIZE  08/04/2021    IR NEPHROSTOMY TUBE CHECK/CHANGE/REPOSITION/REINSERTION/UPSIZE  10/13/2021    IR NEPHROSTOMY TUBE PLACEMENT  05/07/2021    IR SUPRAPUBIC CATHETER PLACEMENT  5/3/2024    NE CYSTO W/IRRIG & EVAC MULTPLE OBSTRUCTING CLOTS Bilateral 04/14/2021    Procedure: CYSTOSCOPY EVACUATION OF CLOTS bilateral retrograde pyelograms possible TURBT bladder biopsy;  Surgeon: Yovani Khan MD;  Location: [facility];  Service: Urology    NE CYSTO W/IRRIG & EVAC MULTPLE OBSTRUCTING CLOTS N/A 04/24/2021    Procedure: CYSTOSCOPY EVACUATION OF CLOTS fulguration;  Surgeon: Yovani Khan MD;  Location: [facility];  Service: Urology    NE CYSTO W/IRRIG & EVAC MULTPLE OBSTRUCTING CLOTS N/A 07/08/2021    Procedure: CYSTOSCOPY EVACUATION OF CLOTS BILATERAL ANTIROGRADES NEPHROSTOMY TUBES, FULGERATION ;  Surgeon: Yovani Khan MD;  [facility]: [facility];  Service: Urology    NE CYSTO W/IRRIG & EVAC MULTPLE OBSTRUCTING CLOTS N/A 08/22/2021    Procedure: CYSTOSCOPY, RIGHT RETROGRADE,  EVACUATION OF CLOTS, BLADDER BIOPSY X2 AND FULGERATION OF BLADDER LESIONS, URETEROSCOPY, 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;  Surgeon: Yovani Khan MD;  [facility]: [facility];  Service: Urology    SD CYSTO W/IRRIG & EVAC MULTPLE OBSTRUCTING CLOTS Bilateral 4/24/2024    Procedure: CYSTOSCOPY EVACUATION OF CLOTS fulguration bladder neck tumors and biopsy, retrograde pyelograms, DVIU;  Surgeon: Yovani Khan MD;  [facility];  Service: Urology    SD CYSTO W/IRRIG & EVAC MULTPLE OBSTRUCTING CLOTS N/A 5/23/2024    Procedure: CYSTOSCOPY FULGERATION RESECTION BLADDER NECK, FLUORSCOPIC CYSTOGRAM, EVACUATION OF CLOTS;  Surgeon: Yovani Khan MD;  [facility];  Service: Urology    SD CYSTOURETHROSCOPY W/DEST &/RMVL MED BLADDER AMARI N/A 4/11/2024    Procedure: TRANSURETHRAL RESECTION OF BLADDER TUMOR (TURBT);  Surgeon: Yovani Khan MD;  [facility]: [facility];  Service: Urology    SD CYSTOURETHROSCOPY W/URETERAL CATHETERIZATION Bilateral 4/11/2024    Procedure: BILATERAL CYSTOSCOPY WITH RETROGRADE PYELOGRAM, BLADDER NECK CONTRACTURE;  Surgeon: Yovani Khan MD;  [facility];  Service: Urology    SD CYSTOURETHROSCOPY WITH BIOPSY N/A 11/16/2023    Procedure: CYSTOSCOPY, BILATERAL RETROGRADEY PYELOGRAM,  FULGERATION 2.0 CM BLADDER TUMOR WITH BIOPSY;  Surgeon: Yovani Khan MD;  Location: [facility];  Service: Urology    PROSTATECTOMY  2014    ROTATOR CUFF REPAIR      right shoulder     Social History   Social History     Substance and Sexual Activity   Alcohol Use Not Currently    Comment: None in over 50 yrs     Social History     Substance and Sexual Activity   Drug Use No     Tobacco Use History[1]  Family History:   Family History   Problem Relation Age of Onset    Diabetes Mother     Stroke Mother     Diabetes Sister     Diabetes Brother     Stroke Brother     Mental illness Neg Hx        Meds/Allergies     Medications Prior to  "Admission:     albuterol (PROVENTIL HFA,VENTOLIN HFA) 90 mcg/act inhaler    aspirin (Ecotrin Low Strength) 81 mg EC tablet    atorvastatin (LIPITOR) 20 mg tablet    fluticasone (FLONASE) 50 mcg/act nasal spray    fluticasone-umeclidinium-vilanterol (Trelegy Ellipta) 200-62.5-25 mcg/actuation AEPB inhaler    gabapentin (NEURONTIN) 100 mg capsule    meloxicam (MOBIC) 15 mg tablet    metoprolol tartrate (LOPRESSOR) 25 mg tablet  Allergies[2]    Objective   Vitals: /82   Pulse (!) 114   Temp (!) 102.8 °F (39.3 °C)   Resp (!) 25   Ht 5' 9\" (1.753 m)   Wt 96.7 kg (213 lb 3 oz)   SpO2 (!) 86%   BMI 31.48 kg/m²     Physical Exam:  General Alert awake   Normocephalic atraumatic PERRLA  Lungs clear bilaterally  Cardiac normal S1 normal S2  Abdomen soft, flank pain right nephrostomy tube draining pyuric hematuric urine  Conduit draining purulent urine  Two normal testicles  Extremities no edema    I/O last 24 hours:  In: 950 [I.V.:450; IV Piggyback:500]  Out: 350 [Urine:350]    Invasive Devices       Peripheral Intravenous Line  Duration             Peripheral IV 05/16/25 Left;Proximal;Ventral (anterior) Forearm <1 day              Drain  Duration             Nephrostomy Right 10.2 Fr. <1 day                        Lab Results: CBC:   Lab Results   Component Value Date    WBC 18.69 (H) 05/16/2025    HGB 13.2 05/16/2025    HCT 42.1 05/16/2025    MCV 91 05/16/2025     05/16/2025    RBC 4.63 05/16/2025    MCH 28.5 05/16/2025    MCHC 31.4 05/16/2025    RDW 15.5 (H) 05/16/2025    MPV 9.6 05/16/2025    NRBC 0 05/16/2025     CMP:   Lab Results   Component Value Date     05/16/2025    CO2 18 (L) 05/16/2025    BUN 56 (H) 05/16/2025    CREATININE 2.26 (H) 05/16/2025    CALCIUM 9.6 05/16/2025    AST 19 05/16/2025    ALT 16 05/16/2025    ALKPHOS 83 05/16/2025    EGFR 26 05/16/2025     Urinalysis:   Lab Results   Component Value Date    COLORU Yellow 05/16/2025    CLARITYU Cloudy 05/16/2025    SPECGRAV 1.020 " "05/16/2025    PHUR 8.0 05/16/2025    PHUR 5.5 03/25/2018    LEUKOCYTESUR Large (A) 05/16/2025    NITRITE Positive (A) 05/16/2025    GLUCOSEU Negative 05/16/2025    KETONESU Negative 05/16/2025    BILIRUBINUR Negative 05/16/2025    BLOODU Moderate (A) 05/16/2025     Urine Culture:   Lab Results   Component Value Date    URINECX >100,000 cfu/ml Corynebacterium urealyticum (A) 05/15/2024     PSA: No results found for: \"PSA\"        Assessment/ Plan:  Bacteremia sepsis secondary to right obstructing renal stones ureteral stones pylonephrosis  History of prostate cancer with recurrence status post radical prostatectomy status post radiation periprostatic tissue  History of radiation cystitis /superficial bladder papillomas status post cystectomy ileal conduit  Left mild hydronephrosis possible anastomotic stricture, upper tract ureteral tumor possible reflux  Right nephrostomy tube drainage sent for culture  Continue broad-spectrum antibiotics as per medical team  Will need right nephrostomy tube to be converted to nephro ureteral stent for flexible ureteroscopy laser gypsy stone removal in a few weeks  Will also need loopogram to check for reflux versus obstruction left ureter in light of new onset hydronephrosis  I spent a total of 1-1/2 hours today reviewing his records talking to patient and family      Yovani Khan MD         [1]   Social History  Tobacco Use   Smoking Status Every Day    Current packs/day: 0.50    Average packs/day: 0.5 packs/day for 50.0 years (25.0 ttl pk-yrs)    Types: Cigarettes    Passive exposure: Past   Smokeless Tobacco Never   [2] No Known Allergies    "

## 2025-05-16 NOTE — ASSESSMENT & PLAN NOTE
Appears stable.  Trelegy substituted with fluticasone-vilanterol and umeclidinium  Start DuoNebs as needed

## 2025-05-16 NOTE — DISCHARGE INSTRUCTIONS
Tunneled Central Lines in Adult   WHAT YOU NEED TO KNOW:     A tunneled central line is a type of IV catheter. A catheter is a flexible tube used to give treatments and to take blood. A tunneled central line is a catheter you can see under your skin before it enters a vein near your heart. A tunneled central line can stay in for several months.  DISCHARGE INSTRUCTIONS:   Prevent catheter-associated infections:  The area around your catheter may get infected, or you may get an infection in your bloodstream. A catheter-associated infection is caused by bacteria getting into your bloodstream through your catheter. Infections from catheters can lead to severe illness. The following are ways you can help prevent an infection:  Wash your hands:  Use soap or an alcohol-based hand rub to clean your hands. Clean your hands before and after you touch the catheter or the catheter site. Ask your healthcare provider for information on how to wash your hands. Remind anyone who cares for your catheter to wash their hands.     Wear medical gloves:  Wear clean medical gloves when you touch your catheter or change bandages.     Limit contact:  Do not touch or handle your catheter unless you need to care for it. Do not pull, push on, or move the catheter when you clean your skin or change the bandage.    .     Check for infection:  Check your skin every day for signs of infection, such as pain, redness, swelling, and oozing. Contact your healthcare provider if you see these signs.    Cover the area:  Keep a sterile bandage over the catheter site for as long as your healthcare provider directs.      Keep the area dry:  Do not let your catheter or catheter site get wet.Cover with plastic and seal with medical tape before you bathe. Ask if you should take showers instead of bath.     Follow up with your healthcare provider as directed:  Your healthcare provider may need to take out the stitches used to keep the catheter in place. Write  down your questions so you remember to ask them during your visits.    Call 911 for any of the following:   You feel lightheaded, short of breath, and have chest pain.    Your catheter comes out    Contact your healthcare provider if:  You have a fever.     The area around where the catheter enters your skin is red, warm, painful, or oozing fluid.    You see blood on your bandage and the amount is increasing.     The veins in your neck or chest bulge. .     You see that the catheter is getting shorter, or it falls out. Put pressure over the site with a clean towel.    You see a hole or a crack in your catheter. Clamp your catheter above the damage before you contact your healthcare provider.     You have questions about how to care for your catheter.         Contact Interventional Radiology at [phone] ([place] PATIENTS: Contact Interventional Radiology at [phone]) ([place] PATIENTS: Contact Interventional Radiology at [phone]) if:  Blood soaks through your bandage.   You have new swelling in your arm, neck, face, or chest on your right side.  Your catheter gets wet.    Your bruises or pain get worse.   You have a fever or chills.  Persistent nausea or vomiting.   Your incision is red, swollen, or draining pus.   You have questions or concerns about your condition or care.  Self-care:     Resume your normal diet.  Keep your dressings dry. Do not take a shower or swim. You may take a tub bath, but do not get your dressings wet. Water in your wound can cause bacteria to grow and cause an infection. If your dressing gets wet, dry it off and cover it with dry sterile gauze. Call your healthcare provider. Do not use soaps or ointments.  Follow up with your healthcare provider as directed: Write down your questions so you remember to ask them during your visits.          Nephrostomy Tube Care     WHAT YOU NEED TO KNOW:   A nephrostomy tube is a catheter (thin plastic tube) that is inserted through your skin  and into your kidney. The nephrostomy tube drains urine from your kidney into a collecting bag outside your body. You may need a nephrostomy tube when something is blocking the normal flow of urine. A nephrostomy tube may be used for a short or a long period of time. The nephrostomy tube comes out of your back, so you will need someone to help care for your nephrostomy tube.          DISCHARGE INSTRUCTIONS:      How to clean the skin around the nephrostomy tube and change the bandage:  Since the nephrostomy tube comes out of your back, you will not be able to care for it by yourself. Ask someone to follow the general directions below to check and care for your nephrostomy tube.   Gather the items you will need.          Disposable (single use) under-pad, and a clean washcloth  Plain soap, warm water, and new medical gloves  Sterile gauze bandages  Clear adhesive dressing or medical tape  Skin barrier  Protective skin film  Trash bag  Remove the old bandage, and check the tube entry site.    Have the patient lie on his side with the nephrostomy tube entry site facing up. Place the under-pad where it will catch drainage as you are working with the nephrostomy tube.   Wash your hands with soap and water. Put on new medical gloves.  Gently remove the old bandage, without pulling on the tube. Do this by holding the skin beside the tube with one hand. With the other hand, gently remove sticky tape and the skin barrier by pulling in the same direction as hair growth. Do not touch the side of the bandage that is placed over or around the tube. Throw the bandage and skin barrier away in a trash bag.  Look for signs of infection, such as skin redness and swelling. Report any skin changes to healthcare providers.  Clean the tube entry site.    Hold the tube in place to keep it from being pulled out while you are cleaning around it.  You will need to clean the area twice. For the first cleaning, wet a new gauze bandage with  soap and water.  Begin at the entry site of the tube. Wipe the skin in circles, moving away from the entry site. Remove blood and any other material with the gauze. Do this as often as needed. Use a new gauze bandage each time you clean the area, moving away from the entry site.   For the second cleaning, wet a new gauze bandage with water. Begin at the entry site of the tube. Wipe the skin in circles, moving away from the entry site. Use a new gauze bandage each time you clean the area, moving away from the entry site.   Gently pat the skin with a clean washcloth to dry it.    Apply the skin barrier and bandages.    Roll up a bandage to make it thick, and place it under  the place where the tube enters the skin. Place it to support the tube, and stop it from kinking or bending. Tape the bandage in place, and apply more bandages if directed by a healthcare provider.   Bring the tubing forward to the front and tape it to the skin. Do not stretch the tube tight, because this may pull the nephrostomy tube out.  How often to change the bandage.  Change the bandage around the tube, every other day. If your bandages  get dirty or wet, change them right away, and as often as needed. If your nephrostomy tube is to be used for a long period of time, the tube needs to be changed every 2 to 3 months. Healthcare providers will tell you when you need to make an appointment to have your tube changed.     How to care for the urine drainage bag:   Ask if you need to measure and write down how much urine is in the bag before you empty it. Drain urine out of the drainage bag when it is ½ to ? full. Open the spout at the bottom of the bag to empty the urine into the toilet.   You may need to detach the drainage bag from the nephrostomy tube to change it.. If so, attach a new drainage bag tightly to the nephrostomy tube.     How to prevent problems with your nephrostomy tube:   Change bandages, directed.  This helps to prevent  infection. Throw away or clean your drainage bag as directed by your healthcare provider.    Wipe the connecting ends of the drainage bag with alcohol before you reconnect the bag to the tube.  This helps prevent infection.     Keep the tube taped to your skin and connected to a drainage bag placed below the level of your kidneys.  This helps prevent urine from backing up into your kidneys. You may wear a small drainage bag strapped to your leg to let you move around more easily.    Check the catheter to be sure it is in place after you change your clothes or do other activities.  Do not wear tight clothing over the tube. Place the tubing over your thigh rather than under it when you are sitting down. Be sure that nothing is pulling on the nephrostomy tube when you move around.    Change positions if you see little or no urine in your drainage bag.  Check to see if the urine tube is twisted or bent. Be sure that you are not sitting or lying on the tube. If there are no kinks and there is little or no urine in the drainage bag, tell your healthcare provider.    Flush out the tube as directed. Some tubes get flushed one time a day with 10 mls of NSS You will be given a prescription for the flushes.  To flush the nephrostomy tube, clean both connections with alcohol swap. Twist off the drainage bag tube and twist the saline syringe into the nephrostomy tube and flush briskly. Remove the syringe and twist the drainage bag tube back into the nephrostomy tube.  Keep the site covered while you shower.  Tape a piece of clear adhesive plastic over the dressing to keep it dry while you shower. Do not take tub baths.    Contact Interventional Radiology at [phone] ([place] PATIENTS: Contact Interventional Radiology at [phone]) ([place] PATIENTS: Contact Interventional Radiology at [phone]) if:  The skin around the nephrostomy tube is red, swollen, itches, or has a rash.   You have a fever greater than 101 or  chills.  You have lower back or hip pain.  There are changes in how your urine looks or smells.  You have little or no urine draining from the nephrostomy tube.   You have nausea and are vomiting.  The black xavier on your tube has moved, or the tube is longer than when it was put in.   You have questions or concerns about your condition or care.  The nephrostomy tube comes out completely.   There is blood, pus, or a bad smell coming from the place where the tube enters your skin.  Urine is leaking around the tube.        The following pharmacies carry the flush syringes.       [facility]                     [facility]                         [facility]       [address]                     [address] [facility]                    [phone]  [place]                       [place]  Phone [phone]            Phone [phone]                 [facility]                                                                                                   [phone]  [facility]             [facility]                             [facility] [address]                      [address]   [place]                                 [phone]  Phone [phone]            Phone [phone]    [facility]                                                                         [facility] [phone]  [facility] [place]   Phone [phone]

## 2025-05-16 NOTE — PLAN OF CARE
Problem: Potential for Falls  Goal: Patient will remain free of falls  Description: INTERVENTIONS:  - Educate patient/family on patient safety including physical limitations  - Instruct patient to call for assistance with activity   - Consider consulting OT/PT to assist with strengthening/mobility based on AM PAC & JH-HLM score  - Consult OT/PT to assist with strengthening/mobility   - Keep Call bell within reach  - Keep bed low and locked with side rails adjusted as appropriate  - Keep care items and personal belongings within reach  - Initiate and maintain comfort rounds  - Make Fall Risk Sign visible to staff  - Offer Toileting every 2 Hours, in advance of need  - Initiate/Maintain bed alarm  - Obtain necessary fall risk management equipment: walker  Problem: INFECTION - ADULT  Goal: Absence or prevention of progression during hospitalization  Description: INTERVENTIONS:  - Assess and monitor for signs and symptoms of infection  - Monitor lab/diagnostic results  - Monitor all insertion sites, i.e. indwelling lines, tubes, and drains  - Monitor endotracheal if appropriate and nasal secretions for changes in amount and color  - [place] appropriate cooling/warming therapies per order  - Administer medications as ordered  - Instruct and encourage patient and family to use good hand hygiene technique  - Identify and instruct in appropriate isolation precautions for identified infection/condition  Outcome: Progressing     - Apply yellow socks and bracelet for high fall risk patients  - Consider moving patient to room near nurses station  Outcome: Progressing

## 2025-05-16 NOTE — ASSESSMENT & PLAN NOTE
Patient presented to the ER with right flank/lower quadrant abdominal pain along with fever at home   Sepsis as noted by leukocytosis, tachycardia in the setting of UTI/possible pyelo.  Lactic acid within normal limits  Patient with temp of 101.2 however when rechecked it was actually 100.2 in the ER  Follow-up pending blood cultures, urine culture  Repeat CBC in a.m.

## 2025-05-16 NOTE — LETTER
Thank you for allowing us to participate in the care of your patient, Joel Wyman, who was hospitalized from [unfilled] through 5/23/2025 with the admitting diagnosis of sepsis with bacteremia treated with IV ceftriaxone through 6/14/2025 will need weekly CBC and BMP as recommended by infectious disease team.  Patient will need to follow up with infectious disease team and urology for his nephrostomy tubes also we recommend follow-up with nephrology.      Medication Changes:  Ceftriaxone daily IV     Outpatient testing recommended:  CBC and BMP weekly     If you have any additional questions or would like to discuss further, please feel free to contact me.    Josephine Sharma MD  [facility] Internal Medicine, Hospitalist  [phone]

## 2025-05-16 NOTE — ASSESSMENT & PLAN NOTE
Lab Results   Component Value Date    EGFR 26 05/16/2025    EGFR 45 11/12/2024    EGFR 59 05/25/2024    CREATININE 2.26 (H) 05/16/2025    CREATININE 1.43 (H) 11/12/2024    CREATININE 1.15 05/25/2024     Creatinine today 2.26 most likely in the setting of obstructing stones, possible sepsis  Per prior nephrology note, baseline creatinine appears to be 1.3-1.6  Place patient on IV fluids and get repeat lab work in a.m.

## 2025-05-16 NOTE — ED NOTES
Pt pulse ox noted between 89-90% on RA, has hx of COPD and still smoking. Placed on 2L Oxygen. DR Marques at bedside.      Eun Foster RN  05/16/25 [id number]

## 2025-05-16 NOTE — ASSESSMENT & PLAN NOTE
Imaging shows right urolithiasis with increased stone burden compared to 2024.  Staghorn calculus was noted in the right renal pelvis and 2 obstructing calculi was noted in the distal ureter proximal to the anastomosis measuring up to 8 mm with right-sided hydroureteronephrosis.  Parastomal hernia noted  Plan of care was coordinated with urology and ER and urology is recommending IR consult for right nephrostomy tube placement  IR consult has been placed

## 2025-05-16 NOTE — SEDATION DOCUMENTATION
Procedure ended. Right 10fr nephrostomy placed by Dr. Diehl. Dressing to site. Specimen sent to lab. Patient transferred to PACU

## 2025-05-16 NOTE — H&P
H&P - Hospitalist   Name: Joel Wyman 79 y.o. male I MRN: [id number]  Unit/Bed#: [facility] I Date of Admission: 5/16/2025   Date of Service: 5/16/2025 I Hospital Day: 0     Assessment & Plan  Sepsis (HCC)  Patient presented to the ER with right flank/lower quadrant abdominal pain along with fever at home   Sepsis as noted by leukocytosis, tachycardia in the setting of UTI/possible pyelo.  Lactic acid within normal limits  Patient with temp of 101.2 however when rechecked it was actually 100.2 in the ER  Follow-up pending blood cultures, urine culture  Repeat CBC in a.m.  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  Patient then followed up at [place] and underwent simple cystectomy, ileal conduit in May 2024   UA abnormal and imaging shows perinephric stranding for which infection could not be excluded  Patient received a dose of IV Rocephin in the ER which will be continued  Follow-up pending urine culture  Renal calculi  Imaging shows right urolithiasis with increased stone burden compared to 2024.  Staghorn calculus was noted in the right renal pelvis and 2 obstructing calculi was noted in the distal ureter proximal to the anastomosis measuring up to 8 mm with right-sided hydroureteronephrosis.  Parastomal hernia noted  Plan of care was coordinated with urology and ER and urology is recommending IR consult for right nephrostomy tube placement  IR consult has been placed  Acute kidney injury superimposed on stage 3b chronic kidney disease (HCC)  Lab Results   Component Value Date    EGFR 26 05/16/2025    EGFR 45 11/12/2024    EGFR 59 05/25/2024    CREATININE 2.26 (H) 05/16/2025    CREATININE 1.43 (H) 11/12/2024    CREATININE 1.15 05/25/2024     Creatinine today 2.26 most likely in the setting of  "obstructing stones, possible sepsis  Per prior nephrology note, baseline creatinine appears to be 1.3-1.6  Place patient on IV fluids and get repeat lab work in a.m.  Essential hypertension  Continue Lopressor monitor blood pressures  COPD (chronic obstructive pulmonary disease) ([facility])  Appears stable.  Trelegy substituted with fluticasone-vilanterol and umeclidinium  Start DuoNebs as needed  Type 2 diabetes mellitus with diabetic polyneuropathy, without long-term current use of insulin ([facility])  Lab Results   Component Value Date    HGBA1C 6.4 (H) 11/12/2024       No results for input(s): \"POCGLU\" in the last 72 hours.    Diet controlled.  Infrarenal abdominal aortic aneurysm (AAA) without rupture ([facility])  On imaging noted to have a 3 cm fusiform infrarenal AAA  Outpatient follow-up      VTE Pharmacologic Prophylaxis:   Heparin  Code Status: Full code  Discussion with family: Updated  (wife) at bedside.    Anticipated Length of Stay: Patient will be admitted on an inpatient basis with an anticipated length of stay of greater than 2 midnights secondary to sepsis, UTI, obstructing stones, SHANTI on CKD.    History of Present Illness   Chief Complaint: Right-sided flank/abdominal pain    Joel Wyman is a 79 y.o. male with a PMH of prostate cancer, CKD who presents with right-sided flank/right lower quadrant abdominal pain that started at around 8 PM yesterday while watching TV.  Patient states pain was severe and had 1 episode of vomiting this morning.  Called urologist office today who recommended that he come to the ER.  Patient states his pain is now mild after receiving pain medications in the ER.  Patient reports radiation of the pain to the back yesterday.  Reports fevers today.  In the ER patient was noted to have obstructing stones causing hydronephrosis on imaging along with UTI    Review of Systems   Constitutional:  Positive for fever. Negative for appetite change.   HENT:  Negative for congestion.  "   Eyes:  Negative for visual disturbance.   Respiratory:  Negative for chest tightness and shortness of breath.    Cardiovascular:  Negative for chest pain.   Gastrointestinal:  Positive for abdominal pain, nausea and vomiting. Negative for diarrhea.   Genitourinary:  Positive for flank pain. Negative for hematuria.   Musculoskeletal:  Positive for back pain.   Skin:  Negative for wound.   Neurological:  Negative for dizziness and light-headedness.   Hematological:  Does not bruise/bleed easily.   Psychiatric/Behavioral:  Negative for agitation.        Historical Information   Past Medical History:   Diagnosis Date    Bladder infection 03/2024    Borderline diabetes     Cancer (HCC)     prostate    COPD (chronic obstructive pulmonary disease) (HCC)     Hematuria     History of transfusion     x2    Hyperlipidemia     Hypertension     Radiation cystitis     Smoker     Wears glasses      Past Surgical History:   Procedure Laterality Date    APPENDECTOMY      FL CYSTOGRAM  4/24/2024    FL CYSTOGRAM  5/23/2024    FL RETROGRADE PYELOGRAM  04/14/2021    FL RETROGRADE PYELOGRAM  11/16/2023    FL RETROGRADE PYELOGRAM  4/11/2024    HERNIA REPAIR      IR EMBOLIZATION (SPECIFY VESSEL OR SITE)  09/17/2021    IR NEPHROSTOMY TUBE CHECK AND/OR REMOVAL  07/08/2021    IR NEPHROSTOMY TUBE CHECK/CHANGE/REPOSITION/REINSERTION/UPSIZE  05/10/2021    IR NEPHROSTOMY TUBE CHECK/CHANGE/REPOSITION/REINSERTION/UPSIZE  08/04/2021    IR NEPHROSTOMY TUBE CHECK/CHANGE/REPOSITION/REINSERTION/UPSIZE  10/13/2021    IR NEPHROSTOMY TUBE PLACEMENT  05/07/2021    IR SUPRAPUBIC CATHETER PLACEMENT  5/3/2024    CO CYSTO W/IRRIG & EVAC MULTPLE OBSTRUCTING CLOTS Bilateral 04/14/2021    Procedure: CYSTOSCOPY EVACUATION OF CLOTS bilateral retrograde pyelograms possible TURBT bladder biopsy;  Surgeon: Yovani Khan MD;  Location: [facility];  Service: Urology    CO CYSTO W/IRRIG & EVAC MULTPLE OBSTRUCTING CLOTS N/A 04/24/2021    Procedure: CYSTOSCOPY  EVACUATION OF CLOTS fulguration;  Surgeon: Yovani Khan MD;  Location: [facility];  Service: Urology    AR CYSTO W/IRRIG & EVAC MULTPLE OBSTRUCTING CLOTS N/A 07/08/2021    Procedure: CYSTOSCOPY EVACUATION OF CLOTS BILATERAL ANTIROGRADES NEPHROSTOMY TUBES, FULGERATION ;  Surgeon: Yovani Khan MD;  Location: [facility];  Service: Urology    AR CYSTO W/IRRIG & EVAC MULTPLE OBSTRUCTING CLOTS N/A 08/22/2021    Procedure: CYSTOSCOPY, RIGHT RETROGRADE, EVACUATION OF CLOTS, BLADDER BIOPSY X2 AND FULGERATION OF BLADDER LESIONS, URETEROSCOPY, 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;  Surgeon: Yovani Khan MD;  Location: [facility];  Service: Urology    AR CYSTO W/IRRIG & EVAC MULTPLE OBSTRUCTING CLOTS Bilateral 4/24/2024    Procedure: CYSTOSCOPY EVACUATION OF CLOTS fulguration bladder neck tumors and biopsy, retrograde pyelograms, DVIU;  Surgeon: Yovani Khan MD;  Location: [facility];  Service: Urology    AR CYSTO W/IRRIG & EVAC MULTPLE OBSTRUCTING CLOTS N/A 5/23/2024    Procedure: CYSTOSCOPY FULGERATION RESECTION BLADDER NECK, FLUORSCOPIC CYSTOGRAM, EVACUATION OF CLOTS;  Surgeon: Yovani Khan MD;  Location: [facility];  Service: Urology    AR CYSTOURETHROSCOPY W/DEST &/RMVL MED BLADDER AMARI N/A 4/11/2024    Procedure: TRANSURETHRAL RESECTION OF BLADDER TUMOR (TURBT);  Surgeon: Yovani Khan MD;  Location: [facility];  Service: Urology    AR CYSTOURETHROSCOPY W/URETERAL CATHETERIZATION Bilateral 4/11/2024    Procedure: BILATERAL CYSTOSCOPY WITH RETROGRADE PYELOGRAM, BLADDER NECK CONTRACTURE;  Surgeon: Yovani Khan MD;  Location: [facility];  Service: Urology    AR CYSTOURETHROSCOPY WITH BIOPSY N/A 11/16/2023    Procedure: CYSTOSCOPY, BILATERAL RETROGRADEY PYELOGRAM,  FULGERATION 2.0 CM BLADDER TUMOR WITH BIOPSY;  Surgeon: Yovani Khan MD;  Location: [facility];  Service: Urology    PROSTATECTOMY  2014    ROTATOR CUFF REPAIR      right shoulder     Social  History     Tobacco Use    Smoking status: Every Day     Current packs/day: 0.50     Average packs/day: 0.5 packs/day for 50.0 years (25.0 ttl pk-yrs)     Types: Cigarettes     Passive exposure: Past    Smokeless tobacco: Never   Vaping Use    Vaping status: Never Used   Substance and Sexual Activity    Alcohol use: Not Currently     Comment: None in over 50 yrs    Drug use: No    Sexual activity: Yes     Partners: Female     E-Cigarette/Vaping    E-Cigarette Use Never User      E-Cigarette/Vaping Substances    Nicotine No     THC No     CBD No     Flavoring No     Other No     Unknown No      Family History   Problem Relation Age of Onset    Diabetes Mother     Stroke Mother     Diabetes Sister     Diabetes Brother     Stroke Brother     Mental illness Neg Hx      Social History:  Marital Status: /Civil Union   Occupation: None  Patient Pre-hospital Living Situation: With spouse  Patient Pre-hospital Level of Mobility: walks  Patient Pre-hospital Diet Restrictions: None    Meds/Allergies   I have reviewed home medications using recent Epic encounter.  Prior to Admission medications    Medication Sig Start Date End Date Taking? Authorizing Provider   albuterol (PROVENTIL HFA,VENTOLIN HFA) 90 mcg/act inhaler TAKE 2 PUFFS BY MOUTH EVERY 6 HOURS AS NEEDED FOR WHEEZE 3/31/25  Yes Frank Lombardi,    aspirin (Ecotrin Low Strength) 81 mg EC tablet Take 1 tablet (81 mg total) by mouth daily 3/7/25  Yes Sandra Franco PA-C   atorvastatin (LIPITOR) 20 mg tablet Take 20 mg by mouth in the morning.   Yes Historical Provider, MD   fluticasone (FLONASE) 50 mcg/act nasal spray 1 spray into each nostril in the morning.   Yes Historical Provider, MD   fluticasone-umeclidinium-vilanterol (Trelegy Ellipta) 200-62.5-25 mcg/actuation AEPB inhaler Inhale 1 puff every morning 12/3/24 5/12/26 Yes Frank Lombardi, DO   gabapentin (NEURONTIN) 100 mg capsule Take 1 capsule (100 mg total) by mouth 3 (three) times a day 2/12/25 2/12/26  Yes Frank Lombardi, DO   meloxicam (MOBIC) 15 mg tablet Take 15 mg by mouth in the morning.   Yes Historical Provider, MD   metoprolol tartrate (LOPRESSOR) 25 mg tablet Take 0.5 tablets (12.5 mg total) by mouth every 12 (twelve) hours 12/3/24 12/3/25 Yes Frank Lombardi, DO     No Known Allergies    Objective :  Temp:  [97.6 °F (36.4 °C)-101.2 °F (38.4 °C)] 100.1 °F (37.8 °C)  HR:  [] 100  BP: (140-164)/(67-97) 140/97  Resp:  [22] 22  SpO2:  [88 %-96 %] 92 %  O2 Device: Nasal cannula  Nasal Cannula O2 Flow Rate (L/min):  [2 L/min] 2 L/min    Physical Exam  Vitals reviewed.   Constitutional:       General: He is not in acute distress.     Appearance: He is not toxic-appearing.      Comments: Sleepy after receiving pain medications in the ER but easily awakens to answer questions   HENT:      Head: Normocephalic and atraumatic.     Eyes:      General:         Right eye: No discharge.         Left eye: No discharge.       Cardiovascular:      Rate and Rhythm: Regular rhythm. Tachycardia present.   Pulmonary:      Effort: No respiratory distress.      Breath sounds: No wheezing or rales.      Comments: Decreased breath sounds bilaterally although patient with decreased inspiratory effort  Abdominal:      General: Bowel sounds are normal. There is no distension.      Palpations: Abdomen is soft.      Tenderness: There is abdominal tenderness (right flank/RLQ). There is no guarding.      Comments: Ileal conduit in place     Musculoskeletal:      Right lower leg: No edema.      Left lower leg: No edema.     Neurological:      Mental Status: He is oriented to person, place, and time.         Lines/Drains:            Lab Results: I have reviewed the following results:  Results from last 7 days   Lab Units 05/16/25  1016   WBC Thousand/uL 18.69*   HEMOGLOBIN g/dL 13.2   HEMATOCRIT % 42.1   PLATELETS Thousands/uL 232   SEGS PCT % 87*   LYMPHO PCT % 5*   MONO PCT % 6   EOS PCT % 1     Results from last 7 days   Lab Units  05/16/25  1016   SODIUM mmol/L 135   POTASSIUM mmol/L 5.2   CHLORIDE mmol/L 107   CO2 mmol/L 18*   BUN mg/dL 56*   CREATININE mg/dL 2.26*   ANION GAP mmol/L 10   CALCIUM mg/dL 9.6   ALBUMIN g/dL 4.2   TOTAL BILIRUBIN mg/dL 0.45   ALK PHOS U/L 83   ALT U/L 16   AST U/L 19   GLUCOSE RANDOM mg/dL 147*             Lab Results   Component Value Date    HGBA1C 6.4 (H) 11/12/2024    HGBA1C 6.6 (H) 05/22/2024     Results from last 7 days   Lab Units 05/16/25  1016   LACTIC ACID mmol/L 0.7       Imaging Results Review: I reviewed radiology reports from this admission including: CT abdomen/pelvis.  Other Study Results Review: No additional pertinent studies reviewed.    Administrative Statements       ** Please Note: This note has been constructed using a voice recognition system. **

## 2025-05-16 NOTE — ASSESSMENT & PLAN NOTE
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  Patient then followed up at [facility] and underwent simple cystectomy, ileal conduit in May 2024   UA abnormal and imaging shows perinephric stranding for which infection could not be excluded  Patient received a dose of IV Rocephin in the ER which will be continued  Follow-up pending urine culture

## 2025-05-16 NOTE — ANESTHESIA POSTPROCEDURE EVALUATION
Post-Op Assessment Note    CV Status:  Stable  Pain Score: 0    Pain management: adequate       Mental Status:  Awake   Hydration Status:  Stable   PONV Controlled:  None   Airway Patency:  Patent     Post Op Vitals Reviewed: Yes    No anethesia notable event occurred.    Staff: CRNA           Last Filed PACU Vitals:  Vitals Value Taken Time   Temp 101.4    Pulse 110    BP 97/51    Resp 24    SpO2 94 on 3L NC

## 2025-05-16 NOTE — CONSULTS
[facility] Electronic Health Record Consult  IR has been consulted to evaluate the patient, determine the appropriate procedure, and whether or not a procedure can and should be performed regarding the care of Joel Wyman.    We were consulted by urology concerning urosepsis, and to possibly perform a nephrostomy tube placement if medically appropriate for the patient. The patient is aware that a specialty consultation is taking place, and agrees to the IR Consult on their behalf.     Assessment/Plan:   79 year old male with complex urologic history presenting with right hydronephrosis and sepsis. Large stone seen in the right kidney will plan for nephrostomy tube placement. Patient is taking ASA, discussed the risk of bleeding with both the patient and his wife and they mcelroy to proceed.    Incidentally noted new mild left hydronephrosis, recommend continued follow up with imaging and renal function.    I spent 15 minutes in medical consultative time evaluating the medical record and imaging of Joel Wyman.    Thank you for allowing [facility] to participate in the care of Joel Wyman. Please don't hesitate to call or TigerText us with any questions.     Ross Diehl MD

## 2025-05-16 NOTE — ASSESSMENT & PLAN NOTE
"Lab Results   Component Value Date    HGBA1C 6.4 (H) 11/12/2024       No results for input(s): \"POCGLU\" in the last 72 hours.    Diet controlled.  "

## 2025-05-17 ENCOUNTER — APPOINTMENT (INPATIENT)
Dept: RADIOLOGY | Facility: HOSPITAL | Age: 80
DRG: 871 | End: 2025-05-17
Payer: COMMERCIAL

## 2025-05-17 PROBLEM — E87.20 METABOLIC ACIDOSIS: Status: ACTIVE | Noted: 2025-05-17

## 2025-05-17 PROBLEM — R78.81 BACTEREMIA: Status: ACTIVE | Noted: 2025-05-17

## 2025-05-17 LAB
ANION GAP SERPL CALCULATED.3IONS-SCNC: 8 MMOL/L (ref 4–13)
BUN SERPL-MCNC: 65 MG/DL (ref 5–25)
CALCIUM SERPL-MCNC: 7.9 MG/DL (ref 8.4–10.2)
CHLORIDE SERPL-SCNC: 112 MMOL/L (ref 96–108)
CO2 SERPL-SCNC: 15 MMOL/L (ref 21–32)
CREAT SERPL-MCNC: 3.22 MG/DL (ref 0.6–1.3)
ERYTHROCYTE [DISTWIDTH] IN BLOOD BY AUTOMATED COUNT: 15.8 % (ref 11.6–15.1)
GFR SERPL CREATININE-BSD FRML MDRD: 17 ML/MIN/1.73SQ M
GLUCOSE SERPL-MCNC: 85 MG/DL (ref 65–140)
GLUCOSE SERPL-MCNC: 94 MG/DL (ref 65–140)
GP B STREP DNA BLD POS QL NAA+NON-PROBE: DETECTED
HCT VFR BLD AUTO: 34.7 % (ref 36.5–49.3)
HGB BLD-MCNC: 10.6 G/DL (ref 12–17)
KLEBSIELLA SP DNA BLD POS QL NAA+NON-PRB: DETECTED
LACTATE SERPL-SCNC: 1.2 MMOL/L (ref 0.5–2)
MAGNESIUM SERPL-MCNC: 1.6 MG/DL (ref 1.9–2.7)
MCH RBC QN AUTO: 28.5 PG (ref 26.8–34.3)
MCHC RBC AUTO-ENTMCNC: 30.5 G/DL (ref 31.4–37.4)
MCV RBC AUTO: 93 FL (ref 82–98)
PLATELET # BLD AUTO: 181 THOUSANDS/UL (ref 149–390)
PMV BLD AUTO: 10.3 FL (ref 8.9–12.7)
POTASSIUM SERPL-SCNC: 5.4 MMOL/L (ref 3.5–5.3)
POTASSIUM SERPL-SCNC: 5.6 MMOL/L (ref 3.5–5.3)
PROTEUS SP DNA BLD POS QL NAA+NON-PROBE: DETECTED
RBC # BLD AUTO: 3.72 MILLION/UL (ref 3.88–5.62)
SODIUM SERPL-SCNC: 135 MMOL/L (ref 135–147)
WBC # BLD AUTO: 29.46 THOUSAND/UL (ref 4.31–10.16)

## 2025-05-17 PROCEDURE — 99223 1ST HOSP IP/OBS HIGH 75: CPT | Performed by: STUDENT IN AN ORGANIZED HEALTH CARE EDUCATION/TRAINING PROGRAM

## 2025-05-17 PROCEDURE — 84132 ASSAY OF SERUM POTASSIUM: CPT | Performed by: INTERNAL MEDICINE

## 2025-05-17 PROCEDURE — 80048 BASIC METABOLIC PNL TOTAL CA: CPT | Performed by: FAMILY MEDICINE

## 2025-05-17 PROCEDURE — 83605 ASSAY OF LACTIC ACID: CPT

## 2025-05-17 PROCEDURE — 99232 SBSQ HOSP IP/OBS MODERATE 35: CPT | Performed by: FAMILY MEDICINE

## 2025-05-17 PROCEDURE — 94640 AIRWAY INHALATION TREATMENT: CPT

## 2025-05-17 PROCEDURE — 83735 ASSAY OF MAGNESIUM: CPT | Performed by: FAMILY MEDICINE

## 2025-05-17 PROCEDURE — 94644 CONT INHLJ TX 1ST HOUR: CPT

## 2025-05-17 PROCEDURE — 85027 COMPLETE CBC AUTOMATED: CPT | Performed by: FAMILY MEDICINE

## 2025-05-17 PROCEDURE — 82948 REAGENT STRIP/BLOOD GLUCOSE: CPT

## 2025-05-17 PROCEDURE — 94760 N-INVAS EAR/PLS OXIMETRY 1: CPT

## 2025-05-17 PROCEDURE — 74176 CT ABD & PELVIS W/O CONTRAST: CPT

## 2025-05-17 RX ORDER — HEPARIN SODIUM 5000 [USP'U]/ML
5000 INJECTION, SOLUTION INTRAVENOUS; SUBCUTANEOUS EVERY 8 HOURS SCHEDULED
Status: DISCONTINUED | OUTPATIENT
Start: 2025-05-17 | End: 2025-05-18

## 2025-05-17 RX ORDER — DEXTROSE MONOHYDRATE 25 G/50ML
25 INJECTION, SOLUTION INTRAVENOUS ONCE
Status: COMPLETED | OUTPATIENT
Start: 2025-05-17 | End: 2025-05-17

## 2025-05-17 RX ORDER — ALBUTEROL SULFATE 0.83 MG/ML
10 SOLUTION RESPIRATORY (INHALATION) ONCE
Status: COMPLETED | OUTPATIENT
Start: 2025-05-17 | End: 2025-05-17

## 2025-05-17 RX ORDER — MAGNESIUM SULFATE HEPTAHYDRATE 40 MG/ML
2 INJECTION, SOLUTION INTRAVENOUS ONCE
Status: COMPLETED | OUTPATIENT
Start: 2025-05-17 | End: 2025-05-17

## 2025-05-17 RX ADMIN — DEXTROSE MONOHYDRATE 25 ML: 25 INJECTION, SOLUTION INTRAVENOUS at 08:01

## 2025-05-17 RX ADMIN — ACETAMINOPHEN 650 MG: 325 TABLET, FILM COATED ORAL at 21:33

## 2025-05-17 RX ADMIN — SODIUM BICARBONATE 75 ML/HR: 84 INJECTION, SOLUTION INTRAVENOUS at 10:27

## 2025-05-17 RX ADMIN — FLUTICASONE FUROATE AND VILANTEROL TRIFENATATE 1 PUFF: 200; 25 POWDER RESPIRATORY (INHALATION) at 08:11

## 2025-05-17 RX ADMIN — INSULIN HUMAN 10 UNITS: 100 INJECTION, SOLUTION PARENTERAL at 08:00

## 2025-05-17 RX ADMIN — SODIUM CHLORIDE 1000 ML: 0.9 INJECTION, SOLUTION INTRAVENOUS at 20:22

## 2025-05-17 RX ADMIN — GABAPENTIN 100 MG: 100 CAPSULE ORAL at 20:29

## 2025-05-17 RX ADMIN — UMECLIDINIUM 1 PUFF: 62.5 AEROSOL, POWDER ORAL at 08:11

## 2025-05-17 RX ADMIN — ATORVASTATIN CALCIUM 20 MG: 20 TABLET, FILM COATED ORAL at 16:02

## 2025-05-17 RX ADMIN — GABAPENTIN 100 MG: 100 CAPSULE ORAL at 16:02

## 2025-05-17 RX ADMIN — GABAPENTIN 100 MG: 100 CAPSULE ORAL at 08:01

## 2025-05-17 RX ADMIN — MAGNESIUM SULFATE HEPTAHYDRATE 2 G: 40 INJECTION, SOLUTION INTRAVENOUS at 08:01

## 2025-05-17 RX ADMIN — SODIUM CHLORIDE 1000 ML: 0.9 INJECTION, SOLUTION INTRAVENOUS at 21:25

## 2025-05-17 RX ADMIN — SODIUM CHLORIDE 100 ML/HR: 0.9 INJECTION, SOLUTION INTRAVENOUS at 06:09

## 2025-05-17 RX ADMIN — FLUTICASONE PROPIONATE 1 SPRAY: 50 SPRAY, METERED NASAL at 08:11

## 2025-05-17 RX ADMIN — ALBUTEROL SULFATE 10 MG: 2.5 SOLUTION RESPIRATORY (INHALATION) at 14:38

## 2025-05-17 RX ADMIN — ACETAMINOPHEN 650 MG: 325 TABLET, FILM COATED ORAL at 01:33

## 2025-05-17 RX ADMIN — CEFEPIME HYDROCHLORIDE 2000 MG: 2 INJECTION, SOLUTION INTRAVENOUS at 20:29

## 2025-05-17 RX ADMIN — NICOTINE 1 PATCH: 14 PATCH, EXTENDED RELEASE TRANSDERMAL at 08:12

## 2025-05-17 RX ADMIN — SODIUM CHLORIDE 1000 ML: 0.9 INJECTION, SOLUTION INTRAVENOUS at 23:08

## 2025-05-17 NOTE — ASSESSMENT & PLAN NOTE
serum HCO3 15mmol  NAGMA likely secondary to SHANTI on CKD  Start sodium bicarbonate drip 150meq/D5 75 mL/h

## 2025-05-17 NOTE — ASSESSMENT & PLAN NOTE
CT A/P: Right urolithiasis with increased stone burden in comparison to 10/25/2024 including staghorn calculus in the right renal pelvis measuring 2 cm and additional two obstructing calculi in the distal ureter just proximal to the anastomosis measuring up to 8 mm. Associated right hydroureteronephrosis and perinephric stranding  ED discussed with Urology and IR. S/p right nephrostomy tube placement on 5/16/25. Will continue to hold ASA and Heparin for today given some blood noted in nephrostomy tube  Follow up body fluid culture from R nephrostomy tube. Continue Cefepime as above  Urology consulted, recommendations are appreciated  Conversion of nephrostomy tube to nephro ureteral stent for flexible ureteroscopy laser gypsy stent removal in a few weeks.  5/17/25: Recommending repeat CT A/P to evaluate positioning of nephrostomy tube and hydro given rise in creatinine and WBC on labs

## 2025-05-17 NOTE — ASSESSMENT & PLAN NOTE
Lab Results   Component Value Date    HGBA1C 6.4 (H) 11/12/2024       Recent Labs     05/17/25  0747   POCGLU 85       (P) 85  Not currently on oral anti-diabetic agents  Continue carb controlled diet

## 2025-05-17 NOTE — ASSESSMENT & PLAN NOTE
"Lab Results   Component Value Date    HGBA1C 6.4 (H) 11/12/2024     HbA1c 6.4  Advised to maintain a good DM control to prevent progression of CKD   Maintain healthy diet (vegetables, fruits, whole grains, nonfat or low fat)  Weight loss  Physical activity (5 to 10 minutes to start the increase to 30 min a day)      No results for input(s): \"POCGLU\" in the last 72 hours.    Blood Sugar Average: Last 72 hrs:      "

## 2025-05-17 NOTE — ASSESSMENT & PLAN NOTE
No acute exacerbation  Trelegy substituted with fluticasone-vilanterol and umeclidinium. Start DuoNebs as needed  Respiratory protocol

## 2025-05-17 NOTE — CONSULTS
NEPHROLOGY HOSPITAL CONSULTATION   Joel Wyman 79 y.o. male MRN: [id number]  Unit/Bed#: [address] [place] [address] Encounter: [id number]    Brief History of Admission - 79 y.o. man  with PMH of CKD G3 b, prostate adenocarcinoma on radiation therapy complicated with severe radiation cystitis, bladder papillomas with ileal conduit p/w lethargy, weakness, foul-smelling urine.  Patient was found to have right hydronephrosis with nephrolithiasis.  Nephrology is consulted for management of SHANTI    Assessment & Plan  Acute kidney injury superimposed on stage 3b chronic kidney disease (HCC)  Lab Results   Component Value Date    EGFR 17 05/17/2025    EGFR 26 05/16/2025    EGFR 45 11/12/2024    CREATININE 3.22 (H) 05/17/2025    CREATININE 2.26 (H) 05/16/2025    CREATININE 1.43 (H) 11/12/2024   #Non-Oliguric KDIGO severe SHANTI stage 3    Etiology: Multifactorial secondary to obstructive uropathy and hypotension in the settings of sepsis  Baseline creatinine 1.3 to 1.5 mg/dL  Current creatinine: 3.2 mg/dL, trending up  Peak creatinine: Trending  UA: Hematuria, leukocyturia, bacteriuria  Renal imaging : Staghorn stone on the right pelvis, mid right ureter stone with right hydroureteronephrosis with perinephric stranding.  Mild distention of the left collecting system  Status post right nephrostomy  Treatment:  Plan to monitor hydronephrosis with ultrasound tomorrow if no improvement if no improvement  kidney function  No urgent indication for dialysis   maintain MAP:  Over 65 mmHg if possible/avoid hypoperfusion:  Hold parameters on blood pressure medications  Avoid nephrotoxic agents such as NSAIDs, and IV contrast if possible. Avoid opioids   Adjust medications to GFR    Sepsis (HCC)  Febrile, tachycardic, hypotensive  Antibiotics as per primary team  Essential hypertension  Volume:euvolemic   Blood pressure: Hypertensive, BP 90/55  Recommend:  Holding metoprolol  Metabolic acidosis  serum HCO3 15mmol  NAGMA likely secondary to SHANTI on  "CKD  Start sodium bicarbonate drip 150meq/D5 75 mL/h  Anemia of chronic disease  Current hemoglobin: 10.6 mg/dL  Treatment:  Transfuse for hemoglobin less than 7.0 per primary service    Type 2 diabetes mellitus with diabetic polyneuropathy, without long-term current use of insulin ([facility])  Lab Results   Component Value Date    HGBA1C 6.4 (H) 11/12/2024     HbA1c 6.4  Advised to maintain a good DM control to prevent progression of CKD   Maintain healthy diet (vegetables, fruits, whole grains, nonfat or low fat)  Weight loss  Physical activity (5 to 10 minutes to start the increase to 30 min a day)      No results for input(s): \"POCGLU\" in the last 72 hours.    Blood Sugar Average: Last 72 hrs:        I have reviewed the nephrology recommendations including start sodium bicarbonate drip, treatment hyperkalemia medically, Lokelma 10 g, with primary team, and we are in agreement with renal plan including the information outlined above.    HISTORY OF PRESENT ILLNESS:  Requesting Physician: Janny Marques DO  Reason for Consult: Hyperkalemia and SHANTI    Joel Wyman is a 79 y.o. male with PMH of CKD G3 b, prostate adenocarcinoma on radiation therapy complicated with severe radiation cystitis, bladder papillomas with ileal conduit who was admitted to [facility] after presenting referred by urology for lethargy, weakness, foul-smelling urine.  Patient was found to have right hydronephrosis with nephrolithiasis A renal consultation is requested today for assistance in the management of SHANTI    PAST MEDICAL HISTORY:  Past Medical History:   Diagnosis Date    Bladder infection 03/2024    Borderline diabetes     Cancer (HCC)     prostate    COPD (chronic obstructive pulmonary disease) (HCC)     Hematuria     History of transfusion     x2    Hyperlipidemia     Hypertension     Radiation cystitis     Smoker     Wears glasses        PAST SURGICAL HISTORY:  Past Surgical History:   Procedure Laterality Date    APPENDECTOMY  "     FL CYSTOGRAM  4/24/2024    FL CYSTOGRAM  5/23/2024    FL RETROGRADE PYELOGRAM  04/14/2021    FL RETROGRADE PYELOGRAM  11/16/2023    FL RETROGRADE PYELOGRAM  4/11/2024    HERNIA REPAIR      IR EMBOLIZATION (SPECIFY VESSEL OR SITE)  09/17/2021    IR NEPHROSTOMY TUBE CHECK AND/OR REMOVAL  07/08/2021    IR NEPHROSTOMY TUBE CHECK/CHANGE/REPOSITION/REINSERTION/UPSIZE  05/10/2021    IR NEPHROSTOMY TUBE CHECK/CHANGE/REPOSITION/REINSERTION/UPSIZE  08/04/2021    IR NEPHROSTOMY TUBE CHECK/CHANGE/REPOSITION/REINSERTION/UPSIZE  10/13/2021    IR NEPHROSTOMY TUBE PLACEMENT  05/07/2021    IR NEPHROSTOMY TUBE PLACEMENT  5/16/2025    IR SUPRAPUBIC CATHETER PLACEMENT  5/3/2024    PA CYSTO W/IRRIG & EVAC MULTPLE OBSTRUCTING CLOTS Bilateral 04/14/2021    Procedure: CYSTOSCOPY EVACUATION OF CLOTS bilateral retrograde pyelograms possible TURBT bladder biopsy;  Surgeon: Yovani Khan MD;  Location: [facility];  Service: Urology    PA CYSTO W/IRRIG & EVAC MULTPLE OBSTRUCTING CLOTS N/A 04/24/2021    Procedure: CYSTOSCOPY EVACUATION OF CLOTS fulguration;  Surgeon: Yovani Khan MD;  Location: [facility];  Service: Urology    PA CYSTO W/IRRIG & EVAC MULTPLE OBSTRUCTING CLOTS N/A 07/08/2021    Procedure: CYSTOSCOPY EVACUATION OF CLOTS BILATERAL ANTIROGRADES NEPHROSTOMY TUBES, FULGERATION ;  Surgeon: Yovani Khan MD;  Location: [facility];  Service: Urology    PA CYSTO W/IRRIG & EVAC MULTPLE OBSTRUCTING CLOTS N/A 08/22/2021    Procedure: CYSTOSCOPY, RIGHT RETROGRADE, EVACUATION OF CLOTS, BLADDER BIOPSY X2 AND FULGERATION OF BLADDER LESIONS, URETEROSCOPY, 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;  Surgeon: Yovani Khan MD;  Location: [facility];  Service: Urology    PA CYSTO W/IRRIG & EVAC MULTPLE OBSTRUCTING CLOTS Bilateral 4/24/2024    Procedure: CYSTOSCOPY EVACUATION OF CLOTS fulguration bladder neck tumors and biopsy, retrograde pyelograms, DVIU;  Surgeon: Yovani Khan MD;   Location: [facility];  Service: Urology    IL CYSTO W/IRRIG & EVAC MULTPLE OBSTRUCTING CLOTS N/A 5/23/2024    Procedure: CYSTOSCOPY FULGERATION RESECTION BLADDER NECK, FLUORSCOPIC CYSTOGRAM, EVACUATION OF CLOTS;  Surgeon: Yovani Khan MD;  Location: [facility];  Service: Urology    IL CYSTOURETHROSCOPY W/DEST &/RMVL MED BLADDER AMARI N/A 4/11/2024    Procedure: TRANSURETHRAL RESECTION OF BLADDER TUMOR (TURBT);  Surgeon: Yovani Khan MD;  Location: [facility];  Service: Urology    IL CYSTOURETHROSCOPY W/URETERAL CATHETERIZATION Bilateral 4/11/2024    Procedure: BILATERAL CYSTOSCOPY WITH RETROGRADE PYELOGRAM, BLADDER NECK CONTRACTURE;  Surgeon: Yovani Khan MD;  Location: [facility];  Service: Urology    IL CYSTOURETHROSCOPY WITH BIOPSY N/A 11/16/2023    Procedure: CYSTOSCOPY, BILATERAL RETROGRADEY PYELOGRAM,  FULGERATION 2.0 CM BLADDER TUMOR WITH BIOPSY;  Surgeon: Yovani Khan MD;  Location: [facility];  Service: Urology    PROSTATECTOMY  2014    ROTATOR CUFF REPAIR      right shoulder       ALLERGIES:  Allergies[1]    SOCIAL HISTORY:  Social History     Substance and Sexual Activity   Alcohol Use Not Currently    Comment: None in over 50 yrs     Social History     Substance and Sexual Activity   Drug Use No     Tobacco Use History[2]    FAMILY HISTORY:  Family History   Problem Relation Age of Onset    Diabetes Mother     Stroke Mother     Diabetes Sister     Diabetes Brother     Stroke Brother     Mental illness Neg Hx        MEDICATIONS:  Current Medications[3]    REVIEW OF SYSTEMS:  Constitutional: Negative for fatigue, anorexia, fever, chills, diaphoresis  HENT: Negative for postnasal drip  Eyes: Negative for visual disturbance.   Respiratory: Negative for cough, shortness of breath and wheezing.   Cardiovascular: Negative for chest pain, palpitations and leg swelling.   Gastrointestinal: Negative for abdominal pain, constipation, diarrhea, nausea and vomiting.   Genitourinary: No dysuria,  hematuria  Endocrine: Negative for polyuria.   Musculoskeletal: Negative for arthralgias, back pain and joint swelling.   Skin: Negative for rash.   Neurological: Negative for focal weakness, headaches, dizziness.  Hematological: Negative for easy bruising or bleeding.  Psychiatric/Behavioral: Negative for confusion and sleep disturbance.   All the systems were reviewed and were negative except as documented on the HPI.    PHYSICAL EXAM:  Current Weight: Weight - Scale: 96.7 kg (213 lb 3 oz)  First Weight: Weight - Scale: 96.7 kg (213 lb 3 oz)  Vitals:    05/17/25 0413 05/17/25 0413 05/17/25 0500 05/17/25 0729   BP:  90/55  109/58   BP Location:       Pulse:  81 82 85   Resp:  18 16    Temp:  97.5 °F (36.4 °C)     TempSrc: Oral      SpO2:  94% 94% 94%   Weight:       Height:           Intake/Output Summary (Last 24 hours) at 5/17/2025 0730  Last data filed at 5/17/2025 0609  Gross per 24 hour   Intake 2323.33 ml   Output 1130 ml   Net 1193.33 ml     Physical Exam  General:  no acute distress at this time  Skin:  No acute rash  Eyes:  No scleral icterus and noninjected  ENT:  mucous membranes moist  Neck:  no carotid bruits  Chest:  Clear to auscultation percussion, good respiratory effort, no use of accessory respiratory muscles  CVS:  Regular rate and rhythm without rub   Abdomen: ileal conduit    Extremities: no significant lower extremity edema  Neuro:  No gross focality  Psych:  Alert , cooperative   : right nephrostomy       Invasive Devices:      Lab Results:   Results from last 7 days   Lab Units 05/17/25  0444 05/16/25  1016   WBC Thousand/uL 29.46* 18.69*   HEMOGLOBIN g/dL 10.6* 13.2   HEMATOCRIT % 34.7* 42.1   PLATELETS Thousands/uL 181 232   POTASSIUM mmol/L 5.6* 5.2   CHLORIDE mmol/L 112* 107   CO2 mmol/L 15* 18*   BUN mg/dL 65* 56*   CREATININE mg/dL 3.22* 2.26*   CALCIUM mg/dL 7.9* 9.6   MAGNESIUM mg/dL 1.6*  --    ALK PHOS U/L  --  83   ALT U/L  --  16   AST U/L  --  19     Other Studies:  "    Portions of the record may have been created with voice recognition software. Occasional wrong word or \"sound a like\" substitutions may have occurred due to the inherent limitations of voice recognition software. Read the chart carefully and recognize, using context, where substitutions have occurred.If you have any questions, please contact the dictating provider.         [1] No Known Allergies  [2]   Social History  Tobacco Use   Smoking Status Every Day    Current packs/day: 0.50    Average packs/day: 0.5 packs/day for 50.0 years (25.0 ttl pk-yrs)    Types: Cigarettes    Passive exposure: Past   Smokeless Tobacco Never   [3]   Current Facility-Administered Medications:     acetaminophen (TYLENOL) tablet 650 mg, 650 mg, Oral, Q6H PRN, Janny Pulidoar, DO, 650 mg at 05/17/25 0133    aspirin chewable tablet 81 mg, 81 mg, Oral, Daily, Venus Matthews PA-C    atorvastatin (LIPITOR) tablet 20 mg, 20 mg, Oral, Daily With Dinner, Janny PulidoarDO    cefepime (MAXIPIME) IVPB (premix in dextrose) 2,000 mg 50 mL, 2,000 mg, Intravenous, Q24H, Janny Revporterar DO, Last Rate: 100 mL/hr at 05/16/25 1946, 2,000 mg at 05/16/25 1946    dextrose 50 % IV solution 25 mL, 25 mL, Intravenous, Once, Venus Matthews PA-C    fluticasone (FLONASE) 50 mcg/act nasal spray 1 spray, 1 spray, Nasal, Daily, Janny Revankar, DO    fluticasone-vilanterol 200-25 mcg/actuation 1 puff, 1 puff, Inhalation, Daily **AND** umeclidinium 62.5 mcg/actuation inhaler AEPB 1 puff, 1 puff, Inhalation, Daily, Janny Revankar, DO    gabapentin (NEURONTIN) capsule 100 mg, 100 mg, Oral, TID, Janny Revankar, DO, 100 mg at 05/16/25 2041    heparin (porcine) subcutaneous injection 5,000 Units, 5,000 Units, Subcutaneous, Q8H DHAVAL **AND** Platelet count, , , Once, Janny Revporterar DO    HYDROmorphone (DILAUDID) injection 0.5 mg, 0.5 mg, Intravenous, Q4H PRN, Janny Marques,     insulin regular (HumuLIN R,NovoLIN R) injection 10 Units, 10 Units, Intravenous, Once, " Venus Matthews PA-C    ipratropium-albuterol (DUO-NEB) 0.5-2.5 mg/3 mL inhalation solution 3 mL, 3 mL, Nebulization, Q4H PRN, Janny Marques DO    magnesium sulfate 2 g/50 mL IVPB (premix) 2 g, 2 g, Intravenous, Once, Venus Matthews PA-C    [Held by provider] metoprolol tartrate (LOPRESSOR) partial tablet 12.5 mg, 12.5 mg, Oral, Q12H DHAVAL, Janny Marques DO    nicotine (NICODERM CQ) 14 mg/24hr TD 24 hr patch 1 patch, 1 patch, Transdermal, Daily, Janny Marques DO, 1 patch at 05/16/25 1831    ondansetron (ZOFRAN) injection 4 mg, 4 mg, Intravenous, Q6H PRN, Janny Marques, DO    sodium chloride 0.9 % infusion, 100 mL/hr, Intravenous, Continuous, Janny Marques DO, Last Rate: 100 mL/hr at 05/17/25 0609, 100 mL/hr at 05/17/25 0609

## 2025-05-17 NOTE — NURSING NOTE
Patient given Incentive Spirometry per order. Patient able to demonstrate 10 x to 1000 goal. Patient agrees to use 10x per hour.

## 2025-05-17 NOTE — ASSESSMENT & PLAN NOTE
Patient with history of prostate cancer. 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 Patient had suprapubic catheter placed. Then patient was followed up at [place] where he underwent simple cystectomy with ileal conduit in May 2024   UA with leukocytes and nitrites  CT with evidence of right sided perinephritic stranding  Received IV ceftriaxone in the ED, escalated to IV cefepime given ongoing fever and leukocytosis  Follow up UC

## 2025-05-17 NOTE — PLAN OF CARE
Problem: Potential for Falls  Goal: Patient will remain free of falls  Description: INTERVENTIONS:  - Educate patient/family on patient safety including physical limitations  - Instruct patient to call for assistance with activity   - Consider consulting OT/PT to assist with strengthening/mobility based on AM PAC & JH-HLM score  - Consult OT/PT to assist with strengthening/mobility   - Keep Call bell within reach  - Keep bed low and locked with side rails adjusted as appropriate  - Keep care items and personal belongings within reach  - Initiate and maintain comfort rounds  - Make Fall Risk Sign visible to staff  - Offer Toileting every 2 Hours, in advance of need  - Initiate/Maintain bed alarm  - Obtain necessary fall risk management equipment: socks  - Apply yellow socks and bracelet for high fall risk patients  - Consider moving patient to room near nurses station  Outcome: Progressing     Problem: Nutrition/Hydration-ADULT  Goal: Nutrient/Hydration intake appropriate for improving, restoring or maintaining nutritional needs  Description: Monitor and assess patient's nutrition/hydration status for malnutrition. Collaborate with interdisciplinary team and initiate plan and interventions as ordered.  Monitor patient's weight and dietary intake as ordered or per policy. Utilize nutrition screening tool and intervene as necessary. Determine patient's food preferences and provide high-protein, high-caloric foods as appropriate.     INTERVENTIONS:  - Monitor oral intake, urinary output, labs, and treatment plans  - Assess nutrition and hydration status and recommend course of action  - Evaluate amount of meals eaten  - Assist patient with eating if necessary   - Allow adequate time for meals  - Recommend/ encourage appropriate diets, oral nutritional supplements, and vitamin/mineral supplements  - Order, calculate, and assess calorie counts as needed  - Recommend, monitor, and adjust tube feedings and TPN/PPN based  on assessed needs  - Assess need for intravenous fluids  - Provide specific nutrition/hydration education as appropriate  - Include patient/family/caregiver in decisions related to nutrition  Outcome: Progressing     Problem: PAIN - ADULT  Goal: Verbalizes/displays adequate comfort level or baseline comfort level  Description: Interventions:  - Encourage patient to monitor pain and request assistance  - Assess pain using appropriate pain scale  - Administer analgesics as ordered based on type and severity of pain and evaluate response  - Implement non-pharmacological measures as appropriate and evaluate response  - Consider cultural and social influences on pain and pain management  - Notify physician/advanced practitioner if interventions unsuccessful or patient reports new pain  - Educate patient/family on pain management process including their role and importance of  reporting pain   - Provide non-pharmacologic/complimentary pain relief interventions  Outcome: Progressing     Problem: INFECTION - ADULT  Goal: Absence or prevention of progression during hospitalization  Description: INTERVENTIONS:  - Assess and monitor for signs and symptoms of infection  - Monitor lab/diagnostic results  - Monitor all insertion sites, i.e. indwelling lines, tubes, and drains  - Monitor endotracheal if appropriate and nasal secretions for changes in amount and color  - [place] appropriate cooling/warming therapies per order  - Administer medications as ordered  - Instruct and encourage patient and family to use good hand hygiene technique  - Identify and instruct in appropriate isolation precautions for identified infection/condition  Outcome: Progressing  Goal: Absence of fever/infection during neutropenic period  Description: INTERVENTIONS:  - Monitor WBC  - Perform strict hand hygiene  - Limit to healthy visitors only  - No plants, dried, fresh or silk flowers with man in patient room  Outcome: Progressing     Problem:  SAFETY ADULT  Goal: Patient will remain free of falls  Description: INTERVENTIONS:  - Educate patient/family on patient safety including physical limitations  - Instruct patient to call for assistance with activity   - Consider consulting OT/PT to assist with strengthening/mobility based on AM PAC & JH-HLM score  - Consult OT/PT to assist with strengthening/mobility   - Keep Call bell within reach  - Keep bed low and locked with side rails adjusted as appropriate  - Keep care items and personal belongings within reach  - Initiate and maintain comfort rounds  - Make Fall Risk Sign visible to staff  - Offer Toileting every 2 Hours, in advance of need  - Initiate/Maintain bed alarm  - Obtain necessary fall risk management equipment: socks  - Apply yellow socks and bracelet for high fall risk patients  - Consider moving patient to room near nurses station  Outcome: Progressing  Goal: Maintain or return to baseline ADL function  Description: INTERVENTIONS:  -  Assess patient's ability to carry out ADLs; assess patient's baseline for ADL function and identify physical deficits which impact ability to perform ADLs (bathing, care of mouth/teeth, toileting, grooming, dressing, etc.)  - Assess/evaluate cause of self-care deficits   - Assess range of motion  - Assess patient's mobility; develop plan if impaired  - Assess patient's need for assistive devices and provide as appropriate  - Encourage maximum independence but intervene and supervise when necessary  - Involve family in performance of ADLs  - Assess for home care needs following discharge   - Consider OT consult to assist with ADL evaluation and planning for discharge  - Provide patient education as appropriate  - Monitor functional capacity and physical performance, use of AM PAC & JH-HLM   - Monitor gait, balance and fatigue with ambulation    Outcome: Progressing  Goal: Maintains/Returns to pre admission functional level  Description: INTERVENTIONS:  - Perform  AM-PAC 6 Click Basic Mobility/ Daily Activity assessment daily.  - Set and communicate daily mobility goal to care team and patient/family/caregiver.   - Collaborate with rehabilitation services on mobility goals if consulted  - Perform Range of Motion 4 times a day.  - Reposition patient every 2 hours.  - Dangle patient 3 times a day  - Stand patient 3 times a day  - Ambulate patient 3 times a day  - Out of bed to chair 3 times a day   - Out of bed for meals 3 times a day  - Out of bed for toileting  - Record patient progress and toleration of activity level   Outcome: Progressing     Problem: DISCHARGE PLANNING  Goal: Discharge to home or other facility with appropriate resources  Description: INTERVENTIONS:  - Identify barriers to discharge w/patient and caregiver  - Arrange for needed discharge resources and transportation as appropriate  - Identify discharge learning needs (meds, wound care, etc.)  - Arrange for interpretive services to assist at discharge as needed  - Refer to Case Management Department for coordinating discharge planning if the patient needs post-hospital services based on physician/advanced practitioner order or complex needs related to functional status, cognitive ability, or social support system  Outcome: Progressing     Problem: Knowledge Deficit  Goal: Patient/family/caregiver demonstrates understanding of disease process, treatment plan, medications, and discharge instructions  Description: Complete learning assessment and assess knowledge base.  Interventions:  - Provide teaching at level of understanding  - Provide teaching via preferred learning methods  Outcome: Progressing     Problem: Prexisting or High Potential for Compromised Skin Integrity  Goal: Skin integrity is maintained or improved  Description: INTERVENTIONS:  - Identify patients at risk for skin breakdown  - Assess and monitor skin integrity including under and around medical devices   - Assess and monitor nutrition  and hydration status  - Monitor labs  - Assess for incontinence   - Turn and reposition patient  - Assist with mobility/ambulation  - Relieve pressure over goldy prominences   - Avoid friction and shearing  - Provide appropriate hygiene as needed including keeping skin clean and dry  - Evaluate need for skin moisturizer/barrier cream  - Collaborate with interdisciplinary team  - Patient/family teaching  - Consider wound care consult    Assess:  - Review Alex scale daily  - Assess extremities for adequate circulation and sensation     Bed Management:  - Have minimal linens on bed & keep smooth, unwrinkled  - Change linens as needed when moist or perspiring        Outcome: Progressing     Problem: RESPIRATORY - ADULT  Goal: Achieves optimal ventilation and oxygenation  Description: INTERVENTIONS:  - Assess for changes in respiratory status  - Assess for changes in mentation and behavior  - Position to facilitate oxygenation and minimize respiratory effort  - Oxygen administered by appropriate delivery if ordered  - Initiate smoking cessation education as indicated  - Encourage broncho-pulmonary hygiene including cough, deep breathe, Incentive Spirometry  - Assess the need for suctioning and aspirate as needed  - Assess and instruct to report SOB or any respiratory difficulty  - Respiratory Therapy support as indicated  Outcome: Progressing

## 2025-05-17 NOTE — ASSESSMENT & PLAN NOTE
Patient presented to the ED with right flank/lower quadrant abdominal pain along with fever at home   Sepsis as noted by leukocytosis, tachycardia in the setting of UTI/possible pyelo  Lactic acid within normal limits  BC x 2 positive for gram negative rods. Follow up [facility]  Follow up UC and body fluid culture from nephrostomy tube  Continue IV cefepime (D2) for now  Trend WBC and fever curve

## 2025-05-17 NOTE — PLAN OF CARE
Problem: Potential for Falls  Goal: Patient will remain free of falls  Description: INTERVENTIONS:  - Educate patient/family on patient safety including physical limitations  - Instruct patient to call for assistance with activity   - Consider consulting OT/PT to assist with strengthening/mobility based on AM PAC & JH-HLM score  - Consult OT/PT to assist with strengthening/mobility   - Keep Call bell within reach  - Keep bed low and locked with side rails adjusted as appropriate  - Keep care items and personal belongings within reach  - Initiate and maintain comfort rounds  - Make Fall Risk Sign visible to staff  - Offer Toileting every 2 Hours, in advance of need  - Initiate/Maintain bed alarm  - Obtain necessary fall risk management equipment: socks  - Apply yellow socks and bracelet for high fall risk patients  - Consider moving patient to room near nurses station  Outcome: Progressing     Problem: Nutrition/Hydration-ADULT  Goal: Nutrient/Hydration intake appropriate for improving, restoring or maintaining nutritional needs  Description: Monitor and assess patient's nutrition/hydration status for malnutrition. Collaborate with interdisciplinary team and initiate plan and interventions as ordered.  Monitor patient's weight and dietary intake as ordered or per policy. Utilize nutrition screening tool and intervene as necessary. Determine patient's food preferences and provide high-protein, high-caloric foods as appropriate.     INTERVENTIONS:  - Monitor oral intake, urinary output, labs, and treatment plans  - Assess nutrition and hydration status and recommend course of action  - Evaluate amount of meals eaten  - Assist patient with eating if necessary   - Allow adequate time for meals  - Recommend/ encourage appropriate diets, oral nutritional supplements, and vitamin/mineral supplements  - Order, calculate, and assess calorie counts as needed  - Recommend, monitor, and adjust tube feedings and TPN/PPN based  on assessed needs  - Assess need for intravenous fluids  - Provide specific nutrition/hydration education as appropriate  - Include patient/family/caregiver in decisions related to nutrition  Outcome: Progressing     Problem: PAIN - ADULT  Goal: Verbalizes/displays adequate comfort level or baseline comfort level  Description: Interventions:  - Encourage patient to monitor pain and request assistance  - Assess pain using appropriate pain scale  - Administer analgesics as ordered based on type and severity of pain and evaluate response  - Implement non-pharmacological measures as appropriate and evaluate response  - Consider cultural and social influences on pain and pain management  - Notify physician/advanced practitioner if interventions unsuccessful or patient reports new pain  - Educate patient/family on pain management process including their role and importance of  reporting pain   - Provide non-pharmacologic/complimentary pain relief interventions  Outcome: Progressing     Problem: INFECTION - ADULT  Goal: Absence or prevention of progression during hospitalization  Description: INTERVENTIONS:  - Assess and monitor for signs and symptoms of infection  - Monitor lab/diagnostic results  - Monitor all insertion sites, i.e. indwelling lines, tubes, and drains  - Monitor endotracheal if appropriate and nasal secretions for changes in amount and color  - [place] appropriate cooling/warming therapies per order  - Administer medications as ordered  - Instruct and encourage patient and family to use good hand hygiene technique  - Identify and instruct in appropriate isolation precautions for identified infection/condition  Outcome: Progressing  Goal: Absence of fever/infection during neutropenic period  Description: INTERVENTIONS:  - Monitor WBC  - Perform strict hand hygiene  - Limit to healthy visitors only  - No plants, dried, fresh or silk flowers with man in patient room  Outcome: Progressing     Problem:  SAFETY ADULT  Goal: Patient will remain free of falls  Description: INTERVENTIONS:  - Educate patient/family on patient safety including physical limitations  - Instruct patient to call for assistance with activity   - Consider consulting OT/PT to assist with strengthening/mobility based on AM PAC & JH-HLM score  - Consult OT/PT to assist with strengthening/mobility   - Keep Call bell within reach  - Keep bed low and locked with side rails adjusted as appropriate  - Keep care items and personal belongings within reach  - Initiate and maintain comfort rounds  - Make Fall Risk Sign visible to staff  - Offer Toileting every 2 Hours, in advance of need  - Initiate/Maintain bed alarm  - Obtain necessary fall risk management equipment: socks  - Apply yellow socks and bracelet for high fall risk patients  - Consider moving patient to room near nurses station  Outcome: Progressing  Goal: Maintain or return to baseline ADL function  Description: INTERVENTIONS:  -  Assess patient's ability to carry out ADLs; assess patient's baseline for ADL function and identify physical deficits which impact ability to perform ADLs (bathing, care of mouth/teeth, toileting, grooming, dressing, etc.)  - Assess/evaluate cause of self-care deficits   - Assess range of motion  - Assess patient's mobility; develop plan if impaired  - Assess patient's need for assistive devices and provide as appropriate  - Encourage maximum independence but intervene and supervise when necessary  - Involve family in performance of ADLs  - Assess for home care needs following discharge   - Consider OT consult to assist with ADL evaluation and planning for discharge  - Provide patient education as appropriate  - Monitor functional capacity and physical performance, use of AM PAC & JH-HLM   - Monitor gait, balance and fatigue with ambulation    Outcome: Progressing  Goal: Maintains/Returns to pre admission functional level  Description: INTERVENTIONS:  - Perform  AM-PAC 6 Click Basic Mobility/ Daily Activity assessment daily.  - Set and communicate daily mobility goal to care team and patient/family/caregiver.   - Collaborate with rehabilitation services on mobility goals if consulted  - Perform Range of Motion 4 times a day.  - Reposition patient every 2 hours.  - Dangle patient 3 times a day  - Stand patient 3 times a day  - Ambulate patient 3 times a day  - Out of bed to chair 3 times a day   - Out of bed for meals 3 times a day  - Out of bed for toileting  - Record patient progress and toleration of activity level   Outcome: Progressing     Problem: DISCHARGE PLANNING  Goal: Discharge to home or other facility with appropriate resources  Description: INTERVENTIONS:  - Identify barriers to discharge w/patient and caregiver  - Arrange for needed discharge resources and transportation as appropriate  - Identify discharge learning needs (meds, wound care, etc.)  - Arrange for interpretive services to assist at discharge as needed  - Refer to Case Management Department for coordinating discharge planning if the patient needs post-hospital services based on physician/advanced practitioner order or complex needs related to functional status, cognitive ability, or social support system  Outcome: Progressing     Problem: Knowledge Deficit  Goal: Patient/family/caregiver demonstrates understanding of disease process, treatment plan, medications, and discharge instructions  Description: Complete learning assessment and assess knowledge base.  Interventions:  - Provide teaching at level of understanding  - Provide teaching via preferred learning methods  Outcome: Progressing

## 2025-05-17 NOTE — ASSESSMENT & PLAN NOTE
Current hemoglobin: 10.6 mg/dL  Treatment:  Transfuse for hemoglobin less than 7.0 per primary service

## 2025-05-17 NOTE — UTILIZATION REVIEW
Initial Clinical Review    Admission: Date/Time/Statement:   Admission Orders (From admission, onward)       Ordered        05/16/25 1329  INPATIENT ADMISSION  Once                          Orders Placed This Encounter   Procedures    INPATIENT ADMISSION     Standing Status:   Standing     Number of Occurrences:   1     Level of Care:   Med Surg [16]     Estimated length of stay:   More than 2 Midnights     Certification:   I certify that inpatient services are medically necessary for this patient for a duration of greater than two midnights. See H&P and MD Progress Notes for additional information about the patient's course of treatment.     ED Arrival Information       Expected   -    Arrival   5/16/2025 09:46    Acuity   Emergent              Means of arrival   Walk-In    Escorted by   Spouse    Service   Hospitalist    Admission type   Emergency              Arrival complaint   Hernia Issue             Chief Complaint   Patient presents with    Hernia     Pt reported that he has hernia on his ileostomy site. Called DR Khan this am, reported of severe pain and nausea. Vomited x 1 this am        Initial Presentation: 79 y.o. male presented to ED from home as inpatient admission for sepsis.  PMH of prostate cancer, CKD who presents with right-sided flank/right lower quadrant abdominal pain that started at around 8 PM yesterday while watching TV.  Patient states pain was severe and had 1 episode of vomiting this morning (+) fever abdominal back and flank pain.  Patient reports radiation of the pain to the back yesterday. Reports fevers today. In the ER patient was noted to have obstructing stones causing hydronephrosis on imaging along with UTI On exam Sleepy after receiving pain medications in the ER but easily awakens to answer questions   Tachycardia Decreased breath sounds bilaterally although patient with decreased inspiratory effort  abdominal tenderness (right flank/RLQ) Ileal conduit in place Plan IVF, IV  Rocephin follow blood and urine cultures, Staghorn calculus was noted in the right renal pelvis and 2 obstructing calculi was noted in the distal ureter proximal to the anastomosis measuring up to 8 mm with right-sided hydroureteronephrosis consult IF & urology, Creatinine  2.26 base line 1.3-1.6 IVF,accu checks with SSI and supportive care     IR consult:  Assessment/Plan:   79 year old male with complex urologic history presenting with right hydronephrosis and sepsis. Large stone seen in the right kidney will plan for nephrostomy tube placement. Patient is taking ASA, discussed the risk of bleeding with both the patient and his wife and they mcelroy to proceed.  Specimens: Purulent urine   Findings: 10 F right neph tube placed.     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  Left mild hydronephrosis possible anastomotic stricture, upper tract ureteral tumor possible reflux  Right nephrostomy tube drainage sent for culture  Continue broad-spectrum antibiotics as per medical team  Will need right nephrostomy tube to be converted to nephro ureteral stent for flexible ureteroscopy laser gypsy stone removal in a few weeks  Will also need loopogram to check for reflux versus obstruction left ureter in light of new onset hydronephrosis  I spent a total of 1-1/2 hours today reviewing his records talking to patient and family     Anticipated Length of Stay/Certification Statement: Patient will be admitted on an inpatient basis with an anticipated length of stay of greater than 2 midnights secondary to sepsis, UTI, obstructing stones, SHANTI on CKD.       Date: 05-17-25   Day 2: BC x 2 positive for gram negative rods. Follow up  BCID  Follow up UC and body fluid culture from nephrostomy tube IV Rocephin d/c IV  Continue IV cefepime (D2) for now  Trend WBC and fever curve. /p right nephrostomy tube placement on 5/16/25. Will continue to hold ASA and Heparin for today given some blood noted in nephrostomy tube repeat CT A/P to evaluate positioning of nephrostomy tube and hydro given rise in creatinine and WBC on lab     Nephrology consult:  Assessment & Plan  Acute kidney injury superimposed on stage 3b chronic kidney disease (HCC)        Lab Results   Component Value Date     EGFR 17 05/17/2025     EGFR 26 05/16/2025     EGFR 45 11/12/2024     CREATININE 3.22 (H) 05/17/2025     CREATININE 2.26 (H) 05/16/2025     CREATININE 1.43 (H) 11/12/2024   #Non-Oliguric KDIGO severe SHANTI stage 3    Etiology: Multifactorial secondary to obstructive uropathy and hypotension in the settings of sepsis  Baseline creatinine 1.3 to 1.5 mg/dL  Current creatinine: 3.2 mg/dL, trending up  Peak creatinine: Trending  UA: Hematuria, leukocyturia, bacteriuria  Renal imaging : Staghorn stone on the right pelvis, mid right ureter stone with right hydroureteronephrosis with perinephric stranding.  Mild distention of the left collecting system  Status post right nephrostomy  Treatment:  Plan to monitor hydronephrosis with ultrasound tomorrow if no improvement if no improvement  kidney function  No urgent indication for dialysis   maintain MAP:  Over 65 mmHg if possible/avoid hypoperfusion:  Hold parameters on blood pressure medications  Avoid nephrotoxic agents such as NSAIDs, and IV contrast if possible. Avoid opioids   Adjust medications to GFR    Date: 05-18-25  Day 3: Has surpassed a 2nd midnight with active treatments and services. Patient referred to ICU 5/17 to 5/18: Hypotensive s/p 3 L IV fluid   IV norepinephrine, 1-30 mcg/min  sodium bicarbonate 150 mEq in dextrose 5 % 1,000 mL infusion, 75 mL/hr, Last Rate: 75 mL/hr (05/18/25 0017) Urine culture pending,  follow-up Continue cefepime Add vancomycin for now Obtain MRSA Obtain sputum culture, 4 L NC d/t multiple SpO2 < 90% On exam ill appearing rhonchi,Right nephrostomy tube accu check and supportive care       ED Treatment-Medication Administration from 05/16/2025 0946 to 05/16/2025 1422         Date/Time Order Dose Route Action     05/16/2025 1012 morphine injection 4 mg 4 mg Intravenous Given     05/16/2025 1012 ondansetron (ZOFRAN) injection 4 mg 4 mg Intravenous Given     05/16/2025 1010 sodium chloride 0.9 % bolus 500 mL 500 mL Intravenous New Bag     05/16/2025 1155 fentaNYL injection 50 mcg 50 mcg Intravenous Given     05/16/2025 1236 cefTRIAXone (ROCEPHIN) IVPB (premix in dextrose) 1,000 mg 50 mL 1,000 mg Intravenous New Bag     05/16/2025 1317 albuterol inhalation solution 5 mg 5 mg Nebulization Given     05/16/2025 1317 ipratropium (ATROVENT) 0.02 % inhalation solution 0.5 mg 0.5 mg Nebulization Given     05/16/2025 1412 acetaminophen (TYLENOL) tablet 650 mg 650 mg Oral Given            Scheduled Medications:  [Held by provider] aspirin, 81 mg, Oral, Daily  atorvastatin, 20 mg, Oral, Daily With Dinner  cefepime, 2,000 mg, Intravenous, Q24H  fluticasone, 1 spray, Nasal, Daily  fluticasone-vilanterol, 1 puff, Inhalation, Daily   And  umeclidinium, 1 puff, Inhalation, Daily  gabapentin, 100 mg, Oral, TID  [Held by provider] heparin (porcine), 5,000 Units, Subcutaneous, Q8H DHAVAL  [Held by provider] metoprolol tartrate, 12.5 mg, Oral, Q12H DHAVAL  nicotine, 1 patch, Transdermal, Daily  vancomycin (VANCOCIN) 2,000 mg in sodium chloride 0.9 % 500 mL IVPB     Continuous IV Infusions:   sodium bicarbonate 150 mEq in dextrose 5 % 1,000 mL infusion, 75 mL/hr, Intravenous, Continuous   norepinephrine (LEVOPHED) 4 mg (STANDARD CONCENTRATION) IV in sodium chloride 0.9% 250 mL   PRN Meds:  acetaminophen, 650 mg, Oral, Q6H PRN  aluminum-magnesium hydroxide-simethicone, 30 mL, Oral, Q4H PRN  HYDROmorphone, 0.5 mg, Intravenous,  Q4H PRN  ipratropium-albuterol, 3 mL, Nebulization, Q4H PRN  ondansetron, 4 mg, Intravenous, Q6H PRN  [START ON 5/19/2025] vancomycin, 12.5 mg/kg (Adjusted), Intravenous, Once PRN      ED Triage Vitals [05/16/25 0958]   Temperature Pulse Respirations Blood Pressure SpO2 Pain Score   97.6 °F (36.4 °C) 66 22 141/67 92 % 10 - Worst Possible Pain     Weight (last 2 days)       Date/Time Weight    05/16/25 1345 96.7 (213.19)    05/16/25 0958 96.7 (213.19)            Vital Signs (last 3 days)       Date/Time Temp Pulse Resp BP MAP (mmHg) SpO2 Calculated FIO2 (%) - Nasal Cannula Nasal Cannula O2 Flow Rate (L/min) O2 Device Cardiac (WDL) Patient Position - Orthostatic VS Ruben Coma Scale Score Pain    05/18/25 0900 -- 90 21 126/56 81 95 % -- -- -- -- -- -- --    05/18/25 0800 99 °F (37.2 °C) 96 30 111/61 81 95 % 36 4 L/min Nasal cannula -- -- 15 No Pain    05/18/25 0700 -- 85 24 97/56 73 95 % -- -- -- -- -- -- --    05/18/25 0630 -- 94 24 106/91 96 90 % 52 8 L/min -- -- -- -- --    05/18/25 0600 -- 83 24 94/51 70 90 % -- -- -- -- -- -- --    05/18/25 0500 -- 131 21 110/52 69 90 % -- -- -- -- -- -- --    05/18/25 0430 -- 99 30 116/79 93 83 % -- -- -- -- -- -- --    05/18/25 0400 -- 85 23 107/51 74 90 % 36 4 L/min -- -- -- -- --    05/18/25 0330 -- 83 23 98/53 71 92 % -- -- -- -- -- -- --    05/18/25 0300 -- 82 26 95/50 69 92 % -- -- -- -- -- -- --    05/18/25 0230 -- 84 24 90/55 67 93 % -- -- -- -- -- 15 No Pain    05/18/25 0200 98.4 °F (36.9 °C) 84 22 90/53 67 91 % -- -- -- -- Lying -- --    05/18/25 0149 -- 153 23 100/55 75 91 % -- -- -- -- -- -- --    05/18/25 0140 -- 126 28 103/56 73 91 % -- -- -- -- -- -- --    05/18/25 01:27:06 -- -- -- 88/52 64 -- 34 3.5 L/min Nasal cannula -- Lying -- No Pain    05/18/25 0120 -- 149 -- 85/50 62 89 % 34 3.5 L/min -- -- -- -- --    05/18/25 01:14:16 98.9 °F (37.2 °C) 121 18 85/50 62 89 % -- -- -- -- Lying -- No Pain    05/18/25 00:58:27 -- 122 -- 84/50 61 87 % -- -- -- -- Lying -- No  Pain    05/18/25 00:38:48 -- 117 -- 86/51 63 89 % -- -- -- -- Lying -- No Pain    05/18/25 00:13:24 -- 116 -- 87/53 64 90 % -- -- -- -- Lying -- --    05/18/25 0001 -- -- -- -- -- -- -- -- Nasal cannula -- -- -- No Pain    05/18/25 00:00:29 -- 136 20 87/53 64 90 % -- -- -- -- Lying -- --    05/17/25 23:13:36 99.4 °F (37.4 °C) 87 18 94/53 67 90 % -- -- -- -- Lying -- No Pain    05/17/25 2300 -- 85 -- 91/52 65 89 % -- -- -- -- Lying -- No Pain    05/17/25 22:51:16 100.6 °F (38.1 °C) 106 18 91/52 65 89 % -- -- -- -- -- -- --    05/17/25 21:43:47 100.2 °F (37.9 °C) 90 20 95/51 66 91 % -- -- -- -- -- -- --    05/17/25 2133 -- -- -- -- -- -- -- -- -- -- -- -- Med Not Given for Pain - for MAR use only    05/17/25 21:29:49 100.9 °F (38.3 °C) 80 18 95/54 68 91 % -- -- -- -- -- -- --    05/17/25 21:25:28 -- 114 18 95/54 68 92 % -- -- -- -- Lying -- No Pain    05/17/25 21:08:54 100.5 °F (38.1 °C) 79 20 94/52 66 93 % -- -- -- -- -- -- --    05/17/25 20:52:24 -- 83 -- 97/56 70 91 % -- -- -- -- -- -- --    05/17/25 20:35:27 97.7 °F (36.5 °C) 122 18 95/59 71 90 % -- -- -- -- Lying -- No Pain    05/17/25 19:55:38 100.9 °F (38.3 °C) 107 20 89/56 67 93 % 32 3 L/min Nasal cannula -- Lying -- --    05/17/25 1955 -- -- -- -- -- -- 32 3 L/min Nasal cannula -- -- 15 --    05/17/25 19:54:42 -- 118 -- 90/56 67 92 % -- -- -- -- Lying -- No Pain    05/17/25 18:49:06 101.2 °F (38.4 °C) 170 -- 90/56 67 92 % -- -- -- -- -- -- --    05/17/25 1600 97.2 °F (36.2 °C) 95 16 113/58 -- 90 % -- -- -- -- -- -- --    05/17/25 15:59:58 97.2 °F (36.2 °C) 95 -- 113/58 76 90 % -- -- -- -- -- -- --    05/17/25 14:50:34 100.8 °F (38.2 °C) 84 -- 114/56 75 97 % -- -- -- -- -- -- --    05/17/25 1354 100.8 °F (38.2 °C) 84 16 114/56 -- 97 % -- -- -- -- -- -- --    05/17/25 12:30:39 99 °F (37.2 °C) 125 -- 109/63 78 92 % -- -- -- -- -- -- --    05/17/25 1200 99 °F (37.2 °C) 125 16 109/63 -- 92 % -- -- -- -- -- -- --    05/17/25 08:05:18 98.3 °F (36.8 °C) 86 -- -- --  95 % -- -- -- -- -- -- --    05/17/25 0800 98.3 °F (36.8 °C) 86 16 109/58 -- 95 % -- -- Nasal cannula -- -- 15 No Pain    05/17/25 07:29:59 -- 85 -- 109/58 75 94 % -- -- -- -- -- -- --    05/17/25 0710 98.3 °F (36.8 °C) 86 16 109/58 -- 95 % -- -- -- -- -- -- --    05/17/25 0500 -- 82 16 -- -- 94 % -- -- -- -- -- -- --    05/17/25 04:13:16 97.5 °F (36.4 °C) 81 18 90/55 67 94 % -- -- -- -- -- -- --    05/17/25 0133 -- -- -- -- -- -- -- -- -- -- -- -- 1    05/16/25 23:06:08 100 °F (37.8 °C) 91 -- 94/53 67 93 % -- -- -- -- -- -- --    05/16/25 2300 -- 85 -- -- -- 94 % -- -- -- -- -- -- --    05/16/25 2100 -- 95 -- -- -- 88 % -- -- -- -- -- -- --    05/16/25 2037 -- -- -- -- -- -- 32 3 L/min Nasal cannula -- -- 15 --    05/16/25 20:23:24 98.8 °F (37.1 °C) 97 18 91/53 66 90 % 32 3 L/min Nasal cannula -- Lying -- No Pain    05/16/25 19:59:52 97.5 °F (36.4 °C) 93 -- 94/54 67 89 % -- -- -- -- -- -- --    05/16/25 19:39:29 -- 98 -- 99/58 72 90 % -- -- -- -- -- -- --    05/16/25 18:36:01 102.2 °F (39 °C) 108 20 91/54 66 85 % -- -- -- -- Lying -- --    05/16/25 18:34:59 -- 106 18 91/54 66 86 % -- -- -- -- -- -- --    05/16/25 18:10:30 102.8 °F (39.3 °C) 114 -- 121/82 95 86 % -- -- -- -- Lying -- --    05/16/25 1743 -- -- -- -- -- -- -- -- -- WDL -- -- --    05/16/25 1737 101.2 °F (38.4 °C) 138 25 136/85 -- 94 % 32 3 L/min Nasal cannula -- Lying -- No Pain    05/16/25 1734 -- 153 25 124/83 -- -- -- -- -- -- -- -- --    05/16/25 1729 101.6 °F (38.7 °C) 155 27 127/52 -- 92 % 32 3 L/min Nasal cannula -- -- -- No Pain    05/16/25 1714 101.5 °F (38.6 °C) 127 28 -- -- 92 % 32 3 L/min Nasal cannula -- -- -- No Pain    05/16/25 1704 102.6 °F (39.2 °C) 109 22 96/47 -- -- -- -- -- -- -- -- --    05/16/25 1652 101.1 °F (38.4 °C) 111 22 97/51 -- 93 % -- -- Nasal cannula WDL -- -- No Pain    05/16/25 1450 -- -- -- -- -- -- 32 3 L/min None (Room air) -- -- 15 --    05/16/25 14:47:20 98.3 °F (36.8 °C) 106 20 -- -- 94 % -- -- -- -- Lying -- --     05/16/25 14:43:33 -- 101 20 156/90 112 93 % -- -- -- -- -- -- --    05/16/25 1400 100.1 °F (37.8 °C) 100 -- 140/97 114 92 % 28 2 L/min Nasal cannula -- -- -- --    05/16/25 1359 100.2 °F (37.9 °C) -- -- -- -- 89 % 28 2 L/min Nasal cannula -- -- -- --    05/16/25 1350 -- -- -- -- -- -- -- -- None (Room air) -- -- -- --    05/16/25 1345 101.2 °F (38.4 °C) 99 22 140/97 114 96 % -- -- None (Room air) -- Lying -- 5 05/16/25 1321 -- 99 22 -- -- 95 % -- -- Other (comment) -- -- -- --    05/16/25 1316 -- -- -- -- -- -- -- -- None (Room air) -- -- -- --    05/16/25 1315 -- 104 -- -- -- 88 % -- -- -- -- -- -- --    05/16/25 1300 -- 91 22 164/78 112 91 % -- -- None (Room air) -- -- -- --    05/16/25 1230 -- 86 -- 158/78 111 92 % -- -- -- -- -- -- --    05/16/25 1225 -- -- -- -- -- -- -- -- -- -- -- -- 4 05/16/25 1155 -- -- -- -- -- -- -- -- -- -- -- -- 5 05/16/25 1100 -- 72 -- 140/71 97 92 % -- -- -- -- -- -- --    05/16/25 1057 -- 71 22 140/71 97 92 % -- -- None (Room air) -- Lying -- --    05/16/25 1042 -- -- -- -- -- -- -- -- -- -- -- -- 8    05/16/25 1012 -- -- -- -- -- -- -- -- -- -- -- -- 10 - Worst Possible Pain    05/16/25 1005 -- -- -- -- -- -- -- -- None (Room air) -- -- 15 --    05/16/25 0958 97.6 °F (36.4 °C) 66 22 141/67 -- 92 % -- -- None (Room air) -- Sitting -- 10 - Worst Possible Pain              Pertinent Labs/Diagnostic Test Results:   Radiology:  CT abdomen pelvis wo contrast   Final Interpretation by John Hdez MD (05/17 1227)      Interval placement of right-sided nephrostomy catheter in expected position with resolution of right hydronephrosis.      Right-sided intrarenal and ureteral calculi reidentified.      Increasing consolidative density in the posterior costophrenic angles with volume loss and mild bronchiectasis suggesting worsening atelectasis and possibly developing bibasilar pneumonia.      Workstation performed: [id number]         IR nephrostomy tube placement   Final  Interpretation by Ross Diehl MD (05/16 1701)   Right nephrostomy catheter placement.               Workstation performed: [id number]         CT abdomen pelvis wo contrast   Final Interpretation by Vicky Joseph MD (05/16 1149)      Postsurgical changes from cystoprostatectomy with ileal conduit and right lower quadrant urostomy. Right urolithiasis with increased stone burden in comparison to 10/25/2024 including staghorn calculus in the right renal pelvis measuring 2 cm and    additional two obstructing calculi in the distal ureter just proximal to the anastomosis measuring up to 8 mm. Associated right hydroureteronephrosis and perinephric stranding. Infection cannot be excluded in the absence of intravenous contrast.      Parastomal hernia containing fat and a nondilated loop of small bowel.      Fusiform infrarenal abdominal aortic aneurysm measuring 3 cm.      The study was marked in EPIC for immediate notification.      Workstation performed: [id number]         IR nephrostomy tube check/change/reposition/reinsertion/upsize    (Results Pending)   XR chest portable    (Results Pending)     Cardiology:  No orders to display     GI:  No orders to display           Results from last 7 days   Lab Units 05/18/25  0620 05/18/25  0157 05/17/25  0444 05/16/25  1016   WBC Thousand/uL 15.80* 15.80* 29.46* 18.69*   HEMOGLOBIN g/dL 9.7* 9.7* 10.6* 13.2   HEMATOCRIT % 31.3* 31.2* 34.7* 42.1   PLATELETS Thousands/uL 148* 142* 181 232   TOTAL NEUT ABS Thousands/µL  --  14.05*  --  16.39*         Results from last 7 days   Lab Units 05/18/25  0620 05/18/25  0157 05/17/25  1320 05/17/25  0444 05/16/25  1016   SODIUM mmol/L 134* 134*  --  135 135   POTASSIUM mmol/L 4.7 4.6 5.4* 5.6* 5.2   CHLORIDE mmol/L 108 111*  --  112* 107   CO2 mmol/L 18* 15*  --  15* 18*   ANION GAP mmol/L 8 8  --  8 10   BUN mg/dL 69* 67*  --  65* 56*   CREATININE mg/dL 3.06* 3.16*  --  3.22* 2.26*   EGFR ml/min/1.73sq m 18 17  --  17 26  "  CALCIUM mg/dL 7.8* 7.4*  --  7.9* 9.6   MAGNESIUM mg/dL 2.1 1.9  --  1.6*  --      Results from last 7 days   Lab Units 05/18/25  0157 05/16/25  1016   AST U/L 24 19   ALT U/L 7 16   ALK PHOS U/L 48 83   TOTAL PROTEIN g/dL 5.6* 8.4   ALBUMIN g/dL 2.6* 4.2   TOTAL BILIRUBIN mg/dL 0.28 0.45     Results from last 7 days   Lab Units 05/17/25  0747   POC GLUCOSE mg/dl 85     Results from last 7 days   Lab Units 05/18/25  0620 05/18/25  0157 05/17/25  0444 05/16/25  1016   GLUCOSE RANDOM mg/dL 146* 125 94 147*             No results found for: \"BETA-HYDROXYBUTYRATE\"       Results from last 7 days   Lab Units 05/18/25  0157   PH PARVEZ  7.277*   PCO2 PARVEZ mm Hg 33.0*   PO2 PARVEZ mm Hg 47.8*   HCO3 PARVEZ mmol/L 15.1*   BASE EXC PARVEZ mmol/L -10.7   O2 CONTENT PARVEZ ml/dL 12.3   O2 HGB, VENOUS % 82.6*                     Results from last 7 days   Lab Units 05/16/25  1530   PROTIME seconds 15.3*   INR  1.16         Results from last 7 days   Lab Units 05/18/25  0157   PROCALCITONIN ng/ml 35.16*     Results from last 7 days   Lab Units 05/17/25  2052 05/16/25  1016   LACTIC ACID mmol/L 1.2 0.7                                 Results from last 7 days   Lab Units 05/16/25  1016   LIPASE u/L 30                 Results from last 7 days   Lab Units 05/16/25  1147   CLARITY UA  Cloudy   COLOR UA  Yellow   SPEC GRAV UA  1.020   PH UA  8.0   GLUCOSE UA mg/dl Negative   KETONES UA mg/dl Negative   BLOOD UA  Moderate*   PROTEIN UA mg/dl 30 (1+)*   NITRITE UA  Positive*   BILIRUBIN UA  Negative   UROBILINOGEN UA (BE) mg/dl <2.0   LEUKOCYTES UA  Large*   WBC UA /hpf 10-20*   RBC UA /hpf 10-20*   BACTERIA UA /hpf Innumerable*   EPITHELIAL CELLS WET PREP /hpf Occasional                                 Results from last 7 days   Lab Units 05/16/25  1629 05/16/25  1234 05/16/25  1220   GRAM STAIN RESULT  4+ Polys*  4+ Gram positive cocci in pairs, chains and clusters* Gram negative rods*  Gram positive cocci in pairs and chains* Gram negative rods* "   BODY FLUID CULTURE, STERILE  Culture too young- will reincubate  --   --                    Past Medical History:   Diagnosis Date    Bladder infection 03/2024    Borderline diabetes     Cancer (HCC)     prostate    COPD (chronic obstructive pulmonary disease) (HCC)     Hematuria     History of transfusion     x2    Hyperlipidemia     Hypertension     Radiation cystitis     Smoker     Smoker     Wears glasses      Present on Admission:   Septic shock (HCC)   Anemia of chronic disease   COPD (chronic obstructive pulmonary disease) (HCC)   Essential hypertension   Acute kidney injury superimposed on stage 3b chronic kidney disease (HCC)   Type 2 diabetes mellitus with diabetic polyneuropathy, without long-term current use of insulin (HCC)   Hydronephrosis   Urinary tract infection without hematuria   Infrarenal abdominal aortic aneurysm (AAA) without rupture (HCC)   Hyperlipidemia      Admitting Diagnosis: Abdominal pain [R10.9]  Right lower quadrant abdominal pain [R10.31]  Ureteral stone with hydronephrosis [N13.2]  History of urostomy [Z98.890]  Age/Sex: 79 y.o. male    Network Utilization Review Department  ATTENTION: Please call with any questions or concerns to [phone] and carefully listen to the prompts so that you are directed to the right person. All voicemails are confidential.   For Discharge needs, contact Care Management DC Support Team at [phone] opt. [phone]  Send all requests for admission clinical reviews, approved or denied determinations and any other requests to dedicated fax number below belonging to the campus where the patient is receiving treatment. List of dedicated fax numbers for the Facilities:  FACILITY NAME UR FAX NUMBER   ADMISSION DENIALS (Administrative/Medical Necessity) [phone]   DISCHARGE SUPPORT TEAM (NETWORK) [phone]   PARENT CHILD HEALTH (Maternity/NICU/Pediatrics) [phone]   [facility] [phone]   [facility]  [place] [phone]   Critical access hospital [phone]   [facility] [phone]   [facility] [phone]   [facility] [phone]   [facility] [phone]   [facility] [phone]   [facility] [phone]   [facility] [phone]   [facility] [phone]   [facility] [phone]

## 2025-05-17 NOTE — ASSESSMENT & PLAN NOTE
Lab Results   Component Value Date    EGFR 17 05/17/2025    EGFR 26 05/16/2025    EGFR 45 11/12/2024    CREATININE 3.22 (H) 05/17/2025    CREATININE 2.26 (H) 05/16/2025    CREATININE 1.43 (H) 11/12/2024   #Non-Oliguric KDIGO severe SHANTI stage 3    Etiology: Multifactorial secondary to obstructive uropathy and hypotension in the settings of sepsis  Baseline creatinine 1.3 to 1.5 mg/dL  Current creatinine: 3.2 mg/dL, trending up  Peak creatinine: Trending  UA: Hematuria, leukocyturia, bacteriuria  Renal imaging : Staghorn stone on the right pelvis, mid right ureter stone with right hydroureteronephrosis with perinephric stranding.  Mild distention of the left collecting system  Status post right nephrostomy  Treatment:  Plan to monitor hydronephrosis with ultrasound tomorrow if no improvement if no improvement  kidney function  No urgent indication for dialysis   maintain MAP:  Over 65 mmHg if possible/avoid hypoperfusion:  Hold parameters on blood pressure medications  Avoid nephrotoxic agents such as NSAIDs, and IV contrast if possible. Avoid opioids   Adjust medications to GFR

## 2025-05-17 NOTE — ASSESSMENT & PLAN NOTE
Hemoglobin 10.1, stable  Likely in the setting of CKD and recent procedure  Transfuse for hemoglobin <7  Daily CBC

## 2025-05-17 NOTE — ASSESSMENT & PLAN NOTE
Lab Results   Component Value Date    EGFR 17 05/17/2025    EGFR 26 05/16/2025    EGFR 45 11/12/2024    CREATININE 3.22 (H) 05/17/2025    CREATININE 2.26 (H) 05/16/2025    CREATININE 1.43 (H) 11/12/2024   Etiology: Multifactorial secondary to obstructive uropathy and hypotension in the settings of sepsis  Baseline creatinine 1.3-1.5  Current creatinine 3.22, trending up  S/p right nephrostomy on 5/16/25 for staghorn stone and associated hydronephrosis on imaging  Avoid hypotension and nephrotoxic agents  Continue IVF with sodium bicarbonate infusion  Nephrology consulted, recommendations are appreciated

## 2025-05-17 NOTE — PROGRESS NOTES
Progress Note - Hospitalist   Name: Joel Wyman 79 y.o. male I MRN: [id number]  Unit/Bed#: 2 [address] Date of Admission: 5/16/2025   Date of Service: 5/17/2025 I Hospital Day: 1    Assessment & Plan  Sepsis (HCC)  Patient presented to the ED with right flank/lower quadrant abdominal pain along with fever at home   Sepsis as noted by leukocytosis, tachycardia in the setting of UTI/possible pyelo  Lactic acid within normal limits  BC x 2 positive for gram negative rods. Follow up BCID  Follow up UC and body fluid culture from nephrostomy tube  Continue IV cefepime (D2) for now  Trend WBC and fever curve  Urinary tract infection without hematuria  Patient with history of prostate cancer. 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 Patient had suprapubic catheter placed. Then patient was followed up at [facility] where he underwent simple cystectomy with ileal conduit in May 2024   UA with leukocytes and nitrites  CT with evidence of right sided perinephritic stranding  Received IV ceftriaxone in the ED, escalated to IV cefepime given ongoing fever and leukocytosis  Follow up UC  Renal calculi  CT A/P: Right urolithiasis with increased stone burden in comparison to 10/25/2024 including staghorn calculus in the right renal pelvis measuring 2 cm and additional two obstructing calculi in the distal ureter just proximal to the anastomosis measuring up to 8 mm. Associated right hydroureteronephrosis and perinephric stranding  ED discussed with Urology and IR. S/p right nephrostomy tube placement on 5/16/25. Will continue to hold ASA and Heparin for today given some blood noted in nephrostomy tube  Follow up body fluid culture from R nephrostomy tube. Continue Cefepime as above  Urology consulted, recommendations are appreciated  Conversion of nephrostomy tube to nephro ureteral stent for flexible ureteroscopy laser gypsy stent removal in  a few weeks.  5/17/25: Recommending repeat CT A/P to evaluate positioning of nephrostomy tube and hydro given rise in creatinine and WBC on labs  Acute kidney injury superimposed on stage 3b chronic kidney disease ([facility])  Lab Results   Component Value Date    EGFR 17 05/17/2025    EGFR 26 05/16/2025    EGFR 45 11/12/2024    CREATININE 3.22 (H) 05/17/2025    CREATININE 2.26 (H) 05/16/2025    CREATININE 1.43 (H) 11/12/2024   Etiology: Multifactorial secondary to obstructive uropathy and hypotension in the settings of sepsis  Baseline creatinine 1.3-1.5  Current creatinine 3.22, trending up  S/p right nephrostomy on 5/16/25 for staghorn stone and associated hydronephrosis on imaging  Avoid hypotension and nephrotoxic agents  Continue IVF with sodium bicarbonate infusion  Nephrology consulted, recommendations are appreciated  Metabolic acidosis  Serum HCO3 15mmol  NAGMA likely secondary to SHANTI on CKD  Start sodium bicarbonate drip 150meq/D5 75 mL/h  Bacteremia  BC x2 positive for gram negative rods  Follow up BCIP  Continue IV cefepime for now  Essential hypertension  BP on the softer side  Holding PTA metoprolol tartrate for now  Monitor vitals per routine  COPD (chronic obstructive pulmonary disease) ([facility])  No acute exacerbation  Trelegy substituted with fluticasone-vilanterol and umeclidinium. Start DuoNebs as needed  Respiratory protocol  Type 2 diabetes mellitus with diabetic polyneuropathy, without long-term current use of insulin ([facility])  Lab Results   Component Value Date    HGBA1C 6.4 (H) 11/12/2024       Recent Labs     05/17/25  0747   POCGLU 85       (P) 85  Not currently on oral anti-diabetic agents  Continue carb controlled diet  Infrarenal abdominal aortic aneurysm (AAA) without rupture (HCC)  On imaging noted to have a 3 cm fusiform infrarenal AAA  Outpatient follow-up    VTE Pharmacologic Prophylaxis:   Moderate Risk (Score 3-4) - Pharmacological DVT Prophylaxis Contraindicated. Sequential Compression  Devices Ordered.    Mobility:   Basic Mobility Inpatient Raw Score: 12  JH-HLM Goal: 4: Move to chair/commode  JH-HLM Achieved: 4: Move to chair/commode  JH-HLM Goal NOT achieved. Continue with multidisciplinary rounding and encourage appropriate mobility to improve upon JH-HLM goals.    Patient Centered Rounds: I performed bedside rounds with nursing staff today.   Discussions with Specialists or Other Care Team Provider: Nursing, Nephrology, Urology    Education and Discussions with Family / Patient: Yes, wife.     Current Length of Stay: 1 day(s)  Current Patient Status: Inpatient   Certification Statement: The patient will continue to require additional inpatient hospital stay due to sepsis, renal calculi, SHANTI  Discharge Plan: Anticipate discharge in >72 hrs to home.    Code Status: Level 1 - Full Code    Subjective   Patient sitting upright in bed. Reports feeling better today and states right flank pain resolved. No acute events overnight.     Objective :  Temp:  [97.5 °F (36.4 °C)-102.8 °F (39.3 °C)] 98.3 °F (36.8 °C)  HR:  [] 86  BP: ()/(47-97) 109/58  Resp:  [16-28] 16  SpO2:  [85 %-96 %] 95 %  O2 Device: Nasal cannula  Nasal Cannula O2 Flow Rate (L/min):  [2 L/min-3 L/min] 3 L/min    Body mass index is 31.48 kg/m².     Input and Output Summary (last 24 hours):     Intake/Output Summary (Last 24 hours) at 5/17/2025 0946  Last data filed at 5/17/2025 0609  Gross per 24 hour   Intake 2323.33 ml   Output 1130 ml   Net 1193.33 ml       Physical Exam  Vitals and nursing note reviewed.   Constitutional:       General: He is not in acute distress.     Appearance: He is well-developed.   HENT:      Head: Normocephalic and atraumatic.     Eyes:      Conjunctiva/sclera: Conjunctivae normal.       Cardiovascular:      Rate and Rhythm: Normal rate and regular rhythm.      Heart sounds: No murmur heard.  Pulmonary:      Effort: Pulmonary effort is normal. No respiratory distress.      Breath sounds: Normal  breath sounds.   Abdominal:      Palpations: Abdomen is soft.      Tenderness: There is no abdominal tenderness.      Comments: Right nephrostomy tube in place, some bloody drainage noted. Ileal conduit in place.      Musculoskeletal:         General: No swelling.      Cervical back: Neck supple.     Skin:     General: Skin is warm and dry.     Neurological:      Mental Status: He is alert. Mental status is at baseline.     Psychiatric:         Mood and Affect: Mood normal.           Lines/Drains:  Lines/Drains/Airways       Active Status       Name Placement date Placement time Site Days    Nephrostomy Retrograde/ Ileal Conduit Right 05/16/25  --  Right  1    Nephrostomy Right 10.2 Fr. 05/16/25  1627  Right  less than 1                            Lab Results: I have reviewed the following results:   Results from last 7 days   Lab Units 05/17/25  0444 05/16/25  1016   WBC Thousand/uL 29.46* 18.69*   HEMOGLOBIN g/dL 10.6* 13.2   HEMATOCRIT % 34.7* 42.1   PLATELETS Thousands/uL 181 232   SEGS PCT %  --  87*   LYMPHO PCT %  --  5*   MONO PCT %  --  6   EOS PCT %  --  1     Results from last 7 days   Lab Units 05/17/25  0444 05/16/25  1016   SODIUM mmol/L 135 135   POTASSIUM mmol/L 5.6* 5.2   CHLORIDE mmol/L 112* 107   CO2 mmol/L 15* 18*   BUN mg/dL 65* 56*   CREATININE mg/dL 3.22* 2.26*   ANION GAP mmol/L 8 10   CALCIUM mg/dL 7.9* 9.6   ALBUMIN g/dL  --  4.2   TOTAL BILIRUBIN mg/dL  --  0.45   ALK PHOS U/L  --  83   ALT U/L  --  16   AST U/L  --  19   GLUCOSE RANDOM mg/dL 94 147*     Results from last 7 days   Lab Units 05/16/25  1530   INR  1.16     Results from last 7 days   Lab Units 05/17/25  0747   POC GLUCOSE mg/dl 85         Results from last 7 days   Lab Units 05/16/25  1016   LACTIC ACID mmol/L 0.7       Recent Cultures (last 7 days):   Results from last 7 days   Lab Units 05/16/25  1234 05/16/25  1220   GRAM STAIN RESULT  Gram negative rods* Gram negative rods*       Imaging Results Review: I reviewed  radiology reports from this admission including: CT abdomen/pelvis and procedure reports.  Other Study Results Review: No additional pertinent studies reviewed.    Last 24 Hours Medication List:     Current Facility-Administered Medications:     acetaminophen (TYLENOL) tablet 650 mg, Q6H PRN    [Held by provider] aspirin chewable tablet 81 mg, Daily    atorvastatin (LIPITOR) tablet 20 mg, Daily With Dinner    cefepime (MAXIPIME) IVPB (premix in dextrose) 2,000 mg 50 mL, Q24H, Last Rate: 2,000 mg (05/16/25 1946)    fluticasone (FLONASE) 50 mcg/act nasal spray 1 spray, Daily    fluticasone-vilanterol 200-25 mcg/actuation 1 puff, Daily **AND** umeclidinium 62.5 mcg/actuation inhaler AEPB 1 puff, Daily    gabapentin (NEURONTIN) capsule 100 mg, TID    [Held by provider] heparin (porcine) subcutaneous injection 5,000 Units, Q8H DHAVAL **AND** Platelet count, Once    HYDROmorphone (DILAUDID) injection 0.5 mg, Q4H PRN    ipratropium-albuterol (DUO-NEB) 0.5-2.5 mg/3 mL inhalation solution 3 mL, Q4H PRN    magnesium sulfate 2 g/50 mL IVPB (premix) 2 g, Once, Last Rate: 2 g (05/17/25 0801)    [Held by provider] metoprolol tartrate (LOPRESSOR) partial tablet 12.5 mg, Q12H DHAVAL    nicotine (NICODERM CQ) 14 mg/24hr TD 24 hr patch 1 patch, Daily    ondansetron (ZOFRAN) injection 4 mg, Q6H PRN    sodium bicarbonate 150 mEq in dextrose 5 % 1,000 mL infusion, Continuous    Administrative Statements   Today, Patient Was Seen By: Venus Matthews PA-C  I have spent a total time of 30 minutes in caring for this patient on the day of the visit/encounter including Diagnostic results, Risks and benefits of tx options, Instructions for management, Patient and family education, Importance of tx compliance, Counseling / Coordination of care, Documenting in the medical record, Reviewing/placing orders in the medical record (including tests, medications, and/or procedures), Obtaining or reviewing history  , and Communicating with other healthcare  professionals .    **Please Note: This note may have been constructed using a voice recognition system.**

## 2025-05-18 ENCOUNTER — APPOINTMENT (INPATIENT)
Dept: RADIOLOGY | Facility: HOSPITAL | Age: 80
DRG: 871 | End: 2025-05-18
Payer: COMMERCIAL

## 2025-05-18 PROBLEM — E87.5 HYPERKALEMIA: Status: ACTIVE | Noted: 2025-05-18

## 2025-05-18 PROBLEM — E78.5 HYPERLIPIDEMIA: Status: ACTIVE | Noted: 2021-04-12

## 2025-05-18 PROBLEM — Z85.46 HISTORY OF PROSTATE CANCER: Status: ACTIVE | Noted: 2025-05-18

## 2025-05-18 PROBLEM — E78.5 HYPERLIPIDEMIA: Status: RESOLVED | Noted: 2021-04-12 | Resolved: 2025-05-18

## 2025-05-18 PROBLEM — N13.30 HYDRONEPHROSIS: Status: ACTIVE | Noted: 2024-12-03

## 2025-05-18 PROBLEM — J96.01 ACUTE HYPOXIC RESPIRATORY FAILURE (HCC): Status: ACTIVE | Noted: 2025-05-18

## 2025-05-18 PROBLEM — R65.21 SEPTIC SHOCK (HCC): Status: ACTIVE | Noted: 2021-08-19

## 2025-05-18 LAB
ALBUMIN SERPL BCG-MCNC: 2.6 G/DL (ref 3.5–5)
ALP SERPL-CCNC: 48 U/L (ref 34–104)
ALT SERPL W P-5'-P-CCNC: 7 U/L (ref 7–52)
ANION GAP SERPL CALCULATED.3IONS-SCNC: 8 MMOL/L (ref 4–13)
ANION GAP SERPL CALCULATED.3IONS-SCNC: 8 MMOL/L (ref 4–13)
AST SERPL W P-5'-P-CCNC: 24 U/L (ref 13–39)
BACTERIA UR CULT: ABNORMAL
BASE EX.OXY STD BLDV CALC-SCNC: 82.6 % (ref 60–80)
BASE EXCESS BLDV CALC-SCNC: -10.7 MMOL/L
BASOPHILS # BLD AUTO: 0.03 THOUSANDS/ÂΜL (ref 0–0.1)
BASOPHILS NFR BLD AUTO: 0 % (ref 0–1)
BILIRUB SERPL-MCNC: 0.28 MG/DL (ref 0.2–1)
BUN SERPL-MCNC: 67 MG/DL (ref 5–25)
BUN SERPL-MCNC: 69 MG/DL (ref 5–25)
CALCIUM ALBUM COR SERPL-MCNC: 8.5 MG/DL (ref 8.3–10.1)
CALCIUM SERPL-MCNC: 7.4 MG/DL (ref 8.4–10.2)
CALCIUM SERPL-MCNC: 7.8 MG/DL (ref 8.4–10.2)
CHLORIDE SERPL-SCNC: 108 MMOL/L (ref 96–108)
CHLORIDE SERPL-SCNC: 111 MMOL/L (ref 96–108)
CO2 SERPL-SCNC: 15 MMOL/L (ref 21–32)
CO2 SERPL-SCNC: 18 MMOL/L (ref 21–32)
CREAT SERPL-MCNC: 3.06 MG/DL (ref 0.6–1.3)
CREAT SERPL-MCNC: 3.16 MG/DL (ref 0.6–1.3)
EOSINOPHIL # BLD AUTO: 0.03 THOUSAND/ÂΜL (ref 0–0.61)
EOSINOPHIL NFR BLD AUTO: 0 % (ref 0–6)
ERYTHROCYTE [DISTWIDTH] IN BLOOD BY AUTOMATED COUNT: 16.2 % (ref 11.6–15.1)
ERYTHROCYTE [DISTWIDTH] IN BLOOD BY AUTOMATED COUNT: 16.2 % (ref 11.6–15.1)
GFR SERPL CREATININE-BSD FRML MDRD: 17 ML/MIN/1.73SQ M
GFR SERPL CREATININE-BSD FRML MDRD: 18 ML/MIN/1.73SQ M
GLUCOSE SERPL-MCNC: 125 MG/DL (ref 65–140)
GLUCOSE SERPL-MCNC: 146 MG/DL (ref 65–140)
HCO3 BLDV-SCNC: 15.1 MMOL/L (ref 24–30)
HCT VFR BLD AUTO: 31.2 % (ref 36.5–49.3)
HCT VFR BLD AUTO: 31.3 % (ref 36.5–49.3)
HGB BLD-MCNC: 9.7 G/DL (ref 12–17)
HGB BLD-MCNC: 9.7 G/DL (ref 12–17)
IMM GRANULOCYTES # BLD AUTO: 0.13 THOUSAND/UL (ref 0–0.2)
IMM GRANULOCYTES NFR BLD AUTO: 1 % (ref 0–2)
L PNEUMO1 AG UR QL IA.RAPID: NEGATIVE
LYMPHOCYTES # BLD AUTO: 0.78 THOUSANDS/ÂΜL (ref 0.6–4.47)
LYMPHOCYTES NFR BLD AUTO: 5 % (ref 14–44)
MAGNESIUM SERPL-MCNC: 1.9 MG/DL (ref 1.9–2.7)
MAGNESIUM SERPL-MCNC: 2.1 MG/DL (ref 1.9–2.7)
MCH RBC QN AUTO: 28.3 PG (ref 26.8–34.3)
MCH RBC QN AUTO: 28.3 PG (ref 26.8–34.3)
MCHC RBC AUTO-ENTMCNC: 31 G/DL (ref 31.4–37.4)
MCHC RBC AUTO-ENTMCNC: 31.1 G/DL (ref 31.4–37.4)
MCV RBC AUTO: 91 FL (ref 82–98)
MCV RBC AUTO: 91 FL (ref 82–98)
MONOCYTES # BLD AUTO: 0.78 THOUSAND/ÂΜL (ref 0.17–1.22)
MONOCYTES NFR BLD AUTO: 5 % (ref 4–12)
NEUTROPHILS # BLD AUTO: 14.05 THOUSANDS/ÂΜL (ref 1.85–7.62)
NEUTS SEG NFR BLD AUTO: 89 % (ref 43–75)
NRBC BLD AUTO-RTO: 0 /100 WBCS
O2 CT BLDV-SCNC: 12.3 ML/DL
PCO2 BLDV: 33 MM HG (ref 42–50)
PH BLDV: 7.28 [PH] (ref 7.3–7.4)
PLATELET # BLD AUTO: 142 THOUSANDS/UL (ref 149–390)
PLATELET # BLD AUTO: 148 THOUSANDS/UL (ref 149–390)
PMV BLD AUTO: 9.7 FL (ref 8.9–12.7)
PMV BLD AUTO: 9.9 FL (ref 8.9–12.7)
PO2 BLDV: 47.8 MM HG (ref 35–45)
POTASSIUM SERPL-SCNC: 4.6 MMOL/L (ref 3.5–5.3)
POTASSIUM SERPL-SCNC: 4.7 MMOL/L (ref 3.5–5.3)
PROCALCITONIN SERPL-MCNC: 35.16 NG/ML
PROT SERPL-MCNC: 5.6 G/DL (ref 6.4–8.4)
RBC # BLD AUTO: 3.43 MILLION/UL (ref 3.88–5.62)
RBC # BLD AUTO: 3.43 MILLION/UL (ref 3.88–5.62)
S PNEUM AG UR QL: NEGATIVE
SODIUM SERPL-SCNC: 134 MMOL/L (ref 135–147)
SODIUM SERPL-SCNC: 134 MMOL/L (ref 135–147)
WBC # BLD AUTO: 15.8 THOUSAND/UL (ref 4.31–10.16)
WBC # BLD AUTO: 15.8 THOUSAND/UL (ref 4.31–10.16)

## 2025-05-18 PROCEDURE — 87106 FUNGI IDENTIFICATION YEAST: CPT

## 2025-05-18 PROCEDURE — 87205 SMEAR GRAM STAIN: CPT

## 2025-05-18 PROCEDURE — 84145 PROCALCITONIN (PCT): CPT

## 2025-05-18 PROCEDURE — 87040 BLOOD CULTURE FOR BACTERIA: CPT

## 2025-05-18 PROCEDURE — 80048 BASIC METABOLIC PNL TOTAL CA: CPT

## 2025-05-18 PROCEDURE — 99233 SBSQ HOSP IP/OBS HIGH 50: CPT | Performed by: STUDENT IN AN ORGANIZED HEALTH CARE EDUCATION/TRAINING PROGRAM

## 2025-05-18 PROCEDURE — 85027 COMPLETE CBC AUTOMATED: CPT

## 2025-05-18 PROCEDURE — 71045 X-RAY EXAM CHEST 1 VIEW: CPT

## 2025-05-18 PROCEDURE — 80053 COMPREHEN METABOLIC PANEL: CPT

## 2025-05-18 PROCEDURE — 99223 1ST HOSP IP/OBS HIGH 75: CPT | Performed by: ANESTHESIOLOGY

## 2025-05-18 PROCEDURE — 87185 SC STD ENZYME DETCJ PER NZM: CPT

## 2025-05-18 PROCEDURE — 87081 CULTURE SCREEN ONLY: CPT | Performed by: INTERNAL MEDICINE

## 2025-05-18 PROCEDURE — 87070 CULTURE OTHR SPECIMN AEROBIC: CPT

## 2025-05-18 PROCEDURE — 87077 CULTURE AEROBIC IDENTIFY: CPT

## 2025-05-18 PROCEDURE — 93005 ELECTROCARDIOGRAM TRACING: CPT

## 2025-05-18 PROCEDURE — 85025 COMPLETE CBC W/AUTO DIFF WBC: CPT

## 2025-05-18 PROCEDURE — 83735 ASSAY OF MAGNESIUM: CPT

## 2025-05-18 PROCEDURE — 82805 BLOOD GASES W/O2 SATURATION: CPT

## 2025-05-18 PROCEDURE — 87449 NOS EACH ORGANISM AG IA: CPT

## 2025-05-18 RX ORDER — NOREPINEPHRINE BITARTRATE 1 MG/ML
INJECTION, SOLUTION INTRAVENOUS
Status: DISPENSED
Start: 2025-05-18 | End: 2025-05-18

## 2025-05-18 RX ORDER — MAGNESIUM HYDROXIDE/ALUMINUM HYDROXICE/SIMETHICONE 120; 1200; 1200 MG/30ML; MG/30ML; MG/30ML
30 SUSPENSION ORAL EVERY 4 HOURS PRN
Status: DISCONTINUED | OUTPATIENT
Start: 2025-05-18 | End: 2025-05-23 | Stop reason: HOSPADM

## 2025-05-18 RX ORDER — SODIUM CHLORIDE 9 MG/ML
10 INJECTION INTRAVENOUS DAILY
Status: DISCONTINUED | OUTPATIENT
Start: 2025-05-18 | End: 2025-05-23 | Stop reason: HOSPADM

## 2025-05-18 RX ORDER — METOPROLOL TARTRATE 1 MG/ML
5 INJECTION, SOLUTION INTRAVENOUS ONCE
Status: COMPLETED | OUTPATIENT
Start: 2025-05-18 | End: 2025-05-18

## 2025-05-18 RX ORDER — MAGNESIUM SULFATE HEPTAHYDRATE 40 MG/ML
2 INJECTION, SOLUTION INTRAVENOUS ONCE
Status: COMPLETED | OUTPATIENT
Start: 2025-05-18 | End: 2025-05-18

## 2025-05-18 RX ORDER — VANCOMYCIN HYDROCHLORIDE 1 G/200ML
12.5 INJECTION, SOLUTION INTRAVENOUS ONCE AS NEEDED
Status: DISCONTINUED | OUTPATIENT
Start: 2025-05-19 | End: 2025-05-18

## 2025-05-18 RX ORDER — CALCIUM GLUCONATE 20 MG/ML
2 INJECTION, SOLUTION INTRAVENOUS ONCE
Status: COMPLETED | OUTPATIENT
Start: 2025-05-18 | End: 2025-05-18

## 2025-05-18 RX ADMIN — FLUTICASONE FUROATE AND VILANTEROL TRIFENATATE 1 PUFF: 200; 25 POWDER RESPIRATORY (INHALATION) at 09:20

## 2025-05-18 RX ADMIN — FLUTICASONE PROPIONATE 1 SPRAY: 50 SPRAY, METERED NASAL at 09:20

## 2025-05-18 RX ADMIN — APIXABAN 2.5 MG: 2.5 TABLET, FILM COATED ORAL at 12:04

## 2025-05-18 RX ADMIN — VANCOMYCIN HYDROCHLORIDE 2000 MG: 10 INJECTION, POWDER, LYOPHILIZED, FOR SOLUTION INTRAVENOUS at 06:41

## 2025-05-18 RX ADMIN — METOROPROLOL TARTRATE 5 MG: 5 INJECTION, SOLUTION INTRAVENOUS at 12:00

## 2025-05-18 RX ADMIN — ATORVASTATIN CALCIUM 20 MG: 20 TABLET, FILM COATED ORAL at 16:34

## 2025-05-18 RX ADMIN — GABAPENTIN 100 MG: 100 CAPSULE ORAL at 16:34

## 2025-05-18 RX ADMIN — UMECLIDINIUM 1 PUFF: 62.5 AEROSOL, POWDER ORAL at 09:20

## 2025-05-18 RX ADMIN — NICOTINE 1 PATCH: 14 PATCH, EXTENDED RELEASE TRANSDERMAL at 08:09

## 2025-05-18 RX ADMIN — GABAPENTIN 100 MG: 100 CAPSULE ORAL at 08:11

## 2025-05-18 RX ADMIN — GABAPENTIN 100 MG: 100 CAPSULE ORAL at 20:51

## 2025-05-18 RX ADMIN — SODIUM BICARBONATE 100 ML/HR: 84 INJECTION, SOLUTION INTRAVENOUS at 02:14

## 2025-05-18 RX ADMIN — Medication 12.5 MG: at 20:51

## 2025-05-18 RX ADMIN — CEFEPIME HYDROCHLORIDE 2000 MG: 2 INJECTION, SOLUTION INTRAVENOUS at 20:13

## 2025-05-18 RX ADMIN — MAGNESIUM SULFATE HEPTAHYDRATE 2 G: 40 INJECTION, SOLUTION INTRAVENOUS at 02:39

## 2025-05-18 RX ADMIN — SODIUM CHLORIDE, PRESERVATIVE FREE 10 ML: 5 INJECTION INTRAVENOUS at 16:04

## 2025-05-18 RX ADMIN — APIXABAN 2.5 MG: 2.5 TABLET, FILM COATED ORAL at 17:22

## 2025-05-18 RX ADMIN — ALUMINUM HYDROXIDE, MAGNESIUM HYDROXIDE, AND DIMETHICONE 30 ML: 200; 20; 200 SUSPENSION ORAL at 00:21

## 2025-05-18 RX ADMIN — CALCIUM GLUCONATE 2 G: 20 INJECTION, SOLUTION INTRAVENOUS at 02:38

## 2025-05-18 NOTE — ASSESSMENT & PLAN NOTE
5/17 BMP revealing serum bicarb of 15  Patient was placed on bicarb infusion  Lactic acid normal    Plan:  Obtain repeat BMP now  Obtain VBG

## 2025-05-18 NOTE — PROGRESS NOTES
"Progress Note - Urology      Patient: Joel Wyman   : 1945 Sex: male   MRN: [id number]     CSN: [id number]  Unit/Bed#: ICU 09     SUBJECTIVE:   Transferred to ICU last night  Becoming very unstable  CAT scan confirming right nephrostomy in normal position  Left hydronephrosis resolved  White count 15 trending down      Objective   Vitals: BP 98/54   Pulse 77   Temp 99 °F (37.2 °C) (Temporal)   Resp (!) 25   Ht 5' 9\" (1.753 m)   Wt 96.7 kg (213 lb 3 oz)   SpO2 98%   BMI 31.48 kg/m²     I/O last 24 hours:  In: 3003.3 [P.O.:475; I.V.:378.3; IV Piggyback:2150]  Out: 2745 [Urine:2745]      Physical Exam:   General Alert awake   Normocephalic atraumatic PERRLA  Lungs clear bilaterally  Cardiac normal S1 normal S2  Abdomen soft, flank pain  Right nephrostomy tube  Extremities no edema      Lab Results: CBC:   Lab Results   Component Value Date    WBC 15.80 (H) 2025    HGB 9.7 (L) 2025    HCT 31.3 (L) 2025    MCV 91 2025     (L) 2025    RBC 3.43 (L) 2025    MCH 28.3 2025    MCHC 31.0 (L) 2025    RDW 16.2 (H) 2025    MPV 9.9 2025    NRBC 0 2025     CMP:   Lab Results   Component Value Date     2025    CO2 18 (L) 2025    BUN 69 (H) 2025    CREATININE 3.06 (H) 2025    CALCIUM 7.8 (L) 2025    AST 24 2025    ALT 7 2025    ALKPHOS 48 2025    EGFR 18 2025     Urinalysis:   Lab Results   Component Value Date    COLORU Yellow 2025    CLARITYU Cloudy 2025    SPECGRAV 1.020 2025    PHUR 8.0 2025    PHUR 5.5 2018    LEUKOCYTESUR Large (A) 2025    NITRITE Positive (A) 2025    GLUCOSEU Negative 2025    KETONESU Negative 2025    BILIRUBINUR Negative 2025    BLOODU Moderate (A) 2025     Urine Culture:   Lab Results   Component Value Date    URINECX >100,000 cfu/ml Klebsiella pneumoniae (A) 2025    URINECX >100,000 cfu/ml " "Klebsiella pneumoniae (A) 05/16/2025    URINECX Proteus mirabilis (A) 05/16/2025    URINECX 20,000-29,000 cfu/ml 05/16/2025     PSA: No results found for: \"PSA\"      Assessment/ Plan:  Bacteremia  Rightpyonephrosis  Status post right nephrostomy tube day #2  Antibiotics as per ICU team  No urologic intervention at this time          Yovani Khan MD  "

## 2025-05-18 NOTE — PROGRESS NOTES
Progress Note - Nephrology   Name: Joel Wyman 79 y.o. male I MRN: [id number]  Unit/Bed#: ICU 09 I Date of Admission: 5/16/2025   Date of Service: 5/18/2025 I Hospital Day: 2     Assessment & Plan  Acute kidney injury superimposed on stage 3b chronic kidney disease (HCC)  Lab Results   Component Value Date    EGFR 17 05/18/2025    EGFR 17 05/17/2025    EGFR 26 05/16/2025    CREATININE 3.16 (H) 05/18/2025    CREATININE 3.22 (H) 05/17/2025    CREATININE 2.26 (H) 05/16/2025   #Non-Oliguric KDIGO severe SHANTI stage 3    Etiology: Multifactorial secondary to obstructive uropathy and sepsis with hypotension   Baseline creatinine 1.3 to 1.5 mg/dL  Current creatinine: 3.1 mg/dL, plateauing seems to be trending down   Peak creatinine: 3.22   UA: Hematuria, leukocyturia, bacteriuria  Renal imaging : Staghorn stone on the right pelvis, mid right ureter stone with right hydroureteronephrosis with perinephric stranding.  Mild distention of the left collecting system  Status post right nephrostomy  Treatment:  Patient transferred to the ICU in the settings of hypotension  No urgent indication of dialysis at this time   Recommend follow-up ultrasound  Avoid NSAIDs, IV contrast  Monitor nephrostomy output    Septic shock (HCC)  Febrile, tachycardic, hypotensive  Transferred to the ICU on 5/17  Antibiotics as per ICU team   Essential hypertension  Volume: Hypervolemic  Blood pressure: Tendency to hypotension, /91  Recommend:  Holding metoprolol due to hypotension  Monitor urinary output   Recommend Bumex 1mg iv x 1 if BP stable   Metabolic acidosis  serum HCO3 18mmol  NAGMA likely secondary to SHANTI on CKD  Started on sodium bicarbonate drip 150 mEq/D5, rate decreased to 75ml/h   Recommend sodium bicarb 650mg bid   Anemia of chronic disease  Current hemoglobin: 9.7  mg/dL  Treatment:  Transfuse for hemoglobin less than 7.0 per primary service    Type 2 diabetes mellitus with diabetic polyneuropathy, without long-term current  use of insulin (HCC)  Lab Results   Component Value Date    HGBA1C 6.4 (H) 11/12/2024     HbA1c 6.4  Advised to maintain a good DM control to prevent progression of CKD   Maintain healthy diet (vegetables, fruits, whole grains, nonfat or low fat)  Weight loss  Physical activity (5 to 10 minutes to start the increase to 30 min a day)      Recent Labs     05/17/25  0747   POCGLU 85       Blood Sugar Average: Last 72 hrs:  (P) 85      I have reviewed the nephrology recommendations including dc sodium bicarb , start bicarb tab 650mg bid , Bumex 1 mg iv if BP stable, with IC team , and we are in agreement with renal plan including the information outlined above. I have discussed the above management plan in detail with the primary service.     Subjective   Brief History of Admission - 79 y.o. man  with PMH of CKD G3 b, prostate adenocarcinoma on radiation therapy complicated with severe radiation cystitis, bladder papillomas with ileal conduit p/w lethargy, weakness, foul-smelling urine.  Patient was found to have right hydronephrosis with nephrolithiasis.  Nephrology is consulted for management of SHANTI     Patient was transferred to the ICU due to hypotension, did not require vasopressors.    Objective :  Temp:  [97.2 °F (36.2 °C)-101.2 °F (38.4 °C)] 98.4 °F (36.9 °C)  HR:  [] 94  BP: ()/(50-91) 106/91  Resp:  [16-30] 24  SpO2:  [83 %-97 %] 90 %  O2 Device: Nasal cannula  Nasal Cannula O2 Flow Rate (L/min):  [3 L/min-8 L/min] 8 L/min    Current Weight: Weight - Scale: 96.7 kg (213 lb 3 oz)  First Weight: Weight - Scale: 96.7 kg (213 lb 3 oz)  I/O         05/16 0701  05/17 0700 05/17 0701  05/18 0700    P.O.  150    I.V. (mL/kg) 1823.3 (18.9) 378.3 (3.9)    IV Piggyback 500 2150    Total Intake(mL/kg) 2323.3 (24) 2678.3 (27.7)    Urine (mL/kg/hr) 1130 1970 (0.8)    Stool  0    Total Output 1130 1970    Net +1193.3 +708.3          Unmeasured Stool Occurrence  2 x          Physical Exam  General:  no acute  "distress at this time  Skin:  No acute rash  Eyes:  No scleral icterus and noninjected  ENT:  mucous membranes moist  Neck:  no carotid bruits  Chest:  no crackles   CVS:  Regular rate and rhythm without rub   Abdomen:  soft and nontender   Extremities: no significant lower extremity edema  Neuro:  No gross focality  Psych:  Alert , cooperative   : Ileal conduit and nephrostomy    Medications:  Current Medications[1]      Lab Results: I have reviewed the following results:  Results from last 7 days   Lab Units 05/18/25  0620 05/18/25  0157 05/17/25  1320 05/17/25  0444 05/16/25  1016   WBC Thousand/uL 15.80* 15.80*  --  29.46* 18.69*   HEMOGLOBIN g/dL 9.7* 9.7*  --  10.6* 13.2   HEMATOCRIT % 31.3* 31.2*  --  34.7* 42.1   PLATELETS Thousands/uL 148* 142*  --  181 232   POTASSIUM mmol/L  --  4.6 5.4* 5.6* 5.2   CHLORIDE mmol/L  --  111*  --  112* 107   CO2 mmol/L  --  15*  --  15* 18*   BUN mg/dL  --  67*  --  65* 56*   CREATININE mg/dL  --  3.16*  --  3.22* 2.26*   CALCIUM mg/dL  --  7.4*  --  7.9* 9.6   MAGNESIUM mg/dL  --  1.9  --  1.6*  --    ALBUMIN g/dL  --  2.6*  --   --  4.2       Administrative Statements     Portions of the record may have been created with voice recognition software. Occasional wrong word or \"sound a like\" substitutions may have occurred due to the inherent limitations of voice recognition software. Read the chart carefully and recognize, using context, where substitutions have occurred.If you have any questions, please contact the dictating provider.         [1]   Current Facility-Administered Medications:     acetaminophen (TYLENOL) tablet 650 mg, 650 mg, Oral, Q6H PRN, Shaylee Clarke, ELIANENP, 650 mg at 05/17/25 2133    aluminum-magnesium hydroxide-simethicone (MAALOX) oral suspension 30 mL, 30 mL, Oral, Q4H PRN, Shaylee Clarke, CRNP, 30 mL at 05/18/25 0021    [Held by provider] aspirin chewable tablet 81 mg, 81 mg, Oral, Daily, Venus Matthews PA-C    atorvastatin (LIPITOR) " tablet 20 mg, 20 mg, Oral, Daily With Dinner, CORWIN Ashby, 20 mg at 05/17/25 1602    cefepime (MAXIPIME) IVPB (premix in dextrose) 2,000 mg 50 mL, 2,000 mg, Intravenous, Q24H, CORWIN Ashby, Stopped at 05/17/25 2126    fluticasone (FLONASE) 50 mcg/act nasal spray 1 spray, 1 spray, Nasal, Daily, ELIANE AshbyNP, 1 spray at 05/17/25 0811    fluticasone-vilanterol 200-25 mcg/actuation 1 puff, 1 puff, Inhalation, Daily, 1 puff at 05/17/25 0811 **AND** umeclidinium 62.5 mcg/actuation inhaler AEPB 1 puff, 1 puff, Inhalation, Daily, CORWIN Ashby, 1 puff at 05/17/25 0811    gabapentin (NEURONTIN) capsule 100 mg, 100 mg, Oral, TID, ELIANE AshbyNP, 100 mg at 05/17/25 2029    [Held by provider] heparin (porcine) subcutaneous injection 5,000 Units, 5,000 Units, Subcutaneous, Q8H [facility] **AND** [CANCELED] Platelet count, , , Once, Janny Revankar, DO    HYDROmorphone (DILAUDID) injection 0.5 mg, 0.5 mg, Intravenous, Q4H PRN, CORWIN Ashby    ipratropium-albuterol (DUO-NEB) 0.5-2.5 mg/3 mL inhalation solution 3 mL, 3 mL, Nebulization, Q4H PRN, Shaylee Clarke, CRNP    [Held by provider] metoprolol tartrate (LOPRESSOR) partial tablet 12.5 mg, 12.5 mg, Oral, Q12H DHAVAL, Venus Matthews PA-C    nicotine (NICODERM CQ) 14 mg/24hr TD 24 hr patch 1 patch, 1 patch, Transdermal, Daily, CORWIN Ashby, 1 patch at 05/17/25 0812    norepinephrine (LEVOPHED) 4 mg (STANDARD CONCENTRATION) IV in sodium chloride 0.9% 250 mL, 1-30 mcg/min, Intravenous, Titrated, CORWIN Ashby, Held at 05/18/25 0203    ondansetron (ZOFRAN) injection 4 mg, 4 mg, Intravenous, Q6H PRN, CORWIN Ashby    vancomycin (VANCOCIN) 2,000 mg in sodium chloride 0.9 % 500 mL IVPB, 25 mg/kg (Adjusted), Intravenous, Once, CORWIN Ashby, Last Rate: 250 mL/hr at 05/18/25 0641, 2,000 mg at 05/18/25 0641

## 2025-05-18 NOTE — SEPSIS NOTE
"Sepsis Note   Joel Wyman 79 y.o. male MRN: [id number]  Unit/Bed#: 2 [place] [address] Encounter: [id number]       Initial Sepsis Screening       Row Name 05/17/25 2005 05/16/25 1317             Is the patient's history suggestive of a new or worsening infection? No  -DK Yes (Proceed)  -NA       Suspected source of infection urinary tract infection  -DK urinary tract infection  -NA       Indicate SIRS criteria Tachycardia > 90 bpm;Tachypnea > 20 resp per min;Hyperthemia > 38.3C (100.9F) OR Hypothermia <36C (96.8F);Leukocytosis (WBC > 89807 IJL) OR Leukopenia (WBC <4000 IJL) OR Bandemia (WBC >10% bands)  -DK Tachypnea > 20 resp per min;Leukocytosis (WBC > 37431 IJL) OR Leukopenia (WBC <4000 IJL) OR Bandemia (WBC >10% bands)  -NA       Are two or more of the above signs & symptoms of infection both present and new to the patient? Yes (Proceed)  -DK No  -NA       Assess for evidence of organ dysfunction: Are any of the below criteria present within 6 hours of suspected infection and SIRS criteria that are NOT considered to be chronic conditions? SBP < 90;Creatinine > 2.0  -DK --       Date of presentation of severe sepsis 05/17/25  -DK --       Time of presentation of severe sepsis 2007  -DK --       Date of presentation of septic shock -- --       Time of presentation of septic shock -- --       Fluid Resuscitation: 30 ml/kg IV fluid bolus will be given based on ideal body weight (use if BMI >30)  -DK --       Is the patient persistently hypotensive in the hour after fluid bolus administration? If yes, patient meets criteria for vasopressor use. -- --       Sepsis Note: Click \"NEXT\" below (NOT \"close\") to generate sepsis note based on above information. -- --                 User Key  (r) = Recorded By, (t) = Taken By, (c) = Cosigned By      Initials Name Provider Type    CORWIN Reddy Nurse Practitioner    TRI Silverio MD Physician                   Initial Sepsis Screening       Row Name " 05/17/25 2005 05/16/25 1317                Initial Sepsis Assessment    Is the patient's history suggestive of a new or worsening infection? No  -DK Yes (Proceed)  -NA       Suspected source of infection urinary tract infection  -DK urinary tract infection  -NA       Indicate SIRS criteria Tachycardia > 90 bpm;Tachypnea > 20 resp per min;Hyperthemia > 38.3C (100.9F) OR Hypothermia <36C (96.8F);Leukocytosis (WBC > 51214 IJL) OR Leukopenia (WBC <4000 IJL) OR Bandemia (WBC >10% bands)  -DK Tachypnea > 20 resp per min;Leukocytosis (WBC > 87766 IJL) OR Leukopenia (WBC <4000 IJL) OR Bandemia (WBC >10% bands)  -NA       Are two or more of the above signs & symptoms of infection both present and new to the patient? Yes (Proceed)  -DK No  -NA          If the answer is yes to both above questions, suspicion of sepsis is present. Proceed with algorithm to determine if severe sepsis or septic shock criteria are met.    Assess for evidence of organ dysfunction: Are any of the below criteria present within 6 hours of suspected infection and SIRS criteria that are NOT considered to be chronic conditions? SBP < 90;Creatinine > 2.0  -DK --          Based on the above information, the patient meets criteria for SEVERE sepsis. CALL A SEPSIS ALERT. Use sepsis order set.     Date of presentation of severe sepsis 05/17/25  -DK --       Time of presentation of severe sepsis 2007 -DK --          Based on the above information, the patient meets criteria for SEPTIC SHOCK. CALL A SEPSIS ALERT. Use sepsis order set. If the lactate is > or equal to 4, or the SBP is <90, give 30 ml/kg cystalloid IV fluid bolus or document exclusion criteria.    Fluid Resuscitation: 30 ml/kg IV fluid bolus will be given based on ideal body weight (use if BMI >30)  -DK --                 User Key  (r) = Recorded By, (t) = Taken By, (c) = Cosigned By      Initials Name Provider Type    CORWIN Reddy Nurse Practitioner    TRI Silverio MD  "Physician                     Default Flowsheet Data (Last 720 Hours)       Sepsis Reassess       Row Name 05/18/25 0114                   Repeat Volume Status and Tissue Perfusion Assessment Performed    Date of Reassessment: 05/18/25  -        Time of Reassessment: 0114  -DK        Sepsis Reassessment Note: Click \"NEXT\" below (NOT \"close\") to generate sepsis reassessment note. --        Repeat Volume Status and Tissue Perfusion Assessment Performed --patient is A/Ox3, BP did not respond to 3L NS bolus. At this time, concerning for septic shock. Will reach out to ICU provider for consultation.                  User Key  (r) = Recorded By, (t) = Taken By, (c) = Cosigned By      Initials Name Provider Type    CORWIN Reddy Nurse Practitioner                    Body mass index is 31.48 kg/m².  Wt Readings from Last 1 Encounters:   05/16/25 96.7 kg (213 lb 3 oz)        Ideal body weight: 70.7 kg (155 lb 13.8 oz)  Adjusted ideal body weight: 81.1 kg (178 lb 12.7 oz)    "

## 2025-05-18 NOTE — ASSESSMENT & PLAN NOTE
Worsening kidney function with creatinine up to 3.2  Baseline creatinine 1.3-1.5  Suspect etiology multifactorial    Plan:  Obtain repeat BMP  Maintain MAP greater than 65  I&O's

## 2025-05-18 NOTE — PLAN OF CARE
Problem: Potential for Falls  Goal: Patient will remain free of falls  Description: INTERVENTIONS:  - Educate patient/family on patient safety including physical limitations  - Instruct patient to call for assistance with activity   - Consider consulting OT/PT to assist with strengthening/mobility based on AM PAC & JH-HLM score  - Consult OT/PT to assist with strengthening/mobility   - Keep Call bell within reach  - Keep bed low and locked with side rails adjusted as appropriate  - Keep care items and personal belongings within reach  - Initiate and maintain comfort rounds  - Make Fall Risk Sign visible to staff  - Offer Toileting every 2 Hours, in advance of need  - Initiate/Maintain bed alarm  - Obtain necessary fall risk management equipment: socks  - Apply yellow socks and bracelet for high fall risk patients  - Consider moving patient to room near nurses station  Outcome: Progressing     Problem: Nutrition/Hydration-ADULT  Goal: Nutrient/Hydration intake appropriate for improving, restoring or maintaining nutritional needs  Description: Monitor and assess patient's nutrition/hydration status for malnutrition. Collaborate with interdisciplinary team and initiate plan and interventions as ordered.  Monitor patient's weight and dietary intake as ordered or per policy. Utilize nutrition screening tool and intervene as necessary. Determine patient's food preferences and provide high-protein, high-caloric foods as appropriate.     INTERVENTIONS:  - Monitor oral intake, urinary output, labs, and treatment plans  - Assess nutrition and hydration status and recommend course of action  - Evaluate amount of meals eaten  - Assist patient with eating if necessary   - Allow adequate time for meals  - Recommend/ encourage appropriate diets, oral nutritional supplements, and vitamin/mineral supplements  - Order, calculate, and assess calorie counts as needed  - Recommend, monitor, and adjust tube feedings and TPN/PPN based  on assessed needs  - Assess need for intravenous fluids  - Provide specific nutrition/hydration education as appropriate  - Include patient/family/caregiver in decisions related to nutrition  Outcome: Progressing     Problem: PAIN - ADULT  Goal: Verbalizes/displays adequate comfort level or baseline comfort level  Description: Interventions:  - Encourage patient to monitor pain and request assistance  - Assess pain using appropriate pain scale  - Administer analgesics as ordered based on type and severity of pain and evaluate response  - Implement non-pharmacological measures as appropriate and evaluate response  - Consider cultural and social influences on pain and pain management  - Notify physician/advanced practitioner if interventions unsuccessful or patient reports new pain  - Educate patient/family on pain management process including their role and importance of  reporting pain   - Provide non-pharmacologic/complimentary pain relief interventions  Outcome: Progressing     Problem: INFECTION - ADULT  Goal: Absence or prevention of progression during hospitalization  Description: INTERVENTIONS:  - Assess and monitor for signs and symptoms of infection  - Monitor lab/diagnostic results  - Monitor all insertion sites, i.e. indwelling lines, tubes, and drains  - Monitor endotracheal if appropriate and nasal secretions for changes in amount and color  - [place] appropriate cooling/warming therapies per order  - Administer medications as ordered  - Instruct and encourage patient and family to use good hand hygiene technique  - Identify and instruct in appropriate isolation precautions for identified infection/condition  Outcome: Progressing  Goal: Absence of fever/infection during neutropenic period  Description: INTERVENTIONS:  - Monitor WBC  - Perform strict hand hygiene  - Limit to healthy visitors only  - No plants, dried, fresh or silk flowers with man in patient room  Outcome: Progressing     Problem:  SAFETY ADULT  Goal: Patient will remain free of falls  Description: INTERVENTIONS:  - Educate patient/family on patient safety including physical limitations  - Instruct patient to call for assistance with activity   - Consider consulting OT/PT to assist with strengthening/mobility based on AM PAC & JH-HLM score  - Consult OT/PT to assist with strengthening/mobility   - Keep Call bell within reach  - Keep bed low and locked with side rails adjusted as appropriate  - Keep care items and personal belongings within reach  - Initiate and maintain comfort rounds  - Make Fall Risk Sign visible to staff  - Offer Toileting every 2 Hours, in advance of need  - Initiate/Maintain bed alarm  - Obtain necessary fall risk management equipment: socks  - Apply yellow socks and bracelet for high fall risk patients  - Consider moving patient to room near nurses station  Outcome: Progressing  Goal: Maintain or return to baseline ADL function  Description: INTERVENTIONS:  -  Assess patient's ability to carry out ADLs; assess patient's baseline for ADL function and identify physical deficits which impact ability to perform ADLs (bathing, care of mouth/teeth, toileting, grooming, dressing, etc.)  - Assess/evaluate cause of self-care deficits   - Assess range of motion  - Assess patient's mobility; develop plan if impaired  - Assess patient's need for assistive devices and provide as appropriate  - Encourage maximum independence but intervene and supervise when necessary  - Involve family in performance of ADLs  - Assess for home care needs following discharge   - Consider OT consult to assist with ADL evaluation and planning for discharge  - Provide patient education as appropriate  - Monitor functional capacity and physical performance, use of AM PAC & JH-HLM   - Monitor gait, balance and fatigue with ambulation    Outcome: Progressing  Goal: Maintains/Returns to pre admission functional level  Description: INTERVENTIONS:  - Perform  AM-PAC 6 Click Basic Mobility/ Daily Activity assessment daily.  - Set and communicate daily mobility goal to care team and patient/family/caregiver.   - Collaborate with rehabilitation services on mobility goals if consulted  - Perform Range of Motion 4 times a day.  - Reposition patient every 2 hours.  - Dangle patient 3 times a day  - Stand patient 3 times a day  - Ambulate patient 3 times a day  - Out of bed to chair 3 times a day   - Out of bed for meals 3 times a day  - Out of bed for toileting  - Record patient progress and toleration of activity level   Outcome: Progressing     Problem: DISCHARGE PLANNING  Goal: Discharge to home or other facility with appropriate resources  Description: INTERVENTIONS:  - Identify barriers to discharge w/patient and caregiver  - Arrange for needed discharge resources and transportation as appropriate  - Identify discharge learning needs (meds, wound care, etc.)  - Arrange for interpretive services to assist at discharge as needed  - Refer to Case Management Department for coordinating discharge planning if the patient needs post-hospital services based on physician/advanced practitioner order or complex needs related to functional status, cognitive ability, or social support system  Outcome: Progressing     Problem: Knowledge Deficit  Goal: Patient/family/caregiver demonstrates understanding of disease process, treatment plan, medications, and discharge instructions  Description: Complete learning assessment and assess knowledge base.  Interventions:  - Provide teaching at level of understanding  - Provide teaching via preferred learning methods  Outcome: Progressing

## 2025-05-18 NOTE — ASSESSMENT & PLAN NOTE
Volume: Hypervolemic  Blood pressure: Tendency to hypotension, /91  Recommend:  Holding metoprolol due to hypotension  Monitor urinary output   Recommend Bumex 1mg iv x 1 if BP stable

## 2025-05-18 NOTE — ASSESSMENT & PLAN NOTE
Presented 5/16: CT imaging concerning for infected ureteral stone and associated right hydroureternephrosis  5/16: IR nephrostomy tube placement  5/17: Blood cultures revealing for Klebsiella, Proteus, strep group B  5/17: Repeat CT imaging with resolution of right-sided hydronephrosis however increasing consolidative density in posterior costophrenic angles and mild bronchiectasis  5/17 to 5/18: Hypotensive s/p 3 L IV fluid    Plan:  Urine culture pending, follow-up  Continue cefepime  Add vancomycin for now  Obtain MRSA  Obtain sputum culture  Obtain urine antigens  CXR in AM  Consider repeat CT scan if clinically worsens  Start norepinephrine to maintain MAP greater than 65

## 2025-05-18 NOTE — SEPSIS NOTE
"Sepsis Note   Joel Wyman 79 y.o. male MRN: [id number]  Unit/Bed#: 2 [place] [address] Encounter: [id number]       Initial Sepsis Screening       Row Name 05/17/25 2005 05/16/25 1317             Is the patient's history suggestive of a new or worsening infection? No  -DK Yes (Proceed)  -NA       Suspected source of infection urinary tract infection  -DK urinary tract infection  -NA       Indicate SIRS criteria Tachycardia > 90 bpm;Tachypnea > 20 resp per min;Hyperthemia > 38.3C (100.9F) OR Hypothermia <36C (96.8F);Leukocytosis (WBC > 33382 IJL) OR Leukopenia (WBC <4000 IJL) OR Bandemia (WBC >10% bands)  -DK Tachypnea > 20 resp per min;Leukocytosis (WBC > 72765 IJL) OR Leukopenia (WBC <4000 IJL) OR Bandemia (WBC >10% bands)  -NA       Are two or more of the above signs & symptoms of infection both present and new to the patient? Yes (Proceed)  -DK No  -NA       Assess for evidence of organ dysfunction: Are any of the below criteria present within 6 hours of suspected infection and SIRS criteria that are NOT considered to be chronic conditions? SBP < 90;Creatinine > 2.0  -DK --       Date of presentation of severe sepsis 05/17/25  -DK --       Time of presentation of severe sepsis 2007  -DK --       Date of presentation of septic shock -- --       Time of presentation of septic shock -- --       Fluid Resuscitation: 30 ml/kg IV fluid bolus will be given based on ideal body weight (use if BMI >30)  -DK --       Is the patient persistently hypotensive in the hour after fluid bolus administration? If yes, patient meets criteria for vasopressor use. -- --       Sepsis Note: Click \"NEXT\" below (NOT \"close\") to generate sepsis note based on above information. -- --                 User Key  (r) = Recorded By, (t) = Taken By, (c) = Cosigned By      Initials Name Provider Type    CORWIN Reddy Nurse Practitioner    TRI Silverio MD Physician                   Initial Sepsis Screening       Row Name " 05/17/25 2005 05/16/25 1317                Initial Sepsis Assessment    Is the patient's history suggestive of a new or worsening infection? No  -DK Yes (Proceed)  -NA       Suspected source of infection urinary tract infection  -DK urinary tract infection  -NA       Indicate SIRS criteria Tachycardia > 90 bpm;Tachypnea > 20 resp per min;Hyperthemia > 38.3C (100.9F) OR Hypothermia <36C (96.8F);Leukocytosis (WBC > 17997 IJL) OR Leukopenia (WBC <4000 IJL) OR Bandemia (WBC >10% bands)  -DK Tachypnea > 20 resp per min;Leukocytosis (WBC > 39417 IJL) OR Leukopenia (WBC <4000 IJL) OR Bandemia (WBC >10% bands)  -NA       Are two or more of the above signs & symptoms of infection both present and new to the patient? Yes (Proceed)  -DK No  -NA          If the answer is yes to both above questions, suspicion of sepsis is present. Proceed with algorithm to determine if severe sepsis or septic shock criteria are met.    Assess for evidence of organ dysfunction: Are any of the below criteria present within 6 hours of suspected infection and SIRS criteria that are NOT considered to be chronic conditions? SBP < 90;Creatinine > 2.0  -DK --          Based on the above information, the patient meets criteria for SEVERE sepsis. CALL A SEPSIS ALERT. Use sepsis order set.     Date of presentation of severe sepsis 05/17/25  -DK --       Time of presentation of severe sepsis 2007 -DK --          Based on the above information, the patient meets criteria for SEPTIC SHOCK. CALL A SEPSIS ALERT. Use sepsis order set. If the lactate is > or equal to 4, or the SBP is <90, give 30 ml/kg cystalloid IV fluid bolus or document exclusion criteria.    Fluid Resuscitation: 30 ml/kg IV fluid bolus will be given based on ideal body weight (use if BMI >30)  -DK --                 User Key  (r) = Recorded By, (t) = Taken By, (c) = Cosigned By      Initials Name Provider Type    CORWIN Reddy Nurse Practitioner    TRI Silverio MD  Physician                         Body mass index is 31.48 kg/m².  Wt Readings from Last 1 Encounters:   05/16/25 96.7 kg (213 lb 3 oz)     IBW (Ideal Body Weight): 70.7 kg    Ideal body weight: 70.7 kg (155 lb 13.8 oz)  Adjusted ideal body weight: 81.1 kg (178 lb 12.7 oz)

## 2025-05-18 NOTE — PROGRESS NOTES
Joel Wyman is a 79 y.o. male who is currently ordered Vancomycin IV with management by the Pharmacy Consult service.  Relevant clinical data and objective / subjective history reviewed.  Vancomycin Assessment:  Indication and Goal AUC/Trough: Bacteremia (goal -600, trough >10)  Clinical Status: new  Micro:   pending  Renal Function:  SCr: 3.06 mg/dL  CrCl: 21.7 mL/min  Renal replacement: Not on dialysis  Days of Therapy: 1  Current Dose: 2000mg (25mg/adj kg) IV once as a loading dose  Vancomycin Plan:  New Dosinmg (12.5mg/adj kg) IV if trough </=15  Next Level: 25 @0600  Renal Function Monitoring: Daily BMP and UOP  Pharmacy will continue to follow closely for s/sx of nephrotoxicity, infusion reactions and appropriateness of therapy.  BMP and CBC will be ordered per protocol. We will continue to follow the patient’s culture results and clinical progress daily.    Cinthia Sarmiento, Pharmacist

## 2025-05-18 NOTE — ASSESSMENT & PLAN NOTE
Requiring 2 L nasal cannula since admission  Transferred to ICU on 3.5 L nasal cannula    Plan:  Obtain chest x-ray in a.m.  Pulmonary hygiene  Wean FiO2 to maintain SpO2 greater than 92%

## 2025-05-18 NOTE — ASSESSMENT & PLAN NOTE
Lab Results   Component Value Date    EGFR 17 05/18/2025    EGFR 17 05/17/2025    EGFR 26 05/16/2025    CREATININE 3.16 (H) 05/18/2025    CREATININE 3.22 (H) 05/17/2025    CREATININE 2.26 (H) 05/16/2025   #Non-Oliguric KDIGO severe SHANTI stage 3    Etiology: Multifactorial secondary to obstructive uropathy and sepsis with hypotension   Baseline creatinine 1.3 to 1.5 mg/dL  Current creatinine: 3.1 mg/dL, plateauing seems to be trending down   Peak creatinine: 3.22   UA: Hematuria, leukocyturia, bacteriuria  Renal imaging : Staghorn stone on the right pelvis, mid right ureter stone with right hydroureteronephrosis with perinephric stranding.  Mild distention of the left collecting system  Status post right nephrostomy  Treatment:  Patient transferred to the ICU in the settings of hypotension  No urgent indication of dialysis at this time   Recommend follow-up ultrasound  Avoid NSAIDs, IV contrast  Monitor nephrostomy output

## 2025-05-18 NOTE — ASSESSMENT & PLAN NOTE
Lab Results   Component Value Date    HGBA1C 6.4 (H) 11/12/2024     HbA1c 6.4  Advised to maintain a good DM control to prevent progression of CKD   Maintain healthy diet (vegetables, fruits, whole grains, nonfat or low fat)  Weight loss  Physical activity (5 to 10 minutes to start the increase to 30 min a day)      Recent Labs     05/17/25  0747   POCGLU 85       Blood Sugar Average: Last 72 hrs:  (P) 85

## 2025-05-18 NOTE — ASSESSMENT & PLAN NOTE
S/p IR nephrostomy tube placement  Consider repeat imaging for source control if clinically declines

## 2025-05-18 NOTE — QUICK NOTE
Wife, Renee, updated on plan of care and transfer to ICU.  Wife tells me is planning to come and see patient shortly.

## 2025-05-18 NOTE — ASSESSMENT & PLAN NOTE
Outpatient regimen consist of metoprolol 12 and half milligrams twice daily  Hold in setting of septic shock

## 2025-05-18 NOTE — CONSULTS
Consultation - Critical Care/ICU   Name: Joel Wyman 79 y.o. male I MRN: [id number]  Unit/Bed#: 2 [address] Date of Admission: 5/16/2025   Date of Service: 5/18/2025 I Hospital Day: 2   Consults  Physician Requesting Evaluation: Janny Marques DO   Reason for Evaluation / Principal Problem: Septic shock        Assessment & Plan  Septic shock (HCC)  Presented 5/16: CT imaging concerning for infected ureteral stone and associated right hydroureternephrosis  5/16: IR nephrostomy tube placement  5/17: Blood cultures revealing for Klebsiella, Proteus, strep group B  5/17: Repeat CT imaging with resolution of right-sided hydronephrosis however increasing consolidative density in posterior costophrenic angles and mild bronchiectasis  5/17 to 5/18: Hypotensive s/p 3 L IV fluid    Plan:  Urine culture pending, follow-up  Continue cefepime  Add vancomycin for now  Obtain MRSA  Obtain sputum culture  Obtain urine antigens  CXR in AM  Consider repeat CT scan if clinically worsens  Start norepinephrine to maintain MAP greater than 65  Bacteremia  Blood cultures revealing for Klebsiella, Proteus, strep group B  See plan outlined above  Metabolic acidosis  5/17 BMP revealing serum bicarb of 15  Patient was placed on bicarb infusion  Lactic acid normal    Plan:  Obtain repeat BMP now  Obtain VBG  Urinary tract infection without hematuria  Plan as outlined above  Hydronephrosis  S/p IR nephrostomy tube placement  Consider repeat imaging for source control if clinically declines  Acute hypoxic respiratory failure (HCC)  Requiring 2 L nasal cannula since admission  Transferred to ICU on 3.5 L nasal cannula    Plan:  Obtain chest x-ray in a.m.  Pulmonary hygiene  Wean FiO2 to maintain SpO2 greater than 92%  Acute kidney injury superimposed on stage 3b chronic kidney disease (HCC)  Worsening kidney function with creatinine up to 3.2  Baseline creatinine 1.3-1.5  Suspect etiology multifactorial    Plan:  Obtain repeat  BMP  Maintain MAP greater than 65  I&O's  Hyperkalemia  Potassium 5.6 > 5.4  Obtain repeat BMP now  History of prostate cancer  S/P cystectomy with ileal conduit in place  Essential hypertension  Outpatient regimen consist of metoprolol 12 and half milligrams twice daily  Hold in setting of septic shock  Hyperlipidemia  Continue statin  COPD (chronic obstructive pulmonary disease) (Formerly [facility])  Outpatient regimen consist of Trelegy  Type 2 diabetes mellitus with diabetic polyneuropathy, without long-term current use of insulin (Formerly [facility])  Continue Accu-Cheks with sliding scale insulin  Infrarenal abdominal aortic aneurysm (AAA) without rupture (Formerly [facility])    Disposition: Critical care    History of Present Illness   Joel Wyman is a 79 y.o. who presented 5/16 with abdominal and flank pain.  Patient has a past medical history of prostate cancer, cystectomy s/p ileal conduit, COPD, hypertension, hyperlipidemia, CKD and diabetes.    Patient went to interventional radiology on 5/16 and underwent nephrostomy tube placement.  Patient has been maintained on cefepime.  Blood cultures have been revealing for Klebsiella, Proteus, group B strep.    Patient underwent additional imaging on 5/17 which showed interval placement of right-sided nephrostomy catheter with resolution of right hydronephrosis.  Right-sided intrarenal and ureteral calculi reidentified.  And increasing consolidative densities in the posterior costophrenic angles and mild bronchiectasis suggesting worsening atelectasis versus pneumonia.    This evening into early a.m. hours patient became hypotensive.  He was given 3 L of normal saline with no improvement in his blood pressures, maps maintained less than 65.  Patient was transferred to ICU for further management and vasopressor support.     History obtained from chart review and the patient.    Patients wife and daughter were updated.     Review of Systems: Review of Systems   Respiratory:  Positive for cough (states  productive with green tinged sputum).    Genitourinary:  Positive for flank pain.       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  No date: Smoker  No date: Wears glasses Past Surgical History:  No date: APPENDECTOMY  4/24/2024: FL CYSTOGRAM  5/23/2024: FL CYSTOGRAM  04/14/2021: FL RETROGRADE PYELOGRAM  11/16/2023: FL RETROGRADE PYELOGRAM  4/11/2024: FL RETROGRADE PYELOGRAM  No date: HERNIA REPAIR  09/17/2021: IR EMBOLIZATION (SPECIFY VESSEL OR SITE)  07/08/2021: IR NEPHROSTOMY TUBE CHECK AND/OR REMOVAL  05/10/2021: IR NEPHROSTOMY TUBE CHECK/CHANGE/REPOSITION/REINSERTION/  UPSIZE  08/04/2021: IR NEPHROSTOMY TUBE CHECK/CHANGE/REPOSITION/REINSERTION/  UPSIZE  10/13/2021: IR NEPHROSTOMY TUBE CHECK/CHANGE/REPOSITION/REINSERTION/  UPSIZE  05/07/2021: IR NEPHROSTOMY TUBE PLACEMENT  5/16/2025: IR NEPHROSTOMY TUBE PLACEMENT  5/3/2024: IR SUPRAPUBIC CATHETER PLACEMENT  04/14/2021: WV CYSTO W/IRRIG & EVAC MULTPLE OBSTRUCTING CLOTS;   Bilateral      Comment:  Procedure: CYSTOSCOPY EVACUATION OF CLOTS bilateral                retrograde pyelograms possible TURBT bladder biopsy;                 Surgeon: Yovani Khan MD;  Location: [facility];                 Service: Urology  04/24/2021: W[place] CYSTO W/IRRIG & EVAC MULTPLE OBSTRUCTING CLOTS; N/A      Comment:  Procedure: CYSTOSCOPY EVACUATION OF CLOTS fulguration;                 Surgeon: Yovani Khan MD;  Location: [facility];                 Service: Urology  07/08/2021: WV CYSTO W/IRRIG & EVAC MULTPLE OBSTRUCTING CLOTS; N/A      Comment:  Procedure: CYSTOSCOPY EVACUATION OF CLOTS BILATERAL                ANTIROGRADES NEPHROSTOMY TUBES, FULGERATION ;  Surgeon:                 Yovani Khan MD;  Location: [facility];  Service:                Urology  08/22/2021: [place] CYSTO W/IRRIG & EVAC MULTPLE OBSTRUCTING CLOTS; N/A      Comment:  Procedure: CYSTOSCOPY, RIGHT RETROGRADE, EVACUATION OF                CLOTS, BLADDER BIOPSY X2 AND FULGERATION OF BLADDER                LESIONS, URETEROSCOPY, BIOPSY AND FULGERATION OF URETERAL               TUMOR WITH HOLMIUM LASER WITH RIGHT STENT INSERTION;                 Surgeon: Yovani Khan MD;  Location: [facility];                 Service: Urology  4/24/2024: MS CYSTO W/IRRIG & EVAC MULTPLE OBSTRUCTING CLOTS;   Bilateral      Comment:  Procedure: CYSTOSCOPY EVACUATION OF CLOTS fulguration                bladder neck tumors and biopsy, retrograde pyelograms,                DVIU;  Surgeon: Yovani Khan MD;  Location: [facility];  Service: Urology  5/23/2024: MS CYSTO W/IRRIG & EVAC MULTPLE OBSTRUCTING CLOTS; N/A      Comment:  Procedure: CYSTOSCOPY FULGERATION RESECTION BLADDER                NECK, FLUORSCOPIC CYSTOGRAM, EVACUATION OF CLOTS;                 Surgeon: Yovani Khan MD;  Location: [facility];                 Service: Urology  4/11/2024: MS CYSTOURETHROSCOPY W/DEST &/RMVL MED BLADDER AMARI; N/A      Comment:  Procedure: TRANSURETHRAL RESECTION OF BLADDER TUMOR                (TURBT);  Surgeon: Yovani Khan MD;  Location: [facility];  Service: Urology  4/11/2024: MS CYSTOURETHROSCOPY W/URETERAL CATHETERIZATION; Bilateral      Comment:  Procedure: BILATERAL CYSTOSCOPY WITH RETROGRADE                PYELOGRAM, BLADDER NECK CONTRACTURE;  Surgeon: Yovani Khan MD;  Location: [facility];  Service: Urology  11/16/2023: MS CYSTOURETHROSCOPY WITH BIOPSY; N/A      Comment:  Procedure: CYSTOSCOPY, BILATERAL RETROGRADEY PYELOGRAM,                FULGERATION 2.0 CM BLADDER TUMOR WITH BIOPSY;  Surgeon:                Yovani Khan MD;  Location: [facility];  Service:                 Urology  2014: PROSTATECTOMY  No date: ROTATOR CUFF REPAIR      Comment:  right shoulder   Current Outpatient Medications   Medication Instructions    albuterol (PROVENTIL HFA,VENTOLIN HFA) 90 mcg/act inhaler TAKE 2 PUFFS BY MOUTH EVERY 6 HOURS AS NEEDED FOR WHEEZE    aspirin (ECOTRIN LOW STRENGTH) 81 mg, Oral, Daily    atorvastatin (LIPITOR) 20 mg, Daily    fluticasone (FLONASE) 50 mcg/act nasal spray 1 spray, Daily    fluticasone-umeclidinium-vilanterol (Trelegy Ellipta) 200-62.5-25 mcg/actuation AEPB inhaler 1 puff, Inhalation, Every morning    gabapentin (NEURONTIN) 100 mg, Oral, 3 times daily    meloxicam (MOBIC) 15 mg, Daily    metoprolol tartrate (LOPRESSOR) 12.5 mg, Oral, Every 12 hours scheduled    Allergies[1]   Social History[2] Family History   Problem Relation Age of Onset    Diabetes Mother     Stroke Mother     Diabetes Sister     Diabetes Brother     Stroke Brother     Mental illness Neg Hx           Objective :                   Vitals I/O      Most Recent Min/Max in 24hrs   Temp 98.9 °F (37.2 °C) Temp  Min: 97.2 °F (36.2 °C)  Max: 101.2 °F (38.4 °C)   Pulse (!) 149 Pulse  Min: 79  Max: 170   Resp 18 Resp  Min: 16  Max: 20   BP (!) 88/52 BP  Min: 84/50  Max: 114/56   O2 Sat (!) 89 % SpO2  Min: 87 %  Max: 97 %      Intake/Output Summary (Last 24 hours) at 5/18/2025 0130  Last data filed at 5/18/2025 0011  Gross per 24 hour   Intake 3173.33 ml   Output 1820 ml   Net 1353.33 ml       Diet [place]/CHO Controlled; Consistent Carbohydrate Diet Level 3 (6 carb servings/90 grams CHO/meal)    Invasive Monitoring           Physical Exam   Physical Exam  Eyes:      Pupils: Pupils are equal, round, and reactive to light.   Skin:     General: Skin is warm and dry.   HENT:      Mouth/Throat:      Mouth: Mucous membranes are moist.   Cardiovascular:      Rate and Rhythm: Normal rate and regular rhythm.      Pulses: Normal pulses.   Musculoskeletal:         General: Normal range of motion.   Abdominal:      Palpations:  Abdomen is soft.   Constitutional:       General: He is awake. He is not in acute distress.     Appearance: He is ill-appearing. He is not toxic-appearing.   Pulmonary:      Effort: Pulmonary effort is normal.      Breath sounds: Rhonchi present. No wheezing or rales.   Neurological:      Mental Status: He is alert and oriented to person, place and time.   Genitourinary/Anorectal:     Comments: Right nephrostomy tube          Diagnostic Studies        Lab Results: I have reviewed the following results:     Medications:  Scheduled PRN   [Held by provider] aspirin, 81 mg, Daily  atorvastatin, 20 mg, Daily With Dinner  cefepime, 2,000 mg, Q24H  fluticasone, 1 spray, Daily  fluticasone-vilanterol, 1 puff, Daily   And  umeclidinium, 1 puff, Daily  gabapentin, 100 mg, TID  [Held by provider] heparin (porcine), 5,000 Units, Q8H DHAVAL  [Held by provider] metoprolol tartrate, 12.5 mg, Q12H DHAVAL  nicotine, 1 patch, Daily  vancomycin, 15 mg/kg, Q12H      acetaminophen, 650 mg, Q6H PRN  aluminum-magnesium hydroxide-simethicone, 30 mL, Q4H PRN  HYDROmorphone, 0.5 mg, Q4H PRN  ipratropium-albuterol, 3 mL, Q4H PRN  ondansetron, 4 mg, Q6H PRN       Continuous    norepinephrine, 1-30 mcg/min  sodium bicarbonate 150 mEq in dextrose 5 % 1,000 mL infusion, 75 mL/hr, Last Rate: 75 mL/hr (05/18/25 0017)         Labs:   CBC    Recent Labs     05/16/25  1016 05/17/25  0444   WBC 18.69* 29.46*   HGB 13.2 10.6*   HCT 42.1 34.7*    181     BMP    Recent Labs     05/16/25  1016 05/17/25  0444 05/17/25  1320   SODIUM 135 135  --    K 5.2 5.6* 5.4*    112*  --    CO2 18* 15*  --    AGAP 10 8  --    BUN 56* 65*  --    CREATININE 2.26* 3.22*  --    CALCIUM 9.6 7.9*  --        Coags    Recent Labs     05/16/25  1530   INR 1.16        Additional Electrolytes  Recent Labs     05/17/25  0444   MG 1.6*          Blood Gas    No recent results  No recent results LFTs  Recent Labs     05/16/25  1016   ALT 16   AST 19   ALKPHOS 83   ALB 4.2    TBILI 0.45       Infectious  No recent results  Glucose  Recent Labs     05/16/25  1016 05/17/25  0444   GLUC 147* 94               [1] No Known Allergies  [2]   Social History  Tobacco Use    Smoking status: Every Day     Current packs/day: 0.50     Average packs/day: 0.5 packs/day for 50.0 years (25.0 ttl pk-yrs)     Types: Cigarettes     Passive exposure: Past    Smokeless tobacco: Never   Vaping Use    Vaping status: Never Used   Substance Use Topics    Alcohol use: Not Currently     Comment: None in over 50 yrs    Drug use: No

## 2025-05-18 NOTE — ASSESSMENT & PLAN NOTE
serum HCO3 18mmol  NAGMA likely secondary to SHANTI on CKD  Started on sodium bicarbonate drip 150 mEq/D5, rate decreased to 75ml/h   Recommend sodium bicarb 650mg bid

## 2025-05-18 NOTE — ASSESSMENT & PLAN NOTE
Febrile, tachycardic, hypotensive  Transferred to the ICU on 5/17  Antibiotics as per ICU team    no

## 2025-05-19 ENCOUNTER — APPOINTMENT (INPATIENT)
Dept: NON INVASIVE DIAGNOSTICS | Facility: HOSPITAL | Age: 80
DRG: 871 | End: 2025-05-19
Payer: COMMERCIAL

## 2025-05-19 PROBLEM — J96.01 ACUTE HYPOXIC RESPIRATORY FAILURE (HCC): Status: RESOLVED | Noted: 2025-05-18 | Resolved: 2025-05-19

## 2025-05-19 PROBLEM — E66.811 CLASS 1 OBESITY IN ADULT: Status: ACTIVE | Noted: 2025-05-19

## 2025-05-19 PROBLEM — N11.1 OBSTRUCTIVE PYELONEPHRITIS: Status: ACTIVE | Noted: 2021-04-24

## 2025-05-19 PROBLEM — I48.91 ATRIAL FIBRILLATION (HCC): Status: ACTIVE | Noted: 2025-05-19

## 2025-05-19 PROBLEM — E87.5 HYPERKALEMIA: Status: RESOLVED | Noted: 2025-05-18 | Resolved: 2025-05-19

## 2025-05-19 LAB
ANION GAP SERPL CALCULATED.3IONS-SCNC: 9 MMOL/L (ref 4–13)
AORTIC ROOT: 4 CM
AORTIC VALVE MEAN VELOCITY: 18.2 M/S
AV AREA BY CONTINUOUS VTI: 1.2 CM2
AV AREA PEAK VELOCITY: 1.1 CM2
AV LVOT MEAN GRADIENT: 2 MMHG
AV LVOT PEAK GRADIENT: 4 MMHG
AV MEAN PRESS GRAD SYS DOP V1V2: 15 MMHG
AV ORIFICE AREA US: 1.15 CM2
AV PEAK GRADIENT: 26 MMHG
AV VELOCITY RATIO: 0.41
AV VMAX SYS DOP: 2.53 M/S
BACTERIA SPEC BFLD CULT: ABNORMAL
BASOPHILS # BLD AUTO: 0.03 THOUSANDS/ÂΜL (ref 0–0.1)
BASOPHILS NFR BLD AUTO: 0 % (ref 0–1)
BSA FOR ECHO PROCEDURE: 2.12 M2
BUN SERPL-MCNC: 67 MG/DL (ref 5–25)
CALCIUM SERPL-MCNC: 8.5 MG/DL (ref 8.4–10.2)
CHLORIDE SERPL-SCNC: 108 MMOL/L (ref 96–108)
CO2 SERPL-SCNC: 17 MMOL/L (ref 21–32)
CREAT SERPL-MCNC: 2.73 MG/DL (ref 0.6–1.3)
DOP CALC AO VTI: 53.15 CM
DOP CALC LVOT AREA: 2.83 CM2
DOP CALC LVOT CARDIAC INDEX: 2.34 L/MIN/M2
DOP CALC LVOT CARDIAC OUTPUT: 4.95 L/MIN
DOP CALC LVOT DIAMETER: 1.9 CM
DOP CALC LVOT PEAK VEL VTI: 21.62 CM
DOP CALC LVOT PEAK VEL: 0.99 M/S
DOP CALC LVOT STROKE INDEX: 30.2 ML/M2
DOP CALC LVOT STROKE VOLUME: 61.27
E WAVE DECELERATION TIME: 186 MS
E/A RATIO: 0.92
EOSINOPHIL # BLD AUTO: 0.21 THOUSAND/ÂΜL (ref 0–0.61)
EOSINOPHIL NFR BLD AUTO: 2 % (ref 0–6)
ERYTHROCYTE [DISTWIDTH] IN BLOOD BY AUTOMATED COUNT: 16.3 % (ref 11.6–15.1)
FRACTIONAL SHORTENING: 40 (ref 28–44)
GFR SERPL CREATININE-BSD FRML MDRD: 20 ML/MIN/1.73SQ M
GLUCOSE SERPL-MCNC: 96 MG/DL (ref 65–140)
GRAM STN SPEC: ABNORMAL
GRAM STN SPEC: ABNORMAL
HCT VFR BLD AUTO: 34.1 % (ref 36.5–49.3)
HGB BLD-MCNC: 10.7 G/DL (ref 12–17)
IMM GRANULOCYTES # BLD AUTO: 0.11 THOUSAND/UL (ref 0–0.2)
IMM GRANULOCYTES NFR BLD AUTO: 1 % (ref 0–2)
INTERVENTRICULAR SEPTUM IN DIASTOLE (PARASTERNAL SHORT AXIS VIEW): 0.9 CM
INTERVENTRICULAR SEPTUM: 0.9 CM (ref 0.6–1.1)
LAAS-AP2: 33.1 CM2
LAAS-AP4: 23.7 CM2
LEFT ATRIUM SIZE: 4 CM
LEFT ATRIUM VOLUME (MOD BIPLANE): 103 ML
LEFT ATRIUM VOLUME INDEX (MOD BIPLANE): 48.6 ML/M2
LEFT INTERNAL DIMENSION IN SYSTOLE: 3.2 CM (ref 2.1–4)
LEFT VENTRICULAR INTERNAL DIMENSION IN DIASTOLE: 5.3 CM (ref 3.5–6)
LEFT VENTRICULAR POSTERIOR WALL IN END DIASTOLE: 0.7 CM
LEFT VENTRICULAR STROKE VOLUME: 97 ML
LV EF US.2D.A4C+ESTIMATED: 73 %
LVSV (TEICH): 97 ML
LYMPHOCYTES # BLD AUTO: 0.78 THOUSANDS/ÂΜL (ref 0.6–4.47)
LYMPHOCYTES NFR BLD AUTO: 6 % (ref 14–44)
MAGNESIUM SERPL-MCNC: 2.2 MG/DL (ref 1.9–2.7)
MCH RBC QN AUTO: 28.4 PG (ref 26.8–34.3)
MCHC RBC AUTO-ENTMCNC: 31.4 G/DL (ref 31.4–37.4)
MCV RBC AUTO: 91 FL (ref 82–98)
MONOCYTES # BLD AUTO: 0.82 THOUSAND/ÂΜL (ref 0.17–1.22)
MONOCYTES NFR BLD AUTO: 6 % (ref 4–12)
MRSA NOSE QL CULT: NORMAL
MV E'TISSUE VEL-SEP: 8 CM/S
MV PEAK A VEL: 1.18 M/S
MV PEAK E VEL: 109 CM/S
MV STENOSIS PRESSURE HALF TIME: 54 MS
MV VALVE AREA P 1/2 METHOD: 4.07
NEUTROPHILS # BLD AUTO: 11.55 THOUSANDS/ÂΜL (ref 1.85–7.62)
NEUTS SEG NFR BLD AUTO: 85 % (ref 43–75)
NRBC BLD AUTO-RTO: 0 /100 WBCS
PHOSPHATE SERPL-MCNC: 2.8 MG/DL (ref 2.3–4.1)
PLATELET # BLD AUTO: 165 THOUSANDS/UL (ref 149–390)
PMV BLD AUTO: 10.5 FL (ref 8.9–12.7)
POTASSIUM SERPL-SCNC: 4.8 MMOL/L (ref 3.5–5.3)
PROCALCITONIN SERPL-MCNC: 19.6 NG/ML
RBC # BLD AUTO: 3.77 MILLION/UL (ref 3.88–5.62)
RIGHT ATRIUM AREA SYSTOLE A4C: 18.3 CM2
RIGHT VENTRICLE ID DIMENSION: 3.6 CM
SL CV LEFT ATRIUM LENGTH A2C: 6.6 CM
SL CV LV EF: 60
SL CV PED ECHO LEFT VENTRICLE DIASTOLIC VOLUME (MOD BIPLANE) 2D: 138 ML
SL CV PED ECHO LEFT VENTRICLE SYSTOLIC VOLUME (MOD BIPLANE) 2D: 41 ML
SODIUM SERPL-SCNC: 134 MMOL/L (ref 135–147)
TR MAX PG: 39 MMHG
TR PEAK VELOCITY: 3.1 M/S
TRICUSPID ANNULAR PLANE SYSTOLIC EXCURSION: 2.2 CM
TRICUSPID VALVE PEAK REGURGITATION VELOCITY: 3.13 M/S
VANCOMYCIN SERPL-MCNC: 7.3 UG/ML (ref 10–20)
WBC # BLD AUTO: 13.5 THOUSAND/UL (ref 4.31–10.16)

## 2025-05-19 PROCEDURE — 94760 N-INVAS EAR/PLS OXIMETRY 1: CPT

## 2025-05-19 PROCEDURE — 94664 DEMO&/EVAL PT USE INHALER: CPT

## 2025-05-19 PROCEDURE — 99223 1ST HOSP IP/OBS HIGH 75: CPT | Performed by: INTERNAL MEDICINE

## 2025-05-19 PROCEDURE — 80048 BASIC METABOLIC PNL TOTAL CA: CPT | Performed by: PHYSICIAN ASSISTANT

## 2025-05-19 PROCEDURE — 84145 PROCALCITONIN (PCT): CPT | Performed by: PHYSICIAN ASSISTANT

## 2025-05-19 PROCEDURE — 99232 SBSQ HOSP IP/OBS MODERATE 35: CPT | Performed by: INTERNAL MEDICINE

## 2025-05-19 PROCEDURE — 83735 ASSAY OF MAGNESIUM: CPT | Performed by: PHYSICIAN ASSISTANT

## 2025-05-19 PROCEDURE — 85025 COMPLETE CBC W/AUTO DIFF WBC: CPT | Performed by: PHYSICIAN ASSISTANT

## 2025-05-19 PROCEDURE — 94640 AIRWAY INHALATION TREATMENT: CPT

## 2025-05-19 PROCEDURE — NC001 PR NO CHARGE: Performed by: INTERNAL MEDICINE

## 2025-05-19 PROCEDURE — 99232 SBSQ HOSP IP/OBS MODERATE 35: CPT | Performed by: FAMILY MEDICINE

## 2025-05-19 PROCEDURE — 80202 ASSAY OF VANCOMYCIN: CPT | Performed by: PHYSICIAN ASSISTANT

## 2025-05-19 PROCEDURE — 93306 TTE W/DOPPLER COMPLETE: CPT

## 2025-05-19 PROCEDURE — G0545 PR INHERENT VISIT TO INPT: HCPCS | Performed by: INTERNAL MEDICINE

## 2025-05-19 PROCEDURE — 93306 TTE W/DOPPLER COMPLETE: CPT | Performed by: INTERNAL MEDICINE

## 2025-05-19 PROCEDURE — 84100 ASSAY OF PHOSPHORUS: CPT | Performed by: PHYSICIAN ASSISTANT

## 2025-05-19 RX ORDER — SODIUM BICARBONATE 650 MG/1
650 TABLET ORAL
Status: DISCONTINUED | OUTPATIENT
Start: 2025-05-19 | End: 2025-05-21

## 2025-05-19 RX ORDER — IPRATROPIUM BROMIDE AND ALBUTEROL SULFATE 2.5; .5 MG/3ML; MG/3ML
3 SOLUTION RESPIRATORY (INHALATION)
Status: DISCONTINUED | OUTPATIENT
Start: 2025-05-19 | End: 2025-05-23 | Stop reason: HOSPADM

## 2025-05-19 RX ORDER — CEFEPIME HYDROCHLORIDE 1 G/50ML
1000 INJECTION, SOLUTION INTRAVENOUS EVERY 12 HOURS
Status: DISCONTINUED | OUTPATIENT
Start: 2025-05-19 | End: 2025-05-21

## 2025-05-19 RX ADMIN — SODIUM CHLORIDE, PRESERVATIVE FREE 10 ML: 5 INJECTION INTRAVENOUS at 08:39

## 2025-05-19 RX ADMIN — SODIUM BICARBONATE 650 MG TABLET 650 MG: at 10:00

## 2025-05-19 RX ADMIN — ATORVASTATIN CALCIUM 20 MG: 20 TABLET, FILM COATED ORAL at 16:42

## 2025-05-19 RX ADMIN — IPRATROPIUM BROMIDE AND ALBUTEROL SULFATE 3 ML: .5; 3 SOLUTION RESPIRATORY (INHALATION) at 17:10

## 2025-05-19 RX ADMIN — FLUTICASONE PROPIONATE 1 SPRAY: 50 SPRAY, METERED NASAL at 08:31

## 2025-05-19 RX ADMIN — GABAPENTIN 100 MG: 100 CAPSULE ORAL at 21:31

## 2025-05-19 RX ADMIN — SODIUM BICARBONATE 650 MG TABLET 650 MG: at 17:32

## 2025-05-19 RX ADMIN — Medication 12.5 MG: at 08:34

## 2025-05-19 RX ADMIN — APIXABAN 2.5 MG: 2.5 TABLET, FILM COATED ORAL at 17:32

## 2025-05-19 RX ADMIN — UMECLIDINIUM 1 PUFF: 62.5 AEROSOL, POWDER ORAL at 08:30

## 2025-05-19 RX ADMIN — Medication 12.5 MG: at 21:31

## 2025-05-19 RX ADMIN — APIXABAN 2.5 MG: 2.5 TABLET, FILM COATED ORAL at 08:35

## 2025-05-19 RX ADMIN — GABAPENTIN 100 MG: 100 CAPSULE ORAL at 16:42

## 2025-05-19 RX ADMIN — HYDROMORPHONE HYDROCHLORIDE 0.5 MG: 1 INJECTION, SOLUTION INTRAMUSCULAR; INTRAVENOUS; SUBCUTANEOUS at 21:33

## 2025-05-19 RX ADMIN — CEFEPIME HYDROCHLORIDE 1000 MG: 1 INJECTION, SOLUTION INTRAVENOUS at 12:15

## 2025-05-19 RX ADMIN — FLUTICASONE FUROATE AND VILANTEROL TRIFENATATE 1 PUFF: 200; 25 POWDER RESPIRATORY (INHALATION) at 08:30

## 2025-05-19 RX ADMIN — GABAPENTIN 100 MG: 100 CAPSULE ORAL at 08:35

## 2025-05-19 RX ADMIN — IPRATROPIUM BROMIDE AND ALBUTEROL SULFATE 3 ML: .5; 3 SOLUTION RESPIRATORY (INHALATION) at 20:24

## 2025-05-19 RX ADMIN — NICOTINE 1 PATCH: 14 PATCH, EXTENDED RELEASE TRANSDERMAL at 08:34

## 2025-05-19 NOTE — ASSESSMENT & PLAN NOTE
Lab Results   Component Value Date    EGFR 20 05/19/2025    EGFR 18 05/18/2025    EGFR 17 05/18/2025    CREATININE 2.73 (H) 05/19/2025    CREATININE 3.06 (H) 05/18/2025    CREATININE 3.16 (H) 05/18/2025   #Non-Oliguric KDIGO severe SHANTI stage 3    Etiology: Multifactorial secondary to obstructive uropathy and sepsis with hypotension   Baseline creatinine 1.3 to 1.5 mg/dL  Renal function improving creatinine down to 2.7 mg/dL and nonoliguric  Peak creatinine: 3.22   UA: Hematuria, leukocyturia, bacteriuria  Renal imaging : Staghorn stone on the right pelvis, mid right ureter stone with right hydroureteronephrosis with perinephric stranding.  Mild distention of the left collecting system  Status post right nephrostomy, Sumner catheter in place as well management as per urology  Treatment:  Blood pressures currently stable and improving currently normotensive  No indication for renal placement therapy as renal function improving.  Recommend follow-up renal imaging  Monitor urine output  Add sodium bicarbonate 650 mg twice a day for the low bicarbonate level

## 2025-05-19 NOTE — ASSESSMENT & PLAN NOTE
BC positive for Klebsiella, Proteus in 2 sets and group B strep in 1 set  Continue IV cefepime  Repeat blood cultures pending  Per ID plan for 7 days of antibiotic for gram-negative organisms in 2 weeks of oral treatment for group B strep  Follow-up pending echo

## 2025-05-19 NOTE — ASSESSMENT & PLAN NOTE
Patient with history of prostate cancer. 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 Patient had suprapubic catheter placed. Then patient was followed up at [facility] where he underwent simple cystectomy with ileal conduit in May 2024   CT with evidence of right sided perinephritic stranding  Patient has right-sided nephrostomy tube in place   Continue IV cefepime as noted above

## 2025-05-19 NOTE — ASSESSMENT & PLAN NOTE
Acute elevation in creatinine noted from baseline.  Currently downtrending.  This does impact antibiotic dosing.  Cefepime dosing as above  Will reach out to clinical ID pharmacist to review dosing  Repeat chemistry tomorrow  Will dose adjust as needed  Ongoing follow-up by nephrology

## 2025-05-19 NOTE — ASSESSMENT & PLAN NOTE
Patient presented to the ED with right flank/lower quadrant abdominal pain along with fever at home   Sepsis as noted by leukocytosis, tachycardia in the setting of obstructive pyelonephritis, polymicrobial bacteremia  Lactic acid within normal limits  WBC count trending down  Continue IV cefepime  Required transfer to ICU due to hypotension with minimal response to fluid boluses but never required vasopressors as blood pressures did improve  Repeat lab work in a.m.

## 2025-05-19 NOTE — ASSESSMENT & PLAN NOTE
Lab Results   Component Value Date    HGBA1C 6.4 (H) 11/12/2024       Recent Labs     05/17/25  0747   POCGLU 85     Not currently on oral anti-diabetic agents  Continue carb controlled diet.

## 2025-05-19 NOTE — ASSESSMENT & PLAN NOTE
Lab Results   Component Value Date    EGFR 20 05/19/2025    EGFR 18 05/18/2025    EGFR 17 05/18/2025    CREATININE 2.73 (H) 05/19/2025    CREATININE 3.06 (H) 05/18/2025    CREATININE 3.16 (H) 05/18/2025   Etiology: Multifactorial secondary to obstructive uropathy and hypotension in the settings of sepsis  Baseline creatinine 1.3-1.5  Peak creatinine 3.22, now trending down to 2.73  S/p right nephrostomy on 5/16/25 for staghorn stone and associated hydronephrosis on imaging  Off IV fluids  Repeat lab work in a.m.

## 2025-05-19 NOTE — ASSESSMENT & PLAN NOTE
Was previously on oxygen but currently on room air  Chest x-ray with moderate bibasilar opacities-atelectasis versus aspiration

## 2025-05-19 NOTE — ASSESSMENT & PLAN NOTE
Per ICU note, patient had short self-limited runs of A-fib while in the ICU  Monitor on telemetry  Currently on Eliquis and Lopressor

## 2025-05-19 NOTE — PROGRESS NOTES
"Progress Note - Urology      Patient: Joel Wyman   : 1945 Sex: male   MRN: [id number]     CSN: [id number]  Unit/Bed#: ICU 09     SUBJECTIVE:   Vs stable  CAT scan confirming right nephrostomy in normal position  Left hydronephrosis resolved  White count 13,5 trending down      Objective   Vitals: /58   Pulse 78   Temp 98 °F (36.7 °C) (Temporal)   Resp 20   Ht 5' 9\" (1.753 m)   Wt 96.6 kg (213 lb)   SpO2 92%   BMI 31.45 kg/m²     I/O last 24 hours:  In: 875 [P.O.:815; I.V.:10; IV Piggyback:50]  Out: 2825 [Urine:2825]      Physical Exam:   General Alert awake   Normocephalic atraumatic PERRLA  Lungs clear bilaterally  Cardiac normal S1 normal S2  Abdomen soft, flank pain  Right nephrostomy tube  Extremities no edema      Lab Results: CBC:   Lab Results   Component Value Date    WBC 13.50 (H) 2025    HGB 10.7 (L) 2025    HCT 34.1 (L) 2025    MCV 91 2025     2025    RBC 3.77 (L) 2025    MCH 28.4 2025    MCHC 31.4 2025    RDW 16.3 (H) 2025    MPV 10.5 2025    NRBC 0 2025     CMP:   Lab Results   Component Value Date     2025    CO2 17 (L) 2025    BUN 67 (H) 2025    CREATININE 2.73 (H) 2025    CALCIUM 8.5 2025    AST 24 2025    ALT 7 2025    ALKPHOS 48 2025    EGFR 20 2025     Urinalysis:   Lab Results   Component Value Date    COLORU Yellow 2025    CLARITYU Cloudy 2025    SPECGRAV 1.020 2025    PHUR 8.0 2025    PHUR 5.5 2018    LEUKOCYTESUR Large (A) 2025    NITRITE Positive (A) 2025    GLUCOSEU Negative 2025    KETONESU Negative 2025    BILIRUBINUR Negative 2025    BLOODU Moderate (A) 2025     Urine Culture:   Lab Results   Component Value Date    URINECX >100,000 cfu/ml Klebsiella pneumoniae (A) 2025    URINECX >100,000 cfu/ml Klebsiella pneumoniae (A) 2025    URINECX Proteus mirabilis " "(A) 05/16/2025    URINECX 20,000-29,000 cfu/ml 05/16/2025     PSA: No results found for: \"PSA\"      Assessment/ Plan:  Bacteremia  Rightpyonephrosis  Status post right nephrostomy tube day #3  Antibiotics as per ICU team  No urologic intervention at this time          Yovani Khan MD  "

## 2025-05-19 NOTE — PROGRESS NOTES
Progress Note - Nephrology   Name: Joel Wyman 79 y.o. male I MRN: [id number]  Unit/Bed#: ICU 09 I Date of Admission: 5/16/2025   Date of Service: 5/19/2025 I Hospital Day: 3     Assessment & Plan  Acute kidney injury superimposed on stage 3b chronic kidney disease (HCC)  Lab Results   Component Value Date    EGFR 20 05/19/2025    EGFR 18 05/18/2025    EGFR 17 05/18/2025    CREATININE 2.73 (H) 05/19/2025    CREATININE 3.06 (H) 05/18/2025    CREATININE 3.16 (H) 05/18/2025   #Non-Oliguric KDIGO severe SHANTI stage 3    Etiology: Multifactorial secondary to obstructive uropathy and sepsis with hypotension   Baseline creatinine 1.3 to 1.5 mg/dL  Renal function improving creatinine down to 2.7 mg/dL and nonoliguric  Peak creatinine: 3.22   UA: Hematuria, leukocyturia, bacteriuria  Renal imaging : Staghorn stone on the right pelvis, mid right ureter stone with right hydroureteronephrosis with perinephric stranding.  Mild distention of the left collecting system  Status post right nephrostomy, Sumner catheter in place as well management as per urology  Treatment:  Blood pressures currently stable and improving currently normotensive  No indication for renal placement therapy as renal function improving.  Recommend follow-up renal imaging  Monitor urine output  Add sodium bicarbonate 650 mg twice a day for the low bicarbonate level    Septic shock (HCC)  Blood pressure improving  Currently afebrile  Saturating 90% on room air  Essential hypertension  Slightly hypervolemic  Currently normotensive  Recommend:  Holding metoprolol due to hypotension  Monitor urinary output   Can use as needed diuretics if needed for worsening volume status but at this time we will monitor  Metabolic acidosis  Start oral sodium bicarbonate  Anemia of chronic disease  Globin stable at 10.7  Treatment:  Transfuse for hemoglobin less than 7.0 per primary service    Type 2 diabetes mellitus with diabetic polyneuropathy, without long-term current use  of insulin ([facility])  Lab Results   Component Value Date    HGBA1C 6.4 (H) 11/12/2024     HbA1c 6.4  Advised to maintain a good DM control to prevent progression of CKD   Maintain healthy diet (vegetables, fruits, whole grains, nonfat or low fat)  Weight loss  Physical activity (5 to 10 minutes to start the increase to 30 min a day)      Recent Labs     05/17/25  0747   POCGLU 85       Blood Sugar Average: Last 72 hrs:  (P) 85      I have reviewed the nephrology recommendations including assessment and plan, with patient, and we are in agreement with renal plan including the information outlined above. Nephrology service will follow.    Subjective   Brief History of Admission - 79 y.o. man  with PMH of CKD G3 b, prostate adenocarcinoma on radiation therapy complicated with severe radiation cystitis, bladder papillomas with ileal conduit p/w lethargy, weakness, foul-smelling urine.  Patient was found to have right hydronephrosis with nephrolithiasis.  Nephrology is consulted for management of SHANTI     Blood pressure has improved remains off pressors.    Objective :  Temp:  [98 °F (36.7 °C)-99 °F (37.2 °C)] 98 °F (36.7 °C)  HR:  [] 78  BP: ()/(52-66) 121/58  Resp:  [18-27] 20  SpO2:  [91 %-99 %] 92 %  O2 Device: None (Room air)    Current Weight: Weight - Scale: 96.6 kg (213 lb)  First Weight: Weight - Scale: 96.7 kg (213 lb 3 oz)  I/O         05/17 0701  05/18 0700 05/18 0701  05/19 0700 05/19 0701  05/20 0700    P.O. 150 815     I.V. (mL/kg) 378.3 (3.9) 10 (0.1)     IV Piggyback 2150 50     Total Intake(mL/kg) 2678.3 (27.7) 875 (9)     Urine (mL/kg/hr) 1970 (0.8) 2825 (1.2)     Stool 0 0     Total Output 1970 2825     Net +708.3 -1950            Unmeasured Stool Occurrence 2 x 2 x           Physical Exam  Constitutional:       General: He is not in acute distress.     Appearance: He is well-developed. He is not diaphoretic.   HENT:      Head: Normocephalic and atraumatic.     Eyes:      General: No scleral  icterus.     Pupils: Pupils are equal, round, and reactive to light.       Cardiovascular:      Rate and Rhythm: Normal rate and regular rhythm.      Heart sounds: Normal heart sounds. No murmur heard.     No friction rub. No gallop.   Pulmonary:      Effort: Pulmonary effort is normal. No respiratory distress.      Breath sounds: Normal breath sounds. No wheezing or rales.   Chest:      Chest wall: No tenderness.   Abdominal:      General: Bowel sounds are normal. There is no distension.      Palpations: Abdomen is soft.      Tenderness: There is no abdominal tenderness. There is no rebound.     Musculoskeletal:         General: Normal range of motion.      Cervical back: Normal range of motion and neck supple.     Skin:     Findings: No rash.     Neurological:      Mental Status: He is alert and oriented to person, place, and time.         Medications:  Current Medications[1]      Lab Results: I have reviewed the following results:  Results from last 7 days   Lab Units 05/19/25  0606 05/18/25  0620 05/18/25  0157 05/17/25  1320 05/17/25  0444 05/16/25  1016   WBC Thousand/uL 13.50* 15.80* 15.80*  --  29.46* 18.69*   HEMOGLOBIN g/dL 10.7* 9.7* 9.7*  --  10.6* 13.2   HEMATOCRIT % 34.1* 31.3* 31.2*  --  34.7* 42.1   PLATELETS Thousands/uL 165 148* 142*  --  181 232   POTASSIUM mmol/L 4.8 4.7 4.6 5.4* 5.6* 5.2   CHLORIDE mmol/L 108 108 111*  --  112* 107   CO2 mmol/L 17* 18* 15*  --  15* 18*   BUN mg/dL 67* 69* 67*  --  65* 56*   CREATININE mg/dL 2.73* 3.06* 3.16*  --  3.22* 2.26*   CALCIUM mg/dL 8.5 7.8* 7.4*  --  7.9* 9.6   MAGNESIUM mg/dL 2.2 2.1 1.9  --  1.6*  --    PHOSPHORUS mg/dL 2.8  --   --   --   --   --    ALBUMIN g/dL  --   --  2.6*  --   --  4.2       Administrative Statements   I have spent a total time of 15 minutes in caring for this patient on the day of the visit/encounter including Counseling / Coordination of care, Documenting in the medical record, Reviewing/placing orders in the medical record  "(including tests, medications, and/or procedures), and Obtaining or reviewing history  .  Portions of the record may have been created with voice recognition software. Occasional wrong word or \"sound a like\" substitutions may have occurred due to the inherent limitations of voice recognition software. Read the chart carefully and recognize, using context, where substitutions have occurred.If you have any questions, please contact the dictating provider.       [1]   Current Facility-Administered Medications:     acetaminophen (TYLENOL) tablet 650 mg, 650 mg, Oral, Q6H PRN, Frieda Dey PA-C, 650 mg at 05/17/25 2133    aluminum-magnesium hydroxide-simethicone (MAALOX) oral suspension 30 mL, 30 mL, Oral, Q4H PRN, Frieda Dey PA-C, 30 mL at 05/18/25 0021    apixaban (ELIQUIS) tablet 2.5 mg, 2.5 mg, Oral, BID, Frieda Dey PA-C, 2.5 mg at 05/19/25 0835    [Held by provider] aspirin chewable tablet 81 mg, 81 mg, Oral, Daily, Frieda Dey PA-C    atorvastatin (LIPITOR) tablet 20 mg, 20 mg, Oral, Daily With Dinner, Frieda Dey PA-C, 20 mg at 05/18/25 1634    cefepime (MAXIPIME) IVPB (premix in dextrose) 2,000 mg 50 mL, 2,000 mg, Intravenous, Q24H, Frieda Dey PA-C, Last Rate: 100 mL/hr at 05/18/25 2013, 2,000 mg at 05/18/25 2013    fluticasone (FLONASE) 50 mcg/act nasal spray 1 spray, 1 spray, Nasal, Daily, Frieda Dey PA-C, 1 spray at 05/19/25 0831    fluticasone-vilanterol 200-25 mcg/actuation 1 puff, 1 puff, Inhalation, Daily, 1 puff at 05/19/25 0830 **AND** umeclidinium 62.5 mcg/actuation inhaler AEPB 1 puff, 1 puff, Inhalation, Daily, Frieda Dey PA-C, 1 puff at 05/19/25 0830    gabapentin (NEURONTIN) capsule 100 mg, 100 mg, Oral, TID, Frieda Dey PA-C, 100 mg at 05/19/25 0835    HYDROmorphone (DILAUDID) injection 0.5 mg, 0.5 mg, Intravenous, Q4H PRN, Frieda Dey PA-C    ipratropium-albuterol (DUO-NEB) 0.5-2.5 mg/3 mL inhalation solution 3 mL, 3 mL, " Nebulization, Q4H PRN, Frieda Dey PA-C    metoprolol tartrate (LOPRESSOR) partial tablet 12.5 mg, 12.5 mg, Oral, Q12H DHAVAL, Frieda Dey PA-C, 12.5 mg at 05/19/25 0834    nicotine (NICODERM CQ) 14 mg/24hr TD 24 hr patch 1 patch, 1 patch, Transdermal, Daily, Frieda Dey PA-C, 1 patch at 05/19/25 0834    ondansetron (ZOFRAN) injection 4 mg, 4 mg, Intravenous, Q6H PRN, Frieda Dey PA-C    sodium bicarbonate tablet 650 mg, 650 mg, Oral, BID after meals, Jamie Farnsworth MD    sodium chloride (PF) 0.9 % injection 10 mL, 10 mL, Intracatheter, Daily, Fireda Dey PA-C, 10 mL at 05/19/25 0839

## 2025-05-19 NOTE — ASSESSMENT & PLAN NOTE
Noted on imaging.  Not absolute contraindication but would favor avoiding fluoroquinolones given this issue.

## 2025-05-19 NOTE — ASSESSMENT & PLAN NOTE
Patient presented acutely ill and blood cultures from admission have isolated Klebsiella, Proteus in 2 sets and group B strep in 1 set.  Susceptibilities pending.  This is likely coming from patient's obstructive pyelonephritis as urine cultures isolating the same.  Patient is without any intravascular devices and has no other localizing symptoms on exam.  Overall clinically appears to be improving.  Low suspicion for complicated bacteremia.  Echo pending.  Repeat cultures pending for clearance.  Continue cefepime, dose adjusted to 1 g every 12 hours for now  Follow-up initial blood culture susceptibilities  Follow-up repeat cultures for clearance  Trend fever curve/vitals  Repeat CBC/chemistry tomorrow  Follow-up pending echo  Monitor exam for new/developing symptoms  Anticipate 7 days of antibiotic for gram-negative organisms  Will likely plan for 2 weeks of oral therapy for group B strep present  Will likely need to maintain cefepime given Citrobacter in PCN culture  Additional supportive care per primary  Additional interventions pending clinical course

## 2025-05-19 NOTE — ASSESSMENT & PLAN NOTE
Likely secondary to SHANTI on CKD  Previously on bicarb drip which has since been discontinued  Started on oral sodium bicarb

## 2025-05-19 NOTE — PROGRESS NOTES
Progress Note - Hospitalist   Name: Joel Wyman 79 y.o. male I MRN: [id number]  Unit/Bed#: ICU 09 I Date of Admission: 5/16/2025   Date of Service: 5/19/2025 I Hospital Day: 3    Assessment & Plan  Sepsis (HCC)  Patient presented to the ED with right flank/lower quadrant abdominal pain along with fever at home   Sepsis as noted by leukocytosis, tachycardia in the setting of obstructive pyelonephritis, polymicrobial bacteremia  Lactic acid within normal limits  WBC count trending down  Continue IV cefepime  Required transfer to ICU due to hypotension with minimal response to fluid boluses but never required vasopressors as blood pressures did improve  Repeat lab work in a.m.  Polymicrobial bacteremia  BC positive for Klebsiella, Proteus in 2 sets and group B strep in 1 set  Continue IV cefepime  Repeat blood cultures pending  Per ID plan for 7 days of antibiotic for gram-negative organisms in 2 weeks of oral treatment for group B strep  Follow-up pending echo  Obstructive pyelonephritis  Patient with history of prostate cancer. 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 Patient had suprapubic catheter placed. Then patient was followed up at [place] where he underwent simple cystectomy with ileal conduit in May 2024   CT with evidence of right sided perinephritic stranding  Patient has right-sided nephrostomy tube in place   Continue IV cefepime as noted above   Hydronephrosis  CT A/P: Right urolithiasis with increased stone burden in comparison to 10/25/2024 including staghorn calculus in the right renal pelvis measuring 2 cm and additional two obstructing calculi in the distal ureter just proximal to the anastomosis measuring up to 8 mm.  S/p right nephrostomy tube placement on 5/16/25.   Follow up body fluid culture from R nephrostomy tube placement with growth of gram-positive cocci, Klebsiella, Proteus, Citrobacter.    Continue Cefepime as above  Urology consulted, recommendations are appreciated  Conversion of nephrostomy tube to nephroureteral stent for flexible ureteroscopy, stone removal in a few weeks.  5/17/25: Repeat CT A/P showed that the right-sided nephrostomy tube is in the expected position  Acute kidney injury superimposed on stage 3b chronic kidney disease ([facility])  Lab Results   Component Value Date    EGFR 20 05/19/2025    EGFR 18 05/18/2025    EGFR 17 05/18/2025    CREATININE 2.73 (H) 05/19/2025    CREATININE 3.06 (H) 05/18/2025    CREATININE 3.16 (H) 05/18/2025   Etiology: Multifactorial secondary to obstructive uropathy and hypotension in the settings of sepsis  Baseline creatinine 1.3-1.5  Peak creatinine 3.22, now trending down to 2.73  S/p right nephrostomy on 5/16/25 for staghorn stone and associated hydronephrosis on imaging  Off IV fluids  Repeat lab work in a.m.  Atrial fibrillation ([facility])  Per ICU note, patient had short self-limited runs of A-fib while in the ICU  Monitor on telemetry  Currently on Eliquis and Lopressor  Metabolic acidosis  Likely secondary to SHANTI on CKD  Previously on bicarb drip which has since been discontinued  Started on oral sodium bicarb  Essential hypertension  Previously with hypotension requiring transfer to ICU  Restarted metoprolol tartrate   Monitor BPs  COPD (chronic obstructive pulmonary disease) ([facility])  No acute exacerbation  Trelegy substituted with fluticasone-vilanterol and umeclidinium.  Continue DuoNebs as needed  Type 2 diabetes mellitus with diabetic polyneuropathy, without long-term current use of insulin ([facility])  Lab Results   Component Value Date    HGBA1C 6.4 (H) 11/12/2024       Recent Labs     05/17/25  0747   POCGLU 85     Not currently on oral anti-diabetic agents  Continue carb controlled diet.  Infrarenal abdominal aortic aneurysm (AAA) without rupture ([facility])  On imaging noted to have a 3 cm fusiform infrarenal AAA  Outpatient follow-up  Acute hypoxic  respiratory failure (HCC) (Resolved: 5/19/2025)  Was previously on oxygen but currently on room air  Chest x-ray with moderate bibasilar opacities-atelectasis versus aspiration  Hyperkalemia (Resolved: 5/19/2025)  Now resolved    VTE Pharmacologic Prophylaxis:   Eliquis    Mobility:   Basic Mobility Inpatient Raw Score: 18  JH-HLM Goal: 6: Walk 10 steps or more  JH-HLM Achieved: 6: Walk 10 steps or more  JH-HLM Goal achieved. Continue to encourage appropriate mobility.    Patient Centered Rounds: I performed bedside rounds with nursing staff today.   Discussions with Specialists or Other Care Team Provider: Yes-ID    Education and Discussions with Family / Patient: Attempted to update  (wife) via phone. Left voicemail.     Current Length of Stay: 3 day(s)  Current Patient Status: Inpatient   Certification Statement: The patient will continue to require additional inpatient hospital stay due to bacteremia, pyelonephritis, SHANTI on CKD  Discharge Plan: Anticipate discharge in 48-72 hrs to discharge location to be determined pending rehab evaluations.    Code Status: Level 1 - Full Code    Subjective   Patient reports some pain by tube insertion site    Objective :  Temp:  [98 °F (36.7 °C)-99 °F (37.2 °C)] 98 °F (36.7 °C)  HR:  [] 78  BP: ()/(52-66) 121/58  Resp:  [18-27] 20  SpO2:  [91 %-99 %] 92 %  O2 Device: None (Room air)    Body mass index is 31.45 kg/m².     Input and Output Summary (last 24 hours):     Intake/Output Summary (Last 24 hours) at 5/19/2025 0932  Last data filed at 5/19/2025 0800  Gross per 24 hour   Intake 760 ml   Output 3275 ml   Net -2515 ml       Physical Exam  Vitals reviewed.   Constitutional:       General: He is not in acute distress.     Appearance: He is ill-appearing (chronically). He is not toxic-appearing.   HENT:      Head: Normocephalic and atraumatic.     Eyes:      General:         Right eye: No discharge.         Left eye: No discharge.        Cardiovascular:      Rate and Rhythm: Normal rate and regular rhythm.   Pulmonary:      Effort: Pulmonary effort is normal. No respiratory distress.      Breath sounds: Normal breath sounds. No wheezing or rales.   Abdominal:      General: Bowel sounds are normal. There is no distension.      Palpations: Abdomen is soft.      Tenderness: There is no abdominal tenderness.      Comments: Ileal conduit in place     Skin:     Comments: Right-sided nephrostomy in place     Neurological:      Mental Status: He is alert.         Lines/Drains:  Lines/Drains/Airways       Active Status       Name Placement date Placement time Site Days    Nephrostomy Retrograde/ Ileal Conduit Right 05/16/25  --  Right  3    Nephrostomy Right 10.2 Fr. 05/16/25  1627  Right  2                        Lab Results: I have reviewed the following results:   Results from last 7 days   Lab Units 05/19/25  0606   WBC Thousand/uL 13.50*   HEMOGLOBIN g/dL 10.7*   HEMATOCRIT % 34.1*   PLATELETS Thousands/uL 165   SEGS PCT % 85*   LYMPHO PCT % 6*   MONO PCT % 6   EOS PCT % 2     Results from last 7 days   Lab Units 05/19/25  0606 05/18/25  0620 05/18/25  0157   SODIUM mmol/L 134*   < > 134*   POTASSIUM mmol/L 4.8   < > 4.6   CHLORIDE mmol/L 108   < > 111*   CO2 mmol/L 17*   < > 15*   BUN mg/dL 67*   < > 67*   CREATININE mg/dL 2.73*   < > 3.16*   ANION GAP mmol/L 9   < > 8   CALCIUM mg/dL 8.5   < > 7.4*   ALBUMIN g/dL  --   --  2.6*   TOTAL BILIRUBIN mg/dL  --   --  0.28   ALK PHOS U/L  --   --  48   ALT U/L  --   --  7   AST U/L  --   --  24   GLUCOSE RANDOM mg/dL 96   < > 125    < > = values in this interval not displayed.     Results from last 7 days   Lab Units 05/16/25  1530   INR  1.16     Results from last 7 days   Lab Units 05/17/25  0747   POC GLUCOSE mg/dl 85         Results from last 7 days   Lab Units 05/19/25  0606 05/18/25  0157 05/17/25 2052 05/16/25  1016   LACTIC ACID mmol/L  --   --  1.2 0.7   PROCALCITONIN ng/ml 19.60* 35.16*  --    --        Recent Cultures (last 7 days):   Results from last 7 days   Lab Units 05/18/25  0857 05/18/25  0620 05/18/25  0456 05/18/25  0231 05/16/25  1629 05/16/25  1235 05/16/25  1234 05/16/25  1220   BLOOD CULTURE  Received in Microbiology Lab. Culture in Progress. Received in Microbiology Lab. Culture in Progress.  --   --   --   --   --  Klebsiella pneumoniae*   GRAM STAIN RESULT   --   --  2+ Epithelial cells per low power field*  4+ Gram positive cocci in pairs*  Rare Gram variable rods*  --  4+ Polys*  4+ Gram positive cocci in pairs, chains and clusters*  --  Gram negative rods*  Gram positive cocci in pairs and chains* Gram negative rods*   URINE CULTURE   --   --   --   --   --  >100,000 cfu/ml Klebsiella pneumoniae*  >100,000 cfu/ml Klebsiella pneumoniae*  Proteus mirabilis*  20,000-29,000 cfu/ml  --   --    BODY FLUID CULTURE, STERILE   --   --   --   --  4+ Growth of Proteus mirabilis*  4+ Growth of Klebsiella pneumoniae*  4+ Growth of Citrobacter freundii*  4+ Growth of  --   --   --    LEGIONELLA URINARY ANTIGEN   --   --   --  Negative  --   --   --   --        Imaging Results Review: I reviewed radiology reports from this admission including: chest xray.  Other Study Results Review: No additional pertinent studies reviewed.    Last 24 Hours Medication List:     Current Facility-Administered Medications:     acetaminophen (TYLENOL) tablet 650 mg, Q6H PRN    aluminum-magnesium hydroxide-simethicone (MAALOX) oral suspension 30 mL, Q4H PRN    apixaban (ELIQUIS) tablet 2.5 mg, BID    [Held by provider] aspirin chewable tablet 81 mg, Daily    atorvastatin (LIPITOR) tablet 20 mg, Daily With Dinner    cefepime (MAXIPIME) IVPB (premix in dextrose) 2,000 mg 50 mL, Q24H, Last Rate: 2,000 mg (05/18/25 2013)    fluticasone (FLONASE) 50 mcg/act nasal spray 1 spray, Daily    fluticasone-vilanterol 200-25 mcg/actuation 1 puff, Daily **AND** umeclidinium 62.5 mcg/actuation inhaler AEPB 1 puff, Daily     gabapentin (NEURONTIN) capsule 100 mg, TID    HYDROmorphone (DILAUDID) injection 0.5 mg, Q4H PRN    ipratropium-albuterol (DUO-NEB) 0.5-2.5 mg/3 mL inhalation solution 3 mL, Q4H PRN    metoprolol tartrate (LOPRESSOR) partial tablet 12.5 mg, Q12H DHAVAL    nicotine (NICODERM CQ) 14 mg/24hr TD 24 hr patch 1 patch, Daily    ondansetron (ZOFRAN) injection 4 mg, Q6H PRN    sodium bicarbonate tablet 650 mg, BID after meals    sodium chloride (PF) 0.9 % injection 10 mL, Daily    Administrative Statements   Today, Patient Was Seen By: Janny Marques DO      **Please Note: This note may have been constructed using a voice recognition system.**

## 2025-05-19 NOTE — ASSESSMENT & PLAN NOTE
CT A/P: Right urolithiasis with increased stone burden in comparison to 10/25/2024 including staghorn calculus in the right renal pelvis measuring 2 cm and additional two obstructing calculi in the distal ureter just proximal to the anastomosis measuring up to 8 mm.  S/p right nephrostomy tube placement on 5/16/25.   Follow up body fluid culture from R nephrostomy tube placement with growth of gram-positive cocci, Klebsiella, Proteus, Citrobacter.   Continue Cefepime as above  Urology consulted, recommendations are appreciated  Conversion of nephrostomy tube to nephroureteral stent for flexible ureteroscopy, stone removal in a few weeks.  5/17/25: Repeat CT A/P showed that the right-sided nephrostomy tube is in the expected position

## 2025-05-19 NOTE — ASSESSMENT & PLAN NOTE
No acute exacerbation  Trelegy substituted with fluticasone-vilanterol and umeclidinium.  Continue DuoNebs as needed

## 2025-05-19 NOTE — CONSULTS
Consultation - Infectious Disease   Name: Joel Wyman 79 y.o. male I MRN: [id number]  Unit/Bed#: ICU 09 I Date of Admission: 5/16/2025   Date of Service: 5/19/2025 I Hospital Day: 3   Inpatient consult to Infectious Diseases  Consult performed by: Gabi Fenton MD  Consult ordered by: Venus Matthews PA-C        Physician Requesting Evaluation: Janny Marques DO   Reason for Evaluation / Principal Problem: Polymicrobial bacteremia    Assessment & Plan  Sepsis (HCC)  Patient met sepsis criteria early on.  Sources are obstructive pyelonephritis with development of polymicrobial bacteremia.  Clinical parameters now seem to be improving.  Repeat cultures and susceptibilities pending.  Antibiotics adjusted as below  Continue to trend fever curve/vitals  Repeat CBC/chemistry tomorrow for treatment response/dosing  Follow-up pending blood cultures for clearance  Follow-up initial cultures for susceptibilities  Follow-up pending echo  Monitor exam for new/developing symptoms  Additional supportive care per primary  Additional interventions pending clinical course  Polymicrobial bacteremia  Patient presented acutely ill and blood cultures from admission have isolated Klebsiella, Proteus in 2 sets and group B strep in 1 set.  Susceptibilities pending.  This is likely coming from patient's obstructive pyelonephritis as urine cultures isolating the same.  Patient is without any intravascular devices and has no other localizing symptoms on exam.  Overall clinically appears to be improving.  Low suspicion for complicated bacteremia.  Echo pending.  Repeat cultures pending for clearance.  Continue cefepime, dose adjusted to 1 g every 12 hours for now  Follow-up initial blood culture susceptibilities  Follow-up repeat cultures for clearance  Trend fever curve/vitals  Repeat CBC/chemistry tomorrow  Follow-up pending echo  Monitor exam for new/developing symptoms  Anticipate 7 days of antibiotic for gram-negative organisms  Will  likely plan for 2 weeks of oral therapy for group B strep present  Will likely need to maintain cefepime given Citrobacter in PCN culture  Additional supportive care per primary  Additional interventions pending clinical course  Obstructive pyelonephritis  In the setting of nephrolithiasis.  Other considerations are for lower tract obstruction in the setting of prostate cancer and prior issues per urology note.  Now has PCN in place with improvement.  Cultures polymicrobial with Klebsiella, Proteus, strep organisms and Citrobacter.  Unfortunately the Citrobacter does limit antibiotic options.  Will avoid fluoroquinolone given AAA and age  Continue cefepime as above  Will plan for 7 days of antibiotic for this issue  Follow-up pending cultures and susceptibilities otherwise  Follow-up pending echo  Monitor PCN output  Ongoing followed by urology  Acute kidney injury superimposed on stage 3b chronic kidney disease (HCC)  Acute elevation in creatinine noted from baseline.  Currently downtrending.  This does impact antibiotic dosing.  Cefepime dosing as above  Will reach out to clinical ID pharmacist to review dosing  Repeat chemistry tomorrow  Will dose adjust as needed  Ongoing follow-up by nephrology  Type 2 diabetes mellitus with diabetic polyneuropathy, without long-term current use of insulin (HCC)  Well-controlled disease with hemoglobin A1c of 6.4.  Ongoing glucose management per primary.  Infrarenal abdominal aortic aneurysm (AAA) without rupture (HCC)  Noted on imaging.  Not absolute contraindication but would favor avoiding fluoroquinolones given this issue.  Class 1 obesity in adult  BMI noted to be 32.  Does impact antibiotic dosing.  Will favor higher baseline antibiotic dosing when possible.  Current antibiotics dose adjusted as above.    Above plan discussed in detail with the patient at bedside  Above plan discussed in detail with primary service attending who is aware of plans for ongoing antibiotics  and tentative durations of therapy    ID consult service will continue to follow.    HISTORY OF PRESENT ILLNESS:  HPI: Joel Wyman is a 79 y.o. year old male with prior prostate cancer, cystectomy status post ileal conduit and CKD who presented with right sided flank and lower abdominal pain that started the previous day while watching TV.  Patient then developed episode of nausea and vomiting in the morning.  He contacted urology office and was recommended to come to the ER.  In the ER patient had imaging done which was concerning for obstructing stones causing hydronephrosis.  He essentially met sepsis criteria on admission and was started on antibiotics.  Cultures were obtained.  Given the obstructive changes IR was consulted for percutaneous nephrostomy tube placement.  Drain was placed on 5/16 with purulent urine obtained and sent for culture.  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  Patient was later noted to have bacteremia and so he was recommended for continued antibiotic and largely conservative management from a urology standpoint.  They report plans for further imaging as an outpatient to check for reflux versus obstruction of the left ureter and eventual management of his stones.  Patient was also seen by nephrology on this admission and noted to have baseline creatinine of 1.3-1.5.  With positive blood cultures antibiotics were further adjusted, repeat cultures ordered and echo was also ordered.  There was brief period then overnight on 5/17 into 5/18 where patient had progressive hypotension and was ultimately transferred to the ICU.  ICU notes reviewed and patient  never required pressors but did require aggressive fluid hydration.  With improvement in blood pressures he was eventually transferred back to medicine service.  On exam today the patient denies having any nausea, vomiting, chest pain or shortness of breath.  He reports having back discomfort where the tube is in place.  He is tolerating antibiotics without issue.  Denies having any other focal areas of pain.  We reviewed at this point plans for repeat blood cultures, following up echo and hopeful eventual transition to oral therapy depending on clearance on cultures and previous susceptibilities.  Additional questions were answered.  On review fever curve has overall downtrended.  White blood cell count now downtrending.  Blood cultures with Klebsiella, Proteus and 1 culture with group B strep.  Urine cultures predominantly with Klebsiella and Proteus.  Percutaneous nephrostomy cultures with Proteus, Klebsiella but also Citrobacter and streptococcal organisms per the lab.  Repeat blood cultures are and echo are pending.  We are consulted at this time for further assistance with management.    REVIEW OF SYSTEMS:  A complete 12 point system-based review of systems is negative other than that noted in the HPI.    PAST MEDICAL HISTORY:  Past Medical History:   Diagnosis Date    Bladder infection 03/2024    Borderline diabetes     Cancer (HCC)     prostate    COPD (chronic obstructive pulmonary disease) (HCC)     Hematuria     History of transfusion     x2    Hyperlipidemia     Hypertension     Radiation cystitis     Smoker     Smoker     Wears glasses      Past Surgical History:   Procedure Laterality Date    APPENDECTOMY      FL CYSTOGRAM  4/24/2024    FL CYSTOGRAM  5/23/2024    FL RETROGRADE PYELOGRAM  04/14/2021    FL RETROGRADE PYELOGRAM  11/16/2023    FL RETROGRADE PYELOGRAM  4/11/2024    HERNIA REPAIR      IR EMBOLIZATION (SPECIFY VESSEL OR SITE)  09/17/2021    IR NEPHROSTOMY TUBE CHECK AND/OR REMOVAL  07/08/2021     IR NEPHROSTOMY TUBE CHECK/CHANGE/REPOSITION/REINSERTION/UPSIZE  05/10/2021    IR NEPHROSTOMY TUBE CHECK/CHANGE/REPOSITION/REINSERTION/UPSIZE  08/04/2021    IR NEPHROSTOMY TUBE CHECK/CHANGE/REPOSITION/REINSERTION/UPSIZE  10/13/2021    IR NEPHROSTOMY TUBE PLACEMENT  05/07/2021    IR NEPHROSTOMY TUBE PLACEMENT  5/16/2025    IR SUPRAPUBIC CATHETER PLACEMENT  5/3/2024    MO CYSTO W/IRRIG & EVAC MULTPLE OBSTRUCTING CLOTS Bilateral 04/14/2021    Procedure: CYSTOSCOPY EVACUATION OF CLOTS bilateral retrograde pyelograms possible TURBT bladder biopsy;  Surgeon: Yovani Khan MD;  Location: [facility];  Service: Urology    MO CYSTO W/IRRIG & EVAC MULTPLE OBSTRUCTING CLOTS N/A 04/24/2021    Procedure: CYSTOSCOPY EVACUATION OF CLOTS fulguration;  Surgeon: Yovani Khan MD;  Location: [facility];  Service: Urology    MO CYSTO W/IRRIG & EVAC MULTPLE OBSTRUCTING CLOTS N/A 07/08/2021    Procedure: CYSTOSCOPY EVACUATION OF CLOTS BILATERAL ANTIROGRADES NEPHROSTOMY TUBES, FULGERATION ;  Surgeon: Yovani Khan MD;  Location: [facility];  Service: Urology    MO CYSTO W/IRRIG & EVAC MULTPLE OBSTRUCTING CLOTS N/A 08/22/2021    Procedure: CYSTOSCOPY, RIGHT RETROGRADE, EVACUATION OF CLOTS, BLADDER BIOPSY X2 AND FULGERATION OF BLADDER LESIONS, URETEROSCOPY, 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;  Surgeon: Yovani Khan MD;  Location: [facility];  Service: Urology    MO CYSTO W/IRRIG & EVAC MULTPLE OBSTRUCTING CLOTS Bilateral 4/24/2024    Procedure: CYSTOSCOPY EVACUATION OF CLOTS fulguration bladder neck tumors and biopsy, retrograde pyelograms, DVIU;  Surgeon: Yovani Khan MD;  Location: [facility];  Service: Urology    MO CYSTO W/IRRIG & EVAC MULTPLE OBSTRUCTING CLOTS N/A 5/23/2024    Procedure: CYSTOSCOPY FULGERATION RESECTION BLADDER NECK, FLUORSCOPIC CYSTOGRAM, EVACUATION OF CLOTS;  Surgeon: Yovani Khan MD;  Location: [facility];  Service: Urology    MO CYSTOURETHROSCOPY  W/DEST &/RMVL MED BLADDER AMARI N/A 2024    Procedure: TRANSURETHRAL RESECTION OF BLADDER TUMOR (TURBT);  Surgeon: Yovani Khan MD;  Location: [facility];  Service: Urology    AK CYSTOURETHROSCOPY W/URETERAL CATHETERIZATION Bilateral 2024    Procedure: BILATERAL CYSTOSCOPY WITH RETROGRADE PYELOGRAM, BLADDER NECK CONTRACTURE;  Surgeon: Yovani Khan MD;  Location: [facility];  Service: Urology    AK CYSTOURETHROSCOPY WITH BIOPSY N/A 2023    Procedure: CYSTOSCOPY, BILATERAL RETROGRADEY PYELOGRAM,  FULGERATION 2.0 CM BLADDER TUMOR WITH BIOPSY;  Surgeon: Yovani Khan MD;  Location: [facility];  Service: Urology    PROSTATECTOMY  2014    ROTATOR CUFF REPAIR      right shoulder       FAMILY HISTORY:  Non-contributory    SOCIAL HISTORY:  Social History   Social History     Substance and Sexual Activity   Alcohol Use Never    Comment: None in over 50 yrs     Social History     Substance and Sexual Activity   Drug Use Never     Tobacco Use History[1]    ALLERGIES:  Allergies[2]    MEDICATIONS:  All current active medications have been reviewed.    PHYSICAL EXAM:  Temp:  [97.7 °F (36.5 °C)-99 °F (37.2 °C)] 97.7 °F (36.5 °C)  HR:  [] 78  Resp:  [18-27] 20  BP: ()/(52-66) 121/58  SpO2:  [91 %-99 %] 92 %  Temp (24hrs), Av.3 °F (36.8 °C), Min:97.7 °F (36.5 °C), Max:99 °F (37.2 °C)  Current: Temperature: 97.7 °F (36.5 °C)    Intake/Output Summary (Last 24 hours) at 2025 1204  Last data filed at 2025 0800  Gross per 24 hour   Intake 560 ml   Output 3275 ml   Net -2715 ml       General Appearance:  Chronically ill-appearing, nontoxic and in no acute distress.   Head:  Normocephalic, without obvious abnormality, atraumatic   Eyes:  Conjunctiva pink and sclera anicteric, both eyes   Nose: Nares normal, mucosa normal, no drainage   Throat: Oropharynx moist without lesions   Neck: Supple, symmetrical, no adenopathy, no tenderness/mass/nodules; no spinal or paraspinal muscle tenderness to  palpation   Back:   Symmetric, no curvature, ROM normal, no CVA tenderness; no spinal or paraspinal muscle tenderness to palpation.  PCN draining clear urine.   Lungs:   Clear to auscultation bilaterally, respirations unlabored   Chest Wall:  No tenderness or deformity   Heart:  RRR; no murmur, rub or gallop noted   Abdomen:   Soft, non-tender, non-distended, positive bowel sounds, ileoconduit site unremarkable.   Extremities: No cyanosis, clubbing or edema   Skin: No rashes or lesions. No draining wounds noted.   Lymph nodes: Cervical, supraclavicular nodes normal   Neurologic: Alert and oriented times 3, extremity strength 5/5 and symmetric       LABS, IMAGING, & OTHER STUDIES:  In completing this consult I have performed an extensive review of the medical records in epic including review of the notes, radiographs, and laboratory results as detailed below.     Lab Results:  I have personally reviewed pertinent labs.  Comments/Interpretations: White blood cell count downtrending.  Platelets improving.  Creatinine also downtrending.    Results from last 7 days   Lab Units 05/19/25  0606 05/18/25 0620 05/18/25 0157   WBC Thousand/uL 13.50* 15.80* 15.80*   HEMOGLOBIN g/dL 10.7* 9.7* 9.7*   PLATELETS Thousands/uL 165 148* 142*     Results from last 7 days   Lab Units 05/19/25  0606 05/18/25  0620 05/18/25  0157 05/17/25  0444 05/16/25  1016   POTASSIUM mmol/L 4.8   < > 4.6   < > 5.2   CHLORIDE mmol/L 108   < > 111*   < > 107   CO2 mmol/L 17*   < > 15*   < > 18*   BUN mg/dL 67*   < > 67*   < > 56*   CREATININE mg/dL 2.73*   < > 3.16*   < > 2.26*   EGFR ml/min/1.73sq m 20   < > 17   < > 26   CALCIUM mg/dL 8.5   < > 7.4*   < > 9.6   AST U/L  --   --  24  --  19   ALT U/L  --   --  7  --  16   ALK PHOS U/L  --   --  48  --  83    < > = values in this interval not displayed.     Results from last 7 days   Lab Units 05/18/25  0857 05/18/25  0620 05/18/25  0456 05/18/25  0231 05/16/25  1629 05/16/25  1235 05/16/25  1234  05/16/25  1220   BLOOD CULTURE  Received in Microbiology Lab. Culture in Progress. Received in Microbiology Lab. Culture in Progress.  --   --   --   --  Gram-negative lorraine- species*  Proteus*  Streptococcus agalactiae (Group B)* Klebsiella pneumoniae*  Proteus*   SPUTUM CULTURE   --   --  Culture too young- will reincubate  --   --   --   --   --    GRAM STAIN RESULT   --   --  2+ Epithelial cells per low power field*  4+ Gram positive cocci in pairs*  Rare Gram variable rods*  --  4+ Polys*  4+ Gram positive cocci in pairs, chains and clusters*  --  Gram negative rods*  Gram positive cocci in pairs and chains* Gram negative rods*   URINE CULTURE   --   --   --   --   --  >100,000 cfu/ml Klebsiella pneumoniae*  >100,000 cfu/ml Klebsiella pneumoniae*  Proteus mirabilis*  20,000-29,000 cfu/ml  --   --    BODY FLUID CULTURE, STERILE   --   --   --   --  4+ Growth of Proteus mirabilis*  4+ Growth of Klebsiella pneumoniae*  4+ Growth of Citrobacter freundii*  4+ Growth of  --   --   --    LEGIONELLA URINARY ANTIGEN   --   --   --  Negative  --   --   --   --        Imaging Studies:   I have personally reviewed pertinent imaging study reports and images in PACS.  Comments/Interpretations:  Chest x-ray with basilar opacities which could be from atelectasis/aspiration  Updated CT imaging of the abdomen pelvis without contrast showed placement of the nephrostomy tube and resolution of hydronephrosis  Initial CT on admission done without contrast also showed the changes of hydronephrosis, parastomal hernia and AAA.    Other Studies:   I have personally reviewed other pertinent reports as below.  Records in CareEverywhere: No recent cultures    Nursing home/EMS Records: None    Current/Prior Cultures: Initial blood cultures on admission polymicrobial.  Susceptibilities pending.  PCN urine cultures reviewed with susceptibilities noted.  Urine cultures collected from conduit also noted.  Repeat blood  cultures now are pending for clearance.         [1]   Social History  Tobacco Use   Smoking Status Every Day    Current packs/day: 1.00    Average packs/day: 0.8 packs/day for 112.4 years (87.4 ttl pk-yrs)    Types: Cigarettes    Start date: 1963    Passive exposure: Past   Smokeless Tobacco Never   [2] No Known Allergies

## 2025-05-19 NOTE — ASSESSMENT & PLAN NOTE
In the setting of nephrolithiasis.  Other considerations are for lower tract obstruction in the setting of prostate cancer and prior issues per urology note.  Now has PCN in place with improvement.  Cultures polymicrobial with Klebsiella, Proteus, strep organisms and Citrobacter.  Unfortunately the Citrobacter does limit antibiotic options.  Will avoid fluoroquinolone given AAA and age  Continue cefepime as above  Will plan for 7 days of antibiotic for this issue  Follow-up pending cultures and susceptibilities otherwise  Follow-up pending echo  Monitor PCN output  Ongoing followed by urology

## 2025-05-19 NOTE — ASSESSMENT & PLAN NOTE
Patient met sepsis criteria early on.  Sources are obstructive pyelonephritis with development of polymicrobial bacteremia.  Clinical parameters now seem to be improving.  Repeat cultures and susceptibilities pending.  Antibiotics adjusted as below  Continue to trend fever curve/vitals  Repeat CBC/chemistry tomorrow for treatment response/dosing  Follow-up pending blood cultures for clearance  Follow-up initial cultures for susceptibilities  Follow-up pending echo  Monitor exam for new/developing symptoms  Additional supportive care per primary  Additional interventions pending clinical course

## 2025-05-19 NOTE — ASSESSMENT & PLAN NOTE
Slightly hypervolemic  Currently normotensive  Recommend:  Holding metoprolol due to hypotension  Monitor urinary output   Can use as needed diuretics if needed for worsening volume status but at this time we will monitor

## 2025-05-19 NOTE — ASSESSMENT & PLAN NOTE
Previously with hypotension requiring transfer to ICU  Restarted metoprolol tartrate   Monitor BPs

## 2025-05-19 NOTE — ASSESSMENT & PLAN NOTE
BMI noted to be 32.  Does impact antibiotic dosing.  Will favor higher baseline antibiotic dosing when possible.  Current antibiotics dose adjusted as above.

## 2025-05-20 LAB
ALBUMIN SERPL BCG-MCNC: 2.9 G/DL (ref 3.5–5)
ALP SERPL-CCNC: 65 U/L (ref 34–104)
ALT SERPL W P-5'-P-CCNC: 15 U/L (ref 7–52)
ANION GAP SERPL CALCULATED.3IONS-SCNC: 6 MMOL/L (ref 4–13)
AST SERPL W P-5'-P-CCNC: 25 U/L (ref 13–39)
BACTERIA BLD CULT: ABNORMAL
BACTERIA SPT RESP CULT: ABNORMAL
BACTERIA SPT RESP CULT: ABNORMAL
BASOPHILS # BLD AUTO: 0.03 THOUSANDS/ÂΜL (ref 0–0.1)
BASOPHILS NFR BLD AUTO: 0 % (ref 0–1)
BILIRUB SERPL-MCNC: 0.39 MG/DL (ref 0.2–1)
BUN SERPL-MCNC: 62 MG/DL (ref 5–25)
CALCIUM ALBUM COR SERPL-MCNC: 9.5 MG/DL (ref 8.3–10.1)
CALCIUM SERPL-MCNC: 8.6 MG/DL (ref 8.4–10.2)
CHLORIDE SERPL-SCNC: 109 MMOL/L (ref 96–108)
CO2 SERPL-SCNC: 21 MMOL/L (ref 21–32)
CREAT SERPL-MCNC: 2.35 MG/DL (ref 0.6–1.3)
EOSINOPHIL # BLD AUTO: 0.24 THOUSAND/ÂΜL (ref 0–0.61)
EOSINOPHIL NFR BLD AUTO: 3 % (ref 0–6)
ERYTHROCYTE [DISTWIDTH] IN BLOOD BY AUTOMATED COUNT: 16.2 % (ref 11.6–15.1)
GFR SERPL CREATININE-BSD FRML MDRD: 25 ML/MIN/1.73SQ M
GLUCOSE SERPL-MCNC: 97 MG/DL (ref 65–140)
GP B STREP DNA BLD POS QL NAA+NON-PROBE: DETECTED
GRAM STN SPEC: ABNORMAL
HCT VFR BLD AUTO: 33.7 % (ref 36.5–49.3)
HGB BLD-MCNC: 10.4 G/DL (ref 12–17)
IMM GRANULOCYTES # BLD AUTO: 0.09 THOUSAND/UL (ref 0–0.2)
IMM GRANULOCYTES NFR BLD AUTO: 1 % (ref 0–2)
KLEBSIELLA SP DNA BLD POS QL NAA+NON-PRB: DETECTED
LYMPHOCYTES # BLD AUTO: 0.88 THOUSANDS/ÂΜL (ref 0.6–4.47)
LYMPHOCYTES NFR BLD AUTO: 10 % (ref 14–44)
MCH RBC QN AUTO: 27.7 PG (ref 26.8–34.3)
MCHC RBC AUTO-ENTMCNC: 30.9 G/DL (ref 31.4–37.4)
MCV RBC AUTO: 90 FL (ref 82–98)
MONOCYTES # BLD AUTO: 0.76 THOUSAND/ÂΜL (ref 0.17–1.22)
MONOCYTES NFR BLD AUTO: 9 % (ref 4–12)
NEUTROPHILS # BLD AUTO: 6.47 THOUSANDS/ÂΜL (ref 1.85–7.62)
NEUTS SEG NFR BLD AUTO: 77 % (ref 43–75)
NRBC BLD AUTO-RTO: 0 /100 WBCS
PLATELET # BLD AUTO: 166 THOUSANDS/UL (ref 149–390)
PMV BLD AUTO: 9.8 FL (ref 8.9–12.7)
POTASSIUM SERPL-SCNC: 4.7 MMOL/L (ref 3.5–5.3)
PROT SERPL-MCNC: 6.4 G/DL (ref 6.4–8.4)
PROTEUS SP DNA BLD POS QL NAA+NON-PROBE: DETECTED
RBC # BLD AUTO: 3.76 MILLION/UL (ref 3.88–5.62)
SODIUM SERPL-SCNC: 136 MMOL/L (ref 135–147)
WBC # BLD AUTO: 8.47 THOUSAND/UL (ref 4.31–10.16)

## 2025-05-20 PROCEDURE — NC001 PR NO CHARGE: Performed by: INTERNAL MEDICINE

## 2025-05-20 PROCEDURE — 85025 COMPLETE CBC W/AUTO DIFF WBC: CPT | Performed by: FAMILY MEDICINE

## 2025-05-20 PROCEDURE — 99232 SBSQ HOSP IP/OBS MODERATE 35: CPT | Performed by: FAMILY MEDICINE

## 2025-05-20 PROCEDURE — G0545 PR INHERENT VISIT TO INPT: HCPCS | Performed by: INTERNAL MEDICINE

## 2025-05-20 PROCEDURE — 97530 THERAPEUTIC ACTIVITIES: CPT

## 2025-05-20 PROCEDURE — 94664 DEMO&/EVAL PT USE INHALER: CPT

## 2025-05-20 PROCEDURE — 97167 OT EVAL HIGH COMPLEX 60 MIN: CPT

## 2025-05-20 PROCEDURE — 94760 N-INVAS EAR/PLS OXIMETRY 1: CPT

## 2025-05-20 PROCEDURE — 94640 AIRWAY INHALATION TREATMENT: CPT

## 2025-05-20 PROCEDURE — 97163 PT EVAL HIGH COMPLEX 45 MIN: CPT

## 2025-05-20 PROCEDURE — 99233 SBSQ HOSP IP/OBS HIGH 50: CPT | Performed by: INTERNAL MEDICINE

## 2025-05-20 PROCEDURE — 80053 COMPREHEN METABOLIC PANEL: CPT | Performed by: FAMILY MEDICINE

## 2025-05-20 PROCEDURE — 99232 SBSQ HOSP IP/OBS MODERATE 35: CPT | Performed by: INTERNAL MEDICINE

## 2025-05-20 RX ADMIN — SODIUM CHLORIDE, PRESERVATIVE FREE 10 ML: 5 INJECTION INTRAVENOUS at 09:52

## 2025-05-20 RX ADMIN — IPRATROPIUM BROMIDE AND ALBUTEROL SULFATE 3 ML: .5; 3 SOLUTION RESPIRATORY (INHALATION) at 07:24

## 2025-05-20 RX ADMIN — GABAPENTIN 100 MG: 100 CAPSULE ORAL at 08:43

## 2025-05-20 RX ADMIN — SODIUM BICARBONATE 650 MG TABLET 650 MG: at 08:43

## 2025-05-20 RX ADMIN — FLUTICASONE FUROATE AND VILANTEROL TRIFENATATE 1 PUFF: 200; 25 POWDER RESPIRATORY (INHALATION) at 08:51

## 2025-05-20 RX ADMIN — IPRATROPIUM BROMIDE AND ALBUTEROL SULFATE 3 ML: .5; 3 SOLUTION RESPIRATORY (INHALATION) at 20:49

## 2025-05-20 RX ADMIN — IPRATROPIUM BROMIDE AND ALBUTEROL SULFATE 3 ML: .5; 3 SOLUTION RESPIRATORY (INHALATION) at 13:26

## 2025-05-20 RX ADMIN — APIXABAN 2.5 MG: 2.5 TABLET, FILM COATED ORAL at 08:43

## 2025-05-20 RX ADMIN — ATORVASTATIN CALCIUM 20 MG: 20 TABLET, FILM COATED ORAL at 16:00

## 2025-05-20 RX ADMIN — UMECLIDINIUM 1 PUFF: 62.5 AEROSOL, POWDER ORAL at 08:51

## 2025-05-20 RX ADMIN — FLUTICASONE PROPIONATE 1 SPRAY: 50 SPRAY, METERED NASAL at 08:46

## 2025-05-20 RX ADMIN — NICOTINE 1 PATCH: 14 PATCH, EXTENDED RELEASE TRANSDERMAL at 08:43

## 2025-05-20 RX ADMIN — APIXABAN 2.5 MG: 2.5 TABLET, FILM COATED ORAL at 17:24

## 2025-05-20 RX ADMIN — GABAPENTIN 100 MG: 100 CAPSULE ORAL at 16:00

## 2025-05-20 RX ADMIN — Medication 12.5 MG: at 20:50

## 2025-05-20 RX ADMIN — CEFEPIME HYDROCHLORIDE 1000 MG: 1 INJECTION, SOLUTION INTRAVENOUS at 12:07

## 2025-05-20 RX ADMIN — SODIUM BICARBONATE 650 MG TABLET 650 MG: at 17:24

## 2025-05-20 RX ADMIN — Medication 12.5 MG: at 08:43

## 2025-05-20 RX ADMIN — GABAPENTIN 100 MG: 100 CAPSULE ORAL at 20:50

## 2025-05-20 RX ADMIN — CEFEPIME HYDROCHLORIDE 1000 MG: 1 INJECTION, SOLUTION INTRAVENOUS at 00:35

## 2025-05-20 NOTE — PHYSICAL THERAPY NOTE
"       PHYSICAL THERAPY EVALUATION/TREATMENT     05/20/25 0972   PT Last Visit   PT Visit Date 05/20/25   Note Type   Note type Evaluation   Pain Assessment   Pain Assessment Tool 0-10   Pain Score No Pain   Restrictions/Precautions   Other Precautions   (urostomy, R nephrostomy)   Home Living   Type of Home Apartment   Home Layout One level  (6 ALLIE with rails)   Bathroom Shower/Tub Tub/shower unit   Bathroom Equipment Toilet raiser;Grab bars in shower   Home Equipment Walker;Cane   Prior Function   Level of [place] Independent with ADLs;Independent with functional mobility   Lives With Spouse   Receives Help From Family   Falls in the last 6 months 0   Vocational Retired   General   Additional Pertinent History Pt admitted with R flank pain, bacteremia, sepsis now with new R nephrostomy tube.   Family/Caregiver Present No   Cognition   Overall Cognitive Status WFL   Arousal/Participation Alert   Orientation Level Oriented X4   Following Commands Follows all commands and directions without difficulty   Subjective   Subjective \"I feel stiff and weak\"   RLE Assessment   RLE Assessment   (ROM WFL, MMT 4/5)   LLE Assessment   LLE Assessment   (ROM WFL, MMT 4/5)   Bed Mobility   Supine to Sit 5  Supervision   Transfers   Sit to Stand 5  Supervision   Stand to Sit 5  Supervision   Stand pivot 5  Supervision   Additional items   (with walker)   Ambulation/Elevation   Gait pattern   (slow sotero, flexed posture)   Gait Assistance 5  Supervision   Assistive Device Rolling walker   Distance 50 feet   Balance   Static Sitting Good   Static Standing Fair +   Ambulatory Fair   Activity Tolerance   Activity Tolerance Patient limited by fatigue   Assessment   Prognosis Good   Problem List Decreased strength;Decreased endurance;Impaired balance;Decreased mobility;Pain   Assessment Pt is 79 y.o. male seen for PT evaluation s/p admit to [facility] on 5/16/2025 w/ Sepsis ([facility]). PT consulted to assess pt's functional " "mobility and d/c needs. Order placed for PT eval and tx, w/ activity as tolerated order.  Co-morbidities affecting patient's physical performance include: obesity, DM with polyneuropathy, COPD, anemia, prostate cancer, bladder cancer, RA.  Personal factors affecting patient at time of initial evaluation include: stairs to enter home, inability to navigate community distances, and hearing impairments.         Prior to admission, patient was independent with functional mobility without assistive device and independent with ADLS.  Upon evaluation: Pt ambulated with a rolling walker with supervision assist.          Please find objective findings from Physical Therapy assessment regarding body systems outlined above with impairments and limitations including impaired balance, gait deviations, decreased activity tolerance, and decreased functional mobility tolerance.The Barthel Index was used as a functional outcome tool presenting with a score of Barthel Index Score: 60 today indicating moderate limitations of functional mobility and ADLS.  Patient's clinical presentation is currently unstable/unpredictable as seen in patient's presentation of new onset of impairment of functional mobility, decreased endurance, and new onset of weakness. Pt would benefit from continued Physical Therapy treatment to address deficits as defined above and maximize level of functional mobility.     As demonstrated by objective findings, the assigned level of complexity for this evaluation is high.    The patient's -[facility] Basic Mobility Inpatient Short Form Raw Score is 19. A Raw score of greater than 16 suggests the patient may benefit from discharge to home. Please also refer to the recommendation of the Physical Therapist for safe discharge planning.   Goals   Patient Goals \"no pain\" \"feel better\"   [facility] Expiration Date 05/27/25   Short Term Goal #1 1. independent bed mobility,   2. independent transfers,   3. modified independent " "ambulation with a walker 150 indoor and outdoor surfaces,   4. supervision up and down 6 steps so pt can enter and exit his home   LTG Expiration Date 06/03/25   Long Term Goal #1 1. modified independent ambulation with least restrictive device 300 feet  outdoor surfaces so pt can get to his medical appointments   Plan   Treatment/Interventions Functional transfer training;LE strengthening/ROM;Elevations;Therapeutic exercise;Gait training;Equipment eval/education;Patient/family training;Endurance training   PT Frequency 3-5x/wk   Discharge Recommendation   Rehab Resource Intensity Level, PT III (Minimum Resource Intensity)   Equipment Recommended   (Pt has a cane and walker.)   AM-PAC Basic Mobility Inpatient   Turning in Flat Bed Without Bedrails 4   Lying on Back to Sitting on Edge of Flat Bed Without Bedrails 3   Moving Bed to Chair 3   Standing Up From Chair Using Arms 3   Walk in Room 3   Climb 3-5 Stairs With Railing 3   Basic Mobility Inpatient Raw Score 19   Basic Mobility Standardized Score 42.48   [facility] Highest Level Of Mobility   -HLM Goal 6: Walk 10 steps or more   -HLM Achieved 7: Walk 25 feet or more   Barthel Index   Feeding 10   Bathing 0   Grooming Score 5   Dressing Score 5   Bladder Score 0   Bowels Score 10   Toilet Use Score 5   Transfers (Bed/Chair) Score 10   Mobility (Level Surface) Score 10   Stairs Score 5   Barthel Index Score 60   Additional Treatment Session   Start Time 0940   End Time 0950   Treatment Assessment S: \"I think I will need the walker when I go home\"   O:  Pt ambulated an additional 150 feet with a rolling walker with a slow but generally steady gait with supervision assist.   A: Pt fatigues with little activity but is able to ambulate functional distances with a walker.   P: Encourage OOB and ambulation with nursing staff.   End of Consult   Patient Position at End of Consult Bedside chair;All needs within reach;Bed/Chair alarm activated   Licensure   [place] License " Number  Radha Guerrero PT [id number]

## 2025-05-20 NOTE — ASSESSMENT & PLAN NOTE
Patient with history of prostate cancer. 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 Patient had suprapubic catheter placed. Then patient was followed up at [facility] where he underwent simple cystectomy with ileal conduit in May 2024   CT with evidence of right sided perinephritic stranding  Patient has right-sided nephrostomy tube in place   Continue IV cefepime as noted above.

## 2025-05-20 NOTE — PROGRESS NOTES
"Progress Note - Urology      Patient: Joel Wyman   : 1945 Sex: male   MRN: [id number]     CSN: [id number]  Unit/Bed#: [address] [place] [address]     SUBJECTIVE:   Vs stable  CAT scan confirming right nephrostomy in normal position  Left hydronephrosis resolved  White count 8.7 trending down      Objective   Vitals: /66   Pulse 61   Temp 98.3 °F (36.8 °C)   Resp 19   Ht 5' 9\" (1.753 m)   Wt 101 kg (222 lb 3.6 oz)   SpO2 91%   BMI 32.82 kg/m²     I/O last 24 hours:  In: 60 [Other:10; IV Piggyback:50]  Out: 2775 [Urine:2775]      Physical Exam:   General Alert awake   Normocephalic atraumatic PERRLA  Lungs clear bilaterally  Cardiac normal S1 normal S2  Abdomen soft, flank pain  Right nephrostomy tube  Extremities no edema      Lab Results: CBC:   Lab Results   Component Value Date    WBC 8.47 2025    HGB 10.4 (L) 2025    HCT 33.7 (L) 2025    MCV 90 2025     2025    RBC 3.76 (L) 2025    MCH 27.7 2025    MCHC 30.9 (L) 2025    RDW 16.2 (H) 2025    MPV 9.8 2025    NRBC 0 2025     CMP:   Lab Results   Component Value Date     (H) 2025    CO2 21 2025    BUN 62 (H) 2025    CREATININE 2.35 (H) 2025    CALCIUM 8.6 2025    AST 25 2025    ALT 15 2025    ALKPHOS 65 2025    EGFR 25 2025     Urinalysis:   Lab Results   Component Value Date    COLORU Yellow 2025    CLARITYU Cloudy 2025    SPECGRAV 1.020 2025    PHUR 8.0 2025    PHUR 5.5 2018    LEUKOCYTESUR Large (A) 2025    NITRITE Positive (A) 2025    GLUCOSEU Negative 2025    KETONESU Negative 2025    BILIRUBINUR Negative 2025    BLOODU Moderate (A) 2025     Urine Culture:   Lab Results   Component Value Date    URINECX >100,000 cfu/ml Klebsiella pneumoniae (A) 2025    URINECX >100,000 cfu/ml Klebsiella pneumoniae (A) 2025    URINECX Proteus mirabilis (A) " "05/16/2025    URINECX 20,000-29,000 cfu/ml 05/16/2025     PSA: No results found for: \"PSA\"      Assessment/ Plan:  Bacteremia  Rightpyonephrosis  Status post right nephrostomy tube day #4  Antibiotics as per ICU team  No urologic intervention at this time          Yovani Khan MD  "

## 2025-05-20 NOTE — ASSESSMENT & PLAN NOTE
Blood pressure has improved  Currently afebrile  Chest x-ray shows concerns of aspiration currently on IV cefepime  Saturating 90% on room air  WBC count has improved to normal at 8.4

## 2025-05-20 NOTE — ASSESSMENT & PLAN NOTE
"Lab Results   Component Value Date    HGBA1C 6.4 (H) 11/12/2024       No results for input(s): \"POCGLU\" in the last 72 hours.    Not currently on oral anti-diabetic agents  Continue carb controlled diet  "

## 2025-05-20 NOTE — ASSESSMENT & PLAN NOTE
Patient met sepsis criteria early on.  Sources are obstructive pyelonephritis with development of polymicrobial bacteremia.  Clinical parameters now seem to be improving.  Repeat cultures NGTD and initial susceptibilities pending.  Antibiotics as below  Continue to trend fever curve/vitals  Repeat CBC/chemistry tomorrow for treatment response/dosing  Follow-up pending blood cultures for clearance  Follow-up initial cultures for susceptibilities  Recommend transesophageal echo as below  Monitor exam for new/developing symptoms  Additional supportive care per primary  Additional interventions pending clinical course

## 2025-05-20 NOTE — PLAN OF CARE
Problem: Potential for Falls  Goal: Patient will remain free of falls  Description: INTERVENTIONS:  - Educate patient/family on patient safety including physical limitations  - Instruct patient to call for assistance with activity   - Consider consulting OT/PT to assist with strengthening/mobility based on AM PAC & JH-HLM score  - Consult OT/PT to assist with strengthening/mobility   - Keep Call bell within reach  - Keep bed low and locked with side rails adjusted as appropriate  - Keep care items and personal belongings within reach  - Initiate and maintain comfort rounds  - Make Fall Risk Sign visible to staff  - Offer Toileting every 2 Hours, in advance of need  - Initiate/Maintain bed alarm  - Obtain necessary fall risk management equipment: socks  - Apply yellow socks and bracelet for high fall risk patients  - Consider moving patient to room near nurses station  Outcome: Progressing     Problem: Nutrition/Hydration-ADULT  Goal: Nutrient/Hydration intake appropriate for improving, restoring or maintaining nutritional needs  Description: Monitor and assess patient's nutrition/hydration status for malnutrition. Collaborate with interdisciplinary team and initiate plan and interventions as ordered.  Monitor patient's weight and dietary intake as ordered or per policy. Utilize nutrition screening tool and intervene as necessary. Determine patient's food preferences and provide high-protein, high-caloric foods as appropriate.     INTERVENTIONS:  - Monitor oral intake, urinary output, labs, and treatment plans  - Assess nutrition and hydration status and recommend course of action  - Evaluate amount of meals eaten  - Assist patient with eating if necessary   - Allow adequate time for meals  - Recommend/ encourage appropriate diets, oral nutritional supplements, and vitamin/mineral supplements  - Order, calculate, and assess calorie counts as needed  - Recommend, monitor, and adjust tube feedings and TPN/PPN based  on assessed needs  - Assess need for intravenous fluids  - Provide specific nutrition/hydration education as appropriate  - Include patient/family/caregiver in decisions related to nutrition  Outcome: Progressing     Problem: PAIN - ADULT  Goal: Verbalizes/displays adequate comfort level or baseline comfort level  Description: Interventions:  - Encourage patient to monitor pain and request assistance  - Assess pain using appropriate pain scale  - Administer analgesics as ordered based on type and severity of pain and evaluate response  - Implement non-pharmacological measures as appropriate and evaluate response  - Consider cultural and social influences on pain and pain management  - Notify physician/advanced practitioner if interventions unsuccessful or patient reports new pain  - Educate patient/family on pain management process including their role and importance of  reporting pain   - Provide non-pharmacologic/complimentary pain relief interventions  Outcome: Progressing     Problem: INFECTION - ADULT  Goal: Absence or prevention of progression during hospitalization  Description: INTERVENTIONS:  - Assess and monitor for signs and symptoms of infection  - Monitor lab/diagnostic results  - Monitor all insertion sites, i.e. indwelling lines, tubes, and drains  - Monitor endotracheal if appropriate and nasal secretions for changes in amount and color  - Quail appropriate cooling/warming therapies per order  - Administer medications as ordered  - Instruct and encourage patient and family to use good hand hygiene technique  - Identify and instruct in appropriate isolation precautions for identified infection/condition  Outcome: Progressing  Goal: Absence of fever/infection during neutropenic period  Description: INTERVENTIONS:  - Monitor WBC  - Perform strict hand hygiene  - Limit to healthy visitors only  - No plants, dried, fresh or silk flowers with man in patient room  Outcome: Progressing     Problem:  SAFETY ADULT  Goal: Patient will remain free of falls  Description: INTERVENTIONS:  - Educate patient/family on patient safety including physical limitations  - Instruct patient to call for assistance with activity   - Consider consulting OT/PT to assist with strengthening/mobility based on AM PAC & JH-HLM score  - Consult OT/PT to assist with strengthening/mobility   - Keep Call bell within reach  - Keep bed low and locked with side rails adjusted as appropriate  - Keep care items and personal belongings within reach  - Initiate and maintain comfort rounds  - Make Fall Risk Sign visible to staff  - Offer Toileting every 2 Hours, in advance of need  - Initiate/Maintain bed alarm  - Obtain necessary fall risk management equipment: socks  - Apply yellow socks and bracelet for high fall risk patients  - Consider moving patient to room near nurses station  Outcome: Progressing  Goal: Maintain or return to baseline ADL function  Description: INTERVENTIONS:  -  Assess patient's ability to carry out ADLs; assess patient's baseline for ADL function and identify physical deficits which impact ability to perform ADLs (bathing, care of mouth/teeth, toileting, grooming, dressing, etc.)  - Assess/evaluate cause of self-care deficits   - Assess range of motion  - Assess patient's mobility; develop plan if impaired  - Assess patient's need for assistive devices and provide as appropriate  - Encourage maximum independence but intervene and supervise when necessary  - Involve family in performance of ADLs  - Assess for home care needs following discharge   - Consider OT consult to assist with ADL evaluation and planning for discharge  - Provide patient education as appropriate  - Monitor functional capacity and physical performance, use of AM PAC & JH-HLM   - Monitor gait, balance and fatigue with ambulation    Outcome: Progressing  Goal: Maintains/Returns to pre admission functional level  Description: INTERVENTIONS:  - Perform  AM-PAC 6 Click Basic Mobility/ Daily Activity assessment daily.  - Set and communicate daily mobility goal to care team and patient/family/caregiver.   - Collaborate with rehabilitation services on mobility goals if consulted  - Perform Range of Motion 4 times a day.  - Reposition patient every 2 hours.  - Dangle patient 3 times a day  - Stand patient 3 times a day  - Ambulate patient 3 times a day  - Out of bed to chair 3 times a day   - Out of bed for meals 3 times a day  - Out of bed for toileting  - Record patient progress and toleration of activity level   Outcome: Progressing     Problem: DISCHARGE PLANNING  Goal: Discharge to home or other facility with appropriate resources  Description: INTERVENTIONS:  - Identify barriers to discharge w/patient and caregiver  - Arrange for needed discharge resources and transportation as appropriate  - Identify discharge learning needs (meds, wound care, etc.)  - Arrange for interpretive services to assist at discharge as needed  - Refer to Case Management Department for coordinating discharge planning if the patient needs post-hospital services based on physician/advanced practitioner order or complex needs related to functional status, cognitive ability, or social support system  Outcome: Progressing     Problem: Knowledge Deficit  Goal: Patient/family/caregiver demonstrates understanding of disease process, treatment plan, medications, and discharge instructions  Description: Complete learning assessment and assess knowledge base.  Interventions:  - Provide teaching at level of understanding  - Provide teaching via preferred learning methods  Outcome: Progressing     Problem: Prexisting or High Potential for Compromised Skin Integrity  Goal: Skin integrity is maintained or improved  Description: INTERVENTIONS:  - Identify patients at risk for skin breakdown  - Assess and monitor skin integrity including under and around medical devices   - Assess and monitor nutrition  and hydration status  - Monitor labs  - Assess for incontinence   - Turn and reposition patient  - Assist with mobility/ambulation  - Relieve pressure over goldy prominences   - Avoid friction and shearing  - Provide appropriate hygiene as needed including keeping skin clean and dry  - Evaluate need for skin moisturizer/barrier cream  - Collaborate with interdisciplinary team  - Patient/family teaching  - Consider wound care consult    Assess:  - Review Alex scale daily  - Clean and moisturize skin every shift  - Inspect skin when repositioning, toileting, and assisting with ADLS  - Assess under medical devices such as nasal cannula every shift  - Assess extremities for adequate circulation and sensation     Bed Management:  - Have minimal linens on bed & keep smooth, unwrinkled  - Change linens as needed when moist or perspiring  - Avoid sitting or lying in one position for more than 2 hours while in bed?Keep HOB at 30 degrees   - Toileting:  - Offer bedside commode  - Assess for incontinence every 2 hrs  - Use incontinent care products after each incontinent episode such as skin barrier cream    Activity:  - Mobilize patient 3 times a day  - Encourage activity and walks on unit  - Encourage or provide ROM exercises   - Turn and reposition patient every 2 Hours  - Use appropriate equipment to lift or move patient in bed  - Instruct/ Assist with weight shifting every 30 mins when out of bed in chair  - Consider limitation of chair time 2 hour intervals    Skin Care:  - Avoid use of baby powder, tape, friction and shearing, hot water or constrictive clothing  - Relieve pressure over bony prominences using pillows  - Do not massage red bony areas    Next Steps:  - Teach patient strategies to minimize risks such as skin breakdowns  - Consider consults to  interdisciplinary teams such as pt/ot  Outcome: Progressing     Problem: RESPIRATORY - ADULT  Goal: Achieves optimal ventilation and oxygenation  Description:  INTERVENTIONS:  - Assess for changes in respiratory status  - Assess for changes in mentation and behavior  - Position to facilitate oxygenation and minimize respiratory effort  - Oxygen administered by appropriate delivery if ordered  - Initiate smoking cessation education as indicated  - Encourage broncho-pulmonary hygiene including cough, deep breathe, Incentive Spirometry  - Assess the need for suctioning and aspirate as needed  - Assess and instruct to report SOB or any respiratory difficulty  - Respiratory Therapy support as indicated  Outcome: Progressing

## 2025-05-20 NOTE — PROGRESS NOTES
Progress Note - Nephrology   Name: Joel Wyman 79 y.o. male I MRN: [id number]  Unit/Bed#: [address] [address] I Date of Admission: 5/16/2025   Date of Service: 5/20/2025 I Hospital Day: 4     Assessment & Plan  Acute kidney injury superimposed on stage 3b chronic kidney disease (HCC)  Lab Results   Component Value Date    EGFR 25 05/20/2025    EGFR 20 05/19/2025    EGFR 18 05/18/2025    CREATININE 2.35 (H) 05/20/2025    CREATININE 2.73 (H) 05/19/2025    CREATININE 3.06 (H) 05/18/2025   #Non-Oliguric KDIGO severe SHANTI stage 3    Etiology: Multifactorial secondary to obstructive uropathy and sepsis with hypotension   Baseline creatinine 1.3 to 1.5 mg/dL  Renal function continues to improve creatinine down to 2.35 mg/dL  Peak creatinine: 3.22   UA: Hematuria, leukocyturia, bacteriuria  Renal imaging : Staghorn stone on the right pelvis, mid right ureter stone with right hydroureteronephrosis with perinephric stranding.  Mild distention of the left collecting system  Status post right nephrostomy, Sumner catheter in place as well management as per urology  Treatment:  Nonoliguric good urine output   Blood pressure is stable  Continue to check daily BMP    Sepsis (HCC)  Blood pressure has improved  Currently afebrile  Chest x-ray shows concerns of aspiration currently on IV cefepime  Saturating 90% on room air  WBC count has improved to normal at 8.4  Essential hypertension  Examines more euvolemic today  Currently normotensive  Recommend:  Back on metoprolol  Monitor urinary output   No indication for diuretics  Metabolic acidosis  Plan oral sodium bicarbonate 650 mg twice a day.  Creatinine level is improved to 21  Continue current management  Anemia of chronic disease  Hemoglobin stable  Treatment:  Transfuse for hemoglobin less than 7.0 per primary service    Type 2 diabetes mellitus with diabetic polyneuropathy, without long-term current use of insulin (HCC)  Lab Results   Component Value Date    HGBA1C 6.4 (H) 11/12/2024  "    HbA1c 6.4  Advised to maintain a good DM control to prevent progression of CKD   Maintain healthy diet (vegetables, fruits, whole grains, nonfat or low fat)  Weight loss  Physical activity (5 to 10 minutes to start the increase to 30 min a day)      No results for input(s): \"POCGLU\" in the last 72 hours.      Blood Sugar Average: Last 72 hrs:  (P) 85    Class 1 obesity in adult    Atrial fibrillation (HCC)  -- Metoprolol and Eliquis    I have reviewed the nephrology recommendations including assessment and plan, with patient, and we are in agreement with renal plan including the information outlined above. Nephrology service will follow.    Subjective   Brief History of Admission - 79 y.o. man  with PMH of CKD G3 b, prostate adenocarcinoma on radiation therapy complicated with severe radiation cystitis, bladder papillomas with ileal conduit p/w lethargy, weakness, foul-smelling urine.  Patient was found to have right hydronephrosis with nephrolithiasis.  Nephrology is consulted for management of SHANTI     No complaints.  No shortness of breath no coughing no chest pain no fevers.    Objective :  Temp:  [97.7 °F (36.5 °C)-98.3 °F (36.8 °C)] 98.3 °F (36.8 °C)  HR:  [60-75] 61  BP: (100-147)/(52-73) 139/66  Resp:  [17-26] 19  SpO2:  [88 %-95 %] 91 %  O2 Device: None (Room air)  Nasal Cannula O2 Flow Rate (L/min):  [4 L/min] 4 L/min    Current Weight: Weight - Scale: 101 kg (222 lb 3.6 oz)  First Weight: Weight - Scale: 96.7 kg (213 lb 3 oz)  I/O         05/18 0701  05/19 0700 05/19 0701  05/20 0700 05/20 0701  05/21 0700    P.O. 815      I.V. (mL/kg) 10 (0.1)      Other  10     IV Piggyback 50 50     Total Intake(mL/kg) 875 (9) 60 (0.6)     Urine (mL/kg/hr) 2825 (1.2) 2775 (1.1)     Stool 0      Total Output 2825 2775     Net -1950 -2715            Unmeasured Stool Occurrence 2 x            Physical Exam  Vitals and nursing note reviewed.   Constitutional:       General: He is not in acute distress.     Appearance: " He is well-developed. He is not diaphoretic.   HENT:      Head: Normocephalic and atraumatic.     Eyes:      General: No scleral icterus.     Pupils: Pupils are equal, round, and reactive to light.       Cardiovascular:      Rate and Rhythm: Normal rate and regular rhythm.      Heart sounds: Normal heart sounds. No murmur heard.     No friction rub. No gallop.   Pulmonary:      Effort: Pulmonary effort is normal. No respiratory distress.      Breath sounds: Normal breath sounds. No wheezing or rales.   Chest:      Chest wall: No tenderness.   Abdominal:      General: Bowel sounds are normal. There is no distension.      Palpations: Abdomen is soft.      Tenderness: There is no abdominal tenderness. There is no rebound.     Musculoskeletal:         General: Normal range of motion.      Cervical back: Normal range of motion and neck supple.     Skin:     Findings: No rash.     Neurological:      Mental Status: He is alert and oriented to person, place, and time.         Medications:  Current Medications[1]      Lab Results: I have reviewed the following results:  Results from last 7 days   Lab Units 05/20/25  0617 05/19/25  0606 05/18/25  0620 05/18/25  0157 05/17/25  1320 05/17/25  0444 05/16/25  1016   WBC Thousand/uL 8.47 13.50* 15.80* 15.80*  --  29.46* 18.69*   HEMOGLOBIN g/dL 10.4* 10.7* 9.7* 9.7*  --  10.6* 13.2   HEMATOCRIT % 33.7* 34.1* 31.3* 31.2*  --  34.7* 42.1   PLATELETS Thousands/uL 166 165 148* 142*  --  181 232   POTASSIUM mmol/L 4.7 4.8 4.7 4.6 5.4* 5.6* 5.2   CHLORIDE mmol/L 109* 108 108 111*  --  112* 107   CO2 mmol/L 21 17* 18* 15*  --  15* 18*   BUN mg/dL 62* 67* 69* 67*  --  65* 56*   CREATININE mg/dL 2.35* 2.73* 3.06* 3.16*  --  3.22* 2.26*   CALCIUM mg/dL 8.6 8.5 7.8* 7.4*  --  7.9* 9.6   MAGNESIUM mg/dL  --  2.2 2.1 1.9  --  1.6*  --    PHOSPHORUS mg/dL  --  2.8  --   --   --   --   --    ALBUMIN g/dL 2.9*  --   --  2.6*  --   --  4.2       Administrative Statements   I have spent a total  "time of 20 minutes in caring for this patient on the day of the visit/encounter including Counseling / Coordination of care, Documenting in the medical record, Reviewing/placing orders in the medical record (including tests, medications, and/or procedures), and Obtaining or reviewing history  .  Portions of the record may have been created with voice recognition software. Occasional wrong word or \"sound a like\" substitutions may have occurred due to the inherent limitations of voice recognition software. Read the chart carefully and recognize, using context, where substitutions have occurred.If you have any questions, please contact the dictating provider.       [1]   Current Facility-Administered Medications:     acetaminophen (TYLENOL) tablet 650 mg, 650 mg, Oral, Q6H PRN, Janny Revankar, DO, 650 mg at 05/17/25 2133    aluminum-magnesium hydroxide-simethicone (MAALOX) oral suspension 30 mL, 30 mL, Oral, Q4H PRN, Janny Revankar, DO, 30 mL at 05/18/25 0021    apixaban (ELIQUIS) tablet 2.5 mg, 2.5 mg, Oral, BID, Janny Revankar, DO, 2.5 mg at 05/19/25 1732    [Held by provider] aspirin chewable tablet 81 mg, 81 mg, Oral, Daily, Frieda Dey PA-C    atorvastatin (LIPITOR) tablet 20 mg, 20 mg, Oral, Daily With Dinner, Janny Revankar, DO, 20 mg at 05/19/25 1642    cefepime (MAXIPIME) IVPB (premix in dextrose) 1,000 mg 50 mL, 1,000 mg, Intravenous, Q12H, Janny Revankar, DO, Stopped at 05/20/25 0105    fluticasone (FLONASE) 50 mcg/act nasal spray 1 spray, 1 spray, Nasal, Daily, Janny Revankar, DO, 1 spray at 05/19/25 0831    fluticasone-vilanterol 200-25 mcg/actuation 1 puff, 1 puff, Inhalation, Daily, 1 puff at 05/19/25 0830 **AND** umeclidinium 62.5 mcg/actuation inhaler AEPB 1 puff, 1 puff, Inhalation, Daily, Janny Revankar, DO, 1 puff at 05/19/25 0830    gabapentin (NEURONTIN) capsule 100 mg, 100 mg, Oral, TID, Janny Marques, DO, 100 mg at 05/19/25 2131    HYDROmorphone (DILAUDID) injection 0.5 mg, 0.5 " mg, Intravenous, Q4H PRN, Janny Revankar, DO, 0.5 mg at 05/19/25 2133    ipratropium-albuterol (DUO-NEB) 0.5-2.5 mg/3 mL inhalation solution 3 mL, 3 mL, Nebulization, TID, Janny Revankar, DO, 3 mL at 05/20/25 0724    metoprolol tartrate (LOPRESSOR) partial tablet 12.5 mg, 12.5 mg, Oral, Q12H DHAVAL, Janny Revankar, DO, 12.5 mg at 05/19/25 2131    nicotine (NICODERM CQ) 14 mg/24hr TD 24 hr patch 1 patch, 1 patch, Transdermal, Daily, Janny Revankar, DO, 1 patch at 05/19/25 0834    ondansetron (ZOFRAN) injection 4 mg, 4 mg, Intravenous, Q6H PRN, Janny Revankar, DO    sodium bicarbonate tablet 650 mg, 650 mg, Oral, BID after meals, Janny Revankar, DO, 650 mg at 05/19/25 1735    sodium chloride (PF) 0.9 % injection 10 mL, 10 mL, Intracatheter, Daily, Janny Revankar, DO, 10 mL at 05/19/25 3986

## 2025-05-20 NOTE — ASSESSMENT & PLAN NOTE
No acute exacerbation  Trelegy substituted with fluticasone-vilanterol and umeclidinium.  Continue DuoNebs as needed.

## 2025-05-20 NOTE — ASSESSMENT & PLAN NOTE
Lab Results   Component Value Date    EGFR 25 05/20/2025    EGFR 20 05/19/2025    EGFR 18 05/18/2025    CREATININE 2.35 (H) 05/20/2025    CREATININE 2.73 (H) 05/19/2025    CREATININE 3.06 (H) 05/18/2025   Etiology: Multifactorial secondary to obstructive uropathy and hypotension in the settings of sepsis  Baseline creatinine 1.3-1.5  Peak creatinine 3.22, now trending down to 2. 35 today  S/p right nephrostomy on 5/16/25 for staghorn stone and associated hydronephrosis on imaging  Off IV fluids  Repeat lab work in a.m.

## 2025-05-20 NOTE — ASSESSMENT & PLAN NOTE
BC positive for Klebsiella, Proteus in 2 sets and group B strep in 1 set  Continue IV cefepime  Repeat blood cultures negative so far  Per ID plan for 7 days of antibiotic with cefepime through 5/22 for gram-negative organisms   TTE with normal EF, grade 1 diastolic dysfunction.  No obvious large vegetation noted but small vegetation could not be ruled out.  Plan for KEI, cardiology made aware  If KEI negative, transition to oral therapy for group B strep   [facility] Discharge Summary    Ernie Rao is a 2 day old male 8 lb 6.4 oz (3810 g) infant, delivered at Gestational Age: 39w2d on 2025.    Seen this morning at bedside with mother, father, and nurse. All questions answered.      Information/ Screenings/ Immunizations     Hearing exam:  Hearing Test Machine: Auditory Brainstem Response (Algo) (23 1300)  [facility] Hearing Test Results: Pass R, Pass L;Final result (23 1300)     Metabolic Screen: Done, results pending  Immunizations:   Most Recent Immunizations   Administered Date(s) Administered   • Hep B, adolescent or pediatric 2023      Bilirubin 7.1    Hours of age-Transcutaneous Biliribin: 24 Hrs    Bilirubin 9.9  Hours of age- Transcutaneous bilirubin: 36 hours    CHD Screening   Screening complete: Done (23)  Right hand reading %: 98 %  Foot reading %: 97 %  CHD: Normal    PHYSICAL EXAM   VITALS:   Visit Vitals  Pulse 138   Temp 98.4 °F (36.9 °C) (Axillary)   Resp 40   Ht 21\" (53.3 cm) Comment: Filed from Delivery Summary   Wt 3665 g   HC 36 cm (14.17\") Comment: Filed from Delivery Summary   BMI 12.88 kg/m²     Intake/Output        07 06 0659          Urine Occurrence 1 x     Stool Occurrence 1 x 1 x          Birth Measurements:        Weight: 8 lb 6.4 oz (3810 g)        Length: 21\"        Head circumference: 36 cm    Weight change since birth: -4%    GENERAL:Baby Boy is an alert, vigorous male with appropriate behavior. He is in no acute distress.  SKIN: His skin is warm with normal turgor. The color of the skin is pink. There is no rash. There are no bruises or other signs of injury. Significant jaundice is not present.   HEAD: The head is atraumatic and normocephalic. The anterior fontanel is open and flat.  EYES: The conjunctivae appear normal with neither icterus nor subconjunctival hemorrhage.   Red reflexes deferred   EARS: Pinnae normal.  NOSE: There is no nasal  flaring, nares patent bilaterally.  THROAT:  The oropharynx is normal.  There is no cleft of the palate.  NECK: Clavicles without crepitus.  TRUNK AND THORAX: There are no lesions on the trunk; there is no dimple over the presacral area. There are no retractions.  LUNGS: The lung fields are clear to auscultation.  HEART: The precordium is quiet. The heart rhythm is grossly regular. S1 and S2 are normal. There are no murmurs. Normal femoral pulses.  ABDOMEN: The umbilical cord stump is normal. There is not an umbilical hernia. The abdomen is flat and soft.   GENITALIA: Vickey 1 male, healing circumcision, testicles descended   RECTAL: anus patent  EXTREMITIES: Moving all 4 extremities. The hip exam is normal  . There are no hip clicks or clunks.    NEUROLOGIC: He displays normal tone throughout. He is not jittery.      ASSESSMENT   Well 2 day old male infant.    Infant of a diabetic mother     PLAN   Routine  care.  Passed glucose protocol   Repeat bilirubin   Ovando is currently being fed Breast milk only.      Follow up: With Pediatrician in 1 day.     Instructions: Baby's mom has been given discharge instructions, including information regarding feeding, any restrictions, and follow up information. Baby will be discharged home with family    Procedures:circumcision    Consults: none      Signed by: Sondra Colbert MD   2023 9:30 AM

## 2025-05-20 NOTE — ASSESSMENT & PLAN NOTE
Patient presented acutely ill and blood cultures from admission have isolated Klebsiella, Proteus in 2 sets and group B strep in 1 set.  Susceptibilities pending.  This is likely coming from patient's obstructive pyelonephritis as urine cultures isolating the same.  Patient is without any intravascular devices and has no other localizing symptoms on exam.  Overall clinically appears to be improving.  Low suspicion for complicated bacteremia.  2D echo unfortunately limited.  Repeat cultures so far without growth.  Patient agreeable to transesophageal echo and aware of possibility of prolonged IV antibiotics depending on imaging.  Continue cefepime 1 g every 12 hours for now, renally dose adjusted  Follow-up initial blood culture susceptibilities  Follow-up repeat cultures for clearance  Trend fever curve/vitals  Repeat CBC/chemistry tomorrow  Recommend formal transesophageal echo  Monitor exam for new/developing symptoms  Tentative plan for 7 days of antibiotic with cefepime through 5/22 for gram-negative infection  We will transition afterwards to ceftriaxone alone  If transesophageal echo negative, we will transition to oral therapy for GBS  Based on recent literature would plan for 1 week of oral therapy from negative culture for GBS  If transesophageal echo abnormal/limited we will need IV antibiotic course for 4 weeks  Additional supportive care per primary  Additional interventions pending clinical course

## 2025-05-20 NOTE — PROGRESS NOTES
Progress Note - Infectious Disease   Name: Joel Wyman 79 y.o. male I MRN: [id number]  Unit/Bed#: 2 [address] Date of Admission: 5/16/2025   Date of Service: 5/20/2025 I Hospital Day: 4     Assessment & Plan  Sepsis (HCC)  Patient met sepsis criteria early on.  Sources are obstructive pyelonephritis with development of polymicrobial bacteremia.  Clinical parameters now seem to be improving.  Repeat cultures NGTD and initial susceptibilities pending.  Antibiotics as below  Continue to trend fever curve/vitals  Repeat CBC/chemistry tomorrow for treatment response/dosing  Follow-up pending blood cultures for clearance  Follow-up initial cultures for susceptibilities  Recommend transesophageal echo as below  Monitor exam for new/developing symptoms  Additional supportive care per primary  Additional interventions pending clinical course  Polymicrobial bacteremia  Patient presented acutely ill and blood cultures from admission have isolated Klebsiella, Proteus in 2 sets and group B strep in 1 set.  Susceptibilities pending.  This is likely coming from patient's obstructive pyelonephritis as urine cultures isolating the same.  Patient is without any intravascular devices and has no other localizing symptoms on exam.  Overall clinically appears to be improving.  Low suspicion for complicated bacteremia.  2D echo unfortunately limited.  Repeat cultures so far without growth.  Patient agreeable to transesophageal echo and aware of possibility of prolonged IV antibiotics depending on imaging.  Continue cefepime 1 g every 12 hours for now, renally dose adjusted  Follow-up initial blood culture susceptibilities  Follow-up repeat cultures for clearance  Trend fever curve/vitals  Repeat CBC/chemistry tomorrow  Recommend formal transesophageal echo  Monitor exam for new/developing symptoms  Tentative plan for 7 days of antibiotic with cefepime through 5/22 for gram-negative infection  We will transition afterwards to  ceftriaxone alone  If transesophageal echo negative, we will transition to oral therapy for GBS  Based on recent literature would plan for 1 week of oral therapy from negative culture for GBS  If transesophageal echo abnormal/limited we will need IV antibiotic course for 4 weeks  Additional supportive care per primary  Additional interventions pending clinical course  Obstructive pyelonephritis  In the setting of nephrolithiasis.  Other considerations are for lower tract obstruction in the setting of prostate cancer and prior issues per urology note.  Now has PCN in place with improvement.  Cultures polymicrobial with Klebsiella, Proteus, strep organisms and Citrobacter.  Unfortunately the Citrobacter does limit antibiotic options.  Will avoid fluoroquinolone given AAA and age  Continue cefepime as above  Will plan for 7 days of antibiotic for this issue through 5/22  Follow-up pending cultures and susceptibilities otherwise  Monitor PCN output  Ongoing followed by urology  Acute kidney injury superimposed on stage 3b chronic kidney disease (HCC)  Acute elevation in creatinine noted from baseline.  Currently downtrending.  This does impact antibiotic dosing.  Cefepime dosing as above  Repeat chemistry tomorrow  Will dose adjust as needed  Ongoing follow-up by nephrology  Type 2 diabetes mellitus with diabetic polyneuropathy, without long-term current use of insulin (HCC)  Well-controlled disease with hemoglobin A1c of 6.4.  Ongoing glucose management per primary.  Infrarenal abdominal aortic aneurysm (AAA) without rupture (HCC)  Noted on imaging.  Not absolute contraindication but would favor avoiding fluoroquinolones given this issue.  Class 1 obesity in adult  BMI noted to be 32.  Does impact antibiotic dosing.  Will favor higher baseline antibiotic dosing when possible.  Current antibiotics dose adjusted as above.    Above plan discussed in detail with the patient at bedside  Above plan discussed with primary  service attending who is aware of recommendation now for transesophageal echo and continued antibiotic otherwise    ID consult service will continue to follow.    Antibiotics:  Cefepime #5    24 Hour events:  Yesterday and overnight notes reviewed and no acute events noted    Subjective:  Patient seen at bedside and he denied having any nausea, vomiting, chest pain or shortness of breath.  We reviewed limited echo and plans for transesophageal echo which she is agreeable to.  Patient reports previously having PICC line and going to infusion center and would be agreeable if needed again.  Additional questions answered.    Objective:  Vitals:  Temp:  [97.7 °F (36.5 °C)-98.3 °F (36.8 °C)] 98.3 °F (36.8 °C)  HR:  [60-75] 61  Resp:  [17-26] 19  BP: (100-147)/(52-73) 139/66  SpO2:  [88 %-95 %] 91 %  Temp (24hrs), Av °F (36.7 °C), Min:97.7 °F (36.5 °C), Max:98.3 °F (36.8 °C)  Current: Temperature: 98.3 °F (36.8 °C)    Physical Exam:   General Appearance:  Alert, interactive, nontoxic, no acute distress.   Throat: Oropharynx moist without lesions.    Lungs:   Clear to auscultation bilaterally; no wheezes, rhonchi or rales; respirations unlabored on room air   Heart:  RRR; no murmur, rub or gallop noted   Abdomen:   Soft, non-tender, non-distended, positive bowel sounds.  Percutaneous nephrostomy draining clear urine   Extremities: No clubbing, cyanosis or edema   Skin: No new rashes or lesions. No new draining wounds noted.       Labs, Imaging, & Other studies:   All pertinent labs and imaging studies in PACS were personally reviewed as below.  Results from last 7 days   Lab Units 25  0617 25  0606 25  0620   WBC Thousand/uL 8.47 13.50* 15.80*   HEMOGLOBIN g/dL 10.4* 10.7* 9.7*   PLATELETS Thousands/uL 166 165 148*     Results from last 7 days   Lab Units 25  0617 25  0620 25  0157 25  0444 25  1016   POTASSIUM mmol/L 4.7   < > 4.6   < > 5.2   CHLORIDE mmol/L 109*   < >  111*   < > 107   CO2 mmol/L 21   < > 15*   < > 18*   BUN mg/dL 62*   < > 67*   < > 56*   CREATININE mg/dL 2.35*   < > 3.16*   < > 2.26*   EGFR ml/min/1.73sq m 25   < > 17   < > 26   CALCIUM mg/dL 8.6   < > 7.4*   < > 9.6   AST U/L 25  --  24  --  19   ALT U/L 15  --  7  --  16   ALK PHOS U/L 65  --  48  --  83    < > = values in this interval not displayed.     Results from last 7 days   Lab Units 05/18/25  0857 05/18/25  0620 05/18/25  0456 05/18/25  0231 05/18/25  0157 05/16/25  1629 05/16/25  1235 05/16/25  1234 05/16/25  1220   BLOOD CULTURE  No Growth at 24 hrs. No Growth at 24 hrs.  --   --   --   --   --  Klebsiella pneumoniae*  Proteus mirabilis*  Streptococcus agalactiae (Group B)* Klebsiella pneumoniae*  Proteus mirabilis*   SPUTUM CULTURE   --   --  Culture too young- will reincubate  --   --   --   --   --   --    GRAM STAIN RESULT   --   --  2+ Epithelial cells per low power field*  4+ Gram positive cocci in pairs*  Rare Gram variable rods*  --   --  4+ Polys*  4+ Gram positive cocci in pairs, chains and clusters*  --  Gram negative rods*  Gram positive cocci in pairs and chains* Gram negative rods*   URINE CULTURE   --   --   --   --   --   --  >100,000 cfu/ml Klebsiella pneumoniae*  >100,000 cfu/ml Klebsiella pneumoniae*  Proteus mirabilis*  20,000-29,000 cfu/ml  --   --    BODY FLUID CULTURE, STERILE   --   --   --   --   --  4+ Growth of Proteus mirabilis*  4+ Growth of Klebsiella pneumoniae*  4+ Growth of Citrobacter freundii*  4+ Growth of  --   --   --    MRSA CULTURE ONLY   --   --   --   --  No Methicillin Resistant Staphlyococcus aureus (MRSA) isolated  --   --   --   --    LEGIONELLA URINARY ANTIGEN   --   --   --  Negative  --   --   --   --   --        Lab interpretation/comments: No leukocytosis today    Imaging interpretation/comments: No new images    Culture data: Repeat blood cultures negative at 24 hours.  Group B strep susceptibilities noted.  Klebsiella and Proteus  susceptibilities pending from admission    External notes: None

## 2025-05-20 NOTE — ASSESSMENT & PLAN NOTE
Plan oral sodium bicarbonate 650 mg twice a day.  Creatinine level is improved to 21  Continue current management

## 2025-05-20 NOTE — ASSESSMENT & PLAN NOTE
Examines more euvolemic today  Currently normotensive  Recommend:  Back on metoprolol  Monitor urinary output   No indication for diuretics

## 2025-05-20 NOTE — ASSESSMENT & PLAN NOTE
Per ICU note, patient had short self-limited runs of A-fib while in the ICU  Monitor on telemetry  Currently on Eliquis and Lopressor.

## 2025-05-20 NOTE — ASSESSMENT & PLAN NOTE
Patient presented to the ED with right flank/lower quadrant abdominal pain along with fever at home   Sepsis as noted by leukocytosis, tachycardia in the setting of obstructive pyelonephritis, polymicrobial bacteremia  Lactic acid within normal limits  WBC count now normalized  Continue IV cefepime  Required transfer to ICU due to hypotension with minimal response to fluid boluses but never required vasopressors as blood pressures did improve  Repeat lab work in a.m.

## 2025-05-20 NOTE — PROGRESS NOTES
Progress Note - Hospitalist   Name: Joel Wyman 79 y.o. male I MRN: [id number]  Unit/Bed#: 2 [address] Date of Admission: 5/16/2025   Date of Service: 5/20/2025 I Hospital Day: 4    Assessment & Plan  Sepsis (HCC)  Patient presented to the ED with right flank/lower quadrant abdominal pain along with fever at home   Sepsis as noted by leukocytosis, tachycardia in the setting of obstructive pyelonephritis, polymicrobial bacteremia  Lactic acid within normal limits  WBC count now normalized  Continue IV cefepime  Required transfer to ICU due to hypotension with minimal response to fluid boluses but never required vasopressors as blood pressures did improve  Repeat lab work in a.m.  Polymicrobial bacteremia  BC positive for Klebsiella, Proteus in 2 sets and group B strep in 1 set  Continue IV cefepime  Repeat blood cultures negative so far  Per ID plan for 7 days of antibiotic with cefepime through 5/22 for gram-negative organisms   TTE with normal EF, grade 1 diastolic dysfunction.  No obvious large vegetation noted but small vegetation could not be ruled out.  Plan for KEI, cardiology made aware  If KEI negative, transition to oral therapy for group B strep  Obstructive pyelonephritis  Patient with history of prostate cancer. 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 Patient had suprapubic catheter placed. Then patient was followed up at [facility] where he underwent simple cystectomy with ileal conduit in May 2024   CT with evidence of right sided perinephritic stranding  Patient has right-sided nephrostomy tube in place   Continue IV cefepime as noted above.  Acute kidney injury superimposed on stage 3b chronic kidney disease (HCC)  Lab Results   Component Value Date    EGFR 25 05/20/2025    EGFR 20 05/19/2025    EGFR 18 05/18/2025    CREATININE 2.35 (H) 05/20/2025    CREATININE 2.73 (H) 05/19/2025    CREATININE 3.06 (H)  "05/18/2025   Etiology: Multifactorial secondary to obstructive uropathy and hypotension in the settings of sepsis  Baseline creatinine 1.3-1.5  Peak creatinine 3.22, now trending down to 2. 35 today  S/p right nephrostomy on 5/16/25 for staghorn stone and associated hydronephrosis on imaging  Off IV fluids  Repeat lab work in a.m.  Hydronephrosis  CT A/P: Right urolithiasis with increased stone burden in comparison to 10/25/2024 including staghorn calculus in the right renal pelvis measuring 2 cm and additional two obstructing calculi in the distal ureter just proximal to the anastomosis measuring up to 8 mm.  S/p right nephrostomy tube placement on 5/16/25.   body fluid culture from R nephrostomy tube placement with growth of gram-positive cocci, Klebsiella, Proteus, Citrobacter.   Continue Cefepime as above  Urology consulted, recommendations are appreciated  Conversion of nephrostomy tube to nephroureteral stent for flexible ureteroscopy, stone removal in a few weeks.  5/17/25: Repeat CT A/P showed that the right-sided nephrostomy tube is in the expected position.  Atrial fibrillation (HCC)  Per ICU note, patient had short self-limited runs of A-fib while in the ICU  Monitor on telemetry  Currently on Eliquis and Lopressor.  Metabolic acidosis  Likely secondary to SHANTI on CKD  Previously on bicarb drip which has since been discontinued  Continue oral sodium bicarb  Essential hypertension  Previously with hypotension requiring transfer to ICU  Continue metoprolol tartrate   Monitor BPs  COPD (chronic obstructive pulmonary disease) ([facility])  No acute exacerbation  Trelegy substituted with fluticasone-vilanterol and umeclidinium.  Continue DuoNebs as needed.  Type 2 diabetes mellitus with diabetic polyneuropathy, without long-term current use of insulin ([facility])  Lab Results   Component Value Date    HGBA1C 6.4 (H) 11/12/2024       No results for input(s): \"POCGLU\" in the last 72 hours.    Not currently on oral " anti-diabetic agents  Continue carb controlled diet  Infrarenal abdominal aortic aneurysm (AAA) without rupture (HCC)  On imaging noted to have a 3 cm fusiform infrarenal AAA  Outpatient follow-up.    VTE Pharmacologic Prophylaxis:   Eliquis    Mobility:   Basic Mobility Inpatient Raw Score: 18  JH-HLM Goal: 6: Walk 10 steps or more  JH-HLM Achieved: 4: Move to chair/commode  JH-HLM Goal NOT achieved. Continue with multidisciplinary rounding and encourage appropriate mobility to improve upon JH-HLM goals.    Patient Centered Rounds: I performed bedside rounds with nursing staff today.   Discussions with Specialists or Other Care Team Provider: Yes-ID, cardiology    Education and Discussions with Family / Patient: Updated  (wife) via phone.    Current Length of Stay: 4 day(s)  Current Patient Status: Inpatient   Certification Statement: The patient will continue to require additional inpatient hospital stay due to polymicrobial bacteremia requiring KEI  Discharge Plan: Anticipate discharge in 48-72 hrs to discharge location to be determined pending rehab evaluations.    Code Status: Level 1 - Full Code    Subjective   Patient states he is continuing to improve.  Reports some mild discomfort via nephrostomy tube insertion site    Objective :  Temp:  [97.7 °F (36.5 °C)-98.3 °F (36.8 °C)] 98.3 °F (36.8 °C)  HR:  [60-75] 61  BP: (100-147)/(52-73) 139/66  Resp:  [17-26] 19  SpO2:  [88 %-95 %] 91 %  O2 Device: None (Room air)  Nasal Cannula O2 Flow Rate (L/min):  [4 L/min] 4 L/min    Body mass index is 32.82 kg/m².     Input and Output Summary (last 24 hours):     Intake/Output Summary (Last 24 hours) at 5/20/2025 0924  Last data filed at 5/20/2025 0608  Gross per 24 hour   Intake 50 ml   Output 2325 ml   Net -2275 ml       Physical Exam  Vitals reviewed.   Constitutional:       General: He is not in acute distress.     Appearance: He is ill-appearing (chronically). He is not toxic-appearing.   HENT:       Head: Normocephalic and atraumatic.     Cardiovascular:      Rate and Rhythm: Normal rate and regular rhythm.   Pulmonary:      Effort: Pulmonary effort is normal. No respiratory distress.      Breath sounds: Normal breath sounds. No wheezing or rales.   Abdominal:      General: Bowel sounds are normal. There is no distension.      Palpations: Abdomen is soft.      Tenderness: There is no abdominal tenderness.      Comments: Ileal conduit in place     Musculoskeletal:      Right lower leg: No edema.      Left lower leg: No edema.     Skin:     Comments: Right-sided nephrostomy with no hematuria in the bag     Neurological:      Mental Status: He is alert and oriented to person, place, and time.         Lines/Drains:  Lines/Drains/Airways       Active Status       Name Placement date Placement time Site Days    Nephrostomy Retrograde/ Ileal Conduit Right 05/16/25  --  Right  4    Nephrostomy Right 10.2 Fr. 05/16/25  1627  Right  3                        Lab Results: I have reviewed the following results:   Results from last 7 days   Lab Units 05/20/25  0617   WBC Thousand/uL 8.47   HEMOGLOBIN g/dL 10.4*   HEMATOCRIT % 33.7*   PLATELETS Thousands/uL 166   SEGS PCT % 77*   LYMPHO PCT % 10*   MONO PCT % 9   EOS PCT % 3     Results from last 7 days   Lab Units 05/20/25  0617   SODIUM mmol/L 136   POTASSIUM mmol/L 4.7   CHLORIDE mmol/L 109*   CO2 mmol/L 21   BUN mg/dL 62*   CREATININE mg/dL 2.35*   ANION GAP mmol/L 6   CALCIUM mg/dL 8.6   ALBUMIN g/dL 2.9*   TOTAL BILIRUBIN mg/dL 0.39   ALK PHOS U/L 65   ALT U/L 15   AST U/L 25   GLUCOSE RANDOM mg/dL 97     Results from last 7 days   Lab Units 05/16/25  1530   INR  1.16     Results from last 7 days   Lab Units 05/17/25  0747   POC GLUCOSE mg/dl 85         Results from last 7 days   Lab Units 05/19/25  0606 05/18/25  0157 05/17/25  2052 05/16/25  1016   LACTIC ACID mmol/L  --   --  1.2 0.7   PROCALCITONIN ng/ml 19.60* 35.16*  --   --        Recent Cultures (last 7 days):    Results from last 7 days   Lab Units 05/18/25  0857 05/18/25  0620 05/18/25  0456 05/18/25  0231 05/16/25  1629 05/16/25  1235 05/16/25  1234 05/16/25  1220   BLOOD CULTURE  No Growth at 24 hrs. No Growth at 24 hrs.  --   --   --   --  Klebsiella pneumoniae*  Proteus mirabilis*  Streptococcus agalactiae (Group B)* Klebsiella pneumoniae*  Proteus mirabilis*   SPUTUM CULTURE   --   --  3+ Growth of Moraxella catarrhalis*  2+ Growth of Candida albicans*  --   --   --   --   --    GRAM STAIN RESULT   --   --  2+ Epithelial cells per low power field*  4+ Gram positive cocci in pairs*  Rare Gram variable rods*  --  4+ Polys*  4+ Gram positive cocci in pairs, chains and clusters*  --  Gram negative rods*  Gram positive cocci in pairs and chains* Gram negative rods*   URINE CULTURE   --   --   --   --   --  >100,000 cfu/ml Klebsiella pneumoniae*  >100,000 cfu/ml Klebsiella pneumoniae*  Proteus mirabilis*  20,000-29,000 cfu/ml  --   --    BODY FLUID CULTURE, STERILE   --   --   --   --  4+ Growth of Proteus mirabilis*  4+ Growth of Klebsiella pneumoniae*  4+ Growth of Citrobacter freundii*  4+ Growth of  --   --   --    LEGIONELLA URINARY ANTIGEN   --   --   --  Negative  --   --   --   --        Imaging Results Review: I reviewed radiology reports from this admission including: Echocardiogram.  Other Study Results Review: No additional pertinent studies reviewed.    Last 24 Hours Medication List:     Current Facility-Administered Medications:     acetaminophen (TYLENOL) tablet 650 mg, Q6H PRN    aluminum-magnesium hydroxide-simethicone (MAALOX) oral suspension 30 mL, Q4H PRN    apixaban (ELIQUIS) tablet 2.5 mg, BID    [Held by provider] aspirin chewable tablet 81 mg, Daily    atorvastatin (LIPITOR) tablet 20 mg, Daily With Dinner    cefepime (MAXIPIME) IVPB (premix in dextrose) 1,000 mg 50 mL, Q12H, Last Rate: Stopped (05/20/25 0105)    fluticasone (FLONASE) 50 mcg/act nasal spray 1 spray, Daily     [Normal] : no abnormal secretions fluticasone-vilanterol 200-25 mcg/actuation 1 puff, Daily **AND** umeclidinium 62.5 mcg/actuation inhaler AEPB 1 puff, Daily    gabapentin (NEURONTIN) capsule 100 mg, TID    HYDROmorphone (DILAUDID) injection 0.5 mg, Q4H PRN    ipratropium-albuterol (DUO-NEB) 0.5-2.5 mg/3 mL inhalation solution 3 mL, TID    metoprolol tartrate (LOPRESSOR) partial tablet 12.5 mg, Q12H DHAVAL    nicotine (NICODERM CQ) 14 mg/24hr TD 24 hr patch 1 patch, Daily    ondansetron (ZOFRAN) injection 4 mg, Q6H PRN    sodium bicarbonate tablet 650 mg, BID after meals    sodium chloride (PF) 0.9 % injection 10 mL, Daily    Administrative Statements   Today, Patient Was Seen By: Janny Marques DO      **Please Note: This note may have been constructed using a voice recognition system.**

## 2025-05-20 NOTE — PLAN OF CARE
Problem: PHYSICAL THERAPY ADULT  Goal: Performs mobility at highest level of function for planned discharge setting.  See evaluation for individualized goals.  Description: Treatment/Interventions: Functional transfer training, LE strengthening/ROM, Elevations, Therapeutic exercise, Gait training, Equipment eval/education, Patient/family training, Endurance training  Equipment Recommended:  (Pt has a cane and walker.)       See flowsheet documentation for full assessment, interventions and recommendations.  Note: Prognosis: Good  Problem List: Decreased strength, Decreased endurance, Impaired balance, Decreased mobility, Pain  Assessment: Pt is 79 y.o. male seen for PT evaluation s/p admit to [facility] on 5/16/2025 w/ Sepsis (HCC). PT consulted to assess pt's functional mobility and d/c needs. Order placed for PT eval and tx, w/ activity as tolerated order.  Co-morbidities affecting patient's physical performance include: obesity, DM with polyneuropathy, COPD, anemia, prostate cancer, bladder cancer, RA.  Personal factors affecting patient at time of initial evaluation include: stairs to enter home, inability to navigate community distances, and hearing impairments.     Prior to admission, patient was independent with functional mobility without assistive device and independent with ADLS.  Upon evaluation: Pt ambulated with a rolling walker with supervision assist.      Please find objective findings from Physical Therapy assessment regarding body systems outlined above with impairments and limitations including impaired balance, gait deviations, decreased activity tolerance, and decreased functional mobility tolerance.The Barthel Index was used as a functional outcome tool presenting with a score of Barthel Index Score: 60 today indicating moderate limitations of functional mobility and ADLS.  Patient's clinical presentation is currently unstable/unpredictable as seen in patient's presentation of new  onset of impairment of functional mobility, decreased endurance, and new onset of weakness. Pt would benefit from continued Physical Therapy treatment to address deficits as defined above and maximize level of functional mobility.     As demonstrated by objective findings, the assigned level of complexity for this evaluation is high.The patient's -[facility] Basic Mobility Inpatient Short Form Raw Score is 19. A Raw score of greater than 16 suggests the patient may benefit from discharge to home. Please also refer to the recommendation of the Physical Therapist for safe discharge planning.        Rehab Resource Intensity Level, PT: III (Minimum Resource Intensity)    See flowsheet documentation for full assessment.

## 2025-05-20 NOTE — OCCUPATIONAL THERAPY NOTE
Occupational Therapy Evaluation/Treatment       05/20/25 1400   OT Last Visit   OT Visit Date 05/20/25   Note Type   Note type Evaluation   Pain Assessment   Pain Assessment Tool 0-10   Pain Score No Pain   Restrictions/Precautions   Other Precautions Fall Risk;Chair Alarm;Bed Alarm  (urostomy, R nephrostomy)   Home Living   Type of Home Apartment   Home Layout One level;Performs ADLs on one level;Able to live on main level with bedroom/bathroom;Stairs to enter with rails   Bathroom Shower/Tub Tub/shower unit   Bathroom Toilet Standard   Bathroom Equipment Grab bars in shower;Toilet raiser   Home Equipment Walker;Cane   Prior Function   Level of [place] Independent with ADLs;Independent with functional mobility;Independent with IADLS   Lives With Spouse   Receives Help From Family   Falls in the last 6 months 0   Vocational Retired   General   Additional Pertinent History Pt admitted with R flank pain, bacteremia, sepsis now with new R nephrostomy tube.   Subjective   Subjective Pt agreeable to OT evaluation   ADL   Eating Assistance 7  Independent   Grooming Assistance 5  Supervision/Setup   UB Bathing Assistance 5  Supervision/Setup   LB Bathing Assistance 4  Minimal Assistance   UB Dressing Assistance 5  Supervision/Setup   LB Dressing Assistance 4  Minimal Assistance   Toileting Assistance  4  Minimal Assistance   Bed Mobility   Supine to Sit Unable to assess   Sit to Supine Unable to assess   Additional Comments pt sitting OOB in chair upon OT arrival and returned to chair at end of session   Transfers   Sit to Stand 5  Supervision   Stand to Sit 5  Supervision   Stand pivot 5  Supervision   Additional items   (with RW)   Functional Mobility   Functional Mobility 5  Supervision   Additional Comments short household distances   Additional items Rolling walker   Balance   Static Sitting Good   Dynamic Sitting Fair +   Static Standing Fair +   Dynamic Standing Fair   Activity Tolerance   Activity Tolerance  Patient tolerated treatment well;Patient limited by fatigue   Nurse Made Aware RN [place]   RUE Assessment   RUE Assessment WFL   LUE Assessment   LUE Assessment WFL   Vision-Basic Assessment   Current Vision Wears glasses all the time   Cognition   Overall Cognitive Status WFL   Arousal/Participation Alert;Cooperative   Attention Within functional limits   Orientation Level Oriented X4   Memory Within functional limits   Following Commands Follows all commands and directions without difficulty   Assessment   Limitation Decreased ADL status;Decreased UE strength;Decreased endurance;Decreased self-care trans;Decreased high-level ADLs  (decreased balance and mobility)   Prognosis Good   Assessment Patient evaluated by Occupational Therapy.  Patient admitted with Sepsis (HCC).  The patients occupational profile, medical and therapy history includes a extensive additional review of physical, cognitive, or psychosocial history related to current functional performance.  Comorbidities affecting functional mobility and ADLS include: obesity, DM with polyneuropathy, COPD, anemia, prostate cancer, bladder cancer, RA.  Prior to admission, patient was independent with functional mobility without assistive device, independent with ADLS, and independent with IADLS.  The evaluation identifies the following performance deficits: weakness, impaired balance, decreased endurance, increased fall risk, new onset of impairment of functional mobility, decreased ADLS, decreased IADLS, decreased activity tolerance, SOB upon exertion, and decreased strength, that result in activity limitations and/or participation restrictions. This evaluation requires clinical decision making of high complexity, because the patient presents with comorbidites that affect occupational performance and required significant modification of tasks or assistance with consideration of multiple treatment options.  The Barthel Index was used as a functional outcome  tool presenting with a score of Barthel Index Score: 60, indicating moderate limitations of functional mobility and ADLS.  The patient's raw score on the -PAC Daily Activity Inpatient Short Form is 19. A raw score of greater than or equal to 19 suggests the patient may benefit from discharge to home. Please refer to the recommendation of the Occupational Therapist for safe discharge planning.  Patient will benefit from skilled Occupational Therapy services to address above deficits and facilitate a safe return to prior level of function.   Goals   Patient Goals to get better   STG Time Frame   (1-7 days)   Short Term Goal  Goals established to promote Patient Goals: to get better: Grooming: independent standing at sink; Bathing: supervision; Upper Body Dressing independent; Lower Body Dressing: supervision; Toileting: supervision; Patient will increase ambulatory standard toilet transfer to independent with rolling walker to increase performance and safety with ADLS and functional mobility; Patient will increase standing tolerance to 3 minutes during ADL task to decrease assistance level and decrease fall risk; Patient will increase bed mobility to independent in preparation for ADLS and transfers; Patient will increase functional mobility to and from bathroom with rolling walker independently to increase performance with ADLS and to use a toilet; Patient will tolerate 8 minutes of UE ROM/strengthening to increase general activity tolerance and performance in ADLS/IADLS; Patient will improve functional activity tolerance to 10 minutes of sustained functional tasks to increase participation in basic self-care and decrease assistance level;  Patient will be able to to verbalize understanding and perform energy conservation/proper body mechanics during ADLS and functional mobility at least 75% of the time with minimal cueing to decrease signs of fatigue and increase stamina to return to prior level of function;  Patient will increase dynamic sitting balance to good to improve the ability to sit at edge of bed or on a chair for ADLS;  Patient will increase dynamic standing balance to fair+ to improve postural stability and decrease fall risk during standing ADLS and transfers.   LTG Time Frame   (8-14 days)   Long Term Goal Bathing: independent; Lower Body Dressing: independent; Toileting: independent; Patient will increase standing tolerance to 6 minutes during ADL task to decrease assistance level and decrease fall risk; Patient will tolerate 15 minutes of UE ROM/strengthening to increase general activity tolerance and performance in ADLS/IADLS; Patient will improve functional activity tolerance to 20 minutes of sustained functional tasks to increase participation in basic self-care and decrease assistance level;  Patient will be able to to verbalize understanding and perform energy conservation/proper body mechanics during ADLS and functional mobility at least 90% of the time with no cueing to decrease signs of fatigue and increase stamina to return to prior level of function; Patient will increase dynamic standing balance to good to improve postural stability and decrease fall risk during standing ADLS and transfers.  Pt will score >/= 23/24 on AM-PAC Daily Activity Inpatient scale to promote safe independence with ADLs and functional mobility; Pt will score >/= 90/100 on Barthel Index in order to decrease caregiver assistance needed and increase ability to perform ADLs and functional mobility.   Plan   Treatment Interventions ADL retraining;Functional transfer training;UE strengthening/ROM;Endurance training;Patient/family training;Equipment evaluation/education;Activityengagement;Compensatory technique education;Energy conservation   Goal Expiration Date 06/03/25   OT Frequency 3-5x/wk   Discharge Recommendation   Rehab Resource Intensity Level, OT III (Minimum Resource Intensity)   AM-PAC Daily Activity Inpatient    Lower Body Dressing 3   Bathing 3   Toileting 3   Upper Body Dressing 3   Grooming 3   Eating 4   Daily Activity Raw Score 19   Daily Activity Standardized Score (Calc for Raw Score >=11) 40.22   AM-PAC Applied Cognition Inpatient   Following a Speech/Presentation 4   Understanding Ordinary Conversation 4   Taking Medications 4   Remembering Where Things Are Placed or Put Away 4   Remembering List of 4-5 Errands 4   Taking Care of Complicated Tasks 4   Applied Cognition Raw Score 24   Applied Cognition Standardized Score 62.21   Barthel Index   Feeding 10   Bathing 0   Grooming Score 5   Dressing Score 5   Bladder Score 0   Bowels Score 10   Toilet Use Score 5   Transfers (Bed/Chair) Score 10   Mobility (Level Surface) Score 10   Stairs Score 5   Barthel Index Score 60   Additional Treatment Session   Start Time 1350   End Time 1400   Treatment Assessment Pt completed standard toilet transfer with supervision. Min A required to stand due to lower surface. Additional fxl mobility community distances using RW and supervision. Pt with incr SOB following fxl mobility benefitting from seated rest to recover. Overall pt tolerated treatment session well. Pt is generally weak and deconditioned, further limited by quick onset fatigue impacting fxl performance. Pt would benefit from cont OT services to maximize safety and fxl performance of ADLs. Recommend level III minimum resource intensity when medically cleared for d/c.     Radha Kim OTR/L   [place] License # [id number]  [place] License # [id number]

## 2025-05-20 NOTE — ASSESSMENT & PLAN NOTE
"Lab Results   Component Value Date    HGBA1C 6.4 (H) 11/12/2024     HbA1c 6.4  Advised to maintain a good DM control to prevent progression of CKD   Maintain healthy diet (vegetables, fruits, whole grains, nonfat or low fat)  Weight loss  Physical activity (5 to 10 minutes to start the increase to 30 min a day)      No results for input(s): \"POCGLU\" in the last 72 hours.      Blood Sugar Average: Last 72 hrs:  (P) 85    "

## 2025-05-20 NOTE — ASSESSMENT & PLAN NOTE
Likely secondary to SHANTI on CKD  Previously on bicarb drip which has since been discontinued  Continue oral sodium bicarb

## 2025-05-20 NOTE — ASSESSMENT & PLAN NOTE
Lab Results   Component Value Date    EGFR 25 05/20/2025    EGFR 20 05/19/2025    EGFR 18 05/18/2025    CREATININE 2.35 (H) 05/20/2025    CREATININE 2.73 (H) 05/19/2025    CREATININE 3.06 (H) 05/18/2025   #Non-Oliguric KDIGO severe SHANTI stage 3    Etiology: Multifactorial secondary to obstructive uropathy and sepsis with hypotension   Baseline creatinine 1.3 to 1.5 mg/dL  Renal function continues to improve creatinine down to 2.35 mg/dL  Peak creatinine: 3.22   UA: Hematuria, leukocyturia, bacteriuria  Renal imaging : Staghorn stone on the right pelvis, mid right ureter stone with right hydroureteronephrosis with perinephric stranding.  Mild distention of the left collecting system  Status post right nephrostomy, Sumner catheter in place as well management as per urology  Treatment:  Nonoliguric good urine output   Blood pressure is stable  Continue to check daily BMP

## 2025-05-20 NOTE — ASSESSMENT & PLAN NOTE
CT A/P: Right urolithiasis with increased stone burden in comparison to 10/25/2024 including staghorn calculus in the right renal pelvis measuring 2 cm and additional two obstructing calculi in the distal ureter just proximal to the anastomosis measuring up to 8 mm.  S/p right nephrostomy tube placement on 5/16/25.   body fluid culture from R nephrostomy tube placement with growth of gram-positive cocci, Klebsiella, Proteus, Citrobacter.   Continue Cefepime as above  Urology consulted, recommendations are appreciated  Conversion of nephrostomy tube to nephroureteral stent for flexible ureteroscopy, stone removal in a few weeks.  5/17/25: Repeat CT A/P showed that the right-sided nephrostomy tube is in the expected position.

## 2025-05-20 NOTE — ASSESSMENT & PLAN NOTE
Acute elevation in creatinine noted from baseline.  Currently downtrending.  This does impact antibiotic dosing.  Cefepime dosing as above  Repeat chemistry tomorrow  Will dose adjust as needed  Ongoing follow-up by nephrology

## 2025-05-20 NOTE — CASE MANAGEMENT
Case Management Assessment & Discharge Planning Note    Patient name Joel Wyman  Location 2 [facility] 209/2 [facility] 209 MRN [id number]  : 1945 Date 2025       Current Admission Date: 2025  Current Admission Diagnosis:Sepsis ([facility])   Patient Active Problem List    Diagnosis Date Noted    Class 1 obesity in adult 2025    Atrial fibrillation ([facility]) 2025    History of prostate cancer 2025    Metabolic acidosis 2025    Polymicrobial bacteremia 2025    Infrarenal abdominal aortic aneurysm (AAA) without rupture ([facility]) 2025    Atheroscler native arteries the extremities w/intermit claudication ([facility]) 2025    Hydronephrosis 2024    Radiation cystitis 2024    Other artificial openings of urinary tract status ([facility]) 2024    Malignant neoplasm of urinary bladder, unspecified site ([facility]) 2024    Chronic low back pain with bilateral sciatica 2024    Cardiac Risk Assessment 2024    Type 2 diabetes mellitus with diabetic polyneuropathy, without long-term current use of insulin ([facility])     Obesity (BMI 30.0-34.9) 2024    Nicotine abuse 2024    Mixed stress and urge urinary incontinence 10/30/2023    Arthritis of both knees 2023    Localized, primary osteoarthritis of hand 2022    Acute kidney injury superimposed on stage 3b chronic kidney disease ([facility])     Sepsis ([facility]) 2021    Urinary retention 2021    Hematuria 2021    Prostate cancer ([facility]) - s/p resection 2021    Leukocytosis 2021    RA (rheumatoid arthritis) ([facility]) 2021    Obstructive pyelonephritis 2021    Gross hematuria     COPD (chronic obstructive pulmonary disease) ([facility]) 2021    Essential hypertension 2021    Hyperlipidemia 2021    Anemia of chronic disease 2021      LOS (days): 4  Geometric Mean LOS (GMLOS) (days): 3.5  Days to GMLOS:-0.6     OBJECTIVE:    Risk of Unplanned Readmission Score:  23.14     Current admission status: Inpatient    Preferred Pharmacy:   [facility]/pharmacy #[id number] - [place], [place] - [address] [address]  Phone: [phone] Fax: [phone]    Primary Care Provider: Frank Lombardi, DO    Primary Insurance: A[employer] MC REP  Secondary Insurance:     ASSESSMENT:  Active Health Care Proxies       Renee Wyman Health Care Representative - Spouse   Primary Phone: [phone] (Mobile)  Home Phone: [phone]                 Readmission Root Cause  30 Day Readmission: No    Patient Information  Admitted from:: Home  Mental Status: Alert  During Assessment patient was accompanied by: Not accompanied during assessment  Assessment information provided by:: Patient  Primary Caregiver: Spouse  Caregiver's Name:: Wife Renee  Caregiver's Telephone Number:: [phone]  Support Systems: Spouse/significant other  What city do you live in?: [place]  Living Arrangements: Lives w/ Spouse/significant other    Activities of Daily Living Prior to Admission  Functional Status: Independent  Completes ADLs independently?: Yes  Ambulates independently?: Yes  Does patient use assisted devices?: Yes  Assisted Devices (DME) used: Nebulizer, Other (Comment) (Albuterol inhaler)  Does patient currently own DME?: Yes  What DME does the patient currently own?: Walker, Straight Cane, Nebulizer, Other (Comment) (Albuterol inhaler)  Does patient currently have HHC?: No      Patient Information Continued  Income Source: Pension/USP  Does patient have prescription coverage?: Yes  Can the patient afford their medications and any related supplies (such as glucometers or test strips)?: Yes  Does patient receive dialysis treatments?: No      Means of Transportation  Means of Transport to Appts:: Drives Self      Social Determinants of Health (SDOH)      Flowsheet Row Most Recent Value   Housing Stability    In the last 12 months, was there a time when you were not able  "to pay the mortgage or rent on time? N   In the past 12 months, how many times have you moved where you were living? 0   At any time in the past 12 months, were you homeless or living in a shelter (including now)? N   Transportation Needs    In the past 12 months, has lack of transportation kept you from medical appointments or from getting medications? no   In the past 12 months, has lack of transportation kept you from meetings, work, or from getting things needed for daily living? No   Food Insecurity    Within the past 12 months, you worried that your food would run out before you got the money to buy more. Never true   Within the past 12 months, the food you bought just didn't last and you didn't have money to get more. Never true   Utilities    In the past 12 months has the electric, gas, oil, or water company threatened to shut off services in your home? No            DISCHARGE DETAILS:    Discharge planning discussed with:: Patient  Freedom of Choice: Yes    CM contacted family/caregiver?: Yes    Contacts  Patient Contacts: Wife Renee  Relationship to Patient:: Family  Contact Method: Phone  Phone Number: [phone]  Reason/Outcome: Continuity of Care, Emergency Contact, Discharge Planning    Requested Home Health Care         Is the patient interested in HHC at discharge?: No    DME Referral Provided  Referral made for DME?: No    Treatment Team Recommendation: Outpatient Rehab, Home with Home Health Care    CM met with patient at bedside to introduce self/role, complete assessment, and discuss level III rehab recommendation. Patient stated he is independent with all daily activities, ambulates with no DME, and drives self. Patient declined rehab at this time and stated he \"will see how I feel when discharge time comes\" and that if he does decide to rehab it would be OP PT. Patient had no discharge questions or concerns at this time.     CM called and left message for patient's wife to introduce self/role " and provide an update. CM left contact information for any questions or concerns. CM will continue to follow patient for potential discharge needs.

## 2025-05-21 ENCOUNTER — ANESTHESIA EVENT (INPATIENT)
Dept: NON INVASIVE DIAGNOSTICS | Facility: HOSPITAL | Age: 80
DRG: 871 | End: 2025-05-21
Payer: COMMERCIAL

## 2025-05-21 ENCOUNTER — APPOINTMENT (INPATIENT)
Dept: NON INVASIVE DIAGNOSTICS | Facility: HOSPITAL | Age: 80
DRG: 871 | End: 2025-05-21
Attending: FAMILY MEDICINE
Payer: COMMERCIAL

## 2025-05-21 LAB
ALBUMIN SERPL BCG-MCNC: 3 G/DL (ref 3.5–5)
ALP SERPL-CCNC: 62 U/L (ref 34–104)
ALT SERPL W P-5'-P-CCNC: 22 U/L (ref 7–52)
ANION GAP SERPL CALCULATED.3IONS-SCNC: 10 MMOL/L (ref 4–13)
AST SERPL W P-5'-P-CCNC: 28 U/L (ref 13–39)
BASOPHILS # BLD MANUAL: 0 THOUSAND/UL (ref 0–0.1)
BASOPHILS NFR MAR MANUAL: 0 % (ref 0–1)
BILIRUB SERPL-MCNC: 0.38 MG/DL (ref 0.2–1)
BUN SERPL-MCNC: 57 MG/DL (ref 5–25)
CALCIUM ALBUM COR SERPL-MCNC: 9.3 MG/DL (ref 8.3–10.1)
CALCIUM SERPL-MCNC: 8.5 MG/DL (ref 8.4–10.2)
CHLORIDE SERPL-SCNC: 107 MMOL/L (ref 96–108)
CO2 SERPL-SCNC: 20 MMOL/L (ref 21–32)
CREAT SERPL-MCNC: 2.26 MG/DL (ref 0.6–1.3)
EOSINOPHIL # BLD MANUAL: 0.26 THOUSAND/UL (ref 0–0.4)
EOSINOPHIL NFR BLD MANUAL: 3 % (ref 0–6)
ERYTHROCYTE [DISTWIDTH] IN BLOOD BY AUTOMATED COUNT: 15.9 % (ref 11.6–15.1)
GFR SERPL CREATININE-BSD FRML MDRD: 26 ML/MIN/1.73SQ M
GLUCOSE SERPL-MCNC: 104 MG/DL (ref 65–140)
HCT VFR BLD AUTO: 33.9 % (ref 36.5–49.3)
HGB BLD-MCNC: 10.4 G/DL (ref 12–17)
LG PLATELETS BLD QL SMEAR: PRESENT
LYMPHOCYTES # BLD AUTO: 0.87 THOUSAND/UL (ref 0.6–4.47)
LYMPHOCYTES # BLD AUTO: 9 % (ref 14–44)
MCH RBC QN AUTO: 27.6 PG (ref 26.8–34.3)
MCHC RBC AUTO-ENTMCNC: 30.7 G/DL (ref 31.4–37.4)
MCV RBC AUTO: 90 FL (ref 82–98)
MONOCYTES # BLD AUTO: 0.52 THOUSAND/UL (ref 0–1.22)
MONOCYTES NFR BLD: 6 % (ref 4–12)
NEUTROPHILS # BLD MANUAL: 7.08 THOUSAND/UL (ref 1.85–7.62)
NEUTS BAND NFR BLD MANUAL: 1 % (ref 0–8)
NEUTS SEG NFR BLD AUTO: 80 % (ref 43–75)
PLATELET # BLD AUTO: 191 THOUSANDS/UL (ref 149–390)
PLATELET BLD QL SMEAR: ADEQUATE
PMV BLD AUTO: 10 FL (ref 8.9–12.7)
POTASSIUM SERPL-SCNC: 4.9 MMOL/L (ref 3.5–5.3)
PROT SERPL-MCNC: 6.5 G/DL (ref 6.4–8.4)
RBC # BLD AUTO: 3.77 MILLION/UL (ref 3.88–5.62)
RBC MORPH BLD: PRESENT
SL CV LV EF: 60
SODIUM SERPL-SCNC: 137 MMOL/L (ref 135–147)
VARIANT LYMPHS # BLD AUTO: 1 %
WBC # BLD AUTO: 8.74 THOUSAND/UL (ref 4.31–10.16)

## 2025-05-21 PROCEDURE — 85007 BL SMEAR W/DIFF WBC COUNT: CPT | Performed by: INTERNAL MEDICINE

## 2025-05-21 PROCEDURE — 93325 DOPPLER ECHO COLOR FLOW MAPG: CPT | Performed by: INTERNAL MEDICINE

## 2025-05-21 PROCEDURE — 99232 SBSQ HOSP IP/OBS MODERATE 35: CPT | Performed by: INTERNAL MEDICINE

## 2025-05-21 PROCEDURE — 97530 THERAPEUTIC ACTIVITIES: CPT

## 2025-05-21 PROCEDURE — B246ZZ4 ULTRASONOGRAPHY OF RIGHT AND LEFT HEART, TRANSESOPHAGEAL: ICD-10-PCS | Performed by: INTERNAL MEDICINE

## 2025-05-21 PROCEDURE — 94640 AIRWAY INHALATION TREATMENT: CPT

## 2025-05-21 PROCEDURE — 80053 COMPREHEN METABOLIC PANEL: CPT | Performed by: INTERNAL MEDICINE

## 2025-05-21 PROCEDURE — 94760 N-INVAS EAR/PLS OXIMETRY 1: CPT

## 2025-05-21 PROCEDURE — 85027 COMPLETE CBC AUTOMATED: CPT | Performed by: INTERNAL MEDICINE

## 2025-05-21 PROCEDURE — 93320 DOPPLER ECHO COMPLETE: CPT | Performed by: INTERNAL MEDICINE

## 2025-05-21 PROCEDURE — 99233 SBSQ HOSP IP/OBS HIGH 50: CPT | Performed by: INTERNAL MEDICINE

## 2025-05-21 PROCEDURE — 93312 ECHO TRANSESOPHAGEAL: CPT

## 2025-05-21 PROCEDURE — 93312 ECHO TRANSESOPHAGEAL: CPT | Performed by: INTERNAL MEDICINE

## 2025-05-21 RX ORDER — SODIUM CHLORIDE 9 MG/ML
INJECTION, SOLUTION INTRAVENOUS CONTINUOUS PRN
Status: DISCONTINUED | OUTPATIENT
Start: 2025-05-21 | End: 2025-05-21

## 2025-05-21 RX ORDER — PROPOFOL 10 MG/ML
INJECTION, EMULSION INTRAVENOUS AS NEEDED
Status: DISCONTINUED | OUTPATIENT
Start: 2025-05-21 | End: 2025-05-21

## 2025-05-21 RX ORDER — CEFTRIAXONE 2 G/50ML
2000 INJECTION, SOLUTION INTRAVENOUS EVERY 24 HOURS
Status: DISCONTINUED | OUTPATIENT
Start: 2025-05-22 | End: 2025-05-23

## 2025-05-21 RX ORDER — CEFEPIME HYDROCHLORIDE 1 G/50ML
1000 INJECTION, SOLUTION INTRAVENOUS EVERY 12 HOURS
Status: COMPLETED | OUTPATIENT
Start: 2025-05-21 | End: 2025-05-22

## 2025-05-21 RX ORDER — SODIUM BICARBONATE 650 MG/1
1300 TABLET ORAL
Status: DISCONTINUED | OUTPATIENT
Start: 2025-05-21 | End: 2025-05-22

## 2025-05-21 RX ORDER — LIDOCAINE HYDROCHLORIDE 10 MG/ML
INJECTION, SOLUTION EPIDURAL; INFILTRATION; INTRACAUDAL; PERINEURAL AS NEEDED
Status: DISCONTINUED | OUTPATIENT
Start: 2025-05-21 | End: 2025-05-21

## 2025-05-21 RX ADMIN — ATORVASTATIN CALCIUM 20 MG: 20 TABLET, FILM COATED ORAL at 17:34

## 2025-05-21 RX ADMIN — CEFEPIME HYDROCHLORIDE 1000 MG: 1 INJECTION, SOLUTION INTRAVENOUS at 00:13

## 2025-05-21 RX ADMIN — LIDOCAINE HYDROCHLORIDE 30 MG: 10 INJECTION, SOLUTION EPIDURAL; INFILTRATION; INTRACAUDAL; PERINEURAL at 11:16

## 2025-05-21 RX ADMIN — Medication 12.5 MG: at 08:38

## 2025-05-21 RX ADMIN — Medication 12.5 MG: at 21:30

## 2025-05-21 RX ADMIN — CEFEPIME HYDROCHLORIDE 1000 MG: 1 INJECTION, SOLUTION INTRAVENOUS at 12:10

## 2025-05-21 RX ADMIN — NICOTINE 1 PATCH: 14 PATCH, EXTENDED RELEASE TRANSDERMAL at 08:39

## 2025-05-21 RX ADMIN — APIXABAN 2.5 MG: 2.5 TABLET, FILM COATED ORAL at 08:38

## 2025-05-21 RX ADMIN — IPRATROPIUM BROMIDE AND ALBUTEROL SULFATE 3 ML: .5; 3 SOLUTION RESPIRATORY (INHALATION) at 14:18

## 2025-05-21 RX ADMIN — PROPOFOL 50 MG: 10 INJECTION, EMULSION INTRAVENOUS at 11:16

## 2025-05-21 RX ADMIN — IPRATROPIUM BROMIDE AND ALBUTEROL SULFATE 3 ML: .5; 3 SOLUTION RESPIRATORY (INHALATION) at 07:45

## 2025-05-21 RX ADMIN — SODIUM BICARBONATE 650 MG TABLET 1300 MG: at 08:43

## 2025-05-21 RX ADMIN — GABAPENTIN 100 MG: 100 CAPSULE ORAL at 17:38

## 2025-05-21 RX ADMIN — SODIUM CHLORIDE: 0.9 INJECTION, SOLUTION INTRAVENOUS at 11:03

## 2025-05-21 RX ADMIN — ASPIRIN 81 MG: 81 TABLET, CHEWABLE ORAL at 08:39

## 2025-05-21 RX ADMIN — IPRATROPIUM BROMIDE AND ALBUTEROL SULFATE 3 ML: .5; 3 SOLUTION RESPIRATORY (INHALATION) at 19:17

## 2025-05-21 RX ADMIN — SODIUM BICARBONATE 650 MG TABLET 1300 MG: at 17:34

## 2025-05-21 RX ADMIN — UMECLIDINIUM 1 PUFF: 62.5 AEROSOL, POWDER ORAL at 08:36

## 2025-05-21 RX ADMIN — APIXABAN 2.5 MG: 2.5 TABLET, FILM COATED ORAL at 17:34

## 2025-05-21 RX ADMIN — CEFEPIME HYDROCHLORIDE 1000 MG: 1 INJECTION, SOLUTION INTRAVENOUS at 21:45

## 2025-05-21 RX ADMIN — GABAPENTIN 100 MG: 100 CAPSULE ORAL at 21:29

## 2025-05-21 RX ADMIN — FLUTICASONE FUROATE AND VILANTEROL TRIFENATATE 1 PUFF: 200; 25 POWDER RESPIRATORY (INHALATION) at 08:36

## 2025-05-21 RX ADMIN — SODIUM CHLORIDE, PRESERVATIVE FREE 10 ML: 5 INJECTION INTRAVENOUS at 08:47

## 2025-05-21 RX ADMIN — LIDOCAINE HYDROCHLORIDE 30 MG: 10 INJECTION, SOLUTION EPIDURAL; INFILTRATION; INTRACAUDAL; PERINEURAL at 11:23

## 2025-05-21 RX ADMIN — LIDOCAINE HYDROCHLORIDE 20 MG: 10 INJECTION, SOLUTION EPIDURAL; INFILTRATION; INTRACAUDAL; PERINEURAL at 11:29

## 2025-05-21 RX ADMIN — GABAPENTIN 100 MG: 100 CAPSULE ORAL at 08:38

## 2025-05-21 RX ADMIN — FLUTICASONE PROPIONATE 1 SPRAY: 50 SPRAY, METERED NASAL at 08:36

## 2025-05-21 NOTE — ASSESSMENT & PLAN NOTE
Patient met sepsis criteria early on.  Sources are obstructive pyelonephritis with development of polymicrobial bacteremia.  Clinical parameters now seem to be improving.  Repeat cultures NGTD and initial susceptibilities reviewed.  Antibiotics as below  Continue to trend fever curve/vitals  Repeat CBC/chemistry tomorrow for treatment response/dosing  Follow-up pending blood cultures for clearance  Will defer transesophageal echo to cardiology, likely limited study per discussion  Monitor exam for new/developing symptoms  Additional supportive care per primary  Additional interventions pending clinical course

## 2025-05-21 NOTE — PLAN OF CARE
Problem: Potential for Falls  Goal: Patient will remain free of falls  Description: INTERVENTIONS:  - Educate patient/family on patient safety including physical limitations  - Instruct patient to call for assistance with activity   - Consider consulting OT/PT to assist with strengthening/mobility based on AM PAC & JH-HLM score  - Consult OT/PT to assist with strengthening/mobility   - Keep Call bell within reach  - Keep bed low and locked with side rails adjusted as appropriate  - Keep care items and personal belongings within reach  - Initiate and maintain comfort rounds  - Make Fall Risk Sign visible to staff  - Offer Toileting every 2 Hours, in advance of need  - Initiate/Maintain bed alarm  - Obtain necessary fall risk management equipment: socks  - Apply yellow socks and bracelet for high fall risk patients  - Consider moving patient to room near nurses station  Outcome: Progressing     Problem: PAIN - ADULT  Goal: Verbalizes/displays adequate comfort level or baseline comfort level  Description: Interventions:  - Encourage patient to monitor pain and request assistance  - Assess pain using appropriate pain scale  - Administer analgesics as ordered based on type and severity of pain and evaluate response  - Implement non-pharmacological measures as appropriate and evaluate response  - Consider cultural and social influences on pain and pain management  - Notify physician/advanced practitioner if interventions unsuccessful or patient reports new pain  - Educate patient/family on pain management process including their role and importance of  reporting pain   - Provide non-pharmacologic/complimentary pain relief interventions  Outcome: Progressing

## 2025-05-21 NOTE — PROGRESS NOTES
"Progress Note - Urology      Patient: Joel Wyman   : 1945 Sex: male   MRN: [id number]     CSN: [id number]  Unit/Bed#: [address] [place] [address]     SUBJECTIVE:   Vs stable  CAT scan confirming right nephrostomy in normal position  Left hydronephrosis resolved  White count 8.7 trending down      Objective   Vitals: /60   Pulse 75   Temp 97.9 °F (36.6 °C)   Resp 18   Ht 5' 9\" (1.753 m)   Wt 101 kg (222 lb)   SpO2 (!) 89%   BMI 32.78 kg/m²     I/O last 24 hours:  In: 50 [IV Piggyback:50]  Out: 3100 [Urine:3100]      Physical Exam:   General Alert awake   Normocephalic atraumatic PERRLA  Lungs clear bilaterally  Cardiac normal S1 normal S2  Abdomen soft, flank pain  Right nephrostomy tube  Extremities no edema      Lab Results: CBC:   Lab Results   Component Value Date    WBC 8.74 2025    HGB 10.4 (L) 2025    HCT 33.9 (L) 2025    MCV 90 2025     2025    RBC 3.77 (L) 2025    MCH 27.6 2025    MCHC 30.7 (L) 2025    RDW 15.9 (H) 2025    MPV 10.0 2025    NRBC 0 2025     CMP:   Lab Results   Component Value Date     2025    CO2 20 (L) 2025    BUN 57 (H) 2025    CREATININE 2.26 (H) 2025    CALCIUM 8.5 2025    AST 28 2025    ALT 22 2025    ALKPHOS 62 2025    EGFR 26 2025     Urinalysis:   Lab Results   Component Value Date    COLORU Yellow 2025    CLARITYU Cloudy 2025    SPECGRAV 1.020 2025    PHUR 8.0 2025    PHUR 5.5 2018    LEUKOCYTESUR Large (A) 2025    NITRITE Positive (A) 2025    GLUCOSEU Negative 2025    KETONESU Negative 2025    BILIRUBINUR Negative 2025    BLOODU Moderate (A) 2025     Urine Culture:   Lab Results   Component Value Date    URINECX >100,000 cfu/ml Klebsiella pneumoniae (A) 2025    URINECX >100,000 cfu/ml Klebsiella pneumoniae (A) 2025    URINECX Proteus mirabilis (A) 2025    " "URINECX 20,000-29,000 cfu/ml 05/16/2025     PSA: No results found for: \"PSA\"      Assessment/ Plan:  Bacteremia  Rightpyonephrosis  Status post right nephrostomy tube day #5  Antibiotics as per medical team  No urologic intervention at this time  inocencia today        Yovani Khan MD  "

## 2025-05-21 NOTE — ASSESSMENT & PLAN NOTE
Patient with history of prostate cancer. 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 Patient had suprapubic catheter placed. Then patient was followed up at [place] where he underwent simple cystectomy with ileal conduit in May 2024   CT with evidence of right sided perinephritic stranding  Patient has right-sided nephrostomy tube in place   Continue IV cefepime as noted above

## 2025-05-21 NOTE — ANESTHESIA POSTPROCEDURE EVALUATION
Post-Op Assessment Note    CV Status:  Stable  Pain Score: 0    Pain management: adequate       Mental Status:  Arousable and sleepy   Hydration Status:  Euvolemic and stable   PONV Controlled:  None   Airway Patency:  Patent and adequate     Post Op Vitals Reviewed: Yes    No anethesia notable event occurred.    Staff: CRNA         Last Filed PACU Vitals:  Vitals Value Taken Time   Temp     Pulse 62 05/21/25 11:37   /57 05/21/25 11:37   Resp 18 05/21/25 11:37   SpO2 95 % 05/21/25 11:37

## 2025-05-21 NOTE — PROGRESS NOTES
Progress Note - Infectious Disease   Name: Joel Wyman 79 y.o. male I MRN: [id number]  Unit/Bed#: 2 [address] Date of Admission: 5/16/2025   Date of Service: 5/21/2025 I Hospital Day: 5     Assessment & Plan  Sepsis (HCC)  Patient met sepsis criteria early on.  Sources are obstructive pyelonephritis with development of polymicrobial bacteremia.  Clinical parameters now seem to be improving.  Repeat cultures NGTD and initial susceptibilities reviewed.  Antibiotics as below  Continue to trend fever curve/vitals  Repeat CBC/chemistry tomorrow for treatment response/dosing  Follow-up pending blood cultures for clearance  Will defer transesophageal echo to cardiology, likely limited study per discussion  Monitor exam for new/developing symptoms  Additional supportive care per primary  Additional interventions pending clinical course  Polymicrobial bacteremia  Patient presented acutely ill and blood cultures from admission have isolated Klebsiella, Proteus in 2 sets and group B strep in 1 set.  Susceptibilities reviewed.  This is likely coming from patient's obstructive pyelonephritis as urine cultures isolating the same.  Patient is without any intravascular devices and has no other localizing symptoms on exam.  Overall clinically appears to be improving. 2D echo unfortunately limited.  Repeat cultures so far without growth.  Patient agreeable to transesophageal echo and aware of possibility of prolonged IV antibiotics depending on imaging.  Discussed with cardiology and KEI may ultimately be limited given degree of calcification.  Unfortunately may now be looking at prolonged antibiotic course as we cannot absolutely rule out uncomplicated bacteremia.  Continue cefepime 1 g every 12 hour, renally dose adjusted  Last dose of antibiotic ordered through tomorrow at 12:30  Will transition then to ceftriaxone 2 g every 24 hours tomorrow  Will defer transesophageal echo to cardiology  Patient ultimately may be looking at  prolonged IV antibiotic course, 4 weeks  Patient would be cleared for PICC line once blood cultures negative at 72 hours  Will arrange ID follow-up and antibiotic needs further tomorrow  Follow-up repeat cultures for clearance  Trend fever curve/vitals  Repeat CBC/chemistry tomorrow  Monitor exam for new/developing symptoms  Additional supportive care per primary  Additional interventions pending clinical course  Obstructive pyelonephritis  In the setting of nephrolithiasis.  Other considerations are for lower tract obstruction in the setting of prostate cancer and prior issues per urology note.  Now has PCN in place with improvement.  Cultures polymicrobial with Klebsiella, Proteus, strep organisms and Citrobacter.  Unfortunately the Citrobacter does limit antibiotic options.  Will avoid fluoroquinolone given AAA and age  Continue cefepime as above  Will plan for 7 days of antibiotic for this issue through 5/22  Follow-up pending cultures and susceptibilities otherwise  Monitor PCN output  Ongoing followed by urology  Acute kidney injury superimposed on stage 3b chronic kidney disease (HCC)  Acute elevation in creatinine noted from baseline.  Currently downtrending.  This does impact antibiotic dosing.  Cefepime dosing as above  Repeat chemistry tomorrow  Will dose adjust as needed  Ongoing follow-up by nephrology  Type 2 diabetes mellitus with diabetic polyneuropathy, without long-term current use of insulin (HCC)  Well-controlled disease with hemoglobin A1c of 6.4.  Ongoing glucose management per primary.  Infrarenal abdominal aortic aneurysm (AAA) without rupture (HCC)  Noted on imaging.  Not absolute contraindication but would favor avoiding fluoroquinolones given this issue.  Class 1 obesity in adult  BMI noted to be 32.  Does impact antibiotic dosing.  Will favor higher baseline antibiotic dosing when possible.  Current antibiotics dose adjusted as above.    Above plan discussed briefly with the patient at  bedside earlier today  Above plan discussed later today with cardiology as well as primary service in terms of current antibiotics and potential for prolonged course at this point    ID consult service will continue to follow.    Antibiotics:  Cefepime #6    24 Hour events:  Yesterday and overnight notes reviewed and no acute events noted.  Case discussed with primary service earlier today and patient is pending KEI.  Discussed case with cardiology later this morning and they are concerned about not being able to rule out small vegetations.  Reached out to primary later about possible need then for prolonged antibiotic.    Subjective:  Patient seen earlier this morning and he denied having any vomiting, chest pain, shortness of breath.  He is having some mild nausea today.  Currently n.p.o. for KEI.  We discussed imaging and hopefully that will further assist with defining duration of therapy.  Additional questions answered.    Objective:  Vitals:  Temp:  [98.7 °F (37.1 °C)-98.8 °F (37.1 °C)] 98.7 °F (37.1 °C)  HR:  [58-79] 74  Resp:  [18-19] 19  BP: (110-124)/(59-80) 118/80  SpO2:  [89 %-91 %] 89 %  Temp (24hrs), Av.8 °F (37.1 °C), Min:98.7 °F (37.1 °C), Max:98.8 °F (37.1 °C)  Current: Temperature: 98.7 °F (37.1 °C)    Physical Exam:   General Appearance:  Alert, interactive, nontoxic, no acute distress.   Throat: Oropharynx moist without lesions.    Lungs:   Clear to auscultation bilaterally; no wheezes, rhonchi or rales; respirations unlabored on room air   Heart:  RRR; no murmur, rub or gallop noted   Abdomen:   Soft, non-tender, non-distended, positive bowel sounds.     Extremities: No clubbing, cyanosis or edema   Skin: No new rashes or lesions. No new draining wounds noted.       Labs, Imaging, & Other studies:   All pertinent labs and imaging studies in PACS were personally reviewed as below.  Results from last 7 days   Lab Units 25  0525 25  0617 25  0606   WBC Thousand/uL 8.74 8.47  13.50*   HEMOGLOBIN g/dL 10.4* 10.4* 10.7*   PLATELETS Thousands/uL 191 166 165     Results from last 7 days   Lab Units 05/21/25  0525 05/20/25  0617 05/18/25  0620 05/18/25  0157   POTASSIUM mmol/L 4.9 4.7   < > 4.6   CHLORIDE mmol/L 107 109*   < > 111*   CO2 mmol/L 20* 21   < > 15*   BUN mg/dL 57* 62*   < > 67*   CREATININE mg/dL 2.26* 2.35*   < > 3.16*   EGFR ml/min/1.73sq m 26 25   < > 17   CALCIUM mg/dL 8.5 8.6   < > 7.4*   AST U/L 28 25  --  24   ALT U/L 22 15  --  7   ALK PHOS U/L 62 65  --  48    < > = values in this interval not displayed.     Results from last 7 days   Lab Units 05/18/25  0857 05/18/25  0620 05/18/25  0456 05/18/25  0231 05/18/25  0157 05/16/25  1629 05/16/25  1235 05/16/25  1234 05/16/25  1220   BLOOD CULTURE  No Growth at 48 hrs. No Growth at 48 hrs.  --   --   --   --   --  Klebsiella pneumoniae*  Proteus mirabilis*  Streptococcus agalactiae (Group B)* Klebsiella pneumoniae*  Proteus mirabilis*   SPUTUM CULTURE   --   --  3+ Growth of Moraxella catarrhalis*  2+ Growth of Candida albicans*  --   --   --   --   --   --    GRAM STAIN RESULT   --   --  2+ Epithelial cells per low power field*  4+ Gram positive cocci in pairs*  Rare Gram variable rods*  --   --  4+ Polys*  4+ Gram positive cocci in pairs, chains and clusters*  --  Gram negative rods*  Gram positive cocci in pairs and chains* Gram negative rods*   URINE CULTURE   --   --   --   --   --   --  >100,000 cfu/ml Klebsiella pneumoniae*  >100,000 cfu/ml Klebsiella pneumoniae*  Proteus mirabilis*  20,000-29,000 cfu/ml  --   --    BODY FLUID CULTURE, STERILE   --   --   --   --   --  4+ Growth of Proteus mirabilis*  4+ Growth of Klebsiella pneumoniae*  4+ Growth of Citrobacter freundii*  4+ Growth of  --   --   --    MRSA CULTURE ONLY   --   --   --   --  No Methicillin Resistant Staphlyococcus aureus (MRSA) isolated  --   --   --   --    LEGIONELLA URINARY ANTIGEN   --   --   --  Negative  --   --   --   --   --         Lab interpretation/comments: No leukocytosis.  Creatinine stable today.    Imaging interpretation/comments: Transesophageal echo pending    Culture data: Blood cultures now negative at 48 hours.  Initial susceptibilities reviewed.    External notes: None

## 2025-05-21 NOTE — ASSESSMENT & PLAN NOTE
CT A/P: Right urolithiasis with increased stone burden in comparison to 10/25/2024 including staghorn calculus in the right renal pelvis measuring 2 cm and additional two obstructing calculi in the distal ureter just proximal to the anastomosis measuring up to 8 mm.  S/p right nephrostomy tube placement on 5/16/25. Follow up outpatient for routine tube checks.  Body fluid culture from R nephrostomy tube placement with growth of gram-positive cocci, Klebsiella, Proteus, Citrobacter.   Continue antibiotics as above  Urology consulted, recommendations are appreciated  Conversion of nephrostomy tube to nephroureteral stent for flexible ureteroscopy, stone removal in a few weeks.  5/17/25: Repeat CT A/P showed that the right-sided nephrostomy tube is in the expected position.   fair minus

## 2025-05-21 NOTE — ASSESSMENT & PLAN NOTE
Lab Results   Component Value Date    EGFR 26 05/21/2025    EGFR 25 05/20/2025    EGFR 20 05/19/2025    CREATININE 2.26 (H) 05/21/2025    CREATININE 2.35 (H) 05/20/2025    CREATININE 2.73 (H) 05/19/2025   #Non-Oliguric KDIGO severe SHANTI stage 3    Etiology: Multifactorial secondary to obstructive uropathy and sepsis with hypotension   Baseline creatinine 1.3 to 1.5 mg/dL  Renal function continues to slowly improve creatinine down to 2.26 mg/dL, nonoliguric  Peak creatinine: 3.22   UA: Hematuria, leukocyturia, bacteriuria  Renal imaging : Staghorn stone on the right pelvis, mid right ureter stone with right hydroureteronephrosis with perinephric stranding.  Mild distention of the left collecting system.  CT scan on May 17 showed resolution of the right-sided hydronephrosis after placement of the right PCN.  Status post right nephrostomy, Sumner catheter in place as well management as per urology  Treatment:  Daily BMP  Stable from standpoint for discharge  Anticipate that his creatinine will plateau higher than his prior baseline given his age and poor renal reserve.

## 2025-05-21 NOTE — ANESTHESIA POSTPROCEDURE EVALUATION
Post-Op Assessment Note    CV Status:  Stable    Pain management: adequate       Mental Status:  Alert and awake   Hydration Status:  Euvolemic   PONV Controlled:  Controlled   Airway Patency:  Patent     Post Op Vitals Reviewed: Yes    No anethesia notable event occurred.    Staff: Anesthesiologist         Last Filed PACU Vitals:  Vitals Value Taken Time   Temp     Pulse     /57 05/21/25 13:40   Resp     SpO2     Vitals shown include unfiled device data.            spontaneous

## 2025-05-21 NOTE — ASSESSMENT & PLAN NOTE
Previously with hypotension requiring transfer to ICU. BP improved and stabilized  Continue metoprolol tartrate 12.5mg BID  Monitor vitals per routine

## 2025-05-21 NOTE — ANESTHESIA PREPROCEDURE EVALUATION
Procedure:  KEI    Relevant Problems   CARDIO   (+) Atrial fibrillation (HCC)   (+) Essential hypertension   (+) Hyperlipidemia   (+) Infrarenal abdominal aortic aneurysm (AAA) without rupture ([facility])      ENDO   (+) Type 2 diabetes mellitus with diabetic polyneuropathy, without long-term current use of insulin ([facility])      /RENAL   (+) Acute kidney injury superimposed on stage 3b chronic kidney disease ([facility])   (+) Hydronephrosis   (+) Obstructive pyelonephritis   (+) Prostate cancer ([facility]) - s/p resection      HEMATOLOGY   (+) Anemia of chronic disease      MUSCULOSKELETAL   (+) Arthritis of both knees   (+) Chronic low back pain with bilateral sciatica   (+) Localized, primary osteoarthritis of hand   (+) RA (rheumatoid arthritis) ([facility])      NEURO/PSYCH   (+) Chronic low back pain with bilateral sciatica   (+) Type 2 diabetes mellitus with diabetic polyneuropathy, without long-term current use of insulin ([facility])      PULMONARY   (+) COPD (chronic obstructive pulmonary disease) ([facility])        Physical Exam    Airway     Mallampati score: II  TM Distance: >3 FB  Neck ROM: full  Upper bite lip test: I  Mouth opening: >= 4 cm      Cardiovascular  Cardiovascular exam normal    Dental   No notable dental hx     Pulmonary  Pulmonary exam normal     Neurological  - normal exam  He appears awake, alert and oriented x3.      Other Findings        Anesthesia Plan  ASA Score- 3     Anesthesia Type- IV sedation with anesthesia with ASA Monitors.         Additional Monitors:     Airway Plan:            Plan Factors-Exercise tolerance (METS): >4 METS.    Chart reviewed.    Patient summary reviewed.    Patient is not a current smoker.              Induction-     Postoperative Plan- .   Monitoring Plan - Monitoring plan - standard ASA monitoring      Perioperative Resuscitation Plan - Level 1 - Full Code.       Informed Consent- Anesthetic plan and risks discussed with patient.  I personally reviewed this patient with the CRNA. Discussed  and agreed on the Anesthesia Plan with the CRNA..      NPO Status:  No vitals data found for the desired time range.

## 2025-05-21 NOTE — PROGRESS NOTES
"Progress Note - Cardiology   Name: Joel Wyman 79 y.o. male I MRN: [id number]  Unit/Bed#: 2 [address] Date of Admission: 5/16/2025   Date of Service: 5/21/2025 I Hospital Day: 5    Assessment & Plan  Polymicrobial bacteremia  followed by infectious disease  noted to have blood cultures which isolated Klebsiella, Proteus and group B strep  patient on Cefepime 1 g Q 12 hours  for KEI today to evaluate for endocarditis  Essential hypertension  blood pressure stable  continue Lopressor 12.5 mg BID  Hyperlipidemia  continue Lipitor 20 mg daily  COPD (chronic obstructive pulmonary disease) ([facility])  per history  Type 2 diabetes mellitus with diabetic polyneuropathy, without long-term current use of insulin ([facility])  Lab Results   Component Value Date    HGBA1C 6.4 (H) 11/12/2024       No results for input(s): \"POCGLU\" in the last 72 hours.    Blood Sugar Average: Last 72 hrs:  managed per primary team  Hydronephrosis  5/16/2025 CT of abdomen and pelvis: postsurgical changes from cysto prostatectomy with ilio conduit and right lower quadrant your ostomy patient has large stone burden on right including staghorn calculus in the right renal pelvis measuring 2 cm and to obstructing calculi at the distal ureter.  5/16/2025 image guided percutaneous right approximately tube placed by IR    Subjective   Chief Complaint: Patient seen and examined. Patient was admitted on 5/16/2025 to the intensive care unit with sepsis. He was found to have poly microbial bacteremia and request for KEI was placed by infectious disease.    Patient agreeable for procedure. Denies any shortness of breath, or chest pain. Denies any procedures to esophagus such as stenting or dilation for structures. Thus stated times he has trouble swallowing pills.      Objective :  Temp:  [98.7 °F (37.1 °C)-98.8 °F (37.1 °C)] 98.7 °F (37.1 °C)  HR:  [58-79] 74  BP: (110-124)/(59-80) 118/80  Resp:  [18-19] 19  SpO2:  [89 %-91 %] 89 %  O2 Device: None (Room " air)  Orthostatic Blood Pressures      Flowsheet Row Most Recent Value   Blood Pressure 118/80 filed at 05/21/2025 0845   Patient Position - Orthostatic VS Lying filed at 05/21/2025 0020          First Weight: Weight - Scale: 96.7 kg (213 lb 3 oz) (05/16/25 0958)  Vitals:    05/19/25 0745 05/19/25 0800   Weight: 96.6 kg (213 lb) 101 kg (222 lb 3.6 oz)     Physical Exam  Vitals and nursing note reviewed.   Constitutional:       Appearance: Normal appearance.   HENT:      Right Ear: External ear normal.      Left Ear: External ear normal.     Eyes:      General:         Right eye: No discharge.         Left eye: No discharge.       Cardiovascular:      Rate and Rhythm: Normal rate and regular rhythm.      Pulses: Normal pulses.   Pulmonary:      Effort: Pulmonary effort is normal. No respiratory distress.      Breath sounds: Normal breath sounds.   Abdominal:      General: There is no distension.      Palpations: Abdomen is soft.     Musculoskeletal:      Right lower leg: No edema.      Left lower leg: No edema.     Skin:     General: Skin is warm and dry.      Capillary Refill: Capillary refill takes less than 2 seconds.     Neurological:      Mental Status: He is alert and oriented to person, place, and time. Mental status is at baseline.     Psychiatric:         Mood and Affect: Mood normal.           Lab Results: I have reviewed the following results:  Results from last 7 days   Lab Units 05/21/25 0525 05/20/25 0617 05/19/25  0606   WBC Thousand/uL 8.74 8.47 13.50*   HEMOGLOBIN g/dL 10.4* 10.4* 10.7*   HEMATOCRIT % 33.9* 33.7* 34.1*   PLATELETS Thousands/uL 191 166 165     Results from last 7 days   Lab Units 05/21/25  0525 05/20/25  0617 05/19/25  0606   POTASSIUM mmol/L 4.9 4.7 4.8   CHLORIDE mmol/L 107 109* 108   CO2 mmol/L 20* 21 17*   BUN mg/dL 57* 62* 67*   CREATININE mg/dL 2.26* 2.35* 2.73*   CALCIUM mg/dL 8.5 8.6 8.5     Results from last 7 days   Lab Units 05/16/25  1530   INR  1.16     Lab Results    Component Value Date    HGBA1C 6.4 (H) 11/12/2024     Lab Results   Component Value Date    TROPONINI <0.02 04/24/2021       VTE Pharmacologic Prophylaxis: VTE covered by:  apixaban, Oral, 2.5 mg at 05/21/25 0838         Ebony OLIVIA  Cardiology

## 2025-05-21 NOTE — PROGRESS NOTES
Progress Note - Hospitalist   Name: Joel Wyman 79 y.o. male I MRN: [id number]  Unit/Bed#: 2 [address] Date of Admission: 5/16/2025   Date of Service: 5/21/2025 I Hospital Day: 5    Assessment & Plan  Sepsis (HCC)  Patient presented to the ED with right flank/lower quadrant abdominal pain along with fever at home   Sepsis as noted by leukocytosis and tachycardia in the setting of obstructive pyelonephritis, polymicrobial bacteremia  Lactic acid within normal limits  Required transfer to ICU due to hypotension with minimal response to fluid boluses but never required vasopressors as blood pressures did improve  Continue IV cefepime antibiotics for bacteremia as below  Trend WBC and fever curve  Polymicrobial bacteremia  BC positive for Klebsiella, Proteus in 2 sets and group B strep in 1 set  5/18/25: Repeat BC x 2 negative @ 48 hours  TTE with normal EF, grade 1 diastolic dysfunction.  No obvious large vegetation noted but small vegetation could not be ruled out  Plan for TTE today 5/21/25. Cardiology made aware  Infectious Disease consulted.  Tentative plan for 7 days of antibiotics with Cefepime through 5/22/25 for gram negative infection. Will transition to Ceftriaxone following this.  If KEI negative, transition to oral therapy for group B strep for 1 week  Obstructive pyelonephritis  Patient with history of prostate cancer. 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 Patient had suprapubic catheter placed. Then patient was followed up at [place] where he underwent simple cystectomy with ileal conduit in May 2024   CT with evidence of right sided perinephritic stranding  Patient has right-sided nephrostomy tube in place   Continue IV cefepime as noted above  Acute kidney injury superimposed on stage 3b chronic kidney disease (HCC)  Lab Results   Component Value Date    EGFR 26 05/21/2025    EGFR 25 05/20/2025    EGFR 20  05/19/2025    CREATININE 2.26 (H) 05/21/2025    CREATININE 2.35 (H) 05/20/2025    CREATININE 2.73 (H) 05/19/2025   Etiology: Multifactorial secondary to obstructive uropathy and hypotension in the settings of sepsis  Baseline creatinine 1.3-1.5  Peak creatinine 3.22, now trending down to 2.26 today  S/p right nephrostomy on 5/16/25 for staghorn stone and associated hydronephrosis on imaging  Nephrology consulted. Stable from their standpoint as creatinine starting to plateau. Do anticipate higher new baseline given age and poor renal reserve. Follow up outpatient for routine monitoring labs.   Hydronephrosis  CT A/P: Right urolithiasis with increased stone burden in comparison to 10/25/2024 including staghorn calculus in the right renal pelvis measuring 2 cm and additional two obstructing calculi in the distal ureter just proximal to the anastomosis measuring up to 8 mm.  S/p right nephrostomy tube placement on 5/16/25. Follow up outpatient for routine tube checks.  Body fluid culture from R nephrostomy tube placement with growth of gram-positive cocci, Klebsiella, Proteus, Citrobacter.   Continue antibiotics as above  Urology consulted, recommendations are appreciated  Conversion of nephrostomy tube to nephroureteral stent for flexible ureteroscopy, stone removal in a few weeks.  5/17/25: Repeat CT A/P showed that the right-sided nephrostomy tube is in the expected position.  Atrial fibrillation (HCC)  Per ICU note, patient had short self-limited runs of A-fib  Currently on Eliquis and Lopressor  Monitor on telemetry  Follow up outpatient with Cardiology  Metabolic acidosis  Likely secondary to SHANTI on CKD  Previously on bicarb drip which has since been discontinued  Continue oral sodium bicarb  Essential hypertension  Previously with hypotension requiring transfer to ICU. BP improved and stabilized  Continue metoprolol tartrate 12.5mg BID  Monitor vitals per routine  COPD (chronic obstructive pulmonary disease)  "(HCC)  No acute exacerbation  Trelegy substituted with fluticasone-vilanterol and umeclidinium.  Continue DuoNebs as needed.  Type 2 diabetes mellitus with diabetic polyneuropathy, without long-term current use of insulin (HCC)  Lab Results   Component Value Date    HGBA1C 6.4 (H) 11/12/2024       No results for input(s): \"POCGLU\" in the last 72 hours.    Not currently on oral anti-diabetic agents  Continue carb controlled diet  Infrarenal abdominal aortic aneurysm (AAA) without rupture (HCC)  On imaging noted to have a 3 cm fusiform infrarenal AAA  Outpatient follow-up.    VTE Pharmacologic Prophylaxis:   Moderate Risk (Score 3-4) - Pharmacological DVT Prophylaxis Ordered: apixaban (Eliquis).    Mobility:   Basic Mobility Inpatient Raw Score: 19  JH-HLM Goal: 6: Walk 10 steps or more  JH-HLM Achieved: 1: Laying in bed  JH-HLM Goal achieved. Continue to encourage appropriate mobility.    Patient Centered Rounds: I performed bedside rounds with nursing staff today.   Discussions with Specialists or Other Care Team Provider: Nursing, ID    Education and Discussions with Family / Patient: Yes, wife.     Current Length of Stay: 5 day(s)  Current Patient Status: Inpatient   Certification Statement: The patient will continue to require additional inpatient hospital stay due to TTE, bacteremia  Discharge Plan: Anticipate discharge in 48 hrs to home.    Code Status: Level 1 - Full Code    Subjective   Patient sitting upright right in bed under no acute distress. Agreeable for KEI procedure today. No overnight events.     Objective :  Temp:  [98.7 °F (37.1 °C)-98.8 °F (37.1 °C)] 98.7 °F (37.1 °C)  HR:  [58-79] 74  BP: (110-124)/(59-80) 118/80  Resp:  [18-19] 19  SpO2:  [89 %-91 %] 89 %  O2 Device: None (Room air)    Body mass index is 32.82 kg/m².     Input and Output Summary (last 24 hours):     Intake/Output Summary (Last 24 hours) at 5/21/2025 0908  Last data filed at 5/21/2025 0043  Gross per 24 hour   Intake 50 ml "   Output 2250 ml   Net -2200 ml       Physical Exam  Vitals and nursing note reviewed.   Constitutional:       General: He is not in acute distress.     Appearance: He is well-developed. He is obese.   HENT:      Head: Normocephalic and atraumatic.     Eyes:      Conjunctiva/sclera: Conjunctivae normal.       Cardiovascular:      Rate and Rhythm: Normal rate and regular rhythm.      Heart sounds: No murmur heard.  Pulmonary:      Effort: Pulmonary effort is normal. No respiratory distress.      Breath sounds: Normal breath sounds.   Abdominal:      Palpations: Abdomen is soft.      Tenderness: There is no abdominal tenderness.      Comments: Ileal conduit and right nephrostomy tube in place. Urine is clear.      Musculoskeletal:         General: No swelling.      Cervical back: Neck supple.     Skin:     General: Skin is warm and dry.     Neurological:      Mental Status: He is alert.     Psychiatric:         Mood and Affect: Mood normal.           Lines/Drains:  Lines/Drains/Airways       Active Status       Name Placement date Placement time Site Days    Nephrostomy Retrograde/ Ileal Conduit Right 05/16/25  --  Right  5    Nephrostomy Right 10.2 Fr. 05/16/25  1627  Right  4                      Telemetry:  Telemetry Orders (From admission, onward)               24 Hour Telemetry Monitoring  Continuous x 24 Hours (Telem)        Expiring   Question:  Reason for 24 Hour Telemetry  Answer:  Arrhythmias requiring acute medical intervention / PPM or ICD malfunction                                  Lab Results: I have reviewed the following results:   Results from last 7 days   Lab Units 05/21/25  0525 05/20/25  0617   WBC Thousand/uL 8.74 8.47   HEMOGLOBIN g/dL 10.4* 10.4*   HEMATOCRIT % 33.9* 33.7*   PLATELETS Thousands/uL 191 166   BANDS PCT % 1  --    SEGS PCT %  --  77*   LYMPHO PCT % 9* 10*   MONO PCT % 6 9   EOS PCT % 3 3     Results from last 7 days   Lab Units 05/21/25  0525   SODIUM mmol/L 137   POTASSIUM  mmol/L 4.9   CHLORIDE mmol/L 107   CO2 mmol/L 20*   BUN mg/dL 57*   CREATININE mg/dL 2.26*   ANION GAP mmol/L 10   CALCIUM mg/dL 8.5   ALBUMIN g/dL 3.0*   TOTAL BILIRUBIN mg/dL 0.38   ALK PHOS U/L 62   ALT U/L 22   AST U/L 28   GLUCOSE RANDOM mg/dL 104     Results from last 7 days   Lab Units 05/16/25  1530   INR  1.16     Results from last 7 days   Lab Units 05/17/25  0747   POC GLUCOSE mg/dl 85         Results from last 7 days   Lab Units 05/19/25  0606 05/18/25  0157 05/17/25  2052 05/16/25  1016   LACTIC ACID mmol/L  --   --  1.2 0.7   PROCALCITONIN ng/ml 19.60* 35.16*  --   --        Recent Cultures (last 7 days):   Results from last 7 days   Lab Units 05/18/25  0857 05/18/25  0620 05/18/25  0456 05/18/25  0231 05/16/25  1629 05/16/25  1235 05/16/25  1234 05/16/25  1220   BLOOD CULTURE  No Growth at 48 hrs. No Growth at 48 hrs.  --   --   --   --  Klebsiella pneumoniae*  Proteus mirabilis*  Streptococcus agalactiae (Group B)* Klebsiella pneumoniae*  Proteus mirabilis*   SPUTUM CULTURE   --   --  3+ Growth of Moraxella catarrhalis*  2+ Growth of Candida albicans*  --   --   --   --   --    GRAM STAIN RESULT   --   --  2+ Epithelial cells per low power field*  4+ Gram positive cocci in pairs*  Rare Gram variable rods*  --  4+ Polys*  4+ Gram positive cocci in pairs, chains and clusters*  --  Gram negative rods*  Gram positive cocci in pairs and chains* Gram negative rods*   URINE CULTURE   --   --   --   --   --  >100,000 cfu/ml Klebsiella pneumoniae*  >100,000 cfu/ml Klebsiella pneumoniae*  Proteus mirabilis*  20,000-29,000 cfu/ml  --   --    BODY FLUID CULTURE, STERILE   --   --   --   --  4+ Growth of Proteus mirabilis*  4+ Growth of Klebsiella pneumoniae*  4+ Growth of Citrobacter freundii*  4+ Growth of  --   --   --    LEGIONELLA URINARY ANTIGEN   --   --   --  Negative  --   --   --   --        Imaging Results Review: No pertinent imaging studies reviewed.  Other Study Results Review: No  additional pertinent studies reviewed.    Last 24 Hours Medication List:     Current Facility-Administered Medications:     acetaminophen (TYLENOL) tablet 650 mg, Q6H PRN    aluminum-magnesium hydroxide-simethicone (MAALOX) oral suspension 30 mL, Q4H PRN    apixaban (ELIQUIS) tablet 2.5 mg, BID    aspirin chewable tablet 81 mg, Daily    atorvastatin (LIPITOR) tablet 20 mg, Daily With Dinner    cefepime (MAXIPIME) IVPB (premix in dextrose) 1,000 mg 50 mL, Q12H, Last Rate: Stopped (05/21/25 0043)    fluticasone (FLONASE) 50 mcg/act nasal spray 1 spray, Daily    fluticasone-vilanterol 200-25 mcg/actuation 1 puff, Daily **AND** umeclidinium 62.5 mcg/actuation inhaler AEPB 1 puff, Daily    gabapentin (NEURONTIN) capsule 100 mg, TID    HYDROmorphone (DILAUDID) injection 0.5 mg, Q4H PRN    ipratropium-albuterol (DUO-NEB) 0.5-2.5 mg/3 mL inhalation solution 3 mL, TID    metoprolol tartrate (LOPRESSOR) partial tablet 12.5 mg, Q12H DHAVAL    nicotine (NICODERM CQ) 14 mg/24hr TD 24 hr patch 1 patch, Daily    ondansetron (ZOFRAN) injection 4 mg, Q6H PRN    sodium bicarbonate tablet 1,300 mg, BID after meals    sodium chloride (PF) 0.9 % injection 10 mL, Daily    Administrative Statements   Today, Patient Was Seen By: Venus Matthews PA-C  I have spent a total time of 30 minutes in caring for this patient on the day of the visit/encounter including Diagnostic results, Risks and benefits of tx options, Instructions for management, Patient and family education, Importance of tx compliance, Counseling / Coordination of care, Documenting in the medical record, Reviewing/placing orders in the medical record (including tests, medications, and/or procedures), Obtaining or reviewing history  , and Communicating with other healthcare professionals .    **Please Note: This note may have been constructed using a voice recognition system.**

## 2025-05-21 NOTE — ASSESSMENT & PLAN NOTE
Lab Results   Component Value Date    EGFR 26 05/21/2025    EGFR 25 05/20/2025    EGFR 20 05/19/2025    CREATININE 2.26 (H) 05/21/2025    CREATININE 2.35 (H) 05/20/2025    CREATININE 2.73 (H) 05/19/2025   Etiology: Multifactorial secondary to obstructive uropathy and hypotension in the settings of sepsis  Baseline creatinine 1.3-1.5  Peak creatinine 3.22, now trending down to 2.26 today  S/p right nephrostomy on 5/16/25 for staghorn stone and associated hydronephrosis on imaging  Nephrology consulted. Stable from their standpoint as creatinine starting to plateau. Do anticipate higher new baseline given age and poor renal reserve. Follow up outpatient for routine monitoring labs.

## 2025-05-21 NOTE — ASSESSMENT & PLAN NOTE
Per ICU note, patient had short self-limited runs of A-fib  Currently on Eliquis and Lopressor  Monitor on telemetry  Follow up outpatient with Cardiology

## 2025-05-21 NOTE — ASSESSMENT & PLAN NOTE
Patient presented to the ED with right flank/lower quadrant abdominal pain along with fever at home   Sepsis as noted by leukocytosis and tachycardia in the setting of obstructive pyelonephritis, polymicrobial bacteremia  Lactic acid within normal limits  Required transfer to ICU due to hypotension with minimal response to fluid boluses but never required vasopressors as blood pressures did improve  Continue IV cefepime antibiotics for bacteremia as below  Trend WBC and fever curve

## 2025-05-21 NOTE — PROGRESS NOTES
Progress Note - Nephrology   Name: Joel Wyman 79 y.o. male I MRN: [id number]  Unit/Bed#: 2 [place] [address] I Date of Admission: 5/16/2025   Date of Service: 5/21/2025 I Hospital Day: 5     Assessment & Plan  Acute kidney injury superimposed on stage 3b chronic kidney disease (HCC)  Lab Results   Component Value Date    EGFR 26 05/21/2025    EGFR 25 05/20/2025    EGFR 20 05/19/2025    CREATININE 2.26 (H) 05/21/2025    CREATININE 2.35 (H) 05/20/2025    CREATININE 2.73 (H) 05/19/2025   #Non-Oliguric KDIGO severe SHANTI stage 3    Etiology: Multifactorial secondary to obstructive uropathy and sepsis with hypotension   Baseline creatinine 1.3 to 1.5 mg/dL  Renal function continues to slowly improve creatinine down to 2.26 mg/dL, nonoliguric  Peak creatinine: 3.22   UA: Hematuria, leukocyturia, bacteriuria  Renal imaging : Staghorn stone on the right pelvis, mid right ureter stone with right hydroureteronephrosis with perinephric stranding.  Mild distention of the left collecting system.  CT scan on May 17 showed resolution of the right-sided hydronephrosis after placement of the right PCN.  Status post right nephrostomy, Sumner catheter in place as well management as per urology  Treatment:  Daily BMP  Stable from standpoint for discharge  Anticipate that his creatinine will plateau higher than his prior baseline given his age and poor renal reserve.    Sepsis (HCC)  Blood pressure has improved  Currently afebrile  Chest x-ray shows concerns of aspiration currently on IV cefepime  Saturating 90% on room air  WBC count has improved to normal at 8.4  Essential hypertension  Euvolemic and normotensive  Recommend:  Back on metoprolol  Monitor urinary output   No indication for diuretics  Metabolic acidosis  Bicarbonate level slightly down to 20  Increase oral sodium bicarbonate to 3000 mg twice a day  Anemia of chronic disease  Hemoglobin stable  Treatment:  Transfuse for hemoglobin less than 7.0 per primary service    Type 2  "diabetes mellitus with diabetic polyneuropathy, without long-term current use of insulin ([facility])  Lab Results   Component Value Date    HGBA1C 6.4 (H) 11/12/2024     HbA1c 6.4  Advised to maintain a good DM control to prevent progression of CKD   Maintain healthy diet (vegetables, fruits, whole grains, nonfat or low fat)  Weight loss  Physical activity (5 to 10 minutes to start the increase to 30 min a day)      No results for input(s): \"POCGLU\" in the last 72 hours.      Blood Sugar Average: Last 72 hrs:      Class 1 obesity in adult    Atrial fibrillation (HCC)  -- Metoprolol and Eliquis    I have reviewed the nephrology recommendations including assessment and plan, with patient, and we are in agreement with renal plan including the information outlined above. Ok for discharge from Nephrology service perspective.    Subjective   Brief History of Admission -  79 y.o. man  with PMH of CKD G3 b, prostate adenocarcinoma on radiation therapy complicated with severe radiation cystitis, bladder papillomas with ileal conduit p/w lethargy, weakness, foul-smelling urine.  Patient was found to have right hydronephrosis with nephrolithiasis.  Nephrology is consulted for management of SHANTI     No complaints.  Had some nausea this morning but no vomiting.    Objective :  Temp:  [98.7 °F (37.1 °C)-98.8 °F (37.1 °C)] 98.7 °F (37.1 °C)  HR:  [58-79] 71  BP: (110-124)/(59-73) 110/73  Resp:  [18] 18  SpO2:  [89 %-91 %] 91 %  O2 Device: None (Room air)    Current Weight: Weight - Scale: 101 kg (222 lb 3.6 oz)  First Weight: Weight - Scale: 96.7 kg (213 lb 3 oz)  I/O         05/19 0701  05/20 0700 05/20 0701  05/21 0700 05/21 0701  05/22 0700    P.O.       I.V. (mL/kg)       Other 10      IV Piggyback 50 50     Total Intake(mL/kg) 60 (0.6) 50 (0.5)     Urine (mL/kg/hr) 2775 (1.1) 2250 (0.9)     Stool       Total Output 2775 2250     Net -2715 -2200                  Physical Exam  Constitutional:       General: He is not in acute " distress.     Appearance: He is well-developed. He is not diaphoretic.   HENT:      Head: Normocephalic and atraumatic.     Eyes:      General: No scleral icterus.     Pupils: Pupils are equal, round, and reactive to light.       Cardiovascular:      Rate and Rhythm: Normal rate and regular rhythm.      Heart sounds: Normal heart sounds. No murmur heard.     No friction rub. No gallop.   Pulmonary:      Effort: Pulmonary effort is normal. No respiratory distress.      Breath sounds: Normal breath sounds. No wheezing or rales.   Chest:      Chest wall: No tenderness.   Abdominal:      General: Bowel sounds are normal. There is no distension.      Palpations: Abdomen is soft.      Tenderness: There is no abdominal tenderness. There is no rebound.     Musculoskeletal:         General: Normal range of motion.      Cervical back: Normal range of motion and neck supple.     Skin:     Findings: No rash.     Neurological:      Mental Status: He is alert and oriented to person, place, and time.         Medications:  Current Medications[1]      Lab Results: I have reviewed the following results:  Results from last 7 days   Lab Units 05/21/25  0525 05/20/25  0617 05/19/25  0606 05/18/25  0620 05/18/25  0157 05/17/25  1320 05/17/25  0444 05/16/25  1016   WBC Thousand/uL 8.74 8.47 13.50* 15.80* 15.80*  --  29.46* 18.69*   HEMOGLOBIN g/dL 10.4* 10.4* 10.7* 9.7* 9.7*  --  10.6* 13.2   HEMATOCRIT % 33.9* 33.7* 34.1* 31.3* 31.2*  --  34.7* 42.1   PLATELETS Thousands/uL 191 166 165 148* 142*  --  181 232   POTASSIUM mmol/L 4.9 4.7 4.8 4.7 4.6 5.4* 5.6* 5.2   CHLORIDE mmol/L 107 109* 108 108 111*  --  112* 107   CO2 mmol/L 20* 21 17* 18* 15*  --  15* 18*   BUN mg/dL 57* 62* 67* 69* 67*  --  65* 56*   CREATININE mg/dL 2.26* 2.35* 2.73* 3.06* 3.16*  --  3.22* 2.26*   CALCIUM mg/dL 8.5 8.6 8.5 7.8* 7.4*  --  7.9* 9.6   MAGNESIUM mg/dL  --   --  2.2 2.1 1.9  --  1.6*  --    PHOSPHORUS mg/dL  --   --  2.8  --   --   --   --   --    ALBUMIN  "g/dL 3.0* 2.9*  --   --  2.6*  --   --  4.2       Administrative Statements   I have spent a total time of 20 minutes in caring for this patient on the day of the visit/encounter including Counseling / Coordination of care, Documenting in the medical record, Reviewing/placing orders in the medical record (including tests, medications, and/or procedures), and Obtaining or reviewing history  .  Portions of the record may have been created with voice recognition software. Occasional wrong word or \"sound a like\" substitutions may have occurred due to the inherent limitations of voice recognition software. Read the chart carefully and recognize, using context, where substitutions have occurred.If you have any questions, please contact the dictating provider.       [1]   Current Facility-Administered Medications:     acetaminophen (TYLENOL) tablet 650 mg, 650 mg, Oral, Q6H PRN, Janny Revankar, DO, 650 mg at 05/17/25 2133    aluminum-magnesium hydroxide-simethicone (MAALOX) oral suspension 30 mL, 30 mL, Oral, Q4H PRN, Janny Revankar, DO, 30 mL at 05/18/25 0021    apixaban (ELIQUIS) tablet 2.5 mg, 2.5 mg, Oral, BID, Janny Revankar, DO, 2.5 mg at 05/20/25 1724    aspirin chewable tablet 81 mg, 81 mg, Oral, Daily, Janny Revankar, DO    atorvastatin (LIPITOR) tablet 20 mg, 20 mg, Oral, Daily With Dinner, Janny Revankar, DO, 20 mg at 05/20/25 1600    cefepime (MAXIPIME) IVPB (premix in dextrose) 1,000 mg 50 mL, 1,000 mg, Intravenous, Q12H, Janny Revankar, DO, Stopped at 05/21/25 0043    fluticasone (FLONASE) 50 mcg/act nasal spray 1 spray, 1 spray, Nasal, Daily, Janny Revankar, DO, 1 spray at 05/20/25 0846    fluticasone-vilanterol 200-25 mcg/actuation 1 puff, 1 puff, Inhalation, Daily, 1 puff at 05/20/25 0851 **AND** umeclidinium 62.5 mcg/actuation inhaler AEPB 1 puff, 1 puff, Inhalation, Daily, Janny Marques DO, 1 puff at 05/20/25 0851    gabapentin (NEURONTIN) capsule 100 mg, 100 mg, Oral, TID, Janny Marques DO, " 100 mg at 05/20/25 2050    HYDROmorphone (DILAUDID) injection 0.5 mg, 0.5 mg, Intravenous, Q4H PRN, Janny Marques DO, 0.5 mg at 05/19/25 2133    ipratropium-albuterol (DUO-NEB) 0.5-2.5 mg/3 mL inhalation solution 3 mL, 3 mL, Nebulization, TID, Janny Marques, , 3 mL at 05/21/25 0745    metoprolol tartrate (LOPRESSOR) partial tablet 12.5 mg, 12.5 mg, Oral, Q12H DHAVAL, Janny Marques, , 12.5 mg at 05/20/25 2050    nicotine (NICODERM CQ) 14 mg/24hr TD 24 hr patch 1 patch, 1 patch, Transdermal, Daily, Janny Marques DO, 1 patch at 05/20/25 0843    ondansetron (ZOFRAN) injection 4 mg, 4 mg, Intravenous, Q6H PRN, Janny Marques DO    sodium bicarbonate tablet 1,300 mg, 1,300 mg, Oral, BID after meals, Jamie Farnsworth MD    sodium chloride (PF) 0.9 % injection 10 mL, 10 mL, Intracatheter, Daily, Janny Marques DO, 10 mL at 05/20/25 4242

## 2025-05-21 NOTE — ASSESSMENT & PLAN NOTE
Patient presented acutely ill and blood cultures from admission have isolated Klebsiella, Proteus in 2 sets and group B strep in 1 set.  Susceptibilities reviewed.  This is likely coming from patient's obstructive pyelonephritis as urine cultures isolating the same.  Patient is without any intravascular devices and has no other localizing symptoms on exam.  Overall clinically appears to be improving. 2D echo unfortunately limited.  Repeat cultures so far without growth.  Patient agreeable to transesophageal echo and aware of possibility of prolonged IV antibiotics depending on imaging.  Discussed with cardiology and KEI may ultimately be limited given degree of calcification.  Unfortunately may now be looking at prolonged antibiotic course as we cannot absolutely rule out uncomplicated bacteremia.  Continue cefepime 1 g every 12 hour, renally dose adjusted  Last dose of antibiotic ordered through tomorrow at 12:30  Will transition then to ceftriaxone 2 g every 24 hours tomorrow  Will defer transesophageal echo to cardiology  Patient ultimately may be looking at prolonged IV antibiotic course, 4 weeks  Patient would be cleared for PICC line once blood cultures negative at 72 hours  Will arrange ID follow-up and antibiotic needs further tomorrow  Follow-up repeat cultures for clearance  Trend fever curve/vitals  Repeat CBC/chemistry tomorrow  Monitor exam for new/developing symptoms  Additional supportive care per primary  Additional interventions pending clinical course

## 2025-05-21 NOTE — ASSESSMENT & PLAN NOTE
followed by infectious disease  noted to have blood cultures which isolated Klebsiella, Proteus and group B strep  patient on Cefepime 1 g Q 12 hours  for KEI today to evaluate for endocarditis

## 2025-05-21 NOTE — ASSESSMENT & PLAN NOTE
In the setting of nephrolithiasis.  Other considerations are for lower tract obstruction in the setting of prostate cancer and prior issues per urology note.  Now has PCN in place with improvement.  Cultures polymicrobial with Klebsiella, Proteus, strep organisms and Citrobacter.  Unfortunately the Citrobacter does limit antibiotic options.  Will avoid fluoroquinolone given AAA and age  Continue cefepime as above  Will plan for 7 days of antibiotic for this issue through 5/22  Follow-up pending cultures and susceptibilities otherwise  Monitor PCN output  Ongoing followed by urology

## 2025-05-21 NOTE — ASSESSMENT & PLAN NOTE
"Lab Results   Component Value Date    HGBA1C 6.4 (H) 11/12/2024       No results for input(s): \"POCGLU\" in the last 72 hours.    Blood Sugar Average: Last 72 hrs:  managed per primary team  "

## 2025-05-21 NOTE — PHYSICAL THERAPY NOTE
"   PT TREATMENT     05/21/25 1530   PT Last Visit   PT Visit Date 05/21/25   Note Type   Note Type Treatment   Pain Assessment   Pain Assessment Tool 0-10   Pain Score No Pain   Restrictions/Precautions   Other Precautions   (urostomy, nephrostomy)   General   Chart Reviewed Yes   Cognition   Overall Cognitive Status WFL   Subjective   Subjective \"I want to get up\"   Bed Mobility   Supine to Sit 5  Supervision   Transfers   Sit to Stand 5  Supervision   Stand to Sit 5  Supervision   Stand pivot 5  Supervision   Ambulation/Elevation   Gait pattern WNL   Gait Assistance 5  Supervision   Assistive Device Rolling walker   Distance 200 feet   Balance   Static Sitting Good   Static Standing Fair   Ambulatory Fair   Assessment   Prognosis Good   Problem List Decreased strength;Decreased endurance;Impaired balance;Decreased mobility   Assessment Pt demonstrates improving activity tolerance overall.  Pt demonstrates a steady gait with the walker. Encouraged pt to ambulate with supervision with the walker ad margo.    The patient's -[facility] Basic Mobility Inpatient Short Form Raw Score is 22. A Raw score of greater than 16 suggests the patient may benefit from discharge to home. Please also refer to the recommendation of the Physical Therapist for safe discharge planning.         Plan   Treatment/Interventions Therapeutic exercise;LE strengthening/ROM;Elevations;Functional transfer training;ADL retraining;Gait training;Bed mobility;Equipment eval/education;Patient/family training   Progress Progressing toward goals   PT Frequency 3-5x/wk   Discharge Recommendation   Rehab Resource Intensity Level, PT III (Minimum Resource Intensity)   AM-PAC Basic Mobility Inpatient   Turning in Flat Bed Without Bedrails 4   Lying on Back to Sitting on Edge of Flat Bed Without Bedrails 4   Moving Bed to Chair 4   Standing Up From Chair Using Arms 4   Walk in Room 3   Climb 3-5 Stairs With Railing 3   Basic Mobility Inpatient Raw Score 22   Basic " Mobility Standardized Score 47.4   [facility] Highest Level Of Mobility   -[facility] Goal 7: Walk 25 feet or more   -HL Achieved 7: Walk 25 feet or more   End of Consult   Patient Position at End of Consult All needs within reach;Bedside chair   Licensure   [place] License Number  Radha Guerrero PT [id number]

## 2025-05-21 NOTE — ASSESSMENT & PLAN NOTE
5/16/2025 CT of abdomen and pelvis: postsurgical changes from cysto prostatectomy with ilio conduit and right lower quadrant your ostomy patient has large stone burden on right including staghorn calculus in the right renal pelvis measuring 2 cm and to obstructing calculi at the distal ureter.  5/16/2025 image guided percutaneous right approximately tube placed by IR

## 2025-05-21 NOTE — ASSESSMENT & PLAN NOTE
Euvolemic and normotensive  Recommend:  Back on metoprolol  Monitor urinary output   No indication for diuretics

## 2025-05-21 NOTE — ASSESSMENT & PLAN NOTE
BC positive for Klebsiella, Proteus in 2 sets and group B strep in 1 set  5/18/25: Repeat BC x 2 negative @ 48 hours  TTE with normal EF, grade 1 diastolic dysfunction.  No obvious large vegetation noted but small vegetation could not be ruled out  Plan for TTE today 5/21/25. Cardiology made aware  Infectious Disease consulted.  Tentative plan for 7 days of antibiotics with Cefepime through 5/22/25 for gram negative infection. Will transition to Ceftriaxone following this.  If KEI negative, transition to oral therapy for group B strep for 1 week

## 2025-05-22 ENCOUNTER — APPOINTMENT (INPATIENT)
Dept: NON INVASIVE DIAGNOSTICS | Facility: HOSPITAL | Age: 80
DRG: 871 | End: 2025-05-22
Attending: RADIOLOGY
Payer: COMMERCIAL

## 2025-05-22 LAB
ANION GAP SERPL CALCULATED.3IONS-SCNC: 10 MMOL/L (ref 4–13)
BUN SERPL-MCNC: 56 MG/DL (ref 5–25)
CALCIUM SERPL-MCNC: 8.4 MG/DL (ref 8.4–10.2)
CHLORIDE SERPL-SCNC: 108 MMOL/L (ref 96–108)
CO2 SERPL-SCNC: 19 MMOL/L (ref 21–32)
CREAT SERPL-MCNC: 2.12 MG/DL (ref 0.6–1.3)
ERYTHROCYTE [DISTWIDTH] IN BLOOD BY AUTOMATED COUNT: 15.8 % (ref 11.6–15.1)
GFR SERPL CREATININE-BSD FRML MDRD: 28 ML/MIN/1.73SQ M
GLUCOSE SERPL-MCNC: 112 MG/DL (ref 65–140)
HCT VFR BLD AUTO: 32.5 % (ref 36.5–49.3)
HGB BLD-MCNC: 10.1 G/DL (ref 12–17)
MCH RBC QN AUTO: 27.7 PG (ref 26.8–34.3)
MCHC RBC AUTO-ENTMCNC: 31.1 G/DL (ref 31.4–37.4)
MCV RBC AUTO: 89 FL (ref 82–98)
PLATELET # BLD AUTO: 191 THOUSANDS/UL (ref 149–390)
PMV BLD AUTO: 9.6 FL (ref 8.9–12.7)
POTASSIUM SERPL-SCNC: 4.9 MMOL/L (ref 3.5–5.3)
RBC # BLD AUTO: 3.64 MILLION/UL (ref 3.88–5.62)
SODIUM SERPL-SCNC: 137 MMOL/L (ref 135–147)
WBC # BLD AUTO: 10.44 THOUSAND/UL (ref 4.31–10.16)

## 2025-05-22 PROCEDURE — 94760 N-INVAS EAR/PLS OXIMETRY 1: CPT

## 2025-05-22 PROCEDURE — 36558 INSERT TUNNELED CV CATH: CPT

## 2025-05-22 PROCEDURE — 77001 FLUOROGUIDE FOR VEIN DEVICE: CPT

## 2025-05-22 PROCEDURE — 85027 COMPLETE CBC AUTOMATED: CPT | Performed by: INTERNAL MEDICINE

## 2025-05-22 PROCEDURE — 02H633Z INSERTION OF INFUSION DEVICE INTO RIGHT ATRIUM, PERCUTANEOUS APPROACH: ICD-10-PCS | Performed by: RADIOLOGY

## 2025-05-22 PROCEDURE — 94640 AIRWAY INHALATION TREATMENT: CPT

## 2025-05-22 PROCEDURE — C1751 CATH, INF, PER/CENT/MIDLINE: HCPCS

## 2025-05-22 PROCEDURE — 0JH63XZ INSERTION OF TUNNELED VASCULAR ACCESS DEVICE INTO CHEST SUBCUTANEOUS TISSUE AND FASCIA, PERCUTANEOUS APPROACH: ICD-10-PCS | Performed by: RADIOLOGY

## 2025-05-22 PROCEDURE — 02HV33Z INSERTION OF INFUSION DEVICE INTO SUPERIOR VENA CAVA, PERCUTANEOUS APPROACH: ICD-10-PCS | Performed by: RADIOLOGY

## 2025-05-22 PROCEDURE — 80048 BASIC METABOLIC PNL TOTAL CA: CPT | Performed by: INTERNAL MEDICINE

## 2025-05-22 PROCEDURE — 99232 SBSQ HOSP IP/OBS MODERATE 35: CPT | Performed by: INTERNAL MEDICINE

## 2025-05-22 PROCEDURE — 76937 US GUIDE VASCULAR ACCESS: CPT

## 2025-05-22 PROCEDURE — 94664 DEMO&/EVAL PT USE INHALER: CPT

## 2025-05-22 RX ORDER — SODIUM CHLORIDE 9 MG/ML
20 INJECTION, SOLUTION INTRAVENOUS ONCE
Status: CANCELLED | OUTPATIENT
Start: 2025-05-24

## 2025-05-22 RX ORDER — SODIUM BICARBONATE 650 MG/1
1300 TABLET ORAL
Status: DISCONTINUED | OUTPATIENT
Start: 2025-05-22 | End: 2025-05-23 | Stop reason: HOSPADM

## 2025-05-22 RX ORDER — CEFTRIAXONE 2 G/50ML
2000 INJECTION, SOLUTION INTRAVENOUS ONCE
Status: CANCELLED | OUTPATIENT
Start: 2025-05-24

## 2025-05-22 RX ADMIN — GABAPENTIN 100 MG: 100 CAPSULE ORAL at 17:01

## 2025-05-22 RX ADMIN — Medication 4 ML: at 15:27

## 2025-05-22 RX ADMIN — GABAPENTIN 100 MG: 100 CAPSULE ORAL at 20:35

## 2025-05-22 RX ADMIN — GABAPENTIN 100 MG: 100 CAPSULE ORAL at 08:33

## 2025-05-22 RX ADMIN — IPRATROPIUM BROMIDE AND ALBUTEROL SULFATE 3 ML: .5; 3 SOLUTION RESPIRATORY (INHALATION) at 13:23

## 2025-05-22 RX ADMIN — SODIUM BICARBONATE 650 MG TABLET 1300 MG: at 17:01

## 2025-05-22 RX ADMIN — FLUTICASONE FUROATE AND VILANTEROL TRIFENATATE 1 PUFF: 200; 25 POWDER RESPIRATORY (INHALATION) at 08:36

## 2025-05-22 RX ADMIN — SODIUM BICARBONATE 650 MG TABLET 1300 MG: at 08:30

## 2025-05-22 RX ADMIN — APIXABAN 2.5 MG: 2.5 TABLET, FILM COATED ORAL at 17:01

## 2025-05-22 RX ADMIN — Medication 12.5 MG: at 08:30

## 2025-05-22 RX ADMIN — IPRATROPIUM BROMIDE AND ALBUTEROL SULFATE 3 ML: .5; 3 SOLUTION RESPIRATORY (INHALATION) at 19:47

## 2025-05-22 RX ADMIN — FLUTICASONE PROPIONATE 1 SPRAY: 50 SPRAY, METERED NASAL at 08:36

## 2025-05-22 RX ADMIN — NICOTINE 1 PATCH: 14 PATCH, EXTENDED RELEASE TRANSDERMAL at 08:34

## 2025-05-22 RX ADMIN — SODIUM CHLORIDE, PRESERVATIVE FREE 10 ML: 5 INJECTION INTRAVENOUS at 08:34

## 2025-05-22 RX ADMIN — CEFTRIAXONE 2000 MG: 2 INJECTION, SOLUTION INTRAVENOUS at 20:31

## 2025-05-22 RX ADMIN — APIXABAN 2.5 MG: 2.5 TABLET, FILM COATED ORAL at 08:32

## 2025-05-22 RX ADMIN — ASPIRIN 81 MG: 81 TABLET, CHEWABLE ORAL at 08:33

## 2025-05-22 RX ADMIN — SODIUM BICARBONATE 650 MG TABLET 1300 MG: at 12:56

## 2025-05-22 RX ADMIN — CEFEPIME HYDROCHLORIDE 1000 MG: 1 INJECTION, SOLUTION INTRAVENOUS at 09:53

## 2025-05-22 RX ADMIN — UMECLIDINIUM 1 PUFF: 62.5 AEROSOL, POWDER ORAL at 08:36

## 2025-05-22 RX ADMIN — IPRATROPIUM BROMIDE AND ALBUTEROL SULFATE 3 ML: .5; 3 SOLUTION RESPIRATORY (INHALATION) at 07:50

## 2025-05-22 RX ADMIN — ATORVASTATIN CALCIUM 20 MG: 20 TABLET, FILM COATED ORAL at 17:01

## 2025-05-22 RX ADMIN — Medication 12.5 MG: at 20:35

## 2025-05-22 NOTE — ASSESSMENT & PLAN NOTE
Patient met sepsis criteria early on.  Sources are obstructive pyelonephritis with development of polymicrobial bacteremia.  Overall clinically improved with culture clearance.  Antibiotics as below  Continue to trend fever curve/vitals  Repeat CBC/chemistry tomorrow for treatment response/dosing  Outpatient antibiotic plans as below  Monitor exam for new/developing symptoms  Additional supportive care/discharge per primary

## 2025-05-22 NOTE — CASE MANAGEMENT
Case Management Discharge Planning Note    Patient name Joel Wyman  Location 2 [facility] 209/2 [facility] 209 MRN [id number]  : 1945 Date 2025       Current Admission Date: 2025  Current Admission Diagnosis:Sepsis ([facility])   Patient Active Problem List    Diagnosis Date Noted    Class 1 obesity in adult 2025    Atrial fibrillation ([facility]) 2025    History of prostate cancer 2025    Metabolic acidosis 2025    Polymicrobial bacteremia 2025    Infrarenal abdominal aortic aneurysm (AAA) without rupture ([facility]) 2025    Atheroscler native arteries the extremities w/intermit claudication ([facility]) 2025    Hydronephrosis 2024    Radiation cystitis 2024    Other artificial openings of urinary tract status ([facility]) 2024    Malignant neoplasm of urinary bladder, unspecified site ([facility]) 2024    Chronic low back pain with bilateral sciatica 2024    Cardiac Risk Assessment 2024    Type 2 diabetes mellitus with diabetic polyneuropathy, without long-term current use of insulin ([facility])     Obesity (BMI 30.0-34.9) 2024    Nicotine abuse 2024    Mixed stress and urge urinary incontinence 10/30/2023    Arthritis of both knees 2023    Localized, primary osteoarthritis of hand 2022    Acute kidney injury superimposed on stage 3b chronic kidney disease ([facility])     Sepsis ([facility]) 2021    Urinary retention 2021    Hematuria 2021    Prostate cancer ([facility]) - s/p resection 2021    Leukocytosis 2021    RA (rheumatoid arthritis) ([facility]) 2021    Obstructive pyelonephritis 2021    Gross hematuria     COPD (chronic obstructive pulmonary disease) ([facility]) 2021    Essential hypertension 2021    Hyperlipidemia 2021    Anemia of chronic disease 2021      LOS (days): 6  Geometric Mean LOS (GMLOS) (days): 3.5  Days to GMLOS:-2.6     OBJECTIVE:  Risk of Unplanned Readmission Score: 22.29     Current  admission status: Inpatient   Preferred Pharmacy:   [id number] - [place], [place] - [address] [address]  Phone: [phone] Fax: [phone]    Primary Care Provider: Frank Lombardi, DO    Primary Insurance: [employer] REP  Secondary Insurance:     DISCHARGE DETAILS:    Discharge planning discussed with:: Patient and wife  Freedom of Choice: Yes  Comments - Freedom of Choice: SW following to assist with planning.  Per ID pt will need IV antibiotic treatment through 6/14/25. Prescription provided today and once daily treatments are planned.  Home infusion referrals were initiated yesterday in anticipation of pt returning home with home infusion.  Pt was accepted by both home infusion companies.  Coverage information received from agencies.  Nursing would be provided by home infusion company and pt's co-pay for medications/supplies would be $73.83.  SW met with pt and wife to review options.  SW discussed home infusion option and co-pay for home infusion services.  SW also reviewed option of treatment at infusion center since treatment is daily.  After considering options pt said he would prefer coming to [facility] since they live right down the road.  SW offered to discuss preference with physicians and make initial appointments for pt.  Pt and wife are anticipating discharge home tomorrow after his treatment.  SW will follow to monitor progress and assist with planning as needed.  CM contacted family/caregiver?: Yes  Were Treatment Team discharge recommendations reviewed with patient/caregiver?: Yes  Did patient/caregiver verbalize understanding of patient care needs?: Yes    Contacts  Patient Contacts: Renee Wyman  Relationship to Patient:: Family (wife)  Contact Method: In Person  Reason/Outcome: Discharge Planning    Requested Home Health Care         Is the patient interested in [facility] at discharge?: No    DME Referral Provided  Referral  made for DME?: No    Other Referral/Resources/Interventions Provided:  Interventions: Other (Specify) (Outpatient infusion)  Referral Comments: SW notified PA and Dr. Fenton of pt's preference for outpatient infusion.  Dr. Fenton said she will place [place] orders.  SW will make appointments after orders are placed.    Treatment Team Recommendation: Home (Outpatient infusion)  Discharge Destination Plan:: Home (Outpatient infusion)  Transport at Discharge : Family    IMM Given (Date):: 05/22/25  IMM Given to:: Family (IMM #2 reviewed with pt and wife. Both verbalized understanding.  Wife signed IMM and copy given. Copy also placed in scan bin for chart.)

## 2025-05-22 NOTE — PROGRESS NOTES
Progress Note - Infectious Disease   Name: Joel Wyman 79 y.o. male I MRN: [id number]  Unit/Bed#: 2 [address] Date of Admission: 5/16/2025   Date of Service: 5/22/2025 I Hospital Day: 6     Assessment & Plan  Sepsis (HCC)  Patient met sepsis criteria early on.  Sources are obstructive pyelonephritis with development of polymicrobial bacteremia.  Overall clinically improved with culture clearance.  Antibiotics as below  Continue to trend fever curve/vitals  Repeat CBC/chemistry tomorrow for treatment response/dosing  Outpatient antibiotic plans as below  Monitor exam for new/developing symptoms  Additional supportive care/discharge per primary  Polymicrobial bacteremia  Patient presented acutely ill and blood cultures from admission have isolated Klebsiella, Proteus in 2 sets and group B strep in 1 set.  Susceptibilities reviewed.  This is likely coming from patient's obstructive pyelonephritis as urine cultures isolating the same.  Patient is without any intravascular devices and has no other localizing symptoms on exam.  Overall clinically appears to be improving.  2D echo as well as transesophageal echo limited and unable to rule out small vegetations with degree of valvulopathy present.  Plan at this point is for prolonged course of IV antibiotic.  Complete last dose of cefepime today  Will transition to ceftriaxone 2 g every 24 hours this evening  Plan is for total 4 weeks of therapy through 6/14  Weekly labs with CBCD and CMP as an outpatient  Patient cleared from ID standpoint for PICC line  Plan for follow-up in ID office 2 weeks after discharge  ID office staff notified of follow-up needs  Prescriptions provided in patient's chart.  Trend fever curve/vitals  Repeat CBC/chemistry tomorrow  Monitor exam for new/developing symptoms  Additional supportive care/discharge per primary  Obstructive pyelonephritis  In the setting of nephrolithiasis.  Other considerations are for lower tract obstruction in the  setting of prostate cancer and prior issues per urology note.  Now has PCN in place with improvement.  Cultures polymicrobial with Klebsiella, Proteus, strep organisms and Citrobacter.  Unfortunately the Citrobacter does limit antibiotic options.  Patient to complete antibiotic course with cefepime for this issue today.  Complete cefepime course today, last doses ordered  Monitor PCN output  Ongoing followed by urology  Additional antibiotics as above  Acute kidney injury superimposed on stage 3b chronic kidney disease (HCC)  Acute elevation in creatinine noted from baseline.  Currently downtrending.  This does impact antibiotic dosing.  Cefepime dose adjusted  Ceftriaxone dosing as above  Repeat chemistry tomorrow  Will dose adjust as needed  Ongoing follow-up by nephrology  Type 2 diabetes mellitus with diabetic polyneuropathy, without long-term current use of insulin (HCC)  Well-controlled disease with hemoglobin A1c of 6.4.  Ongoing glucose management per primary.  Infrarenal abdominal aortic aneurysm (AAA) without rupture (HCC)  Noted on imaging.  Not absolute contraindication but would favor avoiding fluoroquinolones given this issue.  Class 1 obesity in adult  BMI noted to be 32.  Does impact antibiotic dosing.  Will favor higher baseline antibiotic dosing when possible.  Current antibiotics dose adjusted as above.    Above plan discussed briefly with the patient at bedside  Above plan discussed in detail with primary service advance practitioner who is aware of plans for adjustments to antibiotics and transition hopefully to outpatient home infusion versus infusion center    ID consult service will continue to follow.    Antibiotics:  Cefepime #7    24 Hour events:  Yesterday and overnight notes reviewed and no acute events noted.  Patient unfortunately had difficulty with transesophageal echo yesterday.  Results reviewed and imaging limited.  No large vegetations appreciated.    Subjective:  Patient  currently denies having any nausea, vomiting, chest pain or shortness of breath.  We discussed at this point limitations of transesophageal echo and plans for prolonged course of antibiotic, 6 weeks through .  We discussed PICC line.  Reached out to primary service to start to arrange outpatient services.    Objective:  Vitals:  Temp:  [97.9 °F (36.6 °C)-99.1 °F (37.3 °C)] 98.2 °F (36.8 °C)  HR:  [61-85] 71  Resp:  [16-19] 18  BP: (100-125)/(50-66) 113/50  SpO2:  [88 %-97 %] 89 %  Temp (24hrs), Av.6 °F (37 °C), Min:97.9 °F (36.6 °C), Max:99.1 °F (37.3 °C)  Current: Temperature: 98.2 °F (36.8 °C)    Physical Exam:   General Appearance:  Alert, interactive, nontoxic, no acute distress.   Throat: Oropharynx moist without lesions.    Lungs:   Clear to auscultation bilaterally; no wheezes, rhonchi or rales; respirations unlabored on room air   Heart:  RRR; no murmur, rub or gallop noted   Abdomen:   Soft, non-tender, non-distended, positive bowel sounds.     Extremities: No clubbing, cyanosis or edema   Skin: No new rashes or lesions. No new draining wounds noted.       Labs, Imaging, & Other studies:   All pertinent labs and imaging studies in PACS were personally reviewed as below.  Results from last 7 days   Lab Units 25  0525  0525  0617   WBC Thousand/uL 10.44* 8.74 8.47   HEMOGLOBIN g/dL 10.1* 10.4* 10.4*   PLATELETS Thousands/uL 191 191 166     Results from last 7 days   Lab Units 25  0525  0525 25  0617 25  0620 25  0157   POTASSIUM mmol/L 4.9 4.9 4.7   < > 4.6   CHLORIDE mmol/L 108 107 109*   < > 111*   CO2 mmol/L 19* 20* 21   < > 15*   BUN mg/dL 56* 57* 62*   < > 67*   CREATININE mg/dL 2.12* 2.26* 2.35*   < > 3.16*   EGFR ml/min/1.73sq m 28 26 25   < > 17   CALCIUM mg/dL 8.4 8.5 8.6   < > 7.4*   AST U/L  --  28 25  --  24   ALT U/L  --  22 15  --  7   ALK PHOS U/L  --  62 65  --  48    < > = values in this interval not displayed.     Results from  last 7 days   Lab Units 05/18/25  0857 05/18/25  0620 05/18/25  0456 05/18/25  0231 05/18/25  0157 05/16/25  1629 05/16/25  1235 05/16/25  1234 05/16/25  1220   BLOOD CULTURE  No Growth at 72 hrs. No Growth at 72 hrs.  --   --   --   --   --  Klebsiella pneumoniae*  Proteus mirabilis*  Streptococcus agalactiae (Group B)* Klebsiella pneumoniae*  Proteus mirabilis*   SPUTUM CULTURE   --   --  3+ Growth of Moraxella catarrhalis*  2+ Growth of Candida albicans*  --   --   --   --   --   --    GRAM STAIN RESULT   --   --  2+ Epithelial cells per low power field*  4+ Gram positive cocci in pairs*  Rare Gram variable rods*  --   --  4+ Polys*  4+ Gram positive cocci in pairs, chains and clusters*  --  Gram negative rods*  Gram positive cocci in pairs and chains* Gram negative rods*   URINE CULTURE   --   --   --   --   --   --  >100,000 cfu/ml Klebsiella pneumoniae*  >100,000 cfu/ml Klebsiella pneumoniae*  Proteus mirabilis*  20,000-29,000 cfu/ml  --   --    BODY FLUID CULTURE, STERILE   --   --   --   --   --  4+ Growth of Proteus mirabilis*  4+ Growth of Klebsiella pneumoniae*  4+ Growth of Citrobacter freundii*  4+ Growth of  --   --   --    MRSA CULTURE ONLY   --   --   --   --  No Methicillin Resistant Staphlyococcus aureus (MRSA) isolated  --   --   --   --    LEGIONELLA URINARY ANTIGEN   --   --   --  Negative  --   --   --   --   --        Lab interpretation/comments: Mild elevation white count noted which may be inflammatory in nature    Imaging interpretation/comments: Transesophageal echo report reviewed    Culture data: Repeat blood cultures now negative at 72 hours    External notes: None

## 2025-05-22 NOTE — ASSESSMENT & PLAN NOTE
Per ICU note, patient had short self-limited runs of A-fib  Currently on Eliquis and Lopressor  Monitor on telemetry  Referral for Cardiology follow up at discharge

## 2025-05-22 NOTE — ASSESSMENT & PLAN NOTE
Hemoglobin stable at 10.1  Treatment:  Transfuse for hemoglobin less than 7.0 per primary service

## 2025-05-22 NOTE — ASSESSMENT & PLAN NOTE
Patient presented to the ED with right flank/lower quadrant abdominal pain along with fever at home   Sepsis as noted by leukocytosis and tachycardia in the setting of obstructive pyelonephritis, polymicrobial bacteremia  Lactic acid within normal limits  Required transfer to ICU due to hypotension with minimal response to fluid boluses but never required vasopressors as blood pressures did improve  Continue IV antibiotics for bacteremia as below  Trend WBC and fever curve

## 2025-05-22 NOTE — ASSESSMENT & PLAN NOTE
Patient with history of prostate cancer. 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 Patient had suprapubic catheter placed. Then patient was followed up at [facility] where he underwent simple cystectomy with ileal conduit in May 2024   CT with evidence of right sided perinephritic stranding  Patient has right-sided nephrostomy tube in place   Continue IV ABX as noted above

## 2025-05-22 NOTE — PLAN OF CARE
Problem: Potential for Falls  Goal: Patient will remain free of falls  Description: INTERVENTIONS:  - Educate patient/family on patient safety including physical limitations  - Instruct patient to call for assistance with activity   - Consider consulting OT/PT to assist with strengthening/mobility based on AM PAC & JH-HLM score  - Consult OT/PT to assist with strengthening/mobility   - Keep Call bell within reach  - Keep bed low and locked with side rails adjusted as appropriate  - Keep care items and personal belongings within reach  - Initiate and maintain comfort rounds  - Make Fall Risk Sign visible to staff  - Offer Toileting every 2 Hours, in advance of need  - Initiate/Maintain bed alarm  - Obtain necessary fall risk management equipment: socks  - Apply yellow socks and bracelet for high fall risk patients  - Consider moving patient to room near nurses station  5/22/2025 0732 by Pam Ayala RN  Outcome: Progressing  5/21/2025 1851 by Pam Ayala RN  Outcome: Progressing     Problem: Nutrition/Hydration-ADULT  Goal: Nutrient/Hydration intake appropriate for improving, restoring or maintaining nutritional needs  Description: Monitor and assess patient's nutrition/hydration status for malnutrition. Collaborate with interdisciplinary team and initiate plan and interventions as ordered.  Monitor patient's weight and dietary intake as ordered or per policy. Utilize nutrition screening tool and intervene as necessary. Determine patient's food preferences and provide high-protein, high-caloric foods as appropriate.     INTERVENTIONS:  - Monitor oral intake, urinary output, labs, and treatment plans  - Assess nutrition and hydration status and recommend course of action  - Evaluate amount of meals eaten  - Assist patient with eating if necessary   - Allow adequate time for meals  - Recommend/ encourage appropriate diets, oral nutritional supplements, and vitamin/mineral supplements  - Order,  calculate, and assess calorie counts as needed  - Recommend, monitor, and adjust tube feedings and TPN/PPN based on assessed needs  - Assess need for intravenous fluids  - Provide specific nutrition/hydration education as appropriate  - Include patient/family/caregiver in decisions related to nutrition  5/22/2025 0732 by Pam Ayala RN  Outcome: Progressing  5/21/2025 1851 by Pam Ayala RN  Outcome: Progressing

## 2025-05-22 NOTE — ASSESSMENT & PLAN NOTE
Patient presented acutely ill and blood cultures from admission have isolated Klebsiella, Proteus in 2 sets and group B strep in 1 set.  Susceptibilities reviewed.  This is likely coming from patient's obstructive pyelonephritis as urine cultures isolating the same.  Patient is without any intravascular devices and has no other localizing symptoms on exam.  Overall clinically appears to be improving.  2D echo as well as transesophageal echo limited and unable to rule out small vegetations with degree of valvulopathy present.  Plan at this point is for prolonged course of IV antibiotic.  Complete last dose of cefepime today  Will transition to ceftriaxone 2 g every 24 hours this evening  Plan is for total 4 weeks of therapy through 6/14  Weekly labs with CBCD and CMP as an outpatient  Patient cleared from ID standpoint for PICC line  Plan for follow-up in ID office 2 weeks after discharge  ID office staff notified of follow-up needs  Prescriptions provided in patient's chart.  Trend fever curve/vitals  Repeat CBC/chemistry tomorrow  Monitor exam for new/developing symptoms  Additional supportive care/discharge per primary

## 2025-05-22 NOTE — ASSESSMENT & PLAN NOTE
In the setting of nephrolithiasis.  Other considerations are for lower tract obstruction in the setting of prostate cancer and prior issues per urology note.  Now has PCN in place with improvement.  Cultures polymicrobial with Klebsiella, Proteus, strep organisms and Citrobacter.  Unfortunately the Citrobacter does limit antibiotic options.  Patient to complete antibiotic course with cefepime for this issue today.  Complete cefepime course today, last doses ordered  Monitor PCN output  Ongoing followed by urology  Additional antibiotics as above

## 2025-05-22 NOTE — PROGRESS NOTES
"Progress Note - Urology      Patient: Joel Wyman   : 1945 Sex: male   MRN: [id number]     CSN: [id number]  Unit/Bed#: [address] [place] [address]     SUBJECTIVE:   Vs stable  CAT scan confirming right nephrostomy in normal position  Left hydronephrosis resolved  White count 8.7 trending up 10      Objective   Vitals: /50   Pulse 71   Temp 98.2 °F (36.8 °C)   Resp 18   Ht 5' 9\" (1.753 m)   Wt 101 kg (222 lb)   SpO2 95%   BMI 32.78 kg/m²     I/O last 24 hours:  In: 230 [P.O.:180; IV Piggyback:50]  Out: 3900 [Urine:3900]      Physical Exam:   General Alert awake   Normocephalic atraumatic PERRLA  Lungs clear bilaterally  Cardiac normal S1 normal S2  Abdomen soft, flank pain  Right nephrostomy tube  Extremities no edema      Lab Results: CBC:   Lab Results   Component Value Date    WBC 10.44 (H) 2025    HGB 10.1 (L) 2025    HCT 32.5 (L) 2025    MCV 89 2025     2025    RBC 3.64 (L) 2025    MCH 27.7 2025    MCHC 31.1 (L) 2025    RDW 15.8 (H) 2025    MPV 9.6 2025    NRBC 0 2025     CMP:   Lab Results   Component Value Date     2025    CO2 19 (L) 2025    BUN 56 (H) 2025    CREATININE 2.12 (H) 2025    CALCIUM 8.4 2025    AST 28 2025    ALT 22 2025    ALKPHOS 62 2025    EGFR 28 2025     Urinalysis:   Lab Results   Component Value Date    COLORU Yellow 2025    CLARITYU Cloudy 2025    SPECGRAV 1.020 2025    PHUR 8.0 2025    PHUR 5.5 2018    LEUKOCYTESUR Large (A) 2025    NITRITE Positive (A) 2025    GLUCOSEU Negative 2025    KETONESU Negative 2025    BILIRUBINUR Negative 2025    BLOODU Moderate (A) 2025     Urine Culture:   Lab Results   Component Value Date    URINECX >100,000 cfu/ml Klebsiella pneumoniae (A) 2025    URINECX >100,000 cfu/ml Klebsiella pneumoniae (A) 2025    URINECX Proteus mirabilis (A) " "05/16/2025    URINECX 20,000-29,000 cfu/ml 05/16/2025     PSA: No results found for: \"PSA\"      Assessment/ Plan:  Bacteremia  Rightpyonephrosis  Status post right nephrostomy tube day #6  Antibiotics as per medical team  No urologic intervention at this time  inocencia today        Yovani Khan MD  "

## 2025-05-22 NOTE — ASSESSMENT & PLAN NOTE
CT A/P: Right urolithiasis with increased stone burden in comparison to 10/25/2024 including staghorn calculus in the right renal pelvis measuring 2 cm and additional two obstructing calculi in the distal ureter just proximal to the anastomosis measuring up to 8 mm.  S/p right nephrostomy tube placement on 5/16/25. Follow up outpatient for routine tube checks.  Body fluid culture from R nephrostomy tube placement with growth of gram-positive cocci, Klebsiella, Proteus, Citrobacter.   Continue antibiotics as above  5/17/25: Repeat CT A/P showed that the right-sided nephrostomy tube is in the expected position.  Urology consulted, recommendations are appreciated  Conversion of nephrostomy tube to nephroureteral stent for flexible ureteroscopy, stone removal in a few weeks.

## 2025-05-22 NOTE — ASSESSMENT & PLAN NOTE
Lab Results   Component Value Date    EGFR 28 05/22/2025    EGFR 26 05/21/2025    EGFR 25 05/20/2025    CREATININE 2.12 (H) 05/22/2025    CREATININE 2.26 (H) 05/21/2025    CREATININE 2.35 (H) 05/20/2025   #Non-Oliguric KDIGO severe SHANTI stage 3    Etiology: Multifactorial secondary to obstructive uropathy and sepsis with hypotension   Baseline creatinine 1.3 to 1.5 mg/dL, no outpatient nephrologist I will send a message to our office for follow-up as an outpatient  Nonoliguric, creatinine appears to be plateauing currently at 2.1 mg/dL.  Peak creatinine: 3.22   UA: Hematuria, leukocyturia, bacteriuria  Renal imaging : Staghorn stone on the right pelvis, mid right ureter stone with right hydroureteronephrosis with perinephric stranding.  Mild distention of the left collecting system.  CT scan on May 17 showed resolution of the right-sided hydronephrosis after placement of the right PCN.  Status post right nephrostomy, Sumner catheter in place as well management as per urology  Treatment:  Daily BMP  Anticipate that the patient creatinine will plateau at a new baseline likely progressed to now stage IV kidney disease.  Message sent to the office for follow-up  Stable from renal standpoint for discharge.

## 2025-05-22 NOTE — PROGRESS NOTES
Progress Note - Nephrology   Name: Joel Wyman 79 y.o. male I MRN: [id number]  Unit/Bed#: 2 [place] [address] I Date of Admission: 5/16/2025   Date of Service: 5/22/2025 I Hospital Day: 6     Assessment & Plan  Acute kidney injury superimposed on stage 3b chronic kidney disease (HCC)  Lab Results   Component Value Date    EGFR 28 05/22/2025    EGFR 26 05/21/2025    EGFR 25 05/20/2025    CREATININE 2.12 (H) 05/22/2025    CREATININE 2.26 (H) 05/21/2025    CREATININE 2.35 (H) 05/20/2025   #Non-Oliguric KDIGO severe SHANTI stage 3    Etiology: Multifactorial secondary to obstructive uropathy and sepsis with hypotension   Baseline creatinine 1.3 to 1.5 mg/dL, no outpatient nephrologist I will send a message to our office for follow-up as an outpatient  Nonoliguric, creatinine appears to be plateauing currently at 2.1 mg/dL.  Peak creatinine: 3.22   UA: Hematuria, leukocyturia, bacteriuria  Renal imaging : Staghorn stone on the right pelvis, mid right ureter stone with right hydroureteronephrosis with perinephric stranding.  Mild distention of the left collecting system.  CT scan on May 17 showed resolution of the right-sided hydronephrosis after placement of the right PCN.  Status post right nephrostomy, Sumner catheter in place as well management as per urology  Treatment:  Daily BMP  Anticipate that the patient creatinine will plateau at a new baseline likely progressed to now stage IV kidney disease.  Message sent to the office for follow-up  Stable from renal standpoint for discharge.    Sepsis (HCC)  Blood pressure has improved  Currently afebrile  Chest x-ray shows concerns of aspiration currently on IV cefepime  Saturating 90% on room air  WBC count has improved to normal at 8.4  Essential hypertension  Euvolemic and normotensive  Recommend:  Back on metoprolol  Monitor urinary output   No indication for diuretics  Metabolic acidosis  Bicarbonate level 19  Increase sodium bicarbonate to 1300 mg 3 times a day  Anemia of  "chronic disease  Hemoglobin stable at 10.1  Treatment:  Transfuse for hemoglobin less than 7.0 per primary service    Type 2 diabetes mellitus with diabetic polyneuropathy, without long-term current use of insulin ([facility])  Lab Results   Component Value Date    HGBA1C 6.4 (H) 11/12/2024     HbA1c 6.4  Advised to maintain a good DM control to prevent progression of CKD   Maintain healthy diet (vegetables, fruits, whole grains, nonfat or low fat)  Weight loss  Physical activity (5 to 10 minutes to start the increase to 30 min a day)      No results for input(s): \"POCGLU\" in the last 72 hours.      Blood Sugar Average: Last 72 hrs:      Class 1 obesity in adult    Atrial fibrillation (HCC)  -- Metoprolol and Eliquis    I have reviewed the nephrology recommendations including assessment and plan, with patient, and we are in agreement with renal plan including the information outlined above. Ok for discharge from Nephrology service perspective.  We will sign off now at this time.  Call for any questions or concerns.  Thank you    Subjective   Brief History of Admission - 79 y.o. man with PMH of CKD G3 b, prostate adenocarcinoma on radiation therapy complicated with severe radiation cystitis, bladder papillomas with ileal conduit p/w lethargy, weakness, foul-smelling urine. Patient was found to have right hydronephrosis with nephrolithiasis. Nephrology is consulted for management of SHANTI     No overnight events.    Objective :  Temp:  [97.9 °F (36.6 °C)-99.1 °F (37.3 °C)] 99.1 °F (37.3 °C)  HR:  [61-85] 85  BP: (100-125)/(57-80) 116/61  Resp:  [16-19] 18  SpO2:  [88 %-97 %] 92 %  O2 Device: None (Room air)    Current Weight: Weight - Scale: 101 kg (222 lb)  First Weight: Weight - Scale: 96.7 kg (213 lb 3 oz)  I/O         05/20 0701 05/21 0700 05/21 0701 05/22 0700 05/22 0701 05/23 0700    Other       IV Piggyback 50 50     Total Intake(mL/kg) 50 (0.5) 50 (0.5)     Urine (mL/kg/hr) 2250 (0.9) 2800 (1.2)     Total Output " 2257 2800     Net -2200 -9166                  Physical Exam  Vitals and nursing note reviewed.   Constitutional:       General: He is not in acute distress.     Appearance: He is well-developed. He is not diaphoretic.   HENT:      Head: Normocephalic and atraumatic.     Eyes:      General: No scleral icterus.     Pupils: Pupils are equal, round, and reactive to light.       Cardiovascular:      Rate and Rhythm: Normal rate and regular rhythm.      Heart sounds: Normal heart sounds. No murmur heard.     No friction rub. No gallop.   Pulmonary:      Effort: Pulmonary effort is normal. No respiratory distress.      Breath sounds: Normal breath sounds. No wheezing or rales.   Chest:      Chest wall: No tenderness.   Abdominal:      General: Bowel sounds are normal. There is no distension.      Palpations: Abdomen is soft.      Tenderness: There is no abdominal tenderness. There is no rebound.     Musculoskeletal:         General: Normal range of motion.      Cervical back: Normal range of motion and neck supple.     Skin:     Findings: No rash.     Neurological:      Mental Status: He is alert and oriented to person, place, and time.         Medications:  Current Medications[1]      Lab Results: I have reviewed the following results:  Results from last 7 days   Lab Units 05/22/25  0524 05/21/25  0525 05/20/25  0617 05/19/25  0606 05/18/25  0620 05/18/25  0157 05/17/25  1320 05/17/25  0444 05/16/25  1016   WBC Thousand/uL 10.44* 8.74 8.47 13.50* 15.80* 15.80*  --  29.46* 18.69*   HEMOGLOBIN g/dL 10.1* 10.4* 10.4* 10.7* 9.7* 9.7*  --  10.6* 13.2   HEMATOCRIT % 32.5* 33.9* 33.7* 34.1* 31.3* 31.2*  --  34.7* 42.1   PLATELETS Thousands/uL 191 191 166 165 148* 142*  --  181 232   POTASSIUM mmol/L 4.9 4.9 4.7 4.8 4.7 4.6 5.4* 5.6* 5.2   CHLORIDE mmol/L 108 107 109* 108 108 111*  --  112* 107   CO2 mmol/L 19* 20* 21 17* 18* 15*  --  15* 18*   BUN mg/dL 56* 57* 62* 67* 69* 67*  --  65* 56*   CREATININE mg/dL 2.12* 2.26* 2.35*  "2.73* 3.06* 3.16*  --  3.22* 2.26*   CALCIUM mg/dL 8.4 8.5 8.6 8.5 7.8* 7.4*  --  7.9* 9.6   MAGNESIUM mg/dL  --   --   --  2.2 2.1 1.9  --  1.6*  --    PHOSPHORUS mg/dL  --   --   --  2.8  --   --   --   --   --    ALBUMIN g/dL  --  3.0* 2.9*  --   --  2.6*  --   --  4.2       Administrative Statements   I have spent a total time of 20 minutes in caring for this patient on the day of the visit/encounter including Counseling / Coordination of care, Documenting in the medical record, Reviewing/placing orders in the medical record (including tests, medications, and/or procedures), and Obtaining or reviewing history  .  Portions of the record may have been created with voice recognition software. Occasional wrong word or \"sound a like\" substitutions may have occurred due to the inherent limitations of voice recognition software. Read the chart carefully and recognize, using context, where substitutions have occurred.If you have any questions, please contact the dictating provider.       [1]   Current Facility-Administered Medications:     acetaminophen (TYLENOL) tablet 650 mg, 650 mg, Oral, Q6H PRN, Janny Revankar, DO, 650 mg at 05/17/25 2133    aluminum-magnesium hydroxide-simethicone (MAALOX) oral suspension 30 mL, 30 mL, Oral, Q4H PRN, Janny Revankar, DO, 30 mL at 05/18/25 0021    apixaban (ELIQUIS) tablet 2.5 mg, 2.5 mg, Oral, BID, Janny Revankar, DO, 2.5 mg at 05/21/25 1734    aspirin chewable tablet 81 mg, 81 mg, Oral, Daily, Janny Revankar, DO, 81 mg at 05/21/25 0839    atorvastatin (LIPITOR) tablet 20 mg, 20 mg, Oral, Daily With Dinner, Janny Revankar, DO, 20 mg at 05/21/25 1734    cefepime (MAXIPIME) IVPB (premix in dextrose) 1,000 mg 50 mL, 1,000 mg, Intravenous, Q12H, Gabi Fenton MD, Stopped at 05/21/25 7569    cefTRIAXone (ROCEPHIN) IVPB (premix in dextrose) 2,000 mg 50 mL, 2,000 mg, Intravenous, Q24H, Gabi Fenton MD    fluticasone (FLONASE) 50 mcg/act nasal spray 1 spray, 1 spray, Nasal, Daily, " Janny Revankar, DO, 1 spray at 05/21/25 0836    fluticasone-vilanterol 200-25 mcg/actuation 1 puff, 1 puff, Inhalation, Daily, 1 puff at 05/21/25 0836 **AND** umeclidinium 62.5 mcg/actuation inhaler AEPB 1 puff, 1 puff, Inhalation, Daily, Janny Revankar, DO, 1 puff at 05/21/25 0836    gabapentin (NEURONTIN) capsule 100 mg, 100 mg, Oral, TID, Janny Revankar, DO, 100 mg at 05/21/25 2129    HYDROmorphone (DILAUDID) injection 0.5 mg, 0.5 mg, Intravenous, Q4H PRN, Janny Revankar, DO, 0.5 mg at 05/19/25 2133    ipratropium-albuterol (DUO-NEB) 0.5-2.5 mg/3 mL inhalation solution 3 mL, 3 mL, Nebulization, TID, Janny Revankar, DO, 3 mL at 05/22/25 0750    metoprolol tartrate (LOPRESSOR) partial tablet 12.5 mg, 12.5 mg, Oral, Q12H DHAVAL, Janny Revankar, DO, 12.5 mg at 05/21/25 2130    nicotine (NICODERM CQ) 14 mg/24hr TD 24 hr patch 1 patch, 1 patch, Transdermal, Daily, Janny Revporterar, DO, 1 patch at 05/21/25 0839    ondansetron (ZOFRAN) injection 4 mg, 4 mg, Intravenous, Q6H PRN, Janny Revankar, DO    sodium bicarbonate tablet 1,300 mg, 1,300 mg, Oral, TID after meals, Jamie Farnsworth MD    sodium chloride (PF) 0.9 % injection 10 mL, 10 mL, Intracatheter, Daily, Janny Revporterar DO, 10 mL at 05/21/25 0847

## 2025-05-22 NOTE — ASSESSMENT & PLAN NOTE
BC positive for Klebsiella, Proteus in 2 sets and group B strep in 1 set  5/18/25: Repeat BC x 2 negative @ 48 hours  TTE with normal EF, grade 1 diastolic dysfunction.  No obvious large vegetation noted but small vegetation could not be ruled out  TTE (5/21/25) without evidence of acute endocarditis and negative for large vegetation; however cannot rule out small vegetations  Infectious Disease consulted.  Completed 7 days of IV cefepime therapy. Transition to IV ceftriaxone 2g Q24H thru 6/14/25.  IR consulted for IJ tunneled line per Nephrology given CKD, not recommending PICC  CM following for coordination of outpatient antibiotics

## 2025-05-22 NOTE — ASSESSMENT & PLAN NOTE
Lab Results   Component Value Date    EGFR 28 05/22/2025    EGFR 26 05/21/2025    EGFR 25 05/20/2025    CREATININE 2.12 (H) 05/22/2025    CREATININE 2.26 (H) 05/21/2025    CREATININE 2.35 (H) 05/20/2025   Etiology: Multifactorial secondary to obstructive uropathy and hypotension in the settings of sepsis  Baseline creatinine 1.3-1.5  Peak creatinine 3.22, now trending down to 2.12 today  S/p right nephrostomy on 5/16/25 for staghorn stone and associated hydronephrosis on imaging  Nephrology consulted. Stable from their standpoint as creatinine starting to plateau. Do anticipate higher new baseline given age and poor renal reserve. Follow up outpatient for routine monitoring labs.

## 2025-05-22 NOTE — PROGRESS NOTES
Progress Note - Hospitalist   Name: Joel Wyman 79 y.o. male I MRN: [id number]  Unit/Bed#: 2 [address] Date of Admission: 5/16/2025   Date of Service: 5/22/2025 I Hospital Day: 6    Assessment & Plan  Sepsis (HCC)  Patient presented to the ED with right flank/lower quadrant abdominal pain along with fever at home   Sepsis as noted by leukocytosis and tachycardia in the setting of obstructive pyelonephritis, polymicrobial bacteremia  Lactic acid within normal limits  Required transfer to ICU due to hypotension with minimal response to fluid boluses but never required vasopressors as blood pressures did improve  Continue IV antibiotics for bacteremia as below  Trend WBC and fever curve  Polymicrobial bacteremia  BC positive for Klebsiella, Proteus in 2 sets and group B strep in 1 set  5/18/25: Repeat BC x 2 negative @ 48 hours  TTE with normal EF, grade 1 diastolic dysfunction.  No obvious large vegetation noted but small vegetation could not be ruled out  TTE (5/21/25) without evidence of acute endocarditis and negative for large vegetation; however cannot rule out small vegetations  Infectious Disease consulted.  Completed 7 days of IV cefepime therapy. Transition to IV ceftriaxone 2g Q24H thru 6/14/25.  IR consulted for IJ tunneled line per Nephrology given CKD, not recommending PICC  CM following for coordination of outpatient antibiotics   Obstructive pyelonephritis  Patient with history of prostate cancer. 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 Patient had suprapubic catheter placed. Then patient was followed up at [facility] where he underwent simple cystectomy with ileal conduit in May 2024   CT with evidence of right sided perinephritic stranding  Patient has right-sided nephrostomy tube in place   Continue IV ABX as noted above  Acute kidney injury superimposed on stage 3b chronic kidney disease (HCC)  Lab Results    Component Value Date    EGFR 28 05/22/2025    EGFR 26 05/21/2025    EGFR 25 05/20/2025    CREATININE 2.12 (H) 05/22/2025    CREATININE 2.26 (H) 05/21/2025    CREATININE 2.35 (H) 05/20/2025   Etiology: Multifactorial secondary to obstructive uropathy and hypotension in the settings of sepsis  Baseline creatinine 1.3-1.5  Peak creatinine 3.22, now trending down to 2.12 today  S/p right nephrostomy on 5/16/25 for staghorn stone and associated hydronephrosis on imaging  Nephrology consulted. Stable from their standpoint as creatinine starting to plateau. Do anticipate higher new baseline given age and poor renal reserve. Follow up outpatient for routine monitoring labs.   Hydronephrosis  CT A/P: Right urolithiasis with increased stone burden in comparison to 10/25/2024 including staghorn calculus in the right renal pelvis measuring 2 cm and additional two obstructing calculi in the distal ureter just proximal to the anastomosis measuring up to 8 mm.  S/p right nephrostomy tube placement on 5/16/25. Follow up outpatient for routine tube checks.  Body fluid culture from R nephrostomy tube placement with growth of gram-positive cocci, Klebsiella, Proteus, Citrobacter.   Continue antibiotics as above  5/17/25: Repeat CT A/P showed that the right-sided nephrostomy tube is in the expected position.  Urology consulted, recommendations are appreciated  Conversion of nephrostomy tube to nephroureteral stent for flexible ureteroscopy, stone removal in a few weeks.  Atrial fibrillation (HCC)  Per ICU note, patient had short self-limited runs of A-fib  Currently on Eliquis and Lopressor  Monitor on telemetry  Referral for Cardiology follow up at discharge  Metabolic acidosis  Likely secondary to SHANTI on CKD  Previously on bicarb drip which has since been discontinued  Continue oral sodium bicarb  Essential hypertension  Previously with hypotension requiring transfer to ICU. BP improved and stabilized  Continue metoprolol tartrate  "12.5mg BID  Monitor vitals per routine  COPD (chronic obstructive pulmonary disease) ([facility])  No acute exacerbation  Trelegy substituted with fluticasone-vilanterol and umeclidinium.  Continue DuoNebs as needed.  Type 2 diabetes mellitus with diabetic polyneuropathy, without long-term current use of insulin ([facility])  Lab Results   Component Value Date    HGBA1C 6.4 (H) 11/12/2024       No results for input(s): \"POCGLU\" in the last 72 hours.    Not currently on oral anti-diabetic agents  Continue carb controlled diet  Infrarenal abdominal aortic aneurysm (AAA) without rupture (HCC)  On imaging noted to have a 3 cm fusiform infrarenal AAA  Outpatient follow-up.    VTE Pharmacologic Prophylaxis:   Moderate Risk (Score 3-4) - Pharmacological DVT Prophylaxis Ordered: apixaban (Eliquis).    Mobility:   Basic Mobility Inpatient Raw Score: 22  JH-HLM Goal: 7: Walk 25 feet or more  JH-HLM Achieved: 6: Walk 10 steps or more  JH-HLM Goal achieved. Continue to encourage appropriate mobility.    Patient Centered Rounds: I performed bedside rounds with nursing staff today.   Discussions with Specialists or Other Care Team Provider: Nursing, ID, Nephrology    Education and Discussions with Family / Patient: Updated  (wife) via phone.    Current Length of Stay: 6 day(s)  Current Patient Status: Inpatient   Certification Statement: The patient will continue to require additional inpatient hospital stay due to bacteremia, coordination of outpatient antibiotics  Discharge Plan: Anticipate discharge tomorrow to home with home services.    Code Status: Level 1 - Full Code    Subjective   Patient resting comfortably in bed eating breakfast. Denies any acute complaints.     Objective :  Temp:  [97.9 °F (36.6 °C)-99.1 °F (37.3 °C)] 98.2 °F (36.8 °C)  HR:  [61-85] 71  BP: (100-125)/(50-66) 113/50  Resp:  [16-19] 18  SpO2:  [88 %-97 %] 89 %  O2 Device: None (Room air)    Body mass index is 32.78 kg/m².     Input and Output Summary " (last 24 hours):     Intake/Output Summary (Last 24 hours) at 5/22/2025 1101  Last data filed at 5/22/2025 0801  Gross per 24 hour   Intake 50 ml   Output 2500 ml   Net -2450 ml       Physical Exam  Vitals and nursing note reviewed.   Constitutional:       General: He is not in acute distress.     Appearance: He is well-developed. He is obese.   HENT:      Head: Normocephalic and atraumatic.     Eyes:      Conjunctiva/sclera: Conjunctivae normal.       Cardiovascular:      Rate and Rhythm: Normal rate and regular rhythm.      Heart sounds: No murmur heard.  Pulmonary:      Effort: Pulmonary effort is normal. No respiratory distress.      Breath sounds: Normal breath sounds.   Abdominal:      Palpations: Abdomen is soft.      Tenderness: There is no abdominal tenderness.      Comments: Ileal conduit and right nephrostomy tube in place. Urine is clear.      Musculoskeletal:         General: No swelling.      Cervical back: Neck supple.     Skin:     General: Skin is warm and dry.     Neurological:      Mental Status: He is alert.     Psychiatric:         Mood and Affect: Mood normal.           Lines/Drains:  Lines/Drains/Airways       Active Status       Name Placement date Placement time Site Days    Nephrostomy Retrograde/ Ileal Conduit Right 05/16/25  --  Right  6    Nephrostomy Right 10.2 Fr. 05/16/25  1627  Right  5                            Lab Results: I have reviewed the following results:   Results from last 7 days   Lab Units 05/22/25  0524 05/21/25  0525 05/20/25  0617   WBC Thousand/uL 10.44* 8.74 8.47   HEMOGLOBIN g/dL 10.1* 10.4* 10.4*   HEMATOCRIT % 32.5* 33.9* 33.7*   PLATELETS Thousands/uL 191 191 166   BANDS PCT %  --  1  --    SEGS PCT %  --   --  77*   LYMPHO PCT %  --  9* 10*   MONO PCT %  --  6 9   EOS PCT %  --  3 3     Results from last 7 days   Lab Units 05/22/25  0524 05/21/25  0525   SODIUM mmol/L 137 137   POTASSIUM mmol/L 4.9 4.9   CHLORIDE mmol/L 108 107   CO2 mmol/L 19* 20*   BUN mg/dL  56* 57*   CREATININE mg/dL 2.12* 2.26*   ANION GAP mmol/L 10 10   CALCIUM mg/dL 8.4 8.5   ALBUMIN g/dL  --  3.0*   TOTAL BILIRUBIN mg/dL  --  0.38   ALK PHOS U/L  --  62   ALT U/L  --  22   AST U/L  --  28   GLUCOSE RANDOM mg/dL 112 104     Results from last 7 days   Lab Units 05/16/25  1530   INR  1.16     Results from last 7 days   Lab Units 05/17/25  0747   POC GLUCOSE mg/dl 85         Results from last 7 days   Lab Units 05/19/25  0606 05/18/25  0157 05/17/25  2052 05/16/25  1016   LACTIC ACID mmol/L  --   --  1.2 0.7   PROCALCITONIN ng/ml 19.60* 35.16*  --   --        Recent Cultures (last 7 days):   Results from last 7 days   Lab Units 05/18/25  0857 05/18/25  0620 05/18/25  0456 05/18/25  0231 05/16/25  1629 05/16/25  1235 05/16/25  1234 05/16/25  1220   BLOOD CULTURE  No Growth at 72 hrs. No Growth at 72 hrs.  --   --   --   --  Klebsiella pneumoniae*  Proteus mirabilis*  Streptococcus agalactiae (Group B)* Klebsiella pneumoniae*  Proteus mirabilis*   SPUTUM CULTURE   --   --  3+ Growth of Moraxella catarrhalis*  2+ Growth of Candida albicans*  --   --   --   --   --    GRAM STAIN RESULT   --   --  2+ Epithelial cells per low power field*  4+ Gram positive cocci in pairs*  Rare Gram variable rods*  --  4+ Polys*  4+ Gram positive cocci in pairs, chains and clusters*  --  Gram negative rods*  Gram positive cocci in pairs and chains* Gram negative rods*   URINE CULTURE   --   --   --   --   --  >100,000 cfu/ml Klebsiella pneumoniae*  >100,000 cfu/ml Klebsiella pneumoniae*  Proteus mirabilis*  20,000-29,000 cfu/ml  --   --    BODY FLUID CULTURE, STERILE   --   --   --   --  4+ Growth of Proteus mirabilis*  4+ Growth of Klebsiella pneumoniae*  4+ Growth of Citrobacter freundii*  4+ Growth of  --   --   --    LEGIONELLA URINARY ANTIGEN   --   --   --  Negative  --   --   --   --        Imaging Results Review: I reviewed radiology reports from this admission including: procedure reports.  Other Study  Results Review: No additional pertinent studies reviewed.    Last 24 Hours Medication List:     Current Facility-Administered Medications:     acetaminophen (TYLENOL) tablet 650 mg, Q6H PRN    aluminum-magnesium hydroxide-simethicone (MAALOX) oral suspension 30 mL, Q4H PRN    apixaban (ELIQUIS) tablet 2.5 mg, BID    aspirin chewable tablet 81 mg, Daily    atorvastatin (LIPITOR) tablet 20 mg, Daily With Dinner    cefTRIAXone (ROCEPHIN) IVPB (premix in dextrose) 2,000 mg 50 mL, Q24H    fluticasone (FLONASE) 50 mcg/act nasal spray 1 spray, Daily    fluticasone-vilanterol 200-25 mcg/actuation 1 puff, Daily **AND** umeclidinium 62.5 mcg/actuation inhaler AEPB 1 puff, Daily    gabapentin (NEURONTIN) capsule 100 mg, TID    HYDROmorphone (DILAUDID) injection 0.5 mg, Q4H PRN    ipratropium-albuterol (DUO-NEB) 0.5-2.5 mg/3 mL inhalation solution 3 mL, TID    metoprolol tartrate (LOPRESSOR) partial tablet 12.5 mg, Q12H DHAVAL    nicotine (NICODERM CQ) 14 mg/24hr TD 24 hr patch 1 patch, Daily    ondansetron (ZOFRAN) injection 4 mg, Q6H PRN    sodium bicarbonate tablet 1,300 mg, TID after meals    sodium chloride (PF) 0.9 % injection 10 mL, Daily    Administrative Statements   Today, Patient Was Seen By: Venus Matthews PA-C  I have spent a total time of 30 minutes in caring for this patient on the day of the visit/encounter including Diagnostic results, Risks and benefits of tx options, Instructions for management, Patient and family education, Importance of tx compliance, Counseling / Coordination of care, Documenting in the medical record, Reviewing/placing orders in the medical record (including tests, medications, and/or procedures), Obtaining or reviewing history  , and Communicating with other healthcare professionals .    **Please Note: This note may have been constructed using a voice recognition system.**

## 2025-05-22 NOTE — ASSESSMENT & PLAN NOTE
Acute elevation in creatinine noted from baseline.  Currently downtrending.  This does impact antibiotic dosing.  Cefepime dose adjusted  Ceftriaxone dosing as above  Repeat chemistry tomorrow  Will dose adjust as needed  Ongoing follow-up by nephrology

## 2025-05-22 NOTE — PROCEDURES
Central Line    Date/Time: 5/22/2025 3:49 PM    Performed by: Ross Diehl MD  Authorized by: Ross Diehl MD    Patient location:  IR  Consent:     Consent obtained:  Written    Consent given by:  Patient    Risks discussed:  Bleeding and infection  Universal protocol:     Procedure explained and questions answered to patient or proxy's satisfaction: yes      Relevant documents present and verified: yes      Test results available and properly labeled: yes      Radiology Images displayed and confirmed.  If images not available, report reviewed: yes      Required blood products, implants, devices, and special equipment available: yes      Site/side marked: yes      Immediately prior to procedure, a time out was called: yes      Patient identity confirmed:  Verbally with patient  Pre-procedure details:     Hand hygiene: Hand hygiene performed prior to insertion      Sterile barrier technique: All elements of maximal sterile technique followed      Skin preparation:  2% chlorhexidine    Skin preparation agent: Skin preparation agent completely dried prior to procedure    Indications:     Central line indications: medications requiring central line    Sedation:     Sedation type:  Anxiolysis  Anesthesia (see MAR for exact dosages):     Anesthesia method:  Local infiltration    Local anesthetic:  Lidocaine 1% WITH epi  Procedure details:     Location:  Right internal jugular    Vessel type: vein      Laterality:  Right    Approach: percutaneous technique used      Patient position:  Flat    Catheter type:  Single lumen    Catheter size:  6 Fr    Landmarks identified: yes      Ultrasound guidance: yes      Ultrasound image availability:  Images available in PACS    Sterile ultrasound techniques: Sterile gel and sterile probe covers were used      Manometry confirmation: yes      Number of attempts:  1    Successful placement: yes      Catheter tip vessel location: atriocaval junction     Post-procedure details:     Post-procedure:  Dressing applied and line sutured    Assessment:  Blood return through all ports, no pneumothorax on x-ray, free fluid flow and placement verified by x-ray    Patient tolerance of procedure:  Tolerated well, no immediate complications

## 2025-05-22 NOTE — CONSULTS
[facility] Record Consult  IR has been consulted to evaluate the patient, determine the appropriate procedure, and whether or not a procedure can and should be performed regarding the care of Joel Wyman.    We were consulted by medicine concerning central venous access, and to possibly perform a tunneled line placement if medically appropriate for the patient. The patient is aware that a specialty consultation is taking place, and agrees to the IR Consult on their behalf.     Assessment/Plan:   79 year old male admitted with sepsis secondary to urosepsis from obstruction, now s/p right nephrostomy tube. Will need multiple weeks of IV antibiotics. Not a PICC line candidate due to CKD. Will plan for tunneled central line placement today.    I spent 10 minutes in medical consultative time evaluating the medical record and imaging of Joel Wyman.    Thank you for allowing [facility] to participate in the care of Joel Wyman. Please don't hesitate to call or TigerText us with any questions.     Ross Diehl MD

## 2025-05-23 ENCOUNTER — TELEPHONE (OUTPATIENT)
Dept: FAMILY MEDICINE CLINIC | Facility: CLINIC | Age: 80
End: 2025-05-23

## 2025-05-23 VITALS
RESPIRATION RATE: 20 BRPM | DIASTOLIC BLOOD PRESSURE: 56 MMHG | WEIGHT: 222 LBS | HEIGHT: 69 IN | SYSTOLIC BLOOD PRESSURE: 113 MMHG | OXYGEN SATURATION: 94 % | TEMPERATURE: 98.5 F | BODY MASS INDEX: 32.88 KG/M2 | HEART RATE: 64 BPM

## 2025-05-23 LAB
ALBUMIN SERPL BCG-MCNC: 3.1 G/DL (ref 3.5–5)
ALP SERPL-CCNC: 63 U/L (ref 34–104)
ALT SERPL W P-5'-P-CCNC: 35 U/L (ref 7–52)
ANION GAP SERPL CALCULATED.3IONS-SCNC: 8 MMOL/L (ref 4–13)
AST SERPL W P-5'-P-CCNC: 35 U/L (ref 13–39)
ATRIAL RATE: 88 BPM
BACTERIA BLD CULT: NORMAL
BACTERIA BLD CULT: NORMAL
BILIRUB SERPL-MCNC: 0.28 MG/DL (ref 0.2–1)
BUN SERPL-MCNC: 53 MG/DL (ref 5–25)
CALCIUM ALBUM COR SERPL-MCNC: 9.2 MG/DL (ref 8.3–10.1)
CALCIUM SERPL-MCNC: 8.5 MG/DL (ref 8.4–10.2)
CHLORIDE SERPL-SCNC: 108 MMOL/L (ref 96–108)
CO2 SERPL-SCNC: 21 MMOL/L (ref 21–32)
CREAT SERPL-MCNC: 1.96 MG/DL (ref 0.6–1.3)
ERYTHROCYTE [DISTWIDTH] IN BLOOD BY AUTOMATED COUNT: 15.7 % (ref 11.6–15.1)
GFR SERPL CREATININE-BSD FRML MDRD: 31 ML/MIN/1.73SQ M
GLUCOSE SERPL-MCNC: 109 MG/DL (ref 65–140)
HCT VFR BLD AUTO: 32.6 % (ref 36.5–49.3)
HGB BLD-MCNC: 10 G/DL (ref 12–17)
MCH RBC QN AUTO: 27.9 PG (ref 26.8–34.3)
MCHC RBC AUTO-ENTMCNC: 30.7 G/DL (ref 31.4–37.4)
MCV RBC AUTO: 91 FL (ref 82–98)
NRBC BLD AUTO-RTO: 0 /100 WBCS
P AXIS: 61 DEGREES
PLATELET # BLD AUTO: 217 THOUSANDS/UL (ref 149–390)
PMV BLD AUTO: 10 FL (ref 8.9–12.7)
POTASSIUM SERPL-SCNC: 5.1 MMOL/L (ref 3.5–5.3)
PR INTERVAL: 134 MS
PROT SERPL-MCNC: 6.4 G/DL (ref 6.4–8.4)
QRS AXIS: 52 DEGREES
QRSD INTERVAL: 92 MS
QT INTERVAL: 320 MS
QTC INTERVAL: 387 MS
RBC # BLD AUTO: 3.59 MILLION/UL (ref 3.88–5.62)
SODIUM SERPL-SCNC: 137 MMOL/L (ref 135–147)
T WAVE AXIS: 71 DEGREES
VENTRICULAR RATE: 88 BPM
WBC # BLD AUTO: 12.37 THOUSAND/UL (ref 4.31–10.16)

## 2025-05-23 PROCEDURE — 94640 AIRWAY INHALATION TREATMENT: CPT

## 2025-05-23 PROCEDURE — 94760 N-INVAS EAR/PLS OXIMETRY 1: CPT

## 2025-05-23 PROCEDURE — 99239 HOSP IP/OBS DSCHRG MGMT >30: CPT

## 2025-05-23 PROCEDURE — 94664 DEMO&/EVAL PT USE INHALER: CPT

## 2025-05-23 PROCEDURE — 97530 THERAPEUTIC ACTIVITIES: CPT

## 2025-05-23 PROCEDURE — 85027 COMPLETE CBC AUTOMATED: CPT | Performed by: INTERNAL MEDICINE

## 2025-05-23 PROCEDURE — 80053 COMPREHEN METABOLIC PANEL: CPT | Performed by: INTERNAL MEDICINE

## 2025-05-23 RX ORDER — CEFTRIAXONE 2 G/50ML
2000 INJECTION, SOLUTION INTRAVENOUS EVERY 24 HOURS
Start: 2025-05-23 | End: 2025-06-14

## 2025-05-23 RX ORDER — CEFTRIAXONE 2 G/50ML
2000 INJECTION, SOLUTION INTRAVENOUS EVERY 24 HOURS
Status: DISCONTINUED | OUTPATIENT
Start: 2025-05-23 | End: 2025-05-23 | Stop reason: HOSPADM

## 2025-05-23 RX ORDER — SODIUM BICARBONATE 650 MG/1
1300 TABLET ORAL
Qty: 180 TABLET | Refills: 0 | Status: SHIPPED | OUTPATIENT
Start: 2025-05-23

## 2025-05-23 RX ADMIN — SODIUM BICARBONATE 650 MG TABLET 1300 MG: at 10:03

## 2025-05-23 RX ADMIN — Medication 12.5 MG: at 10:00

## 2025-05-23 RX ADMIN — APIXABAN 2.5 MG: 2.5 TABLET, FILM COATED ORAL at 10:00

## 2025-05-23 RX ADMIN — SODIUM BICARBONATE 650 MG TABLET 1300 MG: at 12:32

## 2025-05-23 RX ADMIN — ASPIRIN 81 MG: 81 TABLET, CHEWABLE ORAL at 10:00

## 2025-05-23 RX ADMIN — IPRATROPIUM BROMIDE AND ALBUTEROL SULFATE 3 ML: .5; 3 SOLUTION RESPIRATORY (INHALATION) at 13:49

## 2025-05-23 RX ADMIN — IPRATROPIUM BROMIDE AND ALBUTEROL SULFATE 3 ML: .5; 3 SOLUTION RESPIRATORY (INHALATION) at 07:17

## 2025-05-23 RX ADMIN — GABAPENTIN 100 MG: 100 CAPSULE ORAL at 10:00

## 2025-05-23 RX ADMIN — NICOTINE 1 PATCH: 14 PATCH, EXTENDED RELEASE TRANSDERMAL at 10:00

## 2025-05-23 RX ADMIN — CEFTRIAXONE 2000 MG: 2 INJECTION, SOLUTION INTRAVENOUS at 13:35

## 2025-05-23 RX ADMIN — FLUTICASONE PROPIONATE 1 SPRAY: 50 SPRAY, METERED NASAL at 10:01

## 2025-05-23 RX ADMIN — UMECLIDINIUM 1 PUFF: 62.5 AEROSOL, POWDER ORAL at 10:01

## 2025-05-23 RX ADMIN — FLUTICASONE FUROATE AND VILANTEROL TRIFENATATE 1 PUFF: 200; 25 POWDER RESPIRATORY (INHALATION) at 10:01

## 2025-05-23 NOTE — DISCHARGE INSTR - AVS FIRST PAGE
Please make sure to go to the infusion center daily further antibiotics.   Please make sure to complete blood tests complete blood count and basic metabolic panel weekly starting from tomorrow.  Please make sure to follow-up with infectious disease team in the next 2 weeks.  Please make sure to call and schedule an appointment with your family doctor in the next 7 to 10 days.      Please flush nephrostomy tube once a day

## 2025-05-23 NOTE — ASSESSMENT & PLAN NOTE
In the setting of nephrolithiasis.  Other considerations are for lower tract obstruction in the setting of prostate cancer and prior issues per urology note.  Now has PCN in place with improvement.  Cultures polymicrobial with Klebsiella, Proteus, strep organisms and Citrobacter.  Unfortunately the Citrobacter does limit antibiotic options.  Completed 7 days of cefepime for this issue  Monitor PCN output  Ongoing followed by urology  Additional antibiotics as above

## 2025-05-23 NOTE — DISCHARGE SUMMARY
Discharge Summary - Hospitalist   Name: Joel Wyman 79 y.o. male I MRN: [id number]  Unit/Bed#: 2 [address] Date of Admission: 5/16/2025   Date of Service: 5/23/2025 I Hospital Day: 7     Assessment & Plan  Sepsis (HCC)  Patient presented to the ED with right flank/lower quadrant abdominal pain along with fever at home   Sepsis as noted by leukocytosis and tachycardia in the setting of obstructive pyelonephritis, polymicrobial bacteremia  Lactic acid within normal limits  Required transfer to ICU due to hypotension with minimal response to fluid boluses but never required vasopressors as blood pressures did improve  We will complete ceftriaxone 2 g IV daily course through 6/14/2025 based on recommendations from infectious disease team.  Patient is cleared for discharge per infectious disease team and outpatient follow-up.  Weekly CBC and BMP needed.  Discussed with patient the plan above  Polymicrobial bacteremia  BC positive for Klebsiella, Proteus in 2 sets and group B strep in 1 set  5/18/25: Repeat BC x 2 negative @ 48 hours  TTE with normal EF, grade 1 diastolic dysfunction.  No obvious large vegetation noted but small vegetation could not be ruled out  TTE (5/21/25) without evidence of acute endocarditis and negative for large vegetation; however cannot rule out small vegetations  Infectious Disease consulted.  Completed 7 days of IV cefepime therapy. Transition to IV ceftriaxone 2g Q24H thru 6/14/25.  Complete IV antibiotic course at infusion Center daily.  Joel line inserted on 5/22/2025 with interventional radiologist  Patient is cleared by infectious disease and nephrology for discharge and outpatient follow-up  Will need weekly CBC and BMP.  Patient made aware.  Outpatient follow-up with infectious disease team  Obstructive pyelonephritis  Patient with history of prostate cancer. 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 Patient had suprapubic catheter placed. Then patient was followed up at [facility] where he underwent simple cystectomy with ileal conduit in May 2024   CT with evidence of right sided perinephritic stranding  Patient has right-sided nephrostomy tube in place   Continue IV ABX as noted above  Outpatient follow-up with urologist  Acute kidney injury superimposed on stage 3b chronic kidney disease (HCC)  Lab Results   Component Value Date    EGFR 31 05/23/2025    EGFR 28 05/22/2025    EGFR 26 05/21/2025    CREATININE 1.96 (H) 05/23/2025    CREATININE 2.12 (H) 05/22/2025    CREATININE 2.26 (H) 05/21/2025   Etiology: Multifactorial secondary to obstructive uropathy and hypotension in the settings of sepsis  Baseline creatinine 1.3-1.5  Peak creatinine 3.22  S/p right nephrostomy on 5/16/25 for staghorn stone and associated hydronephrosis on imaging  Nephrology consulted. Stable from their standpoint as creatinine starting to plateau. Do anticipate higher new baseline given age and poor renal reserve. Follow up outpatient for routine monitoring labs.   Cleared by nephrology for discharge and outpatient follow-up  Hydronephrosis  CT A/P: Right urolithiasis with increased stone burden in comparison to 10/25/2024 including staghorn calculus in the right renal pelvis measuring 2 cm and additional two obstructing calculi in the distal ureter just proximal to the anastomosis measuring up to 8 mm.  S/p right nephrostomy tube placement on 5/16/25. Follow up outpatient for routine tube checks.  Body fluid culture from R nephrostomy tube placement with growth of gram-positive cocci, Klebsiella, Proteus, Citrobacter.   Continue antibiotics as above  5/17/25: Repeat CT A/P showed that the right-sided nephrostomy tube is in the expected position.  Urology consulted, recommendations are appreciated  Conversion of nephrostomy tube to nephroureteral stent for flexible ureteroscopy, stone removal in a few  "weeks.  Atrial fibrillation (HCC)  Per ICU note, patient had short self-limited runs of A-fib  Currently on Eliquis and Lopressor  Monitor on telemetry  Outpatient referred to cardiology  Metabolic acidosis  Likely secondary to SHANTI on CKD  Previously on bicarb drip which has since been discontinued  Continue oral sodium bicarb  Cleared by nephrologist for discharge and outpatient follow-up  Essential hypertension  Previously with hypotension requiring transfer to ICU. BP improved and stabilized  Continue metoprolol tartrate 12.5mg BID  Monitor vitals per routine  COPD (chronic obstructive pulmonary disease) ([facility])  No acute exacerbation  Trelegy substituted with fluticasone-vilanterol and umeclidinium.  Continue DuoNebs as needed.  Type 2 diabetes mellitus with diabetic polyneuropathy, without long-term current use of insulin ([facility])  Lab Results   Component Value Date    HGBA1C 6.4 (H) 11/12/2024       No results for input(s): \"POCGLU\" in the last 72 hours.    Not currently on oral anti-diabetic agents  Continue carb controlled diet  Infrarenal abdominal aortic aneurysm (AAA) without rupture (HCC)  On imaging noted to have a 3 cm fusiform infrarenal AAA  Outpatient follow-up.     Medical Problems       Resolved Problems  Date Reviewed: 5/16/2025          Resolved    Acute hypoxic respiratory failure (HCC) 5/19/2025     Resolved by  Janny Marques DO    Hyperkalemia 5/19/2025     Resolved by  Janny Marques DO        Discharging Physician / Practitioner: Josephine Sharma MD  PCP: Frank Lombardi, DO  Admission Date:   Admission Orders (From admission, onward)       Ordered        05/16/25 1329  INPATIENT ADMISSION  Once                          Discharge Date: 05/23/25    Next Steps for Physician/AP Assuming Care:  Cleared for discharge to home with follow-up with infectious disease, nephrology, cardiology, urology  Daily antibiotics at that infusion center  Weekly CBC/BMP  Follow-up with interventional radiologist " for line removal post antibiotic completion    Test Results Pending at Discharge (will require follow up):  None       Consultations During Hospital Stay:  Critical care  Infectious disease  Nephrology  Intervention radiology  Urology    Procedures Performed:   XR chest portable   Final Result      Moderate bibasilar opacity which could be due to atelectasis or aspiration in the appropriate clinical setting.            Workstation performed: [id number]         CT abdomen pelvis wo contrast   Final Result      Interval placement of right-sided nephrostomy catheter in expected position with resolution of right hydronephrosis.      Right-sided intrarenal and ureteral calculi reidentified.      Increasing consolidative density in the posterior costophrenic angles with volume loss and mild bronchiectasis suggesting worsening atelectasis and possibly developing bibasilar pneumonia.      Workstation performed: [id number]         IR nephrostomy tube placement   Final Result   Right nephrostomy catheter placement.               Workstation performed: [id number]         CT abdomen pelvis wo contrast   Final Result      Postsurgical changes from cystoprostatectomy with ileal conduit and right lower quadrant urostomy. Right urolithiasis with increased stone burden in comparison to 10/25/2024 including staghorn calculus in the right renal pelvis measuring 2 cm and    additional two obstructing calculi in the distal ureter just proximal to the anastomosis measuring up to 8 mm. Associated right hydroureteronephrosis and perinephric stranding. Infection cannot be excluded in the absence of intravenous contrast.      Parastomal hernia containing fat and a nondilated loop of small bowel.      Fusiform infrarenal abdominal aortic aneurysm measuring 3 cm.      The study was marked in EPIC for immediate notification.      Workstation performed: [id number]         IR nephrostomy tube check/change/reposition/reinsertion/upsize    (Results  Pending)   IR tunneled central line placement    (Results Pending)         Significant Findings / Test Results:   Results for orders placed or performed during the hospital encounter of 05/16/25   Blood culture #1    Specimen: Hand, Right; Blood   Result Value Ref Range    Blood Culture Klebsiella pneumoniae (A)     Blood Culture Proteus mirabilis (A)     Blood Culture Streptococcus agalactiae (Group B) (A)     Gram Stain Result Gram negative rods (A)     Gram Stain Result Gram positive cocci in pairs and chains (A)        Susceptibility    Streptococcus agalactiae (Group B) - LATIA     Penicillin 0.094 Susceptible ug/ml     Ceftriaxone ($$) 0.09 Susceptible ug/ml     Vancomycin ($) 0.50 Susceptible ug/ml    Klebsiella pneumoniae - LATIA     ZID Performed Yes      Proteus mirabilis - LATIA     ZID Performed Yes     Blood culture #2    Specimen: Arm, Right; Blood   Result Value Ref Range    Blood Culture Klebsiella pneumoniae (A)     Blood Culture Proteus mirabilis (A)     Gram Stain Result Gram negative rods (A)        Susceptibility    Klebsiella pneumoniae - LATIA     ZID Performed Yes       Amoxicillin + Clavulanate <=8/4 Susceptible ug/ml     Ampicillin ($$) >16.00 Resistant ug/ml     Ampicillin + Sulbactam ($) <=4/2 Susceptible ug/ml     Aztreonam ($$$)  <=4 Susceptible ug/ml     Cefazolin ($) <=2.00 Susceptible ug/ml     Ciprofloxacin ($)  <=0.25 Susceptible ug/ml     Gentamicin ($$) <=2 Susceptible ug/ml     Levofloxacin ($) <=0.50 Susceptible ug/ml     Tetracycline <=4 Susceptible ug/ml     Trimethoprim + Sulfamethoxazole ($$$) <=0.5/9.5 Susceptible ug/ml    Proteus mirabilis - LATIA     ZID Performed Yes       Ampicillin ($$) <=8.00 Susceptible ug/ml     Aztreonam ($$$)  <=4 Susceptible ug/ml     Cefazolin ($) <=2.00 Susceptible ug/ml     Ciprofloxacin ($)  <=0.25 Susceptible ug/ml     Gentamicin ($$) <=2 Susceptible ug/ml     Levofloxacin ($) <=0.50 Susceptible ug/ml     Tetracycline >8 Resistant ug/ml      Trimethoprim + Sulfamethoxazole ($$$) <=0.5/9.5 Susceptible ug/ml   Urine culture    Specimen: Urine, Other   Result Value Ref Range    Urine Culture >100,000 cfu/ml Klebsiella pneumoniae (A)     Urine Culture >100,000 cfu/ml Klebsiella pneumoniae (A)     Urine Culture Proteus mirabilis (A)     Urine Culture 20,000-29,000 cfu/ml        Susceptibility    Klebsiella pneumoniae - LATIA     ZID Performed Yes       Amoxicillin + Clavulanate <=8/4 Susceptible ug/ml     Ampicillin ($$) >16.00 Resistant ug/ml     Ampicillin + Sulbactam ($) <=4/2 Susceptible ug/ml     Aztreonam ($$$)  <=4 Susceptible ug/ml     Cefazolin ($) <=2.00 Susceptible ug/ml     Ciprofloxacin ($)  <=0.25 Susceptible ug/ml     Gentamicin ($$) <=2 Susceptible ug/ml     Levofloxacin ($) <=0.50 Susceptible ug/ml     Nitrofurantoin 64 Intermediate ug/ml     Tetracycline <=4 Susceptible ug/ml     Trimethoprim + Sulfamethoxazole ($$$) <=0.5/9.5 Susceptible ug/ml    Klebsiella pneumoniae - LATIA     ZID Performed Yes       Amoxicillin + Clavulanate <=8/4 Susceptible ug/ml     Ampicillin ($$) <=8.00 Resistant ug/ml     Ampicillin + Sulbactam ($) <=4/2 Susceptible ug/ml     Aztreonam ($$$)  <=4 Susceptible ug/ml     Cefazolin ($) <=2.00 Susceptible ug/ml     Ciprofloxacin ($)  <=0.25 Susceptible ug/ml     Gentamicin ($$) <=2 Susceptible ug/ml     Levofloxacin ($) <=0.50 Susceptible ug/ml     Minocycline >8 Resistant ug/ml     Nitrofurantoin 64 Intermediate ug/ml     Tetracycline >8 Resistant ug/ml     Trimethoprim + Sulfamethoxazole ($$$) <=0.5/9.5 Susceptible ug/ml    Proteus mirabilis - LATIA     ZID Performed Yes       Amoxicillin + Clavulanate <=8/4 Susceptible ug/ml     Ampicillin ($$) >16.00 Resistant ug/ml     Ampicillin + Sulbactam ($) <=4/2 Susceptible ug/ml     Aztreonam ($$$)  <=4 Susceptible ug/ml     Cefazolin ($) <=2.00 Susceptible ug/ml     Ciprofloxacin ($)  <=0.25 Susceptible ug/ml     Gentamicin ($$) <=2 Susceptible ug/ml     Levofloxacin ($) <=0.50  Susceptible ug/ml     Nitrofurantoin 64 Resistant ug/ml     Tetracycline <=4 Resistant ug/ml     Trimethoprim + Sulfamethoxazole ($$$) <=0.5/9.5 Susceptible ug/ml   Body fluid culture and Gram stain    Specimen: Other; Body Fluid   Result Value Ref Range    Body Fluid Culture, Sterile 4+ Growth of Proteus mirabilis (A)     Body Fluid Culture, Sterile 4+ Growth of Klebsiella pneumoniae (A)     Body Fluid Culture, Sterile 4+ Growth of Citrobacter freundii (A)     Body Fluid Culture, Sterile 4+ Growth of     Gram Stain Result 4+ Polys (A)     Gram Stain Result (A)      4+ Gram positive cocci in pairs, chains and clusters       Susceptibility    Citrobacter freundii - LATIA     ZID Performed Yes       Cefepime ($) <=2.00 Susceptible ug/ml     Ciprofloxacin ($)  <=0.25 Susceptible ug/ml     Ertapenem ($$$) <=0.5 Susceptible ug/ml     Gentamicin ($$) <=2 Susceptible ug/ml     Levofloxacin ($) <=0.50 Susceptible ug/ml     Tetracycline <=4 Susceptible ug/ml     Trimethoprim + Sulfamethoxazole ($$$) <=0.5/9.5 Susceptible ug/ml    Klebsiella pneumoniae - LATIA     ZID Performed Yes       Amoxicillin + Clavulanate <=8/4 Susceptible ug/ml     Ampicillin ($$) >16.00 Resistant ug/ml     Ampicillin + Sulbactam ($) <=4/2 Susceptible ug/ml     Aztreonam ($$$)  <=4 Susceptible ug/ml     Cefazolin ($) <=2.00 Susceptible ug/ml     Ciprofloxacin ($)  <=0.25 Susceptible ug/ml     Gentamicin ($$) <=2 Susceptible ug/ml     Levofloxacin ($) <=0.50 Susceptible ug/ml     Minocycline <=4 Susceptible ug/ml     Tetracycline >8 Resistant ug/ml     Trimethoprim + Sulfamethoxazole ($$$) <=0.5/9.5 Susceptible ug/ml    Proteus mirabilis - LATIA     ZID Performed Yes       Ampicillin ($$) <=8.00 Susceptible ug/ml     Aztreonam ($$$)  <=4 Susceptible ug/ml     Cefazolin ($) <=2.00 Susceptible ug/ml     Ciprofloxacin ($)  <=0.25 Susceptible ug/ml     Gentamicin ($$) <=2 Susceptible ug/ml     Levofloxacin ($) <=0.50 Susceptible ug/ml     Tetracycline >8  Resistant ug/ml     Trimethoprim + Sulfamethoxazole ($$$) <=0.5/9.5 Susceptible ug/ml   Blood Culture Identification Panel    Specimen: Hand, Right; Blood   Result Value Ref Range    Klebsiella pneumoniae group Detected (A) Not Detected    Proteus Detected (A) Not Detected    Streptococcus agalactiae (Group B) Detected (A) Not Detected   MRSA culture    Specimen: Nose; Nares   Result Value Ref Range    MRSA Culture Only       No Methicillin Resistant Staphlyococcus aureus (MRSA) isolated   Legionella antigen, urine    Specimen: Urine, Other   Result Value Ref Range    Legionella Urinary Antigen Negative Negative   Strep Pneumoniae, Urine    Specimen: Urine, Other   Result Value Ref Range    Strep pneumoniae antigen, urine Negative Negative   Sputum culture and Gram stain    Specimen: Expectorated Sputum   Result Value Ref Range    Sputum Culture 3+ Growth of Moraxella catarrhalis (A)     Sputum Culture 2+ Growth of Candida albicans (A)     Gram Stain Result 2+ Epithelial cells per low power field (A)     Gram Stain Result 4+ Gram positive cocci in pairs (A)     Gram Stain Result Rare Gram variable rods (A)        Susceptibility    Moraxella catarrhalis - [facility]IA     ZID Performed Yes      Moraxella catarrhalis -  (no method available)     Beta Lactamase Positive      Candida albicans - LATIA     ZID Performed Yes     Blood culture    Specimen: Arm, Right; Blood   Result Value Ref Range    Blood Culture No Growth After 4 Days.    Blood culture    Specimen: Arm, Right; Blood   Result Value Ref Range    Blood Culture No Growth After 4 Days.    CBC and differential   Result Value Ref Range    WBC 18.69 (H) 4.31 - 10.16 Thousand/uL    RBC 4.63 3.88 - 5.62 Million/uL    Hemoglobin 13.2 12.0 - 17.0 g/dL    Hematocrit 42.1 36.5 - 49.3 %    MCV 91 82 - 98 fL    MCH 28.5 26.8 - 34.3 pg    MCHC 31.4 31.4 - 37.4 g/dL    RDW 15.5 (H) 11.6 - 15.1 %    MPV 9.6 8.9 - 12.7 fL    Platelets 232 149 - 390 Thousands/uL    nRBC 0 /100 WBCs     Segmented % 87 (H) 43 - 75 %    Immature Grans % 1 0 - 2 %    Lymphocytes % 5 (L) 14 - 44 %    Monocytes % 6 4 - 12 %    Eosinophils Relative 1 0 - 6 %    Basophils Relative 0 0 - 1 %    Absolute Neutrophils 16.39 (H) 1.85 - 7.62 Thousands/µL    Absolute Immature Grans 0.09 0.00 - 0.20 Thousand/uL    Absolute Lymphocytes 1.00 0.60 - 4.47 Thousands/µL    Absolute Monocytes 1.08 0.17 - 1.22 Thousand/µL    Eosinophils Absolute 0.10 0.00 - 0.61 Thousand/µL    Basophils Absolute 0.03 0.00 - 0.10 Thousands/µL   Comprehensive metabolic panel   Result Value Ref Range    Sodium 135 135 - 147 mmol/L    Potassium 5.2 3.5 - 5.3 mmol/L    Chloride 107 96 - 108 mmol/L    CO2 18 (L) 21 - 32 mmol/L    ANION GAP 10 4 - 13 mmol/L    BUN 56 (H) 5 - 25 mg/dL    Creatinine 2.26 (H) 0.60 - 1.30 mg/dL    Glucose 147 (H) 65 - 140 mg/dL    Calcium 9.6 8.4 - 10.2 mg/dL    AST 19 13 - 39 U/L    ALT 16 7 - 52 U/L    Alkaline Phosphatase 83 34 - 104 U/L    Total Protein 8.4 6.4 - 8.4 g/dL    Albumin 4.2 3.5 - 5.0 g/dL    Total Bilirubin 0.45 0.20 - 1.00 mg/dL    eGFR 26 ml/min/1.73sq m   Lipase   Result Value Ref Range    Lipase 30 11 - 82 u/L   Lactic acid, plasma (w/reflex if result > 2.0)   Result Value Ref Range    LACTIC ACID 0.7 0.5 - 2.0 mmol/L   UA (URINE) with reflex to Scope   Result Value Ref Range    Color, UA Yellow     Clarity, UA Cloudy     Specific Gravity, UA 1.020 1.005 - 1.030    pH, UA 8.0 4.5, 5.0, 5.5, 6.0, 6.5, 7.0, 7.5, 8.0    Leukocytes, UA Large (A) Negative    Nitrite, UA Positive (A) Negative    Protein, UA 30 (1+) (A) Negative mg/dl    Glucose, UA Negative Negative mg/dl    Ketones, UA Negative Negative mg/dl    Urobilinogen, UA <2.0 <2.0 mg/dl mg/dl    Bilirubin, UA Negative Negative    Occult Blood, UA Moderate (A) Negative   Urine Microscopic   Result Value Ref Range    RBC, UA 10-20 (A) None Seen, 0-1, 1-2, 2-4, 0-5 /hpf    WBC, UA 10-20 (A) None Seen, 0-1, 1-2, 0-5, 2-4 /hpf    Epithelial Cells Occasional None  Seen, Occasional /hpf    Bacteria, UA Innumerable (A) None Seen, Occasional /hpf    Triplep Phos Shanika, UA Occasional /hpf   Protime-INR   Result Value Ref Range    Protime 15.3 (H) 12.3 - 15.0 seconds    INR 1.16 0.85 - 1.19   Magnesium   Result Value Ref Range    Magnesium 1.6 (L) 1.9 - 2.7 mg/dL   CBC and Platelet   Result Value Ref Range    WBC 29.46 (H) 4.31 - 10.16 Thousand/uL    RBC 3.72 (L) 3.88 - 5.62 Million/uL    Hemoglobin 10.6 (L) 12.0 - 17.0 g/dL    Hematocrit 34.7 (L) 36.5 - 49.3 %    MCV 93 82 - 98 fL    MCH 28.5 26.8 - 34.3 pg    MCHC 30.5 (L) 31.4 - 37.4 g/dL    RDW 15.8 (H) 11.6 - 15.1 %    Platelets 181 149 - 390 Thousands/uL    MPV 10.3 8.9 - 12.7 fL   Basic metabolic panel   Result Value Ref Range    Sodium 135 135 - 147 mmol/L    Potassium 5.6 (H) 3.5 - 5.3 mmol/L    Chloride 112 (H) 96 - 108 mmol/L    CO2 15 (L) 21 - 32 mmol/L    ANION GAP 8 4 - 13 mmol/L    BUN 65 (H) 5 - 25 mg/dL    Creatinine 3.22 (H) 0.60 - 1.30 mg/dL    Glucose 94 65 - 140 mg/dL    Calcium 7.9 (L) 8.4 - 10.2 mg/dL    eGFR 17 ml/min/1.73sq m   Potassium   Result Value Ref Range    Potassium 5.4 (H) 3.5 - 5.3 mmol/L   Lactic acid, plasma (w/reflex if result > 2.0)   Result Value Ref Range    LACTIC ACID 1.2 0.5 - 2.0 mmol/L   Procalcitonin   Result Value Ref Range    Procalcitonin 35.16 (H) <=0.25 ng/ml   Comprehensive metabolic panel   Result Value Ref Range    Sodium 134 (L) 135 - 147 mmol/L    Potassium 4.6 3.5 - 5.3 mmol/L    Chloride 111 (H) 96 - 108 mmol/L    CO2 15 (L) 21 - 32 mmol/L    ANION GAP 8 4 - 13 mmol/L    BUN 67 (H) 5 - 25 mg/dL    Creatinine 3.16 (H) 0.60 - 1.30 mg/dL    Glucose 125 65 - 140 mg/dL    Calcium 7.4 (L) 8.4 - 10.2 mg/dL    Corrected Calcium 8.5 8.3 - 10.1 mg/dL    AST 24 13 - 39 U/L    ALT 7 7 - 52 U/L    Alkaline Phosphatase 48 34 - 104 U/L    Total Protein 5.6 (L) 6.4 - 8.4 g/dL    Albumin 2.6 (L) 3.5 - 5.0 g/dL    Total Bilirubin 0.28 0.20 - 1.00 mg/dL    eGFR 17 ml/min/1.73sq m   CBC and  differential   Result Value Ref Range    WBC 15.80 (H) 4.31 - 10.16 Thousand/uL    RBC 3.43 (L) 3.88 - 5.62 Million/uL    Hemoglobin 9.7 (L) 12.0 - 17.0 g/dL    Hematocrit 31.2 (L) 36.5 - 49.3 %    MCV 91 82 - 98 fL    MCH 28.3 26.8 - 34.3 pg    MCHC 31.1 (L) 31.4 - 37.4 g/dL    RDW 16.2 (H) 11.6 - 15.1 %    MPV 9.7 8.9 - 12.7 fL    Platelets 142 (L) 149 - 390 Thousands/uL    nRBC 0 /100 WBCs    Segmented % 89 (H) 43 - 75 %    Immature Grans % 1 0 - 2 %    Lymphocytes % 5 (L) 14 - 44 %    Monocytes % 5 4 - 12 %    Eosinophils Relative 0 0 - 6 %    Basophils Relative 0 0 - 1 %    Absolute Neutrophils 14.05 (H) 1.85 - 7.62 Thousands/µL    Absolute Immature Grans 0.13 0.00 - 0.20 Thousand/uL    Absolute Lymphocytes 0.78 0.60 - 4.47 Thousands/µL    Absolute Monocytes 0.78 0.17 - 1.22 Thousand/µL    Eosinophils Absolute 0.03 0.00 - 0.61 Thousand/µL    Basophils Absolute 0.03 0.00 - 0.10 Thousands/µL   Magnesium   Result Value Ref Range    Magnesium 1.9 1.9 - 2.7 mg/dL   Blood gas, venous   Result Value Ref Range    pH, Michele 7.277 (L) 7.300 - 7.400    pCO2, Michele 33.0 (L) 42.0 - 50.0 mm Hg    pO2, Michele 47.8 (H) 35.0 - 45.0 mm Hg    HCO3, Michele 15.1 (L) 24 - 30 mmol/L    Base Excess, Michele -10.7 mmol/L    O2 Content, Michele 12.3 ml/dL    O2 HGB, VENOUS 82.6 (H) 60.0 - 80.0 %   CBC   Result Value Ref Range    WBC 15.80 (H) 4.31 - 10.16 Thousand/uL    RBC 3.43 (L) 3.88 - 5.62 Million/uL    Hemoglobin 9.7 (L) 12.0 - 17.0 g/dL    Hematocrit 31.3 (L) 36.5 - 49.3 %    MCV 91 82 - 98 fL    MCH 28.3 26.8 - 34.3 pg    MCHC 31.0 (L) 31.4 - 37.4 g/dL    RDW 16.2 (H) 11.6 - 15.1 %    Platelets 148 (L) 149 - 390 Thousands/uL    MPV 9.9 8.9 - 12.7 fL   Basic metabolic panel   Result Value Ref Range    Sodium 134 (L) 135 - 147 mmol/L    Potassium 4.7 3.5 - 5.3 mmol/L    Chloride 108 96 - 108 mmol/L    CO2 18 (L) 21 - 32 mmol/L    ANION GAP 8 4 - 13 mmol/L    BUN 69 (H) 5 - 25 mg/dL    Creatinine 3.06 (H) 0.60 - 1.30 mg/dL    Glucose 146 (H) 65 -  140 mg/dL    Calcium 7.8 (L) 8.4 - 10.2 mg/dL    eGFR 18 ml/min/1.73sq m   Magnesium   Result Value Ref Range    Magnesium 2.1 1.9 - 2.7 mg/dL   Procalcitonin, Next Day AM Collection   Result Value Ref Range    Procalcitonin 19.60 (H) <=0.25 ng/ml   Vancomycin, random   Result Value Ref Range    Vancomycin Rm 7.3 (L) 10.0 - 20.0 ug/mL   CBC and differential   Result Value Ref Range    WBC 13.50 (H) 4.31 - 10.16 Thousand/uL    RBC 3.77 (L) 3.88 - 5.62 Million/uL    Hemoglobin 10.7 (L) 12.0 - 17.0 g/dL    Hematocrit 34.1 (L) 36.5 - 49.3 %    MCV 91 82 - 98 fL    MCH 28.4 26.8 - 34.3 pg    MCHC 31.4 31.4 - 37.4 g/dL    RDW 16.3 (H) 11.6 - 15.1 %    MPV 10.5 8.9 - 12.7 fL    Platelets 165 149 - 390 Thousands/uL    nRBC 0 /100 WBCs    Segmented % 85 (H) 43 - 75 %    Immature Grans % 1 0 - 2 %    Lymphocytes % 6 (L) 14 - 44 %    Monocytes % 6 4 - 12 %    Eosinophils Relative 2 0 - 6 %    Basophils Relative 0 0 - 1 %    Absolute Neutrophils 11.55 (H) 1.85 - 7.62 Thousands/µL    Absolute Immature Grans 0.11 0.00 - 0.20 Thousand/uL    Absolute Lymphocytes 0.78 0.60 - 4.47 Thousands/µL    Absolute Monocytes 0.82 0.17 - 1.22 Thousand/µL    Eosinophils Absolute 0.21 0.00 - 0.61 Thousand/µL    Basophils Absolute 0.03 0.00 - 0.10 Thousands/µL   Basic metabolic panel   Result Value Ref Range    Sodium 134 (L) 135 - 147 mmol/L    Potassium 4.8 3.5 - 5.3 mmol/L    Chloride 108 96 - 108 mmol/L    CO2 17 (L) 21 - 32 mmol/L    ANION GAP 9 4 - 13 mmol/L    BUN 67 (H) 5 - 25 mg/dL    Creatinine 2.73 (H) 0.60 - 1.30 mg/dL    Glucose 96 65 - 140 mg/dL    Calcium 8.5 8.4 - 10.2 mg/dL    eGFR 20 ml/min/1.73sq m   Magnesium   Result Value Ref Range    Magnesium 2.2 1.9 - 2.7 mg/dL   Phosphorus   Result Value Ref Range    Phosphorus 2.8 2.3 - 4.1 mg/dL   CBC and differential   Result Value Ref Range    WBC 8.47 4.31 - 10.16 Thousand/uL    RBC 3.76 (L) 3.88 - 5.62 Million/uL    Hemoglobin 10.4 (L) 12.0 - 17.0 g/dL    Hematocrit 33.7 (L) 36.5 -  49.3 %    MCV 90 82 - 98 fL    MCH 27.7 26.8 - 34.3 pg    MCHC 30.9 (L) 31.4 - 37.4 g/dL    RDW 16.2 (H) 11.6 - 15.1 %    MPV 9.8 8.9 - 12.7 fL    Platelets 166 149 - 390 Thousands/uL    nRBC 0 /100 WBCs    Segmented % 77 (H) 43 - 75 %    Immature Grans % 1 0 - 2 %    Lymphocytes % 10 (L) 14 - 44 %    Monocytes % 9 4 - 12 %    Eosinophils Relative 3 0 - 6 %    Basophils Relative 0 0 - 1 %    Absolute Neutrophils 6.47 1.85 - 7.62 Thousands/µL    Absolute Immature Grans 0.09 0.00 - 0.20 Thousand/uL    Absolute Lymphocytes 0.88 0.60 - 4.47 Thousands/µL    Absolute Monocytes 0.76 0.17 - 1.22 Thousand/µL    Eosinophils Absolute 0.24 0.00 - 0.61 Thousand/µL    Basophils Absolute 0.03 0.00 - 0.10 Thousands/µL   Comprehensive metabolic panel   Result Value Ref Range    Sodium 136 135 - 147 mmol/L    Potassium 4.7 3.5 - 5.3 mmol/L    Chloride 109 (H) 96 - 108 mmol/L    CO2 21 21 - 32 mmol/L    ANION GAP 6 4 - 13 mmol/L    BUN 62 (H) 5 - 25 mg/dL    Creatinine 2.35 (H) 0.60 - 1.30 mg/dL    Glucose 97 65 - 140 mg/dL    Calcium 8.6 8.4 - 10.2 mg/dL    Corrected Calcium 9.5 8.3 - 10.1 mg/dL    AST 25 13 - 39 U/L    ALT 15 7 - 52 U/L    Alkaline Phosphatase 65 34 - 104 U/L    Total Protein 6.4 6.4 - 8.4 g/dL    Albumin 2.9 (L) 3.5 - 5.0 g/dL    Total Bilirubin 0.39 0.20 - 1.00 mg/dL    eGFR 25 ml/min/1.73sq m   CBC and differential   Result Value Ref Range    WBC 8.74 4.31 - 10.16 Thousand/uL    RBC 3.77 (L) 3.88 - 5.62 Million/uL    Hemoglobin 10.4 (L) 12.0 - 17.0 g/dL    Hematocrit 33.9 (L) 36.5 - 49.3 %    MCV 90 82 - 98 fL    MCH 27.6 26.8 - 34.3 pg    MCHC 30.7 (L) 31.4 - 37.4 g/dL    RDW 15.9 (H) 11.6 - 15.1 %    MPV 10.0 8.9 - 12.7 fL    Platelets 191 149 - 390 Thousands/uL   Comprehensive metabolic panel   Result Value Ref Range    Sodium 137 135 - 147 mmol/L    Potassium 4.9 3.5 - 5.3 mmol/L    Chloride 107 96 - 108 mmol/L    CO2 20 (L) 21 - 32 mmol/L    ANION GAP 10 4 - 13 mmol/L    BUN 57 (H) 5 - 25 mg/dL    Creatinine  2.26 (H) 0.60 - 1.30 mg/dL    Glucose 104 65 - 140 mg/dL    Calcium 8.5 8.4 - 10.2 mg/dL    Corrected Calcium 9.3 8.3 - 10.1 mg/dL    AST 28 13 - 39 U/L    ALT 22 7 - 52 U/L    Alkaline Phosphatase 62 34 - 104 U/L    Total Protein 6.5 6.4 - 8.4 g/dL    Albumin 3.0 (L) 3.5 - 5.0 g/dL    Total Bilirubin 0.38 0.20 - 1.00 mg/dL    eGFR 26 ml/min/1.73sq m   CBC   Result Value Ref Range    WBC 10.44 (H) 4.31 - 10.16 Thousand/uL    RBC 3.64 (L) 3.88 - 5.62 Million/uL    Hemoglobin 10.1 (L) 12.0 - 17.0 g/dL    Hematocrit 32.5 (L) 36.5 - 49.3 %    MCV 89 82 - 98 fL    MCH 27.7 26.8 - 34.3 pg    MCHC 31.1 (L) 31.4 - 37.4 g/dL    RDW 15.8 (H) 11.6 - 15.1 %    Platelets 191 149 - 390 Thousands/uL    MPV 9.6 8.9 - 12.7 fL   Basic metabolic panel   Result Value Ref Range    Sodium 137 135 - 147 mmol/L    Potassium 4.9 3.5 - 5.3 mmol/L    Chloride 108 96 - 108 mmol/L    CO2 19 (L) 21 - 32 mmol/L    ANION GAP 10 4 - 13 mmol/L    BUN 56 (H) 5 - 25 mg/dL    Creatinine 2.12 (H) 0.60 - 1.30 mg/dL    Glucose 112 65 - 140 mg/dL    Calcium 8.4 8.4 - 10.2 mg/dL    eGFR 28 ml/min/1.73sq m   CBC and differential   Result Value Ref Range    WBC 12.37 (H) 4.31 - 10.16 Thousand/uL    RBC 3.59 (L) 3.88 - 5.62 Million/uL    Hemoglobin 10.0 (L) 12.0 - 17.0 g/dL    Hematocrit 32.6 (L) 36.5 - 49.3 %    MCV 91 82 - 98 fL    MCH 27.9 26.8 - 34.3 pg    MCHC 30.7 (L) 31.4 - 37.4 g/dL    RDW 15.7 (H) 11.6 - 15.1 %    MPV 10.0 8.9 - 12.7 fL    Platelets 217 149 - 390 Thousands/uL    nRBC 0 /100 WBCs   Comprehensive metabolic panel   Result Value Ref Range    Sodium 137 135 - 147 mmol/L    Potassium 5.1 3.5 - 5.3 mmol/L    Chloride 108 96 - 108 mmol/L    CO2 21 21 - 32 mmol/L    ANION GAP 8 4 - 13 mmol/L    BUN 53 (H) 5 - 25 mg/dL    Creatinine 1.96 (H) 0.60 - 1.30 mg/dL    Glucose 109 65 - 140 mg/dL    Calcium 8.5 8.4 - 10.2 mg/dL    Corrected Calcium 9.2 8.3 - 10.1 mg/dL    AST 35 13 - 39 U/L    ALT 35 7 - 52 U/L    Alkaline Phosphatase 63 34 - 104 U/L     Total Protein 6.4 6.4 - 8.4 g/dL    Albumin 3.1 (L) 3.5 - 5.0 g/dL    Total Bilirubin 0.28 0.20 - 1.00 mg/dL    eGFR 31 ml/min/1.73sq m   ECG 12 lead   Result Value Ref Range    Ventricular Rate 88 BPM    Atrial Rate 88 BPM    SC Interval 134 ms    QRSD Interval 92 ms    QT Interval 320 ms    QTC Interval 387 ms    P Axis 61 degrees    QRS Axis 52 degrees    T Wave [place] 71 degrees   Echo complete w/ contrast if indicated   Result Value Ref Range    BSA 2.12 m2    A4C EF 73 %    LVOT stroke volume 61.27     LVOT stroke volume index 30.20 ml/m2    LVOT Cardiac Output 4.95 l/min    LVOT Cardiac Index 2.34 l/min/m2    LVIDd 5.30 cm    LVIDS 3.20 cm    IVSd 0.90 cm    LVPWd 0.70 cm    LVOT diameter 1.9 cm    LVOT peak VTI 21.62 cm    FS 40 28 - 44    MV E' Tissue Velocity Septal 8 cm/s    LA Volume Index (BP) 48.6 mL/m2    E/A ratio 0.92     E wave deceleration time 186 ms    MV Peak E Bhupinder 109 cm/s    MV Peak A Bhupinder 1.18 m/s    AV LVOT peak gradient 4 mmHg    LVOT peak bhupinder 0.99 m/s    RVID d 3.6 cm    Tricuspid annular plane systolic excursion 2.20 cm    LA size 4 cm    LA length (A2C) 6.60 cm    LA volume (BP) 103 mL    RAA A4C 18.3 cm2    Aortic valve peak velocity 2.53 m/s    Ao VTI 53.15 cm    AV mean gradient 15 mmHg    LVOT mn grad 2.0 mmHg    AV peak gradient 26 mmHg    AV area by cont VTI 1.2 cm2    AV area peak bhupinder 1.1 cm2    MV stenosis pressure 1/2 time 54 ms    MV valve area p 1/2 method 4.07     TR Peak Bhupinder 3.1 m/s    Triscuspid Valve Regurgitation Peak Gradient 39.0 mmHg    Ao root 4.00 cm    Aortic valve mean velocity 18.20 m/s    Tricuspid valve peak regurgitation velocity 3.13 m/s    Left ventricular stroke volume (2D) 97.00 mL    IVS 0.9 cm    LEFT VENTRICLE SYSTOLIC VOLUME (MOD BIPLANE) 2D 41 mL    LV DIASTOLIC VOLUME (MOD BIPLANE) 2D 138 mL    Left Atrium Area-systolic Four Chamber 23.7 cm2    Left Atrium Area-systolic Apical Two Chamber 33.1 cm2    LVSV, 2D 97 mL    LVOT area 2.83 cm2    DVI 0.41      AV valve area 1.15 cm2    LV EF 60    KEI   Result Value Ref Range    LV EF 60    Fingerstick Glucose (POCT)   Result Value Ref Range    POC Glucose 85 65 - 140 mg/dl   Manual Differential(PHLEBS Do Not Order)   Result Value Ref Range    Segmented % 80 (H) 43 - 75 %    Bands % 1 0 - 8 %    Lymphocytes % 9 (L) 14 - 44 %    Monocytes % 6 4 - 12 %    Eosinophils % 3 0 - 6 %    Basophils % 0 0 - 1 %    Atypical Lymphocytes % 1 (H) <=0 %    Absolute Neutrophils 7.08 1.85 - 7.62 Thousand/uL    Absolute Lymphocytes 0.87 0.60 - 4.47 Thousand/uL    Absolute Monocytes 0.52 0.00 - 1.22 Thousand/uL    Absolute Eosinophils 0.26 0.00 - 0.40 Thousand/uL    Absolute Basophils 0.00 0.00 - 0.10 Thousand/uL    Total Counted      RBC Morphology Present     Platelet Estimate Adequate Adequate    Large Platelet Present          Incidental Findings:   None    I reviewed the above mentioned incidental findings with the patient and/or family and they expressed understanding.    Hospital Course:   Joel Wyman is a 79 y.o. male patient who originally presented to the hospital on 5/16/2025 due to right-sided flank pain/abdominal pain found to be in sepsis secondary to UTI possible pyelonephritis urology team on consult.  Patient was started on antibiotics  For UTI coverage.  Antibiotics upgraded to cefepime secondary to septic shock and ICU transfer.  Eventually blood cultures came back positive/poly microbial.  Infectious disease team are consulted and patient was transition successfully to ceftriaxone.  KEI completed with no visitations.  Repeat blood cultures are negative after 4 days.  While in the ICU patient developed a brief episode of atrial fibrillation and was started on metoprolol and Eliquis.  Currently he have a tunneled catheter for IV antibiotic infusion through 6/14/2025.  Ceftriaxone 2 g daily.  Also will need weekly CBC/BMP that he was made aware of.  Outpatient follow-up with urology, infectious disease, cardiology.   "Also will need for nephrology follow-up given metabolic acidosis with CKD.              Please see above list of diagnoses and related plan for additional information.     Discharge Day Visit / Exam:   Subjective: Patient seen today at bedside.  He is feeling fine.  Does not have any active complaints.  No chest pain or tightness, SOB or cough, dizziness or light headedness, N/V, Diarrhea of constipation.   No active urinary symptoms  Tolerating oral diet.     Vitals: Blood Pressure: 114/58 (05/23/25 0808)  Pulse: 67 (05/23/25 0808)  Temperature: 98.2 °F (36.8 °C) (05/23/25 0808)  Temp Source: Oral (05/23/25 0000)  Respirations: 18 (05/23/25 0000)  Height: 5' 9\" (175.3 cm) (05/21/25 1145)  Weight - Scale: 101 kg (222 lb) (05/21/25 1145)  SpO2: 90 % (05/23/25 0808)  Physical Exam  Vitals and nursing note reviewed.   Constitutional:       General: He is not in acute distress.     Appearance: Normal appearance.   HENT:      Head: Normocephalic and atraumatic.      Mouth/Throat:      Mouth: Mucous membranes are moist.     Eyes:      Conjunctiva/sclera: Conjunctivae normal.      Pupils: Pupils are equal, round, and reactive to light.       Cardiovascular:      Rate and Rhythm: Normal rate and regular rhythm.      Pulses: Normal pulses.           Carotid pulses are 2+ on the right side and 2+ on the left side.       Radial pulses are 2+ on the right side and 2+ on the left side.      Heart sounds: Normal heart sounds, S1 normal and S2 normal. No murmur heard.  Pulmonary:      Effort: No tachypnea, bradypnea or accessory muscle usage.      Breath sounds: Normal breath sounds and air entry. No decreased breath sounds, wheezing, rhonchi or rales.   Abdominal:      General: Abdomen is flat. There is no distension.      Palpations: Abdomen is soft.      Tenderness: There is no abdominal tenderness.     Musculoskeletal:      Right lower leg: No edema.      Left lower leg: No edema.     Neurological:      Mental Status: He is " alert and oriented to person, place, and time. Mental status is at baseline.     Psychiatric:         Mood and Affect: Mood normal.         Behavior: Behavior normal.          Discussion with Family: Patient declined call to .     Discharge instructions/Information to patient and family:   See after visit summary for information provided to patient and family.      Provisions for Follow-Up Care:  See after visit summary for information related to follow-up care and any pertinent home health orders.      Mobility at time of Discharge:   Basic Mobility Inpatient Raw Score: 22  JH-HLM Goal: 7: Walk 25 feet or more  JH-HLM Achieved: 8: Walk 250 feet ot more  HLM Goal achieved. Continue to encourage appropriate mobility.     Disposition:   Home    Planned Readmission: none     Administrative Statements   Discharge Statement:  I have spent a total time of 45 minutes in caring for this patient on the day of the visit/encounter. >30 minutes of time was spent on: Diagnostic results, Prognosis, Risks and benefits of tx options, and Instructions for management.    **Please Note: This note may have been constructed using a voice recognition system**

## 2025-05-23 NOTE — ASSESSMENT & PLAN NOTE
Acute elevation in creatinine noted from baseline.  Currently downtrending.   Ceftriaxone dosing as above  Lab monitoring as outpatient  Will dose adjust as needed  Ongoing follow-up by nephrology

## 2025-05-23 NOTE — PROGRESS NOTES
"Progress Note - Urology      Patient: Joel Wyman   : 1945 Sex: male   MRN: [id number]     CSN: [id number]  Unit/Bed#: [address] [place] [address]     SUBJECTIVE:   Vs stable  CAT scan confirming right nephrostomy in normal position  Left hydronephrosis resolved  White count 8.7 trending up 12      Objective   Vitals: /58   Pulse 67   Temp 98.2 °F (36.8 °C)   Resp 18   Ht 5' 9\" (1.753 m)   Wt 101 kg (222 lb)   SpO2 90%   BMI 32.78 kg/m²     I/O last 24 hours:  In: 430 [P.O.:420; Other:10]  Out: 2845 [Urine:2845]      Physical Exam:   General Alert awake   Normocephalic atraumatic PERRLA  Lungs clear bilaterally  Cardiac normal S1 normal S2  Abdomen soft, flank pain  Right nephrostomy tube  Extremities no edema      Lab Results: CBC:   Lab Results   Component Value Date    WBC 12.37 (H) 2025    HGB 10.0 (L) 2025    HCT 32.6 (L) 2025    MCV 91 2025     2025    RBC 3.59 (L) 2025    MCH 27.9 2025    MCHC 30.7 (L) 2025    RDW 15.7 (H) 2025    MPV 10.0 2025    NRBC 0 2025     CMP:   Lab Results   Component Value Date     2025    CO2 21 2025    BUN 53 (H) 2025    CREATININE 1.96 (H) 2025    CALCIUM 8.5 2025    AST 35 2025    ALT 35 2025    ALKPHOS 63 2025    EGFR 31 2025     Urinalysis:   Lab Results   Component Value Date    COLORU Yellow 2025    CLARITYU Cloudy 2025    SPECGRAV 1.020 2025    PHUR 8.0 2025    PHUR 5.5 2018    LEUKOCYTESUR Large (A) 2025    NITRITE Positive (A) 2025    GLUCOSEU Negative 2025    KETONESU Negative 2025    BILIRUBINUR Negative 2025    BLOODU Moderate (A) 2025     Urine Culture:   Lab Results   Component Value Date    URINECX >100,000 cfu/ml Klebsiella pneumoniae (A) 2025    URINECX >100,000 cfu/ml Klebsiella pneumoniae (A) 2025    URINECX Proteus mirabilis (A) 2025    " "URINECX 20,000-29,000 cfu/ml 05/16/2025     PSA: No results found for: \"PSA\"      Assessment/ Plan:  Bacteremia  Rightpyonephrosis  Status post right nephrostomy tube day #7  Antibiotics as per medical team  No urologic intervention at this time  Pic in antbiotics          Yovani Khan MD  "

## 2025-05-23 NOTE — PLAN OF CARE
Problem: Potential for Falls  Goal: Patient will remain free of falls  Description: INTERVENTIONS:  - Educate patient/family on patient safety including physical limitations  - Instruct patient to call for assistance with activity   - Consider consulting OT/PT to assist with strengthening/mobility based on AM PAC & JH-HLM score  - Consult OT/PT to assist with strengthening/mobility   - Keep Call bell within reach  - Keep bed low and locked with side rails adjusted as appropriate  - Keep care items and personal belongings within reach  - Initiate and maintain comfort rounds  - Make Fall Risk Sign visible to staff  - Offer Toileting every 2 Hours, in advance of need  - Initiate/Maintain bed alarm  - Obtain necessary fall risk management equipment: socks  - Apply yellow socks and bracelet for high fall risk patients  - Consider moving patient to room near nurses station  Outcome: Progressing     Problem: Nutrition/Hydration-ADULT  Goal: Nutrient/Hydration intake appropriate for improving, restoring or maintaining nutritional needs  Description: Monitor and assess patient's nutrition/hydration status for malnutrition. Collaborate with interdisciplinary team and initiate plan and interventions as ordered.  Monitor patient's weight and dietary intake as ordered or per policy. Utilize nutrition screening tool and intervene as necessary. Determine patient's food preferences and provide high-protein, high-caloric foods as appropriate.     INTERVENTIONS:  - Monitor oral intake, urinary output, labs, and treatment plans  - Assess nutrition and hydration status and recommend course of action  - Evaluate amount of meals eaten  - Assist patient with eating if necessary   - Allow adequate time for meals  - Recommend/ encourage appropriate diets, oral nutritional supplements, and vitamin/mineral supplements  - Order, calculate, and assess calorie counts as needed  - Recommend, monitor, and adjust tube feedings and TPN/PPN based  on assessed needs  - Assess need for intravenous fluids  - Provide specific nutrition/hydration education as appropriate  - Include patient/family/caregiver in decisions related to nutrition  Outcome: Progressing     Problem: PAIN - ADULT  Goal: Verbalizes/displays adequate comfort level or baseline comfort level  Description: Interventions:  - Encourage patient to monitor pain and request assistance  - Assess pain using appropriate pain scale  - Administer analgesics as ordered based on type and severity of pain and evaluate response  - Implement non-pharmacological measures as appropriate and evaluate response  - Consider cultural and social influences on pain and pain management  - Notify physician/advanced practitioner if interventions unsuccessful or patient reports new pain  - Educate patient/family on pain management process including their role and importance of  reporting pain   - Provide non-pharmacologic/complimentary pain relief interventions  Outcome: Progressing     Problem: INFECTION - ADULT  Goal: Absence or prevention of progression during hospitalization  Description: INTERVENTIONS:  - Assess and monitor for signs and symptoms of infection  - Monitor lab/diagnostic results  - Monitor all insertion sites, i.e. indwelling lines, tubes, and drains  - Monitor endotracheal if appropriate and nasal secretions for changes in amount and color  - [place] appropriate cooling/warming therapies per order  - Administer medications as ordered  - Instruct and encourage patient and family to use good hand hygiene technique  - Identify and instruct in appropriate isolation precautions for identified infection/condition  Outcome: Progressing  Goal: Absence of fever/infection during neutropenic period  Description: INTERVENTIONS:  - Monitor WBC  - Perform strict hand hygiene  - Limit to healthy visitors only  - No plants, dried, fresh or silk flowers with man in patient room  Outcome: Progressing     Problem:  SAFETY ADULT  Goal: Patient will remain free of falls  Description: INTERVENTIONS:  - Educate patient/family on patient safety including physical limitations  - Instruct patient to call for assistance with activity   - Consider consulting OT/PT to assist with strengthening/mobility based on AM PAC & JH-HLM score  - Consult OT/PT to assist with strengthening/mobility   - Keep Call bell within reach  - Keep bed low and locked with side rails adjusted as appropriate  - Keep care items and personal belongings within reach  - Initiate and maintain comfort rounds  - Make Fall Risk Sign visible to staff  - Offer Toileting every 2 Hours, in advance of need  - Initiate/Maintain bed alarm  - Obtain necessary fall risk management equipment: socks  - Apply yellow socks and bracelet for high fall risk patients  - Consider moving patient to room near nurses station  Outcome: Progressing  Goal: Maintain or return to baseline ADL function  Description: INTERVENTIONS:  -  Assess patient's ability to carry out ADLs; assess patient's baseline for ADL function and identify physical deficits which impact ability to perform ADLs (bathing, care of mouth/teeth, toileting, grooming, dressing, etc.)  - Assess/evaluate cause of self-care deficits   - Assess range of motion  - Assess patient's mobility; develop plan if impaired  - Assess patient's need for assistive devices and provide as appropriate  - Encourage maximum independence but intervene and supervise when necessary  - Involve family in performance of ADLs  - Assess for home care needs following discharge   - Consider OT consult to assist with ADL evaluation and planning for discharge  - Provide patient education as appropriate  - Monitor functional capacity and physical performance, use of AM PAC & JH-HLM   - Monitor gait, balance and fatigue with ambulation    Outcome: Progressing  Goal: Maintains/Returns to pre admission functional level  Description: INTERVENTIONS:  - Perform  AM-PAC 6 Click Basic Mobility/ Daily Activity assessment daily.  - Set and communicate daily mobility goal to care team and patient/family/caregiver.   - Collaborate with rehabilitation services on mobility goals if consulted  - Perform Range of Motion 4 times a day.  - Reposition patient every 2 hours.  - Dangle patient 3 times a day  - Stand patient 3 times a day  - Ambulate patient 3 times a day  - Out of bed to chair 3 times a day   - Out of bed for meals 3 times a day  - Out of bed for toileting  - Record patient progress and toleration of activity level   Outcome: Progressing     Problem: DISCHARGE PLANNING  Goal: Discharge to home or other facility with appropriate resources  Description: INTERVENTIONS:  - Identify barriers to discharge w/patient and caregiver  - Arrange for needed discharge resources and transportation as appropriate  - Identify discharge learning needs (meds, wound care, etc.)  - Arrange for interpretive services to assist at discharge as needed  - Refer to Case Management Department for coordinating discharge planning if the patient needs post-hospital services based on physician/advanced practitioner order or complex needs related to functional status, cognitive ability, or social support system  Outcome: Progressing     Problem: Knowledge Deficit  Goal: Patient/family/caregiver demonstrates understanding of disease process, treatment plan, medications, and discharge instructions  Description: Complete learning assessment and assess knowledge base.  Interventions:  - Provide teaching at level of understanding  - Provide teaching via preferred learning methods  Outcome: Progressing     Problem: Prexisting or High Potential for Compromised Skin Integrity  Goal: Skin integrity is maintained or improved  Description: INTERVENTIONS:  - Identify patients at risk for skin breakdown  - Assess and monitor skin integrity including under and around medical devices   - Assess and monitor nutrition  and hydration status  - Monitor labs  - Assess for incontinence   - Turn and reposition patient  - Assist with mobility/ambulation  - Relieve pressure over goldy prominences   - Avoid friction and shearing  - Provide appropriate hygiene as needed including keeping skin clean and dry  - Evaluate need for skin moisturizer/barrier cream  - Collaborate with interdisciplinary team  - Patient/family teaching  - Consider wound care consult    Assess:  - Review Alex scale daily  - Clean and moisturize skin daily  - Inspect skin when repositioning, toileting, and assisting with ADLS    - Assess extremities for adequate circulation and sensation     Bed Management:  - Have minimal linens on bed & keep smooth, unwrinkled  - Change linens as needed when moist or perspiring  - Avoid sitting or lying in one position for more than 2 hours while in bed?Keep HOB at 30 degrees         Activity:  - Mobilize patient 2 times a day  - Encourage activity and walks on unit  - Encourage or provide ROM exercises   - Turn and reposition patient every 2 Hours  - Use appropriate equipment to lift or move patient in bed  - Instruct/ Assist with weight shifting every 2 hours when out of bed in chair  - Consider limitation of chair time 2 hour intervals    Skin Care:  - Avoid use of baby powder, tape, friction and shearing, hot water or constrictive clothing    - Do not massage red bony areas    Next Steps:    - Consider consults to  interdisciplinary teams such as wound care nurse, PT/OT  Outcome: Progressing     Problem: RESPIRATORY - ADULT  Goal: Achieves optimal ventilation and oxygenation  Description: INTERVENTIONS:  - Assess for changes in respiratory status  - Assess for changes in mentation and behavior  - Position to facilitate oxygenation and minimize respiratory effort  - Oxygen administered by appropriate delivery if ordered  - Initiate smoking cessation education as indicated  - Encourage broncho-pulmonary hygiene including cough,  deep breathe, Incentive Spirometry  - Assess the need for suctioning and aspirate as needed  - Assess and instruct to report SOB or any respiratory difficulty  - Respiratory Therapy support as indicated  Outcome: Progressing

## 2025-05-23 NOTE — ASSESSMENT & PLAN NOTE
Patient with history of prostate cancer. 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 Patient had suprapubic catheter placed. Then patient was followed up at [facility] where he underwent simple cystectomy with ileal conduit in May 2024   CT with evidence of right sided perinephritic stranding  Patient has right-sided nephrostomy tube in place   Continue IV ABX as noted above  Outpatient follow-up with urologist

## 2025-05-23 NOTE — TELEPHONE ENCOUNTER
Patient called the RX Refill Line. Message is being forwarded to the office.     Patient is requesting Albuterol sulfate 0.083% 2.5mg/3mL solution. I dont see this on patient active medication list. Please advise/send to [facility]    Please contact patient at [phone]

## 2025-05-23 NOTE — ASSESSMENT & PLAN NOTE
Patient presented to the ED with right flank/lower quadrant abdominal pain along with fever at home   Sepsis as noted by leukocytosis and tachycardia in the setting of obstructive pyelonephritis, polymicrobial bacteremia  Lactic acid within normal limits  Required transfer to ICU due to hypotension with minimal response to fluid boluses but never required vasopressors as blood pressures did improve  We will complete ceftriaxone 2 g IV daily course through 6/14/2025 based on recommendations from infectious disease team.  Patient is cleared for discharge per infectious disease team and outpatient follow-up.  Weekly CBC and BMP needed.  Discussed with patient the plan above

## 2025-05-23 NOTE — PROGRESS NOTES
Progress Note - Infectious Disease   Name: Joel Wyman 79 y.o. male I MRN: [id number]  Unit/Bed#: 2 [address] Date of Admission: 5/16/2025   Date of Service: 5/23/2025 I Hospital Day: 7     Assessment & Plan  Sepsis (HCC)  Patient met sepsis criteria early on.  Sources are obstructive pyelonephritis with development of polymicrobial bacteremia.  Overall clinically improved with culture clearance.  Mild elevation in white count noted which I suspect is from recent procedure/central line.  Antibiotics as below  Trend fever curve/WBC while admitted  Outpatient antibiotic plans as below  Monitor exam for new/developing symptoms  Additional supportive care/discharge per primary  Polymicrobial bacteremia  Patient presented acutely ill and blood cultures from admission have isolated Klebsiella, Proteus in 2 sets and group B strep in 1 set.  Susceptibilities reviewed.  This is likely coming from patient's obstructive pyelonephritis as urine cultures isolating the same.  Patient is without any intravascular devices and has no other localizing symptoms on exam.  Overall clinically appears to be improving.  2D echo as well as transesophageal echo limited and unable to rule out small vegetations with degree of valvulopathy present.  Plan at this point is for prolonged course of IV antibiotic.  Continue ceftriaxone 2 g every 24 hours, dosing time adjusted  Plan is for total 4 weeks of therapy through 6/14  Weekly labs with CBCD and CMP as an outpatient  Orders have been placed in [facility] as patient to go to infusion center  Maintain current tunneled central line, will need IR outpatient for eventual removal  Plan for follow-up in ID office 2 weeks after discharge  ID office staff notified of follow-up needs  Monitor fever curve/vitals while admitted  Monitor exam for new/developing symptoms  Additional supportive care/discharge per primary  Obstructive pyelonephritis  In the setting of nephrolithiasis.  Other considerations  are for lower tract obstruction in the setting of prostate cancer and prior issues per urology note.  Now has PCN in place with improvement.  Cultures polymicrobial with Klebsiella, Proteus, strep organisms and Citrobacter.  Unfortunately the Citrobacter does limit antibiotic options.  Completed 7 days of cefepime for this issue  Monitor PCN output  Ongoing followed by urology  Additional antibiotics as above  Acute kidney injury superimposed on stage 3b chronic kidney disease (HCC)  Acute elevation in creatinine noted from baseline.  Currently downtrending.   Ceftriaxone dosing as above  Lab monitoring as outpatient  Will dose adjust as needed  Ongoing follow-up by nephrology  Type 2 diabetes mellitus with diabetic polyneuropathy, without long-term current use of insulin (HCC)  Well-controlled disease with hemoglobin A1c of 6.4.  Ongoing glucose management per primary.  Infrarenal abdominal aortic aneurysm (AAA) without rupture ([facility])  Noted on imaging.  Not absolute contraindication but would favor avoiding fluoroquinolones given this issue.  Class 1 obesity in adult  BMI noted to be 32.  Does impact antibiotic dosing.  Will favor higher baseline antibiotic dosing when possible.  Current antibiotics dose adjusted as above.    Above plan discussed briefly with the patient at bedside  Above plan discussed with primary service as well as case management in terms of adjustment to dosing time of antibiotic and clearance otherwise from an ID standpoint    ID consult service will reevaluate this patient again on Tuesday, if admitted.  Please contact ID attending on call if questions in the interim.    Antibiotics:  Ceftriaxone #2  Total antibiotic #8    24 Hour events:  Yesterday and overnight notes reviewed and no acute events noted    Subjective:  Patient seen at bedside and he denied having any nausea, vomiting, chest pain or shortness of breath.  We reviewed transition then to the infusion center after discussion with  case management yesterday.  Dosing of antibiotic moved up to assist with discharge.  Reviewed with primary service and case management as well.    Objective:  Vitals:  Temp:  [97.8 °F (36.6 °C)-98.5 °F (36.9 °C)] 98 °F (36.7 °C)  HR:  [65-76] 65  Resp:  [18-20] 18  BP: (114-126)/(55-65) 116/55  SpO2:  [90 %-96 %] 91 %  Temp (24hrs), Av °F (36.7 °C), Min:97.8 °F (36.6 °C), Max:98.5 °F (36.9 °C)  Current: Temperature: 98 °F (36.7 °C)    Physical Exam:   General Appearance:  Alert, interactive, nontoxic, no acute distress.   Extremities: No clubbing, cyanosis or edema   Skin: No new rashes or lesions. No draining wounds noted.  New tunneled catheter line site noted on the right chest wall which appears stable.       Labs, Imaging, & Other studies:   All pertinent labs and imaging studies in PACS were personally reviewed as below.  Results from last 7 days   Lab Units 25  0453 25  0524 25  0525   WBC Thousand/uL 12.37* 10.44* 8.74   HEMOGLOBIN g/dL 10.0* 10.1* 10.4*   PLATELETS Thousands/uL 217 191 191     Results from last 7 days   Lab Units 25  0453 25  0524 25  0525 25  0617   POTASSIUM mmol/L 5.1   < > 4.9 4.7   CHLORIDE mmol/L 108   < > 107 109*   CO2 mmol/L 21   < > 20* 21   BUN mg/dL 53*   < > 57* 62*   CREATININE mg/dL 1.96*   < > 2.26* 2.35*   EGFR ml/min/1.73sq m 31   < > 26 25   CALCIUM mg/dL 8.5   < > 8.5 8.6   AST U/L 35  --  28 25   ALT U/L 35  --  22 15   ALK PHOS U/L 63  --  62 65    < > = values in this interval not displayed.     Results from last 7 days   Lab Units 25  0857 25  0620 25  0456 25  0231 25  0157 25  1629 25  1235 25  1234 25  1220   BLOOD CULTURE  No Growth After 4 Days. No Growth After 4 Days.  --   --   --   --   --  Klebsiella pneumoniae*  Proteus mirabilis*  Streptococcus agalactiae (Group B)* Klebsiella pneumoniae*  Proteus mirabilis*   SPUTUM CULTURE   --   --  3+ Growth of  Moraxella catarrhalis*  2+ Growth of Candida albicans*  --   --   --   --   --   --    GRAM STAIN RESULT   --   --  2+ Epithelial cells per low power field*  4+ Gram positive cocci in pairs*  Rare Gram variable rods*  --   --  4+ Polys*  4+ Gram positive cocci in pairs, chains and clusters*  --  Gram negative rods*  Gram positive cocci in pairs and chains* Gram negative rods*   URINE CULTURE   --   --   --   --   --   --  >100,000 cfu/ml Klebsiella pneumoniae*  >100,000 cfu/ml Klebsiella pneumoniae*  Proteus mirabilis*  20,000-29,000 cfu/ml  --   --    BODY FLUID CULTURE, STERILE   --   --   --   --   --  4+ Growth of Proteus mirabilis*  4+ Growth of Klebsiella pneumoniae*  4+ Growth of Citrobacter freundii*  4+ Growth of  --   --   --    MRSA CULTURE ONLY   --   --   --   --  No Methicillin Resistant Staphlyococcus aureus (MRSA) isolated  --   --   --   --    LEGIONELLA URINARY ANTIGEN   --   --   --  Negative  --   --   --   --   --        Lab interpretation/comments: Mild elevation of white count noted which I suspect is due to line placement/recent procedure    Imaging interpretation/comments: No new imaging    Culture data: Repeat blood cultures remain without growth    External notes: None

## 2025-05-23 NOTE — ASSESSMENT & PLAN NOTE
Per ICU note, patient had short self-limited runs of A-fib  Currently on Eliquis and Lopressor  Monitor on telemetry  Outpatient referred to cardiology

## 2025-05-23 NOTE — CASE MANAGEMENT
Case Management Discharge Planning Note    Patient name Joel Wyman  Location 2 [facility] [address] [place] [address] MRN [id number]  : 1945 Date 2025       Current Admission Date: 2025  Current Admission Diagnosis:Sepsis ([facility])   Patient Active Problem List    Diagnosis Date Noted    Class 1 obesity in adult 2025    Atrial fibrillation ([facility]) 2025    History of prostate cancer 2025    Metabolic acidosis 2025    Polymicrobial bacteremia 2025    Infrarenal abdominal aortic aneurysm (AAA) without rupture ([facility]) 2025    Atheroscler native arteries the extremities w/intermit claudication ([facility]) 2025    Hydronephrosis 2024    Radiation cystitis 2024    Other artificial openings of urinary tract status ([facility]) 2024    Malignant neoplasm of urinary bladder, unspecified site ([facility]) 2024    Chronic low back pain with bilateral sciatica 2024    Cardiac Risk Assessment 2024    Type 2 diabetes mellitus with diabetic polyneuropathy, without long-term current use of insulin ([facility])     Obesity (BMI 30.0-34.9) 2024    Nicotine abuse 2024    Mixed stress and urge urinary incontinence 10/30/2023    Arthritis of both knees 2023    Localized, primary osteoarthritis of hand 2022    Acute kidney injury superimposed on stage 3b chronic kidney disease ([facility])     Sepsis ([facility]) 2021    Urinary retention 2021    Hematuria 2021    Prostate cancer ([facility]) - s/p resection 2021    Leukocytosis 2021    RA (rheumatoid arthritis) ([facility]) 2021    Obstructive pyelonephritis 2021    Gross hematuria     COPD (chronic obstructive pulmonary disease) ([facility]) 2021    Essential hypertension 2021    Hyperlipidemia 2021    Anemia of chronic disease 2021      LOS (days): 7  Geometric Mean LOS (GMLOS) (days): 3.5  Days to GMLOS:-3.3     OBJECTIVE:  Risk of Unplanned Readmission Score: 22.49     Current  admission status: Inpatient   Preferred Pharmacy:   [id number] - [place], [place] - [address] [address]  Phone: [phone] Fax: [phone]    Primary Care Provider: Frank Lombardi, DO    Primary Insurance: [employer] REP  Secondary Insurance:     DISCHARGE DETAILS:      Other Referral/Resources/Interventions Provided:  Interventions: Other (Specify) (outpatient infusion)  Referral Comments: ALINA spoke with Hayley in [facility] to request appointments for IV antibiotic treatment.  Hayley said she will schedule appointments and bring schedule to pt's room.  Home infusion referrals in [facility] will be cancelled.    Treatment Team Recommendation: Home (Outpatient infusion)  Discharge Destination Plan:: Home (Outpatient infusion)  Transport at Discharge : Family

## 2025-05-23 NOTE — ASSESSMENT & PLAN NOTE
Likely secondary to SHANTI on CKD  Previously on bicarb drip which has since been discontinued  Continue oral sodium bicarb  Cleared by nephrologist for discharge and outpatient follow-up

## 2025-05-23 NOTE — ASSESSMENT & PLAN NOTE
BC positive for Klebsiella, Proteus in 2 sets and group B strep in 1 set  5/18/25: Repeat BC x 2 negative @ 48 hours  TTE with normal EF, grade 1 diastolic dysfunction.  No obvious large vegetation noted but small vegetation could not be ruled out  TTE (5/21/25) without evidence of acute endocarditis and negative for large vegetation; however cannot rule out small vegetations  Infectious Disease consulted.  Completed 7 days of IV cefepime therapy. Transition to IV ceftriaxone 2g Q24H thru 6/14/25.  Complete IV antibiotic course at infusion Center daily.  Joel line inserted on 5/22/2025 with interventional radiologist  Patient is cleared by infectious disease and nephrology for discharge and outpatient follow-up  Will need weekly CBC and BMP.  Patient made aware.  Outpatient follow-up with infectious disease team

## 2025-05-23 NOTE — PHYSICAL THERAPY NOTE
"   PT TREATMENT     05/23/25 1015   PT Last Visit   PT Visit Date 05/23/25   Note Type   Note Type Treatment   Pain Assessment   Pain Assessment Tool 0-10   Pain Score No Pain   Restrictions/Precautions   Other Precautions   (nephrostomy, urostomy)   General   Chart Reviewed Yes   Cognition   Overall Cognitive Status WFL   Subjective   Subjective \"I am going home today\"   Bed Mobility   Supine to Sit 5  Supervision   Transfers   Sit to Stand 5  Supervision   Stand to Sit 5  Supervision   Stand pivot 5  Supervision   Ambulation/Elevation   Gait pattern   (steady)   Gait Assistance 5  Supervision   Assistive Device Rolling walker   Distance 2x200 feet   Stair Management Assistance 5  Supervision   Stair Management Technique One rail L;Step to pattern   Number of Stairs 4   Balance   Static Sitting Good   Static Standing Fair +   Ambulatory Fair   Activity Tolerance   Activity Tolerance Patient tolerated treatment well   Assessment   Prognosis Good   Problem List Decreased strength;Decreased endurance;Impaired balance;Decreased mobility   Assessment Pt demonstrates a steady gait with the walker and is able to negotiate the steps needed to get into his apartment with supervision assist.  Recommend pt continue to ambulate with a walker until he feels strong enough to progress to a cane as pt ambulated independently PTA.  Recommend PT follow up if pt's function does not return to baseline with resumption of normal daily activity.    The patient's AM-PAC Basic Mobility Inpatient Short Form Raw Score is 22. A Raw score of greater than 16 suggests the patient may benefit from discharge to home. Please also refer to the recommendation of the Physical Therapist for safe discharge planning.         Plan   Treatment/Interventions Therapeutic exercise;LE strengthening/ROM;Gait training;Endurance training;Elevations   Progress Progressing toward goals   PT Frequency 3-5x/wk   Discharge Recommendation   Rehab Resource Intensity " Level, PT III (Minimum Resource Intensity)   Equipment Recommended   (Pt has a cane or a walker)   AM-PAC Basic Mobility Inpatient   Turning in Flat Bed Without Bedrails 4   Lying on Back to Sitting on Edge of Flat Bed Without Bedrails 4   Moving Bed to Chair 4   Standing Up From Chair Using Arms 4   Walk in Room 4   Climb 3-5 Stairs With Railing 3   Basic Mobility Inpatient Raw Score 23   Basic Mobility Standardized Score 50.88   [facility] Highest Level Of Mobility   -HLM Goal 7: Walk 25 feet or more   -HLM Achieved 8: Walk 250 feet ot more   End of Consult   Patient Position at End of Consult All needs within reach;Bedside chair   Licensure   [place] License Number  Radha Guerrero PT [id number]

## 2025-05-23 NOTE — ASSESSMENT & PLAN NOTE
Lab Results   Component Value Date    EGFR 31 05/23/2025    EGFR 28 05/22/2025    EGFR 26 05/21/2025    CREATININE 1.96 (H) 05/23/2025    CREATININE 2.12 (H) 05/22/2025    CREATININE 2.26 (H) 05/21/2025   Etiology: Multifactorial secondary to obstructive uropathy and hypotension in the settings of sepsis  Baseline creatinine 1.3-1.5  Peak creatinine 3.22  S/p right nephrostomy on 5/16/25 for staghorn stone and associated hydronephrosis on imaging  Nephrology consulted. Stable from their standpoint as creatinine starting to plateau. Do anticipate higher new baseline given age and poor renal reserve. Follow up outpatient for routine monitoring labs.   Cleared by nephrology for discharge and outpatient follow-up

## 2025-05-23 NOTE — ASSESSMENT & PLAN NOTE
Patient presented acutely ill and blood cultures from admission have isolated Klebsiella, Proteus in 2 sets and group B strep in 1 set.  Susceptibilities reviewed.  This is likely coming from patient's obstructive pyelonephritis as urine cultures isolating the same.  Patient is without any intravascular devices and has no other localizing symptoms on exam.  Overall clinically appears to be improving.  2D echo as well as transesophageal echo limited and unable to rule out small vegetations with degree of valvulopathy present.  Plan at this point is for prolonged course of IV antibiotic.  Continue ceftriaxone 2 g every 24 hours, dosing time adjusted  Plan is for total 4 weeks of therapy through 6/14  Weekly labs with CBCD and CMP as an outpatient  Orders have been placed in [facility] as patient to go to infusion center  Maintain current tunneled central line, will need IR outpatient for eventual removal  Plan for follow-up in ID office 2 weeks after discharge  ID office staff notified of follow-up needs  Monitor fever curve/vitals while admitted  Monitor exam for new/developing symptoms  Additional supportive care/discharge per primary

## 2025-05-23 NOTE — ASSESSMENT & PLAN NOTE
Patient met sepsis criteria early on.  Sources are obstructive pyelonephritis with development of polymicrobial bacteremia.  Overall clinically improved with culture clearance.  Mild elevation in white count noted which I suspect is from recent procedure/central line.  Antibiotics as below  Trend fever curve/WBC while admitted  Outpatient antibiotic plans as below  Monitor exam for new/developing symptoms  Additional supportive care/discharge per primary

## 2025-05-24 ENCOUNTER — HOSPITAL ENCOUNTER (OUTPATIENT)
Dept: INFUSION CENTER | Facility: HOSPITAL | Age: 80
Discharge: HOME | End: 2025-05-24
Attending: INTERNAL MEDICINE
Payer: COMMERCIAL

## 2025-05-24 VITALS
DIASTOLIC BLOOD PRESSURE: 57 MMHG | TEMPERATURE: 98 F | SYSTOLIC BLOOD PRESSURE: 120 MMHG | HEART RATE: 81 BPM | OXYGEN SATURATION: 94 % | RESPIRATION RATE: 18 BRPM

## 2025-05-24 DIAGNOSIS — R78.81 BACTEREMIA: Primary | ICD-10-CM

## 2025-05-24 PROCEDURE — 96365 THER/PROPH/DIAG IV INF INIT: CPT

## 2025-05-24 RX ORDER — CEFTRIAXONE 2 G/50ML
2000 INJECTION, SOLUTION INTRAVENOUS ONCE
Status: CANCELLED | OUTPATIENT
Start: 2025-05-25

## 2025-05-24 RX ORDER — CEFTRIAXONE 2 G/50ML
2000 INJECTION, SOLUTION INTRAVENOUS ONCE
Status: COMPLETED | OUTPATIENT
Start: 2025-05-24 | End: 2025-05-24

## 2025-05-24 RX ORDER — SODIUM CHLORIDE 9 MG/ML
20 INJECTION, SOLUTION INTRAVENOUS ONCE
Status: COMPLETED | OUTPATIENT
Start: 2025-05-24 | End: 2025-05-24

## 2025-05-24 RX ORDER — SODIUM CHLORIDE 9 MG/ML
20 INJECTION, SOLUTION INTRAVENOUS ONCE
Status: CANCELLED | OUTPATIENT
Start: 2025-05-25

## 2025-05-24 RX ADMIN — SODIUM CHLORIDE 20 ML/HR: 0.9 INJECTION, SOLUTION INTRAVENOUS at 12:00

## 2025-05-24 RX ADMIN — CEFTRIAXONE 2000 MG: 2 INJECTION, SOLUTION INTRAVENOUS at 12:00

## 2025-05-24 NOTE — PLAN OF CARE
Problem: INFECTION - ADULT  Goal: Absence of fever/infection during neutropenic period  Description: INTERVENTIONS:  - Monitor WBC  - Perform strict hand hygiene  - Limit to healthy visitors only  - No plants, dried, fresh or silk flowers with man in patient room  Outcome: Progressing     Problem: Knowledge Deficit  Goal: Patient/family/caregiver demonstrates understanding of disease process, treatment plan, medications, and discharge instructions  Description: Complete learning assessment and assess knowledge base.  Interventions:  - Provide teaching at level of understanding  - Provide teaching via preferred learning methods  Outcome: Progressing     Problem: Potential for Falls  Goal: Patient will remain free of falls  Description: INTERVENTIONS:  - Educate patient/family on patient safety including physical limitations  - Instruct patient to call for assistance with activity   - Keep Call bell within reach  - Keep care items and personal belongings within reach  - Initiate and maintain comfort rounds  Outcome: Progressing

## 2025-05-24 NOTE — PROGRESS NOTES
Joel Wyman  tolerated treatment well with no complications.      Joel Wyman is aware of future appt on 05/25 at 1130.     AVS printed and given to Joel Wyman:  No (Declined by Joel Wyman)

## 2025-05-25 ENCOUNTER — HOSPITAL ENCOUNTER (OUTPATIENT)
Dept: INFUSION CENTER | Facility: HOSPITAL | Age: 80
Discharge: HOME | End: 2025-05-25
Attending: INTERNAL MEDICINE
Payer: COMMERCIAL

## 2025-05-25 DIAGNOSIS — R78.81 BACTEREMIA: Primary | ICD-10-CM

## 2025-05-25 PROCEDURE — 96365 THER/PROPH/DIAG IV INF INIT: CPT

## 2025-05-25 RX ORDER — CEFTRIAXONE 2 G/50ML
2000 INJECTION, SOLUTION INTRAVENOUS ONCE
Status: COMPLETED | OUTPATIENT
Start: 2025-05-25 | End: 2025-05-25

## 2025-05-25 RX ORDER — CEFTRIAXONE 2 G/50ML
2000 INJECTION, SOLUTION INTRAVENOUS ONCE
Status: CANCELLED | OUTPATIENT
Start: 2025-05-26

## 2025-05-25 RX ORDER — SODIUM CHLORIDE 9 MG/ML
20 INJECTION, SOLUTION INTRAVENOUS ONCE
Status: CANCELLED | OUTPATIENT
Start: 2025-05-26

## 2025-05-25 RX ORDER — SODIUM CHLORIDE 9 MG/ML
20 INJECTION, SOLUTION INTRAVENOUS ONCE
Status: COMPLETED | OUTPATIENT
Start: 2025-05-25 | End: 2025-05-25

## 2025-05-25 RX ADMIN — CEFTRIAXONE 2000 MG: 2 INJECTION, SOLUTION INTRAVENOUS at 11:30

## 2025-05-25 RX ADMIN — SODIUM CHLORIDE 20 ML/HR: 0.9 INJECTION, SOLUTION INTRAVENOUS at 11:30

## 2025-05-25 NOTE — PROGRESS NOTES
Joel Wyman  tolerated treatment well with no complications.      Joel Wyman is aware of future appt on 5/26 at 1100.     AVS printed and given to Joel Wyman:   No (Declined by Joel Wyman)

## 2025-05-26 ENCOUNTER — HOSPITAL ENCOUNTER (OUTPATIENT)
Dept: INFUSION CENTER | Facility: HOSPITAL | Age: 80
Discharge: HOME | End: 2025-05-26
Attending: INTERNAL MEDICINE
Payer: COMMERCIAL

## 2025-05-26 VITALS
DIASTOLIC BLOOD PRESSURE: 76 MMHG | RESPIRATION RATE: 18 BRPM | SYSTOLIC BLOOD PRESSURE: 136 MMHG | OXYGEN SATURATION: 94 % | HEART RATE: 73 BPM | TEMPERATURE: 98.3 F

## 2025-05-26 DIAGNOSIS — R78.81 BACTEREMIA: Primary | ICD-10-CM

## 2025-05-26 PROCEDURE — 96365 THER/PROPH/DIAG IV INF INIT: CPT

## 2025-05-26 RX ORDER — CEFTRIAXONE 2 G/50ML
2000 INJECTION, SOLUTION INTRAVENOUS ONCE
Status: COMPLETED | OUTPATIENT
Start: 2025-05-26 | End: 2025-05-26

## 2025-05-26 RX ORDER — CEFTRIAXONE 2 G/50ML
2000 INJECTION, SOLUTION INTRAVENOUS ONCE
Status: CANCELLED | OUTPATIENT
Start: 2025-05-27

## 2025-05-26 RX ORDER — SODIUM CHLORIDE 9 MG/ML
20 INJECTION, SOLUTION INTRAVENOUS ONCE
Status: COMPLETED | OUTPATIENT
Start: 2025-05-26 | End: 2025-05-26

## 2025-05-26 RX ORDER — SODIUM CHLORIDE 9 MG/ML
20 INJECTION, SOLUTION INTRAVENOUS ONCE
Status: CANCELLED | OUTPATIENT
Start: 2025-05-27

## 2025-05-26 RX ADMIN — SODIUM CHLORIDE 20 ML/HR: 0.9 INJECTION, SOLUTION INTRAVENOUS at 11:06

## 2025-05-26 RX ADMIN — CEFTRIAXONE 2000 MG: 2 INJECTION, SOLUTION INTRAVENOUS at 11:06

## 2025-05-26 NOTE — PROGRESS NOTES
Joel Wyman  tolerated treatment well with no complications.      Joel Wyman is aware of future appt on 5/27 at 1130.     AVS printed and given to Joel Wyman:  No (Declined by Joel Wyman)

## 2025-05-27 ENCOUNTER — TELEPHONE (OUTPATIENT)
Age: 80
End: 2025-05-27

## 2025-05-27 ENCOUNTER — HOSPITAL ENCOUNTER (OUTPATIENT)
Dept: INFUSION CENTER | Facility: HOSPITAL | Age: 80
Discharge: HOME | End: 2025-05-27
Attending: INTERNAL MEDICINE
Payer: COMMERCIAL

## 2025-05-27 ENCOUNTER — TELEPHONE (OUTPATIENT)
Dept: NEPHROLOGY | Facility: CLINIC | Age: 80
End: 2025-05-27

## 2025-05-27 ENCOUNTER — TRANSITIONAL CARE MANAGEMENT (OUTPATIENT)
Dept: FAMILY MEDICINE CLINIC | Facility: CLINIC | Age: 80
End: 2025-05-27

## 2025-05-27 VITALS
SYSTOLIC BLOOD PRESSURE: 138 MMHG | OXYGEN SATURATION: 93 % | RESPIRATION RATE: 18 BRPM | DIASTOLIC BLOOD PRESSURE: 68 MMHG | TEMPERATURE: 97.6 F | HEART RATE: 79 BPM

## 2025-05-27 DIAGNOSIS — J44.1 CHRONIC OBSTRUCTIVE PULMONARY DISEASE WITH ACUTE EXACERBATION (HCC): ICD-10-CM

## 2025-05-27 DIAGNOSIS — R78.81 BACTEREMIA: Primary | ICD-10-CM

## 2025-05-27 LAB
ATRIAL RATE: 119 BPM
QRS AXIS: 46 DEGREES
QRSD INTERVAL: 92 MS
QT INTERVAL: 318 MS
QTC INTERVAL: 436 MS
T WAVE AXIS: 61 DEGREES
VENTRICULAR RATE: 113 BPM

## 2025-05-27 PROCEDURE — 96365 THER/PROPH/DIAG IV INF INIT: CPT

## 2025-05-27 RX ORDER — ALBUTEROL SULFATE 90 UG/1
2 INHALANT RESPIRATORY (INHALATION) EVERY 4 HOURS PRN
Qty: 6.7 G | Refills: 5 | Status: SHIPPED | OUTPATIENT
Start: 2025-05-27 | End: 2025-06-03

## 2025-05-27 RX ORDER — CEFTRIAXONE 2 G/50ML
2000 INJECTION, SOLUTION INTRAVENOUS ONCE
Status: CANCELLED | OUTPATIENT
Start: 2025-05-28

## 2025-05-27 RX ORDER — CEFTRIAXONE 2 G/50ML
2000 INJECTION, SOLUTION INTRAVENOUS ONCE
Status: COMPLETED | OUTPATIENT
Start: 2025-05-27 | End: 2025-05-27

## 2025-05-27 RX ORDER — SODIUM CHLORIDE 9 MG/ML
20 INJECTION, SOLUTION INTRAVENOUS ONCE
Status: COMPLETED | OUTPATIENT
Start: 2025-05-27 | End: 2025-05-27

## 2025-05-27 RX ORDER — SODIUM CHLORIDE 9 MG/ML
20 INJECTION, SOLUTION INTRAVENOUS ONCE
Status: CANCELLED | OUTPATIENT
Start: 2025-05-28

## 2025-05-27 RX ADMIN — CEFTRIAXONE 2000 MG: 2 INJECTION, SOLUTION INTRAVENOUS at 11:35

## 2025-05-27 RX ADMIN — SODIUM CHLORIDE 20 ML/HR: 0.9 INJECTION, SOLUTION INTRAVENOUS at 11:35

## 2025-05-27 NOTE — TELEPHONE ENCOUNTER
Left a message for patient to give us a call back, just want to verify he picked up his medicine.  Viar Jordan MA

## 2025-05-27 NOTE — PLAN OF CARE
Problem: Potential for Falls  Goal: Patient will remain free of falls  Description: INTERVENTIONS:- Educate patient/family on patient safety including physical limitations- Instruct patient to call for assistance with activity -Keep Call bell within reach- Keep bed low and locked with side rails adjusted as appropriate- Keep care items and personal belongings within reach- Initiate and maintain comfort rounds  Outcome: Progressing     Problem: DISCHARGE PLANNING  Goal: Discharge to home or other facility with appropriate resources  Description: INTERVENTIONS:- Identify barriers to discharge w/patient and caregiver- Arrange for needed discharge resources and transportation as appropriate- Identify discharge learning needs (meds, wound care, etc.)- Arrange for interpretive services to assist at discharge as needed- Refer to Case Management Department for coordinating discharge planning if the patient needs post-hospital services based on physician/advanced practitioner order or complex needs related to functional status, cognitive ability, or social support system  Outcome: Progressing     Problem: Knowledge Deficit  Goal: Patient/family/caregiver demonstrates understanding of disease process, treatment plan, medications, and discharge instructions  Description: Complete learning assessment and assess knowledge base.Interventions:- Provide teaching at level of understanding- Provide teaching via preferred learning methods  Outcome: Progressing

## 2025-05-27 NOTE — TELEPHONE ENCOUNTER
Attempted to leave a message to schedule a hospital follow up appointment in the [place] office.  Answering machine picked up then disconnected.

## 2025-05-27 NOTE — TELEPHONE ENCOUNTER
Patient calling in regard to scheduling hospital follow up    Unable to schedule appointment in appropriate time frame     Please call patient to schedule hospital follow up

## 2025-05-27 NOTE — TELEPHONE ENCOUNTER
----- Message from Alondra VILLALOBOS sent at 5/22/2025 10:14 AM EDT -----    ----- Message -----  From: Jamie Farnsworth MD  Sent: 5/22/2025   8:29 AM EDT  To: Nephrology [facility]    Can we set this patient up for follow-up in the [place] office in 4 to 6 weeks.  Will need a BMP CBC before the office visit.  Currently at [facility] likely will be discharged over the weekend

## 2025-05-27 NOTE — PROGRESS NOTES
Joel Wyman  tolerated treatment well with no complications.      Joel Wyman is aware of future appt on 5/28 at 1130.     AVS printed and given to Joel Wyman:    No (Declined by Joel Wyman)

## 2025-05-27 NOTE — PROGRESS NOTES
OPAT NOTE    Supervising/Discharge provider: Jossy       Diagnosis: Polymicrobial bacteremia     Drug: Ceftriaxone     Dose/Route/Frequency: 2gIVQ    Labs/Frequency: CBCD CMP weekly     End Date: 6/14    Infusion/VNA/SNF contact: [facility]     Next appointment: 6/5

## 2025-05-28 ENCOUNTER — TELEPHONE (OUTPATIENT)
Dept: INFECTIOUS DISEASES | Facility: CLINIC | Age: 80
End: 2025-05-28

## 2025-05-28 ENCOUNTER — HOSPITAL ENCOUNTER (OUTPATIENT)
Dept: INFUSION CENTER | Facility: HOSPITAL | Age: 80
Discharge: HOME | End: 2025-05-28
Attending: INTERNAL MEDICINE
Payer: COMMERCIAL

## 2025-05-28 ENCOUNTER — RESULTS FOLLOW-UP (OUTPATIENT)
Dept: OTHER | Facility: HOSPITAL | Age: 80
End: 2025-05-28

## 2025-05-28 VITALS
TEMPERATURE: 98.4 F | OXYGEN SATURATION: 95 % | HEART RATE: 78 BPM | DIASTOLIC BLOOD PRESSURE: 66 MMHG | SYSTOLIC BLOOD PRESSURE: 136 MMHG | RESPIRATION RATE: 18 BRPM

## 2025-05-28 DIAGNOSIS — R78.81 BACTEREMIA: Primary | ICD-10-CM

## 2025-05-28 LAB
ALBUMIN SERPL BCG-MCNC: 3.6 G/DL (ref 3.5–5)
ALP SERPL-CCNC: 70 U/L (ref 34–104)
ALT SERPL W P-5'-P-CCNC: 26 U/L (ref 7–52)
ANION GAP SERPL CALCULATED.3IONS-SCNC: 7 MMOL/L (ref 4–13)
AST SERPL W P-5'-P-CCNC: 20 U/L (ref 13–39)
BASOPHILS # BLD AUTO: 0.04 THOUSANDS/ÂΜL (ref 0–0.1)
BASOPHILS NFR BLD AUTO: 0 % (ref 0–1)
BILIRUB SERPL-MCNC: 0.35 MG/DL (ref 0.2–1)
BUN SERPL-MCNC: 47 MG/DL (ref 5–25)
CALCIUM SERPL-MCNC: 9.2 MG/DL (ref 8.4–10.2)
CHLORIDE SERPL-SCNC: 107 MMOL/L (ref 96–108)
CO2 SERPL-SCNC: 23 MMOL/L (ref 21–32)
CREAT SERPL-MCNC: 1.86 MG/DL (ref 0.6–1.3)
EOSINOPHIL # BLD AUTO: 0.3 THOUSAND/ÂΜL (ref 0–0.61)
EOSINOPHIL NFR BLD AUTO: 3 % (ref 0–6)
ERYTHROCYTE [DISTWIDTH] IN BLOOD BY AUTOMATED COUNT: 15.6 % (ref 11.6–15.1)
GFR SERPL CREATININE-BSD FRML MDRD: 33 ML/MIN/1.73SQ M
GLUCOSE SERPL-MCNC: 109 MG/DL (ref 65–140)
HCT VFR BLD AUTO: 33.5 % (ref 36.5–49.3)
HGB BLD-MCNC: 10.5 G/DL (ref 12–17)
IMM GRANULOCYTES # BLD AUTO: 0.1 THOUSAND/UL (ref 0–0.2)
IMM GRANULOCYTES NFR BLD AUTO: 1 % (ref 0–2)
LYMPHOCYTES # BLD AUTO: 1.22 THOUSANDS/ÂΜL (ref 0.6–4.47)
LYMPHOCYTES NFR BLD AUTO: 11 % (ref 14–44)
MCH RBC QN AUTO: 28.4 PG (ref 26.8–34.3)
MCHC RBC AUTO-ENTMCNC: 31.3 G/DL (ref 31.4–37.4)
MCV RBC AUTO: 91 FL (ref 82–98)
MONOCYTES # BLD AUTO: 0.76 THOUSAND/ÂΜL (ref 0.17–1.22)
MONOCYTES NFR BLD AUTO: 7 % (ref 4–12)
NEUTROPHILS # BLD AUTO: 8.44 THOUSANDS/ÂΜL (ref 1.85–7.62)
NEUTS SEG NFR BLD AUTO: 78 % (ref 43–75)
NRBC BLD AUTO-RTO: 0 /100 WBCS
PLATELET # BLD AUTO: 248 THOUSANDS/UL (ref 149–390)
PMV BLD AUTO: 9.3 FL (ref 8.9–12.7)
POTASSIUM SERPL-SCNC: 5.6 MMOL/L (ref 3.5–5.3)
PROT SERPL-MCNC: 7.3 G/DL (ref 6.4–8.4)
RBC # BLD AUTO: 3.7 MILLION/UL (ref 3.88–5.62)
SODIUM SERPL-SCNC: 137 MMOL/L (ref 135–147)
WBC # BLD AUTO: 10.86 THOUSAND/UL (ref 4.31–10.16)

## 2025-05-28 PROCEDURE — 85025 COMPLETE CBC W/AUTO DIFF WBC: CPT | Performed by: INTERNAL MEDICINE

## 2025-05-28 PROCEDURE — 96365 THER/PROPH/DIAG IV INF INIT: CPT

## 2025-05-28 PROCEDURE — 80053 COMPREHEN METABOLIC PANEL: CPT | Performed by: INTERNAL MEDICINE

## 2025-05-28 RX ORDER — CEFTRIAXONE 2 G/50ML
2000 INJECTION, SOLUTION INTRAVENOUS ONCE
Status: CANCELLED | OUTPATIENT
Start: 2025-05-29

## 2025-05-28 RX ORDER — SODIUM CHLORIDE 9 MG/ML
20 INJECTION, SOLUTION INTRAVENOUS ONCE
Status: CANCELLED | OUTPATIENT
Start: 2025-05-29

## 2025-05-28 RX ORDER — SODIUM CHLORIDE 9 MG/ML
20 INJECTION, SOLUTION INTRAVENOUS ONCE
Status: COMPLETED | OUTPATIENT
Start: 2025-05-28 | End: 2025-05-28

## 2025-05-28 RX ORDER — CEFTRIAXONE 2 G/50ML
2000 INJECTION, SOLUTION INTRAVENOUS ONCE
Status: COMPLETED | OUTPATIENT
Start: 2025-05-28 | End: 2025-05-28

## 2025-05-28 RX ADMIN — CEFTRIAXONE 2000 MG: 2 INJECTION, SOLUTION INTRAVENOUS at 11:40

## 2025-05-28 RX ADMIN — SODIUM CHLORIDE 20 ML/HR: 0.9 INJECTION, SOLUTION INTRAVENOUS at 11:40

## 2025-05-28 NOTE — PLAN OF CARE
Problem: Potential for Falls  Goal: Patient will remain free of falls  Description: INTERVENTIONS:  - Educate patient/family on patient safety including physical limitations  - Instruct patient to call for assistance with activity   - Keep Call bell within reach  - Keep care items and personal belongings within reach  - Initiate and maintain comfort rounds  Outcome: Progressing     Problem: DISCHARGE PLANNING  Goal: Discharge to home or other facility with appropriate resources  Description: INTERVENTIONS:  - Identify barriers to discharge w/patient and caregiver  - Arrange for needed discharge resources and transportation as appropriate  - Identify discharge learning needs (meds, wound care, etc.)  - Arrange for interpretive services to assist at discharge as needed  - Refer to Case Management Department for coordinating discharge planning if the patient needs post-hospital services based on physician/advanced practitioner order or complex needs related to functional status, cognitive ability, or social support system  Outcome: Progressing     Problem: Knowledge Deficit  Goal: Patient/family/caregiver demonstrates understanding of disease process, treatment plan, medications, and discharge instructions  Description: Complete learning assessment and assess knowledge base.  Interventions:  - Provide teaching at level of understanding  - Provide teaching via preferred learning methods  Outcome: Progressing

## 2025-05-28 NOTE — TELEPHONE ENCOUNTER
Called and left message for patient today.   Informed patient to call the office back.   Was calling to go over antibiotic plan, weekly labs and follow up appointment.   Patients follow up appointment does need to be rescheduled do to a change on providers schedule. Would like to get patient rescheduled 6/4/25 at 1:30PM with ROJAS Snyder at the [place] office. Office will have to schedule the appointment if patient is able to do it.

## 2025-05-28 NOTE — TELEPHONE ENCOUNTER
"-Received communication from Dr. Fenton regarding patients recent lab work. Dr. Fenton requested the patients labs be forwarded to his PCP and nephrologist. Epic in basket messages sent to providers to inform them of the patients labs.    -Called and spoke with Mr. Wyman to provide him with Dr. Fenton's recommendations as it related to his reported potassium. He was informed to avoid potassium rich foods. Mr. Wyman indicated that \"I eat a lot of bananas\". He was instructed to reduce his intake of potassium rich foods. He verbalized his understanding. He was informed that the infectious disease office will continue to monitor his lab work weekly.  "

## 2025-05-28 NOTE — TELEPHONE ENCOUNTER
Patient return call in regards to rescheduling his appointment. Patient accepted the appointment for 6/4/25 at 1:30pm at the [place] office. Spoke with Martha to advise patient called back and confirm for 6/4/25 1:30pm appointment. Thank You

## 2025-05-28 NOTE — TELEPHONE ENCOUNTER
Called and spoke with patient today.   Went over antibiotic plan, weekly labs and follow up appointment. Informed patient due to a change in the providers schedule patients appointment does need to be rescheduled. Patient states he will call the office back when he gets home.   Would like to reschedule patient for 6/4/25 at 1:30PM with ROJAS Snyder at the [place] office. If patient is able to this date and time office staff will have to schedule.   Informed patient if there are any further questions or concerns to call the office   Patient verbalizes understanding at this time.

## 2025-05-28 NOTE — PROGRESS NOTES
Joel Wyman  tolerated treatment well with no complications.      Joel Wyman is aware of future appt on 05/29 at 1300.     AVS printed and given to Joel Wyman:  No (Declined by Joel Wyman)

## 2025-05-29 ENCOUNTER — HOSPITAL ENCOUNTER (OUTPATIENT)
Dept: INFUSION CENTER | Facility: HOSPITAL | Age: 80
Discharge: HOME | End: 2025-05-29
Attending: INTERNAL MEDICINE
Payer: COMMERCIAL

## 2025-05-29 VITALS
OXYGEN SATURATION: 93 % | SYSTOLIC BLOOD PRESSURE: 136 MMHG | RESPIRATION RATE: 18 BRPM | TEMPERATURE: 97.8 F | DIASTOLIC BLOOD PRESSURE: 65 MMHG | HEART RATE: 98 BPM

## 2025-05-29 DIAGNOSIS — R78.81 BACTEREMIA: Primary | ICD-10-CM

## 2025-05-29 PROCEDURE — 96365 THER/PROPH/DIAG IV INF INIT: CPT

## 2025-05-29 RX ORDER — CEFTRIAXONE 2 G/50ML
2000 INJECTION, SOLUTION INTRAVENOUS ONCE
Status: CANCELLED | OUTPATIENT
Start: 2025-05-30

## 2025-05-29 RX ORDER — SODIUM CHLORIDE 9 MG/ML
20 INJECTION, SOLUTION INTRAVENOUS ONCE
Status: CANCELLED | OUTPATIENT
Start: 2025-05-30

## 2025-05-29 RX ORDER — SODIUM CHLORIDE 9 MG/ML
20 INJECTION, SOLUTION INTRAVENOUS ONCE
Status: COMPLETED | OUTPATIENT
Start: 2025-05-29 | End: 2025-05-29

## 2025-05-29 RX ORDER — CEFTRIAXONE 2 G/50ML
2000 INJECTION, SOLUTION INTRAVENOUS ONCE
Status: COMPLETED | OUTPATIENT
Start: 2025-05-29 | End: 2025-05-29

## 2025-05-29 RX ADMIN — CEFTRIAXONE 2000 MG: 2 INJECTION, SOLUTION INTRAVENOUS at 12:58

## 2025-05-29 RX ADMIN — SODIUM CHLORIDE 20 ML/HR: 0.9 INJECTION, SOLUTION INTRAVENOUS at 12:57

## 2025-05-29 NOTE — PROGRESS NOTES
Joel yWman  tolerated treatment well with no complications.      Joel Wyman is aware of future appt on 5/30 at 1330.     AVS printed and given to Joel Wyman:   No (Declined by Joel Wyman)

## 2025-05-30 ENCOUNTER — HOSPITAL ENCOUNTER (OUTPATIENT)
Dept: INFUSION CENTER | Facility: HOSPITAL | Age: 80
Discharge: HOME | End: 2025-05-30
Attending: INTERNAL MEDICINE
Payer: COMMERCIAL

## 2025-05-30 VITALS
HEART RATE: 82 BPM | DIASTOLIC BLOOD PRESSURE: 66 MMHG | RESPIRATION RATE: 18 BRPM | SYSTOLIC BLOOD PRESSURE: 138 MMHG | OXYGEN SATURATION: 92 % | TEMPERATURE: 98.7 F

## 2025-05-30 DIAGNOSIS — R78.81 BACTEREMIA: Primary | ICD-10-CM

## 2025-05-30 PROCEDURE — 96365 THER/PROPH/DIAG IV INF INIT: CPT

## 2025-05-30 RX ORDER — CEFTRIAXONE 2 G/50ML
2000 INJECTION, SOLUTION INTRAVENOUS ONCE
Status: CANCELLED | OUTPATIENT
Start: 2025-05-31

## 2025-05-30 RX ORDER — SODIUM CHLORIDE 9 MG/ML
20 INJECTION, SOLUTION INTRAVENOUS ONCE
Status: COMPLETED | OUTPATIENT
Start: 2025-05-30 | End: 2025-05-30

## 2025-05-30 RX ORDER — CEFTRIAXONE 2 G/50ML
2000 INJECTION, SOLUTION INTRAVENOUS ONCE
Status: COMPLETED | OUTPATIENT
Start: 2025-05-30 | End: 2025-05-30

## 2025-05-30 RX ORDER — SODIUM CHLORIDE 9 MG/ML
20 INJECTION, SOLUTION INTRAVENOUS ONCE
Status: CANCELLED | OUTPATIENT
Start: 2025-05-31

## 2025-05-30 RX ADMIN — SODIUM CHLORIDE 20 ML/HR: 9 INJECTION, SOLUTION INTRAVENOUS at 13:24

## 2025-05-30 RX ADMIN — CEFTRIAXONE 2000 MG: 2 INJECTION, SOLUTION INTRAVENOUS at 13:25

## 2025-05-30 NOTE — PROGRESS NOTES
Joel Wyman  tolerated treatment well with no complications.      Joel Wyman is aware of future appt on 5/31 @ 1200.    AVS printed and given to Joel Wyman:   No (Declined by Joel Wyman)

## 2025-05-31 ENCOUNTER — HOSPITAL ENCOUNTER (OUTPATIENT)
Dept: INFUSION CENTER | Facility: HOSPITAL | Age: 80
Discharge: HOME | End: 2025-05-31
Attending: INTERNAL MEDICINE
Payer: COMMERCIAL

## 2025-05-31 VITALS
DIASTOLIC BLOOD PRESSURE: 74 MMHG | HEART RATE: 72 BPM | RESPIRATION RATE: 18 BRPM | SYSTOLIC BLOOD PRESSURE: 136 MMHG | TEMPERATURE: 97.3 F

## 2025-05-31 DIAGNOSIS — R78.81 BACTEREMIA: Primary | ICD-10-CM

## 2025-05-31 PROCEDURE — 96365 THER/PROPH/DIAG IV INF INIT: CPT

## 2025-05-31 RX ORDER — SODIUM CHLORIDE 9 MG/ML
20 INJECTION, SOLUTION INTRAVENOUS ONCE
Status: CANCELLED | OUTPATIENT
Start: 2025-06-01

## 2025-05-31 RX ORDER — CEFTRIAXONE 2 G/50ML
2000 INJECTION, SOLUTION INTRAVENOUS ONCE
Status: COMPLETED | OUTPATIENT
Start: 2025-05-31 | End: 2025-05-31

## 2025-05-31 RX ORDER — SODIUM CHLORIDE 9 MG/ML
20 INJECTION, SOLUTION INTRAVENOUS ONCE
Status: COMPLETED | OUTPATIENT
Start: 2025-05-31 | End: 2025-05-31

## 2025-05-31 RX ORDER — CEFTRIAXONE 2 G/50ML
2000 INJECTION, SOLUTION INTRAVENOUS ONCE
Status: CANCELLED | OUTPATIENT
Start: 2025-06-01

## 2025-05-31 RX ADMIN — SODIUM CHLORIDE 20 ML/HR: 0.9 INJECTION, SOLUTION INTRAVENOUS at 11:52

## 2025-05-31 RX ADMIN — CEFTRIAXONE 2000 MG: 2 INJECTION, SOLUTION INTRAVENOUS at 11:52

## 2025-05-31 NOTE — PROGRESS NOTES
Joel Wyman  tolerated treatment well with no complications.      Joel Wyman is aware of future appt on 6/1 at 1230.     AVS printed and given to Joel Wyman:  No (Declined by Joel Wyman)

## 2025-06-01 ENCOUNTER — HOSPITAL ENCOUNTER (OUTPATIENT)
Dept: INFUSION CENTER | Facility: HOSPITAL | Age: 80
Discharge: HOME | End: 2025-06-01
Attending: INTERNAL MEDICINE
Payer: COMMERCIAL

## 2025-06-01 VITALS
TEMPERATURE: 97.6 F | HEART RATE: 71 BPM | RESPIRATION RATE: 18 BRPM | SYSTOLIC BLOOD PRESSURE: 136 MMHG | DIASTOLIC BLOOD PRESSURE: 74 MMHG

## 2025-06-01 DIAGNOSIS — R78.81 BACTEREMIA: Primary | ICD-10-CM

## 2025-06-01 PROCEDURE — 96365 THER/PROPH/DIAG IV INF INIT: CPT

## 2025-06-01 RX ORDER — CEFTRIAXONE 2 G/50ML
2000 INJECTION, SOLUTION INTRAVENOUS ONCE
Status: COMPLETED | OUTPATIENT
Start: 2025-06-01 | End: 2025-06-01

## 2025-06-01 RX ORDER — SODIUM CHLORIDE 9 MG/ML
20 INJECTION, SOLUTION INTRAVENOUS ONCE
Status: CANCELLED | OUTPATIENT
Start: 2025-06-02

## 2025-06-01 RX ORDER — SODIUM CHLORIDE 9 MG/ML
20 INJECTION, SOLUTION INTRAVENOUS ONCE
Status: COMPLETED | OUTPATIENT
Start: 2025-06-01 | End: 2025-06-01

## 2025-06-01 RX ORDER — CEFTRIAXONE 2 G/50ML
2000 INJECTION, SOLUTION INTRAVENOUS ONCE
Status: CANCELLED | OUTPATIENT
Start: 2025-06-02

## 2025-06-01 RX ADMIN — CEFTRIAXONE 2000 MG: 2 INJECTION, SOLUTION INTRAVENOUS at 12:22

## 2025-06-01 RX ADMIN — SODIUM CHLORIDE 20 ML/HR: 0.9 INJECTION, SOLUTION INTRAVENOUS at 12:21

## 2025-06-01 NOTE — PROGRESS NOTES
Joel Wyman  tolerated treatment well with no complications.      Joel Wyman is aware of future appt on 6/2 at 1030.     AVS printed and given to Joel Wyman:  Yes

## 2025-06-02 ENCOUNTER — HOSPITAL ENCOUNTER (OUTPATIENT)
Dept: INFUSION CENTER | Facility: HOSPITAL | Age: 80
Discharge: HOME | End: 2025-06-02
Attending: INTERNAL MEDICINE
Payer: COMMERCIAL

## 2025-06-02 VITALS
DIASTOLIC BLOOD PRESSURE: 91 MMHG | HEART RATE: 82 BPM | TEMPERATURE: 98.2 F | RESPIRATION RATE: 18 BRPM | OXYGEN SATURATION: 94 % | SYSTOLIC BLOOD PRESSURE: 134 MMHG

## 2025-06-02 DIAGNOSIS — R78.81 BACTEREMIA: Primary | ICD-10-CM

## 2025-06-02 PROCEDURE — 96365 THER/PROPH/DIAG IV INF INIT: CPT

## 2025-06-02 RX ORDER — CEFTRIAXONE 2 G/50ML
2000 INJECTION, SOLUTION INTRAVENOUS ONCE
Status: COMPLETED | OUTPATIENT
Start: 2025-06-02 | End: 2025-06-02

## 2025-06-02 RX ORDER — CEFTRIAXONE 2 G/50ML
2000 INJECTION, SOLUTION INTRAVENOUS ONCE
Status: CANCELLED | OUTPATIENT
Start: 2025-06-03

## 2025-06-02 RX ORDER — SODIUM CHLORIDE 9 MG/ML
20 INJECTION, SOLUTION INTRAVENOUS ONCE
Status: CANCELLED | OUTPATIENT
Start: 2025-06-03

## 2025-06-02 RX ORDER — SODIUM CHLORIDE 9 MG/ML
20 INJECTION, SOLUTION INTRAVENOUS ONCE
Status: COMPLETED | OUTPATIENT
Start: 2025-06-02 | End: 2025-06-02

## 2025-06-02 RX ADMIN — CEFTRIAXONE 2000 MG: 2 INJECTION, SOLUTION INTRAVENOUS at 10:27

## 2025-06-02 RX ADMIN — SODIUM CHLORIDE 20 ML/HR: 0.9 INJECTION, SOLUTION INTRAVENOUS at 10:27

## 2025-06-02 NOTE — TELEPHONE ENCOUNTER
Left [facility] to schedule hospital follow up appointment with Dr Farnsworth 06/25 at 11:00 am in the [place] office.  Please transfer to [facility].

## 2025-06-02 NOTE — PLAN OF CARE
Problem: Potential for Falls  Goal: Patient will remain free of falls  Description: INTERVENTIONS:- Educate patient/family on patient safety including physical limitations- Instruct patient to call for assistance with activity -Keep Call bell within reachOutcome: Progressing

## 2025-06-02 NOTE — PROGRESS NOTES
Joel Wyman  tolerated treatment well with no complications.      Joel Wyman is aware of future appt on 6/3/25.     AVS printed and given to Joel Wyman:    No (Declined by Joel Wyman)

## 2025-06-03 ENCOUNTER — OFFICE VISIT (OUTPATIENT)
Dept: FAMILY MEDICINE CLINIC | Facility: CLINIC | Age: 80
End: 2025-06-03
Payer: COMMERCIAL

## 2025-06-03 ENCOUNTER — HOSPITAL ENCOUNTER (OUTPATIENT)
Dept: INFUSION CENTER | Facility: HOSPITAL | Age: 80
Discharge: HOME | End: 2025-06-03
Attending: INTERNAL MEDICINE
Payer: COMMERCIAL

## 2025-06-03 VITALS
RESPIRATION RATE: 18 BRPM | DIASTOLIC BLOOD PRESSURE: 76 MMHG | TEMPERATURE: 98.8 F | OXYGEN SATURATION: 96 % | SYSTOLIC BLOOD PRESSURE: 148 MMHG | HEART RATE: 78 BPM

## 2025-06-03 VITALS
BODY MASS INDEX: 30.86 KG/M2 | OXYGEN SATURATION: 95 % | WEIGHT: 209 LBS | TEMPERATURE: 97.5 F | DIASTOLIC BLOOD PRESSURE: 62 MMHG | HEART RATE: 86 BPM | SYSTOLIC BLOOD PRESSURE: 116 MMHG | RESPIRATION RATE: 14 BRPM

## 2025-06-03 DIAGNOSIS — J44.1 CHRONIC OBSTRUCTIVE PULMONARY DISEASE WITH ACUTE EXACERBATION (HCC): ICD-10-CM

## 2025-06-03 DIAGNOSIS — N17.9 ACUTE KIDNEY INJURY SUPERIMPOSED ON STAGE 3B CHRONIC KIDNEY DISEASE (HCC): Primary | ICD-10-CM

## 2025-06-03 DIAGNOSIS — E11.42 TYPE 2 DIABETES MELLITUS WITH DIABETIC POLYNEUROPATHY, WITHOUT LONG-TERM CURRENT USE OF INSULIN (HCC): ICD-10-CM

## 2025-06-03 DIAGNOSIS — Z93.6 OTHER ARTIFICIAL OPENINGS OF URINARY TRACT STATUS (HCC): ICD-10-CM

## 2025-06-03 DIAGNOSIS — E87.5 HYPERKALEMIA: ICD-10-CM

## 2025-06-03 DIAGNOSIS — I48.91 ATRIAL FIBRILLATION, UNSPECIFIED TYPE (HCC): ICD-10-CM

## 2025-06-03 DIAGNOSIS — E78.2 MIXED HYPERLIPIDEMIA: ICD-10-CM

## 2025-06-03 DIAGNOSIS — N18.32 ACUTE KIDNEY INJURY SUPERIMPOSED ON STAGE 3B CHRONIC KIDNEY DISEASE (HCC): Primary | ICD-10-CM

## 2025-06-03 DIAGNOSIS — N20.0 NEPHROLITHIASIS: ICD-10-CM

## 2025-06-03 DIAGNOSIS — I10 ESSENTIAL HYPERTENSION: ICD-10-CM

## 2025-06-03 DIAGNOSIS — R78.81 BACTEREMIA: Primary | ICD-10-CM

## 2025-06-03 PROCEDURE — 96365 THER/PROPH/DIAG IV INF INIT: CPT

## 2025-06-03 PROCEDURE — 99495 TRANSJ CARE MGMT MOD F2F 14D: CPT | Performed by: FAMILY MEDICINE

## 2025-06-03 RX ORDER — SODIUM CHLORIDE 9 MG/ML
20 INJECTION, SOLUTION INTRAVENOUS ONCE
Status: CANCELLED | OUTPATIENT
Start: 2025-06-04

## 2025-06-03 RX ORDER — SODIUM CHLORIDE 9 MG/ML
20 INJECTION, SOLUTION INTRAVENOUS ONCE
Status: COMPLETED | OUTPATIENT
Start: 2025-06-03 | End: 2025-06-03

## 2025-06-03 RX ORDER — ALBUTEROL SULFATE 0.83 MG/ML
2.5 SOLUTION RESPIRATORY (INHALATION) EVERY 6 HOURS PRN
Qty: 180 ML | Refills: 5 | Status: SHIPPED | OUTPATIENT
Start: 2025-06-03

## 2025-06-03 RX ORDER — CEFTRIAXONE 2 G/50ML
2000 INJECTION, SOLUTION INTRAVENOUS ONCE
Status: COMPLETED | OUTPATIENT
Start: 2025-06-03 | End: 2025-06-03

## 2025-06-03 RX ORDER — CEFTRIAXONE 2 G/50ML
2000 INJECTION, SOLUTION INTRAVENOUS ONCE
Status: CANCELLED | OUTPATIENT
Start: 2025-06-04

## 2025-06-03 RX ADMIN — CEFTRIAXONE 2000 MG: 2 INJECTION, SOLUTION INTRAVENOUS at 11:05

## 2025-06-03 RX ADMIN — SODIUM CHLORIDE 20 ML/HR: 0.9 INJECTION, SOLUTION INTRAVENOUS at 11:05

## 2025-06-03 NOTE — ASSESSMENT & PLAN NOTE
Lab Results   Component Value Date    EGFR 33 05/28/2025    EGFR 31 05/23/2025    EGFR 28 05/22/2025    CREATININE 1.86 (H) 05/28/2025    CREATININE 1.96 (H) 05/23/2025    CREATININE 2.12 (H) 05/22/2025

## 2025-06-03 NOTE — ADDENDUM NOTE
Addended by: LOMBARDI, FRANK T on: 6/3/2025 10:32 AM     Modules accepted: Orders     Wound Care: Polysporin ointment

## 2025-06-03 NOTE — ASSESSMENT & PLAN NOTE
Started on eliquis - Dx in the ICU while he had issues with sepsis  Orders:    Albumin / creatinine urine ratio; Future    Comprehensive metabolic panel; Future    Hemoglobin A1C; Future    Lipid Panel with Direct LDL reflex; Future

## 2025-06-03 NOTE — PROGRESS NOTES
Joel Wyman  tolerated treatment well with no complications.      Joel Wyman is aware of future appt on 06/04 at 1030.     AVS printed and given to Joel Wyman:  No (Declined by Joel Wyman)

## 2025-06-03 NOTE — ASSESSMENT & PLAN NOTE
Encouraged to get labs  Orders:    Albumin / creatinine urine ratio; Future    Comprehensive metabolic panel; Future    Hemoglobin A1C; Future    Lipid Panel with Direct LDL reflex; Future

## 2025-06-03 NOTE — PROGRESS NOTES
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)  Pt with nephrostomy tube       Nephrolithiasis  Pt will have stone removed once his infusions are done       Atrial fibrillation, unspecified type (HCC)  Started on eliquis - Dx in the ICU while he had issues with sepsis  Orders:    Albumin / creatinine urine ratio; Future    Comprehensive metabolic panel; Future    Hemoglobin A1C; Future    Lipid Panel with Direct LDL reflex; Future    Type 2 diabetes mellitus with diabetic polyneuropathy, without long-term current use of insulin (HCC)  Pt will get his labs for his follow up and I will order labs for 6 months  Lab Results   Component Value Date    HGBA1C 6.4 (H) 2024     Orders:    Albumin / creatinine urine ratio; Future    Comprehensive metabolic panel; Future    Hemoglobin A1C; Future    Lipid Panel with Direct LDL reflex; Future    Hyperkalemia    Orders:    Albumin / creatinine urine ratio; Future    Comprehensive metabolic panel; Future    Hemoglobin A1C; Future    Lipid Panel with Direct LDL reflex; Future    Mixed hyperlipidemia  Encouraged to get labs  Orders:    Albumin / creatinine urine ratio; Future    Comprehensive metabolic panel; Future    Hemoglobin A1C; Future    Lipid Panel with Direct LDL reflex; Future    Essential hypertension  stable       Chronic obstructive pulmonary disease with acute exacerbation ([facility])    Orders:    albuterol (2.5 mg/3 mL) 0.083 % nebulizer solution; Take 3 mL (2.5 mg total) by  nebulization every 6 (six) hours as needed for wheezing or shortness of breath           History of Present Illness   Pt is sched fro a TCM  Pt was originally sched for a 6 month follow up but they changed it to a TCM - follow up labs not done    Pt was amitted for a kidney stone that caused an infection  Rt flank pain.  Pt is getting out patient infusion of abx - caused sepsis - infection got into blood stream  Last infusion is June 14th    Pain in the side has resolved  Pt has to wait for the infection to pass then urology - will go in and take them out         Diabetic Foot Exam    Patient's shoes and socks removed.    Right Foot/Ankle   Right Foot Inspection  Skin Exam: skin normal. Skin not intact, no dry skin, no warmth, no callus, no erythema, no maceration, no abnormal color, no pre-ulcer, no ulcer and no callus.     Toe Exam: ROM and strength within normal limits.     Sensory   Vibration: intact  Proprioception: intact  Monofilament testing: intact    Vascular  Capillary refills: < 3 seconds  The right DP pulse is 2+. The right PT pulse is 2+.     Left Foot/Ankle  Left Foot Inspection  Skin Exam: skin normal. Skin not intact, no dry skin, no warmth, no erythema, no maceration, normal color, no pre-ulcer, no ulcer and no callus.     Toe Exam: ROM and strength within normal limits.     Sensory   Vibration: intact  Proprioception: intact  Monofilament testing: intact    Vascular  Capillary refills: < 3 seconds  The left DP pulse is 2+. The left PT pulse is 2+.     Assign Risk Category  No deformity present  No loss of protective sensation  No weak pulses  Risk: 0    Review of Systems   Constitutional:  Negative for activity change, appetite change, chills, diaphoresis, fatigue, fever and unexpected weight change.   HENT:  Negative for congestion, dental problem, ear pain, mouth sores, sinus pressure, sinus pain, sore throat and trouble swallowing.    Eyes:  Negative for photophobia, discharge and itching.    Respiratory:  Negative for apnea, chest tightness and shortness of breath.    Cardiovascular:  Negative for chest pain, palpitations and leg swelling.   Gastrointestinal:  Negative for abdominal distention, abdominal pain, blood in stool, nausea and vomiting.   Endocrine: Negative for cold intolerance, heat intolerance, polydipsia, polyphagia and polyuria.   Genitourinary:  Negative for difficulty urinating.   Musculoskeletal:  Negative for arthralgias.   Skin:  Negative for color change and wound.   Neurological:  Negative for dizziness, syncope, speech difficulty and headaches.   Hematological:  Negative for adenopathy.   Psychiatric/Behavioral:  Negative for agitation and behavioral problems.        Objective   /62   Pulse 86   Temp 97.5 °F (36.4 °C)   Resp 14   Wt 94.8 kg (209 lb)   SpO2 95%   BMI 30.86 kg/m²      Physical Exam  Vitals and nursing note reviewed.   Constitutional:       General: He is not in acute distress.     Appearance: He is well-developed. He is not diaphoretic.   HENT:      Head: Normocephalic and atraumatic.      Right Ear: External ear normal.      Left Ear: External ear normal.      Nose: Nose normal.      Mouth/Throat:      Pharynx: No oropharyngeal exudate.     Eyes:      General: No scleral icterus.        Right eye: No discharge.         Left eye: No discharge.      Pupils: Pupils are equal, round, and reactive to light.     Neck:      Thyroid: No thyromegaly.     Cardiovascular:      Rate and Rhythm: Normal rate.      Pulses: no weak pulses.           Dorsalis pedis pulses are 2+ on the right side and 2+ on the left side.        Posterior tibial pulses are 2+ on the right side and 2+ on the left side.      Heart sounds: Normal heart sounds. No murmur heard.  Pulmonary:      Effort: Pulmonary effort is normal. No respiratory distress.      Breath sounds: Normal breath sounds. No wheezing.   Abdominal:      General: Bowel sounds are normal. There is no distension.       Palpations: Abdomen is soft. There is no mass.      Tenderness: There is no abdominal tenderness. There is no guarding or rebound.     Musculoskeletal:         General: Normal range of motion.   Feet:      Right foot:      Skin integrity: No ulcer, skin breakdown, erythema, warmth, callus or dry skin.      Left foot:      Skin integrity: No ulcer, skin breakdown, erythema, warmth, callus or dry skin.     Skin:     General: Skin is warm and dry.      Findings: No erythema or rash.     Neurological:      Mental Status: He is alert.      Coordination: Coordination normal.      Deep Tendon Reflexes: Reflexes normal.     Psychiatric:         Behavior: Behavior normal.

## 2025-06-04 ENCOUNTER — HOSPITAL ENCOUNTER (OUTPATIENT)
Dept: INFUSION CENTER | Facility: HOSPITAL | Age: 80
Discharge: HOME | End: 2025-06-04
Attending: INTERNAL MEDICINE
Payer: COMMERCIAL

## 2025-06-04 ENCOUNTER — OFFICE VISIT (OUTPATIENT)
Dept: INFECTIOUS DISEASES | Facility: CLINIC | Age: 80
End: 2025-06-04
Payer: COMMERCIAL

## 2025-06-04 VITALS
TEMPERATURE: 98.5 F | RESPIRATION RATE: 18 BRPM | HEART RATE: 94 BPM | OXYGEN SATURATION: 92 % | DIASTOLIC BLOOD PRESSURE: 74 MMHG | SYSTOLIC BLOOD PRESSURE: 140 MMHG

## 2025-06-04 VITALS
SYSTOLIC BLOOD PRESSURE: 145 MMHG | OXYGEN SATURATION: 90 % | TEMPERATURE: 98 F | DIASTOLIC BLOOD PRESSURE: 71 MMHG | RESPIRATION RATE: 20 BRPM | HEART RATE: 77 BPM

## 2025-06-04 DIAGNOSIS — E66.811 OBESITY (BMI 30.0-34.9): ICD-10-CM

## 2025-06-04 DIAGNOSIS — R78.81 BACTEREMIA: Primary | ICD-10-CM

## 2025-06-04 DIAGNOSIS — N18.32 ACUTE KIDNEY INJURY SUPERIMPOSED ON STAGE 3B CHRONIC KIDNEY DISEASE (HCC): ICD-10-CM

## 2025-06-04 DIAGNOSIS — N17.9 ACUTE KIDNEY INJURY SUPERIMPOSED ON STAGE 3B CHRONIC KIDNEY DISEASE (HCC): ICD-10-CM

## 2025-06-04 DIAGNOSIS — N11.1 OBSTRUCTIVE PYELONEPHRITIS: ICD-10-CM

## 2025-06-04 DIAGNOSIS — I71.43 INFRARENAL ABDOMINAL AORTIC ANEURYSM (AAA) WITHOUT RUPTURE (HCC): ICD-10-CM

## 2025-06-04 DIAGNOSIS — E11.42 TYPE 2 DIABETES MELLITUS WITH DIABETIC POLYNEUROPATHY, WITHOUT LONG-TERM CURRENT USE OF INSULIN (HCC): ICD-10-CM

## 2025-06-04 LAB
ALBUMIN SERPL BCG-MCNC: 3.9 G/DL (ref 3.5–5)
ALP SERPL-CCNC: 74 U/L (ref 34–104)
ALT SERPL W P-5'-P-CCNC: 13 U/L (ref 7–52)
ANION GAP SERPL CALCULATED.3IONS-SCNC: 10 MMOL/L (ref 4–13)
AST SERPL W P-5'-P-CCNC: 13 U/L (ref 13–39)
BASOPHILS # BLD AUTO: 0.05 THOUSANDS/ÂΜL (ref 0–0.1)
BASOPHILS NFR BLD AUTO: 1 % (ref 0–1)
BILIRUB SERPL-MCNC: 0.38 MG/DL (ref 0.2–1)
BUN SERPL-MCNC: 43 MG/DL (ref 5–25)
CALCIUM SERPL-MCNC: 9.3 MG/DL (ref 8.4–10.2)
CHLORIDE SERPL-SCNC: 106 MMOL/L (ref 96–108)
CO2 SERPL-SCNC: 21 MMOL/L (ref 21–32)
CREAT SERPL-MCNC: 1.67 MG/DL (ref 0.6–1.3)
EOSINOPHIL # BLD AUTO: 0.36 THOUSAND/ÂΜL (ref 0–0.61)
EOSINOPHIL NFR BLD AUTO: 4 % (ref 0–6)
ERYTHROCYTE [DISTWIDTH] IN BLOOD BY AUTOMATED COUNT: 15.9 % (ref 11.6–15.1)
GFR SERPL CREATININE-BSD FRML MDRD: 37 ML/MIN/1.73SQ M
GLUCOSE SERPL-MCNC: 98 MG/DL (ref 65–140)
HCT VFR BLD AUTO: 35.6 % (ref 36.5–49.3)
HGB BLD-MCNC: 10.8 G/DL (ref 12–17)
IMM GRANULOCYTES # BLD AUTO: 0.06 THOUSAND/UL (ref 0–0.2)
IMM GRANULOCYTES NFR BLD AUTO: 1 % (ref 0–2)
LYMPHOCYTES # BLD AUTO: 1.17 THOUSANDS/ÂΜL (ref 0.6–4.47)
LYMPHOCYTES NFR BLD AUTO: 13 % (ref 14–44)
MCH RBC QN AUTO: 27.6 PG (ref 26.8–34.3)
MCHC RBC AUTO-ENTMCNC: 30.3 G/DL (ref 31.4–37.4)
MCV RBC AUTO: 91 FL (ref 82–98)
MONOCYTES # BLD AUTO: 0.8 THOUSAND/ÂΜL (ref 0.17–1.22)
MONOCYTES NFR BLD AUTO: 9 % (ref 4–12)
NEUTROPHILS # BLD AUTO: 6.36 THOUSANDS/ÂΜL (ref 1.85–7.62)
NEUTS SEG NFR BLD AUTO: 72 % (ref 43–75)
NRBC BLD AUTO-RTO: 0 /100 WBCS
PLATELET # BLD AUTO: 250 THOUSANDS/UL (ref 149–390)
PMV BLD AUTO: 9.9 FL (ref 8.9–12.7)
POTASSIUM SERPL-SCNC: 5.1 MMOL/L (ref 3.5–5.3)
PROT SERPL-MCNC: 7.7 G/DL (ref 6.4–8.4)
RBC # BLD AUTO: 3.91 MILLION/UL (ref 3.88–5.62)
SODIUM SERPL-SCNC: 137 MMOL/L (ref 135–147)
WBC # BLD AUTO: 8.8 THOUSAND/UL (ref 4.31–10.16)

## 2025-06-04 PROCEDURE — 80053 COMPREHEN METABOLIC PANEL: CPT | Performed by: INTERNAL MEDICINE

## 2025-06-04 PROCEDURE — 99214 OFFICE O/P EST MOD 30 MIN: CPT | Performed by: PHYSICIAN ASSISTANT

## 2025-06-04 PROCEDURE — 85025 COMPLETE CBC W/AUTO DIFF WBC: CPT | Performed by: INTERNAL MEDICINE

## 2025-06-04 PROCEDURE — 96365 THER/PROPH/DIAG IV INF INIT: CPT

## 2025-06-04 RX ORDER — SODIUM CHLORIDE 9 MG/ML
20 INJECTION, SOLUTION INTRAVENOUS ONCE
Status: COMPLETED | OUTPATIENT
Start: 2025-06-04 | End: 2025-06-04

## 2025-06-04 RX ORDER — CEFTRIAXONE 2 G/50ML
2000 INJECTION, SOLUTION INTRAVENOUS ONCE
Status: CANCELLED | OUTPATIENT
Start: 2025-06-05

## 2025-06-04 RX ORDER — SODIUM CHLORIDE 9 MG/ML
20 INJECTION, SOLUTION INTRAVENOUS ONCE
Status: CANCELLED | OUTPATIENT
Start: 2025-06-05

## 2025-06-04 RX ORDER — CEFTRIAXONE 2 G/50ML
2000 INJECTION, SOLUTION INTRAVENOUS ONCE
Status: COMPLETED | OUTPATIENT
Start: 2025-06-04 | End: 2025-06-04

## 2025-06-04 RX ADMIN — CEFTRIAXONE 2000 MG: 2 INJECTION, SOLUTION INTRAVENOUS at 10:33

## 2025-06-04 RX ADMIN — SODIUM CHLORIDE 20 ML/HR: 0.9 INJECTION, SOLUTION INTRAVENOUS at 10:34

## 2025-06-04 NOTE — PROGRESS NOTES
Name: Joel Wyman      : 1945      MRN: [id number]  Encounter Provider: Lillian Mora PA-C  Encounter Date: 2025   Encounter department: [facility]  :  Assessment & Plan  Polymicrobial bacteremia  Patient presented acutely ill and blood cultures from May admission have isolated Klebsiella, Proteus in 2 sets and group B strep in 1 set.  Susceptibilities reviewed.  This is likely coming from patient's obstructive pyelonephritis as urine cultures isolating the same.  Patient is without any intravascular devices and has no other localizing symptoms on exam.  Overall clinically appears to be improving.  2D echo as well as transesophageal echo limited and unable to rule out small vegetations with degree of valvulopathy present.  Plan at this point is for prolonged course of IV antibiotic.  Continue ceftriaxone 2 g every 24 hours.  Plan is for total 4 weeks of therapy through   Weekly labs with CBCD and CMP as an outpatient  Maintain current tunneled central line, will need IR outpatient for eventual removal; consulted IR for tunneled line removal around 25  Monitor fever curve/vitals   Monitor exam for new/developing symptoms  ID follow up prn hereafter  Orders:    Ambulatory Referral to Interventional Radiology; Future    Obstructive pyelonephritis  In the setting of nephrolithiasis.  Other considerations are for lower tract obstruction in the setting of prostate cancer and prior issues per urology note.  Now has PCN in place with improvement.  Cultures polymicrobial with Klebsiella, Proteus, strep organisms and Citrobacter.  Unfortunately the Citrobacter does limit antibiotic options.  Completed 7 days of cefepime for this issue  Monitor PCN output  Ongoing followed by urology 25 3:15 pm  Additional antibiotics as above       Acute kidney injury superimposed on stage 3b chronic kidney disease (HCC)  Lab Results   Component Value Date    EGFR 37  06/04/2025    EGFR 33 05/28/2025    EGFR 31 05/23/2025    CREATININE 1.67 (H) 06/04/2025    CREATININE 1.86 (H) 05/28/2025    CREATININE 1.96 (H) 05/23/2025   Acute elevation in creatinine noted from baseline on recent admission.  Currently downtrending. Estimated Creatinine Clearance: 39.4 mL/min (A) (by C-G formula based on SCr of 1.67 mg/dL (H)).  Ceftriaxone dosing as above  Lab monitoring as outpatient  Will dose adjust as needed  Ongoing follow-up by nephrology outpatient         Obesity (BMI 30.0-34.9)  BMI noted to be 30.86. Does impact antibiotic dosing. Will favor higher baseline antibiotic dosing when possible. Current antibiotics dose adjusted as above.        Type 2 diabetes mellitus with diabetic polyneuropathy, without long-term current use of insulin (HCC)    Lab Results   Component Value Date    HGBA1C 6.4 (H) 11/12/2024   Well-controlled disease with hemoglobin A1c of 6.4. Ongoing glucose management per primary.          Infrarenal abdominal aortic aneurysm (AAA) without rupture (HCC)  Noted on imaging. Not absolute contraindication but would favor avoiding fluoroquinolones given this issue.          Above plan discussed in detail with patient and wife at chair side. All of inquiries discussed and reassurance provided. They concur with above plan.     Antibiotics:  Ceftriaxone #14  Total antibiotic #20    History of Present Illness   Patient here for ID outpatient follow up of Polymicrobic bacteremia/pyelonephritis on IV Ceftriaxone via tunneled line at [facility].    Patient denies having any fever, chills, sweats, nausea, vomiting, chest pain or shortness of breath, no cough, Urine output equal out of urostomy and PCN. No flank pain. Mild skin irritation from gauze dressing/tape. No leaking. Some occasional loose stool. No new symptoms.    ROS:  A complete review of systems is negative other than that noted above in the HPI.         Objective   /74   Pulse 94   Temp 98.5 °F  "(36.9 °C)   Resp 18   SpO2 92%      General: Alert, interactive, appearing well, nontoxic, no acute distress.  Throat: Oropharynx moist without lesions.   Lungs: Clear to auscultation bilaterally; no audible wheezes, rhonchi or rales; respirations unlabored  Heart: RRR; no murmur, rub or gallop  Abdomen: Soft, non-tender, non-distended, positive bowel sounds.    : RLQ urostomy with scant yellow urine in bag. Right PCN gauze dressing intact and bag with yellow urine.  Extremities: No clubbing, cyanosis or edema  Skin: No new rashes or lesions. No new draining wounds. Right chest wall tunneled catheter site nontender, dressing intact.    Lab Results: I have personally reviewed pertinent labs.  Lab Results   Component Value Date    K 5.1 06/04/2025     06/04/2025    CO2 21 06/04/2025    BUN 43 (H) 06/04/2025    CREATININE 1.67 (H) 06/04/2025    GLUF 106 (H) 11/12/2024    CALCIUM 9.3 06/04/2025    CORRECTEDCA 9.2 05/23/2025    AST 13 06/04/2025    ALT 13 06/04/2025    ALKPHOS 74 06/04/2025    EGFR 37 06/04/2025     Lab Results   Component Value Date    WBC 8.80 06/04/2025    HGB 10.8 (L) 06/04/2025    HCT 35.6 (L) 06/04/2025    MCV 91 06/04/2025     06/04/2025   No results found for: \"ESR\"        Administrative Statements   I have spent a total time of 35 minutes in caring for this patient on the day of the visit/encounter including Diagnostic results, Prognosis, Risks and benefits of tx options, Instructions for management, Patient and family education, Importance of tx compliance, Risk factor reductions, Impressions, Counseling / Coordination of care, Documenting in the medical record, Reviewing/placing orders in the medical record (including tests, medications, and/or procedures), Obtaining or reviewing history  , and Communicating with other healthcare professionals .  "

## 2025-06-04 NOTE — ASSESSMENT & PLAN NOTE
Lab Results   Component Value Date    HGBA1C 6.4 (H) 11/12/2024   Well-controlled disease with hemoglobin A1c of 6.4. Ongoing glucose management per primary.

## 2025-06-04 NOTE — ASSESSMENT & PLAN NOTE
In the setting of nephrolithiasis.  Other considerations are for lower tract obstruction in the setting of prostate cancer and prior issues per urology note.  Now has PCN in place with improvement.  Cultures polymicrobial with Klebsiella, Proteus, strep organisms and Citrobacter.  Unfortunately the Citrobacter does limit antibiotic options.  Completed 7 days of cefepime for this issue  Monitor PCN output  Ongoing followed by urology 6/18/25 3:15 pm  Additional antibiotics as above

## 2025-06-04 NOTE — ASSESSMENT & PLAN NOTE
Patient presented acutely ill and blood cultures from May admission have isolated Klebsiella, Proteus in 2 sets and group B strep in 1 set.  Susceptibilities reviewed.  This is likely coming from patient's obstructive pyelonephritis as urine cultures isolating the same.  Patient is without any intravascular devices and has no other localizing symptoms on exam.  Overall clinically appears to be improving.  2D echo as well as transesophageal echo limited and unable to rule out small vegetations with degree of valvulopathy present.  Plan at this point is for prolonged course of IV antibiotic.  Continue ceftriaxone 2 g every 24 hours.  Plan is for total 4 weeks of therapy through 6/14  Weekly labs with CBCD and CMP as an outpatient  Maintain current tunneled central line, will need IR outpatient for eventual removal; consulted IR for tunneled line removal around 6/16/25  Monitor fever curve/vitals   Monitor exam for new/developing symptoms  ID follow up prn hereafter  Orders:    Ambulatory Referral to Interventional Radiology; Future

## 2025-06-04 NOTE — ASSESSMENT & PLAN NOTE
Lab Results   Component Value Date    EGFR 37 06/04/2025    EGFR 33 05/28/2025    EGFR 31 05/23/2025    CREATININE 1.67 (H) 06/04/2025    CREATININE 1.86 (H) 05/28/2025    CREATININE 1.96 (H) 05/23/2025   Acute elevation in creatinine noted from baseline on recent admission.  Currently downtrending. Estimated Creatinine Clearance: 39.4 mL/min (A) (by C-G formula based on SCr of 1.67 mg/dL (H)).  Ceftriaxone dosing as above  Lab monitoring as outpatient  Will dose adjust as needed  Ongoing follow-up by nephrology outpatient

## 2025-06-04 NOTE — ASSESSMENT & PLAN NOTE
BMI noted to be 30.86. Does impact antibiotic dosing. Will favor higher baseline antibiotic dosing when possible. Current antibiotics dose adjusted as above.

## 2025-06-04 NOTE — PLAN OF CARE
Problem: Potential for Falls  Goal: Patient will remain free of falls  Description: INTERVENTIONS:  - Educate patient/family on patient safety including physical limitations  - Instruct patient to call for assistance with activity   - Keep Call bell within reach  - Keep care items and personal belongings within reach  - Initiate and maintain comfort rounds  - Make Fall Risk Sign visible to staff  Outcome: Progressing     Problem: DISCHARGE PLANNING  Goal: Discharge to home or other facility with appropriate resources  Description: INTERVENTIONS:  - Identify barriers to discharge w/patient and caregiver  - Arrange for needed discharge resources and transportation as appropriate  - Identify discharge learning needs (meds, wound care, etc.)  - Arrange for interpretive services to assist at discharge as needed  - Refer to Case Management Department for coordinating discharge planning if the patient needs post-hospital services based on physician/advanced practitioner order or complex needs related to functional status, cognitive ability, or social support system  Outcome: Progressing     Problem: Knowledge Deficit  Goal: Patient/family/caregiver demonstrates understanding of disease process, treatment plan, medications, and discharge instructions  Description: Complete learning assessment and assess knowledge base.  Interventions:  - Provide teaching at level of understanding  - Provide teaching via preferred learning methods  Outcome: Progressing

## 2025-06-04 NOTE — PROGRESS NOTES
Joel Wyman  tolerated treatment well with no complications.      Joel Wyman is aware of future appt on 06/05 at 1130.     AVS printed and given to Joel Wyman:  No (Declined by Joel Wyman)

## 2025-06-05 ENCOUNTER — PREP FOR PROCEDURE (OUTPATIENT)
Dept: INTERVENTIONAL RADIOLOGY/VASCULAR | Facility: CLINIC | Age: 80
End: 2025-06-05

## 2025-06-05 ENCOUNTER — HOSPITAL ENCOUNTER (OUTPATIENT)
Dept: INFUSION CENTER | Facility: HOSPITAL | Age: 80
Discharge: HOME | End: 2025-06-05
Attending: INTERNAL MEDICINE
Payer: COMMERCIAL

## 2025-06-05 VITALS
OXYGEN SATURATION: 90 % | SYSTOLIC BLOOD PRESSURE: 141 MMHG | HEART RATE: 84 BPM | DIASTOLIC BLOOD PRESSURE: 74 MMHG | RESPIRATION RATE: 18 BRPM | TEMPERATURE: 98.5 F

## 2025-06-05 DIAGNOSIS — R78.81 BACTEREMIA: Primary | ICD-10-CM

## 2025-06-05 PROCEDURE — 96365 THER/PROPH/DIAG IV INF INIT: CPT

## 2025-06-05 RX ORDER — CEFTRIAXONE 2 G/50ML
2000 INJECTION, SOLUTION INTRAVENOUS ONCE
Status: COMPLETED | OUTPATIENT
Start: 2025-06-05 | End: 2025-06-05

## 2025-06-05 RX ORDER — SODIUM CHLORIDE 9 MG/ML
20 INJECTION, SOLUTION INTRAVENOUS ONCE
Status: CANCELLED | OUTPATIENT
Start: 2025-06-06

## 2025-06-05 RX ORDER — CEFTRIAXONE 2 G/50ML
2000 INJECTION, SOLUTION INTRAVENOUS ONCE
Status: CANCELLED | OUTPATIENT
Start: 2025-06-06

## 2025-06-05 RX ORDER — SODIUM CHLORIDE 9 MG/ML
20 INJECTION, SOLUTION INTRAVENOUS ONCE
Status: COMPLETED | OUTPATIENT
Start: 2025-06-05 | End: 2025-06-05

## 2025-06-05 RX ADMIN — SODIUM CHLORIDE 20 ML/HR: 9 INJECTION, SOLUTION INTRAVENOUS at 11:31

## 2025-06-05 RX ADMIN — CEFTRIAXONE 2000 MG: 2 INJECTION, SOLUTION INTRAVENOUS at 11:31

## 2025-06-05 NOTE — PLAN OF CARE
Problem: Potential for Falls  Goal: Patient will remain free of falls  Description: INTERVENTIONS:  - Educate patient/family on patient safety including physical limitations  - Instruct patient to call for assistance with activity   - Keep care items and personal belongings within reach  - Initiate and maintain comfort rounds  Outcome: Progressing

## 2025-06-05 NOTE — PROGRESS NOTES
Joel Wyman  tolerated treatment well with no complications.      Joel Wyman is aware of future appt on 6/6 at 1030.     AVS printed and given to Joel Wyman:    No (Declined by Joel Wyman)

## 2025-06-06 ENCOUNTER — HOSPITAL ENCOUNTER (OUTPATIENT)
Dept: INFUSION CENTER | Facility: HOSPITAL | Age: 80
End: 2025-06-06
Attending: INTERNAL MEDICINE
Payer: COMMERCIAL

## 2025-06-06 VITALS
HEART RATE: 85 BPM | DIASTOLIC BLOOD PRESSURE: 67 MMHG | SYSTOLIC BLOOD PRESSURE: 133 MMHG | OXYGEN SATURATION: 98 % | RESPIRATION RATE: 18 BRPM | TEMPERATURE: 97.7 F

## 2025-06-06 DIAGNOSIS — R78.81 BACTEREMIA: Primary | ICD-10-CM

## 2025-06-06 PROCEDURE — 96365 THER/PROPH/DIAG IV INF INIT: CPT

## 2025-06-06 RX ORDER — CEFTRIAXONE 2 G/50ML
2000 INJECTION, SOLUTION INTRAVENOUS ONCE
Status: COMPLETED | OUTPATIENT
Start: 2025-06-06 | End: 2025-06-06

## 2025-06-06 RX ORDER — SODIUM CHLORIDE 9 MG/ML
20 INJECTION, SOLUTION INTRAVENOUS ONCE
Status: CANCELLED | OUTPATIENT
Start: 2025-06-07

## 2025-06-06 RX ORDER — SODIUM CHLORIDE 9 MG/ML
20 INJECTION, SOLUTION INTRAVENOUS ONCE
Status: COMPLETED | OUTPATIENT
Start: 2025-06-06 | End: 2025-06-06

## 2025-06-06 RX ORDER — CEFTRIAXONE 2 G/50ML
2000 INJECTION, SOLUTION INTRAVENOUS ONCE
Status: CANCELLED | OUTPATIENT
Start: 2025-06-07

## 2025-06-06 RX ADMIN — SODIUM CHLORIDE 20 ML/HR: 0.9 INJECTION, SOLUTION INTRAVENOUS at 10:26

## 2025-06-06 RX ADMIN — CEFTRIAXONE 2000 MG: 2 INJECTION, SOLUTION INTRAVENOUS at 10:27

## 2025-06-06 NOTE — PLAN OF CARE
Problem: Potential for Falls  Goal: Patient will remain free of falls  Description: INTERVENTIONS:  - Educate patient/family on patient safety including physical limitations  - Instruct patient to call for assistance with activity   - Keep Call bell within reach  - Keep care items and personal belongings within reach    6/6/2025 1102 by Miriam Lomas RN  Outcome: Progressing

## 2025-06-06 NOTE — PROGRESS NOTES
Joel Wyman  tolerated treatment well with no complications.      Joel Wyman is aware of future appt on 6/7 at 1230.     AVS printed and given to Joel Wyman:  Declined by Joel Wyman

## 2025-06-06 NOTE — PLAN OF CARE
Problem: Potential for Falls  Goal: Patient will remain free of falls  Description: INTERVENTIONS:  - Educate patient/family on patient safety including physical limitations  - Instruct patient to call for assistance with activity   - Keep Call bell within reach  - Keep care items and personal belongings within reach  - Initiate and maintain comfort rounds    6/6/2025 1036 by Miriam Lomas RN  Outcome: Progressing

## 2025-06-07 ENCOUNTER — HOSPITAL ENCOUNTER (OUTPATIENT)
Dept: INFUSION CENTER | Facility: HOSPITAL | Age: 80
Discharge: HOME | End: 2025-06-07
Attending: INTERNAL MEDICINE
Payer: COMMERCIAL

## 2025-06-07 DIAGNOSIS — R78.81 BACTEREMIA: Primary | ICD-10-CM

## 2025-06-07 PROCEDURE — 96365 THER/PROPH/DIAG IV INF INIT: CPT

## 2025-06-07 RX ORDER — CEFTRIAXONE 2 G/50ML
2000 INJECTION, SOLUTION INTRAVENOUS ONCE
Status: COMPLETED | OUTPATIENT
Start: 2025-06-07 | End: 2025-06-07

## 2025-06-07 RX ORDER — CEFTRIAXONE 2 G/50ML
2000 INJECTION, SOLUTION INTRAVENOUS ONCE
Status: CANCELLED | OUTPATIENT
Start: 2025-06-08

## 2025-06-07 RX ORDER — SODIUM CHLORIDE 9 MG/ML
20 INJECTION, SOLUTION INTRAVENOUS ONCE
Status: COMPLETED | OUTPATIENT
Start: 2025-06-07 | End: 2025-06-07

## 2025-06-07 RX ORDER — SODIUM CHLORIDE 9 MG/ML
20 INJECTION, SOLUTION INTRAVENOUS ONCE
Status: CANCELLED | OUTPATIENT
Start: 2025-06-08

## 2025-06-07 RX ADMIN — SODIUM CHLORIDE 20 ML/HR: 0.9 INJECTION, SOLUTION INTRAVENOUS at 12:38

## 2025-06-07 RX ADMIN — CEFTRIAXONE 2000 MG: 2 INJECTION, SOLUTION INTRAVENOUS at 12:39

## 2025-06-07 NOTE — PROGRESS NOTES
Joel Wyman  tolerated treatment well with no complications.      Joel Wyman is aware of future appt on 6/8 at 1230.     AVS printed and given to Joel Wyman:  No (Declined by Joel Wyman)

## 2025-06-08 ENCOUNTER — HOSPITAL ENCOUNTER (OUTPATIENT)
Dept: INFUSION CENTER | Facility: HOSPITAL | Age: 80
Discharge: HOME | End: 2025-06-08
Attending: INTERNAL MEDICINE
Payer: COMMERCIAL

## 2025-06-08 DIAGNOSIS — R78.81 BACTEREMIA: Primary | ICD-10-CM

## 2025-06-08 PROCEDURE — 96365 THER/PROPH/DIAG IV INF INIT: CPT

## 2025-06-08 RX ORDER — CEFTRIAXONE 2 G/50ML
2000 INJECTION, SOLUTION INTRAVENOUS ONCE
Status: COMPLETED | OUTPATIENT
Start: 2025-06-08 | End: 2025-06-08

## 2025-06-08 RX ORDER — SODIUM CHLORIDE 9 MG/ML
20 INJECTION, SOLUTION INTRAVENOUS ONCE
Status: CANCELLED | OUTPATIENT
Start: 2025-06-09

## 2025-06-08 RX ORDER — SODIUM CHLORIDE 9 MG/ML
20 INJECTION, SOLUTION INTRAVENOUS ONCE
Status: COMPLETED | OUTPATIENT
Start: 2025-06-08 | End: 2025-06-08

## 2025-06-08 RX ORDER — CEFTRIAXONE 2 G/50ML
2000 INJECTION, SOLUTION INTRAVENOUS ONCE
Status: CANCELLED | OUTPATIENT
Start: 2025-06-09

## 2025-06-08 RX ADMIN — CEFTRIAXONE 2000 MG: 2 INJECTION, SOLUTION INTRAVENOUS at 12:22

## 2025-06-08 RX ADMIN — SODIUM CHLORIDE 20 ML/HR: 0.9 INJECTION, SOLUTION INTRAVENOUS at 12:22

## 2025-06-08 NOTE — PROGRESS NOTES
Joel Wyman  tolerated treatment well with no complications.      Joel Wyman is aware of future appt on 6/9 at 1000.     AVS printed and given to Joel Wyman:   No (Declined by Joel Wyman)

## 2025-06-09 ENCOUNTER — APPOINTMENT (OUTPATIENT)
Dept: LAB | Facility: HOSPITAL | Age: 80
End: 2025-06-09
Attending: FAMILY MEDICINE
Payer: COMMERCIAL

## 2025-06-09 ENCOUNTER — RESULTS FOLLOW-UP (OUTPATIENT)
Dept: FAMILY MEDICINE CLINIC | Facility: CLINIC | Age: 80
End: 2025-06-09

## 2025-06-09 ENCOUNTER — HOSPITAL ENCOUNTER (OUTPATIENT)
Dept: INFUSION CENTER | Facility: HOSPITAL | Age: 80
Discharge: HOME | End: 2025-06-09
Attending: INTERNAL MEDICINE
Payer: COMMERCIAL

## 2025-06-09 VITALS
SYSTOLIC BLOOD PRESSURE: 146 MMHG | HEART RATE: 78 BPM | DIASTOLIC BLOOD PRESSURE: 73 MMHG | OXYGEN SATURATION: 96 % | TEMPERATURE: 97.9 F | RESPIRATION RATE: 18 BRPM

## 2025-06-09 DIAGNOSIS — E87.5 HYPERPOTASSEMIA: ICD-10-CM

## 2025-06-09 DIAGNOSIS — E78.5 HYPERLIPIDEMIA, UNSPECIFIED HYPERLIPIDEMIA TYPE: ICD-10-CM

## 2025-06-09 DIAGNOSIS — E13.42 DIABETIC POLYNEUROPATHY ASSOCIATED WITH OTHER SPECIFIED DIABETES MELLITUS (HCC): ICD-10-CM

## 2025-06-09 DIAGNOSIS — R78.81 BACTEREMIA: ICD-10-CM

## 2025-06-09 DIAGNOSIS — I48.91 ATRIAL FIBRILLATION, UNSPECIFIED TYPE (HCC): Primary | ICD-10-CM

## 2025-06-09 DIAGNOSIS — C61 PROSTATE CANCER (HCC): ICD-10-CM

## 2025-06-09 LAB
BASOPHILS # BLD AUTO: 0.05 THOUSANDS/ÂΜL (ref 0–0.1)
BASOPHILS NFR BLD AUTO: 1 % (ref 0–1)
EOSINOPHIL # BLD AUTO: 0.45 THOUSAND/ÂΜL (ref 0–0.61)
EOSINOPHIL NFR BLD AUTO: 5 % (ref 0–6)
ERYTHROCYTE [DISTWIDTH] IN BLOOD BY AUTOMATED COUNT: 15.8 % (ref 11.6–15.1)
HCT VFR BLD AUTO: 37.2 % (ref 36.5–49.3)
HGB BLD-MCNC: 11.3 G/DL (ref 12–17)
IMM GRANULOCYTES # BLD AUTO: 0.04 THOUSAND/UL (ref 0–0.2)
IMM GRANULOCYTES NFR BLD AUTO: 1 % (ref 0–2)
LYMPHOCYTES # BLD AUTO: 1.36 THOUSANDS/ÂΜL (ref 0.6–4.47)
LYMPHOCYTES NFR BLD AUTO: 16 % (ref 14–44)
MCH RBC QN AUTO: 28 PG (ref 26.8–34.3)
MCHC RBC AUTO-ENTMCNC: 30.4 G/DL (ref 31.4–37.4)
MCV RBC AUTO: 92 FL (ref 82–98)
MONOCYTES # BLD AUTO: 0.77 THOUSAND/ÂΜL (ref 0.17–1.22)
MONOCYTES NFR BLD AUTO: 9 % (ref 4–12)
NEUTROPHILS # BLD AUTO: 5.66 THOUSANDS/ÂΜL (ref 1.85–7.62)
NEUTS SEG NFR BLD AUTO: 68 % (ref 43–75)
NRBC BLD AUTO-RTO: 0 /100 WBCS
PLATELET # BLD AUTO: 209 THOUSANDS/UL (ref 149–390)
PMV BLD AUTO: 10.1 FL (ref 8.9–12.7)
PSA SERPL-MCNC: <0.008 NG/ML (ref 0–4)
RBC # BLD AUTO: 4.04 MILLION/UL (ref 3.88–5.62)
WBC # BLD AUTO: 8.33 THOUSAND/UL (ref 4.31–10.16)

## 2025-06-09 PROCEDURE — 36415 COLL VENOUS BLD VENIPUNCTURE: CPT

## 2025-06-09 PROCEDURE — 96365 THER/PROPH/DIAG IV INF INIT: CPT

## 2025-06-09 PROCEDURE — 85025 COMPLETE CBC W/AUTO DIFF WBC: CPT

## 2025-06-09 PROCEDURE — 84153 ASSAY OF PSA TOTAL: CPT

## 2025-06-09 RX ORDER — CEFTRIAXONE 2 G/50ML
2000 INJECTION, SOLUTION INTRAVENOUS ONCE
Status: DISCONTINUED | OUTPATIENT
Start: 2025-06-09 | End: 2025-06-09

## 2025-06-09 RX ORDER — SODIUM CHLORIDE 9 MG/ML
20 INJECTION, SOLUTION INTRAVENOUS ONCE
Status: DISCONTINUED | OUTPATIENT
Start: 2025-06-09 | End: 2025-06-17 | Stop reason: HOSPADM

## 2025-06-09 RX ORDER — CEFTRIAXONE 2 G/50ML
2000 INJECTION, SOLUTION INTRAVENOUS ONCE
Status: COMPLETED | OUTPATIENT
Start: 2025-06-09 | End: 2025-06-09

## 2025-06-09 RX ORDER — SODIUM CHLORIDE 9 MG/ML
20 INJECTION, SOLUTION INTRAVENOUS ONCE
Status: CANCELLED | OUTPATIENT
Start: 2025-06-10

## 2025-06-09 RX ORDER — CEFTRIAXONE 2 G/50ML
2000 INJECTION, SOLUTION INTRAVENOUS ONCE
Status: CANCELLED | OUTPATIENT
Start: 2025-06-10

## 2025-06-09 RX ADMIN — CEFTRIAXONE 2000 MG: 2 INJECTION, SOLUTION INTRAVENOUS at 10:15

## 2025-06-09 NOTE — PROGRESS NOTES
Joel Wyman  tolerated treatment well with no complications.      Joel Wyman is aware of future appt on 6/10 at 1100.     AVS printed and given to Joel Wyman:  No (Declined by Joel Wyman)

## 2025-06-10 ENCOUNTER — HOSPITAL ENCOUNTER (OUTPATIENT)
Dept: INFUSION CENTER | Facility: HOSPITAL | Age: 80
Discharge: HOME | End: 2025-06-10
Attending: INTERNAL MEDICINE
Payer: COMMERCIAL

## 2025-06-10 VITALS
SYSTOLIC BLOOD PRESSURE: 126 MMHG | HEART RATE: 77 BPM | OXYGEN SATURATION: 98 % | DIASTOLIC BLOOD PRESSURE: 63 MMHG | TEMPERATURE: 98.2 F | RESPIRATION RATE: 18 BRPM

## 2025-06-10 DIAGNOSIS — R78.81 BACTEREMIA: Primary | ICD-10-CM

## 2025-06-10 PROCEDURE — 96365 THER/PROPH/DIAG IV INF INIT: CPT

## 2025-06-10 RX ORDER — SODIUM CHLORIDE 9 MG/ML
20 INJECTION, SOLUTION INTRAVENOUS ONCE
Status: COMPLETED | OUTPATIENT
Start: 2025-06-10 | End: 2025-06-10

## 2025-06-10 RX ORDER — SODIUM CHLORIDE 9 MG/ML
20 INJECTION, SOLUTION INTRAVENOUS ONCE
Status: CANCELLED | OUTPATIENT
Start: 2025-06-11

## 2025-06-10 RX ORDER — CEFTRIAXONE 2 G/50ML
2000 INJECTION, SOLUTION INTRAVENOUS ONCE
Status: COMPLETED | OUTPATIENT
Start: 2025-06-10 | End: 2025-06-10

## 2025-06-10 RX ORDER — CEFTRIAXONE 2 G/50ML
2000 INJECTION, SOLUTION INTRAVENOUS ONCE
Status: CANCELLED | OUTPATIENT
Start: 2025-06-11

## 2025-06-10 RX ADMIN — CEFTRIAXONE 2000 MG: 2 INJECTION, SOLUTION INTRAVENOUS at 10:58

## 2025-06-10 RX ADMIN — SODIUM CHLORIDE 20 ML/HR: 0.9 INJECTION, SOLUTION INTRAVENOUS at 10:58

## 2025-06-10 NOTE — PROGRESS NOTES
Joel Wyman  tolerated treatment well with no complications.      Joel Wyman is aware of future appt on 06/11 at 1230.     AVS printed and given to Joel Wyman:  No (Declined by Joel Wyman)

## 2025-06-11 ENCOUNTER — HOSPITAL ENCOUNTER (OUTPATIENT)
Dept: INFUSION CENTER | Facility: HOSPITAL | Age: 80
Discharge: HOME | End: 2025-06-11
Attending: INTERNAL MEDICINE
Payer: COMMERCIAL

## 2025-06-11 VITALS
SYSTOLIC BLOOD PRESSURE: 154 MMHG | HEART RATE: 78 BPM | RESPIRATION RATE: 18 BRPM | OXYGEN SATURATION: 95 % | DIASTOLIC BLOOD PRESSURE: 72 MMHG | TEMPERATURE: 97.8 F

## 2025-06-11 DIAGNOSIS — R78.81 BACTEREMIA: Primary | ICD-10-CM

## 2025-06-11 LAB
ALBUMIN SERPL BCG-MCNC: 4 G/DL (ref 3.5–5)
ALP SERPL-CCNC: 85 U/L (ref 34–104)
ALT SERPL W P-5'-P-CCNC: 13 U/L (ref 7–52)
ANION GAP SERPL CALCULATED.3IONS-SCNC: 6 MMOL/L (ref 4–13)
AST SERPL W P-5'-P-CCNC: 17 U/L (ref 13–39)
BASOPHILS # BLD AUTO: 0.04 THOUSANDS/ÂΜL (ref 0–0.1)
BASOPHILS NFR BLD AUTO: 0 % (ref 0–1)
BILIRUB SERPL-MCNC: 0.33 MG/DL (ref 0.2–1)
BUN SERPL-MCNC: 45 MG/DL (ref 5–25)
CALCIUM SERPL-MCNC: 9.5 MG/DL (ref 8.4–10.2)
CHLORIDE SERPL-SCNC: 107 MMOL/L (ref 96–108)
CO2 SERPL-SCNC: 26 MMOL/L (ref 21–32)
CREAT SERPL-MCNC: 1.64 MG/DL (ref 0.6–1.3)
EOSINOPHIL # BLD AUTO: 0.43 THOUSAND/ÂΜL (ref 0–0.61)
EOSINOPHIL NFR BLD AUTO: 5 % (ref 0–6)
ERYTHROCYTE [DISTWIDTH] IN BLOOD BY AUTOMATED COUNT: 15.9 % (ref 11.6–15.1)
GFR SERPL CREATININE-BSD FRML MDRD: 38 ML/MIN/1.73SQ M
GLUCOSE SERPL-MCNC: 111 MG/DL (ref 65–140)
HCT VFR BLD AUTO: 36.3 % (ref 36.5–49.3)
HGB BLD-MCNC: 11.2 G/DL (ref 12–17)
IMM GRANULOCYTES # BLD AUTO: 0.06 THOUSAND/UL (ref 0–0.2)
IMM GRANULOCYTES NFR BLD AUTO: 1 % (ref 0–2)
LYMPHOCYTES # BLD AUTO: 1.41 THOUSANDS/ÂΜL (ref 0.6–4.47)
LYMPHOCYTES NFR BLD AUTO: 16 % (ref 14–44)
MCH RBC QN AUTO: 28.1 PG (ref 26.8–34.3)
MCHC RBC AUTO-ENTMCNC: 30.9 G/DL (ref 31.4–37.4)
MCV RBC AUTO: 91 FL (ref 82–98)
MONOCYTES # BLD AUTO: 0.88 THOUSAND/ÂΜL (ref 0.17–1.22)
MONOCYTES NFR BLD AUTO: 10 % (ref 4–12)
NEUTROPHILS # BLD AUTO: 6.25 THOUSANDS/ÂΜL (ref 1.85–7.62)
NEUTS SEG NFR BLD AUTO: 68 % (ref 43–75)
NRBC BLD AUTO-RTO: 0 /100 WBCS
PLATELET # BLD AUTO: 203 THOUSANDS/UL (ref 149–390)
PMV BLD AUTO: 10 FL (ref 8.9–12.7)
POTASSIUM SERPL-SCNC: 5 MMOL/L (ref 3.5–5.3)
PROT SERPL-MCNC: 7.5 G/DL (ref 6.4–8.4)
RBC # BLD AUTO: 3.98 MILLION/UL (ref 3.88–5.62)
SODIUM SERPL-SCNC: 139 MMOL/L (ref 135–147)
WBC # BLD AUTO: 9.07 THOUSAND/UL (ref 4.31–10.16)

## 2025-06-11 PROCEDURE — 85025 COMPLETE CBC W/AUTO DIFF WBC: CPT | Performed by: INTERNAL MEDICINE

## 2025-06-11 PROCEDURE — 96365 THER/PROPH/DIAG IV INF INIT: CPT

## 2025-06-11 PROCEDURE — 80053 COMPREHEN METABOLIC PANEL: CPT | Performed by: INTERNAL MEDICINE

## 2025-06-11 RX ORDER — CEFTRIAXONE 2 G/50ML
2000 INJECTION, SOLUTION INTRAVENOUS ONCE
Status: COMPLETED | OUTPATIENT
Start: 2025-06-11 | End: 2025-06-11

## 2025-06-11 RX ORDER — SODIUM CHLORIDE 9 MG/ML
20 INJECTION, SOLUTION INTRAVENOUS ONCE
Status: COMPLETED | OUTPATIENT
Start: 2025-06-11 | End: 2025-06-11

## 2025-06-11 RX ORDER — SODIUM CHLORIDE 9 MG/ML
20 INJECTION, SOLUTION INTRAVENOUS ONCE
Status: CANCELLED | OUTPATIENT
Start: 2025-06-12

## 2025-06-11 RX ORDER — CEFTRIAXONE 2 G/50ML
2000 INJECTION, SOLUTION INTRAVENOUS ONCE
Status: CANCELLED | OUTPATIENT
Start: 2025-06-12

## 2025-06-11 RX ADMIN — SODIUM CHLORIDE 20 ML/HR: 0.9 INJECTION, SOLUTION INTRAVENOUS at 12:29

## 2025-06-11 RX ADMIN — CEFTRIAXONE 2000 MG: 2 INJECTION, SOLUTION INTRAVENOUS at 12:29

## 2025-06-11 NOTE — PROGRESS NOTES
Joel Wyman  tolerated treatment well with no complications.      Joel Wyman is aware of future appt on 6/12 at 1000.     AVS printed and given to Joel Wyman:   No (Declined by Joel Wyman)

## 2025-06-12 ENCOUNTER — HOSPITAL ENCOUNTER (OUTPATIENT)
Dept: INFUSION CENTER | Facility: HOSPITAL | Age: 80
Discharge: HOME | End: 2025-06-12
Attending: INTERNAL MEDICINE
Payer: COMMERCIAL

## 2025-06-12 VITALS
DIASTOLIC BLOOD PRESSURE: 69 MMHG | OXYGEN SATURATION: 95 % | TEMPERATURE: 97.8 F | HEART RATE: 76 BPM | SYSTOLIC BLOOD PRESSURE: 140 MMHG | RESPIRATION RATE: 18 BRPM

## 2025-06-12 DIAGNOSIS — R78.81 BACTEREMIA: Primary | ICD-10-CM

## 2025-06-12 PROCEDURE — 96365 THER/PROPH/DIAG IV INF INIT: CPT

## 2025-06-12 RX ORDER — SODIUM CHLORIDE 9 MG/ML
20 INJECTION, SOLUTION INTRAVENOUS ONCE
Status: COMPLETED | OUTPATIENT
Start: 2025-06-12 | End: 2025-06-12

## 2025-06-12 RX ORDER — CEFTRIAXONE 2 G/50ML
2000 INJECTION, SOLUTION INTRAVENOUS ONCE
Status: COMPLETED | OUTPATIENT
Start: 2025-06-12 | End: 2025-06-12

## 2025-06-12 RX ORDER — SODIUM CHLORIDE 9 MG/ML
20 INJECTION, SOLUTION INTRAVENOUS ONCE
Status: CANCELLED | OUTPATIENT
Start: 2025-06-13

## 2025-06-12 RX ORDER — CEFTRIAXONE 2 G/50ML
2000 INJECTION, SOLUTION INTRAVENOUS ONCE
Status: CANCELLED | OUTPATIENT
Start: 2025-06-13

## 2025-06-12 RX ADMIN — CEFTRIAXONE 2000 MG: 2 INJECTION, SOLUTION INTRAVENOUS at 10:05

## 2025-06-12 RX ADMIN — SODIUM CHLORIDE 20 ML/HR: 9 INJECTION, SOLUTION INTRAVENOUS at 10:04

## 2025-06-12 NOTE — PROGRESS NOTES
Joel Wyman  tolerated treatment well with no complications.      Joel Wyman is aware of future appt on 6/13/25.     AVS printed and given to Joel Wyman:    No (Declined by Joel Wyman)

## 2025-06-13 ENCOUNTER — HOSPITAL ENCOUNTER (OUTPATIENT)
Dept: INFUSION CENTER | Facility: HOSPITAL | Age: 80
End: 2025-06-13
Attending: INTERNAL MEDICINE
Payer: COMMERCIAL

## 2025-06-13 VITALS
RESPIRATION RATE: 18 BRPM | DIASTOLIC BLOOD PRESSURE: 86 MMHG | SYSTOLIC BLOOD PRESSURE: 149 MMHG | HEART RATE: 75 BPM | TEMPERATURE: 98.5 F | OXYGEN SATURATION: 93 %

## 2025-06-13 DIAGNOSIS — R78.81 BACTEREMIA: Primary | ICD-10-CM

## 2025-06-13 PROCEDURE — 96365 THER/PROPH/DIAG IV INF INIT: CPT

## 2025-06-13 RX ORDER — CEFTRIAXONE 2 G/50ML
2000 INJECTION, SOLUTION INTRAVENOUS ONCE
Status: CANCELLED | OUTPATIENT
Start: 2025-06-14

## 2025-06-13 RX ORDER — SODIUM CHLORIDE 9 MG/ML
20 INJECTION, SOLUTION INTRAVENOUS ONCE
Status: CANCELLED | OUTPATIENT
Start: 2025-06-14

## 2025-06-13 RX ORDER — SODIUM CHLORIDE 9 MG/ML
20 INJECTION, SOLUTION INTRAVENOUS ONCE
Status: COMPLETED | OUTPATIENT
Start: 2025-06-13 | End: 2025-06-13

## 2025-06-13 RX ORDER — CEFTRIAXONE 2 G/50ML
2000 INJECTION, SOLUTION INTRAVENOUS ONCE
Status: COMPLETED | OUTPATIENT
Start: 2025-06-13 | End: 2025-06-13

## 2025-06-13 RX ADMIN — CEFTRIAXONE 2000 MG: 2 INJECTION, SOLUTION INTRAVENOUS at 10:05

## 2025-06-13 RX ADMIN — SODIUM CHLORIDE 20 ML/HR: 9 INJECTION, SOLUTION INTRAVENOUS at 10:00

## 2025-06-13 NOTE — PROGRESS NOTES
Joel Wyman  tolerated treatment well with no complications.      Joel Wyman is aware of future appt on 6/14 @ 5953.    AVS printed and given to Joel Wyman:   No (Declined by Joel Wyman)

## 2025-06-14 ENCOUNTER — HOSPITAL ENCOUNTER (OUTPATIENT)
Dept: INFUSION CENTER | Facility: HOSPITAL | Age: 80
Discharge: HOME | End: 2025-06-14
Attending: INTERNAL MEDICINE
Payer: COMMERCIAL

## 2025-06-14 VITALS
RESPIRATION RATE: 18 BRPM | HEART RATE: 77 BPM | TEMPERATURE: 97.3 F | SYSTOLIC BLOOD PRESSURE: 132 MMHG | DIASTOLIC BLOOD PRESSURE: 73 MMHG

## 2025-06-14 DIAGNOSIS — R78.81 BACTEREMIA: Primary | ICD-10-CM

## 2025-06-14 PROCEDURE — 96365 THER/PROPH/DIAG IV INF INIT: CPT

## 2025-06-14 RX ORDER — CEFTRIAXONE 2 G/50ML
2000 INJECTION, SOLUTION INTRAVENOUS ONCE
Status: COMPLETED | OUTPATIENT
Start: 2025-06-14 | End: 2025-06-14

## 2025-06-14 RX ORDER — CEFTRIAXONE 2 G/50ML
2000 INJECTION, SOLUTION INTRAVENOUS ONCE
Status: CANCELLED | OUTPATIENT
Start: 2025-06-14

## 2025-06-14 RX ORDER — SODIUM CHLORIDE 9 MG/ML
20 INJECTION, SOLUTION INTRAVENOUS ONCE
Status: CANCELLED | OUTPATIENT
Start: 2025-06-14

## 2025-06-14 RX ORDER — SODIUM CHLORIDE 9 MG/ML
20 INJECTION, SOLUTION INTRAVENOUS ONCE
Status: COMPLETED | OUTPATIENT
Start: 2025-06-14 | End: 2025-06-14

## 2025-06-14 RX ADMIN — CEFTRIAXONE 2000 MG: 2 INJECTION, SOLUTION INTRAVENOUS at 12:23

## 2025-06-14 RX ADMIN — SODIUM CHLORIDE 20 ML/HR: 0.9 INJECTION, SOLUTION INTRAVENOUS at 12:22

## 2025-06-14 NOTE — PROGRESS NOTES
Joel Wyman  tolerated treatment well with no complications.      Joel Wyman is aware of future appt on 6/16 at 1115 to have central line removed.     AVS printed and given to Joel Wyman:    No (Declined by Joel Wyman)

## 2025-06-16 ENCOUNTER — HOSPITAL ENCOUNTER (OUTPATIENT)
Dept: NON INVASIVE DIAGNOSTICS | Facility: HOSPITAL | Age: 80
Discharge: HOME | End: 2025-06-16
Attending: RADIOLOGY

## 2025-06-16 DIAGNOSIS — R78.81 BACTEREMIA: ICD-10-CM

## 2025-06-16 NOTE — DISCHARGE INSTRUCTIONS
Tunneled cath Removal   WHAT YOU NEED TO KNOW:   A tunneled cath is a catheter placed through a vein into or near your right atrium. Your right atrium is the right upper chamber of your heart. A tunneled-cath is used for IV access for medication.    DISCHARGE INSTRUCTIONS:   Call 911 for any of the following:   You feel lightheaded, short of breath, and have chest pain.    You start bleeding    Contact Interventional Radiology at [phone] ([facility] PATIENTS: Contact Interventional Radiology at [phone]) ([facility] PATIENTS: Contact Interventional Radiology at [phone]) if:  Blood soaks through your bandage.   You have new swelling in your arm, neck, face, or chest on your right side.  Your bruises or pain get worse.   You have a fever or chills.  Persistent nausea or vomiting.   Your incision is red, swollen, or draining pus.   You have questions or concerns about your condition or care.  Self-care:   Resume your normal diet. Small sips of flat soda will help with nausea.  Keep your dressings dry. Do not get your site wet until the incision closes.  You may take a tub bath, but do not get your dressings wet. Water in your wound can cause bacteria to grow and cause an infection. If your dressing gets wet, remove the wet dressing and apply a dry bandaid. Keep it covered until the incision closes. This should only take a few days to heal. Do not use soaps or ointments.  Immediately after your catheter is removed, no strenuous activity for twenty four hours and stay in an upright sitting position for two hours.   Follow up with your healthcare provider as directed: Write down your questions so you remember to ask them during your visits.

## 2025-06-16 NOTE — SEDATION DOCUMENTATION
Pt arrived to holding area for tunneled line removal in no apparent distress. Tolerated well. Tunneled line removed without complication. Manual pressure held until hemostasis obtained. Bandage placed to site. Pt educated and discharged to home.

## 2025-06-18 ENCOUNTER — TRANSCRIBE ORDERS (OUTPATIENT)
Dept: RADIOLOGY | Facility: HOSPITAL | Age: 80
End: 2025-06-18

## 2025-06-18 DIAGNOSIS — C67.9 MALIGNANT NEOPLASM OF URINARY BLADDER, UNSPECIFIED SITE (HCC): ICD-10-CM

## 2025-06-18 DIAGNOSIS — C61 MALIGNANT NEOPLASM OF PROSTATE (HCC): Primary | ICD-10-CM

## 2025-06-18 PROBLEM — A41.9 SEPSIS (HCC): Status: RESOLVED | Noted: 2021-08-19 | Resolved: 2025-06-18

## 2025-06-18 PROBLEM — N11.1 OBSTRUCTIVE PYELONEPHRITIS: Status: RESOLVED | Noted: 2021-04-24 | Resolved: 2025-06-18

## 2025-06-19 ENCOUNTER — PREP FOR PROCEDURE (OUTPATIENT)
Dept: INTERVENTIONAL RADIOLOGY/VASCULAR | Facility: CLINIC | Age: 80
End: 2025-06-19

## 2025-06-19 DIAGNOSIS — N30.40 RADIATION CYSTITIS: Primary | ICD-10-CM

## 2025-06-25 ENCOUNTER — OFFICE VISIT (OUTPATIENT)
Dept: NEPHROLOGY | Facility: CLINIC | Age: 80
End: 2025-06-25
Payer: COMMERCIAL

## 2025-06-25 VITALS
BODY MASS INDEX: 29.63 KG/M2 | DIASTOLIC BLOOD PRESSURE: 58 MMHG | OXYGEN SATURATION: 91 % | HEART RATE: 64 BPM | HEIGHT: 70 IN | SYSTOLIC BLOOD PRESSURE: 110 MMHG | WEIGHT: 207 LBS

## 2025-06-25 DIAGNOSIS — C61 PROSTATE CANCER (HCC): ICD-10-CM

## 2025-06-25 DIAGNOSIS — I48.91 ATRIAL FIBRILLATION, UNSPECIFIED TYPE (HCC): ICD-10-CM

## 2025-06-25 DIAGNOSIS — J43.9 PULMONARY EMPHYSEMA, UNSPECIFIED EMPHYSEMA TYPE (HCC): ICD-10-CM

## 2025-06-25 DIAGNOSIS — N18.32 ACUTE KIDNEY INJURY SUPERIMPOSED ON STAGE 3B CHRONIC KIDNEY DISEASE (HCC): ICD-10-CM

## 2025-06-25 DIAGNOSIS — I10 ESSENTIAL HYPERTENSION: ICD-10-CM

## 2025-06-25 DIAGNOSIS — I71.43 INFRARENAL ABDOMINAL AORTIC ANEURYSM (AAA) WITHOUT RUPTURE (HCC): Primary | ICD-10-CM

## 2025-06-25 DIAGNOSIS — N30.40 RADIATION CYSTITIS: ICD-10-CM

## 2025-06-25 DIAGNOSIS — Z09 HOSPITAL DISCHARGE FOLLOW-UP: ICD-10-CM

## 2025-06-25 DIAGNOSIS — E87.20 METABOLIC ACIDOSIS: ICD-10-CM

## 2025-06-25 DIAGNOSIS — I70.219 ATHEROSCLEROSIS OF NATIVE ARTERY OF LOWER EXTREMITY WITH INTERMITTENT CLAUDICATION, UNSPECIFIED LATERALITY (HCC): ICD-10-CM

## 2025-06-25 DIAGNOSIS — N17.9 ACUTE KIDNEY INJURY SUPERIMPOSED ON STAGE 3B CHRONIC KIDNEY DISEASE (HCC): ICD-10-CM

## 2025-06-25 PROCEDURE — 99214 OFFICE O/P EST MOD 30 MIN: CPT | Performed by: INTERNAL MEDICINE

## 2025-06-25 RX ORDER — MULTIVITAMIN
1 TABLET ORAL DAILY
COMMUNITY

## 2025-06-25 NOTE — ASSESSMENT & PLAN NOTE
Lab Results   Component Value Date    EGFR 38 06/11/2025    EGFR 38 06/09/2025    EGFR 37 06/04/2025    CREATININE 1.64 (H) 06/11/2025    CREATININE 1.64 (H) 06/09/2025    CREATININE 1.67 (H) 06/04/2025     Acute kidney injury--improved and stabilized  -- Most recent creatinine has improved to 1.6 mg/dL, discharge creatinine was 2.1 mg/dL  -- New baseline likely now high 1's, after this episode of SHANTI with prior CKD stage III  -- Right nephrostomy in place.  Following with urology.  -- Renal imaging had showed*: Calculus on the right pelvis with a mid right ureter stone with right hydronephrosis for which he subsequent right-sided hydronephrosis after placement of right PCN.  Patient also had urosepsis and hypotension when he developed the SHANTI  -- Continue current management and follow-up with urology  -- Follow-up in 4 months    Chronic kidney disease stage IIIB  -- Prior baseline creatinine 1.3 to 1.5 mg/dL after this degree of SHANTI patient's baseline creatinine now fluctuating mid to high 1's  -- Currently off RAAS blockade at this time  -- Blood pressure well-controlled with a wide pulse pressure  -- Continue good diabetic control  -- Presumed secondary to hypertension, diabetic kidney disease and age-related nephron loss    Orders:    Ambulatory Referral to Nephrology    Albumin / creatinine urine ratio; Future    Comprehensive metabolic panel; Future    UA (URINE) with reflex to Scope; Future    CBC and Platelet; Future

## 2025-06-25 NOTE — ASSESSMENT & PLAN NOTE
-- Resolved bicarbonate level normal at 26    Orders:    Albumin / creatinine urine ratio; Future    Comprehensive metabolic panel; Future    UA (URINE) with reflex to Scope; Future    CBC and Platelet; Future

## 2025-06-25 NOTE — PATIENT INSTRUCTIONS
1.)  Low 2 g sodium diet    2.)  Monitor weights at home    3.)  Avoid NSAIDs (ibuprofen, Motrin, Advil, Aleve, naproxen)    4.)  Monitor blood pressure at home, call if blood pressure greater than 150/90 persistently    5.) I will plan to discuss all results including blood work, and/or imaging at our next visit, unless there is an urgent indication, in which case I will call you earlier. If you have any questions or concerns about your results, please feel free to call our office.    6.)  Continue follow-up with urology

## 2025-06-25 NOTE — ASSESSMENT & PLAN NOTE
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  Found to have acute kidney injury and met sepsis criteria and was also hypotensive.  --Imaging revealed a right hydronephrosis underwent right nephrostomy tube placement.  --Patient was treated with bacteremia for IV antibiotics  --Renal function improved to 2.1 at discharge and continues to improve to 1.6 and likely is stabilized at this point  --Patient was seen by me in the hospital for the initial consultation    Orders:    Albumin / creatinine urine ratio; Future    Comprehensive metabolic panel; Future    UA (URINE) with reflex to Scope; Future    CBC and Platelet; Future

## 2025-06-25 NOTE — ASSESSMENT & PLAN NOTE
-- Off aspirin.  Off beta-blocker  --Follow-up with cardiology  --Off Eliquis  --Continue statin

## 2025-06-25 NOTE — ASSESSMENT & PLAN NOTE
-- Blood pressure under excellent control can monitor off antihypertensive agents at this time.  --Wide pulse pressure    Orders:    Albumin / creatinine urine ratio; Future    Comprehensive metabolic panel; Future    UA (URINE) with reflex to Scope; Future    CBC and Platelet; Future

## 2025-06-25 NOTE — ASSESSMENT & PLAN NOTE
-- Continue good blood pressure control blood pressure at target  --Avoid smoking  --Off metoprolol  --Continue Lipitor  --Continue good diabetic control    Orders:    Albumin / creatinine urine ratio; Future    Comprehensive metabolic panel; Future    UA (URINE) with reflex to Scope; Future    CBC and Platelet; Future

## 2025-06-25 NOTE — ASSESSMENT & PLAN NOTE
-- Management as per cardiology  --Currently off Eliquis and metoprolol    Orders:    Albumin / creatinine urine ratio; Future    Comprehensive metabolic panel; Future    UA (URINE) with reflex to Scope; Future    CBC and Platelet; Future

## 2025-06-25 NOTE — ASSESSMENT & PLAN NOTE
-- Right percutaneous nephrostomy tube in place    Orders:    Albumin / creatinine urine ratio; Future    Comprehensive metabolic panel; Future    UA (URINE) with reflex to Scope; Future    CBC and Platelet; Future

## 2025-06-25 NOTE — PROGRESS NOTES
Name: Joel Wyman      : 1945      MRN: [id number]  Encounter Provider: Jamie Farnsworth MD  Encounter Date: 2025   Encounter department: [facility]  :  Assessment & Plan  Acute kidney injury superimposed on stage 3b chronic kidney disease (HCC)  Lab Results   Component Value Date    EGFR 38 2025    EGFR 38 2025    EGFR 37 2025    CREATININE 1.64 (H) 2025    CREATININE 1.64 (H) 2025    CREATININE 1.67 (H) 2025     Acute kidney injury--improved and stabilized  -- Most recent creatinine has improved to 1.6 mg/dL, discharge creatinine was 2.1 mg/dL  -- New baseline likely now high 1's, after this episode of SHANTI with prior CKD stage III  -- Right nephrostomy in place.  Following with urology.  -- Renal imaging had showed*: Calculus on the right pelvis with a mid right ureter stone with right hydronephrosis for which he subsequent right-sided hydronephrosis after placement of right PCN.  Patient also had urosepsis and hypotension when he developed the SHANTI  -- Continue current management and follow-up with urology  -- Follow-up in 4 months    Chronic kidney disease stage IIIB  -- Prior baseline creatinine 1.3 to 1.5 mg/dL after this degree of SHANTI patient's baseline creatinine now fluctuating mid to high 1's  -- Currently off RAAS blockade at this time  -- Blood pressure well-controlled with a wide pulse pressure  -- Continue good diabetic control  -- Presumed secondary to hypertension, diabetic kidney disease and age-related nephron loss    Orders:    Ambulatory Referral to Nephrology    Albumin / creatinine urine ratio; Future    Comprehensive metabolic panel; Future    UA (URINE) with reflex to Scope; Future    CBC and Platelet; Future    Infrarenal abdominal aortic aneurysm (AAA) without rupture (HCC)  -- Continue good blood pressure control blood pressure at target  --Avoid smoking  --Off metoprolol  --Continue Lipitor  --Continue good  diabetic control    Orders:    Albumin / creatinine urine ratio; Future    Comprehensive metabolic panel; Future    UA (URINE) with reflex to Scope; Future    CBC and Platelet; Future    Essential hypertension  -- Blood pressure under excellent control can monitor off antihypertensive agents at this time.  --Wide pulse pressure    Orders:    Albumin / creatinine urine ratio; Future    Comprehensive metabolic panel; Future    UA (URINE) with reflex to Scope; Future    CBC and Platelet; Future    Atrial fibrillation, unspecified type (HCC)  -- Management as per cardiology  --Currently off Eliquis and metoprolol    Orders:    Albumin / creatinine urine ratio; Future    Comprehensive metabolic panel; Future    UA (URINE) with reflex to Scope; Future    CBC and Platelet; Future    Atherosclerosis of native artery of lower extremity with intermittent claudication, unspecified laterality (HCC)  -- Off aspirin.  Off beta-blocker  --Follow-up with cardiology  --Off Eliquis  --Continue statin         Pulmonary emphysema, unspecified emphysema type (HCC)  -- Avoid smoking  --Saturating well on room air         Prostate cancer (HCC) - s/p resection  -- Follow-up with urology  -- PSA undetectable       Radiation cystitis  -- Right percutaneous nephrostomy tube in place    Orders:    Albumin / creatinine urine ratio; Future    Comprehensive metabolic panel; Future    UA (URINE) with reflex to Scope; Future    CBC and Platelet; Future    Metabolic acidosis  -- Resolved bicarbonate level normal at 26    Orders:    Albumin / creatinine urine ratio; Future    Comprehensive metabolic panel; Future    UA (URINE) with reflex to Scope; Future    CBC and Platelet; Future    Hospital discharge follow-up  -- Patient had a history of CKD stage III had not seen a nephrologist in the past with prostate adenocarcinoma status post radiation therapy with 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  Found to have acute kidney injury and met sepsis criteria and was also hypotensive.  --Imaging revealed a right hydronephrosis underwent right nephrostomy tube placement.  --Patient was treated with bacteremia for IV antibiotics  --Renal function improved to 2.1 at discharge and continues to improve to 1.6 and likely is stabilized at this point  --Patient was seen by me in the hospital for the initial consultation    Orders:    Albumin / creatinine urine ratio; Future    Comprehensive metabolic panel; Future    UA (URINE) with reflex to Scope; Future    CBC and Platelet; Future        Patient Instructions   1.)  Low 2 g sodium diet    2.)  Monitor weights at home    3.)  Avoid NSAIDs (ibuprofen, Motrin, Advil, Aleve, naproxen)    4.)  Monitor blood pressure at home, call if blood pressure greater than 150/90 persistently    5.) I will plan to discuss all results including blood work, and/or imaging at our next visit, unless there is an urgent indication, in which case I will call you earlier. If you have any questions or concerns about your results, please feel free to call our office.    6.)  Continue follow-up with urology      It was a pleasure evaluating your patient in the office today. Thank you for allowing our team to participate in the care of  Joel Wyman. Please do not hesitate to contact our team if further issues/questions shall arise in the interim.     History of Present Illness   HPI  Joel Wyman is a 79 y.o. male who presents for hospital follow-up for acute kidney injury with underlying chronic kidney disease stage III.    Patient was seen by me in the hospital after the consultation was initially done on May 17 by my colleague Dr. Reyes.  Patient had CKD stage III baseline 1.3 to 1.5 mg/dL but had not seen a nephrologist prior.  Had longstanding history of hypertension diabetes atrial fibrillation.  COPD.  Hypotension and  "urosepsis, with polymicrobial bacteremia.  Patient remains on 4 weeks of total IV antibiotics until June 14 which is completed was on ceftriaxone 2 g every 24 hours.    No symptoms of sepsis at this time.    Renal function continues to improve postdischarge with a creatinine of 1.6 mg/dL recently.  Prior baseline was 1.3-1.5 likely establishing a new baseline.    Bicarb level improved to 26.  Sodium potassium are normal.  BUN 45 and stable.  CBC shows hemoglobin improving to above 11      History obtained from: patient and patient's Significant Other  Pertinent Medical History       Review of Systems   Constitutional:  Negative for activity change and fever.   Respiratory:  Negative for cough, chest tightness, shortness of breath and wheezing.    Cardiovascular:  Negative for chest pain and leg swelling.   Gastrointestinal:  Negative for abdominal pain, diarrhea, nausea and vomiting.   Endocrine: Negative for polyuria.   Genitourinary:  Negative for difficulty urinating, dysuria, flank pain, frequency and urgency.   Skin:  Negative for rash.   Neurological:  Negative for dizziness, syncope, light-headedness and headaches.     Medical History Reviewed by provider this encounter:  Meds     .       Medications Ordered Prior to Encounter[1]  Objective   /58 (BP Location: Left arm, Patient Position: Sitting, Cuff Size: Standard)   Pulse 64   Ht 5' 10\" (1.778 m)   Wt 93.9 kg (207 lb)   SpO2 91%   BMI 29.70 kg/m²      Physical Exam  Exam conducted with a chaperone present.   Constitutional:       General: He is not in acute distress.     Appearance: He is well-developed. He is not diaphoretic.   HENT:      Head: Normocephalic and atraumatic.     Eyes:      General: No scleral icterus.     Pupils: Pupils are equal, round, and reactive to light.       Cardiovascular:      Rate and Rhythm: Normal rate and regular rhythm.      Heart sounds: Normal heart sounds. No murmur heard.     No friction rub. No gallop. " "  Pulmonary:      Effort: Pulmonary effort is normal. No respiratory distress.      Breath sounds: Normal breath sounds. No wheezing or rales.   Chest:      Chest wall: No tenderness.   Abdominal:      General: Bowel sounds are normal. There is no distension.      Palpations: Abdomen is soft.      Tenderness: There is no abdominal tenderness. There is no rebound.     Musculoskeletal:         General: Normal range of motion.      Cervical back: Normal range of motion and neck supple.     Skin:     Findings: No rash.     Neurological:      Mental Status: He is alert and oriented to person, place, and time.           Laboratory Results:        Invalid input(s): \"ALBUMIN\"    Results for orders placed or performed during the hospital encounter of 06/11/25   CBC and differential   Result Value Ref Range    WBC 9.07 4.31 - 10.16 Thousand/uL    RBC 3.98 3.88 - 5.62 Million/uL    Hemoglobin 11.2 (L) 12.0 - 17.0 g/dL    Hematocrit 36.3 (L) 36.5 - 49.3 %    MCV 91 82 - 98 fL    MCH 28.1 26.8 - 34.3 pg    MCHC 30.9 (L) 31.4 - 37.4 g/dL    RDW 15.9 (H) 11.6 - 15.1 %    MPV 10.0 8.9 - 12.7 fL    Platelets 203 149 - 390 Thousands/uL    nRBC 0 /100 WBCs    Segmented % 68 43 - 75 %    Immature Grans % 1 0 - 2 %    Lymphocytes % 16 14 - 44 %    Monocytes % 10 4 - 12 %    Eosinophils Relative 5 0 - 6 %    Basophils Relative 0 0 - 1 %    Absolute Neutrophils 6.25 1.85 - 7.62 Thousands/µL    Absolute Immature Grans 0.06 0.00 - 0.20 Thousand/uL    Absolute Lymphocytes 1.41 0.60 - 4.47 Thousands/µL    Absolute Monocytes 0.88 0.17 - 1.22 Thousand/µL    Eosinophils Absolute 0.43 0.00 - 0.61 Thousand/µL    Basophils Absolute 0.04 0.00 - 0.10 Thousands/µL   Comprehensive metabolic panel   Result Value Ref Range    Sodium 139 135 - 147 mmol/L    Potassium 5.0 3.5 - 5.3 mmol/L    Chloride 107 96 - 108 mmol/L    CO2 26 21 - 32 mmol/L    ANION GAP 6 4 - 13 mmol/L    BUN 45 (H) 5 - 25 mg/dL    Creatinine 1.64 (H) 0.60 - 1.30 mg/dL    Glucose 111 " 65 - 140 mg/dL    Calcium 9.5 8.4 - 10.2 mg/dL    AST 17 13 - 39 U/L    ALT 13 7 - 52 U/L    Alkaline Phosphatase 85 34 - 104 U/L    Total Protein 7.5 6.4 - 8.4 g/dL    Albumin 4.0 3.5 - 5.0 g/dL    Total Bilirubin 0.33 0.20 - 1.00 mg/dL    eGFR 38 ml/min/1.73sq m       Administrative Statements   I have spent a total time of 25 minutes in caring for this patient on the day of the visit/encounter including Impressions, Counseling / Coordination of care, Documenting in the medical record, Reviewing/placing orders in the medical record (including tests, medications, and/or procedures), and Obtaining or reviewing history  .       [1]   Current Outpatient Medications on File Prior to Visit   Medication Sig Dispense Refill    albuterol (2.5 mg/3 mL) 0.083 % nebulizer solution Take 3 mL (2.5 mg total) by nebulization every 6 (six) hours as needed for wheezing or shortness of breath 180 mL 5    atorvastatin (LIPITOR) 20 mg tablet Take 20 mg by mouth in the morning.      fluticasone-umeclidinium-vilanterol (Trelegy Ellipta) 200-62.5-25 mcg/actuation AEPB inhaler Inhale 1 puff every morning 180 blister 5    gabapentin (NEURONTIN) 100 mg capsule Take 1 capsule (100 mg total) by mouth 3 (three) times a day 300 capsule 3    Multiple Vitamin (multivitamin) tablet Take 1 tablet by mouth daily      apixaban (ELIQUIS) 2.5 mg Take 1 tablet (2.5 mg total) by mouth 2 (two) times a day (Patient not taking: Reported on 6/25/2025) 180 tablet 0    aspirin (Ecotrin Low Strength) 81 mg EC tablet Take 1 tablet (81 mg total) by mouth daily (Patient not taking: Reported on 6/25/2025) 90 tablet 1    fluticasone (FLONASE) 50 mcg/act nasal spray 1 spray into each nostril in the morning.      metoprolol tartrate (LOPRESSOR) 25 mg tablet Take 0.5 tablets (12.5 mg total) by mouth every 12 (twelve) hours (Patient not taking: Reported on 6/25/2025) 90 tablet 3    sodium bicarbonate 650 mg tablet Take 2 tablets (1,300 mg total) by mouth 3 (three) times  daily after meals (Patient not taking: Reported on 6/25/2025) 180 tablet 0     No current facility-administered medications on file prior to visit.

## 2025-07-18 ENCOUNTER — HOSPITAL ENCOUNTER (OUTPATIENT)
Dept: NON INVASIVE DIAGNOSTICS | Facility: HOSPITAL | Age: 80
Discharge: HOME | End: 2025-07-18
Attending: RADIOLOGY

## 2025-07-18 ENCOUNTER — TRANSCRIBE ORDERS (OUTPATIENT)
Dept: RADIOLOGY | Facility: HOSPITAL | Age: 80
End: 2025-07-18

## 2025-07-18 DIAGNOSIS — N13.2 URETERAL STONE WITH HYDRONEPHROSIS: ICD-10-CM

## 2025-07-18 DIAGNOSIS — C61 MALIGNANT NEOPLASM OF PROSTATE (HCC): Primary | ICD-10-CM

## 2025-07-18 DIAGNOSIS — C67.9 MALIGNANT NEOPLASM OF URINARY BLADDER, UNSPECIFIED SITE (HCC): ICD-10-CM

## 2025-07-18 NOTE — H&P
Interventional Radiology  History and Physical 7/18/2025     Joel yWman   1945   [id number]    Assessment/Plan:  79 year old male with history of cystoprostatectomy with ileal conduit, right staghorn calculus with hydronephrosis s/p right PCN placement on 5/16/2025, returns due to concerns for leakage around the PCN.    Right PCN appeared to be stable position without leakage.  No signs of infection.  PCN continues to have good output.  Patient will return as previously scheduled on 8/4/2025 for conversion of the PCN to a retrograde nephroureteral catheter.    Problem List Items Addressed This Visit    None  Visit Diagnoses         Ureteral stone with hydronephrosis        Relevant Orders    IR nephrostomy tube check/change/reposition/reinsertion/upsize               Subjective:     Patient ID: Joel Wyman is a 79 y.o. male.    History of Present Illness  Patient with history of cystoprostatectomy with ileal conduit, right staghorn calculus with hydronephrosis s/p right PCN placement on 5/16/2025, returns due to concerns for leakage around the PCN.    Review of Systems      Past Medical History[1]     Past Surgical History[2]     Tobacco Use History[3]     Social History     Substance and Sexual Activity   Alcohol Use Never    Comment: None in over 50 yrs        Social History     Substance and Sexual Activity   Drug Use Never        Allergies[4]    Current Medications[5]       Objective:    There were no vitals filed for this visit.     Physical Exam  Genitourinary:     Comments: Right PCN in place.  No leakage around catheter.  No erythema or signs of infection.          Lab Results   Component Value Date    BNP 34 06/02/2023      Lab Results   Component Value Date    WBC 9.07 06/11/2025    HGB 11.2 (L) 06/11/2025    HCT 36.3 (L) 06/11/2025    MCV 91 06/11/2025     06/11/2025     Lab Results   Component Value Date    INR 1.16 05/16/2025    INR 1.08 05/14/2024    INR 1.13 05/03/2024    PROTIME  15.3 (H) 05/16/2025    PROTIME 14.2 05/14/2024    PROTIME 14.7 (H) 05/03/2024     Lab Results   Component Value Date    PTT 32 05/14/2024         I have personally reviewed pertinent imaging and laboratory results.     Code Status: Prior  Advance Directive and Living Will:      Power of :    POLST:      This text is generated with voice recognition software. There may be translation, syntax,  or grammatical errors. If you have any questions, please contact the dictating provider.        [1]   Past Medical History:  Diagnosis Date    Bladder infection 03/2024    Borderline diabetes     Cancer (HCC)     prostate    COPD (chronic obstructive pulmonary disease) (HCC)     Hematuria     History of transfusion     x2    Hyperlipidemia     Hypertension     Radiation cystitis     Smoker     Smoker     Wears glasses    [2]   Past Surgical History:  Procedure Laterality Date    APPENDECTOMY      FL CYSTOGRAM  4/24/2024    FL CYSTOGRAM  5/23/2024    FL RETROGRADE PYELOGRAM  04/14/2021    FL RETROGRADE PYELOGRAM  11/16/2023    FL RETROGRADE PYELOGRAM  4/11/2024    HERNIA REPAIR      IR EMBOLIZATION (SPECIFY VESSEL OR SITE)  09/17/2021    IR NEPHROSTOMY TUBE CHECK AND/OR REMOVAL  07/08/2021    IR NEPHROSTOMY TUBE CHECK/CHANGE/REPOSITION/REINSERTION/UPSIZE  05/10/2021    IR NEPHROSTOMY TUBE CHECK/CHANGE/REPOSITION/REINSERTION/UPSIZE  08/04/2021    IR NEPHROSTOMY TUBE CHECK/CHANGE/REPOSITION/REINSERTION/UPSIZE  10/13/2021    IR NEPHROSTOMY TUBE PLACEMENT  05/07/2021    IR NEPHROSTOMY TUBE PLACEMENT  5/16/2025    IR SUPRAPUBIC CATHETER PLACEMENT  5/3/2024    IR TUNNELED CENTRAL LINE PLACEMENT  5/22/2025    OK CYSTO W/IRRIG & EVAC MULTPLE OBSTRUCTING CLOTS Bilateral 04/14/2021    Procedure: CYSTOSCOPY EVACUATION OF CLOTS bilateral retrograde pyelograms possible TURBT bladder biopsy;  Surgeon: Yovani Khan MD;  Location: [facility];  Service: Urology    OK CYSTO W/IRRIG & EVAC MULTPLE OBSTRUCTING CLOTS N/A 04/24/2021     Procedure: CYSTOSCOPY EVACUATION OF CLOTS fulguration;  Surgeon: Yovani Khan MD;  Location: [facility];  Service: Urology    OH CYSTO W/IRRIG & EVAC MULTPLE OBSTRUCTING CLOTS N/A 07/08/2021    Procedure: CYSTOSCOPY EVACUATION OF CLOTS BILATERAL ANTIROGRADES NEPHROSTOMY TUBES, FULGERATION ;  Surgeon: Yovani Khan MD;  Location: [facility];  Service: Urology    OH CYSTO W/IRRIG & EVAC MULTPLE OBSTRUCTING CLOTS N/A 08/22/2021    Procedure: CYSTOSCOPY, RIGHT RETROGRADE, EVACUATION OF CLOTS, BLADDER BIOPSY X2 AND FULGERATION OF BLADDER LESIONS, URETEROSCOPY, 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;  Surgeon: Yovani Khan MD;  Location: [facility];  Service: Urology    OH CYSTO W/IRRIG & EVAC MULTPLE OBSTRUCTING CLOTS Bilateral 4/24/2024    Procedure: CYSTOSCOPY EVACUATION OF CLOTS fulguration bladder neck tumors and biopsy, retrograde pyelograms, DVIU;  Surgeon: Yovani Khan MD;  Location: [facility];  Service: Urology    OH CYSTO W/IRRIG & EVAC MULTPLE OBSTRUCTING CLOTS N/A 5/23/2024    Procedure: CYSTOSCOPY FULGERATION RESECTION BLADDER NECK, FLUORSCOPIC CYSTOGRAM, EVACUATION OF CLOTS;  Surgeon: Yovani Khan MD;  Location: [facility];  Service: Urology    OH CYSTOURETHROSCOPY W/DEST &/RMVL MED BLADDER AMARI N/A 4/11/2024    Procedure: TRANSURETHRAL RESECTION OF BLADDER TUMOR (TURBT);  Surgeon: Yovani Khan MD;  Location: [facility];  Service: Urology    OH CYSTOURETHROSCOPY W/URETERAL CATHETERIZATION Bilateral 4/11/2024    Procedure: BILATERAL CYSTOSCOPY WITH RETROGRADE PYELOGRAM, BLADDER NECK CONTRACTURE;  Surgeon: Yovani Khan MD;  Location: [facility];  Service: Urology    OH CYSTOURETHROSCOPY WITH BIOPSY N/A 11/16/2023    Procedure: CYSTOSCOPY, BILATERAL RETROGRADEY PYELOGRAM,  FULGERATION 2.0 CM BLADDER TUMOR WITH BIOPSY;  Surgeon: Yovani Khan MD;  Location: [facility];  Service: Urology    PROSTATECTOMY  2014    ROTATOR CUFF REPAIR      right  shoulder   [3]   Social History  Tobacco Use   Smoking Status Every Day    Current packs/day: 1.00    Average packs/day: 0.8 packs/day for 112.5 years (87.5 ttl pk-yrs)    Types: Cigarettes    Start date: 1963    Passive exposure: Past   Smokeless Tobacco Never   [4] No Known Allergies  [5]   Current Outpatient Medications   Medication Sig Dispense Refill    albuterol (2.5 mg/3 mL) 0.083 % nebulizer solution Take 3 mL (2.5 mg total) by nebulization every 6 (six) hours as needed for wheezing or shortness of breath 180 mL 5    apixaban (ELIQUIS) 2.5 mg Take 1 tablet (2.5 mg total) by mouth 2 (two) times a day (Patient not taking: Reported on 6/25/2025) 180 tablet 0    aspirin (Ecotrin Low Strength) 81 mg EC tablet Take 1 tablet (81 mg total) by mouth daily (Patient not taking: Reported on 6/25/2025) 90 tablet 1    atorvastatin (LIPITOR) 20 mg tablet Take 20 mg by mouth in the morning.      fluticasone (FLONASE) 50 mcg/act nasal spray 1 spray into each nostril in the morning.      fluticasone-umeclidinium-vilanterol (Trelegy Ellipta) 200-62.5-25 mcg/actuation AEPB inhaler Inhale 1 puff every morning 180 blister 5    gabapentin (NEURONTIN) 100 mg capsule Take 1 capsule (100 mg total) by mouth 3 (three) times a day 300 capsule 3    metoprolol tartrate (LOPRESSOR) 25 mg tablet Take 0.5 tablets (12.5 mg total) by mouth every 12 (twelve) hours (Patient not taking: Reported on 6/25/2025) 90 tablet 3    Multiple Vitamin (multivitamin) tablet Take 1 tablet by mouth daily      sodium bicarbonate 650 mg tablet Take 2 tablets (1,300 mg total) by mouth 3 (three) times daily after meals (Patient not taking: Reported on 6/25/2025) 180 tablet 0     No current facility-administered medications for this encounter.

## 2025-08-04 ENCOUNTER — HOSPITAL ENCOUNTER (OUTPATIENT)
Dept: NON INVASIVE DIAGNOSTICS | Facility: HOSPITAL | Age: 80
Discharge: HOME | End: 2025-08-04
Attending: STUDENT IN AN ORGANIZED HEALTH CARE EDUCATION/TRAINING PROGRAM
Payer: COMMERCIAL

## 2025-08-04 VITALS
DIASTOLIC BLOOD PRESSURE: 62 MMHG | BODY MASS INDEX: 28.43 KG/M2 | TEMPERATURE: 97.6 F | HEIGHT: 71 IN | HEART RATE: 81 BPM | RESPIRATION RATE: 18 BRPM | OXYGEN SATURATION: 95 % | SYSTOLIC BLOOD PRESSURE: 140 MMHG | WEIGHT: 203.04 LBS

## 2025-08-04 DIAGNOSIS — N13.2 HYDRONEPHROSIS WITH URINARY OBSTRUCTION DUE TO URETERAL CALCULUS: Primary | ICD-10-CM

## 2025-08-04 DIAGNOSIS — N30.40 RADIATION CYSTITIS: ICD-10-CM

## 2025-08-04 PROCEDURE — 50434 CONVERT NEPHROSTOMY CATHETER: CPT

## 2025-08-04 PROCEDURE — 99152 MOD SED SAME PHYS/QHP 5/>YRS: CPT

## 2025-08-04 PROCEDURE — C1769 GUIDE WIRE: HCPCS | Performed by: RADIOLOGY

## 2025-08-04 PROCEDURE — 50690 INJECTION FOR URETER X-RAY: CPT

## 2025-08-04 PROCEDURE — 74425 UROGRAPHY ANTEGRADE RS&I: CPT

## 2025-08-04 PROCEDURE — C1894 INTRO/SHEATH, NON-LASER: HCPCS | Performed by: RADIOLOGY

## 2025-08-04 PROCEDURE — 50688 CHANGE OF URETER TUBE/STENT: CPT

## 2025-08-04 PROCEDURE — C1729 CATH, DRAINAGE: HCPCS | Performed by: RADIOLOGY

## 2025-08-04 PROCEDURE — 99153 MOD SED SAME PHYS/QHP EA: CPT

## 2025-08-04 RX ORDER — LIDOCAINE WITH 8.4% SOD BICARB 0.9%(10ML)
SYRINGE (ML) INJECTION AS NEEDED
Status: COMPLETED | OUTPATIENT
Start: 2025-08-04 | End: 2025-08-04

## 2025-08-04 RX ORDER — MIDAZOLAM HYDROCHLORIDE 2 MG/2ML
INJECTION, SOLUTION INTRAMUSCULAR; INTRAVENOUS AS NEEDED
Status: COMPLETED | OUTPATIENT
Start: 2025-08-04 | End: 2025-08-04

## 2025-08-04 RX ORDER — LEVOFLOXACIN 25 MG/ML
500 INJECTION INTRAVENOUS ONCE
Status: DISCONTINUED | OUTPATIENT
Start: 2025-08-04 | End: 2025-08-04

## 2025-08-04 RX ORDER — LEVOFLOXACIN 5 MG/ML
500 INJECTION, SOLUTION INTRAVENOUS ONCE
Status: COMPLETED | OUTPATIENT
Start: 2025-08-04 | End: 2025-08-04

## 2025-08-04 RX ORDER — FENTANYL CITRATE 50 UG/ML
INJECTION, SOLUTION INTRAMUSCULAR; INTRAVENOUS AS NEEDED
Status: COMPLETED | OUTPATIENT
Start: 2025-08-04 | End: 2025-08-04

## 2025-08-04 RX ADMIN — FENTANYL CITRATE 50 MCG: 50 INJECTION, SOLUTION INTRAMUSCULAR; INTRAVENOUS at 11:16

## 2025-08-04 RX ADMIN — FENTANYL CITRATE 50 MCG: 50 INJECTION, SOLUTION INTRAMUSCULAR; INTRAVENOUS at 11:33

## 2025-08-04 RX ADMIN — LEVOFLOXACIN 500 MG: 5 INJECTION, SOLUTION INTRAVENOUS at 11:14

## 2025-08-04 RX ADMIN — MIDAZOLAM 1 MG: 1 INJECTION INTRAMUSCULAR; INTRAVENOUS at 11:41

## 2025-08-04 RX ADMIN — MIDAZOLAM 1 MG: 1 INJECTION INTRAMUSCULAR; INTRAVENOUS at 11:16

## 2025-08-04 RX ADMIN — Medication 6 ML: at 11:28

## 2025-08-04 RX ADMIN — IOHEXOL 30 ML: 350 INJECTION, SOLUTION INTRAVENOUS at 12:42

## 2025-08-18 ENCOUNTER — TELEPHONE (OUTPATIENT)
Age: 80
End: 2025-08-18

## 2025-08-20 DIAGNOSIS — I48.91 ATRIAL FIBRILLATION, UNSPECIFIED TYPE (HCC): ICD-10-CM

## 2025-08-21 RX ORDER — APIXABAN 2.5 MG/1
2.5 TABLET, FILM COATED ORAL 2 TIMES DAILY
Qty: 180 TABLET | Refills: 0 | Status: SHIPPED | OUTPATIENT
Start: 2025-08-21 | End: 2025-08-26 | Stop reason: SDUPTHER

## (undated) DEVICE — GROUNDING PAD UNIVERSAL SLW

## (undated) DEVICE — BOWL: 16OZ PEELPOUCH 75/CS 16/PLT: Brand: MEDEGEN MEDICAL PRODUCTS, LLC

## (undated) DEVICE — STORZ LOOP ELECTRODE 24 FR

## (undated) DEVICE — SYRINGE 20ML LL

## (undated) DEVICE — CYSTOSCOPY PACK: Brand: CONVERTORS

## (undated) DEVICE — FIBER HOLMIUM 200

## (undated) DEVICE — POUCH URO CATCHER II STERILE

## (undated) DEVICE — Device

## (undated) DEVICE — STERILE SURGICAL LUBRICANT,  TUBE: Brand: SURGILUBE

## (undated) DEVICE — CYSTO TUBING TUR Y IRRIGATION

## (undated) DEVICE — LUBRICANT JELLY SURGILUBE TUBE 4OZ FLIP TOP

## (undated) DEVICE — EVACUATOR BLADDER ELLIK DISP STRL

## (undated) DEVICE — BAG URINE DRAINAGE 4000ML CONTINUOUS IRR

## (undated) DEVICE — SPONGE STICK WITH PVP-I: Brand: KENDALL

## (undated) DEVICE — CATH SECURE FOLEY

## (undated) DEVICE — GLOVE SRG BIOGEL 8

## (undated) DEVICE — LUBRICANT SURGILUBE TUBE 4 OZ  FLIP TOP

## (undated) DEVICE — GLOVE SRG LF STRL BGL SKNSNS 8 PF

## (undated) DEVICE — SPONGE 4 X 4 XRAY 16 PLY STRL LF RFD

## (undated) DEVICE — TOWEL SET X-RAY

## (undated) DEVICE — POUCH UR CATCHER STERILE

## (undated) DEVICE — STERILE DOUBLE BASIN SET PACK: Brand: CARDINAL HEALTH

## (undated) DEVICE — RADIOLOGY STERILE LABELS: Brand: CENTURION

## (undated) DEVICE — KNIFE,COLD,STERILE, SINGLE USE: Brand: N.A.

## (undated) DEVICE — STANDARD SURGICAL GOWN, L: Brand: CONVERTORS

## (undated) DEVICE — GUIDEWIRE STRGHT TIP 0.038 IN SOLO PLUS

## (undated) DEVICE — GUIDEWIRE STRGHT TIP 0.035 IN  SOLO PLUS

## (undated) DEVICE — FABRIC REINFORCED, SURGICAL GOWN, XL: Brand: CONVERTORS

## (undated) DEVICE — CATH URETERAL 5FR X 70 CM FLEX TIP POLYUR BARD

## (undated) DEVICE — SPECIMEN CONTAINER STERILE PEEL PACK

## (undated) DEVICE — ELECTRODE,CUTTING,STERILE.24FR: Brand: N.A.

## (undated) DEVICE — SOLUTION BOWL: Brand: KENDALL

## (undated) DEVICE — URETEROSCOPIC BIOPSY FORCEPS: Brand: PIRANHA

## (undated) DEVICE — RESECTOSCOPE LOOE ELECTRODE 27040F/6